# Patient Record
Sex: MALE | Race: WHITE | NOT HISPANIC OR LATINO | Employment: OTHER | URBAN - METROPOLITAN AREA
[De-identification: names, ages, dates, MRNs, and addresses within clinical notes are randomized per-mention and may not be internally consistent; named-entity substitution may affect disease eponyms.]

---

## 2017-01-17 ENCOUNTER — CONVERSION ENCOUNTER (OUTPATIENT)
Dept: SURGERY | Facility: CLINIC | Age: 72
End: 2017-01-17

## 2017-02-17 ENCOUNTER — CONVERSION ENCOUNTER (OUTPATIENT)
Dept: SURGERY | Facility: CLINIC | Age: 72
End: 2017-02-17

## 2017-02-24 ENCOUNTER — HOSPITAL ENCOUNTER (OUTPATIENT)
Dept: CARDIOLOGY | Facility: HOSPITAL | Age: 72
Discharge: HOME | End: 2017-02-24
Attending: INTERNAL MEDICINE | Admitting: INTERNAL MEDICINE

## 2017-02-27 ENCOUNTER — HOSPITAL ENCOUNTER (OUTPATIENT)
Dept: LAB | Facility: HOSPITAL | Age: 72
Setting detail: SPECIMEN
Discharge: HOME | End: 2017-02-27
Attending: INTERNAL MEDICINE | Admitting: INTERNAL MEDICINE

## 2017-02-27 LAB
ALBUMIN SERPL-MCNC: 3 G/DL (ref 3.5–4.8)
ALBUMIN/GLOB SERPL: 0.8 {RATIO} (ref 1–1.7)
ALP SERPL-CCNC: 60 IU/L (ref 32–91)
ALT SERPL-CCNC: 17 IU/L (ref 17–63)
ANION GAP SERPL CALC-SCNC: 11.1 MMOL/L (ref 10–20)
AST SERPL-CCNC: 27 IU/L (ref 15–41)
BILIRUB SERPL-MCNC: 0.5 MG/DL (ref 0.3–1.2)
BUN SERPL-MCNC: 26 MG/DL (ref 8–20)
BUN/CREAT SERPL: 18.6 (ref 6.2–20.3)
CALCIUM SERPL-MCNC: 8.4 MG/DL (ref 8.9–10.3)
CHLORIDE SERPL-SCNC: 107 MMOL/L (ref 101–111)
CHOLEST SERPL-MCNC: 133 MG/DL
CHOLEST/HDLC SERPL: 3.6 {RATIO}
CONV CO2: 26 MMOL/L (ref 22–32)
CONV LDL CHOLESTEROL DIRECT: 70 MG/DL (ref 0–100)
CONV TOTAL PROTEIN: 6.6 G/DL (ref 6.1–7.9)
CREAT UR-MCNC: 1.4 MG/DL (ref 0.7–1.2)
GLOBULIN UR ELPH-MCNC: 3.6 G/DL (ref 2.5–3.8)
GLUCOSE SERPL-MCNC: 130 MG/DL (ref 65–99)
HDLC SERPL-MCNC: 37 MG/DL
LDLC/HDLC SERPL: 1.9 {RATIO}
LIPID INTERPRETATION: ABNORMAL
POTASSIUM SERPL-SCNC: 4.1 MMOL/L (ref 3.6–5.1)
SODIUM SERPL-SCNC: 140 MMOL/L (ref 136–144)
T4 FREE SERPL-MCNC: 0.89 NG/DL (ref 0.58–1.64)
TRIGL SERPL-MCNC: 107 MG/DL
TSH SERPL-ACNC: 0.8 UIU/ML (ref 0.34–5.6)
VLDLC SERPL CALC-MCNC: 25.4 MG/DL

## 2017-03-06 ENCOUNTER — HOSPITAL ENCOUNTER (OUTPATIENT)
Dept: LAB | Facility: HOSPITAL | Age: 72
Setting detail: SPECIMEN
Discharge: HOME | End: 2017-03-06
Attending: INTERNAL MEDICINE | Admitting: INTERNAL MEDICINE

## 2017-03-06 ENCOUNTER — CONVERSION ENCOUNTER (OUTPATIENT)
Dept: SURGERY | Facility: CLINIC | Age: 72
End: 2017-03-06

## 2017-03-06 LAB
CONV MICROALBUM.,U,RANDOM: <2 MG/L
CREAT 24H UR-MCNC: 55.8 MG/DL
MICROALBUMIN/CREAT UR: <3.6 UG/MG

## 2017-04-04 ENCOUNTER — HOSPITAL ENCOUNTER (OUTPATIENT)
Dept: LAB | Facility: HOSPITAL | Age: 72
Discharge: HOME | End: 2017-04-04
Attending: INTERNAL MEDICINE | Admitting: INTERNAL MEDICINE

## 2017-04-04 LAB
ANION GAP SERPL CALC-SCNC: 14.3 MMOL/L (ref 10–20)
BASOPHILS # BLD AUTO: 0 10*3/UL (ref 0–0.2)
BASOPHILS NFR BLD AUTO: 1 % (ref 0–2)
BILIRUB UR QL STRIP: NEGATIVE MG/DL
BUN SERPL-MCNC: 25 MG/DL (ref 8–20)
BUN/CREAT SERPL: 19.2 (ref 6.2–20.3)
CALCIUM SERPL-MCNC: 9 MG/DL (ref 8.9–10.3)
CASTS URNS QL MICRO: ABNORMAL /[LPF]
CHLORIDE SERPL-SCNC: 107 MMOL/L (ref 101–111)
COLOR UR: YELLOW
CONV BACTERIA IN URINE MICRO: NEGATIVE
CONV CLARITY OF URINE: CLEAR
CONV CO2: 27 MMOL/L (ref 22–32)
CONV HYALINE CASTS IN URINE MICRO: 2 /[LPF] (ref 0–5)
CONV PROTEIN IN URINE BY AUTOMATED TEST STRIP: NEGATIVE MG/DL
CONV SMALL ROUND CELLS: ABNORMAL /[HPF]
CONV UROBILINOGEN IN URINE BY AUTOMATED TEST STRIP: 1 MG/DL
CREAT 24H UR-MCNC: 121.8 MG/DL
CREAT UR-MCNC: 1.3 MG/DL (ref 0.7–1.2)
CULTURE INDICATED?: ABNORMAL
DIFFERENTIAL METHOD BLD: (no result)
EOSINOPHIL # BLD AUTO: 0.3 10*3/UL (ref 0–0.3)
EOSINOPHIL # BLD AUTO: 4 % (ref 0–3)
ERYTHROCYTE [DISTWIDTH] IN BLOOD BY AUTOMATED COUNT: 14.9 % (ref 11.5–14.5)
GLUCOSE SERPL-MCNC: 138 MG/DL (ref 65–99)
GLUCOSE UR QL: NEGATIVE MG/DL
HCT VFR BLD AUTO: 32.6 % (ref 40–54)
HGB BLD-MCNC: 10.8 G/DL (ref 14–18)
HGB UR QL STRIP: ABNORMAL
KETONES UR QL STRIP: NEGATIVE MG/DL
LEUKOCYTE ESTERASE UR QL STRIP: NEGATIVE
LYMPHOCYTES # BLD AUTO: 1.4 10*3/UL (ref 0.8–4.8)
LYMPHOCYTES NFR BLD AUTO: 19 % (ref 18–42)
MCH RBC QN AUTO: 29.8 PG (ref 26–32)
MCHC RBC AUTO-ENTMCNC: 33.3 G/DL (ref 32–36)
MCV RBC AUTO: 89.5 FL (ref 80–94)
MONOCYTES # BLD AUTO: 0.7 10*3/UL (ref 0.1–1.3)
MONOCYTES NFR BLD AUTO: 9 % (ref 2–11)
NEUTROPHILS # BLD AUTO: 5 10*3/UL (ref 2.3–8.6)
NEUTROPHILS NFR BLD AUTO: 67 % (ref 50–75)
NITRITE UR QL STRIP: NEGATIVE
NRBC BLD AUTO-RTO: 0 /100{WBCS}
NRBC/RBC NFR BLD MANUAL: 0 10*3/UL
PH UR STRIP.AUTO: 5.5 [PH] (ref 4.5–8)
PHOSPHATE SERPL-MCNC: 2.8 MG/DL (ref 2.4–4.7)
PLATELET # BLD AUTO: 185 10*3/UL (ref 150–450)
PMV BLD AUTO: 9.3 FL (ref 7.4–10.4)
POTASSIUM SERPL-SCNC: 5.3 MMOL/L (ref 3.6–5.1)
PROT UR-MCNC: 8 MG/DL
RBC # BLD AUTO: 3.64 10*6/UL (ref 4.6–6)
RBC #/AREA URNS HPF: 2 /[HPF] (ref 0–3)
SODIUM SERPL-SCNC: 143 MMOL/L (ref 136–144)
SP GR UR: 1.02 (ref 1–1.03)
SPERM URNS QL MICRO: ABNORMAL /[HPF]
SQUAMOUS SPT QL MICRO: 1 /[HPF] (ref 0–5)
UNIDENT CRYS URNS QL MICRO: ABNORMAL /[HPF]
WBC # BLD AUTO: 7.4 10*3/UL (ref 4.5–11.5)
WBC #/AREA URNS HPF: 1 /[HPF] (ref 0–5)
YEAST SPEC QL WET PREP: ABNORMAL /[HPF]

## 2017-04-05 LAB — PTH-INTACT SERPL-MCNC: 36 PG/ML (ref 11–72)

## 2017-04-18 ENCOUNTER — HOSPITAL ENCOUNTER (OUTPATIENT)
Dept: LAB | Facility: HOSPITAL | Age: 72
Discharge: HOME | End: 2017-04-18
Attending: INTERNAL MEDICINE | Admitting: INTERNAL MEDICINE

## 2017-04-18 LAB
INR PPP: 1 (ref 2–3)
PROTHROMBIN TIME: 11.8 SEC (ref 19.4–28.5)

## 2017-04-19 ENCOUNTER — OFFICE (AMBULATORY)
Dept: URBAN - METROPOLITAN AREA CLINIC 64 | Facility: CLINIC | Age: 72
End: 2017-04-19
Payer: COMMERCIAL

## 2017-04-19 ENCOUNTER — HOSPITAL ENCOUNTER (OUTPATIENT)
Dept: OTHER | Facility: HOSPITAL | Age: 72
Setting detail: SPECIMEN
Discharge: HOME | End: 2017-04-19
Attending: INTERNAL MEDICINE | Admitting: INTERNAL MEDICINE

## 2017-04-19 ENCOUNTER — ON CAMPUS - OUTPATIENT (AMBULATORY)
Dept: URBAN - METROPOLITAN AREA HOSPITAL 2 | Facility: HOSPITAL | Age: 72
End: 2017-04-19
Payer: COMMERCIAL

## 2017-04-19 VITALS
HEART RATE: 86 BPM | OXYGEN SATURATION: 96 % | OXYGEN SATURATION: 99 % | DIASTOLIC BLOOD PRESSURE: 63 MMHG | SYSTOLIC BLOOD PRESSURE: 116 MMHG | HEART RATE: 71 BPM | OXYGEN SATURATION: 93 % | HEART RATE: 70 BPM | RESPIRATION RATE: 16 BRPM | DIASTOLIC BLOOD PRESSURE: 60 MMHG | WEIGHT: 315 LBS | HEART RATE: 74 BPM | SYSTOLIC BLOOD PRESSURE: 108 MMHG | SYSTOLIC BLOOD PRESSURE: 156 MMHG | OXYGEN SATURATION: 100 % | TEMPERATURE: 97.8 F | HEIGHT: 74 IN | HEART RATE: 72 BPM | DIASTOLIC BLOOD PRESSURE: 62 MMHG | DIASTOLIC BLOOD PRESSURE: 73 MMHG | OXYGEN SATURATION: 95 % | DIASTOLIC BLOOD PRESSURE: 66 MMHG

## 2017-04-19 DIAGNOSIS — K44.9 DIAPHRAGMATIC HERNIA WITHOUT OBSTRUCTION OR GANGRENE: ICD-10-CM

## 2017-04-19 DIAGNOSIS — K22.70 BARRETT'S ESOPHAGUS WITHOUT DYSPLASIA: ICD-10-CM

## 2017-04-19 DIAGNOSIS — K31.7 POLYP OF STOMACH AND DUODENUM: ICD-10-CM

## 2017-04-19 DIAGNOSIS — K25.9 GASTRIC ULCER, UNSPECIFIED AS ACUTE OR CHRONIC, WITHOUT HEMO: ICD-10-CM

## 2017-04-19 DIAGNOSIS — T18.2XXA FOREIGN BODY IN STOMACH, INITIAL ENCOUNTER: ICD-10-CM

## 2017-04-19 LAB
GI HISTOLOGY: A. SELECT: (no result)
GI HISTOLOGY: B. UNSPECIFIED: (no result)
GI HISTOLOGY: PDF REPORT: (no result)

## 2017-04-19 PROCEDURE — 88305 TISSUE EXAM BY PATHOLOGIST: CPT | Mod: 26 | Performed by: INTERNAL MEDICINE

## 2017-04-19 PROCEDURE — 43239 EGD BIOPSY SINGLE/MULTIPLE: CPT | Performed by: INTERNAL MEDICINE

## 2017-04-19 RX ADMIN — PROPOFOL: 10 INJECTION, EMULSION INTRAVENOUS at 12:04

## 2017-04-20 ENCOUNTER — HOSPITAL ENCOUNTER (OUTPATIENT)
Dept: SLEEP MEDICINE | Facility: HOSPITAL | Age: 72
Discharge: HOME | End: 2017-04-20
Attending: INTERNAL MEDICINE | Admitting: INTERNAL MEDICINE

## 2017-04-25 ENCOUNTER — HOSPITAL ENCOUNTER (OUTPATIENT)
Dept: LAB | Facility: HOSPITAL | Age: 72
Discharge: HOME | End: 2017-04-25
Attending: INTERNAL MEDICINE | Admitting: INTERNAL MEDICINE

## 2017-04-25 LAB — POTASSIUM SERPL-SCNC: 5.3 MMOL/L (ref 3.6–5.1)

## 2017-06-01 ENCOUNTER — HOSPITAL ENCOUNTER (OUTPATIENT)
Dept: LAB | Facility: HOSPITAL | Age: 72
Setting detail: SPECIMEN
Discharge: HOME | End: 2017-06-01
Attending: INTERNAL MEDICINE | Admitting: INTERNAL MEDICINE

## 2017-06-01 LAB
ALBUMIN SERPL-MCNC: 3.1 G/DL (ref 3.5–4.8)
ALBUMIN/GLOB SERPL: 0.7 {RATIO} (ref 1–1.7)
ALP SERPL-CCNC: 73 IU/L (ref 32–91)
ALT SERPL-CCNC: 23 IU/L (ref 17–63)
ANION GAP SERPL CALC-SCNC: 17.3 MMOL/L (ref 10–20)
AST SERPL-CCNC: 40 IU/L (ref 15–41)
BILIRUB SERPL-MCNC: 0.7 MG/DL (ref 0.3–1.2)
BUN SERPL-MCNC: 22 MG/DL (ref 8–20)
BUN/CREAT SERPL: 16.9 (ref 6.2–20.3)
CALCIUM SERPL-MCNC: 8.6 MG/DL (ref 8.9–10.3)
CHLORIDE SERPL-SCNC: 103 MMOL/L (ref 101–111)
CHOLEST SERPL-MCNC: 131 MG/DL
CHOLEST/HDLC SERPL: 3.2 {RATIO}
CONV CO2: 24 MMOL/L (ref 22–32)
CONV LDL CHOLESTEROL DIRECT: 71 MG/DL (ref 0–100)
CONV TOTAL PROTEIN: 7.4 G/DL (ref 6.1–7.9)
CREAT UR-MCNC: 1.3 MG/DL (ref 0.7–1.2)
GLOBULIN UR ELPH-MCNC: 4.3 G/DL (ref 2.5–3.8)
GLUCOSE SERPL-MCNC: 148 MG/DL (ref 65–99)
HDLC SERPL-MCNC: 41 MG/DL
LDLC/HDLC SERPL: 1.7 {RATIO}
LIPID INTERPRETATION: NORMAL
POTASSIUM SERPL-SCNC: 4.3 MMOL/L (ref 3.6–5.1)
SODIUM SERPL-SCNC: 140 MMOL/L (ref 136–144)
T4 FREE SERPL-MCNC: 0.89 NG/DL (ref 0.58–1.64)
TRIGL SERPL-MCNC: 113 MG/DL
TSH SERPL-ACNC: 1.65 UIU/ML (ref 0.34–5.6)
VLDLC SERPL CALC-MCNC: 19 MG/DL

## 2017-06-08 ENCOUNTER — CONVERSION ENCOUNTER (OUTPATIENT)
Dept: SURGERY | Facility: CLINIC | Age: 72
End: 2017-06-08

## 2017-06-08 ENCOUNTER — HOSPITAL ENCOUNTER (OUTPATIENT)
Dept: LAB | Facility: HOSPITAL | Age: 72
Setting detail: SPECIMEN
Discharge: HOME | End: 2017-06-08
Attending: INTERNAL MEDICINE | Admitting: INTERNAL MEDICINE

## 2017-06-08 LAB
CONV MICROALBUM.,U,RANDOM: 4 MG/L
CREAT 24H UR-MCNC: 67.2 MG/DL
MICROALBUMIN/CREAT UR: 6 UG/MG

## 2017-07-18 ENCOUNTER — CONVERSION ENCOUNTER (OUTPATIENT)
Dept: SURGERY | Facility: CLINIC | Age: 72
End: 2017-07-18

## 2017-08-31 ENCOUNTER — HOSPITAL ENCOUNTER (OUTPATIENT)
Dept: LAB | Facility: HOSPITAL | Age: 72
Setting detail: SPECIMEN
Discharge: HOME | End: 2017-08-31
Attending: INTERNAL MEDICINE | Admitting: INTERNAL MEDICINE

## 2017-08-31 LAB
ALBUMIN SERPL-MCNC: 3.1 G/DL (ref 3.5–4.8)
ALBUMIN/GLOB SERPL: 0.9 {RATIO} (ref 1–1.7)
ALP SERPL-CCNC: 71 IU/L (ref 32–91)
ALT SERPL-CCNC: 22 IU/L (ref 17–63)
ANION GAP SERPL CALC-SCNC: 13.9 MMOL/L (ref 10–20)
AST SERPL-CCNC: 41 IU/L (ref 15–41)
BILIRUB SERPL-MCNC: 0.4 MG/DL (ref 0.3–1.2)
BUN SERPL-MCNC: 28 MG/DL (ref 8–20)
BUN/CREAT SERPL: 18.7 (ref 6.2–20.3)
CALCIUM SERPL-MCNC: 8.5 MG/DL (ref 8.9–10.3)
CHLORIDE SERPL-SCNC: 107 MMOL/L (ref 101–111)
CHOLEST SERPL-MCNC: 132 MG/DL
CHOLEST/HDLC SERPL: 3 {RATIO}
CONV CO2: 24 MMOL/L (ref 22–32)
CONV LDL CHOLESTEROL DIRECT: 73 MG/DL (ref 0–100)
CONV TOTAL PROTEIN: 6.4 G/DL (ref 6.1–7.9)
CREAT UR-MCNC: 1.5 MG/DL (ref 0.7–1.2)
GLOBULIN UR ELPH-MCNC: 3.3 G/DL (ref 2.5–3.8)
GLUCOSE SERPL-MCNC: 174 MG/DL (ref 65–99)
HDLC SERPL-MCNC: 45 MG/DL
LDLC/HDLC SERPL: 1.6 {RATIO}
LIPID INTERPRETATION: NORMAL
POTASSIUM SERPL-SCNC: 4.9 MMOL/L (ref 3.6–5.1)
SODIUM SERPL-SCNC: 140 MMOL/L (ref 136–144)
T4 FREE SERPL-MCNC: 0.84 NG/DL (ref 0.58–1.64)
TRIGL SERPL-MCNC: 103 MG/DL
TSH SERPL-ACNC: 0.76 UIU/ML (ref 0.34–5.6)
VLDLC SERPL CALC-MCNC: 14.7 MG/DL

## 2017-09-07 ENCOUNTER — CONVERSION ENCOUNTER (OUTPATIENT)
Dept: SURGERY | Facility: CLINIC | Age: 72
End: 2017-09-07

## 2017-11-30 ENCOUNTER — HOSPITAL ENCOUNTER (OUTPATIENT)
Dept: LAB | Facility: HOSPITAL | Age: 72
Setting detail: SPECIMEN
Discharge: HOME | End: 2017-11-30
Attending: INTERNAL MEDICINE | Admitting: INTERNAL MEDICINE

## 2017-11-30 LAB
ALBUMIN SERPL-MCNC: 2.9 G/DL (ref 3.5–4.8)
ALBUMIN/GLOB SERPL: 0.7 {RATIO} (ref 1–1.7)
ALP SERPL-CCNC: 93 IU/L (ref 32–91)
ALT SERPL-CCNC: 25 IU/L (ref 17–63)
ANION GAP SERPL CALC-SCNC: 13.8 MMOL/L (ref 10–20)
AST SERPL-CCNC: 37 IU/L (ref 15–41)
BILIRUB SERPL-MCNC: 0.7 MG/DL (ref 0.3–1.2)
BUN SERPL-MCNC: 31 MG/DL (ref 8–20)
BUN/CREAT SERPL: 22.1 (ref 6.2–20.3)
CALCIUM SERPL-MCNC: 8.4 MG/DL (ref 8.9–10.3)
CHLORIDE SERPL-SCNC: 106 MMOL/L (ref 101–111)
CHOLEST SERPL-MCNC: 116 MG/DL
CHOLEST/HDLC SERPL: 2.8 {RATIO}
CONV CO2: 24 MMOL/L (ref 22–32)
CONV LDL CHOLESTEROL DIRECT: 58 MG/DL (ref 0–100)
CONV TOTAL PROTEIN: 7.2 G/DL (ref 6.1–7.9)
CREAT UR-MCNC: 1.4 MG/DL (ref 0.7–1.2)
GLOBULIN UR ELPH-MCNC: 4.3 G/DL (ref 2.5–3.8)
GLUCOSE SERPL-MCNC: 166 MG/DL (ref 65–99)
HDLC SERPL-MCNC: 42 MG/DL
LDLC/HDLC SERPL: 1.4 {RATIO}
LIPID INTERPRETATION: NORMAL
POTASSIUM SERPL-SCNC: 5.8 MMOL/L (ref 3.6–5.1)
SODIUM SERPL-SCNC: 138 MMOL/L (ref 136–144)
T4 FREE SERPL-MCNC: 0.97 NG/DL (ref 0.58–1.64)
TRIGL SERPL-MCNC: 95 MG/DL
TSH SERPL-ACNC: 0.89 UIU/ML (ref 0.34–5.6)
VLDLC SERPL CALC-MCNC: 15.5 MG/DL

## 2017-12-05 ENCOUNTER — HOSPITAL ENCOUNTER (OUTPATIENT)
Dept: LAB | Facility: HOSPITAL | Age: 72
Setting detail: SPECIMEN
Discharge: HOME | End: 2017-12-05
Attending: INTERNAL MEDICINE | Admitting: INTERNAL MEDICINE

## 2017-12-05 LAB
ANION GAP SERPL CALC-SCNC: 12.8 MMOL/L (ref 10–20)
BUN SERPL-MCNC: 30 MG/DL (ref 8–20)
BUN/CREAT SERPL: 23.1 (ref 6.2–20.3)
CALCIUM SERPL-MCNC: 8.1 MG/DL (ref 8.9–10.3)
CHLORIDE SERPL-SCNC: 104 MMOL/L (ref 101–111)
CONV CO2: 24 MMOL/L (ref 22–32)
CREAT UR-MCNC: 1.3 MG/DL (ref 0.7–1.2)
GLUCOSE SERPL-MCNC: 167 MG/DL (ref 65–99)
POTASSIUM SERPL-SCNC: 4.8 MMOL/L (ref 3.6–5.1)
SODIUM SERPL-SCNC: 136 MMOL/L (ref 136–144)

## 2017-12-07 ENCOUNTER — HOSPITAL ENCOUNTER (OUTPATIENT)
Dept: LAB | Facility: HOSPITAL | Age: 72
Setting detail: SPECIMEN
Discharge: HOME | End: 2017-12-07
Attending: INTERNAL MEDICINE | Admitting: INTERNAL MEDICINE

## 2017-12-07 ENCOUNTER — CONVERSION ENCOUNTER (OUTPATIENT)
Dept: SURGERY | Facility: CLINIC | Age: 72
End: 2017-12-07

## 2018-01-23 ENCOUNTER — HOSPITAL ENCOUNTER (OUTPATIENT)
Dept: LAB | Facility: HOSPITAL | Age: 73
Discharge: HOME | End: 2018-01-23
Attending: INTERNAL MEDICINE | Admitting: INTERNAL MEDICINE

## 2018-01-23 LAB
ANION GAP SERPL CALC-SCNC: 12.6 MMOL/L (ref 10–20)
BASOPHILS # BLD AUTO: 0.1 10*3/UL (ref 0–0.2)
BASOPHILS NFR BLD AUTO: 1 % (ref 0–2)
BILIRUB UR QL STRIP: NEGATIVE MG/DL
BUN SERPL-MCNC: 28 MG/DL (ref 8–20)
BUN/CREAT SERPL: 21.5 (ref 6.2–20.3)
CALCIUM SERPL-MCNC: 8.3 MG/DL (ref 8.9–10.3)
CASTS URNS QL MICRO: ABNORMAL /[LPF]
CHLORIDE SERPL-SCNC: 105 MMOL/L (ref 101–111)
COLOR UR: YELLOW
CONV BACTERIA IN URINE MICRO: NEGATIVE
CONV CLARITY OF URINE: CLEAR
CONV CO2: 25 MMOL/L (ref 22–32)
CONV HYALINE CASTS IN URINE MICRO: 1 /[LPF] (ref 0–5)
CONV PROTEIN IN URINE BY AUTOMATED TEST STRIP: NEGATIVE MG/DL
CONV SMALL ROUND CELLS: ABNORMAL /[HPF]
CONV UROBILINOGEN IN URINE BY AUTOMATED TEST STRIP: 1 MG/DL
CREAT 24H UR-MCNC: 73.4 MG/DL
CREAT UR-MCNC: 1.3 MG/DL (ref 0.7–1.2)
CULTURE INDICATED?: ABNORMAL
DIFFERENTIAL METHOD BLD: (no result)
EOSINOPHIL # BLD AUTO: 0.3 10*3/UL (ref 0–0.3)
EOSINOPHIL # BLD AUTO: 5 % (ref 0–3)
ERYTHROCYTE [DISTWIDTH] IN BLOOD BY AUTOMATED COUNT: 17.6 % (ref 11.5–14.5)
GLUCOSE SERPL-MCNC: 170 MG/DL (ref 65–99)
GLUCOSE UR QL: NEGATIVE MG/DL
HCT VFR BLD AUTO: 24.9 % (ref 40–54)
HGB BLD-MCNC: 7.4 G/DL (ref 14–18)
HGB UR QL STRIP: ABNORMAL
KETONES UR QL STRIP: NEGATIVE MG/DL
LEUKOCYTE ESTERASE UR QL STRIP: NEGATIVE
LYMPHOCYTES # BLD AUTO: 1.3 10*3/UL (ref 0.8–4.8)
LYMPHOCYTES NFR BLD AUTO: 21 % (ref 18–42)
MCH RBC QN AUTO: 23.9 PG (ref 26–32)
MCHC RBC AUTO-ENTMCNC: 29.9 G/DL (ref 32–36)
MCV RBC AUTO: 80 FL (ref 80–94)
MONOCYTES # BLD AUTO: 0.6 10*3/UL (ref 0.1–1.3)
MONOCYTES NFR BLD AUTO: 10 % (ref 2–11)
NEUTROPHILS # BLD AUTO: 3.9 10*3/UL (ref 2.3–8.6)
NEUTROPHILS NFR BLD AUTO: 63 % (ref 50–75)
NITRITE UR QL STRIP: NEGATIVE
NRBC BLD AUTO-RTO: 0 /100{WBCS}
NRBC/RBC NFR BLD MANUAL: 0 10*3/UL
PH UR STRIP.AUTO: 6 [PH] (ref 4.5–8)
PHOSPHATE SERPL-MCNC: 2.6 MG/DL (ref 2.4–4.7)
PLATELET # BLD AUTO: 204 10*3/UL (ref 150–450)
PMV BLD AUTO: 8.7 FL (ref 7.4–10.4)
POTASSIUM SERPL-SCNC: 4.6 MMOL/L (ref 3.6–5.1)
PROT UR-MCNC: 9 MG/DL
PROT/CREAT UR: 0.1 MG/MG (ref 0–22)
RBC # BLD AUTO: 3.11 10*6/UL (ref 4.6–6)
RBC #/AREA URNS HPF: 18 /[HPF] (ref 0–3)
SODIUM SERPL-SCNC: 138 MMOL/L (ref 136–144)
SP GR UR: 1.02 (ref 1–1.03)
SPERM URNS QL MICRO: ABNORMAL /[HPF]
SQUAMOUS SPT QL MICRO: 1 /[HPF] (ref 0–5)
UNIDENT CRYS URNS QL MICRO: ABNORMAL /[HPF]
URATE SERPL-MCNC: 3.9 MG/DL (ref 4.8–8.7)
WBC # BLD AUTO: 6.1 10*3/UL (ref 4.5–11.5)
WBC #/AREA URNS HPF: 1 /[HPF] (ref 0–5)
YEAST SPEC QL WET PREP: ABNORMAL /[HPF]

## 2018-01-25 ENCOUNTER — HOSPITAL ENCOUNTER (OUTPATIENT)
Dept: INFUSION THERAPY | Facility: HOSPITAL | Age: 73
Discharge: HOME | End: 2018-01-25
Attending: INTERNAL MEDICINE | Admitting: INTERNAL MEDICINE

## 2018-01-25 LAB
ABO + RH BLD: NORMAL
ARMBAND: NORMAL
BLD COMPONENT TYPE: NORMAL
BLD COMPONENT TYPE: NORMAL
BLD GP AB SCN SERPL QL: NEGATIVE
BPU ID: NORMAL
CONV PRODUCT 1 STATUS: NORMAL
CROSSMATCH EXPIRATION: NORMAL
CROSSMATCH INTERPRETATION: NORMAL
CROSSMATCH: NORMAL
HCT VFR BLD AUTO: 25.3 % (ref 40–54)
HGB BLD-MCNC: 7.6 G/DL (ref 14–18)
Lab: NORMAL
NUM BPU REQUESTED: 1
PRE-HGB: 7.6 MG/DL
TRANS STATUS: NORMAL
UNIT DIVISION: 0

## 2018-03-01 ENCOUNTER — HOSPITAL ENCOUNTER (OUTPATIENT)
Dept: LAB | Facility: HOSPITAL | Age: 73
Setting detail: SPECIMEN
Discharge: HOME | End: 2018-03-01
Attending: INTERNAL MEDICINE | Admitting: INTERNAL MEDICINE

## 2018-03-01 LAB
ALBUMIN SERPL-MCNC: 2.8 G/DL (ref 3.5–4.8)
ALBUMIN/GLOB SERPL: 0.7 {RATIO} (ref 1–1.7)
ALP SERPL-CCNC: 106 IU/L (ref 32–91)
ALT SERPL-CCNC: 27 IU/L (ref 17–63)
ANION GAP SERPL CALC-SCNC: 11.4 MMOL/L (ref 10–20)
AST SERPL-CCNC: 54 IU/L (ref 15–41)
BILIRUB SERPL-MCNC: 0.5 MG/DL (ref 0.3–1.2)
BUN SERPL-MCNC: 31 MG/DL (ref 8–20)
BUN/CREAT SERPL: 22.1 (ref 6.2–20.3)
CALCIUM SERPL-MCNC: 8.3 MG/DL (ref 8.9–10.3)
CHLORIDE SERPL-SCNC: 106 MMOL/L (ref 101–111)
CHOLEST SERPL-MCNC: 107 MG/DL
CHOLEST/HDLC SERPL: 2.8 {RATIO}
CONV CO2: 24 MMOL/L (ref 22–32)
CONV LDL CHOLESTEROL DIRECT: 52 MG/DL (ref 0–100)
CONV TOTAL PROTEIN: 6.9 G/DL (ref 6.1–7.9)
CREAT UR-MCNC: 1.4 MG/DL (ref 0.7–1.2)
GLOBULIN UR ELPH-MCNC: 4.1 G/DL (ref 2.5–3.8)
GLUCOSE SERPL-MCNC: 129 MG/DL (ref 65–99)
HDLC SERPL-MCNC: 38 MG/DL
LDLC/HDLC SERPL: 1.4 {RATIO}
LIPID INTERPRETATION: ABNORMAL
POTASSIUM SERPL-SCNC: 4.4 MMOL/L (ref 3.6–5.1)
SODIUM SERPL-SCNC: 137 MMOL/L (ref 136–144)
TRIGL SERPL-MCNC: 79 MG/DL
VLDLC SERPL CALC-MCNC: 17.2 MG/DL

## 2018-03-05 ENCOUNTER — ON CAMPUS - OUTPATIENT (AMBULATORY)
Dept: URBAN - METROPOLITAN AREA HOSPITAL 2 | Facility: HOSPITAL | Age: 73
End: 2018-03-05
Payer: COMMERCIAL

## 2018-03-05 ENCOUNTER — HOSPITAL ENCOUNTER (OUTPATIENT)
Dept: OTHER | Facility: HOSPITAL | Age: 73
Setting detail: SPECIMEN
Discharge: HOME | End: 2018-03-05
Attending: INTERNAL MEDICINE | Admitting: INTERNAL MEDICINE

## 2018-03-05 ENCOUNTER — OFFICE (AMBULATORY)
Dept: URBAN - METROPOLITAN AREA PATHOLOGY 4 | Facility: PATHOLOGY | Age: 73
End: 2018-03-05
Payer: COMMERCIAL

## 2018-03-05 VITALS
WEIGHT: 315 LBS | DIASTOLIC BLOOD PRESSURE: 92 MMHG | HEART RATE: 81 BPM | HEART RATE: 69 BPM | DIASTOLIC BLOOD PRESSURE: 74 MMHG | DIASTOLIC BLOOD PRESSURE: 57 MMHG | HEART RATE: 72 BPM | HEART RATE: 75 BPM | OXYGEN SATURATION: 94 % | SYSTOLIC BLOOD PRESSURE: 82 MMHG | SYSTOLIC BLOOD PRESSURE: 91 MMHG | OXYGEN SATURATION: 97 % | SYSTOLIC BLOOD PRESSURE: 153 MMHG | DIASTOLIC BLOOD PRESSURE: 59 MMHG | HEART RATE: 74 BPM | OXYGEN SATURATION: 100 % | RESPIRATION RATE: 16 BRPM | SYSTOLIC BLOOD PRESSURE: 146 MMHG | HEART RATE: 65 BPM | SYSTOLIC BLOOD PRESSURE: 123 MMHG | HEART RATE: 71 BPM | SYSTOLIC BLOOD PRESSURE: 109 MMHG | HEART RATE: 64 BPM | RESPIRATION RATE: 12 BRPM | DIASTOLIC BLOOD PRESSURE: 65 MMHG | HEART RATE: 70 BPM | OXYGEN SATURATION: 99 % | RESPIRATION RATE: 15 BRPM | SYSTOLIC BLOOD PRESSURE: 160 MMHG | HEIGHT: 74 IN | OXYGEN SATURATION: 98 % | SYSTOLIC BLOOD PRESSURE: 85 MMHG | HEART RATE: 76 BPM | DIASTOLIC BLOOD PRESSURE: 79 MMHG | OXYGEN SATURATION: 96 % | SYSTOLIC BLOOD PRESSURE: 103 MMHG | SYSTOLIC BLOOD PRESSURE: 99 MMHG | SYSTOLIC BLOOD PRESSURE: 118 MMHG | DIASTOLIC BLOOD PRESSURE: 67 MMHG | DIASTOLIC BLOOD PRESSURE: 73 MMHG | DIASTOLIC BLOOD PRESSURE: 56 MMHG | DIASTOLIC BLOOD PRESSURE: 76 MMHG | DIASTOLIC BLOOD PRESSURE: 53 MMHG | HEART RATE: 66 BPM | SYSTOLIC BLOOD PRESSURE: 145 MMHG

## 2018-03-05 DIAGNOSIS — D50.0 IRON DEFICIENCY ANEMIA SECONDARY TO BLOOD LOSS (CHRONIC): ICD-10-CM

## 2018-03-05 DIAGNOSIS — K22.70 BARRETT'S ESOPHAGUS WITHOUT DYSPLASIA: ICD-10-CM

## 2018-03-05 DIAGNOSIS — Z91.19 PATIENT'S NONCOMPLIANCE WITH OTHER MEDICAL TREATMENT AND REG: ICD-10-CM

## 2018-03-05 DIAGNOSIS — K44.9 DIAPHRAGMATIC HERNIA WITHOUT OBSTRUCTION OR GANGRENE: ICD-10-CM

## 2018-03-05 DIAGNOSIS — Z86.010 PERSONAL HISTORY OF COLONIC POLYPS: ICD-10-CM

## 2018-03-05 LAB
GI HISTOLOGY: A. UNSPECIFIED: (no result)
GI HISTOLOGY: PDF REPORT: (no result)

## 2018-03-05 PROCEDURE — 43239 EGD BIOPSY SINGLE/MULTIPLE: CPT | Performed by: INTERNAL MEDICINE

## 2018-03-05 PROCEDURE — 45378 DIAGNOSTIC COLONOSCOPY: CPT | Performed by: INTERNAL MEDICINE

## 2018-03-05 PROCEDURE — 88305 TISSUE EXAM BY PATHOLOGIST: CPT | Mod: 26 | Performed by: INTERNAL MEDICINE

## 2018-03-05 RX ORDER — POLYETHYLENE GLYCOL 3350 17 G/17G
POWDER, FOR SOLUTION ORAL
Qty: 3 | Refills: 3 | Status: COMPLETED
Start: 2018-03-05 | End: 2018-10-31

## 2018-03-05 RX ADMIN — PROPOFOL: 10 INJECTION, EMULSION INTRAVENOUS at 13:55

## 2018-03-08 ENCOUNTER — CONVERSION ENCOUNTER (OUTPATIENT)
Dept: SURGERY | Facility: CLINIC | Age: 73
End: 2018-03-08

## 2018-03-08 ENCOUNTER — HOSPITAL ENCOUNTER (OUTPATIENT)
Dept: LAB | Facility: HOSPITAL | Age: 73
Setting detail: SPECIMEN
Discharge: HOME | End: 2018-03-08
Attending: INTERNAL MEDICINE | Admitting: INTERNAL MEDICINE

## 2018-03-08 LAB
CONV MICROALBUM.,U,RANDOM: 5 MG/L
CREAT 24H UR-MCNC: 56.2 MG/DL
MICROALBUMIN/CREAT UR: 8.9 UG/MG

## 2018-05-17 ENCOUNTER — HOSPITAL ENCOUNTER (OUTPATIENT)
Dept: LAB | Facility: HOSPITAL | Age: 73
Discharge: HOME | End: 2018-05-17
Attending: FAMILY MEDICINE | Admitting: FAMILY MEDICINE

## 2018-05-17 LAB
BASOPHILS # BLD AUTO: 0.1 10*3/UL (ref 0–0.2)
BASOPHILS NFR BLD AUTO: 1 % (ref 0–2)
DIFFERENTIAL METHOD BLD: (no result)
EOSINOPHIL # BLD AUTO: 0.3 10*3/UL (ref 0–0.3)
EOSINOPHIL # BLD AUTO: 5 % (ref 0–3)
ERYTHROCYTE [DISTWIDTH] IN BLOOD BY AUTOMATED COUNT: 18.6 % (ref 11.5–14.5)
HCT VFR BLD AUTO: 26.7 % (ref 40–54)
HGB BLD-MCNC: 8.4 G/DL (ref 14–18)
INR PPP: 3 (ref 2–3)
LYMPHOCYTES # BLD AUTO: 1.5 10*3/UL (ref 0.8–4.8)
LYMPHOCYTES NFR BLD AUTO: 24 % (ref 18–42)
MCH RBC QN AUTO: 26.5 PG (ref 26–32)
MCHC RBC AUTO-ENTMCNC: 31.3 G/DL (ref 32–36)
MCV RBC AUTO: 84.5 FL (ref 80–94)
MONOCYTES # BLD AUTO: 0.7 10*3/UL (ref 0.1–1.3)
MONOCYTES NFR BLD AUTO: 11 % (ref 2–11)
NEUTROPHILS # BLD AUTO: 3.7 10*3/UL (ref 2.3–8.6)
NEUTROPHILS NFR BLD AUTO: 59 % (ref 50–75)
NRBC BLD AUTO-RTO: 0 /100{WBCS}
NRBC/RBC NFR BLD MANUAL: 0 10*3/UL
PLATELET # BLD AUTO: 182 10*3/UL (ref 150–450)
PMV BLD AUTO: 8.8 FL (ref 7.4–10.4)
PROTHROMBIN TIME: 30.3 SEC (ref 19.4–28.5)
RBC # BLD AUTO: 3.16 10*6/UL (ref 4.6–6)
WBC # BLD AUTO: 6.3 10*3/UL (ref 4.5–11.5)

## 2018-05-24 ENCOUNTER — HOSPITAL ENCOUNTER (OUTPATIENT)
Dept: ONCOLOGY | Facility: HOSPITAL | Age: 73
Discharge: HOME | End: 2018-05-24
Attending: INTERNAL MEDICINE | Admitting: INTERNAL MEDICINE

## 2018-05-24 ENCOUNTER — CLINICAL SUPPORT (OUTPATIENT)
Dept: ONCOLOGY | Facility: HOSPITAL | Age: 73
End: 2018-05-24

## 2018-05-24 ENCOUNTER — HOSPITAL ENCOUNTER (OUTPATIENT)
Dept: ONCOLOGY | Facility: CLINIC | Age: 73
Setting detail: INFUSION SERIES
Discharge: HOME | End: 2018-05-24
Attending: INTERNAL MEDICINE | Admitting: INTERNAL MEDICINE

## 2018-05-24 LAB
ALBUMIN SERPL-MCNC: 2.7 G/DL (ref 3.5–4.8)
ALBUMIN/GLOB SERPL: 0.6 {RATIO} (ref 1–1.7)
ALP SERPL-CCNC: 131 IU/L (ref 32–91)
ALT SERPL-CCNC: 37 IU/L (ref 17–63)
ANION GAP SERPL CALC-SCNC: 13.6 MMOL/L (ref 10–20)
AST SERPL-CCNC: 46 IU/L (ref 15–41)
BILIRUB SERPL-MCNC: 0.5 MG/DL (ref 0.3–1.2)
BUN SERPL-MCNC: 27 MG/DL (ref 8–20)
BUN/CREAT SERPL: 19.3 (ref 6.2–20.3)
CALCIUM SERPL-MCNC: 8.2 MG/DL (ref 8.9–10.3)
CHLORIDE SERPL-SCNC: 105 MMOL/L (ref 101–111)
CONV CO2: 26 MMOL/L (ref 22–32)
CONV TOTAL PROTEIN: 6.9 G/DL (ref 6.1–7.9)
CREAT UR-MCNC: 1.4 MG/DL (ref 0.7–1.2)
GLOBULIN UR ELPH-MCNC: 4.2 G/DL (ref 2.5–3.8)
GLUCOSE SERPL-MCNC: 119 MG/DL (ref 65–99)
IRON SATN MFR SERPL: 48 % (ref 20–50)
IRON SERPL-MCNC: 183 UG/DL (ref 45–182)
MAGNESIUM UR-MCNC: 2.16 % (ref 0.5–1.5)
POTASSIUM SERPL-SCNC: 4.6 MMOL/L (ref 3.6–5.1)
RETICS/RBC NFR MANUAL: 0.06 10*6/UL
SODIUM SERPL-SCNC: 140 MMOL/L (ref 136–144)
TIBC SERPL-MCNC: 379 UG/DL (ref 228–428)

## 2018-05-24 NOTE — PROGRESS NOTES
PATIENTS ONCOLOGY RECORD LOCATED IN [facility]      Subjective     Name:  JOSE PATEL     Date:  2018  Address:  [address] [place] [address]  Home: [phone]  :  1945 AGE:  72 y.o.        RECORDS OBTAINED:  Patients Oncology Record is located in [facility]

## 2018-05-25 LAB
ALBUMIN SERPL-MCNC: 2.9 G/DL (ref 3.5–4.8)
ALPHA1 GLOB FLD ELPH-MCNC: 0.2 GM/DL (ref 0.1–0.4)
ALPHA2 GLOB SERPL ELPH-MCNC: 0.7 GM/DL (ref 0.5–1)
B-GLOBULIN SERPL ELPH-MCNC: 1.4 GM/DL (ref 0.7–1.4)
CONV TOTAL PROTEIN: 6.9 G/DL (ref 6.1–7.9)
GAMMA GLOB SERPL ELPH-MCNC: 1.7 GM/DL (ref 0.6–1.6)
HAPTOGLOB SERPL-MCNC: 138 MG/DL (ref 36–195)
INSULIN SERPL-ACNC: ABNORMAL U[IU]/ML
PROT PATTERN SERPL IFE-IMP: NORMAL

## 2018-05-29 ENCOUNTER — HOSPITAL ENCOUNTER (OUTPATIENT)
Dept: ONCOLOGY | Facility: CLINIC | Age: 73
Setting detail: INFUSION SERIES
Discharge: HOME | End: 2018-05-29
Attending: INTERNAL MEDICINE | Admitting: INTERNAL MEDICINE

## 2018-05-29 ENCOUNTER — CLINICAL SUPPORT (OUTPATIENT)
Dept: ONCOLOGY | Facility: HOSPITAL | Age: 73
End: 2018-05-29

## 2018-05-29 NOTE — PROGRESS NOTES
PATIENTS ONCOLOGY RECORD LOCATED IN [facility]      Subjective     Name:  JOSE PATEL     Date:  2018  Address:  [address] [place] [address]  Home: [phone]  :  1945 AGE:  72 y.o.        RECORDS OBTAINED:  Patients Oncology Record is located in [facility]

## 2018-06-06 ENCOUNTER — CONVERSION ENCOUNTER (OUTPATIENT)
Dept: SURGERY | Facility: CLINIC | Age: 73
End: 2018-06-06

## 2018-06-08 ENCOUNTER — HOSPITAL ENCOUNTER (OUTPATIENT)
Dept: LAB | Facility: HOSPITAL | Age: 73
Setting detail: SPECIMEN
Discharge: HOME | End: 2018-06-08
Attending: INTERNAL MEDICINE | Admitting: INTERNAL MEDICINE

## 2018-06-08 LAB
ALBUMIN SERPL-MCNC: 2.7 G/DL (ref 3.5–4.8)
ALBUMIN/GLOB SERPL: 0.6 {RATIO} (ref 1–1.7)
ALP SERPL-CCNC: 209 IU/L (ref 32–91)
ALT SERPL-CCNC: 84 IU/L (ref 17–63)
ANION GAP SERPL CALC-SCNC: 14.2 MMOL/L (ref 10–20)
AST SERPL-CCNC: 121 IU/L (ref 15–41)
BILIRUB SERPL-MCNC: 1.1 MG/DL (ref 0.3–1.2)
BUN SERPL-MCNC: 30 MG/DL (ref 8–20)
BUN/CREAT SERPL: 20 (ref 6.2–20.3)
CALCIUM SERPL-MCNC: 8.5 MG/DL (ref 8.9–10.3)
CHLORIDE SERPL-SCNC: 108 MMOL/L (ref 101–111)
CONV CO2: 23 MMOL/L (ref 22–32)
CONV MICROALBUM.,U,RANDOM: 6 MG/L
CONV TOTAL PROTEIN: 7.2 G/DL (ref 6.1–7.9)
CREAT 24H UR-MCNC: 72.1 MG/DL
CREAT UR-MCNC: 1.5 MG/DL (ref 0.7–1.2)
GLOBULIN UR ELPH-MCNC: 4.5 G/DL (ref 2.5–3.8)
GLUCOSE SERPL-MCNC: 130 MG/DL (ref 65–99)
MICROALBUMIN/CREAT UR: 8.3 UG/MG
POTASSIUM SERPL-SCNC: 5.2 MMOL/L (ref 3.6–5.1)
SODIUM SERPL-SCNC: 140 MMOL/L (ref 136–144)

## 2018-06-15 ENCOUNTER — CONVERSION ENCOUNTER (OUTPATIENT)
Dept: SURGERY | Facility: CLINIC | Age: 73
End: 2018-06-15

## 2018-06-19 ENCOUNTER — HOSPITAL ENCOUNTER (OUTPATIENT)
Dept: ONCOLOGY | Facility: HOSPITAL | Age: 73
Discharge: HOME | End: 2018-06-19
Attending: INTERNAL MEDICINE | Admitting: INTERNAL MEDICINE

## 2018-06-19 ENCOUNTER — HOSPITAL ENCOUNTER (OUTPATIENT)
Dept: ONCOLOGY | Facility: CLINIC | Age: 73
Setting detail: INFUSION SERIES
Discharge: HOME | End: 2018-06-19
Attending: INTERNAL MEDICINE | Admitting: INTERNAL MEDICINE

## 2018-06-19 ENCOUNTER — CLINICAL SUPPORT (OUTPATIENT)
Dept: ONCOLOGY | Facility: HOSPITAL | Age: 73
End: 2018-06-19

## 2018-06-19 NOTE — PROGRESS NOTES
PATIENTS ONCOLOGY RECORD LOCATED IN [facility]      Subjective     Name:  JOSE PATEL     Date:  2018  Address:  [facility] [place] [address]  Home: [phone]  :  1945 AGE:  72 y.o.        RECORDS OBTAINED:  Patients Oncology Record is located in [facility]

## 2018-06-20 LAB
A1AT SERPL-MCNC: 144 MG/DL (ref 88–174)
CERULOPLASMIN SERPL-MCNC: 38 MG/DL (ref 22–58)
HAV IGM SERPL QL IA: NONREACTIVE
HBV CORE IGM SERPL QL IA: NONREACTIVE
HBV SURFACE AG SERPL QL IA: NONREACTIVE
HCV AB SER DONR QL: NORMAL
HCV AB SER DONR QL: NORMAL
IGA1 MFR SER: 783 MG/DL (ref 50–400)
IGG1 SER-MCNC: 1480 MG/DL (ref 600–1500)
IGM SERPL-MCNC: 93 MG/DL (ref 40–300)

## 2018-06-22 ENCOUNTER — HOSPITAL ENCOUNTER (OUTPATIENT)
Dept: CT IMAGING | Facility: HOSPITAL | Age: 73
Discharge: HOME | End: 2018-06-22
Attending: NURSE PRACTITIONER | Admitting: NURSE PRACTITIONER

## 2018-06-25 ENCOUNTER — HOSPITAL ENCOUNTER (OUTPATIENT)
Dept: GENERAL RADIOLOGY | Facility: HOSPITAL | Age: 73
Discharge: HOME | End: 2018-06-25
Attending: NURSE PRACTITIONER | Admitting: NURSE PRACTITIONER

## 2018-06-26 LAB
KAPPA LC SERPL-MCNC: 6.41 MG/DL (ref 0.33–1.94)
KAPPA LC/LAMBDA SER: 0.81 {RATIO} (ref 0.26–1.65)
LAMBDA LC FREE SERPL-MCNC: 7.89 MG/DL (ref 0.57–2.63)

## 2018-06-29 ENCOUNTER — CLINICAL SUPPORT (OUTPATIENT)
Dept: ONCOLOGY | Facility: HOSPITAL | Age: 73
End: 2018-06-29

## 2018-06-29 ENCOUNTER — HOSPITAL ENCOUNTER (OUTPATIENT)
Dept: ONCOLOGY | Facility: CLINIC | Age: 73
Setting detail: INFUSION SERIES
Discharge: HOME | End: 2018-06-29
Attending: INTERNAL MEDICINE | Admitting: INTERNAL MEDICINE

## 2018-06-29 NOTE — PROGRESS NOTES
PATIENTS ONCOLOGY RECORD LOCATED IN [facility]      Subjective     Name:  JOSE PATEL     Date:  2018  Address:  [address] [place] [address]  Home: [phone]  :  1945 AGE:  72 y.o.        RECORDS OBTAINED:  Patients Oncology Record is located in [facility]

## 2018-07-02 ENCOUNTER — HOSPITAL ENCOUNTER (OUTPATIENT)
Dept: MRI IMAGING | Facility: HOSPITAL | Age: 73
Discharge: HOME | End: 2018-07-02
Attending: NURSE PRACTITIONER | Admitting: NURSE PRACTITIONER

## 2018-07-06 ENCOUNTER — INPATIENT HOSPITAL (AMBULATORY)
Dept: URBAN - METROPOLITAN AREA HOSPITAL 84 | Facility: HOSPITAL | Age: 73
End: 2018-07-06
Payer: COMMERCIAL

## 2018-07-06 DIAGNOSIS — K74.60 UNSPECIFIED CIRRHOSIS OF LIVER: ICD-10-CM

## 2018-07-06 DIAGNOSIS — K72.90 HEPATIC FAILURE, UNSPECIFIED WITHOUT COMA: ICD-10-CM

## 2018-07-06 DIAGNOSIS — D69.6 THROMBOCYTOPENIA, UNSPECIFIED: ICD-10-CM

## 2018-07-06 DIAGNOSIS — R94.5 ABNORMAL RESULTS OF LIVER FUNCTION STUDIES: ICD-10-CM

## 2018-07-06 PROCEDURE — 99223 1ST HOSP IP/OBS HIGH 75: CPT | Performed by: NURSE PRACTITIONER

## 2018-07-07 ENCOUNTER — INPATIENT HOSPITAL (AMBULATORY)
Dept: URBAN - METROPOLITAN AREA HOSPITAL 84 | Facility: HOSPITAL | Age: 73
End: 2018-07-07
Payer: COMMERCIAL

## 2018-07-07 DIAGNOSIS — D69.6 THROMBOCYTOPENIA, UNSPECIFIED: ICD-10-CM

## 2018-07-07 DIAGNOSIS — K72.90 HEPATIC FAILURE, UNSPECIFIED WITHOUT COMA: ICD-10-CM

## 2018-07-07 PROCEDURE — 99232 SBSQ HOSP IP/OBS MODERATE 35: CPT | Performed by: NURSE PRACTITIONER

## 2018-07-08 PROCEDURE — 99232 SBSQ HOSP IP/OBS MODERATE 35: CPT | Performed by: NURSE PRACTITIONER

## 2018-07-09 ENCOUNTER — INPATIENT HOSPITAL (AMBULATORY)
Dept: URBAN - METROPOLITAN AREA HOSPITAL 84 | Facility: HOSPITAL | Age: 73
End: 2018-07-09
Payer: COMMERCIAL

## 2018-07-09 DIAGNOSIS — K86.89 OTHER SPECIFIED DISEASES OF PANCREAS: ICD-10-CM

## 2018-07-09 DIAGNOSIS — K83.1 OBSTRUCTION OF BILE DUCT: ICD-10-CM

## 2018-07-09 DIAGNOSIS — R94.5 ABNORMAL RESULTS OF LIVER FUNCTION STUDIES: ICD-10-CM

## 2018-07-09 DIAGNOSIS — K80.51 CALCULUS OF BILE DUCT WITHOUT CHOLANGITIS OR CHOLECYSTITIS W: ICD-10-CM

## 2018-07-09 DIAGNOSIS — R93.2 ABNORMAL FINDINGS ON DIAGNOSTIC IMAGING OF LIVER AND BILIARY: ICD-10-CM

## 2018-07-09 PROCEDURE — 43274 ERCP DUCT STENT PLACEMENT: CPT | Performed by: INTERNAL MEDICINE

## 2018-07-09 PROCEDURE — 43264 ERCP REMOVE DUCT CALCULI: CPT | Performed by: INTERNAL MEDICINE

## 2018-07-10 ENCOUNTER — INPATIENT HOSPITAL (AMBULATORY)
Dept: URBAN - METROPOLITAN AREA HOSPITAL 84 | Facility: HOSPITAL | Age: 73
End: 2018-07-10
Payer: COMMERCIAL

## 2018-07-10 DIAGNOSIS — K74.60 UNSPECIFIED CIRRHOSIS OF LIVER: ICD-10-CM

## 2018-07-10 DIAGNOSIS — K80.51 CALCULUS OF BILE DUCT WITHOUT CHOLANGITIS OR CHOLECYSTITIS W: ICD-10-CM

## 2018-07-10 DIAGNOSIS — R94.5 ABNORMAL RESULTS OF LIVER FUNCTION STUDIES: ICD-10-CM

## 2018-07-10 PROCEDURE — 99231 SBSQ HOSP IP/OBS SF/LOW 25: CPT | Performed by: NURSE PRACTITIONER

## 2018-07-18 ENCOUNTER — CLINICAL SUPPORT (OUTPATIENT)
Dept: ONCOLOGY | Facility: HOSPITAL | Age: 73
End: 2018-07-18

## 2018-07-18 ENCOUNTER — HOSPITAL ENCOUNTER (OUTPATIENT)
Dept: ONCOLOGY | Facility: CLINIC | Age: 73
Setting detail: INFUSION SERIES
Discharge: HOME | End: 2018-07-18
Attending: INTERNAL MEDICINE | Admitting: INTERNAL MEDICINE

## 2018-07-18 NOTE — PROGRESS NOTES
PATIENTS ONCOLOGY RECORD LOCATED IN [facility]      Subjective     Name:  JOSE PATEL     Date:  2018  Address:  [address] [place] [address]  Home: [phone]  :  1945 AGE:  72 y.o.        RECORDS OBTAINED:  Patients Oncology Record is located in [facility]

## 2018-08-23 ENCOUNTER — HOSPITAL ENCOUNTER (OUTPATIENT)
Dept: LAB | Facility: HOSPITAL | Age: 73
Discharge: HOME | End: 2018-08-23
Attending: INTERNAL MEDICINE | Admitting: INTERNAL MEDICINE

## 2018-08-23 LAB
ANION GAP SERPL CALC-SCNC: 8.4 MMOL/L (ref 10–20)
BASOPHILS # BLD AUTO: 0 10*3/UL (ref 0–0.2)
BASOPHILS NFR BLD AUTO: 1 % (ref 0–2)
BILIRUB UR QL STRIP: NEGATIVE MG/DL
BUN SERPL-MCNC: 20 MG/DL (ref 8–20)
BUN/CREAT SERPL: 13.3 (ref 6.2–20.3)
CALCIUM SERPL-MCNC: 8.1 MG/DL (ref 8.9–10.3)
CASTS URNS QL MICRO: ABNORMAL /[LPF]
CHLORIDE SERPL-SCNC: 111 MMOL/L (ref 101–111)
COLOR UR: YELLOW
CONV BACTERIA IN URINE MICRO: NEGATIVE
CONV CLARITY OF URINE: CLEAR
CONV CO2: 26 MMOL/L (ref 22–32)
CONV HYALINE CASTS IN URINE MICRO: 1 /[LPF] (ref 0–5)
CONV PROTEIN IN URINE BY AUTOMATED TEST STRIP: NEGATIVE MG/DL
CONV SMALL ROUND CELLS: ABNORMAL /[HPF]
CONV UROBILINOGEN IN URINE BY AUTOMATED TEST STRIP: 1 MG/DL
CREAT 24H UR-MCNC: 81.7 MG/DL
CREAT UR-MCNC: 1.5 MG/DL (ref 0.7–1.2)
CULTURE INDICATED?: ABNORMAL
DIFFERENTIAL METHOD BLD: (no result)
EOSINOPHIL # BLD AUTO: 0.3 10*3/UL (ref 0–0.3)
EOSINOPHIL # BLD AUTO: 6 % (ref 0–3)
ERYTHROCYTE [DISTWIDTH] IN BLOOD BY AUTOMATED COUNT: 17.4 % (ref 11.5–14.5)
GLUCOSE SERPL-MCNC: 156 MG/DL (ref 65–99)
GLUCOSE UR QL: NEGATIVE MG/DL
HCT VFR BLD AUTO: 27.5 % (ref 40–54)
HGB BLD-MCNC: 9.4 G/DL (ref 14–18)
HGB UR QL STRIP: ABNORMAL
IRON SERPL-MCNC: 127 UG/DL (ref 45–182)
KETONES UR QL STRIP: NEGATIVE MG/DL
LEUKOCYTE ESTERASE UR QL STRIP: NEGATIVE
LYMPHOCYTES # BLD AUTO: 1.3 10*3/UL (ref 0.8–4.8)
LYMPHOCYTES NFR BLD AUTO: 22 % (ref 18–42)
MCH RBC QN AUTO: 34.7 PG (ref 26–32)
MCHC RBC AUTO-ENTMCNC: 34.2 G/DL (ref 32–36)
MCV RBC AUTO: 101.6 FL (ref 80–94)
MONOCYTES # BLD AUTO: 0.5 10*3/UL (ref 0.1–1.3)
MONOCYTES NFR BLD AUTO: 9 % (ref 2–11)
NEUTROPHILS # BLD AUTO: 3.6 10*3/UL (ref 2.3–8.6)
NEUTROPHILS NFR BLD AUTO: 62 % (ref 50–75)
NITRITE UR QL STRIP: NEGATIVE
NRBC BLD AUTO-RTO: 0 /100{WBCS}
NRBC/RBC NFR BLD MANUAL: 0 10*3/UL
PH UR STRIP.AUTO: 6.5 [PH] (ref 4.5–8)
PLATELET # BLD AUTO: 180 10*3/UL (ref 150–450)
PMV BLD AUTO: 8.7 FL (ref 7.4–10.4)
POTASSIUM SERPL-SCNC: 4.4 MMOL/L (ref 3.6–5.1)
PROT UR-MCNC: 9 MG/DL
PROT/CREAT UR: 0.1 MG/MG (ref 0–22)
RBC # BLD AUTO: 2.71 10*6/UL (ref 4.6–6)
RBC #/AREA URNS HPF: 46 /[HPF] (ref 0–3)
SODIUM SERPL-SCNC: 141 MMOL/L (ref 136–144)
SP GR UR: 1.01 (ref 1–1.03)
SPERM URNS QL MICRO: ABNORMAL /[HPF]
SQUAMOUS SPT QL MICRO: 2 /[HPF] (ref 0–5)
UNIDENT CRYS URNS QL MICRO: ABNORMAL /[HPF]
WBC # BLD AUTO: 5.7 10*3/UL (ref 4.5–11.5)
WBC #/AREA URNS HPF: 1 /[HPF] (ref 0–5)
YEAST SPEC QL WET PREP: ABNORMAL /[HPF]

## 2018-08-31 ENCOUNTER — ON CAMPUS - OUTPATIENT (AMBULATORY)
Dept: URBAN - METROPOLITAN AREA HOSPITAL 77 | Facility: HOSPITAL | Age: 73
End: 2018-08-31
Payer: COMMERCIAL

## 2018-08-31 DIAGNOSIS — R94.5 ABNORMAL RESULTS OF LIVER FUNCTION STUDIES: ICD-10-CM

## 2018-08-31 DIAGNOSIS — K44.9 DIAPHRAGMATIC HERNIA WITHOUT OBSTRUCTION OR GANGRENE: ICD-10-CM

## 2018-08-31 DIAGNOSIS — K83.1 OBSTRUCTION OF BILE DUCT: ICD-10-CM

## 2018-08-31 DIAGNOSIS — R93.3 ABNORMAL FINDINGS ON DIAGNOSTIC IMAGING OF OTHER PARTS OF DI: ICD-10-CM

## 2018-08-31 DIAGNOSIS — Z95.828 PRESENCE OF OTHER VASCULAR IMPLANTS AND GRAFTS: ICD-10-CM

## 2018-08-31 DIAGNOSIS — K83.9 DISEASE OF BILIARY TRACT, UNSPECIFIED: ICD-10-CM

## 2018-08-31 PROCEDURE — 43242 EGD US FINE NEEDLE BX/ASPIR: CPT | Performed by: INTERNAL MEDICINE

## 2018-09-06 ENCOUNTER — HOSPITAL ENCOUNTER (OUTPATIENT)
Dept: LAB | Facility: HOSPITAL | Age: 73
Setting detail: SPECIMEN
Discharge: HOME | End: 2018-09-06
Attending: INTERNAL MEDICINE | Admitting: INTERNAL MEDICINE

## 2018-09-06 LAB
ALBUMIN SERPL-MCNC: 3 G/DL (ref 3.5–4.8)
ALBUMIN/GLOB SERPL: 0.7 {RATIO} (ref 1–1.7)
ALP SERPL-CCNC: 76 IU/L (ref 32–91)
ALT SERPL-CCNC: 20 IU/L (ref 17–63)
ANION GAP SERPL CALC-SCNC: 10.9 MMOL/L (ref 10–20)
AST SERPL-CCNC: 40 IU/L (ref 15–41)
BILIRUB SERPL-MCNC: 0.6 MG/DL (ref 0.3–1.2)
BUN SERPL-MCNC: 30 MG/DL (ref 8–20)
BUN/CREAT SERPL: 20 (ref 6.2–20.3)
CALCIUM SERPL-MCNC: 8.5 MG/DL (ref 8.9–10.3)
CHLORIDE SERPL-SCNC: 109 MMOL/L (ref 101–111)
CHOLEST SERPL-MCNC: 130 MG/DL
CHOLEST/HDLC SERPL: 2.7 {RATIO}
CONV CO2: 25 MMOL/L (ref 22–32)
CONV LDL CHOLESTEROL DIRECT: 70 MG/DL (ref 0–100)
CONV TOTAL PROTEIN: 7.1 G/DL (ref 6.1–7.9)
CREAT UR-MCNC: 1.5 MG/DL (ref 0.7–1.2)
GLOBULIN UR ELPH-MCNC: 4.1 G/DL (ref 2.5–3.8)
GLUCOSE SERPL-MCNC: 148 MG/DL (ref 65–99)
HDLC SERPL-MCNC: 48 MG/DL
LDLC/HDLC SERPL: 1.5 {RATIO}
LIPID INTERPRETATION: NORMAL
POTASSIUM SERPL-SCNC: 4.9 MMOL/L (ref 3.6–5.1)
SODIUM SERPL-SCNC: 140 MMOL/L (ref 136–144)
TRIGL SERPL-MCNC: 64 MG/DL
VLDLC SERPL CALC-MCNC: 11.6 MG/DL

## 2018-09-11 ENCOUNTER — CONVERSION ENCOUNTER (OUTPATIENT)
Dept: SURGERY | Facility: CLINIC | Age: 73
End: 2018-09-11

## 2018-09-19 ENCOUNTER — HOSPITAL ENCOUNTER (OUTPATIENT)
Dept: ONCOLOGY | Facility: CLINIC | Age: 73
Setting detail: INFUSION SERIES
Discharge: HOME | End: 2018-09-19
Attending: NURSE PRACTITIONER | Admitting: NURSE PRACTITIONER

## 2018-09-19 ENCOUNTER — CLINICAL SUPPORT (OUTPATIENT)
Dept: ONCOLOGY | Facility: HOSPITAL | Age: 73
End: 2018-09-19

## 2018-09-19 ENCOUNTER — HOSPITAL ENCOUNTER (OUTPATIENT)
Dept: ONCOLOGY | Facility: HOSPITAL | Age: 73
Discharge: HOME | End: 2018-09-19
Attending: NURSE PRACTITIONER | Admitting: NURSE PRACTITIONER

## 2018-09-19 LAB
IRON SATN MFR SERPL: 32 % (ref 20–50)
IRON SERPL-MCNC: 94 UG/DL (ref 45–182)
TIBC SERPL-MCNC: 297 UG/DL (ref 228–428)

## 2018-09-19 NOTE — PROGRESS NOTES
PATIENTS ONCOLOGY RECORD LOCATED IN [facility]      Subjective     Name:  JOSE PATEL     Date:  2018  Address:  [facility] [place] [address]  Home: [phone]  :  1945 AGE:  72 y.o.        RECORDS OBTAINED:  Patients Oncology Record is located in [facility]

## 2018-09-28 ENCOUNTER — INPATIENT HOSPITAL (AMBULATORY)
Dept: URBAN - METROPOLITAN AREA HOSPITAL 84 | Facility: HOSPITAL | Age: 73
End: 2018-09-28
Payer: COMMERCIAL

## 2018-09-28 DIAGNOSIS — Z46.59 ENCOUNTER FOR FITTING AND ADJUSTMENT OF OTHER GASTROINTESTIN: ICD-10-CM

## 2018-09-28 DIAGNOSIS — T85.590A OTHER MECHANICAL COMPLICATION OF BILE DUCT PROSTHESIS, INITI: ICD-10-CM

## 2018-09-28 PROCEDURE — 43247 EGD REMOVE FOREIGN BODY: CPT | Performed by: INTERNAL MEDICINE

## 2018-09-29 ENCOUNTER — INPATIENT HOSPITAL (AMBULATORY)
Dept: URBAN - METROPOLITAN AREA HOSPITAL 84 | Facility: HOSPITAL | Age: 73
End: 2018-09-29
Payer: COMMERCIAL

## 2018-09-29 DIAGNOSIS — K74.60 UNSPECIFIED CIRRHOSIS OF LIVER: ICD-10-CM

## 2018-09-29 DIAGNOSIS — K92.1 MELENA: ICD-10-CM

## 2018-09-29 DIAGNOSIS — R94.5 ABNORMAL RESULTS OF LIVER FUNCTION STUDIES: ICD-10-CM

## 2018-09-29 DIAGNOSIS — D69.6 THROMBOCYTOPENIA, UNSPECIFIED: ICD-10-CM

## 2018-09-29 DIAGNOSIS — K72.90 HEPATIC FAILURE, UNSPECIFIED WITHOUT COMA: ICD-10-CM

## 2018-09-29 PROCEDURE — 99231 SBSQ HOSP IP/OBS SF/LOW 25: CPT | Performed by: NURSE PRACTITIONER

## 2018-10-09 ENCOUNTER — HOSPITAL ENCOUNTER (OUTPATIENT)
Dept: ONCOLOGY | Facility: CLINIC | Age: 73
Setting detail: INFUSION SERIES
Discharge: HOME | End: 2018-10-09
Attending: INTERNAL MEDICINE | Admitting: INTERNAL MEDICINE

## 2018-10-09 ENCOUNTER — CLINICAL SUPPORT (OUTPATIENT)
Dept: ONCOLOGY | Facility: HOSPITAL | Age: 73
End: 2018-10-09

## 2018-10-09 NOTE — PROGRESS NOTES
PATIENTS ONCOLOGY RECORD LOCATED IN [facility]      Subjective     Name:  JOSE PATEL     Date:  10/09/2018  Address:  [address] [place] [address]  Home: [phone]  :  1945 AGE:  72 y.o.        RECORDS OBTAINED:  Patients Oncology Record is located in [facility]

## 2018-10-11 ENCOUNTER — INPATIENT HOSPITAL (AMBULATORY)
Dept: URBAN - METROPOLITAN AREA HOSPITAL 84 | Facility: HOSPITAL | Age: 73
End: 2018-10-11
Payer: COMMERCIAL

## 2018-10-11 DIAGNOSIS — D53.9 NUTRITIONAL ANEMIA, UNSPECIFIED: ICD-10-CM

## 2018-10-11 DIAGNOSIS — R50.9 FEVER, UNSPECIFIED: ICD-10-CM

## 2018-10-11 DIAGNOSIS — R74.8 ABNORMAL LEVELS OF OTHER SERUM ENZYMES: ICD-10-CM

## 2018-10-11 DIAGNOSIS — K75.81 NONALCOHOLIC STEATOHEPATITIS (NASH): ICD-10-CM

## 2018-10-11 PROCEDURE — 99222 1ST HOSP IP/OBS MODERATE 55: CPT | Performed by: NURSE PRACTITIONER

## 2018-10-12 ENCOUNTER — INPATIENT HOSPITAL (AMBULATORY)
Dept: URBAN - METROPOLITAN AREA HOSPITAL 84 | Facility: HOSPITAL | Age: 73
End: 2018-10-12
Payer: COMMERCIAL

## 2018-10-12 DIAGNOSIS — K80.70 CALCULUS OF GALLBLADDER AND BILE DUCT WITHOUT CHOLECYSTITIS: ICD-10-CM

## 2018-10-12 PROCEDURE — 43262 ENDO CHOLANGIOPANCREATOGRAPH: CPT | Performed by: INTERNAL MEDICINE

## 2018-10-12 PROCEDURE — 43264 ERCP REMOVE DUCT CALCULI: CPT | Mod: 59 | Performed by: INTERNAL MEDICINE

## 2018-10-13 ENCOUNTER — INPATIENT HOSPITAL (AMBULATORY)
Dept: URBAN - METROPOLITAN AREA HOSPITAL 84 | Facility: HOSPITAL | Age: 73
End: 2018-10-13
Payer: COMMERCIAL

## 2018-10-13 DIAGNOSIS — K75.81 NONALCOHOLIC STEATOHEPATITIS (NASH): ICD-10-CM

## 2018-10-13 DIAGNOSIS — R74.8 ABNORMAL LEVELS OF OTHER SERUM ENZYMES: ICD-10-CM

## 2018-10-13 DIAGNOSIS — R50.9 FEVER, UNSPECIFIED: ICD-10-CM

## 2018-10-13 DIAGNOSIS — K80.70 CALCULUS OF GALLBLADDER AND BILE DUCT WITHOUT CHOLECYSTITIS: ICD-10-CM

## 2018-10-13 DIAGNOSIS — D53.9 NUTRITIONAL ANEMIA, UNSPECIFIED: ICD-10-CM

## 2018-10-13 PROCEDURE — 99231 SBSQ HOSP IP/OBS SF/LOW 25: CPT | Performed by: NURSE PRACTITIONER

## 2018-10-16 ENCOUNTER — CLINICAL SUPPORT (OUTPATIENT)
Dept: ONCOLOGY | Facility: HOSPITAL | Age: 73
End: 2018-10-16

## 2018-10-16 ENCOUNTER — HOSPITAL ENCOUNTER (OUTPATIENT)
Dept: ONCOLOGY | Facility: CLINIC | Age: 73
Setting detail: INFUSION SERIES
Discharge: HOME | End: 2018-10-16
Attending: INTERNAL MEDICINE | Admitting: INTERNAL MEDICINE

## 2018-10-16 ENCOUNTER — HOSPITAL ENCOUNTER (OUTPATIENT)
Dept: ONCOLOGY | Facility: HOSPITAL | Age: 73
Discharge: HOME | End: 2018-10-16
Attending: NURSE PRACTITIONER | Admitting: NURSE PRACTITIONER

## 2018-10-16 LAB
FERRITIN SERPL-MCNC: 34 NG/ML (ref 24–336)
IRON SATN MFR SERPL: 55 % (ref 20–50)
IRON SERPL-MCNC: 177 UG/DL (ref 45–182)
TIBC SERPL-MCNC: 320 UG/DL (ref 228–428)

## 2018-10-16 NOTE — PROGRESS NOTES
PATIENTS ONCOLOGY RECORD LOCATED IN [facility]      Subjective     Name:  JOSE PATEL     Date:  10/16/2018  Address:  [address] [place] [address]  Home: [phone]  :  1945 AGE:  73 y.o.        RECORDS OBTAINED:  Patients Oncology Record is located in [facility]

## 2018-10-22 ENCOUNTER — HOSPITAL ENCOUNTER (OUTPATIENT)
Dept: ONCOLOGY | Facility: CLINIC | Age: 73
Setting detail: INFUSION SERIES
Discharge: HOME | End: 2018-10-22
Attending: INTERNAL MEDICINE | Admitting: INTERNAL MEDICINE

## 2018-10-22 ENCOUNTER — CLINICAL SUPPORT (OUTPATIENT)
Dept: ONCOLOGY | Facility: HOSPITAL | Age: 73
End: 2018-10-22

## 2018-10-22 NOTE — PROGRESS NOTES
PATIENTS ONCOLOGY RECORD LOCATED IN [facility]      Subjective     Name:  JOSE PATEL     Date:  10/22/2018  Address:  [address] [place] [address]  Home: [phone]  :  1945 AGE:  73 y.o.        RECORDS OBTAINED:  Patients Oncology Record is located in [facility]

## 2018-10-31 ENCOUNTER — OFFICE (AMBULATORY)
Dept: URBAN - METROPOLITAN AREA CLINIC 64 | Facility: CLINIC | Age: 73
End: 2018-10-31
Payer: COMMERCIAL

## 2018-10-31 VITALS
HEART RATE: 80 BPM | SYSTOLIC BLOOD PRESSURE: 143 MMHG | HEIGHT: 74 IN | WEIGHT: 315 LBS | DIASTOLIC BLOOD PRESSURE: 62 MMHG

## 2018-10-31 DIAGNOSIS — R19.5 OTHER FECAL ABNORMALITIES: ICD-10-CM

## 2018-10-31 DIAGNOSIS — K75.81 NONALCOHOLIC STEATOHEPATITIS (NASH): ICD-10-CM

## 2018-10-31 DIAGNOSIS — R53.83 OTHER FATIGUE: ICD-10-CM

## 2018-10-31 DIAGNOSIS — K80.50 CALCULUS OF BILE DUCT WITHOUT CHOLANGITIS OR CHOLECYSTITIS W: ICD-10-CM

## 2018-10-31 DIAGNOSIS — D50.0 IRON DEFICIENCY ANEMIA SECONDARY TO BLOOD LOSS (CHRONIC): ICD-10-CM

## 2018-10-31 DIAGNOSIS — K72.90 HEPATIC FAILURE, UNSPECIFIED WITHOUT COMA: ICD-10-CM

## 2018-10-31 PROCEDURE — 99214 OFFICE O/P EST MOD 30 MIN: CPT | Performed by: NURSE PRACTITIONER

## 2018-11-16 ENCOUNTER — CLINICAL SUPPORT (OUTPATIENT)
Dept: ONCOLOGY | Facility: HOSPITAL | Age: 73
End: 2018-11-16

## 2018-11-16 ENCOUNTER — HOSPITAL ENCOUNTER (OUTPATIENT)
Dept: ONCOLOGY | Facility: CLINIC | Age: 73
Setting detail: INFUSION SERIES
Discharge: HOME | End: 2018-11-16
Attending: INTERNAL MEDICINE | Admitting: INTERNAL MEDICINE

## 2018-11-16 ENCOUNTER — HOSPITAL ENCOUNTER (OUTPATIENT)
Dept: INFUSION THERAPY | Facility: HOSPITAL | Age: 73
Discharge: HOME | End: 2018-11-16
Attending: INTERNAL MEDICINE | Admitting: INTERNAL MEDICINE

## 2018-11-16 LAB
ABO + RH BLD: NORMAL
ARMBAND: NORMAL
BASOPHILS # BLD AUTO: 0.1 10*3/UL (ref 0–0.2)
BASOPHILS NFR BLD AUTO: 1 % (ref 0–2)
BLD COMPONENT TYPE: NORMAL
BLD COMPONENT TYPE: NORMAL
BLD GP AB SCN SERPL QL: NEGATIVE
BPU ID: NORMAL
CONV PRODUCT 1 STATUS: NORMAL
CROSSMATCH EXPIRATION: NORMAL
CROSSMATCH INTERPRETATION: NORMAL
CROSSMATCH: NORMAL
DIFFERENTIAL METHOD BLD: (no result)
EOSINOPHIL # BLD AUTO: 0.2 10*3/UL (ref 0–0.3)
EOSINOPHIL # BLD AUTO: 5 % (ref 0–3)
ERYTHROCYTE [DISTWIDTH] IN BLOOD BY AUTOMATED COUNT: 16.2 % (ref 11.5–14.5)
HCT VFR BLD AUTO: 21.3 % (ref 40–54)
HGB BLD-MCNC: 7.1 G/DL (ref 14–18)
LYMPHOCYTES # BLD AUTO: 1.1 10*3/UL (ref 0.8–4.8)
LYMPHOCYTES NFR BLD AUTO: 22 % (ref 18–42)
Lab: NORMAL
MCH RBC QN AUTO: 36 PG (ref 26–32)
MCHC RBC AUTO-ENTMCNC: 33.4 G/DL (ref 32–36)
MCV RBC AUTO: 107.7 FL (ref 80–94)
MONOCYTES # BLD AUTO: 0.6 10*3/UL (ref 0.1–1.3)
MONOCYTES NFR BLD AUTO: 11 % (ref 2–11)
NEUTROPHILS # BLD AUTO: 3.2 10*3/UL (ref 2.3–8.6)
NEUTROPHILS NFR BLD AUTO: 61 % (ref 50–75)
NRBC BLD AUTO-RTO: 0 /100{WBCS}
NRBC/RBC NFR BLD MANUAL: 0 10*3/UL
NUM BPU REQUESTED: 1
PLATELET # BLD AUTO: 124 10*3/UL (ref 150–450)
PMV BLD AUTO: 9.3 FL (ref 7.4–10.4)
PRE-HGB: 7.1 MG/DL
RBC # BLD AUTO: 1.98 10*6/UL (ref 4.6–6)
TRANS STATUS: NORMAL
UNIT DIVISION: 0
WBC # BLD AUTO: 5.2 10*3/UL (ref 4.5–11.5)

## 2018-11-16 NOTE — PROGRESS NOTES
PATIENTS ONCOLOGY RECORD LOCATED IN [facility]      Subjective     Name:  JOSE PATEL     Date:  2018  Address:  [address], [place] [address]  Home: [unfilled]  :  1945 AGE:  73 y.o.        RECORDS OBTAINED:  Patients Oncology Record is located in [facility]

## 2018-11-21 ENCOUNTER — HOSPITAL ENCOUNTER (OUTPATIENT)
Dept: ONCOLOGY | Facility: CLINIC | Age: 73
Setting detail: INFUSION SERIES
Discharge: HOME | End: 2018-11-21
Attending: INTERNAL MEDICINE | Admitting: INTERNAL MEDICINE

## 2018-11-21 ENCOUNTER — HOSPITAL ENCOUNTER (OUTPATIENT)
Dept: INFUSION THERAPY | Facility: HOSPITAL | Age: 73
Discharge: HOME | End: 2018-11-21
Attending: NURSE PRACTITIONER | Admitting: NURSE PRACTITIONER

## 2018-11-21 ENCOUNTER — CLINICAL SUPPORT (OUTPATIENT)
Dept: ONCOLOGY | Facility: HOSPITAL | Age: 73
End: 2018-11-21

## 2018-11-21 ENCOUNTER — HOSPITAL ENCOUNTER (OUTPATIENT)
Dept: ONCOLOGY | Facility: HOSPITAL | Age: 73
Discharge: HOME | End: 2018-11-21
Attending: INTERNAL MEDICINE | Admitting: INTERNAL MEDICINE

## 2018-11-21 LAB
ABO + RH BLD: NORMAL
ARMBAND: NORMAL
BASOPHILS # BLD AUTO: 0 10*3/UL (ref 0–0.2)
BASOPHILS NFR BLD AUTO: 1 % (ref 0–2)
BLD COMPONENT TYPE: NORMAL
BLD COMPONENT TYPE: NORMAL
BLD GP AB SCN SERPL QL: NEGATIVE
BPU ID: NORMAL
CONV PRODUCT 1 STATUS: NORMAL
CROSSMATCH EXPIRATION: NORMAL
CROSSMATCH INTERPRETATION: NORMAL
CROSSMATCH: NORMAL
DIFFERENTIAL METHOD BLD: (no result)
EOSINOPHIL # BLD AUTO: 0.2 10*3/UL (ref 0–0.3)
EOSINOPHIL # BLD AUTO: 4 % (ref 0–3)
ERYTHROCYTE [DISTWIDTH] IN BLOOD BY AUTOMATED COUNT: 16.4 % (ref 11.5–14.5)
HCT VFR BLD AUTO: 23.9 % (ref 40–54)
HGB BLD-MCNC: 7.9 G/DL (ref 14–18)
LYMPHOCYTES # BLD AUTO: 0.9 10*3/UL (ref 0.8–4.8)
LYMPHOCYTES NFR BLD AUTO: 18 % (ref 18–42)
Lab: NORMAL
MCH RBC QN AUTO: 35.2 PG (ref 26–32)
MCHC RBC AUTO-ENTMCNC: 33 G/DL (ref 32–36)
MCV RBC AUTO: 106.8 FL (ref 80–94)
MONOCYTES # BLD AUTO: 0.5 10*3/UL (ref 0.1–1.3)
MONOCYTES NFR BLD AUTO: 10 % (ref 2–11)
NEUTROPHILS # BLD AUTO: 3.3 10*3/UL (ref 2.3–8.6)
NEUTROPHILS NFR BLD AUTO: 67 % (ref 50–75)
NRBC BLD AUTO-RTO: 0 /100{WBCS}
NRBC/RBC NFR BLD MANUAL: 0 10*3/UL
NUM BPU REQUESTED: 1
PLATELET # BLD AUTO: 136 10*3/UL (ref 150–450)
PMV BLD AUTO: 8.8 FL (ref 7.4–10.4)
PRE-HGB: 7.9 MG/DL
RBC # BLD AUTO: 2.24 10*6/UL (ref 4.6–6)
TRANS STATUS: NORMAL
UNIT DIVISION: 0
WBC # BLD AUTO: 4.9 10*3/UL (ref 4.5–11.5)

## 2018-11-21 NOTE — PROGRESS NOTES
PATIENTS ONCOLOGY RECORD LOCATED IN [facility]      Subjective     Name:  JOSE PATEL     Date:  2018  Address:  [address], [place] [address]  Home: [unfilled]  :  1945 AGE:  73 y.o.        RECORDS OBTAINED:  Patients Oncology Record is located in [facility]

## 2018-11-26 ENCOUNTER — ON CAMPUS - OUTPATIENT (AMBULATORY)
Dept: URBAN - METROPOLITAN AREA HOSPITAL 85 | Facility: HOSPITAL | Age: 73
End: 2018-11-26
Payer: COMMERCIAL

## 2018-11-26 ENCOUNTER — HOSPITAL ENCOUNTER (OUTPATIENT)
Dept: GASTROENTEROLOGY | Facility: HOSPITAL | Age: 73
Setting detail: HOSPITAL OUTPATIENT SURGERY
Discharge: HOME | End: 2018-11-26
Attending: INTERNAL MEDICINE | Admitting: INTERNAL MEDICINE

## 2018-11-26 DIAGNOSIS — D50.0 IRON DEFICIENCY ANEMIA SECONDARY TO BLOOD LOSS (CHRONIC): ICD-10-CM

## 2018-11-26 DIAGNOSIS — K29.70 GASTRITIS, UNSPECIFIED, WITHOUT BLEEDING: ICD-10-CM

## 2018-11-26 LAB — GLUCOSE BLD-MCNC: 136 MG/DL (ref 70–105)

## 2018-11-26 PROCEDURE — 43239 EGD BIOPSY SINGLE/MULTIPLE: CPT | Performed by: INTERNAL MEDICINE

## 2018-11-27 ENCOUNTER — HOSPITAL ENCOUNTER (OUTPATIENT)
Dept: ONCOLOGY | Facility: CLINIC | Age: 73
Setting detail: INFUSION SERIES
Discharge: HOME | End: 2018-11-27
Attending: INTERNAL MEDICINE | Admitting: INTERNAL MEDICINE

## 2018-11-27 ENCOUNTER — CLINICAL SUPPORT (OUTPATIENT)
Dept: ONCOLOGY | Facility: HOSPITAL | Age: 73
End: 2018-11-27

## 2018-11-27 ENCOUNTER — HOSPITAL ENCOUNTER (OUTPATIENT)
Dept: ONCOLOGY | Facility: HOSPITAL | Age: 73
Discharge: HOME | End: 2018-11-27
Attending: INTERNAL MEDICINE | Admitting: INTERNAL MEDICINE

## 2018-11-29 LAB
INTERPRETATION UR IFE-IMP: NORMAL
INTERPRETATION UR IFE-IMP: NORMAL

## 2018-12-04 ENCOUNTER — HOSPITAL ENCOUNTER (OUTPATIENT)
Dept: LAB | Facility: HOSPITAL | Age: 73
Setting detail: SPECIMEN
Discharge: HOME | End: 2018-12-04
Attending: INTERNAL MEDICINE | Admitting: INTERNAL MEDICINE

## 2018-12-04 LAB
ALBUMIN SERPL-MCNC: 2.9 G/DL (ref 3.5–4.8)
ALBUMIN/GLOB SERPL: 0.8 {RATIO} (ref 1–1.7)
ALP SERPL-CCNC: 79 IU/L (ref 32–91)
ALT SERPL-CCNC: 27 IU/L (ref 17–63)
ANION GAP SERPL CALC-SCNC: 10.3 MMOL/L (ref 10–20)
AST SERPL-CCNC: 48 IU/L (ref 15–41)
BILIRUB SERPL-MCNC: 0.8 MG/DL (ref 0.3–1.2)
BUN SERPL-MCNC: 20 MG/DL (ref 8–20)
BUN/CREAT SERPL: 13.3 (ref 6.2–20.3)
CALCIUM SERPL-MCNC: 8.1 MG/DL (ref 8.9–10.3)
CHLORIDE SERPL-SCNC: 105 MMOL/L (ref 101–111)
CHOLEST SERPL-MCNC: 149 MG/DL
CHOLEST/HDLC SERPL: 2.7 {RATIO}
CONV CO2: 26 MMOL/L (ref 22–32)
CONV LDL CHOLESTEROL DIRECT: 82 MG/DL (ref 0–100)
CONV MICROALBUM.,U,RANDOM: 25 MG/L
CONV TOTAL PROTEIN: 6.5 G/DL (ref 6.1–7.9)
CREAT 24H UR-MCNC: 88.4 MG/DL
CREAT UR-MCNC: 1.5 MG/DL (ref 0.7–1.2)
GLOBULIN UR ELPH-MCNC: 3.6 G/DL (ref 2.5–3.8)
GLUCOSE SERPL-MCNC: 126 MG/DL (ref 65–99)
HDLC SERPL-MCNC: 56 MG/DL
LDLC/HDLC SERPL: 1.5 {RATIO}
LIPID INTERPRETATION: NORMAL
MICROALBUMIN/CREAT UR: 28.3 UG/MG
POTASSIUM SERPL-SCNC: 4.3 MMOL/L (ref 3.6–5.1)
SODIUM SERPL-SCNC: 137 MMOL/L (ref 136–144)
T4 FREE SERPL-MCNC: 0.87 NG/DL (ref 0.58–1.64)
TRIGL SERPL-MCNC: 75 MG/DL
TSH SERPL-ACNC: 1.79 UIU/ML (ref 0.34–5.6)
VLDLC SERPL CALC-MCNC: 11.6 MG/DL

## 2018-12-05 LAB — HBA1C MFR BLD: 5 % (ref 0–5.6)

## 2018-12-11 ENCOUNTER — CONVERSION ENCOUNTER (OUTPATIENT)
Dept: SURGERY | Facility: CLINIC | Age: 73
End: 2018-12-11

## 2018-12-18 ENCOUNTER — CLINICAL SUPPORT (OUTPATIENT)
Dept: ONCOLOGY | Facility: HOSPITAL | Age: 73
End: 2018-12-18

## 2018-12-18 ENCOUNTER — HOSPITAL ENCOUNTER (OUTPATIENT)
Dept: ONCOLOGY | Facility: CLINIC | Age: 73
Setting detail: INFUSION SERIES
Discharge: HOME | End: 2018-12-18
Attending: INTERNAL MEDICINE | Admitting: INTERNAL MEDICINE

## 2018-12-18 NOTE — PROGRESS NOTES
PATIENTS ONCOLOGY RECORD LOCATED IN [facility]      Subjective     Name:  JOSE PATEL     Date:  2018  Address:  [address], [place] [address]  Home: [unfilled]  :  1945 AGE:  73 y.o.        RECORDS OBTAINED:  Patients Oncology Record is located in [facility]

## 2018-12-21 ENCOUNTER — HOSPITAL ENCOUNTER (OUTPATIENT)
Dept: LAB | Facility: HOSPITAL | Age: 73
Setting detail: SPECIMEN
Discharge: HOME | End: 2018-12-21
Attending: INTERNAL MEDICINE | Admitting: INTERNAL MEDICINE

## 2018-12-21 LAB
ANION GAP SERPL CALC-SCNC: 12.2 MMOL/L (ref 10–20)
BUN SERPL-MCNC: 30 MG/DL (ref 8–20)
BUN/CREAT SERPL: 20 (ref 6.2–20.3)
CALCIUM SERPL-MCNC: 8.3 MG/DL (ref 8.9–10.3)
CHLORIDE SERPL-SCNC: 107 MMOL/L (ref 101–111)
CONV CO2: 23 MMOL/L (ref 22–32)
CREAT UR-MCNC: 1.5 MG/DL (ref 0.7–1.2)
GLUCOSE SERPL-MCNC: 103 MG/DL (ref 65–99)
POTASSIUM SERPL-SCNC: 5.2 MMOL/L (ref 3.6–5.1)
SODIUM SERPL-SCNC: 137 MMOL/L (ref 136–144)

## 2019-01-02 ENCOUNTER — HOSPITAL ENCOUNTER (OUTPATIENT)
Dept: ONCOLOGY | Facility: CLINIC | Age: 74
Setting detail: INFUSION SERIES
Discharge: HOME | End: 2019-01-02
Attending: INTERNAL MEDICINE | Admitting: INTERNAL MEDICINE

## 2019-01-02 ENCOUNTER — HOSPITAL ENCOUNTER (OUTPATIENT)
Dept: ONCOLOGY | Facility: HOSPITAL | Age: 74
Discharge: HOME | End: 2019-01-02
Attending: INTERNAL MEDICINE | Admitting: INTERNAL MEDICINE

## 2019-01-02 ENCOUNTER — CLINICAL SUPPORT (OUTPATIENT)
Dept: ONCOLOGY | Facility: HOSPITAL | Age: 74
End: 2019-01-02

## 2019-01-02 LAB
IGA1 MFR SER: 651 MG/DL (ref 50–400)
IGG1 SER-MCNC: 1470 MG/DL (ref 600–1500)
IGM SERPL-MCNC: 94 MG/DL (ref 40–300)

## 2019-01-02 NOTE — PROGRESS NOTES
PATIENTS ONCOLOGY RECORD LOCATED IN [facility]      Subjective     Name:  JOSE PATEL     Date:  2019  Address:  [address] [place] [place] [address]  Home: [unfilled]  :  1945 AGE:  73 y.o.        RECORDS OBTAINED:  Patients Oncology Record is located in [facility]

## 2019-01-04 ENCOUNTER — OFFICE (AMBULATORY)
Dept: URBAN - METROPOLITAN AREA CLINIC 64 | Facility: CLINIC | Age: 74
End: 2019-01-04
Payer: COMMERCIAL

## 2019-01-04 VITALS
HEART RATE: 107 BPM | WEIGHT: 315 LBS | SYSTOLIC BLOOD PRESSURE: 155 MMHG | DIASTOLIC BLOOD PRESSURE: 85 MMHG | HEIGHT: 74 IN

## 2019-01-04 DIAGNOSIS — K80.50 CALCULUS OF BILE DUCT WITHOUT CHOLANGITIS OR CHOLECYSTITIS W: ICD-10-CM

## 2019-01-04 DIAGNOSIS — K75.81 NONALCOHOLIC STEATOHEPATITIS (NASH): ICD-10-CM

## 2019-01-04 DIAGNOSIS — D50.0 IRON DEFICIENCY ANEMIA SECONDARY TO BLOOD LOSS (CHRONIC): ICD-10-CM

## 2019-01-04 DIAGNOSIS — K72.90 HEPATIC FAILURE, UNSPECIFIED WITHOUT COMA: ICD-10-CM

## 2019-01-04 PROCEDURE — 99213 OFFICE O/P EST LOW 20 MIN: CPT | Performed by: INTERNAL MEDICINE

## 2019-01-04 RX ORDER — FOLIC ACID 1 MG/1
TABLET ORAL
Qty: 90 | Refills: 3 | Status: ACTIVE

## 2019-01-04 RX ORDER — POLYETHYLENE GLYCOL 3350 17 G/17G
POWDER, FOR SOLUTION ORAL
Qty: 1581 | Refills: 3 | Status: COMPLETED
End: 2019-06-03

## 2019-01-04 RX ORDER — ZINC SULFATE 50(220)MG
220 CAPSULE ORAL
Qty: 90 | Refills: 3 | Status: ACTIVE

## 2019-01-04 RX ORDER — PANTOPRAZOLE SODIUM 40 MG/1
80 TABLET, DELAYED RELEASE ORAL
Qty: 90 | Refills: 3 | Status: ACTIVE
Start: 2018-03-05

## 2019-02-08 ENCOUNTER — HOSPITAL ENCOUNTER (OUTPATIENT)
Dept: ONCOLOGY | Facility: CLINIC | Age: 74
Setting detail: INFUSION SERIES
Discharge: HOME | End: 2019-02-08
Attending: NURSE PRACTITIONER | Admitting: NURSE PRACTITIONER

## 2019-02-08 ENCOUNTER — CLINICAL SUPPORT (OUTPATIENT)
Dept: ONCOLOGY | Facility: HOSPITAL | Age: 74
End: 2019-02-08

## 2019-02-08 NOTE — PROGRESS NOTES
PATIENTS ONCOLOGY RECORD LOCATED IN [facility]      Subjective     Name:  JOSE PATEL     Date:  2019  Address:  [address] [place] [address]  Home: [unfilled]  :  1945 AGE:  73 y.o.        RECORDS OBTAINED:  Patients Oncology Record is located in [facility]

## 2019-02-12 ENCOUNTER — HOSPITAL ENCOUNTER (OUTPATIENT)
Dept: LAB | Facility: HOSPITAL | Age: 74
Discharge: HOME | End: 2019-02-12
Attending: INTERNAL MEDICINE | Admitting: INTERNAL MEDICINE

## 2019-02-12 LAB
ANION GAP SERPL CALC-SCNC: 13.9 MMOL/L (ref 10–20)
BASOPHILS # BLD AUTO: 0 10*3/UL (ref 0–0.2)
BASOPHILS NFR BLD AUTO: 1 % (ref 0–2)
BILIRUB UR QL STRIP: NEGATIVE MG/DL
BUN SERPL-MCNC: 21 MG/DL (ref 8–20)
BUN/CREAT SERPL: 11.7 (ref 6.2–20.3)
CALCIUM SERPL-MCNC: 8.6 MG/DL (ref 8.9–10.3)
CASTS URNS QL MICRO: ABNORMAL /[LPF]
CHLORIDE SERPL-SCNC: 105 MMOL/L (ref 101–111)
COLOR UR: YELLOW
CONV BACTERIA IN URINE MICRO: NEGATIVE
CONV CLARITY OF URINE: CLEAR
CONV CO2: 25 MMOL/L (ref 22–32)
CONV HYALINE CASTS IN URINE MICRO: 2 /[LPF] (ref 0–5)
CONV PROTEIN IN URINE BY AUTOMATED TEST STRIP: NEGATIVE MG/DL
CONV SMALL ROUND CELLS: ABNORMAL /[HPF]
CONV UROBILINOGEN IN URINE BY AUTOMATED TEST STRIP: 1 MG/DL
CREAT 24H UR-MCNC: 121.6 MG/DL
CREAT UR-MCNC: 1.8 MG/DL (ref 0.7–1.2)
CULTURE INDICATED?: ABNORMAL
DIFFERENTIAL METHOD BLD: (no result)
EOSINOPHIL # BLD AUTO: 0.3 10*3/UL (ref 0–0.3)
EOSINOPHIL # BLD AUTO: 4 % (ref 0–3)
ERYTHROCYTE [DISTWIDTH] IN BLOOD BY AUTOMATED COUNT: 15.5 % (ref 11.5–14.5)
GLUCOSE SERPL-MCNC: 191 MG/DL (ref 65–99)
GLUCOSE UR QL: NEGATIVE MG/DL
HCT VFR BLD AUTO: 38.1 % (ref 40–54)
HGB BLD-MCNC: 12.4 G/DL (ref 14–18)
HGB UR QL STRIP: ABNORMAL
IRON SERPL-MCNC: 88 UG/DL (ref 45–182)
KETONES UR QL STRIP: NEGATIVE MG/DL
LEUKOCYTE ESTERASE UR QL STRIP: NEGATIVE
LYMPHOCYTES # BLD AUTO: 1 10*3/UL (ref 0.8–4.8)
LYMPHOCYTES NFR BLD AUTO: 15 % (ref 18–42)
MCH RBC QN AUTO: 35 PG (ref 26–32)
MCHC RBC AUTO-ENTMCNC: 32.7 G/DL (ref 32–36)
MCV RBC AUTO: 106.9 FL (ref 80–94)
MONOCYTES # BLD AUTO: 0.6 10*3/UL (ref 0.1–1.3)
MONOCYTES NFR BLD AUTO: 9 % (ref 2–11)
NEUTROPHILS # BLD AUTO: 5 10*3/UL (ref 2.3–8.6)
NEUTROPHILS NFR BLD AUTO: 71 % (ref 50–75)
NITRITE UR QL STRIP: NEGATIVE
NRBC BLD AUTO-RTO: 0 /100{WBCS}
NRBC/RBC NFR BLD MANUAL: 0 10*3/UL
PH UR STRIP.AUTO: 6 [PH] (ref 4.5–8)
PHOSPHATE SERPL-MCNC: 2.5 MG/DL (ref 2.4–4.7)
PLATELET # BLD AUTO: 163 10*3/UL (ref 150–450)
PMV BLD AUTO: 9.6 FL (ref 7.4–10.4)
POTASSIUM SERPL-SCNC: 4.9 MMOL/L (ref 3.6–5.1)
PROT UR-MCNC: 10 MG/DL
PROT/CREAT UR: 0.1 MG/MG (ref 0–22)
RBC # BLD AUTO: 3.56 10*6/UL (ref 4.6–6)
RBC #/AREA URNS HPF: 17 /[HPF] (ref 0–3)
SODIUM SERPL-SCNC: 139 MMOL/L (ref 136–144)
SP GR UR: 1.02 (ref 1–1.03)
SPERM URNS QL MICRO: ABNORMAL /[HPF]
SQUAMOUS SPT QL MICRO: 2 /[HPF] (ref 0–5)
UNIDENT CRYS URNS QL MICRO: ABNORMAL /[HPF]
WBC # BLD AUTO: 6.9 10*3/UL (ref 4.5–11.5)
WBC #/AREA URNS HPF: 1 /[HPF] (ref 0–5)
YEAST SPEC QL WET PREP: ABNORMAL /[HPF]

## 2019-02-20 ENCOUNTER — HOSPITAL ENCOUNTER (OUTPATIENT)
Dept: LAB | Facility: HOSPITAL | Age: 74
Discharge: HOME | End: 2019-02-20
Attending: FAMILY MEDICINE | Admitting: FAMILY MEDICINE

## 2019-02-20 LAB
ALBUMIN SERPL-MCNC: 2.6 G/DL (ref 3.5–4.8)
ALBUMIN/GLOB SERPL: 0.6 {RATIO} (ref 1–1.7)
ALP SERPL-CCNC: 197 IU/L (ref 32–91)
ALT SERPL-CCNC: 57 IU/L (ref 17–63)
AMMONIA PLAS-MCNC: 21 UMOL/L (ref 9–35)
ANION GAP SERPL CALC-SCNC: 14 MMOL/L (ref 10–20)
AST SERPL-CCNC: 117 IU/L (ref 15–41)
BASOPHILS # BLD AUTO: 0 10*3/UL (ref 0–0.2)
BASOPHILS NFR BLD AUTO: 0 % (ref 0–2)
BILIRUB SERPL-MCNC: 5.4 MG/DL (ref 0.3–1.2)
BUN SERPL-MCNC: 29 MG/DL (ref 8–20)
BUN/CREAT SERPL: 14.5 (ref 6.2–20.3)
CALCIUM SERPL-MCNC: 8.6 MG/DL (ref 8.9–10.3)
CHLORIDE SERPL-SCNC: 108 MMOL/L (ref 101–111)
CONV CO2: 24 MMOL/L (ref 22–32)
CONV TOTAL PROTEIN: 6.9 G/DL (ref 6.1–7.9)
CREAT UR-MCNC: 2 MG/DL (ref 0.7–1.2)
DIFFERENTIAL METHOD BLD: (no result)
EOSINOPHIL # BLD AUTO: 0.3 10*3/UL (ref 0–0.3)
EOSINOPHIL # BLD AUTO: 3 % (ref 0–3)
ERYTHROCYTE [DISTWIDTH] IN BLOOD BY AUTOMATED COUNT: 15.5 % (ref 11.5–14.5)
GLOBULIN UR ELPH-MCNC: 4.3 G/DL (ref 2.5–3.8)
GLUCOSE SERPL-MCNC: 116 MG/DL (ref 65–99)
HCT VFR BLD AUTO: 34.3 % (ref 40–54)
HGB BLD-MCNC: 11.5 G/DL (ref 14–18)
LIPASE SERPL-CCNC: 20 U/L (ref 22–51)
LYMPHOCYTES # BLD AUTO: 1.1 10*3/UL (ref 0.8–4.8)
LYMPHOCYTES NFR BLD AUTO: 14 % (ref 18–42)
MCH RBC QN AUTO: 35.6 PG (ref 26–32)
MCHC RBC AUTO-ENTMCNC: 33.4 G/DL (ref 32–36)
MCV RBC AUTO: 106.5 FL (ref 80–94)
MONOCYTES # BLD AUTO: 0.7 10*3/UL (ref 0.1–1.3)
MONOCYTES NFR BLD AUTO: 9 % (ref 2–11)
NEUTROPHILS # BLD AUTO: 5.6 10*3/UL (ref 2.3–8.6)
NEUTROPHILS NFR BLD AUTO: 74 % (ref 50–75)
NRBC BLD AUTO-RTO: 0 /100{WBCS}
NRBC/RBC NFR BLD MANUAL: 0 10*3/UL
PLATELET # BLD AUTO: 157 10*3/UL (ref 150–450)
PMV BLD AUTO: 9.6 FL (ref 7.4–10.4)
POTASSIUM SERPL-SCNC: 5 MMOL/L (ref 3.6–5.1)
RBC # BLD AUTO: 3.22 10*6/UL (ref 4.6–6)
SODIUM SERPL-SCNC: 141 MMOL/L (ref 136–144)
WBC # BLD AUTO: 7.7 10*3/UL (ref 4.5–11.5)

## 2019-02-23 ENCOUNTER — INPATIENT HOSPITAL (AMBULATORY)
Dept: URBAN - METROPOLITAN AREA HOSPITAL 84 | Facility: HOSPITAL | Age: 74
End: 2019-02-23
Payer: COMMERCIAL

## 2019-02-23 DIAGNOSIS — R50.9 FEVER, UNSPECIFIED: ICD-10-CM

## 2019-02-23 DIAGNOSIS — K83.1 OBSTRUCTION OF BILE DUCT: ICD-10-CM

## 2019-02-23 DIAGNOSIS — R53.1 WEAKNESS: ICD-10-CM

## 2019-02-23 DIAGNOSIS — D69.6 THROMBOCYTOPENIA, UNSPECIFIED: ICD-10-CM

## 2019-02-23 DIAGNOSIS — D53.9 NUTRITIONAL ANEMIA, UNSPECIFIED: ICD-10-CM

## 2019-02-23 DIAGNOSIS — K80.20 CALCULUS OF GALLBLADDER WITHOUT CHOLECYSTITIS WITHOUT OBSTRU: ICD-10-CM

## 2019-02-23 DIAGNOSIS — E11.9 TYPE 2 DIABETES MELLITUS WITHOUT COMPLICATIONS: ICD-10-CM

## 2019-02-23 DIAGNOSIS — K80.70 CALCULUS OF GALLBLADDER AND BILE DUCT WITHOUT CHOLECYSTITIS: ICD-10-CM

## 2019-02-23 DIAGNOSIS — R94.5 ABNORMAL RESULTS OF LIVER FUNCTION STUDIES: ICD-10-CM

## 2019-02-23 DIAGNOSIS — K75.81 NONALCOHOLIC STEATOHEPATITIS (NASH): ICD-10-CM

## 2019-02-23 PROCEDURE — 43277 ERCP EA DUCT/AMPULLA DILATE: CPT | Performed by: INTERNAL MEDICINE

## 2019-02-23 PROCEDURE — 99222 1ST HOSP IP/OBS MODERATE 55: CPT | Mod: 25 | Performed by: NURSE PRACTITIONER

## 2019-02-24 PROCEDURE — 99231 SBSQ HOSP IP/OBS SF/LOW 25: CPT | Performed by: NURSE PRACTITIONER

## 2019-03-06 ENCOUNTER — HOSPITAL ENCOUNTER (OUTPATIENT)
Dept: ONCOLOGY | Facility: CLINIC | Age: 74
Setting detail: INFUSION SERIES
Discharge: HOME | End: 2019-03-06
Attending: INTERNAL MEDICINE | Admitting: INTERNAL MEDICINE

## 2019-03-06 ENCOUNTER — CLINICAL SUPPORT (OUTPATIENT)
Dept: ONCOLOGY | Facility: HOSPITAL | Age: 74
End: 2019-03-06

## 2019-03-06 ENCOUNTER — HOSPITAL ENCOUNTER (OUTPATIENT)
Dept: ONCOLOGY | Facility: HOSPITAL | Age: 74
Discharge: HOME | End: 2019-03-06
Attending: INTERNAL MEDICINE | Admitting: INTERNAL MEDICINE

## 2019-03-06 LAB
ALBUMIN SERPL-MCNC: 2.1 G/DL (ref 3.5–4.8)
ALBUMIN/GLOB SERPL: 0.6 {RATIO} (ref 1–1.7)
ALP SERPL-CCNC: 101 IU/L (ref 32–91)
ALT SERPL-CCNC: 14 IU/L (ref 17–63)
ANION GAP SERPL CALC-SCNC: 15.4 MMOL/L (ref 10–20)
AST SERPL-CCNC: 31 IU/L (ref 15–41)
BACTERIA SPEC AEROBE CULT: NORMAL
BILIRUB SERPL-MCNC: 1.1 MG/DL (ref 0.3–1.2)
BUN SERPL-MCNC: 45 MG/DL (ref 8–20)
BUN/CREAT SERPL: 20.5 (ref 6.2–20.3)
CALCIUM SERPL-MCNC: 7.6 MG/DL (ref 8.9–10.3)
CHLORIDE SERPL-SCNC: 102 MMOL/L (ref 101–111)
CONV CO2: 21 MMOL/L (ref 22–32)
CONV TOTAL PROTEIN: 5.6 G/DL (ref 6.1–7.9)
CREAT UR-MCNC: 2.2 MG/DL (ref 0.7–1.2)
GLOBULIN UR ELPH-MCNC: 3.5 G/DL (ref 2.5–3.8)
GLUCOSE SERPL-MCNC: 126 MG/DL (ref 65–99)
IRON SATN MFR SERPL: 15 % (ref 20–50)
IRON SERPL-MCNC: 23 UG/DL (ref 45–182)
Lab: NORMAL
MICRO REPORT STATUS: NORMAL
POTASSIUM SERPL-SCNC: 5.4 MMOL/L (ref 3.6–5.1)
SODIUM SERPL-SCNC: 133 MMOL/L (ref 136–144)
SPECIMEN SOURCE: NORMAL
TIBC SERPL-MCNC: 153 UG/DL (ref 228–428)

## 2019-03-06 NOTE — PROGRESS NOTES
PATIENTS ONCOLOGY RECORD LOCATED IN [facility]      Subjective     Name:  JOSE PATEL     Date:  2019  Address:  [address] [place] [address]  Home: [unfilled]  :  1945 AGE:  73 y.o.        RECORDS OBTAINED:  Patients Oncology Record is located in [facility]

## 2019-03-08 ENCOUNTER — HOSPITAL ENCOUNTER (OUTPATIENT)
Dept: GENERAL RADIOLOGY | Facility: HOSPITAL | Age: 74
Discharge: HOME | End: 2019-03-08
Attending: INTERNAL MEDICINE | Admitting: INTERNAL MEDICINE

## 2019-03-12 ENCOUNTER — CLINICAL SUPPORT (OUTPATIENT)
Dept: ONCOLOGY | Facility: HOSPITAL | Age: 74
End: 2019-03-12

## 2019-03-12 ENCOUNTER — HOSPITAL ENCOUNTER (OUTPATIENT)
Dept: ONCOLOGY | Facility: CLINIC | Age: 74
Setting detail: INFUSION SERIES
Discharge: HOME | End: 2019-03-12
Attending: NURSE PRACTITIONER | Admitting: NURSE PRACTITIONER

## 2019-03-12 ENCOUNTER — HOSPITAL ENCOUNTER (OUTPATIENT)
Dept: ONCOLOGY | Facility: HOSPITAL | Age: 74
Discharge: HOME | End: 2019-03-12
Attending: NURSE PRACTITIONER | Admitting: NURSE PRACTITIONER

## 2019-03-12 LAB
ANION GAP SERPL CALC-SCNC: 15.5 MMOL/L (ref 10–20)
BUN SERPL-MCNC: 34 MG/DL (ref 8–20)
BUN/CREAT SERPL: 15.5 (ref 6.2–20.3)
CALCIUM SERPL-MCNC: 7.7 MG/DL (ref 8.9–10.3)
CHLORIDE SERPL-SCNC: 103 MMOL/L (ref 101–111)
CONV CO2: 23 MMOL/L (ref 22–32)
CREAT UR-MCNC: 2.2 MG/DL (ref 0.7–1.2)
GLUCOSE SERPL-MCNC: 96 MG/DL (ref 65–99)
POTASSIUM SERPL-SCNC: 4.5 MMOL/L (ref 3.6–5.1)
SODIUM SERPL-SCNC: 137 MMOL/L (ref 136–144)

## 2019-03-12 NOTE — PROGRESS NOTES
PATIENTS ONCOLOGY RECORD LOCATED IN [facility]      Subjective     Name:  JOSE PATEL     Date:  2019  Address:  [address], [place] [address]  Home: [unfilled]  :  1945 AGE:  73 y.o.        RECORDS OBTAINED:  Patients Oncology Record is located in [facility]

## 2019-03-14 ENCOUNTER — CLINICAL SUPPORT (OUTPATIENT)
Dept: ONCOLOGY | Facility: HOSPITAL | Age: 74
End: 2019-03-14

## 2019-03-14 ENCOUNTER — HOSPITAL ENCOUNTER (OUTPATIENT)
Dept: ONCOLOGY | Facility: CLINIC | Age: 74
Setting detail: INFUSION SERIES
Discharge: HOME | End: 2019-03-14
Attending: INTERNAL MEDICINE | Admitting: INTERNAL MEDICINE

## 2019-03-14 NOTE — PROGRESS NOTES
PATIENTS ONCOLOGY RECORD LOCATED IN [facility]      Subjective     Name:  JOSE PATEL     Date:  2019  Address:  [address], [place] [address]  Home: [unfilled]  :  1945 AGE:  73 y.o.        RECORDS OBTAINED:  Patients Oncology Record is located in [facility]

## 2019-03-19 ENCOUNTER — INPATIENT HOSPITAL (AMBULATORY)
Dept: URBAN - METROPOLITAN AREA HOSPITAL 84 | Facility: HOSPITAL | Age: 74
End: 2019-03-19
Payer: COMMERCIAL

## 2019-03-19 DIAGNOSIS — D53.9 NUTRITIONAL ANEMIA, UNSPECIFIED: ICD-10-CM

## 2019-03-19 DIAGNOSIS — D50.9 IRON DEFICIENCY ANEMIA, UNSPECIFIED: ICD-10-CM

## 2019-03-19 DIAGNOSIS — K59.00 CONSTIPATION, UNSPECIFIED: ICD-10-CM

## 2019-03-19 DIAGNOSIS — K75.81 NONALCOHOLIC STEATOHEPATITIS (NASH): ICD-10-CM

## 2019-03-19 PROCEDURE — 99232 SBSQ HOSP IP/OBS MODERATE 35: CPT | Performed by: NURSE PRACTITIONER

## 2019-04-02 ENCOUNTER — HOSPITAL ENCOUNTER (OUTPATIENT)
Dept: ONCOLOGY | Facility: CLINIC | Age: 74
Setting detail: INFUSION SERIES
End: 2019-04-02
Attending: INTERNAL MEDICINE | Admitting: INTERNAL MEDICINE

## 2019-04-03 ENCOUNTER — HOSPITAL ENCOUNTER (OUTPATIENT)
Dept: GENERAL RADIOLOGY | Facility: HOSPITAL | Age: 74
Discharge: HOME | End: 2019-04-03
Attending: PHYSICAL MEDICINE & REHABILITATION | Admitting: PHYSICAL MEDICINE & REHABILITATION

## 2019-04-22 ENCOUNTER — CLINICAL SUPPORT (OUTPATIENT)
Dept: ONCOLOGY | Facility: HOSPITAL | Age: 74
End: 2019-04-22

## 2019-04-22 ENCOUNTER — HOSPITAL ENCOUNTER (OUTPATIENT)
Dept: ONCOLOGY | Facility: CLINIC | Age: 74
Setting detail: INFUSION SERIES
Discharge: HOME | End: 2019-04-22
Attending: INTERNAL MEDICINE | Admitting: INTERNAL MEDICINE

## 2019-04-22 ENCOUNTER — HOSPITAL ENCOUNTER (OUTPATIENT)
Dept: ONCOLOGY | Facility: HOSPITAL | Age: 74
Discharge: HOME | End: 2019-04-22
Attending: INTERNAL MEDICINE | Admitting: INTERNAL MEDICINE

## 2019-04-22 NOTE — PROGRESS NOTES
PATIENTS ONCOLOGY RECORD LOCATED IN [facility]      Subjective     Name:  JOSE PATEL     Date:  2019  Address:  [address], [place] [address]  Home: [unfilled]  :  1945 AGE:  73 y.o.        RECORDS OBTAINED:  Patients Oncology Record is located in [facility]

## 2019-04-24 LAB — PROT PATTERN SERPL IFE-IMP: NORMAL

## 2019-05-08 ENCOUNTER — HOSPITAL ENCOUNTER (OUTPATIENT)
Dept: ONCOLOGY | Facility: CLINIC | Age: 74
Setting detail: INFUSION SERIES
Discharge: HOME | End: 2019-05-08
Attending: INTERNAL MEDICINE | Admitting: INTERNAL MEDICINE

## 2019-05-08 ENCOUNTER — CLINICAL SUPPORT (OUTPATIENT)
Dept: ONCOLOGY | Facility: HOSPITAL | Age: 74
End: 2019-05-08

## 2019-05-08 NOTE — PROGRESS NOTES
PATIENTS ONCOLOGY RECORD LOCATED IN [facility]      Subjective     Name:  JOSE PATEL     Date:  2019  Address:  [address] [place] [address]  Home: [unfilled]  :  1945 AGE:  73 y.o.        RECORDS OBTAINED:  Patients Oncology Record is located in [facility]

## 2019-05-24 ENCOUNTER — HOSPITAL ENCOUNTER (OUTPATIENT)
Dept: LAB | Facility: HOSPITAL | Age: 74
Discharge: HOME | End: 2019-05-24
Attending: INTERNAL MEDICINE | Admitting: INTERNAL MEDICINE

## 2019-05-24 LAB — AMMONIA PLAS-MCNC: 108 UMOL/L (ref 9–35)

## 2019-06-03 ENCOUNTER — OFFICE (AMBULATORY)
Dept: URBAN - METROPOLITAN AREA CLINIC 64 | Facility: CLINIC | Age: 74
End: 2019-06-03
Payer: COMMERCIAL

## 2019-06-03 VITALS
SYSTOLIC BLOOD PRESSURE: 112 MMHG | HEART RATE: 85 BPM | DIASTOLIC BLOOD PRESSURE: 55 MMHG | HEIGHT: 74 IN | WEIGHT: 300 LBS

## 2019-06-03 DIAGNOSIS — R60.0 LOCALIZED EDEMA: ICD-10-CM

## 2019-06-03 DIAGNOSIS — N18.9 CHRONIC KIDNEY DISEASE, UNSPECIFIED: ICD-10-CM

## 2019-06-03 DIAGNOSIS — K72.90 HEPATIC FAILURE, UNSPECIFIED WITHOUT COMA: ICD-10-CM

## 2019-06-03 DIAGNOSIS — K75.81 NONALCOHOLIC STEATOHEPATITIS (NASH): ICD-10-CM

## 2019-06-03 DIAGNOSIS — R74.8 ABNORMAL LEVELS OF OTHER SERUM ENZYMES: ICD-10-CM

## 2019-06-03 PROCEDURE — 99214 OFFICE O/P EST MOD 30 MIN: CPT | Performed by: INTERNAL MEDICINE

## 2019-06-03 RX ORDER — LACTULOSE 10 G/15ML
2700 SOLUTION ORAL
Qty: 8100 | Refills: 4 | Status: ACTIVE
Start: 2018-08-15

## 2019-06-04 ENCOUNTER — CONVERSION ENCOUNTER (OUTPATIENT)
Dept: SURGERY | Facility: CLINIC | Age: 74
End: 2019-06-04

## 2019-06-04 VITALS
WEIGHT: 315 LBS | WEIGHT: 315 LBS | DIASTOLIC BLOOD PRESSURE: 75 MMHG | SYSTOLIC BLOOD PRESSURE: 150 MMHG | SYSTOLIC BLOOD PRESSURE: 102 MMHG | WEIGHT: 315 LBS | HEART RATE: 91 BPM | DIASTOLIC BLOOD PRESSURE: 61 MMHG | BODY MASS INDEX: 45.58 KG/M2 | SYSTOLIC BLOOD PRESSURE: 128 MMHG | HEIGHT: 74 IN | HEIGHT: 74 IN | SYSTOLIC BLOOD PRESSURE: 168 MMHG | HEART RATE: 81 BPM | DIASTOLIC BLOOD PRESSURE: 72 MMHG | BODY MASS INDEX: 40.43 KG/M2 | OXYGEN SATURATION: 98 % | OXYGEN SATURATION: 98 % | BODY MASS INDEX: 40.43 KG/M2 | SYSTOLIC BLOOD PRESSURE: 118 MMHG | WEIGHT: 315 LBS | SYSTOLIC BLOOD PRESSURE: 130 MMHG | DIASTOLIC BLOOD PRESSURE: 73 MMHG | BODY MASS INDEX: 40.43 KG/M2 | WEIGHT: 315 LBS | BODY MASS INDEX: 40.43 KG/M2 | OXYGEN SATURATION: 98 % | BODY MASS INDEX: 45.07 KG/M2 | SYSTOLIC BLOOD PRESSURE: 148 MMHG | HEART RATE: 92 BPM | WEIGHT: 315 LBS | DIASTOLIC BLOOD PRESSURE: 72 MMHG | SYSTOLIC BLOOD PRESSURE: 154 MMHG | BODY MASS INDEX: 40.43 KG/M2 | HEART RATE: 80 BPM | DIASTOLIC BLOOD PRESSURE: 62 MMHG | OXYGEN SATURATION: 97 % | OXYGEN SATURATION: 98 % | HEART RATE: 81 BPM | WEIGHT: 315 LBS | DIASTOLIC BLOOD PRESSURE: 80 MMHG | WEIGHT: 315 LBS | BODY MASS INDEX: 45.58 KG/M2 | OXYGEN SATURATION: 96 % | HEART RATE: 75 BPM | SYSTOLIC BLOOD PRESSURE: 140 MMHG | OXYGEN SATURATION: 98 % | DIASTOLIC BLOOD PRESSURE: 64 MMHG | DIASTOLIC BLOOD PRESSURE: 65 MMHG | SYSTOLIC BLOOD PRESSURE: 140 MMHG | BODY MASS INDEX: 45.45 KG/M2 | WEIGHT: 315 LBS | SYSTOLIC BLOOD PRESSURE: 156 MMHG | DIASTOLIC BLOOD PRESSURE: 73 MMHG | HEIGHT: 74 IN | HEART RATE: 91 BPM | SYSTOLIC BLOOD PRESSURE: 147 MMHG | WEIGHT: 315 LBS | HEART RATE: 82 BPM | HEIGHT: 74 IN | HEART RATE: 66 BPM | DIASTOLIC BLOOD PRESSURE: 72 MMHG | DIASTOLIC BLOOD PRESSURE: 68 MMHG | BODY MASS INDEX: 44.55 KG/M2 | BODY MASS INDEX: 44.68 KG/M2 | WEIGHT: 315 LBS | HEART RATE: 88 BPM | OXYGEN SATURATION: 97 % | HEART RATE: 82 BPM | HEIGHT: 74 IN | BODY MASS INDEX: 44.55 KG/M2 | WEIGHT: 315 LBS | HEART RATE: 69 BPM | OXYGEN SATURATION: 99 % | OXYGEN SATURATION: 98 %

## 2019-06-04 VITALS
WEIGHT: 300 LBS | OXYGEN SATURATION: 98 % | SYSTOLIC BLOOD PRESSURE: 139 MMHG | HEART RATE: 82 BPM | HEIGHT: 74 IN | DIASTOLIC BLOOD PRESSURE: 79 MMHG | BODY MASS INDEX: 38.5 KG/M2

## 2019-06-06 NOTE — PROGRESS NOTES
Vital Signs:    Patient Profile:    73 Years Old Male  Height:     74 inches (187.96 cm)  Weight:     300 pounds  BMI:        38.51     O2 Sat:     98 %  Temp:       97.7 degrees F oral  Pulse rate: 82 / minute  BP Sittin / 79  (left arm)    Cuff size:  large      Problems: Active problems were reviewed with the patient during this visit.  Medications: Medications were reviewed with the patient during this visit.  Allergies: Allergies were reviewed with the patient during this visit.        Vitals Entered By: Og Wright MA (2019 11:37 AM)      Past Medical History:     Reviewed history from 2018 and no changes required:        CAD        3. Promus stent RCA . At that time no narrowing >50% LCA.        DVT right superficial fem and popliteal veins and PE . At that time echocardiogram RVE and RVSP 60+.                        Echo 2017 LV OK EF 65%.  RV borderline dilated.  LA and RA borderline dilated. AV and MV OK.  Mild TR.  RVSP 40-45.        2 episodes numbness left side of body March and 2012.          Echo 2012 LV mild dilatation  and normal wall thickness and uniform and appropriate contractility EF 60%. Mild RVE.  Mild LAE. Mild TR. RVSP 40. Aortic root mildly dilated.        Carotid duplex study 2012  Uncomplicated plaque and 0-15% narrowing heart SILVESTRE and 16-49% narrowing LICA.        Echocardiogram 2014                 Diabetes mellitus  2016 ,  A1c 7.7 2018        Hyperlipidemia Chol 107 TG 79 HDL 38 LDL 52 ALT 27 2018        Hypertension        Morbid obesity        KATHIA and BPAP        Syed's esophagus.        Anemia with low B12 level 2009        Lumbar laminectomy        Renal insufficiency creat 1.4 11/23/15, 1.4 2016, 1.4 K 4.4 2018                Hx  from patient anemia 2018 including 1 unit RBC transfusion  Dr. Harirs and [facility]                Labs 16  Na 138, k 4.2, chloride 104,  co2 30, glucose 158, bun 25, creat 1.4, calcium 8.2.         TSH  0.83 03/01/2018        TP 6.9 alb  2.8 bili 0.5  ALT 27 03/01/2018    Past Surgical History:     Reviewed history from 12/11/2018 and no changes required:        Lithotripsy 1997        left renal stent 2009        Cardiac Stent (8/27/2009): on coumadin only- This pt had chest pain which was atypical for angina pectoris. He had abnormal nuclear perfusion study. At heart catheterization, left coronary artery had no narrowing of 50% or more. The right coronary   artery had 50-70% narrowing in the proximal segment and 40-50% narrowing in the mid portion of the mid segment. The pt will have a stent placed to proximal right coronary artery.                PCI stent (8/24/2009)- Successful placement of 3.5/23 Promus stent in proximal right coronary artery.        Cataracts- (2014)        back surgery 1971        colonoscopy 3/2018        IVC Filter inplant- 11/2018    Family History Summary:      Reviewed history Last on 12/11/2018 and no changes required:06/04/2019      General Comments - FH:  FH High Cholesterol  FH Hypertension      Social History:     Reviewed history from 06/15/2018 and no changes required:        Patient has never smoked.        Alcohol Use: N        Drug Use: N        HIV/High Risk: N        Regular Exercise: N              Active Medications (reviewed today):  LACTULOSE 10 GM/15ML ORAL SOLUTION (LACTULOSE) take 30 mL 3-4 times per day  XIFAXAN 550 MG ORAL TABLET (RIFAXIMIN) Take 2 tablets by mouth daily  ZINC SULFATE 220 (50 ZN) MG ORAL CAPSULE (ZINC SULFATE) 1 cap po daily  FOLIC ACID 1 MG ORAL TABLET (FOLIC ACID) Take 1 tablet by mouth daily  HYDRALAZINE HCL 10 MG ORAL TABLET (HYDRALAZINE HCL) Take 2 tablets by mouth daily  MAGNESIUM OXIDE 400 MG ORAL TABLET (MAGNESIUM OXIDE) Take 1 tablet by mouth daily  OXYCODONE HCL 5 MG ORAL TABLET (OXYCODONE HCL) Take one (1) tablet by mouth twice a day as needed for pain  FERROUS  "SULFATE 325 (65 FE) MG ORAL TABLET (FERROUS SULFATE) 2 tablets daily  GLUCAGON EMERGENCY 1 MG INJECTION KIT (GLUCAGON (RDNA)) use as directed  POLYETHYLENE GLYCOL 1000 POWDER (POLYETHYLENE GLYCOL 1000) 17 grams twice daily  LOSARTAN 25MG TABLETS (LOSARTAN POTASSIUM) TAKE 1/2 TABLET BY MOUTH DAILY  BD INSULIN SYRINGE ULTRAFINE 31G X 5/16\" 1 ML (INSULIN SYRINGE-NEEDLE U-100) 4 times per day  ASPIRIN 81 MG ORAL TABLET (ASPIRIN) 1 tab a day  LANTUS 100 UNIT/ML SUBCUTANEOUS SOLUTION (INSULIN GLARGINE) Inject 40 units subqutaneously at bedtime  AMARYL 2 MG ORAL TABLET (GLIMEPIRIDE) one tab daily with breakfast and one tab po supper  VITAMIN D2 50,000IU (ERGO) CAP RX (ERGOCALCIFEROL) TAKE 1 CAPSULE BY MOUTH ONCE MONTHLY  HUMALOG 100 U/ML SOLUTION  VIAL U 100 (INSULIN LISPRO) INJECT SUBCUTANEOUSLY 10  UNITS WITH MEALS PLUS SSI,  1 UNIT FOR EACH 30MG BLOOD  SUGAR OVER 140  SIMVASTATIN 40 MG ORAL TABLET (SIMVASTATIN) Take 1 tablet by mouth daily  B-12 1000 MCG ORAL CAPSULE (CYANOCOBALAMIN) 1 injection monthly  SODIUM BICARBONATE 650 MG ORAL TABLET (SODIUM BICARBONATE) take 3 tabs daily, 1 in the morning and 1 at noon 1 at supper  PANTOPRAZOLE SODIUM 40 MG ORAL TABLET DELAYED RELEASE (PANTOPRAZOLE SODIUM) take 1 tab by mouth every morning  MORPHINE SULFATE 30 MG ORAL TABLET (MORPHINE SULFATE) Take one (1) tablet by mouth twice a day  LEVOTHYROXINE 0.075MG (75MCG) TABS (LEVOTHYROXINE SODIUM) TAKE 1 TABLET BY MOUTH EVERY MORNING  DULOXETINE HCL 60 MG ORAL CAPSULE DELAYED RELEASE PARTICLES (DULOXETINE HCL) Take 1 tablet by mouth daily  ALLOPURINOL 100 MG ORAL TABLET (ALLOPURINOL) take 1 tab by mouth every morning    Current Allergies (reviewed today):  * NKDA (Critical)        CHIEF COMPLAINT:    Mr Ratliff Is seen in the office today in follow-up from his laparoscopic cholecystectomy the back in February.  He had a [address] postop with sepsis.  He just is out of rehab now and is doing well.  No fevers or chills.  No nausea or " vomiting.    PHYSICAL EXAMINATION:      Incisions have healed nicely without infection.    ASSESSMENT:      Satisfactory postop progress    PLAN:     recheck in the office as needed            ]      Electronically signed by Naif Etienne DO on 06/04/2019 at 12:01 PM  ________________________________________________________________________       Disclaimer: Converted Note message may not contain all data elements that existed in the legacy source system. Please see [email or web address] for the original note details.

## 2019-06-13 ENCOUNTER — TRANSCRIBE ORDERS (OUTPATIENT)
Dept: LAB | Facility: HOSPITAL | Age: 74
End: 2019-06-13

## 2019-06-13 DIAGNOSIS — E11.649 UNCONTROLLED TYPE 2 DIABETES MELLITUS WITH HYPOGLYCEMIA, UNSPECIFIED HYPOGLYCEMIA COMA STATUS (HCC): Primary | ICD-10-CM

## 2019-06-13 DIAGNOSIS — E78.5 HYPERLIPIDEMIA, UNSPECIFIED HYPERLIPIDEMIA TYPE: ICD-10-CM

## 2019-06-13 DIAGNOSIS — I10 ESSENTIAL HYPERTENSION, MALIGNANT: ICD-10-CM

## 2019-06-13 DIAGNOSIS — E03.9 HYPOTHYROIDISM, UNSPECIFIED TYPE: ICD-10-CM

## 2019-06-14 ENCOUNTER — TRANSCRIBE ORDERS (OUTPATIENT)
Dept: ADMINISTRATIVE | Facility: HOSPITAL | Age: 74
End: 2019-06-14

## 2019-06-14 DIAGNOSIS — N17.9 ACUTE RENAL FAILURE, UNSPECIFIED ACUTE RENAL FAILURE TYPE (HCC): Primary | ICD-10-CM

## 2019-06-21 ENCOUNTER — HOSPITAL ENCOUNTER (OUTPATIENT)
Dept: ULTRASOUND IMAGING | Facility: HOSPITAL | Age: 74
Discharge: HOME | End: 2019-06-21
Admitting: NURSE PRACTITIONER

## 2019-06-21 DIAGNOSIS — N17.9 ACUTE RENAL FAILURE, UNSPECIFIED ACUTE RENAL FAILURE TYPE (HCC): ICD-10-CM

## 2019-06-21 PROCEDURE — 76775 US EXAM ABDO BACK WALL LIM: CPT

## 2019-06-24 PROBLEM — E11.9 TYPE 2 DIABETES MELLITUS (HCC): Status: ACTIVE | Noted: 2019-06-24

## 2019-06-24 PROBLEM — M54.50 LOW BACK PAIN: Status: ACTIVE | Noted: 2017-01-17

## 2019-06-24 PROBLEM — Z79.4 TYPE 2 DIABETES MELLITUS WITH KIDNEY COMPLICATION, WITH LONG-TERM CURRENT USE OF INSULIN (HCC): Status: ACTIVE | Noted: 2019-06-24

## 2019-06-24 PROBLEM — M48.061 SPINAL STENOSIS OF LUMBAR REGION: Status: ACTIVE | Noted: 2017-01-17

## 2019-06-24 PROBLEM — M54.16 LUMBAR RADICULOPATHY: Status: ACTIVE | Noted: 2017-01-17

## 2019-06-24 PROBLEM — R74.8 ELEVATED LIVER ENZYMES: Status: ACTIVE | Noted: 2018-06-15

## 2019-06-24 PROBLEM — N18.9 CHRONIC RENAL FAILURE: Status: ACTIVE | Noted: 2018-06-06

## 2019-06-24 PROBLEM — I27.20 PULMONARY HYPERTENSION (HCC): Status: ACTIVE | Noted: 2017-02-17

## 2019-06-24 PROBLEM — I10 HYPERTENSION: Status: ACTIVE | Noted: 2019-06-24

## 2019-06-24 PROBLEM — E11.29 TYPE 2 DIABETES MELLITUS WITH KIDNEY COMPLICATION, WITH LONG-TERM CURRENT USE OF INSULIN (HCC): Status: ACTIVE | Noted: 2019-06-24

## 2019-06-24 PROBLEM — I51.7 RIGHT VENTRICULAR DILATION: Status: ACTIVE | Noted: 2017-02-17

## 2019-06-24 PROBLEM — E03.9 HYPOTHYROIDISM: Status: ACTIVE | Noted: 2019-06-24

## 2019-06-24 PROBLEM — M54.17 LUMBOSACRAL RADICULOPATHY: Status: ACTIVE | Noted: 2017-01-17

## 2019-06-24 PROBLEM — E87.5 HYPERKALEMIA: Status: ACTIVE | Noted: 2017-12-04

## 2019-06-24 RX ORDER — LACTULOSE 10 G/15ML
SOLUTION ORAL
Refills: 3 | COMMUNITY
Start: 2019-06-04 | End: 2019-10-16

## 2019-06-24 RX ORDER — POLYETHYLENE GLYCOL 1000
POWDER (GRAM) MISCELLANEOUS
COMMUNITY
Start: 2017-03-06 | End: 2019-10-16 | Stop reason: SDUPTHER

## 2019-06-24 RX ORDER — BUMETANIDE 1 MG/1
TABLET ORAL
Refills: 0 | COMMUNITY
Start: 2019-05-22 | End: 2019-10-16

## 2019-06-24 RX ORDER — LOSARTAN POTASSIUM 25 MG/1
TABLET ORAL EVERY 24 HOURS
COMMUNITY
Start: 2018-03-19 | End: 2019-08-07

## 2019-06-24 RX ORDER — ALLOPURINOL 100 MG/1
TABLET ORAL
COMMUNITY
Start: 2014-12-09 | End: 2019-10-16

## 2019-06-24 RX ORDER — LEVOTHYROXINE SODIUM 0.07 MG/1
TABLET ORAL EVERY 24 HOURS
COMMUNITY
Start: 2018-03-01 | End: 2019-06-25 | Stop reason: SDUPTHER

## 2019-06-24 RX ORDER — SYRINGE-NEEDLE,INSULIN,0.5 ML 27GX1/2"
SYRINGE, EMPTY DISPOSABLE MISCELLANEOUS
COMMUNITY
Start: 2016-09-06 | End: 2019-10-16

## 2019-06-24 RX ORDER — ERGOCALCIFEROL 1.25 MG/1
1 CAPSULE ORAL
COMMUNITY
Start: 2018-09-07 | End: 2019-08-07

## 2019-06-24 RX ORDER — FERROUS SULFATE 325(65) MG
TABLET ORAL EVERY 24 HOURS
COMMUNITY
Start: 2018-06-06 | End: 2019-07-08 | Stop reason: SDUPTHER

## 2019-06-24 RX ORDER — GLIMEPIRIDE 2 MG/1
TABLET ORAL
COMMUNITY
Start: 2015-07-28 | End: 2019-06-25 | Stop reason: ALTCHOICE

## 2019-06-24 RX ORDER — POTASSIUM CHLORIDE 20 MEQ/1
20 TABLET, EXTENDED RELEASE ORAL DAILY
Refills: 0 | COMMUNITY
Start: 2019-05-22 | End: 2020-01-23

## 2019-06-24 RX ORDER — AMOXICILLIN 500 MG/1
CAPSULE ORAL
COMMUNITY
Start: 2019-06-20 | End: 2019-08-07

## 2019-06-24 RX ORDER — DULOXETIN HYDROCHLORIDE 60 MG/1
CAPSULE, DELAYED RELEASE ORAL
COMMUNITY
Start: 2014-12-09 | End: 2019-10-16

## 2019-06-24 RX ORDER — MAGNESIUM OXIDE 400 MG/1
400 TABLET ORAL DAILY
COMMUNITY
Start: 2018-09-11 | End: 2021-01-01 | Stop reason: HOSPADM

## 2019-06-24 RX ORDER — HYDRALAZINE HYDROCHLORIDE 10 MG/1
TABLET, FILM COATED ORAL EVERY 24 HOURS
COMMUNITY
Start: 2018-12-11 | End: 2019-08-07

## 2019-06-24 RX ORDER — SULFACETAMIDE SODIUM 100 MG/ML
SOLUTION/ DROPS OPHTHALMIC
Refills: 0 | COMMUNITY
Start: 2019-05-25 | End: 2019-06-25

## 2019-06-24 RX ORDER — FOLIC ACID 1 MG/1
1 TABLET ORAL DAILY
COMMUNITY
Start: 2018-12-11 | End: 2021-01-01 | Stop reason: HOSPADM

## 2019-06-24 RX ORDER — PANTOPRAZOLE SODIUM 40 MG/1
TABLET, DELAYED RELEASE ORAL
COMMUNITY
Start: 2014-12-09 | End: 2019-07-08 | Stop reason: SDUPTHER

## 2019-06-24 RX ORDER — CHOLECALCIFEROL (VITAMIN D3) 25 MCG
TABLET,CHEWABLE ORAL
COMMUNITY
Start: 2014-12-09 | End: 2019-07-08 | Stop reason: SDUPTHER

## 2019-06-24 RX ORDER — SODIUM BICARBONATE 650 MG/1
TABLET ORAL
COMMUNITY
Start: 2014-12-09 | End: 2019-10-16

## 2019-06-24 RX ORDER — METOCLOPRAMIDE 5 MG/1
5 TABLET ORAL 2 TIMES DAILY
Refills: 0 | COMMUNITY
Start: 2019-05-22 | End: 2021-01-01

## 2019-06-24 RX ORDER — MORPHINE SULFATE 30 MG/1
TABLET, FILM COATED, EXTENDED RELEASE ORAL
Refills: 0 | COMMUNITY
Start: 2019-03-31 | End: 2019-06-25

## 2019-06-24 RX ORDER — ZINC SULFATE 50(220)MG
1 CAPSULE ORAL EVERY 24 HOURS
COMMUNITY
Start: 2018-12-11 | End: 2019-09-23

## 2019-06-24 RX ORDER — OXYCODONE HYDROCHLORIDE 5 MG/1
5 TABLET ORAL EVERY 8 HOURS PRN
Status: ON HOLD | COMMUNITY
Start: 2018-09-11 | End: 2020-03-07 | Stop reason: SDUPTHER

## 2019-06-24 RX ORDER — INSULIN GLARGINE 100 [IU]/ML
INJECTION, SOLUTION SUBCUTANEOUS
COMMUNITY
Start: 2015-07-28 | End: 2019-06-25

## 2019-06-24 RX ORDER — SIMVASTATIN 40 MG
TABLET ORAL
COMMUNITY
Start: 2014-12-10 | End: 2019-06-25 | Stop reason: SINTOL

## 2019-06-25 ENCOUNTER — OFFICE VISIT (OUTPATIENT)
Dept: ENDOCRINOLOGY | Facility: CLINIC | Age: 74
End: 2019-06-25

## 2019-06-25 VITALS
HEART RATE: 86 BPM | BODY MASS INDEX: 37.35 KG/M2 | SYSTOLIC BLOOD PRESSURE: 118 MMHG | DIASTOLIC BLOOD PRESSURE: 65 MMHG | OXYGEN SATURATION: 98 % | WEIGHT: 291 LBS | HEIGHT: 74 IN

## 2019-06-25 DIAGNOSIS — E78.5 DYSLIPIDEMIA: ICD-10-CM

## 2019-06-25 DIAGNOSIS — N18.30 TYPE 2 DIABETES MELLITUS WITH STAGE 3 CHRONIC KIDNEY DISEASE, WITH LONG-TERM CURRENT USE OF INSULIN (HCC): Primary | ICD-10-CM

## 2019-06-25 DIAGNOSIS — N18.30 CHRONIC RENAL INSUFFICIENCY, STAGE III (MODERATE) (HCC): ICD-10-CM

## 2019-06-25 DIAGNOSIS — E03.9 ACQUIRED HYPOTHYROIDISM: ICD-10-CM

## 2019-06-25 DIAGNOSIS — Z79.4 TYPE 2 DIABETES MELLITUS WITH STAGE 3 CHRONIC KIDNEY DISEASE, WITH LONG-TERM CURRENT USE OF INSULIN (HCC): Primary | ICD-10-CM

## 2019-06-25 DIAGNOSIS — I10 ESSENTIAL HYPERTENSION: ICD-10-CM

## 2019-06-25 DIAGNOSIS — E11.22 TYPE 2 DIABETES MELLITUS WITH STAGE 3 CHRONIC KIDNEY DISEASE, WITH LONG-TERM CURRENT USE OF INSULIN (HCC): Primary | ICD-10-CM

## 2019-06-25 PROCEDURE — 99214 OFFICE O/P EST MOD 30 MIN: CPT | Performed by: INTERNAL MEDICINE

## 2019-06-25 RX ORDER — INSULIN GLARGINE 100 [IU]/ML
INJECTION, SOLUTION SUBCUTANEOUS
Qty: 10 ML | Refills: 4 | Status: SHIPPED | OUTPATIENT
Start: 2019-06-25 | End: 2019-06-25 | Stop reason: SDUPTHER

## 2019-06-25 RX ORDER — DOCUSATE SODIUM 100 MG/1
100 CAPSULE, LIQUID FILLED ORAL 2 TIMES DAILY
COMMUNITY
End: 2021-01-01 | Stop reason: HOSPADM

## 2019-06-25 RX ORDER — INSULIN GLARGINE 100 [IU]/ML
INJECTION, SOLUTION SUBCUTANEOUS
Qty: 30 ML | Refills: 4 | Status: SHIPPED | OUTPATIENT
Start: 2019-06-25 | End: 2019-09-27

## 2019-06-25 RX ORDER — LEVOTHYROXINE SODIUM 0.07 MG/1
75 TABLET ORAL DAILY
Qty: 90 TABLET | Refills: 4 | Status: SHIPPED | OUTPATIENT
Start: 2019-06-25 | End: 2020-05-19 | Stop reason: SDUPTHER

## 2019-06-25 NOTE — PATIENT INSTRUCTIONS
Increase Lantus to 40 units subcu nightly  Increase Humalog to 14 units with each meal  Please send blood sugar records were reviewed in few weeks.  Please get all labs done before follow-up in 3 months

## 2019-06-25 NOTE — PROGRESS NOTES
"Rooming Tab(CC,VS,Pt Hx,Fall Screen)  Chief Complaint   Patient presents with   • Diabetes     Type 2 /        Subjective   73-year-old male with history of type 2 diabetes, hypertension, hyperlipidemia, hypothyroidism, CKD stage III disease and obesity is here for follow-up.  He was recently hospitalized with sepsis subsequently was in the rehab facility and he has been discharged about a week ago.  He is here now for follow-up.  He is off glimepiride, is currently on Lantus 38 units subcu nightly and Humalog 10 units with each meal.  He tells me the blood sugars are running high.  It is good and is eating fairly well.  Denies any low blood sugars.  Reviewed his log.    I have reviewed and updated his medications, medical history and problem list during today's office visit.     Patient Care Team:  Paulette Harris MD as PCP - General  Paulette Harris MD as PCP - Family Medicine    Problem List Tab  Medications Tab  Synopsis Tab  Chart Review Tab  Care Everywhere Tab  Immunizations Tab  Patient History Tab    Social History     Tobacco Use   • Smoking status: Never Smoker   Substance Use Topics   • Alcohol use: No     Frequency: Never       Review of Systems   Constitutional: Negative for fatigue and fever.   Eyes: Negative for blurred vision and double vision.   Respiratory: Negative for cough, chest tightness and shortness of breath.    Cardiovascular: Negative for chest pain and leg swelling.   Neurological: Negative for headache.       Objective     Rooming Tab(CC,VS,Pt Hx,Fall Screen)  /65   Pulse 86   Ht 188 cm (74\")   Wt 132 kg (291 lb)   SpO2 98%   BMI 37.36 kg/m²     Body mass index is 37.36 kg/m².    Physical Exam   Constitutional: He appears well-developed and well-nourished.   HENT:   Head: Normocephalic and atraumatic.   Neck: Neck supple.   Cardiovascular: Normal rate.   Pulmonary/Chest: Effort normal and breath sounds normal.   Musculoskeletal: Normal range of motion.   Skin: " Skin is warm and dry.   Psychiatric: He has a normal mood and affect.        Statin Choice Calculator  Data Reviewed:    Us Renal Bilateral    Result Date: 6/21/2019  Impression: No hydronephrosis or acute abnormality.  Electronically Signed By-Hany Ariza On:6/21/2019 12:07 PM This report was finalized on [id number] by  Hany Ariza, .            Lab Results   Component Value Date    BUN 21 (H) 05/29/2019    CREATININE 1.6 (H) 05/29/2019     05/29/2019    K 3.6 05/29/2019     05/29/2019    CALCIUM 8.1 (L) 05/29/2019    ALBUMIN 2.1 (L) 05/29/2019    BILITOT 1.4 (H) 05/29/2019    ALKPHOS 172 (H) 05/29/2019    AST 67 (H) 05/29/2019    ALT 21 05/29/2019    WBC 11.2 05/29/2019    RBC 2.76 (L) 05/29/2019    HCT 28.8 (L) 05/29/2019    .5 (H) 05/29/2019    MCH 35.0 (H) 05/29/2019    INR 1.1 05/26/2019      Assessment/Plan   Order Review Tab  Health Maintenance Tab  Patient Plan/Order Tab  Diagnoses and all orders for this visit:    1. Type 2 diabetes mellitus with stage 3 chronic kidney disease, with long-term current use of insulin (CMS/[facility]) (Primary): Uncontrolled with high blood sugars.  Will increase Lantus to 40 units subcu nightly and increase Humalog to 14 units with each meal.  We will continue sliding scale and follow blood sugars and make further adjustments as needed.  Currently on lactulose 45 g every 4 hours.    2. Essential hypertension: Well-controlled, continue current medications.    3. Acquired hypothyroidism: No recent thyroid labs.  Will continue current dose and follow TSH and free T4.    4. Dyslipidemia: Off simvastatin due to elevated CPK.  Will follow lipid panel.    5. Chronic renal insufficiency, stage III (moderate) (CMS/HCC): Follows with Dr. Temo Viramontes Tab  No Follow-up on file.

## 2019-07-01 ENCOUNTER — OFFICE (AMBULATORY)
Dept: URBAN - METROPOLITAN AREA CLINIC 64 | Facility: CLINIC | Age: 74
End: 2019-07-01
Payer: COMMERCIAL

## 2019-07-01 VITALS
HEART RATE: 88 BPM | DIASTOLIC BLOOD PRESSURE: 86 MMHG | WEIGHT: 280 LBS | HEIGHT: 74 IN | SYSTOLIC BLOOD PRESSURE: 171 MMHG

## 2019-07-01 DIAGNOSIS — N18.9 CHRONIC KIDNEY DISEASE, UNSPECIFIED: ICD-10-CM

## 2019-07-01 DIAGNOSIS — K72.90 HEPATIC FAILURE, UNSPECIFIED WITHOUT COMA: ICD-10-CM

## 2019-07-01 DIAGNOSIS — R60.0 LOCALIZED EDEMA: ICD-10-CM

## 2019-07-01 DIAGNOSIS — K75.81 NONALCOHOLIC STEATOHEPATITIS (NASH): ICD-10-CM

## 2019-07-01 PROCEDURE — 99214 OFFICE O/P EST MOD 30 MIN: CPT | Performed by: NURSE PRACTITIONER

## 2019-07-01 RX ORDER — METOCLOPRAMIDE HYDROCHLORIDE 5 MG/1
10 TABLET ORAL
Qty: 180 | Refills: 3 | Status: ACTIVE

## 2019-07-01 RX ORDER — ZINC SULFATE 50(220)MG
220 CAPSULE ORAL
Qty: 90 | Refills: 3 | Status: ACTIVE

## 2019-07-01 RX ORDER — FOLIC ACID 1 MG/1
TABLET ORAL
Qty: 90 | Refills: 3 | Status: ACTIVE

## 2019-07-05 ENCOUNTER — TRANSCRIBE ORDERS (OUTPATIENT)
Dept: ADMINISTRATIVE | Facility: HOSPITAL | Age: 74
End: 2019-07-05

## 2019-07-05 DIAGNOSIS — K75.81 NONALCOHOLIC STEATOHEPATITIS: Primary | ICD-10-CM

## 2019-07-05 PROBLEM — D63.1 ANEMIA SECONDARY TO RENAL FAILURE: Status: ACTIVE | Noted: 2019-07-05

## 2019-07-05 PROBLEM — D50.9 IDA (IRON DEFICIENCY ANEMIA): Status: ACTIVE | Noted: 2019-07-05

## 2019-07-05 PROBLEM — R76.0 ANTIPHOSPHOLIPID ANTIBODY POSITIVE: Status: ACTIVE | Noted: 2019-07-05

## 2019-07-05 PROBLEM — D50.9 IDA (IRON DEFICIENCY ANEMIA): Chronic | Status: ACTIVE | Noted: 2019-07-05

## 2019-07-05 PROBLEM — K29.60 EROSIVE GASTRITIS: Status: ACTIVE | Noted: 2019-07-05

## 2019-07-05 PROBLEM — D47.2 IGG GAMMOPATHY: Status: ACTIVE | Noted: 2019-07-05

## 2019-07-05 PROBLEM — D72.829 LEUKOCYTOSIS: Status: ACTIVE | Noted: 2019-07-05

## 2019-07-05 PROBLEM — N18.9 ANEMIA SECONDARY TO RENAL FAILURE: Status: ACTIVE | Noted: 2019-07-05

## 2019-07-05 PROBLEM — R79.1 ELEVATED FACTOR VIII LEVEL: Status: ACTIVE | Noted: 2019-07-05

## 2019-07-05 PROBLEM — R16.1 SPLENOMEGALY: Status: ACTIVE | Noted: 2019-07-05

## 2019-07-05 NOTE — PROGRESS NOTES
Hematology/Oncology Outpatient Follow Up    Patient name:Bry Ratliff  :1945  MRN: [id number]  Primary Care Physician: Paulette Harris MD  Referring Physician: Paulette Harris MD    Chief Complaint   Patient presents with   • Follow-up      JOSE with poor oral absorption, Erosive gastritis,ACD secondary to CKD Stage III, IgG kappa MGUS, History of Vitamin B12 deficiency, History of right lower extremity DVT and bilateral PE’s, decreased anti-thrombin III, decreased, THOMAS and splenomegaly, Leukocytosis         History of Present Illness:   1.   Anemia diagnosed 2018 and IgG kappa monoclonal gammopathy diagnosed May 2018.   · This patient is an obese  male who claims to have developed kidney problems in 2018 for which he was referred to Devi Plascencia M.D. He was found to have a   hemoglobin of 7.4 at that time and was given 1 unit of packed red blood cells. He was then   referred to GI where he had been followed for Syed’s esophagus for a number of years. An EGD   and colonoscopy was performed on 3/5/18 by Marisel Gomez M.D. revealing mucosa suggestive of   Syed’s esophagus and hiatal hernia in the cardia. Patient had a poor prep compromising the   colonoscopy exam though the scope did reach the cecum. Esophageal biopsy revealed squamocolumnar   mucosa with extensive intestinal metaplasia consistent with Syed’s esophagus, negative for   dysplasia. Colonoscopy was recommended to be repeated in two years and EGD in three years. The   patient saw his primary care physician, Paulette Harris M.D., in followup and blood testing   was done on 18 revealing WBC 6.3, hemoglobin 8.4, MCV 84.5, platelet count 182,000. Vitamin   B12 level was 416 (180-914) and patient was referred here for further evaluation. The patient   claims to have been diagnosed with Vitamin B12 deficiency a number of years ago for which he has   been on Vitamin B12 injections up until  six months ago when he just stopped taking those. He has   been recently started on oral iron supplementation. He claims not to see any blood in his urine   but does have microscopic hematuria for which he sees a urologist. He denies nosebleeds, gum   bleeds or blood in the stool. He has not had any significant changes in his weight. He feels weak   and dizzy for a number of years which has recently gotten worse. He does not ambulate much   because of back surgery causing him weakness. He did have a right lower extremity DVT with   bilateral pulmonary emboli in 2004 since which time he has been on Coumadin, thought secondary to   a sedentary lifestyle. He has no family history of anemias.   · 5/24/18 - Patient seen initial consultation for anemia. Hemoglobin 7.9, MCV 89.9. Ferritin 17   (), iron 183 (), TIBC 379 (228-428), iron saturation 48% (20-50), retic 2.16%   (0.5-1.5), haptoglobin 138 (), folate 9.1 (5.9-24.8). SPEP showed monoclonal gammopathy.   SIFE showed IgG kappa monoclonal gammopathy. M-spike in the gamma region 0.7 g/dL. Erythropoietin   53.5 (2.59-18.5). Antiparietal cell antibody and intrinsic factor antibodies negative.   Globulin   4.2 (2.5-3.8). Creatinine 1.4 (0.7-1.2).   · 6/19/18 - Discussed results of anemia workup. Hemoglobin improving. Ferrous Sulfate decreased to   325 mg twice a day. Will perform gammopathy workup for IgG kappa monoclonal gammopathy. IgA 783   (), IgG 1480 (600-1500), IgM 93 (). Kappa lambda FLC ratio 0.81 (0.26-1.65). Ferritin   36 ().        · 6/25/18 - Bone survey negative for acute disease. No evidence of lytic or blastic metastatic   bone disease was present. Chest x-ray showed cardiomegaly, mild right basilar atelectasis and   findings suggestive of ankylosing spondylitis.   · 6/29/18 - Patient underwent sternal bone marrow revealing monoclonal gammopathy of undetermined   significant (MGUS). Extent of bone marrow involvement 5%.  Phenotype kappa positive, IgG positive,   CD38 positive, CD20 negative, CD19 negative, CD45 negative, CD56 negative and  negative.   Dyspoiesis was not seen. There was absence of iron stores. Normal male karyotype. Normal FISH   study. Flow cytometry revealed IgG kappa restrictive plasma cells in a polytypic background.   There was a mixed population of maturing myeloid cells, B-cells and T-cells. No abnormal myeloid   maturation was seen. A monoclonal IgG kappa plasma cell population was present. 4% plasma cells   present.   · 8/23/18 - Labs by Dr. Plascencia revealed B12 of 857 (180-914), folate 12.7 (5.9-24.8), iron 127   ().      · 9/19/18 - Iron 94 (), TIBC 297 (228-428), iron saturation 32 (20-50), ferritin 29   (). Erythropoietin 30.04 (2.59-18.5).        · 10/16/18 - Iron 177 (), TIBC 320 (228-428), iron saturation 55 (20-50), ferritin 34   ().       · 11/16/18 - Hemoglobin 7.1. Patient given 1 unit of packed red blood cells.   · 11/21/18 - Ferritin 27 (). Hemoglobin 7.9. Patient given 1 unit of packed red blood cells.    · 11/26/18 - EGD by Dave Russo M.D. revealed erosive gastritis and bleeding ampulla. There   was oozing upon entering the stomach in many different areas. Duodenal mucosa and the esophagus   were normal.   · 11/27/18 - Hemoglobin 8.2. Order written for Procrit 30,000 units subq weekly, not approved by   insurance for low ferritin. Urine JERRY with no definite monoclonal gammopathy identified with   questionable faint IgG kappa.   · 12/18/18 - Hemoglobin 9.9. Injectafer 750 mg IV given.   · 1/2/19 - Injectafer 750 mg IV given.  Hemoglobin 11, .1. Patient claims that he is   getting Vitamin B12 injections monthly at home through Dr. Harris. Currently taking Ferrous   Sulfate 325 mg p.o. b.i.d. and asked to continue that. Asked to continue Pantoprazole 40 mg daily   that he is taking. IgA 651 (), IgG 1470 (600-1500), IgM 94 ().       · 2/12/19 - Iron 88 ().    · 3/6/19 - WBC 19.8 with 83% neutrophils, 5% lymphocytes, 11% monocytes, hemoglobin 8.7, ,   platelet count 182,000. Patient status post laparoscopic cholecystectomy on 2/24/19 with large   ecchymoses apparent on abdomen. Infectious workup ordered and Injectafer 750 mg IV days 1 and 8   ordered.  Iron 23 (), TIBC 153 (228-428), iron saturation 15% (20-50), ferritin 400   (224-336).       · 3/12/19 - WBC 15.02, hemoglobin 8.1 and platelets 227,000. Patient reported hematuria followed   by Dr. Starkey. Orders written to start Injectafer ASAP. Abdominal ecchymosis improving.   · 3/14/19 - Injectafer 750 mg IV given.   · 4/22/19 - Hemoglobin 9.5, . Patient missed day 8 Injectafer in March and it was   rescheduled.  S JERRY showed IgG kappa monoclonal gammopathy.  -5/8/19 Injectafer 750 mg IV given.  2.   Elevated LFT’s diagnosis established March 2018.  Biliary duct dilation diagnosis established   June 2018.   · 11/30/17 - Maia phos 93 (32-91), total bili 0.7 (0.3-1.2), AST 37 (15-41), ALT 25 (15-41).   · 3/1/18 - T-bili 0.5 (0.3-1.2), maia phos 106 (32-91), AST 54 (15-41), ALT 27 (17-63).   · 5/24/18 - AST 46 (15-41), maia phos 131 (32-91).   · 6/8/18 - CMP ordered by Dr. Alejandra Amaro showed maia phos 209 (32-91),  (15-41), ALT 84   (17-63), total bili 1.1 (0.3-1.2).             · 6/19/18 - CT abdomen and pelvis as well as serological workup for elevated LFT’s ordered.   Alpha-1 antitrypsin 144 (). Ceruloplasmin 38 (22-58). AFP 3 (0-9).  Hepatitis panel   non-reactive.   · 6/22/18 - CT abdomen and pelvis showed abnormal intra and extrahepatic biliary dilation. There   was mild distention of the gallbladder and evidence of several gallstones. Bilateral   non-obstructing kidney stone was present. Incidental sigmoid diverticulosis.   · 7/2/18 - Patient underwent MRCP at [facility] showing markedly dilated intrahepatic   and extrahepatic bile duct  with multiple small filling defects within the distal common bile duct   compatible with choledocholithiasis. There was a focal 6 mm segmental narrowing at the distal CBD   with recommended GI consult for ERCP and brushings.   · 7/5/18 to 7/10/18 - On 7/5/18 labs revealed normal total bilirubin (0.8), AST 69 (15-41), maia   phos 152 (32-91), ALT was normal at 37 (17-63), ammonia was elevated at 86 (9-35), lipase was   normal (22). Patient hospitalized at [facility] due to weakness. Workup revealed cholelithiasis. On   7/9/18 patient underwent ERCP with bilateral sphincterotomy, common duct stone extraction,   biliary brushing and stent placement by Dr. Leiva. Path on brushings was negative for   malignancy. There was intra and extrahepatic biliary ductal dilation with relatively abrupt   distal common bile duct cutoff and non-visualization of the gallbladder. He was started on   Lovenox and sequential compression devices due to risk of pulmonary embolism.  of 33   (0-35).  On 7/10/18 LFT’s revealed total bili 0.9 (0.3-1.2). Maia phos 129 (32-91). AST 50   (15-41), ALT 41 (17-63).     · 8/31/18 - EUS by Dr. Gomez at [facility] revealing suspect benign biliary stricture due to CBD stone   with FNA pathology revealing benign and atypical ductal cells, bile and proteinaceous material   including scattered bacteria, neutrophils and degenerating cells. The atypia was felt mild and   favored to be reactive in nature. Plan was to repeat ERCP with removal of the stent and the stone   with consideration of common bile duct evaluation with cholangioscopy at that time.   · 10/12/18 - ERCP with sphincteroplasty and stone extraction done by Dave Russo M.D. for   choledocholithiasis.   · 2/23/19 - Patient underwent ERCP with balloon clearance of bile duct by Jl Hampton M.D.   · 3/6/19 - LFT’s not elevated with bilirubin 1.1, AST 31, ALT 14, maia phos was mildly elevated at   101 (32-91).    -4/25-4/26/2019-admitted  to [facility] for hepatic encephalopathy and hypokalemia.  -4/27/2019- CT abdomen pelvis showed a 4.8 cm air-fluid collection adjacent to the right posterior hepatic lobe significant improved.  Right-sided chest tube small right pleural effusion.  Hepatic cirrhosis.  Bilateral nonobstructing renal stones.  Sigmoid diverticulosis.  -5/26-5/29/2019-admitted to [facility] for hepatic and cephalopathy.  Ammonia level 213 (H).    3.   Right lower extremity DVT and bilateral pulmonary emboli diagnosed 2004.   · 2004 - Patient claims to have developed right lower extremity DVT with bilateral pulmonary   emboli in 2004 and was coumadinized. The blood clots were thought secondary to his sedentary   lifestyle. He stayed on the Coumadin up until October 2017 when, due to difficulty maintaining   his INR’s, he was switched to Xarelto 20 mg daily by Paulette Harris M.D. which was later   dropped to 10 mg daily.    · 11/26/18 - EGD by Dave Russo M.D. revealed erosive gastritis and bleeding ampulla.   Ampullary biopsy revealed reactive/reparative small intestinal mucosa with mild chronic   inflammation. Gastric antrum biopsy was suggestive of focal mild reactive gastropathy. Due to   erosive gastritis, patient asked to hold Xarelto and Aspirin by Dave Russo M.D.   · 11/27/18 - Patient asked to discontinue Xarelto and hold Aspirin while obtaining IVC filter.   Risks discussed. Factor V Leiden gene mutation and prothrombin gene mutations not detected.   Anticardiolipin IgM 11 (<11). Anti beta-2 glyco, IgA 55 (<20). Factor VIII activity 186 ().   Antithrombin III activity 63 (). Protein C activity 69 (), protein S activity 69   ().       · 12/5/18 - Patient underwent transjugular IVC filter placement in IR by  Jonn Cuenca M.D.   · 1/2/19 - Told patient that his low protein CNS and antithrombin III likely due to liver disease.   He would be at a high risk of going back on Xarelto  and hence continued on Aspirin 81 mg p.o.   daily.   · 1/3/19 - D-dimer 1.25 (<0.45).    · 3/8/19 - Chest x-ray showed chronic changes in the chest with no obvious pneumonia or congestive   changes.   · 4/3/19 - Chest x-ray with right pleural effusion and basilar lung density with slight increase   from prior. Cardiomegaly.  - 4/22/2019-factor VIII 334 (H), anti-phosphatidylserine antibody IgM> 80 (H), anti-phosphatidylserine antibody IgG 12 (N).  Cardiolipin IgG 21 (N), cardiolipin IgM 55 (H), cardiolipin IgA 63 (H).  Beta-2 glycoprotein IgG 4 (N), IgA 91 (H), and IgM 21 (H).  Past Medical History:   Diagnosis Date   • Anemia    • B12 deficiency 2009   • Syed's esophagus    • CAD (coronary artery disease)    • Diabetes mellitus (CMS/HCC)    • History of echocardiogram     03/03/2017 - 12/03/2014 - 04/2012   • Hyperlipidemia    • Hypertension    • Morbid obesity (CMS/HCC)    • KATHIA treated with BiPAP    • Renal insufficiency    • S/P lumbar laminectomy        Past Surgical History:   Procedure Laterality Date   • BACK SURGERY  1971   • CATARACT EXTRACTION  2014   • CHOLECYSTECTOMY  02/24/2019   • COLONOSCOPY  03/2018   • CORONARY ANGIOPLASTY  08/24/2009    PCI stent - succesful placement of 3.5/23 promus stent in proximal right coronary artery   • CORONARY ANGIOPLASTY WITH STENT PLACEMENT  08/27/2009    patient had stent placed to proximal right coronary artery   • CYSTOSCOPY BLADDER STONE LITHOTRIPSY  1997   • OTHER SURGICAL HISTORY  11/2018    IVC filter implant   • RENAL ARTERY STENT Left          Current Outpatient Medications:   •  allopurinol (ZYLOPRIM) 100 MG tablet, ALLOPURINOL 100 MG TABS, Disp: , Rfl:   •  aspirin 81 MG tablet, Take 1 tablet by mouth Daily., Disp: 30 tablet, Rfl: 3  •  bumetanide (BUMEX) 1 MG tablet, TAKE 2 TABLETS BY MOUTH EVERY MORNING THEN ONE TABLET EVERY EVENING START ON 05/24, Disp: , Rfl: 0  •  docusate sodium (COLACE) 100 MG capsule, Take 100 mg by mouth 2 (Two) Times a Day.,  "Disp: , Rfl:   •  DULoxetine (CYMBALTA) 60 MG capsule, DULOXETINE HCL 60 MG CPEP, Disp: , Rfl:   •  ergocalciferol (ERGOCALCIFEROL) 16509 units capsule, 1 capsule., Disp: , Rfl:   •  ferrous sulfate 325 (65 FE) MG tablet, Take 1 tablet by mouth Daily With Breakfast., Disp: 30 tablet, Rfl: 3  •  folic acid (FOLVITE) 1 MG tablet, Daily., Disp: , Rfl:   •  glucagon (GLUCAGON EMERGENCY) 1 MG injection, GLUCAGON EMERGENCY 1 MG KIT, Disp: , Rfl:   •  insulin glargine (LANTUS) 100 UNIT/ML injection, Inject 38 units at bedtime, Disp: 30 mL, Rfl: 4  •  insulin lispro (HUMALOG) 100 UNIT/ML injection, 10 units subcu before each meal plus sliding scale, Disp: 30 mL, Rfl: 4  •  Insulin Syringe-Needle U-100 (BD INSULIN SYRINGE U/F) 31G X 5/16\" 1 ML misc, BD INSULIN SYRINGE U/F 31G X 5/16\" 1 ML, Disp: , Rfl:   •  lactulose (CHRONULAC) 10 GM/15ML solution, TK 45 ML PO Q 4 H, Disp: , Rfl: 3  •  levothyroxine (SYNTHROID, LEVOTHROID) 75 MCG tablet, Take 1 tablet by mouth Daily., Disp: 90 tablet, Rfl: 4  •  magnesium oxide (MAG-OX) 400 MG tablet, Daily., Disp: , Rfl:   •  Melatonin-Pyridoxine (MELATIN PO), Take  by mouth Daily., Disp: , Rfl:   •  metoclopramide (REGLAN) 5 MG tablet, Take 5 mg by mouth 2 (Two) Times a Day., Disp: , Rfl: 0  •  oxyCODONE (ROXICODONE) 5 MG immediate release tablet, Every 12 (Twelve) Hours., Disp: , Rfl:   •  pantoprazole (PROTONIX) 40 MG EC tablet, Take 1 tablet by mouth Daily., Disp: 30 tablet, Rfl: 3  •  Ped Multivitamins-Fl-Iron (MULTIVITAMIN WITH FLUORIDE/IRON) 0.25-10 MG/ML solution solution, Take 1 mL by mouth Daily., Disp: 1 bottle, Rfl: 3  •  potassium chloride (K-DUR,KLOR-CON) 20 MEQ CR tablet, Take 20 mEq by mouth 2 (Two) Times a Day., Disp: , Rfl: 0  •  rifaximin (XIFAXAN) 550 MG tablet, Daily., Disp: , Rfl:   •  sodium bicarbonate 650 MG tablet, SODIUM BICARBONATE 650 MG TABS, Disp: , Rfl:   •  zinc sulfate (ZINCATE) 220 (50 Zn) MG capsule, 1 capsule Daily., Disp: , Rfl:   •  amoxicillin " (AMOXIL) 500 MG capsule, , Disp: , Rfl:   •  hydrALAZINE (APRESOLINE) 10 MG tablet, Daily., Disp: , Rfl:   •  losartan (COZAAR) 25 MG tablet, Daily., Disp: , Rfl:   •  Polyethylene Glycol 1000 powder, POLYETHYLENE GLYCOL 1000 POWD, Disp: , Rfl:     Current Facility-Administered Medications:   •  Cyanocobalamin kit 1,000 mcg, 1,000 mcg, Subcutaneous, Q30 Days, Radha, Susan, APRN    No Known Allergies    Family History   Problem Relation Age of Onset   • Hyperlipidemia Other    • Hypertension Other        Cancer-related family history is not on file.    Social History     Tobacco Use   • Smoking status: Never Smoker   Substance Use Topics   • Alcohol use: No     Frequency: Never   • Drug use: No       I have reviewed the history of present illness, past medical history, family history, social history, lab results, all notes and other records since the patient was last seen on Visit 4/22/19.    SUBJECTIVE:  Patient states that Dr. Russo follows his liver disease.  Patient states that he gets short of breath with exertion and he uses a walker for ambulation. He is taking oral Iron once a day and a multivitamin that has iron in it as well.  He was discharged from rehab on 6/17/2019.        ROS:  Review of Systems   Constitutional: Negative for diaphoresis, fatigue, fever and unexpected weight change.   HENT: Negative for congestion and nosebleeds.    Eyes: Negative.    Respiratory: Positive for shortness of breath (with exertion). Negative for cough.    Cardiovascular: Negative for chest pain and leg swelling.   Gastrointestinal: Positive for diarrhea (takes Lactulose daily.). Negative for abdominal pain, blood in stool, constipation, nausea and vomiting.   Endocrine: Negative for cold intolerance and heat intolerance.   Genitourinary: Negative for difficulty urinating, dysuria and hematuria.   Musculoskeletal: Negative for arthralgias and joint swelling.   Skin: Negative for rash and wound.   Neurological: Positive  "for weakness. Negative for numbness and headaches.        Unable to walk long distances.  Uses a walker for ambulation.   Hematological: Does not bruise/bleed easily.   Psychiatric/Behavioral: Negative for confusion. The patient is not nervous/anxious.    All other systems reviewed and are negative.      Objective:    Vitals:    07/08/19 1447   BP: 148/69   Pulse: 93   Resp: 16   Temp: 97.8 °F (36.6 °C)   Weight: 133 kg (293 lb 6.4 oz)   Height: 198.1 cm (78\")   PainSc:   2   PainLoc: Back       ECOG  (2) Ambulatory and capable of self care, unable to carry out work activity, up and about > 50% or waking hours    Physical Exam   Constitutional: He is oriented to person, place, and time. He appears well-developed and well-nourished.   Walker.  Wife present.   Obese.   HENT:   Head: Normocephalic and atraumatic.   Mouth/Throat: Oropharynx is clear and moist.   Eyes: Conjunctivae, EOM and lids are normal. Pupils are equal, round, and reactive to light.   Neck: Normal range of motion. Neck supple. No thyromegaly present.   Cardiovascular: Normal rate, regular rhythm and normal heart sounds.   No murmur heard.  Pulmonary/Chest: Effort normal and breath sounds normal. No respiratory distress.   Bilateral inspiratory wheezes.    Abdominal: Soft. Normal appearance and bowel sounds are normal. He exhibits no distension.   Genitourinary:   Genitourinary Comments: Deferred.   Musculoskeletal: Normal range of motion. He exhibits edema.   Trace lower extremity edema.    Lymphadenopathy:     He has no cervical adenopathy.     He has no axillary adenopathy.        Right: No supraclavicular adenopathy present.        Left: No supraclavicular adenopathy present.   Neurological: He is alert and oriented to person, place, and time.   Skin: Skin is warm and dry. Capillary refill takes less than 2 seconds. No bruising, no petechiae and no rash noted.   Scattered Nevi on face.    Psychiatric: He has a normal mood and affect. His speech " is normal and behavior is normal. Judgment and thought content normal. Cognition and memory are normal.   Nursing note and vitals reviewed.      RECENT LABS  WBC   Date Value Ref Range Status   07/08/2019 12.40 (H) 3.40 - 10.80 10*3/mm3 Final     RBC   Date Value Ref Range Status   07/08/2019 3.20 (L) 4.14 - 5.80 10*6/mm3 Final     Hemoglobin   Date Value Ref Range Status   07/08/2019 11.0 (L) 13.0 - 17.7 g/dL Final     Hematocrit   Date Value Ref Range Status   07/08/2019 33.6 (L) 37.5 - 51.0 % Final     MCV   Date Value Ref Range Status   07/08/2019 105.0 (H) 79.0 - 97.0 fL Final     MCH   Date Value Ref Range Status   07/08/2019 34.4 (H) 26.6 - 33.0 pg Final     MCHC   Date Value Ref Range Status   07/08/2019 32.7 31.5 - 35.7 g/dL Final     RDW   Date Value Ref Range Status   07/08/2019 13.2 12.3 - 15.4 % Final     RDW-SD   Date Value Ref Range Status   07/08/2019 49.3 37.0 - 54.0 fl Final     MPV   Date Value Ref Range Status   07/08/2019 10.3 6.0 - 12.0 fL Final     Platelets   Date Value Ref Range Status   07/08/2019 377 140 - 450 10*3/mm3 Final     Neutrophil %   Date Value Ref Range Status   07/08/2019 75.3 42.7 - 76.0 % Final     Lymphocyte %   Date Value Ref Range Status   07/08/2019 15.6 (L) 19.6 - 45.3 % Final     Monocyte %   Date Value Ref Range Status   07/08/2019 6.7 5.0 - 12.0 % Final     Eosinophil %   Date Value Ref Range Status   07/08/2019 2.0 0.3 - 6.2 % Final     Basophil %   Date Value Ref Range Status   07/08/2019 0.4 0.0 - 1.5 % Final     Neutrophils, Absolute   Date Value Ref Range Status   07/08/2019 9.34 (H) 1.70 - 7.00 10*3/mm3 Final     Lymphocytes, Absolute   Date Value Ref Range Status   07/08/2019 1.93 0.70 - 3.10 10*3/mm3 Final     Monocytes, Absolute   Date Value Ref Range Status   07/08/2019 0.83 0.10 - 0.90 10*3/mm3 Final     Eosinophils, Absolute   Date Value Ref Range Status   07/08/2019 0.25 0.00 - 0.40 10*3/mm3 Final     Basophils, Absolute   Date Value Ref Range Status    07/08/2019 0.05 0.00 - 0.20 10*3/mm3 Final     nRBC   Date Value Ref Range Status   05/29/2019 0 0 /100[WBCs] Final       Lab Results   Component Value Date    GLUCOSE 237 (H) 07/08/2019    BUN 32 (H) 07/08/2019    CREATININE 2.00 (H) 07/08/2019    EGFRIFNONA 33 (L) 07/08/2019    BCR 16.0 07/08/2019    K 4.7 07/08/2019    CO2 23.0 07/08/2019    CALCIUM 9.0 07/08/2019    ALBUMIN 2.50 (L) 07/08/2019    LABIL2 0.4 (L) 05/29/2019    AST 76 (H) 07/08/2019    ALT 53 07/08/2019         Assessment/Plan     Deficiency of other specified B group vitamins  - Cyanocobalamin kit 1,000 mcg    Iron deficiency anemia, unspecified iron deficiency anemia type  - CBC & Differential  - CBC & Differential  - CBC Auto Differential  - Iron Profile  - Ferritin    Chronic renal insufficiency, stage III (moderate) (CMS/[facility])  - CBC & Differential  - CBC & Differential  - CBC Auto Differential    Erosive gastritis  - CBC & Differential  - CBC & Differential  - CBC Auto Differential    Anemia secondary to renal failure  - CBC & Differential  - CBC & Differential  - CBC Auto Differential  - Iron Profile  - Ferritin    IgG gammopathy    History of thrombosis    THOMAS (nonalcoholic steatohepatitis)    Splenomegaly    Leukocytosis, unspecified type  - CBC & Differential  - CBC & Differential  - CBC Auto Differential    Antiphospholipid antibody positive    Elevated factor VIII level    Uncontrolled type 2 diabetes mellitus with hypoglycemia, unspecified hypoglycemia coma status (CMS/[facility])  - Hemoglobin A1c  - T4, Free  - Lipid Panel  - Microalbumin / Creatinine Urine Ratio - Urine, Clean Catch  - Comprehensive Metabolic Panel  - TSH    Hyperlipidemia, unspecified hyperlipidemia type  - Hemoglobin A1c  - T4, Free  - Lipid Panel  - Microalbumin / Creatinine Urine Ratio - Urine, Clean Catch  - Comprehensive Metabolic Panel  - TSH    Hypothyroidism, unspecified type  - Hemoglobin A1c  - T4, Free  - Lipid Panel  - Microalbumin / Creatinine Urine Ratio -  Urine, Clean Catch  - Comprehensive Metabolic Panel  - TSH    Essential hypertension, malignant  - Hemoglobin A1c  - T4, Free  - Lipid Panel  - Microalbumin / Creatinine Urine Ratio - Urine, Clean Catch  - Comprehensive Metabolic Panel  - TSH    Type 2 diabetes mellitus with stage 3 chronic kidney disease, with long-term current use of insulin (CMS/[facility])      ASSESSMENT:    1. JOSE with poor oral absorption: Continue oral ferrous sulfate 325 mg p.o. daily as well as daily multivitamin.  Hemoglobin improved since last visit.  2. Erosive gastritis: Continue pantoprazole 40 mill grams p.o. daily.  3. ACD secondary to CKD Stage III: will follow.  4. IgG kappa MGUS: S JERRY last visit confirmed presence of monoclonal gammopathy.   5. History of Vitamin B12 deficiency:   Continue vitamin B12 injections monthly.   6. History of right lower extremity DVT and bilateral PE’s, decreased anti-thrombin III, decreased   protein C and S deficiencies, antiphospholipid antibody positivity and elevated Factor VIII   level:   Factor VIII remains elevated and presence of antiphospholipid antibodies persist.  7. THOMAS and splenomegaly:    Recent hospitalizations for hepatic encephalopathy and rehab.  Will obtain note from GI.   8. Leukocytosis: GI bleeding chest x-ray and abdominal ultrasound later this week.  Denies fevers.  Denies any skin normalities.      PLAN:     1. Continue ferrous sulfate 325 PO daily along with daily multivitamin.  2. Continue aspirin 81 mg p.o. daily along with pantoprazole daily.  3. Obtain iron studies.  4. Obtain chest x-ray and abdominal ultrasound planned for later this week.  5. Obtain note from GI.    I have reviewed labs results, imaging, vitals, and medications with the patient today. Will follow up in one month with Dr. Mayfield and a CBC.    Patient verbalized understanding and is in agreement of the above plan.       Much of the above report is an electronic transcription//translation of the spoken  language to printed text using Dragon Software. As such, the subtleties and finesse of the spoken language may permit erroneous, or at times, nonsensical words or phrases to be inadvertently transcribed; thus changes may be made at a later date to rectify these errors.

## 2019-07-08 ENCOUNTER — APPOINTMENT (OUTPATIENT)
Dept: LAB | Facility: HOSPITAL | Age: 74
End: 2019-07-08

## 2019-07-08 ENCOUNTER — OFFICE VISIT (OUTPATIENT)
Dept: ONCOLOGY | Facility: CLINIC | Age: 74
End: 2019-07-08

## 2019-07-08 VITALS
RESPIRATION RATE: 16 BRPM | SYSTOLIC BLOOD PRESSURE: 148 MMHG | HEART RATE: 93 BPM | HEIGHT: 78 IN | TEMPERATURE: 97.8 F | DIASTOLIC BLOOD PRESSURE: 69 MMHG | WEIGHT: 293.4 LBS | BODY MASS INDEX: 33.95 KG/M2

## 2019-07-08 DIAGNOSIS — R76.0 ANTIPHOSPHOLIPID ANTIBODY POSITIVE: ICD-10-CM

## 2019-07-08 DIAGNOSIS — K75.81 NASH (NONALCOHOLIC STEATOHEPATITIS): ICD-10-CM

## 2019-07-08 DIAGNOSIS — D47.2 IGG GAMMOPATHY: ICD-10-CM

## 2019-07-08 DIAGNOSIS — Z86.718 HISTORY OF THROMBOSIS: ICD-10-CM

## 2019-07-08 DIAGNOSIS — D50.9 IRON DEFICIENCY ANEMIA, UNSPECIFIED IRON DEFICIENCY ANEMIA TYPE: ICD-10-CM

## 2019-07-08 DIAGNOSIS — N18.9 ANEMIA SECONDARY TO RENAL FAILURE: ICD-10-CM

## 2019-07-08 DIAGNOSIS — I10 ESSENTIAL HYPERTENSION, MALIGNANT: ICD-10-CM

## 2019-07-08 DIAGNOSIS — N18.30 TYPE 2 DIABETES MELLITUS WITH STAGE 3 CHRONIC KIDNEY DISEASE, WITH LONG-TERM CURRENT USE OF INSULIN (HCC): ICD-10-CM

## 2019-07-08 DIAGNOSIS — Z79.4 TYPE 2 DIABETES MELLITUS WITH STAGE 3 CHRONIC KIDNEY DISEASE, WITH LONG-TERM CURRENT USE OF INSULIN (HCC): ICD-10-CM

## 2019-07-08 DIAGNOSIS — K29.60 EROSIVE GASTRITIS: ICD-10-CM

## 2019-07-08 DIAGNOSIS — D72.829 LEUKOCYTOSIS, UNSPECIFIED TYPE: ICD-10-CM

## 2019-07-08 DIAGNOSIS — D63.1 ANEMIA SECONDARY TO RENAL FAILURE: ICD-10-CM

## 2019-07-08 DIAGNOSIS — N18.30 CHRONIC RENAL INSUFFICIENCY, STAGE III (MODERATE) (HCC): ICD-10-CM

## 2019-07-08 DIAGNOSIS — E03.9 HYPOTHYROIDISM, UNSPECIFIED TYPE: ICD-10-CM

## 2019-07-08 DIAGNOSIS — E53.8 DEFICIENCY OF OTHER SPECIFIED B GROUP VITAMINS: Primary | ICD-10-CM

## 2019-07-08 DIAGNOSIS — E11.649 UNCONTROLLED TYPE 2 DIABETES MELLITUS WITH HYPOGLYCEMIA, UNSPECIFIED HYPOGLYCEMIA COMA STATUS (HCC): ICD-10-CM

## 2019-07-08 DIAGNOSIS — E78.5 HYPERLIPIDEMIA, UNSPECIFIED HYPERLIPIDEMIA TYPE: ICD-10-CM

## 2019-07-08 DIAGNOSIS — R79.1 ELEVATED FACTOR VIII LEVEL: ICD-10-CM

## 2019-07-08 DIAGNOSIS — R16.1 SPLENOMEGALY: ICD-10-CM

## 2019-07-08 DIAGNOSIS — E11.22 TYPE 2 DIABETES MELLITUS WITH STAGE 3 CHRONIC KIDNEY DISEASE, WITH LONG-TERM CURRENT USE OF INSULIN (HCC): ICD-10-CM

## 2019-07-08 PROBLEM — Z86.39 HISTORY OF NON ANEMIC VITAMIN B12 DEFICIENCY: Status: ACTIVE | Noted: 2019-07-08

## 2019-07-08 PROBLEM — Z86.711 HISTORY OF PULMONARY EMBOLUS (PE): Status: ACTIVE | Noted: 2019-07-08

## 2019-07-08 LAB
ALBUMIN SERPL-MCNC: 2.5 G/DL (ref 3.5–4.8)
ALBUMIN UR-MCNC: <2 MG/L
ALBUMIN/GLOB SERPL: 0.5 G/DL (ref 1–1.7)
ALP SERPL-CCNC: 162 U/L (ref 32–91)
ALT SERPL W P-5'-P-CCNC: 53 U/L (ref 17–63)
ANION GAP SERPL CALCULATED.3IONS-SCNC: 16.7 MMOL/L (ref 5–15)
ARTICHOKE IGE QN: 116 MG/DL (ref 0–100)
AST SERPL-CCNC: 76 U/L (ref 15–41)
BASOPHILS # BLD AUTO: 0.05 10*3/MM3 (ref 0–0.2)
BASOPHILS NFR BLD AUTO: 0.4 % (ref 0–1.5)
BILIRUB SERPL-MCNC: 0.7 MG/DL (ref 0.3–1.2)
BUN BLD-MCNC: 32 MG/DL (ref 8–20)
BUN/CREAT SERPL: 16 (ref 6.2–20.3)
CALCIUM SPEC-SCNC: 9 MG/DL (ref 8.9–10.3)
CHLORIDE SERPL-SCNC: 103 MMOL/L (ref 101–111)
CHOLEST SERPL-MCNC: 169 MG/DL
CO2 SERPL-SCNC: 23 MMOL/L (ref 22–32)
CREAT BLD-MCNC: 2 MG/DL (ref 0.7–1.2)
CREAT UR-MCNC: 64.9 MG/DL
DEPRECATED RDW RBC AUTO: 49.3 FL (ref 37–54)
EOSINOPHIL # BLD AUTO: 0.25 10*3/MM3 (ref 0–0.4)
EOSINOPHIL NFR BLD AUTO: 2 % (ref 0.3–6.2)
ERYTHROCYTE [DISTWIDTH] IN BLOOD BY AUTOMATED COUNT: 13.2 % (ref 12.3–15.4)
FERRITIN SERPL-MCNC: 1199 NG/ML (ref 24–336)
GFR SERPL CREATININE-BSD FRML MDRD: 33 ML/MIN/1.73
GLOBULIN UR ELPH-MCNC: 5.4 GM/DL (ref 2.5–3.8)
GLUCOSE BLD-MCNC: 237 MG/DL (ref 65–99)
HCT VFR BLD AUTO: 33.6 % (ref 37.5–51)
HDLC SERPL QL: 4.45
HDLC SERPL-MCNC: 38 MG/DL
HGB BLD-MCNC: 11 G/DL (ref 13–17.7)
IRON 24H UR-MRATE: 56 MCG/DL (ref 45–182)
IRON SATN MFR SERPL: 36 % (ref 20–50)
LDLC/HDLC SERPL: 2.66 {RATIO}
LYMPHOCYTES # BLD AUTO: 1.93 10*3/MM3 (ref 0.7–3.1)
LYMPHOCYTES NFR BLD AUTO: 15.6 % (ref 19.6–45.3)
MCH RBC QN AUTO: 34.4 PG (ref 26.6–33)
MCHC RBC AUTO-ENTMCNC: 32.7 G/DL (ref 31.5–35.7)
MCV RBC AUTO: 105 FL (ref 79–97)
MICROALBUMIN/CREAT UR: NORMAL MG/G
MONOCYTES # BLD AUTO: 0.83 10*3/MM3 (ref 0.1–0.9)
MONOCYTES NFR BLD AUTO: 6.7 % (ref 5–12)
NEUTROPHILS # BLD AUTO: 9.34 10*3/MM3 (ref 1.7–7)
NEUTROPHILS NFR BLD AUTO: 75.3 % (ref 42.7–76)
PLATELET # BLD AUTO: 377 10*3/MM3 (ref 140–450)
PMV BLD AUTO: 10.3 FL (ref 6–12)
POTASSIUM BLD-SCNC: 4.7 MMOL/L (ref 3.6–5.1)
PROT SERPL-MCNC: 7.9 G/DL (ref 6.1–7.9)
RBC # BLD AUTO: 3.2 10*6/MM3 (ref 4.14–5.8)
SODIUM BLD-SCNC: 138 MMOL/L (ref 136–144)
T4 FREE SERPL-MCNC: 0.96 NG/DL (ref 0.58–1.64)
TIBC SERPL-MCNC: 154 MCG/DL (ref 228–428)
TRANSFERRIN SERPL-MCNC: 110 MG/DL (ref 180–330)
TRIGL SERPL-MCNC: 150 MG/DL
TSH SERPL DL<=0.05 MIU/L-ACNC: 1.81 MIU/ML (ref 0.34–5.6)
VLDLC SERPL-MCNC: 30 MG/DL
WBC NRBC COR # BLD: 12.4 10*3/MM3 (ref 3.4–10.8)

## 2019-07-08 PROCEDURE — 82570 ASSAY OF URINE CREATININE: CPT | Performed by: INTERNAL MEDICINE

## 2019-07-08 PROCEDURE — 83036 HEMOGLOBIN GLYCOSYLATED A1C: CPT | Performed by: INTERNAL MEDICINE

## 2019-07-08 PROCEDURE — 36415 COLL VENOUS BLD VENIPUNCTURE: CPT | Performed by: NURSE PRACTITIONER

## 2019-07-08 PROCEDURE — 80053 COMPREHEN METABOLIC PANEL: CPT | Performed by: INTERNAL MEDICINE

## 2019-07-08 PROCEDURE — 80061 LIPID PANEL: CPT | Performed by: INTERNAL MEDICINE

## 2019-07-08 PROCEDURE — 83540 ASSAY OF IRON: CPT | Performed by: INTERNAL MEDICINE

## 2019-07-08 PROCEDURE — 84443 ASSAY THYROID STIM HORMONE: CPT | Performed by: INTERNAL MEDICINE

## 2019-07-08 PROCEDURE — 84439 ASSAY OF FREE THYROXINE: CPT | Performed by: INTERNAL MEDICINE

## 2019-07-08 PROCEDURE — 99213 OFFICE O/P EST LOW 20 MIN: CPT | Performed by: NURSE PRACTITIONER

## 2019-07-08 PROCEDURE — 82728 ASSAY OF FERRITIN: CPT | Performed by: INTERNAL MEDICINE

## 2019-07-08 PROCEDURE — 82043 UR ALBUMIN QUANTITATIVE: CPT | Performed by: INTERNAL MEDICINE

## 2019-07-08 PROCEDURE — 84466 ASSAY OF TRANSFERRIN: CPT | Performed by: INTERNAL MEDICINE

## 2019-07-08 PROCEDURE — 85025 COMPLETE CBC W/AUTO DIFF WBC: CPT | Performed by: NURSE PRACTITIONER

## 2019-07-08 RX ORDER — FERROUS SULFATE 325(65) MG
325 TABLET ORAL
Qty: 30 TABLET | Refills: 3
Start: 2019-07-08 | End: 2019-08-07 | Stop reason: DRUGHIGH

## 2019-07-08 RX ORDER — PANTOPRAZOLE SODIUM 40 MG/1
40 TABLET, DELAYED RELEASE ORAL DAILY
Qty: 30 TABLET | Refills: 3 | Status: ON HOLD
Start: 2019-07-08 | End: 2019-10-19 | Stop reason: SDUPTHER

## 2019-07-09 LAB — HBA1C MFR BLD: 7 % (ref 3.5–5.6)

## 2019-07-11 ENCOUNTER — HOSPITAL ENCOUNTER (OUTPATIENT)
Dept: ULTRASOUND IMAGING | Facility: HOSPITAL | Age: 74
Discharge: HOME | End: 2019-07-11
Admitting: NURSE PRACTITIONER

## 2019-07-11 ENCOUNTER — TRANSCRIBE ORDERS (OUTPATIENT)
Dept: ADMINISTRATIVE | Facility: HOSPITAL | Age: 74
End: 2019-07-11

## 2019-07-11 ENCOUNTER — HOSPITAL ENCOUNTER (OUTPATIENT)
Dept: GENERAL RADIOLOGY | Facility: HOSPITAL | Age: 74
Discharge: HOME | End: 2019-07-11

## 2019-07-11 ENCOUNTER — LAB (OUTPATIENT)
Dept: LAB | Facility: HOSPITAL | Age: 74
End: 2019-07-11

## 2019-07-11 DIAGNOSIS — K75.81 NONALCOHOLIC STEATOHEPATITIS: ICD-10-CM

## 2019-07-11 DIAGNOSIS — K74.60 HEPATIC CIRRHOSIS, UNSPECIFIED HEPATIC CIRRHOSIS TYPE, UNSPECIFIED WHETHER ASCITES PRESENT (HCC): ICD-10-CM

## 2019-07-11 DIAGNOSIS — K76.82 HEPATIC ENCEPHALOPATHY (HCC): ICD-10-CM

## 2019-07-11 DIAGNOSIS — D72.829 LEUKOCYTOSIS, UNSPECIFIED TYPE: ICD-10-CM

## 2019-07-11 DIAGNOSIS — K75.81 NONALCOHOLIC STEATOHEPATITIS: Primary | ICD-10-CM

## 2019-07-11 LAB — ALPHA-FETOPROTEIN: 2.76 NG/ML (ref 0–9)

## 2019-07-11 PROCEDURE — 76705 ECHO EXAM OF ABDOMEN: CPT

## 2019-07-11 PROCEDURE — 82105 ALPHA-FETOPROTEIN SERUM: CPT

## 2019-07-11 PROCEDURE — 36415 COLL VENOUS BLD VENIPUNCTURE: CPT

## 2019-07-11 PROCEDURE — 71046 X-RAY EXAM CHEST 2 VIEWS: CPT

## 2019-08-01 ENCOUNTER — TRANSCRIBE ORDERS (OUTPATIENT)
Dept: ADMINISTRATIVE | Facility: HOSPITAL | Age: 74
End: 2019-08-01

## 2019-08-01 ENCOUNTER — LAB (OUTPATIENT)
Dept: LAB | Facility: HOSPITAL | Age: 74
End: 2019-08-01

## 2019-08-01 DIAGNOSIS — E55.9 VITAMIN D DEFICIENCY: ICD-10-CM

## 2019-08-01 DIAGNOSIS — N18.30 CKD (CHRONIC KIDNEY DISEASE) STAGE 3, GFR 30-59 ML/MIN (HCC): ICD-10-CM

## 2019-08-01 DIAGNOSIS — I10 ESSENTIAL HYPERTENSION, BENIGN: ICD-10-CM

## 2019-08-01 DIAGNOSIS — N17.9 AKI (ACUTE KIDNEY INJURY) (HCC): Primary | ICD-10-CM

## 2019-08-01 DIAGNOSIS — N17.9 AKI (ACUTE KIDNEY INJURY) (HCC): ICD-10-CM

## 2019-08-01 DIAGNOSIS — IMO0002 TYPE II DIABETES MELLITUS WITH COMPLICATION, UNCONTROLLED: ICD-10-CM

## 2019-08-01 LAB
25(OH)D3 SERPL-MCNC: 31.1 NG/ML (ref 30–100)
ANION GAP SERPL CALCULATED.3IONS-SCNC: 16.4 MMOL/L (ref 5–15)
BACTERIA UR QL AUTO: ABNORMAL /HPF
BASOPHILS # BLD AUTO: 0.1 10*3/MM3 (ref 0–0.2)
BASOPHILS NFR BLD AUTO: 0.9 % (ref 0–1.5)
BILIRUB UR QL STRIP: NEGATIVE
BUN BLD-MCNC: 31 MG/DL (ref 8–20)
BUN/CREAT SERPL: 16.3 (ref 6.2–20.3)
CALCIUM SPEC-SCNC: 8.8 MG/DL (ref 8.9–10.3)
CHLORIDE SERPL-SCNC: 100 MMOL/L (ref 101–111)
CLARITY UR: CLEAR
CO2 SERPL-SCNC: 23 MMOL/L (ref 22–32)
COLOR UR: YELLOW
CREAT BLD-MCNC: 1.9 MG/DL (ref 0.7–1.2)
DEPRECATED RDW RBC AUTO: 55.1 FL (ref 37–54)
EOSINOPHIL # BLD AUTO: 0.3 10*3/MM3 (ref 0–0.4)
EOSINOPHIL NFR BLD AUTO: 2.6 % (ref 0.3–6.2)
ERYTHROCYTE [DISTWIDTH] IN BLOOD BY AUTOMATED COUNT: 14.7 % (ref 12.3–15.4)
GFR SERPL CREATININE-BSD FRML MDRD: 35 ML/MIN/1.73
GLUCOSE BLD-MCNC: 134 MG/DL (ref 65–99)
GLUCOSE UR STRIP-MCNC: NEGATIVE MG/DL
HBA1C MFR BLD: 6.9 % (ref 3.5–5.6)
HCT VFR BLD AUTO: 31.9 % (ref 37.5–51)
HGB BLD-MCNC: 10.8 G/DL (ref 13–17.7)
HGB UR QL STRIP.AUTO: ABNORMAL
HYALINE CASTS UR QL AUTO: ABNORMAL /LPF
KETONES UR QL STRIP: NEGATIVE
LEUKOCYTE ESTERASE UR QL STRIP.AUTO: NEGATIVE
LYMPHOCYTES # BLD AUTO: 1.7 10*3/MM3 (ref 0.7–3.1)
LYMPHOCYTES NFR BLD AUTO: 13.6 % (ref 19.6–45.3)
MAGNESIUM SERPL-MCNC: 2.1 MG/DL (ref 1.8–2.5)
MCH RBC QN AUTO: 36.1 PG (ref 26.6–33)
MCHC RBC AUTO-ENTMCNC: 33.9 G/DL (ref 31.5–35.7)
MCV RBC AUTO: 106.5 FL (ref 79–97)
MONOCYTES # BLD AUTO: 1 10*3/MM3 (ref 0.1–0.9)
MONOCYTES NFR BLD AUTO: 8 % (ref 5–12)
NEUTROPHILS # BLD AUTO: 9.2 10*3/MM3 (ref 1.7–7)
NEUTROPHILS NFR BLD AUTO: 74.9 % (ref 42.7–76)
NITRITE UR QL STRIP: NEGATIVE
NRBC BLD AUTO-RTO: 0 /100 WBC (ref 0–0.2)
PH UR STRIP.AUTO: 5.5 [PH] (ref 5–8)
PLATELET # BLD AUTO: 267 10*3/MM3 (ref 140–450)
PMV BLD AUTO: 9.3 FL (ref 6–12)
POTASSIUM BLD-SCNC: 4.4 MMOL/L (ref 3.6–5.1)
PROT UR QL STRIP: NEGATIVE
RBC # BLD AUTO: 3 10*6/MM3 (ref 4.14–5.8)
RBC # UR: ABNORMAL /HPF
REF LAB TEST METHOD: ABNORMAL
SODIUM BLD-SCNC: 135 MMOL/L (ref 136–144)
SP GR UR STRIP: 1.01 (ref 1–1.03)
SQUAMOUS #/AREA URNS HPF: ABNORMAL /HPF
URATE SERPL-MCNC: 9.9 MG/DL (ref 4.8–8.7)
UROBILINOGEN UR QL STRIP: ABNORMAL
WBC NRBC COR # BLD: 12.3 10*3/MM3 (ref 3.4–10.8)
WBC UR QL AUTO: ABNORMAL /HPF

## 2019-08-01 PROCEDURE — 82306 VITAMIN D 25 HYDROXY: CPT

## 2019-08-01 PROCEDURE — 83036 HEMOGLOBIN GLYCOSYLATED A1C: CPT

## 2019-08-01 PROCEDURE — 83970 ASSAY OF PARATHORMONE: CPT

## 2019-08-01 PROCEDURE — 85025 COMPLETE CBC W/AUTO DIFF WBC: CPT

## 2019-08-01 PROCEDURE — 80048 BASIC METABOLIC PNL TOTAL CA: CPT

## 2019-08-01 PROCEDURE — 84156 ASSAY OF PROTEIN URINE: CPT

## 2019-08-01 PROCEDURE — 84550 ASSAY OF BLOOD/URIC ACID: CPT

## 2019-08-01 PROCEDURE — 36415 COLL VENOUS BLD VENIPUNCTURE: CPT

## 2019-08-01 PROCEDURE — 81001 URINALYSIS AUTO W/SCOPE: CPT

## 2019-08-01 PROCEDURE — 82570 ASSAY OF URINE CREATININE: CPT

## 2019-08-01 PROCEDURE — 83735 ASSAY OF MAGNESIUM: CPT

## 2019-08-02 LAB
CREAT UR-MCNC: 78.4 MG/DL
PROT UR-MCNC: 3 MG/DL
PROT/CREAT UR: 38.3 MG/G CREA (ref 0–200)
PTH-INTACT SERPL-MCNC: 40 PG/ML (ref 11–72)

## 2019-08-05 DIAGNOSIS — D72.829 LEUKOCYTOSIS, UNSPECIFIED TYPE: Primary | ICD-10-CM

## 2019-08-06 NOTE — PROGRESS NOTES
Hematology/Oncology Outpatient Follow Up    Patient name:Bry Ratliff  :1945  MRN: [id number]  Primary Care Physician: Paulette Harris MD  Referring Physician: Paulette Harris MD    No chief complaint on file.       History of Present Illness:   1. Anemia diagnosed 2018 and IgG kappa monoclonal gammopathy diagnosed May 2018.   • This patient is an obese  male who claims to have developed kidney problems in 2018 for which he was referred to Devi Plascencia M.D. He was found to have a hemoglobin of 7.4 at that time and was given 1 unit of packed red blood cells. He was then referred to GI where he had been followed for Syed’s esophagus for a number of years. An EGD and colonoscopy was performed on 3/5/18 by Marisel Gomez M.D. revealing mucosa suggestive of Syed’s esophagus and hiatal hernia in the cardia. Patient had a poor prep compromising the colonoscopy exam though the scope did reach the cecum. Esophageal biopsy revealed squamocolumnar mucosa with extensive intestinal metaplasia consistent with Syed’s esophagus, negative for dysplasia. Colonoscopy was recommended to be repeated in two years and EGD in three years. The patient saw his primary care physician, Paulette Harris M.D., in followup and blood testing was done on 18 revealing WBC 6.3, hemoglobin 8.4, MCV 84.5, platelet count 182,000. Vitamin B12 level was 416 (180-914) and patient was referred here for further evaluation. The patient claims to have been diagnosed with Vitamin B12 deficiency a number of years ago for which he has been on Vitamin B12 injections up until six months ago when he just stopped taking those. He has been recently started on oral iron supplementation. He claims not to see any blood in his urine but does have microscopic hematuria for which he sees a urologist. He denies nosebleeds, gum bleeds or blood in the stool. He has not had any significant changes in his  weight. He feels weak and dizzy for a number of years which has recently gotten worse. He does not ambulate much because of back surgery causing him weakness. He did have a right lower extremity DVT with bilateral pulmonary emboli in 2004 since which time he has been on Coumadin, thought secondary to a sedentary lifestyle. He has no family history of anemias.   • 5/24/18 – Patient seen initial consultation for anemia. Hemoglobin 7.9, MCV 89.9. Ferritin 17 (), iron 183 (), TIBC 379 (228-428), iron saturation 48% (20-50), retic 2.16% (0.5-1.5), haptoglobin 138 (), folate 9.1 (5.9-24.8). SPEP showed monoclonal gammopathy. SIFE showed IgG kappa monoclonal gammopathy. M-spike in the gamma region 0.7 g/dL. Erythropoietin 53.5 (2.59-18.5). Antiparietal cell antibody and intrinsic factor antibodies negative.   Globulin 4.2 (2.5-3.8). Creatinine 1.4 (0.7-1.2).   • 6/19/18 – Discussed results of anemia workup. Hemoglobin improving. Ferrous Sulfate decreased to 325 mg twice a day. Will perform gammopathy workup for IgG kappa monoclonal gammopathy. IgA 783 (), IgG 1480 (600-1500), IgM 93 (). Kappa lambda FLC ratio 0.81 (0.26-1.65). Ferritin 36 ().        • 6/25/18 – Bone survey negative for acute disease. No evidence of lytic or blastic metastatic bone disease was present. Chest x-ray showed cardiomegaly, mild right basilar atelectasis and findings suggestive of ankylosing spondylitis.   • 6/29/18 – Patient underwent sternal bone marrow revealing monoclonal gammopathy of undetermined significant (MGUS). Extent of bone marrow involvement 5%. Phenotype kappa positive, IgG positive, CD38 positive, CD20 negative, CD19 negative, CD45 negative, CD56 negative and  negative. Dyspoiesis was not seen. There was absence of iron stores. Normal male karyotype. Normal FISH study. Flow cytometry revealed IgG kappa restrictive plasma cells in a polytypic background. There was a mixed population of  maturing myeloid cells, B-cells and T-cells. No abnormal myeloid maturation was seen. A monoclonal IgG kappa plasma cell population was present. 4% plasma cells present.   • 8/23/18 – Labs by Dr. Plascencia revealed B12 of 857 (180-914), folate 12.7 (5.9-24.8), iron 127 ().      • 9/19/18 – Iron 94 (), TIBC 297 (228-428), iron saturation 32 (20-50), ferritin 29 (). Erythropoietin 30.04 (2.59-18.5).        • 10/16/18 – Iron 177 (), TIBC 320 (228-428), iron saturation 55 (20-50), ferritin 34 ().       • 11/16/18 – Hemoglobin 7.1. Patient given 1 unit of packed red blood cells.   • 11/21/18 – Ferritin 27 (). Hemoglobin 7.9. Patient given 1 unit of packed red blood cells.    • 11/26/18 – EGD by Dave Russo M.D. revealed erosive gastritis and bleeding ampulla. There was oozing upon entering the stomach in many different areas. Duodenal mucosa and the esophagus were normal.   • 11/27/18 – Hemoglobin 8.2. Order written for Procrit 30,000 units subq weekly, not approved by insurance for low ferritin. Urine JERRY with no definite monoclonal gammopathy identified with questionable faint IgG kappa.   • 12/18/18 – Hemoglobin 9.9. Injectafer 750 mg IV given.   • 1/2/19 – Injectafer 750 mg IV given.  Hemoglobin 11, .1. Patient claims that he is getting Vitamin B12 injections monthly at home through Dr. Harris. Currently taking Ferrous Sulfate 325 mg p.o. b.i.d. and asked to continue that. Asked to continue Pantoprazole 40 mg daily that he is taking. IgA 651 (), IgG 1470 (600-1500), IgM 94 ().      • 2/12/19 – Iron 88 ().    • 3/6/19 – WBC 19.8 with 83% neutrophils, 5% lymphocytes, 11% monocytes, hemoglobin 8.7, , platelet count 182,000. Patient status post laparoscopic cholecystectomy on 2/24/19 with large ecchymoses apparent on abdomen. Infectious workup ordered and Injectafer 750 mg IV days 1 and 8 ordered.  Iron 23 (), TIBC 153 (228-428), iron  saturation 15% (20-50), ferritin 400 (224-336).       • 3/12/19 – WBC 15.02, hemoglobin 8.1 and platelets 227,000. Patient reported hematuria followed by Dr. Starkey. Orders written to start Injectafer ASAP. Abdominal ecchymosis improving.   • 3/14/19 – Injectafer 750 mg IV given.   • 4/22/19 – Hemoglobin 9.5, . Patient missed day 8 Injectafer in March and it was rescheduled. S JERRY showed IgG kappa monoclonal gammopathy.  • 5/8/19 - Injectafer 750 mg IV given.   • 7/8/19 - Iron 56 (). Iron Saturation 36 (20-50%). Trasferrin 110 (180-330). TIBC 154 (228-428). Ferritin 1,199.00 (24..00).    2.   Elevated LFT’s diagnosis established March 2018.  Biliary duct dilation diagnosis established June 2018.   · 11/30/17 – Maia phos 93 (32-91), total bili 0.7 (0.3-1.2), AST 37 (15-41), ALT 25 (15-41).   · 3/1/18 – T-bili 0.5 (0.3-1.2), maia phos 106 (32-91), AST 54 (15-41), ALT 27 (17-63).   · 5/24/18 – AST 46 (15-41), maia phos 131 (32-91).   • 6/8/18 – CMP ordered by Dr. Alejandra Amaro showed maia phos 209 (32-91),  (15-41), ALT 84 (17-63), total bili 1.1 (0.3-1.2).             • 6/19/18 – CT abdomen and pelvis as well as serological workup for elevated LFT’s ordered. Alpha-1 antitrypsin 144 (). Ceruloplasmin 38 (22-58). AFP 3 (0-9).  Hepatitis panel non-reactive.   • 6/22/18 – CT abdomen and pelvis showed abnormal intra and extrahepatic biliary dilation. There was mild distention of the gallbladder and evidence of several gallstones. Bilateral non-obstructing kidney stone was present. Incidental sigmoid diverticulosis.   • 7/2/18 – Patient underwent MRCP at [facility] showing markedly dilated intrahepatic and extrahepatic bile duct with multiple small filling defects within the distal common bile duct compatible with choledocholithiasis. There was a focal 6 mm segmental narrowing at the distal CBD with recommended GI consult for ERCP and brushings.   • 7/5/18 to 7/10/18 – On 7/5/18 labs  revealed normal total bilirubin (0.8), AST 69 (15-41), maia phos 152 (32-91), ALT was normal at 37 (17-63), ammonia was elevated at 86 (9-35), lipase was normal (22). Patient hospitalized at [facility] due to weakness. Workup revealed cholelithiasis. On 7/9/18 patient underwent ERCP with bilateral sphincterotomy, common duct stone extraction, biliary brushing and stent placement by Dr. Leiva. Path on brushings was negative for malignancy. There was intra and extrahepatic biliary ductal dilation with relatively abrupt distal common bile duct cutoff and non-visualization of the gallbladder. He was started on Lovenox and sequential compression devices due to risk of pulmonary embolism.  of 33 (0-35).  On 7/10/18 LFT’s revealed total bili 0.9 (0.3-1.2). Maia phos 129 (32-91). AST 50 (15-41), ALT 41 (17-63).     • 8/31/18 – EUS by Dr. Gomez at [facility] revealing suspect benign biliary stricture due to CBD stone with FNA pathology revealing benign and atypical ductal cells, bile and proteinaceous material including scattered bacteria, neutrophils and degenerating cells. The atypia was felt mild and favored to be reactive in nature. Plan was to repeat ERCP with removal of the stent and the stone with consideration of common bile duct evaluation with cholangioscopy at that time.   • 10/12/18 – ERCP with sphincteroplasty and stone extraction done by Dave Russo M.D. for choledocholithiasis.   • 2/23/19 – Patient underwent ERCP with balloon clearance of bile duct by Jl Hampton M.D.   • 3/6/19 – LFT’s not elevated with bilirubin 1.1, AST 31, ALT 14, maia phos was mildly elevated at 101 (32-91).   • 4/25/19 – 4/26/19 – Admitted to [facility] for hepatic encephalopathy and hypokalemia.   • 4/27/2019 – CT abdomen pelvis showed a 4.8 cm air-fluid collection adjacent to the right posterior hepatic lobe significant improved.  Right-sided chest tube small right pleural effusion.  Hepatic cirrhosis.  Bilateral nonobstructing  renal stones.  Sigmoid diverticulosis.   • 5/26/19 – 5/29/2019 – Admitted to [facility] for hepatic and cephalopathy.  Ammonia level 213 (H).  3.   Right lower extremity DVT and bilateral pulmonary emboli diagnosed 2004.   • 2004 – Patient claims to have developed right lower extremity DVT with bilateral pulmonary emboli in 2004 and was coumadinized. The blood clots were thought secondary to his sedentary lifestyle. He stayed on the Coumadin up until October 2017 when, due to difficulty maintaining his INR’s, he was switched to Xarelto 20 mg daily by Paulette Harris M.D. which was later dropped to 10 mg daily.    • 11/26/18 – EGD by Dave Russo M.D. revealed erosive gastritis and bleeding ampulla. Ampullary biopsy revealed reactive/reparative small intestinal mucosa with mild chronic inflammation. Gastric antrum biopsy was suggestive of focal mild reactive gastropathy. Due to erosive gastritis, patient asked to hold Xarelto and Aspirin by Dave Russo M.D.   • 11/27/18 – Patient asked to discontinue Xarelto and hold Aspirin while obtaining IVC filter. Risks discussed. Factor V Leiden gene mutation and prothrombin gene mutations not detected. Anticardiolipin IgM 11 (<11). Anti beta-2 glyco, IgA 55 (<20). Factor VIII activity 186 (). Antithrombin III activity 63 (). Protein C activity 69 (), protein S activity 69 ().       • 12/5/18 – Patient underwent transjugular IVC filter placement in IR by  Jonn Cuenca M.D.   • 1/2/19 – Told patient that his low protein CNS and antithrombin III likely due to liver disease. He would be at a high risk of going back on Xarelto and hence continued on Aspirin 81 mg p.o. daily.   • 1/3/19 – D-dimer 1.25 (<0.45).    • 3/8/19 – Chest x-ray showed chronic changes in the chest with no obvious pneumonia or congestive changes.  • 4/3/19 – Chest x-ray with right pleural effusion and basilar lung density with slight increase from prior.  Cardiomegaly.   • 4/22/19 - Factor VIII 334 (H), Anti-phosphatidylserine antibody IgM> 80 (H), Anti-phosphatidylserine antibody IgG 12 (N), Cardiolipin IgG 21 (N), Cardiolipin IgM 55 (H), Cardiolipin IgA 63 (H), Beta-2 glycoprotein IgG 4 (N), IgA 91 (H), and IgM 21 (H).      Past Medical History:   Diagnosis Date   • Anemia    • B12 deficiency 2009   • Syed's esophagus    • CAD (coronary artery disease)    • Diabetes mellitus (CMS/HCC)    • History of echocardiogram     03/03/2017 - 12/03/2014 - 04/2012   • Hyperlipidemia    • Hypertension    • Morbid obesity (CMS/HCC)    • KATHIA treated with BiPAP    • Renal insufficiency    • S/P lumbar laminectomy        Past Surgical History:   Procedure Laterality Date   • BACK SURGERY  1971   • CATARACT EXTRACTION  2014   • CHOLECYSTECTOMY  02/24/2019   • COLONOSCOPY  03/2018   • CORONARY ANGIOPLASTY  08/24/2009    PCI stent - succesful placement of 3.5/23 promus stent in proximal right coronary artery   • CORONARY ANGIOPLASTY WITH STENT PLACEMENT  08/27/2009    patient had stent placed to proximal right coronary artery   • CYSTOSCOPY BLADDER STONE LITHOTRIPSY  1997   • OTHER SURGICAL HISTORY  11/2018    IVC filter implant   • RENAL ARTERY STENT Left          Current Outpatient Medications:   •  allopurinol (ZYLOPRIM) 100 MG tablet, ALLOPURINOL 100 MG TABS, Disp: , Rfl:   •  amoxicillin (AMOXIL) 500 MG capsule, , Disp: , Rfl:   •  aspirin 81 MG tablet, Take 1 tablet by mouth Daily., Disp: 30 tablet, Rfl: 3  •  bumetanide (BUMEX) 1 MG tablet, TAKE 2 TABLETS BY MOUTH EVERY MORNING THEN ONE TABLET EVERY EVENING START ON 05/24, Disp: , Rfl: 0  •  docusate sodium (COLACE) 100 MG capsule, Take 100 mg by mouth 2 (Two) Times a Day., Disp: , Rfl:   •  DULoxetine (CYMBALTA) 60 MG capsule, DULOXETINE HCL 60 MG CPEP, Disp: , Rfl:   •  ergocalciferol (ERGOCALCIFEROL) 22838 units capsule, 1 capsule., Disp: , Rfl:   •  ferrous sulfate 325 (65 FE) MG tablet, Take 1 tablet by mouth Daily With  "Breakfast., Disp: 30 tablet, Rfl: 3  •  folic acid (FOLVITE) 1 MG tablet, Daily., Disp: , Rfl:   •  glucagon (GLUCAGON EMERGENCY) 1 MG injection, GLUCAGON EMERGENCY 1 MG KIT, Disp: , Rfl:   •  hydrALAZINE (APRESOLINE) 10 MG tablet, Daily., Disp: , Rfl:   •  insulin glargine (LANTUS) 100 UNIT/ML injection, Inject 38 units at bedtime, Disp: 30 mL, Rfl: 4  •  insulin lispro (HUMALOG) 100 UNIT/ML injection, 10 units subcu before each meal plus sliding scale, Disp: 30 mL, Rfl: 4  •  Insulin Syringe-Needle U-100 (BD INSULIN SYRINGE U/F) 31G X 5/16\" 1 ML misc, BD INSULIN SYRINGE U/F 31G X 5/16\" 1 ML, Disp: , Rfl:   •  lactulose (CHRONULAC) 10 GM/15ML solution, TK 45 ML PO Q 4 H, Disp: , Rfl: 3  •  levothyroxine (SYNTHROID, LEVOTHROID) 75 MCG tablet, Take 1 tablet by mouth Daily., Disp: 90 tablet, Rfl: 4  •  losartan (COZAAR) 25 MG tablet, Daily., Disp: , Rfl:   •  magnesium oxide (MAG-OX) 400 MG tablet, Daily., Disp: , Rfl:   •  Melatonin-Pyridoxine (MELATIN PO), Take  by mouth Daily., Disp: , Rfl:   •  metoclopramide (REGLAN) 5 MG tablet, Take 5 mg by mouth 2 (Two) Times a Day., Disp: , Rfl: 0  •  oxyCODONE (ROXICODONE) 5 MG immediate release tablet, Every 12 (Twelve) Hours., Disp: , Rfl:   •  pantoprazole (PROTONIX) 40 MG EC tablet, Take 1 tablet by mouth Daily., Disp: 30 tablet, Rfl: 3  •  Ped Multivitamins-Fl-Iron (MULTIVITAMIN WITH FLUORIDE/IRON) 0.25-10 MG/ML solution solution, Take 1 mL by mouth Daily., Disp: 1 bottle, Rfl: 3  •  Polyethylene Glycol 1000 powder, POLYETHYLENE GLYCOL 1000 POWD, Disp: , Rfl:   •  potassium chloride (K-DUR,KLOR-CON) 20 MEQ CR tablet, Take 20 mEq by mouth 2 (Two) Times a Day., Disp: , Rfl: 0  •  rifaximin (XIFAXAN) 550 MG tablet, Daily., Disp: , Rfl:   •  sodium bicarbonate 650 MG tablet, SODIUM BICARBONATE 650 MG TABS, Disp: , Rfl:   •  zinc sulfate (ZINCATE) 220 (50 Zn) MG capsule, 1 capsule Daily., Disp: , Rfl:     No Known Allergies    Family History   Problem Relation Age of Onset "   • Hyperlipidemia Other    • Hypertension Other        Cancer-related family history is not on file.    Social History     Tobacco Use   • Smoking status: Never Smoker   Substance Use Topics   • Alcohol use: No     Frequency: Never   • Drug use: No       I have reviewed the history of present illness, past medical history, family history, social history, lab results, all notes and other records since the patient was last seen on 4/22/19.    SUBJECTIVE: Patient is here for follow up of anemia, right lower extremity DVT and bilateral pulmonary emboli.    GIORGI Leahy present during office visit.          ROS:    Objective:    There were no vitals filed for this visit.    ECOG  (2) Ambulatory and capable of self care, unable to carry out work activity, up and about > 50% or waking hours    Physical Exam   Constitutional: He is oriented to person, place, and time. He appears well-developed and well-nourished.   Walker.  Wife present.   Obese.   HENT:   Head: Normocephalic and atraumatic.   Mouth/Throat: Oropharynx is clear and moist.   Eyes: Conjunctivae, EOM and lids are normal. Pupils are equal, round, and reactive to light.   Neck: Normal range of motion. Neck supple. No thyromegaly present.   Cardiovascular: Normal rate, regular rhythm and normal heart sounds.   No murmur heard.  Pulmonary/Chest: Effort normal and breath sounds normal. No respiratory distress.   Bilateral inspiratory wheezes.    Abdominal: Soft. Normal appearance and bowel sounds are normal. He exhibits no distension.   Genitourinary:   Genitourinary Comments: Deferred.   Musculoskeletal: Normal range of motion. He exhibits no edema.   Trace lower extremity edema.    Lymphadenopathy:     He has no cervical adenopathy.     He has no axillary adenopathy.        Right: No supraclavicular adenopathy present.        Left: No supraclavicular adenopathy present.   Neurological: He is alert and oriented to person, place, and time.   Skin: Skin is  warm and dry. Capillary refill takes less than 2 seconds. No bruising, no petechiae and no rash noted.   Scattered Nevi on face.    Psychiatric: He has a normal mood and affect. His speech is normal and behavior is normal. Judgment and thought content normal. Cognition and memory are normal.   Nursing note and vitals reviewed.      RECENT LABS  WBC   Date Value Ref Range Status   08/01/2019 12.30 (H) 3.40 - 10.80 10*3/mm3 Final     RBC   Date Value Ref Range Status   08/01/2019 3.00 (L) 4.14 - 5.80 10*6/mm3 Final     Hemoglobin   Date Value Ref Range Status   08/01/2019 10.8 (L) 13.0 - 17.7 g/dL Final     Hematocrit   Date Value Ref Range Status   08/01/2019 31.9 (L) 37.5 - 51.0 % Final     MCV   Date Value Ref Range Status   08/01/2019 106.5 (H) 79.0 - 97.0 fL Final     MCH   Date Value Ref Range Status   08/01/2019 36.1 (H) 26.6 - 33.0 pg Final     MCHC   Date Value Ref Range Status   08/01/2019 33.9 31.5 - 35.7 g/dL Final     RDW   Date Value Ref Range Status   08/01/2019 14.7 12.3 - 15.4 % Final     RDW-SD   Date Value Ref Range Status   08/01/2019 55.1 (H) 37.0 - 54.0 fl Final     MPV   Date Value Ref Range Status   08/01/2019 9.3 6.0 - 12.0 fL Final     Platelets   Date Value Ref Range Status   08/01/2019 267 140 - 450 10*3/mm3 Final     Neutrophil %   Date Value Ref Range Status   08/01/2019 74.9 42.7 - 76.0 % Final     Lymphocyte %   Date Value Ref Range Status   08/01/2019 13.6 (L) 19.6 - 45.3 % Final     Monocyte %   Date Value Ref Range Status   08/01/2019 8.0 5.0 - 12.0 % Final     Eosinophil %   Date Value Ref Range Status   08/01/2019 2.6 0.3 - 6.2 % Final     Basophil %   Date Value Ref Range Status   08/01/2019 0.9 0.0 - 1.5 % Final     Neutrophils, Absolute   Date Value Ref Range Status   08/01/2019 9.20 (H) 1.70 - 7.00 10*3/mm3 Final     Lymphocytes, Absolute   Date Value Ref Range Status   08/01/2019 1.70 0.70 - 3.10 10*3/mm3 Final     Monocytes, Absolute   Date Value Ref Range Status   08/01/2019  1.00 (H) 0.10 - 0.90 10*3/mm3 Final     Eosinophils, Absolute   Date Value Ref Range Status   08/01/2019 0.30 0.00 - 0.40 10*3/mm3 Final     Basophils, Absolute   Date Value Ref Range Status   08/01/2019 0.10 0.00 - 0.20 10*3/mm3 Final     nRBC   Date Value Ref Range Status   08/01/2019 0.0 0.0 - 0.2 /100 WBC Final       Lab Results   Component Value Date    GLUCOSE 134 (H) 08/01/2019    BUN 31 (H) 08/01/2019    CREATININE 1.90 (H) 08/01/2019    EGFRIFNONA 35 (L) 08/01/2019    BCR 16.3 08/01/2019    K 4.4 08/01/2019    CO2 23.0 08/01/2019    CALCIUM 8.8 (L) 08/01/2019    ALBUMIN 2.50 (L) 07/08/2019    LABIL2 0.4 (L) 05/29/2019    AST 76 (H) 07/08/2019    ALT 53 07/08/2019       ASSESSMENT:  Assessment/Plan     There are no diagnoses linked to this encounter.      PLAN:     1.     I have reviewed labs results, imaging, vitals, and medications with the patient today. Will follow up in one month with***.    Patient verbalized understanding and is in agreement of the above plan.     I have reviewed and validated all of the information entered above.     Much of the above report is an electronic transcription//translation of the spoken language to printed text using Dragon Software. As such, the subtleties and finesse of the spoken language may permit erroneous, or at times, nonsensical words or phrases to be inadvertently transcribed; thus changes may be made at a later date to rectify these errors.

## 2019-08-07 ENCOUNTER — APPOINTMENT (OUTPATIENT)
Dept: LAB | Facility: HOSPITAL | Age: 74
End: 2019-08-07

## 2019-08-07 ENCOUNTER — OFFICE VISIT (OUTPATIENT)
Dept: ONCOLOGY | Facility: CLINIC | Age: 74
End: 2019-08-07

## 2019-08-07 VITALS
TEMPERATURE: 98.2 F | WEIGHT: 298.2 LBS | DIASTOLIC BLOOD PRESSURE: 69 MMHG | HEART RATE: 87 BPM | HEIGHT: 74 IN | BODY MASS INDEX: 38.27 KG/M2 | RESPIRATION RATE: 18 BRPM | SYSTOLIC BLOOD PRESSURE: 127 MMHG

## 2019-08-07 DIAGNOSIS — N18.30 ANEMIA IN STAGE 3 CHRONIC KIDNEY DISEASE (HCC): ICD-10-CM

## 2019-08-07 DIAGNOSIS — D72.829 LEUKOCYTOSIS, UNSPECIFIED TYPE: ICD-10-CM

## 2019-08-07 DIAGNOSIS — K29.60 EROSIVE GASTRITIS: ICD-10-CM

## 2019-08-07 DIAGNOSIS — Z86.718 HISTORY OF DVT OF LOWER EXTREMITY: ICD-10-CM

## 2019-08-07 DIAGNOSIS — R79.1 ELEVATED FACTOR VIII LEVEL: ICD-10-CM

## 2019-08-07 DIAGNOSIS — K75.81 NASH (NONALCOHOLIC STEATOHEPATITIS): ICD-10-CM

## 2019-08-07 DIAGNOSIS — D68.59 PROTEIN C DEFICIENCY (HCC): ICD-10-CM

## 2019-08-07 DIAGNOSIS — D68.59 PROTEIN S DEFICIENCY (HCC): ICD-10-CM

## 2019-08-07 DIAGNOSIS — D68.59 ANTITHROMBIN III DEFICIENCY (HCC): ICD-10-CM

## 2019-08-07 DIAGNOSIS — Z86.39 HISTORY OF NON ANEMIC VITAMIN B12 DEFICIENCY: ICD-10-CM

## 2019-08-07 DIAGNOSIS — D47.2 MGUS (MONOCLONAL GAMMOPATHY OF UNKNOWN SIGNIFICANCE): ICD-10-CM

## 2019-08-07 DIAGNOSIS — R16.1 SPLENOMEGALY: ICD-10-CM

## 2019-08-07 DIAGNOSIS — Z86.711 HISTORY OF PULMONARY EMBOLUS (PE): ICD-10-CM

## 2019-08-07 DIAGNOSIS — R76.0 ANTIPHOSPHOLIPID ANTIBODY POSITIVE: ICD-10-CM

## 2019-08-07 DIAGNOSIS — D50.8 OTHER IRON DEFICIENCY ANEMIA: Primary | ICD-10-CM

## 2019-08-07 DIAGNOSIS — D63.1 ANEMIA IN STAGE 3 CHRONIC KIDNEY DISEASE (HCC): ICD-10-CM

## 2019-08-07 LAB
BASOPHILS # BLD AUTO: 0.04 10*3/MM3 (ref 0–0.2)
BASOPHILS NFR BLD AUTO: 0.3 % (ref 0–1.5)
DEPRECATED RDW RBC AUTO: 52.4 FL (ref 37–54)
EOSINOPHIL # BLD AUTO: 0.3 10*3/MM3 (ref 0–0.4)
EOSINOPHIL NFR BLD AUTO: 2.5 % (ref 0.3–6.2)
ERYTHROCYTE [DISTWIDTH] IN BLOOD BY AUTOMATED COUNT: 13.8 % (ref 12.3–15.4)
HCT VFR BLD AUTO: 30.5 % (ref 37.5–51)
HGB BLD-MCNC: 10 G/DL (ref 13–17.7)
LYMPHOCYTES # BLD AUTO: 1.81 10*3/MM3 (ref 0.7–3.1)
LYMPHOCYTES NFR BLD AUTO: 15.3 % (ref 19.6–45.3)
MCH RBC QN AUTO: 35.5 PG (ref 26.6–33)
MCHC RBC AUTO-ENTMCNC: 32.8 G/DL (ref 31.5–35.7)
MCV RBC AUTO: 108.2 FL (ref 79–97)
MONOCYTES # BLD AUTO: 1.16 10*3/MM3 (ref 0.1–0.9)
MONOCYTES NFR BLD AUTO: 9.8 % (ref 5–12)
NEUTROPHILS # BLD AUTO: 8.51 10*3/MM3 (ref 1.7–7)
NEUTROPHILS NFR BLD AUTO: 72.1 % (ref 42.7–76)
PLATELET # BLD AUTO: 251 10*3/MM3 (ref 140–450)
PMV BLD AUTO: 11 FL (ref 6–12)
RBC # BLD AUTO: 2.82 10*6/MM3 (ref 4.14–5.8)
WBC NRBC COR # BLD: 11.82 10*3/MM3 (ref 3.4–10.8)

## 2019-08-07 PROCEDURE — 99214 OFFICE O/P EST MOD 30 MIN: CPT | Performed by: INTERNAL MEDICINE

## 2019-08-07 PROCEDURE — 86335 IMMUNFIX E-PHORSIS/URINE/CSF: CPT | Performed by: NURSE PRACTITIONER

## 2019-08-07 PROCEDURE — 36415 COLL VENOUS BLD VENIPUNCTURE: CPT | Performed by: INTERNAL MEDICINE

## 2019-08-07 PROCEDURE — 85025 COMPLETE CBC W/AUTO DIFF WBC: CPT | Performed by: INTERNAL MEDICINE

## 2019-08-07 RX ORDER — FERROUS SULFATE 325(65) MG
325 TABLET ORAL 2 TIMES DAILY
Qty: 60 TABLET | Refills: 2
Start: 2019-08-07 | End: 2020-01-23

## 2019-08-07 NOTE — PROGRESS NOTES
Hematology/Oncology Outpatient Follow Up    PATIENT NAME:Bry Ratliff  :1945  MRN: [id number]  PRIMARY CARE PHYSICIAN: Paulette Harris MD  REFERRING PHYSICIAN: Paulette Harris MD    Chief Complaint   Patient presents with   • Follow-up     JOSE with poor oral absorption, erosive gastritis, ACD secondary to CKD stage III, IgG kappa MGUS history of right lower extremity DVT and bilateral PEs, Antithrombin III and protein C&S deficiencies, antiphospholipid antibody positivity, elevated factor VIII level, Gonzalez and splenomegaly, leukocytosis.     History of Present Illness:   1. Anemia diagnosed 2018 and IgG kappa monoclonal gammopathy diagnosed May 2018.   • This patient is an obese  male who claims to have developed kidney problems in 2018 for which he was referred to Devi Plascencia M.D. He was found to have a hemoglobin of 7.4 at that time and was given 1 unit of packed red blood cells. He was then referred to GI where he had been followed for Syed’s esophagus for a number of years. An EGD and colonoscopy was performed on 3/5/18 by Marisel Gomez M.D. revealing mucosa suggestive of Syed’s esophagus and hiatal hernia in the cardia. Patient had a poor prep compromising the colonoscopy exam though the scope did reach the cecum. Esophageal biopsy revealed squamocolumnar mucosa with extensive intestinal metaplasia consistent with Syed’s esophagus, negative for dysplasia. Colonoscopy was recommended to be repeated in two years and EGD in three years. The patient saw his primary care physician, Paulette Harris M.D., in followup and blood testing was done on 18 revealing WBC 6.3, hemoglobin 8.4, MCV 84.5, platelet count 182,000. Vitamin B12 level was 416 (180-914) and patient was referred here for further evaluation. The patient claims to have been diagnosed with Vitamin B12 deficiency a number of years ago for which he has been on Vitamin B12 injections up  until six months ago when he just stopped taking those. He has been recently started on oral iron supplementation. He claims not to see any blood in his urine but does have microscopic hematuria for which he sees a urologist. He denies nosebleeds, gum bleeds or blood in the stool. He has not had any significant changes in his weight. He feels weak and dizzy for a number of years which has recently gotten worse. He does not ambulate much because of back surgery causing him weakness. He did have a right lower extremity DVT with bilateral pulmonary emboli in 2004 since which time he has been on Coumadin, thought secondary to a sedentary lifestyle. He has no family history of anemias.   • 5/24/18 - Patient seen initial consultation for anemia. Hemoglobin 7.9, MCV 89.9. Ferritin 17 (), iron 183 (), TIBC 379 (228-428), iron saturation 48% (20-50), retic 2.16% (0.5-1.5), haptoglobin 138 (), folate 9.1 (5.9-24.8). SPEP showed monoclonal gammopathy. SIFE showed IgG kappa monoclonal gammopathy. M-spike in the gamma region 0.7 g/dL. Erythropoietin 53.5 (2.59-18.5). Antiparietal cell antibody and intrinsic factor antibodies negative.   Globulin 4.2 (2.5-3.8). Creatinine 1.4 (0.7-1.2).   • 6/19/18 - Discussed results of anemia workup. Hemoglobin improving. Ferrous Sulfate decreased to 325 mg twice a day. Will perform gammopathy workup for IgG kappa monoclonal gammopathy. IgA 783 (), IgG 1480 (600-1500), IgM 93 (). Kappa lambda FLC ratio 0.81 (0.26-1.65). Ferritin 36 ().        • 6/25/18 - Bone survey negative for acute disease. No evidence of lytic or blastic metastatic bone disease was present. Chest x-ray showed cardiomegaly, mild right basilar atelectasis and findings suggestive of ankylosing spondylitis.   • 6/29/18 - Patient underwent sternal bone marrow revealing monoclonal gammopathy of undetermined significant (MGUS). Extent of bone marrow involvement 5%. Phenotype kappa positive, IgG  positive, CD38 positive, CD20 negative, CD19 negative, CD45 negative, CD56 negative and  negative. Dyspoiesis was not seen. There was absence of iron stores. Normal male karyotype. Normal FISH study. Flow cytometry revealed IgG kappa restrictive plasma cells in a polytypic background. There was a mixed population of maturing myeloid cells, B-cells and T-cells. No abnormal myeloid maturation was seen. A monoclonal IgG kappa plasma cell population was present. 4% plasma cells present.   • 8/23/18 - Labs by Dr. Plascencia revealed B12 of 857 (180-914), folate 12.7 (5.9-24.8), iron 127 ().      • 9/19/18 - Iron 94 (), TIBC 297 (228-428), iron saturation 32 (20-50), ferritin 29 (). Erythropoietin 30.04 (2.59-18.5).        • 10/16/18 - Iron 177 (), TIBC 320 (228-428), iron saturation 55 (20-50), ferritin 34 ().       • 11/16/18 - Hemoglobin 7.1. Patient given 1 unit of packed red blood cells.   • 11/21/18 - Ferritin 27 (). Hemoglobin 7.9. Patient given 1 unit of packed red blood cells.    • 11/26/18 - EGD by Dave Russo M.D. revealed erosive gastritis and bleeding ampulla. There was oozing upon entering the stomach in many different areas. Duodenal mucosa and the esophagus were normal.   • 11/27/18 - Hemoglobin 8.2. Order written for Procrit 30,000 units subq weekly, not approved by insurance for low ferritin. Urine JERRY with no definite monoclonal gammopathy identified with questionable faint IgG kappa.   • 12/18/18 - Hemoglobin 9.9. Injectafer 750 mg IV given.   • 1/2/19 - Injectafer 750 mg IV given.  Hemoglobin 11, .1. Patient claims that he is getting Vitamin B12 injections monthly at home through Dr. Harris. Currently taking Ferrous Sulfate 325 mg p.o. b.i.d. and asked to continue that. Asked to continue Pantoprazole 40 mg daily that he is taking. IgA 651 (), IgG 1470 (600-1500), IgM 94 ().      • 2/12/19 - Iron 88 ().    • 3/6/19 - WBC 19.8 with  83% neutrophils, 5% lymphocytes, 11% monocytes, hemoglobin 8.7, , platelet count 182,000. Patient status post laparoscopic cholecystectomy on 2/24/19 with large ecchymoses apparent on abdomen. Infectious workup ordered and Injectafer 750 mg IV days 1 and 8 ordered.  Iron 23 (), TIBC 153 (228-428), iron saturation 15% (20-50), ferritin 400 (224-336).       • 3/12/19 - WBC 15.02, hemoglobin 8.1 and platelets 227,000. Patient reported hematuria followed by Dr. Starkey. Orders written to start Injectafer ASAP. Abdominal ecchymosis improving.   • 3/14/19 - Injectafer 750 mg IV given.   • 4/22/19 - Hemoglobin 9.5, . Patient missed day 8 Injectafer in March and it was rescheduled. SIFE showed IgG kappa monoclonal gammopathy.  • 5/8/19 - Injectafer 750 mg IV given.   • 7/8/19 - Iron 56 (). Iron Saturation 36 (20-50%). Transferrin 110 (180-330). TIBC 154 (228-428). Ferritin 1,199 ().    • 8/7/2019-hemoglobin 10.0.  Patient taking multivitamin with iron only.  Restarted ferrous sulfate 325mg by mouth twice a day.       2.   Elevated LFT’s diagnosis established March 2018.  Biliary duct dilation diagnosis established June 2018.   · 11/30/17 - Maia phos 93 (32-91), total bili 0.7 (0.3-1.2), AST 37 (15-41), ALT 25 (15-41).   · 3/1/18 - T-bili 0.5 (0.3-1.2), maia phos 106 (32-91), AST 54 (15-41), ALT 27 (17-63).   · 5/24/18 - AST 46 (15-41), maia phos 131 (32-91).   • 6/8/18 - CMP ordered by Dr. Alejandra Amaro showed maia phos 209 (32-91),  (15-41), ALT 84 (17-63), total bili 1.1 (0.3-1.2).             • 6/19/18 - CT abdomen and pelvis as well as serological workup for elevated LFT’s ordered. Alpha-1 antitrypsin 144 (). Ceruloplasmin 38 (22-58). AFP 3 (0-9).  Hepatitis panel non-reactive.   • 6/22/18 - CT abdomen and pelvis showed abnormal intra and extrahepatic biliary dilation. There was mild distention of the gallbladder and evidence of several gallstones. Bilateral non-obstructing  kidney stone was present. Incidental sigmoid diverticulosis.   • 7/2/18 - Patient underwent MRCP at [facility] showing markedly dilated intrahepatic and extrahepatic bile duct with multiple small filling defects within the distal common bile duct compatible with choledocholithiasis. There was a focal 6 mm segmental narrowing at the distal CBD with recommended GI consult for ERCP and brushings.   • 7/5/18 to 7/10/18 - On 7/5/18 labs revealed normal total bilirubin (0.8), AST 69 (15-41), maia phos 152 (32-91), ALT was normal at 37 (17-63), ammonia was elevated at 86 (9-35), lipase was normal (22). Patient hospitalized at [facility] due to weakness. Workup revealed cholelithiasis. On 7/9/18 patient underwent ERCP with bilateral sphincterotomy, common duct stone extraction, biliary brushing and stent placement by Dr. Leiva. Path on brushings was negative for malignancy. There was intra and extrahepatic biliary ductal dilation with relatively abrupt distal common bile duct cutoff and non-visualization of the gallbladder. He was started on Lovenox and sequential compression devices due to risk of pulmonary embolism.  of 33 (0-35).  On 7/10/18 LFT’s revealed total bili 0.9 (0.3-1.2). Maia phos 129 (32-91). AST 50 (15-41), ALT 41 (17-63).     • 8/31/18 - EUS by Dr. Gomez at [facility] revealing suspect benign biliary stricture due to CBD stone with FNA pathology revealing benign and atypical ductal cells, bile and proteinaceous material including scattered bacteria, neutrophils and degenerating cells. The atypia was felt mild and favored to be reactive in nature. Plan was to repeat ERCP with removal of the stent and the stone with consideration of common bile duct evaluation with cholangioscopy at that time.   • 10/12/18 - ERCP with sphincteroplasty and stone extraction done by Dave Russo M.D. for choledocholithiasis.   • 2/23/19 - Patient underwent ERCP with balloon clearance of bile duct by Jl QUINTERO  Memo HORAN.   • 3/6/19 - LFT’s not elevated with bilirubin 1.1, AST 31, ALT 14, osbaldo phos was mildly elevated at 101 (32-91).   • 4/25/19 - 4/26/19 - Admitted to [facility] for hepatic encephalopathy and hypokalemia.   • 4/27/2019 - CT abdomen pelvis showed a 4.8 cm air-fluid collection adjacent to the right posterior hepatic lobe significant improved.  Right-sided chest tube small right pleural effusion.  Hepatic cirrhosis.  Bilateral nonobstructing renal stones.  Sigmoid diverticulosis.   • 5/26/19 - 5/29/2019 - Admitted to [facility] for hepatic and cephalopathy.  Ammonia level 213 (H).  • 7/11/19 - AFP 2.76 (0-9).     3.   Right lower extremity DVT and bilateral pulmonary emboli diagnosed 2004.   • 2004 - Patient claims to have developed right lower extremity DVT with bilateral pulmonary emboli in 2004 and was coumadinized. The blood clots were thought secondary to his sedentary lifestyle. He stayed on the Coumadin up until October 2017 when, due to difficulty maintaining his INR’s, he was switched to Xarelto 20 mg daily by Paulette Harris M.D. which was later dropped to 10 mg daily.    • 11/26/18 - EGD by Dave Russo M.D. revealed erosive gastritis and bleeding ampulla. Ampullary biopsy revealed reactive/reparative small intestinal mucosa with mild chronic inflammation. Gastric antrum biopsy was suggestive of focal mild reactive gastropathy. Due to erosive gastritis, patient asked to hold Xarelto and Aspirin by Dave Russo M.D.   • 11/27/18 - Patient asked to discontinue Xarelto and hold Aspirin while obtaining IVC filter. Risks discussed. Factor V Leiden gene mutation and prothrombin gene mutations not detected. Anticardiolipin IgM 11 (<11). Anti beta-2 glyco, IgA 55 (<20). Factor VIII activity 186 (). Antithrombin III activity 63 (). Protein C activity 69 (), protein S activity 69 ().       • 12/5/18 - Patient underwent transjugular IVC filter placement in IR by   Jonn Cuenca M.D.   • 1/2/19 - Told patient that his low protein CNS and antithrombin III likely due to liver disease. He would be at a high risk of going back on Xarelto and hence continued on Aspirin 81 mg p.o. daily.   • 1/3/19 - D-dimer 1.25 (<0.45).    • 3/8/19 - Chest x-ray showed chronic changes in the chest with no obvious pneumonia or congestive changes.  • 4/3/19 - Chest x-ray with right pleural effusion and basilar lung density with slight increase from prior. Cardiomegaly.   • 4/22/19 - Factor VIII 334 (H), Anti-phosphatidylserine antibody IgM> 80 (H), Anti-phosphatidylserine antibody IgG 12 (N), Cardiolipin IgG 21 (N), Cardiolipin IgM 55 (H), Cardiolipin IgA 63 (H), Beta-2 glycoprotein IgG 4 (N), IgA 91 (H), and IgM 21 (H).   • 7/11/19 - Chest x-ray showed stable small right pleural effusion since 04/25/2019. Minimal right costophrenic angle atelectasis.    Past Medical History:   Diagnosis Date   • Anemia    • B12 deficiency 2009   • Syed's esophagus    • CAD (coronary artery disease)    • Diabetes mellitus (CMS/HCC)    • History of echocardiogram     03/03/2017 - 12/03/2014 - 04/2012   • Hyperlipidemia    • Hypertension    • Morbid obesity (CMS/HCC)    • KATHIA treated with BiPAP    • Renal insufficiency    • S/P lumbar laminectomy        Past Surgical History:   Procedure Laterality Date   • BACK SURGERY  1971   • CATARACT EXTRACTION  2014   • CHOLECYSTECTOMY  02/24/2019   • COLONOSCOPY  03/2018   • CORONARY ANGIOPLASTY  08/24/2009    PCI stent - succesful placement of 3.5/23 promus stent in proximal right coronary artery   • CORONARY ANGIOPLASTY WITH STENT PLACEMENT  08/27/2009    patient had stent placed to proximal right coronary artery   • CYSTOSCOPY BLADDER STONE LITHOTRIPSY  1997   • OTHER SURGICAL HISTORY  11/2018    IVC filter implant   • RENAL ARTERY STENT Left          Current Outpatient Medications:   •  allopurinol (ZYLOPRIM) 100 MG tablet, ALLOPURINOL 100 MG TABS, Disp: , Rfl:   •   "aspirin 81 MG tablet, Take 1 tablet by mouth Daily., Disp: 30 tablet, Rfl: 3  •  bumetanide (BUMEX) 1 MG tablet, TAKE 2 TABLETS BY MOUTH EVERY MORNING THEN ONE TABLET EVERY EVENING START ON 05/24, Disp: , Rfl: 0  •  Cyanocobalamin 1000 MCG/ML kit, Inject 1,000 mcg as directed Every 30 (Thirty) Days., Disp: , Rfl:   •  docusate sodium (COLACE) 100 MG capsule, Take 100 mg by mouth 2 (Two) Times a Day., Disp: , Rfl:   •  DULoxetine (CYMBALTA) 60 MG capsule, DULOXETINE HCL 60 MG CPEP, Disp: , Rfl:   •  folic acid (FOLVITE) 1 MG tablet, Daily., Disp: , Rfl:   •  insulin glargine (LANTUS) 100 UNIT/ML injection, Inject 38 units at bedtime, Disp: 30 mL, Rfl: 4  •  insulin lispro (HUMALOG) 100 UNIT/ML injection, 10 units subcu before each meal plus sliding scale, Disp: 30 mL, Rfl: 4  •  Insulin Syringe-Needle U-100 (BD INSULIN SYRINGE U/F) 31G X 5/16\" 1 ML misc, BD INSULIN SYRINGE U/F 31G X 5/16\" 1 ML, Disp: , Rfl:   •  lactulose (CHRONULAC) 10 GM/15ML solution, TK 45 ML PO Q 4 H, Disp: , Rfl: 3  •  levothyroxine (SYNTHROID, LEVOTHROID) 75 MCG tablet, Take 1 tablet by mouth Daily., Disp: 90 tablet, Rfl: 4  •  magnesium oxide (MAG-OX) 400 MG tablet, Daily., Disp: , Rfl:   •  Melatonin-Pyridoxine (MELATIN PO), Take  by mouth Daily., Disp: , Rfl:   •  metoclopramide (REGLAN) 5 MG tablet, Take 5 mg by mouth 2 (Two) Times a Day., Disp: , Rfl: 0  •  Multiple Vitamin (MULTI VITAMIN DAILY PO), Take  by mouth Daily., Disp: , Rfl:   •  oxyCODONE (ROXICODONE) 5 MG immediate release tablet, Every 12 (Twelve) Hours., Disp: , Rfl:   •  pantoprazole (PROTONIX) 40 MG EC tablet, Take 1 tablet by mouth Daily., Disp: 30 tablet, Rfl: 3  •  Polyethylene Glycol 1000 powder, POLYETHYLENE GLYCOL 1000 POWD, Disp: , Rfl:   •  potassium chloride (K-DUR,KLOR-CON) 20 MEQ CR tablet, Take 20 mEq by mouth 2 (Two) Times a Day., Disp: , Rfl: 0  •  rifaximin (XIFAXAN) 550 MG tablet, Daily., Disp: , Rfl:   •  sodium bicarbonate 650 MG tablet, SODIUM " BICARBONATE 650 MG TABS, Disp: , Rfl:   •  zinc sulfate (ZINCATE) 220 (50 Zn) MG capsule, 1 capsule Daily., Disp: , Rfl:   •  ferrous sulfate 325 (65 FE) MG tablet, Take 1 tablet by mouth 2 (Two) Times a Day., Disp: 60 tablet, Rfl: 2    No Known Allergies    Family History   Problem Relation Age of Onset   • Hyperlipidemia Other    • Hypertension Other        Cancer-related family history is not on file.    Social History     Tobacco Use   • Smoking status: Never Smoker   • Smokeless tobacco: Never Used   Substance Use Topics   • Alcohol use: No     Frequency: Never   • Drug use: No     I have reviewed the history of present illness, past medical history, family history, social history, lab results, all notes and other records since the patient was last seen on 4/22/19.    SUBJECTIVE: Had been in rehab but is now home with home health.  Physical therapy is now completed and he continues to have nursing care for a wound on his buttocks that is improving.          REVIEW OF SYSTEMS:  Review of Systems   Constitutional: Positive for fatigue. Negative for activity change, appetite change, chills, fever and unexpected weight change.   HENT: Negative for ear pain, mouth sores, nosebleeds, sinus pressure and sore throat.    Eyes: Negative for photophobia and visual disturbance.   Respiratory: Negative for cough, shortness of breath, wheezing and stridor.    Cardiovascular: Negative for chest pain, palpitations and leg swelling.   Gastrointestinal: Positive for diarrhea ( Due to medications for high ammonia). Negative for abdominal pain, blood in stool, constipation, nausea and vomiting.   Endocrine: Negative for cold intolerance and heat intolerance.   Genitourinary: Negative for difficulty urinating, dysuria, frequency and hematuria.   Musculoskeletal: Negative for arthralgias, joint swelling and neck stiffness.   Skin: Negative for color change and rash.   Neurological: Negative for dizziness, seizures, syncope and  "headaches.   Hematological: Negative for adenopathy.        No obvious bleeding   Psychiatric/Behavioral: Negative for agitation, confusion and hallucinations. The patient is not nervous/anxious.      OBJECTIVE:    Vitals:    08/07/19 1336   BP: 127/69   Pulse: 87   Resp: 18   Temp: 98.2 °F (36.8 °C)   Weight: 135 kg (298 lb 3.2 oz)   Height: 188 cm (74\")   PainSc: 0-No pain     ECOG  (2) Ambulatory and capable of self care, unable to carry out work activity, up and about > 50% or waking hours    Physical Exam   Constitutional: He is oriented to person, place, and time. He appears well-developed and well-nourished. No distress.   HENT:   Head: Normocephalic and atraumatic.   Nose: Nose normal.   Mouth/Throat: Oropharynx is clear and moist. No oropharyngeal exudate.   Eyes: Conjunctivae and EOM are normal. Pupils are equal, round, and reactive to light. Right eye exhibits no discharge. Left eye exhibits no discharge. No scleral icterus.   Neck: Normal range of motion. Neck supple. No thyromegaly present.   Cardiovascular: Normal rate, regular rhythm, normal heart sounds and intact distal pulses. Exam reveals no gallop and no friction rub.   No murmur heard.  Pulmonary/Chest: Effort normal and breath sounds normal. No stridor. No respiratory distress. He has no wheezes.   Abdominal: Soft. Bowel sounds are normal. He exhibits no mass. There is no tenderness. There is no rebound and no guarding.   Obese   Musculoskeletal: Normal range of motion. He exhibits edema ( Trace bilateral pedal). He exhibits no tenderness or deformity.   Ambulates with the use of a walker   Lymphadenopathy:     He has no cervical adenopathy.   Neurological: He is alert and oriented to person, place, and time. He exhibits normal muscle tone. Coordination normal.   Skin: Skin is warm and dry. No rash noted. He is not diaphoretic. No erythema. No pallor.   Psychiatric: He has a normal mood and affect. His behavior is normal.   Nursing note and " vitals reviewed.      RECENT LABS  WBC   Date Value Ref Range Status   08/07/2019 11.82 (H) 3.40 - 10.80 10*3/mm3 Final     RBC   Date Value Ref Range Status   08/07/2019 2.82 (L) 4.14 - 5.80 10*6/mm3 Final     Hemoglobin   Date Value Ref Range Status   08/07/2019 10.0 (L) 13.0 - 17.7 g/dL Final     Hematocrit   Date Value Ref Range Status   08/07/2019 30.5 (L) 37.5 - 51.0 % Final     MCV   Date Value Ref Range Status   08/07/2019 108.2 (H) 79.0 - 97.0 fL Final     MCH   Date Value Ref Range Status   08/07/2019 35.5 (H) 26.6 - 33.0 pg Final     MCHC   Date Value Ref Range Status   08/07/2019 32.8 31.5 - 35.7 g/dL Final     RDW   Date Value Ref Range Status   08/07/2019 13.8 12.3 - 15.4 % Final     RDW-SD   Date Value Ref Range Status   08/07/2019 52.4 37.0 - 54.0 fl Final     MPV   Date Value Ref Range Status   08/07/2019 11.0 6.0 - 12.0 fL Final     Platelets   Date Value Ref Range Status   08/07/2019 251 140 - 450 10*3/mm3 Final     Neutrophil %   Date Value Ref Range Status   08/07/2019 72.1 42.7 - 76.0 % Final     Lymphocyte %   Date Value Ref Range Status   08/07/2019 15.3 (L) 19.6 - 45.3 % Final     Monocyte %   Date Value Ref Range Status   08/07/2019 9.8 5.0 - 12.0 % Final     Eosinophil %   Date Value Ref Range Status   08/07/2019 2.5 0.3 - 6.2 % Final     Basophil %   Date Value Ref Range Status   08/07/2019 0.3 0.0 - 1.5 % Final     Neutrophils, Absolute   Date Value Ref Range Status   08/07/2019 8.51 (H) 1.70 - 7.00 10*3/mm3 Final     Lymphocytes, Absolute   Date Value Ref Range Status   08/07/2019 1.81 0.70 - 3.10 10*3/mm3 Final     Monocytes, Absolute   Date Value Ref Range Status   08/07/2019 1.16 (H) 0.10 - 0.90 10*3/mm3 Final     Eosinophils, Absolute   Date Value Ref Range Status   08/07/2019 0.30 0.00 - 0.40 10*3/mm3 Final     Basophils, Absolute   Date Value Ref Range Status   08/07/2019 0.04 0.00 - 0.20 10*3/mm3 Final     nRBC   Date Value Ref Range Status   08/01/2019 0.0 0.0 - 0.2 /100 WBC  Final       Lab Results   Component Value Date    GLUCOSE 134 (H) 08/01/2019    BUN 31 (H) 08/01/2019    CREATININE 1.90 (H) 08/01/2019    EGFRIFNONA 35 (L) 08/01/2019    BCR 16.3 08/01/2019    K 4.4 08/01/2019    CO2 23.0 08/01/2019    CALCIUM 8.8 (L) 08/01/2019    ALBUMIN 2.50 (L) 07/08/2019    LABIL2 0.4 (L) 05/29/2019    AST 76 (H) 07/08/2019    ALT 53 07/08/2019     ASSESSMENT:  Assessment/Plan     Iron deficiency anemia with poor oral iron absorption    Erosive gastritis    Leukocytosis, unspecified type  - CBC & Differential  - CBC Auto Differential    Anemia in stage 3 chronic kidney disease (CMS/HCC)    History of Vitamin B12 deficiency    History of DVT of right lower extremity    History of bilateral pulmonary embolus (PE)    Antithrombin III deficiency (CMS/HCC)    Protein C deficiency (CMS/HCC)    Protein S deficiency (CMS/HCC)    Antiphospholipid antibody positive    Elevated factor VIII level    THOMAS (nonalcoholic steatohepatitis)    Splenomegaly    MGUS (monoclonal gammopathy of unknown significance)  - Immunofixation, Urine - Urine, Clean Catch  - IgG, IgA, IgM    Since last physician visit he received a second dose of Injectafer with some response.  His most recent hemoglobin is decreasing somewhat again and his only taking a MVI with iron. Serum immunofixation has shown continued IgG kappa MGUS positivity.  He does not exhibit signs of new clotting events however he continues on a baby ASA daily with h/o IVC filter placement and he is high bleeding risk limiting the use of oral anticoagulation.  Anti-phospholipid antibodies remain positive and factor VIII level remains elevated.  He was hospitalized in late April and in May with hepatic encephalopathy which is followed by his gastroenterologist with known diagnosis of THOMAS.  He takes vitamin B12 injections at home monthly as ordered by his PCP. His WBC remains mildly elevated and are slightly improved from his last visit.  He did recently have a  fever 1 evening that was asymptomatic and resolved with 1 dose of Tylenol.  Has not had severe or recurrent infections.    PLAN:  · U JERRY today  · We will check immune globulins with next visit  · Continue baby aspirin one by mouth daily  · Restart ferrous sulfate 325 mg by mouth twice daily  · We will plan future iron infusions as needed  · He may require the use of Procrit should he remain anemic with iron stores replenished       I have reviewed labs results, imaging, vitals, and medications with the patient today. Will follow up in 2 months with the nurse practitioner.     Patient and his spouse verbalized understanding and is in agreement of the above plan.    I have personally performed a face-to-face diagnostic evaluation on this patient.  I have discussed the case with Brown Amezquita NP, have edited/reviewed the note,  and agree with the care plan.  He continues to be somewhat limited in mobility using a walker and on examination is somewhat pale.  He has had further drop in hemoglobin and has been asked to resume ferrous sulfate 325 mg p.o. twice daily.  He remains at bleeding and thrombosis risk and hence is staying on one aspirin daily.        Israel Mayfield M.D., F.A.C.P.       Much of the above report is an electronic transcription/translation of the spoken language to printed text using Dragon Software. As such, the subtleties and finesse of the spoken language may permit erroneous, or at times, nonsensical words or phrases to be inadvertently transcribed; thus changes may be made at a later date to rectify these errors.

## 2019-08-08 LAB — IFEU: NORMAL

## 2019-08-09 ENCOUNTER — TELEPHONE (OUTPATIENT)
Dept: SURGERY | Facility: CLINIC | Age: 74
End: 2019-08-09

## 2019-08-09 NOTE — TELEPHONE ENCOUNTER
Pt's wife calling stating that pt had lap juaquin with a drain tube in 2/2019 by Dr. Etienne, and then was in the hospital in 4/2019 . Dr. Cleaning was on call and removed drain tube while pt was in the hospital. Dr. Cleaning stated then that a recent scan showed there was fluid that didn't reach the drain tube and it would eventually work itself out. On Tuesday pt started developing a seroma. No fever currently. I let wife know that patient needs to come in to see Dr. Etienne on Monday 8/12/19 to possibly have this drained. I did explain to her that if this starts getting bigger or patient develops a fever over the weekend they will need to go into ER. Pt's wife voiced understanding.

## 2019-08-12 ENCOUNTER — OFFICE VISIT (OUTPATIENT)
Dept: SURGERY | Facility: CLINIC | Age: 74
End: 2019-08-12

## 2019-08-12 VITALS
OXYGEN SATURATION: 98 % | SYSTOLIC BLOOD PRESSURE: 118 MMHG | HEIGHT: 74 IN | WEIGHT: 297 LBS | DIASTOLIC BLOOD PRESSURE: 68 MMHG | BODY MASS INDEX: 38.12 KG/M2 | TEMPERATURE: 100.8 F | HEART RATE: 82 BPM

## 2019-08-12 DIAGNOSIS — L02.213 CUTANEOUS ABSCESS OF CHEST WALL: Primary | ICD-10-CM

## 2019-08-12 PROBLEM — L02.91 SKIN ABSCESS: Status: ACTIVE | Noted: 2019-08-12

## 2019-08-12 PROCEDURE — 87077 CULTURE AEROBIC IDENTIFY: CPT | Performed by: SURGERY

## 2019-08-12 PROCEDURE — 87186 SC STD MICRODIL/AGAR DIL: CPT | Performed by: SURGERY

## 2019-08-12 PROCEDURE — 99212 OFFICE O/P EST SF 10 MIN: CPT | Performed by: SURGERY

## 2019-08-12 PROCEDURE — 87205 SMEAR GRAM STAIN: CPT | Performed by: SURGERY

## 2019-08-12 PROCEDURE — 10060 I&D ABSCESS SIMPLE/SINGLE: CPT | Performed by: SURGERY

## 2019-08-12 PROCEDURE — 87070 CULTURE OTHR SPECIMN AEROBIC: CPT | Performed by: SURGERY

## 2019-08-12 NOTE — PROGRESS NOTES
Subjective   Bry Ratliff is a 73 y.o. male.     History of present illness   is seen in the office today at his wife's request for a small area right lateral chest wall where drain tube was removed back in April and has become infected.  He had a gallbladder removed in February and developed an abscess postop and in March had a cutaneous drain placed.  That remained until April  and was removed.  Past several days he has had increasing redness swelling and discomfort over the old drain tube site.  I recommended that we anesthetize the area open it in the office and drained and cultured.  They are agreeable.    Past Medical History:   Diagnosis Date   • Anemia    • B12 deficiency 2009   • Syed's esophagus    • CAD (coronary artery disease)    • Diabetes mellitus (CMS/HCC)    • History of echocardiogram     03/03/2017 - 12/03/2014 - 04/2012   • Hyperlipidemia    • Hypertension    • Morbid obesity (CMS/HCC)    • KATHIA treated with BiPAP    • Renal insufficiency    • S/P lumbar laminectomy        Past Surgical History:   Procedure Laterality Date   • BACK SURGERY  1971   • CATARACT EXTRACTION  2014   • CHOLECYSTECTOMY  02/24/2019   • COLONOSCOPY  03/2018   • CORONARY ANGIOPLASTY  08/24/2009    PCI stent - succesful placement of 3.5/23 promus stent in proximal right coronary artery   • CORONARY ANGIOPLASTY WITH STENT PLACEMENT  08/27/2009    patient had stent placed to proximal right coronary artery   • CYSTOSCOPY BLADDER STONE LITHOTRIPSY  1997   • OTHER SURGICAL HISTORY  11/2018    IVC filter implant   • RENAL ARTERY STENT Left        Outpatient Encounter Medications as of 8/12/2019   Medication Sig Dispense Refill   • allopurinol (ZYLOPRIM) 100 MG tablet ALLOPURINOL 100 MG TABS     • aspirin 81 MG tablet Take 1 tablet by mouth Daily. 30 tablet 3   • bumetanide (BUMEX) 1 MG tablet TAKE 2 TABLETS BY MOUTH EVERY MORNING THEN ONE TABLET EVERY EVENING START ON 05/24  0   • Cyanocobalamin 1000 MCG/ML kit Inject  "1,000 mcg as directed Every 30 (Thirty) Days.     • docusate sodium (COLACE) 100 MG capsule Take 100 mg by mouth 2 (Two) Times a Day.     • DULoxetine (CYMBALTA) 60 MG capsule DULOXETINE HCL 60 MG CPEP     • ferrous sulfate 325 (65 FE) MG tablet Take 1 tablet by mouth 2 (Two) Times a Day. 60 tablet 2   • folic acid (FOLVITE) 1 MG tablet Daily.     • insulin glargine (LANTUS) 100 UNIT/ML injection Inject 38 units at bedtime 30 mL 4   • insulin lispro (HUMALOG) 100 UNIT/ML injection 10 units subcu before each meal plus sliding scale 30 mL 4   • Insulin Syringe-Needle U-100 (BD INSULIN SYRINGE U/F) 31G X 5/16\" 1 ML misc BD INSULIN SYRINGE U/F 31G X 5/16\" 1 ML     • lactulose (CHRONULAC) 10 GM/15ML solution TK 45 ML PO Q 4 H  3   • levothyroxine (SYNTHROID, LEVOTHROID) 75 MCG tablet Take 1 tablet by mouth Daily. 90 tablet 4   • magnesium oxide (MAG-OX) 400 MG tablet Daily.     • Melatonin-Pyridoxine (MELATIN PO) Take  by mouth Daily.     • metoclopramide (REGLAN) 5 MG tablet Take 5 mg by mouth 2 (Two) Times a Day.  0   • Multiple Vitamin (MULTI VITAMIN DAILY PO) Take  by mouth Daily.     • oxyCODONE (ROXICODONE) 5 MG immediate release tablet Every 12 (Twelve) Hours.     • pantoprazole (PROTONIX) 40 MG EC tablet Take 1 tablet by mouth Daily. 30 tablet 3   • Polyethylene Glycol 1000 powder POLYETHYLENE GLYCOL 1000 POWD     • potassium chloride (K-DUR,KLOR-CON) 20 MEQ CR tablet Take 20 mEq by mouth 2 (Two) Times a Day.  0   • rifaximin (XIFAXAN) 550 MG tablet Daily.     • sodium bicarbonate 650 MG tablet SODIUM BICARBONATE 650 MG TABS     • zinc sulfate (ZINCATE) 220 (50 Zn) MG capsule 1 capsule Daily.       No facility-administered encounter medications on file as of 8/12/2019.        No Known Allergies    Family History   Problem Relation Age of Onset   • Hyperlipidemia Other    • Hypertension Other        Social History     Socioeconomic History   • Marital status:      Spouse name: Not on file   • Number of " children: Not on file   • Years of education: Not on file   • Highest education level: Not on file   Tobacco Use   • Smoking status: Never Smoker   • Smokeless tobacco: Never Used   Substance and Sexual Activity   • Alcohol use: No     Frequency: Never   • Drug use: No   • Sexual activity: Defer       The following portions of the patient's history were reviewed and updated as appropriate: allergies, current medications, past family history, past medical history, past social history, past surgical history and problem list.    Objective       Assessment/Plan   There are no diagnoses linked to this encounter.    Procedure #2 the area is prepped with Betadine, anesthetized with lidocaine and incision is made overlying the central portion of it.  The contents were cultured and we then irrigated with saline and placed quarter inch packing into the wound.  With covered with sterile dressing and asked that they keep it intact for 24 hours.  Then they should remove the packing and they will cleanse the area and shower.  Possible including on Bactrim DS twice daily for the next 10 days.    Impression: Small subcu abscess with drain tube was removed in April    Plan: See above paragraph                 Naif Etienne DO  8/12/2019  3:49 PM

## 2019-08-15 LAB
BACTERIA SPEC AEROBE CULT: ABNORMAL
GRAM STN SPEC: ABNORMAL

## 2019-08-19 ENCOUNTER — OFFICE VISIT (OUTPATIENT)
Dept: SURGERY | Facility: CLINIC | Age: 74
End: 2019-08-19

## 2019-08-19 VITALS
HEART RATE: 91 BPM | WEIGHT: 298 LBS | HEIGHT: 74 IN | TEMPERATURE: 98.5 F | SYSTOLIC BLOOD PRESSURE: 146 MMHG | OXYGEN SATURATION: 98 % | BODY MASS INDEX: 38.24 KG/M2 | DIASTOLIC BLOOD PRESSURE: 73 MMHG

## 2019-08-19 DIAGNOSIS — L02.213 CUTANEOUS ABSCESS OF CHEST WALL: Primary | ICD-10-CM

## 2019-08-19 PROCEDURE — 99024 POSTOP FOLLOW-UP VISIT: CPT | Performed by: SURGERY

## 2019-08-19 RX ORDER — SULFAMETHOXAZOLE AND TRIMETHOPRIM 800; 160 MG/1; MG/1
1 TABLET ORAL 2 TIMES DAILY
Refills: 0 | COMMUNITY
Start: 2019-08-12 | End: 2019-10-10 | Stop reason: SDUPTHER

## 2019-08-19 NOTE — PROGRESS NOTES
Subjective   Bry Ratliff is a 73 y.o. male.     History of present illness  Bry is seen in the office today follow-up from the I&D of the abscess from the right lateral chest wall 1 week ago.  He is doing well.  It drained for a day or 2 after we saw him and opened it.  Not drain since last Thursday.    It was growing an organism susceptible to the medication he was on.    Past Medical History:   Diagnosis Date   • Anemia    • B12 deficiency 2009   • Syed's esophagus    • CAD (coronary artery disease)    • Diabetes mellitus (CMS/HCC)    • History of echocardiogram     03/03/2017 - 12/03/2014 - 04/2012   • Hyperlipidemia    • Hypertension    • Morbid obesity (CMS/HCC)    • KATHIA treated with BiPAP    • Renal insufficiency    • S/P lumbar laminectomy        Past Surgical History:   Procedure Laterality Date   • BACK SURGERY  1971   • CATARACT EXTRACTION  2014   • CHOLECYSTECTOMY  02/24/2019   • COLONOSCOPY  03/2018   • CORONARY ANGIOPLASTY  08/24/2009    PCI stent - succesful placement of 3.5/23 promus stent in proximal right coronary artery   • CORONARY ANGIOPLASTY WITH STENT PLACEMENT  08/27/2009    patient had stent placed to proximal right coronary artery   • CYSTOSCOPY BLADDER STONE LITHOTRIPSY  1997   • OTHER SURGICAL HISTORY  11/2018    IVC filter implant   • RENAL ARTERY STENT Left        Outpatient Encounter Medications as of 8/19/2019   Medication Sig Dispense Refill   • allopurinol (ZYLOPRIM) 100 MG tablet ALLOPURINOL 100 MG TABS     • aspirin 81 MG tablet Take 1 tablet by mouth Daily. 30 tablet 3   • bumetanide (BUMEX) 1 MG tablet TAKE 2 TABLETS BY MOUTH EVERY MORNING THEN ONE TABLET EVERY EVENING START ON 05/24  0   • Cyanocobalamin 1000 MCG/ML kit Inject 1,000 mcg as directed Every 30 (Thirty) Days.     • docusate sodium (COLACE) 100 MG capsule Take 100 mg by mouth 2 (Two) Times a Day.     • DULoxetine (CYMBALTA) 60 MG capsule DULOXETINE HCL 60 MG CPEP     • ferrous sulfate 325 (65 FE) MG tablet  "Take 1 tablet by mouth 2 (Two) Times a Day. 60 tablet 2   • folic acid (FOLVITE) 1 MG tablet Daily.     • insulin glargine (LANTUS) 100 UNIT/ML injection Inject 38 units at bedtime 30 mL 4   • insulin lispro (HUMALOG) 100 UNIT/ML injection 10 units subcu before each meal plus sliding scale 30 mL 4   • Insulin Syringe-Needle U-100 (BD INSULIN SYRINGE U/F) 31G X 5/16\" 1 ML misc BD INSULIN SYRINGE U/F 31G X 5/16\" 1 ML     • lactulose (CHRONULAC) 10 GM/15ML solution TK 45 ML PO Q 4 H  3   • levothyroxine (SYNTHROID, LEVOTHROID) 75 MCG tablet Take 1 tablet by mouth Daily. 90 tablet 4   • magnesium oxide (MAG-OX) 400 MG tablet Daily.     • Melatonin-Pyridoxine (MELATIN PO) Take  by mouth Daily.     • metoclopramide (REGLAN) 5 MG tablet Take 5 mg by mouth 2 (Two) Times a Day.  0   • Multiple Vitamin (MULTI VITAMIN DAILY PO) Take  by mouth Daily.     • oxyCODONE (ROXICODONE) 5 MG immediate release tablet Every 12 (Twelve) Hours.     • pantoprazole (PROTONIX) 40 MG EC tablet Take 1 tablet by mouth Daily. 30 tablet 3   • Polyethylene Glycol 1000 powder POLYETHYLENE GLYCOL 1000 POWD     • potassium chloride (K-DUR,KLOR-CON) 20 MEQ CR tablet Take 20 mEq by mouth 2 (Two) Times a Day.  0   • rifaximin (XIFAXAN) 550 MG tablet Daily.     • sodium bicarbonate 650 MG tablet SODIUM BICARBONATE 650 MG TABS     • sulfamethoxazole-trimethoprim (BACTRIM DS,SEPTRA DS) 800-160 MG per tablet Take 1 tablet by mouth 2 (Two) Times a Day.  0   • zinc sulfate (ZINCATE) 220 (50 Zn) MG capsule 1 capsule Daily.       No facility-administered encounter medications on file as of 8/19/2019.        No Known Allergies    Family History   Problem Relation Age of Onset   • Hyperlipidemia Other    • Hypertension Other        Social History     Socioeconomic History   • Marital status:      Spouse name: Not on file   • Number of children: Not on file   • Years of education: Not on file   • Highest education level: Not on file   Tobacco Use   • Smoking " status: Never Smoker   • Smokeless tobacco: Never Used   Substance and Sexual Activity   • Alcohol use: No     Frequency: Never   • Drug use: No   • Sexual activity: Defer       The following portions of the patient's history were reviewed and updated as appropriate: allergies, current medications, past family history, past medical history, past social history, past surgical history and problem list.    Objective       Assessment/Plan   There are no diagnoses linked to this encounter.    Impression: Subcutaneous abscess right lateral chest wall drained    Plan: Finish  antibiotics and recheck in the office as needed         Naif Etienne DO  8/19/2019  3:14 PM

## 2019-08-21 ENCOUNTER — OFFICE (AMBULATORY)
Dept: URBAN - METROPOLITAN AREA CLINIC 64 | Facility: CLINIC | Age: 74
End: 2019-08-21
Payer: COMMERCIAL

## 2019-08-21 VITALS
HEIGHT: 74 IN | SYSTOLIC BLOOD PRESSURE: 134 MMHG | HEART RATE: 80 BPM | WEIGHT: 295 LBS | DIASTOLIC BLOOD PRESSURE: 57 MMHG

## 2019-08-21 DIAGNOSIS — L29.9 PRURITUS, UNSPECIFIED: ICD-10-CM

## 2019-08-21 DIAGNOSIS — K72.90 HEPATIC FAILURE, UNSPECIFIED WITHOUT COMA: ICD-10-CM

## 2019-08-21 DIAGNOSIS — K75.81 NONALCOHOLIC STEATOHEPATITIS (NASH): ICD-10-CM

## 2019-08-21 PROCEDURE — 99213 OFFICE O/P EST LOW 20 MIN: CPT | Performed by: NURSE PRACTITIONER

## 2019-08-21 RX ORDER — DOCUSATE SODIUM 100 MG/1
TABLET, FILM COATED ORAL
Qty: 60 | Refills: 3 | Status: ACTIVE

## 2019-08-21 RX ORDER — HYDROXYZINE PAMOATE 25 MG/1
75 CAPSULE ORAL
Qty: 90 | Refills: 2 | Status: COMPLETED
Start: 2019-08-21 | End: 2020-01-01

## 2019-08-26 ENCOUNTER — HOSPITAL ENCOUNTER (OUTPATIENT)
Dept: ULTRASOUND IMAGING | Facility: HOSPITAL | Age: 74
Discharge: HOME | End: 2019-08-26
Admitting: SURGERY

## 2019-08-26 ENCOUNTER — OFFICE VISIT (OUTPATIENT)
Dept: SURGERY | Facility: CLINIC | Age: 74
End: 2019-08-26

## 2019-08-26 VITALS
SYSTOLIC BLOOD PRESSURE: 139 MMHG | OXYGEN SATURATION: 100 % | HEART RATE: 75 BPM | TEMPERATURE: 97.9 F | DIASTOLIC BLOOD PRESSURE: 70 MMHG

## 2019-08-26 DIAGNOSIS — L02.213 CUTANEOUS ABSCESS OF CHEST WALL: Primary | ICD-10-CM

## 2019-08-26 PROCEDURE — 76604 US EXAM CHEST: CPT

## 2019-08-26 PROCEDURE — 99212 OFFICE O/P EST SF 10 MIN: CPT | Performed by: SURGERY

## 2019-08-26 RX ORDER — HYDROXYZINE PAMOATE 25 MG/1
25 CAPSULE ORAL 3 TIMES DAILY PRN
Refills: 0 | COMMUNITY
Start: 2019-08-21 | End: 2019-10-16

## 2019-08-26 NOTE — PROGRESS NOTES
Subjective   Bry Ratliff is a 73 y.o. male.     History of present illness  Mr. Ratliff seen in the office today for follow-up from the drainage of an abscess from the right lateral chest wall several weeks ago.  He was doing better and then wife states over the weekend it swelled back up and started draining again.    There is some concern that he may have a piece of wire in this area.  So I suggested rather than open and drain it more widely without knowing for sure within a sending from a ultrasound of the area to make sure there is no metal foreign body in this.  If there is no metal foreign body then we will see him back in the morning to open this up more widely.    Past Medical History:   Diagnosis Date   • Anemia    • B12 deficiency 2009   • Syed's esophagus    • CAD (coronary artery disease)    • Diabetes mellitus (CMS/HCC)    • History of echocardiogram     03/03/2017 - 12/03/2014 - 04/2012   • Hyperlipidemia    • Hypertension    • Morbid obesity (CMS/HCC)    • KATHIA treated with BiPAP    • Renal insufficiency    • S/P lumbar laminectomy        Past Surgical History:   Procedure Laterality Date   • BACK SURGERY  1971   • CATARACT EXTRACTION  2014   • CHOLECYSTECTOMY  02/24/2019   • COLONOSCOPY  03/2018   • CORONARY ANGIOPLASTY  08/24/2009    PCI stent - succesful placement of 3.5/23 promus stent in proximal right coronary artery   • CORONARY ANGIOPLASTY WITH STENT PLACEMENT  08/27/2009    patient had stent placed to proximal right coronary artery   • CYSTOSCOPY BLADDER STONE LITHOTRIPSY  1997   • OTHER SURGICAL HISTORY  11/2018    IVC filter implant   • RENAL ARTERY STENT Left        Outpatient Encounter Medications as of 8/26/2019   Medication Sig Dispense Refill   • allopurinol (ZYLOPRIM) 100 MG tablet ALLOPURINOL 100 MG TABS     • aspirin 81 MG tablet Take 1 tablet by mouth Daily. 30 tablet 3   • bumetanide (BUMEX) 1 MG tablet TAKE 2 TABLETS BY MOUTH EVERY MORNING THEN ONE TABLET EVERY EVENING START  "ON 05/24  0   • Cyanocobalamin 1000 MCG/ML kit Inject 1,000 mcg as directed Every 30 (Thirty) Days.     • docusate sodium (COLACE) 100 MG capsule Take 100 mg by mouth 2 (Two) Times a Day.     • DULoxetine (CYMBALTA) 60 MG capsule DULOXETINE HCL 60 MG CPEP     • ferrous sulfate 325 (65 FE) MG tablet Take 1 tablet by mouth 2 (Two) Times a Day. 60 tablet 2   • folic acid (FOLVITE) 1 MG tablet Daily.     • hydrOXYzine pamoate (VISTARIL) 25 MG capsule Take 25 mg by mouth 3 (Three) Times a Day As Needed.  0   • insulin glargine (LANTUS) 100 UNIT/ML injection Inject 38 units at bedtime 30 mL 4   • insulin lispro (HUMALOG) 100 UNIT/ML injection 10 units subcu before each meal plus sliding scale 30 mL 4   • Insulin Syringe-Needle U-100 (BD INSULIN SYRINGE U/F) 31G X 5/16\" 1 ML misc BD INSULIN SYRINGE U/F 31G X 5/16\" 1 ML     • lactulose (CHRONULAC) 10 GM/15ML solution TK 45 ML PO Q 4 H  3   • levothyroxine (SYNTHROID, LEVOTHROID) 75 MCG tablet Take 1 tablet by mouth Daily. 90 tablet 4   • magnesium oxide (MAG-OX) 400 MG tablet Daily.     • Melatonin-Pyridoxine (MELATIN PO) Take  by mouth Daily.     • metoclopramide (REGLAN) 5 MG tablet Take 5 mg by mouth 2 (Two) Times a Day.  0   • Multiple Vitamin (MULTI VITAMIN DAILY PO) Take  by mouth Daily.     • oxyCODONE (ROXICODONE) 5 MG immediate release tablet Every 12 (Twelve) Hours.     • pantoprazole (PROTONIX) 40 MG EC tablet Take 1 tablet by mouth Daily. 30 tablet 3   • Polyethylene Glycol 1000 powder POLYETHYLENE GLYCOL 1000 POWD     • potassium chloride (K-DUR,KLOR-CON) 20 MEQ CR tablet Take 20 mEq by mouth 2 (Two) Times a Day.  0   • rifaximin (XIFAXAN) 550 MG tablet Daily.     • sodium bicarbonate 650 MG tablet SODIUM BICARBONATE 650 MG TABS     • sulfamethoxazole-trimethoprim (BACTRIM DS,SEPTRA DS) 800-160 MG per tablet Take 1 tablet by mouth 2 (Two) Times a Day.  0   • zinc sulfate (ZINCATE) 220 (50 Zn) MG capsule 1 capsule Daily.       No facility-administered encounter " medications on file as of 8/26/2019.        No Known Allergies    Family History   Problem Relation Age of Onset   • Hyperlipidemia Other    • Hypertension Other        Social History     Socioeconomic History   • Marital status:      Spouse name: Not on file   • Number of children: Not on file   • Years of education: Not on file   • Highest education level: Not on file   Tobacco Use   • Smoking status: Never Smoker   • Smokeless tobacco: Never Used   Substance and Sexual Activity   • Alcohol use: No     Frequency: Never   • Drug use: No   • Sexual activity: Defer       The following portions of the patient's history were reviewed and updated as appropriate: allergies, current medications, past family history, past medical history, past social history, past surgical history and problem list.    Objective       Assessment/Plan   There are no diagnoses linked to this encounter.    Recurrent abscess right lateral chest wall    Plan: Sent for ultrasound of the area.  If there is a metallic foreign body then this will have to be removed in the operating room.  If there is no foreign body then we will open and drain the area more widely in the office in the morning.           Naif Etienne,   8/26/2019  1:54 PM

## 2019-09-03 ENCOUNTER — OFFICE VISIT (OUTPATIENT)
Dept: CARDIOLOGY | Facility: CLINIC | Age: 74
End: 2019-09-03

## 2019-09-03 VITALS
BODY MASS INDEX: 38.76 KG/M2 | OXYGEN SATURATION: 99 % | HEIGHT: 74 IN | SYSTOLIC BLOOD PRESSURE: 126 MMHG | WEIGHT: 302 LBS | HEART RATE: 81 BPM | DIASTOLIC BLOOD PRESSURE: 72 MMHG

## 2019-09-03 DIAGNOSIS — E78.5 DYSLIPIDEMIA: Primary | ICD-10-CM

## 2019-09-03 DIAGNOSIS — I25.10 CORONARY ARTERY DISEASE INVOLVING NATIVE CORONARY ARTERY OF NATIVE HEART WITHOUT ANGINA PECTORIS: ICD-10-CM

## 2019-09-03 DIAGNOSIS — I10 HTN, GOAL BELOW 130/80: ICD-10-CM

## 2019-09-03 DIAGNOSIS — N18.30 CHRONIC RENAL IMPAIRMENT, STAGE 3 (MODERATE) (HCC): ICD-10-CM

## 2019-09-03 PROCEDURE — 99214 OFFICE O/P EST MOD 30 MIN: CPT | Performed by: INTERNAL MEDICINE

## 2019-09-03 RX ORDER — ATORVASTATIN CALCIUM 20 MG/1
20 TABLET, FILM COATED ORAL DAILY
Qty: 90 TABLET | Refills: 3 | Status: SHIPPED | OUTPATIENT
Start: 2019-09-03 | End: 2020-05-19 | Stop reason: SINTOL

## 2019-09-03 NOTE — PROGRESS NOTES
Subjective:     Encounter Date:09/03/2019      Patient ID: Bry Ratliff 72 yo    Chief Complaint:  1 yr follow up.   CAD stent RCA 2009. DVT right superficial femoral and popliteal veins 2004, RVE and elevated RVSP echo since 2004, IVC filter December 2018. Morbid obesity. DM. Dyslipidemia.  HTN. KATHIA and BiPAP. Chronic renal insufficiency.    IVC filter and off warfarin December 2018.  Cholecystectomy 2/24/2019 and complication operative site abscess.  THOMAS and hepatic encephalopathy.    No SOB or chest tightness with present activity.  Fatigue and no energy.  No orthopnea or PND.  Takes naps during the day in a recliner.  No ankle swelling.  No syncope or near syncope.  No nausea, vomiting, hematemesis, melena, rectal bleeding.    Echocardiogram 03/03/2017 reviewed and summary in [facility].       No symptoms suggestive of angina pectoris or SOB with present markedly limited activity.  ECHO borderline RV and estimated pulmonary artery systolic pressure just above upper end of normal.  BP and fluid status satisfactory.  Patient taking warfarin and ASA for previous DVT and  coronary stent .  Patient taking statin and satisfactory lipid panel.  Renal failure stable and K satisfactory .  Told patient that he should use BPAP  any time that he sleeps day or night.  Morbid obese weight and expected to shorten his life and lead to continued ill health.  No change made in medication.   return 1 year with EKG.       Past Medical History:     CAD     3.5/23 Promus stent RCA 2009. At that time no narrowing >50% LCA.     DVT right superficial fem and popliteal veins and PE 2004. At that time echocardiogram RVE and RVSP 60+.     IVC filter and off warfarin December 2018.          Echo 03/03/2017 LV OK EF 65%.  RV borderline dilated.  LA and RA borderline dilated. AV and MV OK.  Mild TR.  RVSP 40-45.     2 episodes numbness left side of body March and April 2012.       Echo April 2012 LV mild dilatation  and normal wall thickness  and uniform and appropriate contractility EF 60%. Mild RVE.  Mild LAE. Mild TR. RVSP 40. Aortic root mildly dilated.     Carotid duplex study April 2012  Uncomplicated plaque and 0-15% narrowing heart SILVESTRE and 16-49% narrowing LICA.     Echocardiogram 12/03/2014           Diabetes mellitus  12/14/2016 ,  A1c 7.7 03/01/2018     Hyperlipidemia Chol 107 TG 79 HDL 38 LDL 52 ALT 27 03/01/2018     Hypertension     Morbid obesity     KATHIA and BPAP     Syed's esophagus.     Anemia with low B12 level 2009     Lumbar laminectomy     Renal insufficiency creat 1.4 11/23/15, 1.4 12/14/2016, 1.4 03/01/2018,     1.9 K 4.4 8/1/2019     Hx  from patient anemia 2018 including 1 unit RBC transfusion  Dr. Harris and Tran  Hgb 10.0 .2 8/7/2019        Cholecystectomy 2/24/2019 and complication operative site abscess.  THOMAS and hepatic encephalopathy.       TSH  0.83 03/01/2018     TP 6.9 alb  2.8 bili 0.5  ALT 27 03/01/2018      Objective:     Physical Exam   Constitutional:   Weight 302 pounds with 49 pound loss in the past year  /72 RA= LA sitting  HR 76 regular rhythm  O2 sat 99% room air   Cardiovascular:   No carotid bruits  No JVD  No palpable precordial impulses  Decreased heart sounds  1-2/6 systolic murmur  No diastolic murmur  No gallop or rub   Pulmonary/Chest:   Bilateral decreased breath sounds  No wheezing, rhonchi, rales   Abdominal:   Obese  Liver and spleen not palpable   Musculoskeletal:   Indentation at ankles from socks       Lab Review:       Assessment:       No symptoms suggestive of angina pectoris or SOB with present markedly limited activity.  ECHO borderline RV and estimated pulmonary artery systolic pressure just above upper end of normal.  BP and fluid status satisfactory.  Patient taking ASA for  coronary stent and was taken off of warfarin during the past year and had IVC filter December 2018  Patient taken off statin unsure of reason  Moderate renal failure in case  satisfactory  Marked weight loss past year 49 pounds in part due to ongoing illnesses  Plan:     Atorvastatin 20 mg a day added   No change otherwise to medication  Return appointment 1 year with EKG

## 2019-09-05 ENCOUNTER — OFFICE VISIT (OUTPATIENT)
Dept: SURGERY | Facility: CLINIC | Age: 74
End: 2019-09-05

## 2019-09-05 VITALS
HEIGHT: 74 IN | SYSTOLIC BLOOD PRESSURE: 147 MMHG | TEMPERATURE: 99.1 F | WEIGHT: 301 LBS | DIASTOLIC BLOOD PRESSURE: 73 MMHG | BODY MASS INDEX: 38.63 KG/M2 | HEART RATE: 89 BPM | OXYGEN SATURATION: 99 %

## 2019-09-05 DIAGNOSIS — L02.213 CUTANEOUS ABSCESS OF CHEST WALL: Primary | ICD-10-CM

## 2019-09-05 PROCEDURE — 99024 POSTOP FOLLOW-UP VISIT: CPT | Performed by: SURGERY

## 2019-09-05 RX ORDER — CYANOCOBALAMIN 1000 UG/ML
INJECTION, SOLUTION INTRAMUSCULAR; SUBCUTANEOUS
Refills: 1 | COMMUNITY
Start: 2019-09-03 | End: 2019-10-16 | Stop reason: SDUPTHER

## 2019-09-05 NOTE — PROGRESS NOTES
SUBJECTIVE:    Bry is seen in the office today follow-up from the incision and drainage of chest wall abscess last week.  He is doing better.  Still some clearish drainage but no pus.    OBJECTIVE:    Area is markedly improved.    ASSESSMENT:    Satisfactory progress    PLAN:    He took his last antibiotic today.  Yoana refill his antibiotics for another 5 days.  Recheck in the office as needed

## 2019-09-17 DIAGNOSIS — E11.22 TYPE 2 DIABETES MELLITUS WITH STAGE 3 CHRONIC KIDNEY DISEASE, WITH LONG-TERM CURRENT USE OF INSULIN (HCC): ICD-10-CM

## 2019-09-17 DIAGNOSIS — E03.9 ACQUIRED HYPOTHYROIDISM: ICD-10-CM

## 2019-09-17 DIAGNOSIS — I10 ESSENTIAL HYPERTENSION: Primary | ICD-10-CM

## 2019-09-17 DIAGNOSIS — Z79.4 TYPE 2 DIABETES MELLITUS WITH STAGE 3 CHRONIC KIDNEY DISEASE, WITH LONG-TERM CURRENT USE OF INSULIN (HCC): ICD-10-CM

## 2019-09-17 DIAGNOSIS — N18.30 TYPE 2 DIABETES MELLITUS WITH STAGE 3 CHRONIC KIDNEY DISEASE, WITH LONG-TERM CURRENT USE OF INSULIN (HCC): ICD-10-CM

## 2019-09-17 DIAGNOSIS — E78.5 DYSLIPIDEMIA: ICD-10-CM

## 2019-09-20 ENCOUNTER — LAB (OUTPATIENT)
Dept: LAB | Facility: HOSPITAL | Age: 74
End: 2019-09-20

## 2019-09-20 ENCOUNTER — TELEPHONE (OUTPATIENT)
Dept: SURGERY | Facility: CLINIC | Age: 74
End: 2019-09-20

## 2019-09-20 DIAGNOSIS — Z79.4 TYPE 2 DIABETES MELLITUS WITH STAGE 3 CHRONIC KIDNEY DISEASE, WITH LONG-TERM CURRENT USE OF INSULIN (HCC): ICD-10-CM

## 2019-09-20 DIAGNOSIS — E11.22 TYPE 2 DIABETES MELLITUS WITH STAGE 3 CHRONIC KIDNEY DISEASE, WITH LONG-TERM CURRENT USE OF INSULIN (HCC): ICD-10-CM

## 2019-09-20 DIAGNOSIS — E78.5 DYSLIPIDEMIA: ICD-10-CM

## 2019-09-20 DIAGNOSIS — I10 ESSENTIAL HYPERTENSION: ICD-10-CM

## 2019-09-20 DIAGNOSIS — N18.30 TYPE 2 DIABETES MELLITUS WITH STAGE 3 CHRONIC KIDNEY DISEASE, WITH LONG-TERM CURRENT USE OF INSULIN (HCC): ICD-10-CM

## 2019-09-20 DIAGNOSIS — E03.9 ACQUIRED HYPOTHYROIDISM: ICD-10-CM

## 2019-09-20 LAB
ALBUMIN SERPL-MCNC: 2.5 G/DL (ref 3.5–4.8)
ALBUMIN/GLOB SERPL: 0.6 G/DL (ref 1–1.7)
ALP SERPL-CCNC: 306 U/L (ref 32–91)
ALT SERPL W P-5'-P-CCNC: 73 U/L (ref 17–63)
ANION GAP SERPL CALCULATED.3IONS-SCNC: 15.6 MMOL/L (ref 5–15)
ARTICHOKE IGE QN: 109 MG/DL (ref 0–100)
AST SERPL-CCNC: 98 U/L (ref 15–41)
BILIRUB SERPL-MCNC: 0.7 MG/DL (ref 0.3–1.2)
BUN BLD-MCNC: 40 MG/DL (ref 8–20)
BUN/CREAT SERPL: 21.1 (ref 6.2–20.3)
CALCIUM SPEC-SCNC: 8.2 MG/DL (ref 8.9–10.3)
CHLORIDE SERPL-SCNC: 100 MMOL/L (ref 101–111)
CHOLEST SERPL-MCNC: 177 MG/DL
CO2 SERPL-SCNC: 25 MMOL/L (ref 22–32)
CREAT BLD-MCNC: 1.9 MG/DL (ref 0.7–1.2)
GFR SERPL CREATININE-BSD FRML MDRD: 35 ML/MIN/1.73
GLOBULIN UR ELPH-MCNC: 4.4 GM/DL (ref 2.5–3.8)
GLUCOSE BLD-MCNC: 173 MG/DL (ref 65–99)
HBA1C MFR BLD: 5.4 % (ref 3.5–5.6)
HDLC SERPL QL: 4.43
HDLC SERPL-MCNC: 40 MG/DL
LDLC/HDLC SERPL: 2.82 {RATIO}
POTASSIUM BLD-SCNC: 3.6 MMOL/L (ref 3.6–5.1)
PROT SERPL-MCNC: 6.9 G/DL (ref 6.1–7.9)
SODIUM BLD-SCNC: 137 MMOL/L (ref 136–144)
T4 FREE SERPL-MCNC: 0.87 NG/DL (ref 0.58–1.64)
TRIGL SERPL-MCNC: 122 MG/DL
TSH SERPL DL<=0.05 MIU/L-ACNC: 2.66 UIU/ML (ref 0.34–5.6)
VLDLC SERPL-MCNC: 24.4 MG/DL

## 2019-09-20 PROCEDURE — 83036 HEMOGLOBIN GLYCOSYLATED A1C: CPT

## 2019-09-20 PROCEDURE — 84439 ASSAY OF FREE THYROXINE: CPT

## 2019-09-20 PROCEDURE — 84443 ASSAY THYROID STIM HORMONE: CPT

## 2019-09-20 PROCEDURE — 36415 COLL VENOUS BLD VENIPUNCTURE: CPT

## 2019-09-20 PROCEDURE — 80061 LIPID PANEL: CPT

## 2019-09-20 PROCEDURE — 80053 COMPREHEN METABOLIC PANEL: CPT

## 2019-09-20 NOTE — TELEPHONE ENCOUNTER
Pt's wife is calling stating that pt's incision has opened back up and is draining more. We have seen pt for this same issues multiple times. I let patient's wife know that we do not have a physician in office today. And that I could fit pt in on Monday 9/23/19. Pt has been scheduled for Monday 9/23/19 @ 2:40p w/ Dr Etienne.     I did let wife know that if this worsens or patient develops a fever or has any signs of infection that they will need to be seen in ER. Pt voiced understanding and has no further questions or concerns at this time.

## 2019-09-23 ENCOUNTER — OFFICE VISIT (OUTPATIENT)
Dept: SURGERY | Facility: CLINIC | Age: 74
End: 2019-09-23

## 2019-09-23 VITALS
HEIGHT: 74 IN | DIASTOLIC BLOOD PRESSURE: 63 MMHG | HEART RATE: 91 BPM | BODY MASS INDEX: 39.27 KG/M2 | SYSTOLIC BLOOD PRESSURE: 127 MMHG | WEIGHT: 306 LBS | TEMPERATURE: 97.6 F | OXYGEN SATURATION: 100 %

## 2019-09-23 DIAGNOSIS — L02.213 CUTANEOUS ABSCESS OF CHEST WALL: Primary | ICD-10-CM

## 2019-09-23 DIAGNOSIS — L02.91 ENCOUNTER FOR DRAINAGE OF ABSCESS: ICD-10-CM

## 2019-09-23 PROCEDURE — 99212 OFFICE O/P EST SF 10 MIN: CPT | Performed by: SURGERY

## 2019-09-23 NOTE — PROGRESS NOTES
Subjective   Bry Ratliff is a 73 y.o. male.     History of present illness  Mr Ratliff is seen in the office for persistent drainage from where percutaneous drain was placed after a lap juaquin to drain an abscess.  He had liver cirrhosis complicated the picture as well.  Percutaneous drain was placed in March of this past year.  It stayed in a number of weeks.  Did well for a long period of time and he has had recurring episodes of the drain tube tract getting infected and draining what appears to be bile.  Consequently, I suggested that we repeat a CT scan of the right upper quadrant to make sure that there is no abscess or bile collection there area if there is not then we will excise the sinus tract down to the muscle and closed the muscle and the layers above it.  There is an abnormality intra-abdominal he then will actually open that area all the way cleaned it out in place percutaneous drains such as a José Miguel drain get it to heal.    Past Medical History:   Diagnosis Date   • Anemia    • B12 deficiency 2009   • Syed's esophagus    • CAD (coronary artery disease)    • Diabetes mellitus (CMS/HCC)    • History of echocardiogram     03/03/2017 - 12/03/2014 - 04/2012   • Hyperlipidemia    • Hypertension    • Morbid obesity (CMS/HCC)    • KATHIA treated with BiPAP    • Renal insufficiency    • S/P lumbar laminectomy        Past Surgical History:   Procedure Laterality Date   • BACK SURGERY  1971   • CATARACT EXTRACTION  2014   • CHOLECYSTECTOMY  02/24/2019   • COLONOSCOPY  03/2018   • CORONARY ANGIOPLASTY  08/24/2009    PCI stent - succesful placement of 3.5/23 promus stent in proximal right coronary artery   • CORONARY ANGIOPLASTY WITH STENT PLACEMENT  08/27/2009    patient had stent placed to proximal right coronary artery   • CYSTOSCOPY BLADDER STONE LITHOTRIPSY  1997   • OTHER SURGICAL HISTORY  11/2018    IVC filter implant   • RENAL ARTERY STENT Left        Outpatient Encounter Medications as of 9/23/2019  "  Medication Sig Dispense Refill   • allopurinol (ZYLOPRIM) 100 MG tablet ALLOPURINOL 100 MG TABS     • aspirin 81 MG tablet Take 1 tablet by mouth Daily. 30 tablet 3   • atorvastatin (LIPITOR) 20 MG tablet Take 1 tablet by mouth Daily. 90 tablet 3   • bumetanide (BUMEX) 1 MG tablet TAKE 2 TABLETS BY MOUTH EVERY MORNING THEN ONE TABLET EVERY EVENING START ON 05/24  0   • cyanocobalamin 1000 MCG/ML injection   1   • Cyanocobalamin 1000 MCG/ML kit Inject 1,000 mcg as directed Every 30 (Thirty) Days.     • docusate sodium (COLACE) 100 MG capsule Take 100 mg by mouth 2 (Two) Times a Day.     • DULoxetine (CYMBALTA) 60 MG capsule DULOXETINE HCL 60 MG CPEP     • ferrous sulfate 325 (65 FE) MG tablet Take 1 tablet by mouth 2 (Two) Times a Day. 60 tablet 2   • folic acid (FOLVITE) 1 MG tablet Daily.     • hydrOXYzine pamoate (VISTARIL) 25 MG capsule Take 25 mg by mouth 3 (Three) Times a Day As Needed.  0   • insulin glargine (LANTUS) 100 UNIT/ML injection Inject 38 units at bedtime 30 mL 4   • insulin lispro (HUMALOG) 100 UNIT/ML injection 10 units subcu before each meal plus sliding scale 30 mL 4   • Insulin Syringe-Needle U-100 (BD INSULIN SYRINGE U/F) 31G X 5/16\" 1 ML misc BD INSULIN SYRINGE U/F 31G X 5/16\" 1 ML     • lactulose (CHRONULAC) 10 GM/15ML solution TK 45 ML PO Q 4 H  3   • levothyroxine (SYNTHROID, LEVOTHROID) 75 MCG tablet Take 1 tablet by mouth Daily. 90 tablet 4   • magnesium oxide (MAG-OX) 400 MG tablet Daily.     • Melatonin-Pyridoxine (MELATIN PO) Take  by mouth Daily.     • metoclopramide (REGLAN) 5 MG tablet Take 5 mg by mouth 2 (Two) Times a Day.  0   • Multiple Vitamin (MULTI VITAMIN DAILY PO) Take  by mouth Daily.     • oxyCODONE (ROXICODONE) 5 MG immediate release tablet Every 12 (Twelve) Hours.     • pantoprazole (PROTONIX) 40 MG EC tablet Take 1 tablet by mouth Daily. 30 tablet 3   • Polyethylene Glycol 1000 powder POLYETHYLENE GLYCOL 1000 POWD     • potassium chloride (K-DUR,KLOR-CON) 20 MEQ CR " tablet Take 20 mEq by mouth Daily.  0   • rifaximin (XIFAXAN) 550 MG tablet Every 12 (Twelve) Hours.     • sodium bicarbonate 650 MG tablet SODIUM BICARBONATE 650 MG TABS     • sulfamethoxazole-trimethoprim (BACTRIM DS,SEPTRA DS) 800-160 MG per tablet Take 1 tablet by mouth 2 (Two) Times a Day.  0   • zinc sulfate (ZINCATE) 50 MG capsule   0   • [DISCONTINUED] zinc sulfate (ZINCATE) 220 (50 Zn) MG capsule 1 capsule Daily.       No facility-administered encounter medications on file as of 9/23/2019.        No Known Allergies    Family History   Problem Relation Age of Onset   • Hyperlipidemia Other    • Hypertension Other        Social History     Socioeconomic History   • Marital status:      Spouse name: Not on file   • Number of children: Not on file   • Years of education: Not on file   • Highest education level: Not on file   Tobacco Use   • Smoking status: Never Smoker   • Smokeless tobacco: Never Used   Substance and Sexual Activity   • Alcohol use: No     Frequency: Never   • Drug use: No   • Sexual activity: Defer       The following portions of the patient's history were reviewed and updated as appropriate: allergies, current medications, past family history, past medical history, past social history, past surgical history and problem list.    Objective       Assessment/Plan   There are no diagnoses linked to this encounter.    View of systems is unchanged from previous visit and I would refer you to it for further details.    On exam the area is draining what appears to be dilute bile.    Impression: Persistent drainage from an old percutaneous drain site    Plan: Going to do a CT scan to look at the right upper quadrant more carefully.  We will then decide on surgical approach to fix it.  We will likely excise the sinus tract down to the muscle and then primarily close this.  However, there is any intra-abdominal collection we may have to go all the way into the abdominal cavity to get it to  heal.           Naif Etienne,   9/23/2019  3:55 PM

## 2019-09-26 ENCOUNTER — OFFICE (AMBULATORY)
Dept: URBAN - METROPOLITAN AREA CLINIC 64 | Facility: CLINIC | Age: 74
End: 2019-09-26
Payer: COMMERCIAL

## 2019-09-26 ENCOUNTER — HOSPITAL ENCOUNTER (OUTPATIENT)
Dept: CT IMAGING | Facility: HOSPITAL | Age: 74
Discharge: HOME | End: 2019-09-26
Admitting: SURGERY

## 2019-09-26 VITALS — SYSTOLIC BLOOD PRESSURE: 138 MMHG | HEART RATE: 83 BPM | DIASTOLIC BLOOD PRESSURE: 56 MMHG | HEIGHT: 74 IN

## 2019-09-26 DIAGNOSIS — R19.5 OTHER FECAL ABNORMALITIES: ICD-10-CM

## 2019-09-26 DIAGNOSIS — K74.69 OTHER CIRRHOSIS OF LIVER: ICD-10-CM

## 2019-09-26 DIAGNOSIS — K72.90 HEPATIC FAILURE, UNSPECIFIED WITHOUT COMA: ICD-10-CM

## 2019-09-26 DIAGNOSIS — N18.9 CHRONIC KIDNEY DISEASE, UNSPECIFIED: ICD-10-CM

## 2019-09-26 DIAGNOSIS — L02.213 CUTANEOUS ABSCESS OF CHEST WALL: ICD-10-CM

## 2019-09-26 DIAGNOSIS — R74.8 ABNORMAL LEVELS OF OTHER SERUM ENZYMES: ICD-10-CM

## 2019-09-26 PROCEDURE — 99214 OFFICE O/P EST MOD 30 MIN: CPT | Performed by: INTERNAL MEDICINE

## 2019-09-26 PROCEDURE — 0 IOPAMIDOL PER 1 ML: Performed by: SURGERY

## 2019-09-26 PROCEDURE — 74160 CT ABDOMEN W/CONTRAST: CPT

## 2019-09-26 RX ORDER — LACTULOSE 10 G/15ML
SOLUTION ORAL
Qty: 0 | Refills: 0 | Status: ACTIVE

## 2019-09-26 RX ADMIN — IOPAMIDOL 50 ML: 755 INJECTION, SOLUTION INTRAVENOUS at 12:00

## 2019-09-27 ENCOUNTER — OFFICE VISIT (OUTPATIENT)
Dept: ENDOCRINOLOGY | Facility: CLINIC | Age: 74
End: 2019-09-27

## 2019-09-27 VITALS
SYSTOLIC BLOOD PRESSURE: 112 MMHG | BODY MASS INDEX: 39.4 KG/M2 | OXYGEN SATURATION: 100 % | WEIGHT: 307 LBS | HEIGHT: 74 IN | DIASTOLIC BLOOD PRESSURE: 65 MMHG | HEART RATE: 80 BPM

## 2019-09-27 DIAGNOSIS — Z79.4 TYPE 2 DIABETES MELLITUS WITH STAGE 3 CHRONIC KIDNEY DISEASE, WITH LONG-TERM CURRENT USE OF INSULIN (HCC): ICD-10-CM

## 2019-09-27 DIAGNOSIS — N18.30 TYPE 2 DIABETES MELLITUS WITH STAGE 3 CHRONIC KIDNEY DISEASE, WITH LONG-TERM CURRENT USE OF INSULIN (HCC): ICD-10-CM

## 2019-09-27 DIAGNOSIS — E03.9 ACQUIRED HYPOTHYROIDISM: Primary | ICD-10-CM

## 2019-09-27 DIAGNOSIS — I10 ESSENTIAL HYPERTENSION: ICD-10-CM

## 2019-09-27 DIAGNOSIS — E11.22 TYPE 2 DIABETES MELLITUS WITH STAGE 3 CHRONIC KIDNEY DISEASE, WITH LONG-TERM CURRENT USE OF INSULIN (HCC): ICD-10-CM

## 2019-09-27 DIAGNOSIS — E78.2 MIXED HYPERLIPIDEMIA: ICD-10-CM

## 2019-09-27 DIAGNOSIS — K74.60 HEPATIC CIRRHOSIS, UNSPECIFIED HEPATIC CIRRHOSIS TYPE, UNSPECIFIED WHETHER ASCITES PRESENT (HCC): ICD-10-CM

## 2019-09-27 PROCEDURE — 99214 OFFICE O/P EST MOD 30 MIN: CPT | Performed by: INTERNAL MEDICINE

## 2019-09-27 RX ORDER — INSULIN GLARGINE 100 [IU]/ML
INJECTION, SOLUTION SUBCUTANEOUS
Qty: 30 ML | Refills: 4 | Status: SHIPPED | OUTPATIENT
Start: 2019-09-27 | End: 2019-12-10 | Stop reason: SDUPTHER

## 2019-09-27 NOTE — PROGRESS NOTES
Endocrine Progress Note Outpatient     Patient Care Team:  Paulette Harris MD as PCP - General    Chief Complaint: Follow-up type 2 diabetes    HPI: 73-year-old male with history of type 2 diabetes, hypertension, hyperlipidemia, hypothyroidism, CKD stage III disease and obesity is here for follow-up.  For type 2 diabetes he is currently on Lantus 44 units at bedtime, Humalog 14 units before each meal plus sliding scale of 1 unit for each 30 mg blood sugar over 140.  He did bring in blood sugar records for review and they are running mostly in 200s.  His A1c however is very good at 5.4%.  Does have anemia.  Hypertension: Well-controlled  Hyperlipidemia: Atorvastatin 20 mg p.o. daily  Hypothyroidism: On levothyroxine supplementation  Liver cirrhosis: He is on lactulose and follows with GI on regular basis.    Past Medical History:   Diagnosis Date   • Anemia    • B12 deficiency 2009   • Syed's esophagus    • CAD (coronary artery disease)    • Diabetes mellitus (CMS/HCC)    • History of echocardiogram     03/03/2017 - 12/03/2014 - 04/2012   • Hyperlipidemia    • Hypertension    • Morbid obesity (CMS/HCC)    • KATHIA treated with BiPAP    • Renal insufficiency    • S/P lumbar laminectomy        Social History     Socioeconomic History   • Marital status:      Spouse name: Not on file   • Number of children: Not on file   • Years of education: Not on file   • Highest education level: Not on file   Tobacco Use   • Smoking status: Never Smoker   • Smokeless tobacco: Never Used   Substance and Sexual Activity   • Alcohol use: No     Frequency: Never   • Drug use: No   • Sexual activity: Defer       Family History   Problem Relation Age of Onset   • Hyperlipidemia Other    • Hypertension Other        No Known Allergies    ROS:   Constitutional:  Admit fatigue, tiredness.    Eyes:  Denies change in visual acuity   HENT:  Denies nasal congestion or sore throat   Respiratory: denies cough, admit shortness of breath.    Cardiovascular:  denies chest pain, edema   GI:  Denies abdominal pain, nausea, vomiting.   Musculoskeletal:  Denies back pain or joint pain   Integument:  Denies dry skin and rash   Neurologic:  Denies headache, focal weakness or sensory changes   Endocrine:  Denies polyuria or polydipsia   Psychiatric:  Admit depression and anxiety      Vitals:    09/27/19 1118   BP: 112/65   Pulse: 80   SpO2: 100%       Physical Exam:  GEN: NAD, conversant, Obesity  EYES: EOMI, PERRL, no conjunctival erythema  NECK: no thyromegaly, full ROM   CV: RRR, no murmurs/rubs/gallops, no peripheral edema  LUNG: CTAB, no wheezes/rales/ronchi  SKIN: no rashes, no acanthosis  MSK: no deformities, full ROM of all extremities  NEURO: no tremors, DTR normal  PSYCH: AOX3, appropriate mood, affect normal      Results Review:     I reviewed the patient's new clinical results.    Lab Results   Component Value Date    HGBA1C 5.4 09/20/2019    HGBA1C 6.9 (H) 08/01/2019    HGBA1C 7.0 (H) 07/08/2019      Lab Results   Component Value Date    GLUCOSE 173 (H) 09/20/2019    BUN 40 (H) 09/20/2019    CREATININE 1.90 (H) 09/20/2019    EGFRIFNONA 35 (L) 09/20/2019    BCR 21.1 (H) 09/20/2019    K 3.6 09/20/2019    CO2 25.0 09/20/2019    CALCIUM 8.2 (L) 09/20/2019    ALBUMIN 2.50 (L) 09/20/2019    LABIL2 0.4 (L) 05/29/2019    AST 98 (H) 09/20/2019    ALT 73 (H) 09/20/2019    CHOL 177 09/20/2019    TRIG 122 09/20/2019     (H) 09/20/2019    HDL 40 09/20/2019     Lab Results   Component Value Date    TSH 2.660 09/20/2019    FREET4 0.87 09/20/2019         Medication Review: Reviewed.       Current Outpatient Medications:   •  allopurinol (ZYLOPRIM) 100 MG tablet, ALLOPURINOL 100 MG TABS, Disp: , Rfl:   •  aspirin 81 MG tablet, Take 1 tablet by mouth Daily., Disp: 30 tablet, Rfl: 3  •  atorvastatin (LIPITOR) 20 MG tablet, Take 1 tablet by mouth Daily., Disp: 90 tablet, Rfl: 3  •  bumetanide (BUMEX) 1 MG tablet, TAKE 2 TABLETS BY MOUTH EVERY MORNING THEN ONE  "TABLET EVERY EVENING START ON 05/24, Disp: , Rfl: 0  •  cyanocobalamin 1000 MCG/ML injection, , Disp: , Rfl: 1  •  Cyanocobalamin 1000 MCG/ML kit, Inject 1,000 mcg as directed Every 30 (Thirty) Days., Disp: , Rfl:   •  docusate sodium (COLACE) 100 MG capsule, Take 100 mg by mouth 2 (Two) Times a Day., Disp: , Rfl:   •  DULoxetine (CYMBALTA) 60 MG capsule, DULOXETINE HCL 60 MG CPEP, Disp: , Rfl:   •  ferrous sulfate 325 (65 FE) MG tablet, Take 1 tablet by mouth 2 (Two) Times a Day., Disp: 60 tablet, Rfl: 2  •  folic acid (FOLVITE) 1 MG tablet, Daily., Disp: , Rfl:   •  hydrOXYzine pamoate (VISTARIL) 25 MG capsule, Take 25 mg by mouth 3 (Three) Times a Day As Needed., Disp: , Rfl: 0  •  insulin glargine (LANTUS) 100 UNIT/ML injection, Inject 38 units at bedtime, Disp: 30 mL, Rfl: 4  •  insulin lispro (HUMALOG) 100 UNIT/ML injection, 10 units subcu before each meal plus sliding scale, Disp: 30 mL, Rfl: 4  •  Insulin Syringe-Needle U-100 (BD INSULIN SYRINGE U/F) 31G X 5/16\" 1 ML misc, BD INSULIN SYRINGE U/F 31G X 5/16\" 1 ML, Disp: , Rfl:   •  lactulose (CHRONULAC) 10 GM/15ML solution, TK 45 ML PO Q 4 H, Disp: , Rfl: 3  •  levothyroxine (SYNTHROID, LEVOTHROID) 75 MCG tablet, Take 1 tablet by mouth Daily., Disp: 90 tablet, Rfl: 4  •  magnesium oxide (MAG-OX) 400 MG tablet, Daily., Disp: , Rfl:   •  Melatonin-Pyridoxine (MELATIN PO), Take  by mouth Daily., Disp: , Rfl:   •  metoclopramide (REGLAN) 5 MG tablet, Take 5 mg by mouth 2 (Two) Times a Day., Disp: , Rfl: 0  •  Multiple Vitamin (MULTI VITAMIN DAILY PO), Take  by mouth Daily., Disp: , Rfl:   •  oxyCODONE (ROXICODONE) 5 MG immediate release tablet, Every 12 (Twelve) Hours., Disp: , Rfl:   •  pantoprazole (PROTONIX) 40 MG EC tablet, Take 1 tablet by mouth Daily., Disp: 30 tablet, Rfl: 3  •  Polyethylene Glycol 1000 powder, POLYETHYLENE GLYCOL 1000 POWD, Disp: , Rfl:   •  potassium chloride (K-DUR,KLOR-CON) 20 MEQ CR tablet, Take 20 mEq by mouth Daily., Disp: , Rfl: " 0  •  rifaximin (XIFAXAN) 550 MG tablet, Every 12 (Twelve) Hours., Disp: , Rfl:   •  sodium bicarbonate 650 MG tablet, SODIUM BICARBONATE 650 MG TABS, Disp: , Rfl:   •  sulfamethoxazole-trimethoprim (BACTRIM DS,SEPTRA DS) 800-160 MG per tablet, Take 1 tablet by mouth 2 (Two) Times a Day., Disp: , Rfl: 0  •  zinc sulfate (ZINCATE) 50 MG capsule, , Disp: , Rfl: 0  No current facility-administered medications for this visit.       Assessment/Plan   1.  Diabetes mellitus type II: Uncontrolled with blood sugars at home are running high however his A1c is 5.4%.  He could have anemia, however we will go ahead and give him a new meter to make sure his meter is working fine, I will increase Lantus to 46 units at bedtime and increase Humalog to 15 units before each meal and continue the sliding scale.  We will continue to follow blood sugars and A1c.  Advised always to keep glucose source with him in case of low blood sugar.  2.  Hypertension: Well-controlled, continue current medication  3.  Hyperlipidemia: LDL is mild high at 109 he is recently started on atorvastatin by his cardiologist.  4.  Hypothyroidism: Well-controlled with TSH of 2.66 and free T4 0.87.  Continue current medication  5.  Liver cirrhosis: He follows with GI, currently on lactulose.            Alejandra Amaro MD FACE.    Much of the above report is an electronic transcription/translation of the spoken language to printed text using Dragon Software. As such, the subtleties and finesse of the spoken language may permit erroneous, or at times, nonsensical words or phrases to be inadvertently transcribed; thus changes may be made at a later date to rectify these errors.

## 2019-09-27 NOTE — PATIENT INSTRUCTIONS
Increase Lantus to 46 units subcu at bedtime  Increase Humalog to 15 units with each meal along with sliding scale  Please use a new meter to make sure we get accurate blood sugar readings  Always keep glucose source with you in case of low blood sugars  Follow-up in 3 to 4 months with labs.

## 2019-10-01 ENCOUNTER — TELEPHONE (OUTPATIENT)
Dept: SURGERY | Facility: CLINIC | Age: 74
End: 2019-10-01

## 2019-10-01 NOTE — PROGRESS NOTES
Hematology/Oncology Outpatient Follow Up    PATIENT NAME:Bry Ratliff  :1945  MRN: [id number]  PRIMARY CARE PHYSICIAN: Paulette Harris MD  REFERRING PHYSICIAN: Paulette Harris MD    No chief complaint on file.    History of Present Illness:   1. Anemia diagnosed 2018 and IgG kappa monoclonal gammopathy diagnosed May 2018.   • This patient is an obese  male who claims to have developed kidney problems in 2018 for which he was referred to Devi Plascencia M.D. He was found to have a hemoglobin of 7.4 at that time and was given 1 unit of packed red blood cells. He was then referred to GI where he had been followed for Syed’s esophagus for a number of years. An EGD and colonoscopy was performed on 3/5/18 by Marisel Gomez M.D. revealing mucosa suggestive of Syed’s esophagus and hiatal hernia in the cardia. Patient had a poor prep compromising the colonoscopy exam though the scope did reach the cecum. Esophageal biopsy revealed squamocolumnar mucosa with extensive intestinal metaplasia consistent with Syed’s esophagus, negative for dysplasia. Colonoscopy was recommended to be repeated in two years and EGD in three years. The patient saw his primary care physician, Paulette Harris M.D., in followup and blood testing was done on 18 revealing WBC 6.3, hemoglobin 8.4, MCV 84.5, platelet count 182,000. Vitamin B12 level was 416 (180-914) and patient was referred here for further evaluation. The patient claims to have been diagnosed with Vitamin B12 deficiency a number of years ago for which he has been on Vitamin B12 injections up until six months ago when he just stopped taking those. He has been recently started on oral iron supplementation. He claims not to see any blood in his urine but does have microscopic hematuria for which he sees a urologist. He denies nosebleeds, gum bleeds or blood in the stool. He has not had any significant changes in his  weight. He feels weak and dizzy for a number of years which has recently gotten worse. He does not ambulate much because of back surgery causing him weakness. He did have a right lower extremity DVT with bilateral pulmonary emboli in 2004 since which time he has been on Coumadin, thought secondary to a sedentary lifestyle. He has no family history of anemias.   • 5/24/18 – Patient seen initial consultation for anemia. Hemoglobin 7.9, MCV 89.9. Ferritin 17 (), iron 183 (), TIBC 379 (228-428), iron saturation 48% (20-50), retic 2.16% (0.5-1.5), haptoglobin 138 (), folate 9.1 (5.9-24.8). SPEP showed monoclonal gammopathy. SIFE showed IgG kappa monoclonal gammopathy. M-spike in the gamma region 0.7 g/dL. Erythropoietin 53.5 (2.59-18.5). Antiparietal cell antibody and intrinsic factor antibodies negative.   Globulin 4.2 (2.5-3.8). Creatinine 1.4 (0.7-1.2).   • 6/19/18 – Discussed results of anemia workup. Hemoglobin improving. Ferrous Sulfate decreased to 325 mg twice a day. Will perform gammopathy workup for IgG kappa monoclonal gammopathy. IgA 783 (), IgG 1480 (600-1500), IgM 93 (). Kappa lambda FLC ratio 0.81 (0.26-1.65). Ferritin 36 ().        • 6/25/18 – Bone survey negative for acute disease. No evidence of lytic or blastic metastatic bone disease was present. Chest x-ray showed cardiomegaly, mild right basilar atelectasis and findings suggestive of ankylosing spondylitis.   • 6/29/18 – Patient underwent sternal bone marrow revealing monoclonal gammopathy of undetermined significant (MGUS). Extent of bone marrow involvement 5%. Phenotype kappa positive, IgG positive, CD38 positive, CD20 negative, CD19 negative, CD45 negative, CD56 negative and  negative. Dyspoiesis was not seen. There was absence of iron stores. Normal male karyotype. Normal FISH study. Flow cytometry revealed IgG kappa restrictive plasma cells in a polytypic background. There was a mixed population of  maturing myeloid cells, B-cells and T-cells. No abnormal myeloid maturation was seen. A monoclonal IgG kappa plasma cell population was present. 4% plasma cells present.   • 8/23/18 – Labs by Dr. Plascencia revealed B12 of 857 (180-914), folate 12.7 (5.9-24.8), iron 127 ().      • 9/19/18 – Iron 94 (), TIBC 297 (228-428), iron saturation 32 (20-50), ferritin 29 (). Erythropoietin 30.04 (2.59-18.5).        • 10/16/18 – Iron 177 (), TIBC 320 (228-428), iron saturation 55 (20-50), ferritin 34 ().       • 11/16/18 – Hemoglobin 7.1. Patient given 1 unit of packed red blood cells.   • 11/21/18 – Ferritin 27 (). Hemoglobin 7.9. Patient given 1 unit of packed red blood cells.    • 11/26/18 – EGD by Dave Russo M.D. revealed erosive gastritis and bleeding ampulla. There was oozing upon entering the stomach in many different areas. Duodenal mucosa and the esophagus were normal.   • 11/27/18 – Hemoglobin 8.2. Order written for Procrit 30,000 units subq weekly, not approved by insurance for low ferritin. Urine JERRY with no definite monoclonal gammopathy identified with questionable faint IgG kappa.   • 12/18/18 – Hemoglobin 9.9. Injectafer 750 mg IV given.   • 1/2/19 – Injectafer 750 mg IV given.  Hemoglobin 11, .1. Patient claims that he is getting Vitamin B12 injections monthly at home through Dr. Harris. Currently taking Ferrous Sulfate 325 mg p.o. b.i.d. and asked to continue that. Asked to continue Pantoprazole 40 mg daily that he is taking. IgA 651 (), IgG 1470 (600-1500), IgM 94 ().      • 2/12/19 – Iron 88 ().    • 3/6/19 – WBC 19.8 with 83% neutrophils, 5% lymphocytes, 11% monocytes, hemoglobin 8.7, , platelet count 182,000. Patient status post laparoscopic cholecystectomy on 2/24/19 with large ecchymoses apparent on abdomen. Infectious workup ordered and Injectafer 750 mg IV days 1 and 8 ordered.  Iron 23 (), TIBC 153 (228-428), iron  saturation 15% (20-50), ferritin 400 (224-336).       • 3/12/19 – WBC 15.02, hemoglobin 8.1 and platelets 227,000. Patient reported hematuria followed by Dr. Starkey. Orders written to start Injectafer ASAP. Abdominal ecchymosis improving.   • 3/14/19 – Injectafer 750 mg IV given.   • 4/22/19 – Hemoglobin 9.5, . Patient missed day 8 Injectafer in March and it was rescheduled. SIFE showed IgG kappa monoclonal gammopathy.  • 5/8/19 - Injectafer 750 mg IV given.   • 7/8/19 - Iron 56 (). Iron Saturation 36 (20-50%). Transferrin 110 (180-330). TIBC 154 (228-428). Ferritin 1,199 ().    • 8/7/2019–hemoglobin 10.0.  Patient taking multivitamin with iron only.  Restarted ferrous sulfate 325mg by mouth twice a day.       2.   Elevated LFT’s diagnosis established March 2018.  Biliary duct dilation diagnosis established June 2018.   · 11/30/17 – Maia phos 93 (32-91), total bili 0.7 (0.3-1.2), AST 37 (15-41), ALT 25 (15-41).   · 3/1/18 – T-bili 0.5 (0.3-1.2), maia phos 106 (32-91), AST 54 (15-41), ALT 27 (17-63).   · 5/24/18 – AST 46 (15-41), maia phos 131 (32-91).   • 6/8/18 – CMP ordered by Dr. Alejandra Amaro showed maia phos 209 (32-91),  (15-41), ALT 84 (17-63), total bili 1.1 (0.3-1.2).             • 6/19/18 – CT abdomen and pelvis as well as serological workup for elevated LFT’s ordered. Alpha-1 antitrypsin 144 (). Ceruloplasmin 38 (22-58). AFP 3 (0-9).  Hepatitis panel non-reactive.   • 6/22/18 – CT abdomen and pelvis showed abnormal intra and extrahepatic biliary dilation. There was mild distention of the gallbladder and evidence of several gallstones. Bilateral non-obstructing kidney stone was present. Incidental sigmoid diverticulosis.   • 7/2/18 – Patient underwent MRCP at [facility] showing markedly dilated intrahepatic and extrahepatic bile duct with multiple small filling defects within the distal common bile duct compatible with choledocholithiasis. There was a focal 6 mm  segmental narrowing at the distal CBD with recommended GI consult for ERCP and brushings.   • 7/5/18 to 7/10/18 – On 7/5/18 labs revealed normal total bilirubin (0.8), AST 69 (15-41), maia phos 152 (32-91), ALT was normal at 37 (17-63), ammonia was elevated at 86 (9-35), lipase was normal (22). Patient hospitalized at [facility] due to weakness. Workup revealed cholelithiasis. On 7/9/18 patient underwent ERCP with bilateral sphincterotomy, common duct stone extraction, biliary brushing and stent placement by Dr. Leiva. Path on brushings was negative for malignancy. There was intra and extrahepatic biliary ductal dilation with relatively abrupt distal common bile duct cutoff and non-visualization of the gallbladder. He was started on Lovenox and sequential compression devices due to risk of pulmonary embolism.  of 33 (0-35).  On 7/10/18 LFT’s revealed total bili 0.9 (0.3-1.2). Maia phos 129 (32-91). AST 50 (15-41), ALT 41 (17-63).     • 8/31/18 – EUS by Dr. Gomez at [facility] revealing suspect benign biliary stricture due to CBD stone with FNA pathology revealing benign and atypical ductal cells, bile and proteinaceous material including scattered bacteria, neutrophils and degenerating cells. The atypia was felt mild and favored to be reactive in nature. Plan was to repeat ERCP with removal of the stent and the stone with consideration of common bile duct evaluation with cholangioscopy at that time.   • 10/12/18 – ERCP with sphincteroplasty and stone extraction done by Dave Russo M.D. for choledocholithiasis.   • 2/23/19 – Patient underwent ERCP with balloon clearance of bile duct by Jl Hampton M.D.   • 3/6/19 – LFT’s not elevated with bilirubin 1.1, AST 31, ALT 14, maia phos was mildly elevated at 101 (32-91).   • 4/25/19 – 4/26/19 – Admitted to [facility] for hepatic encephalopathy and hypokalemia.   • 4/27/2019 – CT abdomen pelvis showed a 4.8 cm air-fluid collection adjacent to the right posterior hepatic  lobe significant improved.  Right-sided chest tube small right pleural effusion.  Hepatic cirrhosis.  Bilateral nonobstructing renal stones.  Sigmoid diverticulosis.   • 5/26/19 – 5/29/2019 – Admitted to [facility] for hepatic and cephalopathy.  Ammonia level 213 (H).  • 7/11/19 - AFP 2.76 (0-9).     3.   Right lower extremity DVT and bilateral pulmonary emboli diagnosed 2004.   • 2004 – Patient claims to have developed right lower extremity DVT with bilateral pulmonary emboli in 2004 and was coumadinized. The blood clots were thought secondary to his sedentary lifestyle. He stayed on the Coumadin up until October 2017 when, due to difficulty maintaining his INR’s, he was switched to Xarelto 20 mg daily by Paulette Harris M.D. which was later dropped to 10 mg daily.    • 11/26/18 – EGD by Dave Russo M.D. revealed erosive gastritis and bleeding ampulla. Ampullary biopsy revealed reactive/reparative small intestinal mucosa with mild chronic inflammation. Gastric antrum biopsy was suggestive of focal mild reactive gastropathy. Due to erosive gastritis, patient asked to hold Xarelto and Aspirin by Dave Russo M.D.   • 11/27/18 – Patient asked to discontinue Xarelto and hold Aspirin while obtaining IVC filter. Risks discussed. Factor V Leiden gene mutation and prothrombin gene mutations not detected. Anticardiolipin IgM 11 (<11). Anti beta-2 glyco, IgA 55 (<20). Factor VIII activity 186 (). Antithrombin III activity 63 (). Protein C activity 69 (), protein S activity 69 ().       • 12/5/18 – Patient underwent transjugular IVC filter placement in IR by  Jonn Cuenca M.D.   • 1/2/19 – Told patient that his low protein CNS and antithrombin III likely due to liver disease. He would be at a high risk of going back on Xarelto and hence continued on Aspirin 81 mg p.o. daily.   • 1/3/19 – D-dimer 1.25 (<0.45).    • 3/8/19 – Chest x-ray showed chronic changes in the chest  with no obvious pneumonia or congestive changes.  • 4/3/19 – Chest x-ray with right pleural effusion and basilar lung density with slight increase from prior. Cardiomegaly.   • 4/22/19 - Factor VIII 334 (H), Anti-phosphatidylserine antibody IgM> 80 (H), Anti-phosphatidylserine antibody IgG 12 (N), Cardiolipin IgG 21 (N), Cardiolipin IgM 55 (H), Cardiolipin IgA 63 (H), Beta-2 glycoprotein IgG 4 (N), IgA 91 (H), and IgM 21 (H).   • 7/11/19 - Chest x-ray showed stable small right pleural effusion since 04/25/2019. Minimal right costophrenic angle atelectasis.    Past Medical History:   Diagnosis Date   • Anemia    • B12 deficiency 2009   • Syed's esophagus    • CAD (coronary artery disease)    • Diabetes mellitus (CMS/HCC)    • History of echocardiogram     03/03/2017 - 12/03/2014 - 04/2012   • Hyperlipidemia    • Hypertension    • Morbid obesity (CMS/HCC)    • KATHIA treated with BiPAP    • Renal insufficiency    • S/P lumbar laminectomy        Past Surgical History:   Procedure Laterality Date   • BACK SURGERY  1971   • CATARACT EXTRACTION  2014   • CHOLECYSTECTOMY  02/24/2019   • COLONOSCOPY  03/2018   • CORONARY ANGIOPLASTY  08/24/2009    PCI stent - succesful placement of 3.5/23 promus stent in proximal right coronary artery   • CORONARY ANGIOPLASTY WITH STENT PLACEMENT  08/27/2009    patient had stent placed to proximal right coronary artery   • CYSTOSCOPY BLADDER STONE LITHOTRIPSY  1997   • OTHER SURGICAL HISTORY  11/2018    IVC filter implant   • RENAL ARTERY STENT Left          Current Outpatient Medications:   •  allopurinol (ZYLOPRIM) 100 MG tablet, ALLOPURINOL 100 MG TABS, Disp: , Rfl:   •  aspirin 81 MG tablet, Take 1 tablet by mouth Daily., Disp: 30 tablet, Rfl: 3  •  atorvastatin (LIPITOR) 20 MG tablet, Take 1 tablet by mouth Daily., Disp: 90 tablet, Rfl: 3  •  bumetanide (BUMEX) 1 MG tablet, TAKE 2 TABLETS BY MOUTH EVERY MORNING THEN ONE TABLET EVERY EVENING START ON 05/24, Disp: , Rfl: 0  •   "cyanocobalamin 1000 MCG/ML injection, , Disp: , Rfl: 1  •  Cyanocobalamin 1000 MCG/ML kit, Inject 1,000 mcg as directed Every 30 (Thirty) Days., Disp: , Rfl:   •  docusate sodium (COLACE) 100 MG capsule, Take 100 mg by mouth 2 (Two) Times a Day., Disp: , Rfl:   •  DULoxetine (CYMBALTA) 60 MG capsule, DULOXETINE HCL 60 MG CPEP, Disp: , Rfl:   •  ferrous sulfate 325 (65 FE) MG tablet, Take 1 tablet by mouth 2 (Two) Times a Day., Disp: 60 tablet, Rfl: 2  •  folic acid (FOLVITE) 1 MG tablet, Daily., Disp: , Rfl:   •  glucose blood (ONETOUCH VERIO) test strip, Use to check BS 4 times daily DX E11.29, Disp: 400 each, Rfl: 1  •  hydrOXYzine pamoate (VISTARIL) 25 MG capsule, Take 25 mg by mouth 3 (Three) Times a Day As Needed., Disp: , Rfl: 0  •  insulin glargine (LANTUS) 100 UNIT/ML injection, Inject 46 units at bedtime, Disp: 30 mL, Rfl: 4  •  insulin lispro (HUMALOG) 100 UNIT/ML injection, 15 units subcu before each meal plus sliding scale, Disp: 30 mL, Rfl: 4  •  Insulin Syringe-Needle U-100 (BD INSULIN SYRINGE U/F) 31G X 5/16\" 1 ML misc, BD INSULIN SYRINGE U/F 31G X 5/16\" 1 ML, Disp: , Rfl:   •  lactulose (CHRONULAC) 10 GM/15ML solution, TK 45 ML PO Q 4 H, Disp: , Rfl: 3  •  levothyroxine (SYNTHROID, LEVOTHROID) 75 MCG tablet, Take 1 tablet by mouth Daily., Disp: 90 tablet, Rfl: 4  •  magnesium oxide (MAG-OX) 400 MG tablet, Daily., Disp: , Rfl:   •  Melatonin-Pyridoxine (MELATIN PO), Take  by mouth Daily., Disp: , Rfl:   •  metoclopramide (REGLAN) 5 MG tablet, Take 5 mg by mouth 2 (Two) Times a Day., Disp: , Rfl: 0  •  Multiple Vitamin (MULTI VITAMIN DAILY PO), Take  by mouth Daily., Disp: , Rfl:   •  ONETOUCH DELICA LANCETS FINE misc, Use to check BS 4 times daily DX E11.29, Disp: 400 each, Rfl: 1  •  oxyCODONE (ROXICODONE) 5 MG immediate release tablet, Every 12 (Twelve) Hours., Disp: , Rfl:   •  pantoprazole (PROTONIX) 40 MG EC tablet, Take 1 tablet by mouth Daily., Disp: 30 tablet, Rfl: 3  •  polyethylene glycol " (MIRALAX) pack packet, Take 17 g by mouth Daily., Disp: , Rfl:   •  Polyethylene Glycol 1000 powder, POLYETHYLENE GLYCOL 1000 POWD, Disp: , Rfl:   •  potassium chloride (K-DUR,KLOR-CON) 20 MEQ CR tablet, Take 20 mEq by mouth Daily., Disp: , Rfl: 0  •  rifaximin (XIFAXAN) 550 MG tablet, Every 12 (Twelve) Hours., Disp: , Rfl:   •  sodium bicarbonate 650 MG tablet, SODIUM BICARBONATE 650 MG TABS, Disp: , Rfl:   •  sulfamethoxazole-trimethoprim (BACTRIM DS,SEPTRA DS) 800-160 MG per tablet, Take 1 tablet by mouth 2 (Two) Times a Day., Disp: , Rfl: 0  •  zinc sulfate (ZINCATE) 50 MG capsule, , Disp: , Rfl: 0    No Known Allergies    Family History   Problem Relation Age of Onset   • Hyperlipidemia Other    • Hypertension Other        Cancer-related family history is not on file.    Social History     Tobacco Use   • Smoking status: Never Smoker   • Smokeless tobacco: Never Used   Substance Use Topics   • Alcohol use: No     Frequency: Never   • Drug use: No     I have reviewed the history of present illness, past medical history, family history, social history, lab results, all notes and other records since the patient was last seen on 8/7/19.    SUBJECTIVE:       Jazz Theodore CMA (AAMA) was present during office visit.             REVIEW OF SYSTEMS:  Review of Systems   Constitutional: Positive for fatigue. Negative for activity change, appetite change, chills, fever and unexpected weight change.   HENT: Negative for ear pain, mouth sores, nosebleeds, sinus pressure and sore throat.    Eyes: Negative for photophobia and visual disturbance.   Respiratory: Negative for cough, shortness of breath, wheezing and stridor.    Cardiovascular: Negative for chest pain, palpitations and leg swelling.   Gastrointestinal: Positive for diarrhea ( Due to medications for high ammonia). Negative for abdominal pain, blood in stool, constipation, nausea and vomiting.   Endocrine: Negative for cold intolerance and heat intolerance.    Genitourinary: Negative for difficulty urinating, dysuria, frequency and hematuria.   Musculoskeletal: Negative for arthralgias, joint swelling and neck stiffness.   Skin: Negative for color change and rash.   Neurological: Negative for dizziness, seizures, syncope and headaches.   Hematological: Negative for adenopathy.        No obvious bleeding   Psychiatric/Behavioral: Negative for agitation, confusion and hallucinations. The patient is not nervous/anxious.      OBJECTIVE:    There were no vitals filed for this visit.  ECOG  (2) Ambulatory and capable of self care, unable to carry out work activity, up and about > 50% or waking hours    Physical Exam   Constitutional: He is oriented to person, place, and time. He appears well-developed and well-nourished. No distress.   HENT:   Head: Normocephalic and atraumatic.   Nose: Nose normal.   Mouth/Throat: Oropharynx is clear and moist. No oropharyngeal exudate.   Eyes: Conjunctivae and EOM are normal. Pupils are equal, round, and reactive to light. Right eye exhibits no discharge. Left eye exhibits no discharge. No scleral icterus.   Neck: Normal range of motion. Neck supple. No thyromegaly present.   Cardiovascular: Normal rate, regular rhythm, normal heart sounds and intact distal pulses. Exam reveals no gallop and no friction rub.   No murmur heard.  Pulmonary/Chest: Effort normal and breath sounds normal. No stridor. No respiratory distress. He has no wheezes.   Abdominal: Soft. Bowel sounds are normal. He exhibits no mass. There is no tenderness. There is no rebound and no guarding.   Obese   Musculoskeletal: Normal range of motion. He exhibits edema ( Trace bilateral pedal). He exhibits no tenderness or deformity.   Ambulates with the use of a walker   Lymphadenopathy:     He has no cervical adenopathy.   Neurological: He is alert and oriented to person, place, and time. He exhibits normal muscle tone. Coordination normal.   Skin: Skin is warm and dry. No rash  noted. He is not diaphoretic. No erythema. No pallor.   Psychiatric: He has a normal mood and affect. His behavior is normal.   Nursing note and vitals reviewed.      RECENT LABS  WBC   Date Value Ref Range Status   08/07/2019 11.82 (H) 3.40 - 10.80 10*3/mm3 Final     RBC   Date Value Ref Range Status   08/07/2019 2.82 (L) 4.14 - 5.80 10*6/mm3 Final     Hemoglobin   Date Value Ref Range Status   08/07/2019 10.0 (L) 13.0 - 17.7 g/dL Final     Hematocrit   Date Value Ref Range Status   08/07/2019 30.5 (L) 37.5 - 51.0 % Final     MCV   Date Value Ref Range Status   08/07/2019 108.2 (H) 79.0 - 97.0 fL Final     MCH   Date Value Ref Range Status   08/07/2019 35.5 (H) 26.6 - 33.0 pg Final     MCHC   Date Value Ref Range Status   08/07/2019 32.8 31.5 - 35.7 g/dL Final     RDW   Date Value Ref Range Status   08/07/2019 13.8 12.3 - 15.4 % Final     RDW-SD   Date Value Ref Range Status   08/07/2019 52.4 37.0 - 54.0 fl Final     MPV   Date Value Ref Range Status   08/07/2019 11.0 6.0 - 12.0 fL Final     Platelets   Date Value Ref Range Status   08/07/2019 251 140 - 450 10*3/mm3 Final     Neutrophil %   Date Value Ref Range Status   08/07/2019 72.1 42.7 - 76.0 % Final     Lymphocyte %   Date Value Ref Range Status   08/07/2019 15.3 (L) 19.6 - 45.3 % Final     Monocyte %   Date Value Ref Range Status   08/07/2019 9.8 5.0 - 12.0 % Final     Eosinophil %   Date Value Ref Range Status   08/07/2019 2.5 0.3 - 6.2 % Final     Basophil %   Date Value Ref Range Status   08/07/2019 0.3 0.0 - 1.5 % Final     Neutrophils, Absolute   Date Value Ref Range Status   08/07/2019 8.51 (H) 1.70 - 7.00 10*3/mm3 Final     Lymphocytes, Absolute   Date Value Ref Range Status   08/07/2019 1.81 0.70 - 3.10 10*3/mm3 Final     Monocytes, Absolute   Date Value Ref Range Status   08/07/2019 1.16 (H) 0.10 - 0.90 10*3/mm3 Final     Eosinophils, Absolute   Date Value Ref Range Status   08/07/2019 0.30 0.00 - 0.40 10*3/mm3 Final     Basophils, Absolute   Date  Value Ref Range Status   08/07/2019 0.04 0.00 - 0.20 10*3/mm3 Final     nRBC   Date Value Ref Range Status   08/01/2019 0.0 0.0 - 0.2 /100 WBC Final       Lab Results   Component Value Date    GLUCOSE 173 (H) 09/20/2019    BUN 40 (H) 09/20/2019    CREATININE 1.90 (H) 09/20/2019    EGFRIFNONA 35 (L) 09/20/2019    BCR 21.1 (H) 09/20/2019    K 3.6 09/20/2019    CO2 25.0 09/20/2019    CALCIUM 8.2 (L) 09/20/2019    ALBUMIN 2.50 (L) 09/20/2019    LABIL2 0.4 (L) 05/29/2019    AST 98 (H) 09/20/2019    ALT 73 (H) 09/20/2019     ASSESSMENT:  Assessment/Plan     Iron deficiency anemia with poor oral iron absorption    Erosive gastritis    Leukocytosis, unspecified type    Anemia in stage 3 chronic kidney disease (CMS/HCC)    History of Vitamin B12 deficiency    History of DVT of right lower extremity    History of bilateral pulmonary embolus (PE)    Antithrombin III deficiency (CMS/HCC)    Protein C deficiency (CMS/HCC)    Protein S deficiency (CMS/HCC)    Antiphospholipid antibody positive    Elevated factor VIII level    THOMAS (nonalcoholic steatohepatitis)    Splenomegaly    IgG kappa MGUS      PLAN:  · Continue baby aspirin one by mouth daily  · ferrous sulfate 325 mg by mouth twice daily       I have reviewed labs results, imaging, vitals, and medications with the patient today and have reviewed information entered by Jazz Theodore CMA (AAMA).   Will follow up in 2 months with the MD.     Patient and his spouse verbalized understanding and is in agreement of the above plan.        Much of the above report is an electronic transcription/translation of the spoken language to printed text using Dragon Software. As such, the subtleties and finesse of the spoken language may permit erroneous, or at times, nonsensical words or phrases to be inadvertently transcribed; thus changes may be made at a later date to rectify these errors.

## 2019-10-01 NOTE — TELEPHONE ENCOUNTER
Pt's wife is calling stating that pt had CT scan done on 9/26/19 and is wanting to know if you are wanting to schedule surgery ?     If so, please place case request and we can get scheduled pt.

## 2019-10-07 ENCOUNTER — HOSPITAL ENCOUNTER (OUTPATIENT)
Dept: GENERAL RADIOLOGY | Facility: HOSPITAL | Age: 74
Discharge: HOME | End: 2019-10-07
Admitting: SURGERY

## 2019-10-07 ENCOUNTER — OFFICE VISIT (OUTPATIENT)
Dept: ONCOLOGY | Facility: CLINIC | Age: 74
End: 2019-10-07

## 2019-10-07 ENCOUNTER — TELEPHONE (OUTPATIENT)
Dept: ONCOLOGY | Facility: CLINIC | Age: 74
End: 2019-10-07

## 2019-10-07 ENCOUNTER — APPOINTMENT (OUTPATIENT)
Dept: LAB | Facility: HOSPITAL | Age: 74
End: 2019-10-07

## 2019-10-07 DIAGNOSIS — D50.8 OTHER IRON DEFICIENCY ANEMIA: ICD-10-CM

## 2019-10-07 DIAGNOSIS — D63.1 ANEMIA IN STAGE 3 CHRONIC KIDNEY DISEASE (HCC): ICD-10-CM

## 2019-10-07 DIAGNOSIS — R76.0 ANTIPHOSPHOLIPID ANTIBODY POSITIVE: ICD-10-CM

## 2019-10-07 DIAGNOSIS — K75.81 NASH (NONALCOHOLIC STEATOHEPATITIS): ICD-10-CM

## 2019-10-07 DIAGNOSIS — Z86.718 HISTORY OF DVT OF LOWER EXTREMITY: ICD-10-CM

## 2019-10-07 DIAGNOSIS — D50.8 OTHER IRON DEFICIENCY ANEMIA: Primary | ICD-10-CM

## 2019-10-07 DIAGNOSIS — R79.1 ELEVATED FACTOR VIII LEVEL: ICD-10-CM

## 2019-10-07 DIAGNOSIS — D68.59 PROTEIN S DEFICIENCY (HCC): ICD-10-CM

## 2019-10-07 DIAGNOSIS — Z86.39 HISTORY OF NON ANEMIC VITAMIN B12 DEFICIENCY: ICD-10-CM

## 2019-10-07 DIAGNOSIS — D47.2 MGUS (MONOCLONAL GAMMOPATHY OF UNKNOWN SIGNIFICANCE): ICD-10-CM

## 2019-10-07 DIAGNOSIS — D68.59 ANTITHROMBIN III DEFICIENCY (HCC): ICD-10-CM

## 2019-10-07 DIAGNOSIS — D72.829 LEUKOCYTOSIS, UNSPECIFIED TYPE: ICD-10-CM

## 2019-10-07 DIAGNOSIS — L02.91 ENCOUNTER FOR DRAINAGE OF ABSCESS: ICD-10-CM

## 2019-10-07 DIAGNOSIS — N18.30 ANEMIA IN STAGE 3 CHRONIC KIDNEY DISEASE (HCC): ICD-10-CM

## 2019-10-07 DIAGNOSIS — K29.60 EROSIVE GASTRITIS: ICD-10-CM

## 2019-10-07 DIAGNOSIS — Z86.711 HISTORY OF PULMONARY EMBOLUS (PE): ICD-10-CM

## 2019-10-07 DIAGNOSIS — D68.59 PROTEIN C DEFICIENCY (HCC): ICD-10-CM

## 2019-10-07 DIAGNOSIS — R16.1 SPLENOMEGALY: ICD-10-CM

## 2019-10-07 DIAGNOSIS — L02.213 CUTANEOUS ABSCESS OF CHEST WALL: ICD-10-CM

## 2019-10-07 PROCEDURE — 25010000002 IOPAMIDOL 61 % SOLUTION: Performed by: SURGERY

## 2019-10-07 PROCEDURE — 76080 X-RAY EXAM OF FISTULA: CPT

## 2019-10-07 RX ADMIN — IOPAMIDOL 15 ML: 612 INJECTION, SOLUTION INTRATHECAL at 18:15

## 2019-10-07 NOTE — TELEPHONE ENCOUNTER
Pt is scheduled for Fistulagram at 4:15pm. Lowell will review this once he returns to the office.

## 2019-10-07 NOTE — TELEPHONE ENCOUNTER
Pt's wife calling this morning stating that pt is having drainage. Order is needing signed by a MD, Dr. Quarles to sign order as STAT . Will scheduled pt to have this done today.

## 2019-10-10 RX ORDER — SULFAMETHOXAZOLE AND TRIMETHOPRIM 800; 160 MG/1; MG/1
1 TABLET ORAL 2 TIMES DAILY
Qty: 20 TABLET | Refills: 0 | Status: SHIPPED | OUTPATIENT
Start: 2019-10-10 | End: 2019-10-19 | Stop reason: HOSPADM

## 2019-10-10 NOTE — PROGRESS NOTES
VOID        Hematology/Oncology Outpatient Follow Up    PATIENT NAME:Bry Ratliff  :1945  MRN: [id number]  PRIMARY CARE PHYSICIAN: Paulette Harris MD  REFERRING PHYSICIAN: Paulette Harris MD    Chief Complaint   Patient presents with   • Follow-up     Iron deficiency anemia with poor oral iron absorption, Erosive gastritis, Leukocytosis,  Anemia in stage 3 chronic kidney disease , History of Vitamin B12 deficiency,  History of DVT of right lower extremity,  History of bilateral pulmonary embolus , Antithrombin III deficiency ,  Protein C deficiency ,  Protein S deficiency ,  Antiphospholipid antibody positive, Elevated factor VIII level,  THOMAS,  Splenomegaly, MGUS            History of Present Illness:   1. Anemia diagnosed 2018 and IgG kappa monoclonal gammopathy diagnosed May 2018.   • This patient is an obese  male who claims to have developed kidney problems in 2018 for which he was referred to Devi Plascencia M.D. He was found to have a hemoglobin of 7.4 at that time and was given 1 unit of packed red blood cells. He was then referred to GI where he had been followed for Syed’s esophagus for a number of years. An EGD and colonoscopy was performed on 3/5/18 by Marisel Gomez M.D. revealing mucosa suggestive of Syed’s esophagus and hiatal hernia in the cardia. Patient had a poor prep compromising the colonoscopy exam though the scope did reach the cecum. Esophageal biopsy revealed squamocolumnar mucosa with extensive intestinal metaplasia consistent with Seyd’s esophagus, negative for dysplasia. Colonoscopy was recommended to be repeated in two years and EGD in three years. The patient saw his primary care physician, Paulette Harris M.D., in followup and blood testing was done on 18 revealing WBC 6.3, hemoglobin 8.4, MCV 84.5, platelet count 182,000. Vitamin B12 level was 416 (180-914) and patient was referred here for further evaluation. The  patient claims to have been diagnosed with Vitamin B12 deficiency a number of years ago for which he has been on Vitamin B12 injections up until six months ago when he just stopped taking those. He has been recently started on oral iron supplementation. He claims not to see any blood in his urine but does have microscopic hematuria for which he sees a urologist. He denies nosebleeds, gum bleeds or blood in the stool. He has not had any significant changes in his weight. He feels weak and dizzy for a number of years which has recently gotten worse. He does not ambulate much because of back surgery causing him weakness. He did have a right lower extremity DVT with bilateral pulmonary emboli in 2004 since which time he has been on Coumadin, thought secondary to a sedentary lifestyle. He has no family history of anemias.   • 5/24/18 - Patient seen initial consultation for anemia. Hemoglobin 7.9, MCV 89.9. Ferritin 17 (), iron 183 (), TIBC 379 (228-428), iron saturation 48% (20-50), retic 2.16% (0.5-1.5), haptoglobin 138 (), folate 9.1 (5.9-24.8). SPEP showed monoclonal gammopathy. SIFE showed IgG kappa monoclonal gammopathy. M-spike in the gamma region 0.7 g/dL. Erythropoietin 53.5 (2.59-18.5). Antiparietal cell antibody and intrinsic factor antibodies negative.   Globulin 4.2 (2.5-3.8). Creatinine 1.4 (0.7-1.2).   • 6/19/18 - Discussed results of anemia workup. Hemoglobin improving. Ferrous Sulfate decreased to 325 mg twice a day. Will perform gammopathy workup for IgG kappa monoclonal gammopathy. IgA 783 (), IgG 1480 (600-1500), IgM 93 (). Kappa lambda FLC ratio 0.81 (0.26-1.65). Ferritin 36 ().        • 6/25/18 - Bone survey negative for acute disease. No evidence of lytic or blastic metastatic bone disease was present. Chest x-ray showed cardiomegaly, mild right basilar atelectasis and findings suggestive of ankylosing spondylitis.   • 6/29/18 - Patient underwent sternal bone  marrow revealing monoclonal gammopathy of undetermined significant (MGUS). Extent of bone marrow involvement 5%. Phenotype kappa positive, IgG positive, CD38 positive, CD20 negative, CD19 negative, CD45 negative, CD56 negative and  negative. Dyspoiesis was not seen. There was absence of iron stores. Normal male karyotype. Normal FISH study. Flow cytometry revealed IgG kappa restrictive plasma cells in a polytypic background. There was a mixed population of maturing myeloid cells, B-cells and T-cells. No abnormal myeloid maturation was seen. A monoclonal IgG kappa plasma cell population was present. 4% plasma cells present.   • 8/23/18 - Labs by Dr. Plascencia revealed B12 of 857 (180-914), folate 12.7 (5.9-24.8), iron 127 ().      • 9/19/18 - Iron 94 (), TIBC 297 (228-428), iron saturation 32 (20-50), ferritin 29 (). Erythropoietin 30.04 (2.59-18.5).   • 10/16/18 - Iron 177 (), TIBC 320 (228-428), iron saturation 55 (20-50), ferritin 34 ().       • 11/16/18 - Hemoglobin 7.1. Patient given 1 unit of packed red blood cells.   • 11/21/18 - Ferritin 27 (). Hemoglobin 7.9. Patient given 1 unit of packed red blood cells.    • 11/26/18 - EGD by Dave Russo M.D. revealed erosive gastritis and bleeding ampulla. There was oozing upon entering the stomach in many different areas. Duodenal mucosa and the esophagus were normal.   • 11/27/18 - Hemoglobin 8.2. Order written for Procrit 30,000 units subq weekly, not approved by insurance for low ferritin. Urine JERRY with no definite monoclonal gammopathy identified with questionable faint IgG kappa.   • 12/18/18 - Hemoglobin 9.9. Injectafer 750 mg IV given.   • 1/2/19 - Injectafer 750 mg IV given.  Hemoglobin 11, .1. Patient claims that he is getting Vitamin B12 injections monthly at home through Dr. Harris. Currently taking Ferrous Sulfate 325 mg p.o. b.i.d. and asked to continue that. Asked to continue Pantoprazole 40 mg daily  that he is taking. IgA 651 (), IgG 1470 (600-1500), IgM 94 ().      • 2/12/19 - Iron 88 ().    • 3/6/19 - WBC 19.8 with 83% neutrophils, 5% lymphocytes, 11% monocytes, hemoglobin 8.7, , platelet count 182,000. Patient status post laparoscopic cholecystectomy on 2/24/19 with large ecchymoses apparent on abdomen. Infectious workup ordered and Injectafer 750 mg IV days 1 and 8 ordered.  Iron 23 (), TIBC 153 (228-428), iron saturation 15% (20-50), ferritin 400 (224-336).       • 3/12/19 - WBC 15.02, hemoglobin 8.1 and platelets 227,000. Patient reported hematuria followed by Dr. Starkey. Orders written to start Injectafer ASAP. Abdominal ecchymosis improving.   • 3/14/19 - Injectafer 750 mg IV given.   • 4/22/19 - Hemoglobin 9.5, . Patient missed day 8 Injectafer in March and it was rescheduled. SIFE showed IgG kappa monoclonal gammopathy.  • 5/8/19 - Injectafer 750 mg IV given.   • 7/8/19 - Iron 56 (). Iron Saturation 36 (20-50%). Transferrin 110 (180-330). TIBC 154 (228-428). Ferritin 1,199 ().    • 8/7/2019-hemoglobin 10.0.  Patient taking multivitamin with iron only.  Restarted ferrous sulfate 325mg by mouth twice a day.  UIFE was positive for free kappa light chain.  • 10/16/2019-hemoglobin 4.8.  Patient confirms compliance with ferrous sulfate 325 mg by mouth twice daily and is on a baby aspirin daily.     2.   Elevated LFT’s diagnosis established March 2018.  Biliary duct dilation diagnosis established June 2018.   · 11/30/17 - Maia phos 93 (32-91), total bili 0.7 (0.3-1.2), AST 37 (15-41), ALT 25 (15-41).   · 3/1/18 - T-bili 0.5 (0.3-1.2), maia phos 106 (32-91), AST 54 (15-41), ALT 27 (17-63).   · 5/24/18 - AST 46 (15-41), maia phos 131 (32-91).   • 6/8/18 - CMP ordered by Dr. Alejandra Amaro showed maia phos 209 (32-91),  (15-41), ALT 84 (17-63), total bili 1.1 (0.3-1.2).             • 6/19/18 - CT abdomen and pelvis as well as serological workup for elevated  LFT’s ordered. Alpha-1 antitrypsin 144 (). Ceruloplasmin 38 (22-58). AFP 3 (0-9).  Hepatitis panel non-reactive.   • 6/22/18 - CT abdomen and pelvis showed abnormal intra and extrahepatic biliary dilation. There was mild distention of the gallbladder and evidence of several gallstones. Bilateral non-obstructing kidney stone was present. Incidental sigmoid diverticulosis.   • 7/2/18 - Patient underwent MRCP at [facility] showing markedly dilated intrahepatic and extrahepatic bile duct with multiple small filling defects within the distal common bile duct compatible with choledocholithiasis. There was a focal 6 mm segmental narrowing at the distal CBD with recommended GI consult for ERCP and brushings.   • 7/5/18 to 7/10/18 - On 7/5/18 labs revealed normal total bilirubin (0.8), AST 69 (15-41), maia phos 152 (32-91), ALT was normal at 37 (17-63), ammonia was elevated at 86 (9-35), lipase was normal (22). Patient hospitalized at [facility] due to weakness. Workup revealed cholelithiasis. On 7/9/18 patient underwent ERCP with bilateral sphincterotomy, common duct stone extraction, biliary brushing and stent placement by Dr. Leiva. Path on brushings was negative for malignancy. There was intra and extrahepatic biliary ductal dilation with relatively abrupt distal common bile duct cutoff and non-visualization of the gallbladder. He was started on Lovenox and sequential compression devices due to risk of pulmonary embolism.  of 33 (0-35).  On 7/10/18 LFT’s revealed total bili 0.9 (0.3-1.2). Maia phos 129 (32-91). AST 50 (15-41), ALT 41 (17-63).     • 8/31/18 - EUS by Dr. Gomez at [facility] revealing suspect benign biliary stricture due to CBD stone with FNA pathology revealing benign and atypical ductal cells, bile and proteinaceous material including scattered bacteria, neutrophils and degenerating cells. The atypia was felt mild and favored to be reactive in nature. Plan was to repeat ERCP with removal of the  stent and the stone with consideration of common bile duct evaluation with cholangioscopy at that time.   • 10/12/18 - ERCP with sphincteroplasty and stone extraction done by Dave Russo M.D. for choledocholithiasis.   • 2/23/19 - Patient underwent ERCP with balloon clearance of bile duct by Jl Hampton M.D.   • 3/6/19 - LFT’s not elevated with bilirubin 1.1, AST 31, ALT 14, osbaldo phos was mildly elevated at 101 (32-91).   • 4/25/19 - 4/26/19 - Admitted to [facility] for hepatic encephalopathy and hypokalemia.   • 4/27/2019 - CT abdomen pelvis showed a 4.8 cm air-fluid collection adjacent to the right posterior hepatic lobe significant improved.  Right-sided chest tube small right pleural effusion.  Hepatic cirrhosis.  Bilateral nonobstructing renal stones.  Sigmoid diverticulosis.   • 5/26/19 - 5/29/2019 - Admitted to [facility] for hepatic and cephalopathy.  Ammonia level 213 (H).  • 7/11/19 - AFP 2.76 (0-9).     3.   Right lower extremity DVT and bilateral pulmonary emboli diagnosed 2004.   • 2004 - Patient claims to have developed right lower extremity DVT with bilateral pulmonary emboli in 2004 and was coumadinized. The blood clots were thought secondary to his sedentary lifestyle. He stayed on the Coumadin up until October 2017 when, due to difficulty maintaining his INR’s, he was switched to Xarelto 20 mg daily by Paulette Harris M.D. which was later dropped to 10 mg daily.    • 11/26/18 - EGD by Dave Russo M.D. revealed erosive gastritis and bleeding ampulla. Ampullary biopsy revealed reactive/reparative small intestinal mucosa with mild chronic inflammation. Gastric antrum biopsy was suggestive of focal mild reactive gastropathy. Due to erosive gastritis, patient asked to hold Xarelto and Aspirin by Dave Russo M.D.   • 11/27/18 - Patient asked to discontinue Xarelto and hold Aspirin while obtaining IVC filter. Risks discussed. Factor V Leiden gene mutation and  prothrombin gene mutations not detected. Anticardiolipin IgM 11 (<11). Anti beta-2 glyco, IgA 55 (<20). Factor VIII activity 186 (). Antithrombin III activity 63 (). Protein C activity 69 (), protein S activity 69 ().      • 12/5/18 - Patient underwent transjugular IVC filter placement in IR by  Jonn Cuenca M.D.   • 1/2/19 - Told patient that his low protein CNS and antithrombin III likely due to liver disease. He would be at a high risk of going back on Xarelto and hence continued on Aspirin 81 mg p.o. daily.   • 1/3/19 - D-dimer 1.25 (<0.45).    • 3/8/19 - Chest x-ray showed chronic changes in the chest with no obvious pneumonia or congestive changes.  • 4/3/19 - Chest x-ray with right pleural effusion and basilar lung density with slight increase from prior. Cardiomegaly.   • 4/22/19 - Factor VIII 334 (H), Anti-phosphatidylserine antibody IgM> 80 (H), Anti-phosphatidylserine antibody IgG 12 (N), Cardiolipin IgG 21 (N), Cardiolipin IgM 55 (H), Cardiolipin IgA 63 (H), Beta-2 glycoprotein IgG 4 (N), IgA 91 (H), and IgM 21 (H).   • 7/11/19 - Chest x-ray showed stable small right pleural effusion since 04/25/2019. Minimal right costophrenic angle atelectasis.    Past Medical History:   Diagnosis Date   • Anemia    • B12 deficiency 2009   • Syed's esophagus    • CAD (coronary artery disease)    • Diabetes mellitus (CMS/HCC)    • History of echocardiogram     03/03/2017 - 12/03/2014 - 04/2012   • Hyperlipidemia    • Hypertension    • Morbid obesity (CMS/HCC)    • KATHIA treated with BiPAP    • Renal insufficiency    • S/P lumbar laminectomy        Past Surgical History:   Procedure Laterality Date   • BACK SURGERY  1971   • CATARACT EXTRACTION  2014   • CHOLECYSTECTOMY  02/24/2019   • COLONOSCOPY  03/2018   • CORONARY ANGIOPLASTY  08/24/2009    PCI stent - succesful placement of 3.5/23 promus stent in proximal right coronary artery   • CORONARY ANGIOPLASTY WITH STENT PLACEMENT  08/27/2009  "   patient had stent placed to proximal right coronary artery   • CYSTOSCOPY BLADDER STONE LITHOTRIPSY  1997   • OTHER SURGICAL HISTORY  11/2018    IVC filter implant   • RENAL ARTERY STENT Left          Current Outpatient Medications:   •  allopurinol (ZYLOPRIM) 100 MG tablet, ALLOPURINOL 100 MG TABS, Disp: , Rfl:   •  aspirin 81 MG tablet, Take 1 tablet by mouth Daily., Disp: 30 tablet, Rfl: 3  •  atorvastatin (LIPITOR) 20 MG tablet, Take 1 tablet by mouth Daily., Disp: 90 tablet, Rfl: 3  •  bumetanide (BUMEX) 1 MG tablet, TAKE 2 TABLETS BY MOUTH EVERY MORNING THEN ONE TABLET EVERY EVENING START ON 05/24, Disp: , Rfl: 0  •  Cyanocobalamin 1000 MCG/ML kit, Inject 1,000 mcg as directed Every 30 (Thirty) Days., Disp: , Rfl:   •  docusate sodium (COLACE) 100 MG capsule, Take 100 mg by mouth 2 (Two) Times a Day., Disp: , Rfl:   •  DULoxetine (CYMBALTA) 60 MG capsule, DULOXETINE HCL 60 MG CPEP, Disp: , Rfl:   •  ferrous sulfate 325 (65 FE) MG tablet, Take 1 tablet by mouth 2 (Two) Times a Day., Disp: 60 tablet, Rfl: 2  •  folic acid (FOLVITE) 1 MG tablet, Daily., Disp: , Rfl:   •  glucose blood (ONETOUCH VERIO) test strip, Use to check BS 4 times daily DX E11.29, Disp: 400 each, Rfl: 1  •  hydrOXYzine (ATARAX) 25 MG tablet, Take 25 mg by mouth 3 (Three) Times a Day As Needed for Itching., Disp: , Rfl:   •  insulin glargine (LANTUS) 100 UNIT/ML injection, Inject 46 units at bedtime, Disp: 30 mL, Rfl: 4  •  insulin lispro (HUMALOG) 100 UNIT/ML injection, 15 units subcu before each meal plus sliding scale, Disp: 30 mL, Rfl: 4  •  Insulin Syringe-Needle U-100 (BD INSULIN SYRINGE U/F) 31G X 5/16\" 1 ML misc, BD INSULIN SYRINGE U/F 31G X 5/16\" 1 ML, Disp: , Rfl:   •  lactulose (CHRONULAC) 10 GM/15ML solution, TK 45 ML PO Q 4 H, Disp: , Rfl: 3  •  levothyroxine (SYNTHROID, LEVOTHROID) 75 MCG tablet, Take 1 tablet by mouth Daily., Disp: 90 tablet, Rfl: 4  •  magnesium oxide (MAG-OX) 400 MG tablet, Daily., Disp: , Rfl:   •  " "Melatonin-Pyridoxine (MELATIN PO), Take  by mouth Daily., Disp: , Rfl:   •  metoclopramide (REGLAN) 5 MG tablet, Take 5 mg by mouth 2 (Two) Times a Day., Disp: , Rfl: 0  •  Multiple Vitamin (MULTI VITAMIN DAILY PO), Take  by mouth Daily., Disp: , Rfl:   •  ONETOUCH DELICA LANCETS FINE misc, Use to check BS 4 times daily DX E11.29, Disp: 400 each, Rfl: 1  •  oxyCODONE (ROXICODONE) 5 MG immediate release tablet, Every 12 (Twelve) Hours., Disp: , Rfl:   •  pantoprazole (PROTONIX) 40 MG EC tablet, Take 1 tablet by mouth Daily., Disp: 30 tablet, Rfl: 3  •  potassium chloride (K-DUR,KLOR-CON) 20 MEQ CR tablet, Take 20 mEq by mouth Daily., Disp: , Rfl: 0  •  rifaximin (XIFAXAN) 550 MG tablet, Every 12 (Twelve) Hours., Disp: , Rfl:   •  sodium bicarbonate 650 MG tablet, SODIUM BICARBONATE 650 MG TABS, Disp: , Rfl:   •  sulfamethoxazole-trimethoprim (BACTRIM DS,SEPTRA DS) 800-160 MG per tablet, Take 1 tablet by mouth 2 (Two) Times a Day., Disp: 20 tablet, Rfl: 0  •  zinc sulfate (ZINCATE) 50 MG capsule, , Disp: , Rfl: 0  •  polyethylene glycol (MIRALAX) pack packet, Take 17 g by mouth Daily., Disp: , Rfl:     No Known Allergies    Family History   Problem Relation Age of Onset   • Hyperlipidemia Other    • Hypertension Other        Cancer-related family history is not on file.    Social History     Tobacco Use   • Smoking status: Never Smoker   • Smokeless tobacco: Never Used   Substance Use Topics   • Alcohol use: No     Frequency: Never   • Drug use: No     I have reviewed the history of present illness, past medical history, family history, social history, lab results, all notes and other records since the patient was last seen on 8/7/19.    SUBJECTIVE:         REVIEW OF SYSTEMS:  Review of Systems     OBJECTIVE:    Vitals:    10/16/19 1343   BP: 107/49   Pulse: 62   Resp: 18   Temp: 97.9 °F (36.6 °C)   Weight: (!) 142 kg (313 lb)   Height: 188 cm (74\")   PainSc: 0-No pain     ECOG  (2) Ambulatory and capable of self " care, unable to carry out work activity, up and about > 50% or waking hours    Physical Exam    RECENT LABS  WBC   Date Value Ref Range Status   10/16/2019 10.39 3.40 - 10.80 10*3/mm3 Final     RBC   Date Value Ref Range Status   10/16/2019 1.32 (L) 4.14 - 5.80 10*6/mm3 Final     Hemoglobin   Date Value Ref Range Status   10/16/2019 4.9 (C) 13.0 - 17.7 g/dL Final     Hematocrit   Date Value Ref Range Status   10/16/2019 16.2 (C) 37.5 - 51.0 % Final     MCV   Date Value Ref Range Status   10/16/2019 122.7 (H) 79.0 - 97.0 fL Final     MCH   Date Value Ref Range Status   10/16/2019 37.1 (H) 26.6 - 33.0 pg Final     MCHC   Date Value Ref Range Status   10/16/2019 30.2 (L) 31.5 - 35.7 g/dL Final     RDW   Date Value Ref Range Status   10/16/2019 14.9 12.3 - 15.4 % Final     RDW-SD   Date Value Ref Range Status   10/16/2019 62.0 (H) 37.0 - 54.0 fl Final     MPV   Date Value Ref Range Status   10/16/2019 9.6 6.0 - 12.0 fL Final     Platelets   Date Value Ref Range Status   10/16/2019 293 140 - 450 10*3/mm3 Final     Neutrophil %   Date Value Ref Range Status   10/16/2019 76.5 (H) 42.7 - 76.0 % Final     Lymphocyte %   Date Value Ref Range Status   10/16/2019 11.5 (L) 19.6 - 45.3 % Final     Monocyte %   Date Value Ref Range Status   10/16/2019 8.7 5.0 - 12.0 % Final     Eosinophil %   Date Value Ref Range Status   10/16/2019 2.6 0.3 - 6.2 % Final     Basophil %   Date Value Ref Range Status   10/16/2019 0.7 0.0 - 1.5 % Final     Neutrophils, Absolute   Date Value Ref Range Status   10/16/2019 7.96 (H) 1.70 - 7.00 10*3/mm3 Final     Lymphocytes, Absolute   Date Value Ref Range Status   10/16/2019 1.19 0.70 - 3.10 10*3/mm3 Final     Monocytes, Absolute   Date Value Ref Range Status   10/16/2019 0.90 0.10 - 0.90 10*3/mm3 Final     Eosinophils, Absolute   Date Value Ref Range Status   10/16/2019 0.27 0.00 - 0.40 10*3/mm3 Final     Basophils, Absolute   Date Value Ref Range Status   10/16/2019 0.07 0.00 - 0.20 10*3/mm3 Final      nRBC   Date Value Ref Range Status   08/01/2019 0.0 0.0 - 0.2 /100 WBC Final       Lab Results   Component Value Date    GLUCOSE 173 (H) 09/20/2019    BUN 40 (H) 09/20/2019    CREATININE 1.90 (H) 09/20/2019    EGFRIFNONA 35 (L) 09/20/2019    BCR 21.1 (H) 09/20/2019    K 3.6 09/20/2019    CO2 25.0 09/20/2019    CALCIUM 8.2 (L) 09/20/2019    ALBUMIN 2.50 (L) 09/20/2019    LABIL2 0.4 (L) 05/29/2019    AST 98 (H) 09/20/2019    ALT 73 (H) 09/20/2019     ASSESSMENT:  Assessment/Plan     JOSE (iron deficiency anemia) with poor po absorption  - CBC & Differential  - Iron Profile  - Ferritin  - Haptoglobin  - Reticulocytes  - Folate  - Vitamin B12  - Reticulocytes    Erosive gastritis    Leukocytosis, unspecified type  - CBC & Differential  - CBC & Differential  - CBC & Differential  - CBC Auto Differential    Anemia in stage 3 chronic kidney disease (CMS/HCC)  - CBC & Differential  - CBC & Differential  - Iron Profile  - Ferritin    History of Vitamin B12 deficiency    History of DVT of right lower extremity    History of bilateral pulmonary embolus (PE)    Antithrombin III deficiency (CMS/HCC)    Protein C deficiency (CMS/HCC)    Protein S deficiency (CMS/HCC)    Antiphospholipid antibody positive    Elevated factor VIII level    THOMAS (nonalcoholic steatohepatitis)    Splenomegaly  - CBC & Differential    IgG kappa MGUS        PLAN:  ·        I have reviewed labs results, imaging, vitals, and medications with the patient today and have reviewed information entered by Jazz Theodore CMA (AAMA).       Patient and his spouse verbalized understanding and is in agreement of the above plan.  M.      Much of the above report is an electronic transcription/translation of the spoken language to printed text using Dragon Software. As such, the subtleties and finesse of the spoken language may permit erroneous, or at times, nonsensical words or phrases to be inadvertently transcribed; thus changes may be made at a later date to  rectify these errors.

## 2019-10-10 NOTE — TELEPHONE ENCOUNTER
Per Dr. Etienne, he has reviewed fistulagram and wants IR to place a percutaneous drain. If this does not fix this, Pt will need to be taken to OR.    Dr. Etienne is wanting pt to be on Bactrim  800 mg. Sending to pharmacy.

## 2019-10-11 DIAGNOSIS — K65.1 PERITONEAL ABSCESS (HCC): Primary | ICD-10-CM

## 2019-10-14 ENCOUNTER — HOSPITAL ENCOUNTER (OUTPATIENT)
Dept: INTERVENTIONAL RADIOLOGY/VASCULAR | Facility: HOSPITAL | Age: 74
Discharge: HOME | End: 2019-10-14
Admitting: SURGERY

## 2019-10-14 VITALS — HEIGHT: 74 IN | BODY MASS INDEX: 39.14 KG/M2 | WEIGHT: 305 LBS

## 2019-10-14 DIAGNOSIS — K65.1 PERITONEAL ABSCESS (HCC): ICD-10-CM

## 2019-10-14 PROCEDURE — G0463 HOSPITAL OUTPT CLINIC VISIT: HCPCS

## 2019-10-14 NOTE — NURSING NOTE
Dr Cuenca after getting history and examining fistula site has decided not to attempt drain placement. Dr Cuenca is going to contact Dr Etienne regarding findings.  Pt and wife relate history of abscess not leaking for several days and the fistula site is healed over. No drainage on dressing.  Dr Cuenca told pt and wife to contact Dr Etienne if there is any drainage from fistula in the future and we can place a drain as needed.

## 2019-10-16 ENCOUNTER — OFFICE VISIT (OUTPATIENT)
Dept: ONCOLOGY | Facility: CLINIC | Age: 74
End: 2019-10-16

## 2019-10-16 ENCOUNTER — APPOINTMENT (OUTPATIENT)
Dept: LAB | Facility: HOSPITAL | Age: 74
End: 2019-10-16

## 2019-10-16 ENCOUNTER — LAB REQUISITION (OUTPATIENT)
Dept: LAB | Facility: HOSPITAL | Age: 74
End: 2019-10-16

## 2019-10-16 ENCOUNTER — HOSPITAL ENCOUNTER (INPATIENT)
Facility: HOSPITAL | Age: 74
LOS: 1 days | Discharge: HOME HEALTH | End: 2019-10-19
Attending: INTERNAL MEDICINE | Admitting: INTERNAL MEDICINE

## 2019-10-16 VITALS
DIASTOLIC BLOOD PRESSURE: 49 MMHG | HEART RATE: 62 BPM | WEIGHT: 313 LBS | SYSTOLIC BLOOD PRESSURE: 107 MMHG | HEIGHT: 74 IN | TEMPERATURE: 97.9 F | BODY MASS INDEX: 40.17 KG/M2 | RESPIRATION RATE: 18 BRPM

## 2019-10-16 DIAGNOSIS — D50.0 IRON DEFICIENCY ANEMIA DUE TO CHRONIC BLOOD LOSS: ICD-10-CM

## 2019-10-16 DIAGNOSIS — K92.1 HEMATOCHEZIA: ICD-10-CM

## 2019-10-16 DIAGNOSIS — K75.81 NASH (NONALCOHOLIC STEATOHEPATITIS): ICD-10-CM

## 2019-10-16 DIAGNOSIS — D68.59 PROTEIN C DEFICIENCY (HCC): ICD-10-CM

## 2019-10-16 DIAGNOSIS — N18.30 ANEMIA IN STAGE 3 CHRONIC KIDNEY DISEASE (HCC): ICD-10-CM

## 2019-10-16 DIAGNOSIS — D47.2 MGUS (MONOCLONAL GAMMOPATHY OF UNKNOWN SIGNIFICANCE): ICD-10-CM

## 2019-10-16 DIAGNOSIS — D63.1 ANEMIA IN STAGE 3 CHRONIC KIDNEY DISEASE (HCC): ICD-10-CM

## 2019-10-16 DIAGNOSIS — Z86.39 HISTORY OF NON ANEMIC VITAMIN B12 DEFICIENCY: ICD-10-CM

## 2019-10-16 DIAGNOSIS — R76.0 ANTIPHOSPHOLIPID ANTIBODY POSITIVE: ICD-10-CM

## 2019-10-16 DIAGNOSIS — K29.60 EROSIVE GASTRITIS: ICD-10-CM

## 2019-10-16 DIAGNOSIS — D50.8 OTHER IRON DEFICIENCY ANEMIA: Primary | ICD-10-CM

## 2019-10-16 DIAGNOSIS — R16.1 SPLENOMEGALY: ICD-10-CM

## 2019-10-16 DIAGNOSIS — Z86.711 HISTORY OF PULMONARY EMBOLUS (PE): ICD-10-CM

## 2019-10-16 DIAGNOSIS — Z00.00 ROUTINE GENERAL MEDICAL EXAMINATION AT A HEALTH CARE FACILITY: ICD-10-CM

## 2019-10-16 DIAGNOSIS — D64.9 ANEMIA, UNSPECIFIED TYPE: Primary | ICD-10-CM

## 2019-10-16 DIAGNOSIS — Z86.718 HISTORY OF DVT OF LOWER EXTREMITY: ICD-10-CM

## 2019-10-16 DIAGNOSIS — D68.59 PROTEIN S DEFICIENCY (HCC): ICD-10-CM

## 2019-10-16 DIAGNOSIS — D68.59 ANTITHROMBIN III DEFICIENCY (HCC): ICD-10-CM

## 2019-10-16 DIAGNOSIS — D72.829 LEUKOCYTOSIS, UNSPECIFIED TYPE: ICD-10-CM

## 2019-10-16 DIAGNOSIS — R79.1 ELEVATED FACTOR VIII LEVEL: ICD-10-CM

## 2019-10-16 LAB
ABO GROUP BLD: NORMAL
ALBUMIN SERPL-MCNC: 2.8 G/DL (ref 3.5–5.2)
ALBUMIN/GLOB SERPL: 0.7 G/DL
ALP SERPL-CCNC: 380 U/L (ref 39–117)
ALT SERPL W P-5'-P-CCNC: 30 U/L (ref 1–41)
ANION GAP SERPL CALCULATED.3IONS-SCNC: 17.8 MMOL/L (ref 5–15)
AST SERPL-CCNC: 56 U/L (ref 1–40)
BASOPHILS # BLD AUTO: 0.06 10*3/MM3 (ref 0–0.2)
BASOPHILS # BLD AUTO: 0.07 10*3/MM3 (ref 0–0.2)
BASOPHILS NFR BLD AUTO: 0.6 % (ref 0–1.5)
BASOPHILS NFR BLD AUTO: 0.7 % (ref 0–1.5)
BILIRUB SERPL-MCNC: 0.4 MG/DL (ref 0.2–1.2)
BLD GP AB SCN SERPL QL: NEGATIVE
BUN BLD-MCNC: 28 MG/DL (ref 8–23)
BUN/CREAT SERPL: 11.4 (ref 7–25)
CALCIUM SPEC-SCNC: 8.1 MG/DL (ref 8.6–10.5)
CHLORIDE SERPL-SCNC: 100 MMOL/L (ref 98–107)
CO2 SERPL-SCNC: 22 MMOL/L (ref 22–29)
CREAT BLD-MCNC: 2.45 MG/DL (ref 0.76–1.27)
DEPRECATED RDW RBC AUTO: 62 FL (ref 37–54)
DEPRECATED RDW RBC AUTO: 62.6 FL (ref 37–54)
EOSINOPHIL # BLD AUTO: 0.27 10*3/MM3 (ref 0–0.4)
EOSINOPHIL # BLD AUTO: 0.27 10*3/MM3 (ref 0–0.4)
EOSINOPHIL NFR BLD AUTO: 2.6 % (ref 0.3–6.2)
EOSINOPHIL NFR BLD AUTO: 2.9 % (ref 0.3–6.2)
ERYTHROCYTE [DISTWIDTH] IN BLOOD BY AUTOMATED COUNT: 14.9 % (ref 12.3–15.4)
ERYTHROCYTE [DISTWIDTH] IN BLOOD BY AUTOMATED COUNT: 14.9 % (ref 12.3–15.4)
FOLATE SERPL-MCNC: >20 NG/ML (ref 4.78–24.2)
GFR SERPL CREATININE-BSD FRML MDRD: 26 ML/MIN/1.73
GLOBULIN UR ELPH-MCNC: 3.9 GM/DL
GLUCOSE BLD-MCNC: 193 MG/DL (ref 65–99)
HAPTOGLOB SERPL-MCNC: 105 MG/DL (ref 30–200)
HCT VFR BLD AUTO: 15.8 % (ref 37.5–51)
HCT VFR BLD AUTO: 16.2 % (ref 37.5–51)
HCT VFR BLD AUTO: 16.5 % (ref 37.5–51)
HGB BLD-MCNC: 4.8 G/DL (ref 13–17.7)
HGB BLD-MCNC: 4.9 G/DL (ref 13–17.7)
HGB BLD-MCNC: 5.1 G/DL (ref 13–17.7)
IGA1 MFR SER: 720 MG/DL (ref 70–400)
IGG1 SER-MCNC: 1489 MG/DL (ref 700–1600)
IGM SERPL-MCNC: 72 MG/DL (ref 40–230)
LIPASE SERPL-CCNC: 26 U/L (ref 13–60)
LYMPHOCYTES # BLD AUTO: 1.18 10*3/MM3 (ref 0.7–3.1)
LYMPHOCYTES # BLD AUTO: 1.19 10*3/MM3 (ref 0.7–3.1)
LYMPHOCYTES NFR BLD AUTO: 11.5 % (ref 19.6–45.3)
LYMPHOCYTES NFR BLD AUTO: 12.7 % (ref 19.6–45.3)
MCH RBC QN AUTO: 36.1 PG (ref 26.6–33)
MCH RBC QN AUTO: 37.1 PG (ref 26.6–33)
MCHC RBC AUTO-ENTMCNC: 29.1 G/DL (ref 31.5–35.7)
MCHC RBC AUTO-ENTMCNC: 30.2 G/DL (ref 31.5–35.7)
MCV RBC AUTO: 122.7 FL (ref 79–97)
MCV RBC AUTO: 124.1 FL (ref 79–97)
MONOCYTES # BLD AUTO: 0.84 10*3/MM3 (ref 0.1–0.9)
MONOCYTES # BLD AUTO: 0.9 10*3/MM3 (ref 0.1–0.9)
MONOCYTES NFR BLD AUTO: 8.7 % (ref 5–12)
MONOCYTES NFR BLD AUTO: 9 % (ref 5–12)
NEUTROPHILS # BLD AUTO: 6.95 10*3/MM3 (ref 1.7–7)
NEUTROPHILS # BLD AUTO: 7.96 10*3/MM3 (ref 1.7–7)
NEUTROPHILS NFR BLD AUTO: 74.8 % (ref 42.7–76)
NEUTROPHILS NFR BLD AUTO: 76.5 % (ref 42.7–76)
PLATELET # BLD AUTO: 289 10*3/MM3 (ref 140–450)
PLATELET # BLD AUTO: 293 10*3/MM3 (ref 140–450)
PMV BLD AUTO: 9.6 FL (ref 6–12)
PMV BLD AUTO: 9.9 FL (ref 6–12)
POTASSIUM BLD-SCNC: 3.8 MMOL/L (ref 3.5–5.2)
PROT SERPL-MCNC: 6.7 G/DL (ref 6–8.5)
RBC # BLD AUTO: 1.32 10*6/MM3 (ref 4.14–5.8)
RBC # BLD AUTO: 1.33 10*6/MM3 (ref 4.14–5.8)
RETICS # AUTO: 0.1 10*6/MM3 (ref 0.02–0.13)
RETICS/RBC NFR AUTO: 7.63 % (ref 0.7–1.9)
RH BLD: POSITIVE
SODIUM BLD-SCNC: 136 MMOL/L (ref 136–145)
T&S EXPIRATION DATE: NORMAL
VIT B12 BLD-MCNC: 1256 PG/ML (ref 211–946)
WBC NRBC COR # BLD: 10.39 10*3/MM3 (ref 3.4–10.8)
WBC NRBC COR # BLD: 9.3 10*3/MM3 (ref 3.4–10.8)

## 2019-10-16 PROCEDURE — 83010 ASSAY OF HAPTOGLOBIN QUANT: CPT | Performed by: NURSE PRACTITIONER

## 2019-10-16 PROCEDURE — 85045 AUTOMATED RETICULOCYTE COUNT: CPT | Performed by: NURSE PRACTITIONER

## 2019-10-16 PROCEDURE — 86901 BLOOD TYPING SEROLOGIC RH(D): CPT

## 2019-10-16 PROCEDURE — 80053 COMPREHEN METABOLIC PANEL: CPT | Performed by: NURSE PRACTITIONER

## 2019-10-16 PROCEDURE — 99223 1ST HOSP IP/OBS HIGH 75: CPT | Performed by: INTERNAL MEDICINE

## 2019-10-16 PROCEDURE — 86923 COMPATIBILITY TEST ELECTRIC: CPT

## 2019-10-16 PROCEDURE — 86900 BLOOD TYPING SEROLOGIC ABO: CPT | Performed by: NURSE PRACTITIONER

## 2019-10-16 PROCEDURE — 86850 RBC ANTIBODY SCREEN: CPT | Performed by: NURSE PRACTITIONER

## 2019-10-16 PROCEDURE — 83690 ASSAY OF LIPASE: CPT | Performed by: NURSE PRACTITIONER

## 2019-10-16 PROCEDURE — 36415 COLL VENOUS BLD VENIPUNCTURE: CPT | Performed by: NURSE PRACTITIONER

## 2019-10-16 PROCEDURE — 85025 COMPLETE CBC W/AUTO DIFF WBC: CPT | Performed by: NURSE PRACTITIONER

## 2019-10-16 PROCEDURE — 86901 BLOOD TYPING SEROLOGIC RH(D): CPT | Performed by: NURSE PRACTITIONER

## 2019-10-16 PROCEDURE — P9016 RBC LEUKOCYTES REDUCED: HCPCS

## 2019-10-16 PROCEDURE — 82607 VITAMIN B-12: CPT | Performed by: NURSE PRACTITIONER

## 2019-10-16 PROCEDURE — 82746 ASSAY OF FOLIC ACID SERUM: CPT | Performed by: NURSE PRACTITIONER

## 2019-10-16 PROCEDURE — G0378 HOSPITAL OBSERVATION PER HR: HCPCS

## 2019-10-16 PROCEDURE — 99284 EMERGENCY DEPT VISIT MOD MDM: CPT

## 2019-10-16 PROCEDURE — 85014 HEMATOCRIT: CPT | Performed by: PATHOLOGY

## 2019-10-16 PROCEDURE — 36430 TRANSFUSION BLD/BLD COMPNT: CPT

## 2019-10-16 PROCEDURE — 82784 ASSAY IGA/IGD/IGG/IGM EACH: CPT | Performed by: NURSE PRACTITIONER

## 2019-10-16 PROCEDURE — 86900 BLOOD TYPING SEROLOGIC ABO: CPT

## 2019-10-16 PROCEDURE — 85018 HEMOGLOBIN: CPT | Performed by: PATHOLOGY

## 2019-10-16 RX ORDER — LACTULOSE 10 G/15ML
60 SOLUTION ORAL EVERY 4 HOURS
Status: ON HOLD | COMMUNITY
End: 2021-01-01 | Stop reason: SDUPTHER

## 2019-10-16 RX ORDER — PANTOPRAZOLE SODIUM 40 MG/10ML
40 INJECTION, POWDER, LYOPHILIZED, FOR SOLUTION INTRAVENOUS ONCE
Status: COMPLETED | OUTPATIENT
Start: 2019-10-16 | End: 2019-10-16

## 2019-10-16 RX ORDER — ALLOPURINOL 100 MG/1
100 TABLET ORAL 2 TIMES DAILY
COMMUNITY

## 2019-10-16 RX ORDER — SODIUM BICARBONATE 650 MG/1
650 TABLET ORAL 3 TIMES DAILY
COMMUNITY
End: 2021-01-01 | Stop reason: HOSPADM

## 2019-10-16 RX ORDER — DULOXETIN HYDROCHLORIDE 60 MG/1
60 CAPSULE, DELAYED RELEASE ORAL DAILY
COMMUNITY
End: 2021-01-01 | Stop reason: HOSPADM

## 2019-10-16 RX ORDER — SODIUM CHLORIDE 9 MG/ML
60 INJECTION, SOLUTION INTRAVENOUS CONTINUOUS
Status: DISCONTINUED | OUTPATIENT
Start: 2019-10-16 | End: 2019-10-19 | Stop reason: HOSPADM

## 2019-10-16 RX ORDER — MULTIPLE VITAMINS W/ MINERALS TAB 9MG-400MCG
1 TAB ORAL DAILY
COMMUNITY

## 2019-10-16 RX ORDER — BUMETANIDE 2 MG/1
2 TABLET ORAL EVERY MORNING
COMMUNITY
End: 2019-10-19 | Stop reason: HOSPADM

## 2019-10-16 RX ORDER — SODIUM CHLORIDE 0.9 % (FLUSH) 0.9 %
10 SYRINGE (ML) INJECTION AS NEEDED
Status: DISCONTINUED | OUTPATIENT
Start: 2019-10-16 | End: 2019-10-19 | Stop reason: HOSPADM

## 2019-10-16 RX ORDER — BUMETANIDE 1 MG/1
1 TABLET ORAL EVERY EVENING
COMMUNITY
End: 2019-10-19 | Stop reason: HOSPADM

## 2019-10-16 RX ORDER — HYDROXYZINE HYDROCHLORIDE 25 MG/1
25 TABLET, FILM COATED ORAL 3 TIMES DAILY PRN
COMMUNITY
End: 2019-12-04 | Stop reason: SDUPTHER

## 2019-10-16 RX ADMIN — SODIUM CHLORIDE 75 ML/HR: 900 INJECTION, SOLUTION INTRAVENOUS at 18:05

## 2019-10-16 RX ADMIN — PANTOPRAZOLE SODIUM 40 MG: 40 INJECTION, POWDER, FOR SOLUTION INTRAVENOUS at 18:05

## 2019-10-16 NOTE — ED PROVIDER NOTES
Subjective   Context: 74-year-old male patient with history of chronic anemia presents to the ER per his heme oncologist for treatment of anemia; patient reports that his blood count today was noted to be less than 5.  The patient states that he has had no recent changes in his normal activity, he denies chest pain or shortness of breath.  The patient states that he has had transfusions in the past.  He reports that he has had no active bleeding from stool, he states that occasionally his stool is noted to be dark reports no lisa blood.  He reports no fever or chills.  Patient reports that he has a healing abscess to his right flank but states that this area has no redness and no recent drainage.  He reports chronic sacral ulcers that do not bleed.  He reports that his activity is at its baseline, he states that he does get tired with sustained activity.  Reports no increase in his chronic edema.  Denies confusion.  He reports some upper abdominal discomfort that occurs after eating greasy foods.      Location:  Not applicable, no pain  Quality:  Timing:  Duration:   Aggravating:  Alleviating:    Heme/Onc:  Tran            Review of Systems   Constitutional: Negative for chills, fatigue and fever.   HENT: Negative for congestion, nosebleeds, rhinorrhea, sore throat, trouble swallowing and voice change.    Eyes: Negative for photophobia and visual disturbance.   Respiratory: Negative for cough, choking, chest tightness and shortness of breath.    Cardiovascular: Positive for leg swelling. Negative for chest pain and palpitations.        Chronic leg edema unchanged   Gastrointestinal: Negative for abdominal distention, abdominal pain, blood in stool, constipation, diarrhea, nausea and vomiting.        Loose stools secondary to lactulose and stool softener   Genitourinary: Negative for decreased urine volume, difficulty urinating, flank pain, frequency, hematuria and urgency.   Musculoskeletal: Negative for  "arthralgias, back pain and myalgias.   Skin: Positive for wound. Negative for color change and rash.        Chronic sacral ulceration   Neurological: Negative for dizziness, syncope, weakness, light-headedness, numbness and headaches.   Hematological: Does not bruise/bleed easily.   Psychiatric/Behavioral: Negative for confusion.     Prior to Admission medications    Medication Sig Start Date End Date Taking? Authorizing Provider   allopurinol (ZYLOPRIM) 100 MG tablet ALLOPURINOL 100 MG TABS 12/9/14   Mary Young MD   aspirin 81 MG tablet Take 1 tablet by mouth Daily. 7/8/19   Susan Garcia APRN   atorvastatin (LIPITOR) 20 MG tablet Take 1 tablet by mouth Daily. 9/3/19   Rush Reilly MD   bumetanide (BUMEX) 1 MG tablet TAKE 2 TABLETS BY MOUTH EVERY MORNING THEN ONE TABLET EVERY EVENING START ON 05/24 5/22/19   Mary Young MD   Cyanocobalamin 1000 MCG/ML kit Inject 1,000 mcg as directed Every 30 (Thirty) Days.    Mary Young MD   docusate sodium (COLACE) 100 MG capsule Take 100 mg by mouth 2 (Two) Times a Day.    Mary Young MD   DULoxetine (CYMBALTA) 60 MG capsule DULOXETINE HCL 60 MG CPEP 12/9/14   Mary Young MD   ferrous sulfate 325 (65 FE) MG tablet Take 1 tablet by mouth 2 (Two) Times a Day. 8/7/19   Brown Amezquita FNP   folic acid (FOLVITE) 1 MG tablet Daily. 12/11/18   Mary Young MD   glucose blood (ONETOUCH VERIO) test strip Use to check BS 4 times daily DX E11.29 9/27/19   Alejandra Amaro MD   hydrOXYzine (ATARAX) 25 MG tablet Take 25 mg by mouth 3 (Three) Times a Day As Needed for Itching.    Mary Young MD   insulin glargine (LANTUS) 100 UNIT/ML injection Inject 46 units at bedtime 9/27/19   Alejandra Amaro MD   insulin lispro (HUMALOG) 100 UNIT/ML injection 15 units subcu before each meal plus sliding scale 9/27/19   Alejandra Amaro MD   Insulin Syringe-Needle U-100 (BD INSULIN SYRINGE U/F) 31G X 5/16\" 1 ML misc BD INSULIN " "SYRINGE U/F 31G X 5/16\" 1 ML 9/6/16   Mary Young MD   lactulose (CHRONULAC) 10 GM/15ML solution TK 45 ML PO Q 4 H 6/4/19   Mary Young MD   levothyroxine (SYNTHROID, LEVOTHROID) 75 MCG tablet Take 1 tablet by mouth Daily. 6/25/19   Alejandra Amaor MD   magnesium oxide (MAG-OX) 400 MG tablet Daily. 9/11/18   Mary Young MD   Melatonin-Pyridoxine (MELATIN PO) Take  by mouth Daily.    Mary Young MD   metoclopramide (REGLAN) 5 MG tablet Take 5 mg by mouth 2 (Two) Times a Day. 5/22/19   Mary Young MD   Multiple Vitamin (MULTI VITAMIN DAILY PO) Take  by mouth Daily.    Provider, Historical, MD   ONETOUCH DELICA LANCETS FINE misc Use to check BS 4 times daily DX E11.29 9/27/19   Alejandra Amaro MD   oxyCODONE (ROXICODONE) 5 MG immediate release tablet Every 12 (Twelve) Hours. 9/11/18   Mary Young MD   pantoprazole (PROTONIX) 40 MG EC tablet Take 1 tablet by mouth Daily. 7/8/19   Susan Garcia APRN   polyethylene glycol (MIRALAX) pack packet Take 17 g by mouth Daily.    Mary Young MD   potassium chloride (K-DUR,KLOR-CON) 20 MEQ CR tablet Take 20 mEq by mouth Daily. 5/22/19   Mary Young MD   rifaximin (XIFAXAN) 550 MG tablet Every 12 (Twelve) Hours. 12/11/18   Mary Young MD   sodium bicarbonate 650 MG tablet SODIUM BICARBONATE 650 MG TABS 12/9/14   Mary Young MD   sulfamethoxazole-trimethoprim (BACTRIM DS,SEPTRA DS) 800-160 MG per tablet Take 1 tablet by mouth 2 (Two) Times a Day. 10/10/19   Naif Etienne DO   zinc sulfate (ZINCATE) 50 MG capsule  9/19/19   Mary Young MD   cyanocobalamin 1000 MCG/ML injection  9/3/19 10/16/19  Mary Young MD   hydrOXYzine pamoate (VISTARIL) 25 MG capsule Take 25 mg by mouth 3 (Three) Times a Day As Needed. 8/21/19 10/16/19  Provider, MD Mary   Polyethylene Glycol 1000 powder POLYETHYLENE GLYCOL 1000 POWD 3/6/17 10/16/19  Provider, MD Mary "       Past Medical History:   Diagnosis Date   • Anemia    • B12 deficiency 2009   • Syed's esophagus    • CAD (coronary artery disease)    • Diabetes mellitus (CMS/[facility])    • History of echocardiogram     03/03/2017 - 12/03/2014 - 04/2012   • Hyperlipidemia    • Hypertension    • Morbid obesity (CMS/HCC)    • KATHIA treated with BiPAP    • Renal insufficiency    • S/P lumbar laminectomy        No Known Allergies    Past Surgical History:   Procedure Laterality Date   • BACK SURGERY  1971   • CATARACT EXTRACTION  2014   • CHOLECYSTECTOMY  02/24/2019   • COLONOSCOPY  03/2018   • CORONARY ANGIOPLASTY  08/24/2009    PCI stent - succesful placement of 3.5/23 promus stent in proximal right coronary artery   • CORONARY ANGIOPLASTY WITH STENT PLACEMENT  08/27/2009    patient had stent placed to proximal right coronary artery   • CYSTOSCOPY BLADDER STONE LITHOTRIPSY  1997   • OTHER SURGICAL HISTORY  11/2018    IVC filter implant   • RENAL ARTERY STENT Left        Family History   Problem Relation Age of Onset   • Hyperlipidemia Other    • Hypertension Other        Social History     Socioeconomic History   • Marital status:      Spouse name: Not on file   • Number of children: Not on file   • Years of education: Not on file   • Highest education level: Not on file   Tobacco Use   • Smoking status: Never Smoker   • Smokeless tobacco: Never Used   Substance and Sexual Activity   • Alcohol use: No     Frequency: Never   • Drug use: No   • Sexual activity: Defer           Objective   Physical Exam    Vital signs and triage nurse note reviewed.   Constitutional: Awake, alert; overly obese male patient in no acute distress, he is pleasant conversational examination.  Appearance is nontoxic   HEENT: Normocephalic, atraumatic; pupils are PERRL with intact EOM; oropharynx is pale pink and moist  Neck: Supple, obese, full range of motion without pain; no cervical lymphadenopathy.   Cardiovascular: Regular rate and rhythm,  normal S1-S2. Distal pulses present    Pulmonary: Respiratory effort regular nonlabored, breath sounds clear to auscultation all fields, no rhonchi or wheezing.   Abdomen: Soft, morbidly obese, nontender nondistended with normoactive bowel sounds; no rebound or guarding.  Healing abscess noted to upper R flank with crusted area, surrounding erythema edema there is no induration or fluctuance, no active discharge or bleeding.  Sacral ulcer is noted with no active discharge or bleeding, no necrosis.  Rectal examination: good tone, small amount of dark brown stool is noted that is heme positive, no palpable masses   Musculoskeletal: Independent range of motion of all extremities with no palpable tenderness, chronic edema of bilateral lower extremities without asymmetry  Nuro: Alert oriented x3, speech is clear and appropriate, GCS 15   Skin:  Fleshtone pale, dry, intact; no erythematous or petechial rash or lesion    Procedures           ED Course        No radiology results for the last day  Results for orders placed or performed in visit on 10/16/19   CBC Auto Differential   Result Value Ref Range    WBC 9.30 3.40 - 10.80 10*3/mm3    RBC 1.33 (L) 4.14 - 5.80 10*6/mm3    Hemoglobin 4.8 (C) 13.0 - 17.7 g/dL    Hematocrit 16.5 (C) 37.5 - 51.0 %    .1 (H) 79.0 - 97.0 fL    MCH 36.1 (H) 26.6 - 33.0 pg    MCHC 29.1 (L) 31.5 - 35.7 g/dL    RDW 14.9 12.3 - 15.4 %    RDW-SD 62.6 (H) 37.0 - 54.0 fl    MPV 9.9 6.0 - 12.0 fL    Platelets 289 140 - 450 10*3/mm3    Neutrophil % 74.8 42.7 - 76.0 %    Lymphocyte % 12.7 (L) 19.6 - 45.3 %    Monocyte % 9.0 5.0 - 12.0 %    Eosinophil % 2.9 0.3 - 6.2 %    Basophil % 0.6 0.0 - 1.5 %    Neutrophils, Absolute 6.95 1.70 - 7.00 10*3/mm3    Lymphocytes, Absolute 1.18 0.70 - 3.10 10*3/mm3    Monocytes, Absolute 0.84 0.10 - 0.90 10*3/mm3    Eosinophils, Absolute 0.27 0.00 - 0.40 10*3/mm3    Basophils, Absolute 0.06 0.00 - 0.20 10*3/mm3   CBC Auto Differential   Result Value Ref Range     "WBC 10.39 3.40 - 10.80 10*3/mm3    RBC 1.32 (L) 4.14 - 5.80 10*6/mm3    Hemoglobin 4.9 (C) 13.0 - 17.7 g/dL    Hematocrit 16.2 (C) 37.5 - 51.0 %    .7 (H) 79.0 - 97.0 fL    MCH 37.1 (H) 26.6 - 33.0 pg    MCHC 30.2 (L) 31.5 - 35.7 g/dL    RDW 14.9 12.3 - 15.4 %    RDW-SD 62.0 (H) 37.0 - 54.0 fl    MPV 9.6 6.0 - 12.0 fL    Platelets 293 140 - 450 10*3/mm3    Neutrophil % 76.5 (H) 42.7 - 76.0 %    Lymphocyte % 11.5 (L) 19.6 - 45.3 %    Monocyte % 8.7 5.0 - 12.0 %    Eosinophil % 2.6 0.3 - 6.2 %    Basophil % 0.7 0.0 - 1.5 %    Neutrophils, Absolute 7.96 (H) 1.70 - 7.00 10*3/mm3    Lymphocytes, Absolute 1.19 0.70 - 3.10 10*3/mm3    Monocytes, Absolute 0.90 0.10 - 0.90 10*3/mm3    Eosinophils, Absolute 0.27 0.00 - 0.40 10*3/mm3    Basophils, Absolute 0.07 0.00 - 0.20 10*3/mm3   Reticulocytes   Result Value Ref Range    Reticulocyte % 7.63 (H) 0.70 - 1.90 %    Reticulocyte Absolute 0.1036 0.0200 - 0.1300 10*6/mm3     Medications   sodium chloride 0.9 % flush 10 mL (not administered)   sodium chloride 0.9 % infusion (75 mL/hr Intravenous New Bag 10/16/19 1805)   pantoprazole (PROTONIX) injection 40 mg (40 mg Intravenous Given 10/16/19 1805)     BP (!) 116/38   Pulse 77   Temp 97.9 °F (36.6 °C) (Oral)   Resp 16   Ht 188 cm (74\")   Wt (!) 143 kg (315 lb 7.7 oz)   SpO2 96%   BMI 40.50 kg/m²             MDM  Number of Diagnoses or Management Options  Anemia, unspecified type:   Hematochezia:   THOMAS (nonalcoholic steatohepatitis):      Amount and/or Complexity of Data Reviewed  Discuss the patient with other providers: yes (Hospitalist for admission:  Dr Menchaca, aware of pending labs and will follow )  Independent visualization of images, tracings, or specimens: yes (CT abd pelvis Sept 2019    IMPRESSION:     1. The fluid collection posterior to the right hepatic lobe has  significantly diminished in size with only trace fluid remaining. Small  locules of air are seen within this collection which could be related " to  recent shunt mentation or could be related to tiny fistula from the  splenic flexure of the colon.  2. Trace right pleural fluid, diminished since 04/27/2019. Thickened,  likely reactive, pleural rind remains. Mild right basilar atelectasis.  Line  3. Abnormal intrahepatic and extra hepatic biliary ductal dilation.  Intrahepatic biliary dilation is new since 04/27/2019. The CBD appears  attenuated in its mid segment, raising the possibility of stricture.  ERCP recommended.  5. Uncomplicated colonic diverticulosis.  6. Nonobstructing bilateral renal stones and probable small right renal  cyst.  7. Cirrhotic liver morphology. No focal liver lesion is seen.    CBC reviewed, significant for hemoglobin hematocrit 4.9/16.2)      Patient is typed and cross for 1 unit PRBCs in the ED and will be admitted to the hospital for further transfusion and evaluation of his hematochezia.  He remained stable in the ED with no hypotension noted, patient is admitted to hospitalist service to medical monitored bed in stable condition.    Final diagnoses:   Anemia, unspecified type   Hematochezia   THOMAS (nonalcoholic steatohepatitis)              Nicole Spaulding, [place]APIN[place]O  10/16/19 1827

## 2019-10-16 NOTE — ED NOTES
Pt sent to ED by doctor from low Hgb level 5.0 second draw showed 4.5     Siria Jones, RN  10/16/19 [id number]

## 2019-10-16 NOTE — CONSULTS
Hematology/Oncology Inpatient Consultation    Patient name: Bry Ratliff  : 1945  MRN: [id number]  Referring Provider: Dr. Menchaca   Reason for Consultation: Acute Anemia    Chief complaint: Anemia    History of present illness:    74 y.o.  male admitted through the ED 10/16/2019 after being sent from the [facility] where his hemoglobin was 4.8.  He reported some black stools on oral iron supplementation twice per day without melena or bright red blood per rectum.  He denied recent bleeding events including hemoptysis, nosebleeds, and hematuria.  He had been treated for a chest wall abscess in early 2019 with I&D followed by antibiotics.  An outpatient CT abdomen done 2019 due to continued drainage from the wound showed significant decrease in posterior right hepatic lobe fluid collection, trace right pleural effusion that was improved, intrahepatic and extrahepatic biliary ductal dilation with new intrahepatic biliary dilation.  Liver cirrhosis with no focal liver lesion, diverticulosis, and nonobstructive renal stones noted.  He was most recently treated with Bactrim given by his surgeon.  He stated that the wound closed and he did not have significant bleeding when the wound was open. He denied fever or chills.  His spouse reported him to be more fatigued and less active.  He reported stable shortness of air with exertion.  PMH significant for JOSE with malabsorption, IgG kappa monoclonal gammopathy, erosive gastritis, Syed's esophagus, CKD stage III, vitamin B12 deficiency, RLE DVT and history of PE with thrombophilia, THOMAS, and splenomegaly.  He was not on anticoagulation but was taking aspirin 81 mg by mouth daily.  As an outpatient at the [facility] 10/16/2019 CBC revealed WBC 9.3, hemoglobin 4.8, .1, and platelets 289,000.  Repeat with a new specimen revealed hemoglobin 4.9.  Acute anemia labs were sent with reticulocyte count 7.63 percent (0.7-1.9).  Remaining  anemia work-up was pending at time of admission.    10/16/19  Hematology/Oncology was consulted as the patient is known to our service and followed for anemia diagnosed January 2018, IgG kappa monoclonal gammopathy diagnosed May 2018, elevated LFTs with biliary duct dilation diagnosed 2018 and right lower extremity DVT and bilateral pulmonary emboli diagnosed 2004.    · Anemia/JOSE/MGUS - He was initially seen by us in consultation for anemia with hemoglobin of 7.4 noted in January 2018 by his nephrologist Dr. Plascencia.  He did undergo an EGD and colonoscopy in March 2018 with findings of Syed's esophagus and hiatal hernia in the cardia.  Colonoscopy had poor prep but the scope did reach the cecum.  Colonoscopy was recommended to be repeated in 2 years and EGD in 3 years.  At time of consultation the patient had reported history of vitamin B12 deficiency but had not been on vitamin B12 injections for 6 months prior.  B12 level at PCP office prior to consultation was normal.  He was diagnosed with JOSE and was started on oral iron therapy.  Additional work-up including bone marrow evaluation revealed an IgG kappa monoclonal gammopathy.  Bone marrow had 4% plasma cells present.  He is undergone repeat EGD in November 2018 revealing erosive gastritis and bleeding in the ampulla.  Also oozing upon entering the stomach in many different areas.  He has received IV iron in late 2018 and again in late April and early May 2019.  On 7/8/2019 iron studies showed iron saturation 36%, iron 56, TIBC 154 and ferritin was 1199.  On 8/7/2019 his hemoglobin was 10.0.  He was restarted on ferrous sulfate 325 mg by mouth twice a day.  · Elevated LFTs and biliary duct dilation- work-up of elevated LFTs revealed cholelithiasis and THOMAS.  In July 2018 he underwent ERCP with bilateral sphincterotomy and common duct stone extraction with biliary brushing and stent placement.  Pathology on brushings was negative.  He subsequently underwent  EUS in August 2018 revealing suspect benign biliary stricture due to CBD stone with pathology negative for malignancy and revealing benign and atypical ductal cells.  He has been hospitalized once in April and a second hospitalization in May 2019 for encephalopathy related to his cirrhosis.  · Right lower extremity DVT and bilateral PEs were diagnosed in 2004 he was treated with Coumadin followed by Xarelto.  Thrombophilia work-up revealed protein C and S deficiencies, elevated factor VIII, and antiphospholipid antibody positivity.  Due to GI bleed IVC filter was placed in December 2018.  He has been on low-dose aspirin.  Repeat antiphospholipid antibodies were positive and factor VIII remained elevated (334) in April 2019.    PCP: Paulette Harris MD    History:  Past Medical History:   Diagnosis Date   • Anemia    • B12 deficiency 2009   • Syed's esophagus    • CAD (coronary artery disease)    • Diabetes mellitus (CMS/HCC)    • History of echocardiogram     03/03/2017 - 12/03/2014 - 04/2012   • Hyperlipidemia    • Hypertension    • Morbid obesity (CMS/HCC)    • KATHIA treated with BiPAP    • Renal insufficiency    • S/P lumbar laminectomy    ,   Past Surgical History:   Procedure Laterality Date   • BACK SURGERY  1971   • CATARACT EXTRACTION  2014   • CHOLECYSTECTOMY  02/24/2019   • COLONOSCOPY  03/2018   • CORONARY ANGIOPLASTY  08/24/2009    PCI stent - succesful placement of 3.5/23 promus stent in proximal right coronary artery   • CORONARY ANGIOPLASTY WITH STENT PLACEMENT  08/27/2009    patient had stent placed to proximal right coronary artery   • CYSTOSCOPY BLADDER STONE LITHOTRIPSY  1997   • OTHER SURGICAL HISTORY  11/2018    IVC filter implant   • RENAL ARTERY STENT Left    ,   Family History   Problem Relation Age of Onset   • Hyperlipidemia Other    • Hypertension Other    ,   Social History     Tobacco Use   • Smoking status: Never Smoker   • Smokeless tobacco: Never Used   Substance Use Topics   •  "Alcohol use: No     Frequency: Never   • Drug use: No   ,   (Not in a hospital admission), Scheduled Meds:   , Continuous Infusions:      sodium chloride 75 mL/hr Last Rate: 75 mL/hr (10/16/19 1805)   , PRN Meds:  •  [COMPLETED] Insert peripheral IV **AND** sodium chloride   Allergies:  Patient has no known allergies.    ROS:  Review of Systems   Constitutional: Positive for activity change and fatigue. Negative for appetite change, chills, diaphoresis, fever and unexpected weight change.   HENT: Negative for dental problem, ear pain, mouth sores, nosebleeds and sore throat.    Eyes: Negative for visual disturbance.   Respiratory: Positive for shortness of breath. Negative for cough, chest tightness, wheezing and stridor.    Cardiovascular: Positive for leg swelling ( Chronic and stable). Negative for chest pain and palpitations.   Gastrointestinal: Negative for abdominal distention, abdominal pain, anal bleeding, blood in stool, constipation, diarrhea, nausea and vomiting.        Black stools with reported compliance to ferrous sulfate dosing twice daily.   Endocrine: Negative for cold intolerance and heat intolerance.   Genitourinary: Negative for difficulty urinating, dysuria and hematuria.   Musculoskeletal: Negative for arthralgias, back pain, joint swelling and neck stiffness.   Skin: Negative for color change and rash.   Neurological: Negative for dizziness, seizures, syncope, weakness, light-headedness and headaches.   Hematological: Negative for adenopathy.        No obvious bleeding   Psychiatric/Behavioral: Negative for agitation, confusion and hallucinations.   All other systems reviewed and are negative.       Objective     Vital Signs:   BP (!) 116/38   Pulse 77   Temp 97.9 °F (36.6 °C) (Oral)   Resp 16   Ht 188 cm (74\")   Wt (!) 143 kg (315 lb 7.7 oz)   SpO2 96%   BMI 40.50 kg/m²     Physical Exam:  Physical Exam   Constitutional: He is oriented to person, place, and time. He appears " well-developed and well-nourished. No distress.   HENT:   Head: Normocephalic and atraumatic.   Nose: Nose normal.   Mouth/Throat: Oropharynx is clear and moist. No oropharyngeal exudate.   Eyes: Conjunctivae and EOM are normal. Pupils are equal, round, and reactive to light. Right eye exhibits no discharge. Left eye exhibits no discharge. No scleral icterus.   Neck: Normal range of motion. Neck supple. No thyromegaly present.   Cardiovascular: Normal rate, regular rhythm and intact distal pulses. Exam reveals no gallop and no friction rub.   Murmur ( Systolic) heard.  Pulmonary/Chest: Effort normal and breath sounds normal. No stridor. No respiratory distress. He has no wheezes. He has no rales. He exhibits no tenderness.   Abdominal: Soft. Bowel sounds are normal. He exhibits no distension and no mass. There is no tenderness. There is no rebound and no guarding.   Obese   Musculoskeletal: Normal range of motion. He exhibits edema ( 2+ BLE). He exhibits no tenderness or deformity.   Lymphadenopathy:     He has no cervical adenopathy.   Neurological: He is alert and oriented to person, place, and time. He exhibits normal muscle tone. Coordination normal.   Skin: Skin is warm and dry. Capillary refill takes less than 2 seconds. No rash noted. He is not diaphoretic. No erythema. There is pallor.   Psychiatric: He has a normal mood and affect. His behavior is normal.   Nursing note and vitals reviewed.       Results Review:        Lab Results (last 48 hours)     Procedure Component Value Units Date/Time    Comprehensive Metabolic Panel [[id number]]  (Abnormal) Collected:  10/16/19 1814    Specimen:  Blood Updated:  10/16/19 1848     Glucose 193 mg/dL      BUN 28 mg/dL      Creatinine 2.45 mg/dL      Sodium 136 mmol/L      Potassium 3.8 mmol/L      Chloride 100 mmol/L      CO2 22.0 mmol/L      Calcium 8.1 mg/dL      Total Protein 6.7 g/dL      Albumin 2.80 g/dL      ALT (SGPT) 30 U/L      AST (SGOT) 56 U/L      Alkaline  Phosphatase 380 U/L      Total Bilirubin 0.4 mg/dL      eGFR Non African Amer 26 mL/min/1.73      Globulin 3.9 gm/dL      A/G Ratio 0.7 g/dL      BUN/Creatinine Ratio 11.4     Anion Gap 17.8 mmol/L     Narrative:       GFR Normal >60  Chronic Kidney Disease <60  Kidney Failure <15    Lipase [[id number]]  (Normal) Collected:  10/16/19 1814    Specimen:  Blood Updated:  10/16/19 1848     Lipase 26 U/L            Pending Results: hapto, iron, TIBC, ferritin, B12, folate    Imaging Reviewed:   [facility] Outpatient Visit    Result Date: 10/14/2019  IMPRESSION : The requested procedure was not attempted today as the patient's cutaneous fistula site has healed over there is no evidence of any continued drainage for the past several days. The patient and his wife were instructed to continuously evaluate for recurrent drainage, at which point the patient can be returned for the attempted procedure.  Electronically Signed By-Jonn Cuenca On:10/14/2019 5:38 PM This report was finalized on [id number] by  Jonn Cuenca, .      I have reviewed the patient's labs, imaging, reports, and other clinician documentation.         Assessment/Plan       ASSESSMENT  1. Acute on chronic anemia/JOSE with malabsorption/IgG kappa MGUS - Chronic anemia with last hemoglobin 10.0 in early August and on oral iron twice daily. MGUS has been followed as outpatient and not likely etiology for acute anemia.  Known Syed's esophagus & last EGD November 2018 with active bleeding.  GI bleed suspected.  Patient receiving pRBC transfusion in ED.  Recent antibiotics for chest wall wound.  Hemolysis also in differential.  Acute labs sent from the cancer center today with retic very high.  2. Biliary ductal dilation/THOMAS/splenomegaly - followed by [facility] as outpatient.  Outpatient CT had revealed new intrahepatic biliary dilation.  3. Thrombophilia/H/o RLE DVT and bilateral PE -antiphospholipid antibody positivity, decreased protein C&S, and elevated factor VIII.   Has IVC filter. On low dose ASA as outpatient.   4. CKD - followed by Dr. Plascencia as outpatient.   5. DM/CAD s/p stents/HLD/HTN/KATHIA - chronic conditions per primary team.     PLAN  1. Transfuse to keep Hgb above 7.5.  2. Recommend GI consult.  3. Await acute anemia workup.      I have personally performed a face-to-face diagnostic evaluation on this patient.  I have discussed the case  with Brown Amezquita NP, have edited/reviewed the note,  and agree with the care plan.  Patient complaining of increased fatigue and pain on examination admitted from the cancer center with a hemoglobin of 4.8 g/dL.  Needs acute anemia work-up, transfusions and GI evaluation.    Thank you for the opportunity to consult on this patient.          I discussed the patients findings and my recommendations with patient and family.    Thank you for this consult.  We will be happy to follow along in the care of this patient.       Much of the above report is an electronic transcription/translation of the spoken language to printed text using Dragon Software. As such, the subtleties and finesse of the spoken language may permit erroneous, or at times, nonsensical words or phrases to be inadvertently transcribed; thus changes may be made at a later date to rectify these errors.

## 2019-10-17 ENCOUNTER — APPOINTMENT (OUTPATIENT)
Dept: ULTRASOUND IMAGING | Facility: HOSPITAL | Age: 74
End: 2019-10-17

## 2019-10-17 ENCOUNTER — INPATIENT HOSPITAL (AMBULATORY)
Dept: URBAN - METROPOLITAN AREA HOSPITAL 84 | Facility: HOSPITAL | Age: 74
End: 2019-10-17
Payer: COMMERCIAL

## 2019-10-17 DIAGNOSIS — K72.90 HEPATIC FAILURE, UNSPECIFIED WITHOUT COMA: ICD-10-CM

## 2019-10-17 DIAGNOSIS — N17.9 ACUTE KIDNEY FAILURE, UNSPECIFIED: ICD-10-CM

## 2019-10-17 DIAGNOSIS — K92.1 MELENA: ICD-10-CM

## 2019-10-17 DIAGNOSIS — N18.9 CHRONIC KIDNEY DISEASE, UNSPECIFIED: ICD-10-CM

## 2019-10-17 DIAGNOSIS — D50.0 IRON DEFICIENCY ANEMIA SECONDARY TO BLOOD LOSS (CHRONIC): ICD-10-CM

## 2019-10-17 DIAGNOSIS — K75.81 NONALCOHOLIC STEATOHEPATITIS (NASH): ICD-10-CM

## 2019-10-17 LAB
ANION GAP SERPL CALCULATED.3IONS-SCNC: 13 MMOL/L (ref 5–15)
ANISOCYTOSIS BLD QL: ABNORMAL
BILIRUB UR QL STRIP: NEGATIVE
BUN BLD-MCNC: 27 MG/DL (ref 8–23)
BUN/CREAT SERPL: 12.3 (ref 7–25)
CALCIUM SPEC-SCNC: 7.8 MG/DL (ref 8.6–10.5)
CHLORIDE SERPL-SCNC: 104 MMOL/L (ref 98–107)
CK SERPL-CCNC: 61 U/L (ref 20–200)
CLARITY UR: CLEAR
CO2 SERPL-SCNC: 26 MMOL/L (ref 22–29)
COLOR UR: YELLOW
CREAT BLD-MCNC: 2.2 MG/DL (ref 0.76–1.27)
DEPRECATED RDW RBC AUTO: 94.9 FL (ref 37–54)
EOSINOPHIL # BLD MANUAL: 0.07 10*3/MM3 (ref 0–0.4)
EOSINOPHIL NFR BLD MANUAL: 1 % (ref 0.3–6.2)
EOSINOPHIL SPEC QL MICRO: 0 % EOS/100 CELLS (ref 0–0)
ERYTHROCYTE [DISTWIDTH] IN BLOOD BY AUTOMATED COUNT: 23.7 % (ref 12.3–15.4)
FERRITIN SERPL-MCNC: 128.4 NG/ML (ref 30–400)
GFR SERPL CREATININE-BSD FRML MDRD: 29 ML/MIN/1.73
GIANT PLATELETS: ABNORMAL
GLUCOSE BLD-MCNC: 156 MG/DL (ref 65–99)
GLUCOSE BLDC GLUCOMTR-MCNC: 164 MG/DL (ref 70–105)
GLUCOSE BLDC GLUCOMTR-MCNC: 188 MG/DL (ref 70–105)
GLUCOSE BLDC GLUCOMTR-MCNC: 192 MG/DL (ref 70–105)
GLUCOSE BLDC GLUCOMTR-MCNC: 90 MG/DL (ref 70–105)
GLUCOSE UR STRIP-MCNC: NEGATIVE MG/DL
HBA1C MFR BLD: 4.5 % (ref 3.5–5.6)
HCT VFR BLD AUTO: 16.3 % (ref 37.5–51)
HCT VFR BLD AUTO: 18.2 % (ref 37.5–51)
HCT VFR BLD AUTO: 19.9 % (ref 37.5–51)
HEMOCCULT STL QL IA: POSITIVE
HGB BLD-MCNC: 5.2 G/DL (ref 13–17.7)
HGB BLD-MCNC: 5.9 G/DL (ref 13–17.7)
HGB BLD-MCNC: 6.6 G/DL (ref 13–17.7)
HGB UR QL STRIP.AUTO: NEGATIVE
IRON 24H UR-MRATE: 47 MCG/DL (ref 59–158)
IRON SATN MFR SERPL: 18 % (ref 20–50)
KETONES UR QL STRIP: NEGATIVE
LDH SERPL-CCNC: NORMAL U/L
LEUKOCYTE ESTERASE UR QL STRIP.AUTO: NEGATIVE
LYMPHOCYTES # BLD MANUAL: 1.21 10*3/MM3 (ref 0.7–3.1)
LYMPHOCYTES NFR BLD MANUAL: 18 % (ref 19.6–45.3)
LYMPHOCYTES NFR BLD MANUAL: 3 % (ref 5–12)
MACROCYTES BLD QL SMEAR: ABNORMAL
MCH RBC QN AUTO: 36.7 PG (ref 26.6–33)
MCHC RBC AUTO-ENTMCNC: 32.1 G/DL (ref 31.5–35.7)
MCV RBC AUTO: 114.4 FL (ref 79–97)
MONOCYTES # BLD AUTO: 0.2 10*3/MM3 (ref 0.1–0.9)
NEUTROPHILS # BLD AUTO: 5.23 10*3/MM3 (ref 1.7–7)
NEUTROPHILS NFR BLD MANUAL: 74 % (ref 42.7–76)
NEUTS BAND NFR BLD MANUAL: 4 % (ref 0–5)
NITRITE UR QL STRIP: NEGATIVE
PH UR STRIP.AUTO: 6 [PH] (ref 5–8)
PLATELET # BLD AUTO: 218 10*3/MM3 (ref 140–450)
PMV BLD AUTO: 8.3 FL (ref 6–12)
POIKILOCYTOSIS BLD QL SMEAR: ABNORMAL
POTASSIUM BLD-SCNC: 4 MMOL/L (ref 3.5–5.2)
PROT UR QL STRIP: NEGATIVE
RBC # BLD AUTO: 1.42 10*6/MM3 (ref 4.14–5.8)
SCAN SLIDE: NORMAL
SODIUM BLD-SCNC: 139 MMOL/L (ref 136–145)
SODIUM UR-SCNC: 96 MMOL/L
SP GR UR STRIP: 1.01 (ref 1–1.03)
TIBC SERPL-MCNC: 264 MCG/DL (ref 298–536)
TRANSFERRIN SERPL-MCNC: 177 MG/DL (ref 200–360)
TSH SERPL DL<=0.05 MIU/L-ACNC: 3.13 UIU/ML (ref 0.27–4.2)
URATE SERPL-MCNC: 8.3 MG/DL (ref 3.4–7)
UROBILINOGEN UR QL STRIP: NORMAL
WBC MORPH BLD: NORMAL
WBC NRBC COR # BLD: 6.7 10*3/MM3 (ref 3.4–10.8)

## 2019-10-17 PROCEDURE — 82550 ASSAY OF CK (CPK): CPT | Performed by: INTERNAL MEDICINE

## 2019-10-17 PROCEDURE — 84466 ASSAY OF TRANSFERRIN: CPT | Performed by: NURSE PRACTITIONER

## 2019-10-17 PROCEDURE — G0378 HOSPITAL OBSERVATION PER HR: HCPCS

## 2019-10-17 PROCEDURE — 82962 GLUCOSE BLOOD TEST: CPT

## 2019-10-17 PROCEDURE — 83615 LACTATE (LD) (LDH) ENZYME: CPT | Performed by: NURSE PRACTITIONER

## 2019-10-17 PROCEDURE — 82728 ASSAY OF FERRITIN: CPT | Performed by: NURSE PRACTITIONER

## 2019-10-17 PROCEDURE — 76775 US EXAM ABDO BACK WALL LIM: CPT

## 2019-10-17 PROCEDURE — 86900 BLOOD TYPING SEROLOGIC ABO: CPT

## 2019-10-17 PROCEDURE — 87205 SMEAR GRAM STAIN: CPT | Performed by: INTERNAL MEDICINE

## 2019-10-17 PROCEDURE — 63710000001 INSULIN LISPRO (HUMAN) PER 5 UNITS: Performed by: NURSE PRACTITIONER

## 2019-10-17 PROCEDURE — 99222 1ST HOSP IP/OBS MODERATE 55: CPT | Performed by: NURSE PRACTITIONER

## 2019-10-17 PROCEDURE — 85014 HEMATOCRIT: CPT | Performed by: INTERNAL MEDICINE

## 2019-10-17 PROCEDURE — 63710000001 INSULIN GLARGINE PER 5 UNITS: Performed by: NURSE PRACTITIONER

## 2019-10-17 PROCEDURE — 82274 ASSAY TEST FOR BLOOD FECAL: CPT | Performed by: NURSE PRACTITIONER

## 2019-10-17 PROCEDURE — 84550 ASSAY OF BLOOD/URIC ACID: CPT | Performed by: INTERNAL MEDICINE

## 2019-10-17 PROCEDURE — 84443 ASSAY THYROID STIM HORMONE: CPT | Performed by: INTERNAL MEDICINE

## 2019-10-17 PROCEDURE — 99233 SBSQ HOSP IP/OBS HIGH 50: CPT | Performed by: INTERNAL MEDICINE

## 2019-10-17 PROCEDURE — 83540 ASSAY OF IRON: CPT | Performed by: NURSE PRACTITIONER

## 2019-10-17 PROCEDURE — 80048 BASIC METABOLIC PNL TOTAL CA: CPT | Performed by: NURSE PRACTITIONER

## 2019-10-17 PROCEDURE — 83036 HEMOGLOBIN GLYCOSYLATED A1C: CPT | Performed by: NURSE PRACTITIONER

## 2019-10-17 PROCEDURE — 85018 HEMOGLOBIN: CPT | Performed by: INTERNAL MEDICINE

## 2019-10-17 PROCEDURE — 81003 URINALYSIS AUTO W/O SCOPE: CPT | Performed by: INTERNAL MEDICINE

## 2019-10-17 PROCEDURE — 36430 TRANSFUSION BLD/BLD COMPNT: CPT

## 2019-10-17 PROCEDURE — 84300 ASSAY OF URINE SODIUM: CPT | Performed by: INTERNAL MEDICINE

## 2019-10-17 PROCEDURE — 85007 BL SMEAR W/DIFF WBC COUNT: CPT | Performed by: NURSE PRACTITIONER

## 2019-10-17 PROCEDURE — P9016 RBC LEUKOCYTES REDUCED: HCPCS

## 2019-10-17 PROCEDURE — 85025 COMPLETE CBC W/AUTO DIFF WBC: CPT | Performed by: NURSE PRACTITIONER

## 2019-10-17 RX ORDER — INSULIN GLARGINE 100 [IU]/ML
46 INJECTION, SOLUTION SUBCUTANEOUS NIGHTLY
Status: DISCONTINUED | OUTPATIENT
Start: 2019-10-17 | End: 2019-10-19 | Stop reason: HOSPADM

## 2019-10-17 RX ORDER — FERROUS SULFATE TAB EC 324 MG (65 MG FE EQUIVALENT) 324 (65 FE) MG
324 TABLET DELAYED RESPONSE ORAL 2 TIMES DAILY WITH MEALS
Status: DISCONTINUED | OUTPATIENT
Start: 2019-10-17 | End: 2019-10-19 | Stop reason: HOSPADM

## 2019-10-17 RX ORDER — ONDANSETRON 4 MG/1
4 TABLET, FILM COATED ORAL EVERY 6 HOURS PRN
Status: DISCONTINUED | OUTPATIENT
Start: 2019-10-17 | End: 2019-10-19 | Stop reason: HOSPADM

## 2019-10-17 RX ORDER — OXYCODONE HYDROCHLORIDE 5 MG/1
5 TABLET ORAL EVERY 8 HOURS PRN
Status: DISCONTINUED | OUTPATIENT
Start: 2019-10-17 | End: 2019-10-19 | Stop reason: HOSPADM

## 2019-10-17 RX ORDER — POTASSIUM CHLORIDE 20 MEQ/1
20 TABLET, EXTENDED RELEASE ORAL DAILY
Status: DISCONTINUED | OUTPATIENT
Start: 2019-10-17 | End: 2019-10-19 | Stop reason: HOSPADM

## 2019-10-17 RX ORDER — SULFAMETHOXAZOLE AND TRIMETHOPRIM 800; 160 MG/1; MG/1
1 TABLET ORAL EVERY 12 HOURS SCHEDULED
Status: DISCONTINUED | OUTPATIENT
Start: 2019-10-17 | End: 2019-10-17

## 2019-10-17 RX ORDER — MULTIVITAMIN,THERAPEUTIC
1 TABLET ORAL DAILY
Status: DISCONTINUED | OUTPATIENT
Start: 2019-10-17 | End: 2019-10-19 | Stop reason: HOSPADM

## 2019-10-17 RX ORDER — DEXTROSE MONOHYDRATE 25 G/50ML
25 INJECTION, SOLUTION INTRAVENOUS
Status: DISCONTINUED | OUTPATIENT
Start: 2019-10-17 | End: 2019-10-19 | Stop reason: HOSPADM

## 2019-10-17 RX ORDER — BUMETANIDE 1 MG/1
1 TABLET ORAL EVERY EVENING
Status: DISCONTINUED | OUTPATIENT
Start: 2019-10-17 | End: 2019-10-17

## 2019-10-17 RX ORDER — SODIUM BICARBONATE 650 MG/1
650 TABLET ORAL 3 TIMES DAILY
Status: DISCONTINUED | OUTPATIENT
Start: 2019-10-17 | End: 2019-10-19 | Stop reason: HOSPADM

## 2019-10-17 RX ORDER — ALLOPURINOL 100 MG/1
100 TABLET ORAL 2 TIMES DAILY
Status: DISCONTINUED | OUTPATIENT
Start: 2019-10-17 | End: 2019-10-19 | Stop reason: HOSPADM

## 2019-10-17 RX ORDER — ONDANSETRON 2 MG/ML
4 INJECTION INTRAMUSCULAR; INTRAVENOUS EVERY 6 HOURS PRN
Status: DISCONTINUED | OUTPATIENT
Start: 2019-10-17 | End: 2019-10-19 | Stop reason: HOSPADM

## 2019-10-17 RX ORDER — DOCUSATE SODIUM 100 MG/1
100 CAPSULE, LIQUID FILLED ORAL 2 TIMES DAILY
Status: DISCONTINUED | OUTPATIENT
Start: 2019-10-17 | End: 2019-10-19 | Stop reason: HOSPADM

## 2019-10-17 RX ORDER — METOCLOPRAMIDE 5 MG/1
5 TABLET ORAL 2 TIMES DAILY
Status: DISCONTINUED | OUTPATIENT
Start: 2019-10-17 | End: 2019-10-19 | Stop reason: HOSPADM

## 2019-10-17 RX ORDER — PANTOPRAZOLE SODIUM 40 MG/1
40 TABLET, DELAYED RELEASE ORAL DAILY
Status: DISCONTINUED | OUTPATIENT
Start: 2019-10-17 | End: 2019-10-18

## 2019-10-17 RX ORDER — NITROGLYCERIN 0.4 MG/1
0.4 TABLET SUBLINGUAL
Status: DISCONTINUED | OUTPATIENT
Start: 2019-10-17 | End: 2019-10-19 | Stop reason: HOSPADM

## 2019-10-17 RX ORDER — ATORVASTATIN CALCIUM 20 MG/1
20 TABLET, FILM COATED ORAL DAILY
Status: DISCONTINUED | OUTPATIENT
Start: 2019-10-17 | End: 2019-10-17

## 2019-10-17 RX ORDER — LEVOTHYROXINE SODIUM 0.07 MG/1
75 TABLET ORAL
Status: DISCONTINUED | OUTPATIENT
Start: 2019-10-17 | End: 2019-10-19 | Stop reason: HOSPADM

## 2019-10-17 RX ORDER — BUMETANIDE 1 MG/1
2 TABLET ORAL DAILY
Status: DISCONTINUED | OUTPATIENT
Start: 2019-10-17 | End: 2019-10-17

## 2019-10-17 RX ORDER — SODIUM CHLORIDE 0.9 % (FLUSH) 0.9 %
10 SYRINGE (ML) INJECTION AS NEEDED
Status: DISCONTINUED | OUTPATIENT
Start: 2019-10-17 | End: 2019-10-19 | Stop reason: HOSPADM

## 2019-10-17 RX ORDER — FOLIC ACID 1 MG/1
1 TABLET ORAL DAILY
Status: DISCONTINUED | OUTPATIENT
Start: 2019-10-17 | End: 2019-10-18

## 2019-10-17 RX ORDER — SODIUM CHLORIDE 0.9 % (FLUSH) 0.9 %
10 SYRINGE (ML) INJECTION EVERY 12 HOURS SCHEDULED
Status: DISCONTINUED | OUTPATIENT
Start: 2019-10-17 | End: 2019-10-19 | Stop reason: HOSPADM

## 2019-10-17 RX ORDER — ATORVASTATIN CALCIUM 20 MG/1
20 TABLET, FILM COATED ORAL NIGHTLY
Status: DISCONTINUED | OUTPATIENT
Start: 2019-10-17 | End: 2019-10-19 | Stop reason: HOSPADM

## 2019-10-17 RX ORDER — NICOTINE POLACRILEX 4 MG
15 LOZENGE BUCCAL
Status: DISCONTINUED | OUTPATIENT
Start: 2019-10-17 | End: 2019-10-19 | Stop reason: HOSPADM

## 2019-10-17 RX ORDER — HYDROXYZINE HYDROCHLORIDE 25 MG/1
25 TABLET, FILM COATED ORAL 3 TIMES DAILY PRN
Status: DISCONTINUED | OUTPATIENT
Start: 2019-10-17 | End: 2019-10-19 | Stop reason: HOSPADM

## 2019-10-17 RX ORDER — LACTULOSE 10 G/15ML
60 SOLUTION ORAL
Status: DISCONTINUED | OUTPATIENT
Start: 2019-10-17 | End: 2019-10-19 | Stop reason: HOSPADM

## 2019-10-17 RX ORDER — DULOXETIN HYDROCHLORIDE 30 MG/1
60 CAPSULE, DELAYED RELEASE ORAL DAILY
Status: DISCONTINUED | OUTPATIENT
Start: 2019-10-17 | End: 2019-10-19 | Stop reason: HOSPADM

## 2019-10-17 RX ORDER — CHOLECALCIFEROL (VITAMIN D3) 125 MCG
5 CAPSULE ORAL ONCE
Status: COMPLETED | OUTPATIENT
Start: 2019-10-17 | End: 2019-10-17

## 2019-10-17 RX ADMIN — METOCLOPRAMIDE 5 MG: 10 TABLET ORAL at 08:02

## 2019-10-17 RX ADMIN — RIFAXIMIN 550 MG: 550 TABLET ORAL at 08:02

## 2019-10-17 RX ADMIN — Medication 10 ML: at 10:14

## 2019-10-17 RX ADMIN — MAGNESIUM OXIDE TAB 400 MG (241.3 MG ELEMENTAL MG) 400 MG: 400 (241.3 MG) TAB at 08:02

## 2019-10-17 RX ADMIN — Medication 10 ML: at 04:03

## 2019-10-17 RX ADMIN — OXYCODONE HYDROCHLORIDE 5 MG: 5 TABLET ORAL at 04:09

## 2019-10-17 RX ADMIN — THERA TABS 1 TABLET: TAB at 08:02

## 2019-10-17 RX ADMIN — DOCUSATE SODIUM 100 MG: 100 CAPSULE, LIQUID FILLED ORAL at 21:28

## 2019-10-17 RX ADMIN — LACTULOSE 60 ML: 10 SOLUTION ORAL at 21:29

## 2019-10-17 RX ADMIN — FERROUS SULFATE TAB EC 324 MG (65 MG FE EQUIVALENT) 324 MG: 324 (65 FE) TABLET DELAYED RESPONSE at 08:02

## 2019-10-17 RX ADMIN — SULFAMETHOXAZOLE AND TRIMETHOPRIM 160 MG: 800; 160 TABLET ORAL at 08:02

## 2019-10-17 RX ADMIN — INSULIN LISPRO 15 UNITS: 100 INJECTION, SOLUTION INTRAVENOUS; SUBCUTANEOUS at 13:39

## 2019-10-17 RX ADMIN — INSULIN LISPRO 2 UNITS: 100 INJECTION, SOLUTION INTRAVENOUS; SUBCUTANEOUS at 08:27

## 2019-10-17 RX ADMIN — DOCUSATE SODIUM 100 MG: 100 CAPSULE, LIQUID FILLED ORAL at 08:02

## 2019-10-17 RX ADMIN — ALLOPURINOL 100 MG: 100 TABLET ORAL at 08:02

## 2019-10-17 RX ADMIN — PANTOPRAZOLE SODIUM 40 MG: 40 TABLET, DELAYED RELEASE ORAL at 08:03

## 2019-10-17 RX ADMIN — DULOXETINE HYDROCHLORIDE 60 MG: 30 CAPSULE, DELAYED RELEASE ORAL at 08:01

## 2019-10-17 RX ADMIN — MELATONIN TAB 5 MG 5 MG: 5 TAB at 04:02

## 2019-10-17 RX ADMIN — SODIUM BICARBONATE 650 MG TABLET 650 MG: at 08:02

## 2019-10-17 RX ADMIN — LACTULOSE 60 ML: 10 SOLUTION ORAL at 08:17

## 2019-10-17 RX ADMIN — ALLOPURINOL 100 MG: 100 TABLET ORAL at 21:28

## 2019-10-17 RX ADMIN — SODIUM BICARBONATE 650 MG TABLET 650 MG: at 21:28

## 2019-10-17 RX ADMIN — LACTULOSE 60 ML: 10 SOLUTION ORAL at 04:09

## 2019-10-17 RX ADMIN — METOCLOPRAMIDE 5 MG: 10 TABLET ORAL at 21:28

## 2019-10-17 RX ADMIN — INSULIN LISPRO 15 UNITS: 100 INJECTION, SOLUTION INTRAVENOUS; SUBCUTANEOUS at 08:27

## 2019-10-17 RX ADMIN — POTASSIUM CHLORIDE 20 MEQ: 1500 TABLET, EXTENDED RELEASE ORAL at 08:02

## 2019-10-17 RX ADMIN — FERROUS SULFATE TAB EC 324 MG (65 MG FE EQUIVALENT) 324 MG: 324 (65 FE) TABLET DELAYED RESPONSE at 21:28

## 2019-10-17 RX ADMIN — INSULIN LISPRO 2 UNITS: 100 INJECTION, SOLUTION INTRAVENOUS; SUBCUTANEOUS at 13:39

## 2019-10-17 RX ADMIN — FOLIC ACID 1 MG: 1 TABLET ORAL at 08:02

## 2019-10-17 RX ADMIN — BUMETANIDE 2 MG: 1 TABLET ORAL at 08:03

## 2019-10-17 RX ADMIN — LEVOTHYROXINE SODIUM 75 MCG: 75 TABLET ORAL at 06:28

## 2019-10-17 RX ADMIN — RIFAXIMIN 550 MG: 550 TABLET ORAL at 21:28

## 2019-10-17 RX ADMIN — INSULIN GLARGINE 46 UNITS: 100 INJECTION, SOLUTION SUBCUTANEOUS at 21:30

## 2019-10-17 RX ADMIN — ATORVASTATIN CALCIUM 20 MG: 20 TABLET, FILM COATED ORAL at 21:28

## 2019-10-17 NOTE — ED NOTES
Infusion still running at 2100 when Malika MACE was leaving. Report given to Siria RN to complete blood transfusion. Malika MACE came in at 1200 to complete transfusion documentation, but was not the primary RN after 2100.      Malika Rutherford, RN  10/17/19 [id number]

## 2019-10-17 NOTE — PROGRESS NOTES
[facility] Medicine Services Daily Progress Note      Hospitalist Team  LOS 0 days      Patient Care Team:  Paulette Harris MD as PCP - General        Chief Complaint / Subjective  Chief Complaint   Patient presents with   • Abnormal Lab     Low hemoglobin     Patient denies any chest pain, shortness of breath nor palpitation      Brief Synopsis of Hospital Course/HPI    74-year-old man with multiple comorbidities including Gonzalez cirrhosis, CKD stage III, CAD status post stenting and hypothyroidism.  Follows up with hematology for iron deficiency anemia and coagulation factor deficiency.  Had followed up for routine visits at the hematologist office on 10/16.  Blood work done showed hemoglobin of 4.8 however patient reported no chest pain, shortness of breath, palpitation or generalized body weakness.    He was sent to the emergency room for further evaluation and management.      Review of Systems   Constitution: Positive for weakness. Negative for chills and fever.   HENT: Negative for congestion.    Eyes: Negative for blurred vision.   Cardiovascular: Negative for chest pain and palpitations.   Respiratory: Negative for shortness of breath.    Gastrointestinal: Negative for abdominal pain, nausea and vomiting.   Genitourinary: Negative for flank pain.   Neurological: Negative for focal weakness.   Psychiatric/Behavioral: Negative for altered mental status.       Family History   Problem Relation Age of Onset   • Hyperlipidemia Other    • Hypertension Other        Past Medical History:   Diagnosis Date   • Anemia    • B12 deficiency 2009   • Syed's esophagus    • CAD (coronary artery disease)    • Diabetes mellitus (CMS/HCC)    • History of echocardiogram     03/03/2017 - 12/03/2014 - 04/2012   • Hyperlipidemia    • Hypertension    • Morbid obesity (CMS/HCC)    • KATHIA treated with BiPAP    • Renal insufficiency    • S/P lumbar laminectomy        Social History     Socioeconomic History  "  • Marital status:      Spouse name: Not on file   • Number of children: Not on file   • Years of education: Not on file   • Highest education level: Not on file   Tobacco Use   • Smoking status: Never Smoker   • Smokeless tobacco: Never Used   Substance and Sexual Activity   • Alcohol use: No     Frequency: Never   • Drug use: No   • Sexual activity: Defer           Objective      Vital Signs  Temp:  [97.4 °F (36.3 °C)-98.6 °F (37 °C)] 98.6 °F (37 °C)  Heart Rate:  [62-90] 90  Resp:  [16-23] 18  BP: (107-199)/(38-58) 129/55  Oxygen Therapy  SpO2: 100 %  Pulse Oximetry Type: Continuous  Device (Oxygen Therapy): room air  Flowsheet Rows      First Filed Value   Admission Height  188 cm (74\") Documented at 10/16/2019 1701   Admission Weight  143 kg (315 lb 7.7 oz)  (Abnormal)  Documented at 10/16/2019 1701        Intake & Output (last 3 days)       10/14 0701 - 10/15 0700 10/15 0701 - 10/16 0700 10/16 0701 - 10/17 0700 10/17 0701 - 10/18 0700    Blood   308     Total Intake(mL/kg)   308 (2.2)     Net   +308                 Lines, Drains & Airways    Active LDAs     Name:   Placement date:   Placement time:   Site:   Days:    Peripheral IV 10/16/19 1803 Right Antecubital   10/16/19    1803    Antecubital   less than 1                  Physical Exam:   Physical Exam   Constitutional: He is oriented to person, place, and time. He appears well-developed. No distress.   HENT:   Head: Normocephalic and atraumatic.   Eyes: EOM are normal. Pupils are equal, round, and reactive to light.   Neck: Neck supple.   Cardiovascular: Normal rate and regular rhythm.   Pulmonary/Chest: Effort normal and breath sounds normal.   Abdominal: Soft.   Musculoskeletal: Normal range of motion.   Neurological: He is alert and oriented to person, place, and time.   Skin: Skin is warm and dry.   Psychiatric: He has a normal mood and affect.         Procedures:              Results Review:     I reviewed the patient's new clinical " results.      Results from last 7 days   Lab Units 10/17/19  0620 10/16/19  1814 10/16/19  1455 10/16/19  1351   WBC 10*3/mm3 6.70  --  10.39 9.30   HEMOGLOBIN g/dL 5.2* 5.1* 4.9* 4.8*   HEMATOCRIT % 16.3* 15.8* 16.2* 16.5*   PLATELETS 10*3/mm3 218  --  293 289   MONOCYTES % % 3.0*  --   --   --      Results from last 7 days   Lab Units 10/17/19  0620 10/16/19  1814   SODIUM mmol/L 139 136   POTASSIUM mmol/L 4.0 3.8   CHLORIDE mmol/L 104 100   CO2 mmol/L 26.0 22.0   BUN mg/dL 27* 28*   CREATININE mg/dL 2.20* 2.45*   CALCIUM mg/dL 7.8* 8.1*   BILIRUBIN mg/dL  --  0.4   ALK PHOS U/L  --  380*   ALT (SGPT) U/L  --  30   AST (SGOT) U/L  --  56*   GLUCOSE mg/dL 156* 193*         Lab Results   Component Value Date    CALCIUM 7.8 (L) 10/17/2019    PHOS 4.3 2019     Hemoglobin A1C   Date Value Ref Range Status   10/17/2019 4.5 3.5 - 5.6 % Final                   Microbiology Results (last 10 days)     ** No results found for the last 240 hours. **          ECG/EMG Results (most recent)     None          Results for orders placed during the hospital encounter of 19   Duplex Venous Lower Extremity - Bilateral CAR    Narrative                   Lower Extremity Venous Report                          [facility]                         Vascular Laboratory                            [address] [place] [address]    Name: JOSE PATEL                Study Date: 2019 04:18 PM  MRN: [id number]                       Patient Location: ER  : 1945 (M/d/yyyy)           Gender: Male  Age: 73 yrs                          Account#: [id number]  Reason For Study: soa      Procedure  Venous study was carried out in bilateral lower extremities. Gray scale, color  flow, and spectral doppler images were obtained. Images were obtained in  transverse and longitudinal views. The study was technically difficult due to  Difficulty visualizing the distal veins secondary to edema and  body habitus..    Right Lower Extremity Venous  On the right side the patient has patent common femoral, femoral, and  popliteal veins. The main veins are easily compressible. Femoral vein was  mapped in its entirety through the course of the thigh. It is patent and  compresses well. The popliteal vein is patent along with its tributaries and  compresses well with no evidence of any filling defect. Augmentation test is  positive. Respiratory waves are biphasic. Distal veins are patent. The right  greater and small saphenous veins are patent with good compressibility. There  is fluid retention noted, suggestive of edema.    Left Lower Extremity Venous  On the left side the patient has patent common femoral, femoral, and popliteal  veins. The main veins compress well. Femoral vein was mapped in its entirety  through the course of the thigh. It is patent and compresses well. The  popliteal vein is patent along with its tributaries and compresses well with  no evidence of any filling defect. Augmentation test is positive. Respiratory  waves are biphasic. Distal veins are patent. The left greater and small  saphenous veins are patent with good compressibility. There is fluid retention  noted, suggestive of edema.      Interpretation Summary  No evidence of any deep vein thrombosis or superficial vein thrombosis.  _______________________________________________________________________________    Electronically signed by: Willian Adams MD  on 03/18/2019 11:07 AM    Ordering Physician: ANA HARTLEY  Referring Physician: MELODY CHIANG  Performed By:          Results for orders placed during the hospital encounter of 03/17/19   Adult Transthoracic Echo Complete W/ Cont if Necessary Per Protocol    Narrative                           Adult Echocardiogram Report          [facility]  Cardiology Department  [address] [place] [address]  [id number]      Name: JOSE PATEL JStudy Date: 03/18/2019 07:08 AM            BP: 155/29 mmHg  MRN: [id number]       Patient Location:   : 1945      Gender: Male                              Height: 74 in  Age: 73 yrs          Account#: [id number]                     Weight: 350 lb  Reason For Study: SHORTNESS OF AIR                             BSA: 2.8 m2  Ordering Physician:  ANA HARTLEY  Referring Physician:  MELODY CHIANG  Performed By: MAXX      M-Mode/2-D Measurements:  LVIDd: 5.2 cm       (3.7-5.7) LVPWd: 1.3 cm        (0.8-1.2)  LVIDs: 4.1 cm       (2.3-3.9)  ACS: 2.0 cm         (1.6-3.7) IVSd: 1.4 cm         (0.7-1.2)  LA dimension: 4.2 cm(1.9-4.0) RVDd: 4.3 cm         (0.7-2.4)  FS: 21.9 %          (21-40%)  Ao root diam: 4.1 cm (2.0-3.7)    Comments  Undetermined supraventricular rhythm with regular ventricular rhythm with  probably broad QRS complexes.  Left ventricle had normal cavity size and wall thickness. Contractility of  left ventricle probably uniform and appropriate with EF estimated 50-60%.  Right ventricle at least mildly dilated.  Left atrium probably mildly dilated.  Unsure whether right atrium dilated.  Aortic valve probably had 3 cusps and had mild thickening and increased  density of the cusps with well maintained opening. Peak systolic velocity 1.7  m/s and mean velocity 1.2 m/s. No aortic regurgitation.  Mitral valve had unremarkable appearance and unimpaired opening. No  significant mitral regurgitation.  Peak E velocity 110 cm/s and peak e' velocity septum 11 cm/s and lateral wall  14 cm/s and E/e' average 9.  Tricuspid valve had unremarkable appearance. Mild tricuspid regurgitation.  RVSP less than 35 mmHg.  Pulmonic valve had unremarkable appearance. No significant pulmonic  regurgitation. Right ventricular outflow track 4 cm diameter.  IVC probably dilated.  No pericardial effusion.  Aortic root was not dilated.      Interpretation  1. Undetermined supraventricular rhythm with regular ventricular rhythm with  probably broad QRS complexes.  2. Right  ventricle at least mildly dilated.  3. Left atrium probably mildly dilated.  4. Unsure whether right atrium dilated.  5. Aortic valve probably had 3 cusps and had mild sclerosis/calcification of  the cusps with well maintained opening and no regurgitation.  6. Mild tricuspid regurgitation.  7. Peak systolic pulmonary artery pressure estimated less than 35 mmHg.  8. IVC probably dilated suggesting elevated IVC and right atrial pressure.    MMode/2D Measurements & Calculations  ESV(Teich): 73.9 ml  EF(Teich): 43.9 %                      Ao root area: 13.0 cm2  Asc Aorta Diam: 4.1 cm                 LVOT diam: 2.3 cm    EDV(MOD-sp4): 129.1 ml  ESV(MOD-sp4): 86.7 ml  EF(MOD-sp4): 32.8 %    Doppler Measurements & Calculations  MV E max frankie: 115.7 cm/sec               MV max P.9 mmHg                                           MV mean P.8 mmHg  MV dec time: 0.29 sec                    Ao V2 max: 175.7 cm/sec                                           Ao max P.3 mmHg                                           Ao V2 mean: 127.8 cm/sec                                           Ao mean P.2 mmHg                                           Ao V2 VTI: 33.3 cm                                           ARMAND(I,D): 3.4 cm2                                             ARMAND(V,D): 2.9 cm2  LV V1 max P.2 mmHg                   PA max P.6 mmHg  LV V1 mean PG: 3.5 mmHg  LV V1 max: 124.8 cm/sec  LV V1 mean: 87.7 cm/sec  LV V1 VTI: 27.5 cm  TR max frankie: 219.6 cm/sec  TR max P.4 mmHg  RVSP(TR): 29.4 mmHg    _______________________________________________________________________________      Electronically signed by: Rush Reilly MD  on 2019 09:30 AM         Ir Outpatient Visit    Result Date: 10/14/2019  IMPRESSION : The requested procedure was not attempted today as the patient's cutaneous fistula site has healed over there is no evidence of any continued drainage for the past several days. The patient and his  wife were instructed to continuously evaluate for recurrent drainage, at which point the patient can be returned for the attempted procedure.  Electronically Signed By-Jonn Cuenca On:10/14/2019 5:38 PM This report was finalized on [id number] by  Jonn Cuenca, .      Xrays, labs reviewed personally by physician.    Medication Review:   I have reviewed the patient's current medication list      Scheduled Meds    allopurinol 100 mg Oral BID   atorvastatin 20 mg Oral Daily   docusate sodium 100 mg Oral BID   DULoxetine 60 mg Oral Daily   ferrous sulfate 324 mg Oral BID With Meals   folic acid 1 mg Oral Daily   insulin glargine 46 Units Subcutaneous Nightly   insulin lispro 0-7 Units Subcutaneous 4x Daily With Meals & Nightly   insulin lispro 15 Units Subcutaneous TID With Meals   lactulose 60 mL Oral Q4H   levothyroxine 75 mcg Oral Q AM   magnesium oxide 400 mg Oral Daily   metoclopramide 5 mg Oral BID   pantoprazole 40 mg Oral Daily   potassium chloride 20 mEq Oral Daily   rifaximin 550 mg Oral Q12H   sodium bicarbonate 650 mg Oral TID   sodium chloride 10 mL Intravenous Q12H   sulfamethoxazole-trimethoprim 1 tablet Oral Q12H   THERA 1 tablet Oral Daily       Meds Infusions    sodium chloride 75 mL/hr Last Rate: 75 mL/hr (10/16/19 1805)       Meds PRN  dextrose  •  dextrose  •  glucagon (human recombinant)  •  hydrOXYzine  •  nitroglycerin  •  ondansetron **OR** ondansetron  •  oxyCODONE  •  [COMPLETED] Insert peripheral IV **AND** sodium chloride  •  sodium chloride        Assessment / Plan    Acute on chronic anemia  Has a history of JOSE with malabsorption and IgG kappa MGUS  Patient on supplementary iron  Hemoglobin August 2019 was 10  ?  Slow GI bleed  Though patient denies history of hematochezia but has black stool-patient is on iron- has had previous history of GI bleed  Transfusing as needed to keep hemoglobin greater than 7  Hematology is following  GI also consulted  PPI    History of Gonzalez cirrhosis  with complication-hepatic encephalopathy  Had no history of previous esophageal varices  Continue on home medication  On Xifaxan and lactulose    History of PE/DVT-status post IVC filter    Anxiety/depression disorder  On  Atarax  and Cymbalta     Hypothyroidism-on Synthroid    Acute kidney failure on chronic kidney disease stage III  Possibly due to ATN from severe anemia leading to renal hypoperfusion in the setting of recent exposure to Bactrim  Holding diuretic  Nephrology is following  Transfusing packed red blood cell    Diabetes mellitus type 2   On Lantus, pre-meal insulin and sliding scale insulin  Monitor blood sugar and adjust insulin as needed       Recent chest wall abscess s/p resection- wound healing- on Bactrim 10 days since 10/10/19  Discontinue Bactrim due to worsening renal function     Gout on allopurinol    DVT prophylaxis with SCD                    Active Hospital Problems    Diagnosis  POA   • Anemia [D64.9]  Yes      Resolved Hospital Problems   No resolved problems to display.           Celestino Menchaca MD  10/17/19  9:39 AM

## 2019-10-17 NOTE — PROGRESS NOTES
Hematology/Oncology Inpatient Progress Note    PATIENT NAME: Bry Ratliff  : 1945  MRN: [id number]    CHIEF COMPLAINT: anemia    HISTORY OF PRESENT ILLNESS:  74 y.o.  male admitted through the ED 10/16/2019 after being sent from the [facility] where his hemoglobin was 4.8.  He reported some black stools on oral iron supplementation twice per day without melena or bright red blood per rectum.  He denied recent bleeding events including hemoptysis, nosebleeds, and hematuria.  He had been treated for a chest wall abscess in early 2019 with I&D followed by antibiotics.  An outpatient CT abdomen done 2019 due to continued drainage from the wound showed significant decrease in posterior right hepatic lobe fluid collection, trace right pleural effusion that was improved, intrahepatic and extrahepatic biliary ductal dilation with new intrahepatic biliary dilation.  Liver cirrhosis with no focal liver lesion, diverticulosis, and nonobstructive renal stones noted.  He was most recently treated with Bactrim given by his surgeon.  He stated that the wound closed and he did not have significant bleeding when the wound was open. He denied fever or chills.  His spouse reported him to be more fatigued and less active.  He reported stable shortness of air with exertion.  He was not on anticoagulation but was taking aspirin 81 mg by mouth daily.  CBC at the [facility] center 10/16/2019 showed a WBC 9.3, hemoglobin 4.8, .1, and platelets 289,000.  Repeat with a new specimen revealed hemoglobin 4.9.  Acute anemia labs were sent with reticulocyte count 7.63 percent (0.7-1.9).  Remaining anemia work-up was pending at time of admission.  PMH significant for JOSE with malabsorption, IgG kappa monoclonal gammopathy, erosive gastritis, Syed's esophagus, CKD stage III, vitamin B12 deficiency, RLE DVT and history of PE with thrombophilia, THOMAS, and splenomegaly.     10/16/19  Hematology/Oncology was  consulted as the patient is known to our service and followed for anemia diagnosed January 2018, IgG kappa monoclonal gammopathy diagnosed May 2018, elevated LFTs with biliary duct dilation diagnosed 2018 and right lower extremity DVT and bilateral pulmonary emboli diagnosed 2004.    · Anemia/JOSE/MGUS - He was initially seen by us in consultation for anemia with hemoglobin of 7.4 noted in January 2018 by his nephrologist Dr. Plascencia.  He did undergo an EGD and colonoscopy in March 2018 with findings of Syed's esophagus and hiatal hernia in the cardia.  Colonoscopy had poor prep but the scope did reach the cecum.  Colonoscopy was recommended to be repeated in 2 years and EGD in 3 years.  At time of consultation the patient had reported history of vitamin B12 deficiency but had not been on vitamin B12 injections for 6 months prior.  B12 level at PCP office prior to consultation was normal.  He was diagnosed with JOSE and was started on oral iron therapy.  Additional work-up including bone marrow evaluation revealed an IgG kappa monoclonal gammopathy.  Bone marrow had 4% plasma cells present.  EGD in November 2018 revealed erosive gastritis, bleeding in the ampulla and oozing upon entering the stomach in many different areas.  He has received IV iron in late 2018 and again in late April and early May 2019.  On 7/8/2019, iron studies showed iron saturation 36%, iron 56, TIBC 154 and ferritin was 1199.  On 8/7/2019, his hemoglobin was 10.0.  He was restarted on ferrous sulfate 325 mg by mouth twice a day.  · Elevated LFTs and biliary duct dilation- work-up of elevated LFTs revealed cholelithiasis and THOMAS.  In July 2018 he underwent ERCP with bilateral sphincterotomy and common duct stone extraction with biliary brushing and stent placement.  Pathology on brushings was negative.  He subsequently underwent EUS in August 2018 revealing suspect benign biliary stricture due to CBD stone with pathology negative for  malignancy and revealing benign and atypical ductal cells.  He was hospitalized once in April and a second hospitalization in May 2019 for encephalopathy related to his cirrhosis.  · Right lower extremity DVT and bilateral PEs were diagnosed in 2004 he was treated with Coumadin followed by Xarelto.  Thrombophilia work-up revealed protein C and S deficiencies, elevated factor VIII, and antiphospholipid antibody positivity.  Due to GI bleed, and IVC filter was placed in December 2018 and low-dose aspirin was started.  Repeat antiphospholipid antibodies were positive and factor VIII remained elevated (334) in April 2019.     PCP: Paulette Harris MD    Subjective   He is feeling well today.  No bleeding or GI symptomatology.      ROS:  Review of Systems   Constitutional: Negative for diaphoresis, fatigue, fever and unexpected weight change.   HENT: Negative for congestion and nosebleeds.    Eyes: Negative.    Respiratory: Negative for cough and shortness of breath.    Cardiovascular: Negative for chest pain and leg swelling.   Gastrointestinal: Negative for abdominal pain, blood in stool, constipation, diarrhea, nausea and vomiting.   Endocrine: Negative for cold intolerance and heat intolerance.   Genitourinary: Negative for dysuria and hematuria.   Musculoskeletal: Negative for arthralgias and joint swelling.   Skin: Positive for wound (on sacrum. ). Negative for rash.   Allergic/Immunologic: Negative.    Neurological: Negative for numbness and headaches.   Hematological: Does not bruise/bleed easily.   Psychiatric/Behavioral: Negative for confusion. The patient is not nervous/anxious.    All other systems reviewed and are negative.       MEDICATIONS:    Scheduled Meds:    allopurinol 100 mg Oral BID   atorvastatin 20 mg Oral Daily   docusate sodium 100 mg Oral BID   DULoxetine 60 mg Oral Daily   ferrous sulfate 324 mg Oral BID With Meals   folic acid 1 mg Oral Daily   insulin glargine 46 Units Subcutaneous Nightly  "  insulin lispro 0-7 Units Subcutaneous 4x Daily With Meals & Nightly   insulin lispro 15 Units Subcutaneous TID With Meals   lactulose 60 mL Oral Q4H   levothyroxine 75 mcg Oral Q AM   magnesium oxide 400 mg Oral Daily   metoclopramide 5 mg Oral BID   pantoprazole 40 mg Oral Daily   potassium chloride 20 mEq Oral Daily   rifaximin 550 mg Oral Q12H   sodium bicarbonate 650 mg Oral TID   sodium chloride 10 mL Intravenous Q12H   sulfamethoxazole-trimethoprim 1 tablet Oral Q12H   THERA 1 tablet Oral Daily      Continuous Infusions:    sodium chloride 75 mL/hr Last Rate: 75 mL/hr (10/16/19 1805)      PRN Meds:  dextrose  •  dextrose  •  glucagon (human recombinant)  •  hydrOXYzine  •  nitroglycerin  •  ondansetron **OR** ondansetron  •  oxyCODONE  •  [COMPLETED] Insert peripheral IV **AND** sodium chloride  •  sodium chloride     ALLERGIES:  No Known Allergies    Objective    VITALS:   /46   Pulse 77   Temp 98.6 °F (37 °C)   Resp 17   Ht 188 cm (74\")   Wt (!) 143 kg (315 lb 7.7 oz)   SpO2 100%   BMI 40.50 kg/m²     PHYSICAL EXAM:  Physical Exam   Constitutional: He is oriented to person, place, and time. He appears well-developed and well-nourished.   Morbid obesity   HENT:   Head: Normocephalic and atraumatic.   Mouth/Throat: Oropharynx is clear and moist.   Eyes: Conjunctivae, EOM and lids are normal. Pupils are equal, round, and reactive to light.   Neck: Normal range of motion. Neck supple. No thyromegaly present.   Cardiovascular: Normal rate, regular rhythm and normal heart sounds.   No murmur heard.  Pulmonary/Chest: Effort normal and breath sounds normal. No respiratory distress.   Abdominal: Soft. Normal appearance and bowel sounds are normal. He exhibits no distension.   Genitourinary:   Genitourinary Comments: Deferred.   Musculoskeletal: Normal range of motion. He exhibits no edema.   Lymphadenopathy:     He has no cervical adenopathy.     He has no axillary adenopathy.        Right: No " supraclavicular adenopathy present.        Left: No supraclavicular adenopathy present.   Neurological: He is alert and oriented to person, place, and time.   Skin: Skin is warm and dry. Capillary refill takes less than 2 seconds. No bruising, no petechiae and no rash noted.   Psychiatric: He has a normal mood and affect. His speech is normal and behavior is normal. Judgment and thought content normal. Cognition and memory are normal.   Nursing note and vitals reviewed.        RECENT LABS:  Lab Results (last 24 hours)     Procedure Component Value Units Date/Time    POC Glucose Once [[id number]]  (Abnormal) Collected:  10/17/19 0815    Specimen:  Blood Updated:  10/17/19 0817     Glucose 164 mg/dL      Comment: Serial Number: [id number]:  [id number]       Basic Metabolic Panel [[id number]]  (Abnormal) Collected:  10/17/19 0620    Specimen:  Blood Updated:  10/17/19 0722     Glucose 156 mg/dL      BUN 27 mg/dL      Creatinine 2.20 mg/dL      Sodium 139 mmol/L      Potassium 4.0 mmol/L      Chloride 104 mmol/L      CO2 26.0 mmol/L      Calcium 7.8 mg/dL      eGFR Non African Amer 29 mL/min/1.73      BUN/Creatinine Ratio 12.3     Anion Gap 13.0 mmol/L     Narrative:       GFR Normal >60  Chronic Kidney Disease <60  Kidney Failure <15    Hemoglobin A1c [[id number]]  (Normal) Collected:  10/17/19 0620    Specimen:  Blood Updated:  10/17/19 0715     Hemoglobin A1C 4.5 %     Narrative:       Hemoglobin A1C Reference Range:    <5.7 %        Normal  5.7-6.4 %     Increased risk for diabetes  > 6.4 %        Diabetes       These guidelines have been recommended by the American Diabetic Association for Hgb A1c.      The following 2010 guidelines have been recommended by the American Diabetes Association for Hemoglobin A1c.    HBA1c 5.7-6.4% Increased risk for future diabetes (pre-diabetes)  HBA1c     >6.4% Diabetes    CBC & Differential [[id number]] Collected:  10/17/19 0620    Specimen:  Blood Updated:  10/17/19 0703     Narrative:       The following orders were created for panel order CBC & Differential.  Procedure                               Abnormality         Status                     ---------                               -----------         ------                     CBC Auto Differential[[id number]]        Abnormal            Final result                 Please view results for these tests on the individual orders.    CBC Auto Differential [[id number]]  (Abnormal) Collected:  10/17/19 0620    Specimen:  Blood Updated:  10/17/19 0703     WBC 6.70 10*3/mm3      RBC 1.42 10*6/mm3      Hemoglobin 5.2 g/dL      Hematocrit 16.3 %      .4 fL      Comment: Parameter reviewed in the past 30 days on 10.16.19. Result checked         MCH 36.7 pg      MCHC 32.1 g/dL      RDW 23.7 %      RDW-SD 94.9 fl      MPV 8.3 fL      Platelets 218 10*3/mm3     Scan Slide [[id number]] Collected:  10/17/19 0620    Specimen:  Blood Updated:  10/17/19 0703     Scan Slide --     Comment: See Manual Differential Results       Manual Differential [[id number]]  (Abnormal) Collected:  10/17/19 0620    Specimen:  Blood Updated:  10/17/19 0703     Neutrophil % 74.0 %      Lymphocyte % 18.0 %      Monocyte % 3.0 %      Eosinophil % 1.0 %      Bands %  4.0 %      Neutrophils Absolute 5.23 10*3/mm3      Lymphocytes Absolute 1.21 10*3/mm3      Monocytes Absolute 0.20 10*3/mm3      Eosinophils Absolute 0.07 10*3/mm3      Anisocytosis Large/3+     Macrocytes Mod/2+     Poikilocytes Mod/2+     WBC Morphology Normal     Giant Platelets Slight/1+    Comprehensive Metabolic Panel [[id number]]  (Abnormal) Collected:  10/16/19 1814    Specimen:  Blood Updated:  10/16/19 1848     Glucose 193 mg/dL      BUN 28 mg/dL      Creatinine 2.45 mg/dL      Sodium 136 mmol/L      Potassium 3.8 mmol/L      Chloride 100 mmol/L      CO2 22.0 mmol/L      Calcium 8.1 mg/dL      Total Protein 6.7 g/dL      Albumin 2.80 g/dL      ALT (SGPT) 30 U/L      AST (SGOT) 56 U/L      Alkaline  Phosphatase 380 U/L      Total Bilirubin 0.4 mg/dL      eGFR Non African Amer 26 mL/min/1.73      Globulin 3.9 gm/dL      A/G Ratio 0.7 g/dL      BUN/Creatinine Ratio 11.4     Anion Gap 17.8 mmol/L     Narrative:       GFR Normal >60  Chronic Kidney Disease <60  Kidney Failure <15    Lipase [[id number]]  (Normal) Collected:  10/16/19 1814    Specimen:  Blood Updated:  10/16/19 1848     Lipase 26 U/L           PENDING RESULTS: stool heme, UA    IMAGING REVIEWED:  No radiology results for the last day    I have reviewed the patient's labs, imaging, reports, and other clinician documentation.    Assessment/Plan   ASSESSMENT:  1. Acute on chronic anemia/Macrocytosis/JOSE with malabsorption/IgG kappa MGUS - Chronic anemia with last hemoglobin 10.0 in early August and on oral iron twice daily. MGUS has been followed as outpatient and not likely etiology for acute anemia. EGD November 2018 with active bleeding.  GI bleed suspected-GI consulted-on protonix and stool heme ordered.  S/P pRBC's 1 unit 10/16 and 10/17. Recent antibiotics for chest wall wound-adverse effect of Bactrim may be contributing as well as liver disease/splenomegaly.  Hapto normal and retic high.  Folic acid normal, B12 high and Fe studies showed ACD. On oral Fe, Multivitamin and folic acid.  UA sent for possible source of blood loss.     2. Biliary ductal dilation/THOMAS with cirrhosis/splenomegaly/Syed's esophagus - followed by [facility] as outpatient.  Outpatient CT had revealed new intrahepatic biliary dilation.  GI consulted.   3. Thrombophilia/H/o RLE DVT and bilateral PE -antiphospholipid antibody positivity, decreased protein C&S, and elevated factor VIII.  Has IVC filter. ASA on hold on admit.  SCD's.    4. SUSANA on CKD - followed by Dr. Plascencia as outpatient.   5. DM/CAD s/p stents/HLD/HTN/KATHIA - chronic conditions per primary team.   6. Sacral wound-on bactrim for > 1 week.  Per Dr. Etienne as an OP.      PLAN  1. Transfuse to keep Hgb above 7.0-1  unit today.   2. Recommend GI consult.  3. Stool heme.  4. LDH.   5. UA.  6. Consider holding bactrim.     Electronically signed by SHAJI Tamayo, 10/17/19, 9:35 AM.         I have personally performed a face-to-face diagnostic evaluation on this patient.  I have discussed the case with Susan Garcia NP, have edited/reviewed the note,  and agree with the care plan.  Patient claims to be feeling all right with no significant shortness of breath but on examination is quite pale.  His labs are significant for hemoglobin of 5.2 which is minimally better than yesterday after 1 unit of packed red blood cells.  He seems to have had GI bleed and he needs additional transfusions and GI work-up.          I discussed the patients findings and my recommendations with patient    KIERSTEN Mayfield MD  10/17/19  9:21 AM    Much of the above report is an electronic transcription/translation of the spoken language to printed text using Dragon Software. As such, the subtleties and finesse of the spoken language may permit erroneous, or at times, nonsensical words or phrases to be inadvertently transcribed; thus changes may be made at a later date to rectify these errors.

## 2019-10-17 NOTE — ED NOTES
No reaction noted after first 15 minutes of blood transfusion.  Rate changed.  Will cont to monitor.      Simran Moore RN  10/17/19 [id number]

## 2019-10-17 NOTE — ED NOTES
Call placed to Dr Menchaca to reports pt's hgb of 5.2 after transfusion.  Awating call back.      Simran Moore RN  10/17/19 [id number]

## 2019-10-17 NOTE — ED NOTES
Blood transfusion complete.  No reactions suspected.  Family at bedside. Pt ordered lunch, resting abed.  No acute distress noted.  Will cont to monitor.      Simran Moore RN  10/17/19 [id number]

## 2019-10-17 NOTE — CONSULTS
NEPHROLOGY CONSULTATION-----[facility]    [facility]  [phone]  Paula Plascencia MD    Patient Care Team:  Paulette Harris MD as PCP - General    CC/REASON FOR CONSULTATION: RENAL FAILURE/ELEVATED SERUM CREATININE  PHYSICIAN REQUESTING CONSULTATION:     History of Present Illness     HPI    Patient is a 74 y.o. WM, who I actively follow as an outpatient for his CRF/CKD STG 3, whom I was asked to see in consultation for evaluation and management of renal failure/elevated serum creatinine. Patient was admitted with severe anema.  Recent exposure to Bactrim. No NSAIDs or recent IV dye exposure. No known h/o hepatitis, TB, rheumatic fever, jaundice, SLE. Does bleed/bruise easily. Chronic diarrhea from Lactulose use. Some urinary hesitancy.  Some LE edema/fluid retention. +Compliance with home meds. Was on diuretics in the form of Bumex   prior to admission.   No herbal med use.      Review of Systems   Constitutional: Positive for activity change and fatigue. Negative for appetite change, chills, diaphoresis, fever and unexpected weight change.   HENT: Negative for congestion, dental problem, drooling, ear discharge, ear pain, facial swelling, hearing loss, mouth sores, nosebleeds, postnasal drip, rhinorrhea, sinus pressure, sinus pain, sneezing, sore throat, tinnitus, trouble swallowing and voice change.    Eyes: Negative for photophobia, pain, discharge, redness, itching and visual disturbance.   Respiratory: Positive for shortness of breath. Negative for apnea, cough, choking, chest tightness, wheezing and stridor.    Cardiovascular: Positive for leg swelling. Negative for chest pain and palpitations.   Gastrointestinal: Positive for diarrhea. Negative for abdominal distention, abdominal pain, anal bleeding, blood in stool, constipation, nausea, rectal pain and vomiting.   Endocrine: Negative for cold intolerance, heat intolerance, polydipsia, polyphagia  and polyuria.   Genitourinary: Negative for decreased urine volume, difficulty urinating, dysuria, enuresis, flank pain, frequency, genital sores, hematuria and urgency.   Musculoskeletal: Positive for arthralgias. Negative for back pain, gait problem, joint swelling, myalgias, neck pain and neck stiffness.   Skin: Negative for color change, pallor, rash and wound.   Allergic/Immunologic: Negative for environmental allergies, food allergies and immunocompromised state.   Neurological: Positive for weakness. Negative for dizziness, tremors, seizures, syncope, facial asymmetry, speech difficulty, light-headedness, numbness and headaches.   Hematological: Negative for adenopathy. Bruises/bleeds easily.   Psychiatric/Behavioral: Negative for agitation, behavioral problems, confusion, decreased concentration, dysphoric mood, hallucinations, self-injury, sleep disturbance and suicidal ideas. The patient is not nervous/anxious and is not hyperactive.           Past Medical History:   Diagnosis Date   • Anemia    • B12 deficiency 2009   • Syed's esophagus    • CAD (coronary artery disease)    • Diabetes mellitus (CMS/[facility])    • History of echocardiogram     03/03/2017 - 12/03/2014 - 04/2012   • Hyperlipidemia    • Hypertension    • Morbid obesity (CMS/HCC)    • KATHIA treated with BiPAP    • Renal insufficiency    • S/P lumbar laminectomy        Past Surgical History:   Procedure Laterality Date   • BACK SURGERY  1971   • CATARACT EXTRACTION  2014   • CHOLECYSTECTOMY  02/24/2019   • COLONOSCOPY  03/2018   • CORONARY ANGIOPLASTY  08/24/2009    PCI stent - succesful placement of 3.5/23 promus stent in proximal right coronary artery   • CORONARY ANGIOPLASTY WITH STENT PLACEMENT  08/27/2009    patient had stent placed to proximal right coronary artery   • CYSTOSCOPY BLADDER STONE LITHOTRIPSY  1997   • OTHER SURGICAL HISTORY  11/2018    IVC filter implant   • RENAL ARTERY STENT Left        Family History   Problem Relation Age of  Onset   • Hyperlipidemia Other    • Hypertension Other        Social History     Tobacco Use   • Smoking status: Never Smoker   • Smokeless tobacco: Never Used   Substance Use Topics   • Alcohol use: No     Frequency: Never   • Drug use: No       Home Meds:   (Not in a hospital admission)    Scheduled Meds:    allopurinol 100 mg Oral BID   atorvastatin 20 mg Oral Daily   bumetanide 1 mg Oral Q PM   bumetanide 2 mg Oral Daily   docusate sodium 100 mg Oral BID   DULoxetine 60 mg Oral Daily   ferrous sulfate 324 mg Oral BID With Meals   folic acid 1 mg Oral Daily   insulin glargine 46 Units Subcutaneous Nightly   insulin lispro 0-7 Units Subcutaneous 4x Daily With Meals & Nightly   insulin lispro 15 Units Subcutaneous TID With Meals   lactulose 60 mL Oral Q4H   levothyroxine 75 mcg Oral Q AM   magnesium oxide 400 mg Oral Daily   metoclopramide 5 mg Oral BID   pantoprazole 40 mg Oral Daily   potassium chloride 20 mEq Oral Daily   rifaximin 550 mg Oral Q12H   sodium bicarbonate 650 mg Oral TID   sodium chloride 10 mL Intravenous Q12H   sulfamethoxazole-trimethoprim 1 tablet Oral Q12H   THERA 1 tablet Oral Daily       Continuous Infusions:    sodium chloride 75 mL/hr Last Rate: 75 mL/hr (10/16/19 1805)       PRN Meds:  dextrose  •  dextrose  •  glucagon (human recombinant)  •  hydrOXYzine  •  nitroglycerin  •  ondansetron **OR** ondansetron  •  oxyCODONE  •  [COMPLETED] Insert peripheral IV **AND** sodium chloride  •  sodium chloride    Allergies:  Patient has no known allergies.    OBJECTIVE    Vital Signs  Temp:  [97.4 °F (36.3 °C)-98.3 °F (36.8 °C)] 98.3 °F (36.8 °C)  Heart Rate:  [62-89] 79  Resp:  [16-23] 17  BP: (107-199)/(38-58) 123/43    No intake/output data recorded.  I/O last 3 completed shifts:  In: 308 [Blood:308]  Out: -     Physical Exam:  General Appearance: alert, appears stated age and cooperative  Head: normocephalic, without obvious abnormality and atraumatic  Eyes: conjunctivae and sclerae normal and  no icterus  Neck: supple and no JVD  Lungs: clear to auscultation and respirations regular  Heart: regular rhythm & normal rate and normal S1, S2 +REBECCA  Chest Wall: no abnormalities observed  Abdomen: normal bowel sounds and soft non-tender +OBESITY  Extremities: moves extremities well, +TRACE CHRONIC PRETIBIAL EDEMA, no cyanosis and no redness  Skin: no bleeding, bruising or rash  Neurologic: Alert, and oriented. No focal deficits    Results Review:    I reviewed the patient's new clinical results.    WBC WBC   Date Value Ref Range Status   10/17/2019 6.70 3.40 - 10.80 10*3/mm3 Final   10/16/2019 10.39 3.40 - 10.80 10*3/mm3 Final   10/16/2019 9.30 3.40 - 10.80 10*3/mm3 Final      HGB Hemoglobin   Date Value Ref Range Status   10/17/2019 5.2 (C) 13.0 - 17.7 g/dL Final   10/16/2019 5.1 (C) 13.0 - 17.7 g/dL Final   10/16/2019 4.9 (C) 13.0 - 17.7 g/dL Final   10/16/2019 4.8 (C) 13.0 - 17.7 g/dL Final      HCT Hematocrit   Date Value Ref Range Status   10/17/2019 16.3 (C) 37.5 - 51.0 % Final   10/16/2019 15.8 (C) 37.5 - 51.0 % Final   10/16/2019 16.2 (C) 37.5 - 51.0 % Final   10/16/2019 16.5 (C) 37.5 - 51.0 % Final      Platlets No results found for: LABPLAT   MCV MCV   Date Value Ref Range Status   10/17/2019 114.4 (H) 79.0 - 97.0 fL Final     Comment:     Parameter reviewed in the past 30 days on 10.16.19. Result checked    10/16/2019 122.7 (H) 79.0 - 97.0 fL Final   10/16/2019 124.1 (H) 79.0 - 97.0 fL Final          Sodium Sodium   Date Value Ref Range Status   10/17/2019 139 136 - 145 mmol/L Final   10/16/2019 136 136 - 145 mmol/L Final      Potassium Potassium   Date Value Ref Range Status   10/17/2019 4.0 3.5 - 5.2 mmol/L Final   10/16/2019 3.8 3.5 - 5.2 mmol/L Final      Chloride Chloride   Date Value Ref Range Status   10/17/2019 104 98 - 107 mmol/L Final   10/16/2019 100 98 - 107 mmol/L Final      CO2 CO2   Date Value Ref Range Status   10/17/2019 26.0 22.0 - 29.0 mmol/L Final   10/16/2019 22.0 22.0 - 29.0  mmol/L Final      BUN BUN   Date Value Ref Range Status   10/17/2019 27 (H) 8 - 23 mg/dL Final   10/16/2019 28 (H) 8 - 23 mg/dL Final      Creatinine Creatinine   Date Value Ref Range Status   10/17/2019 2.20 (H) 0.76 - 1.27 mg/dL Final   10/16/2019 2.45 (H) 0.76 - 1.27 mg/dL Final      Calcium Calcium   Date Value Ref Range Status   10/17/2019 7.8 (L) 8.6 - 10.5 mg/dL Final   10/16/2019 8.1 (L) 8.6 - 10.5 mg/dL Final      PO4 No results found for: CAPO4   Albumin Albumin   Date Value Ref Range Status   10/16/2019 2.80 (L) 3.50 - 5.20 g/dL Final      Magnesium No results found for: MG   Uric Acid No results found for: URICACID       Imaging Results (last 72 hours)     ** No results found for the last 72 hours. **            Results for orders placed during the hospital encounter of 07/11/19   XR Chest 2 View    Narrative DATE OF EXAM:  7/11/2019 10:46 AM     PROCEDURE:  XR CHEST 2 VW-     INDICATIONS:  NONALCOHOLIC STEATOHEPATITIS; K75.81-Nonalcoholic steatohepatitis  (THOMAS); K74.60-Unspecified cirrhosis of liver; K72.90-Hepatic failure,  unspecified without coma; D72.829-Elevated white blood cell count,  unspecified     COMPARISON:  AP portable chest 04/25/2019.     TECHNIQUE:   Two radiologic views of the chest.     FINDINGS:  Small right pleural effusion with minimal right costophrenic angle  atelectasis. Heart size is normal, and is improved since 04/25/2019.  Pulmonary vascular distribution is normal. The right arm approach PICC  is been removed since the prior examination. No visible pneumothorax.  Degenerative spurring within the thoracic spine, but no acute osseous  abnormality is seen.        Impression Stable small right pleural effusion since 04/25/2019. Minimal right  costophrenic angle atelectasis.     Electronically Signed By-Dr. Kaitlin Wong MD On:7/11/2019 11:06 AM  This report was finalized on [id number] by Dr. Kaitlin Wong MD.         Results for orders placed during the hospital encounter  of 19   Duplex Venous Lower Extremity - Bilateral CAR    Narrative                   Lower Extremity Venous Report                          [facility]                         Vascular Laboratory                            [address] [place] [address]    Name: JOSE PATEL                Study Date: 2019 04:18 PM  MRN: [id number]                       Patient Location:   : 1945 (M/d/yyyy)           Gender: Male  Age: 73 yrs                          Account#: [id number]  Reason For Study: soa      Procedure  Venous study was carried out in bilateral lower extremities. Gray scale, color  flow, and spectral doppler images were obtained. Images were obtained in  transverse and longitudinal views. The study was technically difficult due to  Difficulty visualizing the distal veins secondary to edema and body habitus..    Right Lower Extremity Venous  On the right side the patient has patent common femoral, femoral, and  popliteal veins. The main veins are easily compressible. Femoral vein was  mapped in its entirety through the course of the thigh. It is patent and  compresses well. The popliteal vein is patent along with its tributaries and  compresses well with no evidence of any filling defect. Augmentation test is  positive. Respiratory waves are biphasic. Distal veins are patent. The right  greater and small saphenous veins are patent with good compressibility. There  is fluid retention noted, suggestive of edema.    Left Lower Extremity Venous  On the left side the patient has patent common femoral, femoral, and popliteal  veins. The main veins compress well. Femoral vein was mapped in its entirety  through the course of the thigh. It is patent and compresses well. The  popliteal vein is patent along with its tributaries and compresses well with  no evidence of any filling defect. Augmentation test is positive. Respiratory  waves are biphasic. Distal  veins are patent. The left greater and small  saphenous veins are patent with good compressibility. There is fluid retention  noted, suggestive of edema.      Interpretation Summary  No evidence of any deep vein thrombosis or superficial vein thrombosis.  _______________________________________________________________________________    Electronically signed by: Willian Adams MD  on 03/18/2019 11:07 AM    Ordering Physician: ANA HARTLEY  Referring Physician: MELODY CHIANG  Performed By:          ASSESSMENT / PLAN      Anemia      1. RENAL FAILURE-------Nonoliguric. +ARF/SUSANA on top of known CRF/CKD STG 3 with a baseline serum creatinine of about 1.5. CRF/CKD STG 3 secondary to DGS/HTN NS. +ARF/SUSANA appears to be secondary to ATN from severe anemia with subsequent decreased renal perfusion and recent Bactrim exposure. Transfuse. Hold Bumex. Check urine and serum studies and renal US. No NSAIDs or IV dye. Dose meds for CrCl less than 10 cc/min.    2. ANEMIA------per Hem/Onc and GI. Check Fe. Transfuse    3. HTN WITH CKD STG 3-------BP okay. Avoid hypotension. No ACE/ARB/DRI/diuretic    4. DMII WITH RENAL MANIFESTATIONS------BS okay. On Lantus    5. HYPOTHYROIDISM-------On Synthroid. Check TSH    6. HYPOCALCEMIA------Replace IV. Follow ionized levels    7. OBESITY/THOMAS/KATHIA------Follow ammonia. On Lactulose. CPAP    8. EDEMA/CHRONIC VENOUS INSUFFICIENCY    9. TYPE 4 RTA------Stable bicarbonate levels on po NaHCO3    10. GERD-------On PPI      I discussed the patients findings and my recommendations with patient, family, nursing staff and consulting provider    Will follow along closely. Thank you for allowing us to see this patient in renal consultation.    [facility]  [phone]  MD Paula Forte Konijeti, MD  10/17/19  8:32 AM

## 2019-10-17 NOTE — CONSULTS
"GI CONSULT  NOTE:    Referring Provider:  Dr. Menchaca    Chief complaint: Anemia    Subjective \"I was told my blood count was very low\"    History of present illness: Patient is a 74-year-old male with a history of Gonzalez cirrhosis complicate a by hepatic encephalopathy, choledocholithiasis with previous ERCP and sphincterotomy, chronic kidney disease, diabetes, PE/DVT with IVC filter, CAD/stent chronic pain on narcotics.  He has had a history of cholecystectomy earlier this year with drain placement and developed a fistula.  Is apparently healed without need for repeat surgical intervention.  He has a history of protein S and C deficiency, Antithrombin III deficiency and antiphospholipid positive with MGUS and is followed by hematology with iron deficiency anemia.    He presents after being found to have a hemoglobin of 5.1 as an outpatient for transfusions.  He is had increased weakness with instability but denies nausea, vomiting or heartburn.  No difficult he swallowing and is eating well without abdominal pain.  No fevers chills.  No chest pain or increased shortness of breath.  He was on recent antibiotic course with Bactrim but this was stopped by nephrology.  He had recurrent right side drainage from previous drain placement over the last several months that has healed spontaneously.  Wife is at bedside who helps with recent history.      Endo History:  2/19 ERCP Dr. Hampton - evidence of prior sphicterotomy, sludge and small stones removed via balloon extraction  10/18 ERCP Dr. Russo - multiple filling defects on cholangiogram indicating multipele large cbd stones, sphinteroplasty using 8mm hurricane balloon held x 3 minutes with some bleeding after sphincteroplasty that slowed down by endo of procedure, successful extraction of multiple large CBD stones larges 1cm, widely patent sphincterotomy side for any stone remnants to come out on their own   9/18 EGD Dr. Galindo - nl egd with removal of biliary " stent  8/18 EUS Dr. Gomez - benign biliary structure suspected to be d/t BD stone, gravel and sludge in CBD, fatty infiltration of pancreas, FNA of CBD negative for malignancy  7/18 ERCP Dr. Leiva - biliary ductal dilation with abrupt distal CBD cutoff, choledocholithiaisis s/p sphincterotomy and balloon sweep, biliary stent placed, biliary brushings negative for malignancy  3/18 EGD/colon Dr. Gomez - Syed's with bx neg for dysphasia, hiatal hernia, poor colon prep  4/17 EGD Dr. Gomez - Syed's esophagus with bx negative for dysphasia, hiatal hernia, polyp in stomach body hyperplastic, food in fundus  6/14 EGD/colonoscopy Dr. Gomez - Syed's esophagus with bx negative for dysphasia, antral polyp, TA colon polyp, div, g2 internal/external hemorrhoids       Past Medical History:  Past Medical History:   Diagnosis Date   • Anemia    • B12 deficiency 2009   • Syed's esophagus    • CAD (coronary artery disease)    • Diabetes mellitus (CMS/HCC)    • History of echocardiogram     03/03/2017 - 12/03/2014 - 04/2012   • Hyperlipidemia    • Hypertension    • Morbid obesity (CMS/HCC)    • KATHIA treated with BiPAP    • Renal insufficiency    • S/P lumbar laminectomy        Past Surgical History:  Past Surgical History:   Procedure Laterality Date   • BACK SURGERY  1971   • CATARACT EXTRACTION  2014   • CHOLECYSTECTOMY  02/24/2019   • COLONOSCOPY  03/2018   • CORONARY ANGIOPLASTY  08/24/2009    PCI stent - succesful placement of 3.5/23 promus stent in proximal right coronary artery   • CORONARY ANGIOPLASTY WITH STENT PLACEMENT  08/27/2009    patient had stent placed to proximal right coronary artery   • CYSTOSCOPY BLADDER STONE LITHOTRIPSY  1997   • OTHER SURGICAL HISTORY  11/2018    IVC filter implant   • RENAL ARTERY STENT Left        Social History:  Social History     Tobacco Use   • Smoking status: Never Smoker   • Smokeless tobacco: Never Used   Substance Use Topics   • Alcohol use: No     Frequency: Never   •  Drug use: No   No tobacco or ETOH abuse    Family History:  Family History   Problem Relation Age of Onset   • Hyperlipidemia Other    • Hypertension Other        Medications:    (Not in a hospital admission)    Scheduled Meds:  allopurinol 100 mg Oral BID   atorvastatin 20 mg Oral Nightly   docusate sodium 100 mg Oral BID   DULoxetine 60 mg Oral Daily   ferrous sulfate 324 mg Oral BID With Meals   folic acid 1 mg Oral Daily   insulin glargine 46 Units Subcutaneous Nightly   insulin lispro 0-7 Units Subcutaneous 4x Daily With Meals & Nightly   insulin lispro 15 Units Subcutaneous TID With Meals   lactulose 60 mL Oral Q4H   levothyroxine 75 mcg Oral Q AM   magnesium oxide 400 mg Oral Daily   metoclopramide 5 mg Oral BID   pantoprazole 40 mg Oral Daily   potassium chloride 20 mEq Oral Daily   rifaximin 550 mg Oral Q12H   sodium bicarbonate 650 mg Oral TID   sodium chloride 10 mL Intravenous Q12H   THERA 1 tablet Oral Daily     Continuous Infusions:  sodium chloride 60 mL/hr Last Rate: 60 mL/hr (10/17/19 1326)     PRN Meds:.dextrose  •  dextrose  •  glucagon (human recombinant)  •  hydrOXYzine  •  nitroglycerin  •  ondansetron **OR** ondansetron  •  oxyCODONE  •  [COMPLETED] Insert peripheral IV **AND** sodium chloride  •  sodium chloride    ALLERGIES:  Patient has no known allergies.    ROS:  Review of Systems   Constitutional: Negative for chills, fever and unexpected weight change.   HENT: Negative for nosebleeds and trouble swallowing.    Respiratory: Negative for cough and shortness of breath.    Cardiovascular: Negative for chest pain and palpitations.   Gastrointestinal: Positive for blood in stool. Negative for abdominal distention, abdominal pain, anal bleeding, constipation, diarrhea, nausea, rectal pain and vomiting.   Genitourinary: Negative for difficulty urinating and dysuria.   Musculoskeletal: Positive for gait problem. Negative for joint swelling.   Skin: Negative for color change and rash.    Neurological: Positive for weakness. Negative for syncope.   Psychiatric/Behavioral: Negative for agitation and confusion.       Objective resting in bed in ER, wife at bedside, NAD    Vital Signs:   Temp:  [97.4 °F (36.3 °C)-98.6 °F (37 °C)] 98.4 °F (36.9 °C)  Heart Rate:  [76-90] 77  Resp:  [16-23] 16  BP: (106-199)/(36-58) 106/36    Physical Exam:      General Appearance:    Awake and alert, in no acute distress, obese   Head:    Normocephalic, without obvious abnormality, atraumatic   Eyes:            Conjunctivae normal, anicteric sclera   Ears:    Ears appear intact with no abnormalities noted   Throat:   No oral lesions, no thrush, oral mucosa moist   Neck:    supple, no thyromegaly, no JVD   Lungs:      respirations regular, even and unlabored    Heart:    Regular rhythm and normal rate, normal S1 and S2   Chest Wall:    No abnormalities observed   Abdomen:     Normal bowel sounds, soft, non-tender, no rebound or guarding, non-distended, obese   Rectal:     Deferred   Extremities:   Moves all extremities well, no cyanosis, no             redness   Pulses:   Pulses palpable and equal bilaterally   Skin:   No bleeding, scattered bruising, no rash, no jaundice   Lymph nodes:   No palpable adenopathy   Neurologic:   Cranial nerves 2 - 12 grossly intact,  sensation intact       Results Review:   I reviewed the patient's labs and imaging.  Lab Results (last 24 hours)     Procedure Component Value Units Date/Time    Sodium, Urine, Random - Urine, Clean Catch [[id number]] Collected:  10/17/19 1508    Specimen:  Urine, Clean Catch Updated:  10/17/19 1512    Eosinophil Smear - Urine, Urine, Clean Catch [[id number]] Collected:  10/17/19 1508    Specimen:  Urine, Clean Catch Updated:  10/17/19 1512    Hemoglobin & Hematocrit, Blood [[id number]]  (Abnormal) Collected:  10/17/19 1446    Specimen:  Blood Updated:  10/17/19 1510     Hemoglobin 5.9 g/dL      Hematocrit 18.2 %     TSH [[id number]]  (Normal) Collected:  10/17/19  0620    Specimen:  Blood Updated:  10/17/19 1446     TSH 3.130 uIU/mL      Comment: Results may be falsely decreased if patient taking Biotin.       POC Glucose Once [[id number]]  (Abnormal) Collected:  10/17/19 1317    Specimen:  Blood Updated:  10/17/19 1324     Glucose 192 mg/dL      Comment: Serial Number: [id number]:  [id number]       Lactate Dehydrogenase [[id number]] Collected:  10/17/19 0620    Specimen:  Blood Updated:  10/17/19 0955     LDH --     Comment: Specimen hemolyzed.  Results may be affected.       CK [[id number]]  (Normal) Collected:  10/17/19 0620    Specimen:  Blood Updated:  10/17/19 0955     Creatine Kinase 61 U/L     Uric Acid [[id number]]  (Abnormal) Collected:  10/17/19 0620    Specimen:  Blood Updated:  10/17/19 0955     Uric Acid 8.3 mg/dL     POC Glucose Once [[id number]]  (Abnormal) Collected:  10/17/19 0815    Specimen:  Blood Updated:  10/17/19 0817     Glucose 164 mg/dL      Comment: Serial Number: [id number]:  [id number]       Basic Metabolic Panel [[id number]]  (Abnormal) Collected:  10/17/19 0620    Specimen:  Blood Updated:  10/17/19 0722     Glucose 156 mg/dL      BUN 27 mg/dL      Creatinine 2.20 mg/dL      Sodium 139 mmol/L      Potassium 4.0 mmol/L      Chloride 104 mmol/L      CO2 26.0 mmol/L      Calcium 7.8 mg/dL      eGFR Non African Amer 29 mL/min/1.73      BUN/Creatinine Ratio 12.3     Anion Gap 13.0 mmol/L     Narrative:       GFR Normal >60  Chronic Kidney Disease <60  Kidney Failure <15    Hemoglobin A1c [[id number]]  (Normal) Collected:  10/17/19 0620    Specimen:  Blood Updated:  10/17/19 0715     Hemoglobin A1C 4.5 %     Narrative:       Hemoglobin A1C Reference Range:    <5.7 %        Normal  5.7-6.4 %     Increased risk for diabetes  > 6.4 %        Diabetes       These guidelines have been recommended by the American Diabetic Association for Hgb A1c.      The following 2010 guidelines have been recommended by the American Diabetes Association for  Hemoglobin A1c.    HBA1c 5.7-6.4% Increased risk for future diabetes (pre-diabetes)  HBA1c     >6.4% Diabetes    CBC & Differential [[id number]] Collected:  10/17/19 0620    Specimen:  Blood Updated:  10/17/19 0703    Narrative:       The following orders were created for panel order CBC & Differential.  Procedure                               Abnormality         Status                     ---------                               -----------         ------                     CBC Auto Differential[[id number]]        Abnormal            Final result                 Please view results for these tests on the individual orders.    CBC Auto Differential [[id number]]  (Abnormal) Collected:  10/17/19 0620    Specimen:  Blood Updated:  10/17/19 0703     WBC 6.70 10*3/mm3      RBC 1.42 10*6/mm3      Hemoglobin 5.2 g/dL      Hematocrit 16.3 %      .4 fL      Comment: Parameter reviewed in the past 30 days on 10.16.19. Result checked         MCH 36.7 pg      MCHC 32.1 g/dL      RDW 23.7 %      RDW-SD 94.9 fl      MPV 8.3 fL      Platelets 218 10*3/mm3     Scan Slide [[id number]] Collected:  10/17/19 0620    Specimen:  Blood Updated:  10/17/19 0703     Scan Slide --     Comment: See Manual Differential Results       Manual Differential [[id number]]  (Abnormal) Collected:  10/17/19 0620    Specimen:  Blood Updated:  10/17/19 0703     Neutrophil % 74.0 %      Lymphocyte % 18.0 %      Monocyte % 3.0 %      Eosinophil % 1.0 %      Bands %  4.0 %      Neutrophils Absolute 5.23 10*3/mm3      Lymphocytes Absolute 1.21 10*3/mm3      Monocytes Absolute 0.20 10*3/mm3      Eosinophils Absolute 0.07 10*3/mm3      Anisocytosis Large/3+     Macrocytes Mod/2+     Poikilocytes Mod/2+     WBC Morphology Normal     Giant Platelets Slight/1+    Comprehensive Metabolic Panel [[id number]]  (Abnormal) Collected:  10/16/19 1814    Specimen:  Blood Updated:  10/16/19 1848     Glucose 193 mg/dL      BUN 28 mg/dL      Creatinine 2.45 mg/dL      Sodium  136 mmol/L      Potassium 3.8 mmol/L      Chloride 100 mmol/L      CO2 22.0 mmol/L      Calcium 8.1 mg/dL      Total Protein 6.7 g/dL      Albumin 2.80 g/dL      ALT (SGPT) 30 U/L      AST (SGOT) 56 U/L      Alkaline Phosphatase 380 U/L      Total Bilirubin 0.4 mg/dL      eGFR Non African Amer 26 mL/min/1.73      Globulin 3.9 gm/dL      A/G Ratio 0.7 g/dL      BUN/Creatinine Ratio 11.4     Anion Gap 17.8 mmol/L     Narrative:       GFR Normal >60  Chronic Kidney Disease <60  Kidney Failure <15    Lipase [[id number]]  (Normal) Collected:  10/16/19 1814    Specimen:  Blood Updated:  10/16/19 1848     Lipase 26 U/L           Imaging Results (last 24 hours)     Procedure Component Value Units Date/Time    US Renal Bilateral [[id number]] Collected:  10/17/19 1451     Updated:  10/17/19 1455    Narrative:       Examination: US RENAL BILATERAL-     Date of Exam: 10/17/2019 2:15 PM     Indication: ARF/CRF; D64.9-Anemia, unspecified; K92.1-Melena;  K75.81-Nonalcoholic steatohepatitis (THOMAS).     Comparison: Renal ultrasound dated 06/21/2019     Technique: Grayscale and color Doppler ultrasound evaluation of the  kidneys and urinary bladder was performed     Findings:  Visualization is limited due to body habitus. The right kidney measures  11.0 x 5.8 x 5.3 cm. The left kidney measures 10.7 x 5.1 x 4.8 cm. There  is normal renal echogenicity. There is probable renal cortical thinning  within the left kidney.  There is a normal variant column of Thanh  within the mid aspect of the right kidney. There are no shadowing renal  stones.     The bladder is well distended with bladder volume of 399 cc. There is no  internal debris or abnormal wall thickening.       Impression:       1. Probable mild renal cortical thinning of the left kidney.  2. No hydronephrosis.     Electronically Signed By-Shady Fischer On:10/17/2019 2:53 PM  This report was finalized on [id number] by  Shady Fischer, .             ASSESSMENT:    Acute on  chronic anemia/JOSE/MGUS  Possible melena  SUSANA/CKD  Gonzalez cirrhosis with history of hepatic encephalopathy  History of choledocholithiasis with previous ERCP and sphincterotomy  DMII  History of PE/DVT with IVC filter  History of CAD/stent  Chronic pain on narcotics  Morbid obesity  History of GERD/Syed's esophagus    PLAN:    Patient admitted with acute anemia with hemoglobin 4.8.  He is s/p 2 units with recheck 5 and need for 2 more units.  Does question recent melena.  No previous history of esophageal varices.  We will plan EGD evaluation in a.m. once transfused.  Start PPI.  Monitor hemoglobin closely.  Hematology and nephrology following.  Hemolysis work-up in progress.  Continue supportive care and will follow.    I discussed the patients findings and my recommendations with the patient.  Madonna Pérez, SHAJI  10/17/19  3:57 PM

## 2019-10-17 NOTE — H&P
[facility]            Primary Care Provider:  Paulette Harris MD    Patient Care Team:  Paulette Harris MD as PCP - General    CHIEF COMPLAINT:     Chief Complaint   Patient presents with   • Abnormal Lab     Low hemoglobin           HISTORY OF PRESENT ILLNESS:    HPI  74 year old male with PMH of JOSE  And poor iron absorption was sent from hematology office today for Hgb 5.1 ( was 10 in August). He denies any hematemesis, hematuria, melena or hematochezia. He denies weakness, dyspnea , chest pain or palpitations. He reports he has no complaints and the only reason he is here is because they told him needed to come. 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 He was started on PRBC transfusion and will be admitted for monitoring H and H likely another transfusion and hematology consulted.     Past Medical History:   Diagnosis Date   • Anemia    • B12 deficiency 2009   • Syed's esophagus    • CAD (coronary artery disease)    • Diabetes mellitus (CMS/HCC)    • History of echocardiogram     03/03/2017 - 12/03/2014 - 04/2012   • Hyperlipidemia    • Hypertension    • Morbid obesity (CMS/HCC)    • KATHIA treated with BiPAP    • Renal insufficiency    • S/P lumbar laminectomy        Past Surgical History:   Procedure Laterality Date   • BACK SURGERY  1971   • CATARACT EXTRACTION  2014   • CHOLECYSTECTOMY  02/24/2019   • COLONOSCOPY  03/2018   • CORONARY ANGIOPLASTY  08/24/2009    PCI stent - succesful placement of 3.5/23 promus stent in proximal right coronary artery   • CORONARY ANGIOPLASTY WITH STENT PLACEMENT  08/27/2009    patient had stent placed to proximal right coronary artery   • CYSTOSCOPY BLADDER STONE LITHOTRIPSY  1997   • OTHER SURGICAL  "HISTORY  11/2018    IVC filter implant   • RENAL ARTERY STENT Left        Family History   Problem Relation Age of Onset   • Hyperlipidemia Other    • Hypertension Other        Social History     Tobacco Use   • Smoking status: Never Smoker   • Smokeless tobacco: Never Used   Substance Use Topics   • Alcohol use: No     Frequency: Never   • Drug use: No         (Not in a hospital admission)    Allergies:  Patient has no known allergies.      There is no immunization history on file for this patient.        REVIEW OF SYSTEMS:     Review of Systems   Constitution: Negative.   HENT: Negative.    Eyes: Negative.    Cardiovascular: Negative.    Respiratory: Negative.    Endocrine: Negative.    Hematologic/Lymphatic: Negative.    Skin:        Sacral and left anterior / lateral abdomen    Musculoskeletal: Negative.    Gastrointestinal: Negative.    Genitourinary: Negative.    Neurological: Negative.    Psychiatric/Behavioral: Negative.    Allergic/Immunologic: Negative.        Vital Signs  Temp:  [97.4 °F (36.3 °C)-98.3 °F (36.8 °C)] 98.3 °F (36.8 °C)  Heart Rate:  [62-89] 78  Resp:  [16-23] 17  BP: (107-199)/(38-58) 118/50    Flowsheet Rows      First Filed Value   Admission Height  188 cm (74\") Documented at 10/16/2019 1701   Admission Weight  143 kg (315 lb 7.7 oz)  (Abnormal)  Documented at 10/16/2019 1701           Physical Exam:    Physical Exam   Constitutional: He is oriented to person, place, and time.   Morbid obesity BMI 40   HENT:   Head: Atraumatic.   Eyes: EOM are normal.   Neck: Normal range of motion. Neck supple.   Cardiovascular: Normal rate, regular rhythm and normal heart sounds.   Pulmonary/Chest: Effort normal and breath sounds normal.   Abdominal: Soft. Bowel sounds are normal.   Musculoskeletal: Normal range of motion.   Neurological: He is alert and oriented to person, place, and time.   Skin: Skin is warm and dry.   Sacral skin tear and stage 2 also healing wound right anterolateral torso  "   Psychiatric: He has a normal mood and affect. His behavior is normal. Judgment and thought content normal.   Vitals reviewed.      Emotional Behavior:    cooperative and pleasant    Debilities:  Age appropriate      Results Review:      I reviewed the patient's new clinical results.    Lab Results (most recent)     Procedure Component Value Units Date/Time    Comprehensive Metabolic Panel [[id number]]  (Abnormal) Collected:  10/16/19 1814    Specimen:  Blood Updated:  10/16/19 1848     Glucose 193 mg/dL      BUN 28 mg/dL      Creatinine 2.45 mg/dL      Sodium 136 mmol/L      Potassium 3.8 mmol/L      Chloride 100 mmol/L      CO2 22.0 mmol/L      Calcium 8.1 mg/dL      Total Protein 6.7 g/dL      Albumin 2.80 g/dL      ALT (SGPT) 30 U/L      AST (SGOT) 56 U/L      Alkaline Phosphatase 380 U/L      Total Bilirubin 0.4 mg/dL      eGFR Non African Amer 26 mL/min/1.73      Globulin 3.9 gm/dL      A/G Ratio 0.7 g/dL      BUN/Creatinine Ratio 11.4     Anion Gap 17.8 mmol/L     Narrative:       GFR Normal >60  Chronic Kidney Disease <60  Kidney Failure <15    Lipase [[id number]]  (Normal) Collected:  10/16/19 1814    Specimen:  Blood Updated:  10/16/19 1848     Lipase 26 U/L           Imaging Results (most recent)     None        NA    ECG/EMG Results (most recent)     None        Ordered and pending     Results for orders placed during the hospital encounter of 19   Duplex Venous Lower Extremity - Bilateral CAR    Narrative                   Lower Extremity Venous Report                          [facility]                         Vascular Laboratory                            [address] [place] [address]    Name: JOSE PATEL                Study Date: 2019 04:18 PM  MRN: [id number]                       Patient Location:   : 1945 (M/d/yyyy)           Gender: Male  Age: 73 yrs                          Account#: [id number]  Reason For Study:  soa      Procedure  Venous study was carried out in bilateral lower extremities. Gray scale, color  flow, and spectral doppler images were obtained. Images were obtained in  transverse and longitudinal views. The study was technically difficult due to  Difficulty visualizing the distal veins secondary to edema and body habitus..    Right Lower Extremity Venous  On the right side the patient has patent common femoral, femoral, and  popliteal veins. The main veins are easily compressible. Femoral vein was  mapped in its entirety through the course of the thigh. It is patent and  compresses well. The popliteal vein is patent along with its tributaries and  compresses well with no evidence of any filling defect. Augmentation test is  positive. Respiratory waves are biphasic. Distal veins are patent. The right  greater and small saphenous veins are patent with good compressibility. There  is fluid retention noted, suggestive of edema.    Left Lower Extremity Venous  On the left side the patient has patent common femoral, femoral, and popliteal  veins. The main veins compress well. Femoral vein was mapped in its entirety  through the course of the thigh. It is patent and compresses well. The  popliteal vein is patent along with its tributaries and compresses well with  no evidence of any filling defect. Augmentation test is positive. Respiratory  waves are biphasic. Distal veins are patent. The left greater and small  saphenous veins are patent with good compressibility. There is fluid retention  noted, suggestive of edema.      Interpretation Summary  No evidence of any deep vein thrombosis or superficial vein thrombosis.  _______________________________________________________________________________    Electronically signed by: Willian Adams MD  on 03/18/2019 11:07 AM    Ordering Physician: ANA HARTLEY  Referring Physician: MELODY CHIANG  Performed By:          Results for orders placed during the hospital  encounter of 19   Adult Transthoracic Echo Complete W/ Cont if Necessary Per Protocol    Narrative                           Adult Echocardiogram Report          [facility]  Cardiology Department  [facility] [place]  [address]      Name: JOSE PATEL JStudy Date: 2019 07:08 AM           BP: 155/29 mmHg  MRN: [id number]       Patient Location:   : 1945      Gender: Male                              Height: 74 in  Age: 73 yrs          Account#: [id number]                     Weight: 350 lb  Reason For Study: SHORTNESS OF AIR                             BSA: 2.8 m2  Ordering Physician:  ANA HARTLEY  Referring Physician:  MELODY CHIANG  Performed By: MAXX      M-Mode/2-D Measurements:  LVIDd: 5.2 cm       (3.7-5.7) LVPWd: 1.3 cm        (0.8-1.2)  LVIDs: 4.1 cm       (2.3-3.9)  ACS: 2.0 cm         (1.6-3.7) IVSd: 1.4 cm         (0.7-1.2)  LA dimension: 4.2 cm(1.9-4.0) RVDd: 4.3 cm         (0.7-2.4)  FS: 21.9 %          (21-40%)  Ao root diam: 4.1 cm (2.0-3.7)    Comments  Undetermined supraventricular rhythm with regular ventricular rhythm with  probably broad QRS complexes.  Left ventricle had normal cavity size and wall thickness. Contractility of  left ventricle probably uniform and appropriate with EF estimated 50-60%.  Right ventricle at least mildly dilated.  Left atrium probably mildly dilated.  Unsure whether right atrium dilated.  Aortic valve probably had 3 cusps and had mild thickening and increased  density of the cusps with well maintained opening. Peak systolic velocity 1.7  m/s and mean velocity 1.2 m/s. No aortic regurgitation.  Mitral valve had unremarkable appearance and unimpaired opening. No  significant mitral regurgitation.  Peak E velocity 110 cm/s and peak e' velocity septum 11 cm/s and lateral wall  14 cm/s and E/e' average 9.  Tricuspid valve had unremarkable appearance. Mild tricuspid regurgitation.  RVSP less than 35 mmHg.  Pulmonic valve had  unremarkable appearance. No significant pulmonic  regurgitation. Right ventricular outflow track 4 cm diameter.  IVC probably dilated.  No pericardial effusion.  Aortic root was not dilated.      Interpretation  1. Undetermined supraventricular rhythm with regular ventricular rhythm with  probably broad QRS complexes.  2. Right ventricle at least mildly dilated.  3. Left atrium probably mildly dilated.  4. Unsure whether right atrium dilated.  5. Aortic valve probably had 3 cusps and had mild sclerosis/calcification of  the cusps with well maintained opening and no regurgitation.  6. Mild tricuspid regurgitation.  7. Peak systolic pulmonary artery pressure estimated less than 35 mmHg.  8. IVC probably dilated suggesting elevated IVC and right atrial pressure.    MMode/2D Measurements & Calculations  ESV(Teich): 73.9 ml  EF(Teich): 43.9 %                      Ao root area: 13.0 cm2  Asc Aorta Diam: 4.1 cm                 LVOT diam: 2.3 cm    EDV(MOD-sp4): 129.1 ml  ESV(MOD-sp4): 86.7 ml  EF(MOD-sp4): 32.8 %    Doppler Measurements & Calculations  MV E max frankie: 115.7 cm/sec               MV max P.9 mmHg                                           MV mean P.8 mmHg  MV dec time: 0.29 sec                    Ao V2 max: 175.7 cm/sec                                           Ao max P.3 mmHg                                           Ao V2 mean: 127.8 cm/sec                                           Ao mean P.2 mmHg                                           Ao V2 VTI: 33.3 cm                                           ARMAND(I,D): 3.4 cm2                                             ARMAND(V,D): 2.9 cm2  LV V1 max P.2 mmHg                   PA max P.6 mmHg  LV V1 mean PG: 3.5 mmHg  LV V1 max: 124.8 cm/sec  LV V1 mean: 87.7 cm/sec  LV V1 VTI: 27.5 cm  TR max frankie: 219.6 cm/sec  TR max P.4 mmHg  RVSP(TR): 29.4  mmHg    _______________________________________________________________________________      Electronically signed by: Rush Reilly MD  on 03/18/2019 09:30 AM         IR outpatient visit  Narrative:    DATE OF EXAM:   10/14/2019 9:51 AM     PROCEDURE:   IR OUTPATIENT VISIT-     INDICATIONS:   Peritoneal abscess; K65.1-Peritoneal abscess     COMPARISON:  No Comparisons Available        Impression: IMPRESSION :   The requested procedure was not attempted today as the patient's  cutaneous fistula site has healed over there is no evidence of any  continued drainage for the past several days. The patient and his wife  were instructed to continuously evaluate for recurrent drainage, at  which point the patient can be returned for the attempted procedure.     Electronically Signed By-Jonn Cuenca On:10/14/2019 5:38 PM  This report was finalized on [id number] by  Jonn Cuenca, .      Active Hospital Problems    Diagnosis  POA   • Anemia [D64.9]  Yes      Resolved Hospital Problems   No resolved problems to display.         Assessment/Plan       Anemia    Anemia with PMH JOSE, poor iron absorption, and cirrhosis Hgb 5.1- transfuse 1 unit started in ED with reflex Hgb , will likely need another unit. Hematology consulted as follows patient and referred here. Denies any bleeding. 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 - wound RN consulted     THOMAS Cirrhosis - stable - continue Xifaxan, lactulose     CAD s/p stents -stable continue statin     DM2 controlled- continue home  regimen accucheck achs SSI prn LCS diet     GERD- on PPI, Reglan     Depression- on Cymbalta     Anxiety - on Atarax     Pain - on home Roxicodone INSPECT verified     Recent chest wall abscess s/p resection- wound healing- on Bactrim 10 days since 10/10/19    Gout on allopurinol    HX DVT - has  IVC filter     HTN - controled - on Bumex     Hypothyroidism - on levothyroxine     DVT prophylaxis IVC filter add scd       Patient independently seen/examined and nurse practitioner note reviewed and verified.  I have also reviewed the note from the hematology oncology.  Patient otherwise clinically stable.  Patient with low hemoglobin and has suspected GI bleed will need upper endoscopy.  Patient also getting PRBC transfusion.  Patient also has chronic kidney disease and may need to have nephrology involved on this case.      Disposition     Patient will be admitted for PRBC, monitoring H and H and hematology consulted.     I discussed the patients findings and my recommendations with patient and family at bedide .     Santosh Amaro MD  10/17/19  3:38 AM

## 2019-10-18 ENCOUNTER — ANESTHESIA (OUTPATIENT)
Dept: GASTROENTEROLOGY | Facility: HOSPITAL | Age: 74
End: 2019-10-18

## 2019-10-18 ENCOUNTER — ANESTHESIA EVENT (OUTPATIENT)
Dept: GASTROENTEROLOGY | Facility: HOSPITAL | Age: 74
End: 2019-10-18

## 2019-10-18 ENCOUNTER — INPATIENT HOSPITAL (AMBULATORY)
Dept: URBAN - METROPOLITAN AREA HOSPITAL 84 | Facility: HOSPITAL | Age: 74
End: 2019-10-18
Payer: COMMERCIAL

## 2019-10-18 DIAGNOSIS — K44.9 DIAPHRAGMATIC HERNIA WITHOUT OBSTRUCTION OR GANGRENE: ICD-10-CM

## 2019-10-18 DIAGNOSIS — D50.0 IRON DEFICIENCY ANEMIA SECONDARY TO BLOOD LOSS (CHRONIC): ICD-10-CM

## 2019-10-18 DIAGNOSIS — K22.70 BARRETT'S ESOPHAGUS WITHOUT DYSPLASIA: ICD-10-CM

## 2019-10-18 DIAGNOSIS — K31.811 ANGIODYSPLASIA OF STOMACH AND DUODENUM WITH BLEEDING: ICD-10-CM

## 2019-10-18 LAB
ABO + RH BLD: NORMAL
ABO + RH BLD: NORMAL
ALBUMIN SERPL-MCNC: 2.3 G/DL (ref 3.5–5.2)
ALBUMIN/GLOB SERPL: 0.7 G/DL
ALP SERPL-CCNC: 276 U/L (ref 39–117)
ALT SERPL W P-5'-P-CCNC: 21 U/L (ref 1–41)
ANION GAP SERPL CALCULATED.3IONS-SCNC: 9 MMOL/L (ref 5–15)
ANISOCYTOSIS BLD QL: ABNORMAL
AST SERPL-CCNC: 39 U/L (ref 1–40)
BH BB BLOOD EXPIRATION DATE: NORMAL
BH BB BLOOD EXPIRATION DATE: NORMAL
BH BB BLOOD TYPE BARCODE: 6200
BH BB BLOOD TYPE BARCODE: 6200
BH BB DISPENSE STATUS: NORMAL
BH BB DISPENSE STATUS: NORMAL
BH BB PRODUCT CODE: NORMAL
BH BB PRODUCT CODE: NORMAL
BH BB UNIT NUMBER: NORMAL
BH BB UNIT NUMBER: NORMAL
BILIRUB SERPL-MCNC: 1.2 MG/DL (ref 0.2–1.2)
BUN BLD-MCNC: 28 MG/DL (ref 8–23)
BUN/CREAT SERPL: 13.5 (ref 7–25)
CA-I SERPL ISE-MCNC: 1.13 MMOL/L (ref 1.2–1.3)
CALCIUM SPEC-SCNC: 8 MG/DL (ref 8.6–10.5)
CHLORIDE SERPL-SCNC: 106 MMOL/L (ref 98–107)
CO2 SERPL-SCNC: 24 MMOL/L (ref 22–29)
CREAT BLD-MCNC: 2.08 MG/DL (ref 0.76–1.27)
DEPRECATED RDW RBC AUTO: 98.4 FL (ref 37–54)
EOSINOPHIL # BLD MANUAL: 0.32 10*3/MM3 (ref 0–0.4)
EOSINOPHIL NFR BLD MANUAL: 4 % (ref 0.3–6.2)
ERYTHROCYTE [DISTWIDTH] IN BLOOD BY AUTOMATED COUNT: 28 % (ref 12.3–15.4)
GFR SERPL CREATININE-BSD FRML MDRD: 31 ML/MIN/1.73
GIANT PLATELETS: ABNORMAL
GLOBULIN UR ELPH-MCNC: 3.5 GM/DL
GLUCOSE BLD-MCNC: 133 MG/DL (ref 65–99)
GLUCOSE BLDC GLUCOMTR-MCNC: 126 MG/DL (ref 70–105)
GLUCOSE BLDC GLUCOMTR-MCNC: 142 MG/DL (ref 70–105)
GLUCOSE BLDC GLUCOMTR-MCNC: 164 MG/DL (ref 70–105)
GLUCOSE BLDC GLUCOMTR-MCNC: 165 MG/DL (ref 70–105)
HCT VFR BLD AUTO: 21.1 % (ref 37.5–51)
HGB BLD-MCNC: 7 G/DL (ref 13–17.7)
LYMPHOCYTES # BLD MANUAL: 1.26 10*3/MM3 (ref 0.7–3.1)
LYMPHOCYTES NFR BLD MANUAL: 16 % (ref 19.6–45.3)
LYMPHOCYTES NFR BLD MANUAL: 5 % (ref 5–12)
MCH RBC QN AUTO: 35.3 PG (ref 26.6–33)
MCHC RBC AUTO-ENTMCNC: 33.4 G/DL (ref 31.5–35.7)
MCV RBC AUTO: 105.5 FL (ref 79–97)
MONOCYTES # BLD AUTO: 0.4 10*3/MM3 (ref 0.1–0.9)
NEUTROPHILS # BLD AUTO: 5.93 10*3/MM3 (ref 1.7–7)
NEUTROPHILS NFR BLD MANUAL: 71 % (ref 42.7–76)
NEUTS BAND NFR BLD MANUAL: 4 % (ref 0–5)
PHOSPHATE SERPL-MCNC: 2.7 MG/DL (ref 2.5–4.5)
PLATELET # BLD AUTO: 216 10*3/MM3 (ref 140–450)
PMV BLD AUTO: 8.8 FL (ref 6–12)
POIKILOCYTOSIS BLD QL SMEAR: ABNORMAL
POTASSIUM BLD-SCNC: 4.1 MMOL/L (ref 3.5–5.2)
PROT SERPL-MCNC: 5.8 G/DL (ref 6–8.5)
RBC # BLD AUTO: 2 10*6/MM3 (ref 4.14–5.8)
SCAN SLIDE: NORMAL
SODIUM BLD-SCNC: 139 MMOL/L (ref 136–145)
UNIT  ABO: NORMAL
UNIT  ABO: NORMAL
UNIT  RH: NORMAL
UNIT  RH: NORMAL
WBC MORPH BLD: NORMAL
WBC NRBC COR # BLD: 7.9 10*3/MM3 (ref 3.4–10.8)

## 2019-10-18 PROCEDURE — P9016 RBC LEUKOCYTES REDUCED: HCPCS

## 2019-10-18 PROCEDURE — 85007 BL SMEAR W/DIFF WBC COUNT: CPT | Performed by: INTERNAL MEDICINE

## 2019-10-18 PROCEDURE — 86900 BLOOD TYPING SEROLOGIC ABO: CPT

## 2019-10-18 PROCEDURE — 85025 COMPLETE CBC W/AUTO DIFF WBC: CPT | Performed by: NURSE PRACTITIONER

## 2019-10-18 PROCEDURE — 85025 COMPLETE CBC W/AUTO DIFF WBC: CPT | Performed by: INTERNAL MEDICINE

## 2019-10-18 PROCEDURE — 85007 BL SMEAR W/DIFF WBC COUNT: CPT | Performed by: NURSE PRACTITIONER

## 2019-10-18 PROCEDURE — 84100 ASSAY OF PHOSPHORUS: CPT | Performed by: INTERNAL MEDICINE

## 2019-10-18 PROCEDURE — 0W3P8ZZ CONTROL BLEEDING IN GASTROINTESTINAL TRACT, VIA NATURAL OR ARTIFICIAL OPENING ENDOSCOPIC: ICD-10-PCS | Performed by: INTERNAL MEDICINE

## 2019-10-18 PROCEDURE — 36430 TRANSFUSION BLD/BLD COMPNT: CPT

## 2019-10-18 PROCEDURE — 25010000002 PROPOFOL 10 MG/ML EMULSION: Performed by: ANESTHESIOLOGY

## 2019-10-18 PROCEDURE — 82330 ASSAY OF CALCIUM: CPT | Performed by: INTERNAL MEDICINE

## 2019-10-18 PROCEDURE — 82962 GLUCOSE BLOOD TEST: CPT

## 2019-10-18 PROCEDURE — 99232 SBSQ HOSP IP/OBS MODERATE 35: CPT | Performed by: INTERNAL MEDICINE

## 2019-10-18 PROCEDURE — 63710000001 INSULIN GLARGINE PER 5 UNITS: Performed by: NURSE PRACTITIONER

## 2019-10-18 PROCEDURE — 99233 SBSQ HOSP IP/OBS HIGH 50: CPT | Performed by: INTERNAL MEDICINE

## 2019-10-18 PROCEDURE — 43255 EGD CONTROL BLEEDING ANY: CPT | Performed by: INTERNAL MEDICINE

## 2019-10-18 PROCEDURE — 80053 COMPREHEN METABOLIC PANEL: CPT | Performed by: INTERNAL MEDICINE

## 2019-10-18 RX ORDER — SODIUM CHLORIDE 0.9 % (FLUSH) 0.9 %
10 SYRINGE (ML) INJECTION AS NEEDED
Status: DISCONTINUED | OUTPATIENT
Start: 2019-10-18 | End: 2019-10-18

## 2019-10-18 RX ORDER — PROPOFOL 10 MG/ML
VIAL (ML) INTRAVENOUS AS NEEDED
Status: DISCONTINUED | OUTPATIENT
Start: 2019-10-18 | End: 2019-10-18 | Stop reason: SURG

## 2019-10-18 RX ORDER — SODIUM CHLORIDE 0.9 % (FLUSH) 0.9 %
3 SYRINGE (ML) INJECTION EVERY 12 HOURS SCHEDULED
Status: DISCONTINUED | OUTPATIENT
Start: 2019-10-18 | End: 2019-10-18

## 2019-10-18 RX ORDER — SUCRALFATE 1 G/1
1 TABLET ORAL
Status: DISCONTINUED | OUTPATIENT
Start: 2019-10-18 | End: 2019-10-19 | Stop reason: HOSPADM

## 2019-10-18 RX ORDER — PANTOPRAZOLE SODIUM 40 MG/1
40 TABLET, DELAYED RELEASE ORAL
Status: DISCONTINUED | OUTPATIENT
Start: 2019-10-18 | End: 2019-10-19 | Stop reason: HOSPADM

## 2019-10-18 RX ADMIN — ALLOPURINOL 100 MG: 100 TABLET ORAL at 20:48

## 2019-10-18 RX ADMIN — Medication 3 ML: at 15:51

## 2019-10-18 RX ADMIN — SUCRALFATE 1 G: 1 TABLET ORAL at 17:28

## 2019-10-18 RX ADMIN — SODIUM CHLORIDE 60 ML/HR: 900 INJECTION, SOLUTION INTRAVENOUS at 13:31

## 2019-10-18 RX ADMIN — PROPOFOL 100 MG: 10 INJECTION, EMULSION INTRAVENOUS at 13:50

## 2019-10-18 RX ADMIN — Medication 10 ML: at 20:49

## 2019-10-18 RX ADMIN — Medication 10 ML: at 08:30

## 2019-10-18 RX ADMIN — RIFAXIMIN 550 MG: 550 TABLET ORAL at 20:48

## 2019-10-18 RX ADMIN — LACTULOSE 60 ML: 10 SOLUTION ORAL at 20:48

## 2019-10-18 RX ADMIN — PANTOPRAZOLE SODIUM 40 MG: 40 TABLET, DELAYED RELEASE ORAL at 17:28

## 2019-10-18 RX ADMIN — FERROUS SULFATE TAB EC 324 MG (65 MG FE EQUIVALENT) 324 MG: 324 (65 FE) TABLET DELAYED RESPONSE at 17:28

## 2019-10-18 RX ADMIN — OXYCODONE HYDROCHLORIDE 5 MG: 5 TABLET ORAL at 22:27

## 2019-10-18 RX ADMIN — INSULIN GLARGINE 46 UNITS: 100 INJECTION, SOLUTION SUBCUTANEOUS at 20:49

## 2019-10-18 RX ADMIN — DOCUSATE SODIUM 100 MG: 100 CAPSULE, LIQUID FILLED ORAL at 20:48

## 2019-10-18 RX ADMIN — PROPOFOL 100 MG: 10 INJECTION, EMULSION INTRAVENOUS at 13:56

## 2019-10-18 RX ADMIN — METOCLOPRAMIDE 5 MG: 10 TABLET ORAL at 20:48

## 2019-10-18 RX ADMIN — SODIUM BICARBONATE 650 MG TABLET 650 MG: at 20:48

## 2019-10-18 RX ADMIN — SODIUM BICARBONATE 650 MG TABLET 650 MG: at 15:49

## 2019-10-18 RX ADMIN — PROPOFOL 50 MG: 10 INJECTION, EMULSION INTRAVENOUS at 13:52

## 2019-10-18 RX ADMIN — PROPOFOL 50 MG: 10 INJECTION, EMULSION INTRAVENOUS at 14:00

## 2019-10-18 RX ADMIN — SUCRALFATE 1 G: 1 TABLET ORAL at 20:48

## 2019-10-18 RX ADMIN — LACTULOSE 60 ML: 10 SOLUTION ORAL at 15:49

## 2019-10-18 RX ADMIN — ATORVASTATIN CALCIUM 20 MG: 20 TABLET, FILM COATED ORAL at 20:48

## 2019-10-18 NOTE — ANESTHESIA POSTPROCEDURE EVALUATION
Patient: Bry Ratliff    Procedure Summary     Date:  10/18/19 Room / Location:  [facility] ENDOSCOPY 1 / [facility] ENDOSCOPY    Anesthesia Start:  1345 Anesthesia Stop:  1403    Procedure:  ESOPHAGOGASTRODUODENOSCOPY with argon plasma coagulation of gastric antral vascular ectasia (N/A ) Diagnosis:       Iron deficiency anemia due to chronic blood loss      (Iron deficiency anemia due to chronic blood loss [D50.0])    Surgeon:  Marisel Gomez MD Provider:  Carmen Mosley MD    Anesthesia Type:  MAC ASA Status:  4          Anesthesia Type: MAC  Last vitals  BP   107/54 (10/18/19 1130)   Temp   98.8 °F (37.1 °C) (10/18/19 1130)   Pulse   77 (10/18/19 1130)   Resp   18 (10/18/19 1130)     SpO2   99 % (10/18/19 1013)     Post Anesthesia Care and Evaluation    Patient location during evaluation: PACU  Patient participation: complete - patient participated  Level of consciousness: awake  Pain score: 0 (See nurse's notes for pain score)  Pain management: adequate  Airway patency: patent  Anesthetic complications: No anesthetic complications  PONV Status: none  Cardiovascular status: acceptable  Respiratory status: acceptable  Hydration status: acceptable    Comments: Patient seen and examined postoperatively; vital signs stable; SpO2 greater than or equal to 90%; cardiopulmonary status stable; nausea/vomiting adequately controlled; pain adequately controlled; no apparent anesthesia complications; patient discharged from anesthesia care when discharge criteria were met

## 2019-10-18 NOTE — PROGRESS NOTES
Discharge Planning Assessment   [place]     Patient Name: Bry Ratliff  MRN: [id number]  Today's Date: 10/18/2019    Admit Date: 10/16/2019    Discharge Needs Assessment     Row Name 10/18/19 1148       Living Environment    Lives With  spouse    Current Living Arrangements  home/apartment/condo    Primary Care Provided by  self    Provides Primary Care For  no one       Transition Planning    Patient/Family Anticipates Transition to  home with family       Discharge Needs Assessment    Readmission Within the Last 30 Days  no previous admission in last 30 days    Equipment Currently Used at Home  wheelchair;cane, straight    Discharge Facility/Level of Care Needs  home with home health Current with [facility]    Offered/Gave Vendor List  no             Home Medical Care - Selection Complete      Service Provider Request Status Selected Services Address Phone Number Fax Number    [facility] Selected Home Health Services [address] [facility] [place] [id number] [phone]      Therapy      No service coordination in this encounter.      Community Resources      No service coordination in this encounter.          Demographic Summary     Row Name 10/18/19 1147       General Information    Admission Type  inpatient    Arrived From  emergency department    Required Notices Provided  Important Message from Medicare    Referral Source  admission list    Preferred Language  English        Functional Status     Row Name 10/18/19 1148       Functional Status    Usual Activity Tolerance  good    Current Activity Tolerance  good       Functional Status, IADL    Medications  independent    Meal Preparation  independent       Mental Status    General Appearance WDL  WDL      Spoke with patient at bedside, [facility].  (current, notified of admission)  Discharge Barrier: monitor Hbg., transfusing  Katie Barnes RN   [phone]

## 2019-10-18 NOTE — PROGRESS NOTES
Hematology/Oncology Inpatient Progress Note    PATIENT NAME: Bry Ratliff  : 1945  MRN: [id number]    CHIEF COMPLAINT: anemia    HISTORY OF PRESENT ILLNESS:  74 y.o.  male admitted through the ED 10/16/2019 after being sent from the [facility] where his hemoglobin was 4.8.  He reported some black stools on oral iron supplementation twice per day without melena or bright red blood per rectum.  He denied recent bleeding events including hemoptysis, nosebleeds, and hematuria.  He had been treated for a chest wall abscess in early 2019 with I&D followed by antibiotics.  An outpatient CT abdomen done 2019 due to continued drainage from the wound showed significant decrease in posterior right hepatic lobe fluid collection, trace right pleural effusion that was improved, intrahepatic and extrahepatic biliary ductal dilation with new intrahepatic biliary dilation.  Liver cirrhosis with no focal liver lesion, diverticulosis, and nonobstructive renal stones noted.  He was most recently treated with Bactrim given by his surgeon.  He stated that the wound closed and he did not have significant bleeding when the wound was open. He denied fever or chills.  His spouse reported him to be more fatigued and less active.  He reported stable shortness of air with exertion.  He was not on anticoagulation but was taking aspirin 81 mg by mouth daily.  CBC at the [facility] center 10/16/2019 showed a WBC 9.3, hemoglobin 4.8, .1, and platelets 289,000.  Repeat with a new specimen revealed hemoglobin 4.9.  Acute anemia labs were sent with reticulocyte count 7.63 percent (0.7-1.9).  Remaining anemia work-up was pending at time of admission.  PMH significant for JOSE with malabsorption, IgG kappa monoclonal gammopathy, erosive gastritis, Syed's esophagus, CKD stage III, vitamin B12 deficiency, RLE DVT and history of PE with thrombophilia, THOMAS, and splenomegaly.     10/16/19  Hematology/Oncology was  consulted as the patient is known to our service and followed for anemia diagnosed January 2018, IgG kappa monoclonal gammopathy diagnosed May 2018, elevated LFTs with biliary duct dilation diagnosed 2018 and right lower extremity DVT and bilateral pulmonary emboli diagnosed 2004.    · Anemia/JOSE/MGUS - He was initially seen by us in consultation for anemia with hemoglobin of 7.4 noted in January 2018 by his nephrologist Dr. Plascencia.  He did undergo an EGD and colonoscopy in March 2018 with findings of Syed's esophagus and hiatal hernia in the cardia.  Colonoscopy had poor prep but the scope did reach the cecum.  Colonoscopy was recommended to be repeated in 2 years and EGD in 3 years.  At time of consultation the patient had reported history of vitamin B12 deficiency but had not been on vitamin B12 injections for 6 months prior.  B12 level at PCP office prior to consultation was normal.  He was diagnosed with JOSE and was started on oral iron therapy.  Additional work-up including bone marrow evaluation revealed an IgG kappa monoclonal gammopathy.  Bone marrow had 4% plasma cells present.  EGD in November 2018 revealed erosive gastritis, bleeding in the ampulla and oozing upon entering the stomach in many different areas.  He has received IV iron in late 2018 and again in late April and early May 2019.  On 7/8/2019, iron studies showed iron saturation 36%, iron 56, TIBC 154 and ferritin was 1199.  On 8/7/2019, his hemoglobin was 10.0.  He was restarted on ferrous sulfate 325 mg by mouth twice a day.  · Elevated LFTs and biliary duct dilation- work-up of elevated LFTs revealed cholelithiasis and THOMAS.  In July 2018 he underwent ERCP with bilateral sphincterotomy and common duct stone extraction with biliary brushing and stent placement.  Pathology on brushings was negative.  He subsequently underwent EUS in August 2018 revealing suspect benign biliary stricture due to CBD stone with pathology negative for  malignancy and revealing benign and atypical ductal cells.  He was hospitalized once in April and a second hospitalization in May 2019 for encephalopathy related to his cirrhosis.  · Right lower extremity DVT and bilateral PEs were diagnosed in 2004 he was treated with Coumadin followed by Xarelto.  Thrombophilia work-up revealed protein C and S deficiencies, elevated factor VIII, and antiphospholipid antibody positivity.  Due to GI bleed, and IVC filter was placed in December 2018 and low-dose aspirin was started.  Repeat antiphospholipid antibodies were positive and factor VIII remained elevated (334) in April 2019.     PCP: Paulette Harris MD    Subjective   Feels all right with clear urine and no vomiting.  Is getting some diarrhea from lactulose but no bright blood in it.  Shortness of air on exertion    ROS:  Review of Systems   Constitutional: Negative for diaphoresis, fatigue, fever and unexpected weight change.   HENT: Negative for congestion and nosebleeds.    Eyes: Negative.    Respiratory: Positive for shortness of breath. Negative for cough.    Cardiovascular: Negative for chest pain and leg swelling.   Gastrointestinal: Positive for diarrhea. Negative for abdominal pain, blood in stool, constipation, nausea and vomiting.   Endocrine: Negative for cold intolerance and heat intolerance.   Genitourinary: Negative for dysuria and hematuria.   Musculoskeletal: Negative for arthralgias and joint swelling.   Skin: Positive for wound (on sacrum. ). Negative for rash.   Allergic/Immunologic: Negative.    Neurological: Negative for numbness and headaches.   Hematological: Does not bruise/bleed easily.   Psychiatric/Behavioral: Negative for confusion. The patient is not nervous/anxious.    All other systems reviewed and are negative.       MEDICATIONS:    Scheduled Meds:      allopurinol 100 mg Oral BID   atorvastatin 20 mg Oral Nightly   docusate sodium 100 mg Oral BID   DULoxetine 60 mg Oral Daily   ferrous  "sulfate 324 mg Oral BID With Meals   folic acid 1 mg Oral Daily   insulin glargine 46 Units Subcutaneous Nightly   insulin lispro 0-7 Units Subcutaneous 4x Daily With Meals & Nightly   insulin lispro 15 Units Subcutaneous TID With Meals   lactulose 60 mL Oral Q4H   levothyroxine 75 mcg Oral Q AM   magnesium oxide 400 mg Oral Daily   metoclopramide 5 mg Oral BID   pantoprazole 40 mg Oral Daily   potassium chloride 20 mEq Oral Daily   rifaximin 550 mg Oral Q12H   sodium bicarbonate 650 mg Oral TID   sodium chloride 10 mL Intravenous Q12H   THERA 1 tablet Oral Daily      Continuous Infusions:      sodium chloride 60 mL/hr Last Rate: 60 mL/hr (10/17/19 1326)      PRN Meds:  dextrose  •  dextrose  •  glucagon (human recombinant)  •  hydrOXYzine  •  nitroglycerin  •  ondansetron **OR** ondansetron  •  oxyCODONE  •  [COMPLETED] Insert peripheral IV **AND** sodium chloride  •  sodium chloride     ALLERGIES:  No Known Allergies    Objective    VITALS:   /42   Pulse 68   Temp 98.1 °F (36.7 °C) (Axillary)   Resp 16   Ht 188 cm (74\")   Wt (!) 139 kg (306 lb 14.1 oz)   SpO2 98%   BMI 39.40 kg/m²     PHYSICAL EXAM:  Physical Exam   Constitutional: He is oriented to person, place, and time. He appears well-developed and well-nourished.   Morbid obesity   HENT:   Head: Normocephalic and atraumatic.   Mouth/Throat: Oropharynx is clear and moist.   Poor dentition   Eyes: Conjunctivae, EOM and lids are normal. Pupils are equal, round, and reactive to light.   Neck: Normal range of motion. Neck supple. No thyromegaly present.   Cardiovascular: Normal rate, regular rhythm and normal heart sounds.   No murmur heard.  Monitor leads   Pulmonary/Chest: Effort normal and breath sounds normal. No respiratory distress.   Abdominal: Soft. Normal appearance and bowel sounds are normal. He exhibits no distension.   Obese   Genitourinary:   Genitourinary Comments: Deferred.   Musculoskeletal: Normal range of motion. He exhibits no " edema.   Lymphadenopathy:     He has no cervical adenopathy.     He has no axillary adenopathy.        Right: No supraclavicular adenopathy present.        Left: No supraclavicular adenopathy present.   Neurological: He is alert and oriented to person, place, and time.   Skin: Skin is warm and dry. Capillary refill takes less than 2 seconds. No bruising, no petechiae and no rash noted.   Psychiatric: He has a normal mood and affect. His speech is normal and behavior is normal. Judgment and thought content normal. Cognition and memory are normal.   Nursing note and vitals reviewed.        RECENT LABS:  Lab Results (last 24 hours)     Procedure Component Value Units Date/Time    POC Glucose Once [[id number]]  (Abnormal) Collected:  10/18/19 0726    Specimen:  Blood Updated:  10/18/19 0729     Glucose 126 mg/dL      Comment: Serial Number: [id number]:  [id number]       CBC & Differential [[id number]] Collected:  10/18/19 0344    Specimen:  Blood Updated:  10/18/19 0609    Narrative:       The following orders were created for panel order CBC & Differential.  Procedure                               Abnormality         Status                     ---------                               -----------         ------                     CBC Auto Differential[[id number]]        Abnormal            Final result                 Please view results for these tests on the individual orders.    CBC Auto Differential [[id number]]  (Abnormal) Collected:  10/18/19 0344    Specimen:  Blood Updated:  10/18/19 0609     WBC 7.90 10*3/mm3      RBC 2.00 10*6/mm3      Hemoglobin 7.0 g/dL      Hematocrit 21.1 %      .5 fL      Comment: Result checked         MCH 35.3 pg      MCHC 33.4 g/dL      RDW 28.0 %      Comment: Result checked         RDW-SD 98.4 fl      MPV 8.8 fL      Platelets 216 10*3/mm3     Scan Slide [[id number]] Collected:  10/18/19 0344    Specimen:  Blood Updated:  10/18/19 0609     Scan Slide --     Comment: See  Manual Differential Results       Manual Differential [[id number]]  (Abnormal) Collected:  10/18/19 0344    Specimen:  Blood Updated:  10/18/19 0609     Neutrophil % 71.0 %      Lymphocyte % 16.0 %      Monocyte % 5.0 %      Eosinophil % 4.0 %      Bands %  4.0 %      Neutrophils Absolute 5.93 10*3/mm3      Lymphocytes Absolute 1.26 10*3/mm3      Monocytes Absolute 0.40 10*3/mm3      Eosinophils Absolute 0.32 10*3/mm3      Anisocytosis Mod/2+     Poikilocytes Slight/1+     WBC Morphology Normal     Giant Platelets Slight/1+    Comprehensive Metabolic Panel [[id number]]  (Abnormal) Collected:  10/18/19 0344    Specimen:  Blood Updated:  10/18/19 0521     Glucose 133 mg/dL      BUN 28 mg/dL      Creatinine 2.08 mg/dL      Sodium 139 mmol/L      Potassium 4.1 mmol/L      Chloride 106 mmol/L      CO2 24.0 mmol/L      Calcium 8.0 mg/dL      Total Protein 5.8 g/dL      Albumin 2.30 g/dL      ALT (SGPT) 21 U/L      AST (SGOT) 39 U/L      Alkaline Phosphatase 276 U/L      Total Bilirubin 1.2 mg/dL      eGFR Non African Amer 31 mL/min/1.73      Globulin 3.5 gm/dL      A/G Ratio 0.7 g/dL      BUN/Creatinine Ratio 13.5     Anion Gap 9.0 mmol/L     Narrative:       GFR Normal >60  Chronic Kidney Disease <60  Kidney Failure <15    Phosphorus [[id number]]  (Normal) Collected:  10/18/19 0344    Specimen:  Blood Updated:  10/18/19 0521     Phosphorus 2.7 mg/dL     Calcium, Ionized [[id number]]  (Abnormal) Collected:  10/18/19 0344    Specimen:  Blood Updated:  10/18/19 0455     Ionized Calcium 1.13 mmol/L     Hemoglobin & Hematocrit, Blood [[id number]]  (Abnormal) Collected:  10/17/19 2255    Specimen:  Blood Updated:  10/17/19 2310     Hemoglobin 6.6 g/dL      Hematocrit 19.9 %     Occult Blood, Fecal By Immunoassay - Stool, Per Rectum [[id number]]  (Abnormal) Collected:  10/17/19 2043    Specimen:  Stool from Per Rectum Updated:  10/17/19 2057     Occult Blood, Fecal by Immunoassay Positive    POC Glucose Once [[id number]]  (Abnormal)  Collected:  10/17/19 2049    Specimen:  Blood Updated:  10/17/19 2052     Glucose 188 mg/dL      Comment: Serial Number: [id number]:  [id number]       Urinalysis With Culture If Indicated - Urine, Clean Catch [[id number]]  (Normal) Collected:  10/17/19 1508    Specimen:  Urine, Clean Catch Updated:  10/17/19 1840     Color, UA Yellow     Appearance, UA Clear     pH, UA 6.0     Specific Gravity, UA 1.008     Glucose, UA Negative     Ketones, UA Negative     Bilirubin, UA Negative     Blood, UA Negative     Protein, UA Negative     Leuk Esterase, UA Negative     Nitrite, UA Negative     Urobilinogen, UA 0.2 E.U./dL    Narrative:       Urine microscopic not indicated.    POC Glucose Once [[id number]]  (Normal) Collected:  10/17/19 1719    Specimen:  Blood Updated:  10/17/19 1721     Glucose 90 mg/dL      Comment: Serial Number: [id number]:  [id number]       Eosinophil Smear - Urine, Urine, Clean Catch [[id number]]  (Normal) Collected:  10/17/19 1508    Specimen:  Urine, Clean Catch Updated:  10/17/19 1707     Eosinophil Smear 0 % EOS/100 Cells     Sodium, Urine, Random - Urine, Clean Catch [[id number]] Collected:  10/17/19 1508    Specimen:  Urine, Clean Catch Updated:  10/17/19 1608     Sodium, Urine 96 mmol/L     Narrative:       Reference intervals for random urine have not been established.  Clinical usage is dependent upon physician's interpretation in combination with other laboratory tests.     Hemoglobin & Hematocrit, Blood [[id number]]  (Abnormal) Collected:  10/17/19 1446    Specimen:  Blood Updated:  10/17/19 1510     Hemoglobin 5.9 g/dL      Hematocrit 18.2 %     TSH [[id number]]  (Normal) Collected:  10/17/19 0620    Specimen:  Blood Updated:  10/17/19 1446     TSH 3.130 uIU/mL      Comment: Results may be falsely decreased if patient taking Biotin.       POC Glucose Once [[id number]]  (Abnormal) Collected:  10/17/19 1317    Specimen:  Blood Updated:  10/17/19 1324     Glucose 192 mg/dL      Comment:  Serial Number: [id number]:  [id number]       Lactate Dehydrogenase [[id number]] Collected:  10/17/19 0620    Specimen:  Blood Updated:  10/17/19 0955     LDH --     Comment: Specimen hemolyzed.  Results may be affected.       CK [[id number]]  (Normal) Collected:  10/17/19 0620    Specimen:  Blood Updated:  10/17/19 0955     Creatine Kinase 61 U/L     Uric Acid [[id number]]  (Abnormal) Collected:  10/17/19 0620    Specimen:  Blood Updated:  10/17/19 0955     Uric Acid 8.3 mg/dL           PENDING RESULTS: LDH    IMAGING REVIEWED:  Us Renal Bilateral    Result Date: 10/17/2019  1. Probable mild renal cortical thinning of the left kidney. 2. No hydronephrosis.  Electronically Signed By-Shady Fischer On:10/17/2019 2:53 PM This report was finalized on [id number] by  Shady Fischer, .      I have reviewed the patient's labs, imaging, reports, and other clinician documentation.    Assessment/Plan   ASSESSMENT:  1. Acute on chronic anemia/Macrocytosis/JOSE with malabsorption/IgG kappa MGUS - Chronic anemia with last hemoglobin 10.0 in early August and on oral iron twice daily. MGUS has been followed as outpatient and not likely etiology for acute anemia. EGD November 2018 with active bleeding.  GI bleed suspected-GI consulted-on protonix. Stool heme positive.    UA negative for blood.  S/P pRBC's 1 unit 10/16, 10/17, 10/18. Recent antibiotics for chest wall wound-adverse effect of Bactrim may be contributing as well as liver disease/splenomegaly.  Hapto normal and retic high.  Folic acid normal, B12 high and Fe studies showed ACD. On oral Fe, Multivitamin and folic acid.  Folate supplementation discontinued.  2. Biliary ductal dilation/THOMAS with cirrhosis/splenomegaly/Syed's esophagus - followed by [facility] as outpatient.  Outpatient CT had revealed new intrahepatic biliary dilation.  GI consulted.   3. Thrombophilia/H/o RLE DVT and bilateral PE -antiphospholipid antibody positivity, decreased protein C&S, and  elevated factor VIII.  Has IVC filter. ASA on hold on admit.  SCD's.    4. SUSANA on CKD - followed by Dr. Plascencia as outpatient.   5. DM/CAD s/p stents/HLD/HTN/KATHIA/hyperuricemia- chronic conditions per primary team.   6. Sacral wound-on bactrim for > 1 week.  Per Dr. Etienne as an OP.  Bactrim discontinued.     PLAN  1. Transfuse to keep Hgb above 7.0-1 unit today.   2. EGD.  3. LDH.   4. DC folic acid.                 I discussed the patients findings and my recommendations with patient    NNydia Mayfield MD  10/18/19  8:49 AM    Much of the above report is an electronic transcription/translation of the spoken language to printed text using Dragon Software. As such, the subtleties and finesse of the spoken language may permit erroneous, or at times, nonsensical words or phrases to be inadvertently transcribed; thus changes may be made at a later date to rectify these errors.

## 2019-10-18 NOTE — PROGRESS NOTES
[facility] Medicine Services Daily Progress Note      Hospitalist Team  LOS 0 days      Patient Care Team:  Paulette Harris MD as PCP - General        Chief Complaint / Subjective  Chief Complaint   Patient presents with   • Abnormal Lab     Low hemoglobin     Patient denies any chest pain, shortness of breath nor palpitation      Brief Synopsis of Hospital Course/HPI    74-year-old man with multiple comorbidities including Gonzalez cirrhosis, CKD stage III, CAD status post stenting and hypothyroidism.  Follows up with hematology for iron deficiency anemia and coagulation factor deficiency.  Had followed up for routine visits at the hematologist office on 10/16.  Blood work done showed hemoglobin of 4.8 however patient reported no chest pain, shortness of breath, palpitation or generalized body weakness.    He was sent to the emergency room for further evaluation and management.      Review of Systems   Constitution: Positive for weakness. Negative for chills and fever.   HENT: Negative for congestion.    Eyes: Negative for blurred vision.   Cardiovascular: Negative for chest pain and palpitations.   Respiratory: Negative for shortness of breath.    Gastrointestinal: Negative for abdominal pain, nausea and vomiting.   Genitourinary: Negative for flank pain.   Neurological: Negative for focal weakness.   Psychiatric/Behavioral: Negative for altered mental status.   All other systems reviewed and are negative.      Family History   Problem Relation Age of Onset   • Hyperlipidemia Other    • Hypertension Other        Past Medical History:   Diagnosis Date   • Anemia    • B12 deficiency 2009   • Syed's esophagus    • CAD (coronary artery disease)    • Diabetes mellitus (CMS/HCC)    • History of echocardiogram     03/03/2017 - 12/03/2014 - 04/2012   • Hyperlipidemia    • Hypertension    • Morbid obesity (CMS/HCC)    • KATHIA treated with BiPAP    • Renal insufficiency    • S/P lumbar laminectomy   "      Social History     Socioeconomic History   • Marital status:      Spouse name: Not on file   • Number of children: Not on file   • Years of education: Not on file   • Highest education level: Not on file   Tobacco Use   • Smoking status: Never Smoker   • Smokeless tobacco: Never Used   Substance and Sexual Activity   • Alcohol use: No     Frequency: Never   • Drug use: No   • Sexual activity: Defer           Objective      Vital Signs  Temp:  [97.7 °F (36.5 °C)-98.6 °F (37 °C)] 98.3 °F (36.8 °C)  Heart Rate:  [68-83] 83  Resp:  [14-18] 18  BP: (100-141)/(30-61) 100/30  Oxygen Therapy  SpO2: 100 %  Pulse Oximetry Type: Continuous  Device (Oxygen Therapy): room air  Oximetry Probe Site Changed: Yes  Flowsheet Rows      First Filed Value   Admission Height  188 cm (74\") Documented at 10/16/2019 1701   Admission Weight  143 kg (315 lb 7.7 oz)  (Abnormal)  Documented at 10/16/2019 1701        Intake & Output (last 3 days)       10/15 0701 - 10/16 0700 10/16 0701 - 10/17 0700 10/17 0701 - 10/18 0700 10/18 0701 - 10/19 0700    P.O.   240     Blood  308 905     Total Intake(mL/kg)  308 (2.2) 1145 (8.2)     Net  +308 +1145             Urine Unmeasured Occurrence   3 x     Stool Unmeasured Occurrence   1 x         Lines, Drains & Airways    Active LDAs     Name:   Placement date:   Placement time:   Site:   Days:    Peripheral IV 10/16/19 1803 Right Antecubital   10/16/19    1803    Antecubital   less than 1                  Physical Exam:   Physical Exam   Constitutional: He is oriented to person, place, and time. He appears well-developed. No distress.   HENT:   Head: Normocephalic and atraumatic.   Eyes: EOM are normal. Pupils are equal, round, and reactive to light.   Neck: Neck supple.   Cardiovascular: Normal rate and regular rhythm.   Pulmonary/Chest: Effort normal and breath sounds normal.   Abdominal: Soft.   Musculoskeletal: Normal range of motion.   Neurological: He is alert and oriented to person, " place, and time.   Skin: Skin is warm and dry.   Psychiatric: He has a normal mood and affect.         Procedures:              Results Review:     I reviewed the patient's new clinical results.      Results from last 7 days   Lab Units 10/18/19  0344 10/17/19  2255 10/17/19  1446 10/17/19  0620  10/16/19  1455   WBC 10*3/mm3 7.90  --   --  6.70  --  10.39   HEMOGLOBIN g/dL 7.0* 6.6* 5.9* 5.2*   < > 4.9*   HEMATOCRIT % 21.1* 19.9* 18.2* 16.3*   < > 16.2*   PLATELETS 10*3/mm3 216  --   --  218  --  293   MONOCYTES % % 5.0  --   --  3.0*  --   --     < > = values in this interval not displayed.     Results from last 7 days   Lab Units 10/18/19  0344 10/17/19  0620 10/16/19  1814   SODIUM mmol/L 139 139 136   POTASSIUM mmol/L 4.1 4.0 3.8   CHLORIDE mmol/L 106 104 100   CO2 mmol/L 24.0 26.0 22.0   BUN mg/dL 28* 27* 28*   CREATININE mg/dL 2.08* 2.20* 2.45*   CALCIUM mg/dL 8.0* 7.8* 8.1*   BILIRUBIN mg/dL 1.2  --  0.4   ALK PHOS U/L 276*  --  380*   ALT (SGPT) U/L 21  --  30   AST (SGOT) U/L 39  --  56*   GLUCOSE mg/dL 133* 156* 193*         Lab Results   Component Value Date    CALCIUM 8.0 (L) 10/18/2019    PHOS 2.7 10/18/2019     Hemoglobin A1C   Date Value Ref Range Status   10/17/2019 4.5 3.5 - 5.6 % Final                   Microbiology Results (last 10 days)     Procedure Component Value - Date/Time    Eosinophil Smear - Urine, Urine, Clean Catch [[id number]]  (Normal) Collected:  10/17/19 1508    Lab Status:  Final result Specimen:  Urine, Clean Catch Updated:  10/17/19 1707     Eosinophil Smear 0 % EOS/100 Cells           ECG/EMG Results (most recent)     None          Results for orders placed during the hospital encounter of 03/17/19   Duplex Venous Lower Extremity - Bilateral CAR    Narrative                   Lower Extremity Venous Report                          [facility]                         Vascular Laboratory                            [address] [place]  [address]    Name: JOSE PATEL                Study Date: 2019 04:18 PM  MRN: [id number]                       Patient Location: ER  : 1945 (M/d/yyyy)           Gender: Male  Age: 73 yrs                          Account#: [id number]  Reason For Study: soa      Procedure  Venous study was carried out in bilateral lower extremities. Gray scale, color  flow, and spectral doppler images were obtained. Images were obtained in  transverse and longitudinal views. The study was technically difficult due to  Difficulty visualizing the distal veins secondary to edema and body habitus..    Right Lower Extremity Venous  On the right side the patient has patent common femoral, femoral, and  popliteal veins. The main veins are easily compressible. Femoral vein was  mapped in its entirety through the course of the thigh. It is patent and  compresses well. The popliteal vein is patent along with its tributaries and  compresses well with no evidence of any filling defect. Augmentation test is  positive. Respiratory waves are biphasic. Distal veins are patent. The right  greater and small saphenous veins are patent with good compressibility. There  is fluid retention noted, suggestive of edema.    Left Lower Extremity Venous  On the left side the patient has patent common femoral, femoral, and popliteal  veins. The main veins compress well. Femoral vein was mapped in its entirety  through the course of the thigh. It is patent and compresses well. The  popliteal vein is patent along with its tributaries and compresses well with  no evidence of any filling defect. Augmentation test is positive. Respiratory  waves are biphasic. Distal veins are patent. The left greater and small  saphenous veins are patent with good compressibility. There is fluid retention  noted, suggestive of edema.      Interpretation Summary  No evidence of any deep vein thrombosis or superficial vein  thrombosis.  _______________________________________________________________________________    Electronically signed by: Willian Adams MD  on 2019 11:07 AM    Ordering Physician: ANA HARTLEY  Referring Physician: MELODY CHIANG  Performed By:          Results for orders placed during the hospital encounter of 19   Adult Transthoracic Echo Complete W/ Cont if Necessary Per Protocol    Narrative                           Adult Echocardiogram Report          [facility]  Cardiology Department  [address] [place] [address]  [address]      Name: JOSE PATEL JStudy Date: 2019 07:08 AM           BP: 155/29 mmHg  MRN: [id number]       Patient Location:   : 1945      Gender: Male                              Height: 74 in  Age: 73 yrs          Account#: [id number]                     Weight: 350 lb  Reason For Study: SHORTNESS OF AIR                             BSA: 2.8 m2  Ordering Physician:  ANA HARTLEY  Referring Physician:  MELODY CHIANG  Performed By: MAXX      M-Mode/2-D Measurements:  LVIDd: 5.2 cm       (3.7-5.7) LVPWd: 1.3 cm        (0.8-1.2)  LVIDs: 4.1 cm       (2.3-3.9)  ACS: 2.0 cm         (1.6-3.7) IVSd: 1.4 cm         (0.7-1.2)  LA dimension: 4.2 cm(1.9-4.0) RVDd: 4.3 cm         (0.7-2.4)  FS: 21.9 %          (21-40%)  Ao root diam: 4.1 cm (2.0-3.7)    Comments  Undetermined supraventricular rhythm with regular ventricular rhythm with  probably broad QRS complexes.  Left ventricle had normal cavity size and wall thickness. Contractility of  left ventricle probably uniform and appropriate with EF estimated 50-60%.  Right ventricle at least mildly dilated.  Left atrium probably mildly dilated.  Unsure whether right atrium dilated.  Aortic valve probably had 3 cusps and had mild thickening and increased  density of the cusps with well maintained opening. Peak systolic velocity 1.7  m/s and mean velocity 1.2 m/s. No aortic regurgitation.  Mitral valve had unremarkable  appearance and unimpaired opening. No  significant mitral regurgitation.  Peak E velocity 110 cm/s and peak e' velocity septum 11 cm/s and lateral wall  14 cm/s and E/e' average 9.  Tricuspid valve had unremarkable appearance. Mild tricuspid regurgitation.  RVSP less than 35 mmHg.  Pulmonic valve had unremarkable appearance. No significant pulmonic  regurgitation. Right ventricular outflow track 4 cm diameter.  IVC probably dilated.  No pericardial effusion.  Aortic root was not dilated.      Interpretation  1. Undetermined supraventricular rhythm with regular ventricular rhythm with  probably broad QRS complexes.  2. Right ventricle at least mildly dilated.  3. Left atrium probably mildly dilated.  4. Unsure whether right atrium dilated.  5. Aortic valve probably had 3 cusps and had mild sclerosis/calcification of  the cusps with well maintained opening and no regurgitation.  6. Mild tricuspid regurgitation.  7. Peak systolic pulmonary artery pressure estimated less than 35 mmHg.  8. IVC probably dilated suggesting elevated IVC and right atrial pressure.    MMode/2D Measurements & Calculations  ESV(Teich): 73.9 ml  EF(Teich): 43.9 %                      Ao root area: 13.0 cm2  Asc Aorta Diam: 4.1 cm                 LVOT diam: 2.3 cm    EDV(MOD-sp4): 129.1 ml  ESV(MOD-sp4): 86.7 ml  EF(MOD-sp4): 32.8 %    Doppler Measurements & Calculations  MV E max frankie: 115.7 cm/sec               MV max P.9 mmHg                                           MV mean P.8 mmHg  MV dec time: 0.29 sec                    Ao V2 max: 175.7 cm/sec                                           Ao max P.3 mmHg                                           Ao V2 mean: 127.8 cm/sec                                           Ao mean P.2 mmHg                                           Ao V2 VTI: 33.3 cm                                           ARMAND(I,D): 3.4 cm2                                             ARMAND(V,D): 2.9 cm2  LV V1 max P.2  mmHg                   PA max P.6 mmHg  LV V1 mean PG: 3.5 mmHg  LV V1 max: 124.8 cm/sec  LV V1 mean: 87.7 cm/sec  LV V1 VTI: 27.5 cm  TR max frankie: 219.6 cm/sec  TR max P.4 mmHg  RVSP(TR): 29.4 mmHg    _______________________________________________________________________________      Electronically signed by: Rush Reilly MD  on 2019 09:30 AM         Us Renal Bilateral    Result Date: 10/17/2019  1. Probable mild renal cortical thinning of the left kidney. 2. No hydronephrosis.  Electronically Signed By-Shady Fischer On:10/17/2019 2:53 PM This report was finalized on [id number] by  Shady Fischer, .    [facility] Outpatient Visit    Result Date: 10/14/2019  IMPRESSION : The requested procedure was not attempted today as the patient's cutaneous fistula site has healed over there is no evidence of any continued drainage for the past several days. The patient and his wife were instructed to continuously evaluate for recurrent drainage, at which point the patient can be returned for the attempted procedure.  Electronically Signed By-Jonn Cuenca On:10/14/2019 5:38 PM This report was finalized on [id number] by  Jonn Cuenca, .      Xrays, labs reviewed personally by physician.    Medication Review:   I have reviewed the patient's current medication list      Scheduled Meds    allopurinol 100 mg Oral BID   atorvastatin 20 mg Oral Nightly   docusate sodium 100 mg Oral BID   DULoxetine 60 mg Oral Daily   ferrous sulfate 324 mg Oral BID With Meals   insulin glargine 46 Units Subcutaneous Nightly   insulin lispro 0-7 Units Subcutaneous 4x Daily With Meals & Nightly   insulin lispro 15 Units Subcutaneous TID With Meals   lactulose 60 mL Oral Q4H   levothyroxine 75 mcg Oral Q AM   magnesium oxide 400 mg Oral Daily   metoclopramide 5 mg Oral BID   pantoprazole 40 mg Oral Daily   potassium chloride 20 mEq Oral Daily   rifaximin 550 mg Oral Q12H   sodium bicarbonate 650 mg Oral TID   sodium chloride 10 mL  Intravenous Q12H   THERA 1 tablet Oral Daily       Meds Infusions    sodium chloride 60 mL/hr Last Rate: 60 mL/hr (10/17/19 1326)       Meds PRN  dextrose  •  dextrose  •  glucagon (human recombinant)  •  hydrOXYzine  •  nitroglycerin  •  ondansetron **OR** ondansetron  •  oxyCODONE  •  [COMPLETED] Insert peripheral IV **AND** sodium chloride  •  sodium chloride        Assessment / Plan    Acute on chronic anemia  Has a history of JOSE with malabsorption and IgG kappa MGUS  Patient on supplementary iron  Hemoglobin August 2019 was 10  ?  Slow GI bleed  Though patient denies history of hematochezia but has black stool-patient is on iron- has had previous history of GI bleed  Fecal Hemoccult positive  Transfusing as needed to keep hemoglobin greater than 7  Is status post transfusion 4 units packed red blood cell and currently receiving another unit   hematology is following  GI plans EGD today   On PPI    History of Gonzalez cirrhosis with complication-hepatic encephalopathy  Had no history of previous esophageal varices  Continue on home medication  On Xifaxan and lactulose    History of PE/DVT-status post IVC filter    Anxiety/depression disorder  On  Atarax  and Cymbalta     Hypothyroidism-on Synthroid    Acute kidney failure on chronic kidney disease stage III  Possibly due to ATN from severe anemia leading to renal hypoperfusion in the setting of recent exposure to Bactrim  Holding diuretic  Nephrology is following  Transfusing packed red blood cell    Diabetes mellitus type 2   Blood glucose has been fairly controlled-less than 200 since admission on Lantus, pre-meal insulin and sliding scale insulin  Monitor blood sugar and adjust insulin as needed       Recent chest wall abscess s/p resection- wound healing- on Bactrim 10 days since 10/10/19  Discontinue Bactrim due to worsening renal function     Gout on allopurinol    DVT prophylaxis with SCD                    Active Hospital Problems    Diagnosis  POA   •  **Iron deficiency anemia due to chronic blood loss [D50.0]  Unknown   • Anemia [D64.9]  Yes      Resolved Hospital Problems   No resolved problems to display.           Celestino Menchaca MD  10/18/19  9:46 AM

## 2019-10-18 NOTE — OP NOTE
ESOPHAGOGASTRODUODENOSCOPY Procedure Report    Patient Name:  Bry Ratliff  YOB: 1945    Date of Surgery:  10/18/2019     Pre-Op Diagnosis:  Iron deficiency anemia due to chronic blood loss [D50.0]         Procedure/CPT® Codes:      Procedure(s):  ESOPHAGOGASTRODUODENOSCOPY with argon plasma coagulation of gastric antral vascular ectasia    Staff:  Surgeon(s):  Marisel Gomez MD      Anesthesia: Monitored Anesthesia Care    Description of Procedure:  A description of the procedure as well as risks, benefits and alternative methods were explained to the patient who voiced understanding and signed the corresponding consent form. A physical exam was performed and vital signs were monitored throughout the procedure.    An upper GI endoscope was placed into the mouth and proceeded through the esophagus, stomach and second portion of the duodenum without difficulty. The scope was then retroflexed and the fundus was visualized. The procedure was not difficult and there were no immediate complications.  Again noticed a short segment of Syed's esophagus without any obvious nodules.  Small hiatal hernia was seen.  Severe gastric antral vascular ectasia was noticed with active oozing.  Dual mucosa looks normal.    Due to the severe gastric antral vascular ectasia, APC was applied for bleeding control.  Patient tolerated procedure very well.      Impression:  1.  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  2.  Syed's esophagus  3.  Hiatal hernia.    Recommendations:  Follow with a hemoglobin.  Patient will be placed on PPI and Carafate.  Repeat EGD with cauterization of ganglion again in 6 weeks.  Patient will need to be on IV iron at least 2 doses with Injectafer.    Follow hemoglobin and transfuse.  If less than 7.5.  Call if needed        Marisel Gomez MD     Date: 10/18/2019    Time: 3:09 PM

## 2019-10-18 NOTE — PROGRESS NOTES
"NEPHROLOGY PROGRESS NOTE------[facility]    [facility]  [phone]  Enrique Lubin MD      Patient Care Team:  Paulette Harris MD as PCP - General      Provider:  Enrique Luibn MD  Patient Name: Bry Ratliff  :  1945    SUBJECTIVE:  F/u SUSANA  CKD3  No cp/soa    Medication:    allopurinol 100 mg Oral BID   atorvastatin 20 mg Oral Nightly   docusate sodium 100 mg Oral BID   DULoxetine 60 mg Oral Daily   ferrous sulfate 324 mg Oral BID With Meals   insulin glargine 46 Units Subcutaneous Nightly   insulin lispro 0-7 Units Subcutaneous 4x Daily With Meals & Nightly   insulin lispro 15 Units Subcutaneous TID With Meals   lactulose 60 mL Oral Q4H   levothyroxine 75 mcg Oral Q AM   magnesium oxide 400 mg Oral Daily   metoclopramide 5 mg Oral BID   pantoprazole 40 mg Oral Daily   potassium chloride 20 mEq Oral Daily   rifaximin 550 mg Oral Q12H   sodium bicarbonate 650 mg Oral TID   sodium chloride 10 mL Intravenous Q12H   THERA 1 tablet Oral Daily       sodium chloride 60 mL/hr Last Rate: 60 mL/hr (10/17/19 1326)       OBJECTIVE    Vital Sign Min/Max for last 24 hours  Temp  Min: 97.7 °F (36.5 °C)  Max: 98.8 °F (37.1 °C)   BP  Min: 100/30  Max: 141/61   Pulse  Min: 68  Max: 83   Resp  Min: 14  Max: 18   SpO2  Min: 93 %  Max: 100 %   No Data Recorded   Weight  Min: 139 kg (306 lb 14.1 oz)  Max: 140 kg (308 lb 10.3 oz)     Flowsheet Rows      First Filed Value   Admission Height  188 cm (74\") Documented at 10/16/2019 1701   Admission Weight  143 kg (315 lb 7.7 oz)  (Abnormal)  Documented at 10/16/2019 1701          I/O this shift:  In: 350 [Blood:350]  Out: -   I/O last 3 completed shifts:  In: 1453 [P.O.:240; Blood:1213]  Out: -     Physical Exam:  General Appearance: alert, appears stated age and cooperative  Head: normocephalic, without obvious abnormality and atraumatic  Eyes: conjunctivae and sclerae normal and no icterus  Neck: supple and no JVD  Lungs: clear to " auscultation and respirations regular  Heart: regular rhythm & normal rate and normal S1, S2  Chest: Wall no abnormalities observed  Abdomen: normal bowel sounds and soft non-tender  Extremities: moves extremities well, no edema, no cyanosis and no redness  Skin: no bleeding, bruising or rash, turgor normal, color normal and no leasions noted  Neurologic: Alert, and oriented. No focal deficits    Labs:    WBC WBC   Date Value Ref Range Status   10/18/2019 7.90 3.40 - 10.80 10*3/mm3 Final   10/17/2019 6.70 3.40 - 10.80 10*3/mm3 Final   10/16/2019 10.39 3.40 - 10.80 10*3/mm3 Final   10/16/2019 9.30 3.40 - 10.80 10*3/mm3 Final      HGB Hemoglobin   Date Value Ref Range Status   10/18/2019 7.0 (L) 13.0 - 17.7 g/dL Final   10/17/2019 6.6 (C) 13.0 - 17.7 g/dL Final   10/17/2019 5.9 (C) 13.0 - 17.7 g/dL Final   10/17/2019 5.2 (C) 13.0 - 17.7 g/dL Final   10/16/2019 5.1 (C) 13.0 - 17.7 g/dL Final   10/16/2019 4.9 (C) 13.0 - 17.7 g/dL Final   10/16/2019 4.8 (C) 13.0 - 17.7 g/dL Final      HCT Hematocrit   Date Value Ref Range Status   10/18/2019 21.1 (L) 37.5 - 51.0 % Final   10/17/2019 19.9 (C) 37.5 - 51.0 % Final   10/17/2019 18.2 (C) 37.5 - 51.0 % Final   10/17/2019 16.3 (C) 37.5 - 51.0 % Final   10/16/2019 15.8 (C) 37.5 - 51.0 % Final   10/16/2019 16.2 (C) 37.5 - 51.0 % Final   10/16/2019 16.5 (C) 37.5 - 51.0 % Final      Platlets No results found for: LABPLAT   MCV MCV   Date Value Ref Range Status   10/18/2019 105.5 (H) 79.0 - 97.0 fL Final     Comment:     Result checked    10/17/2019 114.4 (H) 79.0 - 97.0 fL Final     Comment:     Parameter reviewed in the past 30 days on 10.16.19. Result checked    10/16/2019 122.7 (H) 79.0 - 97.0 fL Final   10/16/2019 124.1 (H) 79.0 - 97.0 fL Final          Sodium Sodium   Date Value Ref Range Status   10/18/2019 139 136 - 145 mmol/L Final   10/17/2019 139 136 - 145 mmol/L Final   10/16/2019 136 136 - 145 mmol/L Final      Potassium Potassium   Date Value Ref Range Status    10/18/2019 4.1 3.5 - 5.2 mmol/L Final   10/17/2019 4.0 3.5 - 5.2 mmol/L Final   10/16/2019 3.8 3.5 - 5.2 mmol/L Final      Chloride Chloride   Date Value Ref Range Status   10/18/2019 106 98 - 107 mmol/L Final   10/17/2019 104 98 - 107 mmol/L Final   10/16/2019 100 98 - 107 mmol/L Final      CO2 CO2   Date Value Ref Range Status   10/18/2019 24.0 22.0 - 29.0 mmol/L Final   10/17/2019 26.0 22.0 - 29.0 mmol/L Final   10/16/2019 22.0 22.0 - 29.0 mmol/L Final      BUN BUN   Date Value Ref Range Status   10/18/2019 28 (H) 8 - 23 mg/dL Final   10/17/2019 27 (H) 8 - 23 mg/dL Final   10/16/2019 28 (H) 8 - 23 mg/dL Final      Creatinine Creatinine   Date Value Ref Range Status   10/18/2019 2.08 (H) 0.76 - 1.27 mg/dL Final   10/17/2019 2.20 (H) 0.76 - 1.27 mg/dL Final   10/16/2019 2.45 (H) 0.76 - 1.27 mg/dL Final      Calcium Calcium   Date Value Ref Range Status   10/18/2019 8.0 (L) 8.6 - 10.5 mg/dL Final   10/17/2019 7.8 (L) 8.6 - 10.5 mg/dL Final   10/16/2019 8.1 (L) 8.6 - 10.5 mg/dL Final      PO4 No components found for: PO4   Albumin Albumin   Date Value Ref Range Status   10/18/2019 2.30 (L) 3.50 - 5.20 g/dL Final   10/16/2019 2.80 (L) 3.50 - 5.20 g/dL Final      Magnesium No results found for: MG   Uric Acid No components found for: URIC ACID     Imaging Results (last 72 hours)     Procedure Component Value Units Date/Time    US Renal Bilateral [[id number]] Collected:  10/17/19 1451     Updated:  10/17/19 1455    Narrative:       Examination: US RENAL BILATERAL-     Date of Exam: 10/17/2019 2:15 PM     Indication: ARF/CRF; D64.9-Anemia, unspecified; K92.1-Melena;  K75.81-Nonalcoholic steatohepatitis (THOMAS).     Comparison: Renal ultrasound dated 06/21/2019     Technique: Grayscale and color Doppler ultrasound evaluation of the  kidneys and urinary bladder was performed     Findings:  Visualization is limited due to body habitus. The right kidney measures  11.0 x 5.8 x 5.3 cm. The left kidney measures 10.7 x 5.1 x 4.8  cm. There  is normal renal echogenicity. There is probable renal cortical thinning  within the left kidney.  There is a normal variant column of Thanh  within the mid aspect of the right kidney. There are no shadowing renal  stones.     The bladder is well distended with bladder volume of 399 cc. There is no  internal debris or abnormal wall thickening.       Impression:       1. Probable mild renal cortical thinning of the left kidney.  2. No hydronephrosis.     Electronically Signed By-Shady Fischer On:10/17/2019 2:53 PM  This report was finalized on [id number] by  Shady Fischer, .          Results for orders placed during the hospital encounter of 07/11/19   XR Chest 2 View    Narrative DATE OF EXAM:  7/11/2019 10:46 AM     PROCEDURE:  XR CHEST 2 VW-     INDICATIONS:  NONALCOHOLIC STEATOHEPATITIS; K75.81-Nonalcoholic steatohepatitis  (THOMAS); K74.60-Unspecified cirrhosis of liver; K72.90-Hepatic failure,  unspecified without coma; D72.829-Elevated white blood cell count,  unspecified     COMPARISON:  AP portable chest 04/25/2019.     TECHNIQUE:   Two radiologic views of the chest.     FINDINGS:  Small right pleural effusion with minimal right costophrenic angle  atelectasis. Heart size is normal, and is improved since 04/25/2019.  Pulmonary vascular distribution is normal. The right arm approach PICC  is been removed since the prior examination. No visible pneumothorax.  Degenerative spurring within the thoracic spine, but no acute osseous  abnormality is seen.        Impression Stable small right pleural effusion since 04/25/2019. Minimal right  costophrenic angle atelectasis.     Electronically Signed By-Dr. Kaitlin Wong MD On:7/11/2019 11:06 AM  This report was finalized on [id number] by Dr. Kaitlin Wong MD.       Results for orders placed during the hospital encounter of 03/17/19   Duplex Venous Lower Extremity - Bilateral CAR    Narrative                   Lower Extremity Venous Report                           [facility]                         Vascular Laboratory                            [address] [place] [address]    Name: JOSE PATEL                Study Date: 2019 04:18 PM  MRN: [id number]                       Patient Location: ER  : 1945 (M/d/yyyy)           Gender: Male  Age: 73 yrs                          Account#: [id number]  Reason For Study: soa      Procedure  Venous study was carried out in bilateral lower extremities. Gray scale, color  flow, and spectral doppler images were obtained. Images were obtained in  transverse and longitudinal views. The study was technically difficult due to  Difficulty visualizing the distal veins secondary to edema and body habitus..    Right Lower Extremity Venous  On the right side the patient has patent common femoral, femoral, and  popliteal veins. The main veins are easily compressible. Femoral vein was  mapped in its entirety through the course of the thigh. It is patent and  compresses well. The popliteal vein is patent along with its tributaries and  compresses well with no evidence of any filling defect. Augmentation test is  positive. Respiratory waves are biphasic. Distal veins are patent. The right  greater and small saphenous veins are patent with good compressibility. There  is fluid retention noted, suggestive of edema.    Left Lower Extremity Venous  On the left side the patient has patent common femoral, femoral, and popliteal  veins. The main veins compress well. Femoral vein was mapped in its entirety  through the course of the thigh. It is patent and compresses well. The  popliteal vein is patent along with its tributaries and compresses well with  no evidence of any filling defect. Augmentation test is positive. Respiratory  waves are biphasic. Distal veins are patent. The left greater and small  saphenous veins are patent with good compressibility. There is fluid  retention  noted, suggestive of edema.      Interpretation Summary  No evidence of any deep vein thrombosis or superficial vein thrombosis.  _______________________________________________________________________________    Electronically signed by: Willian Adams MD  on 03/18/2019 11:07 AM    Ordering Physician: ANA HARTLEY  Referring Physician: MELODY CHIANG  Performed By:            ASSESSMENT / PLAN      Iron deficiency anemia due to chronic blood loss    Anemia    1.SUSANA/CKD3-----Nonoliguric. +ARF/SUSANA on top of known CRF/CKD STG 3 with a baseline serum creatinine of about 1.5. CRF/CKD STG 3 secondary to DGS/HTN NS. +ARF/SUSANA appears to be secondary to ATN from severe anemia with subsequent decreased renal perfusion and recent Bactrim exposure. Transfuse. Hold Bumex. Check urine and serum studies and renal US. No NSAIDs or IV dye. Dose meds for CrCl less than 10 cc/min.     2. ANEMIA------per Hem/Onc and GI. Check Fe. Transfuse     3. HTN WITH CKD STG 3-------BP okay. Avoid hypotension. No ACE/ARB/DRI/diuretic     4. DMII WITH RENAL MANIFESTATIONS------BS okay. On Lantus     5. HYPOTHYROIDISM-------On Synthroid. Check TSH     6. HYPOCALCEMIA------Replace IV. Follow ionized levels     7. OBESITY/THOMAS/KATHIA------Follow ammonia. On Lactulose. CPAP     8. EDEMA/CHRONIC VENOUS INSUFFICIENCY     9. TYPE 4 RTA------Stable bicarbonate levels on po NaHCO3     10. GERD-------On PPI  Cr stable      Enrique Lubin MD  [facility]  [phone]  10/18/19  1:02 PM

## 2019-10-18 NOTE — PLAN OF CARE
Problem: Activity Intolerance (Adult)  Goal: Identify Related Risk Factors and Signs and Symptoms  Outcome: Ongoing (interventions implemented as appropriate)   10/18/19 0320   Activity Intolerance (Adult)   Related Risk Factors (Activity Intolerance) generalized weakness;pain;obesity;sedentary lifestyle;PVD (peripheral vascular disease);impaired cardiac output   Signs and Symptoms (Activity Intolerance) pallor/cyanosis;pain/discomfort     Goal: Activity Tolerance  Outcome: Ongoing (interventions implemented as appropriate)   10/18/19 0320   Activity Intolerance (Adult)   Activity Tolerance achieves outcome     Goal: Effective Energy Conservation Techniques  Outcome: Ongoing (interventions implemented as appropriate)   10/18/19 0320   Activity Intolerance (Adult)   Effective Energy Conservation Techniques achieves outcome       Problem: Anemia (Adult)  Goal: Identify Related Risk Factors and Signs and Symptoms  Outcome: Ongoing (interventions implemented as appropriate)   10/18/19 0320   Anemia (Adult)   Related Risk Factors (Anemia) chronic illness;bleeding  ((Possible GI Bleed))   Signs and Symptoms (Anemia) dyspnea;fatigue;pallor     Goal: Symptom Improvement  Outcome: Ongoing (interventions implemented as appropriate)   10/18/19 0320   Anemia (Adult)   Symptom Improvement making progress toward outcome       Problem: Patient Care Overview  Goal: Plan of Care Review  Outcome: Ongoing (interventions implemented as appropriate)   10/18/19 0320   Coping/Psychosocial   Plan of Care Reviewed With patient;spouse   Plan of Care Review   Progress no change   OTHER   Outcome Summary Pt is new admit to PCU 10/17. Pt has completed 4th unit of PRBC's. Awaiting next H&H level. Pt will have an EGD in am and is currently NPO.       Problem: Pain, Chronic (Adult)  Goal: Identify Related Risk Factors and Signs and Symptoms  Outcome: Ongoing (interventions implemented as appropriate)   10/18/19 0320   Pain, Chronic (Adult)    Related Risk Factors (Chronic Pain) disease process;procedures/treatments   Signs and Symptoms (Chronic Pain) social withdrawal     Goal: Acceptable Pain/Comfort Level and Functional Ability  Outcome: Ongoing (interventions implemented as appropriate)   10/18/19 0320   Pain, Chronic (Adult)   Acceptable Pain/Comfort Level and Functional Ability achieves outcome       Problem: Skin Injury Risk (Adult)  Goal: Identify Related Risk Factors and Signs and Symptoms  Outcome: Ongoing (interventions implemented as appropriate)   10/18/19 0320   Skin Injury Risk (Adult)   Related Risk Factors (Skin Injury Risk) advanced age;body weight extremes;edema;mobility impaired;moisture     Goal: Skin Health and Integrity  Outcome: Ongoing (interventions implemented as appropriate)   10/18/19 0320   Skin Injury Risk (Adult)   Skin Health and Integrity making progress toward outcome

## 2019-10-18 NOTE — ANESTHESIA PREPROCEDURE EVALUATION
Anesthesia Evaluation     Patient summary reviewed and Nursing notes reviewed   NPO Solid Status: > 8 hours  NPO Liquid Status: > 8 hours           Airway   Mallampati: III  TM distance: >3 FB  Neck ROM: full  Possible difficult intubation  Dental - normal exam     Pulmonary - negative pulmonary ROS and normal exam   Cardiovascular - negative cardio ROS and normal exam        Neuro/Psych- negative ROS  GI/Hepatic/Renal/Endo - negative ROS     Musculoskeletal (-) negative ROS    Abdominal  - normal exam    Bowel sounds: normal.   Substance History - negative use     OB/GYN negative ob/gyn ROS         Other                        Anesthesia Plan    ASA 4     MAC     intravenous induction   Anesthetic plan, all risks, benefits, and alternatives have been provided, discussed and informed consent has been obtained with: patient.

## 2019-10-19 VITALS
BODY MASS INDEX: 39.38 KG/M2 | SYSTOLIC BLOOD PRESSURE: 103 MMHG | TEMPERATURE: 98.1 F | OXYGEN SATURATION: 98 % | HEIGHT: 74 IN | RESPIRATION RATE: 15 BRPM | WEIGHT: 306.88 LBS | DIASTOLIC BLOOD PRESSURE: 30 MMHG | HEART RATE: 72 BPM

## 2019-10-19 LAB
ABO + RH BLD: NORMAL
ALBUMIN SERPL-MCNC: 2.6 G/DL (ref 3.5–5.2)
ALBUMIN/GLOB SERPL: 0.7 G/DL
ALP SERPL-CCNC: 279 U/L (ref 39–117)
ALT SERPL W P-5'-P-CCNC: 23 U/L (ref 1–41)
ANION GAP SERPL CALCULATED.3IONS-SCNC: 9 MMOL/L (ref 5–15)
ANISOCYTOSIS BLD QL: NORMAL
ANISOCYTOSIS BLD QL: NORMAL
AST SERPL-CCNC: 55 U/L (ref 1–40)
BASOPHILS # BLD AUTO: 0 10*3/MM3 (ref 0–0.2)
BASOPHILS # BLD AUTO: 0.1 10*3/MM3 (ref 0–0.2)
BASOPHILS NFR BLD AUTO: 0.5 % (ref 0–1.5)
BASOPHILS NFR BLD AUTO: 0.8 % (ref 0–1.5)
BH BB BLOOD EXPIRATION DATE: NORMAL
BH BB BLOOD TYPE BARCODE: 600
BH BB BLOOD TYPE BARCODE: 6200
BH BB BLOOD TYPE BARCODE: 6200
BH BB DISPENSE STATUS: NORMAL
BH BB PRODUCT CODE: NORMAL
BH BB UNIT NUMBER: NORMAL
BILIRUB SERPL-MCNC: 0.7 MG/DL (ref 0.2–1.2)
BUN BLD-MCNC: 22 MG/DL (ref 8–23)
BUN/CREAT SERPL: 11.5 (ref 7–25)
CALCIUM SPEC-SCNC: 8.1 MG/DL (ref 8.6–10.5)
CHLORIDE SERPL-SCNC: 106 MMOL/L (ref 98–107)
CO2 SERPL-SCNC: 22 MMOL/L (ref 22–29)
CREAT BLD-MCNC: 1.91 MG/DL (ref 0.76–1.27)
DEPRECATED RDW RBC AUTO: 94.1 FL (ref 37–54)
DEPRECATED RDW RBC AUTO: 98 FL (ref 37–54)
EOSINOPHIL # BLD AUTO: 0.3 10*3/MM3 (ref 0–0.4)
EOSINOPHIL # BLD AUTO: 0.3 10*3/MM3 (ref 0–0.4)
EOSINOPHIL NFR BLD AUTO: 3.8 % (ref 0.3–6.2)
EOSINOPHIL NFR BLD AUTO: 4.5 % (ref 0.3–6.2)
ERYTHROCYTE [DISTWIDTH] IN BLOOD BY AUTOMATED COUNT: 27.5 % (ref 12.3–15.4)
ERYTHROCYTE [DISTWIDTH] IN BLOOD BY AUTOMATED COUNT: 27.8 % (ref 12.3–15.4)
GFR SERPL CREATININE-BSD FRML MDRD: 35 ML/MIN/1.73
GLOBULIN UR ELPH-MCNC: 3.6 GM/DL
GLUCOSE BLD-MCNC: 210 MG/DL (ref 65–99)
GLUCOSE BLDC GLUCOMTR-MCNC: 112 MG/DL (ref 70–105)
GLUCOSE BLDC GLUCOMTR-MCNC: 116 MG/DL (ref 70–105)
GLUCOSE BLDC GLUCOMTR-MCNC: 178 MG/DL (ref 70–105)
HCT VFR BLD AUTO: 23 % (ref 37.5–51)
HCT VFR BLD AUTO: 24.5 % (ref 37.5–51)
HCT VFR BLD AUTO: 24.5 % (ref 37.5–51)
HGB BLD-MCNC: 7.7 G/DL (ref 13–17.7)
HGB BLD-MCNC: 8.1 G/DL (ref 13–17.7)
HGB BLD-MCNC: 8.3 G/DL (ref 13–17.7)
INR PPP: 1.09 (ref 0.9–1.1)
LYMPHOCYTES # BLD AUTO: 0.9 10*3/MM3 (ref 0.7–3.1)
LYMPHOCYTES # BLD AUTO: 1.2 10*3/MM3 (ref 0.7–3.1)
LYMPHOCYTES NFR BLD AUTO: 11.8 % (ref 19.6–45.3)
LYMPHOCYTES NFR BLD AUTO: 16.9 % (ref 19.6–45.3)
MACROCYTES BLD QL SMEAR: NORMAL
MACROCYTES BLD QL SMEAR: NORMAL
MCH RBC QN AUTO: 34.3 PG (ref 26.6–33)
MCH RBC QN AUTO: 35.3 PG (ref 26.6–33)
MCHC RBC AUTO-ENTMCNC: 33.2 G/DL (ref 31.5–35.7)
MCHC RBC AUTO-ENTMCNC: 33.4 G/DL (ref 31.5–35.7)
MCV RBC AUTO: 102.5 FL (ref 79–97)
MCV RBC AUTO: 106.1 FL (ref 79–97)
MONOCYTES # BLD AUTO: 0.6 10*3/MM3 (ref 0.1–0.9)
MONOCYTES # BLD AUTO: 0.6 10*3/MM3 (ref 0.1–0.9)
MONOCYTES NFR BLD AUTO: 7 % (ref 5–12)
MONOCYTES NFR BLD AUTO: 7.5 % (ref 5–12)
NEUTROPHILS # BLD AUTO: 5.2 10*3/MM3 (ref 1.7–7)
NEUTROPHILS # BLD AUTO: 6.2 10*3/MM3 (ref 1.7–7)
NEUTROPHILS NFR BLD AUTO: 70.3 % (ref 42.7–76)
NEUTROPHILS NFR BLD AUTO: 76.9 % (ref 42.7–76)
NRBC BLD AUTO-RTO: 0 /100 WBC (ref 0–0.2)
NRBC BLD AUTO-RTO: 0.1 /100 WBC (ref 0–0.2)
PLAT MORPH BLD: NORMAL
PLAT MORPH BLD: NORMAL
PLATELET # BLD AUTO: 216 10*3/MM3 (ref 140–450)
PLATELET # BLD AUTO: 224 10*3/MM3 (ref 140–450)
PMV BLD AUTO: 8.3 FL (ref 6–12)
PMV BLD AUTO: 8.4 FL (ref 6–12)
POIKILOCYTOSIS BLD QL SMEAR: NORMAL
POIKILOCYTOSIS BLD QL SMEAR: NORMAL
POLYCHROMASIA BLD QL SMEAR: NORMAL
POTASSIUM BLD-SCNC: 4.4 MMOL/L (ref 3.5–5.2)
PROT SERPL-MCNC: 6.2 G/DL (ref 6–8.5)
PROTHROMBIN TIME: 11.2 SECONDS (ref 9.6–11.7)
RBC # BLD AUTO: 2.24 10*6/MM3 (ref 4.14–5.8)
RBC # BLD AUTO: 2.31 10*6/MM3 (ref 4.14–5.8)
SODIUM BLD-SCNC: 137 MMOL/L (ref 136–145)
TOXIC GRANULATION: NORMAL
UNIT  ABO: NORMAL
UNIT  RH: NORMAL
WBC MORPH BLD: NORMAL
WBC NRBC COR # BLD: 7.3 10*3/MM3 (ref 3.4–10.8)
WBC NRBC COR # BLD: 8 10*3/MM3 (ref 3.4–10.8)

## 2019-10-19 PROCEDURE — 99239 HOSP IP/OBS DSCHRG MGMT >30: CPT | Performed by: INTERNAL MEDICINE

## 2019-10-19 PROCEDURE — 85014 HEMATOCRIT: CPT | Performed by: NURSE PRACTITIONER

## 2019-10-19 PROCEDURE — 99232 SBSQ HOSP IP/OBS MODERATE 35: CPT | Performed by: INTERNAL MEDICINE

## 2019-10-19 PROCEDURE — 25010000002 IRON SUCROSE PER 1 MG: Performed by: NURSE PRACTITIONER

## 2019-10-19 PROCEDURE — 85610 PROTHROMBIN TIME: CPT | Performed by: NURSE PRACTITIONER

## 2019-10-19 PROCEDURE — 63710000001 INSULIN LISPRO (HUMAN) PER 5 UNITS: Performed by: NURSE PRACTITIONER

## 2019-10-19 PROCEDURE — 85018 HEMOGLOBIN: CPT | Performed by: NURSE PRACTITIONER

## 2019-10-19 PROCEDURE — 80053 COMPREHEN METABOLIC PANEL: CPT | Performed by: NURSE PRACTITIONER

## 2019-10-19 PROCEDURE — 85025 COMPLETE CBC W/AUTO DIFF WBC: CPT | Performed by: NURSE PRACTITIONER

## 2019-10-19 PROCEDURE — 82962 GLUCOSE BLOOD TEST: CPT

## 2019-10-19 PROCEDURE — 85007 BL SMEAR W/DIFF WBC COUNT: CPT | Performed by: NURSE PRACTITIONER

## 2019-10-19 RX ORDER — BUMETANIDE 1 MG/1
2 TABLET ORAL DAILY
Status: DISCONTINUED | OUTPATIENT
Start: 2019-10-19 | End: 2019-10-19 | Stop reason: HOSPADM

## 2019-10-19 RX ORDER — BUMETANIDE 2 MG/1
2 TABLET ORAL DAILY
Start: 2019-10-20 | End: 2020-03-07 | Stop reason: HOSPADM

## 2019-10-19 RX ORDER — PANTOPRAZOLE SODIUM 40 MG/1
40 TABLET, DELAYED RELEASE ORAL 2 TIMES DAILY
Qty: 30 TABLET | Refills: 3 | Status: SHIPPED | OUTPATIENT
Start: 2019-10-19

## 2019-10-19 RX ORDER — SUCRALFATE 1 G/1
1 TABLET ORAL
Qty: 120 TABLET | Refills: 0 | Status: SHIPPED | OUTPATIENT
Start: 2019-10-19 | End: 2019-11-18

## 2019-10-19 RX ADMIN — LEVOTHYROXINE SODIUM 75 MCG: 75 TABLET ORAL at 06:05

## 2019-10-19 RX ADMIN — LACTULOSE 60 ML: 10 SOLUTION ORAL at 01:58

## 2019-10-19 RX ADMIN — FERROUS SULFATE TAB EC 324 MG (65 MG FE EQUIVALENT) 324 MG: 324 (65 FE) TABLET DELAYED RESPONSE at 17:21

## 2019-10-19 RX ADMIN — SUCRALFATE 1 G: 1 TABLET ORAL at 09:57

## 2019-10-19 RX ADMIN — THERA TABS 1 TABLET: TAB at 09:57

## 2019-10-19 RX ADMIN — LACTULOSE 60 ML: 10 SOLUTION ORAL at 06:02

## 2019-10-19 RX ADMIN — INSULIN LISPRO 2 UNITS: 100 INJECTION, SOLUTION INTRAVENOUS; SUBCUTANEOUS at 12:21

## 2019-10-19 RX ADMIN — FERROUS SULFATE TAB EC 324 MG (65 MG FE EQUIVALENT) 324 MG: 324 (65 FE) TABLET DELAYED RESPONSE at 09:58

## 2019-10-19 RX ADMIN — RIFAXIMIN 550 MG: 550 TABLET ORAL at 09:58

## 2019-10-19 RX ADMIN — Medication 10 ML: at 09:57

## 2019-10-19 RX ADMIN — METOCLOPRAMIDE 5 MG: 10 TABLET ORAL at 09:57

## 2019-10-19 RX ADMIN — LACTULOSE 60 ML: 10 SOLUTION ORAL at 12:20

## 2019-10-19 RX ADMIN — DULOXETINE HYDROCHLORIDE 60 MG: 30 CAPSULE, DELAYED RELEASE ORAL at 09:58

## 2019-10-19 RX ADMIN — INSULIN LISPRO 15 UNITS: 100 INJECTION, SOLUTION INTRAVENOUS; SUBCUTANEOUS at 09:57

## 2019-10-19 RX ADMIN — SUCRALFATE 1 G: 1 TABLET ORAL at 12:20

## 2019-10-19 RX ADMIN — SODIUM BICARBONATE 650 MG TABLET 650 MG: at 09:57

## 2019-10-19 RX ADMIN — ALLOPURINOL 100 MG: 100 TABLET ORAL at 09:57

## 2019-10-19 RX ADMIN — POTASSIUM CHLORIDE 20 MEQ: 1500 TABLET, EXTENDED RELEASE ORAL at 09:57

## 2019-10-19 RX ADMIN — INSULIN LISPRO 15 UNITS: 100 INJECTION, SOLUTION INTRAVENOUS; SUBCUTANEOUS at 12:20

## 2019-10-19 RX ADMIN — SUCRALFATE 1 G: 1 TABLET ORAL at 17:21

## 2019-10-19 RX ADMIN — DOCUSATE SODIUM 100 MG: 100 CAPSULE, LIQUID FILLED ORAL at 09:58

## 2019-10-19 RX ADMIN — IRON SUCROSE 400 MG: 20 INJECTION, SOLUTION INTRAVENOUS at 13:46

## 2019-10-19 RX ADMIN — PANTOPRAZOLE SODIUM 40 MG: 40 TABLET, DELAYED RELEASE ORAL at 09:58

## 2019-10-19 RX ADMIN — MAGNESIUM OXIDE TAB 400 MG (241.3 MG ELEMENTAL MG) 400 MG: 400 (241.3 MG) TAB at 09:58

## 2019-10-19 RX ADMIN — BUMETANIDE 2 MG: 1 TABLET ORAL at 13:50

## 2019-10-19 NOTE — DISCHARGE SUMMARY
[facility] Medicine Services  DISCHARGE SUMMARY        Prepared For PCP:  Paulette Harris MD        Date of Admission:   10/16/2019    Date of Discharge:  10/19/2019    Length of stay:  LOS: 1 day     Reason For Admission:    Acute blood loss anemia      Principal and Active Diagnosis During Hospital Stay:    Acute on chronic anemia  Has a history of JOSE with malabsorption and IgG kappa MGUS  Patient on supplementary iron  Hemoglobin August 2019 was 10  ?  Slow GI bleed  Though patient denies history of hematochezia but has black stool-patient is on iron- has had previous history of GI bleed  Fecal Hemoccult positive  Transfused  as needed to keep hemoglobin greater than 7  status post transfusion 5 units packed red blood cell .  hematology and GI followed while inpatient   EGD 10/18-  Severe gastric antral vascular ectasia with oozing likely reason for blood loss anemia status post of cauterization.  Syed's esophagus  Hiatal hernia.    GI recommended  PPI and Carafate.  Repeat EGD with cauterization of ganglion again in 6 weeks.         History of Gonzalez cirrhosis with complication-hepatic encephalopathy  Had no history of previous esophageal varices  Continue on home medication  On Xifaxan and lactulose     History of PE/DVT-status post IVC filter     Anxiety/depression disorder  On  Atarax  and Cymbalta      Hypothyroidism-on Synthroid     Acute kidney failure on chronic kidney disease stage III  Possibly due to ATN from severe anemia leading to renal hypoperfusion in the setting of recent exposure to Bactrim  Nephrology  followed while inpatient  Serum creatinine slowly improving  Hemoglobin stable   nephrologist -okay to resume Bumex at 2 mg daily    Diabetes mellitus type 2   Blood glucose has been fairly controlled-less than 200 since admission on Lantus and pre-meal insulin       Recent chest wall abscess s/p resection- wound healing- on Bactrim 10 days since  10/10/19  Discontinue Bactrim due to worsening renal function     Gout on allopurinol             Hospital Course:    74-year-old man with multiple comorbidities including Gonzalez cirrhosis, CKD stage III, CAD status post stenting and hypothyroidism.  Follows up with hematology for iron deficiency anemia and coagulation factor deficiency.  Had followed up for routine visits at the hematologist office on 10/16.  Blood work done showed hemoglobin of 4.8 however patient reported no chest pain, shortness of breath, palpitation or generalized body weakness.    He was sent to the emergency room for further evaluation and management.     Conditions addressed while inpatients are as stated below    Acute on chronic anemia  Has a history of JOSE with malabsorption and IgG kappa MGUS  Patient on supplementary iron  Hemoglobin August 2019 was 10  ?  Slow GI bleed  Though patient denies history of hematochezia but has black stool-patient is on iron- has had previous history of GI bleed  Fecal Hemoccult positive  Transfused  as needed to keep hemoglobin greater than 7  status post transfusion 5 units packed red blood cell .  hematology and GI followed while inpatient   EGD 10/18-  Severe gastric antral vascular ectasia with oozing likely reason for blood loss anemia status post of cauterization.  Syed's esophagus  Hiatal hernia.    GI recommended  PPI and Carafate.  Repeat EGD with cauterization of ganglion again in 6 weeks.         History of Gonzalez cirrhosis with complication-hepatic encephalopathy  Had no history of previous esophageal varices  Continue on home medication  On Xifaxan and lactulose     History of PE/DVT-status post IVC filter     Anxiety/depression disorder  On  Atarax  and Cymbalta      Hypothyroidism-on Synthroid     Acute kidney failure on chronic kidney disease stage III  Possibly due to ATN from severe anemia leading to renal hypoperfusion in the setting of recent exposure to Bactrim  Nephrology  followed  while inpatient  Serum creatinine slowly improving  Hemoglobin stable   nephrologist -okay to resume Bumex at 2 mg daily    Diabetes mellitus type 2   Blood glucose has been fairly controlled-less than 200 since admission on Lantus and pre-meal insulin       Recent chest wall abscess s/p resection- wound healing- on Bactrim 10 days since 10/10/19  Discontinue Bactrim due to worsening renal function     Gout on allopurinol             Recommendation for Outpatient Providers:             Reasons For Change In Medications and Indications for New Medications:          Discharge Disposition             Discharge Medications      New Medications      Instructions Start Date   sucralfate 1 g tablet  Commonly known as:  CARAFATE   1 g, Oral, 4 Times Daily Before Meals & Nightly         Changes to Medications      Instructions Start Date   aspirin 81 MG tablet  What changed:  These instructions start on 10/26/2019. If you are unsure what to do until then, ask your doctor or other care provider.   81 mg, Oral, Daily   Start Date:  10/26/2019     bumetanide 2 MG tablet  Commonly known as:  BUMEX  What changed:    · when to take this  · Another medication with the same name was removed. Continue taking this medication, and follow the directions you see here.   2 mg, Oral, Daily   Start Date:  10/20/2019     pantoprazole 40 MG EC tablet  Commonly known as:  PROTONIX  What changed:  when to take this   40 mg, Oral, 2 Times Daily         Continue These Medications      Instructions Start Date   allopurinol 100 MG tablet  Commonly known as:  ZYLOPRIM   100 mg, Oral, 2 Times Daily      atorvastatin 20 MG tablet  Commonly known as:  LIPITOR   20 mg, Oral, Daily      COLACE 100 MG capsule  Generic drug:  docusate sodium   100 mg, Oral, 2 Times Daily      Cyanocobalamin 1000 MCG/ML kit   1,000 mcg, Injection, Every 30 Days      DULoxetine 60 MG capsule  Commonly known as:  CYMBALTA   60 mg, Oral, Daily      ferrous sulfate 325 (65 FE)  MG tablet   325 mg, Oral, 2 Times Daily      folic acid 1 MG tablet  Commonly known as:  FOLVITE   1 mg, Oral, Daily      hydrOXYzine 25 MG tablet  Commonly known as:  ATARAX   25 mg, Oral, 3 Times Daily PRN      insulin glargine 100 UNIT/ML injection  Commonly known as:  LANTUS   Inject 46 units at bedtime      insulin lispro 100 UNIT/ML injection  Commonly known as:  HUMALOG   15 units subcu before each meal plus sliding scale      lactulose 10 GM/15ML solution  Commonly known as:  CHRONULAC   60 mL, Oral, Every 4 Hours      levothyroxine 75 MCG tablet  Commonly known as:  SYNTHROID, LEVOTHROID   75 mcg, Oral, Daily      magnesium oxide 400 MG tablet  Commonly known as:  MAG-OX   400 mg, Oral, Daily      metoclopramide 5 MG tablet  Commonly known as:  REGLAN   5 mg, Oral, 2 Times Daily      multivitamin with minerals tablet tablet   1 tablet, Oral, Daily      oxyCODONE 5 MG immediate release tablet  Commonly known as:  ROXICODONE   5 mg, Oral, Every 8 Hours PRN      potassium chloride 20 MEQ CR tablet  Commonly known as:  K-DUR,KLOR-CON   20 mEq, Oral, Daily      sodium bicarbonate 650 MG tablet   650 mg, Oral, 3 Times Daily      XIFAXAN 550 MG tablet  Generic drug:  rifaximin   550 mg, Oral, Every 12 Hours Scheduled      zinc sulfate 50 MG capsule  Commonly known as:  ZINCATE   50 mg, Oral, Daily         Stop These Medications    sulfamethoxazole-trimethoprim 800-160 MG per tablet  Commonly known as:  BACTRIM DS,SEPTRA DS                  Test Results Pending at Discharge            Procedures Performed  Procedure(s):  ESOPHAGOGASTRODUODENOSCOPY with argon plasma coagulation of gastric antral vascular ectasia       Vital Signs    Temp:  [97.8 °F (36.6 °C)-98.8 °F (37.1 °C)] 98.1 °F (36.7 °C)  Heart Rate:  [68-78] 72  Resp:  [15-24] 15  BP: (103-138)/(30-53) 103/30    Physical Exam:    Physical Exam   Constitutional: He is oriented to person, place, and time. He appears well-developed and well-nourished.   HENT:    Head: Normocephalic and atraumatic.   Eyes: EOM are normal. Pupils are equal, round, and reactive to light.   Neck: Neck supple.   Cardiovascular: Normal rate and regular rhythm.   Pulmonary/Chest: Effort normal and breath sounds normal.   Abdominal: Soft. Bowel sounds are normal.   Musculoskeletal: Normal range of motion.   Neurological: He is alert and oriented to person, place, and time.   Skin: Skin is warm and dry.   Psychiatric: He has a normal mood and affect.   Nursing note and vitals reviewed.        Consults:   Consults     Date and Time Order Name Status Description    10/17/2019 0342 Inpatient Nephrology Consult Completed     10/17/2019 0342 Inpatient Gastroenterology Consult Completed     10/17/2019 0116 Inpatient Hematology & Oncology Consult            Pertinent Test Results:     Results from last 7 days   Lab Units 10/19/19  0942 10/18/19  2321 10/18/19  0344  10/17/19  0620   WBC 10*3/mm3 8.00 7.30 7.90  --  6.70   HEMOGLOBIN g/dL 8.1* 7.7* 7.0*   < > 5.2*   HEMATOCRIT % 24.5* 23.0* 21.1*   < > 16.3*   PLATELETS 10*3/mm3 216 224 216  --  218   MONOCYTES % %  --   --  5.0  --  3.0*    < > = values in this interval not displayed.     Results from last 7 days   Lab Units 10/19/19  0942 10/18/19  0344 10/17/19  0620   SODIUM mmol/L 137 139 139   POTASSIUM mmol/L 4.4 4.1 4.0   CHLORIDE mmol/L 106 106 104   CO2 mmol/L 22.0 24.0 26.0   BUN mg/dL 22 28* 27*   CREATININE mg/dL 1.91* 2.08* 2.20*   GLUCOSE mg/dL 210* 133* 156*   CALCIUM mg/dL 8.1* 8.0* 7.8*     Results from last 7 days   Lab Units 10/19/19  0942 10/18/19  0344 10/17/19  0620 10/16/19  1814   SODIUM mmol/L 137 139 139 136   POTASSIUM mmol/L 4.4 4.1 4.0 3.8   CHLORIDE mmol/L 106 106 104 100   CO2 mmol/L 22.0 24.0 26.0 22.0   BUN mg/dL 22 28* 27* 28*   CREATININE mg/dL 1.91* 2.08* 2.20* 2.45*   CALCIUM mg/dL 8.1* 8.0* 7.8* 8.1*   BILIRUBIN mg/dL 0.7 1.2  --  0.4   ALK PHOS U/L 279* 276*  --  380*   ALT (SGPT) U/L 23 21  --  30   AST (SGOT) U/L  55* 39  --  56*   GLUCOSE mg/dL 210* 133* 156* 193*         Lab Results   Component Value Date    CALCIUM 8.1 (L) 10/19/2019    PHOS 2.7 10/18/2019     Hemoglobin A1C   Date Value Ref Range Status   10/17/2019 4.5 3.5 - 5.6 % Final             Microbiology Results (last 10 days)     Procedure Component Value - Date/Time    Eosinophil Smear - Urine, Urine, Clean Catch [[id number]]  (Normal) Collected:  10/17/19 1508    Lab Status:  Final result Specimen:  Urine, Clean Catch Updated:  10/17/19 170     Eosinophil Smear 0 % EOS/100 Cells           ECG/EMG Results (most recent)     None          Results for orders placed during the hospital encounter of 19   Duplex Venous Lower Extremity - Bilateral CAR    Narrative                   Lower Extremity Venous Report                          [facility]                         Vascular Laboratory                            [address] [place] [address]    Name: JOSE PATEL                Study Date: 2019 04:18 PM  MRN: [id number]                       Patient Location: ER  : 1945 (M/[birth year]/yyyy)           Gender: Male  Age: 73 yrs                          Account#: [id number]  Reason For Study: soa      Procedure  Venous study was carried out in bilateral lower extremities. Gray scale, color  flow, and spectral doppler images were obtained. Images were obtained in  transverse and longitudinal views. The study was technically difficult due to  Difficulty visualizing the distal veins secondary to edema and body habitus..    Right Lower Extremity Venous  On the right side the patient has patent common femoral, femoral, and  popliteal veins. The main veins are easily compressible. Femoral vein was  mapped in its entirety through the course of the thigh. It is patent and  compresses well. The popliteal vein is patent along with its tributaries and  compresses well with no evidence of any filling defect. Augmentation test  is  positive. Respiratory waves are biphasic. Distal veins are patent. The right  greater and small saphenous veins are patent with good compressibility. There  is fluid retention noted, suggestive of edema.    Left Lower Extremity Venous  On the left side the patient has patent common femoral, femoral, and popliteal  veins. The main veins compress well. Femoral vein was mapped in its entirety  through the course of the thigh. It is patent and compresses well. The  popliteal vein is patent along with its tributaries and compresses well with  no evidence of any filling defect. Augmentation test is positive. Respiratory  waves are biphasic. Distal veins are patent. The left greater and small  saphenous veins are patent with good compressibility. There is fluid retention  noted, suggestive of edema.      Interpretation Summary  No evidence of any deep vein thrombosis or superficial vein thrombosis.  _______________________________________________________________________________    Electronically signed by: Willian Adams MD  on 2019 11:07 AM    Ordering Physician: ANA HARTLEY  Referring Physician: MELODY CHIANG  Performed By: GE         Results for orders placed during the hospital encounter of 19   Adult Transthoracic Echo Complete W/ Cont if Necessary Per Protocol    Narrative                           Adult Echocardiogram Report          [facility]  Cardiology Department  [address] [place] [address]  [id number]      Name: JOSE PATEL JStudy Date: 2019 07:08 AM           BP: 155/29 mmHg  MRN: [id number]       Patient Location:   : 1945      Gender: Male                              Height: 74 in  Age: 73 yrs          Account#: [id number]                     Weight: 350 lb  Reason For Study: SHORTNESS OF AIR                             BSA: 2.8 m2  Ordering Physician:  ANA HARTLEY  Referring Physician:  MELODY CHIANG  Performed By: MAXX      M-Mode/2-D Measurements:  LVIDd:  5.2 cm       (3.7-5.7) LVPWd: 1.3 cm        (0.8-1.2)  LVIDs: 4.1 cm       (2.3-3.9)  ACS: 2.0 cm         (1.6-3.7) IVSd: 1.4 cm         (0.7-1.2)  LA dimension: 4.2 cm(1.9-4.0) RVDd: 4.3 cm         (0.7-2.4)  FS: 21.9 %          (21-40%)  Ao root diam: 4.1 cm (2.0-3.7)    Comments  Undetermined supraventricular rhythm with regular ventricular rhythm with  probably broad QRS complexes.  Left ventricle had normal cavity size and wall thickness. Contractility of  left ventricle probably uniform and appropriate with EF estimated 50-60%.  Right ventricle at least mildly dilated.  Left atrium probably mildly dilated.  Unsure whether right atrium dilated.  Aortic valve probably had 3 cusps and had mild thickening and increased  density of the cusps with well maintained opening. Peak systolic velocity 1.7  m/s and mean velocity 1.2 m/s. No aortic regurgitation.  Mitral valve had unremarkable appearance and unimpaired opening. No  significant mitral regurgitation.  Peak E velocity 110 cm/s and peak e' velocity septum 11 cm/s and lateral wall  14 cm/s and E/e' average 9.  Tricuspid valve had unremarkable appearance. Mild tricuspid regurgitation.  RVSP less than 35 mmHg.  Pulmonic valve had unremarkable appearance. No significant pulmonic  regurgitation. Right ventricular outflow track 4 cm diameter.  IVC probably dilated.  No pericardial effusion.  Aortic root was not dilated.      Interpretation  1. Undetermined supraventricular rhythm with regular ventricular rhythm with  probably broad QRS complexes.  2. Right ventricle at least mildly dilated.  3. Left atrium probably mildly dilated.  4. Unsure whether right atrium dilated.  5. Aortic valve probably had 3 cusps and had mild sclerosis/calcification of  the cusps with well maintained opening and no regurgitation.  6. Mild tricuspid regurgitation.  7. Peak systolic pulmonary artery pressure estimated less than 35 mmHg.  8. IVC probably dilated suggesting elevated IVC and  right atrial pressure.    MMode/2D Measurements & Calculations  ESV(Teich): 73.9 ml  EF(Teich): 43.9 %                      Ao root area: 13.0 cm2  Asc Aorta Diam: 4.1 cm                 LVOT diam: 2.3 cm    EDV(MOD-sp4): 129.1 ml  ESV(MOD-sp4): 86.7 ml  EF(MOD-sp4): 32.8 %    Doppler Measurements & Calculations  MV E max frankie: 115.7 cm/sec               MV max P.9 mmHg                                           MV mean P.8 mmHg  MV dec time: 0.29 sec                    Ao V2 max: 175.7 cm/sec                                           Ao max P.3 mmHg                                           Ao V2 mean: 127.8 cm/sec                                           Ao mean P.2 mmHg                                           Ao V2 VTI: 33.3 cm                                           ARMAND(I,D): 3.4 cm2                                             ARMAND(V,D): 2.9 cm2  LV V1 max P.2 mmHg                   PA max P.6 mmHg  LV V1 mean PG: 3.5 mmHg  LV V1 max: 124.8 cm/sec  LV V1 mean: 87.7 cm/sec  LV V1 VTI: 27.5 cm  TR max frankie: 219.6 cm/sec  TR max P.4 mmHg  RVSP(TR): 29.4 mmHg    _______________________________________________________________________________      Electronically signed by: Rush Reilly MD  on 2019 09:30 AM         Us Renal Bilateral    Result Date: 10/17/2019  1. Probable mild renal cortical thinning of the left kidney. 2. No hydronephrosis.  Electronically Signed By-Shady Fischer On:10/17/2019 2:53 PM This report was finalized on [id number] by  Shady Fischer, .    Ir Outpatient Visit    Result Date: 10/14/2019  IMPRESSION : The requested procedure was not attempted today as the patient's cutaneous fistula site has healed over there is no evidence of any continued drainage for the past several days. The patient and his wife were instructed to continuously evaluate for recurrent drainage, at which point the patient can be returned for the attempted procedure.   Electronically Signed By-Jonn Cuenca On:10/14/2019 5:38 PM This report was finalized on [id number] by  Jonn Cuenca, .          Condition on Discharge:      Stable    Discharge Diet:   Diet Instructions     Diet: Consistent Carbohydrate      Discharge Diet:  Consistent Carbohydrate          Activity at Discharge:   Activity Instructions     Activity as Tolerated            Follow-up Appointments  Future Appointments   Date Time Provider Department Center   1/16/2020 10:00 AM [facility] None   1/23/2020  1:40 PM Alejandra Amaro MD [facility] NA None   9/4/2020 11:10 AM Alvaro Pandey MD [facility] NA     Additional Instructions for the Follow-ups that You Need to Schedule     Discharge Follow-up with PCP   As directed       Currently Documented PCP:    Paultete Harris MD    PCP Phone Number:    [phone]     Follow Up Details:  Follow up with PCP in 2 weeks         Discharge Follow-up with Specified Provider: Follow-up with gastroenterologist next scheduled appointment   As directed      To:  Follow-up with gastroenterologist next scheduled appointment         Discharge Follow-up with Specified Provider: Follow-up with the hematologist in 1 week   As directed      To:  Follow-up with the hematologist in 1 week                 Celestino Menchaca MD  10/19/19  3:06 PM    Time: I spent  32  minutes on this discharge activity which included face-to-face encounter with the patient/reviewing the data in the system/coordination of the care with the nursing staff as well as consultants/documentation/entering orders.

## 2019-10-19 NOTE — PROGRESS NOTES
"NEPHROLOGY PROGRESS NOTE------[facility]    [facility]  [phone]  Enrique Lubin MD      Patient Care Team:  Paulette Harris MD as PCP - General      Provider:  Enrique Lubin MD  Patient Name: Bry Ratliff  :  1945    SUBJECTIVE:  F/u SUSANA  CKD3  No cp/soa    Medication:    allopurinol 100 mg Oral BID   atorvastatin 20 mg Oral Nightly   docusate sodium 100 mg Oral BID   DULoxetine 60 mg Oral Daily   ferrous sulfate 324 mg Oral BID With Meals   insulin glargine 46 Units Subcutaneous Nightly   insulin lispro 0-7 Units Subcutaneous 4x Daily With Meals & Nightly   insulin lispro 15 Units Subcutaneous TID With Meals   lactulose 60 mL Oral Q4H   levothyroxine 75 mcg Oral Q AM   magnesium oxide 400 mg Oral Daily   metoclopramide 5 mg Oral BID   pantoprazole 40 mg Oral BID AC   potassium chloride 20 mEq Oral Daily   rifaximin 550 mg Oral Q12H   sodium bicarbonate 650 mg Oral TID   sodium chloride 10 mL Intravenous Q12H   sucralfate 1 g Oral 4x Daily AC & at Bedtime   THERA 1 tablet Oral Daily       sodium chloride 60 mL/hr Last Rate: 60 mL/hr (10/18/19 1331)       OBJECTIVE    Vital Sign Min/Max for last 24 hours  Temp  Min: 97.8 °F (36.6 °C)  Max: 98.8 °F (37.1 °C)   BP  Min: 100/50  Max: 138/45   Pulse  Min: 68  Max: 78   Resp  Min: 16  Max: 24   SpO2  Min: 95 %  Max: 100 %   No Data Recorded   No Data Recorded     Flowsheet Rows      First Filed Value   Admission Height  188 cm (74\") Documented at 10/16/2019 170   Admission Weight  143 kg (315 lb 7.7 oz)  (Abnormal)  Documented at 10/16/2019 1701          I/O this shift:  In: 480 [P.O.:480]  Out: -   I/O last 3 completed shifts:  In: 1880 [P.O.:840; I.V.:100; Blood:940]  Out: -     Physical Exam:  General Appearance: alert, appears stated age and cooperative  Head: normocephalic, without obvious abnormality and atraumatic  Eyes: conjunctivae and sclerae normal and no icterus  Neck: supple and no JVD  Lungs: clear " to auscultation and respirations regular  Heart: regular rhythm & normal rate and normal S1, S2  Chest: Wall no abnormalities observed  Abdomen: normal bowel sounds and soft non-tender  Extremities: moves extremities well, no edema, no cyanosis and no redness  Skin: no bleeding, bruising or rash, turgor normal, color normal and no leasions noted  Neurologic: Alert, and oriented. No focal deficits    Labs:    WBC WBC   Date Value Ref Range Status   10/19/2019 8.00 3.40 - 10.80 10*3/mm3 Final   10/18/2019 7.30 3.40 - 10.80 10*3/mm3 Final   10/18/2019 7.90 3.40 - 10.80 10*3/mm3 Final   10/17/2019 6.70 3.40 - 10.80 10*3/mm3 Final   10/16/2019 10.39 3.40 - 10.80 10*3/mm3 Final   10/16/2019 9.30 3.40 - 10.80 10*3/mm3 Final      HGB Hemoglobin   Date Value Ref Range Status   10/19/2019 8.1 (L) 13.0 - 17.7 g/dL Final   10/18/2019 7.7 (L) 13.0 - 17.7 g/dL Final   10/18/2019 7.0 (L) 13.0 - 17.7 g/dL Final   10/17/2019 6.6 (C) 13.0 - 17.7 g/dL Final   10/17/2019 5.9 (C) 13.0 - 17.7 g/dL Final   10/17/2019 5.2 (C) 13.0 - 17.7 g/dL Final   10/16/2019 5.1 (C) 13.0 - 17.7 g/dL Final   10/16/2019 4.9 (C) 13.0 - 17.7 g/dL Final   10/16/2019 4.8 (C) 13.0 - 17.7 g/dL Final      HCT Hematocrit   Date Value Ref Range Status   10/19/2019 24.5 (L) 37.5 - 51.0 % Final   10/18/2019 23.0 (L) 37.5 - 51.0 % Final   10/18/2019 21.1 (L) 37.5 - 51.0 % Final   10/17/2019 19.9 (C) 37.5 - 51.0 % Final   10/17/2019 18.2 (C) 37.5 - 51.0 % Final   10/17/2019 16.3 (C) 37.5 - 51.0 % Final   10/16/2019 15.8 (C) 37.5 - 51.0 % Final   10/16/2019 16.2 (C) 37.5 - 51.0 % Final   10/16/2019 16.5 (C) 37.5 - 51.0 % Final      Platlets No results found for: LABPLAT   MCV MCV   Date Value Ref Range Status   10/19/2019 106.1 (H) 79.0 - 97.0 fL Final   10/18/2019 102.5 (H) 79.0 - 97.0 fL Final   10/18/2019 105.5 (H) 79.0 - 97.0 fL Final     Comment:     Result checked    10/17/2019 114.4 (H) 79.0 - 97.0 fL Final     Comment:     Parameter reviewed in the past 30 days  on 10.16.19. Result checked    10/16/2019 122.7 (H) 79.0 - 97.0 fL Final   10/16/2019 124.1 (H) 79.0 - 97.0 fL Final          Sodium Sodium   Date Value Ref Range Status   10/19/2019 137 136 - 145 mmol/L Final   10/18/2019 139 136 - 145 mmol/L Final   10/17/2019 139 136 - 145 mmol/L Final   10/16/2019 136 136 - 145 mmol/L Final      Potassium Potassium   Date Value Ref Range Status   10/19/2019 4.4 3.5 - 5.2 mmol/L Final   10/18/2019 4.1 3.5 - 5.2 mmol/L Final   10/17/2019 4.0 3.5 - 5.2 mmol/L Final   10/16/2019 3.8 3.5 - 5.2 mmol/L Final      Chloride Chloride   Date Value Ref Range Status   10/19/2019 106 98 - 107 mmol/L Final   10/18/2019 106 98 - 107 mmol/L Final   10/17/2019 104 98 - 107 mmol/L Final   10/16/2019 100 98 - 107 mmol/L Final      CO2 CO2   Date Value Ref Range Status   10/19/2019 22.0 22.0 - 29.0 mmol/L Final   10/18/2019 24.0 22.0 - 29.0 mmol/L Final   10/17/2019 26.0 22.0 - 29.0 mmol/L Final   10/16/2019 22.0 22.0 - 29.0 mmol/L Final      BUN BUN   Date Value Ref Range Status   10/19/2019 22 8 - 23 mg/dL Final   10/18/2019 28 (H) 8 - 23 mg/dL Final   10/17/2019 27 (H) 8 - 23 mg/dL Final   10/16/2019 28 (H) 8 - 23 mg/dL Final      Creatinine Creatinine   Date Value Ref Range Status   10/19/2019 1.91 (H) 0.76 - 1.27 mg/dL Final   10/18/2019 2.08 (H) 0.76 - 1.27 mg/dL Final   10/17/2019 2.20 (H) 0.76 - 1.27 mg/dL Final   10/16/2019 2.45 (H) 0.76 - 1.27 mg/dL Final      Calcium Calcium   Date Value Ref Range Status   10/19/2019 8.1 (L) 8.6 - 10.5 mg/dL Final   10/18/2019 8.0 (L) 8.6 - 10.5 mg/dL Final   10/17/2019 7.8 (L) 8.6 - 10.5 mg/dL Final   10/16/2019 8.1 (L) 8.6 - 10.5 mg/dL Final      PO4 No components found for: PO4   Albumin Albumin   Date Value Ref Range Status   10/19/2019 2.60 (L) 3.50 - 5.20 g/dL Final   10/18/2019 2.30 (L) 3.50 - 5.20 g/dL Final   10/16/2019 2.80 (L) 3.50 - 5.20 g/dL Final      Magnesium No results found for: MG   Uric Acid No components found for: URIC ACID      Imaging Results (last 72 hours)     Procedure Component Value Units Date/Time    US Renal Bilateral [[id number]] Collected:  10/17/19 1451     Updated:  10/17/19 1455    Narrative:       Examination: US RENAL BILATERAL-     Date of Exam: 10/17/2019 2:15 PM     Indication: ARF/CRF; D64.9-Anemia, unspecified; K92.1-Melena;  K75.81-Nonalcoholic steatohepatitis (THOMAS).     Comparison: Renal ultrasound dated 06/21/2019     Technique: Grayscale and color Doppler ultrasound evaluation of the  kidneys and urinary bladder was performed     Findings:  Visualization is limited due to body habitus. The right kidney measures  11.0 x 5.8 x 5.3 cm. The left kidney measures 10.7 x 5.1 x 4.8 cm. There  is normal renal echogenicity. There is probable renal cortical thinning  within the left kidney.  There is a normal variant column of Thanh  within the mid aspect of the right kidney. There are no shadowing renal  stones.     The bladder is well distended with bladder volume of 399 cc. There is no  internal debris or abnormal wall thickening.       Impression:       1. Probable mild renal cortical thinning of the left kidney.  2. No hydronephrosis.     Electronically Signed By-Shady Fischer On:10/17/2019 2:53 PM  This report was finalized on [id number] by  Shady Fischer, .          Results for orders placed during the hospital encounter of 07/11/19   XR Chest 2 View    Narrative DATE OF EXAM:  7/11/2019 10:46 AM     PROCEDURE:  XR CHEST 2 VW-     INDICATIONS:  NONALCOHOLIC STEATOHEPATITIS; K75.81-Nonalcoholic steatohepatitis  (THOMAS); K74.60-Unspecified cirrhosis of liver; K72.90-Hepatic failure,  unspecified without coma; D72.829-Elevated white blood cell count,  unspecified     COMPARISON:  AP portable chest 04/25/2019.     TECHNIQUE:   Two radiologic views of the chest.     FINDINGS:  Small right pleural effusion with minimal right costophrenic angle  atelectasis. Heart size is normal, and is improved since  2019.  Pulmonary vascular distribution is normal. The right arm approach PICC  is been removed since the prior examination. No visible pneumothorax.  Degenerative spurring within the thoracic spine, but no acute osseous  abnormality is seen.        Impression Stable small right pleural effusion since 2019. Minimal right  costophrenic angle atelectasis.     Electronically Signed By-Dr. Kaitlin Wong MD On:2019 11:06 AM  This report was finalized on [id number] by Dr. Kaitlin Wong MD.       Results for orders placed during the hospital encounter of 19   Duplex Venous Lower Extremity - Bilateral CAR    Narrative                   Lower Extremity Venous Report                          [facility]                         Vascular Laboratory                            [address] [place] [address]    Name: JOSE PATEL                Study Date: 2019 04:18 PM  MRN: [id number]                       Patient Location:   : 1945 (M/d/yyyy)           Gender: Male  Age: 73 yrs                          Account#: [id number]  Reason For Study: soa      Procedure  Venous study was carried out in bilateral lower extremities. Gray scale, color  flow, and spectral doppler images were obtained. Images were obtained in  transverse and longitudinal views. The study was technically difficult due to  Difficulty visualizing the distal veins secondary to edema and body habitus..    Right Lower Extremity Venous  On the right side the patient has patent common femoral, femoral, and  popliteal veins. The main veins are easily compressible. Femoral vein was  mapped in its entirety through the course of the thigh. It is patent and  compresses well. The popliteal vein is patent along with its tributaries and  compresses well with no evidence of any filling defect. Augmentation test is  positive. Respiratory waves are biphasic. Distal veins are patent. The  right  greater and small saphenous veins are patent with good compressibility. There  is fluid retention noted, suggestive of edema.    Left Lower Extremity Venous  On the left side the patient has patent common femoral, femoral, and popliteal  veins. The main veins compress well. Femoral vein was mapped in its entirety  through the course of the thigh. It is patent and compresses well. The  popliteal vein is patent along with its tributaries and compresses well with  no evidence of any filling defect. Augmentation test is positive. Respiratory  waves are biphasic. Distal veins are patent. The left greater and small  saphenous veins are patent with good compressibility. There is fluid retention  noted, suggestive of edema.      Interpretation Summary  No evidence of any deep vein thrombosis or superficial vein thrombosis.  _______________________________________________________________________________    Electronically signed by: Willian Adams MD  on 03/18/2019 11:07 AM    Ordering Physician: ANA HARTLEY  Referring Physician: MELODY CHIANG  Performed By:            ASSESSMENT / PLAN      Iron deficiency anemia due to chronic blood loss    Anemia    1.SUSANA/CKD3-----Nonoliguric. +ARF/SUSANA on top of known CRF/CKD STG 3 with a baseline serum creatinine of about 1.5. CRF/CKD STG 3 secondary to DGS/HTN NS. +ARF/SUSANA appears to be secondary to ATN from severe anemia with subsequent decreased renal perfusion and recent Bactrim exposure. Transfuse. Hold Bumex. Check urine and serum studies and renal US. No NSAIDs or IV dye. Dose meds for CrCl less than 10 cc/min.     2. ANEMIA------per Hem/Onc and GI. Check Fe. Transfuse     3. HTN WITH CKD STG 3-------BP okay. Avoid hypotension. No ACE/ARB/DRI/diuretic     4. DMII WITH RENAL MANIFESTATIONS------BS okay. On Lantus     5. HYPOTHYROIDISM-------On Synthroid. Check TSH     6. HYPOCALCEMIA------Replace IV. Follow ionized levels     7. OBESITY/THOMAS/KATHIA------Follow ammonia. On  Lactulose. CPAP     8. EDEMA/CHRONIC VENOUS INSUFFICIENCY     9. TYPE 4 RTA------Stable bicarbonate levels on po NaHCO3     10. GERD-------On PPI    Cr slowly better  Hb stable  Can resume bumex at 2 mg daily      Enrique Lubin MD  [facility]  [phone]  10/19/19  11:06 AM

## 2019-10-19 NOTE — PROGRESS NOTES
Hematology/Oncology Inpatient Progress Note    PATIENT NAME: Bry Ratliff  : 1945  MRN: [id number]    CHIEF COMPLAINT: anemia    HISTORY OF PRESENT ILLNESS:  74 y.o.  male admitted through the ED 10/16/2019 after being sent from the [facility] where his hemoglobin was 4.8.  He reported some black stools on oral iron supplementation twice per day without melena or bright red blood per rectum.  He denied recent bleeding events including hemoptysis, nosebleeds, and hematuria.  He had been treated for a chest wall abscess in early 2019 with I&D followed by antibiotics.  An outpatient CT abdomen done 2019 due to continued drainage from the wound showed significant decrease in posterior right hepatic lobe fluid collection, trace right pleural effusion that was improved, intrahepatic and extrahepatic biliary ductal dilation with new intrahepatic biliary dilation.  Liver cirrhosis with no focal liver lesion, diverticulosis, and nonobstructive renal stones noted.  He was most recently treated with Bactrim given by his surgeon.  He stated that the wound closed and he did not have significant bleeding when the wound was open. He denied fever or chills.  His spouse reported him to be more fatigued and less active.  He reported stable shortness of air with exertion.  He was not on anticoagulation but was taking aspirin 81 mg by mouth daily.  CBC at the [facility] center 10/16/2019 showed a WBC 9.3, hemoglobin 4.8, .1, and platelets 289,000.  Repeat with a new specimen revealed hemoglobin 4.9.  Acute anemia labs were sent with reticulocyte count 7.63 percent (0.7-1.9).  Remaining anemia work-up was pending at time of admission.  PMH significant for JSOE with malabsorption, IgG kappa monoclonal gammopathy, erosive gastritis, Syed's esophagus, CKD stage III, vitamin B12 deficiency, RLE DVT and history of PE with thrombophilia, THOMAS, and splenomegaly.     10/16/19  Hematology/Oncology was  consulted as the patient is known to our service and followed for anemia diagnosed January 2018, IgG kappa monoclonal gammopathy diagnosed May 2018, elevated LFTs with biliary duct dilation diagnosed 2018 and right lower extremity DVT and bilateral pulmonary emboli diagnosed 2004.    · Anemia/JOSE/MGUS - He was initially seen by us in consultation for anemia with hemoglobin of 7.4 noted in January 2018 by his nephrologist Dr. Plascencia.  He did undergo an EGD and colonoscopy in March 2018 with findings of Syed's esophagus and hiatal hernia in the cardia.  Colonoscopy had poor prep but the scope did reach the cecum.  Colonoscopy was recommended to be repeated in 2 years and EGD in 3 years.  At time of consultation the patient had reported history of vitamin B12 deficiency but had not been on vitamin B12 injections for 6 months prior.  B12 level at PCP office prior to consultation was normal.  He was diagnosed with JOSE and was started on oral iron therapy.  Additional work-up including bone marrow evaluation revealed an IgG kappa monoclonal gammopathy.  Bone marrow had 4% plasma cells present.  EGD in November 2018 revealed erosive gastritis, bleeding in the ampulla and oozing upon entering the stomach in many different areas.  He has received IV iron in late 2018 and again in late April and early May 2019.  On 7/8/2019, iron studies showed iron saturation 36%, iron 56, TIBC 154 and ferritin was 1199.  On 8/7/2019, his hemoglobin was 10.0.  He was restarted on ferrous sulfate 325 mg by mouth twice a day.  · Elevated LFTs and biliary duct dilation- work-up of elevated LFTs revealed cholelithiasis and THOMAS.  In July 2018 he underwent ERCP with bilateral sphincterotomy and common duct stone extraction with biliary brushing and stent placement.  Pathology on brushings was negative.  He subsequently underwent EUS in August 2018 revealing suspect benign biliary stricture due to CBD stone with pathology negative for  malignancy and revealing benign and atypical ductal cells.  He was hospitalized once in April and a second hospitalization in May 2019 for encephalopathy related to his cirrhosis.  · Right lower extremity DVT and bilateral PEs were diagnosed in 2004 he was treated with Coumadin followed by Xarelto.  Thrombophilia work-up revealed protein C and S deficiencies, elevated factor VIII, and antiphospholipid antibody positivity.  Due to GI bleed, and IVC filter was placed in December 2018 and low-dose aspirin was started.  Repeat antiphospholipid antibodies were positive and factor VIII remained elevated (334) in April 2019.    10/18-EGD per Dr. Gomez.  Oozing severe gastric antral vascular ectasia, Syed's esophagus and hiatal hernia.       PCP: Paulette Harris MD    Subjective   Feels all right with clear urine and no vomiting.  No bleeding per rectum.  Shortness of air on exertion    ROS:  Review of Systems   Constitutional: Negative for diaphoresis, fatigue, fever and unexpected weight change.   HENT: Negative for congestion and nosebleeds.    Eyes: Negative.    Respiratory: Positive for shortness of breath. Negative for cough.    Cardiovascular: Negative for chest pain and leg swelling.   Gastrointestinal: Positive for diarrhea. Negative for abdominal pain, blood in stool, constipation, nausea and vomiting.   Endocrine: Negative for cold intolerance and heat intolerance.   Genitourinary: Negative for dysuria and hematuria.   Musculoskeletal: Negative for arthralgias and joint swelling.   Skin: Positive for wound (on sacrum. ). Negative for rash.   Allergic/Immunologic: Negative.    Neurological: Negative for numbness and headaches.   Hematological: Does not bruise/bleed easily.   Psychiatric/Behavioral: Negative for confusion. The patient is not nervous/anxious.    All other systems reviewed and are negative.       MEDICATIONS:    Scheduled Meds:      allopurinol 100 mg Oral BID   atorvastatin 20 mg Oral Nightly  "  docusate sodium 100 mg Oral BID   DULoxetine 60 mg Oral Daily   ferrous sulfate 324 mg Oral BID With Meals   insulin glargine 46 Units Subcutaneous Nightly   insulin lispro 0-7 Units Subcutaneous 4x Daily With Meals & Nightly   insulin lispro 15 Units Subcutaneous TID With Meals   lactulose 60 mL Oral Q4H   levothyroxine 75 mcg Oral Q AM   magnesium oxide 400 mg Oral Daily   metoclopramide 5 mg Oral BID   pantoprazole 40 mg Oral BID AC   potassium chloride 20 mEq Oral Daily   rifaximin 550 mg Oral Q12H   sodium bicarbonate 650 mg Oral TID   sodium chloride 10 mL Intravenous Q12H   sucralfate 1 g Oral 4x Daily AC & at Bedtime   THERA 1 tablet Oral Daily      Continuous Infusions:      sodium chloride 60 mL/hr Last Rate: 60 mL/hr (10/18/19 1331)      PRN Meds:  dextrose  •  dextrose  •  glucagon (human recombinant)  •  hydrOXYzine  •  nitroglycerin  •  ondansetron **OR** ondansetron  •  oxyCODONE  •  [COMPLETED] Insert peripheral IV **AND** sodium chloride  •  sodium chloride     ALLERGIES:  No Known Allergies    Objective    VITALS:   /45   Pulse 73   Temp 98.3 °F (36.8 °C) (Oral)   Resp 20   Ht 188 cm (74\")   Wt (!) 139 kg (306 lb 14.1 oz)   SpO2 98%   BMI 39.40 kg/m²     PHYSICAL EXAM:  Physical Exam   Constitutional: He is oriented to person, place, and time. He appears well-developed and well-nourished.   Morbid obesity   HENT:   Head: Normocephalic and atraumatic.   Mouth/Throat: Oropharynx is clear and moist.   Poor dentition   Eyes: Conjunctivae, EOM and lids are normal. Pupils are equal, round, and reactive to light.   Neck: Normal range of motion. Neck supple. No thyromegaly present.   Cardiovascular: Normal rate, regular rhythm and normal heart sounds.   No murmur heard.  Monitor leads   Pulmonary/Chest: Effort normal and breath sounds normal. No respiratory distress.   Abdominal: Soft. Normal appearance and bowel sounds are normal. He exhibits no distension.   Obese   Genitourinary: "   Genitourinary Comments: Deferred.   Musculoskeletal: Normal range of motion. He exhibits no edema.   Lymphadenopathy:     He has no cervical adenopathy.     He has no axillary adenopathy.        Right: No supraclavicular adenopathy present.        Left: No supraclavicular adenopathy present.   Neurological: He is alert and oriented to person, place, and time.   Skin: Skin is warm and dry. Capillary refill takes less than 2 seconds. No bruising, no petechiae and no rash noted.   Psychiatric: He has a normal mood and affect. His speech is normal and behavior is normal. Judgment and thought content normal. Cognition and memory are normal.   Nursing note and vitals reviewed.        RECENT LABS:  Lab Results (last 24 hours)     Procedure Component Value Units Date/Time    POC Glucose Once [[id number]]  (Abnormal) Collected:  10/19/19 0717    Specimen:  Blood Updated:  10/19/19 0718     Glucose 112 mg/dL      Comment: Serial Number: [id number]:  [id number]       CBC & Differential [[id number]] Collected:  10/18/19 2321    Specimen:  Blood Updated:  10/19/19 0102    Narrative:       The following orders were created for panel order CBC & Differential.  Procedure                               Abnormality         Status                     ---------                               -----------         ------                     CBC Auto Differential[[id number]]        Abnormal            Final result                 Please view results for these tests on the individual orders.    CBC Auto Differential [[id number]]  (Abnormal) Collected:  10/18/19 2321    Specimen:  Blood Updated:  10/19/19 0102     WBC 7.30 10*3/mm3      RBC 2.24 10*6/mm3      Hemoglobin 7.7 g/dL      Hematocrit 23.0 %      .5 fL      MCH 34.3 pg      MCHC 33.4 g/dL      RDW 27.5 %      RDW-SD 94.1 fl      MPV 8.4 fL      Platelets 224 10*3/mm3      Neutrophil % 70.3 %      Lymphocyte % 16.9 %      Monocyte % 7.5 %      Eosinophil % 4.5 %       Basophil % 0.8 %      Neutrophils, Absolute 5.20 10*3/mm3      Lymphocytes, Absolute 1.20 10*3/mm3      Monocytes, Absolute 0.60 10*3/mm3      Eosinophils, Absolute 0.30 10*3/mm3      Basophils, Absolute 0.10 10*3/mm3      nRBC 0.0 /100 WBC     Narrative:       Appended report. These results have been appended to a previously verified report.    Scan Slide [[id number]] Collected:  10/18/19 2321    Specimen:  Blood Updated:  10/19/19 0102     Anisocytosis Mod/2+     Macrocytes Slight/1+     Poikilocytes Slight/1+     Toxic Granulation Slight/1+     Platelet Morphology Normal    POC Glucose Once [[id number]]  (Abnormal) Collected:  10/18/19 1853    Specimen:  Blood Updated:  10/18/19 1854     Glucose 165 mg/dL      Comment: Serial Number: [id number]:  [id number]       POC Glucose Once [[id number]]  (Abnormal) Collected:  10/18/19 1532    Specimen:  Blood Updated:  10/18/19 1534     Glucose 142 mg/dL      Comment: Serial Number: [id number]:  [id number]       POC Glucose Once [[id number]]  (Abnormal) Collected:  10/18/19 1128    Specimen:  Blood Updated:  10/18/19 1135     Glucose 164 mg/dL      Comment: Serial Number: [id number]:  [id number]             PENDING RESULTS: n/a    IMAGING REVIEWED:  Us Renal Bilateral    Result Date: 10/17/2019  1. Probable mild renal cortical thinning of the left kidney. 2. No hydronephrosis.  Electronically Signed By-Shady Fischer On:10/17/2019 2:53 PM This report was finalized on [id number] by  Shady Fischer, .      I have reviewed the patient's labs, imaging, reports, and other clinician documentation.    Assessment/Plan   ASSESSMENT:  1. Acute on chronic anemia/Macrocytosis/JOSE with malabsorption/IgG kappa MGUS - Chronic anemia with last hemoglobin 10.0 in early August and on oral iron twice daily. MGUS has been followed as outpatient and not likely etiology for acute anemia. EGD November 2018 with active bleeding and oozing from severe gastric antral vascular  ectasia, Syed's esophagus and hiatal hernia with admit.  On protonix. UA negative for blood.  S/P pRBC's 1 unit 10/16 and 10/18, 3 units 10/17.  Recent antibiotics for chest wall wound-adverse effect of Bactrim may be contributing as well as liver disease/splenomegaly.  Hapto/folic both normal.  B12 and retic both high.  Fe studies showed ACD. On oral Fe and Multivitamin.  Improved.    2. Biliary ductal dilation/THOMAS with cirrhosis/splenomegaly/Syed's esophagus/gastric antral vascular ectasia- followed by [facility] as outpatient.  Outpatient CT had revealed new intrahepatic biliary dilation.  EGD to be repeated in 6 weeks.    3. Thrombophilia/H/o RLE DVT and bilateral PE -antiphospholipid antibody positivity, decreased protein C&S, and elevated factor VIII.  Has IVC filter. ASA on hold since admit.  SCD's.    4. SUSANA on CKD - followed by Dr. Plascencia as outpatient. Stable.  5. DM/CAD s/p stents/HLD/HTN/KATHIA/hyperuricemia- chronic conditions per primary team.   6. Sacral wound-s/p Bactrim course.  Per Dr. Etienne as an OP.       PLAN  1. Transfuse to keep Hgb above 7.0.  2. H/H at 1600.   3. Resume ASA 81 mg when ok with GI.   4. Venofer 400 mg x 1.    Electronically signed by SHAJI Tamayo, 10/19/19, 9:09 AM.         Patient seen and examined.  I personally reviewed all pertinent labs and the  imaging.  I agree with  nurse practitioner's assessment and plan.  Patient has received Venofer.  Will need follow-up as outpatient in a week.  I discussed the patients findings and my recommendations with patient    SCOOBY Guajardo MD  10/19/19  9:09 AM

## 2019-10-20 ENCOUNTER — READMISSION MANAGEMENT (OUTPATIENT)
Dept: CALL CENTER | Facility: HOSPITAL | Age: 74
End: 2019-10-20

## 2019-10-20 NOTE — OUTREACH NOTE
Prep Survey      Responses   Facility patient discharged from?  [facility]   Is patient eligible?  Yes   Discharge diagnosis  Iron deficiency anemia due to chronic blood loss Syed's esophagus   Does the patient have one of the following disease processes/diagnoses(primary or secondary)?  Other   Does the patient have Home health ordered?  Yes   What is the Home health agency?   [facility]   Is there a DME ordered?  No   Prep survey completed?  Yes          Spring Arce RN

## 2019-10-20 NOTE — PROGRESS NOTES
"Continued Stay Note  [facility]     Patient Name: Bry Ratliff  MRN: [id number]  Today's Date: 10/20/2019    Admit Date: 10/16/2019    Discharge Plan     Row Name 10/20/19 0813       Plan    Plan Comments  10/20/19: Per Katie with [facility] this pt is current and she has noted pt was discharged home on 10/19/19 but she did not note a HHC order.  I checked order history and do not see the HHC order- I noted on 10/18/19  has noted that the pt is to \"return home with [facility].\"  Katie with [facility] will pursue resumption of care orders from PCP.         Discharge Codes    No documentation.       Expected Discharge Date and Time     Expected Discharge Date Expected Discharge Time    Oct 19, 2019             Malika Allen RN    "

## 2019-10-21 NOTE — PROGRESS NOTES
Case Management Discharge Note    Final Note: [facility] h[facility]           Home Medical Care - Selection Complete      Service Provider Request Status Selected Services Address Phone Number Fax Number    [facility] [place] [facility] Services [address] [address] [phone]                         Final Discharge Disposition Code: 06 - home with home health care

## 2019-10-22 DIAGNOSIS — L02.213 CUTANEOUS ABSCESS OF CHEST WALL: Primary | ICD-10-CM

## 2019-10-23 ENCOUNTER — READMISSION MANAGEMENT (OUTPATIENT)
Dept: CALL CENTER | Facility: HOSPITAL | Age: 74
End: 2019-10-23

## 2019-10-23 NOTE — OUTREACH NOTE
Medical Week 1 Survey      Responses   Facility patient discharged from?  [facility]   Does the patient have one of the following disease processes/diagnoses(primary or secondary)?  Other   Is there a successful TCM telephone encounter documented?  No   Week 1 attempt successful?  Yes   Call start time  1855   Call end time  1858   Discharge diagnosis  Iron deficiency anemia due to chronic blood loss Syed's esophagus   Meds reviewed with patient/caregiver?  Yes   Is the patient having any side effects they believe may be caused by any medication additions or changes?  No   Does the patient have all medications ordered at discharge?  Yes   Is the patient taking all medications as directed (includes completed medication regime)?  Yes   Does the patient have a primary care provider?   Yes   Does the patient have an appointment with their PCP within 7 days of discharge?  Greater than 7 days   What is preventing the patient from scheduling follow up appointments within 7 days of discharge?  Earlier appointment not available   Has the patient kept scheduled appointments due by today?  N/A   Comments  Patient to have CT done on Oct 28   What is the Home health agency?   [facility]   Has home health visited the patient within 72 hours of discharge?  Yes   Did the patient receive a copy of their discharge instructions?  Yes   Nursing interventions  Reviewed instructions with patient   What is the patient's perception of their health status since discharge?  Improving   Is the patient/caregiver able to teach back signs and symptoms related to disease process for when to call PCP?  Yes   Is the patient/caregiver able to teach back signs and symptoms related to disease process for when to call 911?  Yes   Is the patient/caregiver able to teach back the hierarchy of who to call/visit for symptoms/problems? PCP, Specialist, Home health nurse, Urgent Care, ED, 911  Yes   Additional teach back comments  Patient states he is doing well.    Week 1 call completed?  Yes   Wrap up additional comments  No questions or needs at this time          Kesha Mallory, ANH

## 2019-10-24 ENCOUNTER — TELEPHONE (OUTPATIENT)
Dept: SURGERY | Facility: CLINIC | Age: 74
End: 2019-10-24

## 2019-10-24 DIAGNOSIS — L02.213 CUTANEOUS ABSCESS OF CHEST WALL: Primary | ICD-10-CM

## 2019-10-24 NOTE — TELEPHONE ENCOUNTER
Pt is scheduled for an repeat CT scan on 10/28/19. Dr Etienne is wanting to see CT scan results once completed . He will talk with IR once he has reviewed results.

## 2019-10-25 ENCOUNTER — LAB (OUTPATIENT)
Dept: LAB | Facility: HOSPITAL | Age: 74
End: 2019-10-25

## 2019-10-25 DIAGNOSIS — D72.829 LEUKOCYTOSIS, UNSPECIFIED TYPE: ICD-10-CM

## 2019-10-25 LAB
BASOPHILS # BLD AUTO: 0.05 10*3/MM3 (ref 0–0.2)
BASOPHILS NFR BLD AUTO: 0.7 % (ref 0–1.5)
DEPRECATED RDW RBC AUTO: 74.7 FL (ref 37–54)
EOSINOPHIL # BLD AUTO: 0.32 10*3/MM3 (ref 0–0.4)
EOSINOPHIL NFR BLD AUTO: 4.5 % (ref 0.3–6.2)
ERYTHROCYTE [DISTWIDTH] IN BLOOD BY AUTOMATED COUNT: 19.7 % (ref 12.3–15.4)
HCT VFR BLD AUTO: 27.6 % (ref 37.5–51)
HGB BLD-MCNC: 8.7 G/DL (ref 13–17.7)
LYMPHOCYTES # BLD AUTO: 1.1 10*3/MM3 (ref 0.7–3.1)
LYMPHOCYTES NFR BLD AUTO: 15.4 % (ref 19.6–45.3)
MCH RBC QN AUTO: 34.1 PG (ref 26.6–33)
MCHC RBC AUTO-ENTMCNC: 31.5 G/DL (ref 31.5–35.7)
MCV RBC AUTO: 108.2 FL (ref 79–97)
MONOCYTES # BLD AUTO: 0.63 10*3/MM3 (ref 0.1–0.9)
MONOCYTES NFR BLD AUTO: 8.8 % (ref 5–12)
NEUTROPHILS # BLD AUTO: 5.03 10*3/MM3 (ref 1.7–7)
NEUTROPHILS NFR BLD AUTO: 70.6 % (ref 42.7–76)
PLATELET # BLD AUTO: 139 10*3/MM3 (ref 140–450)
PMV BLD AUTO: 11.4 FL (ref 6–12)
RBC # BLD AUTO: 2.55 10*6/MM3 (ref 4.14–5.8)
WBC NRBC COR # BLD: 7.13 10*3/MM3 (ref 3.4–10.8)

## 2019-10-25 PROCEDURE — 36415 COLL VENOUS BLD VENIPUNCTURE: CPT

## 2019-10-25 PROCEDURE — 85025 COMPLETE CBC W/AUTO DIFF WBC: CPT

## 2019-10-28 ENCOUNTER — LAB (OUTPATIENT)
Dept: LAB | Facility: HOSPITAL | Age: 74
End: 2019-10-28

## 2019-10-28 ENCOUNTER — HOSPITAL ENCOUNTER (OUTPATIENT)
Dept: CT IMAGING | Facility: HOSPITAL | Age: 74
Discharge: HOME | End: 2019-10-28
Admitting: SURGERY

## 2019-10-28 ENCOUNTER — TRANSCRIBE ORDERS (OUTPATIENT)
Dept: ADMINISTRATIVE | Facility: HOSPITAL | Age: 74
End: 2019-10-28

## 2019-10-28 DIAGNOSIS — N18.30 CHRONIC KIDNEY DISEASE, STAGE III (MODERATE) (HCC): Primary | ICD-10-CM

## 2019-10-28 DIAGNOSIS — L02.213 CUTANEOUS ABSCESS OF CHEST WALL: ICD-10-CM

## 2019-10-28 DIAGNOSIS — N18.30 CHRONIC KIDNEY DISEASE, STAGE III (MODERATE) (HCC): ICD-10-CM

## 2019-10-28 LAB
ANION GAP SERPL CALCULATED.3IONS-SCNC: 9.8 MMOL/L (ref 5–15)
BUN BLD-MCNC: 19 MG/DL (ref 8–23)
BUN/CREAT SERPL: 11.4 (ref 7–25)
CALCIUM SPEC-SCNC: 8.1 MG/DL (ref 8.6–10.5)
CHLORIDE SERPL-SCNC: 101 MMOL/L (ref 98–107)
CO2 SERPL-SCNC: 23.2 MMOL/L (ref 22–29)
CREAT BLD-MCNC: 1.67 MG/DL (ref 0.76–1.27)
GFR SERPL CREATININE-BSD FRML MDRD: 41 ML/MIN/1.73
GLUCOSE BLD-MCNC: 190 MG/DL (ref 65–99)
POTASSIUM BLD-SCNC: 4.4 MMOL/L (ref 3.5–5.2)
SODIUM BLD-SCNC: 134 MMOL/L (ref 136–145)

## 2019-10-28 PROCEDURE — 36415 COLL VENOUS BLD VENIPUNCTURE: CPT

## 2019-10-28 PROCEDURE — 80048 BASIC METABOLIC PNL TOTAL CA: CPT

## 2019-10-28 PROCEDURE — 0 IOPAMIDOL PER 1 ML: Performed by: SURGERY

## 2019-10-28 PROCEDURE — 74177 CT ABD & PELVIS W/CONTRAST: CPT

## 2019-10-28 RX ADMIN — IOPAMIDOL 50 ML: 755 INJECTION, SOLUTION INTRAVENOUS at 12:00

## 2019-10-29 PROBLEM — D68.59 PROTEIN S DEFICIENCY (HCC): Status: RESOLVED | Noted: 2019-08-07 | Resolved: 2019-10-29

## 2019-10-29 NOTE — PROGRESS NOTES
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 Anemia diagnosed 2018 and IgG kappa monoclonal gammopathy diagnosed May 2018.   • This patient is an obese  male who claims to have developed kidney problems in 2018 for which he was referred to Devi Plascencia M.D. He was found to have a hemoglobin of 7.4 at that time and was given 1 unit of packed red blood cells. He was then referred to GI where he had been followed for Syed’s esophagus for a number of years. An EGD and colonoscopy was performed on 3/5/18 by Marisel Gomez M.D. revealing mucosa suggestive of Syed’s esophagus and hiatal hernia in the cardia. Patient had a poor prep compromising the colonoscopy exam though the scope did reach the cecum. Esophageal biopsy revealed squamocolumnar mucosa with extensive intestinal metaplasia consistent with Syed’s esophagus, negative for dysplasia. Colonoscopy was recommended to be repeated in two years and EGD in three years. The patient saw his primary care physician, Paulette Harris M.D., in followup and blood testing was done on 18 revealing WBC 6.3, hemoglobin 8.4, MCV 84.5, platelet count 182,000. Vitamin B12 level was 416 (180-914) and patient was referred here for further evaluation. The patient claims to have been diagnosed with Vitamin B12 deficiency a number of years ago for which he has been on Vitamin B12 injections  up until six months ago when he just stopped taking those. He has been recently started on oral iron supplementation. He claims not to see any blood in his urine but does have microscopic hematuria for which he sees a urologist. He denies nosebleeds, gum bleeds or blood in the stool. He has not had any significant changes in his weight. He feels weak and dizzy for a number of years which has recently gotten worse. He does not ambulate much because of back surgery causing him weakness. He did have a right lower extremity DVT with bilateral pulmonary emboli in 2004 since which time he has been on Coumadin, thought secondary to a sedentary lifestyle. He has no family history of anemias.   • 5/24/18 - Patient seen initial consultation for anemia. Hemoglobin 7.9, MCV 89.9. Ferritin 17 (), iron 183 (), TIBC 379 (228-428), iron saturation 48% (20-50), retic 2.16% (0.5-1.5), haptoglobin 138 (), folate 9.1 (5.9-24.8). SPEP showed monoclonal gammopathy. SIFE showed IgG kappa monoclonal gammopathy. M-spike in the gamma region 0.7 g/dL. Erythropoietin 53.5 (2.59-18.5). Antiparietal cell antibody and intrinsic factor antibodies negative.   Globulin 4.2 (2.5-3.8). Creatinine 1.4 (0.7-1.2).   • 6/19/18 - Discussed results of anemia workup. Hemoglobin improving. Ferrous Sulfate decreased to 325 mg twice a day. Will perform gammopathy workup for IgG kappa monoclonal gammopathy. IgA 783 (), IgG 1480 (600-1500), IgM 93 (). Kappa lambda FLC ratio 0.81 (0.26-1.65). Ferritin 36 ().        • 6/25/18 - Bone survey negative for acute disease. No evidence of lytic or blastic metastatic bone disease was present. Chest x-ray showed cardiomegaly, mild right basilar atelectasis and findings suggestive of ankylosing spondylitis.   • 6/29/18 - Patient underwent sternal bone marrow revealing monoclonal gammopathy of undetermined significant (MGUS). Extent of bone marrow involvement 5%. Phenotype kappa positive,  IgG positive, CD38 positive, CD20 negative, CD19 negative, CD45 negative, CD56 negative and  negative. Dyspoiesis was not seen. There was absence of iron stores. Normal male karyotype. Normal FISH study. Flow cytometry revealed IgG kappa restrictive plasma cells in a polytypic background. There was a mixed population of maturing myeloid cells, B-cells and T-cells. No abnormal myeloid maturation was seen. A monoclonal IgG kappa plasma cell population was present. 4% plasma cells present.   • 8/23/18 - Labs by Dr. Plascencia revealed B12 of 857 (180-914), folate 12.7 (5.9-24.8), iron 127 ().      • 9/19/18 - Iron 94 (), TIBC 297 (228-428), iron saturation 32 (20-50), ferritin 29 (). Erythropoietin 30.04 (2.59-18.5).        • 10/16/18 - Iron 177 (), TIBC 320 (228-428), iron saturation 55 (20-50), ferritin 34 ().       • 11/16/18 - Hemoglobin 7.1. Patient given 1 unit of packed red blood cells.   • 11/21/18 - Ferritin 27 (). Hemoglobin 7.9. Patient given 1 unit of packed red blood cells.    • 11/26/18 - EGD by Dave Russo M.D. revealed erosive gastritis and bleeding ampulla. There was oozing upon entering the stomach in many different areas. Duodenal mucosa and the esophagus were normal.   • 11/27/18 - Hemoglobin 8.2. Order written for Procrit 30,000 units subq weekly, not approved by insurance for low ferritin. Urine JERRY with no definite monoclonal gammopathy identified with questionable faint IgG kappa.   • 12/18/18 - Hemoglobin 9.9. Injectafer 750 mg IV given.   • 1/2/19 - Injectafer 750 mg IV given.  Hemoglobin 11, .1. Patient claims that he is getting Vitamin B12 injections monthly at home through Dr. Harris. Currently taking Ferrous Sulfate 325 mg p.o. b.i.d. and asked to continue that. Asked to continue Pantoprazole 40 mg daily that he is taking. IgA 651 (), IgG 1470 (600-1500), IgM 94 ().      • 2/12/19 - Iron 88 ().    • 3/6/19 - WBC 19.8  with 83% neutrophils, 5% lymphocytes, 11% monocytes, hemoglobin 8.7, , platelet count 182,000. Patient status post laparoscopic cholecystectomy on 2/24/19 with large ecchymoses apparent on abdomen. Infectious workup ordered and Injectafer 750 mg IV days 1 and 8 ordered.  Iron 23 (), TIBC 153 (228-428), iron saturation 15% (20-50), ferritin 400 (224-336).       • 3/12/19 - WBC 15.02, hemoglobin 8.1 and platelets 227,000. Patient reported hematuria followed by Dr. Starkey. Orders written to start Injectafer ASAP. Abdominal ecchymosis improving.   • 3/14/19 - Injectafer 750 mg IV given.   • 4/22/19 - Hemoglobin 9.5, . Patient missed day 8 Injectafer in March and it was rescheduled. SIFE showed IgG kappa monoclonal gammopathy.  • 5/8/19 - Injectafer 750 mg IV given.   • 7/8/19 - Iron 56 (). Iron Saturation 36 (20-50%). Transferrin 110 (180-330). TIBC 154 (228-428). Ferritin 1,199 ().    • 8/7/2019-hemoglobin 10.0.  Patient taking multivitamin with iron only.  Restarted ferrous sulfate 325mg by mouth twice a day. UIFE showed free kappa light chain identified.   • 10/16/2019 patient hospitalized at [facility] for 3 days and found to have a hemoglobin of 4.8 at the cancer center visit.  Stool Hemoccult was positive.  He ended up receiving 5 units of packed red blood cells.  EGD by Marisel Gomez MD revealed severe gastric antral vascular ectasia with oozing, Syed's esophagus and hiatal hernia.  The patient was treated with PPI and Carafate.  Plan was to repeat EGD with cauterization in 6 weeks.  B12 and reticulocyte count were high.  Haptoglobin and folic acid were normal.  Creatinine 2.2 (0.76-1.27), iron 47 (), TIBC 264 (298-5 0.36), iron saturation 18 (20-50), ferritin 128.4 ().  Aspirin was held temporarily and patient given IV iron.  IgA 720 (), IgM 72 (), IgG 1489 (700-1600).     2.   Elevated LFT’s diagnosis established March 2018.  Biliary duct dilation diagnosis  established June 2018.   · 11/30/17 - Maia phos 93 (32-91), total bili 0.7 (0.3-1.2), AST 37 (15-41), ALT 25 (15-41).   · 3/1/18 - T-bili 0.5 (0.3-1.2), maia phos 106 (32-91), AST 54 (15-41), ALT 27 (17-63).   · 5/24/18 - AST 46 (15-41), maia phos 131 (32-91).   • 6/8/18 - CMP ordered by Dr. Alejandra Amaro showed maia phos 209 (32-91),  (15-41), ALT 84 (17-63), total bili 1.1 (0.3-1.2).             • 6/19/18 - CT abdomen and pelvis as well as serological workup for elevated LFT’s ordered. Alpha-1 antitrypsin 144 (). Ceruloplasmin 38 (22-58). AFP 3 (0-9).  Hepatitis panel non-reactive.   • 6/22/18 - CT abdomen and pelvis showed abnormal intra and extrahepatic biliary dilation. There was mild distention of the gallbladder and evidence of several gallstones. Bilateral non-obstructing kidney stone was present. Incidental sigmoid diverticulosis.   • 7/2/18 - Patient underwent MRCP at [facility] showing markedly dilated intrahepatic and extrahepatic bile duct with multiple small filling defects within the distal common bile duct compatible with choledocholithiasis. There was a focal 6 mm segmental narrowing at the distal CBD with recommended GI consult for ERCP and brushings.   • 7/5/18 to 7/10/18 - On 7/5/18 labs revealed normal total bilirubin (0.8), AST 69 (15-41), maia phos 152 (32-91), ALT was normal at 37 (17-63), ammonia was elevated at 86 (9-35), lipase was normal (22). Patient hospitalized at [facility] due to weakness. Workup revealed cholelithiasis. On 7/9/18 patient underwent ERCP with bilateral sphincterotomy, common duct stone extraction, biliary brushing and stent placement by Dr. Leiva. Path on brushings was negative for malignancy. There was intra and extrahepatic biliary ductal dilation with relatively abrupt distal common bile duct cutoff and non-visualization of the gallbladder. He was started on Lovenox and sequential compression devices due to risk of pulmonary embolism.  of 33  (0-35).  On 7/10/18 LFT’s revealed total bili 0.9 (0.3-1.2). Maia phos 129 (32-91). AST 50 (15-41), ALT 41 (17-63).     • 8/31/18 - EUS by Dr. Gomez at [facility] revealing suspect benign biliary stricture due to CBD stone with FNA pathology revealing benign and atypical ductal cells, bile and proteinaceous material including scattered bacteria, neutrophils and degenerating cells. The atypia was felt mild and favored to be reactive in nature. Plan was to repeat ERCP with removal of the stent and the stone with consideration of common bile duct evaluation with cholangioscopy at that time.   • 10/12/18 - ERCP with sphincteroplasty and stone extraction done by Dave Russo M.D. for choledocholithiasis.   • 2/23/19 - Patient underwent ERCP with balloon clearance of bile duct by Jl Hampton M.D.   • 3/6/19 - LFT’s not elevated with bilirubin 1.1, AST 31, ALT 14, maia phos was mildly elevated at 101 (32-91).   • 4/25/19 - 4/26/19 - Admitted to [facility] for hepatic encephalopathy and hypokalemia.   • 4/27/2019 - CT abdomen pelvis showed a 4.8 cm air-fluid collection adjacent to the right posterior hepatic lobe significant improved.  Right-sided chest tube small right pleural effusion.  Hepatic cirrhosis.  Bilateral nonobstructing renal stones.  Sigmoid diverticulosis.   • 5/26/19 - 5/29/2019 - Admitted to [facility] for hepatic and cephalopathy.  Ammonia level 213 (H).  • 7/11/19 - AFP 2.76 (0-9).   • 9/26/19 - CT scan abdomen showed fluid collection posterior to the right hepatic lobe has significantly diminished in size with only trace fluid remaining. Small locules of air are seen within this collection which could be related to recent shunt mentation or could be related to tiny fistula from the splenic flexure of the colon. Trace right pleural fluid, diminished since 4/27/19. Thickened, likely reactive, pleural rind remains. Mild right basilar atelectasis. Abnormal intrahepatic and extra hepatic biliary  ductal dilation. Intrahepatic biliary dilation is new since 4/27/19. The CBD appears attenuated in its mid segment, raising the possibility of stricture. ERCP recommended. Uncomplicated colonic diverticulosis. Non-obstructing bilateral renal stones and probable small right renal cyst. Cirrhotic liver morphology. No focal liver lesion is seen.  • 10/28/19 - CT scan abdomen and pelvis showed fluid collection posterior to the right hepatic lobe appears slightly increased in size now measuring 24 mm in diameter, again a small droplet of gas adjacent to this fluid collection is seen which may represent fistula. Right basilar small pleural effusion and atelectasis remain. Continued dilatation of the intra and extrahepatic biliary ductal system which appears stable from previous exam. Non-obstructing bilateral renal calculi. Changes of cirrhosis and splenomegaly. Diverticulosis. Mild interval change from prior study with increasing fluid collection posterior to the right hepatic lobe.     3.   Right lower extremity DVT and bilateral pulmonary emboli diagnosed 2004.   • 2004 - Patient claims to have developed right lower extremity DVT with bilateral pulmonary emboli in 2004 and was coumadinized. The blood clots were thought secondary to his sedentary lifestyle. He stayed on the Coumadin up until October 2017 when, due to difficulty maintaining his INR’s, he was switched to Xarelto 20 mg daily by Paulette Harris M.D. which was later dropped to 10 mg daily.    • 11/26/18 - EGD by Dave Russo M.D. revealed erosive gastritis and bleeding ampulla. Ampullary biopsy revealed reactive/reparative small intestinal mucosa with mild chronic inflammation. Gastric antrum biopsy was suggestive of focal mild reactive gastropathy. Due to erosive gastritis, patient asked to hold Xarelto and Aspirin by Dave Russo M.D.   • 11/27/18 - Patient asked to discontinue Xarelto and hold Aspirin while obtaining IVC filter. Risks  discussed. Factor V Leiden gene mutation and prothrombin gene mutations not detected. Anticardiolipin IgM 11 (<11). Anti beta-2 glyco, IgA 55 (<20). Factor VIII activity 186 (). Antithrombin III activity 63 (). Protein C activity 69 (), protein S activity 69 ().       • 12/5/18 - Patient underwent transjugular IVC filter placement in IR by  Jonn Cuenca M.D.   • 1/2/19 - Told patient that his low protein CNS and antithrombin III likely due to liver disease. He would be at a high risk of going back on Xarelto and hence continued on Aspirin 81 mg p.o. daily.   • 1/3/19 - D-dimer 1.25 (<0.45).    • 3/8/19 - Chest x-ray showed chronic changes in the chest with no obvious pneumonia or congestive changes.  • 4/3/19 - Chest x-ray with right pleural effusion and basilar lung density with slight increase from prior. Cardiomegaly.   • 4/22/19 - Factor VIII 334 (H), Anti-phosphatidylserine antibody IgM> 80 (H), Anti-phosphatidylserine antibody IgG 12 (N), Cardiolipin IgG 21 (N), Cardiolipin IgM 55 (H), Cardiolipin IgA 63 (H), Beta-2 glycoprotein IgG 4 (N), IgA 91 (H), and IgM 21 (H).   • 7/11/19 - Chest x-ray showed stable small right pleural effusion since 04/25/2019. Minimal right costophrenic angle atelectasis.    Past Medical History:   Diagnosis Date   • Anemia    • B12 deficiency 2009   • Syed's esophagus    • CAD (coronary artery disease)    • Diabetes mellitus (CMS/HCC)    • History of echocardiogram     03/03/2017 - 12/03/2014 - 04/2012   • Hyperlipidemia    • Hypertension    • Morbid obesity (CMS/HCC)    • KATHIA treated with BiPAP    • Renal insufficiency    • S/P lumbar laminectomy        Past Surgical History:   Procedure Laterality Date   • BACK SURGERY  1971   • CATARACT EXTRACTION  2014   • CHOLECYSTECTOMY  02/24/2019   • COLONOSCOPY  03/2018   • CORONARY ANGIOPLASTY  08/24/2009    PCI stent - succesful placement of 3.5/23 promus stent in proximal right coronary artery   • CORONARY  ANGIOPLASTY WITH STENT PLACEMENT  08/27/2009    patient had stent placed to proximal right coronary artery   • CYSTOSCOPY BLADDER STONE LITHOTRIPSY  1997   • ENDOSCOPY N/A 10/18/2019    Procedure: ESOPHAGOGASTRODUODENOSCOPY with argon plasma coagulation of gastric antral vascular ectasia;  Surgeon: Marisel Gomez MD;  Location: [facility] ENDOSCOPY;  Service: Gastroenterology   • OTHER SURGICAL HISTORY  11/2018    IVC filter implant   • RENAL ARTERY STENT Left          Current Outpatient Medications:   •  allopurinol (ZYLOPRIM) 100 MG tablet, Take 100 mg by mouth 2 (Two) Times a Day., Disp: , Rfl:   •  aspirin 81 MG tablet, Take 1 tablet by mouth Daily., Disp: 30 tablet, Rfl: 3  •  atorvastatin (LIPITOR) 20 MG tablet, Take 1 tablet by mouth Daily., Disp: 90 tablet, Rfl: 3  •  bumetanide (BUMEX) 2 MG tablet, Take 1 tablet by mouth Daily., Disp: , Rfl:   •  Cyanocobalamin 1000 MCG/ML kit, Inject 1,000 mcg as directed Every 30 (Thirty) Days., Disp: , Rfl:   •  docusate sodium (COLACE) 100 MG capsule, Take 100 mg by mouth 2 (Two) Times a Day., Disp: , Rfl:   •  DULoxetine (CYMBALTA) 60 MG capsule, Take 60 mg by mouth Daily., Disp: , Rfl:   •  ferrous sulfate 325 (65 FE) MG tablet, Take 1 tablet by mouth 2 (Two) Times a Day., Disp: 60 tablet, Rfl: 2  •  folic acid (FOLVITE) 1 MG tablet, Take 1 mg by mouth Daily., Disp: , Rfl:   •  hydrOXYzine (ATARAX) 25 MG tablet, Take 25 mg by mouth 3 (Three) Times a Day As Needed for Itching., Disp: , Rfl:   •  insulin glargine (LANTUS) 100 UNIT/ML injection, Inject 46 units at bedtime, Disp: 30 mL, Rfl: 4  •  insulin lispro (HUMALOG) 100 UNIT/ML injection, 15 units subcu before each meal plus sliding scale, Disp: 30 mL, Rfl: 4  •  lactulose (CHRONULAC) 10 GM/15ML solution, Take 60 mL by mouth Every 4 (Four) Hours., Disp: , Rfl:   •  levothyroxine (SYNTHROID, LEVOTHROID) 75 MCG tablet, Take 1 tablet by mouth Daily., Disp: 90 tablet, Rfl: 4  •  magnesium oxide (MAG-OX) 400 MG tablet, Take  400 mg by mouth Daily., Disp: , Rfl:   •  metoclopramide (REGLAN) 5 MG tablet, Take 5 mg by mouth 2 (Two) Times a Day., Disp: , Rfl: 0  •  Multiple Vitamins-Minerals (MULTIVITAMIN WITH MINERALS) tablet tablet, Take 1 tablet by mouth Daily., Disp: , Rfl:   •  oxyCODONE (ROXICODONE) 5 MG immediate release tablet, Take 5 mg by mouth Every 8 (Eight) Hours As Needed., Disp: , Rfl:   •  pantoprazole (PROTONIX) 40 MG EC tablet, Take 1 tablet by mouth 2 (Two) Times a Day., Disp: 30 tablet, Rfl: 3  •  potassium chloride (K-DUR,KLOR-CON) 20 MEQ CR tablet, Take 20 mEq by mouth Daily., Disp: , Rfl: 0  •  rifaximin (XIFAXAN) 550 MG tablet, Take 550 mg by mouth Every 12 (Twelve) Hours., Disp: , Rfl:   •  sodium bicarbonate 650 MG tablet, Take 650 mg by mouth 3 (Three) Times a Day., Disp: , Rfl:   •  sucralfate (CARAFATE) 1 g tablet, Take 1 tablet by mouth 4 (Four) Times a Day Before Meals & at Bedtime for 30 days., Disp: 120 tablet, Rfl: 0  •  zinc sulfate (ZINCATE) 50 MG capsule, Take 50 mg by mouth Daily., Disp: , Rfl: 0    No Known Allergies    Family History   Problem Relation Age of Onset   • Hyperlipidemia Other    • Hypertension Other        Cancer-related family history is not on file.    Social History     Tobacco Use   • Smoking status: Never Smoker   • Smokeless tobacco: Never Used   Substance Use Topics   • Alcohol use: No     Frequency: Never   • Drug use: No     I have reviewed the history of present illness, past medical history, family history, social history, lab results, all notes and other records since the patient was last seen on 8/7/19.    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 Reports that he is off  "Aspirin right now because he is getting a drain tube placed for abscess. Has a little cough. Home Health is coming once a week.     Female accompanied patient today.    GIORGI Leahy present during office visit.        REVIEW OF SYSTEMS:  Review of Systems   Constitutional: Negative for activity change, appetite change, chills, fever and unexpected weight change.   HENT: Negative for ear pain, mouth sores, nosebleeds, sinus pressure and sore throat.    Eyes: Negative for photophobia and visual disturbance.   Respiratory: Positive for cough. Negative for shortness of breath, wheezing and stridor.    Cardiovascular: Negative for chest pain, palpitations and leg swelling.   Gastrointestinal: Negative for abdominal pain, blood in stool, constipation, diarrhea, nausea and vomiting.   Endocrine: Negative for cold intolerance and heat intolerance.   Genitourinary: Negative for difficulty urinating, dysuria, frequency and hematuria.   Musculoskeletal: Negative for arthralgias, joint swelling and neck stiffness.   Skin: Negative for color change and rash.   Neurological: Negative for dizziness, seizures, syncope and headaches.   Hematological: Negative for adenopathy.        No obvious bleeding   Psychiatric/Behavioral: Negative for agitation, confusion and hallucinations. The patient is not nervous/anxious.      OBJECTIVE:    Vitals:    10/30/19 1531   BP: 159/70   Pulse: 103   Resp: 20   Temp: 99.3 °F (37.4 °C)   Weight: (!) 139 kg (307 lb 6.4 oz)   Height: 188 cm (74\")   PainSc: 0-No pain     ECOG  (2) Ambulatory and capable of self care, unable to carry out work activity, up and about > 50% or waking hours    Physical Exam   Constitutional: He is oriented to person, place, and time. No distress.   HENT:   Head: Normocephalic and atraumatic.   Mouth/Throat: No oropharyngeal exudate.   Poor dentition.    Eyes: Conjunctivae and EOM are normal. Right eye exhibits no discharge. Left eye exhibits no discharge. No scleral " icterus.   Neck: Normal range of motion. Neck supple. No thyromegaly present.   Cardiovascular: Normal rate, regular rhythm and normal heart sounds. Exam reveals no gallop and no friction rub.   No murmur heard.  Grade II systolic murmur.    Pulmonary/Chest: Effort normal. No stridor. No respiratory distress. He has no wheezes.   Abdominal: Soft. Bowel sounds are normal. He exhibits no mass. There is no tenderness. There is no rebound and no guarding.   Obese.   Musculoskeletal: Normal range of motion. He exhibits no tenderness or deformity.   1+ edema left lower extremity. He is using a walker for ambulation.    Lymphadenopathy:     He has no cervical adenopathy.   Neurological: He is alert and oriented to person, place, and time. He exhibits normal muscle tone. Coordination normal.   Skin: Skin is warm. No rash noted. He is not diaphoretic. No erythema. No pallor.   Skin is pale. Ecchymoses upper extremities.    Psychiatric: He has a normal mood and affect. His behavior is normal.   Nursing note and vitals reviewed.      RECENT LABS  WBC   Date Value Ref Range Status   10/25/2019 7.13 3.40 - 10.80 10*3/mm3 Final     RBC   Date Value Ref Range Status   10/25/2019 2.55 (L) 4.14 - 5.80 10*6/mm3 Final     Hemoglobin   Date Value Ref Range Status   10/25/2019 8.7 (L) 13.0 - 17.7 g/dL Final     Hematocrit   Date Value Ref Range Status   10/25/2019 27.6 (L) 37.5 - 51.0 % Final     MCV   Date Value Ref Range Status   10/25/2019 108.2 (H) 79.0 - 97.0 fL Final     MCH   Date Value Ref Range Status   10/25/2019 34.1 (H) 26.6 - 33.0 pg Final     MCHC   Date Value Ref Range Status   10/25/2019 31.5 31.5 - 35.7 g/dL Final     RDW   Date Value Ref Range Status   10/25/2019 19.7 (H) 12.3 - 15.4 % Final     RDW-SD   Date Value Ref Range Status   10/25/2019 74.7 (H) 37.0 - 54.0 fl Final     MPV   Date Value Ref Range Status   10/25/2019 11.4 6.0 - 12.0 fL Final     Platelets   Date Value Ref Range Status   10/25/2019 139 (L) 140 -  450 10*3/mm3 Final     Neutrophil %   Date Value Ref Range Status   10/25/2019 70.6 42.7 - 76.0 % Final     Lymphocyte %   Date Value Ref Range Status   10/25/2019 15.4 (L) 19.6 - 45.3 % Final     Monocyte %   Date Value Ref Range Status   10/25/2019 8.8 5.0 - 12.0 % Final     Eosinophil %   Date Value Ref Range Status   10/25/2019 4.5 0.3 - 6.2 % Final     Basophil %   Date Value Ref Range Status   10/25/2019 0.7 0.0 - 1.5 % Final     Neutrophils, Absolute   Date Value Ref Range Status   10/25/2019 5.03 1.70 - 7.00 10*3/mm3 Final     Lymphocytes, Absolute   Date Value Ref Range Status   10/25/2019 1.10 0.70 - 3.10 10*3/mm3 Final     Monocytes, Absolute   Date Value Ref Range Status   10/25/2019 0.63 0.10 - 0.90 10*3/mm3 Final     Eosinophils, Absolute   Date Value Ref Range Status   10/25/2019 0.32 0.00 - 0.40 10*3/mm3 Final     Basophils, Absolute   Date Value Ref Range Status   10/25/2019 0.05 0.00 - 0.20 10*3/mm3 Final     nRBC   Date Value Ref Range Status   10/19/2019 0.1 0.0 - 0.2 /100 WBC Final       Lab Results   Component Value Date    GLUCOSE 190 (H) 10/28/2019    BUN 19 10/28/2019    CREATININE 1.67 (H) 10/28/2019    EGFRIFNONA 41 (L) 10/28/2019    BCR 11.4 10/28/2019    K 4.4 10/28/2019    CO2 23.2 10/28/2019    CALCIUM 8.1 (L) 10/28/2019    ALBUMIN 2.60 (L) 10/19/2019    LABIL2 0.4 (L) 05/29/2019    AST 55 (H) 10/19/2019    ALT 23 10/19/2019     ASSESSMENT:  Assessment/Plan     Iron deficiency anemia due to chronic blood loss  - CBC & Differential  - CBC Auto Differential  - Iron Profile    GAVE (gastric antral vascular ectasia)    Anemia in stage 3 chronic kidney disease (CMS/HCC)    History of Vitamin B12 deficiency    History of DVT of right lower extremity    History of bilateral pulmonary embolus (PE)    Antithrombin III deficiency (CMS/HCC)    Protein C deficiency (CMS/HCC)    Protein S deficiency (CMS/HCC)    Antiphospholipid antibody positive    Elevated factor VIII level    THOMAS (nonalcoholic  steatohepatitis)    Splenomegaly    IgG kappa MGUS  - CBC & Differential  - CBC Auto Differential    Patient claims to be feeling somewhat better post hospitalization and blood transfusions.  He is still on PPI and Carafate.  He has just started back on aspirin daily which she is going to hold again as he needs a drain placed for a fluid collection in abdomen.  He continues on B12 injections through PCP and is taking folic acid 1 pill daily as well as iron twice a day.  He is scheduled for follow-up EGD by [facility].  His discharge hemoglobin was 8.7 and if he is better today we will continue on the oral iron twice a day.  If his hemoglobin is not much better, we will make arranges for IV iron again.    PLAN:  Iron profile next visit.  Continue monthly vitamin B12 replacement at home.  Resume aspirin 81 mg p.o. daily.       I have reviewed lab results, imaging, vitals and medications with the patient today. Will follow up in 1 month with NP x2.     Patient verbalized understanding and is in agreement of the above plan.    I have reviewed and agree with the above information.   Israel Mayfield M.D., F.A.C.P.

## 2019-10-29 NOTE — TELEPHONE ENCOUNTER
Pt has been scheduled for CT guided percutaneous drain placement on 11/1/19 . Pt's wife has been notified of plan . Per  and Dr Nydia Donahue.

## 2019-10-30 ENCOUNTER — OFFICE VISIT (OUTPATIENT)
Dept: ONCOLOGY | Facility: CLINIC | Age: 74
End: 2019-10-30

## 2019-10-30 ENCOUNTER — READMISSION MANAGEMENT (OUTPATIENT)
Dept: CALL CENTER | Facility: HOSPITAL | Age: 74
End: 2019-10-30

## 2019-10-30 ENCOUNTER — APPOINTMENT (OUTPATIENT)
Dept: LAB | Facility: HOSPITAL | Age: 74
End: 2019-10-30

## 2019-10-30 VITALS
SYSTOLIC BLOOD PRESSURE: 159 MMHG | WEIGHT: 307.4 LBS | TEMPERATURE: 99.3 F | DIASTOLIC BLOOD PRESSURE: 70 MMHG | HEIGHT: 74 IN | HEART RATE: 103 BPM | BODY MASS INDEX: 39.45 KG/M2 | RESPIRATION RATE: 20 BRPM

## 2019-10-30 DIAGNOSIS — D68.59 ANTITHROMBIN III DEFICIENCY (HCC): ICD-10-CM

## 2019-10-30 DIAGNOSIS — D68.59 PROTEIN C DEFICIENCY (HCC): ICD-10-CM

## 2019-10-30 DIAGNOSIS — K31.819 GAVE (GASTRIC ANTRAL VASCULAR ECTASIA): ICD-10-CM

## 2019-10-30 DIAGNOSIS — R79.1 ELEVATED FACTOR VIII LEVEL: ICD-10-CM

## 2019-10-30 DIAGNOSIS — D68.59 PROTEIN S DEFICIENCY (HCC): ICD-10-CM

## 2019-10-30 DIAGNOSIS — D47.2 MGUS (MONOCLONAL GAMMOPATHY OF UNKNOWN SIGNIFICANCE): ICD-10-CM

## 2019-10-30 DIAGNOSIS — Z86.711 HISTORY OF PULMONARY EMBOLUS (PE): ICD-10-CM

## 2019-10-30 DIAGNOSIS — N18.30 ANEMIA IN STAGE 3 CHRONIC KIDNEY DISEASE (HCC): ICD-10-CM

## 2019-10-30 DIAGNOSIS — Z86.718 HISTORY OF DVT OF LOWER EXTREMITY: ICD-10-CM

## 2019-10-30 DIAGNOSIS — R16.1 SPLENOMEGALY: ICD-10-CM

## 2019-10-30 DIAGNOSIS — D50.0 IRON DEFICIENCY ANEMIA DUE TO CHRONIC BLOOD LOSS: Primary | ICD-10-CM

## 2019-10-30 DIAGNOSIS — Z86.39 HISTORY OF NON ANEMIC VITAMIN B12 DEFICIENCY: ICD-10-CM

## 2019-10-30 DIAGNOSIS — K75.81 NASH (NONALCOHOLIC STEATOHEPATITIS): ICD-10-CM

## 2019-10-30 DIAGNOSIS — D63.1 ANEMIA IN STAGE 3 CHRONIC KIDNEY DISEASE (HCC): ICD-10-CM

## 2019-10-30 DIAGNOSIS — R76.0 ANTIPHOSPHOLIPID ANTIBODY POSITIVE: ICD-10-CM

## 2019-10-30 LAB
BASOPHILS # BLD AUTO: 0.03 10*3/MM3 (ref 0–0.2)
BASOPHILS NFR BLD AUTO: 0.4 % (ref 0–1.5)
DEPRECATED RDW RBC AUTO: 71.4 FL (ref 37–54)
EOSINOPHIL # BLD AUTO: 0.2 10*3/MM3 (ref 0–0.4)
EOSINOPHIL NFR BLD AUTO: 2.6 % (ref 0.3–6.2)
ERYTHROCYTE [DISTWIDTH] IN BLOOD BY AUTOMATED COUNT: 18.3 % (ref 12.3–15.4)
HCT VFR BLD AUTO: 29.7 % (ref 37.5–51)
HGB BLD-MCNC: 9.2 G/DL (ref 13–17.7)
LYMPHOCYTES # BLD AUTO: 0.45 10*3/MM3 (ref 0.7–3.1)
LYMPHOCYTES NFR BLD AUTO: 5.8 % (ref 19.6–45.3)
MCH RBC QN AUTO: 34.5 PG (ref 26.6–33)
MCHC RBC AUTO-ENTMCNC: 31 G/DL (ref 31.5–35.7)
MCV RBC AUTO: 111.2 FL (ref 79–97)
MONOCYTES # BLD AUTO: 0.57 10*3/MM3 (ref 0.1–0.9)
MONOCYTES NFR BLD AUTO: 7.4 % (ref 5–12)
NEUTROPHILS # BLD AUTO: 6.49 10*3/MM3 (ref 1.7–7)
NEUTROPHILS NFR BLD AUTO: 83.8 % (ref 42.7–76)
PLATELET # BLD AUTO: 232 10*3/MM3 (ref 140–450)
PMV BLD AUTO: 10.6 FL (ref 6–12)
RBC # BLD AUTO: 2.67 10*6/MM3 (ref 4.14–5.8)
WBC NRBC COR # BLD: 7.74 10*3/MM3 (ref 3.4–10.8)

## 2019-10-30 PROCEDURE — 99214 OFFICE O/P EST MOD 30 MIN: CPT | Performed by: INTERNAL MEDICINE

## 2019-10-30 PROCEDURE — 36415 COLL VENOUS BLD VENIPUNCTURE: CPT | Performed by: INTERNAL MEDICINE

## 2019-10-30 PROCEDURE — 85025 COMPLETE CBC W/AUTO DIFF WBC: CPT | Performed by: INTERNAL MEDICINE

## 2019-10-30 NOTE — OUTREACH NOTE
Medical Week 2 Survey      Responses   Facility patient discharged from?  [facility]   Does the patient have one of the following disease processes/diagnoses(primary or secondary)?  Other   Week 2 attempt successful?  No   Rescheduled  Revoked   Revoke  Decline to participate [NO ANSWER, LEFT VM]          Samia Gonzalez LPN

## 2019-11-01 ENCOUNTER — HOSPITAL ENCOUNTER (OUTPATIENT)
Facility: HOSPITAL | Age: 74
Setting detail: HOSPITAL OUTPATIENT SURGERY
End: 2019-11-01
Attending: INTERNAL MEDICINE | Admitting: INTERNAL MEDICINE

## 2019-11-01 ENCOUNTER — HOSPITAL ENCOUNTER (OUTPATIENT)
Dept: INTERVENTIONAL RADIOLOGY/VASCULAR | Facility: HOSPITAL | Age: 74
Discharge: HOME | End: 2019-11-01
Admitting: RADIOLOGY

## 2019-11-01 VITALS
BODY MASS INDEX: 39.75 KG/M2 | HEIGHT: 74 IN | TEMPERATURE: 98.4 F | WEIGHT: 309.75 LBS | HEART RATE: 75 BPM | OXYGEN SATURATION: 100 % | SYSTOLIC BLOOD PRESSURE: 109 MMHG | DIASTOLIC BLOOD PRESSURE: 46 MMHG | RESPIRATION RATE: 19 BRPM

## 2019-11-01 DIAGNOSIS — L02.213 CUTANEOUS ABSCESS OF CHEST WALL: ICD-10-CM

## 2019-11-01 PROCEDURE — C1729 CATH, DRAINAGE: HCPCS

## 2019-11-01 PROCEDURE — C1887 CATHETER, GUIDING: HCPCS

## 2019-11-01 PROCEDURE — C1769 GUIDE WIRE: HCPCS

## 2019-11-01 PROCEDURE — 0 IOPAMIDOL PER 1 ML: Performed by: SURGERY

## 2019-11-01 PROCEDURE — 76080 X-RAY EXAM OF FISTULA: CPT

## 2019-11-01 PROCEDURE — 25010000002 FENTANYL CITRATE (PF) 100 MCG/2ML SOLUTION: Performed by: RADIOLOGY

## 2019-11-01 RX ORDER — SODIUM CHLORIDE 0.9 % (FLUSH) 0.9 %
3-10 SYRINGE (ML) INJECTION AS NEEDED
Status: DISCONTINUED | OUTPATIENT
Start: 2019-11-01 | End: 2019-11-01 | Stop reason: HOSPADM

## 2019-11-01 RX ORDER — FENTANYL CITRATE 50 UG/ML
INJECTION, SOLUTION INTRAMUSCULAR; INTRAVENOUS
Status: COMPLETED | OUTPATIENT
Start: 2019-11-01 | End: 2019-11-01

## 2019-11-01 RX ORDER — SODIUM CHLORIDE 0.9 % (FLUSH) 0.9 %
3 SYRINGE (ML) INJECTION EVERY 12 HOURS SCHEDULED
Status: DISCONTINUED | OUTPATIENT
Start: 2019-11-01 | End: 2019-11-01 | Stop reason: HOSPADM

## 2019-11-01 RX ADMIN — Medication 2 G: at 12:45

## 2019-11-01 RX ADMIN — FENTANYL CITRATE 100 MCG: 50 INJECTION, SOLUTION INTRAMUSCULAR; INTRAVENOUS at 12:55

## 2019-11-01 RX ADMIN — IOPAMIDOL 10 ML: 755 INJECTION, SOLUTION INTRAVENOUS at 13:32

## 2019-11-01 NOTE — DISCHARGE INSTRUCTIONS
A responsible adult should stay with you and you should rest quietly for the rest of the day. Do not drink alcohol, drive or cook for 24 hours following your procedure.  Progress your diet as tolerated.  Resume your usually medications including aspirin.  Do not remove your dressing.  If your site starts bleeding and you feel it is bleeding excessively apply pressure and proceed to the Emergency room.  Do not shower, bath, or get your dressing wet at all .   No lifting more that 10 pounds for 48 hours.  If severe pain, increased shortness of air or racing heartbeat occur, seek immediate medical attention.  Follow up with Dr. Etienne with questions.

## 2019-11-04 ENCOUNTER — TELEPHONE (OUTPATIENT)
Dept: SURGERY | Facility: CLINIC | Age: 74
End: 2019-11-04

## 2019-11-04 NOTE — TELEPHONE ENCOUNTER
Pt's wife calling stating that pt had percutaneous drain placed on 11/1/19 by Dr. Donahue. Dr. Donahue is wanting to speak w/ Dr. Etienne about pt. He is thinking that the patient is going to need surgery. Since he is having drainage in two diff areas. I have left a message with IR to have  contact DR. Etienne. Pt is having some discomfort where the drain was placed. No fever. Pt is on schedule for 11/6/19 to see Dr. Etienne.

## 2019-11-05 NOTE — TELEPHONE ENCOUNTER
GENARO Etienne and he states that the pt is needing to see GI for an ERCP & Stent placement with [facility].  does not need to speak with  at this time.     I have genaro Bills @ [facility] and she states to send office note from tomorrow 11/6/19 once completed and all testing that the pt has had done to her @ [phone] and she will send this info to Dr. Armstrong or Dr. Gomez when received to find out when they can work patient in. As of right now they don't have any openings until Dec.

## 2019-11-07 ENCOUNTER — OFFICE VISIT (OUTPATIENT)
Dept: SURGERY | Facility: CLINIC | Age: 74
End: 2019-11-07

## 2019-11-07 VITALS
OXYGEN SATURATION: 100 % | SYSTOLIC BLOOD PRESSURE: 114 MMHG | HEIGHT: 74 IN | BODY MASS INDEX: 39.66 KG/M2 | HEART RATE: 84 BPM | TEMPERATURE: 97.7 F | DIASTOLIC BLOOD PRESSURE: 58 MMHG | WEIGHT: 309 LBS

## 2019-11-07 DIAGNOSIS — R93.5 ABNORMAL COMPUTERIZED AXIAL TOMOGRAPHY OF ABDOMEN: Primary | ICD-10-CM

## 2019-11-07 PROCEDURE — 99213 OFFICE O/P EST LOW 20 MIN: CPT | Performed by: SURGERY

## 2019-11-07 RX ORDER — CYANOCOBALAMIN 1000 UG/ML
INJECTION, SOLUTION INTRAMUSCULAR; SUBCUTANEOUS
Refills: 1 | COMMUNITY
Start: 2019-11-06 | End: 2019-11-07 | Stop reason: SDUPTHER

## 2019-11-07 RX ORDER — SYRINGE WITH NEEDLE, 1 ML 25GX5/8"
SYRINGE, EMPTY DISPOSABLE MISCELLANEOUS
Refills: 1 | COMMUNITY
Start: 2019-11-06 | End: 2020-03-03

## 2019-11-07 NOTE — TELEPHONE ENCOUNTER
Melonie @ [facility] will send a message out to  to contact  in regards to pt. Pt had EGD done a couple of weeks ago and had bleeding from stomach. Will await to here from  for a plan

## 2019-11-08 LAB — REF LAB TEST METHOD: NORMAL

## 2019-11-11 PROBLEM — R93.5 ABNORMAL COMPUTERIZED AXIAL TOMOGRAPHY OF ABDOMEN: Status: ACTIVE | Noted: 2019-11-11

## 2019-11-11 NOTE — PROGRESS NOTES
Subjective   Bry Ratliff is a 74 y.o. male.     History of present illness  Bry is seen in the office today follow-up from the percutaneous drain placement by IR, Dr. Mills, for the recurrent fluid collection around the liver.  The fistulogram showed continued connection between the scan and this collection.  It also showed a very tiny connection to the colon.  I discussed this with Dr. Mills and we feel that doing a decompression of the biliary tree by ERCP with stent may get this to close.  We would want to try that first before doing an exploratory surgery.  We will contact Dr. Gomez who has recently done an EGD on him for consideration of this.    Past Medical History:   Diagnosis Date   • Anemia    • B12 deficiency 2009   • Syed's esophagus    • CAD (coronary artery disease)    • Diabetes mellitus (CMS/HCC)    • History of echocardiogram     03/03/2017 - 12/03/2014 - 04/2012   • Hyperlipidemia    • Hypertension    • Morbid obesity (CMS/HCC)    • KATHIA treated with BiPAP    • Renal insufficiency    • S/P lumbar laminectomy        Past Surgical History:   Procedure Laterality Date   • BACK SURGERY  1971   • CATARACT EXTRACTION  2014   • CHOLECYSTECTOMY  02/24/2019   • COLONOSCOPY  03/2018   • CORONARY ANGIOPLASTY  08/24/2009    PCI stent - succesful placement of 3.5/23 promus stent in proximal right coronary artery   • CORONARY ANGIOPLASTY WITH STENT PLACEMENT  08/27/2009    patient had stent placed to proximal right coronary artery   • CYSTOSCOPY BLADDER STONE LITHOTRIPSY  1997   • ENDOSCOPY N/A 10/18/2019    Procedure: ESOPHAGOGASTRODUODENOSCOPY with argon plasma coagulation of gastric antral vascular ectasia;  Surgeon: Marisel Gomez MD;  Location: [facility] ENDOSCOPY;  Service: Gastroenterology   • OTHER SURGICAL HISTORY  11/2018    IVC filter implant   • RENAL ARTERY STENT Left        Outpatient Encounter Medications as of 11/7/2019   Medication Sig Dispense Refill   • allopurinol (ZYLOPRIM) 100 MG  "tablet Take 100 mg by mouth 2 (Two) Times a Day.     • aspirin 81 MG tablet Take 1 tablet by mouth Daily. 30 tablet 3   • atorvastatin (LIPITOR) 20 MG tablet Take 1 tablet by mouth Daily. 90 tablet 3   • B-D 3CC LUER-YASMEEN SYR 25GX1\" 25G X 1\" 3 ML misc   1   • bumetanide (BUMEX) 2 MG tablet Take 1 tablet by mouth Daily.     • Cyanocobalamin 1000 MCG/ML kit Inject 1,000 mcg as directed Every 30 (Thirty) Days.     • docusate sodium (COLACE) 100 MG capsule Take 100 mg by mouth 2 (Two) Times a Day.     • DULoxetine (CYMBALTA) 60 MG capsule Take 60 mg by mouth Daily.     • ferrous sulfate 325 (65 FE) MG tablet Take 1 tablet by mouth 2 (Two) Times a Day. 60 tablet 2   • folic acid (FOLVITE) 1 MG tablet Take 1 mg by mouth Daily.     • hydrOXYzine (ATARAX) 25 MG tablet Take 25 mg by mouth 3 (Three) Times a Day As Needed for Itching.     • insulin glargine (LANTUS) 100 UNIT/ML injection Inject 46 units at bedtime 30 mL 4   • insulin lispro (HUMALOG) 100 UNIT/ML injection 15 units subcu before each meal plus sliding scale 30 mL 4   • lactulose (CHRONULAC) 10 GM/15ML solution Take 60 mL by mouth Every 4 (Four) Hours.     • levothyroxine (SYNTHROID, LEVOTHROID) 75 MCG tablet Take 1 tablet by mouth Daily. 90 tablet 4   • magnesium oxide (MAG-OX) 400 MG tablet Take 400 mg by mouth Daily.     • metoclopramide (REGLAN) 5 MG tablet Take 5 mg by mouth 2 (Two) Times a Day.  0   • Multiple Vitamins-Minerals (MULTIVITAMIN WITH MINERALS) tablet tablet Take 1 tablet by mouth Daily.     • oxyCODONE (ROXICODONE) 5 MG immediate release tablet Take 5 mg by mouth Every 8 (Eight) Hours As Needed.     • pantoprazole (PROTONIX) 40 MG EC tablet Take 1 tablet by mouth 2 (Two) Times a Day. 30 tablet 3   • potassium chloride (K-DUR,KLOR-CON) 20 MEQ CR tablet Take 20 mEq by mouth Daily.  0   • rifaximin (XIFAXAN) 550 MG tablet Take 550 mg by mouth Every 12 (Twelve) Hours.     • sodium bicarbonate 650 MG tablet Take 650 mg by mouth 3 (Three) Times a Day. "     • sucralfate (CARAFATE) 1 g tablet Take 1 tablet by mouth 4 (Four) Times a Day Before Meals & at Bedtime for 30 days. 120 tablet 0   • zinc sulfate (ZINCATE) 50 MG capsule Take 50 mg by mouth Daily.  0   • [DISCONTINUED] cyanocobalamin 1000 MCG/ML injection   1     No facility-administered encounter medications on file as of 11/7/2019.        No Known Allergies    Family History   Problem Relation Age of Onset   • Hyperlipidemia Other    • Hypertension Other        Social History     Socioeconomic History   • Marital status:      Spouse name: Not on file   • Number of children: Not on file   • Years of education: Not on file   • Highest education level: Not on file   Tobacco Use   • Smoking status: Never Smoker   • Smokeless tobacco: Never Used   Substance and Sexual Activity   • Alcohol use: No     Frequency: Never   • Drug use: No   • Sexual activity: Defer       The following portions of the patient's history were reviewed and updated as appropriate: allergies, current medications, past family history, past medical history, past social history, past surgical history and problem list.    Objective       Assessment/Plan   There are no diagnoses linked to this encounter.  Complete review of systems is noted and unchanged from previous visit    Physical exam shows pleasant obese 74-year-old male.  HEENT is negative.  Heart regular.  Lungs clear.  Abdomen soft.  He has a percutaneous pigtail drain in the right lateral abdominal wall/chest wall that is draining bilious fluid.    Extremities show equal range of motion in the upper and lower extremities.  He has symmetrical strength and usage.  Neuro shows no obvious focal deficit.    Impression persistent drainage right upper quadrant after cholecystectomy last spring.    Plan: We will contact Dr. Gomez and discuss possible ERCP with stent placement.             Naif Etienne DO  11/11/2019  7:42 AM

## 2019-11-19 PROBLEM — E53.8 VITAMIN B12 DEFICIENCY: Status: ACTIVE | Noted: 2019-11-19

## 2019-11-19 NOTE — PROGRESS NOTES
Hematology/Oncology Outpatient Follow Up    PATIENT NAME:Bry Ratliff  :1945  MRN: [id number]  PRIMARY CARE PHYSICIAN: Paulette Harris MD  REFERRING PHYSICIAN: Paulette Harris MD    No chief complaint on file.    History of Present Illness:   1. Anemia diagnosed 2018 and IgG kappa monoclonal gammopathy diagnosed May 2018.   • This patient is an obese  male who claims to have developed kidney problems in 2018 for which he was referred to Devi Plascencia M.D. He was found to have a hemoglobin of 7.4 at that time and was given 1 unit of packed red blood cells. He was then referred to GI where he had been followed for Syed’s esophagus for a number of years. An EGD and colonoscopy was performed on 3/5/18 by Marisel Gomez M.D. revealing mucosa suggestive of Syed’s esophagus and hiatal hernia in the cardia. Patient had a poor prep compromising the colonoscopy exam though the scope did reach the cecum. Esophageal biopsy revealed squamocolumnar mucosa with extensive intestinal metaplasia consistent with Syed’s esophagus, negative for dysplasia. Colonoscopy was recommended to be repeated in two years and EGD in three years. The patient saw his primary care physician, Paulette Harris M.D., in followup and blood testing was done on 18 revealing WBC 6.3, hemoglobin 8.4, MCV 84.5, platelet count 182,000. Vitamin B12 level was 416 (180-914) and patient was referred here for further evaluation. The patient claims to have been diagnosed with Vitamin B12 deficiency a number of years ago for which he has been on Vitamin B12 injections up until six months ago when he just stopped taking those. He has been recently started on oral iron supplementation. He claims not to see any blood in his urine but does have microscopic hematuria for which he sees a urologist. He denies nosebleeds, gum bleeds or blood in the stool. He has not had any significant changes in his  weight. He feels weak and dizzy for a number of years which has recently gotten worse. He does not ambulate much because of back surgery causing him weakness. He did have a right lower extremity DVT with bilateral pulmonary emboli in 2004 since which time he has been on Coumadin, thought secondary to a sedentary lifestyle. He has no family history of anemias.   • 5/24/18 – Patient seen initial consultation for anemia. Hemoglobin 7.9, MCV 89.9. Ferritin 17 (), iron 183 (), TIBC 379 (228-428), iron saturation 48% (20-50), retic 2.16% (0.5-1.5), haptoglobin 138 (), folate 9.1 (5.9-24.8). SPEP showed monoclonal gammopathy. SIFE showed IgG kappa monoclonal gammopathy. M-spike in the gamma region 0.7 g/dL. Erythropoietin 53.5 (2.59-18.5). Antiparietal cell antibody and intrinsic factor antibodies negative.   Globulin 4.2 (2.5-3.8). Creatinine 1.4 (0.7-1.2).   • 6/19/18 – Discussed results of anemia workup. Hemoglobin improving. Ferrous Sulfate decreased to 325 mg twice a day. Will perform gammopathy workup for IgG kappa monoclonal gammopathy. IgA 783 (), IgG 1480 (600-1500), IgM 93 (). Kappa lambda FLC ratio 0.81 (0.26-1.65). Ferritin 36 ().        • 6/25/18 – Bone survey negative for acute disease. No evidence of lytic or blastic metastatic bone disease was present. Chest x-ray showed cardiomegaly, mild right basilar atelectasis and findings suggestive of ankylosing spondylitis.   • 6/29/18 – Patient underwent sternal bone marrow revealing monoclonal gammopathy of undetermined significant (MGUS). Extent of bone marrow involvement 5%. Phenotype kappa positive, IgG positive, CD38 positive, CD20 negative, CD19 negative, CD45 negative, CD56 negative and  negative. Dyspoiesis was not seen. There was absence of iron stores. Normal male karyotype. Normal FISH study. Flow cytometry revealed IgG kappa restrictive plasma cells in a polytypic background. There was a mixed population of  maturing myeloid cells, B-cells and T-cells. No abnormal myeloid maturation was seen. A monoclonal IgG kappa plasma cell population was present. 4% plasma cells present.   • 8/23/18 – Labs by Dr. Plascencia revealed B12 of 857 (180-914), folate 12.7 (5.9-24.8), iron 127 ().      • 9/19/18 – Iron 94 (), TIBC 297 (228-428), iron saturation 32 (20-50), ferritin 29 (). Erythropoietin 30.04 (2.59-18.5).        • 10/16/18 – Iron 177 (), TIBC 320 (228-428), iron saturation 55 (20-50), ferritin 34 ().       • 11/16/18 – Hemoglobin 7.1. Patient given 1 unit of packed red blood cells.   • 11/21/18 – Ferritin 27 (). Hemoglobin 7.9. Patient given 1 unit of packed red blood cells.    • 11/26/18 – EGD by Dave Russo M.D. revealed erosive gastritis and bleeding ampulla. There was oozing upon entering the stomach in many different areas. Duodenal mucosa and the esophagus were normal.   • 11/27/18 – Hemoglobin 8.2. Order written for Procrit 30,000 units subq weekly, not approved by insurance for low ferritin. Urine JERRY with no definite monoclonal gammopathy identified with questionable faint IgG kappa.   • 12/18/18 – Hemoglobin 9.9. Injectafer 750 mg IV given.   • 1/2/19 – Injectafer 750 mg IV given.  Hemoglobin 11, .1. Patient claims that he is getting Vitamin B12 injections monthly at home through Dr. Harris. Currently taking Ferrous Sulfate 325 mg p.o. b.i.d. and asked to continue that. Asked to continue Pantoprazole 40 mg daily that he is taking. IgA 651 (), IgG 1470 (600-1500), IgM 94 ().      • 2/12/19 – Iron 88 ().    • 3/6/19 – WBC 19.8 with 83% neutrophils, 5% lymphocytes, 11% monocytes, hemoglobin 8.7, , platelet count 182,000. Patient status post laparoscopic cholecystectomy on 2/24/19 with large ecchymoses apparent on abdomen. Infectious workup ordered and Injectafer 750 mg IV days 1 and 8 ordered.  Iron 23 (), TIBC 153 (228-428), iron  saturation 15% (20-50), ferritin 400 (224-336).       • 3/12/19 – WBC 15.02, hemoglobin 8.1 and platelets 227,000. Patient reported hematuria followed by Dr. Starkey. Orders written to start Injectafer ASAP. Abdominal ecchymosis improving.   • 3/14/19 – Injectafer 750 mg IV given.   • 4/22/19 – Hemoglobin 9.5, . Patient missed day 8 Injectafer in March and it was rescheduled. SIFE showed IgG kappa monoclonal gammopathy.  • 5/8/19 - Injectafer 750 mg IV given.   • 7/8/19 - Iron 56 (). Iron Saturation 36 (20-50%). Transferrin 110 (180-330). TIBC 154 (228-428). Ferritin 1,199 ().    • 8/7/2019–hemoglobin 10.0.  Patient taking multivitamin with iron only.  Restarted ferrous sulfate 325mg by mouth twice a day. UIFE showed free kappa light chain identified.   • 10/16/2019 patient hospitalized at [facility] for 3 days and found to have a hemoglobin of 4.8 at the cancer center visit.  Stool Hemoccult was positive.  He ended up receiving 5 units of packed red blood cells.  EGD by Marisel Gomez MD revealed severe gastric antral vascular ectasia with oozing, Syed's esophagus and hiatal hernia.  The patient was treated with PPI and Carafate.  Plan was to repeat EGD with cauterization in 6 weeks.  B12 and reticulocyte count were high.  Haptoglobin and folic acid were normal.  Creatinine 2.2 (0.76–1.27), iron 47 (59–158), TIBC 264 (298–5 0.36), iron saturation 18 (20–50), ferritin 128.4 (30–400).  Aspirin was held temporarily and patient given IV iron.  IgA 720 (70–400), IgM 72 (40–230), IgG 1489 (700–1600).     2.   Elevated LFT’s diagnosis established March 2018.  Biliary duct dilation diagnosis established June 2018.   · 11/30/17 – Maia phos 93 (32-91), total bili 0.7 (0.3-1.2), AST 37 (15-41), ALT 25 (15-41).   · 3/1/18 – T-bili 0.5 (0.3-1.2), maia phos 106 (32-91), AST 54 (15-41), ALT 27 (17-63).   · 5/24/18 – AST 46 (15-41), maia phos 131 (32-91).   • 6/8/18 – CMP ordered by Dr. Alejandra Amaro showed maia phos  209 (32-91),  (15-41), ALT 84 (17-63), total bili 1.1 (0.3-1.2).             • 6/19/18 – CT abdomen and pelvis as well as serological workup for elevated LFT’s ordered. Alpha-1 antitrypsin 144 (). Ceruloplasmin 38 (22-58). AFP 3 (0-9).  Hepatitis panel non-reactive.   • 6/22/18 – CT abdomen and pelvis showed abnormal intra and extrahepatic biliary dilation. There was mild distention of the gallbladder and evidence of several gallstones. Bilateral non-obstructing kidney stone was present. Incidental sigmoid diverticulosis.   • 7/2/18 – Patient underwent MRCP at [facility] showing markedly dilated intrahepatic and extrahepatic bile duct with multiple small filling defects within the distal common bile duct compatible with choledocholithiasis. There was a focal 6 mm segmental narrowing at the distal CBD with recommended GI consult for ERCP and brushings.   • 7/5/18 to 7/10/18 – On 7/5/18 labs revealed normal total bilirubin (0.8), AST 69 (15-41), maia phos 152 (32-91), ALT was normal at 37 (17-63), ammonia was elevated at 86 (9-35), lipase was normal (22). Patient hospitalized at [facility] due to weakness. Workup revealed cholelithiasis. On 7/9/18 patient underwent ERCP with bilateral sphincterotomy, common duct stone extraction, biliary brushing and stent placement by Dr. Leiva. Path on brushings was negative for malignancy. There was intra and extrahepatic biliary ductal dilation with relatively abrupt distal common bile duct cutoff and non-visualization of the gallbladder. He was started on Lovenox and sequential compression devices due to risk of pulmonary embolism.  of 33 (0-35).  On 7/10/18 LFT’s revealed total bili 0.9 (0.3-1.2). Maia phos 129 (32-91). AST 50 (15-41), ALT 41 (17-63).     • 8/31/18 – EUS by Dr. Gomez at [facility] revealing suspect benign biliary stricture due to CBD stone with FNA pathology revealing benign and atypical ductal cells, bile and proteinaceous material including  scattered bacteria, neutrophils and degenerating cells. The atypia was felt mild and favored to be reactive in nature. Plan was to repeat ERCP with removal of the stent and the stone with consideration of common bile duct evaluation with cholangioscopy at that time.   • 10/12/18 – ERCP with sphincteroplasty and stone extraction done by Dave Russo M.D. for choledocholithiasis.   • 2/23/19 – Patient underwent ERCP with balloon clearance of bile duct by Jl Hampton M.D.   • 3/6/19 – LFT’s not elevated with bilirubin 1.1, AST 31, ALT 14, osbaldo phos was mildly elevated at 101 (32-91).   • 4/25/19 – 4/26/19 – Admitted to [facility] for hepatic encephalopathy and hypokalemia.   • 4/27/2019 – CT abdomen pelvis showed a 4.8 cm air-fluid collection adjacent to the right posterior hepatic lobe significant improved.  Right-sided chest tube small right pleural effusion.  Hepatic cirrhosis.  Bilateral nonobstructing renal stones.  Sigmoid diverticulosis.   • 5/26/19 – 5/29/2019 – Admitted to [facility] for hepatic and cephalopathy.  Ammonia level 213 (H).  • 7/11/19 - AFP 2.76 (0-9).   • 9/26/19 - CT scan abdomen showed fluid collection posterior to the right hepatic lobe has significantly diminished in size with only trace fluid remaining. Small locules of air are seen within this collection which could be related to recent shunt mentation or could be related to tiny fistula from the splenic flexure of the colon. Trace right pleural fluid, diminished since 4/27/19. Thickened, likely reactive, pleural rind remains. Mild right basilar atelectasis. Abnormal intrahepatic and extra hepatic biliary ductal dilation. Intrahepatic biliary dilation is new since 4/27/19. The CBD appears attenuated in its mid segment, raising the possibility of stricture. ERCP recommended. Uncomplicated colonic diverticulosis. Non-obstructing bilateral renal stones and probable small right renal cyst. Cirrhotic liver morphology. No focal  liver lesion is seen.  • 10/28/19 - CT scan abdomen and pelvis showed fluid collection posterior to the right hepatic lobe appears slightly increased in size now measuring 24 mm in diameter, again a small droplet of gas adjacent to this fluid collection is seen which may represent fistula. Right basilar small pleural effusion and atelectasis remain. Continued dilatation of the intra and extrahepatic biliary ductal system which appears stable from previous exam. Non-obstructing bilateral renal calculi. Changes of cirrhosis and splenomegaly. Diverticulosis. Mild interval change from prior study with increasing fluid collection posterior to the right hepatic lobe.     3.   Right lower extremity DVT and bilateral pulmonary emboli diagnosed 2004.   • 2004 – Patient claims to have developed right lower extremity DVT with bilateral pulmonary emboli in 2004 and was coumadinized. The blood clots were thought secondary to his sedentary lifestyle. He stayed on the Coumadin up until October 2017 when, due to difficulty maintaining his INR’s, he was switched to Xarelto 20 mg daily by Paulette Harris M.D. which was later dropped to 10 mg daily.    • 11/26/18 – EGD by Dave Russo M.D. revealed erosive gastritis and bleeding ampulla. Ampullary biopsy revealed reactive/reparative small intestinal mucosa with mild chronic inflammation. Gastric antrum biopsy was suggestive of focal mild reactive gastropathy. Due to erosive gastritis, patient asked to hold Xarelto and Aspirin by Dave Russo M.D.   • 11/27/18 – Patient asked to discontinue Xarelto and hold Aspirin while obtaining IVC filter. Risks discussed. Factor V Leiden gene mutation and prothrombin gene mutations not detected. Anticardiolipin IgM 11 (<11). Anti beta-2 glyco, IgA 55 (<20). Factor VIII activity 186 (). Antithrombin III activity 63 (). Protein C activity 69 (), protein S activity 69 ().       • 12/5/18 – Patient underwent  transjugular IVC filter placement in IR by  Jonn Cuenca M.D.   • 1/2/19 – Told patient that his low protein CNS and antithrombin III likely due to liver disease. He would be at a high risk of going back on Xarelto and hence continued on Aspirin 81 mg p.o. daily.   • 1/3/19 – D-dimer 1.25 (<0.45).    • 3/8/19 – Chest x-ray showed chronic changes in the chest with no obvious pneumonia or congestive changes.  • 4/3/19 – Chest x-ray with right pleural effusion and basilar lung density with slight increase from prior. Cardiomegaly.   • 4/22/19 - Factor VIII 334 (H), Anti-phosphatidylserine antibody IgM> 80 (H), Anti-phosphatidylserine antibody IgG 12 (N), Cardiolipin IgG 21 (N), Cardiolipin IgM 55 (H), Cardiolipin IgA 63 (H), Beta-2 glycoprotein IgG 4 (N), IgA 91 (H), and IgM 21 (H).   • 7/11/19 - Chest x-ray showed stable small right pleural effusion since 04/25/2019. Minimal right costophrenic angle atelectasis.    Past Medical History:   Diagnosis Date   • Anemia    • B12 deficiency 2009   • Syed's esophagus    • CAD (coronary artery disease)    • Diabetes mellitus (CMS/HCC)    • History of echocardiogram     03/03/2017 - 12/03/2014 - 04/2012   • Hyperlipidemia    • Hypertension    • Morbid obesity (CMS/HCC)    • KATHIA treated with BiPAP    • Renal insufficiency    • S/P lumbar laminectomy        Past Surgical History:   Procedure Laterality Date   • BACK SURGERY  1971   • CATARACT EXTRACTION  2014   • CHOLECYSTECTOMY  02/24/2019   • COLONOSCOPY  03/2018   • CORONARY ANGIOPLASTY  08/24/2009    PCI stent - succesful placement of 3.5/23 promus stent in proximal right coronary artery   • CORONARY ANGIOPLASTY WITH STENT PLACEMENT  08/27/2009    patient had stent placed to proximal right coronary artery   • CYSTOSCOPY BLADDER STONE LITHOTRIPSY  1997   • ENDOSCOPY N/A 10/18/2019    Procedure: ESOPHAGOGASTRODUODENOSCOPY with argon plasma coagulation of gastric antral vascular ectasia;  Surgeon: Marisel Gomez MD;   "Location: [facility] ENDOSCOPY;  Service: Gastroenterology   • OTHER SURGICAL HISTORY  11/2018    IVC filter implant   • RENAL ARTERY STENT Left          Current Outpatient Medications:   •  allopurinol (ZYLOPRIM) 100 MG tablet, Take 100 mg by mouth 2 (Two) Times a Day., Disp: , Rfl:   •  aspirin 81 MG tablet, Take 1 tablet by mouth Daily., Disp: 30 tablet, Rfl: 3  •  atorvastatin (LIPITOR) 20 MG tablet, Take 1 tablet by mouth Daily., Disp: 90 tablet, Rfl: 3  •  B-D 3CC LUER-YASMEEN SYR 25GX1\" 25G X 1\" 3 ML misc, , Disp: , Rfl: 1  •  bumetanide (BUMEX) 2 MG tablet, Take 1 tablet by mouth Daily., Disp: , Rfl:   •  Cyanocobalamin 1000 MCG/ML kit, Inject 1,000 mcg as directed Every 30 (Thirty) Days., Disp: , Rfl:   •  docusate sodium (COLACE) 100 MG capsule, Take 100 mg by mouth 2 (Two) Times a Day., Disp: , Rfl:   •  DULoxetine (CYMBALTA) 60 MG capsule, Take 60 mg by mouth Daily., Disp: , Rfl:   •  ferrous sulfate 325 (65 FE) MG tablet, Take 1 tablet by mouth 2 (Two) Times a Day., Disp: 60 tablet, Rfl: 2  •  folic acid (FOLVITE) 1 MG tablet, Take 1 mg by mouth Daily., Disp: , Rfl:   •  hydrOXYzine (ATARAX) 25 MG tablet, Take 25 mg by mouth 3 (Three) Times a Day As Needed for Itching., Disp: , Rfl:   •  insulin glargine (LANTUS) 100 UNIT/ML injection, Inject 46 units at bedtime, Disp: 30 mL, Rfl: 4  •  insulin lispro (HUMALOG) 100 UNIT/ML injection, 15 units subcu before each meal plus sliding scale, Disp: 30 mL, Rfl: 4  •  lactulose (CHRONULAC) 10 GM/15ML solution, Take 60 mL by mouth Every 4 (Four) Hours., Disp: , Rfl:   •  levothyroxine (SYNTHROID, LEVOTHROID) 75 MCG tablet, Take 1 tablet by mouth Daily., Disp: 90 tablet, Rfl: 4  •  magnesium oxide (MAG-OX) 400 MG tablet, Take 400 mg by mouth Daily., Disp: , Rfl:   •  metoclopramide (REGLAN) 5 MG tablet, Take 5 mg by mouth 2 (Two) Times a Day., Disp: , Rfl: 0  •  Multiple Vitamins-Minerals (MULTIVITAMIN WITH MINERALS) tablet tablet, Take 1 tablet by mouth Daily., Disp: , " Rfl:   •  oxyCODONE (ROXICODONE) 5 MG immediate release tablet, Take 5 mg by mouth Every 8 (Eight) Hours As Needed., Disp: , Rfl:   •  pantoprazole (PROTONIX) 40 MG EC tablet, Take 1 tablet by mouth 2 (Two) Times a Day., Disp: 30 tablet, Rfl: 3  •  potassium chloride (K-DUR,KLOR-CON) 20 MEQ CR tablet, Take 20 mEq by mouth Daily., Disp: , Rfl: 0  •  rifaximin (XIFAXAN) 550 MG tablet, Take 550 mg by mouth Every 12 (Twelve) Hours., Disp: , Rfl:   •  sodium bicarbonate 650 MG tablet, Take 650 mg by mouth 3 (Three) Times a Day., Disp: , Rfl:   •  zinc sulfate (ZINCATE) 50 MG capsule, Take 50 mg by mouth Daily., Disp: , Rfl: 0    No Known Allergies    Family History   Problem Relation Age of Onset   • Hyperlipidemia Other    • Hypertension Other        Cancer-related family history is not on file.    Social History     Tobacco Use   • Smoking status: Never Smoker   • Smokeless tobacco: Never Used   Substance Use Topics   • Alcohol use: No     Frequency: Never   • Drug use: No     I have reviewed the history of present illness, past medical history, family history, social history, lab results, all notes and other records since the patient was last seen on     SUBJECTIVE:       Jazz Theodore CMA (AAMA) was present during office visit.         REVIEW OF SYSTEMS:  Review of Systems   Constitutional: Negative for chills and fever.   HENT: Negative for ear pain, mouth sores, nosebleeds and sore throat.    Eyes: Negative for photophobia and visual disturbance.   Respiratory: Negative for wheezing and stridor.    Cardiovascular: Negative for chest pain and palpitations.   Gastrointestinal: Negative for abdominal pain, diarrhea, nausea and vomiting.   Endocrine: Negative for cold intolerance and heat intolerance.   Genitourinary: Negative for dysuria and hematuria.   Musculoskeletal: Negative for joint swelling and neck stiffness.   Skin: Negative for color change and rash.   Neurological: Negative for seizures and syncope.    Hematological: Negative for adenopathy.        No obvious bleeding   Psychiatric/Behavioral: Negative for agitation, confusion and hallucinations.     OBJECTIVE:    There were no vitals filed for this visit.  ECOG  (2) Ambulatory and capable of self care, unable to carry out work activity, up and about > 50% or waking hours    Physical Exam   Constitutional: He is oriented to person, place, and time. No distress.   HENT:   Head: Normocephalic and atraumatic.   Mouth/Throat: No oropharyngeal exudate.   Poor dentition.    Eyes: Conjunctivae and EOM are normal. Right eye exhibits no discharge. Left eye exhibits no discharge. No scleral icterus.   Neck: Normal range of motion. Neck supple. No thyromegaly present.   Cardiovascular: Normal rate, regular rhythm and normal heart sounds. Exam reveals no gallop and no friction rub.   No murmur heard.  Pulmonary/Chest: Effort normal. No stridor. No respiratory distress. He has no wheezes.   Abdominal: Soft. Bowel sounds are normal. He exhibits no mass. There is no tenderness. There is no rebound and no guarding.   Obese.   Musculoskeletal: Normal range of motion. He exhibits no tenderness or deformity.   Lymphadenopathy:     He has no cervical adenopathy.   Neurological: He is alert and oriented to person, place, and time. He exhibits normal muscle tone. Coordination normal.   Skin: Skin is warm. No rash noted. He is not diaphoretic. No erythema. No pallor.   Psychiatric: He has a normal mood and affect. His behavior is normal.   Nursing note and vitals reviewed.      RECENT LABS  WBC   Date Value Ref Range Status   10/30/2019 7.74 3.40 - 10.80 10*3/mm3 Final     RBC   Date Value Ref Range Status   10/30/2019 2.67 (L) 4.14 - 5.80 10*6/mm3 Final     Hemoglobin   Date Value Ref Range Status   10/30/2019 9.2 (L) 13.0 - 17.7 g/dL Final     Hematocrit   Date Value Ref Range Status   10/30/2019 29.7 (L) 37.5 - 51.0 % Final     MCV   Date Value Ref Range Status   10/30/2019 111.2  (H) 79.0 - 97.0 fL Final     MCH   Date Value Ref Range Status   10/30/2019 34.5 (H) 26.6 - 33.0 pg Final     MCHC   Date Value Ref Range Status   10/30/2019 31.0 (L) 31.5 - 35.7 g/dL Final     RDW   Date Value Ref Range Status   10/30/2019 18.3 (H) 12.3 - 15.4 % Final     RDW-SD   Date Value Ref Range Status   10/30/2019 71.4 (H) 37.0 - 54.0 fl Final     MPV   Date Value Ref Range Status   10/30/2019 10.6 6.0 - 12.0 fL Final     Platelets   Date Value Ref Range Status   10/30/2019 232 140 - 450 10*3/mm3 Final     Neutrophil %   Date Value Ref Range Status   10/30/2019 83.8 (H) 42.7 - 76.0 % Final     Lymphocyte %   Date Value Ref Range Status   10/30/2019 5.8 (L) 19.6 - 45.3 % Final     Monocyte %   Date Value Ref Range Status   10/30/2019 7.4 5.0 - 12.0 % Final     Eosinophil %   Date Value Ref Range Status   10/30/2019 2.6 0.3 - 6.2 % Final     Basophil %   Date Value Ref Range Status   10/30/2019 0.4 0.0 - 1.5 % Final     Neutrophils, Absolute   Date Value Ref Range Status   10/30/2019 6.49 1.70 - 7.00 10*3/mm3 Final     Lymphocytes, Absolute   Date Value Ref Range Status   10/30/2019 0.45 (L) 0.70 - 3.10 10*3/mm3 Final     Monocytes, Absolute   Date Value Ref Range Status   10/30/2019 0.57 0.10 - 0.90 10*3/mm3 Final     Eosinophils, Absolute   Date Value Ref Range Status   10/30/2019 0.20 0.00 - 0.40 10*3/mm3 Final     Basophils, Absolute   Date Value Ref Range Status   10/30/2019 0.03 0.00 - 0.20 10*3/mm3 Final     nRBC   Date Value Ref Range Status   10/19/2019 0.1 0.0 - 0.2 /100 WBC Final       Lab Results   Component Value Date    GLUCOSE 190 (H) 10/28/2019    BUN 19 10/28/2019    CREATININE 1.67 (H) 10/28/2019    EGFRIFNONA 41 (L) 10/28/2019    BCR 11.4 10/28/2019    K 4.4 10/28/2019    CO2 23.2 10/28/2019    CALCIUM 8.1 (L) 10/28/2019    ALBUMIN 2.60 (L) 10/19/2019    LABIL2 0.4 (L) 05/29/2019    AST 55 (H) 10/19/2019    ALT 23 10/19/2019     ASSESSMENT:  Assessment/Plan     There are no diagnoses linked  to this encounter.      PLAN:         I have reviewed lab results, imaging, vitals and medications with the patient today and have reviewed information entered by Jazz Theodore CMA (AAMA).   Will follow up in     Patient verbalized understanding and is in agreement of the above plan.

## 2019-11-20 ENCOUNTER — ANESTHESIA (OUTPATIENT)
Dept: GASTROENTEROLOGY | Facility: HOSPITAL | Age: 74
End: 2019-11-20

## 2019-11-20 ENCOUNTER — ANESTHESIA EVENT (OUTPATIENT)
Dept: GASTROENTEROLOGY | Facility: HOSPITAL | Age: 74
End: 2019-11-20

## 2019-11-20 ENCOUNTER — HOSPITAL ENCOUNTER (OUTPATIENT)
Facility: HOSPITAL | Age: 74
Setting detail: HOSPITAL OUTPATIENT SURGERY
Discharge: HOME | End: 2019-11-20
Attending: INTERNAL MEDICINE | Admitting: INTERNAL MEDICINE

## 2019-11-20 ENCOUNTER — ON CAMPUS - OUTPATIENT (AMBULATORY)
Dept: URBAN - METROPOLITAN AREA HOSPITAL 85 | Facility: HOSPITAL | Age: 74
End: 2019-11-20
Payer: COMMERCIAL

## 2019-11-20 ENCOUNTER — APPOINTMENT (OUTPATIENT)
Dept: GENERAL RADIOLOGY | Facility: HOSPITAL | Age: 74
End: 2019-11-20

## 2019-11-20 VITALS
HEART RATE: 79 BPM | BODY MASS INDEX: 38.24 KG/M2 | TEMPERATURE: 97.9 F | SYSTOLIC BLOOD PRESSURE: 135 MMHG | WEIGHT: 298 LBS | HEIGHT: 74 IN | RESPIRATION RATE: 18 BRPM | DIASTOLIC BLOOD PRESSURE: 60 MMHG | OXYGEN SATURATION: 100 %

## 2019-11-20 DIAGNOSIS — K22.70 BARRETT'S ESOPHAGUS WITHOUT DYSPLASIA: ICD-10-CM

## 2019-11-20 DIAGNOSIS — K44.9 DIAPHRAGMATIC HERNIA WITHOUT OBSTRUCTION OR GANGRENE: ICD-10-CM

## 2019-11-20 DIAGNOSIS — R94.5 ABNORMAL RESULTS OF LIVER FUNCTION STUDIES: ICD-10-CM

## 2019-11-20 DIAGNOSIS — K83.1 OBSTRUCTION OF BILE DUCT: ICD-10-CM

## 2019-11-20 DIAGNOSIS — K31.819 GASTRIC ANTRAL VASCULAR ECTASIA: ICD-10-CM

## 2019-11-20 DIAGNOSIS — D50.0 IRON DEFICIENCY ANEMIA DUE TO CHRONIC BLOOD LOSS: ICD-10-CM

## 2019-11-20 DIAGNOSIS — K31.819 ANGIODYSPLASIA OF STOMACH AND DUODENUM WITHOUT BLEEDING: ICD-10-CM

## 2019-11-20 LAB
ABO GROUP BLD: NORMAL
ALBUMIN SERPL-MCNC: 3.2 G/DL (ref 3.5–5.2)
ALBUMIN/GLOB SERPL: 0.7 G/DL
ALP SERPL-CCNC: 356 U/L (ref 39–117)
ALT SERPL W P-5'-P-CCNC: 29 U/L (ref 1–41)
ANION GAP SERPL CALCULATED.3IONS-SCNC: 13 MMOL/L (ref 5–15)
AST SERPL-CCNC: 51 U/L (ref 1–40)
BILIRUB SERPL-MCNC: 0.4 MG/DL (ref 0.2–1.2)
BLD GP AB SCN SERPL QL: NEGATIVE
BUN BLD-MCNC: 32 MG/DL (ref 8–23)
BUN/CREAT SERPL: 17.8 (ref 7–25)
CALCIUM SPEC-SCNC: 8.5 MG/DL (ref 8.6–10.5)
CHLORIDE SERPL-SCNC: 102 MMOL/L (ref 98–107)
CO2 SERPL-SCNC: 23 MMOL/L (ref 22–29)
CREAT BLD-MCNC: 1.8 MG/DL (ref 0.76–1.27)
DEPRECATED RDW RBC AUTO: 81.8 FL (ref 37–54)
ERYTHROCYTE [DISTWIDTH] IN BLOOD BY AUTOMATED COUNT: 21.1 % (ref 12.3–15.4)
GFR SERPL CREATININE-BSD FRML MDRD: 37 ML/MIN/1.73
GLOBULIN UR ELPH-MCNC: 4.7 GM/DL
GLUCOSE BLD-MCNC: 242 MG/DL (ref 65–99)
GLUCOSE BLDC GLUCOMTR-MCNC: 215 MG/DL (ref 70–105)
HCT VFR BLD AUTO: 20 % (ref 37.5–51)
HGB BLD-MCNC: 6.6 G/DL (ref 13–17.7)
INR PPP: 0.97 (ref 0.9–1.1)
IRON 24H UR-MRATE: 80 MCG/DL (ref 59–158)
IRON SATN MFR SERPL: 27 % (ref 20–50)
MCH RBC QN AUTO: 36 PG (ref 26.6–33)
MCHC RBC AUTO-ENTMCNC: 32.7 G/DL (ref 31.5–35.7)
MCV RBC AUTO: 110.2 FL (ref 79–97)
PLATELET # BLD AUTO: 219 10*3/MM3 (ref 140–450)
PMV BLD AUTO: 8.8 FL (ref 6–12)
POTASSIUM BLD-SCNC: 4.5 MMOL/L (ref 3.5–5.2)
PROT SERPL-MCNC: 7.9 G/DL (ref 6–8.5)
PROTHROMBIN TIME: 10.2 SECONDS (ref 9.6–11.7)
RBC # BLD AUTO: 1.82 10*6/MM3 (ref 4.14–5.8)
RH BLD: POSITIVE
SODIUM BLD-SCNC: 138 MMOL/L (ref 136–145)
T&S EXPIRATION DATE: NORMAL
TIBC SERPL-MCNC: 292 MCG/DL (ref 298–536)
TRANSFERRIN SERPL-MCNC: 196 MG/DL (ref 200–360)
WBC NRBC COR # BLD: 8.3 10*3/MM3 (ref 3.4–10.8)

## 2019-11-20 PROCEDURE — 25010000002 PHENYLEPHRINE 10 MG/ML SOLUTION: Performed by: ANESTHESIOLOGY

## 2019-11-20 PROCEDURE — 85027 COMPLETE CBC AUTOMATED: CPT | Performed by: INTERNAL MEDICINE

## 2019-11-20 PROCEDURE — 88104 CYTOPATH FL NONGYN SMEARS: CPT | Performed by: INTERNAL MEDICINE

## 2019-11-20 PROCEDURE — 25010000002 PROPOFOL 200 MG/20ML EMULSION: Performed by: ANESTHESIOLOGY

## 2019-11-20 PROCEDURE — 86901 BLOOD TYPING SEROLOGIC RH(D): CPT | Performed by: INTERNAL MEDICINE

## 2019-11-20 PROCEDURE — 86923 COMPATIBILITY TEST ELECTRIC: CPT

## 2019-11-20 PROCEDURE — 36430 TRANSFUSION BLD/BLD COMPNT: CPT

## 2019-11-20 PROCEDURE — 25010000002 ONDANSETRON PER 1 MG: Performed by: ANESTHESIOLOGY

## 2019-11-20 PROCEDURE — 43274 ERCP DUCT STENT PLACEMENT: CPT | Performed by: INTERNAL MEDICINE

## 2019-11-20 PROCEDURE — 25010000002 IOPAMIDOL 61 % SOLUTION 50 ML VIAL: Performed by: INTERNAL MEDICINE

## 2019-11-20 PROCEDURE — 82962 GLUCOSE BLOOD TEST: CPT

## 2019-11-20 PROCEDURE — 85610 PROTHROMBIN TIME: CPT | Performed by: INTERNAL MEDICINE

## 2019-11-20 PROCEDURE — C1769 GUIDE WIRE: HCPCS | Performed by: INTERNAL MEDICINE

## 2019-11-20 PROCEDURE — C1876 STENT, NON-COA/NON-COV W/DEL: HCPCS | Performed by: INTERNAL MEDICINE

## 2019-11-20 PROCEDURE — P9016 RBC LEUKOCYTES REDUCED: HCPCS

## 2019-11-20 PROCEDURE — 74328 X-RAY BILE DUCT ENDOSCOPY: CPT

## 2019-11-20 PROCEDURE — 86850 RBC ANTIBODY SCREEN: CPT | Performed by: INTERNAL MEDICINE

## 2019-11-20 PROCEDURE — 80053 COMPREHEN METABOLIC PANEL: CPT | Performed by: INTERNAL MEDICINE

## 2019-11-20 PROCEDURE — 84466 ASSAY OF TRANSFERRIN: CPT | Performed by: INTERNAL MEDICINE

## 2019-11-20 PROCEDURE — 83540 ASSAY OF IRON: CPT | Performed by: INTERNAL MEDICINE

## 2019-11-20 PROCEDURE — 86900 BLOOD TYPING SEROLOGIC ABO: CPT | Performed by: INTERNAL MEDICINE

## 2019-11-20 PROCEDURE — 86900 BLOOD TYPING SEROLOGIC ABO: CPT

## 2019-11-20 DEVICE — STNT OASIS 11.5F 9CM
Type: IMPLANTABLE DEVICE | Status: NON-FUNCTIONAL
Removed: 2020-02-27

## 2019-11-20 RX ORDER — ONDANSETRON 2 MG/ML
INJECTION INTRAMUSCULAR; INTRAVENOUS AS NEEDED
Status: DISCONTINUED | OUTPATIENT
Start: 2019-11-20 | End: 2019-11-20 | Stop reason: SURG

## 2019-11-20 RX ORDER — PROPOFOL 10 MG/ML
INJECTION, EMULSION INTRAVENOUS AS NEEDED
Status: DISCONTINUED | OUTPATIENT
Start: 2019-11-20 | End: 2019-11-20 | Stop reason: SURG

## 2019-11-20 RX ORDER — ONDANSETRON 4 MG/1
4 TABLET, FILM COATED ORAL EVERY 6 HOURS PRN
Status: DISCONTINUED | OUTPATIENT
Start: 2019-11-20 | End: 2019-11-20 | Stop reason: HOSPADM

## 2019-11-20 RX ORDER — ONDANSETRON 2 MG/ML
4 INJECTION INTRAMUSCULAR; INTRAVENOUS EVERY 6 HOURS PRN
Status: DISCONTINUED | OUTPATIENT
Start: 2019-11-20 | End: 2019-11-20 | Stop reason: HOSPADM

## 2019-11-20 RX ORDER — HYDROMORPHONE HCL 110MG/55ML
0.5 PATIENT CONTROLLED ANALGESIA SYRINGE INTRAVENOUS
Status: DISCONTINUED | OUTPATIENT
Start: 2019-11-20 | End: 2019-11-20 | Stop reason: HOSPADM

## 2019-11-20 RX ORDER — PHENYLEPHRINE HYDROCHLORIDE 10 MG/ML
INJECTION INTRAVENOUS AS NEEDED
Status: DISCONTINUED | OUTPATIENT
Start: 2019-11-20 | End: 2019-11-20 | Stop reason: SURG

## 2019-11-20 RX ORDER — SODIUM CHLORIDE 0.9 % (FLUSH) 0.9 %
3-10 SYRINGE (ML) INJECTION AS NEEDED
Status: DISCONTINUED | OUTPATIENT
Start: 2019-11-20 | End: 2019-11-20 | Stop reason: HOSPADM

## 2019-11-20 RX ORDER — SODIUM CHLORIDE 9 MG/ML
9 INJECTION, SOLUTION INTRAVENOUS CONTINUOUS PRN
Status: DISCONTINUED | OUTPATIENT
Start: 2019-11-20 | End: 2019-11-20 | Stop reason: HOSPADM

## 2019-11-20 RX ORDER — LIDOCAINE HYDROCHLORIDE 20 MG/ML
INJECTION, SOLUTION EPIDURAL; INFILTRATION; INTRACAUDAL; PERINEURAL AS NEEDED
Status: DISCONTINUED | OUTPATIENT
Start: 2019-11-20 | End: 2019-11-20 | Stop reason: SURG

## 2019-11-20 RX ORDER — SODIUM CHLORIDE 0.9 % (FLUSH) 0.9 %
3 SYRINGE (ML) INJECTION EVERY 12 HOURS SCHEDULED
Status: DISCONTINUED | OUTPATIENT
Start: 2019-11-20 | End: 2019-11-20 | Stop reason: HOSPADM

## 2019-11-20 RX ORDER — ROCURONIUM BROMIDE 10 MG/ML
INJECTION, SOLUTION INTRAVENOUS AS NEEDED
Status: DISCONTINUED | OUTPATIENT
Start: 2019-11-20 | End: 2019-11-20 | Stop reason: SURG

## 2019-11-20 RX ORDER — NALOXONE HCL 0.4 MG/ML
0.1 VIAL (ML) INJECTION
Status: DISCONTINUED | OUTPATIENT
Start: 2019-11-20 | End: 2019-11-20 | Stop reason: HOSPADM

## 2019-11-20 RX ORDER — MORPHINE SULFATE 4 MG/ML
2 INJECTION, SOLUTION INTRAMUSCULAR; INTRAVENOUS
Status: DISCONTINUED | OUTPATIENT
Start: 2019-11-20 | End: 2019-11-20 | Stop reason: HOSPADM

## 2019-11-20 RX ORDER — SUCRALFATE 1 G/1
1 TABLET ORAL 4 TIMES DAILY
COMMUNITY
End: 2020-05-19

## 2019-11-20 RX ORDER — METOCLOPRAMIDE HYDROCHLORIDE 5 MG/ML
10 INJECTION INTRAMUSCULAR; INTRAVENOUS 3 TIMES DAILY
Status: DISCONTINUED | OUTPATIENT
Start: 2019-11-20 | End: 2019-11-20 | Stop reason: HOSPADM

## 2019-11-20 RX ORDER — SODIUM CHLORIDE, SODIUM LACTATE, POTASSIUM CHLORIDE, CALCIUM CHLORIDE 600; 310; 30; 20 MG/100ML; MG/100ML; MG/100ML; MG/100ML
125 INJECTION, SOLUTION INTRAVENOUS CONTINUOUS
Status: DISCONTINUED | OUTPATIENT
Start: 2019-11-20 | End: 2019-11-20 | Stop reason: HOSPADM

## 2019-11-20 RX ORDER — GLYCOPYRROLATE 0.2 MG/ML
INJECTION INTRAMUSCULAR; INTRAVENOUS AS NEEDED
Status: DISCONTINUED | OUTPATIENT
Start: 2019-11-20 | End: 2019-11-20 | Stop reason: SURG

## 2019-11-20 RX ADMIN — ROCURONIUM BROMIDE 30 MG: 10 INJECTION, SOLUTION INTRAVENOUS at 12:35

## 2019-11-20 RX ADMIN — GLYCOPYRROLATE 0.2 MG: 0.2 INJECTION, SOLUTION INTRAMUSCULAR; INTRAVENOUS at 12:35

## 2019-11-20 RX ADMIN — LIDOCAINE HYDROCHLORIDE 50 MG: 20 INJECTION, SOLUTION EPIDURAL; INFILTRATION; INTRACAUDAL; PERINEURAL at 12:35

## 2019-11-20 RX ADMIN — ONDANSETRON 4 MG: 2 INJECTION INTRAMUSCULAR; INTRAVENOUS at 13:12

## 2019-11-20 RX ADMIN — SODIUM CHLORIDE: 0.9 INJECTION, SOLUTION INTRAVENOUS at 12:32

## 2019-11-20 RX ADMIN — PHENYLEPHRINE HYDROCHLORIDE 0.1 MG: 10 INJECTION INTRAVENOUS at 12:35

## 2019-11-20 RX ADMIN — SUGAMMADEX 400 MG: 100 INJECTION, SOLUTION INTRAVENOUS at 13:13

## 2019-11-20 RX ADMIN — PROPOFOL 150 MG: 10 INJECTION, EMULSION INTRAVENOUS at 12:35

## 2019-11-20 RX ADMIN — ROCURONIUM BROMIDE 10 MG: 10 INJECTION, SOLUTION INTRAVENOUS at 12:40

## 2019-11-20 NOTE — OP NOTE
ENDOSCOPIC RETROGRADE CHOLANGIOPANCREATOGRAPHY Procedure Report    Patient Name:  Bry Ratliff  YOB: 1945    Date of Surgery:  11/20/2019     Pre-Op Diagnosis:  Gastric antral vascular ectasia [K31.819]        Procedure/CPT® Codes:      Procedure(s):  ERCP, clearance of bile duct with balloon, placement of biliary stent, EGD with argon plasma coagulation of gastric antral vascular ectasia    Staff:  Surgeon(s):  Marisel Gomez MD      Anesthesia: General    Description of Procedure:  A description of the procedure as well as risks, benefits and alternative methods were explained to the patient who voiced understanding and signed the corresponding consent form.Specifically risks of post-ERCP pancreatitis, bleeding, perforation, failure to canulate and adverse reaction to sedation were discussed. A physical exam was performed and vital signs were monitored throughout the procedure.    A  film was performed which was normal. With the patient in the semi-prone position, an Olympus side viewing endoscope was placed into the mouth and proceeded through the esophagus, stomach and second portion of the duodenum without difficulty. Limited views of the esophagus and stomach were normal. The ampulla was visualized and appeared normal. A [employer]rop Mobile hydrotome was used to cannulate the ampulla using wire guided technique. Bile was aspirated to ensure this was the duct of interest. Contrast was injected into the bile duct.      The scope was then retroflexed and the fundus was visualized. The procedure was not difficult and there were no immediate complications.    Findings:   Severe gastric antral vascular ectasia was noticed with a Syed's esophagus and small hiatal hernia.  Major ampulla was aligned with the scope.  Conventional common bile duct was obtained using a dream tome.  Significant dilation of common hepatic common bile duct and intrahepatic ducts were noticed.  Drain was noticed in  the right hepatic side.  Stricture was seen at the distal part of the common bile duct which was smooth.  Balloon was inflated to 12 mm and decreased to 9 mm still could not be passed through that stricture area, it was deflated further with a slight resistant.  Stricture was brushing cytology was performed.  Suspect the stricture most likely may be benign at the distal part of the common bile duct.  Subsequent that, 11.5 Hungarian 9 cm stent was placed across the stricture.  Patient tolerated procedure very well no immediate complication was noticed.    Later  scope was advanced regularization from orifice, esophagus.  Severe gastric antral vascular ectasia was seen.  These the and they were cauterized by using APC.  Patient tolerated very well no immediate complication was noticed.    Impression:  1 severe gastric antral vascular ectasia status post of APC.  2. Distal CBD stricture stricture which was smooth, brushing cytology was performed and subsequently 11.5 and 9 cm stent was placed across the stricture.  Drain was noticed in the right intrahepatic region area.  Patient may have a sludge or stone above that which will be removed after further dilation of the stricture on next ERCP.  Because of very low hemoglobin 6.6 today, extensive therapeutic was not performed.    Recommendations:  Patient will undergo repeat ERCP in 3-month.  Follow-up on the brushing cytology.  He will undergo repeat EGD with a cauterization of APC in  6 weeks.  He will be placed on a PPI and Carafate.  Patient will need to be on IV Injectafer.  Patient will be receiving a blood today and subsequently will be on IV Injectafer.      Marisel Gomez MD     Date: 11/20/2019    Time: 1:15 PM

## 2019-11-20 NOTE — ANESTHESIA PREPROCEDURE EVALUATION
Anesthesia Evaluation     Patient summary reviewed and Nursing notes reviewed   no history of anesthetic complications:  NPO Solid Status: > 8 hours  NPO Liquid Status: > 8 hours           Airway   Mallampati: III  TM distance: >3 FB  Neck ROM: full  Possible difficult intubation  Dental - normal exam     Pulmonary - normal exam   (+) sleep apnea on CPAP,   Cardiovascular - normal exam    (+) hypertension well controlled, CAD, cardiac stents unknown timeframe hyperlipidemia,       Neuro/Psych  (+) numbness,     GI/Hepatic/Renal/Endo    (+) morbid obesity, PUD,  hepatitis, liver disease, renal disease, diabetes mellitus type 2 well controlled,     Musculoskeletal     (+) chronic pain,   Abdominal  - normal exam    Bowel sounds: normal.   Substance History - negative use     OB/GYN negative ob/gyn ROS         Other - negative ROS                         Anesthesia Plan    ASA 4     general     intravenous induction     Anesthetic plan, all risks, benefits, and alternatives have been provided, discussed and informed consent has been obtained with: patient.

## 2019-11-20 NOTE — NURSING NOTE
Spoke with Dr Gomez via cell phone at 4524 11/20/2019. Verbal order to discharge patient to home post transfusion 1 unit PRBCs. Telephone order Dr Gomez, disregard order for post-transfusion H&H. Have Patient move Dr Mayfield appointment to this week, if they have trouble patient is to call Dr Gomez and he will call Dr Mayfield directly.     Repeated and verified by myself this date this time.

## 2019-11-20 NOTE — ANESTHESIA PROCEDURE NOTES
Airway  Urgency: elective    Date/Time: 11/20/2019 12:37 PM  Airway not difficult    General Information and Staff    Patient location during procedure: OR  Anesthesiologist: Jas Ahn MD    Indications and Patient Condition  Indications for airway management: airway protection    Preoxygenated: yes  MILS maintained throughout  Mask difficulty assessment: 1 - vent by mask    Final Airway Details  Final airway type: endotracheal airway      Successful airway: ETT  Cuffed: yes   Successful intubation technique: direct laryngoscopy  Endotracheal tube insertion site: oral  Blade: Vikram  Blade size: 3  ETT size (mm): 7.0  Cormack-Lehane Classification: grade I - full view of glottis  Placement verified by: chest auscultation and capnometry   Measured from: teeth  ETT/EBT  to teeth (cm): 22  Number of attempts at approach: 1  Assessment: lips, teeth, and gum same as pre-op and atraumatic intubation    Additional Comments  ATRAUMATIC.  TEETH IN PREOP CONDITION.  CUFF TO MINIMUM OCCLUSIVE CUFF PRESSURE. GAUZE BITE BLOCK

## 2019-11-20 NOTE — DISCHARGE INSTRUCTIONS
A responsible adult should stay with you and you should rest quietly for the rest of the day.    Do not drink alcohol, drive, operate any heavy machinery or power tools or make any legal/important decisions for the next 24 hours.     Progress your diet as tolerated.  If you begin to experience severe pain, increased shortness of breath, racing heartbeat or a fever above 101 F, seek immediate medical attention.     Follow up with Dr Gomez in 3 months for stent removal. Follow up with Dr Mayfield this week if possible, call Dr Gomez if you cannot move the appointment up to this week.

## 2019-11-20 NOTE — ANESTHESIA POSTPROCEDURE EVALUATION
Patient: Bry Ratliff    Procedure Summary     Date:  11/20/19 Room / Location:  [facility] ENDOSCOPY 2 / [facility] ENDOSCOPY    Anesthesia Start:  1232 Anesthesia Stop:  1320    Procedure:  ERCP, clearance of bile duct with balloon, placement of biliary stent, EGD with argon plasma coagulation of gastric antral vascular ectasia (N/A ) Diagnosis:       Gastric antral vascular ectasia      (Gastric antral vascular ectasia [K31.819])    Surgeon:  Marisel Gomez MD Provider:  Jas Ahn MD    Anesthesia Type:  general ASA Status:  4          Anesthesia Type: general  Last vitals  BP   126/52 (11/20/19 1337)   Temp   97.7 °F (36.5 °C) (11/20/19 1335)   Pulse   85 (11/20/19 1337)   Resp   18 (11/20/19 1337)     SpO2   100 % (11/20/19 1337)     Post Anesthesia Care and Evaluation    Patient location during evaluation: PACU  Patient participation: complete - patient participated  Level of consciousness: awake  Pain score: 0 (See nurse's notes for pain score)  Pain management: adequate  Airway patency: patent  Anesthetic complications: No anesthetic complications  PONV Status: none  Cardiovascular status: acceptable  Respiratory status: acceptable  Hydration status: acceptable    Comments: Patient seen and examined postoperatively; vital signs stable; SpO2 greater than or equal to 90%; cardiopulmonary status stable; nausea/vomiting adequately controlled; pain adequately controlled; no apparent anesthesia complications; patient discharged from anesthesia care when discharge criteria were met

## 2019-11-20 NOTE — H&P
Patient Care Team:  Paulette Harris MD as PCP - General      Subjective .     History of present illness:    Bry Ratliff is a 74 y.o. male who presents today for Procedure(s):  ENDOSCOPIC RETROGRADE CHOLANGIOPANCREATOGRAPHY possible stent for the indications listed below.     The updated Patient Profile was reviewed prior to the procedure, in conjunction with the Physical Exam, including medical conditions, surgical procedures, medications, allergies, family history and social history.     Pre-operatively, I reviewed the indication(s) for the procedure, the risks of the procedure [including but not limited to: unexpected bleeding possibly requiring hospitalization and/or unplanned repeat procedures, perforation possibly requiring surgical treatment, missed lesions and complications of sedation/MAC (also explained by anesthesia staff)].     I have evaluated the patient for risks associated with the planned anesthesia and the procedure to be performed and find the patient an acceptable candidate for IV sedation.    Multiple opportunities were provided for any questions or concerns, and all questions were answered satisfactorily before any anesthesia was administered. We will proceed with the planned procedure.      ASSESSMENT/PLAN:  Possible fistula  ERCP        Past Medical History:  Past Medical History:   Diagnosis Date   • Anemia    • B12 deficiency 2009   • Syed's esophagus    • CAD (coronary artery disease)    • Diabetes mellitus (CMS/HCC)    • History of echocardiogram     03/03/2017 - 12/03/2014 - 04/2012   • Hyperlipidemia    • Hypertension    • Morbid obesity (CMS/HCC)    • KATHIA treated with BiPAP    • Renal insufficiency    • S/P lumbar laminectomy        Past Surgical History:  Past Surgical History:   Procedure Laterality Date   • BACK SURGERY  1971   • CATARACT EXTRACTION  2014   • CHOLECYSTECTOMY  02/24/2019   • COLONOSCOPY  03/2018   • CORONARY ANGIOPLASTY  08/24/2009    PCI stent - succesful  "placement of 3.5/23 promus stent in proximal right coronary artery   • CORONARY ANGIOPLASTY WITH STENT PLACEMENT  08/27/2009    patient had stent placed to proximal right coronary artery   • CYSTOSCOPY BLADDER STONE LITHOTRIPSY  1997   • ENDOSCOPY N/A 10/18/2019    Procedure: ESOPHAGOGASTRODUODENOSCOPY with argon plasma coagulation of gastric antral vascular ectasia;  Surgeon: Marisel Gomez MD;  Location: [facility] ENDOSCOPY;  Service: Gastroenterology   • OTHER SURGICAL HISTORY  11/2018    IVC filter implant   • RENAL ARTERY STENT Left        Social History:  Social History     Tobacco Use   • Smoking status: Never Smoker   • Smokeless tobacco: Never Used   Substance Use Topics   • Alcohol use: No     Frequency: Never   • Drug use: No       Family History:  Family History   Problem Relation Age of Onset   • Hyperlipidemia Other    • Hypertension Other        Medications:  Medications Prior to Admission   Medication Sig Dispense Refill Last Dose   • allopurinol (ZYLOPRIM) 100 MG tablet Take 100 mg by mouth 2 (Two) Times a Day.   Taking   • aspirin 81 MG tablet Take 1 tablet by mouth Daily. 30 tablet 3 Taking   • atorvastatin (LIPITOR) 20 MG tablet Take 1 tablet by mouth Daily. 90 tablet 3 Taking   • B-D 3CC LUER-YASMEEN SYR 25GX1\" 25G X 1\" 3 ML misc   1 Taking   • bumetanide (BUMEX) 2 MG tablet Take 1 tablet by mouth Daily.   Taking   • Cyanocobalamin 1000 MCG/ML kit Inject 1,000 mcg as directed Every 30 (Thirty) Days.   Taking   • docusate sodium (COLACE) 100 MG capsule Take 100 mg by mouth 2 (Two) Times a Day.   Taking   • DULoxetine (CYMBALTA) 60 MG capsule Take 60 mg by mouth Daily.   Taking   • ferrous sulfate 325 (65 FE) MG tablet Take 1 tablet by mouth 2 (Two) Times a Day. 60 tablet 2 Taking   • folic acid (FOLVITE) 1 MG tablet Take 1 mg by mouth Daily.   Taking   • hydrOXYzine (ATARAX) 25 MG tablet Take 25 mg by mouth 3 (Three) Times a Day As Needed for Itching.   Taking   • insulin glargine (LANTUS) 100 UNIT/ML " injection Inject 46 units at bedtime 30 mL 4 Taking   • insulin lispro (HUMALOG) 100 UNIT/ML injection 15 units subcu before each meal plus sliding scale 30 mL 4 Taking   • lactulose (CHRONULAC) 10 GM/15ML solution Take 60 mL by mouth Every 4 (Four) Hours.   Taking   • levothyroxine (SYNTHROID, LEVOTHROID) 75 MCG tablet Take 1 tablet by mouth Daily. 90 tablet 4 Taking   • magnesium oxide (MAG-OX) 400 MG tablet Take 400 mg by mouth Daily.   Taking   • metoclopramide (REGLAN) 5 MG tablet Take 5 mg by mouth 2 (Two) Times a Day.  0 Taking   • Multiple Vitamins-Minerals (MULTIVITAMIN WITH MINERALS) tablet tablet Take 1 tablet by mouth Daily.   Taking   • oxyCODONE (ROXICODONE) 5 MG immediate release tablet Take 5 mg by mouth Every 8 (Eight) Hours As Needed.   Taking   • pantoprazole (PROTONIX) 40 MG EC tablet Take 1 tablet by mouth 2 (Two) Times a Day. 30 tablet 3 Taking   • potassium chloride (K-DUR,KLOR-CON) 20 MEQ CR tablet Take 20 mEq by mouth Daily.  0 Taking   • rifaximin (XIFAXAN) 550 MG tablet Take 550 mg by mouth Every 12 (Twelve) Hours.   Taking   • sodium bicarbonate 650 MG tablet Take 650 mg by mouth 3 (Three) Times a Day.   Taking   • zinc sulfate (ZINCATE) 50 MG capsule Take 50 mg by mouth Daily.  0 Taking       Scheduled Meds:  Continuous Infusions:  No current facility-administered medications for this encounter.   PRN Meds:.    ALLERGIES:  Patient has no known allergies.        Objective     Vital Signs:        Physical Exam:      General Appearance:    Awake and alert, in no acute distress   Lungs:     Clear to auscultation bilaterally, respirations regular, even and unlabored    Heart:    Regular rhythm and normal rate, normal S1 and S2, no            murmur, no gallop, no rub   Abdomen:     Normal bowel sounds, soft, non-tender, no rebound or guarding, non-distended, no hepatosplenomegaly        Results Review:   I reviewed the patient's labs and imaging.  Lab Results (last 24 hours)     Procedure  Component Value Units Date/Time    Protime-INR [[id number]] Collected:  11/20/19 1129    Specimen:  Blood Updated:  11/20/19 1135    Comprehensive Metabolic Panel [[id number]] Collected:  11/20/19 1129    Specimen:  Blood Updated:  11/20/19 1135    CBC (No Diff) [[id number]] Collected:  11/20/19 1129    Specimen:  Blood Updated:  11/20/19 1135          Imaging Results (Last 24 Hours)     ** No results found for the last 24 hours. **             I discussed the patients findings and my recommendations with the patient.  Marisel Gomez MD  11/20/19  11:37 AM

## 2019-11-21 ENCOUNTER — TELEPHONE (OUTPATIENT)
Dept: ONCOLOGY | Facility: CLINIC | Age: 74
End: 2019-11-21

## 2019-11-21 LAB
ABO + RH BLD: NORMAL
BH BB BLOOD EXPIRATION DATE: NORMAL
BH BB BLOOD TYPE BARCODE: 6200
BH BB DISPENSE STATUS: NORMAL
BH BB PRODUCT CODE: NORMAL
BH BB UNIT NUMBER: NORMAL
LAB AP CASE REPORT: NORMAL
LAB AP DIAGNOSIS COMMENT: NORMAL
PATH REPORT.FINAL DX SPEC: NORMAL
UNIT  ABO: NORMAL
UNIT  RH: NORMAL

## 2019-11-21 NOTE — TELEPHONE ENCOUNTER
Mrs. Ratliff left message that Dr. Gomez wanted patient to be seen by Dr. Mayfield this week for low hemoglobin.  He had ERCP and another procedure done.  Returned call, explained Dr. Mayfield had left practice.  Scheduled w/ Dr. You for Friday 11/22.  Cancelled NP appt for 11/26.

## 2019-11-22 ENCOUNTER — TELEPHONE (OUTPATIENT)
Dept: ONCOLOGY | Facility: CLINIC | Age: 74
End: 2019-11-22

## 2019-11-22 ENCOUNTER — OFFICE VISIT (OUTPATIENT)
Dept: ONCOLOGY | Facility: CLINIC | Age: 74
End: 2019-11-22

## 2019-11-22 ENCOUNTER — APPOINTMENT (OUTPATIENT)
Dept: LAB | Facility: HOSPITAL | Age: 74
End: 2019-11-22

## 2019-11-22 ENCOUNTER — TELEPHONE (OUTPATIENT)
Dept: SURGERY | Facility: CLINIC | Age: 74
End: 2019-11-22

## 2019-11-22 VITALS
HEART RATE: 87 BPM | BODY MASS INDEX: 38.63 KG/M2 | RESPIRATION RATE: 16 BRPM | SYSTOLIC BLOOD PRESSURE: 119 MMHG | HEIGHT: 74 IN | TEMPERATURE: 98.2 F | DIASTOLIC BLOOD PRESSURE: 64 MMHG | WEIGHT: 301 LBS

## 2019-11-22 DIAGNOSIS — D50.0 IRON DEFICIENCY ANEMIA DUE TO CHRONIC BLOOD LOSS: Primary | ICD-10-CM

## 2019-11-22 DIAGNOSIS — N18.30 ANEMIA IN STAGE 3 CHRONIC KIDNEY DISEASE (HCC): Primary | ICD-10-CM

## 2019-11-22 DIAGNOSIS — D50.9 IRON DEFICIENCY ANEMIA, UNSPECIFIED IRON DEFICIENCY ANEMIA TYPE: ICD-10-CM

## 2019-11-22 DIAGNOSIS — D63.1 ANEMIA IN STAGE 3 CHRONIC KIDNEY DISEASE (HCC): Primary | ICD-10-CM

## 2019-11-22 DIAGNOSIS — D50.9 IRON DEFICIENCY ANEMIA, UNSPECIFIED IRON DEFICIENCY ANEMIA TYPE: Primary | ICD-10-CM

## 2019-11-22 LAB
BASOPHILS # BLD AUTO: 0.05 10*3/MM3 (ref 0–0.2)
BASOPHILS NFR BLD AUTO: 0.6 % (ref 0–1.5)
DEPRECATED RDW RBC AUTO: 75.5 FL (ref 37–54)
EOSINOPHIL # BLD AUTO: 0.38 10*3/MM3 (ref 0–0.4)
EOSINOPHIL NFR BLD AUTO: 4.4 % (ref 0.3–6.2)
ERYTHROCYTE [DISTWIDTH] IN BLOOD BY AUTOMATED COUNT: 19.2 % (ref 12.3–15.4)
FERRITIN SERPL-MCNC: 132.4 NG/ML (ref 30–400)
HCT VFR BLD AUTO: 23.3 % (ref 37.5–51)
HGB BLD-MCNC: 7.2 G/DL (ref 13–17.7)
IRON 24H UR-MRATE: 43 MCG/DL (ref 59–158)
IRON SATN MFR SERPL: 15 % (ref 20–50)
LYMPHOCYTES # BLD AUTO: 1.07 10*3/MM3 (ref 0.7–3.1)
LYMPHOCYTES NFR BLD AUTO: 12.3 % (ref 19.6–45.3)
MCH RBC QN AUTO: 34.8 PG (ref 26.6–33)
MCHC RBC AUTO-ENTMCNC: 30.9 G/DL (ref 31.5–35.7)
MCV RBC AUTO: 112.6 FL (ref 79–97)
MONOCYTES # BLD AUTO: 0.8 10*3/MM3 (ref 0.1–0.9)
MONOCYTES NFR BLD AUTO: 9.2 % (ref 5–12)
NEUTROPHILS # BLD AUTO: 6.41 10*3/MM3 (ref 1.7–7)
NEUTROPHILS NFR BLD AUTO: 73.5 % (ref 42.7–76)
PLATELET # BLD AUTO: 242 10*3/MM3 (ref 140–450)
PMV BLD AUTO: 10.8 FL (ref 6–12)
RBC # BLD AUTO: 2.07 10*6/MM3 (ref 4.14–5.8)
TIBC SERPL-MCNC: 283 MCG/DL (ref 298–536)
TRANSFERRIN SERPL-MCNC: 190 MG/DL (ref 200–360)
WBC NRBC COR # BLD: 8.71 10*3/MM3 (ref 3.4–10.8)

## 2019-11-22 PROCEDURE — 36415 COLL VENOUS BLD VENIPUNCTURE: CPT | Performed by: INTERNAL MEDICINE

## 2019-11-22 PROCEDURE — 84466 ASSAY OF TRANSFERRIN: CPT | Performed by: NURSE PRACTITIONER

## 2019-11-22 PROCEDURE — 85025 COMPLETE CBC W/AUTO DIFF WBC: CPT | Performed by: INTERNAL MEDICINE

## 2019-11-22 PROCEDURE — 82728 ASSAY OF FERRITIN: CPT | Performed by: NURSE PRACTITIONER

## 2019-11-22 PROCEDURE — 99215 OFFICE O/P EST HI 40 MIN: CPT | Performed by: INTERNAL MEDICINE

## 2019-11-22 PROCEDURE — 83540 ASSAY OF IRON: CPT | Performed by: NURSE PRACTITIONER

## 2019-11-22 RX ORDER — SODIUM CHLORIDE 9 MG/ML
250 INJECTION, SOLUTION INTRAVENOUS AS NEEDED
Status: CANCELLED | OUTPATIENT
Start: 2019-11-22

## 2019-11-22 NOTE — TELEPHONE ENCOUNTER
Pt's wife is calling today stating that Dr. Gomez placed the stent on 11/20/19 and she said that  mentioned that 2 weeks after the stent has been placed that we need to do a CT scan to see if the drain tube can come out. I let wife know that  will be back in the office on Monday 11/25/19 and I will mention this to him to see if we can be the ones to place the order for the CT scan. Pt voiced understanding and had no further question or concerns at this time.

## 2019-11-22 NOTE — PROGRESS NOTES
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 Anemia diagnosed January 2018 and IgG kappa monoclonal gammopathy diagnosed May 2018.   · This patient is an obese  male who claims to have developed kidney problems in January 2018 for which he was referred to Devi Plascencia M.D. He was found to have a hemoglobin of 7.4 at that time and was given 1 unit of packed red blood cells. He was then referred to GI where he had been followed for Syed’s esophagus for a number of years. An EGD and colonoscopy was performed on 3/5/18 by Marisel Gomez M.D. revealing mucosa suggestive of Syed’s esophagus and hiatal hernia in the cardia. Patient had a poor prep compromising the colonoscopy exam though the scope did reach the cecum. Esophageal biopsy revealed squamocolumnar mucosa with extensive intestinal metaplasia consistent with Syed’s esophagus, negative for dysplasia. Colonoscopy was recommended to be repeated in two years and EGD in three years. The patient saw his primary care physician, Paulette Harris M.D., in followup and blood testing was done on 5/17/18 revealing WBC 6.3, hemoglobin 8.4, MCV 84.5, platelet count 182,000. Vitamin B12 level was 416 (180-914) and patient was referred here for further evaluation. The patient claims to have been diagnosed with Vitamin B12 deficiency a number of years ago for which he has been on Vitamin B12 injections up until six months ago when he just stopped taking those. He has been recently started on oral iron  supplementation. He claims not to see any blood in his urine but does have microscopic hematuria for which he sees a urologist. He denies nosebleeds, gum bleeds or blood in the stool. He has not had any significant changes in his weight. He feels weak and dizzy for a number of years which has recently gotten worse. He does not ambulate much because of back surgery causing him weakness. He did have a right lower extremity DVT with bilateral pulmonary emboli in 2004 since which time he has been on Coumadin, thought secondary to a sedentary lifestyle. He has no family history of anemias.   · 5/24/18 - Patient seen initial consultation for anemia. Hemoglobin 7.9, MCV 89.9. Ferritin 17 (), iron 183 (), TIBC 379 (228-428), iron saturation 48% (20-50), retic 2.16% (0.5-1.5), haptoglobin 138 (), folate 9.1 (5.9-24.8). SPEP showed monoclonal gammopathy. SIFE showed IgG kappa monoclonal gammopathy. M-spike in the gamma region 0.7 g/dL. Erythropoietin 53.5 (2.59-18.5). Antiparietal cell antibody and intrinsic factor antibodies negative.   Globulin 4.2 (2.5-3.8). Creatinine 1.4 (0.7-1.2).   · 6/19/18 - Discussed results of anemia workup. Hemoglobin improving. Ferrous Sulfate decreased to 325 mg twice a day. Will perform gammopathy workup for IgG kappa monoclonal gammopathy. IgA 783 (), IgG 1480 (600-1500), IgM 93 (). Kappa lambda FLC ratio 0.81 (0.26-1.65). Ferritin 36 ().        · 6/25/18 - Bone survey negative for acute disease. No evidence of lytic or blastic metastatic bone disease was present. Chest x-ray showed cardiomegaly, mild right basilar atelectasis and findings suggestive of ankylosing spondylitis.   · 6/29/18 - Patient underwent sternal bone marrow revealing monoclonal gammopathy of undetermined significant (MGUS). Extent of bone marrow involvement 5%. Phenotype kappa positive, IgG positive, CD38 positive, CD20 negative, CD19 negative, CD45 negative, CD56 negative and   negative. Dyspoiesis was not seen. There was absence of iron stores. Normal male karyotype. Normal FISH study. Flow cytometry revealed IgG kappa restrictive plasma cells in a polytypic background. There was a mixed population of maturing myeloid cells, B-cells and T-cells. No abnormal myeloid maturation was seen. A monoclonal IgG kappa plasma cell population was present. 4% plasma cells present.   · 8/23/18 - Labs by Dr. Plascencia revealed B12 of 857 (180-914), folate 12.7 (5.9-24.8), iron 127 ().      · 9/19/18 - Iron 94 (), TIBC 297 (228-428), iron saturation 32 (20-50), ferritin 29 (). Erythropoietin 30.04 (2.59-18.5).        · 10/16/18 - Iron 177 (), TIBC 320 (228-428), iron saturation 55 (20-50), ferritin 34 ().       · 11/16/18 - Hemoglobin 7.1. Patient given 1 unit of packed red blood cells.   · 11/21/18 - Ferritin 27 (). Hemoglobin 7.9. Patient given 1 unit of packed red blood cells.    · 11/26/18 - EGD by Dave Russo M.D. revealed erosive gastritis and bleeding ampulla. There was oozing upon entering the stomach in many different areas. Duodenal mucosa and the esophagus were normal.   · 11/27/18 - Hemoglobin 8.2. Order written for Procrit 30,000 units subq weekly, not approved by insurance for low ferritin. Urine JERRY with no definite monoclonal gammopathy identified with questionable faint IgG kappa.   · 12/18/18 - Hemoglobin 9.9. Injectafer 750 mg IV given.   · 1/2/19 - Injectafer 750 mg IV given.  Hemoglobin 11, .1. Patient claims that he is getting Vitamin B12 injections monthly at home through Dr. Harris. Currently taking Ferrous Sulfate 325 mg p.o. b.i.d. and asked to continue that. Asked to continue Pantoprazole 40 mg daily that he is taking. IgA 651 (), IgG 1470 (600-1500), IgM 94 ().      · 2/12/19 - Iron 88 ().    · 3/6/19 - WBC 19.8 with 83% neutrophils, 5% lymphocytes, 11% monocytes, hemoglobin 8.7, , platelet count  182,000. Patient status post laparoscopic cholecystectomy on 2/24/19 with large ecchymoses apparent on abdomen. Infectious workup ordered and Injectafer 750 mg IV days 1 and 8 ordered.  Iron 23 (), TIBC 153 (228-428), iron saturation 15% (20-50), ferritin 400 (224-336).       · 3/12/19 - WBC 15.02, hemoglobin 8.1 and platelets 227,000. Patient reported hematuria followed by Dr. Starkey. Orders written to start Injectafer ASAP. Abdominal ecchymosis improving.   · 3/14/19 - Injectafer 750 mg IV given.   · 4/22/19 - Hemoglobin 9.5, . Patient missed day 8 Injectafer in March and it was rescheduled. SIFE showed IgG kappa monoclonal gammopathy.  · 5/8/19 - Injectafer 750 mg IV given.   · 7/8/19 - Iron 56 (). Iron Saturation 36 (20-50%). Transferrin 110 (180-330). TIBC 154 (228-428). Ferritin 1,199 ().    · 8/7/2019-hemoglobin 10.0.  Patient taking multivitamin with iron only.  Restarted ferrous sulfate 325mg by mouth twice a day. UIFE showed free kappa light chain identified.   · 10/16/2019 patient hospitalized at [facility] for 3 days and found to have a hemoglobin of 4.8 at the cancer center visit.  Stool Hemoccult was positive.  He ended up receiving 5 units of packed red blood cells.  EGD by Marisel Gomez MD revealed severe gastric antral vascular ectasia with oozing, Syed's esophagus and hiatal hernia.  The patient was treated with PPI and Carafate.  Plan was to repeat EGD with cauterization in 6 weeks.  B12 and reticulocyte count were high.  Haptoglobin and folic acid were normal.  Creatinine 2.2 (0.76-1.27), iron 47 (), TIBC 264 (298-5 0.36), iron saturation 18 (20-50), ferritin 128.4 ().  Aspirin was held temporarily and patient given IV iron.  IgA 720 (), IgM 72 (), IgG 1489 (700-1600).     2.   Elevated LFT’s diagnosis established March 2018.  Biliary duct dilation diagnosis established June 2018.   · 11/30/17 - Maia phos 93 (32-91), total bili 0.7 (0.3-1.2), AST 37  (15-41), ALT 25 (15-41).   · 3/1/18 - T-bili 0.5 (0.3-1.2), maia phos 106 (32-91), AST 54 (15-41), ALT 27 (17-63).   · 5/24/18 - AST 46 (15-41), maia phos 131 (32-91).   · 6/8/18 - CMP ordered by Dr. Alejandra Amaro showed maia phos 209 (32-91),  (15-41), ALT 84 (17-63), total bili 1.1 (0.3-1.2).             · 6/19/18 - CT abdomen and pelvis as well as serological workup for elevated LFT’s ordered. Alpha-1 antitrypsin 144 (). Ceruloplasmin 38 (22-58). AFP 3 (0-9).  Hepatitis panel non-reactive.   · 6/22/18 - CT abdomen and pelvis showed abnormal intra and extrahepatic biliary dilation. There was mild distention of the gallbladder and evidence of several gallstones. Bilateral non-obstructing kidney stone was present. Incidental sigmoid diverticulosis.   · 7/2/18 - Patient underwent MRCP at [facility] showing markedly dilated intrahepatic and extrahepatic bile duct with multiple small filling defects within the distal common bile duct compatible with choledocholithiasis. There was a focal 6 mm segmental narrowing at the distal CBD with recommended GI consult for ERCP and brushings.   · 7/5/18 to 7/10/18 - On 7/5/18 labs revealed normal total bilirubin (0.8), AST 69 (15-41), maia phos 152 (32-91), ALT was normal at 37 (17-63), ammonia was elevated at 86 (9-35), lipase was normal (22). Patient hospitalized at [facility] due to weakness. Workup revealed cholelithiasis. On 7/9/18 patient underwent ERCP with bilateral sphincterotomy, common duct stone extraction, biliary brushing and stent placement by Dr. Leiva. Path on brushings was negative for malignancy. There was intra and extrahepatic biliary ductal dilation with relatively abrupt distal common bile duct cutoff and non-visualization of the gallbladder. He was started on Lovenox and sequential compression devices due to risk of pulmonary embolism.  of 33 (0-35).  On 7/10/18 LFT’s revealed total bili 0.9 (0.3-1.2). Maia phos 129 (32-91). AST 50  (15-41), ALT 41 (17-63).     · 8/31/18 - EUS by Dr. Gomez at [facility] revealing suspect benign biliary stricture due to CBD stone with FNA pathology revealing benign and atypical ductal cells, bile and proteinaceous material including scattered bacteria, neutrophils and degenerating cells. The atypia was felt mild and favored to be reactive in nature. Plan was to repeat ERCP with removal of the stent and the stone with consideration of common bile duct evaluation with cholangioscopy at that time.   · 10/12/18 - ERCP with sphincteroplasty and stone extraction done by Dave Russo M.D. for choledocholithiasis.   · 2/23/19 - Patient underwent ERCP with balloon clearance of bile duct by Jl Hampton M.D.   · 3/6/19 - LFT’s not elevated with bilirubin 1.1, AST 31, ALT 14, osbaldo phos was mildly elevated at 101 (32-91).   · 4/25/19 - 4/26/19 - Admitted to [facility] for hepatic encephalopathy and hypokalemia.   · 4/27/2019 - CT abdomen pelvis showed a 4.8 cm air-fluid collection adjacent to the right posterior hepatic lobe significant improved.  Right-sided chest tube small right pleural effusion.  Hepatic cirrhosis.  Bilateral nonobstructing renal stones.  Sigmoid diverticulosis.   · 5/26/19 - 5/29/2019 - Admitted to [facility] for hepatic and cephalopathy.  Ammonia level 213 (H).  · 7/11/19 - AFP 2.76 (0-9).   · 9/26/19 - CT scan abdomen showed fluid collection posterior to the right hepatic lobe has significantly diminished in size with only trace fluid remaining. Small locules of air are seen within this collection which could be related to recent shunt mentation or could be related to tiny fistula from the splenic flexure of the colon. Trace right pleural fluid, diminished since 4/27/19. Thickened, likely reactive, pleural rind remains. Mild right basilar atelectasis. Abnormal intrahepatic and extra hepatic biliary ductal dilation. Intrahepatic biliary dilation is new since 4/27/19. The CBD appears  attenuated in its mid segment, raising the possibility of stricture. ERCP recommended. Uncomplicated colonic diverticulosis. Non-obstructing bilateral renal stones and probable small right renal cyst. Cirrhotic liver morphology. No focal liver lesion is seen.  · 10/28/19 - CT scan abdomen and pelvis showed fluid collection posterior to the right hepatic lobe appears slightly increased in size now measuring 24 mm in diameter, again a small droplet of gas adjacent to this fluid collection is seen which may represent fistula. Right basilar small pleural effusion and atelectasis remain. Continued dilatation of the intra and extrahepatic biliary ductal system which appears stable from previous exam. Non-obstructing bilateral renal calculi. Changes of cirrhosis and splenomegaly. Diverticulosis. Mild interval change from prior study with increasing fluid collection posterior to the right hepatic lobe.      3.   Right lower extremity DVT and bilateral pulmonary emboli diagnosed 2004.   · 2004 - Patient claims to have developed right lower extremity DVT with bilateral pulmonary emboli in 2004 and was coumadinized. The blood clots were thought secondary to his sedentary lifestyle. He stayed on the Coumadin up until October 2017 when, due to difficulty maintaining his INR’s, he was switched to Xarelto 20 mg daily by Paulette Harris M.D. which was later dropped to 10 mg daily.    · 11/26/18 - EGD by Dave Russo M.D. revealed erosive gastritis and bleeding ampulla. Ampullary biopsy revealed reactive/reparative small intestinal mucosa with mild chronic inflammation. Gastric antrum biopsy was suggestive of focal mild reactive gastropathy. Due to erosive gastritis, patient asked to hold Xarelto and Aspirin by Dave Russo M.D.   · 11/27/18 - Patient asked to discontinue Xarelto and hold Aspirin while obtaining IVC filter. Risks discussed. Factor V Leiden gene mutation and prothrombin gene mutations not detected.  Anticardiolipin IgM 11 (<11). Anti beta-2 glyco, IgA 55 (<20). Factor VIII activity 186 (). Antithrombin III activity 63 (). Protein C activity 69 (), protein S activity 69 ().       · 12/5/18 - Patient underwent transjugular IVC filter placement in IR by  Jonn Cuenca M.D.   · 1/2/19 - Told patient that his low protein CNS and antithrombin III likely due to liver disease. He would be at a high risk of going back on Xarelto and hence continued on Aspirin 81 mg p.o. daily.   · 1/3/19 - D-dimer 1.25 (<0.45).    · 3/8/19 - Chest x-ray showed chronic changes in the chest with no obvious pneumonia or congestive changes.  · 4/3/19 - Chest x-ray with right pleural effusion and basilar lung density with slight increase from prior. Cardiomegaly.   · 4/22/19 - Factor VIII 334 (H), Anti-phosphatidylserine antibody IgM> 80 (H), Anti-phosphatidylserine antibody IgG 12 (N), Cardiolipin IgG 21 (N), Cardiolipin IgM 55 (H), Cardiolipin IgA 63 (H), Beta-2 glycoprotein IgG 4 (N), IgA 91 (H), and IgM 21 (H).   · 7/11/19 - Chest x-ray showed stable small right pleural effusion since 04/25/2019. Minimal right costophrenic angle atelectasis.  ·     Past Medical History:   Diagnosis Date   • Anemia    • B12 deficiency 2009   • Syed's esophagus    • CAD (coronary artery disease)    • Diabetes mellitus (CMS/HCC)    • History of echocardiogram     03/03/2017 - 12/03/2014 - 04/2012   • Hyperlipidemia    • Hypertension    • Morbid obesity (CMS/HCC)    • KATHIA treated with BiPAP    • Renal insufficiency    • S/P lumbar laminectomy        Past Surgical History:   Procedure Laterality Date   • BACK SURGERY  1971   • CATARACT EXTRACTION  2014   • CHOLECYSTECTOMY  02/24/2019   • COLONOSCOPY  03/2018   • CORONARY ANGIOPLASTY  08/24/2009    PCI stent - succesful placement of 3.5/23 promus stent in proximal right coronary artery   • CORONARY ANGIOPLASTY WITH STENT PLACEMENT  08/27/2009    patient had stent placed to  "proximal right coronary artery   • CYSTOSCOPY BLADDER STONE LITHOTRIPSY  1997   • ENDOSCOPY N/A 10/18/2019    Procedure: ESOPHAGOGASTRODUODENOSCOPY with argon plasma coagulation of gastric antral vascular ectasia;  Surgeon: Marisel Gomez MD;  Location: [facility] ENDOSCOPY;  Service: Gastroenterology   • ERCP N/A 11/20/2019    Procedure: ERCP, clearance of bile duct with balloon, placement of biliary stent, EGD with argon plasma coagulation of gastric antral vascular ectasia;  Surgeon: Marisel Gomez MD;  Location: [facility] ENDOSCOPY;  Service: Gastroenterology   • OTHER SURGICAL HISTORY  11/2018    IVC filter implant   • RENAL ARTERY STENT Left          Current Outpatient Medications:   •  allopurinol (ZYLOPRIM) 100 MG tablet, Take 100 mg by mouth 2 (Two) Times a Day., Disp: , Rfl:   •  aspirin 81 MG tablet, Take 1 tablet by mouth Daily., Disp: 30 tablet, Rfl: 3  •  atorvastatin (LIPITOR) 20 MG tablet, Take 1 tablet by mouth Daily., Disp: 90 tablet, Rfl: 3  •  B-D 3CC LUER-YASMEEN SYR 25GX1\" 25G X 1\" 3 ML misc, , Disp: , Rfl: 1  •  bumetanide (BUMEX) 2 MG tablet, Take 1 tablet by mouth Daily., Disp: , Rfl:   •  Cyanocobalamin 1000 MCG/ML kit, Inject 1,000 mcg as directed Every 30 (Thirty) Days., Disp: , Rfl:   •  docusate sodium (COLACE) 100 MG capsule, Take 100 mg by mouth 2 (Two) Times a Day., Disp: , Rfl:   •  DULoxetine (CYMBALTA) 60 MG capsule, Take 60 mg by mouth Daily., Disp: , Rfl:   •  ferrous sulfate 325 (65 FE) MG tablet, Take 1 tablet by mouth 2 (Two) Times a Day., Disp: 60 tablet, Rfl: 2  •  folic acid (FOLVITE) 1 MG tablet, Take 1 mg by mouth Daily., Disp: , Rfl:   •  hydrOXYzine (ATARAX) 25 MG tablet, Take 25 mg by mouth 3 (Three) Times a Day As Needed for Itching., Disp: , Rfl:   •  insulin glargine (LANTUS) 100 UNIT/ML injection, Inject 46 units at bedtime, Disp: 30 mL, Rfl: 4  •  insulin lispro (HUMALOG) 100 UNIT/ML injection, 15 units subcu before each meal plus sliding scale, Disp: 30 mL, Rfl: 4  •  " lactulose (CHRONULAC) 10 GM/15ML solution, Take 60 mL by mouth Every 4 (Four) Hours., Disp: , Rfl:   •  levothyroxine (SYNTHROID, LEVOTHROID) 75 MCG tablet, Take 1 tablet by mouth Daily., Disp: 90 tablet, Rfl: 4  •  magnesium oxide (MAG-OX) 400 MG tablet, Take 400 mg by mouth Daily., Disp: , Rfl:   •  metoclopramide (REGLAN) 5 MG tablet, Take 5 mg by mouth 2 (Two) Times a Day., Disp: , Rfl: 0  •  Multiple Vitamins-Minerals (MULTIVITAMIN WITH MINERALS) tablet tablet, Take 1 tablet by mouth Daily., Disp: , Rfl:   •  oxyCODONE (ROXICODONE) 5 MG immediate release tablet, Take 5 mg by mouth Every 8 (Eight) Hours As Needed., Disp: , Rfl:   •  pantoprazole (PROTONIX) 40 MG EC tablet, Take 1 tablet by mouth 2 (Two) Times a Day., Disp: 30 tablet, Rfl: 3  •  potassium chloride (K-DUR,KLOR-CON) 20 MEQ CR tablet, Take 20 mEq by mouth Daily., Disp: , Rfl: 0  •  rifaximin (XIFAXAN) 550 MG tablet, Take 550 mg by mouth Every 12 (Twelve) Hours., Disp: , Rfl:   •  sodium bicarbonate 650 MG tablet, Take 650 mg by mouth 3 (Three) Times a Day., Disp: , Rfl:   •  sucralfate (CARAFATE) 1 g tablet, Take 1 g by mouth 4 (Four) Times a Day., Disp: , Rfl:   •  zinc sulfate (ZINCATE) 50 MG capsule, Take 50 mg by mouth Daily., Disp: , Rfl: 0    No Known Allergies    Family History   Problem Relation Age of Onset   • Hyperlipidemia Other    • Hypertension Other        Cancer-related family history is not on file.    Social History     Tobacco Use   • Smoking status: Never Smoker   • Smokeless tobacco: Never Used   Substance Use Topics   • Alcohol use: No     Frequency: Never   • Drug use: No       I have reviewed the history of present illness, past medical history, family history, social history, lab results, all notes and other records since the patient was last seen on   Visit date not found  SUBJECTIVE:      Patient is my office for follow-up.  He continues to have black stools he attributes that to taking oral iron tablets.  He tolerated the  "recent EGD and ERCP.    ROS:      Review of Systems   Constitutional: Negative for fever.   HENT: Negative for nosebleeds and trouble swallowing.    Eyes: Negative for visual disturbance.   Respiratory: Negative for cough, shortness of breath and wheezing.    Cardiovascular: Negative for chest pain.   Gastrointestinal: Negative for abdominal pain and blood in stool.   Endocrine: Negative for cold intolerance.   Genitourinary: Negative for dysuria and hematuria.   Musculoskeletal: Negative for joint swelling.   Skin: Negative for rash.   Allergic/Immunologic: Negative for environmental allergies.   Neurological: Negative for seizures.   Hematological: Does not bruise/bleed easily.   Psychiatric/Behavioral: The patient is not nervous/anxious.          Objective:       Vitals:    11/22/19 0954   BP: 119/64   Pulse: 87   Resp: 16   Temp: 98.2 °F (36.8 °C)   Weight: (!) 137 kg (301 lb)   Height: 188 cm (74\")   PainSc: 0-No pain         PHYSICAL EXAM:      Physical Exam   Constitutional: He is oriented to person, place, and time. No distress.   Morbidly obese   HENT:   Head: Normocephalic and atraumatic.   Eyes: Conjunctivae and EOM are normal. Right eye exhibits no discharge. Left eye exhibits no discharge. No scleral icterus.   Neck: Normal range of motion. Neck supple. No thyromegaly present.   Cardiovascular: Normal rate, regular rhythm and normal heart sounds. Exam reveals no gallop and no friction rub.   Pulmonary/Chest: Effort normal. No stridor. No respiratory distress. He has no wheezes.   Abdominal: Soft. Bowel sounds are normal. He exhibits no mass. There is no tenderness. There is no rebound and no guarding.   Musculoskeletal: Normal range of motion. He exhibits no tenderness.   1+ bilateral lower extremity edema   Lymphadenopathy:     He has no cervical adenopathy.   Neurological: He is alert and oriented to person, place, and time. He exhibits normal muscle tone.   Skin: Skin is warm. No rash noted. He is " not diaphoretic. No erythema.   Psychiatric: He has a normal mood and affect. His behavior is normal.   Nursing note and vitals reviewed.       RECENT LABS:     WBC   Date Value Ref Range Status   11/20/2019 8.30 3.40 - 10.80 10*3/mm3 Final     RBC   Date Value Ref Range Status   11/20/2019 1.82 (L) 4.14 - 5.80 10*6/mm3 Final     Hemoglobin   Date Value Ref Range Status   11/20/2019 6.6 (C) 13.0 - 17.7 g/dL Final     Hematocrit   Date Value Ref Range Status   11/20/2019 20.0 (C) 37.5 - 51.0 % Final     MCV   Date Value Ref Range Status   11/20/2019 110.2 (H) 79.0 - 97.0 fL Final     MCH   Date Value Ref Range Status   11/20/2019 36.0 (H) 26.6 - 33.0 pg Final     MCHC   Date Value Ref Range Status   11/20/2019 32.7 31.5 - 35.7 g/dL Final     RDW   Date Value Ref Range Status   11/20/2019 21.1 (H) 12.3 - 15.4 % Final     RDW-SD   Date Value Ref Range Status   11/20/2019 81.8 (H) 37.0 - 54.0 fl Final     MPV   Date Value Ref Range Status   11/20/2019 8.8 6.0 - 12.0 fL Final     Platelets   Date Value Ref Range Status   11/20/2019 219 140 - 450 10*3/mm3 Final     Neutrophil %   Date Value Ref Range Status   10/30/2019 83.8 (H) 42.7 - 76.0 % Final     Lymphocyte %   Date Value Ref Range Status   10/30/2019 5.8 (L) 19.6 - 45.3 % Final     Monocyte %   Date Value Ref Range Status   10/30/2019 7.4 5.0 - 12.0 % Final     Eosinophil %   Date Value Ref Range Status   10/30/2019 2.6 0.3 - 6.2 % Final     Basophil %   Date Value Ref Range Status   10/30/2019 0.4 0.0 - 1.5 % Final     Neutrophils, Absolute   Date Value Ref Range Status   10/30/2019 6.49 1.70 - 7.00 10*3/mm3 Final     Lymphocytes, Absolute   Date Value Ref Range Status   10/30/2019 0.45 (L) 0.70 - 3.10 10*3/mm3 Final     Monocytes, Absolute   Date Value Ref Range Status   10/30/2019 0.57 0.10 - 0.90 10*3/mm3 Final     Eosinophils, Absolute   Date Value Ref Range Status   10/30/2019 0.20 0.00 - 0.40 10*3/mm3 Final     Basophils, Absolute   Date Value Ref Range  Status   10/30/2019 0.03 0.00 - 0.20 10*3/mm3 Final     nRBC   Date Value Ref Range Status   10/19/2019 0.1 0.0 - 0.2 /100 WBC Final       Lab Results   Component Value Date    GLUCOSE 242 (H) 11/20/2019    BUN 32 (H) 11/20/2019    CREATININE 1.80 (H) 11/20/2019    EGFRIFNONA 37 (L) 11/20/2019    BCR 17.8 11/20/2019    K 4.5 11/20/2019    CO2 23.0 11/20/2019    CALCIUM 8.5 (L) 11/20/2019    ALBUMIN 3.20 (L) 11/20/2019    LABIL2 0.4 (L) 05/29/2019    AST 51 (H) 11/20/2019    ALT 29 11/20/2019         Assessment/Plan      ASSESSMENT:     GAVE (gastric antral vascular ectasia)     Anemia in stage 3 chronic kidney disease (CMS/HCC)     History of Vitamin B12 deficiency     History of DVT of right lower extremity     History of bilateral pulmonary embolus (PE)     Antithrombin III deficiency (CMS/HCC)     Protein C deficiency (CMS/HCC)     Protein S deficiency (CMS/HCC)     Antiphospholipid antibody positive     Elevated factor VIII level     THOMAS (nonalcoholic steatohepatitis)     Splenomegaly     IgG kappa MGUS    PLAN:      1. Cbc 11/25, transfuse if the hemoglobin drops below 7.5  2. Patient is off anticoagulation given his GI bleed.  3. Instructed the patient to stop taking oral iron tablets that way we can tell the difference between GI blood loss versus iron use.  4. If patient needs iron will administer testing infusion   5. Patient follows with Dr. Gomez regarding his Thomas and portal hypertension.  Continue to use ammonia.    6. Patient has hyper coag work-up but off anticoagulation secondary to GI bleeds.    7. I will see him back in my office in 4 weeks recheck CBC at the time     I have reviewed labs results, imaging, vitals, and medications with the patient today.     Patient verbalized understanding and is in agreement of the above plan.          This report was compiled using Dragon voice recognition software. I have made every effort to proof read this document; however, typographical errors may  persist.

## 2019-11-22 NOTE — TELEPHONE ENCOUNTER
----- Message from SHAJI Tamayo sent at 11/22/2019  1:52 PM EST -----  Patient needs blood transfusion if not already ordered.

## 2019-11-22 NOTE — TELEPHONE ENCOUNTER
I called and made the pt aware the he would need a blood transfusion and he and his wife state the the drive is to far and he does not want to do that today. I spoke with ambulatory care and he is going to get his one unit tomorrow and they will call him with a time to come in. I made his wife aware.

## 2019-11-23 ENCOUNTER — HOSPITAL ENCOUNTER (OUTPATIENT)
Dept: INFUSION THERAPY | Facility: HOSPITAL | Age: 74
Setting detail: INFUSION SERIES
Discharge: HOME | End: 2019-11-23

## 2019-11-23 VITALS
DIASTOLIC BLOOD PRESSURE: 66 MMHG | OXYGEN SATURATION: 100 % | RESPIRATION RATE: 18 BRPM | SYSTOLIC BLOOD PRESSURE: 129 MMHG | HEART RATE: 70 BPM | TEMPERATURE: 98 F

## 2019-11-23 DIAGNOSIS — N18.30 ANEMIA IN STAGE 3 CHRONIC KIDNEY DISEASE (HCC): ICD-10-CM

## 2019-11-23 DIAGNOSIS — D50.9 IRON DEFICIENCY ANEMIA, UNSPECIFIED IRON DEFICIENCY ANEMIA TYPE: ICD-10-CM

## 2019-11-23 DIAGNOSIS — D63.1 ANEMIA IN STAGE 3 CHRONIC KIDNEY DISEASE (HCC): ICD-10-CM

## 2019-11-23 LAB
ABO GROUP BLD: NORMAL
ANISOCYTOSIS BLD QL: ABNORMAL
BASOPHILS # BLD MANUAL: 0.09 10*3/MM3 (ref 0–0.2)
BASOPHILS NFR BLD AUTO: 1 % (ref 0–1.5)
BB HOLD TUBE: NORMAL
BLD GP AB SCN SERPL QL: NEGATIVE
DEPRECATED RDW RBC AUTO: 78.8 FL (ref 37–54)
EOSINOPHIL # BLD MANUAL: 0.34 10*3/MM3 (ref 0–0.4)
EOSINOPHIL NFR BLD MANUAL: 4 % (ref 0.3–6.2)
ERYTHROCYTE [DISTWIDTH] IN BLOOD BY AUTOMATED COUNT: 20.9 % (ref 12.3–15.4)
HCT VFR BLD AUTO: 21 % (ref 37.5–51)
HGB BLD-MCNC: 7 G/DL (ref 13–17.7)
LYMPHOCYTES # BLD MANUAL: 0.94 10*3/MM3 (ref 0.7–3.1)
LYMPHOCYTES NFR BLD MANUAL: 11 % (ref 19.6–45.3)
LYMPHOCYTES NFR BLD MANUAL: 6 % (ref 5–12)
MCH RBC QN AUTO: 36 PG (ref 26.6–33)
MCHC RBC AUTO-ENTMCNC: 33.2 G/DL (ref 31.5–35.7)
MCV RBC AUTO: 108.5 FL (ref 79–97)
MONOCYTES # BLD AUTO: 0.51 10*3/MM3 (ref 0.1–0.9)
NEUTROPHILS # BLD AUTO: 6.63 10*3/MM3 (ref 1.7–7)
NEUTROPHILS NFR BLD MANUAL: 71 % (ref 42.7–76)
NEUTS BAND NFR BLD MANUAL: 7 % (ref 0–5)
PLAT MORPH BLD: NORMAL
PLATELET # BLD AUTO: 249 10*3/MM3 (ref 140–450)
PMV BLD AUTO: 9.3 FL (ref 6–12)
POLYCHROMASIA BLD QL SMEAR: ABNORMAL
RBC # BLD AUTO: 1.93 10*6/MM3 (ref 4.14–5.8)
RH BLD: POSITIVE
SCAN SLIDE: NORMAL
T&S EXPIRATION DATE: NORMAL
WBC MORPH BLD: NORMAL
WBC NRBC COR # BLD: 8.5 10*3/MM3 (ref 3.4–10.8)

## 2019-11-23 PROCEDURE — 86900 BLOOD TYPING SEROLOGIC ABO: CPT

## 2019-11-23 PROCEDURE — 86900 BLOOD TYPING SEROLOGIC ABO: CPT | Performed by: INTERNAL MEDICINE

## 2019-11-23 PROCEDURE — 86923 COMPATIBILITY TEST ELECTRIC: CPT

## 2019-11-23 PROCEDURE — 85007 BL SMEAR W/DIFF WBC COUNT: CPT | Performed by: NURSE PRACTITIONER

## 2019-11-23 PROCEDURE — 86850 RBC ANTIBODY SCREEN: CPT | Performed by: INTERNAL MEDICINE

## 2019-11-23 PROCEDURE — P9016 RBC LEUKOCYTES REDUCED: HCPCS

## 2019-11-23 PROCEDURE — 36430 TRANSFUSION BLD/BLD COMPNT: CPT

## 2019-11-23 PROCEDURE — 86901 BLOOD TYPING SEROLOGIC RH(D): CPT | Performed by: INTERNAL MEDICINE

## 2019-11-23 PROCEDURE — 85025 COMPLETE CBC W/AUTO DIFF WBC: CPT | Performed by: NURSE PRACTITIONER

## 2019-11-23 PROCEDURE — 36415 COLL VENOUS BLD VENIPUNCTURE: CPT

## 2019-11-23 RX ORDER — SODIUM CHLORIDE 9 MG/ML
250 INJECTION, SOLUTION INTRAVENOUS AS NEEDED
Status: DISCONTINUED | OUTPATIENT
Start: 2019-11-23 | End: 2019-11-25 | Stop reason: HOSPADM

## 2019-11-23 NOTE — NURSING NOTE
Blood completed, no S/S reaction, IV removed, pt requested to walk out on own, discharged in no acute distress

## 2019-11-24 LAB
ABO + RH BLD: NORMAL
BH BB BLOOD EXPIRATION DATE: NORMAL
BH BB BLOOD TYPE BARCODE: 6200
BH BB DISPENSE STATUS: NORMAL
BH BB PRODUCT CODE: NORMAL
BH BB UNIT NUMBER: NORMAL
UNIT  ABO: NORMAL
UNIT  RH: NORMAL

## 2019-11-25 ENCOUNTER — LAB (OUTPATIENT)
Dept: LAB | Facility: HOSPITAL | Age: 74
End: 2019-11-25

## 2019-11-25 DIAGNOSIS — D50.0 IRON DEFICIENCY ANEMIA DUE TO CHRONIC BLOOD LOSS: ICD-10-CM

## 2019-11-25 DIAGNOSIS — N18.30 ANEMIA IN STAGE 3 CHRONIC KIDNEY DISEASE (HCC): ICD-10-CM

## 2019-11-25 DIAGNOSIS — D63.1 ANEMIA IN STAGE 3 CHRONIC KIDNEY DISEASE (HCC): ICD-10-CM

## 2019-11-25 DIAGNOSIS — K90.9 MALABSORPTION OF IRON: ICD-10-CM

## 2019-11-25 LAB
BASOPHILS # BLD AUTO: 0.04 10*3/MM3 (ref 0–0.2)
BASOPHILS NFR BLD AUTO: 0.4 % (ref 0–1.5)
DEPRECATED RDW RBC AUTO: 71.1 FL (ref 37–54)
EOSINOPHIL # BLD AUTO: 0.42 10*3/MM3 (ref 0–0.4)
EOSINOPHIL NFR BLD AUTO: 3.7 % (ref 0.3–6.2)
ERYTHROCYTE [DISTWIDTH] IN BLOOD BY AUTOMATED COUNT: 18.7 % (ref 12.3–15.4)
HCT VFR BLD AUTO: 25 % (ref 37.5–51)
HGB BLD-MCNC: 8 G/DL (ref 13–17.7)
LYMPHOCYTES # BLD AUTO: 1.15 10*3/MM3 (ref 0.7–3.1)
LYMPHOCYTES NFR BLD AUTO: 10.1 % (ref 19.6–45.3)
MCH RBC QN AUTO: 34.6 PG (ref 26.6–33)
MCHC RBC AUTO-ENTMCNC: 32 G/DL (ref 31.5–35.7)
MCV RBC AUTO: 108.2 FL (ref 79–97)
MONOCYTES # BLD AUTO: 0.93 10*3/MM3 (ref 0.1–0.9)
MONOCYTES NFR BLD AUTO: 8.2 % (ref 5–12)
NEUTROPHILS # BLD AUTO: 8.81 10*3/MM3 (ref 1.7–7)
NEUTROPHILS NFR BLD AUTO: 77.6 % (ref 42.7–76)
PLATELET # BLD AUTO: 279 10*3/MM3 (ref 140–450)
PMV BLD AUTO: 10.3 FL (ref 6–12)
RBC # BLD AUTO: 2.31 10*6/MM3 (ref 4.14–5.8)
WBC NRBC COR # BLD: 11.35 10*3/MM3 (ref 3.4–10.8)

## 2019-11-25 PROCEDURE — 36415 COLL VENOUS BLD VENIPUNCTURE: CPT

## 2019-11-25 PROCEDURE — 85025 COMPLETE CBC W/AUTO DIFF WBC: CPT

## 2019-11-25 RX ORDER — SODIUM CHLORIDE 9 MG/ML
250 INJECTION, SOLUTION INTRAVENOUS ONCE
Status: CANCELLED | OUTPATIENT
Start: 2019-12-11

## 2019-11-25 RX ORDER — SODIUM CHLORIDE 9 MG/ML
250 INJECTION, SOLUTION INTRAVENOUS ONCE
Status: CANCELLED | OUTPATIENT
Start: 2019-12-04

## 2019-11-26 ENCOUNTER — APPOINTMENT (OUTPATIENT)
Dept: ONCOLOGY | Facility: CLINIC | Age: 74
End: 2019-11-26

## 2019-11-26 ENCOUNTER — APPOINTMENT (OUTPATIENT)
Dept: LAB | Facility: HOSPITAL | Age: 74
End: 2019-11-26

## 2019-11-26 NOTE — TELEPHONE ENCOUNTER
SW  and he states that we don't need to order a CT scan since drain tube fell out, Pt is as needed in our office unless he has more complications and then should call our office .  will let patient know if they should proceed with a CT scan In a couple of weeks.

## 2019-12-03 ENCOUNTER — TRANSCRIBE ORDERS (OUTPATIENT)
Dept: ADMINISTRATIVE | Facility: HOSPITAL | Age: 74
End: 2019-12-03

## 2019-12-03 DIAGNOSIS — K75.81 NASH (NONALCOHOLIC STEATOHEPATITIS): Primary | ICD-10-CM

## 2019-12-03 DIAGNOSIS — R74.8 ELEVATED LIVER ENZYMES: ICD-10-CM

## 2019-12-03 DIAGNOSIS — K83.1 BILE DUCT OBSTRUCTION: ICD-10-CM

## 2019-12-04 ENCOUNTER — HOSPITAL ENCOUNTER (OUTPATIENT)
Dept: ONCOLOGY | Facility: HOSPITAL | Age: 74
Setting detail: INFUSION SERIES
Discharge: HOME | End: 2019-12-04

## 2019-12-04 VITALS
RESPIRATION RATE: 18 BRPM | WEIGHT: 303 LBS | TEMPERATURE: 97.8 F | HEIGHT: 74 IN | DIASTOLIC BLOOD PRESSURE: 57 MMHG | HEART RATE: 82 BPM | BODY MASS INDEX: 38.89 KG/M2 | SYSTOLIC BLOOD PRESSURE: 121 MMHG

## 2019-12-04 DIAGNOSIS — D50.0 IRON DEFICIENCY ANEMIA DUE TO CHRONIC BLOOD LOSS: ICD-10-CM

## 2019-12-04 DIAGNOSIS — K90.9 MALABSORPTION OF IRON: Primary | ICD-10-CM

## 2019-12-04 PROCEDURE — 25010000002 FERRIC CARBOXYMALTOSE 750 MG/15ML SOLUTION 15 ML VIAL: Performed by: NURSE PRACTITIONER

## 2019-12-04 PROCEDURE — 96374 THER/PROPH/DIAG INJ IV PUSH: CPT | Performed by: NURSE PRACTITIONER

## 2019-12-04 RX ORDER — HYDROXYZINE PAMOATE 25 MG/1
25 CAPSULE ORAL 3 TIMES DAILY PRN
Refills: 0 | COMMUNITY
Start: 2019-11-26 | End: 2021-01-01 | Stop reason: HOSPADM

## 2019-12-04 RX ORDER — SODIUM CHLORIDE 9 MG/ML
250 INJECTION, SOLUTION INTRAVENOUS ONCE
Status: DISCONTINUED | OUTPATIENT
Start: 2019-12-04 | End: 2019-12-05 | Stop reason: HOSPADM

## 2019-12-04 RX ADMIN — FERRIC CARBOXYMALTOSE INJECTION 750 MG: 50 INJECTION, SOLUTION INTRAVENOUS at 13:34

## 2019-12-09 ENCOUNTER — HOSPITAL ENCOUNTER (OUTPATIENT)
Dept: MRI IMAGING | Facility: HOSPITAL | Age: 74
Discharge: HOME | End: 2019-12-09
Admitting: INTERNAL MEDICINE

## 2019-12-09 DIAGNOSIS — R74.8 ELEVATED LIVER ENZYMES: ICD-10-CM

## 2019-12-09 DIAGNOSIS — K83.1 BILE DUCT OBSTRUCTION: ICD-10-CM

## 2019-12-09 DIAGNOSIS — K75.81 NASH (NONALCOHOLIC STEATOHEPATITIS): ICD-10-CM

## 2019-12-09 PROCEDURE — A9576 INJ PROHANCE MULTIPACK: HCPCS | Performed by: INTERNAL MEDICINE

## 2019-12-09 PROCEDURE — 74183 MRI ABD W/O CNTR FLWD CNTR: CPT

## 2019-12-09 PROCEDURE — 25010000002 GADOTERIDOL PER 1 ML: Performed by: INTERNAL MEDICINE

## 2019-12-09 RX ADMIN — GADOTERIDOL 20 ML: 279.3 INJECTION, SOLUTION INTRAVENOUS at 14:39

## 2019-12-10 DIAGNOSIS — N18.30 TYPE 2 DIABETES MELLITUS WITH STAGE 3 CHRONIC KIDNEY DISEASE, WITH LONG-TERM CURRENT USE OF INSULIN (HCC): ICD-10-CM

## 2019-12-10 DIAGNOSIS — Z79.4 TYPE 2 DIABETES MELLITUS WITH STAGE 3 CHRONIC KIDNEY DISEASE, WITH LONG-TERM CURRENT USE OF INSULIN (HCC): ICD-10-CM

## 2019-12-10 DIAGNOSIS — E11.22 TYPE 2 DIABETES MELLITUS WITH STAGE 3 CHRONIC KIDNEY DISEASE, WITH LONG-TERM CURRENT USE OF INSULIN (HCC): ICD-10-CM

## 2019-12-10 RX ORDER — INSULIN GLARGINE 100 [IU]/ML
INJECTION, SOLUTION SUBCUTANEOUS
Qty: 45 ML | Refills: 3 | Status: SHIPPED | OUTPATIENT
Start: 2019-12-10 | End: 2020-01-23 | Stop reason: SDUPTHER

## 2019-12-11 ENCOUNTER — HOSPITAL ENCOUNTER (OUTPATIENT)
Dept: ONCOLOGY | Facility: HOSPITAL | Age: 74
Setting detail: INFUSION SERIES
Discharge: HOME | End: 2019-12-11

## 2019-12-11 VITALS
WEIGHT: 295 LBS | SYSTOLIC BLOOD PRESSURE: 129 MMHG | TEMPERATURE: 98.2 F | RESPIRATION RATE: 16 BRPM | BODY MASS INDEX: 37.86 KG/M2 | DIASTOLIC BLOOD PRESSURE: 72 MMHG | OXYGEN SATURATION: 100 % | HEART RATE: 66 BPM | HEIGHT: 74 IN

## 2019-12-11 DIAGNOSIS — D50.0 IRON DEFICIENCY ANEMIA DUE TO CHRONIC BLOOD LOSS: ICD-10-CM

## 2019-12-11 DIAGNOSIS — K90.9 MALABSORPTION OF IRON: Primary | ICD-10-CM

## 2019-12-11 PROCEDURE — 25010000002 FERRIC CARBOXYMALTOSE 750 MG/15ML SOLUTION 15 ML VIAL: Performed by: NURSE PRACTITIONER

## 2019-12-11 PROCEDURE — 36415 COLL VENOUS BLD VENIPUNCTURE: CPT | Performed by: INTERNAL MEDICINE

## 2019-12-11 PROCEDURE — 96365 THER/PROPH/DIAG IV INF INIT: CPT | Performed by: INTERNAL MEDICINE

## 2019-12-11 RX ORDER — SODIUM CHLORIDE 9 MG/ML
250 INJECTION, SOLUTION INTRAVENOUS ONCE
Status: DISCONTINUED | OUTPATIENT
Start: 2019-12-11 | End: 2019-12-12 | Stop reason: HOSPADM

## 2019-12-11 RX ADMIN — FERRIC CARBOXYMALTOSE INJECTION 750 MG: 50 INJECTION, SOLUTION INTRAVENOUS at 11:24

## 2019-12-16 ENCOUNTER — OFFICE (AMBULATORY)
Dept: URBAN - METROPOLITAN AREA CLINIC 64 | Facility: CLINIC | Age: 74
End: 2019-12-16
Payer: COMMERCIAL

## 2019-12-16 VITALS — SYSTOLIC BLOOD PRESSURE: 127 MMHG | HEART RATE: 81 BPM | DIASTOLIC BLOOD PRESSURE: 61 MMHG | HEIGHT: 74 IN

## 2019-12-16 DIAGNOSIS — K31.819 ANGIODYSPLASIA OF STOMACH AND DUODENUM WITHOUT BLEEDING: ICD-10-CM

## 2019-12-16 DIAGNOSIS — Z86.010 PERSONAL HISTORY OF COLONIC POLYPS: ICD-10-CM

## 2019-12-16 DIAGNOSIS — K72.90 HEPATIC FAILURE, UNSPECIFIED WITHOUT COMA: ICD-10-CM

## 2019-12-16 DIAGNOSIS — I10 ESSENTIAL (PRIMARY) HYPERTENSION: ICD-10-CM

## 2019-12-16 DIAGNOSIS — D64.9 ANEMIA, UNSPECIFIED: ICD-10-CM

## 2019-12-16 DIAGNOSIS — K74.69 OTHER CIRRHOSIS OF LIVER: ICD-10-CM

## 2019-12-16 PROCEDURE — 99214 OFFICE O/P EST MOD 30 MIN: CPT | Performed by: INTERNAL MEDICINE

## 2019-12-16 RX ORDER — PANTOPRAZOLE SODIUM 40 MG/1
80 TABLET, DELAYED RELEASE ORAL
Qty: 90 | Refills: 3 | Status: ACTIVE
Start: 2018-03-05

## 2019-12-20 ENCOUNTER — OFFICE VISIT (OUTPATIENT)
Dept: ONCOLOGY | Facility: CLINIC | Age: 74
End: 2019-12-20

## 2019-12-20 ENCOUNTER — APPOINTMENT (OUTPATIENT)
Dept: LAB | Facility: HOSPITAL | Age: 74
End: 2019-12-20

## 2019-12-20 VITALS
HEIGHT: 74 IN | HEART RATE: 85 BPM | SYSTOLIC BLOOD PRESSURE: 123 MMHG | RESPIRATION RATE: 16 BRPM | BODY MASS INDEX: 37.99 KG/M2 | WEIGHT: 296 LBS | DIASTOLIC BLOOD PRESSURE: 63 MMHG

## 2019-12-20 DIAGNOSIS — D50.0 IRON DEFICIENCY ANEMIA DUE TO CHRONIC BLOOD LOSS: Primary | ICD-10-CM

## 2019-12-20 PROBLEM — D63.1 ANEMIA DUE TO STAGE 3 CHRONIC KIDNEY DISEASE (HCC): Status: ACTIVE | Noted: 2019-12-20

## 2019-12-20 PROBLEM — N18.30 ANEMIA DUE TO STAGE 3 CHRONIC KIDNEY DISEASE (HCC): Status: ACTIVE | Noted: 2019-12-20

## 2019-12-20 LAB
BASOPHILS # BLD AUTO: 0.05 10*3/MM3 (ref 0–0.2)
BASOPHILS NFR BLD AUTO: 0.6 % (ref 0–1.5)
DEPRECATED RDW RBC AUTO: 64.9 FL (ref 37–54)
EOSINOPHIL # BLD AUTO: 0.35 10*3/MM3 (ref 0–0.4)
EOSINOPHIL NFR BLD AUTO: 4.5 % (ref 0.3–6.2)
ERYTHROCYTE [DISTWIDTH] IN BLOOD BY AUTOMATED COUNT: 17.6 % (ref 12.3–15.4)
HCT VFR BLD AUTO: 26.2 % (ref 37.5–51)
HGB BLD-MCNC: 8.5 G/DL (ref 13–17.7)
LYMPHOCYTES # BLD AUTO: 1.2 10*3/MM3 (ref 0.7–3.1)
LYMPHOCYTES NFR BLD AUTO: 15.5 % (ref 19.6–45.3)
MCH RBC QN AUTO: 34.6 PG (ref 26.6–33)
MCHC RBC AUTO-ENTMCNC: 32.4 G/DL (ref 31.5–35.7)
MCV RBC AUTO: 106.5 FL (ref 79–97)
MONOCYTES # BLD AUTO: 0.66 10*3/MM3 (ref 0.1–0.9)
MONOCYTES NFR BLD AUTO: 8.5 % (ref 5–12)
NEUTROPHILS # BLD AUTO: 5.49 10*3/MM3 (ref 1.7–7)
NEUTROPHILS NFR BLD AUTO: 70.9 % (ref 42.7–76)
PLATELET # BLD AUTO: 177 10*3/MM3 (ref 140–450)
PMV BLD AUTO: 11.1 FL (ref 6–12)
RBC # BLD AUTO: 2.46 10*6/MM3 (ref 4.14–5.8)
WBC NRBC COR # BLD: 7.75 10*3/MM3 (ref 3.4–10.8)

## 2019-12-20 PROCEDURE — 36415 COLL VENOUS BLD VENIPUNCTURE: CPT | Performed by: INTERNAL MEDICINE

## 2019-12-20 PROCEDURE — 99215 OFFICE O/P EST HI 40 MIN: CPT | Performed by: INTERNAL MEDICINE

## 2019-12-20 PROCEDURE — 85025 COMPLETE CBC W/AUTO DIFF WBC: CPT | Performed by: INTERNAL MEDICINE

## 2019-12-20 NOTE — PROGRESS NOTES
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 Anemia diagnosed January 2018 and IgG kappa monoclonal gammopathy diagnosed May 2018.   · This patient is an obese  male who  developed kidney problems in January 2018 for which he was referred to Devi Plascencia M.D. He was found to have a hemoglobin of 7.4 at that time and was given 1 unit of packed red blood cells. He was then referred to GI where he had been followed for Syed’s esophagus for a number of years. An EGD and colonoscopy was performed on 3/5/18 by Marisel Gomez M.D. revealing mucosa suggestive of Syed’s esophagus and hiatal hernia in the cardia. Patient had a poor prep compromising the colonoscopy exam though the scope did reach the cecum. Esophageal biopsy revealed squamocolumnar mucosa with extensive intestinal metaplasia consistent with Syed’s esophagus, negative for dysplasia. Colonoscopy was recommended to be repeated in two years and EGD in three years. The patient saw his primary care physician, Paulette Harris M.D., in followup and blood testing was done on 5/17/18 revealing WBC 6.3, hemoglobin 8.4, MCV 84.5, platelet count 182,000. Vitamin B12 level was 416 (180-914) and patient was referred here for further evaluation. The patient claims to have been diagnosed with Vitamin B12 deficiency a number of years ago for which he has been on Vitamin B12 injections up until six months ago when he just stopped taking those. He has been recently started on oral iron supplementation.  He claims not to see any blood in his urine but does have microscopic hematuria for which he sees a urologist. He denies nosebleeds, gum bleeds or blood in the stool. He has not had any significant changes in his weight. He feels weak and dizzy for a number of years which has recently gotten worse. He does not ambulate much because of back surgery causing him weakness. He did have a right lower extremity DVT with bilateral pulmonary emboli in 2004 since which time he has been on Coumadin, thought secondary to a sedentary lifestyle. He has no family history of anemias.   · 5/24/18 - Patient seen initial consultation for anemia. Hemoglobin 7.9, MCV 89.9. Ferritin 17 (), iron 183 (), TIBC 379 (228-428), iron saturation 48% (20-50), retic 2.16% (0.5-1.5), haptoglobin 138 (), folate 9.1 (5.9-24.8). SPEP showed monoclonal gammopathy. SIFE showed IgG kappa monoclonal gammopathy. M-spike in the gamma region 0.7 g/dL. Erythropoietin 53.5 (2.59-18.5). Antiparietal cell antibody and intrinsic factor antibodies negative.   Globulin 4.2 (2.5-3.8). Creatinine 1.4 (0.7-1.2).   · 6/19/18 - Discussed results of anemia workup. Hemoglobin improving. Ferrous Sulfate decreased to 325 mg twice a day. Will perform gammopathy workup for IgG kappa monoclonal gammopathy. IgA 783 (), IgG 1480 (600-1500), IgM 93 (). Kappa lambda FLC ratio 0.81 (0.26-1.65). Ferritin 36 ().        · 6/25/18 - Bone survey negative for acute disease. No evidence of lytic or blastic metastatic bone disease was present. Chest x-ray showed cardiomegaly, mild right basilar atelectasis and findings suggestive of ankylosing spondylitis.   · 6/29/18 - Patient underwent sternal bone marrow revealing monoclonal gammopathy of undetermined significant (MGUS). Extent of bone marrow involvement 5%. Phenotype kappa positive, IgG positive, CD38 positive, CD20 negative, CD19 negative, CD45 negative, CD56 negative and  negative. Dyspoiesis  was not seen. There was absence of iron stores. Normal male karyotype. Normal FISH study. Flow cytometry revealed IgG kappa restrictive plasma cells in a polytypic background. There was a mixed population of maturing myeloid cells, B-cells and T-cells. No abnormal myeloid maturation was seen. A monoclonal IgG kappa plasma cell population was present. 4% plasma cells present.   · 8/23/18 - Labs by Dr. Plascencia revealed B12 of 857 (180-914), folate 12.7 (5.9-24.8), iron 127 ().      · 9/19/18 - Iron 94 (), TIBC 297 (228-428), iron saturation 32 (20-50), ferritin 29 (). Erythropoietin 30.04 (2.59-18.5).        · 10/16/18 - Iron 177 (), TIBC 320 (228-428), iron saturation 55 (20-50), ferritin 34 ().       · 11/16/18 - Hemoglobin 7.1. Patient given 1 unit of packed red blood cells.   · 11/21/18 - Ferritin 27 (). Hemoglobin 7.9. Patient given 1 unit of packed red blood cells.    · 11/26/18 - EGD by Dave Russo M.D. revealed erosive gastritis and bleeding ampulla. There was oozing upon entering the stomach in many different areas. Duodenal mucosa and the esophagus were normal.   · 11/27/18 - Hemoglobin 8.2. Order written for Procrit 30,000 units subq weekly, not approved by insurance for low ferritin. Urine JERRY with no definite monoclonal gammopathy identified with questionable faint IgG kappa.   · 12/18/18 - Hemoglobin 9.9. Injectafer 750 mg IV given.   · 1/2/19 - Injectafer 750 mg IV given.  Hemoglobin 11, .1. Patient claims that he is getting Vitamin B12 injections monthly at home through Dr. Harris. Currently taking Ferrous Sulfate 325 mg p.o. b.i.d. and asked to continue that. Asked to continue Pantoprazole 40 mg daily that he is taking. IgA 651 (), IgG 1470 (600-1500), IgM 94 ().      · 2/12/19 - Iron 88 ().    · 3/6/19 - WBC 19.8 with 83% neutrophils, 5% lymphocytes, 11% monocytes, hemoglobin 8.7, , platelet count 182,000. Patient status post  laparoscopic cholecystectomy on 2/24/19 with large ecchymoses apparent on abdomen. Infectious workup ordered and Injectafer 750 mg IV days 1 and 8 ordered.  Iron 23 (), TIBC 153 (228-428), iron saturation 15% (20-50), ferritin 400 (224-336).       · 3/12/19 - WBC 15.02, hemoglobin 8.1 and platelets 227,000. Patient reported hematuria followed by Dr. Starkey. Orders written to start Injectafer ASAP. Abdominal ecchymosis improving.   · 3/14/19 - Injectafer 750 mg IV given.   · 4/22/19 - Hemoglobin 9.5, . Patient missed day 8 Injectafer in March and it was rescheduled. SIFE showed IgG kappa monoclonal gammopathy.  · 5/8/19 - Injectafer 750 mg IV given.   · 7/8/19 - Iron 56 (). Iron Saturation 36 (20-50%). Transferrin 110 (180-330). TIBC 154 (228-428). Ferritin 1,199 ().    · 8/7/2019-hemoglobin 10.0.  Patient taking multivitamin with iron only.  Restarted ferrous sulfate 325mg by mouth twice a day. UIFE showed free kappa light chain identified.   · 10/16/2019 patient hospitalized at [facility] for 3 days and found to have a hemoglobin of 4.8 at the cancer center visit.  Stool Hemoccult was positive.  He ended up receiving 5 units of packed red blood cells.  EGD by Marisel Gomez MD revealed severe gastric antral vascular ectasia with oozing, Syed's esophagus and hiatal hernia.  The patient was treated with PPI and Carafate.  Plan was to repeat EGD with cauterization in 6 weeks.  B12 and reticulocyte count were high.  Haptoglobin and folic acid were normal.  Creatinine 2.2 (0.76-1.27), iron 47 (), TIBC 264 (298-5 0.36), iron saturation 18 (20-50), ferritin 128.4 ().  Aspirin was held temporarily and patient given IV iron.  IgA 720 (), IgM 72 (), IgG 1489 (700-1600).     2.   Elevated LFT’s diagnosis established March 2018.  Biliary duct dilation diagnosis established June 2018.   · 11/30/17 - Maia phos 93 (32-91), total bili 0.7 (0.3-1.2), AST 37 (15-41), ALT 25 (15-41).    · 3/1/18 - T-bili 0.5 (0.3-1.2), maia phos 106 (32-91), AST 54 (15-41), ALT 27 (17-63).   · 5/24/18 - AST 46 (15-41), maia phos 131 (32-91).   · 6/8/18 - CMP ordered by Dr. Alejandra Amaro showed maia phos 209 (32-91),  (15-41), ALT 84 (17-63), total bili 1.1 (0.3-1.2).             · 6/19/18 - CT abdomen and pelvis as well as serological workup for elevated LFT’s ordered. Alpha-1 antitrypsin 144 (). Ceruloplasmin 38 (22-58). AFP 3 (0-9).  Hepatitis panel non-reactive.   · 6/22/18 - CT abdomen and pelvis showed abnormal intra and extrahepatic biliary dilation. There was mild distention of the gallbladder and evidence of several gallstones. Bilateral non-obstructing kidney stone was present. Incidental sigmoid diverticulosis.   · 7/2/18 - Patient underwent MRCP at [facility] showing markedly dilated intrahepatic and extrahepatic bile duct with multiple small filling defects within the distal common bile duct compatible with choledocholithiasis. There was a focal 6 mm segmental narrowing at the distal CBD with recommended GI consult for ERCP and brushings.   · 7/5/18 to 7/10/18 - On 7/5/18 labs revealed normal total bilirubin (0.8), AST 69 (15-41), maia phos 152 (32-91), ALT was normal at 37 (17-63), ammonia was elevated at 86 (9-35), lipase was normal (22). Patient hospitalized at [facility] due to weakness. Workup revealed cholelithiasis. On 7/9/18 patient underwent ERCP with bilateral sphincterotomy, common duct stone extraction, biliary brushing and stent placement by Dr. Leiva. Path on brushings was negative for malignancy. There was intra and extrahepatic biliary ductal dilation with relatively abrupt distal common bile duct cutoff and non-visualization of the gallbladder. He was started on Lovenox and sequential compression devices due to risk of pulmonary embolism.  of 33 (0-35).  On 7/10/18 LFT’s revealed total bili 0.9 (0.3-1.2). Maia phos 129 (32-91). AST 50 (15-41), ALT 41 (17-63).      · 8/31/18 - EUS by Dr. Gomez at [facility] revealing suspect benign biliary stricture due to CBD stone with FNA pathology revealing benign and atypical ductal cells, bile and proteinaceous material including scattered bacteria, neutrophils and degenerating cells. The atypia was felt mild and favored to be reactive in nature. Plan was to repeat ERCP with removal of the stent and the stone with consideration of common bile duct evaluation with cholangioscopy at that time.   · 10/12/18 - ERCP with sphincteroplasty and stone extraction done by  JUAN PABLO Russo for choledocholithiasis.   · 2/23/19 - Patient underwent ERCP with balloon clearance of bile duct by Memo  · 3/6/19 - LFT’s not elevated with bilirubin 1.1, AST 31, ALT 14, osbaldo phos was mildly elevated at 101 (32-91).   · 4/25/19 - 4/26/19 - Admitted to [facility] for hepatic encephalopathy and hypokalemia.   · 4/27/2019 - CT abdomen pelvis showed a 4.8 cm air-fluid collection adjacent to the right posterior hepatic lobe significant improved.  Right-sided chest tube small right pleural effusion.  Hepatic cirrhosis.  Bilateral nonobstructing renal stones.  Sigmoid diverticulosis.   · 5/26/19 - 5/29/2019 - Admitted to [facility] for hepatic and cephalopathy.  Ammonia level 213 (H).  · 7/11/19 - AFP 2.76 (0-9).   · 9/26/19 - CT scan abdomen showed fluid collection posterior to the right hepatic lobe has significantly diminished in size with only trace fluid remaining. Small locules of air are seen within this collection which could be related to recent shunt mentation or could be related to tiny fistula from the splenic flexure of the colon. Trace right pleural fluid, diminished since 4/27/19. Thickened, likely reactive, pleural rind remains. Mild right basilar atelectasis. Abnormal intrahepatic and extra hepatic biliary ductal dilation. Intrahepatic biliary dilation is new since 4/27/19. The CBD appears attenuated in its mid segment, raising the possibility of  stricture. ERCP recommended. Uncomplicated colonic diverticulosis. Non-obstructing bilateral renal stones and probable small right renal cyst. Cirrhotic liver morphology. No focal liver lesion is seen.  · 10/28/19 - CT scan abdomen and pelvis showed fluid collection posterior to the right hepatic lobe appears slightly increased in size now measuring 24 mm in diameter, again a small droplet of gas adjacent to this fluid collection is seen which may represent fistula. Right basilar small pleural effusion and atelectasis remain. Continued dilatation of the intra and extrahepatic biliary ductal system which appears stable from previous exam. Non-obstructing bilateral renal calculi. Changes of cirrhosis and splenomegaly. Diverticulosis. Mild interval change from prior study with increasing fluid collection posterior to the right hepatic lobe.      3.   Right lower extremity DVT and bilateral pulmonary emboli diagnosed 2004.   · 2004 - Patient claims to have developed right lower extremity DVT with bilateral pulmonary emboli in 2004 and was coumadinized. The blood clots were thought secondary to his sedentary lifestyle. He stayed on the Coumadin up until October 2017 when, due to difficulty maintaining his INR’s, he was switched to Xarelto 20 mg daily by Paulette Harris M.D. which was later dropped to 10 mg daily.    · 11/26/18 - EGD by Dave Russo M.D. revealed erosive gastritis and bleeding ampulla. Ampullary biopsy revealed reactive/reparative small intestinal mucosa with mild chronic inflammation. Gastric antrum biopsy was suggestive of focal mild reactive gastropathy. Due to erosive gastritis, patient asked to hold Xarelto and Aspirin by Dave Russo M.D.   · 11/27/18 - Patient asked to discontinue Xarelto and hold Aspirin while obtaining IVC filter. Risks discussed. Factor V Leiden gene mutation and prothrombin gene mutations not detected. Anticardiolipin IgM 11 (<11). Anti beta-2 glyco, IgA 55  (<20). Factor VIII activity 186 (). Antithrombin III activity 63 (). Protein C activity 69 (), protein S activity 69 ().       · 12/5/18 - Patient underwent transjugular IVC filter placement in IR by  Jonn Cuenca M.D.   · 1/2/19 - Told patient that his low protein CNS and antithrombin III likely due to liver disease. He would be at a high risk of going back on Xarelto and hence continued on Aspirin 81 mg p.o. daily.   · 1/3/19 - D-dimer 1.25 (<0.45).    · 3/8/19 - Chest x-ray showed chronic changes in the chest with no obvious pneumonia or congestive changes.  · 4/3/19 - Chest x-ray with right pleural effusion and basilar lung density with slight increase from prior. Cardiomegaly.   · 4/22/19 - Factor VIII 334 (H), Anti-phosphatidylserine antibody IgM> 80 (H), Anti-phosphatidylserine antibody IgG 12 (N), Cardiolipin IgG 21 (N), Cardiolipin IgM 55 (H), Cardiolipin IgA 63 (H), Beta-2 glycoprotein IgG 4 (N), IgA 91 (H), and IgM 21 (H).   · 7/11/19 - Chest x-ray showed stable small right pleural effusion since 04/25/2019. Minimal right costophrenic angle atelectasis.  ·     Past Medical History:   Diagnosis Date   • Anemia    • B12 deficiency 2009   • Syed's esophagus    • CAD (coronary artery disease)    • Diabetes mellitus (CMS/HCC)    • History of echocardiogram     03/03/2017 - 12/03/2014 - 04/2012   • Hyperlipidemia    • Hypertension    • Morbid obesity (CMS/HCC)    • KATHIA treated with BiPAP    • Renal insufficiency    • S/P lumbar laminectomy        Past Surgical History:   Procedure Laterality Date   • BACK SURGERY  1971   • CATARACT EXTRACTION  2014   • CHOLECYSTECTOMY  02/24/2019   • COLONOSCOPY  03/2018   • CORONARY ANGIOPLASTY  08/24/2009    PCI stent - succesful placement of 3.5/23 promus stent in proximal right coronary artery   • CORONARY ANGIOPLASTY WITH STENT PLACEMENT  08/27/2009    patient had stent placed to proximal right coronary artery   • CYSTOSCOPY BLADDER STONE  "LITHOTRIPSY  1997   • ENDOSCOPY N/A 10/18/2019    Procedure: ESOPHAGOGASTRODUODENOSCOPY with argon plasma coagulation of gastric antral vascular ectasia;  Surgeon: Marisel Gomez MD;  Location: [facility] ENDOSCOPY;  Service: Gastroenterology   • ERCP N/A 11/20/2019    Procedure: ERCP, clearance of bile duct with balloon, placement of biliary stent, EGD with argon plasma coagulation of gastric antral vascular ectasia;  Surgeon: Marisel Gomez MD;  Location: [facility] ENDOSCOPY;  Service: Gastroenterology   • OTHER SURGICAL HISTORY  11/2018    IVC filter implant   • RENAL ARTERY STENT Left          Current Outpatient Medications:   •  allopurinol (ZYLOPRIM) 100 MG tablet, Take 100 mg by mouth 2 (Two) Times a Day., Disp: , Rfl:   •  aspirin 81 MG tablet, Take 1 tablet by mouth Daily., Disp: 30 tablet, Rfl: 3  •  atorvastatin (LIPITOR) 20 MG tablet, Take 1 tablet by mouth Daily., Disp: 90 tablet, Rfl: 3  •  B-D 3CC LUER-YASMEEN SYR 25GX1\" 25G X 1\" 3 ML misc, , Disp: , Rfl: 1  •  bumetanide (BUMEX) 2 MG tablet, Take 1 tablet by mouth Daily., Disp: , Rfl:   •  Cyanocobalamin 1000 MCG/ML kit, Inject 1,000 mcg as directed Every 30 (Thirty) Days., Disp: , Rfl:   •  docusate sodium (COLACE) 100 MG capsule, Take 100 mg by mouth 2 (Two) Times a Day., Disp: , Rfl:   •  DULoxetine (CYMBALTA) 60 MG capsule, Take 60 mg by mouth Daily., Disp: , Rfl:   •  ferrous sulfate 325 (65 FE) MG tablet, Take 1 tablet by mouth 2 (Two) Times a Day., Disp: 60 tablet, Rfl: 2  •  folic acid (FOLVITE) 1 MG tablet, Take 1 mg by mouth Daily., Disp: , Rfl:   •  hydrOXYzine pamoate (VISTARIL) 25 MG capsule, , Disp: , Rfl: 0  •  insulin glargine (LANTUS) 100 UNIT/ML injection, Inject 46 units at bedtime, Disp: 45 mL, Rfl: 3  •  insulin lispro (HUMALOG) 100 UNIT/ML injection, 15 units subcu before each meal plus sliding scale MDD 60, Disp: 60 mL, Rfl: 4  •  lactulose (CHRONULAC) 10 GM/15ML solution, Take 60 mL by mouth Every 4 (Four) Hours., Disp: , Rfl:   •  " levothyroxine (SYNTHROID, LEVOTHROID) 75 MCG tablet, Take 1 tablet by mouth Daily., Disp: 90 tablet, Rfl: 4  •  magnesium oxide (MAG-OX) 400 MG tablet, Take 400 mg by mouth Daily., Disp: , Rfl:   •  metoclopramide (REGLAN) 5 MG tablet, Take 5 mg by mouth 2 (Two) Times a Day., Disp: , Rfl: 0  •  Multiple Vitamins-Minerals (MULTIVITAMIN WITH MINERALS) tablet tablet, Take 1 tablet by mouth Daily., Disp: , Rfl:   •  oxyCODONE (ROXICODONE) 5 MG immediate release tablet, Take 5 mg by mouth Every 8 (Eight) Hours As Needed., Disp: , Rfl:   •  pantoprazole (PROTONIX) 40 MG EC tablet, Take 1 tablet by mouth 2 (Two) Times a Day., Disp: 30 tablet, Rfl: 3  •  potassium chloride (K-DUR,KLOR-CON) 20 MEQ CR tablet, Take 20 mEq by mouth Daily., Disp: , Rfl: 0  •  rifaximin (XIFAXAN) 550 MG tablet, Take 550 mg by mouth Every 12 (Twelve) Hours., Disp: , Rfl:   •  sodium bicarbonate 650 MG tablet, Take 650 mg by mouth 3 (Three) Times a Day., Disp: , Rfl:   •  zinc sulfate (ZINCATE) 50 MG capsule, Take 50 mg by mouth Daily., Disp: , Rfl: 0  •  sucralfate (CARAFATE) 1 g tablet, Take 1 g by mouth 4 (Four) Times a Day., Disp: , Rfl:     No Known Allergies    Family History   Problem Relation Age of Onset   • Hyperlipidemia Other    • Hypertension Other        Cancer-related family history is not on file.    Social History     Tobacco Use   • Smoking status: Never Smoker   • Smokeless tobacco: Never Used   Substance Use Topics   • Alcohol use: No     Frequency: Never   • Drug use: No       I have reviewed the history of present illness, past medical history, family history, social history, lab results, all notes and other records since the patient was last seen at the cancer care center  SUBJECTIVE:      Patient is my office for follow-up.  He received iron infusions tolerated it well.  Stopped iron tablets.  He tolerated the recent EGD and ERCP. not too sleepy anymore.  Not trying to lose weight.  Weight fluctuates depending on the fluid  "on him    ROS:      Review of Systems   Constitutional: Negative for fever.   HENT: Negative for nosebleeds and trouble swallowing.    Eyes: Negative for visual disturbance.   Respiratory: Negative for cough, shortness of breath and wheezing.    Cardiovascular: Negative for chest pain.   Gastrointestinal: Negative for abdominal pain and blood in stool.   Endocrine: Negative for cold intolerance.   Genitourinary: Negative for dysuria and hematuria.   Musculoskeletal: Negative for joint swelling.   Skin: Negative for rash.   Allergic/Immunologic: Negative for environmental allergies.   Neurological: Negative for seizures.   Hematological: Does not bruise/bleed easily.   Psychiatric/Behavioral: The patient is not nervous/anxious.          Objective:       Vitals:    12/20/19 1033   BP: 123/63   Pulse: 85   Resp: 16   Weight: 134 kg (296 lb)   Height: 188 cm (74\")   PainSc: 0-No pain         PHYSICAL EXAM:      Physical Exam   Constitutional: He is oriented to person, place, and time. No distress.   Morbidly obese   HENT:   Head: Normocephalic and atraumatic.   Eyes: Conjunctivae and EOM are normal. Right eye exhibits no discharge. Left eye exhibits no discharge. No scleral icterus.   Neck: Normal range of motion. Neck supple. No thyromegaly present.   Cardiovascular: Normal rate, regular rhythm and normal heart sounds. Exam reveals no gallop and no friction rub.   Pulmonary/Chest: Effort normal. No stridor. No respiratory distress. He has no wheezes.   Abdominal: Soft. Bowel sounds are normal. He exhibits no mass. There is no tenderness. There is no rebound and no guarding.   Musculoskeletal: Normal range of motion. He exhibits no tenderness.   1+ bilateral lower extremity edema   Lymphadenopathy:     He has no cervical adenopathy.   Neurological: He is alert and oriented to person, place, and time. He exhibits normal muscle tone.   Skin: Skin is warm. No rash noted. He is not diaphoretic. No erythema.   Psychiatric: " He has a normal mood and affect. His behavior is normal.   Nursing note and vitals reviewed.       RECENT LABS:     WBC   Date Value Ref Range Status   12/20/2019 7.75 3.40 - 10.80 10*3/mm3 Final     RBC   Date Value Ref Range Status   12/20/2019 2.46 (L) 4.14 - 5.80 10*6/mm3 Final     Hemoglobin   Date Value Ref Range Status   12/20/2019 8.5 (L) 13.0 - 17.7 g/dL Final     Hematocrit   Date Value Ref Range Status   12/20/2019 26.2 (L) 37.5 - 51.0 % Final     MCV   Date Value Ref Range Status   12/20/2019 106.5 (H) 79.0 - 97.0 fL Final     MCH   Date Value Ref Range Status   12/20/2019 34.6 (H) 26.6 - 33.0 pg Final     MCHC   Date Value Ref Range Status   12/20/2019 32.4 31.5 - 35.7 g/dL Final     RDW   Date Value Ref Range Status   12/20/2019 17.6 (H) 12.3 - 15.4 % Final     RDW-SD   Date Value Ref Range Status   12/20/2019 64.9 (H) 37.0 - 54.0 fl Final     MPV   Date Value Ref Range Status   12/20/2019 11.1 6.0 - 12.0 fL Final     Platelets   Date Value Ref Range Status   12/20/2019 177 140 - 450 10*3/mm3 Final     Neutrophil %   Date Value Ref Range Status   12/20/2019 70.9 42.7 - 76.0 % Final     Lymphocyte %   Date Value Ref Range Status   12/20/2019 15.5 (L) 19.6 - 45.3 % Final     Monocyte %   Date Value Ref Range Status   12/20/2019 8.5 5.0 - 12.0 % Final     Eosinophil %   Date Value Ref Range Status   12/20/2019 4.5 0.3 - 6.2 % Final     Basophil %   Date Value Ref Range Status   12/20/2019 0.6 0.0 - 1.5 % Final     Neutrophils, Absolute   Date Value Ref Range Status   12/20/2019 5.49 1.70 - 7.00 10*3/mm3 Final     Lymphocytes, Absolute   Date Value Ref Range Status   12/20/2019 1.20 0.70 - 3.10 10*3/mm3 Final     Monocytes, Absolute   Date Value Ref Range Status   12/20/2019 0.66 0.10 - 0.90 10*3/mm3 Final     Eosinophils, Absolute   Date Value Ref Range Status   12/20/2019 0.35 0.00 - 0.40 10*3/mm3 Final     Basophils, Absolute   Date Value Ref Range Status   12/20/2019 0.05 0.00 - 0.20 10*3/mm3 Final      nRBC   Date Value Ref Range Status   10/19/2019 0.1 0.0 - 0.2 /100 WBC Final       Lab Results   Component Value Date    GLUCOSE 242 (H) 11/20/2019    BUN 32 (H) 11/20/2019    CREATININE 1.80 (H) 11/20/2019    EGFRIFNONA 37 (L) 11/20/2019    BCR 17.8 11/20/2019    K 4.5 11/20/2019    CO2 23.0 11/20/2019    CALCIUM 8.5 (L) 11/20/2019    ALBUMIN 3.20 (L) 11/20/2019    LABIL2 0.4 (L) 05/29/2019    AST 51 (H) 11/20/2019    ALT 29 11/20/2019         Assessment/Plan      ASSESSMENT:     GAVE (gastric antral vascular ectasia)     Anemia in stage 3 chronic kidney disease (CMS/HCC)     History of Vitamin B12 deficiency     History of DVT of right lower extremity     History of bilateral pulmonary embolus (PE)     Antithrombin III deficiency (CMS/HCC)     Protein C deficiency (CMS/HCC)     Protein S deficiency (CMS/HCC)     Antiphospholipid antibody positive     Elevated factor VIII level     THOMAS (nonalcoholic steatohepatitis)     Splenomegaly     IgG kappa MGUS    PLAN:      1. Hemoglobin mildly improved to 8.5 after iron infusion.  Recheck CBC in 6 weeks.  2. Patient is off anticoagulation given his GI bleed.  3. Instructed the patient to stop taking oral iron tablets that way we can tell the difference between GI blood loss versus iron use.  4. Patient follows with Dr. Gomez regarding his Thomas and portal hypertension.  Continue to use lactulose for his high ammonia.    5. Patient has hyper coag work-up but off anticoagulation secondary to GI bleeds.    6. I will see him back in my office in 6 weeks recheck CBC at the time     I have reviewed labs results, imaging, vitals, and medications with the patient today.     Patient verbalized understanding and is in agreement of the above plan.          This report was compiled using Dragon voice recognition software. I have made every effort to proof read this document; however, typographical errors may persist.

## 2019-12-26 DIAGNOSIS — D64.9 ANEMIA, UNSPECIFIED TYPE: Primary | ICD-10-CM

## 2019-12-27 ENCOUNTER — LAB (OUTPATIENT)
Dept: LAB | Facility: HOSPITAL | Age: 74
End: 2019-12-27

## 2019-12-27 ENCOUNTER — APPOINTMENT (OUTPATIENT)
Dept: ONCOLOGY | Facility: HOSPITAL | Age: 74
End: 2019-12-27

## 2019-12-27 ENCOUNTER — HOSPITAL ENCOUNTER (OUTPATIENT)
Dept: ONCOLOGY | Facility: HOSPITAL | Age: 74
Discharge: HOME | End: 2019-12-27
Admitting: NURSE PRACTITIONER

## 2019-12-27 VITALS
OXYGEN SATURATION: 97 % | BODY MASS INDEX: 37.99 KG/M2 | RESPIRATION RATE: 20 BRPM | SYSTOLIC BLOOD PRESSURE: 127 MMHG | DIASTOLIC BLOOD PRESSURE: 67 MMHG | HEART RATE: 84 BPM | HEIGHT: 74 IN | WEIGHT: 296 LBS | TEMPERATURE: 97.9 F

## 2019-12-27 DIAGNOSIS — D47.2 MGUS (MONOCLONAL GAMMOPATHY OF UNKNOWN SIGNIFICANCE): ICD-10-CM

## 2019-12-27 DIAGNOSIS — D50.0 IRON DEFICIENCY ANEMIA DUE TO CHRONIC BLOOD LOSS: ICD-10-CM

## 2019-12-27 DIAGNOSIS — D64.9 ANEMIA, UNSPECIFIED TYPE: ICD-10-CM

## 2019-12-27 DIAGNOSIS — D50.0 IRON DEFICIENCY ANEMIA DUE TO CHRONIC BLOOD LOSS: Primary | ICD-10-CM

## 2019-12-27 LAB
BASOPHILS # BLD AUTO: 0.05 10*3/MM3 (ref 0–0.2)
BASOPHILS NFR BLD AUTO: 0.6 % (ref 0–1.5)
DEPRECATED RDW RBC AUTO: 67.1 FL (ref 37–54)
EOSINOPHIL # BLD AUTO: 0.38 10*3/MM3 (ref 0–0.4)
EOSINOPHIL NFR BLD AUTO: 4.8 % (ref 0.3–6.2)
ERYTHROCYTE [DISTWIDTH] IN BLOOD BY AUTOMATED COUNT: 17.7 % (ref 12.3–15.4)
FERRITIN SERPL-MCNC: 611.4 NG/ML (ref 30–400)
HCT VFR BLD AUTO: 23.3 % (ref 37.5–51)
HGB BLD-MCNC: 7.5 G/DL (ref 13–17.7)
IRON 24H UR-MRATE: 76 MCG/DL (ref 59–158)
IRON SATN MFR SERPL: 31 % (ref 20–50)
LYMPHOCYTES # BLD AUTO: 1.08 10*3/MM3 (ref 0.7–3.1)
LYMPHOCYTES NFR BLD AUTO: 13.7 % (ref 19.6–45.3)
MCH RBC QN AUTO: 35 PG (ref 26.6–33)
MCHC RBC AUTO-ENTMCNC: 32.2 G/DL (ref 31.5–35.7)
MCV RBC AUTO: 108.9 FL (ref 79–97)
MONOCYTES # BLD AUTO: 0.76 10*3/MM3 (ref 0.1–0.9)
MONOCYTES NFR BLD AUTO: 9.7 % (ref 5–12)
NEUTROPHILS # BLD AUTO: 5.6 10*3/MM3 (ref 1.7–7)
NEUTROPHILS NFR BLD AUTO: 71.2 % (ref 42.7–76)
PLATELET # BLD AUTO: 170 10*3/MM3 (ref 140–450)
PMV BLD AUTO: 11 FL (ref 6–12)
RBC # BLD AUTO: 2.14 10*6/MM3 (ref 4.14–5.8)
TIBC SERPL-MCNC: 241 MCG/DL (ref 298–536)
TRANSFERRIN SERPL-MCNC: 162 MG/DL (ref 200–360)
WBC NRBC COR # BLD: 7.87 10*3/MM3 (ref 3.4–10.8)

## 2019-12-27 PROCEDURE — 83540 ASSAY OF IRON: CPT | Performed by: NURSE PRACTITIONER

## 2019-12-27 PROCEDURE — 82728 ASSAY OF FERRITIN: CPT | Performed by: NURSE PRACTITIONER

## 2019-12-27 PROCEDURE — 36415 COLL VENOUS BLD VENIPUNCTURE: CPT

## 2019-12-27 PROCEDURE — 85025 COMPLETE CBC W/AUTO DIFF WBC: CPT

## 2019-12-27 PROCEDURE — 84466 ASSAY OF TRANSFERRIN: CPT | Performed by: NURSE PRACTITIONER

## 2019-12-27 NOTE — PROGRESS NOTES
Patient came in for cbc poss retacrit advised it wasn't approved needed Iron studies repeated, Spoke with Jana BECKHAM about HGB 7.5 she advised to bring back 12/30/19 or 12/31/19 to repeat cbc. Patient is not having any symptoms of SOB, dizziness or weakness. Patient has appointment to come in on 12/30/19

## 2019-12-30 ENCOUNTER — HOSPITAL ENCOUNTER (OUTPATIENT)
Dept: ONCOLOGY | Facility: HOSPITAL | Age: 74
Discharge: HOME | End: 2019-12-30
Admitting: NURSE PRACTITIONER

## 2019-12-30 ENCOUNTER — HOSPITAL ENCOUNTER (OUTPATIENT)
Dept: INFUSION THERAPY | Facility: HOSPITAL | Age: 74
Setting detail: INFUSION SERIES
Discharge: HOME | End: 2019-12-30

## 2019-12-30 ENCOUNTER — LAB (OUTPATIENT)
Dept: LAB | Facility: HOSPITAL | Age: 74
End: 2019-12-30

## 2019-12-30 VITALS
TEMPERATURE: 97.2 F | OXYGEN SATURATION: 100 % | DIASTOLIC BLOOD PRESSURE: 64 MMHG | HEART RATE: 74 BPM | BODY MASS INDEX: 39.66 KG/M2 | WEIGHT: 309 LBS | RESPIRATION RATE: 16 BRPM | SYSTOLIC BLOOD PRESSURE: 137 MMHG | HEIGHT: 74 IN

## 2019-12-30 VITALS
HEIGHT: 60 IN | SYSTOLIC BLOOD PRESSURE: 140 MMHG | RESPIRATION RATE: 18 BRPM | BODY MASS INDEX: 60.78 KG/M2 | WEIGHT: 309.6 LBS | DIASTOLIC BLOOD PRESSURE: 58 MMHG | HEART RATE: 77 BPM | TEMPERATURE: 99 F

## 2019-12-30 DIAGNOSIS — D47.2 MGUS (MONOCLONAL GAMMOPATHY OF UNKNOWN SIGNIFICANCE): ICD-10-CM

## 2019-12-30 DIAGNOSIS — D50.9 IRON DEFICIENCY ANEMIA, UNSPECIFIED IRON DEFICIENCY ANEMIA TYPE: Primary | ICD-10-CM

## 2019-12-30 DIAGNOSIS — D50.0 IRON DEFICIENCY ANEMIA DUE TO CHRONIC BLOOD LOSS: Primary | ICD-10-CM

## 2019-12-30 DIAGNOSIS — D50.0 IRON DEFICIENCY ANEMIA DUE TO CHRONIC BLOOD LOSS: ICD-10-CM

## 2019-12-30 LAB
ABO GROUP BLD: NORMAL
BASOPHILS # BLD AUTO: 0.08 10*3/MM3 (ref 0–0.2)
BASOPHILS # BLD AUTO: 0.1 10*3/MM3 (ref 0–0.2)
BASOPHILS NFR BLD AUTO: 0.9 % (ref 0–1.5)
BASOPHILS NFR BLD AUTO: 1 % (ref 0–1.5)
BLD GP AB SCN SERPL QL: NEGATIVE
DEPRECATED RDW RBC AUTO: 69.5 FL (ref 37–54)
DEPRECATED RDW RBC AUTO: 79.6 FL (ref 37–54)
EOSINOPHIL # BLD AUTO: 0.3 10*3/MM3 (ref 0–0.4)
EOSINOPHIL # BLD AUTO: 0.32 10*3/MM3 (ref 0–0.4)
EOSINOPHIL NFR BLD AUTO: 3.8 % (ref 0.3–6.2)
EOSINOPHIL NFR BLD AUTO: 4.4 % (ref 0.3–6.2)
ERYTHROCYTE [DISTWIDTH] IN BLOOD BY AUTOMATED COUNT: 18.2 % (ref 12.3–15.4)
ERYTHROCYTE [DISTWIDTH] IN BLOOD BY AUTOMATED COUNT: 20.8 % (ref 12.3–15.4)
HCT VFR BLD AUTO: 20.3 % (ref 37.5–51)
HCT VFR BLD AUTO: 20.7 % (ref 37.5–51)
HGB BLD-MCNC: 6.5 G/DL (ref 13–17.7)
HGB BLD-MCNC: 6.7 G/DL (ref 13–17.7)
HGB BLD-MCNC: 7.2 G/DL (ref 13–17.7)
LYMPHOCYTES # BLD AUTO: 1.1 10*3/MM3 (ref 0.7–3.1)
LYMPHOCYTES # BLD AUTO: 1.33 10*3/MM3 (ref 0.7–3.1)
LYMPHOCYTES NFR BLD AUTO: 14 % (ref 19.6–45.3)
LYMPHOCYTES NFR BLD AUTO: 15.8 % (ref 19.6–45.3)
MCH RBC QN AUTO: 34.6 PG (ref 26.6–33)
MCH RBC QN AUTO: 36 PG (ref 26.6–33)
MCHC RBC AUTO-ENTMCNC: 31.4 G/DL (ref 31.5–35.7)
MCHC RBC AUTO-ENTMCNC: 32.9 G/DL (ref 31.5–35.7)
MCV RBC AUTO: 109.3 FL (ref 79–97)
MCV RBC AUTO: 110.1 FL (ref 79–97)
MONOCYTES # BLD AUTO: 0.7 10*3/MM3 (ref 0.1–0.9)
MONOCYTES # BLD AUTO: 0.78 10*3/MM3 (ref 0.1–0.9)
MONOCYTES NFR BLD AUTO: 8.9 % (ref 5–12)
MONOCYTES NFR BLD AUTO: 9.3 % (ref 5–12)
NEUTROPHILS # BLD AUTO: 5.6 10*3/MM3 (ref 1.7–7)
NEUTROPHILS # BLD AUTO: 5.91 10*3/MM3 (ref 1.7–7)
NEUTROPHILS NFR BLD AUTO: 70.1 % (ref 42.7–76)
NEUTROPHILS NFR BLD AUTO: 71.8 % (ref 42.7–76)
NRBC BLD AUTO-RTO: 0.1 /100 WBC (ref 0–0.2)
PLATELET # BLD AUTO: 190 10*3/MM3 (ref 140–450)
PLATELET # BLD AUTO: 195 10*3/MM3 (ref 140–450)
PMV BLD AUTO: 10.5 FL (ref 6–12)
PMV BLD AUTO: 7.6 FL (ref 6–12)
RBC # BLD AUTO: 1.85 10*6/MM3 (ref 4.14–5.8)
RBC # BLD AUTO: 1.88 10*6/MM3 (ref 4.14–5.8)
RH BLD: POSITIVE
T&S EXPIRATION DATE: NORMAL
WBC NRBC COR # BLD: 7.8 10*3/MM3 (ref 3.4–10.8)
WBC NRBC COR # BLD: 8.42 10*3/MM3 (ref 3.4–10.8)

## 2019-12-30 PROCEDURE — 85018 HEMOGLOBIN: CPT | Performed by: NURSE PRACTITIONER

## 2019-12-30 PROCEDURE — 86900 BLOOD TYPING SEROLOGIC ABO: CPT

## 2019-12-30 PROCEDURE — 86923 COMPATIBILITY TEST ELECTRIC: CPT

## 2019-12-30 PROCEDURE — 86850 RBC ANTIBODY SCREEN: CPT | Performed by: NURSE PRACTITIONER

## 2019-12-30 PROCEDURE — 36430 TRANSFUSION BLD/BLD COMPNT: CPT

## 2019-12-30 PROCEDURE — P9016 RBC LEUKOCYTES REDUCED: HCPCS

## 2019-12-30 PROCEDURE — 36415 COLL VENOUS BLD VENIPUNCTURE: CPT

## 2019-12-30 PROCEDURE — 86901 BLOOD TYPING SEROLOGIC RH(D): CPT | Performed by: NURSE PRACTITIONER

## 2019-12-30 PROCEDURE — 85025 COMPLETE CBC W/AUTO DIFF WBC: CPT

## 2019-12-30 PROCEDURE — 86900 BLOOD TYPING SEROLOGIC ABO: CPT | Performed by: NURSE PRACTITIONER

## 2019-12-30 PROCEDURE — 85025 COMPLETE CBC W/AUTO DIFF WBC: CPT | Performed by: NURSE PRACTITIONER

## 2019-12-30 RX ORDER — SODIUM CHLORIDE 9 MG/ML
250 INJECTION, SOLUTION INTRAVENOUS AS NEEDED
Status: CANCELLED | OUTPATIENT
Start: 2019-12-30

## 2019-12-30 RX ORDER — SODIUM CHLORIDE 9 MG/ML
250 INJECTION, SOLUTION INTRAVENOUS AS NEEDED
Status: DISCONTINUED | OUTPATIENT
Start: 2019-12-30 | End: 2020-01-01 | Stop reason: HOSPADM

## 2019-12-30 NOTE — PROGRESS NOTES
1023 Patient pale and reports SOA at times.otherwise no problemsPatient denies signs of bleeding and wife reports he is seeing  on 01/09/2019 for an EGD Notified Aline N.PNydia of Hgb 6.5 today and on 11/22/19 Hgb 7.2 and of above assessment. DAMON Garcia sending  orders for patient to have a transfusion of PRCs  today at [facility] and patient and his wife made aware a will go directly to hospital. I contacted Jackie at Ambulatory dept of [facility] of orders for transfusion on patient and that they are on their way there.

## 2020-01-01 ENCOUNTER — HOSPITAL ENCOUNTER (OUTPATIENT)
Dept: CT IMAGING | Facility: HOSPITAL | Age: 75
Discharge: HOME | End: 2020-12-21

## 2020-01-01 ENCOUNTER — HOSPITAL ENCOUNTER (OUTPATIENT)
Dept: ONCOLOGY | Facility: HOSPITAL | Age: 75
Discharge: HOME | End: 2020-09-08
Admitting: INTERNAL MEDICINE

## 2020-01-01 ENCOUNTER — HOSPITAL ENCOUNTER (OUTPATIENT)
Dept: ONCOLOGY | Facility: HOSPITAL | Age: 75
Setting detail: INFUSION SERIES
Discharge: HOME | End: 2020-12-22

## 2020-01-01 ENCOUNTER — TELEPHONE (OUTPATIENT)
Dept: ONCOLOGY | Facility: CLINIC | Age: 75
End: 2020-01-01

## 2020-01-01 ENCOUNTER — HOSPITAL ENCOUNTER (OUTPATIENT)
Dept: ONCOLOGY | Facility: HOSPITAL | Age: 75
Discharge: HOME | End: 2020-10-19
Admitting: INTERNAL MEDICINE

## 2020-01-01 ENCOUNTER — HOSPITAL ENCOUNTER (OUTPATIENT)
Dept: ONCOLOGY | Facility: HOSPITAL | Age: 75
Discharge: HOME | End: 2020-11-02
Admitting: NURSE PRACTITIONER

## 2020-01-01 ENCOUNTER — OFFICE VISIT (OUTPATIENT)
Dept: CARDIOLOGY | Facility: CLINIC | Age: 75
End: 2020-01-01

## 2020-01-01 ENCOUNTER — HOSPITAL ENCOUNTER (OUTPATIENT)
Dept: CT IMAGING | Facility: HOSPITAL | Age: 75
Discharge: HOME | End: 2020-11-27
Admitting: SURGERY

## 2020-01-01 ENCOUNTER — LAB (OUTPATIENT)
Dept: LAB | Facility: HOSPITAL | Age: 75
End: 2020-01-01

## 2020-01-01 ENCOUNTER — TELEMEDICINE (OUTPATIENT)
Dept: ENDOCRINOLOGY | Facility: CLINIC | Age: 75
End: 2020-01-01

## 2020-01-01 ENCOUNTER — HOSPITAL ENCOUNTER (OUTPATIENT)
Dept: ONCOLOGY | Facility: HOSPITAL | Age: 75
Discharge: HOME | End: 2020-09-21
Admitting: INTERNAL MEDICINE

## 2020-01-01 ENCOUNTER — OFFICE VISIT (OUTPATIENT)
Dept: ONCOLOGY | Facility: CLINIC | Age: 75
End: 2020-01-01

## 2020-01-01 ENCOUNTER — ANESTHESIA (OUTPATIENT)
Dept: GASTROENTEROLOGY | Facility: HOSPITAL | Age: 75
End: 2020-01-01

## 2020-01-01 ENCOUNTER — ON CAMPUS - OUTPATIENT (AMBULATORY)
Dept: URBAN - METROPOLITAN AREA HOSPITAL 77 | Facility: HOSPITAL | Age: 75
End: 2020-01-01
Payer: COMMERCIAL

## 2020-01-01 ENCOUNTER — TRANSCRIBE ORDERS (OUTPATIENT)
Dept: ADMINISTRATIVE | Facility: HOSPITAL | Age: 75
End: 2020-01-01

## 2020-01-01 ENCOUNTER — HOSPITAL ENCOUNTER (OUTPATIENT)
Dept: ONCOLOGY | Facility: HOSPITAL | Age: 75
Discharge: HOME | End: 2020-11-30
Admitting: INTERNAL MEDICINE

## 2020-01-01 ENCOUNTER — ANESTHESIA EVENT (OUTPATIENT)
Dept: GASTROENTEROLOGY | Facility: HOSPITAL | Age: 75
End: 2020-01-01

## 2020-01-01 ENCOUNTER — HOSPITAL ENCOUNTER (OUTPATIENT)
Dept: ONCOLOGY | Facility: HOSPITAL | Age: 75
Discharge: HOME | End: 2020-12-28

## 2020-01-01 ENCOUNTER — TELEPHONE (OUTPATIENT)
Dept: SURGERY | Facility: CLINIC | Age: 75
End: 2020-01-01

## 2020-01-01 ENCOUNTER — HOSPITAL ENCOUNTER (OUTPATIENT)
Dept: ONCOLOGY | Facility: HOSPITAL | Age: 75
Discharge: HOME | End: 2020-10-05
Admitting: FAMILY MEDICINE

## 2020-01-01 ENCOUNTER — OFFICE (AMBULATORY)
Dept: URBAN - METROPOLITAN AREA CLINIC 64 | Facility: CLINIC | Age: 75
End: 2020-01-01

## 2020-01-01 ENCOUNTER — APPOINTMENT (OUTPATIENT)
Dept: GENERAL RADIOLOGY | Facility: HOSPITAL | Age: 75
End: 2020-01-01

## 2020-01-01 ENCOUNTER — OFFICE VISIT (OUTPATIENT)
Dept: SURGERY | Facility: CLINIC | Age: 75
End: 2020-01-01

## 2020-01-01 ENCOUNTER — HOSPITAL ENCOUNTER (OUTPATIENT)
Dept: CARDIOLOGY | Facility: HOSPITAL | Age: 75
Discharge: HOME | End: 2020-11-11

## 2020-01-01 ENCOUNTER — APPOINTMENT (OUTPATIENT)
Dept: MRI IMAGING | Facility: HOSPITAL | Age: 75
End: 2020-01-01

## 2020-01-01 ENCOUNTER — HOSPITAL ENCOUNTER (OUTPATIENT)
Facility: HOSPITAL | Age: 75
Setting detail: HOSPITAL OUTPATIENT SURGERY
Discharge: HOME | End: 2020-11-12
Attending: INTERNAL MEDICINE | Admitting: INTERNAL MEDICINE

## 2020-01-01 ENCOUNTER — HOSPITAL ENCOUNTER (OUTPATIENT)
Dept: ONCOLOGY | Facility: HOSPITAL | Age: 75
Discharge: HOME | End: 2020-12-14

## 2020-01-01 ENCOUNTER — ON CAMPUS - OUTPATIENT (AMBULATORY)
Dept: URBAN - METROPOLITAN AREA HOSPITAL 85 | Facility: HOSPITAL | Age: 75
End: 2020-01-01

## 2020-01-01 ENCOUNTER — HOSPITAL ENCOUNTER (OUTPATIENT)
Dept: ONCOLOGY | Facility: HOSPITAL | Age: 75
Discharge: HOME | End: 2020-11-16
Admitting: NURSE PRACTITIONER

## 2020-01-01 VITALS
BODY MASS INDEX: 39.11 KG/M2 | SYSTOLIC BLOOD PRESSURE: 122 MMHG | OXYGEN SATURATION: 100 % | WEIGHT: 304.75 LBS | DIASTOLIC BLOOD PRESSURE: 67 MMHG | HEIGHT: 74 IN | HEART RATE: 77 BPM

## 2020-01-01 VITALS
RESPIRATION RATE: 18 BRPM | DIASTOLIC BLOOD PRESSURE: 74 MMHG | HEART RATE: 74 BPM | BODY MASS INDEX: 39.16 KG/M2 | TEMPERATURE: 97.3 F | SYSTOLIC BLOOD PRESSURE: 123 MMHG | HEIGHT: 74 IN

## 2020-01-01 VITALS
SYSTOLIC BLOOD PRESSURE: 116 MMHG | BODY MASS INDEX: 39.01 KG/M2 | WEIGHT: 304 LBS | HEIGHT: 74 IN | DIASTOLIC BLOOD PRESSURE: 60 MMHG

## 2020-01-01 VITALS
OXYGEN SATURATION: 99 % | WEIGHT: 303 LBS | HEIGHT: 74 IN | BODY MASS INDEX: 38.89 KG/M2 | TEMPERATURE: 98.2 F | HEART RATE: 74 BPM | DIASTOLIC BLOOD PRESSURE: 72 MMHG | SYSTOLIC BLOOD PRESSURE: 135 MMHG

## 2020-01-01 VITALS
BODY MASS INDEX: 39.14 KG/M2 | HEIGHT: 74 IN | DIASTOLIC BLOOD PRESSURE: 80 MMHG | RESPIRATION RATE: 18 BRPM | WEIGHT: 305 LBS | SYSTOLIC BLOOD PRESSURE: 172 MMHG | HEART RATE: 80 BPM | TEMPERATURE: 96.9 F

## 2020-01-01 VITALS
WEIGHT: 301 LBS | HEIGHT: 74 IN | SYSTOLIC BLOOD PRESSURE: 124 MMHG | BODY MASS INDEX: 38.63 KG/M2 | DIASTOLIC BLOOD PRESSURE: 68 MMHG

## 2020-01-01 VITALS
HEART RATE: 87 BPM | WEIGHT: 303 LBS | HEIGHT: 74 IN | DIASTOLIC BLOOD PRESSURE: 64 MMHG | SYSTOLIC BLOOD PRESSURE: 132 MMHG

## 2020-01-01 VITALS
RESPIRATION RATE: 25 BRPM | OXYGEN SATURATION: 97 % | WEIGHT: 302.91 LBS | HEIGHT: 74 IN | TEMPERATURE: 99.1 F | DIASTOLIC BLOOD PRESSURE: 53 MMHG | BODY MASS INDEX: 38.88 KG/M2 | SYSTOLIC BLOOD PRESSURE: 127 MMHG | HEART RATE: 83 BPM

## 2020-01-01 VITALS
SYSTOLIC BLOOD PRESSURE: 148 MMHG | HEIGHT: 74 IN | TEMPERATURE: 96.8 F | DIASTOLIC BLOOD PRESSURE: 71 MMHG | HEART RATE: 94 BPM | RESPIRATION RATE: 18 BRPM | WEIGHT: 303 LBS | BODY MASS INDEX: 38.89 KG/M2

## 2020-01-01 VITALS
HEIGHT: 74 IN | DIASTOLIC BLOOD PRESSURE: 71 MMHG | TEMPERATURE: 97.3 F | HEART RATE: 73 BPM | OXYGEN SATURATION: 100 % | SYSTOLIC BLOOD PRESSURE: 149 MMHG | BODY MASS INDEX: 39.76 KG/M2 | WEIGHT: 309.8 LBS | RESPIRATION RATE: 20 BRPM

## 2020-01-01 VITALS
BODY MASS INDEX: 39.32 KG/M2 | SYSTOLIC BLOOD PRESSURE: 151 MMHG | WEIGHT: 306.4 LBS | HEART RATE: 88 BPM | RESPIRATION RATE: 18 BRPM | DIASTOLIC BLOOD PRESSURE: 69 MMHG | HEIGHT: 74 IN | TEMPERATURE: 97.3 F

## 2020-01-01 VITALS
BODY MASS INDEX: 39.16 KG/M2 | WEIGHT: 305.1 LBS | HEIGHT: 74 IN | DIASTOLIC BLOOD PRESSURE: 58 MMHG | SYSTOLIC BLOOD PRESSURE: 124 MMHG

## 2020-01-01 VITALS
BODY MASS INDEX: 39.89 KG/M2 | DIASTOLIC BLOOD PRESSURE: 58 MMHG | HEIGHT: 74 IN | WEIGHT: 310.8 LBS | SYSTOLIC BLOOD PRESSURE: 130 MMHG

## 2020-01-01 VITALS — HEIGHT: 74 IN

## 2020-01-01 DIAGNOSIS — K31.89 GASTROPTOSIS: ICD-10-CM

## 2020-01-01 DIAGNOSIS — I25.10 CHRONIC CORONARY ARTERY DISEASE: ICD-10-CM

## 2020-01-01 DIAGNOSIS — D50.9 IRON DEFICIENCY ANEMIA, UNSPECIFIED IRON DEFICIENCY ANEMIA TYPE: Primary | ICD-10-CM

## 2020-01-01 DIAGNOSIS — I27.20 PULMONARY HYPERTENSION (HCC): ICD-10-CM

## 2020-01-01 DIAGNOSIS — K31.819 ANGIODYSPLASIA OF STOMACH AND DUODENUM WITHOUT BLEEDING: ICD-10-CM

## 2020-01-01 DIAGNOSIS — D50.0 IRON DEFICIENCY ANEMIA DUE TO CHRONIC BLOOD LOSS: Primary | ICD-10-CM

## 2020-01-01 DIAGNOSIS — K83.8 OTHER SPECIFIED DISEASES OF BILIARY TRACT: ICD-10-CM

## 2020-01-01 DIAGNOSIS — E11.65 TYPE 2 DIABETES MELLITUS WITH HYPERGLYCEMIA, WITH LONG-TERM CURRENT USE OF INSULIN (HCC): Primary | ICD-10-CM

## 2020-01-01 DIAGNOSIS — K31.819 GASTRIC ANTRAL VASCULAR ECTASIA: ICD-10-CM

## 2020-01-01 DIAGNOSIS — N18.30 STAGE 3 CHRONIC KIDNEY DISEASE, UNSPECIFIED WHETHER STAGE 3A OR 3B CKD (HCC): Primary | ICD-10-CM

## 2020-01-01 DIAGNOSIS — T85.590A OTHER MECHANICAL COMPLICATION OF BILE DUCT PROSTHESIS, INITI: ICD-10-CM

## 2020-01-01 DIAGNOSIS — Z46.59 ENCOUNTER FOR FITTING AND ADJUSTMENT OF OTHER GASTROINTESTIN: ICD-10-CM

## 2020-01-01 DIAGNOSIS — D64.9 ANEMIA, UNSPECIFIED TYPE: ICD-10-CM

## 2020-01-01 DIAGNOSIS — R74.8 ELEVATED LIVER ENZYMES: ICD-10-CM

## 2020-01-01 DIAGNOSIS — K75.81 NONALCOHOLIC STEATOHEPATITIS: ICD-10-CM

## 2020-01-01 DIAGNOSIS — N18.30 STAGE 3 CHRONIC KIDNEY DISEASE, UNSPECIFIED WHETHER STAGE 3A OR 3B CKD (HCC): ICD-10-CM

## 2020-01-01 DIAGNOSIS — E11.65 TYPE 2 DIABETES MELLITUS WITH HYPERGLYCEMIA, WITH LONG-TERM CURRENT USE OF INSULIN (HCC): ICD-10-CM

## 2020-01-01 DIAGNOSIS — D47.2 MGUS (MONOCLONAL GAMMOPATHY OF UNKNOWN SIGNIFICANCE): Primary | ICD-10-CM

## 2020-01-01 DIAGNOSIS — D47.2 MGUS (MONOCLONAL GAMMOPATHY OF UNKNOWN SIGNIFICANCE): ICD-10-CM

## 2020-01-01 DIAGNOSIS — K74.69 OTHER CIRRHOSIS OF LIVER: ICD-10-CM

## 2020-01-01 DIAGNOSIS — N18.6 ANEMIA IN END-STAGE RENAL DISEASE (HCC): ICD-10-CM

## 2020-01-01 DIAGNOSIS — G47.33 OBSTRUCTIVE SLEEP APNEA: ICD-10-CM

## 2020-01-01 DIAGNOSIS — D50.0 IRON DEFICIENCY ANEMIA DUE TO CHRONIC BLOOD LOSS: ICD-10-CM

## 2020-01-01 DIAGNOSIS — N18.30 CKD (CHRONIC KIDNEY DISEASE) STAGE 3, GFR 30-59 ML/MIN (HCC): ICD-10-CM

## 2020-01-01 DIAGNOSIS — K75.81 NASH (NONALCOHOLIC STEATOHEPATITIS): ICD-10-CM

## 2020-01-01 DIAGNOSIS — K63.2 COLOCUTANEOUS FISTULA: Primary | ICD-10-CM

## 2020-01-01 DIAGNOSIS — Z86.718 HISTORY OF DVT OF LOWER EXTREMITY: ICD-10-CM

## 2020-01-01 DIAGNOSIS — K83.1 OBSTRUCTION OF BILE DUCT: ICD-10-CM

## 2020-01-01 DIAGNOSIS — Z79.4 TYPE 2 DIABETES MELLITUS WITH HYPERGLYCEMIA, WITH LONG-TERM CURRENT USE OF INSULIN (HCC): Primary | ICD-10-CM

## 2020-01-01 DIAGNOSIS — K63.2 COLOCUTANEOUS FISTULA: ICD-10-CM

## 2020-01-01 DIAGNOSIS — K22.70 BARRETT'S ESOPHAGUS: ICD-10-CM

## 2020-01-01 DIAGNOSIS — E78.2 MIXED HYPERLIPIDEMIA: ICD-10-CM

## 2020-01-01 DIAGNOSIS — R16.0 LIVER MASS: Primary | ICD-10-CM

## 2020-01-01 DIAGNOSIS — R97.8 ABNORMAL TUMOR MARKERS: ICD-10-CM

## 2020-01-01 DIAGNOSIS — K90.9 MALABSORPTION OF IRON: ICD-10-CM

## 2020-01-01 DIAGNOSIS — N18.30 CKD (CHRONIC KIDNEY DISEASE) STAGE 3, GFR 30-59 ML/MIN (HCC): Primary | ICD-10-CM

## 2020-01-01 DIAGNOSIS — R74.8 ELEVATED SERUM ALKALINE PHOSPHATASE LEVEL: Primary | ICD-10-CM

## 2020-01-01 DIAGNOSIS — D63.1 ANEMIA DUE TO CHRONIC RENAL FAILURE TREATED WITH ERYTHROPOIETIN, STAGE 3 (MODERATE) (HCC): ICD-10-CM

## 2020-01-01 DIAGNOSIS — D47.2 IGG GAMMOPATHY: ICD-10-CM

## 2020-01-01 DIAGNOSIS — N18.30 CHRONIC KIDNEY DISEASE, STAGE III (MODERATE) (HCC): ICD-10-CM

## 2020-01-01 DIAGNOSIS — E11.8 DIABETIC COMPLICATION (HCC): ICD-10-CM

## 2020-01-01 DIAGNOSIS — R74.8 ABNORMAL LEVELS OF OTHER SERUM ENZYMES: ICD-10-CM

## 2020-01-01 DIAGNOSIS — E55.9 AVITAMINOSIS D: ICD-10-CM

## 2020-01-01 DIAGNOSIS — L02.213 CUTANEOUS ABSCESS OF CHEST WALL: Primary | ICD-10-CM

## 2020-01-01 DIAGNOSIS — I10 ESSENTIAL HYPERTENSION: Primary | ICD-10-CM

## 2020-01-01 DIAGNOSIS — K74.60 HEPATIC CIRRHOSIS, UNSPECIFIED HEPATIC CIRRHOSIS TYPE, UNSPECIFIED WHETHER ASCITES PRESENT (HCC): Primary | ICD-10-CM

## 2020-01-01 DIAGNOSIS — D63.1 ANEMIA IN END-STAGE RENAL DISEASE (HCC): Primary | ICD-10-CM

## 2020-01-01 DIAGNOSIS — N18.6 ANEMIA IN END-STAGE RENAL DISEASE (HCC): Primary | ICD-10-CM

## 2020-01-01 DIAGNOSIS — R94.5 ABNORMAL RESULTS OF LIVER FUNCTION STUDIES: ICD-10-CM

## 2020-01-01 DIAGNOSIS — L02.212 CUTANEOUS ABSCESS OF BACK EXCLUDING BUTTOCKS: Primary | ICD-10-CM

## 2020-01-01 DIAGNOSIS — K90.9 MALABSORPTION OF IRON: Primary | ICD-10-CM

## 2020-01-01 DIAGNOSIS — D63.1 ANEMIA IN END-STAGE RENAL DISEASE (HCC): ICD-10-CM

## 2020-01-01 DIAGNOSIS — K75.81 NONALCOHOLIC STEATOHEPATITIS (NASH): ICD-10-CM

## 2020-01-01 DIAGNOSIS — N18.30 ANEMIA DUE TO CHRONIC RENAL FAILURE TREATED WITH ERYTHROPOIETIN, STAGE 3 (MODERATE) (HCC): ICD-10-CM

## 2020-01-01 DIAGNOSIS — D64.9 ANEMIA, UNSPECIFIED: ICD-10-CM

## 2020-01-01 DIAGNOSIS — K74.69 FLORID CIRRHOSIS (HCC): ICD-10-CM

## 2020-01-01 DIAGNOSIS — R80.9 PROTEINURIA, UNSPECIFIED TYPE: ICD-10-CM

## 2020-01-01 DIAGNOSIS — E03.9 ACQUIRED HYPOTHYROIDISM: ICD-10-CM

## 2020-01-01 DIAGNOSIS — D50.9 IRON DEFICIENCY ANEMIA, UNSPECIFIED IRON DEFICIENCY ANEMIA TYPE: ICD-10-CM

## 2020-01-01 DIAGNOSIS — K74.60 HEPATIC CIRRHOSIS, UNSPECIFIED HEPATIC CIRRHOSIS TYPE, UNSPECIFIED WHETHER ASCITES PRESENT (HCC): ICD-10-CM

## 2020-01-01 DIAGNOSIS — K74.60 UNSPECIFIED CIRRHOSIS OF LIVER: ICD-10-CM

## 2020-01-01 DIAGNOSIS — K74.60 CIRRHOSIS OF LIVER (HCC): ICD-10-CM

## 2020-01-01 DIAGNOSIS — K74.69 FLORID CIRRHOSIS (HCC): Primary | ICD-10-CM

## 2020-01-01 DIAGNOSIS — I10 ESSENTIAL HYPERTENSION: ICD-10-CM

## 2020-01-01 DIAGNOSIS — Z79.4 TYPE 2 DIABETES MELLITUS WITH HYPERGLYCEMIA, WITH LONG-TERM CURRENT USE OF INSULIN (HCC): ICD-10-CM

## 2020-01-01 LAB
25(OH)D3 SERPL-MCNC: 30.6 NG/ML (ref 30–100)
ABO + RH BLD: NORMAL
ABO + RH BLD: NORMAL
ALBUMIN SERPL ELPH-MCNC: 2.6 G/DL (ref 2.9–4.4)
ALBUMIN SERPL-MCNC: 2.8 G/DL (ref 3.5–5.2)
ALBUMIN SERPL-MCNC: 3 G/DL (ref 3.5–5.2)
ALBUMIN SERPL-MCNC: 3.1 G/DL (ref 3.5–5.2)
ALBUMIN/GLOB SERPL: 0.6 {RATIO} (ref 0.7–1.7)
ALBUMIN/GLOB SERPL: 0.7 G/DL
ALP SERPL-CCNC: 124 U/L (ref 39–117)
ALP SERPL-CCNC: 328 U/L (ref 39–117)
ALP SERPL-CCNC: 499 U/L (ref 39–117)
ALPHA1 GLOB SERPL ELPH-MCNC: 0.3 G/DL (ref 0–0.4)
ALPHA2 GLOB SERPL ELPH-MCNC: 0.9 G/DL (ref 0.4–1)
ALT SERPL W P-5'-P-CCNC: 22 U/L (ref 1–41)
ALT SERPL W P-5'-P-CCNC: 9 U/L (ref 1–41)
ALT SERPL W P-5'-P-CCNC: 90 U/L (ref 1–41)
AMMONIA BLD-SCNC: 24 UMOL/L (ref 16–60)
AMMONIA BLD-SCNC: 63 UMOL/L (ref 16–60)
ANION GAP SERPL CALCULATED.3IONS-SCNC: 10 MMOL/L (ref 5–15)
ANION GAP SERPL CALCULATED.3IONS-SCNC: 10.5 MMOL/L (ref 5–15)
ANION GAP SERPL CALCULATED.3IONS-SCNC: 11 MMOL/L (ref 5–15)
ANION GAP SERPL CALCULATED.3IONS-SCNC: 11 MMOL/L (ref 5–15)
ANION GAP SERPL CALCULATED.3IONS-SCNC: 13 MMOL/L (ref 5–15)
ANION GAP SERPL CALCULATED.3IONS-SCNC: 9.3 MMOL/L (ref 5–15)
APTT PPP: 30.6 SECONDS (ref 24–31)
ARTERIAL PATENCY WRIST A: POSITIVE
AST SERPL-CCNC: 179 U/L (ref 1–40)
AST SERPL-CCNC: 19 U/L (ref 1–40)
AST SERPL-CCNC: 21 U/L (ref 1–40)
ATMOSPHERIC PRESS: ABNORMAL MM[HG]
B-GLOBULIN SERPL ELPH-MCNC: 1.1 G/DL (ref 0.7–1.3)
BASE EXCESS BLDA CALC-SCNC: -4.2 MMOL/L (ref 0–3)
BASOPHILS # BLD AUTO: 0.03 10*3/MM3 (ref 0–0.2)
BASOPHILS # BLD AUTO: 0.06 10*3/MM3 (ref 0–0.2)
BASOPHILS # BLD AUTO: 0.06 10*3/MM3 (ref 0–0.2)
BASOPHILS # BLD AUTO: 0.08 10*3/MM3 (ref 0–0.2)
BASOPHILS # BLD AUTO: 0.09 10*3/MM3 (ref 0–0.2)
BASOPHILS # BLD AUTO: 0.1 10*3/MM3 (ref 0–0.2)
BASOPHILS # BLD AUTO: 0.1 10*3/MM3 (ref 0–0.2)
BASOPHILS NFR BLD AUTO: 0.2 % (ref 0–1.5)
BASOPHILS NFR BLD AUTO: 0.7 % (ref 0–1.5)
BASOPHILS NFR BLD AUTO: 0.8 % (ref 0–1.5)
BASOPHILS NFR BLD AUTO: 0.9 % (ref 0–1.5)
BASOPHILS NFR BLD AUTO: 1 % (ref 0–1.5)
BASOPHILS NFR BLD AUTO: 1 % (ref 0–1.5)
BASOPHILS NFR BLD AUTO: 1.1 % (ref 0–1.5)
BASOPHILS NFR BLD AUTO: 1.3 % (ref 0–1.5)
BDY SITE: ABNORMAL
BH BB BLOOD EXPIRATION DATE: NORMAL
BH BB BLOOD EXPIRATION DATE: NORMAL
BH BB BLOOD TYPE BARCODE: 6200
BH BB BLOOD TYPE BARCODE: 6200
BH BB DISPENSE STATUS: NORMAL
BH BB DISPENSE STATUS: NORMAL
BH BB PRODUCT CODE: NORMAL
BH BB PRODUCT CODE: NORMAL
BH BB UNIT NUMBER: NORMAL
BH BB UNIT NUMBER: NORMAL
BILIRUB SERPL-MCNC: 0.3 MG/DL (ref 0–1.2)
BILIRUB SERPL-MCNC: 0.6 MG/DL (ref 0–1.2)
BILIRUB SERPL-MCNC: 0.8 MG/DL (ref 0–1.2)
BILIRUB UR QL STRIP: NEGATIVE
BUN SERPL-MCNC: 24 MG/DL (ref 8–23)
BUN SERPL-MCNC: 26 MG/DL (ref 8–23)
BUN SERPL-MCNC: 27 MG/DL (ref 8–23)
BUN SERPL-MCNC: 28 MG/DL (ref 8–23)
BUN SERPL-MCNC: 35 MG/DL (ref 8–23)
BUN SERPL-MCNC: 37 MG/DL (ref 8–23)
BUN/CREAT SERPL: 16 (ref 7–25)
BUN/CREAT SERPL: 16.4 (ref 7–25)
BUN/CREAT SERPL: 18.6 (ref 7–25)
BUN/CREAT SERPL: 19.9 (ref 7–25)
BUN/CREAT SERPL: 23.4 (ref 7–25)
BUN/CREAT SERPL: 24.5 (ref 7–25)
CA-I BLDA-SCNC: 0.96 MMOL/L (ref 1.15–1.33)
CALCIUM SPEC-SCNC: 8.3 MG/DL (ref 8.6–10.5)
CALCIUM SPEC-SCNC: 8.5 MG/DL (ref 8.6–10.5)
CALCIUM SPEC-SCNC: 8.6 MG/DL (ref 8.6–10.5)
CALCIUM SPEC-SCNC: 8.6 MG/DL (ref 8.6–10.5)
CALCIUM SPEC-SCNC: 8.8 MG/DL (ref 8.6–10.5)
CALCIUM SPEC-SCNC: 9.4 MG/DL (ref 8.6–10.5)
CHLORIDE SERPL-SCNC: 102 MMOL/L (ref 98–107)
CHLORIDE SERPL-SCNC: 104 MMOL/L (ref 98–107)
CHLORIDE SERPL-SCNC: 105 MMOL/L (ref 98–107)
CHLORIDE SERPL-SCNC: 105 MMOL/L (ref 98–107)
CHLORIDE SERPL-SCNC: 106 MMOL/L (ref 98–107)
CHLORIDE SERPL-SCNC: 106 MMOL/L (ref 98–107)
CLARITY UR: CLEAR
CO2 SERPL-SCNC: 19 MMOL/L (ref 22–29)
CO2 SERPL-SCNC: 20.5 MMOL/L (ref 22–29)
CO2 SERPL-SCNC: 20.7 MMOL/L (ref 22–29)
CO2 SERPL-SCNC: 22 MMOL/L (ref 22–29)
CO2 SERPL-SCNC: 22 MMOL/L (ref 22–29)
CO2 SERPL-SCNC: 25 MMOL/L (ref 22–29)
COLOR UR: YELLOW
CREAT BLDA-MCNC: 1.5 MG/DL (ref 0.6–1.3)
CREAT SERPL-MCNC: 1.41 MG/DL (ref 0.76–1.27)
CREAT SERPL-MCNC: 1.43 MG/DL (ref 0.76–1.27)
CREAT SERPL-MCNC: 1.45 MG/DL (ref 0.76–1.27)
CREAT SERPL-MCNC: 1.5 MG/DL (ref 0.76–1.27)
CREAT SERPL-MCNC: 1.58 MG/DL (ref 0.76–1.27)
CREAT SERPL-MCNC: 1.59 MG/DL (ref 0.76–1.27)
CREAT UR-MCNC: 99.5 MG/DL
D-LACTATE SERPL-SCNC: 1.9 MMOL/L (ref 0.5–2)
DEPRECATED RDW RBC AUTO: 49.5 FL (ref 37–54)
DEPRECATED RDW RBC AUTO: 49.8 FL (ref 37–54)
DEPRECATED RDW RBC AUTO: 50.9 FL (ref 37–54)
DEPRECATED RDW RBC AUTO: 53.7 FL (ref 37–54)
DEPRECATED RDW RBC AUTO: 54.4 FL (ref 37–54)
DEPRECATED RDW RBC AUTO: 54.5 FL (ref 37–54)
DEPRECATED RDW RBC AUTO: 54.6 FL (ref 37–54)
DEPRECATED RDW RBC AUTO: 54.7 FL (ref 37–54)
DEPRECATED RDW RBC AUTO: 56.6 FL (ref 37–54)
DEPRECATED RDW RBC AUTO: 57.4 FL (ref 37–54)
DEPRECATED RDW RBC AUTO: 59.1 FL (ref 37–54)
DEPRECATED RDW RBC AUTO: 65 FL (ref 37–54)
DEPRECATED RDW RBC AUTO: 68.3 FL (ref 37–54)
EOSINOPHIL # BLD AUTO: 0.3 10*3/MM3 (ref 0–0.4)
EOSINOPHIL # BLD AUTO: 0.31 10*3/MM3 (ref 0–0.4)
EOSINOPHIL # BLD AUTO: 0.31 10*3/MM3 (ref 0–0.4)
EOSINOPHIL # BLD AUTO: 0.39 10*3/MM3 (ref 0–0.4)
EOSINOPHIL # BLD AUTO: 0.39 10*3/MM3 (ref 0–0.4)
EOSINOPHIL # BLD AUTO: 0.47 10*3/MM3 (ref 0–0.4)
EOSINOPHIL # BLD AUTO: 0.49 10*3/MM3 (ref 0–0.4)
EOSINOPHIL # BLD AUTO: 0.53 10*3/MM3 (ref 0–0.4)
EOSINOPHIL # BLD AUTO: 0.53 10*3/MM3 (ref 0–0.4)
EOSINOPHIL # BLD AUTO: 0.54 10*3/MM3 (ref 0–0.4)
EOSINOPHIL # BLD AUTO: 0.64 10*3/MM3 (ref 0–0.4)
EOSINOPHIL NFR BLD AUTO: 2.5 % (ref 0.3–6.2)
EOSINOPHIL NFR BLD AUTO: 4.2 % (ref 0.3–6.2)
EOSINOPHIL NFR BLD AUTO: 4.2 % (ref 0.3–6.2)
EOSINOPHIL NFR BLD AUTO: 4.4 % (ref 0.3–6.2)
EOSINOPHIL NFR BLD AUTO: 5.1 % (ref 0.3–6.2)
EOSINOPHIL NFR BLD AUTO: 5.1 % (ref 0.3–6.2)
EOSINOPHIL NFR BLD AUTO: 5.2 % (ref 0.3–6.2)
EOSINOPHIL NFR BLD AUTO: 5.6 % (ref 0.3–6.2)
EOSINOPHIL NFR BLD AUTO: 5.7 % (ref 0.3–6.2)
EOSINOPHIL NFR BLD AUTO: 5.7 % (ref 0.3–6.2)
EOSINOPHIL NFR BLD AUTO: 7.3 % (ref 0.3–6.2)
ERYTHROCYTE [DISTWIDTH] IN BLOOD BY AUTOMATED COUNT: 14.6 % (ref 12.3–15.4)
ERYTHROCYTE [DISTWIDTH] IN BLOOD BY AUTOMATED COUNT: 14.7 % (ref 12.3–15.4)
ERYTHROCYTE [DISTWIDTH] IN BLOOD BY AUTOMATED COUNT: 15.2 % (ref 12.3–15.4)
ERYTHROCYTE [DISTWIDTH] IN BLOOD BY AUTOMATED COUNT: 15.3 % (ref 12.3–15.4)
ERYTHROCYTE [DISTWIDTH] IN BLOOD BY AUTOMATED COUNT: 15.8 % (ref 12.3–15.4)
ERYTHROCYTE [DISTWIDTH] IN BLOOD BY AUTOMATED COUNT: 16.1 % (ref 12.3–15.4)
ERYTHROCYTE [DISTWIDTH] IN BLOOD BY AUTOMATED COUNT: 16.5 % (ref 12.3–15.4)
ERYTHROCYTE [DISTWIDTH] IN BLOOD BY AUTOMATED COUNT: 16.6 % (ref 12.3–15.4)
ERYTHROCYTE [DISTWIDTH] IN BLOOD BY AUTOMATED COUNT: 17.3 % (ref 12.3–15.4)
ERYTHROCYTE [DISTWIDTH] IN BLOOD BY AUTOMATED COUNT: 18.5 % (ref 12.3–15.4)
ERYTHROCYTE [DISTWIDTH] IN BLOOD BY AUTOMATED COUNT: 19.9 % (ref 12.3–15.4)
FERRITIN SERPL-MCNC: 67.59 NG/ML (ref 30–400)
FIBRINOGEN PPP-MCNC: 624 MG/DL (ref 210–450)
FOLATE SERPL-MCNC: >20 NG/ML (ref 4.78–24.2)
GAMMA GLOB SERPL ELPH-MCNC: 1.9 G/DL (ref 0.4–1.8)
GFR SERPL CREATININE-BSD FRML MDRD: 43 ML/MIN/1.73
GFR SERPL CREATININE-BSD FRML MDRD: 43 ML/MIN/1.73
GFR SERPL CREATININE-BSD FRML MDRD: 46 ML/MIN/1.73
GFR SERPL CREATININE-BSD FRML MDRD: 48 ML/MIN/1.73
GFR SERPL CREATININE-BSD FRML MDRD: 48 ML/MIN/1.73
GFR SERPL CREATININE-BSD FRML MDRD: 49 ML/MIN/1.73
GGT SERPL-CCNC: 25 U/L (ref 8–61)
GLOBULIN SER CALC-MCNC: 4.2 G/DL (ref 2.2–3.9)
GLOBULIN UR ELPH-MCNC: 4.1 GM/DL
GLOBULIN UR ELPH-MCNC: 4.2 GM/DL
GLOBULIN UR ELPH-MCNC: 4.3 GM/DL
GLUCOSE BLDC GLUCOMTR-MCNC: 145 MG/DL (ref 70–105)
GLUCOSE BLDC GLUCOMTR-MCNC: 146 MG/DL (ref 70–105)
GLUCOSE BLDC GLUCOMTR-MCNC: 180 MG/DL (ref 74–100)
GLUCOSE SERPL-MCNC: 125 MG/DL (ref 65–99)
GLUCOSE SERPL-MCNC: 131 MG/DL (ref 65–99)
GLUCOSE SERPL-MCNC: 136 MG/DL (ref 65–99)
GLUCOSE SERPL-MCNC: 153 MG/DL (ref 65–99)
GLUCOSE SERPL-MCNC: 236 MG/DL (ref 65–99)
GLUCOSE SERPL-MCNC: 82 MG/DL (ref 65–99)
GLUCOSE UR STRIP-MCNC: NEGATIVE MG/DL
HBA1C MFR BLD: 6.1 % (ref 3.5–5.6)
HCO3 BLDA-SCNC: 22.7 MMOL/L (ref 21–28)
HCT VFR BLD AUTO: 26.7 % (ref 37.5–51)
HCT VFR BLD AUTO: 27.1 % (ref 37.5–51)
HCT VFR BLD AUTO: 28 % (ref 37.5–51)
HCT VFR BLD AUTO: 29.1 % (ref 37.5–51)
HCT VFR BLD AUTO: 29.7 % (ref 37.5–51)
HCT VFR BLD AUTO: 29.8 % (ref 37.5–51)
HCT VFR BLD AUTO: 29.9 % (ref 37.5–51)
HCT VFR BLD AUTO: 30.1 % (ref 37.5–51)
HCT VFR BLD AUTO: 30.2 % (ref 37.5–51)
HCT VFR BLD AUTO: 30.4 % (ref 37.5–51)
HCT VFR BLD AUTO: 31 % (ref 37.5–51)
HCT VFR BLD AUTO: 32.8 % (ref 37.5–51)
HCT VFR BLD AUTO: 33.1 % (ref 37.5–51)
HEMODILUTION: NO
HGB BLD-MCNC: 10.1 G/DL (ref 13–17.7)
HGB BLD-MCNC: 10.3 G/DL (ref 13–17.7)
HGB BLD-MCNC: 8.4 G/DL (ref 13–17.7)
HGB BLD-MCNC: 8.6 G/DL (ref 13–17.7)
HGB BLD-MCNC: 8.8 G/DL (ref 13–17.7)
HGB BLD-MCNC: 9 G/DL (ref 13–17.7)
HGB BLD-MCNC: 9.1 G/DL (ref 13–17.7)
HGB BLD-MCNC: 9.2 G/DL (ref 13–17.7)
HGB BLD-MCNC: 9.3 G/DL (ref 13–17.7)
HGB BLD-MCNC: 9.4 G/DL (ref 13–17.7)
HGB BLD-MCNC: 9.6 G/DL (ref 13–17.7)
HGB UR QL STRIP.AUTO: NEGATIVE
IGA1 MFR SER: 936 MG/DL (ref 70–400)
IGG1 SER-MCNC: 1562 MG/DL (ref 700–1600)
IGM SERPL-MCNC: 179 MG/DL (ref 40–230)
IMM GRANULOCYTES # BLD AUTO: 0.04 10*3/MM3 (ref 0–0.05)
IMM GRANULOCYTES NFR BLD AUTO: 0.6 % (ref 0–0.5)
INHALED O2 CONCENTRATION: 100 %
INR PPP: 1.06 (ref 0.93–1.1)
IRON 24H UR-MRATE: 41 MCG/DL (ref 59–158)
IRON 24H UR-MRATE: 89 MCG/DL (ref 59–158)
IRON SATN MFR SERPL: 29 % (ref 20–50)
KAPPA LC FREE SER-MCNC: 152.9 MG/L (ref 3.3–19.4)
KAPPA LC FREE/LAMBDA FREE SER: 1.01 {RATIO} (ref 0.26–1.65)
KETONES UR QL STRIP: NEGATIVE
LAB AP CASE REPORT: NORMAL
LAB AP CASE REPORT: NORMAL
LAB AP DIAGNOSIS COMMENT: NORMAL
LAB AP DIAGNOSIS COMMENT: NORMAL
LABORATORY COMMENT REPORT: ABNORMAL
LAMBDA LC FREE SERPL-MCNC: 151.2 MG/L (ref 5.7–26.3)
LEUKOCYTE ESTERASE UR QL STRIP.AUTO: NEGATIVE
LYMPHOCYTES # BLD AUTO: 0.99 10*3/MM3 (ref 0.7–3.1)
LYMPHOCYTES # BLD AUTO: 1 10*3/MM3 (ref 0.7–3.1)
LYMPHOCYTES # BLD AUTO: 1.01 10*3/MM3 (ref 0.7–3.1)
LYMPHOCYTES # BLD AUTO: 1.11 10*3/MM3 (ref 0.7–3.1)
LYMPHOCYTES # BLD AUTO: 1.22 10*3/MM3 (ref 0.7–3.1)
LYMPHOCYTES # BLD AUTO: 1.4 10*3/MM3 (ref 0.7–3.1)
LYMPHOCYTES # BLD AUTO: 1.46 10*3/MM3 (ref 0.7–3.1)
LYMPHOCYTES # BLD AUTO: 1.53 10*3/MM3 (ref 0.7–3.1)
LYMPHOCYTES # BLD AUTO: 1.54 10*3/MM3 (ref 0.7–3.1)
LYMPHOCYTES # BLD AUTO: 1.69 10*3/MM3 (ref 0.7–3.1)
LYMPHOCYTES # BLD AUTO: 1.86 10*3/MM3 (ref 0.7–3.1)
LYMPHOCYTES NFR BLD AUTO: 13 % (ref 19.6–45.3)
LYMPHOCYTES NFR BLD AUTO: 13.3 % (ref 19.6–45.3)
LYMPHOCYTES NFR BLD AUTO: 14.2 % (ref 19.6–45.3)
LYMPHOCYTES NFR BLD AUTO: 14.4 % (ref 19.6–45.3)
LYMPHOCYTES NFR BLD AUTO: 14.7 % (ref 19.6–45.3)
LYMPHOCYTES NFR BLD AUTO: 15.9 % (ref 19.6–45.3)
LYMPHOCYTES NFR BLD AUTO: 17.5 % (ref 19.6–45.3)
LYMPHOCYTES NFR BLD AUTO: 17.8 % (ref 19.6–45.3)
LYMPHOCYTES NFR BLD AUTO: 18 % (ref 19.6–45.3)
LYMPHOCYTES NFR BLD AUTO: 18 % (ref 19.6–45.3)
LYMPHOCYTES NFR BLD AUTO: 7.9 % (ref 19.6–45.3)
M PROTEIN SERPL ELPH-MCNC: 0.7 G/DL
MCH RBC QN AUTO: 29.1 PG (ref 26.6–33)
MCH RBC QN AUTO: 29.5 PG (ref 26.6–33)
MCH RBC QN AUTO: 30 PG (ref 26.6–33)
MCH RBC QN AUTO: 30 PG (ref 26.6–33)
MCH RBC QN AUTO: 30.2 PG (ref 26.6–33)
MCH RBC QN AUTO: 30.3 PG (ref 26.6–33)
MCH RBC QN AUTO: 30.4 PG (ref 26.6–33)
MCH RBC QN AUTO: 30.5 PG (ref 26.6–33)
MCH RBC QN AUTO: 30.6 PG (ref 26.6–33)
MCH RBC QN AUTO: 30.8 PG (ref 26.6–33)
MCH RBC QN AUTO: 31 PG (ref 26.6–33)
MCHC RBC AUTO-ENTMCNC: 30.2 G/DL (ref 31.5–35.7)
MCHC RBC AUTO-ENTMCNC: 30.8 G/DL (ref 31.5–35.7)
MCHC RBC AUTO-ENTMCNC: 30.8 G/DL (ref 31.5–35.7)
MCHC RBC AUTO-ENTMCNC: 30.9 G/DL (ref 31.5–35.7)
MCHC RBC AUTO-ENTMCNC: 31 G/DL (ref 31.5–35.7)
MCHC RBC AUTO-ENTMCNC: 31 G/DL (ref 31.5–35.7)
MCHC RBC AUTO-ENTMCNC: 31.1 G/DL (ref 31.5–35.7)
MCHC RBC AUTO-ENTMCNC: 31.4 G/DL (ref 31.5–35.7)
MCHC RBC AUTO-ENTMCNC: 31.4 G/DL (ref 31.5–35.7)
MCHC RBC AUTO-ENTMCNC: 31.6 G/DL (ref 31.5–35.7)
MCHC RBC AUTO-ENTMCNC: 32.2 G/DL (ref 31.5–35.7)
MCV RBC AUTO: 101 FL (ref 79–97)
MCV RBC AUTO: 93 FL (ref 79–97)
MCV RBC AUTO: 94.4 FL (ref 79–97)
MCV RBC AUTO: 95.5 FL (ref 79–97)
MCV RBC AUTO: 95.9 FL (ref 79–97)
MCV RBC AUTO: 97.1 FL (ref 79–97)
MCV RBC AUTO: 97.5 FL (ref 79–97)
MCV RBC AUTO: 97.8 FL (ref 79–97)
MCV RBC AUTO: 98.7 FL (ref 79–97)
MCV RBC AUTO: 99 FL (ref 79–97)
MCV RBC AUTO: 99.6 FL (ref 79–97)
MODALITY: ABNORMAL
MONOCYTES # BLD AUTO: 0.69 10*3/MM3 (ref 0.1–0.9)
MONOCYTES # BLD AUTO: 0.7 10*3/MM3 (ref 0.1–0.9)
MONOCYTES # BLD AUTO: 0.72 10*3/MM3 (ref 0.1–0.9)
MONOCYTES # BLD AUTO: 0.73 10*3/MM3 (ref 0.1–0.9)
MONOCYTES # BLD AUTO: 0.81 10*3/MM3 (ref 0.1–0.9)
MONOCYTES # BLD AUTO: 0.81 10*3/MM3 (ref 0.1–0.9)
MONOCYTES # BLD AUTO: 0.89 10*3/MM3 (ref 0.1–0.9)
MONOCYTES # BLD AUTO: 0.93 10*3/MM3 (ref 0.1–0.9)
MONOCYTES # BLD AUTO: 0.94 10*3/MM3 (ref 0.1–0.9)
MONOCYTES # BLD AUTO: 1 10*3/MM3 (ref 0.1–0.9)
MONOCYTES # BLD AUTO: 1.16 10*3/MM3 (ref 0.1–0.9)
MONOCYTES NFR BLD AUTO: 10.3 % (ref 5–12)
MONOCYTES NFR BLD AUTO: 10.7 % (ref 5–12)
MONOCYTES NFR BLD AUTO: 11.4 % (ref 5–12)
MONOCYTES NFR BLD AUTO: 7.8 % (ref 5–12)
MONOCYTES NFR BLD AUTO: 8.6 % (ref 5–12)
MONOCYTES NFR BLD AUTO: 8.8 % (ref 5–12)
MONOCYTES NFR BLD AUTO: 9 % (ref 5–12)
MONOCYTES NFR BLD AUTO: 9 % (ref 5–12)
MONOCYTES NFR BLD AUTO: 9.3 % (ref 5–12)
MONOCYTES NFR BLD AUTO: 9.5 % (ref 5–12)
MONOCYTES NFR BLD AUTO: 9.8 % (ref 5–12)
NEUTROPHILS NFR BLD AUTO: 4.93 10*3/MM3 (ref 1.7–7)
NEUTROPHILS NFR BLD AUTO: 5.1 10*3/MM3 (ref 1.7–7)
NEUTROPHILS NFR BLD AUTO: 5.45 10*3/MM3 (ref 1.7–7)
NEUTROPHILS NFR BLD AUTO: 5.47 10*3/MM3 (ref 1.7–7)
NEUTROPHILS NFR BLD AUTO: 5.53 10*3/MM3 (ref 1.7–7)
NEUTROPHILS NFR BLD AUTO: 5.88 10*3/MM3 (ref 1.7–7)
NEUTROPHILS NFR BLD AUTO: 6.24 10*3/MM3 (ref 1.7–7)
NEUTROPHILS NFR BLD AUTO: 6.77 10*3/MM3 (ref 1.7–7)
NEUTROPHILS NFR BLD AUTO: 6.91 10*3/MM3 (ref 1.7–7)
NEUTROPHILS NFR BLD AUTO: 62.8 % (ref 42.7–76)
NEUTROPHILS NFR BLD AUTO: 66.6 % (ref 42.7–76)
NEUTROPHILS NFR BLD AUTO: 66.9 % (ref 42.7–76)
NEUTROPHILS NFR BLD AUTO: 66.9 % (ref 42.7–76)
NEUTROPHILS NFR BLD AUTO: 67 % (ref 42.7–76)
NEUTROPHILS NFR BLD AUTO: 69.2 % (ref 42.7–76)
NEUTROPHILS NFR BLD AUTO: 7.48 10*3/MM3 (ref 1.7–7)
NEUTROPHILS NFR BLD AUTO: 70.7 % (ref 42.7–76)
NEUTROPHILS NFR BLD AUTO: 71.5 % (ref 42.7–76)
NEUTROPHILS NFR BLD AUTO: 71.8 % (ref 42.7–76)
NEUTROPHILS NFR BLD AUTO: 72.3 % (ref 42.7–76)
NEUTROPHILS NFR BLD AUTO: 80.1 % (ref 42.7–76)
NEUTROPHILS NFR BLD AUTO: 9.97 10*3/MM3 (ref 1.7–7)
NITRITE UR QL STRIP: NEGATIVE
NRBC BLD AUTO-RTO: 0 /100 WBC (ref 0–0.2)
NRBC BLD AUTO-RTO: 0 /100 WBC (ref 0–0.2)
PATH REPORT.FINAL DX SPEC: NORMAL
PATH REPORT.FINAL DX SPEC: NORMAL
PATH REPORT.GROSS SPEC: NORMAL
PATH REPORT.GROSS SPEC: NORMAL
PCO2 BLDA: 49 MM HG (ref 35–48)
PH BLDA: 7.27 PH UNITS (ref 7.35–7.45)
PH UR STRIP.AUTO: 5.5 [PH] (ref 5–8)
PHOSPHATE SERPL-MCNC: 2.1 MG/DL (ref 2.5–4.5)
PLATELET # BLD AUTO: 133 10*3/MM3 (ref 140–450)
PLATELET # BLD AUTO: 175 10*3/MM3 (ref 140–450)
PLATELET # BLD AUTO: 194 10*3/MM3 (ref 140–450)
PLATELET # BLD AUTO: 200 10*3/MM3 (ref 140–450)
PLATELET # BLD AUTO: 213 10*3/MM3 (ref 140–450)
PLATELET # BLD AUTO: 218 10*3/MM3 (ref 140–450)
PLATELET # BLD AUTO: 218 10*3/MM3 (ref 140–450)
PLATELET # BLD AUTO: 225 10*3/MM3 (ref 140–450)
PLATELET # BLD AUTO: 233 10*3/MM3 (ref 140–450)
PLATELET # BLD AUTO: 242 10*3/MM3 (ref 140–450)
PLATELET # BLD AUTO: 244 10*3/MM3 (ref 140–450)
PLATELET # BLD AUTO: 281 10*3/MM3 (ref 140–450)
PLATELET # BLD AUTO: 285 10*3/MM3 (ref 140–450)
PMV BLD AUTO: 10 FL (ref 6–12)
PMV BLD AUTO: 10.2 FL (ref 6–12)
PMV BLD AUTO: 10.4 FL (ref 6–12)
PMV BLD AUTO: 10.6 FL (ref 6–12)
PMV BLD AUTO: 11.2 FL (ref 6–12)
PMV BLD AUTO: 11.2 FL (ref 6–12)
PMV BLD AUTO: 11.3 FL (ref 6–12)
PMV BLD AUTO: 12 FL (ref 6–12)
PMV BLD AUTO: 12 FL (ref 6–12)
PMV BLD AUTO: 12.6 FL (ref 6–12)
PMV BLD AUTO: 8.6 FL (ref 6–12)
PMV BLD AUTO: 8.9 FL (ref 6–12)
PMV BLD AUTO: 9.9 FL (ref 6–12)
PO2 BLDA: 306.8 MM HG (ref 83–108)
POTASSIUM BLDA-SCNC: 4.7 MMOL/L (ref 3.5–4.5)
POTASSIUM SERPL-SCNC: 4.5 MMOL/L (ref 3.5–5.2)
POTASSIUM SERPL-SCNC: 4.7 MMOL/L (ref 3.5–5.2)
POTASSIUM SERPL-SCNC: 4.9 MMOL/L (ref 3.5–5.2)
POTASSIUM SERPL-SCNC: 5 MMOL/L (ref 3.5–5.2)
POTASSIUM SERPL-SCNC: 5 MMOL/L (ref 3.5–5.2)
POTASSIUM SERPL-SCNC: 5.2 MMOL/L (ref 3.5–5.2)
PROT SERPL-MCNC: 6.8 G/DL (ref 6–8.5)
PROT SERPL-MCNC: 6.9 G/DL (ref 6–8.5)
PROT SERPL-MCNC: 7.2 G/DL (ref 6–8.5)
PROT SERPL-MCNC: 7.4 G/DL (ref 6–8.5)
PROT UR QL STRIP: NEGATIVE
PROT UR-MCNC: 10 MG/DL
PROTHROMBIN TIME: 11.6 SECONDS (ref 9.6–11.7)
PTH-INTACT SERPL-MCNC: 25.3 PG/ML (ref 15–65)
QT INTERVAL: 394 MS
RBC # BLD AUTO: 2.77 10*6/MM3 (ref 4.14–5.8)
RBC # BLD AUTO: 2.87 10*6/MM3 (ref 4.14–5.8)
RBC # BLD AUTO: 2.88 10*6/MM3 (ref 4.14–5.8)
RBC # BLD AUTO: 2.94 10*6/MM3 (ref 4.14–5.8)
RBC # BLD AUTO: 2.98 10*6/MM3 (ref 4.14–5.8)
RBC # BLD AUTO: 3.03 10*6/MM3 (ref 4.14–5.8)
RBC # BLD AUTO: 3.05 10*6/MM3 (ref 4.14–5.8)
RBC # BLD AUTO: 3.07 10*6/MM3 (ref 4.14–5.8)
RBC # BLD AUTO: 3.11 10*6/MM3 (ref 4.14–5.8)
RBC # BLD AUTO: 3.18 10*6/MM3 (ref 4.14–5.8)
RBC # BLD AUTO: 3.2 10*6/MM3 (ref 4.14–5.8)
RBC # BLD AUTO: 3.41 10*6/MM3 (ref 4.14–5.8)
RBC # BLD AUTO: 3.42 10*6/MM3 (ref 4.14–5.8)
SAO2 % BLDCOA: 99.9 % (ref 94–98)
SARS-COV-2 RNA PNL SPEC NAA+PROBE: NOT DETECTED
SODIUM BLD-SCNC: 137 MMOL/L (ref 138–146)
SODIUM SERPL-SCNC: 134 MMOL/L (ref 136–145)
SODIUM SERPL-SCNC: 135 MMOL/L (ref 136–145)
SODIUM SERPL-SCNC: 135 MMOL/L (ref 136–145)
SODIUM SERPL-SCNC: 137 MMOL/L (ref 136–145)
SODIUM SERPL-SCNC: 140 MMOL/L (ref 136–145)
SODIUM SERPL-SCNC: 141 MMOL/L (ref 136–145)
SP GR UR STRIP: 1.02 (ref 1–1.03)
TIBC SERPL-MCNC: 302 MCG/DL (ref 298–536)
TRANSFERRIN SERPL-MCNC: 203 MG/DL (ref 200–360)
UNIT  ABO: NORMAL
UNIT  ABO: NORMAL
UNIT  RH: NORMAL
UNIT  RH: NORMAL
UROBILINOGEN UR QL STRIP: NORMAL
VIT B12 BLD-MCNC: >2000 PG/ML (ref 211–946)
WBC # BLD AUTO: 10.33 10*3/MM3 (ref 3.4–10.8)
WBC # BLD AUTO: 10.44 10*3/MM3 (ref 3.4–10.8)
WBC # BLD AUTO: 12.46 10*3/MM3 (ref 3.4–10.8)
WBC # BLD AUTO: 7.11 10*3/MM3 (ref 3.4–10.8)
WBC # BLD AUTO: 7.2 10*3/MM3 (ref 3.4–10.8)
WBC # BLD AUTO: 7.7 10*3/MM3 (ref 3.4–10.8)
WBC # BLD AUTO: 8.18 10*3/MM3 (ref 3.4–10.8)
WBC # BLD AUTO: 8.79 10*3/MM3 (ref 3.4–10.8)
WBC # BLD AUTO: 8.8 10*3/MM3 (ref 3.4–10.8)
WBC # BLD AUTO: 9.26 10*3/MM3 (ref 3.4–10.8)
WBC # BLD AUTO: 9.36 10*3/MM3 (ref 3.4–10.8)
WBC # BLD AUTO: 9.37 10*3/MM3 (ref 3.4–10.8)
WBC # BLD AUTO: 9.8 10*3/MM3 (ref 3.4–10.8)

## 2020-01-01 PROCEDURE — 96372 THER/PROPH/DIAG INJ SC/IM: CPT

## 2020-01-01 PROCEDURE — 85730 THROMBOPLASTIN TIME PARTIAL: CPT | Performed by: INTERNAL MEDICINE

## 2020-01-01 PROCEDURE — 43275 ERCP REMOVE FORGN BODY DUCT: CPT | Performed by: INTERNAL MEDICINE

## 2020-01-01 PROCEDURE — 82728 ASSAY OF FERRITIN: CPT

## 2020-01-01 PROCEDURE — 83036 HEMOGLOBIN GLYCOSYLATED A1C: CPT

## 2020-01-01 PROCEDURE — 25010000002 IOPAMIDOL 61 % SOLUTION 50 ML VIAL: Performed by: INTERNAL MEDICINE

## 2020-01-01 PROCEDURE — 84100 ASSAY OF PHOSPHORUS: CPT

## 2020-01-01 PROCEDURE — C9803 HOPD COVID-19 SPEC COLLECT: HCPCS

## 2020-01-01 PROCEDURE — 80053 COMPREHEN METABOLIC PANEL: CPT

## 2020-01-01 PROCEDURE — 85025 COMPLETE CBC W/AUTO DIFF WBC: CPT

## 2020-01-01 PROCEDURE — 80051 ELECTROLYTE PANEL: CPT

## 2020-01-01 PROCEDURE — 82140 ASSAY OF AMMONIA: CPT

## 2020-01-01 PROCEDURE — 99214 OFFICE O/P EST MOD 30 MIN: CPT | Performed by: INTERNAL MEDICINE

## 2020-01-01 PROCEDURE — 0 IOPAMIDOL PER 1 ML: Performed by: SURGERY

## 2020-01-01 PROCEDURE — 82607 VITAMIN B-12: CPT

## 2020-01-01 PROCEDURE — 25010000002 EPOETIN ALFA-EPBX 40000 UNIT/ML SOLUTION: Performed by: INTERNAL MEDICINE

## 2020-01-01 PROCEDURE — 25010000002 EPOETIN ALFA-EPBX 40000 UNIT/ML SOLUTION: Performed by: NURSE PRACTITIONER

## 2020-01-01 PROCEDURE — 82784 ASSAY IGA/IGD/IGG/IGM EACH: CPT

## 2020-01-01 PROCEDURE — 99213 OFFICE O/P EST LOW 20 MIN: CPT | Performed by: SURGERY

## 2020-01-01 PROCEDURE — 25010000002 FENTANYL CITRATE (PF) 100 MCG/2ML SOLUTION: Performed by: ANESTHESIOLOGY

## 2020-01-01 PROCEDURE — 85384 FIBRINOGEN ACTIVITY: CPT | Performed by: INTERNAL MEDICINE

## 2020-01-01 PROCEDURE — 36415 COLL VENOUS BLD VENIPUNCTURE: CPT

## 2020-01-01 PROCEDURE — 84466 ASSAY OF TRANSFERRIN: CPT

## 2020-01-01 PROCEDURE — C1874 STENT, COATED/COV W/DEL SYS: HCPCS | Performed by: INTERNAL MEDICINE

## 2020-01-01 PROCEDURE — 80048 BASIC METABOLIC PNL TOTAL CA: CPT

## 2020-01-01 PROCEDURE — 83970 ASSAY OF PARATHORMONE: CPT

## 2020-01-01 PROCEDURE — 36600 WITHDRAWAL OF ARTERIAL BLOOD: CPT

## 2020-01-01 PROCEDURE — 82140 ASSAY OF AMMONIA: CPT | Performed by: INTERNAL MEDICINE

## 2020-01-01 PROCEDURE — 93005 ELECTROCARDIOGRAM TRACING: CPT | Performed by: INTERNAL MEDICINE

## 2020-01-01 PROCEDURE — 80053 COMPREHEN METABOLIC PANEL: CPT | Performed by: INTERNAL MEDICINE

## 2020-01-01 PROCEDURE — 82565 ASSAY OF CREATININE: CPT

## 2020-01-01 PROCEDURE — 43274 ERCP DUCT STENT PLACEMENT: CPT | Performed by: INTERNAL MEDICINE

## 2020-01-01 PROCEDURE — 96365 THER/PROPH/DIAG IV INF INIT: CPT

## 2020-01-01 PROCEDURE — 99213 OFFICE O/P EST LOW 20 MIN: CPT | Mod: 95 | Performed by: INTERNAL MEDICINE

## 2020-01-01 PROCEDURE — 82803 BLOOD GASES ANY COMBINATION: CPT

## 2020-01-01 PROCEDURE — C1769 GUIDE WIRE: HCPCS | Performed by: INTERNAL MEDICINE

## 2020-01-01 PROCEDURE — 80048 BASIC METABOLIC PNL TOTAL CA: CPT | Performed by: INTERNAL MEDICINE

## 2020-01-01 PROCEDURE — 85027 COMPLETE CBC AUTOMATED: CPT | Performed by: INTERNAL MEDICINE

## 2020-01-01 PROCEDURE — 43261 ENDO CHOLANGIOPANCREATOGRAPH: CPT | Performed by: INTERNAL MEDICINE

## 2020-01-01 PROCEDURE — 82330 ASSAY OF CALCIUM: CPT

## 2020-01-01 PROCEDURE — 74177 CT ABD & PELVIS W/CONTRAST: CPT

## 2020-01-01 PROCEDURE — 99212 OFFICE O/P EST SF 10 MIN: CPT | Performed by: SURGERY

## 2020-01-01 PROCEDURE — 25010000002 EPOETIN ALFA-EPBX 40000 UNIT/ML SOLUTION: Performed by: FAMILY MEDICINE

## 2020-01-01 PROCEDURE — 85027 COMPLETE CBC AUTOMATED: CPT

## 2020-01-01 PROCEDURE — 82570 ASSAY OF URINE CREATININE: CPT

## 2020-01-01 PROCEDURE — 25010000002 DEXAMETHASONE PER 1 MG: Performed by: ANESTHESIOLOGY

## 2020-01-01 PROCEDURE — 74300 X-RAY BILE DUCTS/PANCREAS: CPT

## 2020-01-01 PROCEDURE — 85025 COMPLETE CBC W/AUTO DIFF WBC: CPT | Performed by: INTERNAL MEDICINE

## 2020-01-01 PROCEDURE — 93010 ELECTROCARDIOGRAM REPORT: CPT | Performed by: INTERNAL MEDICINE

## 2020-01-01 PROCEDURE — 25010000002 PROPOFOL 10 MG/ML EMULSION: Performed by: ANESTHESIOLOGY

## 2020-01-01 PROCEDURE — 25010000002 FERRIC CARBOXYMALTOSE 750 MG/15ML SOLUTION 15 ML VIAL: Performed by: INTERNAL MEDICINE

## 2020-01-01 PROCEDURE — 83605 ASSAY OF LACTIC ACID: CPT

## 2020-01-01 PROCEDURE — 82306 VITAMIN D 25 HYDROXY: CPT

## 2020-01-01 PROCEDURE — 88104 CYTOPATH FL NONGYN SMEARS: CPT | Performed by: INTERNAL MEDICINE

## 2020-01-01 PROCEDURE — 25010000002 LEVOFLOXACIN PER 250 MG

## 2020-01-01 PROCEDURE — 84156 ASSAY OF PROTEIN URINE: CPT

## 2020-01-01 PROCEDURE — 88305 TISSUE EXAM BY PATHOLOGIST: CPT | Performed by: INTERNAL MEDICINE

## 2020-01-01 PROCEDURE — 36415 COLL VENOUS BLD VENIPUNCTURE: CPT | Performed by: INTERNAL MEDICINE

## 2020-01-01 PROCEDURE — 85610 PROTHROMBIN TIME: CPT | Performed by: INTERNAL MEDICINE

## 2020-01-01 PROCEDURE — 83540 ASSAY OF IRON: CPT

## 2020-01-01 PROCEDURE — 82746 ASSAY OF FOLIC ACID SERUM: CPT

## 2020-01-01 PROCEDURE — 25010000002 ONDANSETRON PER 1 MG: Performed by: ANESTHESIOLOGY

## 2020-01-01 PROCEDURE — 82962 GLUCOSE BLOOD TEST: CPT

## 2020-01-01 PROCEDURE — 82977 ASSAY OF GGT: CPT

## 2020-01-01 PROCEDURE — 43264 ERCP REMOVE DUCT CALCULI: CPT | Mod: 59 | Performed by: INTERNAL MEDICINE

## 2020-01-01 PROCEDURE — 99215 OFFICE O/P EST HI 40 MIN: CPT | Performed by: INTERNAL MEDICINE

## 2020-01-01 PROCEDURE — 71260 CT THORAX DX C+: CPT

## 2020-01-01 PROCEDURE — 0 IOPAMIDOL PER 1 ML: Performed by: INTERNAL MEDICINE

## 2020-01-01 PROCEDURE — 87635 SARS-COV-2 COVID-19 AMP PRB: CPT

## 2020-01-01 PROCEDURE — 81003 URINALYSIS AUTO W/O SCOPE: CPT

## 2020-01-01 PROCEDURE — 99214 OFFICE O/P EST MOD 30 MIN: CPT | Performed by: NURSE PRACTITIONER

## 2020-01-01 DEVICE — STENT SYSTEM RMV
Type: IMPLANTABLE DEVICE | Status: FUNCTIONAL
Brand: WALLFLEX BILIARY

## 2020-01-01 RX ORDER — ZINC SULFATE 50(220)MG
CAPSULE ORAL
Qty: 90 | Refills: 3 | Status: ACTIVE

## 2020-01-01 RX ORDER — CIPROFLOXACIN 500 MG/1
TABLET, FILM COATED ORAL
COMMUNITY
Start: 2020-01-01 | End: 2020-01-01

## 2020-01-01 RX ORDER — LEVOFLOXACIN 5 MG/ML
INJECTION, SOLUTION INTRAVENOUS
Status: COMPLETED
Start: 2020-01-01 | End: 2020-01-01

## 2020-01-01 RX ORDER — SODIUM CHLORIDE 9 MG/ML
INJECTION INTRAVENOUS
Status: COMPLETED
Start: 2020-01-01 | End: 2020-01-01

## 2020-01-01 RX ORDER — URSODIOL 500 MG/1
500 TABLET, FILM COATED ORAL 2 TIMES DAILY
COMMUNITY
Start: 2020-01-01 | End: 2021-01-01

## 2020-01-01 RX ORDER — ONDANSETRON 2 MG/ML
4 INJECTION INTRAMUSCULAR; INTRAVENOUS ONCE AS NEEDED
Status: DISCONTINUED | OUTPATIENT
Start: 2020-01-01 | End: 2020-01-01 | Stop reason: HOSPADM

## 2020-01-01 RX ORDER — ONDANSETRON 4 MG/1
4 TABLET, FILM COATED ORAL EVERY 6 HOURS PRN
Status: DISCONTINUED | OUTPATIENT
Start: 2020-01-01 | End: 2020-01-01 | Stop reason: HOSPADM

## 2020-01-01 RX ORDER — SODIUM CHLORIDE 9 MG/ML
1000 INJECTION, SOLUTION INTRAVENOUS CONTINUOUS
Status: DISCONTINUED | OUTPATIENT
Start: 2020-01-01 | End: 2020-01-01 | Stop reason: HOSPADM

## 2020-01-01 RX ORDER — LEVOFLOXACIN 5 MG/ML
500 INJECTION, SOLUTION INTRAVENOUS ONCE
Status: DISCONTINUED | OUTPATIENT
Start: 2020-01-01 | End: 2020-01-01 | Stop reason: SDUPTHER

## 2020-01-01 RX ORDER — METOCLOPRAMIDE HYDROCHLORIDE 5 MG/ML
10 INJECTION INTRAMUSCULAR; INTRAVENOUS 3 TIMES DAILY
Status: DISCONTINUED | OUTPATIENT
Start: 2020-01-01 | End: 2020-01-01 | Stop reason: HOSPADM

## 2020-01-01 RX ORDER — DEXAMETHASONE SODIUM PHOSPHATE 4 MG/ML
INJECTION, SOLUTION INTRA-ARTICULAR; INTRALESIONAL; INTRAMUSCULAR; INTRAVENOUS; SOFT TISSUE AS NEEDED
Status: DISCONTINUED | OUTPATIENT
Start: 2020-01-01 | End: 2020-01-01 | Stop reason: SURG

## 2020-01-01 RX ORDER — ROCURONIUM BROMIDE 10 MG/ML
INJECTION, SOLUTION INTRAVENOUS AS NEEDED
Status: DISCONTINUED | OUTPATIENT
Start: 2020-01-01 | End: 2020-01-01 | Stop reason: SURG

## 2020-01-01 RX ORDER — SODIUM CHLORIDE, SODIUM LACTATE, POTASSIUM CHLORIDE, CALCIUM CHLORIDE 600; 310; 30; 20 MG/100ML; MG/100ML; MG/100ML; MG/100ML
125 INJECTION, SOLUTION INTRAVENOUS CONTINUOUS
Status: DISCONTINUED | OUTPATIENT
Start: 2020-01-01 | End: 2020-01-01 | Stop reason: HOSPADM

## 2020-01-01 RX ORDER — LACTULOSE 10 G/15ML
SOLUTION ORAL
Qty: 0 | Refills: 0 | Status: ACTIVE

## 2020-01-01 RX ORDER — GLYCOPYRROLATE 1 MG/5 ML
SYRINGE (ML) INTRAVENOUS AS NEEDED
Status: DISCONTINUED | OUTPATIENT
Start: 2020-01-01 | End: 2020-01-01 | Stop reason: SURG

## 2020-01-01 RX ORDER — HYDROXYZINE PAMOATE 25 MG/1
CAPSULE ORAL
Qty: 180 | Refills: 2 | Status: ACTIVE

## 2020-01-01 RX ORDER — FENTANYL CITRATE 50 UG/ML
INJECTION, SOLUTION INTRAMUSCULAR; INTRAVENOUS AS NEEDED
Status: DISCONTINUED | OUTPATIENT
Start: 2020-01-01 | End: 2020-01-01 | Stop reason: SURG

## 2020-01-01 RX ORDER — DOXYCYCLINE HYCLATE 50 MG/1
CAPSULE, GELATIN COATED ORAL DAILY
COMMUNITY
Start: 2020-01-01

## 2020-01-01 RX ORDER — LIDOCAINE HYDROCHLORIDE 10 MG/ML
INJECTION, SOLUTION EPIDURAL; INFILTRATION; INTRACAUDAL; PERINEURAL AS NEEDED
Status: DISCONTINUED | OUTPATIENT
Start: 2020-01-01 | End: 2020-01-01 | Stop reason: SURG

## 2020-01-01 RX ORDER — SODIUM CHLORIDE 9 MG/ML
250 INJECTION, SOLUTION INTRAVENOUS ONCE
Status: COMPLETED | OUTPATIENT
Start: 2020-01-01 | End: 2020-01-01

## 2020-01-01 RX ORDER — ONDANSETRON 2 MG/ML
INJECTION INTRAMUSCULAR; INTRAVENOUS AS NEEDED
Status: DISCONTINUED | OUTPATIENT
Start: 2020-01-01 | End: 2020-01-01 | Stop reason: SURG

## 2020-01-01 RX ORDER — PANTOPRAZOLE SODIUM 40 MG/1
80 TABLET, DELAYED RELEASE ORAL
Qty: 90 | Refills: 3 | Status: ACTIVE
Start: 2018-03-05

## 2020-01-01 RX ORDER — PROPOFOL 10 MG/ML
VIAL (ML) INTRAVENOUS AS NEEDED
Status: DISCONTINUED | OUTPATIENT
Start: 2020-01-01 | End: 2020-01-01 | Stop reason: SURG

## 2020-01-01 RX ORDER — NEOSTIGMINE METHYLSULFATE 5 MG/5 ML
SYRINGE (ML) INTRAVENOUS AS NEEDED
Status: DISCONTINUED | OUTPATIENT
Start: 2020-01-01 | End: 2020-01-01 | Stop reason: SURG

## 2020-01-01 RX ORDER — HYDROMORPHONE HCL 110MG/55ML
0.5 PATIENT CONTROLLED ANALGESIA SYRINGE INTRAVENOUS
Status: DISCONTINUED | OUTPATIENT
Start: 2020-01-01 | End: 2020-01-01 | Stop reason: HOSPADM

## 2020-01-01 RX ORDER — NALOXONE HCL 0.4 MG/ML
0.1 VIAL (ML) INJECTION
Status: DISCONTINUED | OUTPATIENT
Start: 2020-01-01 | End: 2020-01-01 | Stop reason: HOSPADM

## 2020-01-01 RX ORDER — ONDANSETRON 2 MG/ML
4 INJECTION INTRAMUSCULAR; INTRAVENOUS EVERY 6 HOURS PRN
Status: DISCONTINUED | OUTPATIENT
Start: 2020-01-01 | End: 2020-01-01 | Stop reason: HOSPADM

## 2020-01-01 RX ORDER — EZETIMIBE 10 MG/1
10 TABLET ORAL DAILY
Qty: 30 TABLET | Refills: 11 | Status: SHIPPED | OUTPATIENT
Start: 2020-01-01 | End: 2021-01-01

## 2020-01-01 RX ORDER — LEVOFLOXACIN 5 MG/ML
500 INJECTION, SOLUTION INTRAVENOUS ONCE
Status: COMPLETED | OUTPATIENT
Start: 2020-01-01 | End: 2020-01-01

## 2020-01-01 RX ORDER — URSODIOL 500 MG/1
TABLET ORAL
Qty: 0 | Refills: 0 | Status: ACTIVE

## 2020-01-01 RX ORDER — URSODIOL 300 MG/1
500 CAPSULE ORAL 2 TIMES DAILY
COMMUNITY
End: 2020-01-01 | Stop reason: ALTCHOICE

## 2020-01-01 RX ADMIN — SODIUM CHLORIDE 1000 ML: 9 INJECTION, SOLUTION INTRAVENOUS at 12:04

## 2020-01-01 RX ADMIN — EPOETIN ALFA-EPBX 40000 UNITS: 40000 INJECTION, SOLUTION INTRAVENOUS; SUBCUTANEOUS at 13:37

## 2020-01-01 RX ADMIN — IOPAMIDOL 100 ML: 755 INJECTION, SOLUTION INTRAVENOUS at 17:15

## 2020-01-01 RX ADMIN — ONDANSETRON 4 MG: 2 INJECTION INTRAMUSCULAR; INTRAVENOUS at 13:05

## 2020-01-01 RX ADMIN — EPOETIN ALFA-EPBX 40000 UNITS: 40000 INJECTION, SOLUTION INTRAVENOUS; SUBCUTANEOUS at 11:40

## 2020-01-01 RX ADMIN — DEXAMETHASONE SODIUM PHOSPHATE 8 MG: 4 INJECTION, SOLUTION INTRAMUSCULAR; INTRAVENOUS at 12:29

## 2020-01-01 RX ADMIN — PROPOFOL 200 MG: 10 INJECTION, EMULSION INTRAVENOUS at 12:22

## 2020-01-01 RX ADMIN — EPOETIN ALFA-EPBX 40000 UNITS: 40000 INJECTION, SOLUTION INTRAVENOUS; SUBCUTANEOUS at 11:12

## 2020-01-01 RX ADMIN — EPOETIN ALFA-EPBX 40000 UNITS: 40000 INJECTION, SOLUTION INTRAVENOUS; SUBCUTANEOUS at 15:15

## 2020-01-01 RX ADMIN — Medication 0.6 MG: at 13:03

## 2020-01-01 RX ADMIN — SODIUM CHLORIDE 750 MG: 900 INJECTION, SOLUTION INTRAVENOUS at 13:17

## 2020-01-01 RX ADMIN — LEVOFLOXACIN 500 MG: 5 INJECTION, SOLUTION INTRAVENOUS at 13:53

## 2020-01-01 RX ADMIN — IOPAMIDOL 100 ML: 755 INJECTION, SOLUTION INTRAVENOUS at 15:00

## 2020-01-01 RX ADMIN — EPOETIN ALFA-EPBX 40000 UNITS: 40000 INJECTION, SOLUTION INTRAVENOUS; SUBCUTANEOUS at 12:12

## 2020-01-01 RX ADMIN — ROCURONIUM BROMIDE 40 MG: 10 INJECTION, SOLUTION INTRAVENOUS at 12:22

## 2020-01-01 RX ADMIN — EPOETIN ALFA-EPBX 40000 UNITS: 40000 INJECTION, SOLUTION INTRAVENOUS; SUBCUTANEOUS at 10:58

## 2020-01-01 RX ADMIN — Medication 3 MG: at 13:03

## 2020-01-01 RX ADMIN — LIDOCAINE HYDROCHLORIDE 50 MG: 10 INJECTION, SOLUTION EPIDURAL; INFILTRATION; INTRACAUDAL; PERINEURAL at 12:22

## 2020-01-01 RX ADMIN — SUGAMMADEX 200 MG: 100 INJECTION, SOLUTION INTRAVENOUS at 13:30

## 2020-01-01 RX ADMIN — FENTANYL CITRATE 100 MCG: 50 INJECTION, SOLUTION INTRAMUSCULAR; INTRAVENOUS at 12:22

## 2020-01-01 RX ADMIN — EPOETIN ALFA-EPBX 40000 UNITS: 40000 INJECTION, SOLUTION INTRAVENOUS; SUBCUTANEOUS at 11:30

## 2020-01-01 RX ADMIN — SODIUM CHLORIDE 250 ML: 9 INJECTION, SOLUTION INTRAVENOUS at 13:16

## 2020-01-03 ENCOUNTER — HOSPITAL ENCOUNTER (OUTPATIENT)
Dept: ONCOLOGY | Facility: HOSPITAL | Age: 75
Discharge: HOME | End: 2020-01-03
Admitting: INTERNAL MEDICINE

## 2020-01-03 ENCOUNTER — LAB (OUTPATIENT)
Dept: LAB | Facility: HOSPITAL | Age: 75
End: 2020-01-03

## 2020-01-03 VITALS
RESPIRATION RATE: 18 BRPM | DIASTOLIC BLOOD PRESSURE: 66 MMHG | TEMPERATURE: 98.4 F | WEIGHT: 301.4 LBS | SYSTOLIC BLOOD PRESSURE: 161 MMHG | HEIGHT: 68 IN | BODY MASS INDEX: 45.68 KG/M2 | HEART RATE: 80 BPM

## 2020-01-03 DIAGNOSIS — D47.2 MGUS (MONOCLONAL GAMMOPATHY OF UNKNOWN SIGNIFICANCE): ICD-10-CM

## 2020-01-03 DIAGNOSIS — D50.0 IRON DEFICIENCY ANEMIA DUE TO CHRONIC BLOOD LOSS: ICD-10-CM

## 2020-01-03 DIAGNOSIS — D63.1 ANEMIA DUE TO STAGE 3 CHRONIC KIDNEY DISEASE (HCC): Primary | ICD-10-CM

## 2020-01-03 DIAGNOSIS — N18.30 ANEMIA DUE TO STAGE 3 CHRONIC KIDNEY DISEASE (HCC): Primary | ICD-10-CM

## 2020-01-03 LAB
BASOPHILS # BLD AUTO: 0.07 10*3/MM3 (ref 0–0.2)
BASOPHILS NFR BLD AUTO: 0.8 % (ref 0–1.5)
DEPRECATED RDW RBC AUTO: 71.9 FL (ref 37–54)
EOSINOPHIL # BLD AUTO: 0.57 10*3/MM3 (ref 0–0.4)
EOSINOPHIL NFR BLD AUTO: 6.2 % (ref 0.3–6.2)
ERYTHROCYTE [DISTWIDTH] IN BLOOD BY AUTOMATED COUNT: 19.5 % (ref 12.3–15.4)
HCT VFR BLD AUTO: 26.2 % (ref 37.5–51)
HGB BLD-MCNC: 8.6 G/DL (ref 13–17.7)
LYMPHOCYTES # BLD AUTO: 1.45 10*3/MM3 (ref 0.7–3.1)
LYMPHOCYTES NFR BLD AUTO: 15.7 % (ref 19.6–45.3)
MCH RBC QN AUTO: 34.4 PG (ref 26.6–33)
MCHC RBC AUTO-ENTMCNC: 32.8 G/DL (ref 31.5–35.7)
MCV RBC AUTO: 104.8 FL (ref 79–97)
MONOCYTES # BLD AUTO: 0.8 10*3/MM3 (ref 0.1–0.9)
MONOCYTES NFR BLD AUTO: 8.7 % (ref 5–12)
NEUTROPHILS # BLD AUTO: 6.34 10*3/MM3 (ref 1.7–7)
NEUTROPHILS NFR BLD AUTO: 68.6 % (ref 42.7–76)
PLATELET # BLD AUTO: 184 10*3/MM3 (ref 140–450)
PMV BLD AUTO: 10.9 FL (ref 6–12)
RBC # BLD AUTO: 2.5 10*6/MM3 (ref 4.14–5.8)
WBC NRBC COR # BLD: 9.23 10*3/MM3 (ref 3.4–10.8)

## 2020-01-03 PROCEDURE — 85025 COMPLETE CBC W/AUTO DIFF WBC: CPT

## 2020-01-03 PROCEDURE — 36415 COLL VENOUS BLD VENIPUNCTURE: CPT

## 2020-01-03 PROCEDURE — 25010000002 EPOETIN ALFA-EPBX 40000 UNIT/ML SOLUTION: Performed by: INTERNAL MEDICINE

## 2020-01-03 PROCEDURE — 96372 THER/PROPH/DIAG INJ SC/IM: CPT

## 2020-01-03 RX ADMIN — EPOETIN ALFA-EPBX 40000 UNITS: 40000 INJECTION, SOLUTION INTRAVENOUS; SUBCUTANEOUS at 13:44

## 2020-01-06 ENCOUNTER — TRANSCRIBE ORDERS (OUTPATIENT)
Dept: ADMINISTRATIVE | Facility: HOSPITAL | Age: 75
End: 2020-01-06

## 2020-01-06 ENCOUNTER — LAB (OUTPATIENT)
Dept: LAB | Facility: HOSPITAL | Age: 75
End: 2020-01-06

## 2020-01-06 DIAGNOSIS — D63.1 ANEMIA DUE TO STAGE 3 CHRONIC KIDNEY DISEASE (HCC): Primary | ICD-10-CM

## 2020-01-06 DIAGNOSIS — N18.30 ANEMIA DUE TO STAGE 3 CHRONIC KIDNEY DISEASE (HCC): Primary | ICD-10-CM

## 2020-01-06 DIAGNOSIS — N18.30 CKD (CHRONIC KIDNEY DISEASE), STAGE III (HCC): ICD-10-CM

## 2020-01-06 DIAGNOSIS — N18.30 ANEMIA DUE TO STAGE 3 CHRONIC KIDNEY DISEASE (HCC): ICD-10-CM

## 2020-01-06 DIAGNOSIS — D63.1 ANEMIA DUE TO STAGE 3 CHRONIC KIDNEY DISEASE (HCC): ICD-10-CM

## 2020-01-06 LAB
25(OH)D3 SERPL-MCNC: 27.3 NG/ML (ref 30–100)
ALBUMIN SERPL-MCNC: 3.1 G/DL (ref 3.5–5.2)
ALBUMIN/GLOB SERPL: 0.7 G/DL
ALP SERPL-CCNC: 127 U/L (ref 39–117)
ALT SERPL W P-5'-P-CCNC: 21 U/L (ref 1–41)
ANION GAP SERPL CALCULATED.3IONS-SCNC: 12.4 MMOL/L (ref 5–15)
AST SERPL-CCNC: 38 U/L (ref 1–40)
BASOPHILS # BLD AUTO: 0.07 10*3/MM3 (ref 0–0.2)
BASOPHILS NFR BLD AUTO: 1 % (ref 0–1.5)
BILIRUB SERPL-MCNC: 0.3 MG/DL (ref 0.2–1.2)
BUN BLD-MCNC: 20 MG/DL (ref 8–23)
BUN/CREAT SERPL: 12.6 (ref 7–25)
CALCIUM SPEC-SCNC: 8.3 MG/DL (ref 8.6–10.5)
CHLORIDE SERPL-SCNC: 99 MMOL/L (ref 98–107)
CK SERPL-CCNC: 50 U/L (ref 20–200)
CO2 SERPL-SCNC: 24.6 MMOL/L (ref 22–29)
CREAT BLD-MCNC: 1.59 MG/DL (ref 0.76–1.27)
DEPRECATED RDW RBC AUTO: 62.4 FL (ref 37–54)
EOSINOPHIL # BLD AUTO: 0.33 10*3/MM3 (ref 0–0.4)
EOSINOPHIL NFR BLD AUTO: 4.5 % (ref 0.3–6.2)
ERYTHROCYTE [DISTWIDTH] IN BLOOD BY AUTOMATED COUNT: 16.5 % (ref 12.3–15.4)
GFR SERPL CREATININE-BSD FRML MDRD: 43 ML/MIN/1.73
GLOBULIN UR ELPH-MCNC: 4.3 GM/DL
GLUCOSE BLD-MCNC: 223 MG/DL (ref 65–99)
HCT VFR BLD AUTO: 24.6 % (ref 37.5–51)
HGB BLD-MCNC: 8.2 G/DL (ref 13–17.7)
IMM GRANULOCYTES # BLD AUTO: 0.04 10*3/MM3 (ref 0–0.05)
IMM GRANULOCYTES NFR BLD AUTO: 0.5 % (ref 0–0.5)
LYMPHOCYTES # BLD AUTO: 1.08 10*3/MM3 (ref 0.7–3.1)
LYMPHOCYTES NFR BLD AUTO: 14.7 % (ref 19.6–45.3)
MAGNESIUM SERPL-MCNC: 2.1 MG/DL (ref 1.6–2.4)
MCH RBC QN AUTO: 34.7 PG (ref 26.6–33)
MCHC RBC AUTO-ENTMCNC: 33.3 G/DL (ref 31.5–35.7)
MCV RBC AUTO: 104.2 FL (ref 79–97)
MONOCYTES # BLD AUTO: 0.71 10*3/MM3 (ref 0.1–0.9)
MONOCYTES NFR BLD AUTO: 9.6 % (ref 5–12)
NEUTROPHILS # BLD AUTO: 5.13 10*3/MM3 (ref 1.7–7)
NEUTROPHILS NFR BLD AUTO: 69.7 % (ref 42.7–76)
NRBC BLD AUTO-RTO: 0 /100 WBC (ref 0–0.2)
PHOSPHATE SERPL-MCNC: 1.1 MG/DL (ref 2.5–4.5)
PLATELET # BLD AUTO: 201 10*3/MM3 (ref 140–450)
PMV BLD AUTO: 11.9 FL (ref 6–12)
POTASSIUM BLD-SCNC: 4.3 MMOL/L (ref 3.5–5.2)
PROT SERPL-MCNC: 7.4 G/DL (ref 6–8.5)
PTH-INTACT SERPL-MCNC: 55.6 PG/ML (ref 15–65)
RBC # BLD AUTO: 2.36 10*6/MM3 (ref 4.14–5.8)
SODIUM BLD-SCNC: 136 MMOL/L (ref 136–145)
URATE SERPL-MCNC: 7.2 MG/DL (ref 3.4–7)
WBC NRBC COR # BLD: 7.36 10*3/MM3 (ref 3.4–10.8)

## 2020-01-06 PROCEDURE — 84550 ASSAY OF BLOOD/URIC ACID: CPT

## 2020-01-06 PROCEDURE — 80053 COMPREHEN METABOLIC PANEL: CPT

## 2020-01-06 PROCEDURE — 36415 COLL VENOUS BLD VENIPUNCTURE: CPT

## 2020-01-06 PROCEDURE — 83735 ASSAY OF MAGNESIUM: CPT

## 2020-01-06 PROCEDURE — 82550 ASSAY OF CK (CPK): CPT

## 2020-01-06 PROCEDURE — 83970 ASSAY OF PARATHORMONE: CPT

## 2020-01-06 PROCEDURE — 85025 COMPLETE CBC W/AUTO DIFF WBC: CPT

## 2020-01-06 PROCEDURE — 82306 VITAMIN D 25 HYDROXY: CPT

## 2020-01-06 PROCEDURE — 84100 ASSAY OF PHOSPHORUS: CPT

## 2020-01-08 ENCOUNTER — ANESTHESIA EVENT (OUTPATIENT)
Dept: GASTROENTEROLOGY | Facility: HOSPITAL | Age: 75
End: 2020-01-08

## 2020-01-09 ENCOUNTER — ON CAMPUS - OUTPATIENT (AMBULATORY)
Dept: URBAN - METROPOLITAN AREA HOSPITAL 85 | Facility: HOSPITAL | Age: 75
End: 2020-01-09
Payer: COMMERCIAL

## 2020-01-09 ENCOUNTER — HOSPITAL ENCOUNTER (OUTPATIENT)
Facility: HOSPITAL | Age: 75
Setting detail: HOSPITAL OUTPATIENT SURGERY
Discharge: HOME | End: 2020-01-09
Attending: INTERNAL MEDICINE | Admitting: INTERNAL MEDICINE

## 2020-01-09 ENCOUNTER — ANESTHESIA (OUTPATIENT)
Dept: GASTROENTEROLOGY | Facility: HOSPITAL | Age: 75
End: 2020-01-09

## 2020-01-09 VITALS
BODY MASS INDEX: 39.01 KG/M2 | DIASTOLIC BLOOD PRESSURE: 58 MMHG | OXYGEN SATURATION: 97 % | HEIGHT: 74 IN | WEIGHT: 304 LBS | SYSTOLIC BLOOD PRESSURE: 139 MMHG | HEART RATE: 76 BPM

## 2020-01-09 DIAGNOSIS — K31.819 GASTRIC ANTRAL VASCULAR ECTASIA: ICD-10-CM

## 2020-01-09 DIAGNOSIS — N18.30 ANEMIA DUE TO STAGE 3 CHRONIC KIDNEY DISEASE (HCC): ICD-10-CM

## 2020-01-09 DIAGNOSIS — K22.70 BARRETT'S ESOPHAGUS WITHOUT DYSPLASIA: ICD-10-CM

## 2020-01-09 DIAGNOSIS — K31.819 ANGIODYSPLASIA OF STOMACH AND DUODENUM WITHOUT BLEEDING: ICD-10-CM

## 2020-01-09 DIAGNOSIS — D63.1 ANEMIA DUE TO STAGE 3 CHRONIC KIDNEY DISEASE (HCC): ICD-10-CM

## 2020-01-09 DIAGNOSIS — K44.9 DIAPHRAGMATIC HERNIA WITHOUT OBSTRUCTION OR GANGRENE: ICD-10-CM

## 2020-01-09 LAB — GLUCOSE BLDC GLUCOMTR-MCNC: 238 MG/DL (ref 70–105)

## 2020-01-09 PROCEDURE — 43239 EGD BIOPSY SINGLE/MULTIPLE: CPT | Performed by: INTERNAL MEDICINE

## 2020-01-09 PROCEDURE — 25010000002 PROPOFOL 10 MG/ML EMULSION: Performed by: ANESTHESIOLOGY

## 2020-01-09 PROCEDURE — 88305 TISSUE EXAM BY PATHOLOGIST: CPT | Performed by: INTERNAL MEDICINE

## 2020-01-09 PROCEDURE — 82962 GLUCOSE BLOOD TEST: CPT

## 2020-01-09 RX ORDER — PROMETHAZINE HYDROCHLORIDE 25 MG/ML
6.25 INJECTION, SOLUTION INTRAMUSCULAR; INTRAVENOUS ONCE AS NEEDED
Status: DISCONTINUED | OUTPATIENT
Start: 2020-01-09 | End: 2020-01-09 | Stop reason: HOSPADM

## 2020-01-09 RX ORDER — LABETALOL HYDROCHLORIDE 5 MG/ML
5 INJECTION, SOLUTION INTRAVENOUS
Status: DISCONTINUED | OUTPATIENT
Start: 2020-01-09 | End: 2020-01-09 | Stop reason: HOSPADM

## 2020-01-09 RX ORDER — SODIUM CHLORIDE 0.9 % (FLUSH) 0.9 %
10 SYRINGE (ML) INJECTION EVERY 12 HOURS SCHEDULED
Status: DISCONTINUED | OUTPATIENT
Start: 2020-01-09 | End: 2020-01-09 | Stop reason: HOSPADM

## 2020-01-09 RX ORDER — ONDANSETRON 2 MG/ML
4 INJECTION INTRAMUSCULAR; INTRAVENOUS ONCE AS NEEDED
Status: DISCONTINUED | OUTPATIENT
Start: 2020-01-09 | End: 2020-01-09 | Stop reason: HOSPADM

## 2020-01-09 RX ORDER — HYDRALAZINE HYDROCHLORIDE 20 MG/ML
5 INJECTION INTRAMUSCULAR; INTRAVENOUS
Status: DISCONTINUED | OUTPATIENT
Start: 2020-01-09 | End: 2020-01-09 | Stop reason: HOSPADM

## 2020-01-09 RX ORDER — ACETAMINOPHEN 325 MG/1
650 TABLET ORAL ONCE AS NEEDED
Status: DISCONTINUED | OUTPATIENT
Start: 2020-01-09 | End: 2020-01-09 | Stop reason: HOSPADM

## 2020-01-09 RX ORDER — SODIUM CHLORIDE 0.9 % (FLUSH) 0.9 %
10 SYRINGE (ML) INJECTION AS NEEDED
Status: DISCONTINUED | OUTPATIENT
Start: 2020-01-09 | End: 2020-01-09 | Stop reason: HOSPADM

## 2020-01-09 RX ORDER — PROMETHAZINE HYDROCHLORIDE 25 MG/1
25 SUPPOSITORY RECTAL ONCE AS NEEDED
Status: DISCONTINUED | OUTPATIENT
Start: 2020-01-09 | End: 2020-01-09 | Stop reason: HOSPADM

## 2020-01-09 RX ORDER — PROMETHAZINE HYDROCHLORIDE 25 MG/1
25 TABLET ORAL ONCE AS NEEDED
Status: DISCONTINUED | OUTPATIENT
Start: 2020-01-09 | End: 2020-01-09 | Stop reason: HOSPADM

## 2020-01-09 RX ORDER — PROPOFOL 10 MG/ML
VIAL (ML) INTRAVENOUS AS NEEDED
Status: DISCONTINUED | OUTPATIENT
Start: 2020-01-09 | End: 2020-01-09 | Stop reason: SURG

## 2020-01-09 RX ORDER — PHENYLEPHRINE HCL IN 0.9% NACL 0.5 MG/5ML
.5-3 SYRINGE (ML) INTRAVENOUS
Status: DISCONTINUED | OUTPATIENT
Start: 2020-01-09 | End: 2020-01-09 | Stop reason: HOSPADM

## 2020-01-09 RX ORDER — MEPERIDINE HYDROCHLORIDE 25 MG/ML
12.5 INJECTION INTRAMUSCULAR; INTRAVENOUS; SUBCUTANEOUS
Status: DISCONTINUED | OUTPATIENT
Start: 2020-01-09 | End: 2020-01-09 | Stop reason: HOSPADM

## 2020-01-09 RX ORDER — SODIUM CHLORIDE 9 MG/ML
9 INJECTION, SOLUTION INTRAVENOUS CONTINUOUS PRN
Status: DISCONTINUED | OUTPATIENT
Start: 2020-01-09 | End: 2020-01-09 | Stop reason: HOSPADM

## 2020-01-09 RX ORDER — SODIUM CHLORIDE 0.9 % (FLUSH) 0.9 %
3 SYRINGE (ML) INJECTION EVERY 12 HOURS SCHEDULED
Status: DISCONTINUED | OUTPATIENT
Start: 2020-01-09 | End: 2020-01-09 | Stop reason: HOSPADM

## 2020-01-09 RX ORDER — ACETAMINOPHEN 650 MG/1
650 SUPPOSITORY RECTAL ONCE AS NEEDED
Status: DISCONTINUED | OUTPATIENT
Start: 2020-01-09 | End: 2020-01-09 | Stop reason: HOSPADM

## 2020-01-09 RX ADMIN — SODIUM CHLORIDE 9 ML/HR: 0.9 INJECTION, SOLUTION INTRAVENOUS at 10:41

## 2020-01-09 RX ADMIN — PROPOFOL 100 MG: 10 INJECTION, EMULSION INTRAVENOUS at 11:41

## 2020-01-09 RX ADMIN — PROPOFOL 200 MG: 10 INJECTION, EMULSION INTRAVENOUS at 11:32

## 2020-01-09 NOTE — ANESTHESIA POSTPROCEDURE EVALUATION
Patient: Bry Ratliff    Procedure Summary     Date:  01/09/20 Room / Location:  [facility] ENDOSCOPY 4 / [facility] ENDOSCOPY    Anesthesia Start:  1131 Anesthesia Stop:  1153    Procedure:  ESOPHAGOGASTRODUODENOSCOPY (N/A ) Diagnosis:       Gastric antral vascular ectasia      (GASTRIC ANTRAL VASCULAR ECTASIA)    Surgeon:  Marisel Gomez MD Provider:  Anna Etienne MD    Anesthesia Type:  MAC ASA Status:  3          Anesthesia Type: MAC    Vitals  No vitals data found for the desired time range.          Post Anesthesia Care and Evaluation    Patient location during evaluation: PACU  Patient participation: complete - patient participated  Level of consciousness: sleepy but conscious  Pain management: adequate  Airway patency: patent  Anesthetic complications: No anesthetic complications  PONV Status: controlled  Cardiovascular status: acceptable  Respiratory status: acceptable  Hydration status: acceptable  Post Neuraxial Block status: Motor and sensory function returned to baseline

## 2020-01-09 NOTE — OP NOTE
ESOPHAGOGASTRODUODENOSCOPY Procedure Report    Patient Name:  Bry Ratliff  YOB: 1945    Date of Surgery:  1/9/2020     Pre-Op Diagnosis:  GASTRIC ANTRAL VASCULAR ECTASIA         Procedure/CPT® Codes:      Procedure(s):  ESOPHAGOGASTRODUODENOSCOPY WITH BIOPSY, ARGON PLASMA COAGULATION OF GASTRIC ANTREL VASCULAR ECTASIA,    Staff:  Surgeon(s):  Marisel Gomez MD      Anesthesia: Monitored Anesthesia Care    Description of Procedure:  A description of the procedure as well as risks, benefits and alternative methods were explained to the patient who voiced understanding and signed the corresponding consent form. A physical exam was performed and vital signs were monitored throughout the procedure.    An upper GI endoscope was placed into the mouth and proceeded through the esophagus, stomach and second portion of the duodenum without difficulty. The scope was then retroflexed and the fundus was visualized. The procedure was not difficult and there were no immediate complications.  Again noticed a short segment of Syed's esophagus which was biopsied.  Does have a small hiatal hernia.  Gastric antral vascular ectasia was noticed which have improved after previous application of APC.  Still there was significant lesion in the antrum and the distal part of the body they were cauterized using a standard setting for APC.  Duodenal mucosa looks normal.  Patient tolerated procedure very well.      Impression:  1.  Moderately severe gastric antral vascular ectasia APC applied for cauterization  2.  Short segment of Syed's biopsy taken.  3.  Small hiatal hernia.    Recommendations:  Patient will continue with iron supplement.  Follow hemoglobin  Repeat EGD as well as perform colonoscopy examination next time to evaluate    evaluate recurrent anemia.  Follow up with biopsy report      Marisel Gomez MD     Date: 1/9/2020    Time: 12:01 PM

## 2020-01-09 NOTE — ANESTHESIA PREPROCEDURE EVALUATION
Anesthesia Evaluation     Patient summary reviewed and Nursing notes reviewed   no history of anesthetic complications:  NPO Solid Status: > 8 hours  NPO Liquid Status: > 8 hours           Airway   Mallampati: II  TM distance: >3 FB  Neck ROM: full  Large neck circumference and No difficulty expected  Dental      Pulmonary     breath sounds clear to auscultation  (+) sleep apnea on CPAP,   Cardiovascular     ECG reviewed  Rhythm: regular  Rate: normal    (+) hypertension poorly controlled 2 medications or greater, CAD, hyperlipidemia,       Neuro/Psych- negative ROS  GI/Hepatic/Renal/Endo    (+) obesity, morbid obesity, GERD well controlled,  hepatitis (THOMAS), liver disease fatty liver disease, renal disease CRI, diabetes mellitus type 2 poorly controlled using insulin,     Musculoskeletal (-) negative ROS    Abdominal   (+) obese,     Abdomen: soft.   Substance History - negative use     OB/GYN negative ob/gyn ROS         Other - negative ROS                       Anesthesia Plan    ASA 3     MAC       Anesthetic plan, all risks, benefits, and alternatives have been provided, discussed and informed consent has been obtained with: patient.

## 2020-01-09 NOTE — H&P
Patient Care Team:  Paulette Harris MD as PCP - General  Josefina Plascencia MD as Consulting Physician (Nephrology)      Subjective .     History of present illness:    Bry Ratliff is a 74 y.o. male who presents today for Procedure(s):  ESOPHAGOGASTRODUODENOSCOPY for the indications listed below.     The updated Patient Profile was reviewed prior to the procedure, in conjunction with the Physical Exam, including medical conditions, surgical procedures, medications, allergies, family history and social history.     Pre-operatively, I reviewed the indication(s) for the procedure, the risks of the procedure [including but not limited to: unexpected bleeding possibly requiring hospitalization and/or unplanned repeat procedures, perforation possibly requiring surgical treatment, missed lesions and complications of sedation/MAC (also explained by anesthesia staff)].     I have evaluated the patient for risks associated with the planned anesthesia and the procedure to be performed and find the patient an acceptable candidate for IV sedation.    Multiple opportunities were provided for any questions or concerns, and all questions were answered satisfactorily before any anesthesia was administered. We will proceed with the planned procedure.      ASSESSMENT/PLAN:  EGD  C        Past Medical History:  Past Medical History:   Diagnosis Date   • Anemia    • B12 deficiency 2009   • Syed's esophagus    • CAD (coronary artery disease)    • Diabetes mellitus (CMS/HCC)    • GERD (gastroesophageal reflux disease)    • History of echocardiogram     03/03/2017 - 12/03/2014 - 04/2012   • Hyperlipidemia    • Hypertension    • Morbid obesity (CMS/HCC)    • KATHIA treated with BiPAP    • Renal insufficiency    • S/P lumbar laminectomy        Past Surgical History:  Past Surgical History:   Procedure Laterality Date   • BACK SURGERY  1971   • CATARACT EXTRACTION  2014   • CHOLECYSTECTOMY  02/24/2019   • COLONOSCOPY  03/2018    • CORONARY ANGIOPLASTY  08/24/2009    PCI stent - succesful placement of 3.5/23 promus stent in proximal right coronary artery   • CORONARY ANGIOPLASTY WITH STENT PLACEMENT  08/27/2009    patient had stent placed to proximal right coronary artery   • CYSTOSCOPY BLADDER STONE LITHOTRIPSY  1997   • ENDOSCOPY N/A 10/18/2019    Procedure: ESOPHAGOGASTRODUODENOSCOPY with argon plasma coagulation of gastric antral vascular ectasia;  Surgeon: Marisel Gomez MD;  Location: [facility] ENDOSCOPY;  Service: Gastroenterology   • ERCP N/A 11/20/2019    Procedure: ERCP, clearance of bile duct with balloon, placement of biliary stent, EGD with argon plasma coagulation of gastric antral vascular ectasia;  Surgeon: Marisel Gomez MD;  Location: [facility] ENDOSCOPY;  Service: Gastroenterology   • OTHER SURGICAL HISTORY  11/2018    IVC filter implant   • RENAL ARTERY STENT Left        Social History:  Social History     Tobacco Use   • Smoking status: Never Smoker   • Smokeless tobacco: Never Used   Substance Use Topics   • Alcohol use: No     Frequency: Never   • Drug use: No       Family History:  Family History   Problem Relation Age of Onset   • Hyperlipidemia Other    • Hypertension Other        Medications:  No medications prior to admission.       Scheduled Meds:  Continuous Infusions:  No current facility-administered medications for this encounter.   PRN Meds:.    ALLERGIES:  Patient has no known allergies.        Objective     Vital Signs:        Physical Exam:      General Appearance:    Awake and alert, in no acute distress   Lungs:     Clear to auscultation bilaterally, respirations regular, even and unlabored    Heart:    Regular rhythm and normal rate, normal S1 and S2, no            murmur, no gallop, no rub   Abdomen:     Normal bowel sounds, soft, non-tender, no rebound or guarding, non-distended, no hepatosplenomegaly        Results Review:   I reviewed the patient's labs and imaging.  Lab Results (last 24 hours)     **  No results found for the last 24 hours. **          Imaging Results (Last 24 Hours)     ** No results found for the last 24 hours. **             I discussed the patients findings and my recommendations with the patient.  Marisel Gomez MD  01/09/20  7:59 AM

## 2020-01-10 ENCOUNTER — HOSPITAL ENCOUNTER (OUTPATIENT)
Dept: ONCOLOGY | Facility: HOSPITAL | Age: 75
Discharge: HOME | End: 2020-01-10
Admitting: INTERNAL MEDICINE

## 2020-01-10 ENCOUNTER — LAB (OUTPATIENT)
Dept: LAB | Facility: HOSPITAL | Age: 75
End: 2020-01-10

## 2020-01-10 VITALS
SYSTOLIC BLOOD PRESSURE: 124 MMHG | HEART RATE: 83 BPM | RESPIRATION RATE: 18 BRPM | HEIGHT: 74 IN | TEMPERATURE: 98.6 F | DIASTOLIC BLOOD PRESSURE: 58 MMHG | WEIGHT: 300.4 LBS | BODY MASS INDEX: 38.55 KG/M2

## 2020-01-10 DIAGNOSIS — N18.30 ANEMIA DUE TO STAGE 3 CHRONIC KIDNEY DISEASE (HCC): Primary | ICD-10-CM

## 2020-01-10 DIAGNOSIS — D63.1 ANEMIA DUE TO STAGE 3 CHRONIC KIDNEY DISEASE (HCC): ICD-10-CM

## 2020-01-10 DIAGNOSIS — N18.30 ANEMIA DUE TO STAGE 3 CHRONIC KIDNEY DISEASE (HCC): ICD-10-CM

## 2020-01-10 DIAGNOSIS — D63.1 ANEMIA DUE TO STAGE 3 CHRONIC KIDNEY DISEASE (HCC): Primary | ICD-10-CM

## 2020-01-10 LAB
BASOPHILS # BLD AUTO: 0.06 10*3/MM3 (ref 0–0.2)
BASOPHILS NFR BLD AUTO: 0.5 % (ref 0–1.5)
DEPRECATED RDW RBC AUTO: 67.4 FL (ref 37–54)
EOSINOPHIL # BLD AUTO: 0.38 10*3/MM3 (ref 0–0.4)
EOSINOPHIL NFR BLD AUTO: 3.3 % (ref 0.3–6.2)
ERYTHROCYTE [DISTWIDTH] IN BLOOD BY AUTOMATED COUNT: 18.2 % (ref 12.3–15.4)
HCT VFR BLD AUTO: 26.9 % (ref 37.5–51)
HGB BLD-MCNC: 8.7 G/DL (ref 13–17.7)
LAB AP CASE REPORT: NORMAL
LYMPHOCYTES # BLD AUTO: 1.71 10*3/MM3 (ref 0.7–3.1)
LYMPHOCYTES NFR BLD AUTO: 15 % (ref 19.6–45.3)
MCH RBC QN AUTO: 34.3 PG (ref 26.6–33)
MCHC RBC AUTO-ENTMCNC: 32.3 G/DL (ref 31.5–35.7)
MCV RBC AUTO: 105.9 FL (ref 79–97)
MONOCYTES # BLD AUTO: 0.9 10*3/MM3 (ref 0.1–0.9)
MONOCYTES NFR BLD AUTO: 7.9 % (ref 5–12)
NEUTROPHILS # BLD AUTO: 8.38 10*3/MM3 (ref 1.7–7)
NEUTROPHILS NFR BLD AUTO: 73.3 % (ref 42.7–76)
PATH REPORT.FINAL DX SPEC: NORMAL
PATH REPORT.GROSS SPEC: NORMAL
PLATELET # BLD AUTO: 240 10*3/MM3 (ref 140–450)
PMV BLD AUTO: 10.3 FL (ref 6–12)
RBC # BLD AUTO: 2.54 10*6/MM3 (ref 4.14–5.8)
WBC NRBC COR # BLD: 11.43 10*3/MM3 (ref 3.4–10.8)

## 2020-01-10 PROCEDURE — 96372 THER/PROPH/DIAG INJ SC/IM: CPT | Performed by: INTERNAL MEDICINE

## 2020-01-10 PROCEDURE — 85025 COMPLETE CBC W/AUTO DIFF WBC: CPT

## 2020-01-10 PROCEDURE — 25010000002 EPOETIN ALFA-EPBX 40000 UNIT/ML SOLUTION: Performed by: INTERNAL MEDICINE

## 2020-01-10 RX ADMIN — EPOETIN ALFA-EPBX 40000 UNITS: 40000 INJECTION, SOLUTION INTRAVENOUS; SUBCUTANEOUS at 13:53

## 2020-01-16 ENCOUNTER — LAB (OUTPATIENT)
Dept: LAB | Facility: HOSPITAL | Age: 75
End: 2020-01-16

## 2020-01-16 DIAGNOSIS — N18.30 TYPE 2 DIABETES MELLITUS WITH STAGE 3 CHRONIC KIDNEY DISEASE, WITH LONG-TERM CURRENT USE OF INSULIN (HCC): ICD-10-CM

## 2020-01-16 DIAGNOSIS — E11.22 TYPE 2 DIABETES MELLITUS WITH STAGE 3 CHRONIC KIDNEY DISEASE, WITH LONG-TERM CURRENT USE OF INSULIN (HCC): ICD-10-CM

## 2020-01-16 DIAGNOSIS — I10 ESSENTIAL HYPERTENSION: ICD-10-CM

## 2020-01-16 DIAGNOSIS — Z79.4 TYPE 2 DIABETES MELLITUS WITH STAGE 3 CHRONIC KIDNEY DISEASE, WITH LONG-TERM CURRENT USE OF INSULIN (HCC): ICD-10-CM

## 2020-01-16 DIAGNOSIS — E78.2 MIXED HYPERLIPIDEMIA: ICD-10-CM

## 2020-01-16 DIAGNOSIS — E03.9 ACQUIRED HYPOTHYROIDISM: ICD-10-CM

## 2020-01-16 DIAGNOSIS — K74.60 HEPATIC CIRRHOSIS, UNSPECIFIED HEPATIC CIRRHOSIS TYPE, UNSPECIFIED WHETHER ASCITES PRESENT (HCC): ICD-10-CM

## 2020-01-16 LAB
ALBUMIN SERPL-MCNC: 3 G/DL (ref 3.5–5.2)
ALBUMIN/GLOB SERPL: 0.7 G/DL
ALP SERPL-CCNC: 116 U/L (ref 39–117)
ALT SERPL W P-5'-P-CCNC: 15 U/L (ref 1–41)
ANION GAP SERPL CALCULATED.3IONS-SCNC: 14.6 MMOL/L (ref 5–15)
AST SERPL-CCNC: 28 U/L (ref 1–40)
BILIRUB SERPL-MCNC: 0.5 MG/DL (ref 0.2–1.2)
BUN BLD-MCNC: 16 MG/DL (ref 8–23)
BUN/CREAT SERPL: 10.6 (ref 7–25)
CALCIUM SPEC-SCNC: 8.7 MG/DL (ref 8.6–10.5)
CHLORIDE SERPL-SCNC: 99 MMOL/L (ref 98–107)
CHOLEST SERPL-MCNC: 139 MG/DL (ref 0–200)
CO2 SERPL-SCNC: 25.4 MMOL/L (ref 22–29)
CREAT BLD-MCNC: 1.51 MG/DL (ref 0.76–1.27)
GFR SERPL CREATININE-BSD FRML MDRD: 46 ML/MIN/1.73
GLOBULIN UR ELPH-MCNC: 4.2 GM/DL
GLUCOSE BLD-MCNC: 214 MG/DL (ref 65–99)
HBA1C MFR BLD: 6 % (ref 3.5–5.6)
HDLC SERPL-MCNC: 47 MG/DL (ref 40–60)
LDLC SERPL CALC-MCNC: 64 MG/DL (ref 0–100)
LDLC/HDLC SERPL: 1.36 {RATIO}
POTASSIUM BLD-SCNC: 4 MMOL/L (ref 3.5–5.2)
PROT SERPL-MCNC: 7.2 G/DL (ref 6–8.5)
SODIUM BLD-SCNC: 139 MMOL/L (ref 136–145)
T4 FREE SERPL-MCNC: 0.87 NG/DL (ref 0.93–1.7)
TRIGL SERPL-MCNC: 141 MG/DL (ref 0–150)
TSH SERPL DL<=0.05 MIU/L-ACNC: 1.88 UIU/ML (ref 0.27–4.2)
VLDLC SERPL-MCNC: 28.2 MG/DL (ref 5–40)

## 2020-01-16 PROCEDURE — 36415 COLL VENOUS BLD VENIPUNCTURE: CPT

## 2020-01-16 PROCEDURE — 80053 COMPREHEN METABOLIC PANEL: CPT

## 2020-01-16 PROCEDURE — 83036 HEMOGLOBIN GLYCOSYLATED A1C: CPT

## 2020-01-16 PROCEDURE — 84439 ASSAY OF FREE THYROXINE: CPT

## 2020-01-16 PROCEDURE — 84443 ASSAY THYROID STIM HORMONE: CPT

## 2020-01-16 PROCEDURE — 80061 LIPID PANEL: CPT

## 2020-01-17 ENCOUNTER — OFFICE VISIT (OUTPATIENT)
Dept: ONCOLOGY | Facility: CLINIC | Age: 75
End: 2020-01-17

## 2020-01-17 ENCOUNTER — HOSPITAL ENCOUNTER (OUTPATIENT)
Dept: ONCOLOGY | Facility: HOSPITAL | Age: 75
Discharge: HOME | End: 2020-01-17
Admitting: INTERNAL MEDICINE

## 2020-01-17 ENCOUNTER — LAB (OUTPATIENT)
Dept: LAB | Facility: HOSPITAL | Age: 75
End: 2020-01-17

## 2020-01-17 VITALS
SYSTOLIC BLOOD PRESSURE: 109 MMHG | RESPIRATION RATE: 24 BRPM | HEART RATE: 82 BPM | BODY MASS INDEX: 39.22 KG/M2 | TEMPERATURE: 98 F | DIASTOLIC BLOOD PRESSURE: 61 MMHG | HEIGHT: 74 IN | WEIGHT: 305.6 LBS

## 2020-01-17 DIAGNOSIS — D50.0 IRON DEFICIENCY ANEMIA DUE TO CHRONIC BLOOD LOSS: ICD-10-CM

## 2020-01-17 DIAGNOSIS — D47.2 MGUS (MONOCLONAL GAMMOPATHY OF UNKNOWN SIGNIFICANCE): ICD-10-CM

## 2020-01-17 DIAGNOSIS — D50.0 IRON DEFICIENCY ANEMIA DUE TO CHRONIC BLOOD LOSS: Primary | ICD-10-CM

## 2020-01-17 DIAGNOSIS — N18.30 ANEMIA DUE TO STAGE 3 CHRONIC KIDNEY DISEASE (HCC): Primary | ICD-10-CM

## 2020-01-17 DIAGNOSIS — D63.1 ANEMIA DUE TO STAGE 3 CHRONIC KIDNEY DISEASE (HCC): Primary | ICD-10-CM

## 2020-01-17 LAB
BASOPHILS # BLD AUTO: 0.05 10*3/MM3 (ref 0–0.2)
BASOPHILS NFR BLD AUTO: 0.7 % (ref 0–1.5)
DEPRECATED RDW RBC AUTO: 62 FL (ref 37–54)
EOSINOPHIL # BLD AUTO: 0.37 10*3/MM3 (ref 0–0.4)
EOSINOPHIL NFR BLD AUTO: 5.5 % (ref 0.3–6.2)
ERYTHROCYTE [DISTWIDTH] IN BLOOD BY AUTOMATED COUNT: 16.8 % (ref 12.3–15.4)
FERRITIN SERPL-MCNC: 130.4 NG/ML (ref 30–400)
HCT VFR BLD AUTO: 26.1 % (ref 37.5–51)
HGB BLD-MCNC: 8.3 G/DL (ref 13–17.7)
IRON 24H UR-MRATE: 42 MCG/DL (ref 59–158)
IRON SATN MFR SERPL: 16 % (ref 20–50)
LYMPHOCYTES # BLD AUTO: 1.04 10*3/MM3 (ref 0.7–3.1)
LYMPHOCYTES NFR BLD AUTO: 15.4 % (ref 19.6–45.3)
MCH RBC QN AUTO: 33.9 PG (ref 26.6–33)
MCHC RBC AUTO-ENTMCNC: 31.8 G/DL (ref 31.5–35.7)
MCV RBC AUTO: 106.5 FL (ref 79–97)
MONOCYTES # BLD AUTO: 0.74 10*3/MM3 (ref 0.1–0.9)
MONOCYTES NFR BLD AUTO: 10.9 % (ref 5–12)
NEUTROPHILS # BLD AUTO: 4.56 10*3/MM3 (ref 1.7–7)
NEUTROPHILS NFR BLD AUTO: 67.5 % (ref 42.7–76)
PLATELET # BLD AUTO: 207 10*3/MM3 (ref 140–450)
PMV BLD AUTO: 10.3 FL (ref 6–12)
RBC # BLD AUTO: 2.45 10*6/MM3 (ref 4.14–5.8)
TIBC SERPL-MCNC: 264 MCG/DL (ref 298–536)
TRANSFERRIN SERPL-MCNC: 177 MG/DL (ref 200–360)
WBC NRBC COR # BLD: 6.76 10*3/MM3 (ref 3.4–10.8)

## 2020-01-17 PROCEDURE — 84466 ASSAY OF TRANSFERRIN: CPT | Performed by: INTERNAL MEDICINE

## 2020-01-17 PROCEDURE — 85025 COMPLETE CBC W/AUTO DIFF WBC: CPT

## 2020-01-17 PROCEDURE — 96372 THER/PROPH/DIAG INJ SC/IM: CPT | Performed by: INTERNAL MEDICINE

## 2020-01-17 PROCEDURE — 25010000002 EPOETIN ALFA-EPBX 40000 UNIT/ML SOLUTION: Performed by: INTERNAL MEDICINE

## 2020-01-17 PROCEDURE — 36415 COLL VENOUS BLD VENIPUNCTURE: CPT | Performed by: INTERNAL MEDICINE

## 2020-01-17 PROCEDURE — 83540 ASSAY OF IRON: CPT | Performed by: INTERNAL MEDICINE

## 2020-01-17 PROCEDURE — 99215 OFFICE O/P EST HI 40 MIN: CPT | Performed by: INTERNAL MEDICINE

## 2020-01-17 PROCEDURE — 82728 ASSAY OF FERRITIN: CPT | Performed by: INTERNAL MEDICINE

## 2020-01-17 RX ADMIN — EPOETIN ALFA-EPBX 40000 UNITS: 40000 INJECTION, SOLUTION INTRAVENOUS; SUBCUTANEOUS at 12:23

## 2020-01-17 NOTE — PROGRESS NOTES
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 Anemia diagnosed January 2018 and IgG kappa monoclonal gammopathy diagnosed May 2018.   · This patient is an obese  male who  developed kidney problems in January 2018 for which he was referred to Devi Plascencia M.D. He was found to have a hemoglobin of 7.4 at that time and was given 1 unit of packed red blood cells. He was then referred to GI where he had been followed for Syed’s esophagus for a number of years. An EGD and colonoscopy was performed on 3/5/18 by Marisel Gomez M.D. revealing mucosa suggestive of Syed’s esophagus and hiatal hernia in the cardia. Patient had a poor prep compromising the colonoscopy exam though the scope did reach the cecum. Esophageal biopsy revealed squamocolumnar mucosa with extensive intestinal metaplasia consistent with Syed’s esophagus, negative for dysplasia. Colonoscopy was recommended to be repeated in two years and EGD in three years. The patient saw his primary care physician, Paulette Harris M.D., in followup and blood testing was done on 5/17/18 revealing WBC 6.3, hemoglobin 8.4, MCV 84.5, platelet count 182,000. Vitamin B12 level was 416 (180-914) and patient was referred here for further evaluation. The patient claims to have been diagnosed with Vitamin B12 deficiency a number of years ago for which he has been on Vitamin B12 injections up until six months ago when he just stopped taking those. He has been recently started on oral iron supplementation.  He claims not to see any blood in his urine but does have microscopic hematuria for which he sees a urologist. He denies nosebleeds, gum bleeds or blood in the stool. He has not had any significant changes in his weight. He feels weak and dizzy for a number of years which has recently gotten worse. He does not ambulate much because of back surgery causing him weakness. He did have a right lower extremity DVT with bilateral pulmonary emboli in 2004 since which time he has been on Coumadin, thought secondary to a sedentary lifestyle. He has no family history of anemias.   · 5/24/18 - Patient seen initial consultation for anemia. Hemoglobin 7.9, MCV 89.9. Ferritin 17 (), iron 183 (), TIBC 379 (228-428), iron saturation 48% (20-50), retic 2.16% (0.5-1.5), haptoglobin 138 (), folate 9.1 (5.9-24.8). SPEP showed monoclonal gammopathy. SIFE showed IgG kappa monoclonal gammopathy. M-spike in the gamma region 0.7 g/dL. Erythropoietin 53.5 (2.59-18.5). Antiparietal cell antibody and intrinsic factor antibodies negative.   Globulin 4.2 (2.5-3.8). Creatinine 1.4 (0.7-1.2).   · 6/19/18 - Discussed results of anemia workup. Hemoglobin improving. Ferrous Sulfate decreased to 325 mg twice a day. Will perform gammopathy workup for IgG kappa monoclonal gammopathy. IgA 783 (), IgG 1480 (600-1500), IgM 93 (). Kappa lambda FLC ratio 0.81 (0.26-1.65). Ferritin 36 ().        · 6/25/18 - Bone survey negative for acute disease. No evidence of lytic or blastic metastatic bone disease was present. Chest x-ray showed cardiomegaly, mild right basilar atelectasis and findings suggestive of ankylosing spondylitis.   · 6/29/18 - Patient underwent sternal bone marrow revealing monoclonal gammopathy of undetermined significant (MGUS). Extent of bone marrow involvement 5%. Phenotype kappa positive, IgG positive, CD38 positive, CD20 negative, CD19 negative, CD45 negative, CD56 negative and  negative. Dyspoiesis  was not seen. There was absence of iron stores. Normal male karyotype. Normal FISH study. Flow cytometry revealed IgG kappa restrictive plasma cells in a polytypic background. There was a mixed population of maturing myeloid cells, B-cells and T-cells. No abnormal myeloid maturation was seen. A monoclonal IgG kappa plasma cell population was present. 4% plasma cells present.   · 8/23/18 - Labs by Dr. Plascencia revealed B12 of 857 (180-914), folate 12.7 (5.9-24.8), iron 127 ().      · 9/19/18 - Iron 94 (), TIBC 297 (228-428), iron saturation 32 (20-50), ferritin 29 (). Erythropoietin 30.04 (2.59-18.5).        · 11/16/18 - Hemoglobin 7.1. Patient given 1 unit of packed red blood cells.   · 11/21/18 - Ferritin 27 (). Hemoglobin 7.9. Patient given 1 unit of packed red blood cells.    · 11/26/18 - EGD by Dave Russo M.D. revealed erosive gastritis and bleeding ampulla. There was oozing upon entering the stomach in many different areas. Duodenal mucosa and the esophagus were normal.   · 11/27/18 - Hemoglobin 8.2. Order written for Procrit 30,000 units subq weekly, not approved by insurance for low ferritin. Urine JERRY with no definite monoclonal gammopathy identified with questionable faint IgG kappa.   · 12/18/18 - Hemoglobin 9.9. Injectafer 750 mg IV given.   · 1/2/19 - Injectafer 750 mg IV given.  Hemoglobin 11, .1. Patient claims that he is getting Vitamin B12 injections monthly at home through Dr. Harris. Currently taking Ferrous Sulfate 325 mg p.o. b.i.d. and asked to continue that. Asked to continue Pantoprazole 40 mg daily that he is taking. IgA 651 (), IgG 1470 (600-1500), IgM 94 ().      · 2/12/19 - Iron 88 ().    · 3/6/19 - WBC 19.8 with 83% neutrophils, 5% lymphocytes, 11% monocytes, hemoglobin 8.7, , platelet count 182,000. Patient status post laparoscopic cholecystectomy on 2/24/19 with large ecchymoses apparent on abdomen. Infectious workup  ordered and Injectafer 750 mg IV days 1 and 8 ordered.  Iron 23 (), TIBC 153 (228-428), iron saturation 15% (20-50), ferritin 400 (224-336).       · 3/12/19 - WBC 15.02, hemoglobin 8.1 and platelets 227,000. Patient reported hematuria followed by Dr. Starkey. Orders written to start Injectafer ASAP. Abdominal ecchymosis improving.   · 3/14/19 - Injectafer 750 mg IV given.   · 4/22/19 - Hemoglobin 9.5, . Patient missed day 8 Injectafer in March and it was rescheduled. SIFE showed IgG kappa monoclonal gammopathy.  · 5/8/19 - Injectafer 750 mg IV given.   · 7/8/19 - Iron 56 (). Iron Saturation 36 (20-50%). Transferrin 110 (180-330). TIBC 154 (228-428). Ferritin 1,199 ().    · 8/7/2019-hemoglobin 10.0.  Patient taking multivitamin with iron only.  Restarted ferrous sulfate 325mg by mouth twice a day. UIFE showed free kappa light chain identified.   · 10/16/2019 patient hospitalized at [facility] for 3 days and found to have a hemoglobin of 4.8 at the cancer center visit.  Stool Hemoccult was positive.  He ended up receiving 5 units of packed red blood cells.    · EGD by Marisel Gomez MD revealed severe gastric antral vascular ectasia with oozing, Syed's esophagus and hiatal hernia.  The patient was treated with PPI and Carafate.  Plan was to repeat EGD with cauterization in 6 weeks.    · Creatinine 2.2 , iron 47 , TIBC 264 , iron saturation 18 (20-50), ferritin 128.4 ().    · Aspirin was held temporarily and patient given IV iron.  IgA 720 (), IgM 72 (), IgG 1489 (700-1600).  · EGD done secondary to GI bleed on 1/9/2020 --1.  Moderately severe gastric antral vascular ectasia APC applied for cauterization 2.  Short segment of Syed's biopsy taken. 3.  Small hiatal hernia.  ·      2.   Elevated LFT’s diagnosis established March 2018.  Biliary duct dilation diagnosis established June 2018.   · 11/30/17 - Maia phos 93 (32-91), total bili 0.7 (0.3-1.2), AST 37 (15-41), ALT 25 (15-41).    · 3/1/18 - T-bili 0.5 (0.3-1.2), maia phos 106 (32-91), AST 54 (15-41), ALT 27 (17-63).   · 5/24/18 - AST 46 (15-41), maia phos 131 (32-91).   · 6/8/18 - CMP ordered by Dr. Alejandra Amaro showed maia phos 209 (32-91),  (15-41), ALT 84 (17-63), total bili 1.1 (0.3-1.2).             · 6/19/18 - CT abdomen and pelvis as well as serological workup for elevated LFT’s ordered. Alpha-1 antitrypsin 144 (). Ceruloplasmin 38 (22-58). AFP 3 (0-9).  Hepatitis panel non-reactive.   · 6/22/18 - CT abdomen and pelvis showed abnormal intra and extrahepatic biliary dilation. There was mild distention of the gallbladder and evidence of several gallstones. Bilateral non-obstructing kidney stone was present. Incidental sigmoid diverticulosis.   · 7/2/18 - Patient underwent MRCP at [facility] showing markedly dilated intrahepatic and extrahepatic bile duct with multiple small filling defects within the distal common bile duct compatible with choledocholithiasis. There was a focal 6 mm segmental narrowing at the distal CBD with recommended GI consult for ERCP and brushings.   · 7/5/18 to 7/10/18 - On 7/5/18 labs revealed normal total bilirubin (0.8), AST 69 (15-41), maia phos 152 (32-91), ALT was normal at 37 (17-63), ammonia was elevated at 86 (9-35), lipase was normal (22). Patient hospitalized at [facility] due to weakness. Workup revealed cholelithiasis. On 7/9/18 patient underwent ERCP with bilateral sphincterotomy, common duct stone extraction, biliary brushing and stent placement by Dr. Leiva. Path on brushings was negative for malignancy. There was intra and extrahepatic biliary ductal dilation with relatively abrupt distal common bile duct cutoff and non-visualization of the gallbladder. He was started on Lovenox and sequential compression devices due to risk of pulmonary embolism.  of 33 (0-35).  On 7/10/18 LFT’s revealed total bili 0.9 (0.3-1.2). Maia phos 129 (32-91). AST 50 (15-41), ALT 41 (17-63).      · 8/31/18 - EUS by Dr. Gomez at [facility] revealing suspect benign biliary stricture due to CBD stone with FNA pathology revealing benign and atypical ductal cells, bile and proteinaceous material including scattered bacteria, neutrophils and degenerating cells. The atypia was felt mild and favored to be reactive in nature. Plan was to repeat ERCP with removal of the stent and the stone with consideration of common bile duct evaluation with cholangioscopy at that time.   · 10/12/18 - ERCP with sphincteroplasty and stone extraction done by  JUAN PABLO Russo for choledocholithiasis.   · 2/23/19 - Patient underwent ERCP with balloon clearance of bile duct by Memo  · 3/6/19 - LFT’s not elevated with bilirubin 1.1, AST 31, ALT 14, osbaldo phos was mildly elevated at 101 (32-91).   · 4/25/19 - 4/26/19 - Admitted to [facility] for hepatic encephalopathy and hypokalemia.   · 4/27/2019 - CT abdomen pelvis showed a 4.8 cm air-fluid collection adjacent to the right posterior hepatic lobe significant improved.  Right-sided chest tube small right pleural effusion.  Hepatic cirrhosis.  Bilateral nonobstructing renal stones.  Sigmoid diverticulosis.   · 5/26/19 - 5/29/2019 - Admitted to [facility] for hepatic and cephalopathy.  Ammonia level 213 (H).  · 7/11/19 - AFP 2.76 (0-9).   · 9/26/19 - CT scan abdomen showed fluid collection posterior to the right hepatic lobe has significantly diminished in size with only trace fluid remaining. Small locules of air are seen within this collection which could be related to recent shunt mentation or could be related to tiny fistula from the splenic flexure of the colon. Trace right pleural fluid, diminished since 4/27/19. Thickened, likely reactive, pleural rind remains. Mild right basilar atelectasis. Abnormal intrahepatic and extra hepatic biliary ductal dilation. Intrahepatic biliary dilation is new since 4/27/19. The CBD appears attenuated in its mid segment, raising the possibility of  stricture. ERCP recommended. Uncomplicated colonic diverticulosis. Non-obstructing bilateral renal stones and probable small right renal cyst. Cirrhotic liver morphology. No focal liver lesion is seen.  · 10/28/19 - CT scan abdomen and pelvis showed fluid collection posterior to the right hepatic lobe appears slightly increased in size now measuring 24 mm in diameter, again a small droplet of gas adjacent to this fluid collection is seen which may represent fistula. Right basilar small pleural effusion and atelectasis remain. Continued dilatation of the intra and extrahepatic biliary ductal system which appears stable from previous exam. Non-obstructing bilateral renal calculi. Changes of cirrhosis and splenomegaly. Diverticulosis. Mild interval change from prior study with increasing fluid collection posterior to the right hepatic lobe.      3.   Right lower extremity DVT and bilateral pulmonary emboli diagnosed 2004.   · 2004 - Patient claims to have developed right lower extremity DVT with bilateral pulmonary emboli in 2004 and was coumadinized. The blood clots were thought secondary to his sedentary lifestyle. He stayed on the Coumadin up until October 2017 when, due to difficulty maintaining his INR’s, he was switched to Xarelto 20 mg daily by Paulette Harris M.D. which was later dropped to 10 mg daily.    · 11/26/18 - EGD by Dave Russo M.D. revealed erosive gastritis and bleeding ampulla. Ampullary biopsy revealed reactive/reparative small intestinal mucosa with mild chronic inflammation. Gastric antrum biopsy was suggestive of focal mild reactive gastropathy. Due to erosive gastritis, patient asked to hold Xarelto and Aspirin by Dave Russo M.D.   · 11/27/18 - Patient asked to discontinue Xarelto and hold Aspirin while obtaining IVC filter. Risks discussed. Factor V Leiden gene mutation and prothrombin gene mutations not detected. Anticardiolipin IgM 11 (<11). Anti beta-2 glyco, IgA 55  (<20). Factor VIII activity 186 (). Antithrombin III activity 63 (). Protein C activity 69 (), protein S activity 69 ().       · 12/5/18 - Patient underwent transjugular IVC filter placement in IR by  Jonn Cuenca M.D.   · 1/2/19 - Told patient that his low protein CNS and antithrombin III likely due to liver disease. He would be at a high risk of going back on Xarelto and hence continued on Aspirin 81 mg p.o. daily.   · 1/3/19 - D-dimer 1.25 (<0.45).    · 3/8/19 - Chest x-ray showed chronic changes in the chest with no obvious pneumonia or congestive changes.  · 4/3/19 - Chest x-ray with right pleural effusion and basilar lung density with slight increase from prior. Cardiomegaly.   · 4/22/19 - Factor VIII 334 (H), Anti-phosphatidylserine antibody IgM> 80 (H), Anti-phosphatidylserine antibody IgG 12 (N), Cardiolipin IgG 21 (N), Cardiolipin IgM 55 (H), Cardiolipin IgA 63 (H), Beta-2 glycoprotein IgG 4 (N), IgA 91 (H), and IgM 21 (H).   · 7/11/19 - Chest x-ray showed stable small right pleural effusion since 04/25/2019. Minimal right costophrenic angle atelectasis.  · 4.  Recurrent episodes of hepatic encephalopathy on treatment with lactulose 30 cc 4 times a day.  · Recurrent GI blood loss on anticoagulation.    Past Medical History:   Diagnosis Date   • Anemia    • B12 deficiency 2009   • Syed's esophagus    • CAD (coronary artery disease)    • Diabetes mellitus (CMS/HCC)    • GERD (gastroesophageal reflux disease)    • History of echocardiogram     03/03/2017 - 12/03/2014 - 04/2012   • Hyperlipidemia    • Hypertension    • Morbid obesity (CMS/HCC)    • KATHIA treated with BiPAP    • Renal insufficiency    • S/P lumbar laminectomy        Past Surgical History:   Procedure Laterality Date   • BACK SURGERY  1971   • CATARACT EXTRACTION  2014   • CHOLECYSTECTOMY  02/24/2019   • COLONOSCOPY  03/2018   • CORONARY ANGIOPLASTY  08/24/2009    PCI stent - succesful placement of 3.5/23 promus  "stent in proximal right coronary artery   • CORONARY ANGIOPLASTY WITH STENT PLACEMENT  08/27/2009    patient had stent placed to proximal right coronary artery   • CYSTOSCOPY BLADDER STONE LITHOTRIPSY  1997   • ENDOSCOPY N/A 10/18/2019    Procedure: ESOPHAGOGASTRODUODENOSCOPY with argon plasma coagulation of gastric antral vascular ectasia;  Surgeon: Marisel Gomez MD;  Location: [facility] ENDOSCOPY;  Service: Gastroenterology   • ENDOSCOPY N/A 1/9/2020    Procedure: ESOPHAGOGASTRODUODENOSCOPY WITH BIOPSY, ARGON PLASMA COAGULATION OF GASTRIC ANTREL VASCULAR ECTASIA,;  Surgeon: Marisel Gomez MD;  Location: [facility] ENDOSCOPY;  Service: Gastroenterology   • ERCP N/A 11/20/2019    Procedure: ERCP, clearance of bile duct with balloon, placement of biliary stent, EGD with argon plasma coagulation of gastric antral vascular ectasia;  Surgeon: Marisel Gomez MD;  Location: [facility] ENDOSCOPY;  Service: Gastroenterology   • OTHER SURGICAL HISTORY  11/2018    IVC filter implant   • RENAL ARTERY STENT Left          Current Outpatient Medications:   •  allopurinol (ZYLOPRIM) 100 MG tablet, Take 100 mg by mouth 2 (Two) Times a Day., Disp: , Rfl:   •  aspirin 81 MG tablet, Take 1 tablet by mouth Daily., Disp: 30 tablet, Rfl: 3  •  atorvastatin (LIPITOR) 20 MG tablet, Take 1 tablet by mouth Daily., Disp: 90 tablet, Rfl: 3  •  B-D 3CC LUER-YASMEEN SYR 25GX1\" 25G X 1\" 3 ML misc, , Disp: , Rfl: 1  •  bumetanide (BUMEX) 2 MG tablet, Take 1 tablet by mouth Daily., Disp: , Rfl:   •  Cyanocobalamin 1000 MCG/ML kit, Inject 1,000 mcg as directed Every 30 (Thirty) Days., Disp: , Rfl:   •  docusate sodium (COLACE) 100 MG capsule, Take 100 mg by mouth 2 (Two) Times a Day., Disp: , Rfl:   •  DULoxetine (CYMBALTA) 60 MG capsule, Take 60 mg by mouth Daily., Disp: , Rfl:   •  folic acid (FOLVITE) 1 MG tablet, Take 1 mg by mouth Daily., Disp: , Rfl:   •  hydrOXYzine pamoate (VISTARIL) 25 MG capsule, Take 25 mg by mouth As Needed., Disp: , Rfl: 0  •  " insulin glargine (LANTUS) 100 UNIT/ML injection, Inject 46 units at bedtime (Patient taking differently: Inject 46 Units under the skin into the appropriate area as directed. Inject 46 units at bedtime), Disp: 45 mL, Rfl: 3  •  insulin lispro (HUMALOG) 100 UNIT/ML injection, 15 units subcu before each meal plus sliding scale MDD 60, Disp: 60 mL, Rfl: 4  •  K Phos Wilkin-Sod Phos Di & Mono (PHOSPHA 250 NEUTRAL PO), Take 250 mg by mouth Daily., Disp: , Rfl:   •  lactulose (CHRONULAC) 10 GM/15ML solution, Take 60 mL by mouth Every 4 (Four) Hours., Disp: , Rfl:   •  levothyroxine (SYNTHROID, LEVOTHROID) 75 MCG tablet, Take 1 tablet by mouth Daily., Disp: 90 tablet, Rfl: 4  •  magnesium oxide (MAG-OX) 400 MG tablet, Take 400 mg by mouth Daily., Disp: , Rfl:   •  metoclopramide (REGLAN) 5 MG tablet, Take 5 mg by mouth 2 (Two) Times a Day., Disp: , Rfl: 0  •  Multiple Vitamins-Minerals (MULTIVITAMIN WITH MINERALS) tablet tablet, Take 1 tablet by mouth Daily., Disp: , Rfl:   •  oxyCODONE (ROXICODONE) 5 MG immediate release tablet, Take 5 mg by mouth Every 8 (Eight) Hours As Needed., Disp: , Rfl:   •  pantoprazole (PROTONIX) 40 MG EC tablet, Take 1 tablet by mouth 2 (Two) Times a Day., Disp: 30 tablet, Rfl: 3  •  rifaximin (XIFAXAN) 550 MG tablet, Take 550 mg by mouth Every 12 (Twelve) Hours., Disp: , Rfl:   •  sodium bicarbonate 650 MG tablet, Take 650 mg by mouth 3 (Three) Times a Day., Disp: , Rfl:   •  sucralfate (CARAFATE) 1 g tablet, Take 1 g by mouth 4 (Four) Times a Day., Disp: , Rfl:   •  zinc sulfate (ZINCATE) 50 MG capsule, Take 50 mg by mouth Daily., Disp: , Rfl: 0  •  ferrous sulfate 325 (65 FE) MG tablet, Take 1 tablet by mouth 2 (Two) Times a Day., Disp: 60 tablet, Rfl: 2  •  potassium chloride (K-DUR,KLOR-CON) 20 MEQ CR tablet, Take 20 mEq by mouth Daily., Disp: , Rfl: 0    No Known Allergies    Family History   Problem Relation Age of Onset   • Hyperlipidemia Other    • Hypertension Other        Cancer-related  "family history is not on file.    Social History     Tobacco Use   • Smoking status: Never Smoker   • Smokeless tobacco: Never Used   Substance Use Topics   • Alcohol use: No     Frequency: Never   • Drug use: No       I have reviewed the history of present illness, past medical history, family history, social history, lab results, all notes and other records since the patient was last seen at the cancer care center  SUBJECTIVE:      Patient is my office for follow-up.  He received iron infusions tolerated it well.  Had episode of GI bleed underwent EGD and cauterization again.  Compliant with lactulose,  not too sleepy nowadays but reports to fatigue.  Not trying to lose weight.  Weight fluctuates depending on the fluid on him.     ROS:      Review of Systems   Constitutional: Negative for fever.   HENT: Negative for nosebleeds and trouble swallowing.    Eyes: Negative for visual disturbance.   Respiratory: Negative for cough, shortness of breath and wheezing.    Cardiovascular: Negative for chest pain.   Gastrointestinal: Negative for abdominal pain and blood in stool.   Endocrine: Negative for cold intolerance.   Genitourinary: Negative for dysuria and hematuria.   Musculoskeletal: Negative for joint swelling.   Skin: Negative for rash.   Allergic/Immunologic: Negative for environmental allergies.   Neurological: Negative for seizures.   Hematological: Does not bruise/bleed easily.   Psychiatric/Behavioral: The patient is not nervous/anxious.          Objective:       Vitals:    01/17/20 1119   BP: 109/61   Pulse: 82   Resp: 24   Temp: 98 °F (36.7 °C)   Weight: (!) 139 kg (305 lb 9.6 oz)   Height: 188 cm (74\")   BMI 38      PHYSICAL EXAM:      Physical Exam   Constitutional: He is oriented to person, place, and time. No distress.   Morbidly obese   HENT:   Head: Normocephalic and atraumatic.   Eyes: Conjunctivae and EOM are normal. Right eye exhibits no discharge. Left eye exhibits no discharge. No scleral " icterus.   Neck: Normal range of motion. Neck supple. No thyromegaly present.   Cardiovascular: Normal rate, regular rhythm and normal heart sounds. Exam reveals no gallop and no friction rub.   Pulmonary/Chest: Effort normal. No stridor. No respiratory distress. He has no wheezes.   Abdominal: Soft. Bowel sounds are normal. He exhibits no mass. There is no tenderness. There is no rebound and no guarding.   Musculoskeletal: Normal range of motion. He exhibits no tenderness.   1+ bilateral lower extremity edema   Lymphadenopathy:     He has no cervical adenopathy.   Neurological: He is alert and oriented to person, place, and time. He exhibits normal muscle tone.   Skin: Skin is warm. No rash noted. He is not diaphoretic. No erythema.   Psychiatric: He has a normal mood and affect. His behavior is normal.   Nursing note and vitals reviewed.       RECENT LABS:     WBC   Date Value Ref Range Status   01/17/2020 6.76 3.40 - 10.80 10*3/mm3 Final     RBC   Date Value Ref Range Status   01/17/2020 2.45 (L) 4.14 - 5.80 10*6/mm3 Final     Hemoglobin   Date Value Ref Range Status   01/17/2020 8.3 (L) 13.0 - 17.7 g/dL Final     Hematocrit   Date Value Ref Range Status   01/17/2020 26.1 (L) 37.5 - 51.0 % Final     MCV   Date Value Ref Range Status   01/17/2020 106.5 (H) 79.0 - 97.0 fL Final     MCH   Date Value Ref Range Status   01/17/2020 33.9 (H) 26.6 - 33.0 pg Final     MCHC   Date Value Ref Range Status   01/17/2020 31.8 31.5 - 35.7 g/dL Final     RDW   Date Value Ref Range Status   01/17/2020 16.8 (H) 12.3 - 15.4 % Final     RDW-SD   Date Value Ref Range Status   01/17/2020 62.0 (H) 37.0 - 54.0 fl Final     MPV   Date Value Ref Range Status   01/17/2020 10.3 6.0 - 12.0 fL Final     Platelets   Date Value Ref Range Status   01/17/2020 207 140 - 450 10*3/mm3 Final     Neutrophil %   Date Value Ref Range Status   01/17/2020 67.5 42.7 - 76.0 % Final     Lymphocyte %   Date Value Ref Range Status   01/17/2020 15.4 (L) 19.6 -  45.3 % Final     Monocyte %   Date Value Ref Range Status   01/17/2020 10.9 5.0 - 12.0 % Final     Eosinophil %   Date Value Ref Range Status   01/17/2020 5.5 0.3 - 6.2 % Final     Basophil %   Date Value Ref Range Status   01/17/2020 0.7 0.0 - 1.5 % Final     Immature Grans %   Date Value Ref Range Status   01/06/2020 0.5 0.0 - 0.5 % Final     Neutrophils, Absolute   Date Value Ref Range Status   01/17/2020 4.56 1.70 - 7.00 10*3/mm3 Final     Lymphocytes, Absolute   Date Value Ref Range Status   01/17/2020 1.04 0.70 - 3.10 10*3/mm3 Final     Monocytes, Absolute   Date Value Ref Range Status   01/17/2020 0.74 0.10 - 0.90 10*3/mm3 Final     Eosinophils, Absolute   Date Value Ref Range Status   01/17/2020 0.37 0.00 - 0.40 10*3/mm3 Final     Basophils, Absolute   Date Value Ref Range Status   01/17/2020 0.05 0.00 - 0.20 10*3/mm3 Final     Immature Grans, Absolute   Date Value Ref Range Status   01/06/2020 0.04 0.00 - 0.05 10*3/mm3 Final     nRBC   Date Value Ref Range Status   01/06/2020 0.0 0.0 - 0.2 /100 WBC Final       Lab Results   Component Value Date    GLUCOSE 214 (H) 01/16/2020    BUN 16 01/16/2020    CREATININE 1.51 (H) 01/16/2020    EGFRIFNONA 46 (L) 01/16/2020    BCR 10.6 01/16/2020    K 4.0 01/16/2020    CO2 25.4 01/16/2020    CALCIUM 8.7 01/16/2020    ALBUMIN 3.00 (L) 01/16/2020    LABIL2 0.4 (L) 05/29/2019    AST 28 01/16/2020    ALT 15 01/16/2020         Assessment/Plan      ASSESSMENT:     GAVE (gastric antral vascular ectasia)     Anemia in stage 3 chronic kidney disease (CMS/HCC)     Vitamin B12 deficiency     History of DVT of right lower extremity     History of bilateral pulmonary embolus (PE)     Antithrombin III deficiency (CMS/HCC)     Protein C deficiency (CMS/HCC)     Protein S deficiency (CMS/HCC)     Antiphospholipid antibody positive     Elevated factor VIII level     THOMAS (nonalcoholic steatohepatitis)     Splenomegaly     IgG kappa MGUS    PLAN:      1. Hemoglobin mildly improved to 8.5  after iron infusion.    2. Recheck iron studies.  Patient will benefit from weekly Procrit if the iron studies are adequate.  He will receive Procrit when hemoglobin is less than 10 only.   3. Patient is off anticoagulation given his GI bleed.  Follow closely for DVT or pulmonary emboli.  He had PE in the past.  4. Instructed the patient to stop taking oral iron tablets that way we can tell the difference between GI blood loss versus iron use when he has black stool.  5. Patient follows with Dr. Gomez regarding his Gonzalez and portal hypertension.   6. I prescribed him lactulose for his hepatic encephalopathy.  Discussed compliance.  Check ammonia level today   7. Evaluation for myeloma is ongoing given his IgG kappa MGUS.  8. patient has hyper coag work-up which was normal or negative , he is  off anticoagulation secondary to GI bleeds.    9. I will see him back in my office in 6 weeks recheck CBC at the time     I have reviewed labs results, imaging, vitals, and medications with the patient today.     Patient verbalized understanding and is in agreement of the above plan.  Addendum electronically signed by Joceline Pandey MD, 03/24/20, 10:30 AM.          This report was compiled using Dragon voice recognition software. I have made every effort to proof read this document; however, typographical errors may persist.

## 2020-01-23 ENCOUNTER — OFFICE VISIT (OUTPATIENT)
Dept: ENDOCRINOLOGY | Facility: CLINIC | Age: 75
End: 2020-01-23

## 2020-01-23 VITALS
BODY MASS INDEX: 39.27 KG/M2 | WEIGHT: 306 LBS | OXYGEN SATURATION: 98 % | SYSTOLIC BLOOD PRESSURE: 150 MMHG | DIASTOLIC BLOOD PRESSURE: 70 MMHG | HEART RATE: 78 BPM | HEIGHT: 74 IN

## 2020-01-23 DIAGNOSIS — N18.30 TYPE 2 DIABETES MELLITUS WITH STAGE 3 CHRONIC KIDNEY DISEASE, WITH LONG-TERM CURRENT USE OF INSULIN (HCC): Primary | ICD-10-CM

## 2020-01-23 DIAGNOSIS — E03.9 ACQUIRED HYPOTHYROIDISM: ICD-10-CM

## 2020-01-23 DIAGNOSIS — Z79.4 TYPE 2 DIABETES MELLITUS WITH STAGE 3 CHRONIC KIDNEY DISEASE, WITH LONG-TERM CURRENT USE OF INSULIN (HCC): Primary | ICD-10-CM

## 2020-01-23 DIAGNOSIS — I10 ESSENTIAL HYPERTENSION: ICD-10-CM

## 2020-01-23 DIAGNOSIS — E78.2 MIXED HYPERLIPIDEMIA: ICD-10-CM

## 2020-01-23 DIAGNOSIS — E11.22 TYPE 2 DIABETES MELLITUS WITH STAGE 3 CHRONIC KIDNEY DISEASE, WITH LONG-TERM CURRENT USE OF INSULIN (HCC): Primary | ICD-10-CM

## 2020-01-23 PROCEDURE — 99214 OFFICE O/P EST MOD 30 MIN: CPT | Performed by: INTERNAL MEDICINE

## 2020-01-23 RX ORDER — MIRABEGRON 50 MG/1
50 TABLET, FILM COATED, EXTENDED RELEASE ORAL DAILY
COMMUNITY
End: 2020-01-01 | Stop reason: ALTCHOICE

## 2020-01-23 RX ORDER — CHOLECALCIFEROL (VITAMIN D3) 125 MCG
10 CAPSULE ORAL NIGHTLY PRN
COMMUNITY
End: 2021-01-01

## 2020-01-23 RX ORDER — INSULIN GLARGINE 100 [IU]/ML
INJECTION, SOLUTION SUBCUTANEOUS
Qty: 45 ML | Refills: 3 | Status: SHIPPED | OUTPATIENT
Start: 2020-01-23 | End: 2021-01-01 | Stop reason: SDUPTHER

## 2020-01-23 NOTE — PATIENT INSTRUCTIONS
Increase Lantus to 50 units at bedtime  Increase Humalog to 18 units before each meal along with a sliding scale of 1 unit for each 30/140 at meals.  Please work on your diet and stop eating candies  Always keep glucose source with you in case of low blood sugar  Please get regular eye exam and flu vaccine  Follow-up in 3 months with labs.

## 2020-01-23 NOTE — PROGRESS NOTES
Endocrine Progress Note Outpatient     Patient Care Team:  Paulette Harris MD as PCP - General  Josefina Plascencia MD as Consulting Physician (Nephrology)    Chief Complaint: Follow-up type 2 diabetes    HPI: 74-year-old male with history of type 2 diabetes, hypertension, hyperlipidemia, hypothyroidism, CKD stage III disease and obesity is here for follow-up.  For type 2 diabetes he is currently on Lantus 46 units at bedtime, Humalog 15 units before each meal plus sliding scale of 1 unit for each 30 mg blood sugar over 140.  He did bring in blood sugar records for review and they are running mostly in 200s.  His A1c however is very good at 6%.  Does have anemia.  Hypertension: Well-controlled  Hyperlipidemia: Atorvastatin 20 mg p.o. daily  Hypothyroidism: On levothyroxine supplementation  Liver cirrhosis: He is on lactulose and follows with GI on regular basis.    Past Medical History:   Diagnosis Date   • Anemia    • B12 deficiency 2009   • Syed's esophagus    • CAD (coronary artery disease)    • Diabetes mellitus (CMS/HCC)    • GERD (gastroesophageal reflux disease)    • History of echocardiogram     03/03/2017 - 12/03/2014 - 04/2012   • Hyperlipidemia    • Hypertension    • Morbid obesity (CMS/HCC)    • KATHIA treated with BiPAP    • Renal insufficiency    • S/P lumbar laminectomy        Social History     Socioeconomic History   • Marital status:      Spouse name: Not on file   • Number of children: Not on file   • Years of education: Not on file   • Highest education level: Not on file   Tobacco Use   • Smoking status: Never Smoker   • Smokeless tobacco: Never Used   Substance and Sexual Activity   • Alcohol use: No     Frequency: Never   • Drug use: No   • Sexual activity: Defer       Family History   Problem Relation Age of Onset   • Hyperlipidemia Other    • Hypertension Other        No Known Allergies    ROS:   Constitutional:  Admit fatigue, tiredness.    Eyes:  Denies change in visual  acuity   HENT:  Denies nasal congestion or sore throat   Respiratory: denies cough, admit shortness of breath.   Cardiovascular:  denies chest pain, edema   GI:  Denies abdominal pain, nausea, vomiting.   Musculoskeletal:  Denies back pain or joint pain   Integument:  Denies dry skin and rash   Neurologic:  Denies headache, focal weakness or sensory changes   Endocrine:  Denies polyuria or polydipsia   Psychiatric:  Admit depression and anxiety      Vitals:    01/23/20 1329   BP: 150/70   Pulse: 78   SpO2: 98%       Physical Exam:  GEN: NAD, conversant, Obesity  EYES: EOMI, PERRL, no conjunctival erythema  NECK: no thyromegaly, full ROM   CV: RRR, no murmurs/rubs/gallops, no peripheral edema  LUNG: CTAB, no wheezes/rales/ronchi  SKIN: no rashes, no acanthosis  MSK: no deformities, full ROM of all extremities  NEURO: no tremors, DTR normal  PSYCH: AOX3, appropriate mood, affect normal      Results Review:     I reviewed the patient's new clinical results.    Lab Results   Component Value Date    HGBA1C 6.0 (H) 01/16/2020    HGBA1C 4.5 10/17/2019    HGBA1C 5.4 09/20/2019      Lab Results   Component Value Date    GLUCOSE 214 (H) 01/16/2020    BUN 16 01/16/2020    CREATININE 1.51 (H) 01/16/2020    EGFRIFNONA 46 (L) 01/16/2020    BCR 10.6 01/16/2020    K 4.0 01/16/2020    CO2 25.4 01/16/2020    CALCIUM 8.7 01/16/2020    ALBUMIN 3.00 (L) 01/16/2020    LABIL2 0.4 (L) 05/29/2019    AST 28 01/16/2020    ALT 15 01/16/2020    CHOL 139 01/16/2020    TRIG 141 01/16/2020    LDL 64 01/16/2020    HDL 47 01/16/2020     Lab Results   Component Value Date    TSH 1.880 01/16/2020    FREET4 0.87 (L) 01/16/2020         Medication Review: Reviewed.       Current Outpatient Medications:   •  allopurinol (ZYLOPRIM) 100 MG tablet, Take 100 mg by mouth 2 (Two) Times a Day., Disp: , Rfl:   •  aspirin 81 MG tablet, Take 1 tablet by mouth Daily., Disp: 30 tablet, Rfl: 3  •  atorvastatin (LIPITOR) 20 MG tablet, Take 1 tablet by mouth Daily.,  "Disp: 90 tablet, Rfl: 3  •  B-D 3CC LUER-YASMEEN SYR 25GX1\" 25G X 1\" 3 ML misc, , Disp: , Rfl: 1  •  bumetanide (BUMEX) 2 MG tablet, Take 1 tablet by mouth Daily., Disp: , Rfl:   •  Cyanocobalamin 1000 MCG/ML kit, Inject 1,000 mcg as directed Every 30 (Thirty) Days., Disp: , Rfl:   •  docusate sodium (COLACE) 100 MG capsule, Take 100 mg by mouth 2 (Two) Times a Day., Disp: , Rfl:   •  DULoxetine (CYMBALTA) 60 MG capsule, Take 60 mg by mouth Daily., Disp: , Rfl:   •  folic acid (FOLVITE) 1 MG tablet, Take 1 mg by mouth Daily., Disp: , Rfl:   •  hydrOXYzine pamoate (VISTARIL) 25 MG capsule, Take 25 mg by mouth As Needed., Disp: , Rfl: 0  •  insulin glargine (LANTUS) 100 UNIT/ML injection, Inject 46 units at bedtime (Patient taking differently: Inject 46 Units under the skin into the appropriate area as directed. Inject 46 units at bedtime), Disp: 45 mL, Rfl: 3  •  insulin lispro (HUMALOG) 100 UNIT/ML injection, 15 units subcu before each meal plus sliding scale MDD 60, Disp: 60 mL, Rfl: 4  •  lactulose (CHRONULAC) 10 GM/15ML solution, Take 60 mL by mouth Every 4 (Four) Hours., Disp: , Rfl:   •  levothyroxine (SYNTHROID, LEVOTHROID) 75 MCG tablet, Take 1 tablet by mouth Daily., Disp: 90 tablet, Rfl: 4  •  magnesium oxide (MAG-OX) 400 MG tablet, Take 400 mg by mouth Daily., Disp: , Rfl:   •  melatonin 5 MG tablet tablet, Take 10 mg by mouth., Disp: , Rfl:   •  metoclopramide (REGLAN) 5 MG tablet, Take 5 mg by mouth 2 (Two) Times a Day., Disp: , Rfl: 0  •  Mirabegron ER (MYRBETRIQ) 50 MG tablet sustained-release 24 hour 24 hr tablet, Take 50 mg by mouth Daily., Disp: , Rfl:   •  Multiple Vitamins-Minerals (MULTIVITAMIN WITH MINERALS) tablet tablet, Take 1 tablet by mouth Daily., Disp: , Rfl:   •  oxyCODONE (ROXICODONE) 5 MG immediate release tablet, Take 5 mg by mouth Every 8 (Eight) Hours As Needed., Disp: , Rfl:   •  pantoprazole (PROTONIX) 40 MG EC tablet, Take 1 tablet by mouth 2 (Two) Times a Day., Disp: 30 tablet, Rfl: " 3  •  rifaximin (XIFAXAN) 550 MG tablet, Take 550 mg by mouth Every 12 (Twelve) Hours., Disp: , Rfl:   •  sodium bicarbonate 650 MG tablet, Take 650 mg by mouth 3 (Three) Times a Day., Disp: , Rfl:   •  sucralfate (CARAFATE) 1 g tablet, Take 1 g by mouth 4 (Four) Times a Day., Disp: , Rfl:   •  zinc sulfate (ZINCATE) 50 MG capsule, Take 50 mg by mouth Daily., Disp: , Rfl: 0      Assessment/Plan   1.  Diabetes mellitus type II: Uncontrolled with blood sugars at home are running high however his A1c is 6%.  He could have anemia, however we will go ahead and give him a new meter to make sure his meter is working fine, I will increase Lantus to 50 units at bedtime and increase Humalog to 18 units before each meal and continue the sliding scale.  We will continue to follow blood sugars and A1c.  Advised always to keep glucose source with him in case of low blood sugar. Advised to stop eating candies.   2.  Hypertension: Well-controlled, continue current medication  3.  Hyperlipidemia: Well-controlled with LDL 64 and triglycerides 141.  Continue current medications.  4.  Hypothyroidism: Well-controlled with TSH of 1.8 and free T4 0.87.  Continue current medication  5.  Liver cirrhosis: He follows with GI, currently on lactulose.            Alejandra Amaro MD FACE.    Much of the above report is an electronic transcription/translation of the spoken language to printed text using Dragon Software. As such, the subtleties and finesse of the spoken language may permit erroneous, or at times, nonsensical words or phrases to be inadvertently transcribed; thus changes may be made at a later date to rectify these errors.

## 2020-01-24 ENCOUNTER — LAB (OUTPATIENT)
Dept: LAB | Facility: HOSPITAL | Age: 75
End: 2020-01-24

## 2020-01-24 ENCOUNTER — HOSPITAL ENCOUNTER (OUTPATIENT)
Dept: ONCOLOGY | Facility: HOSPITAL | Age: 75
Discharge: HOME | End: 2020-01-24
Admitting: INTERNAL MEDICINE

## 2020-01-24 VITALS
BODY MASS INDEX: 39.42 KG/M2 | SYSTOLIC BLOOD PRESSURE: 147 MMHG | HEIGHT: 74 IN | HEART RATE: 77 BPM | RESPIRATION RATE: 16 BRPM | WEIGHT: 307.2 LBS | TEMPERATURE: 98.9 F | DIASTOLIC BLOOD PRESSURE: 55 MMHG

## 2020-01-24 DIAGNOSIS — D63.1 ANEMIA DUE TO STAGE 3 CHRONIC KIDNEY DISEASE (HCC): Primary | ICD-10-CM

## 2020-01-24 DIAGNOSIS — D47.2 MGUS (MONOCLONAL GAMMOPATHY OF UNKNOWN SIGNIFICANCE): ICD-10-CM

## 2020-01-24 DIAGNOSIS — D50.0 IRON DEFICIENCY ANEMIA DUE TO CHRONIC BLOOD LOSS: ICD-10-CM

## 2020-01-24 DIAGNOSIS — N18.30 ANEMIA DUE TO STAGE 3 CHRONIC KIDNEY DISEASE (HCC): Primary | ICD-10-CM

## 2020-01-24 LAB
BASOPHILS # BLD AUTO: 0.09 10*3/MM3 (ref 0–0.2)
BASOPHILS NFR BLD AUTO: 1.2 % (ref 0–1.5)
DEPRECATED RDW RBC AUTO: 61 FL (ref 37–54)
EOSINOPHIL # BLD AUTO: 0.38 10*3/MM3 (ref 0–0.4)
EOSINOPHIL NFR BLD AUTO: 5.2 % (ref 0.3–6.2)
ERYTHROCYTE [DISTWIDTH] IN BLOOD BY AUTOMATED COUNT: 16.4 % (ref 12.3–15.4)
HCT VFR BLD AUTO: 27.6 % (ref 37.5–51)
HGB BLD-MCNC: 8.8 G/DL (ref 13–17.7)
LYMPHOCYTES # BLD AUTO: 1.05 10*3/MM3 (ref 0.7–3.1)
LYMPHOCYTES NFR BLD AUTO: 14.5 % (ref 19.6–45.3)
MCH RBC QN AUTO: 33.8 PG (ref 26.6–33)
MCHC RBC AUTO-ENTMCNC: 31.9 G/DL (ref 31.5–35.7)
MCV RBC AUTO: 106.2 FL (ref 79–97)
MONOCYTES # BLD AUTO: 0.72 10*3/MM3 (ref 0.1–0.9)
MONOCYTES NFR BLD AUTO: 9.9 % (ref 5–12)
NEUTROPHILS # BLD AUTO: 5 10*3/MM3 (ref 1.7–7)
NEUTROPHILS NFR BLD AUTO: 69.2 % (ref 42.7–76)
PLATELET # BLD AUTO: 182 10*3/MM3 (ref 140–450)
PMV BLD AUTO: 10.2 FL (ref 6–12)
RBC # BLD AUTO: 2.6 10*6/MM3 (ref 4.14–5.8)
WBC NRBC COR # BLD: 7.24 10*3/MM3 (ref 3.4–10.8)

## 2020-01-24 PROCEDURE — 96372 THER/PROPH/DIAG INJ SC/IM: CPT | Performed by: INTERNAL MEDICINE

## 2020-01-24 PROCEDURE — 25010000002 EPOETIN ALFA-EPBX 40000 UNIT/ML SOLUTION: Performed by: INTERNAL MEDICINE

## 2020-01-24 PROCEDURE — 85025 COMPLETE CBC W/AUTO DIFF WBC: CPT

## 2020-01-24 RX ADMIN — EPOETIN ALFA-EPBX 40000 UNITS: 40000 INJECTION, SOLUTION INTRAVENOUS; SUBCUTANEOUS at 10:26

## 2020-01-30 ENCOUNTER — LAB (OUTPATIENT)
Dept: LAB | Facility: HOSPITAL | Age: 75
End: 2020-01-30

## 2020-01-30 ENCOUNTER — HOSPITAL ENCOUNTER (OUTPATIENT)
Dept: ONCOLOGY | Facility: HOSPITAL | Age: 75
Discharge: HOME | End: 2020-01-30
Admitting: INTERNAL MEDICINE

## 2020-01-30 ENCOUNTER — TRANSCRIBE ORDERS (OUTPATIENT)
Dept: ADMINISTRATIVE | Facility: HOSPITAL | Age: 75
End: 2020-01-30

## 2020-01-30 VITALS
SYSTOLIC BLOOD PRESSURE: 150 MMHG | HEART RATE: 78 BPM | HEIGHT: 74 IN | RESPIRATION RATE: 18 BRPM | WEIGHT: 298.6 LBS | DIASTOLIC BLOOD PRESSURE: 56 MMHG | TEMPERATURE: 98.7 F | BODY MASS INDEX: 38.32 KG/M2

## 2020-01-30 DIAGNOSIS — D47.2 MGUS (MONOCLONAL GAMMOPATHY OF UNKNOWN SIGNIFICANCE): ICD-10-CM

## 2020-01-30 DIAGNOSIS — N18.30 ANEMIA DUE TO STAGE 3 CHRONIC KIDNEY DISEASE (HCC): Primary | ICD-10-CM

## 2020-01-30 DIAGNOSIS — Z12.5 PROSTATE CANCER SCREENING: Primary | ICD-10-CM

## 2020-01-30 DIAGNOSIS — D63.1 ANEMIA DUE TO STAGE 3 CHRONIC KIDNEY DISEASE (HCC): Primary | ICD-10-CM

## 2020-01-30 DIAGNOSIS — D50.0 IRON DEFICIENCY ANEMIA DUE TO CHRONIC BLOOD LOSS: ICD-10-CM

## 2020-01-30 DIAGNOSIS — Z12.5 PROSTATE CANCER SCREENING: ICD-10-CM

## 2020-01-30 LAB
BASOPHILS # BLD AUTO: 0.07 10*3/MM3 (ref 0–0.2)
BASOPHILS NFR BLD AUTO: 0.8 % (ref 0–1.5)
DEPRECATED RDW RBC AUTO: 57.4 FL (ref 37–54)
EOSINOPHIL # BLD AUTO: 0.52 10*3/MM3 (ref 0–0.4)
EOSINOPHIL NFR BLD AUTO: 5.8 % (ref 0.3–6.2)
ERYTHROCYTE [DISTWIDTH] IN BLOOD BY AUTOMATED COUNT: 15.7 % (ref 12.3–15.4)
HCT VFR BLD AUTO: 30.7 % (ref 37.5–51)
HGB BLD-MCNC: 9.8 G/DL (ref 13–17.7)
LYMPHOCYTES # BLD AUTO: 1.6 10*3/MM3 (ref 0.7–3.1)
LYMPHOCYTES NFR BLD AUTO: 17.9 % (ref 19.6–45.3)
MCH RBC QN AUTO: 33.3 PG (ref 26.6–33)
MCHC RBC AUTO-ENTMCNC: 31.9 G/DL (ref 31.5–35.7)
MCV RBC AUTO: 104.4 FL (ref 79–97)
MONOCYTES # BLD AUTO: 0.85 10*3/MM3 (ref 0.1–0.9)
MONOCYTES NFR BLD AUTO: 9.5 % (ref 5–12)
NEUTROPHILS # BLD AUTO: 5.89 10*3/MM3 (ref 1.7–7)
NEUTROPHILS NFR BLD AUTO: 66 % (ref 42.7–76)
PLATELET # BLD AUTO: 238 10*3/MM3 (ref 140–450)
PMV BLD AUTO: 10.5 FL (ref 6–12)
PSA SERPL-MCNC: 0.04 NG/ML (ref 0–4)
RBC # BLD AUTO: 2.94 10*6/MM3 (ref 4.14–5.8)
WBC NRBC COR # BLD: 8.93 10*3/MM3 (ref 3.4–10.8)

## 2020-01-30 PROCEDURE — 25010000002 EPOETIN ALFA-EPBX 40000 UNIT/ML SOLUTION: Performed by: INTERNAL MEDICINE

## 2020-01-30 PROCEDURE — 36415 COLL VENOUS BLD VENIPUNCTURE: CPT

## 2020-01-30 PROCEDURE — G0103 PSA SCREENING: HCPCS

## 2020-01-30 PROCEDURE — 96372 THER/PROPH/DIAG INJ SC/IM: CPT | Performed by: INTERNAL MEDICINE

## 2020-01-30 PROCEDURE — 85025 COMPLETE CBC W/AUTO DIFF WBC: CPT

## 2020-01-30 RX ADMIN — EPOETIN ALFA-EPBX 40000 UNITS: 40000 INJECTION, SOLUTION INTRAVENOUS; SUBCUTANEOUS at 10:48

## 2020-02-06 ENCOUNTER — LAB (OUTPATIENT)
Dept: LAB | Facility: HOSPITAL | Age: 75
End: 2020-02-06

## 2020-02-06 ENCOUNTER — HOSPITAL ENCOUNTER (OUTPATIENT)
Dept: ONCOLOGY | Facility: HOSPITAL | Age: 75
Discharge: HOME | End: 2020-02-06
Admitting: NURSE PRACTITIONER

## 2020-02-06 DIAGNOSIS — D63.1 ANEMIA DUE TO STAGE 3 CHRONIC KIDNEY DISEASE (HCC): Primary | ICD-10-CM

## 2020-02-06 DIAGNOSIS — N18.30 ANEMIA DUE TO STAGE 3 CHRONIC KIDNEY DISEASE (HCC): Primary | ICD-10-CM

## 2020-02-06 DIAGNOSIS — D47.2 MGUS (MONOCLONAL GAMMOPATHY OF UNKNOWN SIGNIFICANCE): ICD-10-CM

## 2020-02-06 DIAGNOSIS — D50.0 IRON DEFICIENCY ANEMIA DUE TO CHRONIC BLOOD LOSS: ICD-10-CM

## 2020-02-06 LAB
BASOPHILS # BLD AUTO: 0.06 10*3/MM3 (ref 0–0.2)
BASOPHILS NFR BLD AUTO: 0.7 % (ref 0–1.5)
DEPRECATED RDW RBC AUTO: 56.3 FL (ref 37–54)
EOSINOPHIL # BLD AUTO: 0.59 10*3/MM3 (ref 0–0.4)
EOSINOPHIL NFR BLD AUTO: 6.9 % (ref 0.3–6.2)
ERYTHROCYTE [DISTWIDTH] IN BLOOD BY AUTOMATED COUNT: 15.2 % (ref 12.3–15.4)
HCT VFR BLD AUTO: 31.4 % (ref 37.5–51)
HGB BLD-MCNC: 10.1 G/DL (ref 13–17.7)
LYMPHOCYTES # BLD AUTO: 1.67 10*3/MM3 (ref 0.7–3.1)
LYMPHOCYTES NFR BLD AUTO: 19.6 % (ref 19.6–45.3)
MCH RBC QN AUTO: 33.4 PG (ref 26.6–33)
MCHC RBC AUTO-ENTMCNC: 32.2 G/DL (ref 31.5–35.7)
MCV RBC AUTO: 104 FL (ref 79–97)
MONOCYTES # BLD AUTO: 0.81 10*3/MM3 (ref 0.1–0.9)
MONOCYTES NFR BLD AUTO: 9.5 % (ref 5–12)
NEUTROPHILS # BLD AUTO: 5.41 10*3/MM3 (ref 1.7–7)
NEUTROPHILS NFR BLD AUTO: 63.3 % (ref 42.7–76)
PLATELET # BLD AUTO: 193 10*3/MM3 (ref 140–450)
PMV BLD AUTO: 11.2 FL (ref 6–12)
RBC # BLD AUTO: 3.02 10*6/MM3 (ref 4.14–5.8)
WBC NRBC COR # BLD: 8.54 10*3/MM3 (ref 3.4–10.8)

## 2020-02-06 PROCEDURE — 85025 COMPLETE CBC W/AUTO DIFF WBC: CPT

## 2020-02-06 PROCEDURE — 36415 COLL VENOUS BLD VENIPUNCTURE: CPT

## 2020-02-12 ENCOUNTER — TELEPHONE (OUTPATIENT)
Dept: ONCOLOGY | Facility: CLINIC | Age: 75
End: 2020-02-12

## 2020-02-13 ENCOUNTER — APPOINTMENT (OUTPATIENT)
Dept: ONCOLOGY | Facility: HOSPITAL | Age: 75
End: 2020-02-13

## 2020-02-13 ENCOUNTER — APPOINTMENT (OUTPATIENT)
Dept: LAB | Facility: HOSPITAL | Age: 75
End: 2020-02-13

## 2020-02-18 ENCOUNTER — TELEPHONE (OUTPATIENT)
Dept: ONCOLOGY | Facility: CLINIC | Age: 75
End: 2020-02-18

## 2020-02-18 ENCOUNTER — APPOINTMENT (OUTPATIENT)
Dept: PREADMISSION TESTING | Facility: HOSPITAL | Age: 75
End: 2020-02-18

## 2020-02-18 VITALS
BODY MASS INDEX: 38.01 KG/M2 | HEART RATE: 92 BPM | SYSTOLIC BLOOD PRESSURE: 131 MMHG | HEIGHT: 74 IN | WEIGHT: 296.2 LBS | DIASTOLIC BLOOD PRESSURE: 66 MMHG | OXYGEN SATURATION: 96 %

## 2020-02-18 LAB
ALBUMIN SERPL-MCNC: 3.3 G/DL (ref 3.5–5.2)
ALBUMIN/GLOB SERPL: 0.7 G/DL
ALP SERPL-CCNC: 131 U/L (ref 39–117)
ALT SERPL W P-5'-P-CCNC: 17 U/L (ref 1–41)
ANION GAP SERPL CALCULATED.3IONS-SCNC: 17 MMOL/L (ref 5–15)
APTT PPP: 23.1 SECONDS (ref 24–31)
AST SERPL-CCNC: 43 U/L (ref 1–40)
BILIRUB SERPL-MCNC: 0.4 MG/DL (ref 0.2–1.2)
BUN BLD-MCNC: 31 MG/DL (ref 8–23)
BUN/CREAT SERPL: 17.1 (ref 7–25)
CALCIUM SPEC-SCNC: 8.9 MG/DL (ref 8.6–10.5)
CHLORIDE SERPL-SCNC: 101 MMOL/L (ref 98–107)
CO2 SERPL-SCNC: 26 MMOL/L (ref 22–29)
CREAT BLD-MCNC: 1.81 MG/DL (ref 0.76–1.27)
DEPRECATED RDW RBC AUTO: 59.1 FL (ref 37–54)
ERYTHROCYTE [DISTWIDTH] IN BLOOD BY AUTOMATED COUNT: 16.5 % (ref 12.3–15.4)
GFR SERPL CREATININE-BSD FRML MDRD: 37 ML/MIN/1.73
GLOBULIN UR ELPH-MCNC: 4.9 GM/DL
GLUCOSE BLD-MCNC: 172 MG/DL (ref 65–99)
HCT VFR BLD AUTO: 30.2 % (ref 37.5–51)
HGB BLD-MCNC: 9.6 G/DL (ref 13–17.7)
INR PPP: 1.01 (ref 0.9–1.1)
MCH RBC QN AUTO: 32.4 PG (ref 26.6–33)
MCHC RBC AUTO-ENTMCNC: 31.8 G/DL (ref 31.5–35.7)
MCV RBC AUTO: 101.8 FL (ref 79–97)
PLATELET # BLD AUTO: 237 10*3/MM3 (ref 140–450)
PMV BLD AUTO: 9.4 FL (ref 6–12)
POTASSIUM BLD-SCNC: 4.2 MMOL/L (ref 3.5–5.2)
PROT SERPL-MCNC: 8.2 G/DL (ref 6–8.5)
PROTHROMBIN TIME: 10.5 SECONDS (ref 9.6–11.7)
RBC # BLD AUTO: 2.97 10*6/MM3 (ref 4.14–5.8)
SODIUM BLD-SCNC: 144 MMOL/L (ref 136–145)
WBC NRBC COR # BLD: 7.6 10*3/MM3 (ref 3.4–10.8)

## 2020-02-18 PROCEDURE — 93010 ELECTROCARDIOGRAM REPORT: CPT | Performed by: INTERNAL MEDICINE

## 2020-02-18 PROCEDURE — 85610 PROTHROMBIN TIME: CPT | Performed by: INTERNAL MEDICINE

## 2020-02-18 PROCEDURE — 93005 ELECTROCARDIOGRAM TRACING: CPT

## 2020-02-18 PROCEDURE — 80053 COMPREHEN METABOLIC PANEL: CPT | Performed by: INTERNAL MEDICINE

## 2020-02-18 PROCEDURE — 85730 THROMBOPLASTIN TIME PARTIAL: CPT | Performed by: INTERNAL MEDICINE

## 2020-02-18 PROCEDURE — 85027 COMPLETE CBC AUTOMATED: CPT | Performed by: INTERNAL MEDICINE

## 2020-02-18 NOTE — TELEPHONE ENCOUNTER
Patient had been at hospital earlier today for pre-admission testing. [facility] scheduled [facility] PAT called here asking at patient's request, if CBC done today at hospital can be reviewed to see if he can avoid coming in tomorrow. I told PAT that I will look for his result and call the patient.    Reviewed CBC and Hgb = 9.6. Procrit order is for Hgb < 10. Called patient and informed him still needs Procrit. However, we can use today's CBC. Pt gave v/u and will keep appt.

## 2020-02-19 ENCOUNTER — HOSPITAL ENCOUNTER (OUTPATIENT)
Dept: ONCOLOGY | Facility: HOSPITAL | Age: 75
Discharge: HOME | End: 2020-02-19
Admitting: INTERNAL MEDICINE

## 2020-02-19 ENCOUNTER — APPOINTMENT (OUTPATIENT)
Dept: LAB | Facility: HOSPITAL | Age: 75
End: 2020-02-19

## 2020-02-19 VITALS
BODY MASS INDEX: 37.99 KG/M2 | SYSTOLIC BLOOD PRESSURE: 151 MMHG | HEIGHT: 74 IN | DIASTOLIC BLOOD PRESSURE: 63 MMHG | HEART RATE: 84 BPM | TEMPERATURE: 97.6 F | RESPIRATION RATE: 18 BRPM | WEIGHT: 296 LBS

## 2020-02-19 DIAGNOSIS — D63.1 ANEMIA DUE TO STAGE 3 CHRONIC KIDNEY DISEASE (HCC): Primary | ICD-10-CM

## 2020-02-19 DIAGNOSIS — N18.30 ANEMIA DUE TO STAGE 3 CHRONIC KIDNEY DISEASE (HCC): Primary | ICD-10-CM

## 2020-02-19 PROCEDURE — 25010000002 EPOETIN ALFA-EPBX 40000 UNIT/ML SOLUTION: Performed by: INTERNAL MEDICINE

## 2020-02-19 PROCEDURE — 96372 THER/PROPH/DIAG INJ SC/IM: CPT

## 2020-02-19 RX ADMIN — EPOETIN ALFA-EPBX 40000 UNITS: 40000 INJECTION, SOLUTION INTRAVENOUS; SUBCUTANEOUS at 10:45

## 2020-02-19 NOTE — PROGRESS NOTES
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 Anemia diagnosed January 2018 and IgG kappa monoclonal gammopathy diagnosed May 2018.   · This patient is an obese  male who  developed kidney problems in January 2018 for which he was referred to Devi Plascencia M.D. He was found to have a hemoglobin of 7.4 at that time and was given 1 unit of packed red blood cells. He was then referred to GI where he had been followed for Syed’s esophagus for a number of years. An EGD and colonoscopy was performed on 3/5/18 by Marisel Gomez M.D. revealing mucosa suggestive of Syed’s esophagus and hiatal hernia in the cardia. Patient had a poor prep compromising the colonoscopy exam though the scope did reach the cecum. Esophageal biopsy revealed squamocolumnar mucosa with extensive intestinal metaplasia consistent with Syed’s esophagus, negative for dysplasia. Colonoscopy was recommended to be repeated in two years and EGD in three years. The patient saw his primary care physician, Paulette Harris M.D., in followup and blood testing was done on 5/17/18 revealing WBC 6.3, hemoglobin 8.4, MCV 84.5, platelet count 182,000. Vitamin B12 level was 416 (180-914) and patient was referred here for further evaluation. The patient claims to have been diagnosed with Vitamin B12 deficiency a number of years ago for which he has been on Vitamin B12 injections up until six months ago when he just stopped taking those. He has been recently started on oral iron supplementation.  He claims not to see any blood in his urine but does have microscopic hematuria for which he sees a urologist. He denies nosebleeds, gum bleeds or blood in the stool. He has not had any significant changes in his weight. He feels weak and dizzy for a number of years which has recently gotten worse. He does not ambulate much because of back surgery causing him weakness. He did have a right lower extremity DVT with bilateral pulmonary emboli in 2004 since which time he has been on Coumadin, thought secondary to a sedentary lifestyle. He has no family history of anemias.   · 5/24/18 - Patient seen initial consultation for anemia. Hemoglobin 7.9, MCV 89.9. Ferritin 17 (), iron 183 (), TIBC 379 (228-428), iron saturation 48% (20-50), retic 2.16% (0.5-1.5), haptoglobin 138 (), folate 9.1 (5.9-24.8). SPEP showed monoclonal gammopathy. SIFE showed IgG kappa monoclonal gammopathy. M-spike in the gamma region 0.7 g/dL. Erythropoietin 53.5 (2.59-18.5). Antiparietal cell antibody and intrinsic factor antibodies negative.   Globulin 4.2 (2.5-3.8). Creatinine 1.4 (0.7-1.2).   · 6/19/18 - Discussed results of anemia workup. Hemoglobin improving. Ferrous Sulfate decreased to 325 mg twice a day. Will perform gammopathy workup for IgG kappa monoclonal gammopathy. IgA 783 (), IgG 1480 (600-1500), IgM 93 (). Kappa lambda FLC ratio 0.81 (0.26-1.65). Ferritin 36 ().        · 6/25/18 - Bone survey negative for acute disease. No evidence of lytic or blastic metastatic bone disease was present. Chest x-ray showed cardiomegaly, mild right basilar atelectasis and findings suggestive of ankylosing spondylitis.   · 6/29/18 - Patient underwent sternal bone marrow revealing monoclonal gammopathy of undetermined significant (MGUS). Extent of bone marrow involvement 5%. Phenotype kappa positive, IgG positive, CD38 positive, CD20 negative, CD19 negative, CD45 negative, CD56 negative and  negative. Dyspoiesis  was not seen. There was absence of iron stores. Normal male karyotype. Normal FISH study. Flow cytometry revealed IgG kappa restrictive plasma cells in a polytypic background. There was a mixed population of maturing myeloid cells, B-cells and T-cells. No abnormal myeloid maturation was seen. A monoclonal IgG kappa plasma cell population was present. 4% plasma cells present.   · 8/23/18 - Labs by Dr. Plascencia revealed B12 of 857 (180-914), folate 12.7 (5.9-24.8), iron 127 ().      · 9/19/18 - Iron 94 (), TIBC 297 (228-428), iron saturation 32 (20-50), ferritin 29 (). Erythropoietin 30.04 (2.59-18.5).        · 10/16/18 - Iron 177 (), TIBC 320 (228-428), iron saturation 55 (20-50), ferritin 34 ().       · 11/16/18 - Hemoglobin 7.1. Patient given 1 unit of packed red blood cells.   · 11/21/18 - Ferritin 27 (). Hemoglobin 7.9. Patient given 1 unit of packed red blood cells.    · 11/26/18 - EGD by Dave Russo M.D. revealed erosive gastritis and bleeding ampulla. There was oozing upon entering the stomach in many different areas. Duodenal mucosa and the esophagus were normal.   · 11/27/18 - Hemoglobin 8.2. Order written for Procrit 30,000 units subq weekly, not approved by insurance for low ferritin. Urine JERRY with no definite monoclonal gammopathy identified with questionable faint IgG kappa.   · 12/18/18 - Hemoglobin 9.9. Injectafer 750 mg IV given.   · 1/2/19 - Injectafer 750 mg IV given.  Hemoglobin 11, .1. Patient claims that he is getting Vitamin B12 injections monthly at home through Dr. Harris. Currently taking Ferrous Sulfate 325 mg p.o. b.i.d. and asked to continue that. Asked to continue Pantoprazole 40 mg daily that he is taking. IgA 651 (), IgG 1470 (600-1500), IgM 94 ().      · 2/12/19 - Iron 88 ().    · 3/6/19 - WBC 19.8 with 83% neutrophils, 5% lymphocytes, 11% monocytes, hemoglobin 8.7, , platelet count 182,000. Patient status post  laparoscopic cholecystectomy on 2/24/19 with large ecchymoses apparent on abdomen. Infectious workup ordered and Injectafer 750 mg IV days 1 and 8 ordered.  Iron 23 (), TIBC 153 (228-428), iron saturation 15% (20-50), ferritin 400 (224-336).       · 3/12/19 - WBC 15.02, hemoglobin 8.1 and platelets 227,000. Patient reported hematuria followed by Dr. Starkey. Orders written to start Injectafer ASAP. Abdominal ecchymosis improving.   · 3/14/19 - Injectafer 750 mg IV given.   · 4/22/19 - Hemoglobin 9.5, . Patient missed day 8 Injectafer in March and it was rescheduled. SIFE showed IgG kappa monoclonal gammopathy.  · 5/8/19 - Injectafer 750 mg IV given.   · 7/8/19 - Iron 56 (). Iron Saturation 36 (20-50%). Transferrin 110 (180-330). TIBC 154 (228-428). Ferritin 1,199 ().    · 8/7/2019-hemoglobin 10.0.  Patient taking multivitamin with iron only.  Restarted ferrous sulfate 325mg by mouth twice a day. UIFE showed free kappa light chain identified.   10/16/2019 patient hospitalized at [facility] for 3 days and found to have a hemoglobin of 4.8 at the cancer center visit.  Stool Hemoccult was positive.  He ended up receiving 5 units of packed red blood cells.  EGD by Marisel Gomez MD revealed severe gastric antral vascular ectasia with oozing, Syed's esophagus and hiatal hernia.  The patient was treated with PPI and Carafate.  Plan was to repeat EGD with cauterization in 6 weeks.  B12 and reticulocyte count were high.  Haptoglobin and folic acid were normal.  Creatinine 2.2 (0.76-1.27), iron 47 (), TIBC 264 (298-5 0.36), iron saturation 18 (20-50), ferritin 128.4 ().  Aspirin was held temporarily and patient given IV iron.  IgA 720 (), IgM 72 (), IgG 1489 (700-1600).  · EGD done secondary to GI bleed on 1/9/2020 1.  Moderately severe gastric antral vascular ectasia APC applied for cauterization 2.  Short segment of Syed's biopsy taken. 3.  Small hiatal hernia.  · 1/17/20 Iron  42, Iron saturation 16, TIBC 264.  · 1/3/2020 patient was initiated on weekly Procrit     2.   Elevated LFT’s diagnosis established March 2018.  Biliary duct dilation diagnosis established June 2018.   · 11/30/17 - Maia phos 93 (32-91), total bili 0.7 (0.3-1.2), AST 37 (15-41), ALT 25 (15-41).   · 3/1/18 - T-bili 0.5 (0.3-1.2), maia phos 106 (32-91), AST 54 (15-41), ALT 27 (17-63).   · 5/24/18 - AST 46 (15-41), maia phos 131 (32-91).   · 6/8/18 - CMP ordered by Dr. Alejandra Amaro showed maia phos 209 (32-91),  (15-41), ALT 84 (17-63), total bili 1.1 (0.3-1.2).             · 6/19/18 - CT abdomen and pelvis as well as serological workup for elevated LFT’s ordered. Alpha-1 antitrypsin 144 (). Ceruloplasmin 38 (22-58). AFP 3 (0-9).  Hepatitis panel non-reactive.   · 6/22/18 - CT abdomen and pelvis showed abnormal intra and extrahepatic biliary dilation. There was mild distention of the gallbladder and evidence of several gallstones. Bilateral non-obstructing kidney stone was present. Incidental sigmoid diverticulosis.   · 7/2/18 - Patient underwent MRCP at [facility] showing markedly dilated intrahepatic and extrahepatic bile duct with multiple small filling defects within the distal common bile duct compatible with choledocholithiasis. There was a focal 6 mm segmental narrowing at the distal CBD with recommended GI consult for ERCP and brushings.   · 7/5/18 to 7/10/18 - On 7/5/18 labs revealed normal total bilirubin (0.8), AST 69 (15-41), maia phos 152 (32-91), ALT was normal at 37 (17-63), ammonia was elevated at 86 (9-35), lipase was normal (22). Patient hospitalized at [facility] due to weakness. Workup revealed cholelithiasis. On 7/9/18 patient underwent ERCP with bilateral sphincterotomy, common duct stone extraction, biliary brushing and stent placement by Dr. Leiva. Path on brushings was negative for malignancy. There was intra and extrahepatic biliary ductal dilation with relatively abrupt  distal common bile duct cutoff and non-visualization of the gallbladder. He was started on Lovenox and sequential compression devices due to risk of pulmonary embolism.  of 33 (0-35).  On 7/10/18 LFT’s revealed total bili 0.9 (0.3-1.2). Maia phos 129 (32-91). AST 50 (15-41), ALT 41 (17-63).     · 8/31/18 - EUS by Dr. Gomez at [facility] revealing suspect benign biliary stricture due to CBD stone with FNA pathology revealing benign and atypical ductal cells, bile and proteinaceous material including scattered bacteria, neutrophils and degenerating cells. The atypia was felt mild and favored to be reactive in nature. Plan was to repeat ERCP with removal of the stent and the stone with consideration of common bile duct evaluation with cholangioscopy at that time.   · 10/12/18 - ERCP with sphincteroplasty and stone extraction done by  JUAN PABLO Russo for choledocholithiasis.   · 2/23/19 - Patient underwent ERCP with balloon clearance of bile duct by Memo  · 3/6/19 - LFT’s not elevated with bilirubin 1.1, AST 31, ALT 14, maia phos was mildly elevated at 101 (32-91).   · 4/25/19 - 4/26/19 - Admitted to [facility] for hepatic encephalopathy and hypokalemia.   · 4/27/2019 - CT abdomen pelvis showed a 4.8 cm air-fluid collection adjacent to the right posterior hepatic lobe significant improved.  Right-sided chest tube small right pleural effusion.  Hepatic cirrhosis.  Bilateral nonobstructing renal stones.  Sigmoid diverticulosis.   · 5/26/19 - 5/29/2019 - Admitted to [facility] for hepatic and cephalopathy.  Ammonia level 213 (H).  · 7/11/19 - AFP 2.76 (0-9).   · 9/26/19 - CT scan abdomen showed fluid collection posterior to the right hepatic lobe has significantly diminished in size with only trace fluid remaining. Small locules of air are seen within this collection which could be related to recent shunt mentation or could be related to tiny fistula from the splenic flexure of the colon. Trace right pleural fluid,  diminished since 4/27/19. Thickened, likely reactive, pleural rind remains. Mild right basilar atelectasis. Abnormal intrahepatic and extra hepatic biliary ductal dilation. Intrahepatic biliary dilation is new since 4/27/19. The CBD appears attenuated in its mid segment, raising the possibility of stricture. ERCP recommended. Uncomplicated colonic diverticulosis. Non-obstructing bilateral renal stones and probable small right renal cyst. Cirrhotic liver morphology. No focal liver lesion is seen.  · 10/28/19 - CT scan abdomen and pelvis showed fluid collection posterior to the right hepatic lobe appears slightly increased in size now measuring 24 mm in diameter, again a small droplet of gas adjacent to this fluid collection is seen which may represent fistula. Right basilar small pleural effusion and atelectasis remain. Continued dilatation of the intra and extrahepatic biliary ductal system which appears stable from previous exam. Non-obstructing bilateral renal calculi. Changes of cirrhosis and splenomegaly. Diverticulosis. Mild interval change from prior study with increasing fluid collection posterior to the right hepatic lobe.      3.   Right lower extremity DVT and bilateral pulmonary emboli diagnosed 2004.   · 2004 - Patient claims to have developed right lower extremity DVT with bilateral pulmonary emboli in 2004 and was coumadinized. The blood clots were thought secondary to his sedentary lifestyle. He stayed on the Coumadin up until October 2017 when, due to difficulty maintaining his INR’s, he was switched to Xarelto 20 mg daily by Paulette Harris M.D. which was later dropped to 10 mg daily.    · 11/26/18 - EGD by Dave Russo M.D. revealed erosive gastritis and bleeding ampulla. Ampullary biopsy revealed reactive/reparative small intestinal mucosa with mild chronic inflammation. Gastric antrum biopsy was suggestive of focal mild reactive gastropathy. Due to erosive gastritis, patient asked to  hold Xarelto and Aspirin by Dave Russo M.D.   · 11/27/18 - Patient asked to discontinue Xarelto and hold Aspirin while obtaining IVC filter. Risks discussed. Factor V Leiden gene mutation and prothrombin gene mutations not detected. Anticardiolipin IgM 11 (<11). Anti beta-2 glyco, IgA 55 (<20). Factor VIII activity 186 (). Antithrombin III activity 63 (). Protein C activity 69 (), protein S activity 69 ().       · 12/5/18 - Patient underwent transjugular IVC filter placement in IR by  Jonn Cuenca M.D.   · 1/2/19 - Told patient that his low protein CNS and antithrombin III likely due to liver disease. He would be at a high risk of going back on Xarelto and hence continued on Aspirin 81 mg p.o. daily.   · 1/3/19 - D-dimer 1.25 (<0.45).    · 3/8/19 - Chest x-ray showed chronic changes in the chest with no obvious pneumonia or congestive changes.  · 4/3/19 - Chest x-ray with right pleural effusion and basilar lung density with slight increase from prior. Cardiomegaly.   · 4/22/19 - Factor VIII 334 (H), Anti-phosphatidylserine antibody IgM> 80 (H), Anti-phosphatidylserine antibody IgG 12 (N), Cardiolipin IgG 21 (N), Cardiolipin IgM 55 (H), Cardiolipin IgA 63 (H), Beta-2 glycoprotein IgG 4 (N), IgA 91 (H), and IgM 21 (H).   · 7/11/19 - Chest x-ray showed stable small right pleural effusion since 04/25/2019. Minimal right costophrenic angle atelectasis.  · 4.  Recurrent episodes of hepatic encephalopathy on treatment with lactulose 30 cc 4 times a day.  · Recurrent GI blood loss on anticoagulation.    Past Medical History:   Diagnosis Date   • Anemia    • Anxiety    • B12 deficiency 2009   • Balance disorder    • Syed's esophagus    • CAD (coronary artery disease)     cardiac stent   • Constipation    • DDD (degenerative disc disease), lumbar    • Decubitus ulcer     buttocks   • Depression    • Diabetes mellitus (CMS/HCC)    • Disease of thyroid gland     hypothyroid   •  Diverticular disease    • Dvt femoral (deep venous thrombosis) (CMS/HCC) 2004    rt let   • Gastroparesis    • GERD (gastroesophageal reflux disease)    • Gout    • Hepatic encephalopathy (CMS/HCC)     if doesnt take meds   • History of echocardiogram     03/03/2017 - 12/03/2014 - 04/2012   • History of stomach ulcers    • Hyperlipidemia    • Iron deficiency    • Morbid obesity (CMS/HCC)    • THOMAS (nonalcoholic steatohepatitis)    • Neuropathy    • OAB (overactive bladder)    • KATHIA treated with BiPAP     bring dos   • Peripheral edema    • Pulmonary embolus (CMS/HCC) 2004   • Renal insufficiency    • S/P lumbar laminectomy    • Skin irritation     skin fold   • Stage 3 chronic kidney disease (CMS/HCC)        Past Surgical History:   Procedure Laterality Date   • BACK SURGERY  1971   • BILE DUCT STENT PLACEMENT     • CATARACT EXTRACTION  2014   • CHOLECYSTECTOMY  02/24/2019   • COLONOSCOPY  03/2018   • CORONARY ANGIOPLASTY  08/24/2009    PCI stent - succesful placement of 3.5/23 promus stent in proximal right coronary artery   • CORONARY ANGIOPLASTY WITH STENT PLACEMENT  08/27/2009    patient had stent placed to proximal right coronary artery   • CYSTOSCOPY BLADDER STONE LITHOTRIPSY  1997   • ENDOSCOPY N/A 10/18/2019    Procedure: ESOPHAGOGASTRODUODENOSCOPY with argon plasma coagulation of gastric antral vascular ectasia;  Surgeon: Marisel Gomez MD;  Location: [facility] ENDOSCOPY;  Service: Gastroenterology   • ENDOSCOPY N/A 1/9/2020    Procedure: ESOPHAGOGASTRODUODENOSCOPY WITH BIOPSY, ARGON PLASMA COAGULATION OF GASTRIC ANTREL VASCULAR ECTASIA,;  Surgeon: Marisel Gomez MD;  Location: [facility] ENDOSCOPY;  Service: Gastroenterology   • ERCP N/A 11/20/2019    Procedure: ERCP, clearance of bile duct with balloon, placement of biliary stent, EGD with argon plasma coagulation of gastric antral vascular ectasia;  Surgeon: Marisel Gomez MD;  Location: [facility] ENDOSCOPY;  Service: Gastroenterology   • OTHER SURGICAL HISTORY   "11/2018    IVC filter implant   • RENAL ARTERY STENT Left     does not recall this         Current Outpatient Medications:   •  allopurinol (ZYLOPRIM) 100 MG tablet, Take 100 mg by mouth 2 (Two) Times a Day. Do not preop, Disp: , Rfl:   •  aspirin 81 MG tablet, Take 1 tablet by mouth Daily. (Patient taking differently: Take 81 mg by mouth Daily. dont take preop), Disp: 30 tablet, Rfl: 3  •  atorvastatin (LIPITOR) 20 MG tablet, Take 1 tablet by mouth Daily. (Patient taking differently: Take 20 mg by mouth Every Night.), Disp: 90 tablet, Rfl: 3  •  B-D 3CC LUER-YASMEEN SYR 25GX1\" 25G X 1\" 3 ML misc, , Disp: , Rfl: 1  •  bumetanide (BUMEX) 2 MG tablet, Take 1 tablet by mouth Daily. (Patient taking differently: Take 2 mg by mouth Daily. Do not take preop), Disp: , Rfl:   •  Cyanocobalamin 1000 MCG/ML kit, Inject 1,000 mcg as directed Every 30 (Thirty) Days., Disp: , Rfl:   •  docusate sodium (COLACE) 100 MG capsule, Take 100 mg by mouth 2 (Two) Times a Day. dont take preop, Disp: , Rfl:   •  DULoxetine (CYMBALTA) 60 MG capsule, Take 60 mg by mouth Daily. Take preop, Disp: , Rfl:   •  folic acid (FOLVITE) 1 MG tablet, Take 1 mg by mouth Daily. dont take preop, Disp: , Rfl:   •  hydrOXYzine pamoate (VISTARIL) 25 MG capsule, Take 25 mg by mouth 3 (Three) Times a Day As Needed. Always takes am and pm  dont take preop, Disp: , Rfl: 0  •  insulin glargine (LANTUS) 100 UNIT/ML injection, Inject 50 units at bedtime (Patient taking differently: Inject 50 Units under the skin into the appropriate area as directed Every Night. Inject 50 units at bedtime), Disp: 45 mL, Rfl: 3  •  insulin lispro (HUMALOG) 100 UNIT/ML injection, 18 units subcu before each meal plus sliding scale MDD 60 (Patient taking differently: Inject 18 Units under the skin into the appropriate area as directed 3 (Three) Times a Day Before Meals. 18 units subcu before each meal plus sliding scale MDD 60  Do not take preop), Disp: 60 mL, Rfl: 4  •  lactulose " (CHRONULAC) 10 GM/15ML solution, Take 60 mL by mouth Every 4 (Four) Hours. Take last dose 6 am 2/27, Disp: , Rfl:   •  levothyroxine (SYNTHROID, LEVOTHROID) 75 MCG tablet, Take 1 tablet by mouth Daily. (Patient taking differently: Take 75 mcg by mouth Daily. Take preop), Disp: 90 tablet, Rfl: 4  •  magnesium oxide (MAG-OX) 400 MG tablet, Take 400 mg by mouth Daily. Take post op, Disp: , Rfl:   •  melatonin 5 MG tablet tablet, Take 10 mg by mouth At Night As Needed., Disp: , Rfl:   •  metoclopramide (REGLAN) 5 MG tablet, Take 5 mg by mouth 2 (Two) Times a Day. Ok to take preop, Disp: , Rfl: 0  •  Mirabegron ER (MYRBETRIQ) 50 MG tablet sustained-release 24 hour 24 hr tablet, Take 50 mg by mouth Daily. Ok to take preop, Disp: , Rfl:   •  Multiple Vitamins-Minerals (MULTIVITAMIN WITH MINERALS) tablet tablet, Take 1 tablet by mouth Daily. dont take preop, Disp: , Rfl:   •  oxyCODONE (ROXICODONE) 5 MG immediate release tablet, Take 5 mg by mouth Every 8 (Eight) Hours As Needed. May take preop, Disp: , Rfl:   •  pantoprazole (PROTONIX) 40 MG EC tablet, Take 1 tablet by mouth 2 (Two) Times a Day. (Patient taking differently: Take 40 mg by mouth 2 (Two) Times a Day. Take preop), Disp: 30 tablet, Rfl: 3  •  phosphorus (K PHOS NEUTRAL) 155-852-130 MG tablet, Take 1 tablet by mouth Daily. afternoon, Disp: , Rfl:   •  rifaximin (XIFAXAN) 550 MG tablet, Take 550 mg by mouth Every 12 (Twelve) Hours. Take preop, Disp: , Rfl:   •  sodium bicarbonate 650 MG tablet, Take 650 mg by mouth 3 (Three) Times a Day. Take preop, Disp: , Rfl:   •  sucralfate (CARAFATE) 1 g tablet, Take 1 g by mouth 4 (Four) Times a Day., Disp: , Rfl:   •  zinc sulfate (ZINCATE) 50 MG capsule, Take 50 mg by mouth Daily. Do not take preop, Disp: , Rfl: 0    No Known Allergies    Family History   Problem Relation Age of Onset   • Hyperlipidemia Other    • Hypertension Other        Cancer-related family history is not on file.    Social History     Tobacco Use   •  "Smoking status: Never Smoker   • Smokeless tobacco: Never Used   Substance Use Topics   • Alcohol use: No     Frequency: Never   • Drug use: No       I have reviewed the history of present illness, past medical history, family history, social history, lab results, all notes and other records since the patient was last seen at the cancer care center    SUBJECTIVE:      Patient is my office for follow-up.  He received iron infusions tolerated it well.  No further bleeds .  Scheduled for ERCP.  Compliant with lactulose not too sleepy anymore.  Not trying to lose weight.  Weight fluctuates depending on the fluid on him    ROS:      Review of Systems   Constitutional: Negative for fever.   HENT: Negative for nosebleeds and trouble swallowing.    Eyes: Negative for visual disturbance.   Respiratory: Negative for cough, shortness of breath and wheezing.    Cardiovascular: Negative for chest pain.   Gastrointestinal: Negative for abdominal pain and blood in stool.   Endocrine: Negative for cold intolerance.   Genitourinary: Negative for dysuria and hematuria.   Musculoskeletal: Negative for joint swelling.   Skin: Negative for rash.   Allergic/Immunologic: Negative for environmental allergies.   Neurological: Negative for seizures.   Hematological: Does not bruise/bleed easily.   Psychiatric/Behavioral: The patient is not nervous/anxious.          Objective:       Vitals:    02/24/20 1207   BP: 135/67   Pulse: 86   Temp: 98.5 °F (36.9 °C)   Weight: (!) 137 kg (301 lb 6.4 oz)   Height: 188 cm (74\")         PHYSICAL EXAM:      Physical Exam   Constitutional: He is oriented to person, place, and time. No distress.   Morbidly obese   HENT:   Head: Normocephalic and atraumatic.   Eyes: Conjunctivae and EOM are normal. Right eye exhibits no discharge. Left eye exhibits no discharge. No scleral icterus.   Neck: Normal range of motion. Neck supple. No thyromegaly present.   Cardiovascular: Normal rate, regular rhythm and normal " heart sounds. Exam reveals no gallop and no friction rub.   Pulmonary/Chest: Effort normal. No stridor. No respiratory distress. He has no wheezes.   Abdominal: Soft. Bowel sounds are normal. He exhibits no mass. There is no tenderness. There is no rebound and no guarding.   Musculoskeletal: Normal range of motion. He exhibits no tenderness.   1+ bilateral lower extremity edema   Lymphadenopathy:     He has no cervical adenopathy.   Neurological: He is alert and oriented to person, place, and time. He exhibits normal muscle tone.   Skin: Skin is warm. No rash noted. He is not diaphoretic. No erythema.   Psychiatric: He has a normal mood and affect. His behavior is normal.   Nursing note and vitals reviewed.       RECENT LABS:     WBC   Date Value Ref Range Status   02/24/2020 8.45 3.40 - 10.80 10*3/mm3 Final     RBC   Date Value Ref Range Status   02/24/2020 2.77 (L) 4.14 - 5.80 10*6/mm3 Final     Hemoglobin   Date Value Ref Range Status   02/24/2020 9.1 (L) 13.0 - 17.7 g/dL Final     Hematocrit   Date Value Ref Range Status   02/24/2020 29.0 (L) 37.5 - 51.0 % Final     MCV   Date Value Ref Range Status   02/24/2020 104.7 (H) 79.0 - 97.0 fL Final     MCH   Date Value Ref Range Status   02/24/2020 32.9 26.6 - 33.0 pg Final     MCHC   Date Value Ref Range Status   02/24/2020 31.4 (L) 31.5 - 35.7 g/dL Final     RDW   Date Value Ref Range Status   02/24/2020 15.1 12.3 - 15.4 % Final     RDW-SD   Date Value Ref Range Status   02/24/2020 55.4 (H) 37.0 - 54.0 fl Final     MPV   Date Value Ref Range Status   02/24/2020 10.3 6.0 - 12.0 fL Final     Platelets   Date Value Ref Range Status   02/24/2020 242 140 - 450 10*3/mm3 Final     Neutrophil %   Date Value Ref Range Status   02/24/2020 65.4 42.7 - 76.0 % Final     Lymphocyte %   Date Value Ref Range Status   02/24/2020 16.4 (L) 19.6 - 45.3 % Final     Monocyte %   Date Value Ref Range Status   02/24/2020 10.2 5.0 - 12.0 % Final     Eosinophil %   Date Value Ref Range  Status   02/24/2020 6.9 (H) 0.3 - 6.2 % Final     Basophil %   Date Value Ref Range Status   02/24/2020 1.1 0.0 - 1.5 % Final     Immature Grans %   Date Value Ref Range Status   01/06/2020 0.5 0.0 - 0.5 % Final     Neutrophils, Absolute   Date Value Ref Range Status   02/24/2020 5.53 1.70 - 7.00 10*3/mm3 Final     Lymphocytes, Absolute   Date Value Ref Range Status   02/24/2020 1.39 0.70 - 3.10 10*3/mm3 Final     Monocytes, Absolute   Date Value Ref Range Status   02/24/2020 0.86 0.10 - 0.90 10*3/mm3 Final     Eosinophils, Absolute   Date Value Ref Range Status   02/24/2020 0.58 (H) 0.00 - 0.40 10*3/mm3 Final     Basophils, Absolute   Date Value Ref Range Status   02/24/2020 0.09 0.00 - 0.20 10*3/mm3 Final     Immature Grans, Absolute   Date Value Ref Range Status   01/06/2020 0.04 0.00 - 0.05 10*3/mm3 Final     nRBC   Date Value Ref Range Status   01/06/2020 0.0 0.0 - 0.2 /100 WBC Final       Lab Results   Component Value Date    GLUCOSE 172 (H) 02/18/2020    BUN 31 (H) 02/18/2020    CREATININE 1.81 (H) 02/18/2020    EGFRIFNONA 37 (L) 02/18/2020    BCR 17.1 02/18/2020    K 4.2 02/18/2020    CO2 26.0 02/18/2020    CALCIUM 8.9 02/18/2020    ALBUMIN 3.30 (L) 02/18/2020    LABIL2 0.4 (L) 05/29/2019    AST 43 (H) 02/18/2020    ALT 17 02/18/2020         Assessment/Plan      ASSESSMENT:     GAVE (gastric antral vascular ectasia)     Anemia in stage 3 chronic kidney disease (CMS/HCC)     History of Vitamin B12 deficiency     History of DVT of right lower extremity     History of bilateral pulmonary embolus (PE)     Antithrombin III deficiency (CMS/HCC)     Protein C deficiency (CMS/HCC)     Protein S deficiency (CMS/HCC)     Antiphospholipid antibody positive     Elevated factor VIII level     THOMAS (nonalcoholic steatohepatitis)     Splenomegaly     IgG kappa MGUS    PLAN:      1. Hemoglobin proving with Procrit injections.  Continue weekly Procrit.  Obtain iron studies today if needed..  Recheck iron studies.  Patient  will continue weekly Procrit at this time with a hemoglobin is less than 10.   2. Patient is off anticoagulation given his GI bleed.  3. Instructed the patient to stop taking oral iron tablets that way we can tell the difference between GI blood loss versus iron use.  4. Patient follows with Dr. Gomez regarding his Gonzalez and portal hypertension.  Continue to use lactulose for his high ammonia.  ERCP with stent removal this week  5. Patient has hyper coag work-up but off anticoagulation secondary to GI bleeds.    6. I will see him back in my office in 6 weeks recheck CBC at the time     I have reviewed labs results, imaging, vitals, and medications with the patient today.     Patient verbalized understanding and is in agreement of the above plan.          This report was compiled using Dragon voice recognition software. I have made every effort to proof read this document; however, typographical errors may persist.

## 2020-02-24 ENCOUNTER — OFFICE VISIT (OUTPATIENT)
Dept: LAB | Facility: HOSPITAL | Age: 75
End: 2020-02-24

## 2020-02-24 ENCOUNTER — TELEPHONE (OUTPATIENT)
Dept: ONCOLOGY | Facility: CLINIC | Age: 75
End: 2020-02-24

## 2020-02-24 ENCOUNTER — HOSPITAL ENCOUNTER (OUTPATIENT)
Dept: ONCOLOGY | Facility: HOSPITAL | Age: 75
Discharge: HOME | End: 2020-02-24
Admitting: INTERNAL MEDICINE

## 2020-02-24 ENCOUNTER — OFFICE VISIT (OUTPATIENT)
Dept: ONCOLOGY | Facility: CLINIC | Age: 75
End: 2020-02-24

## 2020-02-24 VITALS
HEIGHT: 74 IN | DIASTOLIC BLOOD PRESSURE: 67 MMHG | HEART RATE: 86 BPM | SYSTOLIC BLOOD PRESSURE: 135 MMHG | BODY MASS INDEX: 38.68 KG/M2 | TEMPERATURE: 98.5 F | WEIGHT: 301.4 LBS

## 2020-02-24 DIAGNOSIS — K90.9 MALABSORPTION OF IRON: ICD-10-CM

## 2020-02-24 DIAGNOSIS — D50.0 IRON DEFICIENCY ANEMIA DUE TO CHRONIC BLOOD LOSS: ICD-10-CM

## 2020-02-24 DIAGNOSIS — N18.30 ANEMIA DUE TO STAGE 3 CHRONIC KIDNEY DISEASE (HCC): Primary | ICD-10-CM

## 2020-02-24 DIAGNOSIS — K31.819 GAVE (GASTRIC ANTRAL VASCULAR ECTASIA): ICD-10-CM

## 2020-02-24 DIAGNOSIS — D50.9 IRON DEFICIENCY ANEMIA, UNSPECIFIED IRON DEFICIENCY ANEMIA TYPE: ICD-10-CM

## 2020-02-24 DIAGNOSIS — D63.1 ANEMIA DUE TO STAGE 3 CHRONIC KIDNEY DISEASE (HCC): Primary | ICD-10-CM

## 2020-02-24 DIAGNOSIS — D47.2 MGUS (MONOCLONAL GAMMOPATHY OF UNKNOWN SIGNIFICANCE): ICD-10-CM

## 2020-02-24 LAB
BASOPHILS # BLD AUTO: 0.09 10*3/MM3 (ref 0–0.2)
BASOPHILS NFR BLD AUTO: 1.1 % (ref 0–1.5)
DEPRECATED RDW RBC AUTO: 55.4 FL (ref 37–54)
EOSINOPHIL # BLD AUTO: 0.58 10*3/MM3 (ref 0–0.4)
EOSINOPHIL NFR BLD AUTO: 6.9 % (ref 0.3–6.2)
ERYTHROCYTE [DISTWIDTH] IN BLOOD BY AUTOMATED COUNT: 15.1 % (ref 12.3–15.4)
HCT VFR BLD AUTO: 29 % (ref 37.5–51)
HGB BLD-MCNC: 9.1 G/DL (ref 13–17.7)
LYMPHOCYTES # BLD AUTO: 1.39 10*3/MM3 (ref 0.7–3.1)
LYMPHOCYTES NFR BLD AUTO: 16.4 % (ref 19.6–45.3)
MCH RBC QN AUTO: 32.9 PG (ref 26.6–33)
MCHC RBC AUTO-ENTMCNC: 31.4 G/DL (ref 31.5–35.7)
MCV RBC AUTO: 104.7 FL (ref 79–97)
MONOCYTES # BLD AUTO: 0.86 10*3/MM3 (ref 0.1–0.9)
MONOCYTES NFR BLD AUTO: 10.2 % (ref 5–12)
NEUTROPHILS # BLD AUTO: 5.53 10*3/MM3 (ref 1.7–7)
NEUTROPHILS NFR BLD AUTO: 65.4 % (ref 42.7–76)
PLATELET # BLD AUTO: 242 10*3/MM3 (ref 140–450)
PMV BLD AUTO: 10.3 FL (ref 6–12)
RBC # BLD AUTO: 2.77 10*6/MM3 (ref 4.14–5.8)
WBC NRBC COR # BLD: 8.45 10*3/MM3 (ref 3.4–10.8)

## 2020-02-24 PROCEDURE — 99215 OFFICE O/P EST HI 40 MIN: CPT | Performed by: INTERNAL MEDICINE

## 2020-02-24 PROCEDURE — 36415 COLL VENOUS BLD VENIPUNCTURE: CPT

## 2020-02-24 PROCEDURE — 85025 COMPLETE CBC W/AUTO DIFF WBC: CPT

## 2020-02-24 RX ORDER — SODIUM CHLORIDE 9 MG/ML
250 INJECTION, SOLUTION INTRAVENOUS ONCE
Status: CANCELLED | OUTPATIENT
Start: 2020-03-10

## 2020-02-24 RX ORDER — SODIUM CHLORIDE 9 MG/ML
250 INJECTION, SOLUTION INTRAVENOUS ONCE
Status: CANCELLED | OUTPATIENT
Start: 2020-03-03

## 2020-02-24 NOTE — PROGRESS NOTES
Spoke with Nany and she advised patient doesn't meet medicare guidelines for retacrit with his IRON studies he will need injectafer. Advised Brandee VIDAL MA and spoke with patient and advised they will call once approved with insurance to do IV Iron.

## 2020-02-24 NOTE — TELEPHONE ENCOUNTER
Received return call from the patient's wife returning call to Ana Worthington.  Chart reviewed.  Advised that an iron infusion has been ordered as he no longer qualifies for Retacrit.  Explained that once insurance authorization has been obtained, he will be called for an appointment.  Patient's wife v/u.

## 2020-02-26 ENCOUNTER — ANESTHESIA EVENT (OUTPATIENT)
Dept: GASTROENTEROLOGY | Facility: HOSPITAL | Age: 75
End: 2020-02-26

## 2020-02-27 ENCOUNTER — HOSPITAL ENCOUNTER (OUTPATIENT)
Dept: GENERAL RADIOLOGY | Facility: HOSPITAL | Age: 75
Discharge: HOME | End: 2020-02-27

## 2020-02-27 ENCOUNTER — HOSPITAL ENCOUNTER (OUTPATIENT)
Facility: HOSPITAL | Age: 75
Setting detail: HOSPITAL OUTPATIENT SURGERY
Discharge: HOME | End: 2020-02-27
Attending: INTERNAL MEDICINE | Admitting: INTERNAL MEDICINE

## 2020-02-27 ENCOUNTER — ANESTHESIA (OUTPATIENT)
Dept: GASTROENTEROLOGY | Facility: HOSPITAL | Age: 75
End: 2020-02-27

## 2020-02-27 ENCOUNTER — ON CAMPUS - OUTPATIENT (AMBULATORY)
Dept: URBAN - METROPOLITAN AREA HOSPITAL 85 | Facility: HOSPITAL | Age: 75
End: 2020-02-27
Payer: COMMERCIAL

## 2020-02-27 VITALS
RESPIRATION RATE: 17 BRPM | SYSTOLIC BLOOD PRESSURE: 114 MMHG | TEMPERATURE: 98.4 F | BODY MASS INDEX: 39.97 KG/M2 | HEIGHT: 73 IN | OXYGEN SATURATION: 100 % | HEART RATE: 75 BPM | DIASTOLIC BLOOD PRESSURE: 60 MMHG | WEIGHT: 301.59 LBS

## 2020-02-27 DIAGNOSIS — K22.70 BARRETT'S ESOPHAGUS WITHOUT DYSPLASIA: ICD-10-CM

## 2020-02-27 DIAGNOSIS — K31.819 ANGIODYSPLASIA OF STOMACH AND DUODENUM WITHOUT BLEEDING: ICD-10-CM

## 2020-02-27 DIAGNOSIS — N18.9 CHRONIC KIDNEY FAILURE, UNSPECIFIED STAGE: ICD-10-CM

## 2020-02-27 DIAGNOSIS — K83.1 OBSTRUCTION OF BILE DUCT: ICD-10-CM

## 2020-02-27 LAB
GLUCOSE BLDC GLUCOMTR-MCNC: 143 MG/DL (ref 70–105)
GLUCOSE BLDC GLUCOMTR-MCNC: 155 MG/DL (ref 70–105)
GLUCOSE BLDC GLUCOMTR-MCNC: 159 MG/DL (ref 70–105)

## 2020-02-27 PROCEDURE — 82962 GLUCOSE BLOOD TEST: CPT

## 2020-02-27 PROCEDURE — C1769 GUIDE WIRE: HCPCS | Performed by: INTERNAL MEDICINE

## 2020-02-27 PROCEDURE — C2625 STENT, NON-COR, TEM W/DEL SY: HCPCS | Performed by: INTERNAL MEDICINE

## 2020-02-27 PROCEDURE — 25010000002 PROPOFOL 10 MG/ML EMULSION: Performed by: ANESTHESIOLOGY

## 2020-02-27 PROCEDURE — 74328 X-RAY BILE DUCT ENDOSCOPY: CPT

## 2020-02-27 PROCEDURE — 88104 CYTOPATH FL NONGYN SMEARS: CPT | Performed by: INTERNAL MEDICINE

## 2020-02-27 PROCEDURE — 43276 ERCP STENT EXCHANGE W/DILATE: CPT | Performed by: INTERNAL MEDICINE

## 2020-02-27 PROCEDURE — C1726 CATH, BAL DIL, NON-VASCULAR: HCPCS | Performed by: INTERNAL MEDICINE

## 2020-02-27 PROCEDURE — 25010000002 IOPAMIDOL 61 % SOLUTION 50 ML VIAL: Performed by: INTERNAL MEDICINE

## 2020-02-27 PROCEDURE — 25010000002 ONDANSETRON PER 1 MG: Performed by: ANESTHESIOLOGY

## 2020-02-27 DEVICE — BILIARY STENT WITH NAVIFLEXTM RX DELIVERY SYSTEM
Type: IMPLANTABLE DEVICE | Site: BILE DUCT | Status: FUNCTIONAL
Brand: ADVANIX™ BILIARY

## 2020-02-27 RX ORDER — OXYCODONE HYDROCHLORIDE 5 MG/1
10 TABLET ORAL ONCE AS NEEDED
Status: DISCONTINUED | OUTPATIENT
Start: 2020-02-27 | End: 2020-02-27 | Stop reason: HOSPADM

## 2020-02-27 RX ORDER — ONDANSETRON 2 MG/ML
4 INJECTION INTRAMUSCULAR; INTRAVENOUS ONCE AS NEEDED
Status: DISCONTINUED | OUTPATIENT
Start: 2020-02-27 | End: 2020-02-27 | Stop reason: HOSPADM

## 2020-02-27 RX ORDER — PROPOFOL 10 MG/ML
VIAL (ML) INTRAVENOUS AS NEEDED
Status: DISCONTINUED | OUTPATIENT
Start: 2020-02-27 | End: 2020-02-27 | Stop reason: SURG

## 2020-02-27 RX ORDER — PROMETHAZINE HYDROCHLORIDE 25 MG/1
25 TABLET ORAL ONCE AS NEEDED
Status: DISCONTINUED | OUTPATIENT
Start: 2020-02-27 | End: 2020-02-27 | Stop reason: HOSPADM

## 2020-02-27 RX ORDER — LIDOCAINE HYDROCHLORIDE 20 MG/ML
INJECTION, SOLUTION EPIDURAL; INFILTRATION; INTRACAUDAL; PERINEURAL AS NEEDED
Status: DISCONTINUED | OUTPATIENT
Start: 2020-02-27 | End: 2020-02-27 | Stop reason: SURG

## 2020-02-27 RX ORDER — PROMETHAZINE HYDROCHLORIDE 25 MG/ML
12.5 INJECTION, SOLUTION INTRAMUSCULAR; INTRAVENOUS ONCE AS NEEDED
Status: DISCONTINUED | OUTPATIENT
Start: 2020-02-27 | End: 2020-02-27 | Stop reason: HOSPADM

## 2020-02-27 RX ORDER — SODIUM CHLORIDE 0.9 % (FLUSH) 0.9 %
10 SYRINGE (ML) INJECTION EVERY 12 HOURS SCHEDULED
Status: DISCONTINUED | OUTPATIENT
Start: 2020-02-27 | End: 2020-02-27 | Stop reason: HOSPADM

## 2020-02-27 RX ORDER — HYDRALAZINE HYDROCHLORIDE 20 MG/ML
5 INJECTION INTRAMUSCULAR; INTRAVENOUS
Status: DISCONTINUED | OUTPATIENT
Start: 2020-02-27 | End: 2020-02-27 | Stop reason: HOSPADM

## 2020-02-27 RX ORDER — NALOXONE HCL 0.4 MG/ML
0.4 VIAL (ML) INJECTION AS NEEDED
Status: DISCONTINUED | OUTPATIENT
Start: 2020-02-27 | End: 2020-02-27 | Stop reason: HOSPADM

## 2020-02-27 RX ORDER — ONDANSETRON 2 MG/ML
INJECTION INTRAMUSCULAR; INTRAVENOUS AS NEEDED
Status: DISCONTINUED | OUTPATIENT
Start: 2020-02-27 | End: 2020-02-27 | Stop reason: SURG

## 2020-02-27 RX ORDER — SODIUM CHLORIDE 9 MG/ML
INJECTION INTRAVENOUS
Status: DISCONTINUED
Start: 2020-02-27 | End: 2020-02-27 | Stop reason: HOSPADM

## 2020-02-27 RX ORDER — PROMETHAZINE HYDROCHLORIDE 25 MG/1
25 SUPPOSITORY RECTAL ONCE AS NEEDED
Status: DISCONTINUED | OUTPATIENT
Start: 2020-02-27 | End: 2020-02-27 | Stop reason: HOSPADM

## 2020-02-27 RX ORDER — LABETALOL HYDROCHLORIDE 5 MG/ML
5 INJECTION, SOLUTION INTRAVENOUS
Status: DISCONTINUED | OUTPATIENT
Start: 2020-02-27 | End: 2020-02-27 | Stop reason: HOSPADM

## 2020-02-27 RX ORDER — SODIUM CHLORIDE 9 MG/ML
9 INJECTION, SOLUTION INTRAVENOUS CONTINUOUS PRN
Status: DISCONTINUED | OUTPATIENT
Start: 2020-02-27 | End: 2020-02-27 | Stop reason: HOSPADM

## 2020-02-27 RX ORDER — IPRATROPIUM BROMIDE AND ALBUTEROL SULFATE 2.5; .5 MG/3ML; MG/3ML
3 SOLUTION RESPIRATORY (INHALATION) ONCE AS NEEDED
Status: DISCONTINUED | OUTPATIENT
Start: 2020-02-27 | End: 2020-02-27 | Stop reason: HOSPADM

## 2020-02-27 RX ORDER — ROCURONIUM BROMIDE 10 MG/ML
INJECTION, SOLUTION INTRAVENOUS AS NEEDED
Status: DISCONTINUED | OUTPATIENT
Start: 2020-02-27 | End: 2020-02-27 | Stop reason: SURG

## 2020-02-27 RX ORDER — FLUMAZENIL 0.1 MG/ML
0.1 INJECTION INTRAVENOUS AS NEEDED
Status: DISCONTINUED | OUTPATIENT
Start: 2020-02-27 | End: 2020-02-27 | Stop reason: HOSPADM

## 2020-02-27 RX ORDER — SODIUM CHLORIDE 0.9 % (FLUSH) 0.9 %
10 SYRINGE (ML) INJECTION AS NEEDED
Status: DISCONTINUED | OUTPATIENT
Start: 2020-02-27 | End: 2020-02-27 | Stop reason: HOSPADM

## 2020-02-27 RX ORDER — SODIUM CHLORIDE 9 MG/ML
INJECTION INTRAVENOUS
Status: DISCONTINUED
Start: 2020-02-27 | End: 2020-02-27 | Stop reason: WASHOUT

## 2020-02-27 RX ORDER — GLYCOPYRROLATE 1 MG/5 ML
SYRINGE (ML) INTRAVENOUS AS NEEDED
Status: DISCONTINUED | OUTPATIENT
Start: 2020-02-27 | End: 2020-02-27 | Stop reason: SURG

## 2020-02-27 RX ORDER — SODIUM CHLORIDE 0.9 % (FLUSH) 0.9 %
3 SYRINGE (ML) INJECTION EVERY 12 HOURS SCHEDULED
Status: DISCONTINUED | OUTPATIENT
Start: 2020-02-27 | End: 2020-02-27 | Stop reason: HOSPADM

## 2020-02-27 RX ORDER — NEOSTIGMINE METHYLSULFATE 5 MG/5 ML
SYRINGE (ML) INTRAVENOUS AS NEEDED
Status: DISCONTINUED | OUTPATIENT
Start: 2020-02-27 | End: 2020-02-27 | Stop reason: SURG

## 2020-02-27 RX ORDER — MEPERIDINE HYDROCHLORIDE 25 MG/ML
12.5 INJECTION INTRAMUSCULAR; INTRAVENOUS; SUBCUTANEOUS
Status: DISCONTINUED | OUTPATIENT
Start: 2020-02-27 | End: 2020-02-27 | Stop reason: HOSPADM

## 2020-02-27 RX ORDER — WATER 1000 ML/1000ML
INJECTION, SOLUTION INTRAVENOUS
Status: DISCONTINUED
Start: 2020-02-27 | End: 2020-02-27 | Stop reason: WASHOUT

## 2020-02-27 RX ADMIN — Medication 10 ML: at 10:28

## 2020-02-27 RX ADMIN — ONDANSETRON 4 MG: 2 INJECTION INTRAMUSCULAR; INTRAVENOUS at 11:04

## 2020-02-27 RX ADMIN — LIDOCAINE HYDROCHLORIDE 100 MG: 20 INJECTION, SOLUTION EPIDURAL; INFILTRATION; INTRACAUDAL; PERINEURAL at 10:54

## 2020-02-27 RX ADMIN — Medication 0.6 MG: at 11:50

## 2020-02-27 RX ADMIN — SODIUM CHLORIDE 9 ML/HR: 0.9 INJECTION, SOLUTION INTRAVENOUS at 10:28

## 2020-02-27 RX ADMIN — PROPOFOL 150 MG: 10 INJECTION, EMULSION INTRAVENOUS at 10:54

## 2020-02-27 RX ADMIN — Medication 5 MG: at 11:50

## 2020-02-27 RX ADMIN — ROCURONIUM BROMIDE 40 MG: 10 INJECTION, SOLUTION INTRAVENOUS at 10:54

## 2020-02-27 NOTE — ANESTHESIA PROCEDURE NOTES
Airway  Urgency: elective    Date/Time: 2/27/2020 10:57 AM  Airway not difficult    General Information and Staff    Patient location during procedure: OR  Anesthesiologist: Bi Oviedo MD    Indications and Patient Condition  Indications for airway management: airway protection    Preoxygenated: yes  MILS not maintained throughout  Mask difficulty assessment: 1 - vent by mask    Final Airway Details  Final airway type: endotracheal airway      Successful airway: ETT  Cuffed: yes   Successful intubation technique: direct laryngoscopy  Endotracheal tube insertion site: oral  Blade: Vikram  Blade size: 4  ETT size (mm): 8.0  Cormack-Lehane Classification: grade I - full view of glottis  Placement verified by: capnometry and palpation of cuff   Measured from: lips  ETT/EBT  to lips (cm): 24  Number of attempts at approach: 1  Assessment: lips, teeth, and gum same as pre-op and atraumatic intubation    Additional Comments  ASA monitors applied; preoxygenated with 100% FiO2 via anesthesia face mask; induction of general anesthesia; bag-mask ventilation; patient's position optimized; laryngoscopy; cuffed ETT placed; cuff inflated to seal; atraumatic/dentition in preoperative condition; ETT secured in place; correct placement confirmed by bilateral chest rise, tube condensation, and return of EtCO2 > 30 mmHg x3

## 2020-02-27 NOTE — ANESTHESIA POSTPROCEDURE EVALUATION
Patient: Bry Ratliff    Procedure Summary     Date:  02/27/20 Room / Location:  [facility] ENDOSCOPY 2 / [facility] ENDOSCOPY    Anesthesia Start:  1050 Anesthesia Stop:  1202    Procedure:  ENDOSCOPIC RETROGRADE CHOLANGIOPANCREATOGRAPHY with removal of biliary stent, brushing, dilation, and placement of biliary stent x 2 and Esophagogastroduodenoscopy with argon plasma coagulation (N/A ) Diagnosis:       Obstruction of bile duct      (OBSTRUCTION OF BILE DUCT)    Surgeon:  Marisel Gomez MD Provider:  Bi Oviedo MD    Anesthesia Type:  general ASA Status:  4          Anesthesia Type: general    Vitals  Vitals Value Taken Time   /55 2/27/2020 12:14 PM   Temp 98.4 °F (36.9 °C) 2/27/2020 12:00 PM   Pulse 73 2/27/2020 12:14 PM   Resp 23 2/27/2020 12:00 PM   SpO2 100 % 2/27/2020 12:14 PM   Vitals shown include unvalidated device data.        Post Anesthesia Care and Evaluation    Patient location during evaluation: PACU  Patient participation: complete - patient participated  Level of consciousness: awake  Pain scale: See nurse's notes for pain score.  Pain management: adequate  Airway patency: patent  Anesthetic complications: No anesthetic complications  PONV Status: none  Cardiovascular status: acceptable  Respiratory status: acceptable  Hydration status: acceptable    Comments: Patient seen and examined postoperatively; vital signs stable; SpO2 greater than or equal to 90%; cardiopulmonary status stable; nausea/vomiting adequately controlled; pain adequately controlled; no apparent anesthesia complications; patient discharged from anesthesia care when discharge criteria were met

## 2020-02-27 NOTE — ANESTHESIA PREPROCEDURE EVALUATION
Anesthesia Evaluation                  Airway   Dental      Pulmonary    (+) pulmonary embolism, sleep apnea,   Cardiovascular     ECG reviewed  PT is on anticoagulation therapy    (+) hypertension, CAD, DVT, hyperlipidemia,       Neuro/Psych  (+) numbness, psychiatric history Depression and Anxiety,     GI/Hepatic/Renal/Endo    (+) obesity,   liver disease, renal disease CRI and stones, diabetes mellitus, thyroid problem hypothyroidism    Musculoskeletal     (+) back pain,   Abdominal    Substance History      OB/GYN          Other   arthritis, blood dyscrasia anemia,     ROS/Med Hx Other: Bile duct obstruction, cirrhosis, THOMAS, splenomegaly, spinal stenosis, radiculopathy, neuropathy, erosive gastritis, vit B12 deficiency, pulm HTN, Antithrombin III deficiency, Protein S and C deficiencies, elevated Factor VIII, GAVE, iron malabsorption, IgG Kappa MGUS, skin abscess, Syed's esophagus, OAB, hepatic encephalopathy, KATHIA on BiPAP, decubitus ulcer, gout, DDD, gastroparesis, diverticulosis, balance disorder    S/P renal artery stent    S/P coronary angioplasty    Echo  Comments  Undetermined supraventricular rhythm with regular ventricular rhythm with  probably broad QRS complexes.  Left ventricle had normal cavity size and wall thickness. Contractility of  left ventricle probably uniform and appropriate with EF estimated 50-60%.  Right ventricle at least mildly dilated.  Left atrium probably mildly dilated.  Unsure whether right atrium dilated.  Aortic valve probably had 3 cusps and had mild thickening and increased  density of the cusps with well maintained opening. Peak systolic velocity 1.7  m/s and mean velocity 1.2 m/s. No aortic regurgitation.  Mitral valve had unremarkable appearance and unimpaired opening. No  significant mitral regurgitation.  Peak E velocity 110 cm/s and peak e' velocity septum 11 cm/s and lateral wall  14 cm/s and E/e' average 9.  Tricuspid valve had unremarkable appearance. Mild  tricuspid regurgitation.  RVSP less than 35 mmHg.  Pulmonic valve had unremarkable appearance. No significant pulmonic  regurgitation. Right ventricular outflow track 4 cm diameter.  IVC probably dilated.  No pericardial effusion.  Aortic root was not dilated.        Interpretation  1. Undetermined supraventricular rhythm with regular ventricular rhythm with  probably broad QRS complexes.  2. Right ventricle at least mildly dilated.  3. Left atrium probably mildly dilated.  4. Unsure whether right atrium dilated.  5. Aortic valve probably had 3 cusps and had mild sclerosis/calcification of  the cusps with well maintained opening and no regurgitation.  6. Mild tricuspid regurgitation.  7. Peak systolic pulmonary artery pressure estimated less than 35 mmHg.  8. IVC probably dilated suggesting elevated IVC and right atrial pressure.                Anesthesia Plan    ASA 4     general   (Patient identified; pre-operative vital signs, all relevant labs/studies, complete medical/surgical/anesthetic history, full medication list, full allergy list, and NPO status obtained/reviewed; physical assessment performed; anesthetic options, side effects, potential complications, risks, and benefits discussed; questions answered; written anesthesia consent obtained; patient cleared for procedure; anesthesia machine and equipment checked and functioning)  intravenous induction     Anesthetic plan, all risks, benefits, and alternatives have been provided, discussed and informed consent has been obtained with: patient.

## 2020-02-28 LAB
LAB AP CASE REPORT: NORMAL
LAB AP DIAGNOSIS COMMENT: NORMAL
PATH REPORT.FINAL DX SPEC: NORMAL
PATH REPORT.GROSS SPEC: NORMAL

## 2020-03-02 ENCOUNTER — APPOINTMENT (OUTPATIENT)
Dept: LAB | Facility: HOSPITAL | Age: 75
End: 2020-03-02

## 2020-03-02 ENCOUNTER — APPOINTMENT (OUTPATIENT)
Dept: ONCOLOGY | Facility: HOSPITAL | Age: 75
End: 2020-03-02

## 2020-03-03 ENCOUNTER — APPOINTMENT (OUTPATIENT)
Dept: GENERAL RADIOLOGY | Facility: HOSPITAL | Age: 75
End: 2020-03-03

## 2020-03-03 ENCOUNTER — APPOINTMENT (OUTPATIENT)
Dept: ULTRASOUND IMAGING | Facility: HOSPITAL | Age: 75
End: 2020-03-03

## 2020-03-03 ENCOUNTER — APPOINTMENT (OUTPATIENT)
Dept: CARDIOLOGY | Facility: HOSPITAL | Age: 75
End: 2020-03-03

## 2020-03-03 ENCOUNTER — HOSPITAL ENCOUNTER (INPATIENT)
Facility: HOSPITAL | Age: 75
LOS: 4 days | Discharge: NURSING, REHAB OR LONG-TERM CARE | End: 2020-03-07
Attending: EMERGENCY MEDICINE | Admitting: INTERNAL MEDICINE

## 2020-03-03 DIAGNOSIS — N17.9 ACUTE RENAL FAILURE, UNSPECIFIED ACUTE RENAL FAILURE TYPE (HCC): ICD-10-CM

## 2020-03-03 DIAGNOSIS — R77.8 ELEVATED TROPONIN: ICD-10-CM

## 2020-03-03 DIAGNOSIS — R53.1 GENERAL WEAKNESS: Primary | ICD-10-CM

## 2020-03-03 DIAGNOSIS — K31.819 GAVE (GASTRIC ANTRAL VASCULAR ECTASIA): ICD-10-CM

## 2020-03-03 DIAGNOSIS — D64.9 ANEMIA, UNSPECIFIED TYPE: ICD-10-CM

## 2020-03-03 DIAGNOSIS — N18.9 CHRONIC KIDNEY DISEASE, UNSPECIFIED CKD STAGE: ICD-10-CM

## 2020-03-03 DIAGNOSIS — D50.9 IRON DEFICIENCY ANEMIA, UNSPECIFIED IRON DEFICIENCY ANEMIA TYPE: ICD-10-CM

## 2020-03-03 PROBLEM — I51.7 RIGHT VENTRICULAR DILATION: Status: RESOLVED | Noted: 2017-02-17 | Resolved: 2020-03-03

## 2020-03-03 PROBLEM — R93.5 ABNORMAL COMPUTERIZED AXIAL TOMOGRAPHY OF ABDOMEN: Status: RESOLVED | Noted: 2019-11-11 | Resolved: 2020-03-03

## 2020-03-03 PROBLEM — M54.50 LOW BACK PAIN: Status: RESOLVED | Noted: 2017-01-17 | Resolved: 2020-03-03

## 2020-03-03 PROBLEM — M54.17 LUMBOSACRAL RADICULOPATHY: Status: RESOLVED | Noted: 2017-01-17 | Resolved: 2020-03-03

## 2020-03-03 PROBLEM — D72.829 LEUKOCYTOSIS: Status: RESOLVED | Noted: 2019-07-05 | Resolved: 2020-03-03

## 2020-03-03 PROBLEM — M54.16 LUMBAR RADICULOPATHY: Status: RESOLVED | Noted: 2017-01-17 | Resolved: 2020-03-03

## 2020-03-03 PROBLEM — E87.5 HYPERKALEMIA: Status: RESOLVED | Noted: 2017-12-04 | Resolved: 2020-03-03

## 2020-03-03 LAB
ALBUMIN SERPL-MCNC: 3 G/DL (ref 3.5–5.2)
ALBUMIN/GLOB SERPL: 0.6 G/DL
ALP SERPL-CCNC: 128 U/L (ref 39–117)
ALT SERPL W P-5'-P-CCNC: 22 U/L (ref 1–41)
ANION GAP SERPL CALCULATED.3IONS-SCNC: 15 MMOL/L (ref 5–15)
APTT PPP: 31.2 SECONDS (ref 24–31)
AST SERPL-CCNC: 34 U/L (ref 1–40)
BACTERIA UR QL AUTO: ABNORMAL /HPF
BASOPHILS # BLD AUTO: 0 10*3/MM3 (ref 0–0.2)
BASOPHILS NFR BLD AUTO: 0.4 % (ref 0–1.5)
BH CV LOWER VASCULAR LEFT COMMON FEMORAL AUGMENT: NORMAL
BH CV LOWER VASCULAR LEFT COMMON FEMORAL COMPETENT: NORMAL
BH CV LOWER VASCULAR LEFT COMMON FEMORAL COMPRESS: NORMAL
BH CV LOWER VASCULAR LEFT COMMON FEMORAL PHASIC: NORMAL
BH CV LOWER VASCULAR LEFT COMMON FEMORAL SPONT: NORMAL
BH CV LOWER VASCULAR LEFT DISTAL FEMORAL COMPRESS: NORMAL
BH CV LOWER VASCULAR LEFT GASTRONEMIUS COMPRESS: NORMAL
BH CV LOWER VASCULAR LEFT GREATER SAPH AK COMPRESS: NORMAL
BH CV LOWER VASCULAR LEFT GREATER SAPH BK COMPRESS: NORMAL
BH CV LOWER VASCULAR LEFT LESSER SAPH COMPRESS: NORMAL
BH CV LOWER VASCULAR LEFT MID FEMORAL AUGMENT: NORMAL
BH CV LOWER VASCULAR LEFT MID FEMORAL COMPETENT: NORMAL
BH CV LOWER VASCULAR LEFT MID FEMORAL COMPRESS: NORMAL
BH CV LOWER VASCULAR LEFT MID FEMORAL PHASIC: NORMAL
BH CV LOWER VASCULAR LEFT MID FEMORAL SPONT: NORMAL
BH CV LOWER VASCULAR LEFT PERONEAL COMPRESS: NORMAL
BH CV LOWER VASCULAR LEFT POPLITEAL AUGMENT: NORMAL
BH CV LOWER VASCULAR LEFT POPLITEAL COMPETENT: NORMAL
BH CV LOWER VASCULAR LEFT POPLITEAL COMPRESS: NORMAL
BH CV LOWER VASCULAR LEFT POPLITEAL PHASIC: NORMAL
BH CV LOWER VASCULAR LEFT POPLITEAL SPONT: NORMAL
BH CV LOWER VASCULAR LEFT POSTERIOR TIBIAL COMPRESS: NORMAL
BH CV LOWER VASCULAR LEFT PROXIMAL FEMORAL COMPRESS: NORMAL
BH CV LOWER VASCULAR LEFT SAPHENOFEMORAL JUNCTION AUGMENT: NORMAL
BH CV LOWER VASCULAR LEFT SAPHENOFEMORAL JUNCTION COMPETENT: NORMAL
BH CV LOWER VASCULAR LEFT SAPHENOFEMORAL JUNCTION COMPRESS: NORMAL
BH CV LOWER VASCULAR LEFT SAPHENOFEMORAL JUNCTION PHASIC: NORMAL
BH CV LOWER VASCULAR LEFT SAPHENOFEMORAL JUNCTION SPONT: NORMAL
BH CV LOWER VASCULAR RIGHT COMMON FEMORAL AUGMENT: NORMAL
BH CV LOWER VASCULAR RIGHT COMMON FEMORAL COMPETENT: NORMAL
BH CV LOWER VASCULAR RIGHT COMMON FEMORAL COMPRESS: NORMAL
BH CV LOWER VASCULAR RIGHT COMMON FEMORAL PHASIC: NORMAL
BH CV LOWER VASCULAR RIGHT COMMON FEMORAL SPONT: NORMAL
BH CV LOWER VASCULAR RIGHT DISTAL FEMORAL COMPRESS: NORMAL
BH CV LOWER VASCULAR RIGHT GASTRONEMIUS COMPRESS: NORMAL
BH CV LOWER VASCULAR RIGHT GREATER SAPH AK COMPRESS: NORMAL
BH CV LOWER VASCULAR RIGHT GREATER SAPH BK COMPRESS: NORMAL
BH CV LOWER VASCULAR RIGHT LESSER SAPH COMPRESS: NORMAL
BH CV LOWER VASCULAR RIGHT MID FEMORAL AUGMENT: NORMAL
BH CV LOWER VASCULAR RIGHT MID FEMORAL COMPETENT: NORMAL
BH CV LOWER VASCULAR RIGHT MID FEMORAL COMPRESS: NORMAL
BH CV LOWER VASCULAR RIGHT MID FEMORAL PHASIC: NORMAL
BH CV LOWER VASCULAR RIGHT MID FEMORAL SPONT: NORMAL
BH CV LOWER VASCULAR RIGHT PERONEAL COMPRESS: NORMAL
BH CV LOWER VASCULAR RIGHT POPLITEAL AUGMENT: NORMAL
BH CV LOWER VASCULAR RIGHT POPLITEAL COMPETENT: NORMAL
BH CV LOWER VASCULAR RIGHT POPLITEAL COMPRESS: NORMAL
BH CV LOWER VASCULAR RIGHT POPLITEAL PHASIC: NORMAL
BH CV LOWER VASCULAR RIGHT POPLITEAL SPONT: NORMAL
BH CV LOWER VASCULAR RIGHT POSTERIOR TIBIAL COMPRESS: NORMAL
BH CV LOWER VASCULAR RIGHT PROXIMAL FEMORAL COMPRESS: NORMAL
BH CV LOWER VASCULAR RIGHT SAPHENOFEMORAL JUNCTION AUGMENT: NORMAL
BH CV LOWER VASCULAR RIGHT SAPHENOFEMORAL JUNCTION COMPETENT: NORMAL
BH CV LOWER VASCULAR RIGHT SAPHENOFEMORAL JUNCTION COMPRESS: NORMAL
BH CV LOWER VASCULAR RIGHT SAPHENOFEMORAL JUNCTION PHASIC: NORMAL
BH CV LOWER VASCULAR RIGHT SAPHENOFEMORAL JUNCTION SPONT: NORMAL
BILIRUB SERPL-MCNC: 0.5 MG/DL (ref 0.2–1.2)
BILIRUB UR QL STRIP: NEGATIVE
BUN BLD-MCNC: 35 MG/DL (ref 8–23)
BUN/CREAT SERPL: 19 (ref 7–25)
CALCIUM SPEC-SCNC: 8.2 MG/DL (ref 8.6–10.5)
CHLORIDE SERPL-SCNC: 100 MMOL/L (ref 98–107)
CLARITY UR: CLEAR
CO2 SERPL-SCNC: 19 MMOL/L (ref 22–29)
COLOR UR: YELLOW
CREAT BLD-MCNC: 1.84 MG/DL (ref 0.76–1.27)
DEPRECATED RDW RBC AUTO: 55.1 FL (ref 37–54)
EOSINOPHIL # BLD AUTO: 0.1 10*3/MM3 (ref 0–0.4)
EOSINOPHIL NFR BLD AUTO: 1 % (ref 0.3–6.2)
ERYTHROCYTE [DISTWIDTH] IN BLOOD BY AUTOMATED COUNT: 15.8 % (ref 12.3–15.4)
FLUAV SUBTYP SPEC NAA+PROBE: NOT DETECTED
FLUBV RNA ISLT QL NAA+PROBE: NOT DETECTED
GFR SERPL CREATININE-BSD FRML MDRD: 36 ML/MIN/1.73
GLOBULIN UR ELPH-MCNC: 4.7 GM/DL
GLUCOSE BLD-MCNC: 245 MG/DL (ref 65–99)
GLUCOSE BLDC GLUCOMTR-MCNC: 145 MG/DL (ref 70–105)
GLUCOSE BLDC GLUCOMTR-MCNC: 168 MG/DL (ref 70–105)
GLUCOSE UR STRIP-MCNC: NEGATIVE MG/DL
HCT VFR BLD AUTO: 24.9 % (ref 37.5–51)
HGB BLD-MCNC: 8.4 G/DL (ref 13–17.7)
HGB UR QL STRIP.AUTO: ABNORMAL
HOLD SPECIMEN: NORMAL
HYALINE CASTS UR QL AUTO: ABNORMAL /LPF
INR PPP: 1.09 (ref 0.9–1.1)
KETONES UR QL STRIP: NEGATIVE
LEUKOCYTE ESTERASE UR QL STRIP.AUTO: NEGATIVE
LYMPHOCYTES # BLD AUTO: 0.5 10*3/MM3 (ref 0.7–3.1)
LYMPHOCYTES NFR BLD AUTO: 4.2 % (ref 19.6–45.3)
MCH RBC QN AUTO: 33.5 PG (ref 26.6–33)
MCHC RBC AUTO-ENTMCNC: 33.9 G/DL (ref 31.5–35.7)
MCV RBC AUTO: 98.7 FL (ref 79–97)
MONOCYTES # BLD AUTO: 1.1 10*3/MM3 (ref 0.1–0.9)
MONOCYTES NFR BLD AUTO: 9.6 % (ref 5–12)
NEUTROPHILS # BLD AUTO: 9.4 10*3/MM3 (ref 1.7–7)
NEUTROPHILS NFR BLD AUTO: 84.8 % (ref 42.7–76)
NITRITE UR QL STRIP: NEGATIVE
NRBC BLD AUTO-RTO: 0 /100 WBC (ref 0–0.2)
PH UR STRIP.AUTO: <=5 [PH] (ref 5–8)
PLATELET # BLD AUTO: 181 10*3/MM3 (ref 140–450)
PMV BLD AUTO: 9 FL (ref 6–12)
POTASSIUM BLD-SCNC: 3.7 MMOL/L (ref 3.5–5.2)
POTASSIUM BLD-SCNC: 3.9 MMOL/L (ref 3.5–5.2)
PROT SERPL-MCNC: 7.7 G/DL (ref 6–8.5)
PROT UR QL STRIP: NEGATIVE
PROTHROMBIN TIME: 11.3 SECONDS (ref 9.6–11.7)
RBC # BLD AUTO: 2.52 10*6/MM3 (ref 4.14–5.8)
RBC # UR: ABNORMAL /HPF
REF LAB TEST METHOD: ABNORMAL
SODIUM BLD-SCNC: 134 MMOL/L (ref 136–145)
SODIUM UR-SCNC: 41 MMOL/L
SP GR UR STRIP: 1.01 (ref 1–1.03)
SQUAMOUS #/AREA URNS HPF: ABNORMAL /HPF
TROPONIN T SERPL-MCNC: 0.03 NG/ML (ref 0–0.03)
UROBILINOGEN UR QL STRIP: ABNORMAL
WBC NRBC COR # BLD: 11 10*3/MM3 (ref 3.4–10.8)
WBC UR QL AUTO: ABNORMAL /HPF

## 2020-03-03 PROCEDURE — 99222 1ST HOSP IP/OBS MODERATE 55: CPT | Performed by: INTERNAL MEDICINE

## 2020-03-03 PROCEDURE — 74018 RADEX ABDOMEN 1 VIEW: CPT

## 2020-03-03 PROCEDURE — 85610 PROTHROMBIN TIME: CPT | Performed by: EMERGENCY MEDICINE

## 2020-03-03 PROCEDURE — 71045 X-RAY EXAM CHEST 1 VIEW: CPT

## 2020-03-03 PROCEDURE — 25010000002 EPOETIN ALFA-EPBX 40000 UNIT/ML SOLUTION: Performed by: INTERNAL MEDICINE

## 2020-03-03 PROCEDURE — 85730 THROMBOPLASTIN TIME PARTIAL: CPT | Performed by: EMERGENCY MEDICINE

## 2020-03-03 PROCEDURE — 63710000001 INSULIN GLARGINE PER 5 UNITS: Performed by: INTERNAL MEDICINE

## 2020-03-03 PROCEDURE — 93970 EXTREMITY STUDY: CPT

## 2020-03-03 PROCEDURE — 82962 GLUCOSE BLOOD TEST: CPT

## 2020-03-03 PROCEDURE — 81001 URINALYSIS AUTO W/SCOPE: CPT | Performed by: EMERGENCY MEDICINE

## 2020-03-03 PROCEDURE — 99223 1ST HOSP IP/OBS HIGH 75: CPT | Performed by: INTERNAL MEDICINE

## 2020-03-03 PROCEDURE — 93005 ELECTROCARDIOGRAM TRACING: CPT | Performed by: EMERGENCY MEDICINE

## 2020-03-03 PROCEDURE — 84484 ASSAY OF TROPONIN QUANT: CPT | Performed by: EMERGENCY MEDICINE

## 2020-03-03 PROCEDURE — 87502 INFLUENZA DNA AMP PROBE: CPT | Performed by: EMERGENCY MEDICINE

## 2020-03-03 PROCEDURE — 25010000002 IRON SUCROSE PER 1 MG: Performed by: INTERNAL MEDICINE

## 2020-03-03 PROCEDURE — 99284 EMERGENCY DEPT VISIT MOD MDM: CPT

## 2020-03-03 PROCEDURE — 84300 ASSAY OF URINE SODIUM: CPT | Performed by: INTERNAL MEDICINE

## 2020-03-03 PROCEDURE — 80053 COMPREHEN METABOLIC PANEL: CPT | Performed by: EMERGENCY MEDICINE

## 2020-03-03 PROCEDURE — 82274 ASSAY TEST FOR BLOOD FECAL: CPT | Performed by: INTERNAL MEDICINE

## 2020-03-03 PROCEDURE — 84132 ASSAY OF SERUM POTASSIUM: CPT | Performed by: INTERNAL MEDICINE

## 2020-03-03 PROCEDURE — 85025 COMPLETE CBC W/AUTO DIFF WBC: CPT | Performed by: EMERGENCY MEDICINE

## 2020-03-03 PROCEDURE — 76775 US EXAM ABDO BACK WALL LIM: CPT

## 2020-03-03 RX ORDER — ALLOPURINOL 100 MG/1
100 TABLET ORAL 2 TIMES DAILY
Status: DISCONTINUED | OUTPATIENT
Start: 2020-03-03 | End: 2020-03-07 | Stop reason: HOSPADM

## 2020-03-03 RX ORDER — ACETAMINOPHEN 650 MG/1
650 SUPPOSITORY RECTAL EVERY 4 HOURS PRN
Status: DISCONTINUED | OUTPATIENT
Start: 2020-03-03 | End: 2020-03-07 | Stop reason: HOSPADM

## 2020-03-03 RX ORDER — SODIUM CHLORIDE 0.9 % (FLUSH) 0.9 %
10 SYRINGE (ML) INJECTION AS NEEDED
Status: DISCONTINUED | OUTPATIENT
Start: 2020-03-03 | End: 2020-03-06 | Stop reason: SDUPTHER

## 2020-03-03 RX ORDER — MAGNESIUM SULFATE HEPTAHYDRATE 40 MG/ML
4 INJECTION, SOLUTION INTRAVENOUS AS NEEDED
Status: DISCONTINUED | OUTPATIENT
Start: 2020-03-03 | End: 2020-03-07 | Stop reason: HOSPADM

## 2020-03-03 RX ORDER — MAGNESIUM SULFATE HEPTAHYDRATE 40 MG/ML
2 INJECTION, SOLUTION INTRAVENOUS AS NEEDED
Status: DISCONTINUED | OUTPATIENT
Start: 2020-03-03 | End: 2020-03-07 | Stop reason: HOSPADM

## 2020-03-03 RX ORDER — ATORVASTATIN CALCIUM 20 MG/1
20 TABLET, FILM COATED ORAL DAILY
Status: DISCONTINUED | OUTPATIENT
Start: 2020-03-04 | End: 2020-03-07 | Stop reason: HOSPADM

## 2020-03-03 RX ORDER — SODIUM BICARBONATE 650 MG/1
650 TABLET ORAL 3 TIMES DAILY
Status: DISCONTINUED | OUTPATIENT
Start: 2020-03-03 | End: 2020-03-07 | Stop reason: HOSPADM

## 2020-03-03 RX ORDER — ACETAMINOPHEN 160 MG/5ML
650 SOLUTION ORAL EVERY 4 HOURS PRN
Status: DISCONTINUED | OUTPATIENT
Start: 2020-03-03 | End: 2020-03-07 | Stop reason: HOSPADM

## 2020-03-03 RX ORDER — NICOTINE POLACRILEX 4 MG
15 LOZENGE BUCCAL
Status: DISCONTINUED | OUTPATIENT
Start: 2020-03-03 | End: 2020-03-07 | Stop reason: HOSPADM

## 2020-03-03 RX ORDER — OXYCODONE HYDROCHLORIDE 5 MG/1
5 TABLET ORAL EVERY 8 HOURS PRN
Status: DISCONTINUED | OUTPATIENT
Start: 2020-03-03 | End: 2020-03-07 | Stop reason: HOSPADM

## 2020-03-03 RX ORDER — SUCRALFATE 1 G/1
1 TABLET ORAL
Status: DISCONTINUED | OUTPATIENT
Start: 2020-03-03 | End: 2020-03-07 | Stop reason: HOSPADM

## 2020-03-03 RX ORDER — SODIUM CHLORIDE 9 MG/ML
100 INJECTION, SOLUTION INTRAVENOUS CONTINUOUS
Status: DISCONTINUED | OUTPATIENT
Start: 2020-03-03 | End: 2020-03-05

## 2020-03-03 RX ORDER — CALCIUM GLUCONATE 20 MG/ML
2 INJECTION, SOLUTION INTRAVENOUS AS NEEDED
Status: DISCONTINUED | OUTPATIENT
Start: 2020-03-03 | End: 2020-03-07

## 2020-03-03 RX ORDER — PANTOPRAZOLE SODIUM 40 MG/1
40 TABLET, DELAYED RELEASE ORAL
Status: DISCONTINUED | OUTPATIENT
Start: 2020-03-03 | End: 2020-03-07 | Stop reason: HOSPADM

## 2020-03-03 RX ORDER — FOLIC ACID 1 MG/1
1 TABLET ORAL DAILY
Status: DISCONTINUED | OUTPATIENT
Start: 2020-03-04 | End: 2020-03-07 | Stop reason: HOSPADM

## 2020-03-03 RX ORDER — ASPIRIN 81 MG/1
81 TABLET ORAL DAILY
Status: DISCONTINUED | OUTPATIENT
Start: 2020-03-04 | End: 2020-03-07 | Stop reason: HOSPADM

## 2020-03-03 RX ORDER — ACETAMINOPHEN 325 MG/1
650 TABLET ORAL EVERY 4 HOURS PRN
Status: DISCONTINUED | OUTPATIENT
Start: 2020-03-03 | End: 2020-03-07 | Stop reason: HOSPADM

## 2020-03-03 RX ORDER — MULTIVITAMIN,THERAPEUTIC
1 TABLET ORAL DAILY
Status: DISCONTINUED | OUTPATIENT
Start: 2020-03-04 | End: 2020-03-07 | Stop reason: HOSPADM

## 2020-03-03 RX ORDER — POTASSIUM CHLORIDE 20 MEQ/1
40 TABLET, EXTENDED RELEASE ORAL AS NEEDED
Status: DISCONTINUED | OUTPATIENT
Start: 2020-03-03 | End: 2020-03-07 | Stop reason: HOSPADM

## 2020-03-03 RX ORDER — INSULIN GLARGINE 100 [IU]/ML
50 INJECTION, SOLUTION SUBCUTANEOUS NIGHTLY
Status: DISCONTINUED | OUTPATIENT
Start: 2020-03-03 | End: 2020-03-07 | Stop reason: HOSPADM

## 2020-03-03 RX ORDER — DEXTROSE MONOHYDRATE 25 G/50ML
25 INJECTION, SOLUTION INTRAVENOUS
Status: DISCONTINUED | OUTPATIENT
Start: 2020-03-03 | End: 2020-03-07 | Stop reason: HOSPADM

## 2020-03-03 RX ORDER — DULOXETIN HYDROCHLORIDE 30 MG/1
60 CAPSULE, DELAYED RELEASE ORAL DAILY
Status: DISCONTINUED | OUTPATIENT
Start: 2020-03-04 | End: 2020-03-07 | Stop reason: HOSPADM

## 2020-03-03 RX ORDER — METOCLOPRAMIDE 5 MG/1
5 TABLET ORAL
Status: DISCONTINUED | OUTPATIENT
Start: 2020-03-03 | End: 2020-03-07 | Stop reason: HOSPADM

## 2020-03-03 RX ORDER — OXYBUTYNIN CHLORIDE 10 MG/1
10 TABLET, EXTENDED RELEASE ORAL DAILY
Status: DISCONTINUED | OUTPATIENT
Start: 2020-03-04 | End: 2020-03-07 | Stop reason: HOSPADM

## 2020-03-03 RX ORDER — SODIUM CHLORIDE 0.9 % (FLUSH) 0.9 %
10 SYRINGE (ML) INJECTION EVERY 12 HOURS SCHEDULED
Status: DISCONTINUED | OUTPATIENT
Start: 2020-03-03 | End: 2020-03-06 | Stop reason: SDUPTHER

## 2020-03-03 RX ORDER — ONDANSETRON 2 MG/ML
4 INJECTION INTRAMUSCULAR; INTRAVENOUS EVERY 6 HOURS PRN
Status: DISCONTINUED | OUTPATIENT
Start: 2020-03-03 | End: 2020-03-06 | Stop reason: SDUPTHER

## 2020-03-03 RX ORDER — DOCUSATE SODIUM 100 MG/1
100 CAPSULE, LIQUID FILLED ORAL 2 TIMES DAILY PRN
Status: DISCONTINUED | OUTPATIENT
Start: 2020-03-03 | End: 2020-03-07 | Stop reason: HOSPADM

## 2020-03-03 RX ORDER — SODIUM PHOSPHATE, DIBASIC, ANHYDROUS, POTASSIUM PHOSPHATE, MONOBASIC, AND SODIUM PHOSPHATE, MONOBASIC, MONOHYDRATE 852; 155; 130 MG/1; MG/1; MG/1
1 TABLET, COATED ORAL DAILY
Status: DISCONTINUED | OUTPATIENT
Start: 2020-03-04 | End: 2020-03-04

## 2020-03-03 RX ORDER — DOCUSATE SODIUM 100 MG/1
100 CAPSULE, LIQUID FILLED ORAL 2 TIMES DAILY
Status: DISCONTINUED | OUTPATIENT
Start: 2020-03-03 | End: 2020-03-07 | Stop reason: HOSPADM

## 2020-03-03 RX ORDER — LACTULOSE 10 G/15ML
30 SOLUTION ORAL 3 TIMES DAILY
Status: DISCONTINUED | OUTPATIENT
Start: 2020-03-03 | End: 2020-03-05

## 2020-03-03 RX ORDER — CALCIUM GLUCONATE 20 MG/ML
1 INJECTION, SOLUTION INTRAVENOUS AS NEEDED
Status: DISCONTINUED | OUTPATIENT
Start: 2020-03-03 | End: 2020-03-07

## 2020-03-03 RX ORDER — CYANOCOBALAMIN 1000 UG/ML
1000 INJECTION, SOLUTION INTRAMUSCULAR; SUBCUTANEOUS
Status: DISCONTINUED | OUTPATIENT
Start: 2020-03-04 | End: 2020-03-07 | Stop reason: HOSPADM

## 2020-03-03 RX ORDER — HYDROXYZINE HYDROCHLORIDE 25 MG/1
25 TABLET, FILM COATED ORAL NIGHTLY PRN
Status: DISCONTINUED | OUTPATIENT
Start: 2020-03-03 | End: 2020-03-07 | Stop reason: HOSPADM

## 2020-03-03 RX ORDER — BUMETANIDE 1 MG/1
2 TABLET ORAL DAILY
Status: CANCELLED | OUTPATIENT
Start: 2020-03-03

## 2020-03-03 RX ORDER — NITROGLYCERIN 0.4 MG/1
0.4 TABLET SUBLINGUAL
Status: DISCONTINUED | OUTPATIENT
Start: 2020-03-03 | End: 2020-03-07 | Stop reason: HOSPADM

## 2020-03-03 RX ORDER — ZINC SULFATE 50(220)MG
220 CAPSULE ORAL DAILY
Status: DISCONTINUED | OUTPATIENT
Start: 2020-03-04 | End: 2020-03-07 | Stop reason: HOSPADM

## 2020-03-03 RX ORDER — PANTOPRAZOLE SODIUM 40 MG/1
40 TABLET, DELAYED RELEASE ORAL
Status: DISCONTINUED | OUTPATIENT
Start: 2020-03-04 | End: 2020-03-03 | Stop reason: SDUPTHER

## 2020-03-03 RX ORDER — CHOLECALCIFEROL (VITAMIN D3) 125 MCG
5 CAPSULE ORAL NIGHTLY PRN
Status: DISCONTINUED | OUTPATIENT
Start: 2020-03-03 | End: 2020-03-07 | Stop reason: HOSPADM

## 2020-03-03 RX ORDER — POTASSIUM CHLORIDE 1.5 G/1.77G
40 POWDER, FOR SOLUTION ORAL AS NEEDED
Status: DISCONTINUED | OUTPATIENT
Start: 2020-03-03 | End: 2020-03-07 | Stop reason: HOSPADM

## 2020-03-03 RX ORDER — NICOTINE 21 MG/24HR
1 PATCH, TRANSDERMAL 24 HOURS TRANSDERMAL NIGHTLY
Status: DISCONTINUED | OUTPATIENT
Start: 2020-03-03 | End: 2020-03-07 | Stop reason: HOSPADM

## 2020-03-03 RX ORDER — LEVOTHYROXINE SODIUM 0.07 MG/1
75 TABLET ORAL
Status: DISCONTINUED | OUTPATIENT
Start: 2020-03-04 | End: 2020-03-07 | Stop reason: HOSPADM

## 2020-03-03 RX ORDER — CHOLECALCIFEROL (VITAMIN D3) 125 MCG
10 CAPSULE ORAL NIGHTLY PRN
Status: DISCONTINUED | OUTPATIENT
Start: 2020-03-03 | End: 2020-03-07 | Stop reason: HOSPADM

## 2020-03-03 RX ADMIN — METOCLOPRAMIDE 5 MG: 5 TABLET ORAL at 23:45

## 2020-03-03 RX ADMIN — ALLOPURINOL 100 MG: 100 TABLET ORAL at 23:46

## 2020-03-03 RX ADMIN — EPOETIN ALFA-EPBX 40000 UNITS: 40000 INJECTION, SOLUTION INTRAVENOUS; SUBCUTANEOUS at 17:37

## 2020-03-03 RX ADMIN — SUCRALFATE 1 G: 1 TABLET ORAL at 23:46

## 2020-03-03 RX ADMIN — SODIUM CHLORIDE 200 MG: 900 INJECTION, SOLUTION INTRAVENOUS at 17:26

## 2020-03-03 RX ADMIN — SODIUM BICARBONATE 650 MG TABLET 650 MG: at 23:45

## 2020-03-03 RX ADMIN — HYDROXYZINE HYDROCHLORIDE 25 MG: 25 TABLET, FILM COATED ORAL at 23:59

## 2020-03-03 RX ADMIN — LACTULOSE 30 G: 10 SOLUTION ORAL at 23:44

## 2020-03-03 RX ADMIN — OXYCODONE HYDROCHLORIDE 5 MG: 5 TABLET ORAL at 23:59

## 2020-03-03 RX ADMIN — Medication 10 ML: at 23:47

## 2020-03-03 RX ADMIN — INSULIN GLARGINE 30 UNITS: 100 INJECTION, SOLUTION SUBCUTANEOUS at 23:56

## 2020-03-03 RX ADMIN — RIFAXIMIN 550 MG: 550 TABLET ORAL at 23:46

## 2020-03-03 RX ADMIN — PANTOPRAZOLE SODIUM 40 MG: 40 TABLET, DELAYED RELEASE ORAL at 23:47

## 2020-03-03 RX ADMIN — DOCUSATE SODIUM 100 MG: 100 CAPSULE, LIQUID FILLED ORAL at 23:46

## 2020-03-03 NOTE — H&P
[facility] Medicine Services      Patient Name: Bry Ratliff  : 1945  MRN: [id number]  Primary Care Physician: Paulette Harris MD  Date of admission: 3/3/2020    Patient Care Team:  Paulette Harris MD as PCP - General  Josefina Plascencia MD as Consulting Physician (Nephrology)          Subjective   History Present Illness     Chief Complaint:   Chief Complaint   Patient presents with   • Weakness - Generalized       Mr. Ratliff is a 74 y.o.  presents to [facility] complaining of generalized weakness.  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  Patient also has anemia for which patient is supposed to receive iron infusions.  Patient condition has been declining for the last several days and it got to a point where he was unable to move around and safely ambulate.  5 she is also elderly frail person and she is unable to take care of him as he is very unstable on his gait and he is tend to fall and she is afraid that he will have a major injury from a fall.  Patient also complains of right knee pain for which she had injection in the past and that knee is also getting worse in terms of his ambulation.           History of Present Illness    Review of Systems   All other systems reviewed and are negative.          Personal History     Past Medical History:   Past Medical History:   Diagnosis Date   • Anemia    • Anxiety    • B12 deficiency    • Balance disorder    • Syed's esophagus    • CAD (coronary artery disease)     cardiac  stent   • Constipation    • DDD (degenerative disc disease), lumbar    • Decubitus ulcer     buttocks   • Depression    • Diabetes mellitus (CMS/HCC)    • Disease of thyroid gland     hypothyroid   • Diverticular disease    • Dvt femoral (deep venous thrombosis) (CMS/HCC) 2004    rt let   • Gastroparesis    • GERD (gastroesophageal reflux disease)    • Gout    • Hepatic encephalopathy (CMS/HCC)     if doesnt take meds   • History of echocardiogram     03/03/2017 - 12/03/2014 - 04/2012   • History of stomach ulcers    • Hyperlipidemia    • Iron deficiency    • Morbid obesity (CMS/HCC)    • THOMAS (nonalcoholic steatohepatitis)    • Neuropathy    • OAB (overactive bladder)    • KATHIA treated with BiPAP     bring dos   • Peripheral edema    • Pulmonary embolus (CMS/HCC) 2004   • Renal insufficiency    • S/P lumbar laminectomy    • Skin irritation     skin fold   • Stage 3 chronic kidney disease (CMS/HCC)        Surgical History:      Past Surgical History:   Procedure Laterality Date   • BACK SURGERY  1971   • BILE DUCT STENT PLACEMENT     • CATARACT EXTRACTION  2014   • CHOLECYSTECTOMY  02/24/2019   • COLONOSCOPY  03/2018   • CORONARY ANGIOPLASTY  08/24/2009    PCI stent - succesful placement of 3.5/23 promus stent in proximal right coronary artery   • CORONARY ANGIOPLASTY WITH STENT PLACEMENT  08/27/2009    patient had stent placed to proximal right coronary artery   • CYSTOSCOPY BLADDER STONE LITHOTRIPSY  1997   • ENDOSCOPY N/A 10/18/2019    Procedure: ESOPHAGOGASTRODUODENOSCOPY with argon plasma coagulation of gastric antral vascular ectasia;  Surgeon: Marisel Gomez MD;  Location: [facility] ENDOSCOPY;  Service: Gastroenterology   • ENDOSCOPY N/A 1/9/2020    Procedure: ESOPHAGOGASTRODUODENOSCOPY WITH BIOPSY, ARGON PLASMA COAGULATION OF GASTRIC ANTREL VASCULAR ECTASIA,;  Surgeon: Marisel Gomez MD;  Location: [facility] ENDOSCOPY;  Service: Gastroenterology   • ERCP N/A 11/20/2019    Procedure: ERCP, clearance of bile duct  with balloon, placement of biliary stent, EGD with argon plasma coagulation of gastric antral vascular ectasia;  Surgeon: Marisel Gomez MD;  Location: [facility] ENDOSCOPY;  Service: Gastroenterology   • ERCP N/A 2/27/2020    Procedure: ENDOSCOPIC RETROGRADE CHOLANGIOPANCREATOGRAPHY with removal of biliary stent, brushing, dilation, and placement of biliary stent x 2 and Esophagogastroduodenoscopy with argon plasma coagulation;  Surgeon: Marisel Gomez MD;  Location: [facility] ENDOSCOPY;  Service: Gastroenterology;  Laterality: N/A;  Gastric antral vascular ectasia, common bile duct stricture   • OTHER SURGICAL HISTORY  11/2018    IVC filter implant   • RENAL ARTERY STENT Left     does not recall this           Family History: family history includes Hyperlipidemia in an other family member; Hypertension in an other family member. Otherwise pertinent FHx was reviewed and unremarkable.     Social History:  reports that he has never smoked. He has never used smokeless tobacco. He reports that he does not drink alcohol or use drugs.      Medications:  Prior to Admission medications    Medication Sig Start Date End Date Taking? Authorizing Provider   allopurinol (ZYLOPRIM) 100 MG tablet Take 100 mg by mouth 2 (Two) Times a Day. Do not preop   Yes Mary Young MD   aspirin 81 MG tablet Take 1 tablet by mouth Daily. 10/26/19  Yes Celestino Menchaca MD   atorvastatin (LIPITOR) 20 MG tablet Take 1 tablet by mouth Daily.  Patient taking differently: Take 20 mg by mouth Every Night. 9/3/19  Yes Rush Reilly MD   bumetanide (BUMEX) 2 MG tablet Take 1 tablet by mouth Daily.  Patient taking differently: Take 2 mg by mouth Daily. Do not take preop 10/20/19  Yes Celestino Menchaca MD   Cyanocobalamin 1000 MCG/ML kit Inject 1,000 mcg as directed Every 30 (Thirty) Days.   Yes Mary Young MD   docusate sodium (COLACE) 100 MG capsule Take 100 mg by mouth 2 (Two) Times a Day.   Yes Mary Young MD   DULoxetine  (CYMBALTA) 60 MG capsule Take 60 mg by mouth Daily.   Yes Mary Young MD   folic acid (FOLVITE) 1 MG tablet Take 1 mg by mouth Daily. 12/11/18  Yes Mary Young MD   hydrOXYzine pamoate (VISTARIL) 25 MG capsule Take 25 mg by mouth 3 (Three) Times a Day As Needed. 11/26/19  Yes Mary Young MD   insulin glargine (LANTUS) 100 UNIT/ML injection Inject 50 units at bedtime  Patient taking differently: Inject 50 Units under the skin into the appropriate area as directed Every Night. Inject 50 units at bedtime 1/23/20  Yes Alejandra Amaro MD   insulin lispro (HUMALOG) 100 UNIT/ML injection 18 units subcu before each meal plus sliding scale MDD 60  Patient taking differently: Inject 18 Units under the skin into the appropriate area as directed 3 (Three) Times a Day Before Meals. 18 units subcu before each meal plus sliding scale MDD 60 1/23/20  Yes Alejandra Amaro MD   lactulose (CHRONULAC) 10 GM/15ML solution Take 60 mL by mouth Every 4 (Four) Hours.   Yes Mary Young MD   levothyroxine (SYNTHROID, LEVOTHROID) 75 MCG tablet Take 1 tablet by mouth Daily.  Patient taking differently: Take 75 mcg by mouth Daily. Take preop 6/25/19  Yes Alejandra Amaro MD   magnesium oxide (MAG-OX) 400 MG tablet Take 400 mg by mouth Daily. Take post op 9/11/18  Yes Mary Young MD   melatonin 5 MG tablet tablet Take 10 mg by mouth At Night As Needed.   Yes Mary Young MD   metoclopramide (REGLAN) 5 MG tablet Take 5 mg by mouth 2 (Two) Times a Day. Ok to take preop 5/22/19  Yes Mary Young MD   Mirabegron ER (MYRBETRIQ) 50 MG tablet sustained-release 24 hour 24 hr tablet Take 50 mg by mouth Daily.   Yes Mary Young MD   Multiple Vitamins-Minerals (MULTIVITAMIN WITH MINERALS) tablet tablet Take 1 tablet by mouth Daily.   Yes Mary Young MD   oxyCODONE (ROXICODONE) 5 MG immediate release tablet Take 5 mg by mouth Every 8 (Eight) Hours As Needed. 9/11/18  Yes  "Mary Young MD   pantoprazole (PROTONIX) 40 MG EC tablet Take 1 tablet by mouth 2 (Two) Times a Day. 10/19/19  Yes Celestino Menchaca MD   phosphorus (K PHOS NEUTRAL) 155-852-130 MG tablet Take 1 tablet by mouth Daily. afternoon   Yes Mary Young MD   rifaximin (XIFAXAN) 550 MG tablet Take 550 mg by mouth Every 12 (Twelve) Hours. 12/11/18  Yes Mary Young MD   sodium bicarbonate 650 MG tablet Take 650 mg by mouth 3 (Three) Times a Day.   Yes Mary Young MD   sucralfate (CARAFATE) 1 g tablet Take 1 g by mouth 4 (Four) Times a Day.   Yes Mary Young MD   zinc sulfate (ZINCATE) 50 MG capsule Take 50 mg by mouth Daily. 9/19/19  Yes Mary Young MD B-D 3CC LUER-YASMEEN SYR 25GX1\" 25G X 1\" 3 ML misc  11/6/19 3/3/20  Mary Young MD       Allergies:  No Known Allergies    Objective   Objective     Vital Signs  Temp:  [98.7 °F (37.1 °C)] 98.7 °F (37.1 °C)  Heart Rate:  [89] 89  Resp:  [16] 16  BP: (125)/(70) 125/70  SpO2:  [96 %-99 %] 99 %  on   ;      Body mass index is 37.88 kg/m².    Physical Exam   Nursing note and vitals reviewed.      Results Review:  I have personally reviewed most recent radiology images and interpretations and agree with findings, most notably: Normal chest x-ray.    Results from last 7 days   Lab Units 03/03/20  1202   WBC 10*3/mm3 11.00*   HEMOGLOBIN g/dL 8.4*   HEMATOCRIT % 24.9*   PLATELETS 10*3/mm3 181   INR  1.09     Results from last 7 days   Lab Units 03/03/20  1202   SODIUM mmol/L 134*   POTASSIUM mmol/L 3.7   CHLORIDE mmol/L 100   CO2 mmol/L 19.0*   BUN mg/dL 35*   CREATININE mg/dL 1.84*   GLUCOSE mg/dL 245*   CALCIUM mg/dL 8.2*   ALT (SGPT) U/L 22   AST (SGOT) U/L 34   TROPONIN T ng/mL 0.031*     Estimated Creatinine Clearance: 52.1 mL/min (A) (by C-G formula based on SCr of 1.84 mg/dL (H)).  Brief Urine Lab Results  (Last result in the past 365 days)      Color   Clarity   Blood   Leuk Est   Nitrite   Protein   CREAT   Urine " HCG        03/03/20 1238 Yellow Clear Small (1+) Negative Negative Negative               Microbiology Results (last 10 days)     Procedure Component Value - Date/Time    Influenza Antigen, Rapid - Swab, Nasopharynx [[id number]]  (Normal) Collected:  03/03/20 1202    Lab Status:  Final result Specimen:  Swab from Nasopharynx Updated:  03/03/20 1222     Influenza A PCR Not Detected     Influenza B PCR Not Detected          ECG/EMG Results (most recent)     Procedure Component Value Units Date/Time    ECG 12 Lead [[id number]] Collected:  03/03/20 1307     Updated:  03/03/20 1309    Narrative:       HEART RATE= 77  bpm  RR Interval= 776  ms  WI Interval= 88  ms  P Horizontal Axis=   deg  P Front Axis= 0  deg  QRSD Interval= 90  ms  QT Interval= 459  ms  QRS Axis= -23  deg  T Wave Axis= 23  deg  - ABNORMAL ECG -  Sinus rhythm  Low voltage, precordial leads  Prolonged QT interval  When compared with ECG of 18-Feb-2020 10:54:41,  Significant axis, voltage or hypertrophy change  Electronically Signed By:   Date and Time of Study: 2020-03-03 13:07:48                    Xr Chest 1 View    Result Date: 3/3/2020  No acute chest findings.  Electronically Signed By-Dr. Kaitlin Wong MD On:3/3/2020 12:46 PM This report was finalized on [id number] by Dr. Kaitlin Wong MD.    Fl Ercp Biliary Duct Only    Result Date: 2/27/2020  Fluoroscopy performed during ERCP, as described above.  Electronically Signed By-Rush Soni On:2/27/2020 12:02 PM This report was finalized on [id number] by  Rush Soni, .        Estimated Creatinine Clearance: 52.1 mL/min (A) (by C-G formula based on SCr of 1.84 mg/dL (H)).    Assessment/Plan   Assessment/Plan       Active Hospital Problems    Diagnosis  POA   • **General weakness [R53.1]  Yes     Priority: High   • CKD (chronic kidney disease) stage 3, GFR 30-59 ml/min (CMS/HCC) [N18.3]  Yes     Priority: High   • Spinal stenosis of lumbar region [M48.061]  Yes     Priority: High   • Anemia in  stage 3 chronic kidney disease (CMS/HCC) [N18.3, D63.1]  Yes     Priority: Medium   • Type 2 diabetes mellitus with kidney complication, with long-term current use of insulin (CMS/HCC) [E11.29, Z79.4]  Not Applicable     Priority: Medium   • Chronic coronary artery disease [I25.10]  Yes     Priority: Medium   • Cirrhosis (CMS/HCC) [K74.60]  Yes     Priority: Medium   • Diabetic neuropathy (CMS/HCC) [E11.40]  Yes     Priority: Medium   • Morbid obesity (CMS/HCC) [E66.01]  Yes     Priority: Medium   • Obstructive sleep apnea [G47.33]  Yes     Priority: Medium   • 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]  Yes     Priority: Low   • 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]  Yes     Priority: Low   • Protein S deficiency (CMS/HCC) [D68.59]  Yes     Priority: Low   • Protein C deficiency (CMS/HCC) [D68.59]  Yes     Priority: Low   • History of Vitamin B12 deficiency [Z86.39]  Not Applicable     Priority: Low   • History of bilateral pulmonary embolus (PE) [Z86.711]  Yes     Priority: Low   • JOSE (iron deficiency anemia) with poor po absorption [D50.9]  Yes     Priority: Low   • IgG kappa MGUS [D47.2]  Yes     Priority: Low   • THOMAS (nonalcoholic steatohepatitis) [K75.81]  Yes     Priority: Low   • Antiphospholipid antibody positive [R76.0]  Yes     Priority: Low   • 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]  Yes     Priority: Low   • Splenomegaly [R16.1]  Yes     Priority: Low   • Essential hypertension [I10]  Yes     Priority: Low   • Acquired hypothyroidism [E03.9]  Yes     Priority: Low   • Pulmonary hypertension (CMS/HCC) [I27.20]  Yes     Priority: Low   • Dyslipidemia [E78.5]  Yes     Priority: Low   • History of DVT of right lower extremity [Z86.718]  Not Applicable     Priority: Low   • Deficiency of other specified B group vitamins [E53.8]  Yes     Priority: Low      Resolved Hospital Problems   No resolved problems to display.     Medical decision making very high complexity with multiple medical problems  as well as at risk for fall and decline.    #1 generalized weakness with difficulty ambulation as well as taking care of himself.  Patient has several risk factors which can cause myopathy as well as patient has peripheral neuropathy.  Patient also has spinal stenosis and I am wondering if that is playing into this.  I think patient need to be staying in the hospital at this time and need to get further evaluated and we need to get PT OT involved as well as patient will need rehab placement.  I believe if patient get discharged home he will have a significant fall and he may end up getting a major joint fracture or dislocated.    #2 chronic kidney disease stage III  -We will avoid nephrotoxic medications as well as patient will see nephrology    #3 anemia of chronic kidney disease as well as chronic blood loss anemia from GI origin and he has recent endoscopy done as well as ERCP done and patient has seen GI.  -Patient may need Venofer infusion as well as patient is on vitamin B12 injections.  His hemoglobin need to be monitored.  I am also concerned that he has some blood disorder and he may need hematology consult.    #4 obstructive sleep sleep apnea and patient need to use his CPAP machine    #5 hyperlipidemia stable on statins and will check lipid profile    #6 history of DVT/PE and patient is at high risk for PE and DVT while he is in the hospital and he will be on anticoagulation enoxaparin and I do understand patient has history of chronic blood loss anemia but we do need to monitor that but I am also concerned about his DVT PE and further complication.    #7 essential hypertension well-controlled on medications    #8 acquired hypothyroidism patient is on levothyroxine and will check his TSH    #9 diabetes mellitus type 2 with neuropathy/chronic kidney disease  -Continue his long-acting and short acting insulin at current doses and also check Accu-Cheks and use sliding scale insulin.  Patient also will get  hemoglobin A1c drawn.  Patient may need endocrinology consult.    #10 hyponatremia will monitor sodium    #11 minimal elevation of troponin and we may consider checking for PE study as well as echocardiogram and cardiology consult    #12 liver cirrhosis and patient is on lactulose and I will check ammonia level    VTE Prophylaxis -   Mechanical Order History:     None      Pharmalogical Order History:     Ordered     Dose Route Frequency Stop    Signed and Held  enoxaparin (LOVENOX) syringe 40 mg      40 mg SC Every 24 Hours --          CODE STATUS:    Code Status and Medical Interventions:   Ordered at: 03/03/20 1551     Level Of Support Discussed With:    Patient     Code Status:    CPR     Medical Interventions (Level of Support Prior to Arrest):    Full       Admission Status:  I believe this patient meets inpatient criteria.      I discussed the patient's findings and my recommendations with patient and family.        Electronically signed by Santosh Amaro MD, 03/03/20, 3:52 PM.  [facility] Hospitalist Team

## 2020-03-03 NOTE — ED PROVIDER NOTES
Subjective   Chief complaint: General weakness    74-year-old male presents with general weakness.  Patient has a history of anemia and was supposed to get an iron infusion today but he was too weak.  He states he has been having a fever over the past few days.  He denies any cough or congestion.  He has had no vomiting or diarrhea.  He denies any urinary complaints.  He denies any alleviating or exacerbating factors.      History provided by:  Patient and spouse      Review of Systems   Constitutional: Positive for fever.   HENT: Negative for congestion and sore throat.    Eyes: Negative for pain and redness.   Respiratory: Negative for cough and shortness of breath.    Cardiovascular: Negative for chest pain and palpitations.   Gastrointestinal: Negative for abdominal pain and vomiting.   Genitourinary: Negative for dysuria and frequency.   Musculoskeletal: Negative for back pain.   Skin: Negative for rash.   Neurological: Positive for weakness. Negative for headaches.   Psychiatric/Behavioral: Negative for behavioral problems and confusion.       Past Medical History:   Diagnosis Date   • Anemia    • Anxiety    • B12 deficiency 2009   • Balance disorder    • Syed's esophagus    • CAD (coronary artery disease)     cardiac stent   • Constipation    • DDD (degenerative disc disease), lumbar    • Decubitus ulcer     buttocks   • Depression    • Diabetes mellitus (CMS/HCC)    • Disease of thyroid gland     hypothyroid   • Diverticular disease    • Dvt femoral (deep venous thrombosis) (CMS/HCC) 2004    rt let   • Gastroparesis    • GERD (gastroesophageal reflux disease)    • Gout    • Hepatic encephalopathy (CMS/HCC)     if doesnt take meds   • History of echocardiogram     03/03/2017 - 12/03/2014 - 04/2012   • History of stomach ulcers    • Hyperlipidemia    • Iron deficiency    • Morbid obesity (CMS/HCC)    • THOMAS (nonalcoholic steatohepatitis)    • Neuropathy    • OAB (overactive bladder)    • KATHIA treated with  BiPAP     bring dos   • Peripheral edema    • Pulmonary embolus (CMS/HCC) 2004   • Renal insufficiency    • S/P lumbar laminectomy    • Skin irritation     skin fold   • Stage 3 chronic kidney disease (CMS/HCC)        No Known Allergies    Past Surgical History:   Procedure Laterality Date   • BACK SURGERY  1971   • BILE DUCT STENT PLACEMENT     • CATARACT EXTRACTION  2014   • CHOLECYSTECTOMY  02/24/2019   • COLONOSCOPY  03/2018   • CORONARY ANGIOPLASTY  08/24/2009    PCI stent - succesful placement of 3.5/23 promus stent in proximal right coronary artery   • CORONARY ANGIOPLASTY WITH STENT PLACEMENT  08/27/2009    patient had stent placed to proximal right coronary artery   • CYSTOSCOPY BLADDER STONE LITHOTRIPSY  1997   • ENDOSCOPY N/A 10/18/2019    Procedure: ESOPHAGOGASTRODUODENOSCOPY with argon plasma coagulation of gastric antral vascular ectasia;  Surgeon: Marisel Gomez MD;  Location: [facility] ENDOSCOPY;  Service: Gastroenterology   • ENDOSCOPY N/A 1/9/2020    Procedure: ESOPHAGOGASTRODUODENOSCOPY WITH BIOPSY, ARGON PLASMA COAGULATION OF GASTRIC ANTREL VASCULAR ECTASIA,;  Surgeon: Marisel Gomez MD;  Location: [facility] ENDOSCOPY;  Service: Gastroenterology   • ERCP N/A 11/20/2019    Procedure: ERCP, clearance of bile duct with balloon, placement of biliary stent, EGD with argon plasma coagulation of gastric antral vascular ectasia;  Surgeon: Marisel Gomez MD;  Location: [facility] ENDOSCOPY;  Service: Gastroenterology   • ERCP N/A 2/27/2020    Procedure: ENDOSCOPIC RETROGRADE CHOLANGIOPANCREATOGRAPHY with removal of biliary stent, brushing, dilation, and placement of biliary stent x 2 and Esophagogastroduodenoscopy with argon plasma coagulation;  Surgeon: Marisel Gomez MD;  Location: [facility] ENDOSCOPY;  Service: Gastroenterology;  Laterality: N/A;  Gastric antral vascular ectasia, common bile duct stricture   • OTHER SURGICAL HISTORY  11/2018    IVC filter implant   • RENAL ARTERY STENT Left     does not recall this  "      Family History   Problem Relation Age of Onset   • Hyperlipidemia Other    • Hypertension Other        Social History     Socioeconomic History   • Marital status:      Spouse name: Not on file   • Number of children: Not on file   • Years of education: Not on file   • Highest education level: Not on file   Tobacco Use   • Smoking status: Never Smoker   • Smokeless tobacco: Never Used   Substance and Sexual Activity   • Alcohol use: No     Frequency: Never   • Drug use: No   • Sexual activity: Defer       /70 (BP Location: Left arm, Patient Position: Sitting)   Pulse 89   Temp 98.7 °F (37.1 °C) (Oral)   Resp 16   Ht 188 cm (74\")   Wt 134 kg (295 lb)   SpO2 96%   BMI 37.88 kg/m²       Objective   Physical Exam   Constitutional: He is oriented to person, place, and time. He appears well-developed and well-nourished.   HENT:   Head: Normocephalic and atraumatic.   Eyes: Pupils are equal, round, and reactive to light. EOM are normal.   Neck: Normal range of motion. Neck supple.   Cardiovascular: Normal rate, regular rhythm and normal heart sounds.   Pulmonary/Chest: Effort normal and breath sounds normal. No respiratory distress.   Abdominal: Soft. Bowel sounds are normal. There is no tenderness.   Musculoskeletal: Normal range of motion.   Neurological: He is alert and oriented to person, place, and time.   General weakness but no focal motor or sensory deficit appreciated   Skin:   Patient does have decubitus ulcers on the sacrum with no signs of secondary infection   Nursing note and vitals reviewed.      Procedures           ED Course      Results for orders placed or performed during the hospital encounter of 03/03/20   Influenza Antigen, Rapid - Swab, Nasopharynx   Result Value Ref Range    Influenza A PCR Not Detected Not Detected    Influenza B PCR Not Detected Not Detected   Comprehensive Metabolic Panel   Result Value Ref Range    Glucose 245 (H) 65 - 99 mg/dL    BUN 35 (H) 8 - 23 " mg/dL    Creatinine 1.84 (H) 0.76 - 1.27 mg/dL    Sodium 134 (L) 136 - 145 mmol/L    Potassium 3.7 3.5 - 5.2 mmol/L    Chloride 100 98 - 107 mmol/L    CO2 19.0 (L) 22.0 - 29.0 mmol/L    Calcium 8.2 (L) 8.6 - 10.5 mg/dL    Total Protein 7.7 6.0 - 8.5 g/dL    Albumin 3.00 (L) 3.50 - 5.20 g/dL    ALT (SGPT) 22 1 - 41 U/L    AST (SGOT) 34 1 - 40 U/L    Alkaline Phosphatase 128 (H) 39 - 117 U/L    Total Bilirubin 0.5 0.2 - 1.2 mg/dL    eGFR Non African Amer 36 (L) >60 mL/min/1.73    Globulin 4.7 gm/dL    A/G Ratio 0.6 g/dL    BUN/Creatinine Ratio 19.0 7.0 - 25.0    Anion Gap 15.0 5.0 - 15.0 mmol/L   Protime-INR   Result Value Ref Range    Protime 11.3 9.6 - 11.7 Seconds    INR 1.09 0.90 - 1.10   aPTT   Result Value Ref Range    PTT 31.2 (H) 24.0 - 31.0 seconds   Troponin   Result Value Ref Range    Troponin T 0.031 (C) 0.000 - 0.030 ng/mL   Urinalysis With Culture If Indicated - Urine, Clean Catch   Result Value Ref Range    Color, UA Yellow Yellow, Straw    Appearance, UA Clear Clear    pH, UA <=5.0 5.0 - 8.0    Specific Gravity, UA 1.013 1.005 - 1.030    Glucose, UA Negative Negative    Ketones, UA Negative Negative    Bilirubin, UA Negative Negative    Blood, UA Small (1+) (A) Negative    Protein, UA Negative Negative    Leuk Esterase, UA Negative Negative    Nitrite, UA Negative Negative    Urobilinogen, UA 0.2 E.U./dL 0.2 - 1.0 E.U./dL   CBC Auto Differential   Result Value Ref Range    WBC 11.00 (H) 3.40 - 10.80 10*3/mm3    RBC 2.52 (L) 4.14 - 5.80 10*6/mm3    Hemoglobin 8.4 (L) 13.0 - 17.7 g/dL    Hematocrit 24.9 (L) 37.5 - 51.0 %    MCV 98.7 (H) 79.0 - 97.0 fL    MCH 33.5 (H) 26.6 - 33.0 pg    MCHC 33.9 31.5 - 35.7 g/dL    RDW 15.8 (H) 12.3 - 15.4 %    RDW-SD 55.1 (H) 37.0 - 54.0 fl    MPV 9.0 6.0 - 12.0 fL    Platelets 181 140 - 450 10*3/mm3    Neutrophil % 84.8 (H) 42.7 - 76.0 %    Lymphocyte % 4.2 (L) 19.6 - 45.3 %    Monocyte % 9.6 5.0 - 12.0 %    Eosinophil % 1.0 0.3 - 6.2 %    Basophil % 0.4 0.0 - 1.5 %     Neutrophils, Absolute 9.40 (H) 1.70 - 7.00 10*3/mm3    Lymphocytes, Absolute 0.50 (L) 0.70 - 3.10 10*3/mm3    Monocytes, Absolute 1.10 (H) 0.10 - 0.90 10*3/mm3    Eosinophils, Absolute 0.10 0.00 - 0.40 10*3/mm3    Basophils, Absolute 0.00 0.00 - 0.20 10*3/mm3    nRBC 0.0 0.0 - 0.2 /100 WBC   Urinalysis, Microscopic Only - Urine, Clean Catch   Result Value Ref Range    RBC, UA 0-2 (A) None Seen /HPF    WBC, UA None Seen None Seen /HPF    Bacteria, UA None Seen None Seen /HPF    Squamous Epithelial Cells, UA 0-2 None Seen, 0-2 /HPF    Hyaline Casts, UA 3-6 None Seen /LPF    Methodology Manual Light Microscopy    Gold Top - SST   Result Value Ref Range    Extra Tube Hold for add-ons.      Xr Chest 1 View    Result Date: 3/3/2020  No acute chest findings.  Electronically Signed By-Dr. Kaitlin Wong MD On:3/3/2020 12:46 PM This report was finalized on [id number] by Dr. Kaitlin Wong MD.        My interpretation of EKG shows sinus rhythm, rate of 77, prolonged QTC, no ST elevation                                     MDM   Patient had the above evaluation.  Results were discussed with the patient.  Chest x-ray shows no acute disease.  EKG shows no acute ischemia.  Troponin is borderline elevated at 0.31.  I have no records for comparison on the troponin level.  Hemoglobin is 8.4.  This has been trending down over the past 3-4 weeks.  Hemoglobin was 9.1 about a week ago.  Creatinine appears to be at baseline.  Urinalysis is negative.  Patient is generally weak on exam with no focal deficits appreciated.  He was so weak today that he was unable to stand.  His wife cannot care for him in this state.  I discussed with the hospitalist and the patient will be admitted for further evaluation and management.      Final diagnoses:   General weakness   Anemia, unspecified type   Chronic kidney disease, unspecified CKD stage   Elevated troponin            Erasto Weems MD  03/03/20 1380

## 2020-03-03 NOTE — CONSULTS
NEPHROLOGY CONSULTATION-----[facility]    [facility]  [phone]  Paula Plascencia MD    Patient Care Team:  Paulette Harris MD as PCP - Josefina Rucker MD as Consulting Physician (Nephrology)    CC/REASON FOR CONSULTATION: RENAL FAILURE/ELEVATED SERUM CREATININE  PHYSICIAN REQUESTING CONSULTATION:     History of Present Illness     Patient is a 74 y.o. WM, who I actively follow as an outpatient for his CRF/CKD STG 3, whom I was asked to see in consultation for evaluation and management of renal failure/elevated serum creatinine. Patient was admitted after presenting to ER with generalized weakness. Has a history of recurrent anemia requiring PRBCs and IV iron infusions.  Also reports some fever over past few day. No cough, diarrhea, or new skin lesions. No NSAIDs or recent IV dye exposure. No known h/o hepatitis, TB, rheumatic fever, jaundice, SLE. Does bleed/bruise easily. Chronic diarrhea from Lactulose use. Some urinary hesitancy.  Some LE edema/fluid retention. +Compliance with home meds. Was on diuretics in the form of Bumex   prior to admission.   No herbal med use. +Abdominal pain.       Review of Systems   Constitutional: Positive for activity change, fatigue and fever. Negative for appetite change, chills, diaphoresis and unexpected weight change.   HENT: Negative for congestion, dental problem, drooling, ear discharge, ear pain, facial swelling, hearing loss, mouth sores, nosebleeds, postnasal drip, rhinorrhea, sinus pressure, sinus pain, sneezing, sore throat, tinnitus, trouble swallowing and voice change.    Eyes: Negative for photophobia, pain, discharge, redness, itching and visual disturbance.   Respiratory: Negative for apnea, cough, choking, chest tightness, shortness of breath, wheezing and stridor.    Cardiovascular: Negative for chest pain, palpitations and leg swelling.   Gastrointestinal: Negative for abdominal  distention, abdominal pain, anal bleeding, blood in stool, constipation, diarrhea, nausea, rectal pain and vomiting.   Endocrine: Negative for cold intolerance, heat intolerance, polydipsia, polyphagia and polyuria.   Genitourinary: Negative for decreased urine volume, difficulty urinating, dysuria, enuresis, flank pain, frequency, genital sores, hematuria and urgency.   Musculoskeletal: Positive for arthralgias. Negative for back pain, gait problem, joint swelling, myalgias, neck pain and neck stiffness.   Skin: Negative for color change, pallor, rash and wound.   Allergic/Immunologic: Negative for environmental allergies, food allergies and immunocompromised state.   Neurological: Positive for weakness. Negative for dizziness, tremors, seizures, syncope, facial asymmetry, speech difficulty, light-headedness, numbness and headaches.   Hematological: Negative for adenopathy. Bruises/bleeds easily.   Psychiatric/Behavioral: Negative for agitation, behavioral problems, confusion, decreased concentration, dysphoric mood, hallucinations, self-injury, sleep disturbance and suicidal ideas. The patient is not nervous/anxious and is not hyperactive.           Past Medical History:   Diagnosis Date   • Anemia    • Anxiety    • B12 deficiency 2009   • Balance disorder    • Syed's esophagus    • CAD (coronary artery disease)     cardiac stent   • Constipation    • DDD (degenerative disc disease), lumbar    • Decubitus ulcer     buttocks   • Depression    • Diabetes mellitus (CMS/HCC)    • Disease of thyroid gland     hypothyroid   • Diverticular disease    • Dvt femoral (deep venous thrombosis) (CMS/HCC) 2004    rt let   • Gastroparesis    • GERD (gastroesophageal reflux disease)    • Gout    • Hepatic encephalopathy (CMS/[facility])     if doesnt take meds   • History of echocardiogram     03/03/2017 - 12/03/2014 - 04/2012   • History of stomach ulcers    • Hyperlipidemia    • Iron deficiency    • Morbid obesity (CMS/HCC)    •  THOMAS (nonalcoholic steatohepatitis)    • Neuropathy    • OAB (overactive bladder)    • KATHIA treated with BiPAP     bring dos   • Peripheral edema    • Pulmonary embolus (CMS/HCC) 2004   • Renal insufficiency    • S/P lumbar laminectomy    • Skin irritation     skin fold   • Stage 3 chronic kidney disease (CMS/HCC)        Past Surgical History:   Procedure Laterality Date   • BACK SURGERY  1971   • BILE DUCT STENT PLACEMENT     • CATARACT EXTRACTION  2014   • CHOLECYSTECTOMY  02/24/2019   • COLONOSCOPY  03/2018   • CORONARY ANGIOPLASTY  08/24/2009    PCI stent - succesful placement of 3.5/23 promus stent in proximal right coronary artery   • CORONARY ANGIOPLASTY WITH STENT PLACEMENT  08/27/2009    patient had stent placed to proximal right coronary artery   • CYSTOSCOPY BLADDER STONE LITHOTRIPSY  1997   • ENDOSCOPY N/A 10/18/2019    Procedure: ESOPHAGOGASTRODUODENOSCOPY with argon plasma coagulation of gastric antral vascular ectasia;  Surgeon: Marisel Gomez MD;  Location: [facility] ENDOSCOPY;  Service: Gastroenterology   • ENDOSCOPY N/A 1/9/2020    Procedure: ESOPHAGOGASTRODUODENOSCOPY WITH BIOPSY, ARGON PLASMA COAGULATION OF GASTRIC ANTREL VASCULAR ECTASIA,;  Surgeon: Marisel Gomez MD;  Location: [facility] ENDOSCOPY;  Service: Gastroenterology   • ERCP N/A 11/20/2019    Procedure: ERCP, clearance of bile duct with balloon, placement of biliary stent, EGD with argon plasma coagulation of gastric antral vascular ectasia;  Surgeon: Marisel Gomez MD;  Location: [facility] ENDOSCOPY;  Service: Gastroenterology   • ERCP N/A 2/27/2020    Procedure: ENDOSCOPIC RETROGRADE CHOLANGIOPANCREATOGRAPHY with removal of biliary stent, brushing, dilation, and placement of biliary stent x 2 and Esophagogastroduodenoscopy with argon plasma coagulation;  Surgeon: Marisel Gomez MD;  Location: [facility] ENDOSCOPY;  Service: Gastroenterology;  Laterality: N/A;  Gastric antral vascular ectasia, common bile duct stricture   • OTHER SURGICAL HISTORY   11/2018    IVC filter implant   • RENAL ARTERY STENT Left     does not recall this       Family History   Problem Relation Age of Onset   • Hyperlipidemia Other    • Hypertension Other        Social History     Tobacco Use   • Smoking status: Never Smoker   • Smokeless tobacco: Never Used   Substance Use Topics   • Alcohol use: No     Frequency: Never   • Drug use: No       Home Meds:   (Not in a hospital admission)    Scheduled Meds:       Continuous Infusions:       PRN Meds:  •  [COMPLETED] Insert peripheral IV **AND** sodium chloride    Allergies:  Patient has no known allergies.    OBJECTIVE    Vital Signs  Temp:  [98.7 °F (37.1 °C)] 98.7 °F (37.1 °C)  Heart Rate:  [89] 89  Resp:  [16] 16  BP: (125)/(70) 125/70    No intake/output data recorded.  No intake/output data recorded.    Physical Exam:  General Appearance: alert, appears stated age and cooperative  Head: normocephalic, without obvious abnormality and atraumatic  Eyes: conjunctivae and sclerae normal and no icterus  Neck: supple and no JVD  Lungs: clear to auscultation and respirations regular  Heart: regular rhythm & normal rate and normal S1, S2 +REBECCA  Chest Wall: no abnormalities observed  Abdomen: normal bowel sounds and +MILD DIFFUSE PAIN +OBESITY  Extremities: moves extremities well, no edema, no cyanosis and no redness  Skin: no bleeding, bruising or rash  Neurologic: Alert, and oriented. No focal deficits    Results Review:    I reviewed the patient's new clinical results.    WBC WBC   Date Value Ref Range Status   03/03/2020 11.00 (H) 3.40 - 10.80 10*3/mm3 Final      HGB Hemoglobin   Date Value Ref Range Status   03/03/2020 8.4 (L) 13.0 - 17.7 g/dL Final      HCT Hematocrit   Date Value Ref Range Status   03/03/2020 24.9 (L) 37.5 - 51.0 % Final      Platlets No results found for: LABPLAT   MCV MCV   Date Value Ref Range Status   03/03/2020 98.7 (H) 79.0 - 97.0 fL Final          Sodium Sodium   Date Value Ref Range Status   03/03/2020 134 (L)  136 - 145 mmol/L Final      Potassium Potassium   Date Value Ref Range Status   03/03/2020 3.7 3.5 - 5.2 mmol/L Final      Chloride Chloride   Date Value Ref Range Status   03/03/2020 100 98 - 107 mmol/L Final      CO2 CO2   Date Value Ref Range Status   03/03/2020 19.0 (L) 22.0 - 29.0 mmol/L Final      BUN BUN   Date Value Ref Range Status   03/03/2020 35 (H) 8 - 23 mg/dL Final      Creatinine Creatinine   Date Value Ref Range Status   03/03/2020 1.84 (H) 0.76 - 1.27 mg/dL Final      Calcium Calcium   Date Value Ref Range Status   03/03/2020 8.2 (L) 8.6 - 10.5 mg/dL Final      PO4 No results found for: CAPO4   Albumin Albumin   Date Value Ref Range Status   03/03/2020 3.00 (L) 3.50 - 5.20 g/dL Final      Magnesium No results found for: MG   Uric Acid No results found for: URICACID       Imaging Results (Last 72 Hours)     Procedure Component Value Units Date/Time    XR Chest 1 View [[id number]] Collected:  03/03/20 1245     Updated:  03/03/20 1248    Narrative:       DATE OF EXAM:  3/3/2020 12:12 PM     PROCEDURE:  XR CHEST 1 VW-     INDICATIONS:  Shortness breath, cough and fever for 2 to 3 days.       COMPARISON:  PA and lateral chest 07/11/2019.     TECHNIQUE:   Single radiographic view of the chest was obtained.     FINDINGS:  No acute airspace disease. Heart size is upper limits normal. Mild  degenerative endplate spurring in the thoracic spine. No acute osseous  abnormality.       Impression:       No acute chest findings.     Electronically Signed By-Dr. Kaitlin Wong MD On:3/3/2020 12:46 PM  This report was finalized on [id number] by Dr. Kaitlin Wong MD.            Results for orders placed during the hospital encounter of 03/03/20   XR Chest 1 View    Narrative DATE OF EXAM:  3/3/2020 12:12 PM     PROCEDURE:  XR CHEST 1 VW-     INDICATIONS:  Shortness breath, cough and fever for 2 to 3 days.       COMPARISON:  PA and lateral chest 07/11/2019.     TECHNIQUE:   Single radiographic view of the chest was  obtained.     FINDINGS:  No acute airspace disease. Heart size is upper limits normal. Mild  degenerative endplate spurring in the thoracic spine. No acute osseous  abnormality.       Impression No acute chest findings.     Electronically Signed By-Dr. Kaitlin Wong MD On:3/3/2020 12:46 PM  This report was finalized on [id number] by Dr. Kaitlin Wong MD.              ASSESSMENT / PLAN      General weakness    Cirrhosis (CMS/HCC)    Diabetic neuropathy (CMS/HCC)    Dyslipidemia    History of DVT of right lower extremity    Essential hypertension    Acquired hypothyroidism    Morbid obesity (CMS/HCC)    Obstructive sleep apnea    Type 2 diabetes mellitus with kidney complication, with long-term current use of insulin (CMS/HCC)    Spinal stenosis of lumbar region    Pulmonary hypertension (CMS/HCC)    Deficiency of other specified B group vitamins    Chronic coronary artery disease    JOSE (iron deficiency anemia) with poor po absorption    IgG kappa MGUS    THOMAS (nonalcoholic steatohepatitis)    Splenomegaly    Antiphospholipid antibody positive    Elevated factor VIII level    Anemia in stage 3 chronic kidney disease (CMS/HCC)    History of Vitamin B12 deficiency    History of bilateral pulmonary embolus (PE)    Antithrombin III deficiency (CMS/HCC)    Protein C deficiency (CMS/HCC)    Protein S deficiency (CMS/HCC)    GAVE (gastric antral vascular ectasia)    CKD (chronic kidney disease) stage 3, GFR 30-59 ml/min (CMS/[facility])      1. RENAL FAILURE-------Nonoliguric. +Mild ARF/SUSANA on top of known CRF/CKD STG 3 with a baseline serum creatinine of about 1.5. CRF/CKD STG 3 secondary to DGS/HTN NS. +ARF/SUSANA appears to be secondary to slight prerenal state.  Hold Bumex. Check urine and serum studies and renal US and PVR. No NSAIDs or IV dye. Dose meds for CrCl less than 10 cc/min.     2. ANEMIA WITH H/O GAVE AND MGUS AND JOSE-----Check Fe and hemoccult. Follow for PRBC need. On IV iron and EPO     3. HTN WITH CKD STG  3-------BP okay. Avoid hypotension. No ACE/ARB/DRI/diuretic     4. DMII WITH RENAL MANIFESTATIONS------BS okay. On Lantus     5. HYPOTHYROIDISM-------On Synthroid. Check TSH     6. THOMAS/CIRRHOSIS/HEPATIC ENCEPHALOPATHY------On Lactulose. Check ammonia     7. OBESITY/KATHIA------ CPAP     8. EDEMA/CHRONIC VENOUS INSUFFICIENCY     9. ACIDOSIS------Metabolic. No elevation in AGAP. Secondary to Type 4 RTA and stool losses from diarrhea related to Lactulose use. Bicarb down. Increase po NaHCO3     10. GERD-------On PPI/Carafate    11. HYPERCOAGUABLE STATE WITH ANTI THROMBI III/PROTEIN C & S DEFICIENCY AND HISTORY OF PE-----Check V/Q and Dopplers. Lovenox ordered for prophylaxis      I discussed the patients findings and my recommendations with patient, family, nursing staff and consulting provider    Will follow along closely. Thank you for allowing us to see this patient in renal consultation.    [facility]  [phone]  MD Paula Forte MD  03/03/20  4:02 PM

## 2020-03-03 NOTE — ED NOTES
Patient is resting comfortably.  No complaints at this time  Call light within reach.     Sue Swanson RN  03/03/20 4459

## 2020-03-04 ENCOUNTER — APPOINTMENT (OUTPATIENT)
Dept: CARDIOLOGY | Facility: HOSPITAL | Age: 75
End: 2020-03-04

## 2020-03-04 ENCOUNTER — APPOINTMENT (OUTPATIENT)
Dept: NUCLEAR MEDICINE | Facility: HOSPITAL | Age: 75
End: 2020-03-04

## 2020-03-04 ENCOUNTER — APPOINTMENT (OUTPATIENT)
Dept: GENERAL RADIOLOGY | Facility: HOSPITAL | Age: 75
End: 2020-03-04

## 2020-03-04 PROBLEM — L89.153 DECUBITUS ULCER OF SACRAL REGION, STAGE 3 (HCC): Status: ACTIVE | Noted: 2020-03-04

## 2020-03-04 LAB
ALBUMIN SERPL-MCNC: 2.7 G/DL (ref 3.5–5.2)
ALBUMIN/GLOB SERPL: 0.6 G/DL
ALP SERPL-CCNC: 119 U/L (ref 39–117)
ALT SERPL W P-5'-P-CCNC: 18 U/L (ref 1–41)
AMMONIA BLD-SCNC: 65 UMOL/L (ref 16–60)
ANION GAP SERPL CALCULATED.3IONS-SCNC: 12 MMOL/L (ref 5–15)
AST SERPL-CCNC: 29 U/L (ref 1–40)
BASOPHILS # BLD AUTO: 0.1 10*3/MM3 (ref 0–0.2)
BASOPHILS NFR BLD AUTO: 0.7 % (ref 0–1.5)
BH CV ECHO MEAS - ACS: 1.9 CM
BH CV ECHO MEAS - AO MAX PG (FULL): 2.1 MMHG
BH CV ECHO MEAS - AO MAX PG: 7.6 MMHG
BH CV ECHO MEAS - AO MEAN PG (FULL): 1 MMHG
BH CV ECHO MEAS - AO MEAN PG: 4.5 MMHG
BH CV ECHO MEAS - AO ROOT AREA (BSA CORRECTED): 1.5
BH CV ECHO MEAS - AO ROOT AREA: 12.4 CM^2
BH CV ECHO MEAS - AO ROOT DIAM: 4 CM
BH CV ECHO MEAS - AO V2 MAX: 138.1 CM/SEC
BH CV ECHO MEAS - AO V2 MEAN: 99.8 CM/SEC
BH CV ECHO MEAS - AO V2 VTI: 28.9 CM
BH CV ECHO MEAS - AORTIC HR: 76.5 BPM
BH CV ECHO MEAS - AORTIC R-R: 0.78 SEC
BH CV ECHO MEAS - ASC AORTA: 3.5 CM
BH CV ECHO MEAS - AVA(I,A): 5.1 CM^2
BH CV ECHO MEAS - AVA(I,D): 5.1 CM^2
BH CV ECHO MEAS - AVA(V,A): 4.4 CM^2
BH CV ECHO MEAS - AVA(V,D): 4.4 CM^2
BH CV ECHO MEAS - BSA(HAYCOCK): 2.7 M^2
BH CV ECHO MEAS - BSA: 2.6 M^2
BH CV ECHO MEAS - BZI_BMI: 37.9 KILOGRAMS/M^2
BH CV ECHO MEAS - BZI_METRIC_HEIGHT: 188 CM
BH CV ECHO MEAS - BZI_METRIC_WEIGHT: 133.8 KG
BH CV ECHO MEAS - CI(AO): 10.7 L/MIN/M^2
BH CV ECHO MEAS - CI(LVOT): 4.4 L/MIN/M^2
BH CV ECHO MEAS - CO(AO): 27.3 L/MIN
BH CV ECHO MEAS - CO(LVOT): 11.2 L/MIN
BH CV ECHO MEAS - EDV(CUBED): 201.1 ML
BH CV ECHO MEAS - EDV(MOD-SP4): 176.8 ML
BH CV ECHO MEAS - EDV(TEICH): 170.5 ML
BH CV ECHO MEAS - EF(CUBED): 73.7 %
BH CV ECHO MEAS - EF(MOD-BP): 61 %
BH CV ECHO MEAS - EF(MOD-SP4): 61.2 %
BH CV ECHO MEAS - EF(TEICH): 64.7 %
BH CV ECHO MEAS - ESV(CUBED): 53 ML
BH CV ECHO MEAS - ESV(MOD-SP4): 68.5 ML
BH CV ECHO MEAS - ESV(TEICH): 60.2 ML
BH CV ECHO MEAS - FS: 35.9 %
BH CV ECHO MEAS - IVS/LVPW: 0.81
BH CV ECHO MEAS - IVSD: 0.96 CM
BH CV ECHO MEAS - LA DIMENSION(2D): 3.4 CM
BH CV ECHO MEAS - LV DIASTOLIC VOL/BSA (35-75): 69 ML/M^2
BH CV ECHO MEAS - LV MASS(C)D: 259.1 GRAMS
BH CV ECHO MEAS - LV MASS(C)DI: 101.1 GRAMS/M^2
BH CV ECHO MEAS - LV MAX PG: 5.5 MMHG
BH CV ECHO MEAS - LV MEAN PG: 3.5 MMHG
BH CV ECHO MEAS - LV SYSTOLIC VOL/BSA (12-30): 26.7 ML/M^2
BH CV ECHO MEAS - LV V1 MAX: 117.2 CM/SEC
BH CV ECHO MEAS - LV V1 MEAN: 90.2 CM/SEC
BH CV ECHO MEAS - LV V1 VTI: 28.2 CM
BH CV ECHO MEAS - LVIDD: 5.9 CM
BH CV ECHO MEAS - LVIDS: 3.8 CM
BH CV ECHO MEAS - LVOT AREA: 5.2 CM^2
BH CV ECHO MEAS - LVOT DIAM: 2.6 CM
BH CV ECHO MEAS - LVPWD: 1.2 CM
BH CV ECHO MEAS - MV A MAX VEL: 109.4 CM/SEC
BH CV ECHO MEAS - MV DEC SLOPE: 649.1 CM/SEC^2
BH CV ECHO MEAS - MV DEC TIME: 0.16 SEC
BH CV ECHO MEAS - MV E MAX VEL: 101.9 CM/SEC
BH CV ECHO MEAS - MV E/A: 0.93
BH CV ECHO MEAS - MV MAX PG: 5.8 MMHG
BH CV ECHO MEAS - MV MEAN PG: 3.3 MMHG
BH CV ECHO MEAS - MV V2 MAX: 120.6 CM/SEC
BH CV ECHO MEAS - MV V2 MEAN: 87.4 CM/SEC
BH CV ECHO MEAS - MV V2 VTI: 33.3 CM
BH CV ECHO MEAS - MVA(VTI): 4.4 CM^2
BH CV ECHO MEAS - PA ACC TIME: 0.08 SEC
BH CV ECHO MEAS - PA MAX PG (FULL): 5 MMHG
BH CV ECHO MEAS - PA MAX PG: 7.8 MMHG
BH CV ECHO MEAS - PA MEAN PG (FULL): 2.7 MMHG
BH CV ECHO MEAS - PA MEAN PG: 4.3 MMHG
BH CV ECHO MEAS - PA PR(ACCEL): 41.3 MMHG
BH CV ECHO MEAS - PA V2 MAX: 139.7 CM/SEC
BH CV ECHO MEAS - PA V2 MEAN: 96.4 CM/SEC
BH CV ECHO MEAS - PA V2 VTI: 27.2 CM
BH CV ECHO MEAS - PVA(I,A): 3.7 CM^2
BH CV ECHO MEAS - PVA(I,D): 3.7 CM^2
BH CV ECHO MEAS - PVA(V,A): 3.3 CM^2
BH CV ECHO MEAS - PVA(V,D): 3.3 CM^2
BH CV ECHO MEAS - QP/QS: 0.68
BH CV ECHO MEAS - RAP SYSTOLE: 15 MMHG
BH CV ECHO MEAS - RV MAX PG: 2.8 MMHG
BH CV ECHO MEAS - RV MEAN PG: 1.6 MMHG
BH CV ECHO MEAS - RV V1 MAX: 84.4 CM/SEC
BH CV ECHO MEAS - RV V1 MEAN: 59.6 CM/SEC
BH CV ECHO MEAS - RV V1 VTI: 17.9 CM
BH CV ECHO MEAS - RVDD: 3.5 CM
BH CV ECHO MEAS - RVOT AREA: 5.5 CM^2
BH CV ECHO MEAS - RVOT DIAM: 2.7 CM
BH CV ECHO MEAS - RVSP: 49 MMHG
BH CV ECHO MEAS - SI(AO): 139.4 ML/M^2
BH CV ECHO MEAS - SI(CUBED): 57.8 ML/M^2
BH CV ECHO MEAS - SI(LVOT): 57.2 ML/M^2
BH CV ECHO MEAS - SI(MOD-SP4): 42.2 ML/M^2
BH CV ECHO MEAS - SI(TEICH): 43 ML/M^2
BH CV ECHO MEAS - SV(AO): 357.2 ML
BH CV ECHO MEAS - SV(CUBED): 148.2 ML
BH CV ECHO MEAS - SV(LVOT): 146.6 ML
BH CV ECHO MEAS - SV(MOD-SP4): 108.3 ML
BH CV ECHO MEAS - SV(RVOT): 99.3 ML
BH CV ECHO MEAS - SV(TEICH): 110.2 ML
BH CV ECHO MEAS - TR MAX VEL: 291.7 CM/SEC
BILIRUB SERPL-MCNC: 0.4 MG/DL (ref 0.2–1.2)
BUN BLD-MCNC: 35 MG/DL (ref 8–23)
BUN/CREAT SERPL: 19.6 (ref 7–25)
CA-I SERPL ISE-MCNC: 1.13 MMOL/L (ref 1.2–1.3)
CALCIUM SPEC-SCNC: 8.5 MG/DL (ref 8.6–10.5)
CHLORIDE SERPL-SCNC: 103 MMOL/L (ref 98–107)
CHOLEST SERPL-MCNC: 99 MG/DL (ref 0–200)
CK SERPL-CCNC: 37 U/L (ref 20–200)
CO2 SERPL-SCNC: 22 MMOL/L (ref 22–29)
CREAT BLD-MCNC: 1.79 MG/DL (ref 0.76–1.27)
CRP SERPL-MCNC: 16.24 MG/DL (ref 0–0.5)
D-LACTATE SERPL-SCNC: 2 MMOL/L (ref 0.5–2)
DEPRECATED RDW RBC AUTO: 56.4 FL (ref 37–54)
EOSINOPHIL # BLD AUTO: 0.4 10*3/MM3 (ref 0–0.4)
EOSINOPHIL NFR BLD AUTO: 4.3 % (ref 0.3–6.2)
ERYTHROCYTE [DISTWIDTH] IN BLOOD BY AUTOMATED COUNT: 16.3 % (ref 12.3–15.4)
ERYTHROCYTE [SEDIMENTATION RATE] IN BLOOD: >120 MM/HR (ref 0–20)
FERRITIN SERPL-MCNC: 292.8 NG/ML (ref 30–400)
FOLATE SERPL-MCNC: >20 NG/ML (ref 4.78–24.2)
GFR SERPL CREATININE-BSD FRML MDRD: 37 ML/MIN/1.73
GLOBULIN UR ELPH-MCNC: 4.5 GM/DL
GLUCOSE BLD-MCNC: 159 MG/DL (ref 65–99)
GLUCOSE BLDC GLUCOMTR-MCNC: 141 MG/DL (ref 70–105)
GLUCOSE BLDC GLUCOMTR-MCNC: 142 MG/DL (ref 70–105)
GLUCOSE BLDC GLUCOMTR-MCNC: 165 MG/DL (ref 70–105)
GLUCOSE BLDC GLUCOMTR-MCNC: 191 MG/DL (ref 70–105)
HBA1C MFR BLD: 7.4 % (ref 3.5–5.6)
HCT VFR BLD AUTO: 24.6 % (ref 37.5–51)
HDLC SERPL-MCNC: 30 MG/DL (ref 40–60)
HGB BLD-MCNC: 8.4 G/DL (ref 13–17.7)
IRON 24H UR-MRATE: 97 MCG/DL (ref 59–158)
LDLC SERPL CALC-MCNC: 44 MG/DL (ref 0–100)
LDLC/HDLC SERPL: 1.45 {RATIO}
LV EF 2D ECHO EST: 50 %
LYMPHOCYTES # BLD AUTO: 0.8 10*3/MM3 (ref 0.7–3.1)
LYMPHOCYTES NFR BLD AUTO: 9.2 % (ref 19.6–45.3)
MAGNESIUM SERPL-MCNC: 1.9 MG/DL (ref 1.6–2.4)
MCH RBC QN AUTO: 33.5 PG (ref 26.6–33)
MCHC RBC AUTO-ENTMCNC: 34 G/DL (ref 31.5–35.7)
MCV RBC AUTO: 98.5 FL (ref 79–97)
MONOCYTES # BLD AUTO: 0.6 10*3/MM3 (ref 0.1–0.9)
MONOCYTES NFR BLD AUTO: 7.2 % (ref 5–12)
NEUTROPHILS # BLD AUTO: 6.5 10*3/MM3 (ref 1.7–7)
NEUTROPHILS NFR BLD AUTO: 78.6 % (ref 42.7–76)
NRBC BLD AUTO-RTO: 0 /100 WBC (ref 0–0.2)
NT-PROBNP SERPL-MCNC: 1083 PG/ML (ref 5–900)
PHOSPHATE SERPL-MCNC: 2.8 MG/DL (ref 2.5–4.5)
PLATELET # BLD AUTO: 189 10*3/MM3 (ref 140–450)
PMV BLD AUTO: 9.2 FL (ref 6–12)
POTASSIUM BLD-SCNC: 3.3 MMOL/L (ref 3.5–5.2)
PROCALCITONIN SERPL-MCNC: 0.4 NG/ML (ref 0.1–0.25)
PROT SERPL-MCNC: 7.2 G/DL (ref 6–8.5)
RBC # BLD AUTO: 2.5 10*6/MM3 (ref 4.14–5.8)
SODIUM BLD-SCNC: 137 MMOL/L (ref 136–145)
TRIGL SERPL-MCNC: 127 MG/DL (ref 0–150)
TROPONIN T SERPL-MCNC: 0.02 NG/ML (ref 0–0.03)
TSH SERPL DL<=0.05 MIU/L-ACNC: 2.73 UIU/ML (ref 0.27–4.2)
URATE SERPL-MCNC: 8 MG/DL (ref 3.4–7)
VIT B12 BLD-MCNC: 1634 PG/ML (ref 211–946)
VLDLC SERPL-MCNC: 25.4 MG/DL
WBC NRBC COR # BLD: 8.3 10*3/MM3 (ref 3.4–10.8)

## 2020-03-04 PROCEDURE — 99232 SBSQ HOSP IP/OBS MODERATE 35: CPT | Performed by: NURSE PRACTITIONER

## 2020-03-04 PROCEDURE — 84443 ASSAY THYROID STIM HORMONE: CPT | Performed by: INTERNAL MEDICINE

## 2020-03-04 PROCEDURE — 97165 OT EVAL LOW COMPLEX 30 MIN: CPT

## 2020-03-04 PROCEDURE — A9540 TC99M MAA: HCPCS | Performed by: INTERNAL MEDICINE

## 2020-03-04 PROCEDURE — 82140 ASSAY OF AMMONIA: CPT | Performed by: INTERNAL MEDICINE

## 2020-03-04 PROCEDURE — 82607 VITAMIN B-12: CPT | Performed by: INTERNAL MEDICINE

## 2020-03-04 PROCEDURE — 82550 ASSAY OF CK (CPK): CPT | Performed by: INTERNAL MEDICINE

## 2020-03-04 PROCEDURE — 78582 LUNG VENTILAT&PERFUS IMAGING: CPT

## 2020-03-04 PROCEDURE — 82728 ASSAY OF FERRITIN: CPT | Performed by: INTERNAL MEDICINE

## 2020-03-04 PROCEDURE — 87070 CULTURE OTHR SPECIMN AEROBIC: CPT | Performed by: INTERNAL MEDICINE

## 2020-03-04 PROCEDURE — 83735 ASSAY OF MAGNESIUM: CPT | Performed by: INTERNAL MEDICINE

## 2020-03-04 PROCEDURE — 99232 SBSQ HOSP IP/OBS MODERATE 35: CPT | Performed by: INTERNAL MEDICINE

## 2020-03-04 PROCEDURE — 87040 BLOOD CULTURE FOR BACTERIA: CPT | Performed by: INTERNAL MEDICINE

## 2020-03-04 PROCEDURE — 83605 ASSAY OF LACTIC ACID: CPT | Performed by: INTERNAL MEDICINE

## 2020-03-04 PROCEDURE — 93306 TTE W/DOPPLER COMPLETE: CPT | Performed by: INTERNAL MEDICINE

## 2020-03-04 PROCEDURE — 83540 ASSAY OF IRON: CPT | Performed by: INTERNAL MEDICINE

## 2020-03-04 PROCEDURE — 84100 ASSAY OF PHOSPHORUS: CPT | Performed by: INTERNAL MEDICINE

## 2020-03-04 PROCEDURE — A9538 TC99M PYROPHOSPHATE: HCPCS | Performed by: INTERNAL MEDICINE

## 2020-03-04 PROCEDURE — 63710000001 INSULIN LISPRO (HUMAN) PER 5 UNITS: Performed by: INTERNAL MEDICINE

## 2020-03-04 PROCEDURE — 97162 PT EVAL MOD COMPLEX 30 MIN: CPT

## 2020-03-04 PROCEDURE — 80061 LIPID PANEL: CPT | Performed by: INTERNAL MEDICINE

## 2020-03-04 PROCEDURE — 82746 ASSAY OF FOLIC ACID SERUM: CPT | Performed by: INTERNAL MEDICINE

## 2020-03-04 PROCEDURE — 97530 THERAPEUTIC ACTIVITIES: CPT

## 2020-03-04 PROCEDURE — 25010000002 IRON SUCROSE PER 1 MG: Performed by: INTERNAL MEDICINE

## 2020-03-04 PROCEDURE — 97116 GAIT TRAINING THERAPY: CPT

## 2020-03-04 PROCEDURE — 0 TECHNETIUM TC99M PYROPHOSPHATE: Performed by: INTERNAL MEDICINE

## 2020-03-04 PROCEDURE — 0 TECHNETIUM ALBUMIN AGGREGATED: Performed by: INTERNAL MEDICINE

## 2020-03-04 PROCEDURE — 87150 DNA/RNA AMPLIFIED PROBE: CPT | Performed by: INTERNAL MEDICINE

## 2020-03-04 PROCEDURE — 85652 RBC SED RATE AUTOMATED: CPT | Performed by: INTERNAL MEDICINE

## 2020-03-04 PROCEDURE — 82962 GLUCOSE BLOOD TEST: CPT

## 2020-03-04 PROCEDURE — 87147 CULTURE TYPE IMMUNOLOGIC: CPT | Performed by: INTERNAL MEDICINE

## 2020-03-04 PROCEDURE — 80053 COMPREHEN METABOLIC PANEL: CPT | Performed by: INTERNAL MEDICINE

## 2020-03-04 PROCEDURE — 82330 ASSAY OF CALCIUM: CPT | Performed by: INTERNAL MEDICINE

## 2020-03-04 PROCEDURE — 83880 ASSAY OF NATRIURETIC PEPTIDE: CPT | Performed by: INTERNAL MEDICINE

## 2020-03-04 PROCEDURE — 93306 TTE W/DOPPLER COMPLETE: CPT

## 2020-03-04 PROCEDURE — 73564 X-RAY EXAM KNEE 4 OR MORE: CPT

## 2020-03-04 PROCEDURE — 84484 ASSAY OF TROPONIN QUANT: CPT | Performed by: INTERNAL MEDICINE

## 2020-03-04 PROCEDURE — 25010000002 ENOXAPARIN PER 10 MG: Performed by: INTERNAL MEDICINE

## 2020-03-04 PROCEDURE — 84145 PROCALCITONIN (PCT): CPT | Performed by: INTERNAL MEDICINE

## 2020-03-04 PROCEDURE — 86140 C-REACTIVE PROTEIN: CPT | Performed by: INTERNAL MEDICINE

## 2020-03-04 PROCEDURE — 87205 SMEAR GRAM STAIN: CPT | Performed by: INTERNAL MEDICINE

## 2020-03-04 PROCEDURE — 85025 COMPLETE CBC W/AUTO DIFF WBC: CPT | Performed by: INTERNAL MEDICINE

## 2020-03-04 PROCEDURE — 83036 HEMOGLOBIN GLYCOSYLATED A1C: CPT | Performed by: INTERNAL MEDICINE

## 2020-03-04 PROCEDURE — 84550 ASSAY OF BLOOD/URIC ACID: CPT | Performed by: INTERNAL MEDICINE

## 2020-03-04 RX ORDER — BUPIVACAINE HYDROCHLORIDE 2.5 MG/ML
5 INJECTION, SOLUTION INFILTRATION; PERINEURAL ONCE
Status: COMPLETED | OUTPATIENT
Start: 2020-03-05 | End: 2020-03-05

## 2020-03-04 RX ORDER — TRIAMCINOLONE ACETONIDE 40 MG/ML
80 INJECTION, SUSPENSION INTRA-ARTICULAR; INTRAMUSCULAR ONCE
Status: COMPLETED | OUTPATIENT
Start: 2020-03-05 | End: 2020-03-05

## 2020-03-04 RX ADMIN — PANTOPRAZOLE SODIUM 40 MG: 40 TABLET, DELAYED RELEASE ORAL at 17:42

## 2020-03-04 RX ADMIN — INSULIN LISPRO 18 UNITS: 100 INJECTION, SOLUTION INTRAVENOUS; SUBCUTANEOUS at 17:47

## 2020-03-04 RX ADMIN — DOCUSATE SODIUM 100 MG: 100 CAPSULE, LIQUID FILLED ORAL at 20:21

## 2020-03-04 RX ADMIN — SODIUM BICARBONATE 650 MG TABLET 650 MG: at 11:38

## 2020-03-04 RX ADMIN — INSULIN LISPRO 2 UNITS: 100 INJECTION, SOLUTION INTRAVENOUS; SUBCUTANEOUS at 11:45

## 2020-03-04 RX ADMIN — ENOXAPARIN SODIUM 40 MG: 40 INJECTION SUBCUTANEOUS at 17:41

## 2020-03-04 RX ADMIN — SODIUM CHLORIDE 200 MG: 900 INJECTION, SOLUTION INTRAVENOUS at 17:42

## 2020-03-04 RX ADMIN — OXYBUTYNIN 10 MG: 10 TABLET, FILM COATED, EXTENDED RELEASE ORAL at 11:37

## 2020-03-04 RX ADMIN — Medication 10 ML: at 20:21

## 2020-03-04 RX ADMIN — RIFAXIMIN 550 MG: 550 TABLET ORAL at 20:21

## 2020-03-04 RX ADMIN — INSULIN LISPRO 2 UNITS: 100 INJECTION, SOLUTION INTRAVENOUS; SUBCUTANEOUS at 17:41

## 2020-03-04 RX ADMIN — POTASSIUM CHLORIDE 40 MEQ: 1500 TABLET, EXTENDED RELEASE ORAL at 11:40

## 2020-03-04 RX ADMIN — METOCLOPRAMIDE 5 MG: 5 TABLET ORAL at 17:42

## 2020-03-04 RX ADMIN — ALLOPURINOL 100 MG: 100 TABLET ORAL at 20:21

## 2020-03-04 RX ADMIN — ZINC SULFATE 220 MG (50 MG) CAPSULE 220 MG: CAPSULE at 11:38

## 2020-03-04 RX ADMIN — LEVOTHYROXINE SODIUM 75 MCG: 75 TABLET ORAL at 05:25

## 2020-03-04 RX ADMIN — SODIUM CHLORIDE 100 ML/HR: 900 INJECTION, SOLUTION INTRAVENOUS at 00:37

## 2020-03-04 RX ADMIN — FOLIC ACID 1 MG: 1 TABLET ORAL at 11:37

## 2020-03-04 RX ADMIN — SUCRALFATE 1 G: 1 TABLET ORAL at 11:41

## 2020-03-04 RX ADMIN — INSULIN LISPRO 18 UNITS: 100 INJECTION, SOLUTION INTRAVENOUS; SUBCUTANEOUS at 11:41

## 2020-03-04 RX ADMIN — METOCLOPRAMIDE 5 MG: 5 TABLET ORAL at 11:38

## 2020-03-04 RX ADMIN — DOCUSATE SODIUM 100 MG: 100 CAPSULE, LIQUID FILLED ORAL at 11:39

## 2020-03-04 RX ADMIN — Medication 10 ML: at 11:44

## 2020-03-04 RX ADMIN — ATORVASTATIN CALCIUM 20 MG: 20 TABLET, FILM COATED ORAL at 11:39

## 2020-03-04 RX ADMIN — THERA TABS 1 TABLET: TAB at 11:38

## 2020-03-04 RX ADMIN — LACTULOSE 30 G: 10 SOLUTION ORAL at 20:21

## 2020-03-04 RX ADMIN — ALLOPURINOL 100 MG: 100 TABLET ORAL at 11:39

## 2020-03-04 RX ADMIN — PANTOPRAZOLE SODIUM 40 MG: 40 TABLET, DELAYED RELEASE ORAL at 11:37

## 2020-03-04 RX ADMIN — MAGNESIUM OXIDE TAB 400 MG (241.3 MG ELEMENTAL MG) 400 MG: 400 (241.3 MG) TAB at 11:37

## 2020-03-04 RX ADMIN — LACTULOSE 30 G: 10 SOLUTION ORAL at 11:41

## 2020-03-04 RX ADMIN — SODIUM BICARBONATE 650 MG TABLET 650 MG: at 20:21

## 2020-03-04 RX ADMIN — DULOXETINE HYDROCHLORIDE 60 MG: 30 CAPSULE, DELAYED RELEASE ORAL at 11:39

## 2020-03-04 RX ADMIN — POTASSIUM & SODIUM PHOSPHATES POWDER PACK 280-160-250 MG 1 PACKET: 280-160-250 PACK at 17:42

## 2020-03-04 RX ADMIN — Medication 1 DOSE: at 08:35

## 2020-03-04 RX ADMIN — SODIUM BICARBONATE 650 MG TABLET 650 MG: at 17:42

## 2020-03-04 RX ADMIN — SUCRALFATE 1 G: 1 TABLET ORAL at 20:21

## 2020-03-04 RX ADMIN — RIFAXIMIN 550 MG: 550 TABLET ORAL at 11:38

## 2020-03-04 RX ADMIN — TECHNETIUM TC99M PYROPHOSPHATE 1 DOSE: 12 INJECTION INTRAVENOUS at 08:15

## 2020-03-04 RX ADMIN — SUCRALFATE 1 G: 1 TABLET ORAL at 17:42

## 2020-03-04 RX ADMIN — LACTULOSE 30 G: 10 SOLUTION ORAL at 17:41

## 2020-03-04 RX ADMIN — ASPIRIN 81 MG: 81 TABLET, COATED ORAL at 11:38

## 2020-03-04 NOTE — PLAN OF CARE
Problem: Patient Care Overview  Goal: Plan of Care Review  Outcome: Ongoing (interventions implemented as appropriate)  Flowsheets (Taken 3/4/2020 1154)  Outcome Summary: Pt ADM with anemia,chronic kidney obesity. Prior pt lives with spouse in private home ramp to enter. Per spouse she is afraid care at home is becoming too difficult. Pt was SBA mobility in the home however pt was not able to get in a car with spouses help. Today pt needed MIN A bed mobility, sit to stand, and CGA walking 45 feet CGA and a second person as  due to per spouse pt weight is 298 lbs. Pt SOA with gait, UE support bariatric rollator.  Recommendation is IP rehab.

## 2020-03-04 NOTE — PLAN OF CARE
Patient is alert and oriented.  Patient had BSC Ax1-2.  Wife remained at bedside.  Patient is weak.  Will continue to monitor

## 2020-03-04 NOTE — CONSULTS
Referring Provider: Emergency room  Reason for Consultation: Pain in right knee    Patient Care Team:  Paulette Harris MD as PCP - General  Josefina Plascencia MD as Consulting Physician (Nephrology)    Chief complaint right knee pain    Subjective .     History of present illness: This patient is a 74-year-old male whose had had increasing difficulty walking around.  Came to the hospital because of severe weakness and shortness of breath.  I was asked to see him because of pain in his right knee.  Apparently according to his wife I saw him in the office about a year ago and gave him an injection at that time.  They both said it helped a lot.      History  Past Medical History:   Diagnosis Date   • Anemia    • Anxiety    • B12 deficiency 2009   • Balance disorder    • Syed's esophagus    • CAD (coronary artery disease)     cardiac stent   • Constipation    • DDD (degenerative disc disease), lumbar    • Decubitus ulcer     buttocks   • Depression    • Diabetes mellitus (CMS/HCC)    • Disease of thyroid gland     hypothyroid   • Diverticular disease    • Dvt femoral (deep venous thrombosis) (CMS/HCC) 2004    rt let   • Gastroparesis    • GERD (gastroesophageal reflux disease)    • Gout    • Hepatic encephalopathy (CMS/HCC)     if doesnt take meds   • History of echocardiogram     03/03/2017 - 12/03/2014 - 04/2012   • History of stomach ulcers    • Hyperlipidemia    • Iron deficiency    • Morbid obesity (CMS/HCC)    • THOMAS (nonalcoholic steatohepatitis)    • Neuropathy    • OAB (overactive bladder)    • KATHIA treated with BiPAP     bring dos   • Peripheral edema    • Pulmonary embolus (CMS/HCC) 2004   • Renal insufficiency    • S/P lumbar laminectomy    • Skin irritation     skin fold   • Stage 3 chronic kidney disease (CMS/HCC)        Past Surgical History:   Procedure Laterality Date   • BACK SURGERY  1971   • BILE DUCT STENT PLACEMENT     • CATARACT EXTRACTION  2014   • CHOLECYSTECTOMY  02/24/2019    • COLONOSCOPY  03/2018   • CORONARY ANGIOPLASTY  08/24/2009    PCI stent - succesful placement of 3.5/23 promus stent in proximal right coronary artery   • CORONARY ANGIOPLASTY WITH STENT PLACEMENT  08/27/2009    patient had stent placed to proximal right coronary artery   • CYSTOSCOPY BLADDER STONE LITHOTRIPSY  1997   • ENDOSCOPY N/A 10/18/2019    Procedure: ESOPHAGOGASTRODUODENOSCOPY with argon plasma coagulation of gastric antral vascular ectasia;  Surgeon: Marisel Gomez MD;  Location: [facility] ENDOSCOPY;  Service: Gastroenterology   • ENDOSCOPY N/A 1/9/2020    Procedure: ESOPHAGOGASTRODUODENOSCOPY WITH BIOPSY, ARGON PLASMA COAGULATION OF GASTRIC ANTREL VASCULAR ECTASIA,;  Surgeon: Marisel Gomez MD;  Location: [facility] ENDOSCOPY;  Service: Gastroenterology   • ERCP N/A 11/20/2019    Procedure: ERCP, clearance of bile duct with balloon, placement of biliary stent, EGD with argon plasma coagulation of gastric antral vascular ectasia;  Surgeon: Marisel Gomez MD;  Location: [facility] ENDOSCOPY;  Service: Gastroenterology   • ERCP N/A 2/27/2020    Procedure: ENDOSCOPIC RETROGRADE CHOLANGIOPANCREATOGRAPHY with removal of biliary stent, brushing, dilation, and placement of biliary stent x 2 and Esophagogastroduodenoscopy with argon plasma coagulation;  Surgeon: Marisel Gomez MD;  Location: [facility] ENDOSCOPY;  Service: Gastroenterology;  Laterality: N/A;  Gastric antral vascular ectasia, common bile duct stricture   • OTHER SURGICAL HISTORY  11/2018    IVC filter implant   • RENAL ARTERY STENT Left     does not recall this          Scheduled Meds:      allopurinol 100 mg Oral BID   aspirin 81 mg Oral Daily   atorvastatin 20 mg Oral Daily   cyanocobalamin 1,000 mcg Subcutaneous Q30 Days   docusate sodium 100 mg Oral BID   DULoxetine 60 mg Oral Daily   enoxaparin 40 mg Subcutaneous Daily   folic acid 1 mg Oral Daily   insulin glargine 50 Units Subcutaneous Nightly   insulin lispro 0-9 Units Subcutaneous 4x Daily With Meals &  Nightly   insulin lispro 18 Units Subcutaneous TID AC   iron sucrose 200 mg Intravenous Q24H   K-PHOS-NEUTRAL 1 tablet Oral Daily   lactulose 30 g Oral TID   levothyroxine 75 mcg Oral Q AM   magnesium oxide 400 mg Oral Daily   metoclopramide 5 mg Oral BID AC   nicotine 1 patch Transdermal Nightly   oxybutynin XL 10 mg Oral Daily   pantoprazole 40 mg Oral BID AC   riFAXIMin 550 mg Oral Q12H   sodium bicarbonate 650 mg Oral TID   sodium chloride 10 mL Intravenous Q12H   sucralfate 1 g Oral 4x Daily AC & at Bedtime   THERA 1 tablet Oral Daily   zinc sulfate 220 mg Oral Daily        PRN Meds:   •  acetaminophen **OR** acetaminophen **OR** acetaminophen  •  Calcium Gluconate-NaCl **AND** calcium gluconate **AND** Calcium, Ionized  •  dextrose  •  dextrose  •  docusate sodium  •  glucagon (human recombinant)  •  hydrOXYzine  •  insulin lispro **AND** insulin lispro  •  ketamine (KETALAR) infusion **AND** Ketamine Vital Signs & Assessment  •  magnesium sulfate **OR** magnesium sulfate **OR** magnesium sulfate  •  melatonin  •  melatonin  •  nitroglycerin  •  ondansetron  •  oxyCODONE  •  potassium & sodium phosphates **OR** potassium & sodium phosphates  •  potassium chloride  •  potassium chloride  •  [COMPLETED] Insert peripheral IV **AND** sodium chloride  •  sodium chloride      Allergies:    Patient has no known allergies.        Objective     Vital Signs   [unfilled]    Physical Exam:     General Appearance:   Sitting up in a chair.  With his wife.  Oriented to time place and person.   Extremities:  Right knee active range of motion is 0/125.  There is no sign of infection.  He has pain to palpation in the medial joint line   Pulses:  Deferred   Skin:  Deferred   Neurologic:  5/5 plantarflexion and dorsiflexion.       Results Review:  CBC    Results from last 7 days   Lab Units 03/04/20  1144 03/03/20  1202   WBC 10*3/mm3 8.30 11.00*   HEMOGLOBIN g/dL 8.4* 8.4*   PLATELETS 10*3/mm3 189 181     BMP Results from last 7  days   Lab Units 03/04/20  0944 03/03/20  1202   SODIUM mmol/L 137 134*   POTASSIUM mmol/L 3.3* 3.9  3.7   CHLORIDE mmol/L 103 100   CO2 mmol/L 22.0 19.0*   BUN mg/dL 35* 35*   CREATININE mg/dL 1.79* 1.84*   GLUCOSE mg/dL 159* 245*   MAGNESIUM mg/dL 1.9  --    PHOSPHORUS mg/dL 2.8  --      Infection Results from last 7 days   Lab Units 03/04/20  0944   PROCALCITONIN ng/mL 0.40*     Radiology(recent) Nm Lung Ventilation Perfusion Aerosol    Result Date: 3/4/2020  Normal ventilation/perfusion lung scan. This excludes the diagnosis of pulmonary embolism with a very high degree of clinical certainty  Electronically Signed By-Naif Donahue On:3/4/2020 9:13 AM This report was finalized on [id number] by  Naif Donahue, .    Xr Chest 1 View    Result Date: 3/3/2020  No acute chest findings.  Electronically Signed By-Dr. Kaitlin Wong MD On:3/3/2020 12:46 PM This report was finalized on [id number] by Dr. Kaitlin Wong MD.    Us Renal Bilateral    Result Date: 3/3/2020  Normal renal ultrasound.  Electronically Signed By-Dr. Kaitlin Wong MD On:3/3/2020 5:04 PM This report was finalized on [id number] by Dr. Kaitlin Wong MD.    Xr Abdomen Kub    Result Date: 3/3/2020   1. Postoperative changes of biliary stent placement and cholecystectomy and inferior vena cava filter 2. Nonspecific bowel pattern, lung bases are free of disease 3. No evidence of renal or ureteral calculus  Electronically Signed By-Bennett Bobby Jr. On:3/3/2020 11:20 PM This report was finalized on [id number] by  Bennett Bobby Jr., .        I reviewed the patient's new clinical results.  I reviewed the patient's new imaging results and agree with the interpretation.      Assessment/Plan       General weakness    Cirrhosis (CMS/HCC)    Diabetic neuropathy (CMS/HCC)    Dyslipidemia    History of DVT of right lower extremity    Essential hypertension    Acquired hypothyroidism    Morbid obesity (CMS/HCC)    Obstructive sleep apnea    Type 2 diabetes  mellitus with kidney complication, with long-term current use of insulin (CMS/HCC)    Spinal stenosis of lumbar region    Pulmonary hypertension (CMS/HCC)    Deficiency of other specified B group vitamins    Chronic coronary artery disease    JOSE (iron deficiency anemia) with poor po absorption    IgG kappa MGUS    THOMAS (nonalcoholic steatohepatitis)    Splenomegaly    Antiphospholipid antibody positive    Elevated factor VIII level    Anemia in stage 3 chronic kidney disease (CMS/HCC)    History of Vitamin B12 deficiency    History of bilateral pulmonary embolus (PE)    Antithrombin III deficiency (CMS/HCC)    Protein C deficiency (CMS/HCC)    Protein S deficiency (CMS/HCC)    GAVE (gastric antral vascular ectasia)    CKD (chronic kidney disease) stage 3, GFR 30-59 ml/min (CMS/HCC)      Impression #1 pain in right knee #2 weakness and shortness of breath 3 stage III chronic kidney disease #4 spinal stenosis #5 type 2 diabetes    Recommendations #1 we will get x-rays of his right knee and then if there are no unusual abnormalities we will go ahead and plan on an injection of his knee.  Thank you    I discussed the patients findings and my recommendations with patient    Carlo Staples MD  03/04/20  12:47 PM

## 2020-03-04 NOTE — THERAPY EVALUATION
Patient Name: Bry Ratliff  : 1945    MRN: [id number]                              Today's Date: 3/4/2020       Admit Date: 3/3/2020    Visit Dx:     ICD-10-CM ICD-9-CM   1. General weakness R53.1 780.79   2. Anemia, unspecified type D64.9 285.9   3. Chronic kidney disease, unspecified CKD stage N18.9 585.9   4. Elevated troponin R79.89 790.6     Patient Active Problem List   Diagnosis   • Cirrhosis (CMS/HCC)   • Diabetic neuropathy (CMS/HCC)   • Dyslipidemia   • History of DVT of right lower extremity   • Essential hypertension   • Acquired hypothyroidism   • Morbid obesity (CMS/[facility])   • Obstructive sleep apnea   • Type 2 diabetes mellitus with kidney complication, with long-term current use of insulin (CMS/HCC)   • Spinal stenosis of lumbar region   • Pulmonary hypertension (CMS/HCC)   • Kidney stone   • Deficiency of other specified B group vitamins   • Chronic coronary artery disease   • JOSE (iron deficiency anemia) with poor po absorption   • Erosive gastritis   • IgG kappa MGUS   • THOMAS (nonalcoholic steatohepatitis)   • Splenomegaly   • Antiphospholipid antibody positive   • Elevated factor VIII level   • Anemia in stage 3 chronic kidney disease (CMS/HCC)   • History of Vitamin B12 deficiency   • History of bilateral pulmonary embolus (PE)   • Antithrombin III deficiency (CMS/HCC)   • Protein C deficiency (CMS/HCC)   • Protein S deficiency (CMS/HCC)   • Skin abscess   • Mixed hyperlipidemia   • Anemia   • Iron deficiency anemia due to chronic blood loss   • GAVE (gastric antral vascular ectasia)   • Vitamin B12 deficiency   • Malabsorption of iron   • CKD (chronic kidney disease) stage 3, GFR 30-59 ml/min (CMS/HCC)   • Iron deficiency anemia   • General weakness     Past Medical History:   Diagnosis Date   • Anemia    • Anxiety    • B12 deficiency    • Balance disorder    • Syed's esophagus    • CAD (coronary artery disease)     cardiac stent   • Constipation    • DDD (degenerative disc  disease), lumbar    • Decubitus ulcer     buttocks   • Depression    • Diabetes mellitus (CMS/HCC)    • Disease of thyroid gland     hypothyroid   • Diverticular disease    • Dvt femoral (deep venous thrombosis) (CMS/HCC) 2004    rt let   • Gastroparesis    • GERD (gastroesophageal reflux disease)    • Gout    • Hepatic encephalopathy (CMS/HCC)     if doesnt take meds   • History of echocardiogram     03/03/2017 - 12/03/2014 - 04/2012   • History of stomach ulcers    • Hyperlipidemia    • Iron deficiency    • Morbid obesity (CMS/HCC)    • THOMAS (nonalcoholic steatohepatitis)    • Neuropathy    • OAB (overactive bladder)    • KATHIA treated with BiPAP     bring dos   • Peripheral edema    • Pulmonary embolus (CMS/HCC) 2004   • Renal insufficiency    • S/P lumbar laminectomy    • Skin irritation     skin fold   • Stage 3 chronic kidney disease (CMS/HCC)      Past Surgical History:   Procedure Laterality Date   • BACK SURGERY  1971   • BILE DUCT STENT PLACEMENT     • CATARACT EXTRACTION  2014   • CHOLECYSTECTOMY  02/24/2019   • COLONOSCOPY  03/2018   • CORONARY ANGIOPLASTY  08/24/2009    PCI stent - succesful placement of 3.5/23 promus stent in proximal right coronary artery   • CORONARY ANGIOPLASTY WITH STENT PLACEMENT  08/27/2009    patient had stent placed to proximal right coronary artery   • CYSTOSCOPY BLADDER STONE LITHOTRIPSY  1997   • ENDOSCOPY N/A 10/18/2019    Procedure: ESOPHAGOGASTRODUODENOSCOPY with argon plasma coagulation of gastric antral vascular ectasia;  Surgeon: Marisel Gomez MD;  Location: [facility] ENDOSCOPY;  Service: Gastroenterology   • ENDOSCOPY N/A 1/9/2020    Procedure: ESOPHAGOGASTRODUODENOSCOPY WITH BIOPSY, ARGON PLASMA COAGULATION OF GASTRIC ANTREL VASCULAR ECTASIA,;  Surgeon: Marisel Gomez MD;  Location: [facility] ENDOSCOPY;  Service: Gastroenterology   • ERCP N/A 11/20/2019    Procedure: ERCP, clearance of bile duct with balloon, placement of biliary stent, EGD with argon plasma coagulation of  gastric antral vascular ectasia;  Surgeon: Marisel Gomez MD;  Location: [facility] ENDOSCOPY;  Service: Gastroenterology   • ERCP N/A 2/27/2020    Procedure: ENDOSCOPIC RETROGRADE CHOLANGIOPANCREATOGRAPHY with removal of biliary stent, brushing, dilation, and placement of biliary stent x 2 and Esophagogastroduodenoscopy with argon plasma coagulation;  Surgeon: Marisel Gomez MD;  Location: [facility] ENDOSCOPY;  Service: Gastroenterology;  Laterality: N/A;  Gastric antral vascular ectasia, common bile duct stricture   • OTHER SURGICAL HISTORY  11/2018    IVC filter implant   • RENAL ARTERY STENT Left     does not recall this     General Information     Row Name 03/04/20 1142          PT Evaluation Time/Intention    Document Type  evaluation  -     Row Name 03/04/20 1142          General Information    Patient Profile Reviewed?  yes  -     Row Name 03/04/20 1142          Relationship/Environment    Lives With  spouse Spouse fears she will not be able to continue meeting his needs at home  -     Row Name 03/04/20 1142          Resource/Environmental Concerns    Current Living Arrangements  home/apartment/condo  -     Row Name 03/04/20 1142          Home Main Entrance    Number of Stairs, Main Entrance  (S) none RAMP  -     Row Name 03/04/20 1142          Cognitive Assessment/Intervention- PT/OT    Orientation Status (Cognition)  oriented x 4  -     Row Name 03/04/20 1142          Safety Issues, Functional Mobility    Safety Issues Affecting Function (Mobility)  awareness of need for assistance  -     Impairments Affecting Function (Mobility)  balance;endurance/activity tolerance;motor control;motor planning;shortness of breath;strength  -       User Key  (r) = Recorded By, (t) = Taken By, (c) = Cosigned By    Initials Name Provider Type    WC Matilde Yoon, PT Physical Therapist        Mobility     Row Name 03/04/20 1144          Bed Mobility Assessment/Treatment    Bed Mobility Assessment/Treatment  bed  mobility (all) activities  -     [place] Level (Bed Mobility)  minimum assist (75% patient effort)  -     Row Name 03/04/20 1144          Bed-Chair Transfer    Bed-Chair [place] (Transfers)  minimum assist (75% patient effort)  -     Assistive Device (Bed-Chair Transfers)  walker, 4-wheeled;bariatric  -     Row Name 03/04/20 1144          Sit-Stand Transfer    Sit-Stand [place] (Transfers)  minimum assist (75% patient effort)  -     Assistive Device (Sit-Stand Transfers)  bariatric;walker, 4-wheeled  -     Row Name 03/04/20 1149 03/04/20 1144       Gait/Stairs Assessment/Training    Gait/Stairs Assessment/Training  --  gait/ambulation independence  -    [place] Level (Gait)  --  contact guard  -    Assistive Device (Gait)  --  walker, 4-wheeled  -    Distance in Feet (Gait)  45 feet  -  --    Deviations/Abnormal Patterns (Gait)  --  base of support, wide  -      User Key  (r) = Recorded By, (t) = Taken By, (c) = Cosigned By    Initials Name Provider Type     Matilde Yoon, PT Physical Therapist        Obj/Interventions     Row Name 03/04/20 1145          General ROM    GENERAL ROM COMMENTS  WFL's  -     Row Name 03/04/20 1145          MMT (Manual Muscle Testing)    General MMT Comments  4+/5 left LE 4/5 on the right grossly  -     Row Name 03/04/20 1145          Static Sitting Balance    Level of [place] (Unsupported Sitting, Static Balance)  standby assist  -     Time Able to Maintain Position (Unsupported Sitting, Static Balance)  4 to 5 minutes  -     Row Name 03/04/20 1145          Dynamic Sitting Balance    Level of [place], Reaches Outside Midline (Sitting, Dynamic Balance)  standby assist  -     Row Name 03/04/20 1145          Static Standing Balance    Level of [place] (Supported Standing, Static Balance)  contact guard assist  -     Time Able to Maintain Position (Supported Standing, Static Balance)  4 to 5 minutes  -     Assistive  Device Utilized (Supported Standing, Static Balance)  walker, rolling  -     Row Name 03/04/20 1145          Dynamic Standing Balance    Level of [place], Reaches Outside Midline (Standing, Dynamic Balance)  contact guard assist  -     Time Able to Maintain Position, Reaches Outside Midline (Standing, Dynamic Balance)  4 to 5 minutes  -     Assistive Device Utilized (Supported Standing, Dynamic Balance)  walker, rolling  -       User Key  (r) = Recorded By, (t) = Taken By, (c) = Cosigned By    Initials Name Provider Type    Matilde Echols PT Physical Therapist        Goals/Plan     Row Name 03/04/20 1148          Bed Mobility Goal 1 (PT)    Activity/Assistive Device (Bed Mobility Goal 1, PT)  bed mobility activities, all  -WC     [place] Level/Cues Needed (Bed Mobility Goal 1, PT)  conditional independence  -WC     Time Frame (Bed Mobility Goal 1, PT)  long term goal (LTG);2 weeks  -     Row Name 03/04/20 1148          Transfer Goal 1 (PT)    Activity/Assistive Device (Transfer Goal 1, PT)  sit-to-stand/stand-to-sit;bed-to-chair/chair-to-bed  -     [place] Level/Cues Needed (Transfer Goal 1, PT)  conditional independence  -WC     Time Frame (Transfer Goal 1, PT)  2 weeks  -     Row Name 03/04/20 1148          Gait Training Goal 1 (PT)    Activity/Assistive Device (Gait Training Goal 1, PT)  gait (walking locomotion)  -WC     [place] Level (Gait Training Goal 1, PT)  conditional independence  -WC     Time Frame (Gait Training Goal 1, PT)  2 weeks  -WC     Barriers (Gait Training Goal 1, PT)  60 feet   -       User Key  (r) = Recorded By, (t) = Taken By, (c) = Cosigned By    Initials Name Provider Type    Matilde Echols PT Physical Therapist        Clinical Impression     Row Name 03/04/20 1146          Pain Assessment    Additional Documentation  Pain Scale: Numbers Pre/Post-Treatment (Group)  -[facility] Name 03/04/20 1147          Pain Scale: Numbers Pre/Post-Treatment     Pain Scale: Numbers, Pretreatment  0/10 - no pain  -     Pain Scale: Numbers, Post-Treatment  0/10 - no pain  -     Row Name 03/04/20 1147          Plan of Care Review    Plan of Care Reviewed With  patient;spouse  -     Row Name 03/04/20 1147          Physical Therapy Clinical Impression    Criteria for Skilled Interventions Met (PT Clinical Impression)  yes;treatment indicated  -     Rehab Potential (PT Clinical Summary)  good, to achieve stated therapy goals  -     Row Name 03/04/20 1147          Vital Signs    Pre Patient Position  Supine  -     Intra Patient Position  Standing  -WC     Post Patient Position  Sitting  -     Row Name 03/04/20 1147          Positioning and Restraints    Pre-Treatment Position  in bed  -     Post Treatment Position  chair  -       User Key  (r) = Recorded By, (t) = Taken By, (c) = Cosigned By    Initials Name Provider Type    Matilde Echols PT Physical Therapist        Outcome Measures     Row Name 03/04/20 1201          How much help from another person do you currently need...    Turning from your back to your side while in flat bed without using bedrails?  3  -WC     Moving from lying on back to sitting on the side of a flat bed without bedrails?  3  -WC     Moving to and from a bed to a chair (including a wheelchair)?  3  -WC     Standing up from a chair using your arms (e.g., wheelchair, bedside chair)?  3  -WC     Climbing 3-5 steps with a railing?  1  -WC     To walk in hospital room?  3  -WC     AM-PAC 6 Clicks Score (PT)  16  -     Row Name 03/04/20 1201          Functional Assessment    Outcome Measure Options  AM-PAC 6 Clicks Basic Mobility (PT)  -       User Key  (r) = Recorded By, (t) = Taken By, (c) = Cosigned By    Initials Name Provider Type    Matilde Echols PT Physical Therapist          PT Recommendation and Plan  Planned Therapy Interventions (PT Eval): balance training, bed mobility training, gait training, neuromuscular  re-education, patient/family education, strengthening, transfer training  Outcome Summary/Treatment Plan (PT)  Anticipated Discharge Disposition (PT): inpatient rehabilitation facility  Plan of Care Reviewed With: patient, spouse  Outcome Summary: Pt ADM with anemia,chronic kidney obesity. Prior pt lives with spouse in private home ramp to enter. Per spouse she is afraid care at home is becoming too difficult. Pt was SBA mobility in the home however pt was not able to get in a car with spouses help. Today pt needed MIN A bed mobility, sit to stand, and CGA walking 45 feet CGA and a second person as  due to per spouse pt weight is 298 lbs. Pt SOA with gait, UE support bariatric rollator.  Recommendation is IP rehab.     Time Calculation:   PT Charges     Row Name 03/04/20 1150             Time Calculation    Start Time  1110  -WC      Stop Time  1130  -WC      Time Calculation (min)  20 min  -WC      PT Received On  03/04/20  -WC      PT - Next Appointment  03/06/20  -WC      PT Goal Re-Cert Due Date  03/18/20  -WC         Time Calculation- PT    TCU Minutes- PT  15 min  -WC        User Key  (r) = Recorded By, (t) = Taken By, (c) = Cosigned By    Initials Name Provider Type    WC Matilde Yoon, VALENTE Physical Therapist        Therapy Charges for Today     Code Description Service Date Service Provider Modifiers Qty    84967494326 HC PT EVAL MOD COMPLEXITY 3 3/4/2020 Matilde Yoon, PT GP 1    55192866003 HC GAIT TRAINING EA 15 MIN 3/4/2020 Matilde Yoon, PT GP 1          PT G-Codes  Outcome Measure Options: AM-PAC 6 Clicks Basic Mobility (PT)  AM-PAC 6 Clicks Score (PT): 16    Matilde Yoon PT  3/4/2020

## 2020-03-04 NOTE — PROGRESS NOTES
"NEPHROLOGY PROGRESS NOTE------[facility]    [facility]  [phone]  Paula Plascencia MD      Patient Care Team:  Paulette Harris MD as PCP - General  Josefina Plascencia MD as Consulting Physician (Nephrology)      Provider:  Paula lPascencia MD  Patient Name: Bry Ratliff  :  1945    SUBJECTIVE:    Patient feeling better this AM. Labs pending.    Medication:    allopurinol 100 mg Oral BID   aspirin 81 mg Oral Daily   atorvastatin 20 mg Oral Daily   cyanocobalamin 1,000 mcg Subcutaneous Q30 Days   docusate sodium 100 mg Oral BID   DULoxetine 60 mg Oral Daily   enoxaparin 40 mg Subcutaneous Daily   folic acid 1 mg Oral Daily   insulin glargine 50 Units Subcutaneous Nightly   insulin lispro 0-9 Units Subcutaneous 4x Daily With Meals & Nightly   insulin lispro 18 Units Subcutaneous TID AC   iron sucrose 200 mg Intravenous Q24H   K-PHOS-NEUTRAL 1 tablet Oral Daily   lactulose 30 g Oral TID   levothyroxine 75 mcg Oral Q AM   magnesium oxide 400 mg Oral Daily   metoclopramide 5 mg Oral BID AC   nicotine 1 patch Transdermal Nightly   oxybutynin XL 10 mg Oral Daily   pantoprazole 40 mg Oral BID AC   riFAXIMin 550 mg Oral Q12H   sodium bicarbonate 650 mg Oral TID   sodium chloride 10 mL Intravenous Q12H   sucralfate 1 g Oral 4x Daily AC & at Bedtime   THERA 1 tablet Oral Daily   zinc sulfate 220 mg Oral Daily       sodium chloride 100 mL/hr Last Rate: 100 mL/hr (20 0037)       OBJECTIVE    Vital Sign Min/Max for last 24 hours  Temp  Min: 97.4 °F (36.3 °C)  Max: 98.9 °F (37.2 °C)   BP  Min: 117/62  Max: 125/70   Pulse  Min: 78  Max: 89   Resp  Min: 15  Max: 18   SpO2  Min: 96 %  Max: 100 %   No data recorded   Weight  Min: 134 kg (295 lb)  Max: 134 kg (295 lb)     Flowsheet Rows      First Filed Value   Admission Height  188 cm (74\") Documented at 2020 1133   Admission Weight  134 kg (295 lb) Documented at 2020 1133          No " intake/output data recorded.  I/O last 3 completed shifts:  In: 1080 [P.O.:480; I.V.:600]  Out: -     Physical Exam:  General Appearance: alert, appears stated age and cooperative  Head: normocephalic, without obvious abnormality and atraumatic  Eyes: conjunctivae and sclerae normal and no icterus  Neck: supple and no JVD  Lungs: clear to auscultation and respirations regular  Heart: regular rhythm & normal rate and normal S1, S2 +REBECCA  Chest: Wall no abnormalities observed  Abdomen: normal bowel sounds and soft non-tender +OBESITY  Extremities: moves extremities well, +TRACE PRETIBIAL EDEMA BILAT, no cyanosis and no redness  Skin: no bleeding, bruising or rash, turgor normal, color normal and no lesions noted  Neurologic: Alert, and oriented. No focal deficits    Labs:    WBC WBC   Date Value Ref Range Status   03/03/2020 11.00 (H) 3.40 - 10.80 10*3/mm3 Final      HGB Hemoglobin   Date Value Ref Range Status   03/03/2020 8.4 (L) 13.0 - 17.7 g/dL Final      HCT Hematocrit   Date Value Ref Range Status   03/03/2020 24.9 (L) 37.5 - 51.0 % Final      Platlets No results found for: LABPLAT   MCV MCV   Date Value Ref Range Status   03/03/2020 98.7 (H) 79.0 - 97.0 fL Final          Sodium Sodium   Date Value Ref Range Status   03/03/2020 134 (L) 136 - 145 mmol/L Final      Potassium Potassium   Date Value Ref Range Status   03/03/2020 3.7 3.5 - 5.2 mmol/L Final   03/03/2020 3.9 3.5 - 5.2 mmol/L Final      Chloride Chloride   Date Value Ref Range Status   03/03/2020 100 98 - 107 mmol/L Final      CO2 CO2   Date Value Ref Range Status   03/03/2020 19.0 (L) 22.0 - 29.0 mmol/L Final      BUN BUN   Date Value Ref Range Status   03/03/2020 35 (H) 8 - 23 mg/dL Final      Creatinine Creatinine   Date Value Ref Range Status   03/03/2020 1.84 (H) 0.76 - 1.27 mg/dL Final      Calcium Calcium   Date Value Ref Range Status   03/03/2020 8.2 (L) 8.6 - 10.5 mg/dL Final      PO4 No components found for: PO4   Albumin Albumin   Date Value  Ref Range Status   03/03/2020 3.00 (L) 3.50 - 5.20 g/dL Final      Magnesium No results found for: MG   Uric Acid No components found for: URIC ACID     Imaging Results (Last 72 Hours)     Procedure Component Value Units Date/Time    XR Abdomen KUB [[id number]] Collected:  03/03/20 2318     Updated:  03/03/20 2322    Narrative:       DATE OF EXAM:  3/3/2020 7:24 PM     PROCEDURE:  XR ABDOMEN KUB-     INDICATIONS:  ABDOMINAL PAIN; R53.1-Weakness; D64.9-Anemia, unspecified; N18.9-Chronic  kidney disease, unspecified; R79.89-Other specified abnormal findings of  blood chemistry  74-year-old male who has abdominal pain off and on, pain mostly to the  right in the middle of his abdomen. Patient gives history of diabetes     COMPARISON:  No Comparisons Available     TECHNIQUE:   Single radiographic view of the abdomen was obtained.     FINDINGS:  Today's KUB demonstrates a common duct stent in place along with change  of cholecystectomy and placement of inferior vena cava filter the bowel  pattern is nonspecific the bony structures demonstrate degenerative  change no renal or ureteral or bladder calculus is seen. The bony  structures are intact. The are degenerative changes to the lower  thoracic and lumbar spine. The lung bases are free of disease..       Impression:          1. Postoperative changes of biliary stent placement and cholecystectomy  and inferior vena cava filter  2. Nonspecific bowel pattern, lung bases are free of disease  3. No evidence of renal or ureteral calculus     Electronically Signed By-Bennett Bobby Jr. On:3/3/2020 11:20 PM  This report was finalized on [id number] by  Bennett Bobby Jr., .    US Renal Bilateral [[id number]] Collected:  03/03/20 1701     Updated:  03/03/20 1706    Narrative:       DATE OF EXAM:  3/3/2020 4:38 PM     PROCEDURE:  US RENAL BILATERAL-     INDICATIONS:  ARF/CKD; R53.1-Weakness; D64.9-Anemia, unspecified; N18.9-Chronic kidney  disease, unspecified; R79.89-Other specified  abnormal findings of blood  chemistry     COMPARISON:  No Comparisons Available     TECHNIQUE:   Grayscale and color Doppler ultrasound evaluation of the kidneys and  urinary bladder was performed.        FINDINGS:  Study is technically limited secondary to obscuration by body habitus.  Right kidney measures 9.5 x 4.9 x 5.0 cm the left kidney measures 8.6 x  4.7 x 4.9 cm. Both kidneys maintain normal cortical thickness and  echotexture. No cystic or solid renal mass or shadowing stone or  hydronephrosis is seen. There is mild to moderate urinary bladder  distention with fluid, and the urinary bladder appears normal.        Impression:       Normal renal ultrasound.     Electronically Signed By-Dr. Kaitlin Wong MD On:3/3/2020 5:04 PM  This report was finalized on [id number] by Dr. Kaitlin Wong MD.    XR Chest 1 View [[id number]] Collected:  03/03/20 1245     Updated:  03/03/20 1248    Narrative:       DATE OF EXAM:  3/3/2020 12:12 PM     PROCEDURE:  XR CHEST 1 VW-     INDICATIONS:  Shortness breath, cough and fever for 2 to 3 days.       COMPARISON:  PA and lateral chest 07/11/2019.     TECHNIQUE:   Single radiographic view of the chest was obtained.     FINDINGS:  No acute airspace disease. Heart size is upper limits normal. Mild  degenerative endplate spurring in the thoracic spine. No acute osseous  abnormality.       Impression:       No acute chest findings.     Electronically Signed By-Dr. Kaitlin Wong MD On:3/3/2020 12:46 PM  This report was finalized on [id number] by Dr. Kaitlin Wong MD.          Results for orders placed during the hospital encounter of 03/03/20   XR Abdomen KUB    Narrative DATE OF EXAM:  3/3/2020 7:24 PM     PROCEDURE:  XR ABDOMEN KUB-     INDICATIONS:  ABDOMINAL PAIN; R53.1-Weakness; D64.9-Anemia, unspecified; N18.9-Chronic  kidney disease, unspecified; R79.89-Other specified abnormal findings of  blood chemistry  74-year-old male who has abdominal pain off and on, pain  mostly to the  right in the middle of his abdomen. Patient gives history of diabetes     COMPARISON:  No Comparisons Available     TECHNIQUE:   Single radiographic view of the abdomen was obtained.     FINDINGS:  Today's KUB demonstrates a common duct stent in place along with change  of cholecystectomy and placement of inferior vena cava filter the bowel  pattern is nonspecific the bony structures demonstrate degenerative  change no renal or ureteral or bladder calculus is seen. The bony  structures are intact. The are degenerative changes to the lower  thoracic and lumbar spine. The lung bases are free of disease..       Impression    1. Postoperative changes of biliary stent placement and cholecystectomy  and inferior vena cava filter  2. Nonspecific bowel pattern, lung bases are free of disease  3. No evidence of renal or ureteral calculus     Electronically Signed By-Bennett Bobby Jr. On:3/3/2020 11:20 PM  This report was finalized on [id number] by  Bennett Bobby Jr., .   XR Chest 1 View    Narrative DATE OF EXAM:  3/3/2020 12:12 PM     PROCEDURE:  XR CHEST 1 VW-     INDICATIONS:  Shortness breath, cough and fever for 2 to 3 days.       COMPARISON:  PA and lateral chest 07/11/2019.     TECHNIQUE:   Single radiographic view of the chest was obtained.     FINDINGS:  No acute airspace disease. Heart size is upper limits normal. Mild  degenerative endplate spurring in the thoracic spine. No acute osseous  abnormality.       Impression No acute chest findings.     Electronically Signed By-Dr. Kaitlin Wong MD On:3/3/2020 12:46 PM  This report was finalized on [id number] by Dr. Kaitlin Wong MD.       Results for orders placed during the hospital encounter of 03/03/20   Duplex Venous Lower Extremity - Bilateral CAR    Narrative · Normal bilateral lower extremity venous duplex scan.            ASSESSMENT / PLAN      General weakness    Cirrhosis (CMS/HCC)    Diabetic neuropathy (CMS/HCC)    Dyslipidemia     History of DVT of right lower extremity    Essential hypertension    Acquired hypothyroidism    Morbid obesity (CMS/HCC)    Obstructive sleep apnea    Type 2 diabetes mellitus with kidney complication, with long-term current use of insulin (CMS/HCC)    Spinal stenosis of lumbar region    Pulmonary hypertension (CMS/HCC)    Deficiency of other specified B group vitamins    Chronic coronary artery disease    JOSE (iron deficiency anemia) with poor po absorption    IgG kappa MGUS    THOMAS (nonalcoholic steatohepatitis)    Splenomegaly    Antiphospholipid antibody positive    Elevated factor VIII level    Anemia in stage 3 chronic kidney disease (CMS/HCC)    History of Vitamin B12 deficiency    History of bilateral pulmonary embolus (PE)    Antithrombin III deficiency (CMS/HCC)    Protein C deficiency (CMS/HCC)    Protein S deficiency (CMS/HCC)    GAVE (gastric antral vascular ectasia)    CKD (chronic kidney disease) stage 3, GFR 30-59 ml/min (CMS/formerly [facility])      1. RENAL FAILURE-------Nonoliguric. Will follow up on labs from this AM. +Mild ARF/SUSANA on top of known CRF/CKD STG 3 with a baseline serum creatinine of about 1.5. CRF/CKD STG 3 secondary to DGS/HTN NS. +ARF/SUSANA appears to be secondary to slight prerenal state.  Hold Bumex.  No NSAIDs or IV dye. Dose meds for CrCl less than 10 cc/min.     2. ANEMIA WITH H/O GAVE AND MGUS AND JOSE-----Check Fe and hemoccult. Follow for PRBC need. On IV iron and EPO     3. HTN WITH CKD STG 3-------BP okay. Avoid hypotension. No ACE/ARB/DRI/diuretic     4. DMII WITH RENAL MANIFESTATIONS------BS okay. On Lantus     5. HYPOTHYROIDISM-------On Synthroid. TSH pending     6. THOMAS/CIRRHOSIS/HEPATIC ENCEPHALOPATHY------On Lactulose. Follow ammonia     7. OBESITY/KATHIA------ CPAP     8. EDEMA/CHRONIC VENOUS INSUFFICIENCY     9. ACIDOSIS------Metabolic. No elevation in AGAP. Secondary to Type 4 RTA and stool losses from diarrhea related to Lactulose use. Bicarb down. Increased po NaHCO3     10.  GERD-------On PPI/Carafate     11. HYPERCOAGUABLE STATE WITH ANTI THROMBI III/PROTEIN C & S DEFICIENCY AND HISTORY OF PE-----Dopplers negative. On Lovenox      Paula Plascencia MD  [facility]  [phone]  03/04/20  7:43 AM

## 2020-03-04 NOTE — PROGRESS NOTES
Continued Stay Note  [facility]     Patient Name: Bry Ratliff  MRN: [id number]  Today's Date: 3/4/2020    Admit Date: 3/3/2020    Discharge Plan     Row Name 03/04/20 1417       Plan    Plan  D/C Plan: Rehab choices 1. [facility] 2. [facility]-referral pending.     Plan Comments  Spoke with patient and spouse at bedside. CM discussed PT recommendations for rehab. Patient is agreeable to rehab. ECF list given. 1. [facility] 2. [facility]. SW notified. Spouse has concerns for transportation to MD appts while at rehab.     Row Name 03/04/20 5479       Plan    Plan  D/C Plan: Pending PT recommendations.     Patient/Family in Agreement with Plan  yes    Plan Comments  Spoke to patient's spouse at bedside. Patient off the floor for testing. Patient is dependent on spouse for care. Spouse reports patient requires 1-2 people to ambulate. Patient sits in recliner and moves from recliner to bedside commode. Spouse states she cannot continue to care for patient at home as he is getting weaker. Spouse states patient was at [facility] for rehab last year and would likely choose [facility] or [facility] if rehab is recommended. Spouse reports concerns for transportation to appts as she has difficulty transferring patient to the car. CM notified SW of family concerns. Per Brittnye at [facility], patient is current, referral sent and order placed. Barrier to D/C: IVFs, xray of R knee pending.         Expected Discharge Date and Time     Expected Discharge Date Expected Discharge Time    Mar 6, 2020             Simran Funes

## 2020-03-04 NOTE — THERAPY EVALUATION
Acute Care - Occupational Therapy Initial Evaluation   [facility]     Patient Name: Bry Ratliff  : 1945  MRN: [id number]  Today's Date: 3/4/2020             Admit Date: 3/3/2020       ICD-10-CM ICD-9-CM   1. General weakness R53.1 780.79   2. Anemia, unspecified type D64.9 285.9   3. Chronic kidney disease, unspecified CKD stage N18.9 585.9   4. Elevated troponin R79.89 790.6     Patient Active Problem List   Diagnosis   • Cirrhosis (CMS/HCC)   • Diabetic neuropathy (CMS/HCC)   • Dyslipidemia   • History of DVT of right lower extremity   • Essential hypertension   • Acquired hypothyroidism   • Morbid obesity (CMS/HCC)   • Obstructive sleep apnea   • Type 2 diabetes mellitus with kidney complication, with long-term current use of insulin (CMS/HCC)   • Spinal stenosis of lumbar region   • Pulmonary hypertension (CMS/HCC)   • Kidney stone   • Deficiency of other specified B group vitamins   • Chronic coronary artery disease   • JOSE (iron deficiency anemia) with poor po absorption   • Erosive gastritis   • IgG kappa MGUS   • THOMAS (nonalcoholic steatohepatitis)   • Splenomegaly   • Antiphospholipid antibody positive   • Elevated factor VIII level   • Anemia in stage 3 chronic kidney disease (CMS/HCC)   • History of Vitamin B12 deficiency   • History of bilateral pulmonary embolus (PE)   • Antithrombin III deficiency (CMS/HCC)   • Protein C deficiency (CMS/HCC)   • Protein S deficiency (CMS/HCC)   • Skin abscess   • Mixed hyperlipidemia   • Anemia   • Iron deficiency anemia due to chronic blood loss   • GAVE (gastric antral vascular ectasia)   • Vitamin B12 deficiency   • Malabsorption of iron   • CKD (chronic kidney disease) stage 3, GFR 30-59 ml/min (CMS/HCC)   • Iron deficiency anemia   • General weakness   • Decubitus ulcer of sacral region, stage 3 (CMS/HCC)     Past Medical History:   Diagnosis Date   • Anemia    • Anxiety    • B12 deficiency    • Balance disorder    • Syed's esophagus    • CAD  (coronary artery disease)     cardiac stent   • Constipation    • DDD (degenerative disc disease), lumbar    • Decubitus ulcer     buttocks   • Depression    • Diabetes mellitus (CMS/HCC)    • Disease of thyroid gland     hypothyroid   • Diverticular disease    • Dvt femoral (deep venous thrombosis) (CMS/HCC) 2004    rt let   • Gastroparesis    • GERD (gastroesophageal reflux disease)    • Gout    • Hepatic encephalopathy (CMS/HCC)     if doesnt take meds   • History of echocardiogram     03/03/2017 - 12/03/2014 - 04/2012   • History of stomach ulcers    • Hyperlipidemia    • Iron deficiency    • Morbid obesity (CMS/HCC)    • THOMAS (nonalcoholic steatohepatitis)    • Neuropathy    • OAB (overactive bladder)    • KATHIA treated with BiPAP     bring dos   • Peripheral edema    • Pulmonary embolus (CMS/HCC) 2004   • Renal insufficiency    • S/P lumbar laminectomy    • Skin irritation     skin fold   • Stage 3 chronic kidney disease (CMS/HCC)      Past Surgical History:   Procedure Laterality Date   • BACK SURGERY  1971   • BILE DUCT STENT PLACEMENT     • CATARACT EXTRACTION  2014   • CHOLECYSTECTOMY  02/24/2019   • COLONOSCOPY  03/2018   • CORONARY ANGIOPLASTY  08/24/2009    PCI stent - succesful placement of 3.5/23 promus stent in proximal right coronary artery   • CORONARY ANGIOPLASTY WITH STENT PLACEMENT  08/27/2009    patient had stent placed to proximal right coronary artery   • CYSTOSCOPY BLADDER STONE LITHOTRIPSY  1997   • ENDOSCOPY N/A 10/18/2019    Procedure: ESOPHAGOGASTRODUODENOSCOPY with argon plasma coagulation of gastric antral vascular ectasia;  Surgeon: Marisel Gomez MD;  Location: [facility] ENDOSCOPY;  Service: Gastroenterology   • ENDOSCOPY N/A 1/9/2020    Procedure: ESOPHAGOGASTRODUODENOSCOPY WITH BIOPSY, ARGON PLASMA COAGULATION OF GASTRIC ANTREL VASCULAR ECTASIA,;  Surgeon: Marisel Gomez MD;  Location: [facility] ENDOSCOPY;  Service: Gastroenterology   • ERCP N/A 11/20/2019    Procedure: ERCP, clearance of  bile duct with balloon, placement of biliary stent, EGD with argon plasma coagulation of gastric antral vascular ectasia;  Surgeon: Marisel Gomez MD;  Location: [facility] ENDOSCOPY;  Service: Gastroenterology   • ERCP N/A 2/27/2020    Procedure: ENDOSCOPIC RETROGRADE CHOLANGIOPANCREATOGRAPHY with removal of biliary stent, brushing, dilation, and placement of biliary stent x 2 and Esophagogastroduodenoscopy with argon plasma coagulation;  Surgeon: Marisel Gomez MD;  Location: [facility] ENDOSCOPY;  Service: Gastroenterology;  Laterality: N/A;  Gastric antral vascular ectasia, common bile duct stricture   • OTHER SURGICAL HISTORY  11/2018    IVC filter implant   • RENAL ARTERY STENT Left     does not recall this          OT ASSESSMENT FLOWSHEET (last 12 hours)      Occupational Therapy Evaluation     Row Name 03/04/20 1500                   OT Evaluation Time/Intention    Subjective Information  weakness  -MP        Document Type  evaluation  -MP        Patient Effort  good  -MP           General Information    Patient Profile Reviewed?  yes  -MP        Patient Observations  alert;cooperative;agree to therapy  -MP        Prior Level of Function  mod assist:;ADL's Requires assists for LB ADLs  -MP           Relationship/Environment    Lives With  spouse  -MP        Family Caregiver if Needed  child(jose), adult  -MP           Resource/Environmental Concerns    Current Living Arrangements  home/apartment/condo  -MP        Resource/Environmental Concerns  none  -MP        Transportation Concerns  car, none  -MP           Cognitive Assessment/Intervention- PT/OT    Orientation Status (Cognition)  oriented x 3  -MP        Follows Commands (Cognition)  WNL  -MP           Bed Mobility Assessment/Treatment    Bed Mobility Assessment/Treatment  supine-sit  -MP        [place] Level (Bed Mobility)  minimum assist (75% patient effort);1 person assist  -MP        Supine-Sit [place] (Bed Mobility)  minimum assist (75% patient  effort);1 person assist  -MP           Functional Mobility    Functional Mobility- Ind. Level  contact guard assist;1 person  -MP        Functional Mobility- Device  rolling walker  -MP           Bed-Chair Transfer    Bed-Chair [place] (Transfers)  minimum assist (75% patient effort);1 person assist  -MP        Assistive Device (Bed-Chair Transfers)  walker, 4-wheeled  -MP           Sit-Stand Transfer    Sit-Stand Glenn (Transfers)  minimum assist (75% patient effort)  -MP        Assistive Device (Sit-Stand Transfers)  walker, 4-wheeled  -MP           ADL Assessment/Intervention    BADL Assessment/Intervention  lower body dressing  -MP           Lower Body Dressing Assessment/Training    Lower Body Dressing [place] Level  don;socks;maximum assist (25% patient effort)  -MP           General ROM    GENERAL ROM COMMENTS  BUE WFL  -MP           MMT (Manual Muscle Testing)    General MMT Comments  BUE 4/5  -MP           Positioning and Restraints    Pre-Treatment Position  in bed  -MP        Post Treatment Position  chair  -MP           Pain Scale: Numbers Pre/Post-Treatment    Pain Scale: Numbers, Pretreatment  0/10 - no pain  -MP        Pain Scale: Numbers, Post-Treatment  0/10 - no pain  -MP           Wound 10/17/19 1830 lower sacral spine Pressure Injury    Wound - Properties Group Date first assessed: 10/17/19  -JR Time first assessed: 1830  -JR Orientation: lower  -JR Location: sacral spine  -JR Primary Wound Type: Pressure inj  -JR Stage, Pressure Injury: Stage 3  -JR       Plan of Care Review    Plan of Care Reviewed With  patient  -MP        Progress  no change  -MP           Clinical Impression (OT)    Criteria for Skilled Therapeutic Interventions Met (OT Eval)  yes;treatment indicated  -MP        Rehab Potential (OT Eval)  good, to achieve stated therapy goals  -MP        Therapy Frequency (OT Eval)  3 times/wk  -MP        Care Plan Review (OT)  evaluation/treatment results reviewed  -MP         Care Plan Review, Other Participant (OT Eval)  spouse  -MP        Anticipated Discharge Disposition (OT)  inpatient rehabilitation facility  -MP           OT Goals    Bed Mobility Goal Selection (OT)  bed mobility, OT goal 1  -MP        Transfer Goal Selection (OT)  transfer, OT goal 1  -MP        Toileting Goal Selection (OT)  toileting, OT goal 1  -MP           Bed Mobility Goal 1 (OT)    Activity/Assistive Device (Bed Mobility Goal 1, OT)  sit to supine;supine to sit  -MP        Christian Level/Cues Needed (Bed Mobility Goal 1, OT)  supervision required;1 person assist  -MP        Time Frame (Bed Mobility Goal 1, OT)  long term goal (LTG);2 weeks  -MP           Transfer Goal 1 (OT)    Activity/Assistive Device (Transfer Goal 1, OT)  transfers, all  -MP        Christian Level/Cues Needed (Transfer Goal 1, OT)  supervision required;1 person assist  -MP        Time Frame (Transfer Goal 1, OT)  long term goal (LTG);2 weeks  -MP           Toileting Goal 1 (OT)    Activity/Device (Toileting Goal 1, OT)  toileting skills, all  -MP        Christian Level/Cues Needed (Toileting Goal 1, OT)  supervision required;set-up required;1 person assist  -MP        Time Frame (Toileting Goal 1, OT)  long term goal (LTG);2 weeks  -MP          User Key  (r) = Recorded By, (t) = Taken By, (c) = Cosigned By    Initials Name Effective Dates    JR Gautam Torres RN 06/16/16 -     MP Emerson Mccormick OT 03/01/19 -                OT Recommendation and Plan  Outcome Summary/Treatment Plan (OT)  Anticipated Discharge Disposition (OT): inpatient rehabilitation facility  Therapy Frequency (OT Eval): 3 times/wk  Plan of Care Review  Plan of Care Reviewed With: patient  Plan of Care Reviewed With: patient  Outcome Summary: Pt. is 75 y/o male admit w/ anemia, h/o CKD stage III. Pt. living w/ spouse prior to admit w/ incresaed weakness and difficulty ambulating. Pt. requires increased assist for ADL's this date max and min A for all  bed mobility and bed to chair transfer. Pt. not safe to d/c home at this time and will require IP rehab at d/c to address aforementioned deficits. Will follow up w/ pt. 1-3x per week at [facility].        Time Calculation:   Time Calculation- OT     Row Name 03/04/20 1551             Time Calculation- OT    OT Start Time  1100  -MP      OT Stop Time  1120  -MP      OT Time Calculation (min)  20 min  -MP      Total Timed Code Minutes- OT  10 minute(s)  -MP      OT Received On  03/04/20  -      OT - Next Appointment  03/06/20  -      OT Goal Re-Cert Due Date  03/18/20  -        User Key  (r) = Recorded By, (t) = Taken By, (c) = Cosigned By    Initials Name Provider Type    Emerson Don OT Occupational Therapist        Therapy Charges for Today     Code Description Service Date Service Provider Modifiers Qty    [id number]  OT EVAL LOW COMPLEXITY 3 3/4/2020 Emerson Mccormick OT GO 1    [id number]  OT THERAPEUTIC ACT EA 15 MIN 3/4/2020 Emerson Mccormick OT GO 1               Emerson Mccormick OT  3/4/2020

## 2020-03-04 NOTE — PROGRESS NOTES
Discharge Planning Assessment  [facility]     Patient Name: Bry Ratliff  MRN: [id number]  Today's Date: 3/4/2020    Admit Date: 3/3/2020    Discharge Needs Assessment     Row Name 03/04/20 1318       Living Environment    Lives With  spouse    Current Living Arrangements  home/apartment/condo    Primary Care Provided by  spouse/significant other    Provides Primary Care For  no one, unable/limited ability to care for self    Family Caregiver if Needed  child(jose), adult;spouse    Quality of Family Relationships  helpful;supportive    Able to Return to Prior Arrangements  yes       Resource/Environmental Concerns    Resource/Environmental Concerns  none    Transportation Concerns  car, none       Transition Planning    Patient/Family Anticipates Transition to  home with family    Transportation Anticipated  family or friend will provide       Discharge Needs Assessment    Readmission Within the Last 30 Days  no previous admission in last 30 days 2/27/2020-Endoscopy procedure    Concerns to be Addressed  denies needs/concerns at this time    Equipment Currently Used at Home  rollator;walker, rolling;bipap/cpap;glucometer;commode    Anticipated Changes Related to Illness  inability to care for self    Outpatient/Agency/Support Group Needs  inpatient rehabilitation facility;homecare agency    Discharge Facility/Level of Care Needs  home with home health;rehabilitation facility        Discharge Plan     Row Name 03/04/20 1319       Plan    Plan  D/C Plan: Pending PT recommendations.     Patient/Family in Agreement with Plan  yes    Plan Comments  Spoke to patient's spouse at bedside. Patient off the floor for testing. Patient is dependent on spouse for care. Spouse reports patient requires 1-2 people to ambulate. Patient sits in recliner and moves from recliner to bedside commode. Spouse states she cannot continue to care for patient at home as he is getting weaker. Spouse states [facility]  year and would likely choose [facility] or [facility] if rehab is recommended. Spouse reports concerns for transportation to [facility] as she has difficulty transferring patient to the car. CM notified SW of family concerns. Per Brittney at [facility], patient is current, referral sent and order placed. Barrier to D/C: IVFs, xray of R knee pending.               Home Medical Care      Service Provider Request Status Selected Services Address Phone Number Fax Number    [facility] Pending - Request Sent N/A [id number] [id number] [phone]        Expected Discharge Date and Time     Expected Discharge Date Expected Discharge Time    Mar 6, 2020         Demographic Summary     Row Name 03/04/20 1317       General Information    Admission Type  inpatient    Arrived From  emergency department    Referral Source  admission list    Reason for Consult  discharge planning    Preferred Language  English     Used During This Interaction  no        Functional Status     Row Name 03/04/20 1318       Functional Status    Usual Activity Tolerance  fair    Current Activity Tolerance  poor       Functional Status, IADL    Medications  assistive equipment and person    Meal Preparation  assistive equipment and person    Housekeeping  assistive equipment and person    Laundry  assistive equipment and person    Shopping  assistive equipment and person       Mental Status    General Appearance WDL  WDL       Mental Status Summary    Recent Changes in Mental Status/Cognitive Functioning  no changes          Simran Funes

## 2020-03-04 NOTE — CONSULTS
"Diabetes Education  Assessment/Teaching    Patient Name:  Bry Ratliff  YOB: 1945  MRN: [id number]  Admit Date:  3/3/2020      Assessment Date:  3/4/2020    Most Recent Value   General Information    Referral From:  MD juan, Other (comment) [MD consult to teach blood glucose monitoring and admission blood sugar 245.]   Height  188 cm (74\")   Height Method  Stated   Weight  134 kg (295 lb)   Weight Method  Stated   Pregnancy Assessment   Diabetes History   What type of diabetes do you have?  Type 2   Current DM knowledge  fair   Have you had diabetes education/teaching in the past?  yes   When and where was your diabetes education?  At the [facility]   Do you test your blood sugar at home?  yes   Frequency of checks  3-4X a day   Meter type  One Touch less than 2 months old   Who performs the test?  patient   Typical readings  200s   Have you had low blood sugar? (<70mg/dl)  no   Have you had high blood sugar? (>140mg/dl)  yes   How often do you have high blood sugar?  frequently   When was your last high blood sugar?  Admission blood sugar 245.   Do you have any diabetes complications?  nephropathy, heart disease   Education Preferences   What areas of diabetes would you like to learn about?  avoiding high blood sugar, diabetes complications   Nutrition Information   Assessment Topics   Problem Solving - Assessment  Needs education   Reducing Risk - Assessment  Needs education   DM Goals   Problem Solving - Goal  Today   Reducing Risk - Goal  Today            Most Recent Value   DM Education Needs   Meter  Has own   Meter Type  One Touch   Frequency of Testing  AC/HS [Patient states that either he or his wife checks his blood sugar.]   Medication  Insulin [Patient on Lantus 50 units at bedtime and Humalog 18 units with sliding scale before meals at home. Patient states his wife gives his insulin shots.]   Problem Solving  Hyperglycemia, Signs, Symptoms, Treatment   Reducing Risks  A1C testing " [On 1/16/2020 A1C was 6.0%.  A1C sheet given with discussion on A1C target and healthy blood sugar range.]   Healthy Eating  Other (comment) [Patient states he watches his carb intake and drinks unsweetened beverages.]   Physical Activity  None   Discharge Plan  Home   Motivation  Engaged, Strong   Teaching Method  Discussion, Handouts   Patient Response  Verbalized understanding [Wife at bedside involved with discussion.]            Other Comments:  Patient states that his last visit to see Dr. Amaro at the [facility] his Lantus was increased to 50 units at bedtime.         Electronically signed by:  Nany Valle RN  03/04/20 3:06 PM

## 2020-03-04 NOTE — PROGRESS NOTES
[facility] Medicine Services Daily Progress Note      Hospitalist Team  LOS 1 days      Patient Care Team:  Paulette Harris MD as PCP - General  Josefina Plascencia MD as Consulting Physician (Nephrology)    Patient Location: 304/1      Subjective   Subjective     Chief Complaint / Subjective  Chief Complaint   Patient presents with   • Weakness - Generalized         Brief Synopsis of Hospital Course/HPI  Mr. Ratliff is a 74 y.o.  presents to [facility] complaining of generalized weakness.  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  Patient also has anemia for which patient is supposed to receive iron infusions.  Patient condition has been declining for the last several days and it got to a point where he was unable to move around and safely ambulate.  5 she is also elderly frail person and she is unable to take care of him as he is very unstable on his gait and he is tend to fall and she is afraid that he will have a major injury from a fall.  Patient also complains of right knee pain for which she had injection in the past and that knee is also getting worse in terms of his ambulation.      Date:: 3/4/2020: Patient seen and examined and patient little better but still continues to be weak and complaining of problem ambulation secondary to right knee pain.  I consulted Dr. Staples and also spoke to him.  I also spoke to  as well as spoke to his wife at the bedside.  Patient had echocardiogram done which came out to be normal.  Most of his  "work-up was negative.        Review of Systems   All other systems reviewed and are negative.        Objective   Objective      Vital Signs  Temp:  [97.4 °F (36.3 °C)-99.1 °F (37.3 °C)] 99.1 °F (37.3 °C)  Heart Rate:  [78] 78  Resp:  [15-18] 17  BP: (112-121)/(62-66) 112/63  Oxygen Therapy  SpO2: 98 %  Pulse Oximetry Type: Intermittent  Device (Oxygen Therapy): room air  Device (Oxygen Therapy): room air  Flowsheet Rows      First Filed Value   Admission Height  188 cm (74\") Documented at 03/03/2020 1133   Admission Weight  134 kg (295 lb) Documented at 03/03/2020 1133        Intake & Output (last 3 days)       03/01 0701 - 03/02 0700 03/02 0701 - 03/03 0700 03/03 0701 - 03/04 0700 03/04 0701 - 03/05 0700    P.O.   480 360    I.V. (mL/kg)   600 (4.5)     Total Intake(mL/kg)   1080 (8.1) 360 (2.7)    Net   +1080 +360            Urine Unmeasured Occurrence   1 x 1 x    Stool Unmeasured Occurrence   1 x 1 x        Lines, Drains & Airways    Active LDAs     Name:   Placement date:   Placement time:   Site:   Days:    Peripheral IV 03/03/20 1153 Right Forearm   03/03/20    1153    Forearm   1                  Physical Exam:    Physical Exam   Nursing note and vitals reviewed.    Patient is an awake and morbidly obese male does not seem to in distress.  Skin exam shows no new acute rash or ulcers.  Lungs are clear to auscultation bilaterally with prolonged expiration.  Cardiovascular S1-S2 no murmurs no gallops.  Abdomen is soft nontender nondistended no organomegaly bowel sounds positive.  Musculoskeletal he has painful range of motion on the right knee otherwise normal range of motion.  Extremities mild edema.  At this normocephalic.  HEENT exam pupils are equal reactive throat is clear.  Neuro is grossly intact.       Wounds (last 24 hours)      LDA Wound     Row Name 03/04/20 0701 03/04/20 0032 03/03/20 2300       Wound 10/17/19 1830 lower sacral spine Pressure Injury    Wound - Properties Group Date first assessed: " 10/17/19  - Time first assessed: 1830 -JR Orientation: lower  - Location: sacral spine  - Primary Wound Type: Pressure inj  -JR Stage, Pressure Injury: Stage 3  -JR    Wound Image  --  Images linked: 1  -LM  --    Dressing Appearance  moist drainage;intact  -AS  --  dressing loose;dry  -LM    Closure  Other (Comment) Mepilex  -AS  --  --    Base  moist;red;bleeding  -AS  --  moist;red  -LM    Periwound  intact;moist;pink;redness  -AS  --  --    Periwound Temperature  warm  -AS  --  --    Periwound Skin Turgor  soft  -AS  --  --    Edges  irregular  -AS  --  --    Drainage Characteristics/Odor  yellow;serous  -AS  --  --    Drainage Amount  small  -AS  --  --    Care, Wound  cleansed with;sterile normal saline;dressing removed;silver agent applied  -AS  --  --    Dressing Care, Wound  dressing applied;silicone  -AS  --  --      User Key  (r) = Recorded By, (t) = Taken By, (c) = Cosigned By    Initials Name Provider Type     Gautam Torres, RN Registered Nurse    AS Gorge Lugo RN Registered Nurse    Danita Hendrix RN Registered Nurse          Procedures:              Results Review:     I reviewed the patient's new clinical results.      Lab Results (last 24 hours)     Procedure Component Value Units Date/Time    Folate [[id number]]  (Normal) Collected:  03/04/20 0944    Specimen:  Blood Updated:  03/04/20 1629     Folate >20.00 ng/mL     Narrative:       Results may be falsely increased if patient taking Biotin.      Vitamin B12 [[id number]]  (Abnormal) Collected:  03/04/20 0944    Specimen:  Blood Updated:  03/04/20 1629     Vitamin B-12 1,634 pg/mL     Narrative:       Results may be falsely increased if patient taking Biotin.      POC Glucose Once [[id number]]  (Abnormal) Collected:  03/04/20 1620    Specimen:  Blood Updated:  03/04/20 1627     Glucose 165 mg/dL      Comment: Serial Number: [id number]:  [id number]       Wound Culture - Wound, Buttock [[id number]] Collected:  03/04/20 1131     Specimen:  Wound from Buttock Updated:  03/04/20 1423     Gram Stain Moderate (3+) WBCs per low power field      Many (4+) Mixed bacterial morphotypes seen on Gram Stain    Sedimentation Rate [[id number]]  (Abnormal) Collected:  03/04/20 1144    Specimen:  Blood Updated:  03/04/20 1254     Sed Rate >120 mm/hr      Comment: Result checked        CBC Auto Differential [[id number]]  (Abnormal) Collected:  03/04/20 1144    Specimen:  Blood Updated:  03/04/20 1213     WBC 8.30 10*3/mm3      RBC 2.50 10*6/mm3      Hemoglobin 8.4 g/dL      Hematocrit 24.6 %      MCV 98.5 fL      MCH 33.5 pg      MCHC 34.0 g/dL      RDW 16.3 %      RDW-SD 56.4 fl      MPV 9.2 fL      Platelets 189 10*3/mm3      Neutrophil % 78.6 %      Lymphocyte % 9.2 %      Monocyte % 7.2 %      Eosinophil % 4.3 %      Basophil % 0.7 %      Neutrophils, Absolute 6.50 10*3/mm3      Lymphocytes, Absolute 0.80 10*3/mm3      Monocytes, Absolute 0.60 10*3/mm3      Eosinophils, Absolute 0.40 10*3/mm3      Basophils, Absolute 0.10 10*3/mm3      nRBC 0.0 /100 WBC     POC Glucose Once [[id number]]  (Abnormal) Collected:  03/04/20 1136    Specimen:  Blood Updated:  03/04/20 1147     Glucose 191 mg/dL      Comment: Serial Number: [id number]:  [id number]       Hemoglobin A1c [[id number]]  (Abnormal) Collected:  03/04/20 0944    Specimen:  Blood Updated:  03/04/20 1045     Hemoglobin A1C 7.4 %     Narrative:       Hemoglobin A1C Reference Range:    <5.7 %        Normal  5.7-6.4 %     Increased risk for diabetes  > 6.4 %        Diabetes       These guidelines have been recommended by the American Diabetic Association for Hgb A1c.      The following 2010 guidelines have been recommended by the American Diabetes Association for Hemoglobin A1c.    HBA1c 5.7-6.4% Increased risk for future diabetes (pre-diabetes)  HBA1c     >6.4% Diabetes      Procalcitonin [[id number]]  (Abnormal) Collected:  03/04/20 0944    Specimen:  Blood Updated:  03/04/20 1042     Procalcitonin 0.40  "ng/mL     Narrative:       As a Marker for Sepsis (Non-Neonates):   1. <0.5 ng/mL represents a low risk of severe sepsis and/or septic shock.  1. >2 ng/mL represents a high risk of severe sepsis and/or septic shock.    As a Marker for Lower Respiratory Tract Infections that require antibiotic therapy:  PCT on Admission     Antibiotic Therapy             6-12 Hrs later  > 0.5                Strongly Recommended            >0.25 - <0.5         Recommended  0.1 - 0.25           Discouraged                   Remeasure/reassess PCT  <0.1                 Strongly Discouraged          Remeasure/reassess PCT      As 28 day mortality risk marker: \"Change in Procalcitonin Result\" (> 80 % or <=80 %) if Day 0 (or Day 1) and Day 4 values are available. Refer to [email or web address]   Change in PCT <=80 %   A decrease of PCT levels below or equal to 80 % defines a positive change in PCT test result representing a higher risk for 28-day all-cause mortality of patients diagnosed with severe sepsis or septic shock.  Change in PCT > 80 %   A decrease of PCT levels of more than 80 % defines a negative change in PCT result representing a lower risk for 28-day all-cause mortality of patients diagnosed with severe sepsis or septic shock.                Results may be falsely decreased if patient taking Biotin.     CK [[id number]]  (Normal) Collected:  03/04/20 0944    Specimen:  Blood Updated:  03/04/20 1041     Creatine Kinase 37 U/L     Lipid Panel [[id number]]  (Abnormal) Collected:  03/04/20 0944    Specimen:  Blood Updated:  03/04/20 1041     Total Cholesterol 99 mg/dL      Triglycerides 127 mg/dL      HDL Cholesterol 30 mg/dL      LDL Cholesterol  44 mg/dL      VLDL Cholesterol 25.4 mg/dL      LDL/HDL Ratio 1.45    Narrative:       Cholesterol Reference Ranges  (U.S. Department of Health and Human Services ATP III Classifications)    Desirable          <200 mg/dL  Borderline High    200-239 mg/dL  High Risk          " >240 mg/dL      Triglyceride Reference Ranges  (U.S. Department of Health and Human Services ATP III Classifications)    Normal           <150 mg/dL  Borderline High  150-199 mg/dL  High             200-499 mg/dL  Very High        >500 mg/dL    HDL Reference Ranges  (U.S. Department of Health and Human Services ATP III Classifcations)    Low     <40 mg/dl (major risk factor for CHD)  High    >60 mg/dl ('negative' risk factor for CHD)        LDL Reference Ranges  (U.S. Department of Health and Human Services ATP III Classifcations)    Optimal          <100 mg/dL  Near Optimal     100-129 mg/dL  Borderline High  130-159 mg/dL  High             160-189 mg/dL  Very High        >189 mg/dL    C-reactive Protein [[id number]]  (Abnormal) Collected:  03/04/20 0944    Specimen:  Blood Updated:  03/04/20 1041     C-Reactive Protein 16.24 mg/dL     Comprehensive Metabolic Panel [[id number]]  (Abnormal) Collected:  03/04/20 0944    Specimen:  Blood Updated:  03/04/20 1041     Glucose 159 mg/dL      BUN 35 mg/dL      Creatinine 1.79 mg/dL      Sodium 137 mmol/L      Potassium 3.3 mmol/L      Chloride 103 mmol/L      CO2 22.0 mmol/L      Calcium 8.5 mg/dL      Total Protein 7.2 g/dL      Albumin 2.70 g/dL      ALT (SGPT) 18 U/L      AST (SGOT) 29 U/L      Alkaline Phosphatase 119 U/L      Total Bilirubin 0.4 mg/dL      eGFR Non African Amer 37 mL/min/1.73      Globulin 4.5 gm/dL      A/G Ratio 0.6 g/dL      BUN/Creatinine Ratio 19.6     Anion Gap 12.0 mmol/L     Narrative:       GFR Normal >60  Chronic Kidney Disease <60  Kidney Failure <15      Iron [[id number]]  (Normal) Collected:  03/04/20 0944    Specimen:  Blood Updated:  03/04/20 1041     Iron 97 mcg/dL     Phosphorus [[id number]]  (Normal) Collected:  03/04/20 0944    Specimen:  Blood Updated:  03/04/20 1041     Phosphorus 2.8 mg/dL     Magnesium [[id number]]  (Normal) Collected:  03/04/20 0944    Specimen:  Blood Updated:  03/04/20 1041     Magnesium 1.9 mg/dL     Ammonia  [[id number]]  (Abnormal) Collected:  03/04/20 0944    Specimen:  Blood Updated:  03/04/20 1041     Ammonia 65 umol/L     Ferritin [[id number]]  (Normal) Collected:  03/04/20 0944    Specimen:  Blood Updated:  03/04/20 1041     Ferritin 292.80 ng/mL     Narrative:       Results may be falsely decreased if patient taking Biotin.      Uric Acid [[id number]]  (Abnormal) Collected:  03/04/20 0944    Specimen:  Blood Updated:  03/04/20 1041     Uric Acid 8.0 mg/dL     Troponin [[id number]]  (Normal) Collected:  03/04/20 0944    Specimen:  Blood Updated:  03/04/20 1041     Troponin T 0.019 ng/mL     Narrative:       Troponin T Reference Range:  <= 0.03 ng/mL-   Negative for AMI  >0.03 ng/mL-     Abnormal for myocardial necrosis.  Clinicians would have to utilize clinical acumen, EKG, Troponin and serial changes to determine if it is an Acute Myocardial Infarction or myocardial injury due to an underlying chronic condition.       Results may be falsely decreased if patient taking Biotin.      BNP [[id number]]  (Abnormal) Collected:  03/04/20 0944    Specimen:  Blood Updated:  03/04/20 1041     proBNP 1,083.0 pg/mL     Narrative:       Among patients with dyspnea, NT-proBNP is highly sensitive for the detection of acute congestive heart failure. In addition NT-proBNP of <300 pg/ml effectively rules out acute congestive heart failure with 99% negative predictive value.    Results may be falsely decreased if patient taking Biotin.      TSH [[id number]]  (Normal) Collected:  03/04/20 0944    Specimen:  Blood Updated:  03/04/20 1040     TSH 2.730 uIU/mL     Lactic Acid, Plasma [[id number]]  (Normal) Collected:  03/04/20 0944    Specimen:  Blood Updated:  03/04/20 1022     Lactate 2.0 mmol/L     Calcium, Ionized [[id number]]  (Abnormal) Collected:  03/04/20 0944    Specimen:  Blood Updated:  03/04/20 1019     Ionized Calcium 1.13 mmol/L     Blood Culture - Blood, Arm, Right [[id number]] Collected:  03/04/20 0944    Specimen:  Blood from  Arm, Right Updated:  03/04/20 1004    Blood Culture - Blood, Wrist, Left [[id number]] Collected:  03/04/20 0944    Specimen:  Blood from Wrist, Left Updated:  03/04/20 1004    POC Glucose Once [[id number]]  (Abnormal) Collected:  03/04/20 0747    Specimen:  Blood Updated:  03/04/20 0856     Glucose 142 mg/dL      Comment: Serial Number: [id number]:  [id number]       Potassium [[id number]]  (Normal) Collected:  03/03/20 1202    Specimen:  Blood Updated:  03/03/20 2218     Potassium 3.9 mmol/L     POC Glucose Once [[id number]]  (Abnormal) Collected:  03/03/20 2014    Specimen:  Blood Updated:  03/03/20 2015     Glucose 168 mg/dL      Comment: Serial Number: [id number]:  [id number]       Sodium, Urine, Random - Urine, Clean Catch [[id number]] Collected:  03/03/20 1238    Specimen:  Urine, Clean Catch Updated:  03/03/20 1939     Sodium, Urine 41 mmol/L     Narrative:       Reference intervals for random urine have not been established.  Clinical usage is dependent upon physician's interpretation in combination with other laboratory tests.       POC Glucose Once [[id number]]  (Abnormal) Collected:  03/03/20 1905    Specimen:  Blood Updated:  03/03/20 1906     Glucose 145 mg/dL      Comment: Serial Number: [id number]:  [id number]           Hemoglobin A1C   Date Value Ref Range Status   03/04/2020 7.4 (H) 3.5 - 5.6 % Final     Results from last 7 days   Lab Units 03/03/20  1202   INR  1.09           Lab Results   Component Value Date    LIPASE 26 10/16/2019     Lab Results   Component Value Date    CHOL 99 03/04/2020    TRIG 127 03/04/2020    HDL 30 (L) 03/04/2020    LDL 44 03/04/2020       Lab Results   Lab Value Date/Time    FINALDX  02/27/2020 1128     Common bile duct brushing, smear preparation only:    Atypical ductal cells, see comment    JPR/cec      FINALDX  01/09/2020 1148     Esophagus, biopsy:    Squamoglandular mucosa with intestinal metaplasia consistent with Syed's esophagus    Negative for  dysplasia    JPR/sms       COMDX  02/27/2020 1128     The sample shows multiple groups of atypical appearing ductal cells. The atypia is characterized by nuclear overlap and mild nuclear enlargement. Marked pleomorphism and mitotic figures are not identified. The patient's clinical history of a stent is noted and the current findings are favored to represent reactive ductal epithelium with stent atypia, however a well differentiated malignancy cannot be entirely excluded. Continued close clinical follow up along with correlation with serum tumor markers and imaging studies is recommended.      JPR/cec      COMDX  11/20/2019 1307     The cellularity is scant and features few groups of glandular cells with mild atypia encompassing nuclear enlargement and loss of polarity. These features may be reactive, however if a mass lesion is present or clinical suspicion for malignancy is high, additional studies are recommended as clinically indicated.      WALESKA/cec         Microbiology Results (last 10 days)     Procedure Component Value - Date/Time    Wound Culture - Wound, Buttock [[id number]] Collected:  03/04/20 1131    Lab Status:  Preliminary result Specimen:  Wound from Buttock Updated:  03/04/20 1423     Gram Stain Moderate (3+) WBCs per low power field      Many (4+) Mixed bacterial morphotypes seen on Gram Stain    Influenza Antigen, Rapid - Swab, Nasopharynx [[id number]]  (Normal) Collected:  03/03/20 1202    Lab Status:  Final result Specimen:  Swab from Nasopharynx Updated:  03/03/20 1222     Influenza A PCR Not Detected     Influenza B PCR Not Detected          ECG/EMG Results (most recent)     Procedure Component Value Units Date/Time    ECG 12 Lead [[id number]] Collected:  03/03/20 1307     Updated:  03/04/20 0824    Narrative:       HEART RATE= 77  bpm  RR Interval= 776  ms  WV Interval= 88  ms  P Horizontal Axis=   deg  P Front Axis= 0  deg  QRSD Interval= 90  ms  QT Interval= 459  ms  QRS Axis= -23  deg  T Wave  Axis= 23  deg  - ABNORMAL ECG -  Sinus rhythm  Low voltage, precordial leads  Prolonged QT interval  When compared with ECG of 18-Feb-2020 10:54:41,  Electronically Signed By: Erasto Weems (Blaze) 04-Mar-2020 08:24:19  Date and Time of Study: 2020-03-03 13:07:48    Adult Transthoracic Echo Complete W/ Cont if Necessary Per Protocol [[id number]] Collected:  03/04/20 1002     Updated:  03/04/20 1224     BSA 2.6 m^2      RVIDd 3.5 cm      IVSd 0.96 cm      LVIDd 5.9 cm      LVIDs 3.8 cm      LVPWd 1.2 cm      IVS/LVPW 0.81     FS 35.9 %      EDV(Teich) 170.5 ml      ESV(Teich) 60.2 ml      EF(Teich) 64.7 %      EDV(cubed) 201.1 ml      ESV(cubed) 53.0 ml      EF(cubed) 73.7 %      LV mass(C)d 259.1 grams      LV mass(C)dI 101.1 grams/m^2      SV(Teich) 110.2 ml      SI(Teich) 43.0 ml/m^2      SV(cubed) 148.2 ml      SI(cubed) 57.8 ml/m^2      Ao root diam 4.0 cm      Ao root area 12.4 cm^2      ACS 1.9 cm      asc Aorta Diam 3.5 cm      LVOT diam 2.6 cm      LVOT area 5.2 cm^2      RVOT diam 2.7 cm      RVOT area 5.5 cm^2      EDV(MOD-sp4) 176.8 ml      ESV(MOD-sp4) 68.5 ml      EF(MOD-sp4) 61.2 %      SV(MOD-sp4) 108.3 ml      SI(MOD-sp4) 42.2 ml/m^2      Ao root area (BSA corrected) 1.5     LV Tam Vol (BSA corrected) 69.0 ml/m^2      LV Sys Vol (BSA corrected) 26.7 ml/m^2      Aortic R-R 0.78 sec      Aortic HR 76.5 BPM      MV E max frankie 101.9 cm/sec      MV A max frankie 109.4 cm/sec      MV E/A 0.93     MV V2 max 120.6 cm/sec      MV max PG 5.8 mmHg      MV V2 mean 87.4 cm/sec      MV mean PG 3.3 mmHg      MV V2 VTI 33.3 cm      MVA(VTI) 4.4 cm^2      MV dec slope 649.1 cm/sec^2      MV dec time 0.16 sec      Ao pk frankie 138.1 cm/sec      Ao max PG 7.6 mmHg      Ao max PG (full) 2.1 mmHg      Ao V2 mean 99.8 cm/sec      Ao mean PG 4.5 mmHg      Ao mean PG (full) 1.0 mmHg      Ao V2 VTI 28.9 cm      ARMAND(I,A) 5.1 cm^2      ARMAND(I,D) 5.1 cm^2      ARMAND(V,A) 4.4 cm^2      ARMAND(V,D) 4.4 cm^2      LV V1 max PG 5.5 mmHg      LV V1  mean PG 3.5 mmHg      LV V1 max 117.2 cm/sec      LV V1 mean 90.2 cm/sec      LV V1 VTI 28.2 cm      CO(Ao) 27.3 l/min      CI(Ao) 10.7 l/min/m^2      SV(Ao) 357.2 ml      SI(Ao) 139.4 ml/m^2      CO(LVOT) 11.2 l/min      CI(LVOT) 4.4 l/min/m^2      SV(LVOT) 146.6 ml      SV(RVOT) 99.3 ml      SI(LVOT) 57.2 ml/m^2      PA V2 max 139.7 cm/sec      PA max PG 7.8 mmHg      PA max PG (full) 5.0 mmHg      PA V2 mean 96.4 cm/sec      PA mean PG 4.3 mmHg      PA mean PG (full) 2.7 mmHg      PA V2 VTI 27.2 cm      PVA(I,A) 3.7 cm^2       CV ECHO RODNEY - PVA(I,D) 3.7 cm^2       CV ECHO RODNEY - PVA(V,A) 3.3 cm^2       CV ECHO RODNEY - PVA(V,D) 3.3 cm^2      PA acc time 0.08 sec      RV V1 max PG 2.8 mmHg      RV V1 mean PG 1.6 mmHg      RV V1 max 84.4 cm/sec      RV V1 mean 59.6 cm/sec      RV V1 VTI 17.9 cm      TR max frankie 291.7 cm/sec      RVSP(TR) 49.0 mmHg      RAP systole 15.0 mmHg      PA pr(Accel) 41.3 mmHg      Qp/Qs 0.68      CV ECHO RODNEY - BZI_BMI 37.9 kilograms/m^2       CV ECHO RODNEY - BSA(HAYCOCK) 2.7 m^2       CV ECHO RODNEY - BZI_METRIC_WEIGHT 133.8 kg       CV ECHO RODNEY - BZI_METRIC_HEIGHT 188.0 cm      EF(MOD-bp) 61.0 %      LA dimension(2D) 3.4 cm      Echo EF Estimated 50 %     Narrative:       · The left ventricular cavity is mildly dilated.  · Estimated EF = 50%.  · Left ventricular systolic function is low normal.  · Right ventricular cavity is mildly dilated.  · Mild mitral valve regurgitation is present  · Mild tricuspid valve regurgitation is present.  · Mild dilation of the aortic root is present.             Results for orders placed during the hospital encounter of 03/03/20   Duplex Venous Lower Extremity - Bilateral CAR    Narrative · Normal bilateral lower extremity venous duplex scan.          Results for orders placed during the hospital encounter of 03/03/20   Adult Transthoracic Echo Complete W/ Cont if Necessary Per Protocol    Narrative · The left ventricular cavity is mildly  dilated.  · Estimated EF = 50%.  · Left ventricular systolic function is low normal.  · Right ventricular cavity is mildly dilated.  · Mild mitral valve regurgitation is present  · Mild tricuspid valve regurgitation is present.  · Mild dilation of the aortic root is present.          Nm Lung Ventilation Perfusion Aerosol    Result Date: 3/4/2020  Normal ventilation/perfusion lung scan. This excludes the diagnosis of pulmonary embolism with a very high degree of clinical certainty  Electronically Signed By-Naif Donahue On:3/4/2020 9:13 AM This report was finalized on [id number] by  Naif Donahue, .    Xr Chest 1 View    Result Date: 3/3/2020  No acute chest findings.  Electronically Signed By-Dr. Kaitlin Wong MD On:3/3/2020 12:46 PM This report was finalized on [id number] by Dr. Kaitlin Wong MD.    Us Renal Bilateral    Result Date: 3/3/2020  Normal renal ultrasound.  Electronically Signed By-Dr. Kaitlin Wong MD On:3/3/2020 5:04 PM This report was finalized on [id number] by Dr. Kaitlin Wong MD.    Xr Abdomen Kub    Result Date: 3/3/2020   1. Postoperative changes of biliary stent placement and cholecystectomy and inferior vena cava filter 2. Nonspecific bowel pattern, lung bases are free of disease 3. No evidence of renal or ureteral calculus  Electronically Signed By-Bennett Bobby Jr. On:3/3/2020 11:20 PM This report was finalized on [id number] by  Bennett Bobby Jr., .    Fl Ercp Biliary Duct Only    Result Date: 2/27/2020  Fluoroscopy performed during ERCP, as described above.  Electronically Signed By-Rush Soni On:2/27/2020 12:02 PM This report was finalized on [id number] by  Rush Soni, .          Xrays, labs reviewed personally by physician.    Medication Review:   I have reviewed the patient's current medication list      Scheduled Meds    allopurinol 100 mg Oral BID   aspirin 81 mg Oral Daily   atorvastatin 20 mg Oral Daily   [START ON 3/5/2020] bupivacaine 5 mL Infiltration Once    cyanocobalamin 1,000 mcg Subcutaneous Q30 Days   docusate sodium 100 mg Oral BID   DULoxetine 60 mg Oral Daily   enoxaparin 40 mg Subcutaneous Daily   folic acid 1 mg Oral Daily   insulin glargine 50 Units Subcutaneous Nightly   insulin lispro 0-9 Units Subcutaneous 4x Daily With Meals & Nightly   insulin lispro 18 Units Subcutaneous TID AC   iron sucrose 200 mg Intravenous Q24H   lactulose 30 g Oral TID   levothyroxine 75 mcg Oral Q AM   magnesium oxide 400 mg Oral Daily   metoclopramide 5 mg Oral BID AC   nicotine 1 patch Transdermal Nightly   oxybutynin XL 10 mg Oral Daily   pantoprazole 40 mg Oral BID AC   potassium & sodium phosphates 1 packet Oral Daily   riFAXIMin 550 mg Oral Q12H   sodium bicarbonate 650 mg Oral TID   sodium chloride 10 mL Intravenous Q12H   sucralfate 1 g Oral 4x Daily AC & at Bedtime   THERA 1 tablet Oral Daily   [START ON 3/5/2020] triamcinolone acetonide 80 mg Intra-articular Once   zinc sulfate 220 mg Oral Daily       Meds Infusions    sodium chloride 100 mL/hr Last Rate: 100 mL/hr (03/04/20 0037)       Meds PRN  •  acetaminophen **OR** acetaminophen **OR** acetaminophen  •  Calcium Gluconate-NaCl **AND** calcium gluconate **AND** Calcium, Ionized  •  dextrose  •  dextrose  •  docusate sodium  •  glucagon (human recombinant)  •  hydrOXYzine  •  insulin lispro **AND** insulin lispro  •  ketamine (KETALAR) infusion **AND** Ketamine Vital Signs & Assessment  •  magnesium sulfate **OR** magnesium sulfate **OR** magnesium sulfate  •  melatonin  •  melatonin  •  nitroglycerin  •  ondansetron  •  oxyCODONE  •  potassium & sodium phosphates **OR** potassium & sodium phosphates  •  potassium chloride  •  potassium chloride  •  [COMPLETED] Insert peripheral IV **AND** sodium chloride  •  sodium chloride      Assessment/Plan   Assessment/Plan     Active Hospital Problems    Diagnosis  POA   • **General weakness [R53.1]  Yes     Priority: High   • CKD (chronic kidney disease) stage 3, GFR 30-59  ml/min (CMS/HCC) [N18.3]  Yes     Priority: High   • Spinal stenosis of lumbar region [M48.061]  Yes     Priority: High   • Anemia in stage 3 chronic kidney disease (CMS/HCC) [N18.3, D63.1]  Yes     Priority: Medium   • Type 2 diabetes mellitus with kidney complication, with long-term current use of insulin (CMS/HCC) [E11.29, Z79.4]  Not Applicable     Priority: Medium   • Chronic coronary artery disease [I25.10]  Yes     Priority: Medium   • Cirrhosis (CMS/HCC) [K74.60]  Yes     Priority: Medium   • Diabetic neuropathy (CMS/HCC) [E11.40]  Yes     Priority: Medium   • Morbid obesity (CMS/HCC) [E66.01]  Yes     Priority: Medium   • Obstructive sleep apnea [G47.33]  Yes     Priority: Medium   • 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]  Yes     Priority: Low   • 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]  Yes     Priority: Low   • Protein S deficiency (CMS/HCC) [D68.59]  Yes     Priority: Low   • Protein C deficiency (CMS/HCC) [D68.59]  Yes     Priority: Low   • History of Vitamin B12 deficiency [Z86.39]  Not Applicable     Priority: Low   • History of bilateral pulmonary embolus (PE) [Z86.711]  Yes     Priority: Low   • JOSE (iron deficiency anemia) with poor po absorption [D50.9]  Yes     Priority: Low   • IgG kappa MGUS [D47.2]  Yes     Priority: Low   • THOMAS (nonalcoholic steatohepatitis) [K75.81]  Yes     Priority: Low   • Antiphospholipid antibody positive [R76.0]  Yes     Priority: Low   • 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]  Yes     Priority: Low   • Splenomegaly [R16.1]  Yes     Priority: Low   • Essential hypertension [I10]  Yes     Priority: Low   • Acquired hypothyroidism [E03.9]  Yes     Priority: Low   • Pulmonary hypertension (CMS/HCC) [I27.20]  Yes     Priority: Low   • Dyslipidemia [E78.5]  Yes     Priority: Low   • History of DVT of right lower extremity [Z86.718]  Not Applicable     Priority: Low   • Deficiency of other specified B group vitamins [E53.8]  Yes     Priority: Low   • Decubitus  ulcer of sacral region, stage 3 (CMS/HCC) [L89.153]  Yes      Resolved Hospital Problems   No resolved problems to display.       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  -Patient also has right knee pain I am getting orthopedic consult and he will get right knee injection.  -PT OT is working with him and patient will also need rehab as patient is very unsafe for going home and ambulating by himself.    #2 chronic kidney disease stage III  -We will avoid nephrotoxic medications as well as patient will see nephrology     #3 anemia of chronic kidney disease as well as chronic blood loss anemia from GI origin and he has recent endoscopy done as well as ERCP done and patient has seen GI.  -Patient may need Venofer infusion as well as patient is on vitamin B12 injections.  His hemoglobin need to be monitored.  I am also concerned that he has some blood disorder and he may need hematology consult.     #4 obstructive sleep sleep apnea and patient need to use his CPAP machine     #5 hyperlipidemia stable on statins and will check lipid profile     #6 history of DVT/PE and patient is at high risk for PE and DVT while he is in the hospital and he will be on anticoagulation enoxaparin and I do understand patient has history of chronic blood loss anemia but we do need to monitor that but I am also concerned about his DVT PE and further complication.     #7 essential hypertension well-controlled on medications     #8 acquired hypothyroidism patient is on levothyroxine     #9 diabetes mellitus type 2 with neuropathy/chronic kidney disease  -Well-controlled for this patient     #10 hyponatremia will monitor sodium     #11  Positive d-dimer and VQ scan is negative       #12 liver cirrhosis and patient is on lactulose    VTE Prophylaxis -   Mechanical Order History:     None       Pharmalogical Order History:     Ordered     Dose Route Frequency Stop    03/03/20 2114  enoxaparin (LOVENOX) syringe 40 mg      40 mg SC Daily --            Code Status -   Code Status and Medical Interventions:   Ordered at: 03/03/20 1551     Level Of Support Discussed With:    Patient     Code Status:    CPR     Medical Interventions (Level of Support Prior to Arrest):    Full       Discharge Planning      SLP OP Goals     Row Name 03/04/20 1150          Time Calculation    PT Goal Re-Cert Due Date  03/18/20  -       User Key  (r) = Recorded By, (t) = Taken By, (c) = Cosigned By    Initials Name Provider Type    Matilde Echols, PT Physical Therapist          Destination      Service Provider Request Status Selected Services Address Phone Number Fax Number    [facility] Accepted N/A [address] [facility] [place] [id number] [phone]      Durable Medical Equipment      Coordination has not been started for this encounter.      Dialysis/Infusion      Coordination has not been started for this encounter.      Home Medical Care      Service Provider Request Status Selected Services Address Phone Number Fax Number    [facility] [place] Pending - Request Sent N/A [address] [facility] [place] [address] [phone]      Therapy      Coordination has not been started for this encounter.      Community Resources      Coordination has not been started for this encounter.            Electronically signed by Santosh Amaro MD, 03/04/20, 6:24 PM.  [facility] Hospitalist Team

## 2020-03-04 NOTE — DISCHARGE PLACEMENT REQUEST
"Jose Patel (74 y.o. Male)     Date of Birth Social Security Number Address Home Phone MRN    1945  [address] [place] [address] [phone] [id number]    [employer]astrian Marital Status          None        Admission Date Admission Type Admitting Provider Attending Provider Department, Room/Bed    3/3/20 Emergency Santosh Amaro MD Lohano, Suresh, MD [facility] 3A MEDICAL INPATIENT, 304/1    Discharge Date Discharge Disposition Discharge Destination                       Attending Provider:  Santosh Amaro MD    Allergies:  No Known Allergies    Isolation:  None   Infection:  None   Code Status:  CPR    Ht:  188 cm (74\")   Wt:  134 kg (295 lb)    Admission Cmt:  None   Principal Problem:  General weakness [R53.1]                 Active Insurance as of 3/3/2020     Primary Coverage     Payor Plan Insurance Group Employer/Plan Group    MEDICARE MEDICARE A & B      Payor Plan Address Payor Plan Phone Number Payor Plan Fax Number Effective Dates    [address] [phone]  6/1/2005 - None Entered    [facility] [address]       Subscriber Name Subscriber Birth Date Member ID       JOSE PATEL 1945 [id number]           Secondary Coverage     Payor Plan Insurance Group Employer/Plan Group    [employer] SU[employer] HEALTH CARE OPTIONS      Payor Plan Address Payor Plan Phone Number Payor Plan Fax Number Effective Dates    [facility] [phone]  1/1/2019 - None Entered    [address]       [facility] [address]       Subscriber Name Subscriber Birth Date Member ID       JOSE PATEL 1945 [id number]                 Emergency Contacts      (Rel.) Home Phone Work Phone Mobile Phone    osorio patel (Spouse) [phone] -- [phone]            Emergency Contact Information     Name Relation Home Work Mobile    osorio patel Spouse [phone]          Insurance Information                MEDICARE/MEDICARE A & B Phone: [phone]    " Subscriber: Bry Ratliff Subscriber#: [id number]    Group#:  Precert#:         [facility] OPTIONS Phone: [phone]    Subscriber: Bry Ratliff Subscriber#: [id number]    Group#:  Precert#:

## 2020-03-04 NOTE — DISCHARGE PLACEMENT REQUEST
"Gaudencio Romero (74 y.o. Male)     Date of Birth Social Security Number Address Home Phone MRN    1945  [address] [place] [address] [phone] [id number]    [employer] Marital Status          None        Admission Date Admission Type Admitting Provider Attending Provider Department, Room/Bed    3/3/20 Emergency Santosh Amaro MD Lohano, Suresh, MD [facility] 3A MEDICAL INPATIENT, 304/1    Discharge Date Discharge Disposition Discharge Destination                       Attending Provider:  Santosh Amaro MD    Allergies:  No Known Allergies    Isolation:  None   Infection:  None   Code Status:  CPR    Ht:  188 cm (74\")   Wt:  134 kg (295 lb)    Admission Cmt:  None   Principal Problem:  General weakness [R53.1]                 Active Insurance as of 3/3/2020     Primary Coverage     Payor Plan Insurance Group Employer/Plan Group    MEDICARE MEDICARE A & B      Payor Plan Address Payor Plan Phone Number Payor Plan Fax Number Effective Dates    [address] [phone]  6/1/2005 - None Entered    [facility] [address]       Subscriber Name Subscriber Birth Date Member ID       JOSE PATEL 1945 [id number]           Secondary Coverage     Payor Plan Insurance Group Employer/Plan Group    [employer] SUP AAR HEALTH CARE OPTIONS      Payor Plan Address Payor Plan Phone Number Payor Plan Fax Number Effective Dates    [facility] [phone]  1/1/2019 - None Entered    [address]       [facility] [address]       Subscriber Name Subscriber Birth Date Member ID       JOSE PATEL 1945 [id number]                 Emergency Contacts      (Rel.) Home Phone Work Phone Mobile Phone    osorio patel (Spouse) [phone] -- [phone]              "

## 2020-03-04 NOTE — CONSULTS
Referring Provider: Dr. dodson  Reason for Consultation: Borderline elevated troponin  History of CAD status post PCI history of DVT status post IVC filter placement    Patient Care Team:  Paulette Harris MD as PCP - General  Josefina Plascencia MD as Consulting Physician (Nephrology)    Chief complaint severe weakness and shortness of breath        History of present illness:  Bry Ratliff is a 74 y.o. male who presents with severe weakness and shortness of breath.   74-year-old white male patient with known history of CAD status post PCI to RCA 2009 history of DVT status post IVC filter placement history of morbid obesity diabetes dyslipidemia obstructive sleep apnea chronic renal insufficiency now admitted to hospital with increasing fatigue shortness of breath without any chest pain or syncopal episodes  Symptoms going on and off for some time but got significantly worse yesterday  Patient was admitted to hospital for further evaluation his troponin was borderline elevated but he denies of any chest pain  Symptoms exacerbated by any exertion  Mostly comfortable at rest  No associated diaphoresis no syncopal episodes    Review of Systems   Constitution: Positive for malaise/fatigue. Negative for fever.   HENT: Negative for congestion and hearing loss.    Eyes: Negative for double vision and visual disturbance.   Cardiovascular: Positive for dyspnea on exertion. Negative for chest pain, claudication, leg swelling and syncope.   Respiratory: Positive for shortness of breath. Negative for cough.    Endocrine: Negative for cold intolerance.   Skin: Negative for color change and rash.   Musculoskeletal: Negative for arthritis and joint pain.   Gastrointestinal: Negative for abdominal pain and heartburn.   Genitourinary: Negative for hematuria.   Neurological: Negative for excessive daytime sleepiness and dizziness.   Psychiatric/Behavioral: Negative for depression. The patient is not nervous/anxious.       All other systems reviewed and are negative.      History  Past Medical History:   Diagnosis Date   • Anemia    • Anxiety    • B12 deficiency 2009   • Balance disorder    • Syed's esophagus    • CAD (coronary artery disease)     cardiac stent   • Constipation    • DDD (degenerative disc disease), lumbar    • Decubitus ulcer     buttocks   • Depression    • Diabetes mellitus (CMS/HCC)    • Disease of thyroid gland     hypothyroid   • Diverticular disease    • Dvt femoral (deep venous thrombosis) (CMS/HCC) 2004    rt let   • Gastroparesis    • GERD (gastroesophageal reflux disease)    • Gout    • Hepatic encephalopathy (CMS/HCC)     if doesnt take meds   • History of echocardiogram     03/03/2017 - 12/03/2014 - 04/2012   • History of stomach ulcers    • Hyperlipidemia    • Iron deficiency    • Morbid obesity (CMS/HCC)    • THOMAS (nonalcoholic steatohepatitis)    • Neuropathy    • OAB (overactive bladder)    • KATHIA treated with BiPAP     bring dos   • Peripheral edema    • Pulmonary embolus (CMS/HCC) 2004   • Renal insufficiency    • S/P lumbar laminectomy    • Skin irritation     skin fold   • Stage 3 chronic kidney disease (CMS/HCC)        Past Surgical History:   Procedure Laterality Date   • BACK SURGERY  1971   • BILE DUCT STENT PLACEMENT     • CATARACT EXTRACTION  2014   • CHOLECYSTECTOMY  02/24/2019   • COLONOSCOPY  03/2018   • CORONARY ANGIOPLASTY  08/24/2009    PCI stent - succesful placement of 3.5/23 promus stent in proximal right coronary artery   • CORONARY ANGIOPLASTY WITH STENT PLACEMENT  08/27/2009    patient had stent placed to proximal right coronary artery   • CYSTOSCOPY BLADDER STONE LITHOTRIPSY  1997   • ENDOSCOPY N/A 10/18/2019    Procedure: ESOPHAGOGASTRODUODENOSCOPY with argon plasma coagulation of gastric antral vascular ectasia;  Surgeon: Marisel Gomez MD;  Location: [facility] ENDOSCOPY;  Service: Gastroenterology   • ENDOSCOPY N/A 1/9/2020    Procedure: ESOPHAGOGASTRODUODENOSCOPY WITH  BIOPSY, ARGON PLASMA COAGULATION OF GASTRIC ANTREL VASCULAR ECTASIA,;  Surgeon: Marisel Gomez MD;  Location: [facility] ENDOSCOPY;  Service: Gastroenterology   • ERCP N/A 11/20/2019    Procedure: ERCP, clearance of bile duct with balloon, placement of biliary stent, EGD with argon plasma coagulation of gastric antral vascular ectasia;  Surgeon: Marisel Gomez MD;  Location: [facility] ENDOSCOPY;  Service: Gastroenterology   • ERCP N/A 2/27/2020    Procedure: ENDOSCOPIC RETROGRADE CHOLANGIOPANCREATOGRAPHY with removal of biliary stent, brushing, dilation, and placement of biliary stent x 2 and Esophagogastroduodenoscopy with argon plasma coagulation;  Surgeon: Marisel Gomez MD;  Location: [facility] ENDOSCOPY;  Service: Gastroenterology;  Laterality: N/A;  Gastric antral vascular ectasia, common bile duct stricture   • OTHER SURGICAL HISTORY  11/2018    IVC filter implant   • RENAL ARTERY STENT Left     does not recall this       Family History   Problem Relation Age of Onset   • Hyperlipidemia Other    • Hypertension Other        Social History     Tobacco Use   • Smoking status: Never Smoker   • Smokeless tobacco: Never Used   Substance Use Topics   • Alcohol use: No     Frequency: Never   • Drug use: No        Medications Prior to Admission   Medication Sig Dispense Refill Last Dose   • allopurinol (ZYLOPRIM) 100 MG tablet Take 100 mg by mouth 2 (Two) Times a Day. Do not preop   3/3/2020 at 0800   • aspirin 81 MG tablet Take 1 tablet by mouth Daily. 30 tablet 3 3/3/2020 at 0800   • atorvastatin (LIPITOR) 20 MG tablet Take 1 tablet by mouth Daily. (Patient taking differently: Take 20 mg by mouth Every Night.) 90 tablet 3 3/2/2020 at Unknown time   • bumetanide (BUMEX) 2 MG tablet Take 1 tablet by mouth Daily. (Patient taking differently: Take 2 mg by mouth Daily. Do not take preop)   3/3/2020 at 0800   • Cyanocobalamin 1000 MCG/ML kit Inject 1,000 mcg as directed Every 30 (Thirty) Days.   1/31/2020   • docusate sodium  (COLACE) 100 MG capsule Take 100 mg by mouth 2 (Two) Times a Day.   3/3/2020 at 0800   • DULoxetine (CYMBALTA) 60 MG capsule Take 60 mg by mouth Daily.   3/3/2020 at 0800   • folic acid (FOLVITE) 1 MG tablet Take 1 mg by mouth Daily.   3/3/2020 at 0800   • hydrOXYzine pamoate (VISTARIL) 25 MG capsule Take 25 mg by mouth 3 (Three) Times a Day As Needed.  0 Taking   • insulin glargine (LANTUS) 100 UNIT/ML injection Inject 50 units at bedtime (Patient taking differently: Inject 50 Units under the skin into the appropriate area as directed Every Night. Inject 50 units at bedtime) 45 mL 3 3/2/2020 at Unknown time   • insulin lispro (HUMALOG) 100 UNIT/ML injection 18 units subcu before each meal plus sliding scale MDD 60 (Patient taking differently: Inject 18 Units under the skin into the appropriate area as directed 3 (Three) Times a Day Before Meals. 18 units subcu before each meal plus sliding scale MDD 60) 60 mL 4 3/2/2020 at Unknown time   • lactulose (CHRONULAC) 10 GM/15ML solution Take 60 mL by mouth Every 4 (Four) Hours.   3/3/2020 at 0700   • levothyroxine (SYNTHROID, LEVOTHROID) 75 MCG tablet Take 1 tablet by mouth Daily. (Patient taking differently: Take 75 mcg by mouth Daily. Take preop) 90 tablet 4 3/3/2020 at 0700   • magnesium oxide (MAG-OX) 400 MG tablet Take 400 mg by mouth Daily. Take post op   3/3/2020 at 0800   • melatonin 5 MG tablet tablet Take 10 mg by mouth At Night As Needed.   Taking   • metoclopramide (REGLAN) 5 MG tablet Take 5 mg by mouth 2 (Two) Times a Day. Ok to take preop  0 3/3/2020 at 0800   • Mirabegron ER (MYRBETRIQ) 50 MG tablet sustained-release 24 hour 24 hr tablet Take 50 mg by mouth Daily.   3/3/2020 at 0800   • Multiple Vitamins-Minerals (MULTIVITAMIN WITH MINERALS) tablet tablet Take 1 tablet by mouth Daily.   3/3/2020 at 0800   • oxyCODONE (ROXICODONE) 5 MG immediate release tablet Take 5 mg by mouth Every 8 (Eight) Hours As Needed.   2/27/2020 at Unknown time   • pantoprazole  "(PROTONIX) 40 MG EC tablet Take 1 tablet by mouth 2 (Two) Times a Day. 30 tablet 3 3/3/2020 at 0800   • phosphorus (K PHOS NEUTRAL) 155-852-130 MG tablet Take 1 tablet by mouth Daily. afternoon   3/2/2020 at Unknown time   • rifaximin (XIFAXAN) 550 MG tablet Take 550 mg by mouth Every 12 (Twelve) Hours.   3/3/2020 at 0800   • sodium bicarbonate 650 MG tablet Take 650 mg by mouth 3 (Three) Times a Day.   3/3/2020 at 0800   • sucralfate (CARAFATE) 1 g tablet Take 1 g by mouth 4 (Four) Times a Day.   3/3/2020 at 0800   • zinc sulfate (ZINCATE) 50 MG capsule Take 50 mg by mouth Daily.  0 3/3/2020 at 0800         Patient has no known allergies.    Scheduled Meds:  iron sucrose 200 mg Intravenous Q24H     Continuous Infusions:   PRN Meds:.[COMPLETED] Insert peripheral IV **AND** sodium chloride        VITAL SIGNS  Vitals:    03/03/20 1353 03/03/20 1514 03/03/20 1636 03/03/20 2003   BP:    121/66   BP Location:    Right arm   Patient Position:       Pulse:    78   Resp:    18   Temp:    98.9 °F (37.2 °C)   TempSrc:    Oral   SpO2: 96% 99% 100% 98%   Weight:       Height:           Flowsheet Rows      First Filed Value   Admission Height  188 cm (74\") Documented at 03/03/2020 1133   Admission Weight  134 kg (295 lb) Documented at 03/03/2020 1133           TELEMETRY: Sinus rhythm    Physical Exam:  Physical Exam   Constitutional: He is oriented to person, place, and time. He appears well-developed and well-nourished. He is cooperative.   Morbid obesity   HENT:   Head: Normocephalic and atraumatic.   Mouth/Throat: Uvula is midline and oropharynx is clear and moist. No oral lesions.   Eyes: Conjunctivae are normal. No scleral icterus.   Neck: Trachea normal. Neck supple. No JVD present. Carotid bruit is not present. No thyromegaly present.   Cardiovascular: Normal rate, regular rhythm, S1 normal, S2 normal, normal heart sounds, intact distal pulses and normal pulses. PMI is not displaced. Exam reveals no gallop and no friction " rub.   No murmur heard.  Pulmonary/Chest: Effort normal and breath sounds normal.   Abdominal: Soft. Bowel sounds are normal.   Genitourinary:   Genitourinary Comments: No Maurice catheter   Musculoskeletal: Normal range of motion. He exhibits edema.   Neurological: He is alert and oriented to person, place, and time. He has normal strength.   No focal deficits   Skin: Skin is warm. No cyanosis.   Psychiatric: He has a normal mood and affect.                LAB RESULTS (LAST 7 DAYS)    CBC  Results from last 7 days   Lab Units 03/03/20  1202   WBC 10*3/mm3 11.00*   RBC 10*6/mm3 2.52*   HEMOGLOBIN g/dL 8.4*   HEMATOCRIT % 24.9*   MCV fL 98.7*   PLATELETS 10*3/mm3 181       BMP  Results from last 7 days   Lab Units 03/03/20  1202   SODIUM mmol/L 134*   POTASSIUM mmol/L 3.7   CHLORIDE mmol/L 100   CO2 mmol/L 19.0*   BUN mg/dL 35*   CREATININE mg/dL 1.84*   GLUCOSE mg/dL 245*       CMP Results from last 7 days   Lab Units 03/03/20  1202   SODIUM mmol/L 134*   POTASSIUM mmol/L 3.7   CHLORIDE mmol/L 100   CO2 mmol/L 19.0*   BUN mg/dL 35*   CREATININE mg/dL 1.84*   GLUCOSE mg/dL 245*   ALBUMIN g/dL 3.00*   BILIRUBIN mg/dL 0.5   ALK PHOS U/L 128*   AST (SGOT) U/L 34   ALT (SGPT) U/L 22         BNP        TROPONIN  Results from last 7 days   Lab Units 03/03/20  1202   TROPONIN T ng/mL 0.031*       CoAg  Results from last 7 days   Lab Units 03/03/20  1202   INR  1.09   APTT seconds 31.2*       Creatinine Clearance  Estimated Creatinine Clearance: 52.1 mL/min (A) (by C-G formula based on SCr of 1.84 mg/dL (H)).    ABG        Radiology  Xr Chest 1 View    Result Date: 3/3/2020  No acute chest findings.  Electronically Signed By-Dr. Kaitlin Wong MD On:3/3/2020 12:46 PM This report was finalized on [id number] by Dr. Kaitlin Wong MD.    Us Renal Bilateral    Result Date: 3/3/2020  Normal renal ultrasound.  Electronically Signed By-Dr. Kaitlin Wong MD On:3/3/2020 5:04 PM This report was finalized on [id number] by   Kaitlin Wong MD.          EKG          I personally viewed and interpreted the patient's EKG/Telemetry data:    ECHOCARDIOGRAM:         STRESS MYOVIEW:    CARDIAC CATHETERIZATION:    OTHER:         Assessment/Plan       General weakness    Cirrhosis (CMS/HCC)    Diabetic neuropathy (CMS/HCC)    Dyslipidemia    History of DVT of right lower extremity    Essential hypertension    Acquired hypothyroidism    Morbid obesity (CMS/HCC)    Obstructive sleep apnea    Type 2 diabetes mellitus with kidney complication, with long-term current use of insulin (CMS/HCC)    Spinal stenosis of lumbar region    Pulmonary hypertension (CMS/HCC)    Deficiency of other specified B group vitamins    Chronic coronary artery disease    JOSE (iron deficiency anemia) with poor po absorption    IgG kappa MGUS    THOMAS (nonalcoholic steatohepatitis)    Splenomegaly    Antiphospholipid antibody positive    Elevated factor VIII level    Anemia in stage 3 chronic kidney disease (CMS/HCC)    History of Vitamin B12 deficiency    History of bilateral pulmonary embolus (PE)    Antithrombin III deficiency (CMS/HCC)    Protein C deficiency (CMS/HCC)    Protein S deficiency (CMS/HCC)    GAVE (gastric antral vascular ectasia)    CKD (chronic kidney disease) stage 3, GFR 30-59 ml/min (CMS/HCC)      Multiple comorbid conditions including cirrhosis RV dysfunction obstructive sleep apnea pulmonary hypertension  Now admitted with significant weakness fatigue shortness of breath probably multifactorial  History of DVT status post IVC filter placement  CAD status post PCI with no symptoms of chest pain  Echocardiogram will be checked  Patient will be monitored for recurrent DVT also monitor for any sepsis  Anemia will be closely monitored  Needs aggressive cardiac risk factor modification diabetes dyslipidemia  Borderline elevated troponin doubt any acute coronary syndrome when stable pharmacological stress Myoview  I discussed the patients findings and my  recommendations with patient and family at bedside also discussed with attending nurse    Alvaro Pandey MD  03/03/20  8:10 PM

## 2020-03-04 NOTE — PROGRESS NOTES
Wound Initial Evaluation   [facility]     Patient Name: Bry Ratliff  : 1945  MRN: [id number]  Today's Date: 3/4/2020 Room Number: 304/1      Admit Date: 3/3/2020  Attending: Santosh Amaro MD    Consult Requested By: Dr Amaro    Reason For Consult: stage 3 sacral PU    Chief Complaint: Patient presented to the hospital with complaints of generalized weakness.  Patient is current with home health Per caregiver though sacral pressure injuries have been present for greater than 1 year they have been seen in the past by a plastic surgeon at the outpatient wound care center.  Currently only being seen by home health and treating with a zinc powder and barrier cream.  Per caregiver the injuries were improving until about a week ago when they believe the injury began deteriorating Per caregiver patient is more weak and requiring more assistance with care recently.  Patient states that he sleeps in his recliner.    Visit Dx:    ICD-10-CM ICD-9-CM   1. General weakness R53.1 780.79   2. Anemia, unspecified type D64.9 285.9   3. Chronic kidney disease, unspecified CKD stage N18.9 585.9   4. Elevated troponin R79.89 790.6     Patient Active Problem List   Diagnosis   • Cirrhosis (CMS/HCC)   • Diabetic neuropathy (CMS/HCC)   • Dyslipidemia   • History of DVT of right lower extremity   • Essential hypertension   • Acquired hypothyroidism   • Morbid obesity (CMS/HCC)   • Obstructive sleep apnea   • Type 2 diabetes mellitus with kidney complication, with long-term current use of insulin (CMS/HCC)   • Spinal stenosis of lumbar region   • Pulmonary hypertension (CMS/HCC)   • Kidney stone   • Deficiency of other specified B group vitamins   • Chronic coronary artery disease   • JOSE (iron deficiency anemia) with poor po absorption   • Erosive gastritis   • IgG kappa MGUS   • THOMAS (nonalcoholic steatohepatitis)   • Splenomegaly   • Antiphospholipid antibody positive   • Elevated factor VIII level   • Anemia in stage 3  chronic kidney disease (CMS/HCC)   • History of Vitamin B12 deficiency   • History of bilateral pulmonary embolus (PE)   • Antithrombin III deficiency (CMS/HCC)   • Protein C deficiency (CMS/HCC)   • Protein S deficiency (CMS/HCC)   • Skin abscess   • Mixed hyperlipidemia   • Anemia   • Iron deficiency anemia due to chronic blood loss   • GAVE (gastric antral vascular ectasia)   • Vitamin B12 deficiency   • Malabsorption of iron   • CKD (chronic kidney disease) stage 3, GFR 30-59 ml/min (CMS/HCC)   • Iron deficiency anemia   • General weakness   • Decubitus ulcer of sacral region, stage 3 (CMS/HCC)       History:   Past Medical History:   Diagnosis Date   • Anemia    • Anxiety    • B12 deficiency 2009   • Balance disorder    • Syed's esophagus    • CAD (coronary artery disease)     cardiac stent   • Constipation    • DDD (degenerative disc disease), lumbar    • Decubitus ulcer     buttocks   • Depression    • Diabetes mellitus (CMS/HCC)    • Disease of thyroid gland     hypothyroid   • Diverticular disease    • Dvt femoral (deep venous thrombosis) (CMS/HCC) 2004    rt let   • Gastroparesis    • GERD (gastroesophageal reflux disease)    • Gout    • Hepatic encephalopathy (CMS/HCC)     if doesnt take meds   • History of echocardiogram     03/03/2017 - 12/03/2014 - 04/2012   • History of stomach ulcers    • Hyperlipidemia    • Iron deficiency    • Morbid obesity (CMS/HCC)    • THOMAS (nonalcoholic steatohepatitis)    • Neuropathy    • OAB (overactive bladder)    • KATHIA treated with BiPAP     bring dos   • Peripheral edema    • Pulmonary embolus (CMS/HCC) 2004   • Renal insufficiency    • S/P lumbar laminectomy    • Skin irritation     skin fold   • Stage 3 chronic kidney disease (CMS/HCC)      Past Surgical History:   Procedure Laterality Date   • BACK SURGERY  1971   • BILE DUCT STENT PLACEMENT     • CATARACT EXTRACTION  2014   • CHOLECYSTECTOMY  02/24/2019   • COLONOSCOPY  03/2018   • CORONARY ANGIOPLASTY  08/24/2009     PCI stent - succesful placement of 3.5/23 promus stent in proximal right coronary artery   • CORONARY ANGIOPLASTY WITH STENT PLACEMENT  08/27/2009    patient had stent placed to proximal right coronary artery   • CYSTOSCOPY BLADDER STONE LITHOTRIPSY  1997   • ENDOSCOPY N/A 10/18/2019    Procedure: ESOPHAGOGASTRODUODENOSCOPY with argon plasma coagulation of gastric antral vascular ectasia;  Surgeon: Marisel Gomez MD;  Location: [facility] ENDOSCOPY;  Service: Gastroenterology   • ENDOSCOPY N/A 1/9/2020    Procedure: ESOPHAGOGASTRODUODENOSCOPY WITH BIOPSY, ARGON PLASMA COAGULATION OF GASTRIC ANTREL VASCULAR ECTASIA,;  Surgeon: Marisel Gomez MD;  Location: [facility] ENDOSCOPY;  Service: Gastroenterology   • ERCP N/A 11/20/2019    Procedure: ERCP, clearance of bile duct with balloon, placement of biliary stent, EGD with argon plasma coagulation of gastric antral vascular ectasia;  Surgeon: Marisel Gomez MD;  Location: [facility] ENDOSCOPY;  Service: Gastroenterology   • ERCP N/A 2/27/2020    Procedure: ENDOSCOPIC RETROGRADE CHOLANGIOPANCREATOGRAPHY with removal of biliary stent, brushing, dilation, and placement of biliary stent x 2 and Esophagogastroduodenoscopy with argon plasma coagulation;  Surgeon: Marisel Gomez MD;  Location: [facility] ENDOSCOPY;  Service: Gastroenterology;  Laterality: N/A;  Gastric antral vascular ectasia, common bile duct stricture   • OTHER SURGICAL HISTORY  11/2018    IVC filter implant   • RENAL ARTERY STENT Left     does not recall this     Social History     Socioeconomic History   • Marital status:      Spouse name: Not on file   • Number of children: Not on file   • Years of education: Not on file   • Highest education level: Not on file   Tobacco Use   • Smoking status: Never Smoker   • Smokeless tobacco: Never Used   Substance and Sexual Activity   • Alcohol use: No     Frequency: Never   • Drug use: No   • Sexual activity: Defer       Allergies:  No Known  Allergies    Medications:    Current Facility-Administered Medications:   •  acetaminophen (TYLENOL) tablet 650 mg, 650 mg, Oral, Q4H PRN **OR** acetaminophen (TYLENOL) 160 MG/5ML solution 650 mg, 650 mg, Oral, Q4H PRN **OR** acetaminophen (TYLENOL) suppository 650 mg, 650 mg, Rectal, Q4H PRN, Santosh Amaro MD  •  allopurinol (ZYLOPRIM) tablet 100 mg, 100 mg, Oral, BID, Santosh Amaro MD, 100 mg at 03/04/20 1139  •  aspirin EC tablet 81 mg, 81 mg, Oral, Daily, Santosh Amaro MD, 81 mg at 03/04/20 1138  •  atorvastatin (LIPITOR) tablet 20 mg, 20 mg, Oral, Daily, Santosh Amaro MD, 20 mg at 03/04/20 1139  •  [START ON 3/5/2020] bupivacaine (MARCAINE) 0.25 % injection 5 mL, 5 mL, Infiltration, Once, Carlo Staples MD  •  calcium gluconate 1g/50ml 0.675% NaCl IV SOLN, 1 g, Intravenous, PRN **AND** calcium gluconate 2-0.675 GM/100ML NACL IVPB, 2 g, Intravenous, PRN **AND** Calcium, Ionized, , , PRN, Santosh Amaro MD  •  cyanocobalamin injection 1,000 mcg, 1,000 mcg, Subcutaneous, Q30 Days, Santosh Amaro MD  •  dextrose (D50W) 25 g/ 50mL Intravenous Solution 25 g, 25 g, Intravenous, Q15 Min PRN, Santosh Amaro MD  •  dextrose (GLUTOSE) oral gel 15 g, 15 g, Oral, Q15 Min PRN, Santosh Amaro MD  •  docusate sodium (COLACE) capsule 100 mg, 100 mg, Oral, BID, Santosh Amaro MD, 100 mg at 03/04/20 1139  •  docusate sodium (COLACE) capsule 100 mg, 100 mg, Oral, BID PRN, Santosh Amaro MD  •  DULoxetine (CYMBALTA) DR capsule 60 mg, 60 mg, Oral, Daily, Santosh Amaro MD, 60 mg at 03/04/20 1139  •  enoxaparin (LOVENOX) syringe 40 mg, 40 mg, Subcutaneous, Daily, Santosh Amaro MD  •  folic acid (FOLVITE) tablet 1 mg, 1 mg, Oral, Daily, Santosh Amaro MD, 1 mg at 03/04/20 1137  •  glucagon (human recombinant) (GLUCAGEN DIAGNOSTIC) injection 1 mg, 1 mg, Subcutaneous, Q15 Min PRN, Santosh Amaro MD  •  hydrOXYzine (ATARAX) tablet 25 mg, 25 mg, Oral, Nightly PRN, Santosh Amaro MD, 25 mg at 03/03/20 3299  •  insulin  glargine (LANTUS) injection 50 Units, 50 Units, Subcutaneous, Nightly, Santosh Amaro MD, 30 Units at 03/03/20 2356  •  insulin lispro (humaLOG) injection 0-9 Units, 0-9 Units, Subcutaneous, 4x Daily With Meals & Nightly, 2 Units at 03/04/20 1145 **AND** insulin lispro (humaLOG) injection 0-9 Units, 0-9 Units, Subcutaneous, PRN, Santosh Amaro MD  •  insulin lispro (humaLOG) injection 18 Units, 18 Units, Subcutaneous, TID AC, Santosh Amaro MD, 18 Units at 03/04/20 1141  •  iron sucrose 200 mg, 200 mg, Intravenous, Q24H, Santosh Amaro MD, 200 mg at 03/03/20 1726  •  K-PHOS-NEUTRAL 155-852-130 MG tablet 1 tablet, 1 tablet, Oral, Daily, Santosh Amaro MD  •  ketamine (KETALAR) 31 mg in sodium chloride 0.9 % 100 mL infusion, 0.3 mg/kg (Adjusted), Intravenous, Daily PRN **AND** Ketamine Vital Signs & Assessment, , , Per Order Details, Santosh Amaro MD  •  lactulose (CHRONULAC) 10 GM/15ML solution 30 g, 30 g, Oral, TID, Santosh Amaro MD, 30 g at 03/04/20 1141  •  levothyroxine (SYNTHROID, LEVOTHROID) tablet 75 mcg, 75 mcg, Oral, Q AM, Santosh Amaro MD, 75 mcg at 03/04/20 0525  •  magnesium oxide (MAGOX) tablet 400 mg, 400 mg, Oral, Daily, Santosh Amaro MD, 400 mg at 03/04/20 1137  •  Magnesium Sulfate 2 gram Bolus, followed by 8 gram infusion (total Mg dose 10 grams)- Mg less than or equal to 1mg/dL, 2 g, Intravenous, PRN **OR** Magnesium Sulfate 2 gram / 50mL Infusion (GIVE X 3 BAGS TO EQUAL 6GM TOTAL DOSE) - Mg 1.1 - 1.5 mg/dl, 2 g, Intravenous, PRN **OR** Magnesium Sulfate 4 gram infusion- Mg 1.6-1.9 mg/dL, 4 g, Intravenous, PRN, Santosh Amaro MD  •  melatonin tablet 10 mg, 10 mg, Oral, Nightly PRN, Santosh Amaro MD  •  melatonin tablet 5 mg, 5 mg, Oral, Nightly PRN, Santosh Amaro MD  •  metoclopramide (REGLAN) tablet 5 mg, 5 mg, Oral, BID AC, Santosh Amaro MD, 5 mg at 03/04/20 1138  •  nicotine (NICODERM CQ) 21 MG/24HR patch 1 patch, 1 patch, Transdermal, Nightly, Santosh Amaro MD  •   nitroglycerin (NITROSTAT) SL tablet 0.4 mg, 0.4 mg, Sublingual, Q5 Min PRN, Santosh Amaro MD  •  ondansetron (ZOFRAN) injection 4 mg, 4 mg, Intravenous, Q6H PRN, Santosh Amaro MD  •  oxybutynin XL (DITROPAN-XL) 24 hr tablet 10 mg, 10 mg, Oral, Daily, Santosh Amaro MD, 10 mg at 03/04/20 1137  •  oxyCODONE (ROXICODONE) immediate release tablet 5 mg, 5 mg, Oral, Q8H PRN, Santosh Amaro MD, 5 mg at 03/03/20 2359  •  pantoprazole (PROTONIX) EC tablet 40 mg, 40 mg, Oral, BID AC, Santosh Amaro MD, 40 mg at 03/04/20 1137  •  potassium & sodium phosphates (PHOS-NAK) 280-160-250 MG packet - for Phosphorus less than 1.25 mg/dL, 2 packet, Oral, Q6H PRN **OR** potassium & sodium phosphates (PHOS-NAK) 280-160-250 MG packet - for Phosphorus 1.25 - 2.5 mg/dL, 2 packet, Oral, Q6H PRN, Santosh Amaro MD  •  potassium chloride (K-DUR,KLOR-CON) CR tablet 40 mEq, 40 mEq, Oral, PRN, Santosh Amaro MD, 40 mEq at 03/04/20 1140  •  potassium chloride (KLOR-CON) packet 40 mEq, 40 mEq, Oral, PRN, Santosh Amaro MD  •  riFAXIMin (XIFAXAN) tablet 550 mg, 550 mg, Oral, Q12H, Santosh Amaro MD, 550 mg at 03/04/20 1138  •  sodium bicarbonate tablet 650 mg, 650 mg, Oral, TID, Santosh Amaro MD, 650 mg at 03/04/20 1138  •  [COMPLETED] Insert peripheral IV, , , Once **AND** sodium chloride 0.9 % flush 10 mL, 10 mL, Intravenous, PRN, Santosh Amaro MD  •  sodium chloride 0.9 % flush 10 mL, 10 mL, Intravenous, Q12H, Santosh Amaro MD, 10 mL at 03/04/20 1144  •  sodium chloride 0.9 % flush 10 mL, 10 mL, Intravenous, PRN, Santosh Amaro MD  •  sodium chloride 0.9 % infusion, 100 mL/hr, Intravenous, Continuous, Santosh Amaro MD, Last Rate: 100 mL/hr at 03/04/20 0037, 100 mL/hr at 03/04/20 0037  •  sucralfate (CARAFATE) tablet 1 g, 1 g, Oral, 4x Daily AC & at Bedtime, Santosh Amaro MD, 1 g at 03/04/20 1141  •  THERA tablet 1 tablet, 1 tablet, Oral, Daily, Santosh Amaro MD, 1 tablet at 03/04/20 1138  •  [START ON 3/5/2020]  triamcinolone acetonide (KENALOG-40) injection 80 mg, 80 mg, Intra-articular, Once, Carlo Staples MD  •  zinc sulfate (ZINCATE) capsule 220 mg, 220 mg, Oral, Daily, Santosh Amaro MD, 220 mg at 03/04/20 1138    Results Review:  Lab Results (last 48 hours)     Procedure Component Value Units Date/Time    Wound Culture - Wound, Buttock [[id number]] Collected:  03/04/20 1131    Specimen:  Wound from Buttock Updated:  03/04/20 1316    Sedimentation Rate [[id number]]  (Abnormal) Collected:  03/04/20 1144    Specimen:  Blood Updated:  03/04/20 1254     Sed Rate >120 mm/hr      Comment: Result checked        CBC Auto Differential [[id number]]  (Abnormal) Collected:  03/04/20 1144    Specimen:  Blood Updated:  03/04/20 1213     WBC 8.30 10*3/mm3      RBC 2.50 10*6/mm3      Hemoglobin 8.4 g/dL      Hematocrit 24.6 %      MCV 98.5 fL      MCH 33.5 pg      MCHC 34.0 g/dL      RDW 16.3 %      RDW-SD 56.4 fl      MPV 9.2 fL      Platelets 189 10*3/mm3      Neutrophil % 78.6 %      Lymphocyte % 9.2 %      Monocyte % 7.2 %      Eosinophil % 4.3 %      Basophil % 0.7 %      Neutrophils, Absolute 6.50 10*3/mm3      Lymphocytes, Absolute 0.80 10*3/mm3      Monocytes, Absolute 0.60 10*3/mm3      Eosinophils, Absolute 0.40 10*3/mm3      Basophils, Absolute 0.10 10*3/mm3      nRBC 0.0 /100 WBC     POC Glucose Once [[id number]]  (Abnormal) Collected:  03/04/20 1136    Specimen:  Blood Updated:  03/04/20 1147     Glucose 191 mg/dL      Comment: Serial Number: [id number]:  [id number]       Hemoglobin A1c [[id number]]  (Abnormal) Collected:  03/04/20 0944    Specimen:  Blood Updated:  03/04/20 1045     Hemoglobin A1C 7.4 %     Narrative:       Hemoglobin A1C Reference Range:    <5.7 %        Normal  5.7-6.4 %     Increased risk for diabetes  > 6.4 %        Diabetes       These guidelines have been recommended by the American Diabetic Association for Hgb A1c.      The following 2010 guidelines have been recommended by the American  "Diabetes Association for Hemoglobin A1c.    HBA1c 5.7-6.4% Increased risk for future diabetes (pre-diabetes)  HBA1c     >6.4% Diabetes      Procalcitonin [[id number]]  (Abnormal) Collected:  03/04/20 0944    Specimen:  Blood Updated:  03/04/20 1042     Procalcitonin 0.40 ng/mL     Narrative:       As a Marker for Sepsis (Non-Neonates):   1. <0.5 ng/mL represents a low risk of severe sepsis and/or septic shock.  1. >2 ng/mL represents a high risk of severe sepsis and/or septic shock.    As a Marker for Lower Respiratory Tract Infections that require antibiotic therapy:  PCT on Admission     Antibiotic Therapy             6-12 Hrs later  > 0.5                Strongly Recommended            >0.25 - <0.5         Recommended  0.1 - 0.25           Discouraged                   Remeasure/reassess PCT  <0.1                 Strongly Discouraged          Remeasure/reassess PCT      As 28 day mortality risk marker: \"Change in Procalcitonin Result\" (> 80 % or <=80 %) if Day 0 (or Day 1) and Day 4 values are available. Refer to http://www.[employer]-pct-calculator.com/   Change in PCT <=80 %   A decrease of PCT levels below or equal to 80 % defines a positive change in PCT test result representing a higher risk for 28-day all-cause mortality of patients diagnosed with severe sepsis or septic shock.  Change in PCT > 80 %   A decrease of PCT levels of more than 80 % defines a negative change in PCT result representing a lower risk for 28-day all-cause mortality of patients diagnosed with severe sepsis or septic shock.                Results may be falsely decreased if patient taking Biotin.     CK [[id number]]  (Normal) Collected:  03/04/20 0944    Specimen:  Blood Updated:  03/04/20 1041     Creatine Kinase 37 U/L     Lipid Panel [[id number]]  (Abnormal) Collected:  03/04/20 0944    Specimen:  Blood Updated:  03/04/20 1041     Total Cholesterol 99 mg/dL      Triglycerides 127 mg/dL      HDL Cholesterol 30 mg/dL      LDL Cholesterol  44 " mg/dL      VLDL Cholesterol 25.4 mg/dL      LDL/HDL Ratio 1.45    Narrative:       Cholesterol Reference Ranges  (U.S. Department of Health and Human Services ATP III Classifications)    Desirable          <200 mg/dL  Borderline High    200-239 mg/dL  High Risk          >240 mg/dL      Triglyceride Reference Ranges  (U.S. Department of Health and Human Services ATP III Classifications)    Normal           <150 mg/dL  Borderline High  150-199 mg/dL  High             200-499 mg/dL  Very High        >500 mg/dL    HDL Reference Ranges  (U.S. Department of Health and Human Services ATP III Classifcations)    Low     <40 mg/dl (major risk factor for CHD)  High    >60 mg/dl ('negative' risk factor for CHD)        LDL Reference Ranges  (U.S. Department of Health and Human Services ATP III Classifcations)    Optimal          <100 mg/dL  Near Optimal     100-129 mg/dL  Borderline High  130-159 mg/dL  High             160-189 mg/dL  Very High        >189 mg/dL    C-reactive Protein [[id number]]  (Abnormal) Collected:  03/04/20 0944    Specimen:  Blood Updated:  03/04/20 1041     C-Reactive Protein 16.24 mg/dL     Comprehensive Metabolic Panel [[id number]]  (Abnormal) Collected:  03/04/20 0944    Specimen:  Blood Updated:  03/04/20 1041     Glucose 159 mg/dL      BUN 35 mg/dL      Creatinine 1.79 mg/dL      Sodium 137 mmol/L      Potassium 3.3 mmol/L      Chloride 103 mmol/L      CO2 22.0 mmol/L      Calcium 8.5 mg/dL      Total Protein 7.2 g/dL      Albumin 2.70 g/dL      ALT (SGPT) 18 U/L      AST (SGOT) 29 U/L      Alkaline Phosphatase 119 U/L      Total Bilirubin 0.4 mg/dL      eGFR Non African Amer 37 mL/min/1.73      Globulin 4.5 gm/dL      A/G Ratio 0.6 g/dL      BUN/Creatinine Ratio 19.6     Anion Gap 12.0 mmol/L     Narrative:       GFR Normal >60  Chronic Kidney Disease <60  Kidney Failure <15      Iron [[id number]]  (Normal) Collected:  03/04/20 0944    Specimen:  Blood Updated:  03/04/20 1041     Iron 97 mcg/dL      Phosphorus [[id number]]  (Normal) Collected:  03/04/20 0944    Specimen:  Blood Updated:  03/04/20 1041     Phosphorus 2.8 mg/dL     Magnesium [[id number]]  (Normal) Collected:  03/04/20 0944    Specimen:  Blood Updated:  03/04/20 1041     Magnesium 1.9 mg/dL     Ammonia [[id number]]  (Abnormal) Collected:  03/04/20 0944    Specimen:  Blood Updated:  03/04/20 1041     Ammonia 65 umol/L     Ferritin [[id number]]  (Normal) Collected:  03/04/20 0944    Specimen:  Blood Updated:  03/04/20 1041     Ferritin 292.80 ng/mL     Narrative:       Results may be falsely decreased if patient taking Biotin.      Uric Acid [[id number]]  (Abnormal) Collected:  03/04/20 0944    Specimen:  Blood Updated:  03/04/20 1041     Uric Acid 8.0 mg/dL     Troponin [[id number]]  (Normal) Collected:  03/04/20 0944    Specimen:  Blood Updated:  03/04/20 1041     Troponin T 0.019 ng/mL     Narrative:       Troponin T Reference Range:  <= 0.03 ng/mL-   Negative for AMI  >0.03 ng/mL-     Abnormal for myocardial necrosis.  Clinicians would have to utilize clinical acumen, EKG, Troponin and serial changes to determine if it is an Acute Myocardial Infarction or myocardial injury due to an underlying chronic condition.       Results may be falsely decreased if patient taking Biotin.      BNP [[id number]]  (Abnormal) Collected:  03/04/20 0944    Specimen:  Blood Updated:  03/04/20 1041     proBNP 1,083.0 pg/mL     Narrative:       Among patients with dyspnea, NT-proBNP is highly sensitive for the detection of acute congestive heart failure. In addition NT-proBNP of <300 pg/ml effectively rules out acute congestive heart failure with 99% negative predictive value.    Results may be falsely decreased if patient taking Biotin.      TSH [[id number]]  (Normal) Collected:  03/04/20 0944    Specimen:  Blood Updated:  03/04/20 1040     TSH 2.730 uIU/mL     Lactic Acid, Plasma [[id number]]  (Normal) Collected:  03/04/20 0944    Specimen:  Blood Updated:  03/04/20 1022      Lactate 2.0 mmol/L     Calcium, Ionized [[id number]]  (Abnormal) Collected:  03/04/20 0944    Specimen:  Blood Updated:  03/04/20 1019     Ionized Calcium 1.13 mmol/L     Blood Culture - Blood, Arm, Right [[id number]] Collected:  03/04/20 0944    Specimen:  Blood from Arm, Right Updated:  03/04/20 1004    Blood Culture - Blood, Wrist, Left [[id number]] Collected:  03/04/20 0944    Specimen:  Blood from Wrist, Left Updated:  03/04/20 1004    Vitamin B12 [[id number]] Collected:  03/04/20 0944    Specimen:  Blood Updated:  03/04/20 1003    Folate [[id number]] Collected:  03/04/20 0944    Specimen:  Blood Updated:  03/04/20 1003    POC Glucose Once [[id number]]  (Abnormal) Collected:  03/04/20 0747    Specimen:  Blood Updated:  03/04/20 0856     Glucose 142 mg/dL      Comment: Serial Number: [id number]:  [id number]       Potassium [[id number]]  (Normal) Collected:  03/03/20 1202    Specimen:  Blood Updated:  03/03/20 2218     Potassium 3.9 mmol/L     POC Glucose Once [[id number]]  (Abnormal) Collected:  03/03/20 2014    Specimen:  Blood Updated:  03/03/20 2015     Glucose 168 mg/dL      Comment: Serial Number: [id number]:  [id number]       Sodium, Urine, Random - Urine, Clean Catch [[id number]] Collected:  03/03/20 1238    Specimen:  Urine, Clean Catch Updated:  03/03/20 1939     Sodium, Urine 41 mmol/L     Narrative:       Reference intervals for random urine have not been established.  Clinical usage is dependent upon physician's interpretation in combination with other laboratory tests.       POC Glucose Once [[id number]]  (Abnormal) Collected:  03/03/20 1905    Specimen:  Blood Updated:  03/03/20 1906     Glucose 145 mg/dL      Comment: Serial Number: [id number]:  [id number]       Urinalysis, Microscopic Only - Urine, Clean Catch [[id number]]  (Abnormal) Collected:  03/03/20 1238    Specimen:  Urine, Clean Catch Updated:  03/03/20 1315     RBC, UA 0-2 /HPF      WBC, UA None Seen /HPF      Bacteria, UA None  Seen /HPF      Squamous Epithelial Cells, UA 0-2 /HPF      Hyaline Casts, UA 3-6 /LPF      Methodology Manual Light Microscopy    Extra Tubes [[id number]] Collected:  03/03/20 1202    Specimen:  Blood Updated:  03/03/20 1315    Narrative:       The following orders were created for panel order Extra Tubes.  Procedure                               Abnormality         Status                     ---------                               -----------         ------                     Gold Top - SST[[id number]]                                   Final result                 Please view results for these tests on the individual orders.    Gold Top - SST [[id number]] Collected:  03/03/20 1202    Specimen:  Blood Updated:  03/03/20 1315     Extra Tube Hold for add-ons.     Comment: Auto resulted.       Urinalysis With Culture If Indicated - Urine, Clean Catch [[id number]]  (Abnormal) Collected:  03/03/20 1238    Specimen:  Urine, Clean Catch Updated:  03/03/20 1309     Color, UA Yellow     Appearance, UA Clear     pH, UA <=5.0     Specific Gravity, UA 1.013     Glucose, UA Negative     Ketones, UA Negative     Bilirubin, UA Negative     Blood, UA Small (1+)     Protein, UA Negative     Leuk Esterase, UA Negative     Nitrite, UA Negative     Urobilinogen, UA 0.2 E.U./dL    Comprehensive Metabolic Panel [[id number]]  (Abnormal) Collected:  03/03/20 1202    Specimen:  Blood Updated:  03/03/20 1237     Glucose 245 mg/dL      BUN 35 mg/dL      Creatinine 1.84 mg/dL      Sodium 134 mmol/L      Potassium 3.7 mmol/L      Chloride 100 mmol/L      CO2 19.0 mmol/L      Calcium 8.2 mg/dL      Total Protein 7.7 g/dL      Albumin 3.00 g/dL      ALT (SGPT) 22 U/L      AST (SGOT) 34 U/L      Alkaline Phosphatase 128 U/L      Total Bilirubin 0.5 mg/dL      eGFR Non African Amer 36 mL/min/1.73      Globulin 4.7 gm/dL      A/G Ratio 0.6 g/dL      BUN/Creatinine Ratio 19.0     Anion Gap 15.0 mmol/L     Narrative:       GFR Normal >60  Chronic Kidney  Disease <60  Kidney Failure <15      Troponin [[id number]]  (Abnormal) Collected:  03/03/20 1202    Specimen:  Blood Updated:  03/03/20 1235     Troponin T 0.031 ng/mL     Narrative:       Troponin T Reference Range:  <= 0.03 ng/mL-   Negative for AMI  >0.03 ng/mL-     Abnormal for myocardial necrosis.  Clinicians would have to utilize clinical acumen, EKG, Troponin and serial changes to determine if it is an Acute Myocardial Infarction or myocardial injury due to an underlying chronic condition.       Results may be falsely decreased if patient taking Biotin.      Influenza Antigen, Rapid - Swab, Nasopharynx [[id number]]  (Normal) Collected:  03/03/20 1202    Specimen:  Swab from Nasopharynx Updated:  03/03/20 1222     Influenza A PCR Not Detected     Influenza B PCR Not Detected    Protime-INR [[id number]]  (Normal) Collected:  03/03/20 1202    Specimen:  Blood Updated:  03/03/20 1220     Protime 11.3 Seconds      INR 1.09    aPTT [[id number]]  (Abnormal) Collected:  03/03/20 1202    Specimen:  Blood Updated:  03/03/20 1220     PTT 31.2 seconds     CBC & Differential [[id number]] Collected:  03/03/20 1202    Specimen:  Blood Updated:  03/03/20 1210    Narrative:       The following orders were created for panel order CBC & Differential.  Procedure                               Abnormality         Status                     ---------                               -----------         ------                     CBC Auto Differential[[id number]]        Abnormal            Final result                 Please view results for these tests on the individual orders.    CBC Auto Differential [[id number]]  (Abnormal) Collected:  03/03/20 1202    Specimen:  Blood Updated:  03/03/20 1210     WBC 11.00 10*3/mm3      RBC 2.52 10*6/mm3      Hemoglobin 8.4 g/dL      Hematocrit 24.9 %      MCV 98.7 fL      MCH 33.5 pg      MCHC 33.9 g/dL      RDW 15.8 %      RDW-SD 55.1 fl      MPV 9.0 fL      Platelets 181 10*3/mm3      Neutrophil % 84.8  %      Lymphocyte % 4.2 %      Monocyte % 9.6 %      Eosinophil % 1.0 %      Basophil % 0.4 %      Neutrophils, Absolute 9.40 10*3/mm3      Lymphocytes, Absolute 0.50 10*3/mm3      Monocytes, Absolute 1.10 10*3/mm3      Eosinophils, Absolute 0.10 10*3/mm3      Basophils, Absolute 0.00 10*3/mm3      nRBC 0.0 /100 WBC         Imaging Results (Last 72 Hours)     Procedure Component Value Units Date/Time    NM Lung Ventilation Perfusion Aerosol [[id number]] Collected:  03/04/20 0911     Updated:  03/04/20 0926    Narrative:       agfaDATE OF EXAM:  3/4/2020 8:24 AM     PROCEDURE:  NM LUNG VENTILATION PERFUSION AEROSOL-     INDICATIONS:  Dyspnea, cardiac origin suspected; R53.1-Weakness; D64.9-Anemia,  unspecified; N18.9-Chronic kidney disease, unspecified; R79.89-Other  specified abnormal findings of blood chemistry     COMPARISON:  Chest radiograph 03/03/2020     TECHNIQUE:   The patient was ventilated with 5.5 mCi of technetium 99m pertechnetate  aerosol and ventilation images were acquired in multiple obliquities.  The patient then received 45 mCi of technetium 99m MAA intravenously and  perfusion images were acquired in multiple obliquities.     FINDINGS:  Ventilation and perfusion scan appears normal. No evidence of  ventilation or perfusion mismatch. No chest radiographic abnormalities.       Impression:       Normal ventilation/perfusion lung scan. This excludes the diagnosis of  pulmonary embolism with a very high degree of clinical certainty     Electronically Signed By-Naif Donahue On:3/4/2020 9:13 AM  This report was finalized on [id number] by  Naif Donahue, .    XR Abdomen KUB [[id number]] Collected:  03/03/20 2318     Updated:  03/03/20 2322    Narrative:       DATE OF EXAM:  3/3/2020 7:24 PM     PROCEDURE:  XR ABDOMEN KUB-     INDICATIONS:  ABDOMINAL PAIN; R53.1-Weakness; D64.9-Anemia, unspecified; N18.9-Chronic  kidney disease, unspecified; R79.89-Other specified abnormal findings of  blood  chemistry  74-year-old male who has abdominal pain off and on, pain mostly to the  right in the middle of his abdomen. Patient gives history of diabetes     COMPARISON:  No Comparisons Available     TECHNIQUE:   Single radiographic view of the abdomen was obtained.     FINDINGS:  Today's KUB demonstrates a common duct stent in place along with change  of cholecystectomy and placement of inferior vena cava filter the bowel  pattern is nonspecific the bony structures demonstrate degenerative  change no renal or ureteral or bladder calculus is seen. The bony  structures are intact. The are degenerative changes to the lower  thoracic and lumbar spine. The lung bases are free of disease..       Impression:          1. Postoperative changes of biliary stent placement and cholecystectomy  and inferior vena cava filter  2. Nonspecific bowel pattern, lung bases are free of disease  3. No evidence of renal or ureteral calculus     Electronically Signed By-Bennett Bobby Jr. On:3/3/2020 11:20 PM  This report was finalized on [id number] by  Bennett Bobby Jr., .    US Renal Bilateral [[id number]] Collected:  03/03/20 1701     Updated:  03/03/20 1706    Narrative:       DATE OF EXAM:  3/3/2020 4:38 PM     PROCEDURE:  US RENAL BILATERAL-     INDICATIONS:  ARF/CKD; R53.1-Weakness; D64.9-Anemia, unspecified; N18.9-Chronic kidney  disease, unspecified; R79.89-Other specified abnormal findings of blood  chemistry     COMPARISON:  No Comparisons Available     TECHNIQUE:   Grayscale and color Doppler ultrasound evaluation of the kidneys and  urinary bladder was performed.        FINDINGS:  Study is technically limited secondary to obscuration by body habitus.  Right kidney measures 9.5 x 4.9 x 5.0 cm the left kidney measures 8.6 x  4.7 x 4.9 cm. Both kidneys maintain normal cortical thickness and  echotexture. No cystic or solid renal mass or shadowing stone or  hydronephrosis is seen. There is mild to moderate urinary bladder  distention  with fluid, and the urinary bladder appears normal.        Impression:       Normal renal ultrasound.     Electronically Signed By-Dr. Kaitlin Wong MD On:3/3/2020 5:04 PM  This report was finalized on [id number] by Dr. Kaitlin Wong MD.    XR Chest 1 View [[id number]] Collected:  03/03/20 1245     Updated:  03/03/20 1248    Narrative:       DATE OF EXAM:  3/3/2020 12:12 PM     PROCEDURE:  XR CHEST 1 VW-     INDICATIONS:  Shortness breath, cough and fever for 2 to 3 days.       COMPARISON:  PA and lateral chest 07/11/2019.     TECHNIQUE:   Single radiographic view of the chest was obtained.     FINDINGS:  No acute airspace disease. Heart size is upper limits normal. Mild  degenerative endplate spurring in the thoracic spine. No acute osseous  abnormality.       Impression:       No acute chest findings.     Electronically Signed By-Dr. Kaitlin Wong MD On:3/3/2020 12:46 PM  This report was finalized on [id number] by Dr. Kaitlin Wong MD.          Review of Systems:  Review of Systems   Respiratory: Negative for cough and shortness of breath.    Cardiovascular: Negative for chest pain and leg swelling.   Gastrointestinal:        Occasional incont   Genitourinary: Negative for dysuria.   Skin: Positive for rash and wound.   Neurological: Positive for weakness.   Psychiatric/Behavioral: Negative.        Physical Assessment:  Wound 10/17/19 1830 lower sacral spine Pressure Injury (Active)   Wound Image   3/4/2020 12:32 AM                                                             Stage III sacral pressure injuries: The current treatment in place is a silicone foam border there is a small amount of exudate serosanguineous in color noted.  The injuries are rather low sacral and the patient does have some defects in the skin from previous scarring.  The wound injuries do not meet therefore will measure out as 2 separate wounds approximate site of the right sacral injury 6 x 3.2 cm with a depth of point to and  left sacrum injury approximately 6 cm x 2-1/2 cm with a depth of 0.2.  There is no odor noted nor is there any warmth or erythema noted to the injury.  Skin is slightly macerated surrounding and patient does have a red papular rash consistent with yeast.    Recommendation and Plan  Sacral stage III pressure injuries I would recommend treating with a Hydrofiber with silver and covering with a silicone foam pad and changing every other day.  Will need to offload patient recommend an immersion surface as well as a chair cushion for when patient is out of bed.  Discussed and instructed patient will need to limit time out of bed.  Would recommend patient to follow-up and return to outpatient wound care following discharge.  Patient has been recommended by physical therapy for inpatient rehab however patient is states that he is unsure if patient does go home it likely he will need a hospital bed with a mattress to help offload the area so that he does not have to sleep in his recliner.    Stephie Millard, APRN   3/4/2020   1:20 PM

## 2020-03-04 NOTE — PLAN OF CARE
Pt alert and oriented waiting for rehab placement, very pleasant. Wound dressing changed on bottom. Will continue to monitor

## 2020-03-04 NOTE — PLAN OF CARE
Problem: Patient Care Overview  Goal: Plan of Care Review  Outcome: Ongoing (interventions implemented as appropriate)  Flowsheets  Taken 3/4/2020 1500  Progress: no change  Plan of Care Reviewed With: patient  Taken 3/4/2020 1879  Outcome Summary: Pt. is 73 y/o male admit w/ anemia, h/o CKD stage III. Pt. living w/ spouse prior to admit w/ incresaed weakness and difficulty ambulating. Pt. requires increased assist for ADL's this date max and min A for all bed mobility and bed to chair transfer. Pt. not safe to d/c home at this time and will require IP rehab at d/c to address aforementioned deficits. Will follow up w/ pt. 1-3x per week at [facility].

## 2020-03-05 LAB
ABO GROUP BLD: NORMAL
ALBUMIN SERPL-MCNC: 2.6 G/DL (ref 3.5–5.2)
ALBUMIN/GLOB SERPL: 0.6 G/DL
ALP SERPL-CCNC: 112 U/L (ref 39–117)
ALT SERPL W P-5'-P-CCNC: 21 U/L (ref 1–41)
AMMONIA BLD-SCNC: 191 UMOL/L (ref 16–60)
ANION GAP SERPL CALCULATED.3IONS-SCNC: 13 MMOL/L (ref 5–15)
AST SERPL-CCNC: 39 U/L (ref 1–40)
BACTERIA BLD CULT: ABNORMAL
BACTERIA SPEC AEROBE CULT: NORMAL
BILIRUB SERPL-MCNC: 0.2 MG/DL (ref 0.2–1.2)
BLD GP AB SCN SERPL QL: NEGATIVE
BUN BLD-MCNC: 34 MG/DL (ref 8–23)
BUN/CREAT SERPL: 19.2 (ref 7–25)
CA-I SERPL ISE-MCNC: 1.15 MMOL/L (ref 1.2–1.3)
CALCIUM SPEC-SCNC: 8.5 MG/DL (ref 8.6–10.5)
CHLORIDE SERPL-SCNC: 104 MMOL/L (ref 98–107)
CO2 SERPL-SCNC: 21 MMOL/L (ref 22–29)
CREAT BLD-MCNC: 1.77 MG/DL (ref 0.76–1.27)
DEPRECATED RDW RBC AUTO: 55.6 FL (ref 37–54)
ERYTHROCYTE [DISTWIDTH] IN BLOOD BY AUTOMATED COUNT: 16.1 % (ref 12.3–15.4)
GFR SERPL CREATININE-BSD FRML MDRD: 38 ML/MIN/1.73
GLOBULIN UR ELPH-MCNC: 4.2 GM/DL
GLUCOSE BLD-MCNC: 196 MG/DL (ref 65–99)
GLUCOSE BLDC GLUCOMTR-MCNC: 145 MG/DL (ref 70–105)
GLUCOSE BLDC GLUCOMTR-MCNC: 154 MG/DL (ref 70–105)
GLUCOSE BLDC GLUCOMTR-MCNC: 158 MG/DL (ref 70–105)
GLUCOSE BLDC GLUCOMTR-MCNC: 178 MG/DL (ref 70–105)
GLUCOSE BLDC GLUCOMTR-MCNC: 188 MG/DL (ref 70–105)
GLUCOSE BLDC GLUCOMTR-MCNC: 209 MG/DL (ref 70–105)
GRAM STN SPEC: NORMAL
GRAM STN SPEC: NORMAL
HCT VFR BLD AUTO: 22.8 % (ref 37.5–51)
HCT VFR BLD AUTO: 24.5 % (ref 37.5–51)
HEMOCCULT STL QL IA: POSITIVE
HGB BLD-MCNC: 7.7 G/DL (ref 13–17.7)
HGB BLD-MCNC: 8.3 G/DL (ref 13–17.7)
MAGNESIUM SERPL-MCNC: 2.1 MG/DL (ref 1.6–2.4)
MCH RBC QN AUTO: 33.1 PG (ref 26.6–33)
MCHC RBC AUTO-ENTMCNC: 33.7 G/DL (ref 31.5–35.7)
MCV RBC AUTO: 98.2 FL (ref 79–97)
PHOSPHATE SERPL-MCNC: 1.8 MG/DL (ref 2.5–4.5)
PLATELET # BLD AUTO: 178 10*3/MM3 (ref 140–450)
PMV BLD AUTO: 9.1 FL (ref 6–12)
POTASSIUM BLD-SCNC: 3.6 MMOL/L (ref 3.5–5.2)
PROT SERPL-MCNC: 6.8 G/DL (ref 6–8.5)
RBC # BLD AUTO: 2.33 10*6/MM3 (ref 4.14–5.8)
RH BLD: POSITIVE
SODIUM BLD-SCNC: 138 MMOL/L (ref 136–145)
T&S EXPIRATION DATE: NORMAL
WBC NRBC COR # BLD: 6.4 10*3/MM3 (ref 3.4–10.8)

## 2020-03-05 PROCEDURE — 85018 HEMOGLOBIN: CPT | Performed by: PHYSICIAN ASSISTANT

## 2020-03-05 PROCEDURE — 82962 GLUCOSE BLOOD TEST: CPT

## 2020-03-05 PROCEDURE — 84100 ASSAY OF PHOSPHORUS: CPT | Performed by: INTERNAL MEDICINE

## 2020-03-05 PROCEDURE — 25010000002 ENOXAPARIN PER 10 MG: Performed by: INTERNAL MEDICINE

## 2020-03-05 PROCEDURE — 25010000002 IRON SUCROSE PER 1 MG: Performed by: INTERNAL MEDICINE

## 2020-03-05 PROCEDURE — 25010000002 TRIAMCINOLONE PER 10 MG: Performed by: ORTHOPAEDIC SURGERY

## 2020-03-05 PROCEDURE — 3E0U3BZ INTRODUCTION OF ANESTHETIC AGENT INTO JOINTS, PERCUTANEOUS APPROACH: ICD-10-PCS | Performed by: ORTHOPAEDIC SURGERY

## 2020-03-05 PROCEDURE — 86901 BLOOD TYPING SEROLOGIC RH(D): CPT | Performed by: INTERNAL MEDICINE

## 2020-03-05 PROCEDURE — 83735 ASSAY OF MAGNESIUM: CPT | Performed by: INTERNAL MEDICINE

## 2020-03-05 PROCEDURE — 85014 HEMATOCRIT: CPT | Performed by: PHYSICIAN ASSISTANT

## 2020-03-05 PROCEDURE — 86900 BLOOD TYPING SEROLOGIC ABO: CPT | Performed by: INTERNAL MEDICINE

## 2020-03-05 PROCEDURE — 80053 COMPREHEN METABOLIC PANEL: CPT | Performed by: INTERNAL MEDICINE

## 2020-03-05 PROCEDURE — 3E0U33Z INTRODUCTION OF ANTI-INFLAMMATORY INTO JOINTS, PERCUTANEOUS APPROACH: ICD-10-PCS | Performed by: ORTHOPAEDIC SURGERY

## 2020-03-05 PROCEDURE — 63710000001 INSULIN GLARGINE PER 5 UNITS: Performed by: INTERNAL MEDICINE

## 2020-03-05 PROCEDURE — 82330 ASSAY OF CALCIUM: CPT | Performed by: INTERNAL MEDICINE

## 2020-03-05 PROCEDURE — 63710000001 INSULIN LISPRO (HUMAN) PER 5 UNITS: Performed by: INTERNAL MEDICINE

## 2020-03-05 PROCEDURE — 86850 RBC ANTIBODY SCREEN: CPT | Performed by: INTERNAL MEDICINE

## 2020-03-05 PROCEDURE — 99232 SBSQ HOSP IP/OBS MODERATE 35: CPT | Performed by: INTERNAL MEDICINE

## 2020-03-05 PROCEDURE — 25010000002 FUROSEMIDE PER 20 MG: Performed by: INTERNAL MEDICINE

## 2020-03-05 PROCEDURE — 82140 ASSAY OF AMMONIA: CPT | Performed by: INTERNAL MEDICINE

## 2020-03-05 PROCEDURE — 85027 COMPLETE CBC AUTOMATED: CPT | Performed by: INTERNAL MEDICINE

## 2020-03-05 PROCEDURE — 25010000002 CALCIUM GLUCONATE-NACL 1-0.675 GM/50ML-% SOLUTION: Performed by: INTERNAL MEDICINE

## 2020-03-05 RX ORDER — FUROSEMIDE 10 MG/ML
20 INJECTION INTRAMUSCULAR; INTRAVENOUS ONCE
Status: COMPLETED | OUTPATIENT
Start: 2020-03-05 | End: 2020-03-05

## 2020-03-05 RX ORDER — DIPHENHYDRAMINE HYDROCHLORIDE 50 MG/ML
12.5 INJECTION INTRAMUSCULAR; INTRAVENOUS ONCE
Status: DISCONTINUED | OUTPATIENT
Start: 2020-03-05 | End: 2020-03-07 | Stop reason: HOSPADM

## 2020-03-05 RX ORDER — ACETAMINOPHEN 325 MG/1
650 TABLET ORAL ONCE
Status: DISCONTINUED | OUTPATIENT
Start: 2020-03-05 | End: 2020-03-07 | Stop reason: HOSPADM

## 2020-03-05 RX ORDER — ARGININE/GLUTAMINE/CALCIUM BMB 7G-7G-1.5G
1 POWDER IN PACKET (EA) ORAL 2 TIMES DAILY
Status: DISCONTINUED | OUTPATIENT
Start: 2020-03-05 | End: 2020-03-07 | Stop reason: HOSPADM

## 2020-03-05 RX ORDER — LACTULOSE 10 G/15ML
30 SOLUTION ORAL
Status: COMPLETED | OUTPATIENT
Start: 2020-03-05 | End: 2020-03-05

## 2020-03-05 RX ORDER — CALCIUM GLUCONATE 20 MG/ML
1 INJECTION, SOLUTION INTRAVENOUS EVERY 12 HOURS
Status: COMPLETED | OUTPATIENT
Start: 2020-03-05 | End: 2020-03-05

## 2020-03-05 RX ORDER — LACTULOSE 10 G/15ML
45 SOLUTION ORAL 3 TIMES DAILY
Status: DISCONTINUED | OUTPATIENT
Start: 2020-03-05 | End: 2020-03-05 | Stop reason: SDUPTHER

## 2020-03-05 RX ADMIN — SODIUM BICARBONATE 650 MG TABLET 650 MG: at 11:10

## 2020-03-05 RX ADMIN — RIFAXIMIN 550 MG: 550 TABLET ORAL at 11:10

## 2020-03-05 RX ADMIN — SUCRALFATE 1 G: 1 TABLET ORAL at 20:37

## 2020-03-05 RX ADMIN — ALLOPURINOL 100 MG: 100 TABLET ORAL at 20:37

## 2020-03-05 RX ADMIN — Medication 10 ML: at 11:12

## 2020-03-05 RX ADMIN — ENOXAPARIN SODIUM 40 MG: 40 INJECTION SUBCUTANEOUS at 18:04

## 2020-03-05 RX ADMIN — INSULIN GLARGINE 25 UNITS: 100 INJECTION, SOLUTION SUBCUTANEOUS at 01:11

## 2020-03-05 RX ADMIN — CALCIUM GLUCONATE 1 G: 20 INJECTION, SOLUTION INTRAVENOUS at 09:14

## 2020-03-05 RX ADMIN — OXYCODONE HYDROCHLORIDE 5 MG: 5 TABLET ORAL at 09:32

## 2020-03-05 RX ADMIN — DOCUSATE SODIUM 100 MG: 100 CAPSULE, LIQUID FILLED ORAL at 09:13

## 2020-03-05 RX ADMIN — PANTOPRAZOLE SODIUM 40 MG: 40 TABLET, DELAYED RELEASE ORAL at 18:05

## 2020-03-05 RX ADMIN — METOCLOPRAMIDE 5 MG: 5 TABLET ORAL at 11:11

## 2020-03-05 RX ADMIN — INSULIN LISPRO 2 UNITS: 100 INJECTION, SOLUTION INTRAVENOUS; SUBCUTANEOUS at 12:29

## 2020-03-05 RX ADMIN — BUPIVACAINE HYDROCHLORIDE 5 ML: 2.5 INJECTION, SOLUTION INFILTRATION; PERINEURAL at 06:15

## 2020-03-05 RX ADMIN — DOCUSATE SODIUM 100 MG: 100 CAPSULE, LIQUID FILLED ORAL at 20:38

## 2020-03-05 RX ADMIN — LEVOTHYROXINE SODIUM 75 MCG: 75 TABLET ORAL at 05:41

## 2020-03-05 RX ADMIN — THERA TABS 1 TABLET: TAB at 11:09

## 2020-03-05 RX ADMIN — RIFAXIMIN 550 MG: 550 TABLET ORAL at 20:38

## 2020-03-05 RX ADMIN — INSULIN LISPRO 18 UNITS: 100 INJECTION, SOLUTION INTRAVENOUS; SUBCUTANEOUS at 18:05

## 2020-03-05 RX ADMIN — Medication 10 ML: at 20:38

## 2020-03-05 RX ADMIN — ALLOPURINOL 100 MG: 100 TABLET ORAL at 11:10

## 2020-03-05 RX ADMIN — TRIAMCINOLONE ACETONIDE 80 MG: 40 INJECTION, SUSPENSION INTRA-ARTICULAR; INTRAMUSCULAR at 06:15

## 2020-03-05 RX ADMIN — LACTULOSE 30 G: 10 SOLUTION ORAL at 18:05

## 2020-03-05 RX ADMIN — POTASSIUM PHOSPHATE, MONOBASIC AND POTASSIUM PHOSPHATE, DIBASIC 20 MMOL: 224; 236 INJECTION, SOLUTION, CONCENTRATE INTRAVENOUS at 11:43

## 2020-03-05 RX ADMIN — ASPIRIN 81 MG: 81 TABLET, COATED ORAL at 09:13

## 2020-03-05 RX ADMIN — LACTULOSE 30 G: 10 SOLUTION ORAL at 13:00

## 2020-03-05 RX ADMIN — MAGNESIUM OXIDE TAB 400 MG (241.3 MG ELEMENTAL MG) 400 MG: 400 (241.3 MG) TAB at 11:10

## 2020-03-05 RX ADMIN — PANTOPRAZOLE SODIUM 40 MG: 40 TABLET, DELAYED RELEASE ORAL at 09:13

## 2020-03-05 RX ADMIN — LACTULOSE 30 G: 10 SOLUTION ORAL at 15:19

## 2020-03-05 RX ADMIN — INSULIN LISPRO 18 UNITS: 100 INJECTION, SOLUTION INTRAVENOUS; SUBCUTANEOUS at 12:29

## 2020-03-05 RX ADMIN — DULOXETINE HYDROCHLORIDE 60 MG: 30 CAPSULE, DELAYED RELEASE ORAL at 11:10

## 2020-03-05 RX ADMIN — OXYBUTYNIN 10 MG: 10 TABLET, FILM COATED, EXTENDED RELEASE ORAL at 11:10

## 2020-03-05 RX ADMIN — SODIUM CHLORIDE 200 MG: 900 INJECTION, SOLUTION INTRAVENOUS at 18:05

## 2020-03-05 RX ADMIN — LACTULOSE 30 G: 10 SOLUTION ORAL at 09:14

## 2020-03-05 RX ADMIN — ATORVASTATIN CALCIUM 20 MG: 20 TABLET, FILM COATED ORAL at 11:11

## 2020-03-05 RX ADMIN — FOLIC ACID 1 MG: 1 TABLET ORAL at 11:10

## 2020-03-05 RX ADMIN — SUCRALFATE 1 G: 1 TABLET ORAL at 11:10

## 2020-03-05 RX ADMIN — INSULIN GLARGINE 50 UNITS: 100 INJECTION, SOLUTION SUBCUTANEOUS at 20:38

## 2020-03-05 RX ADMIN — METOCLOPRAMIDE 5 MG: 5 TABLET ORAL at 18:05

## 2020-03-05 RX ADMIN — POTASSIUM & SODIUM PHOSPHATES POWDER PACK 280-160-250 MG 1 PACKET: 280-160-250 PACK at 09:14

## 2020-03-05 RX ADMIN — LACTULOSE 30 G: 10 SOLUTION ORAL at 11:11

## 2020-03-05 RX ADMIN — SODIUM BICARBONATE 650 MG TABLET 650 MG: at 18:13

## 2020-03-05 RX ADMIN — FUROSEMIDE 20 MG: 10 INJECTION, SOLUTION INTRAMUSCULAR; INTRAVENOUS at 09:13

## 2020-03-05 RX ADMIN — SODIUM BICARBONATE 650 MG TABLET 650 MG: at 20:38

## 2020-03-05 RX ADMIN — CALCIUM GLUCONATE 1 G: 20 INJECTION, SOLUTION INTRAVENOUS at 20:51

## 2020-03-05 RX ADMIN — INSULIN LISPRO 2 UNITS: 100 INJECTION, SOLUTION INTRAVENOUS; SUBCUTANEOUS at 20:38

## 2020-03-05 RX ADMIN — ZINC SULFATE 220 MG (50 MG) CAPSULE 220 MG: CAPSULE at 11:09

## 2020-03-05 NOTE — PROGRESS NOTES
"NEPHROLOGY PROGRESS NOTE------[facility]    [facility]  [phone]  Paula Plascencia MD      Patient Care Team:  Paulette Harris MD as PCP - General  Josefina Plascencia MD as Consulting Physician (Nephrology)      Provider:  Paula Plascencia MD  Patient Name: Bry Ratliff  :  1945    SUBJECTIVE:    No SOB, CP, palpitations, dysuria, cramping.    Medication:    allopurinol 100 mg Oral BID   aspirin 81 mg Oral Daily   atorvastatin 20 mg Oral Daily   cyanocobalamin 1,000 mcg Subcutaneous Q30 Days   docusate sodium 100 mg Oral BID   DULoxetine 60 mg Oral Daily   enoxaparin 40 mg Subcutaneous Daily   folic acid 1 mg Oral Daily   insulin glargine 50 Units Subcutaneous Nightly   insulin lispro 0-9 Units Subcutaneous 4x Daily With Meals & Nightly   insulin lispro 18 Units Subcutaneous TID AC   iron sucrose 200 mg Intravenous Q24H   lactulose 30 g Oral TID   levothyroxine 75 mcg Oral Q AM   magnesium oxide 400 mg Oral Daily   metoclopramide 5 mg Oral BID AC   nicotine 1 patch Transdermal Nightly   oxybutynin XL 10 mg Oral Daily   pantoprazole 40 mg Oral BID AC   potassium & sodium phosphates 1 packet Oral Daily   riFAXIMin 550 mg Oral Q12H   sodium bicarbonate 650 mg Oral TID   sodium chloride 10 mL Intravenous Q12H   sucralfate 1 g Oral 4x Daily AC & at Bedtime   THERA 1 tablet Oral Daily   zinc sulfate 220 mg Oral Daily       sodium chloride 100 mL/hr Last Rate: 100 mL/hr (20 0037)       OBJECTIVE    Vital Sign Min/Max for last 24 hours  Temp  Min: 98.3 °F (36.8 °C)  Max: 99.1 °F (37.3 °C)   BP  Min: 112/63  Max: 133/72   Pulse  Min: 68  Max: 78   Resp  Min: 14  Max: 18   SpO2  Min: 98 %  Max: 99 %   No data recorded   No data recorded     Flowsheet Rows      First Filed Value   Admission Height  188 cm (74\") Documented at 2020 1133   Admission Weight  134 kg (295 lb) Documented at 2020 1133          I/O this shift:  In: -   Out: 250 " [Urine:250]  I/O last 3 completed shifts:  In: 1440 [P.O.:840; I.V.:600]  Out: -     Physical Exam:  General Appearance: alert, appears stated age and cooperative  Head: normocephalic, without obvious abnormality and atraumatic  Eyes: conjunctivae and sclerae normal and no icterus  Neck: supple and no JVD  Lungs: clear to auscultation and respirations regular  Heart: regular rhythm & normal rate and normal S1, S2 +REBECCA  Chest: Wall no abnormalities observed  Abdomen: normal bowel sounds and soft non-tender +OBESITY  Extremities: moves extremities well, +TRACE PRETIBIAL EDEMA BILAT, no cyanosis and no redness  Skin: no bleeding, bruising or rash, turgor normal, color normal and no lesions noted  Neurologic: Alert, and oriented. No focal deficits    Labs:    WBC WBC   Date Value Ref Range Status   03/05/2020 6.40 3.40 - 10.80 10*3/mm3 Final   03/04/2020 8.30 3.40 - 10.80 10*3/mm3 Final   03/03/2020 11.00 (H) 3.40 - 10.80 10*3/mm3 Final      HGB Hemoglobin   Date Value Ref Range Status   03/05/2020 8.3 (L) 13.0 - 17.7 g/dL Final   03/05/2020 7.7 (L) 13.0 - 17.7 g/dL Final   03/04/2020 8.4 (L) 13.0 - 17.7 g/dL Final   03/03/2020 8.4 (L) 13.0 - 17.7 g/dL Final      HCT Hematocrit   Date Value Ref Range Status   03/05/2020 24.5 (L) 37.5 - 51.0 % Final   03/05/2020 22.8 (L) 37.5 - 51.0 % Final   03/04/2020 24.6 (L) 37.5 - 51.0 % Final   03/03/2020 24.9 (L) 37.5 - 51.0 % Final      Platlets No results found for: LABPLAT   MCV MCV   Date Value Ref Range Status   03/05/2020 98.2 (H) 79.0 - 97.0 fL Final   03/04/2020 98.5 (H) 79.0 - 97.0 fL Final   03/03/2020 98.7 (H) 79.0 - 97.0 fL Final          Sodium Sodium   Date Value Ref Range Status   03/05/2020 138 136 - 145 mmol/L Final   03/04/2020 137 136 - 145 mmol/L Final   03/03/2020 134 (L) 136 - 145 mmol/L Final      Potassium Potassium   Date Value Ref Range Status   03/05/2020 3.6 3.5 - 5.2 mmol/L Final   03/04/2020 3.3 (L) 3.5 - 5.2 mmol/L Final   03/03/2020 3.7 3.5 - 5.2  mmol/L Final   03/03/2020 3.9 3.5 - 5.2 mmol/L Final      Chloride Chloride   Date Value Ref Range Status   03/05/2020 104 98 - 107 mmol/L Final   03/04/2020 103 98 - 107 mmol/L Final   03/03/2020 100 98 - 107 mmol/L Final      CO2 CO2   Date Value Ref Range Status   03/05/2020 21.0 (L) 22.0 - 29.0 mmol/L Final   03/04/2020 22.0 22.0 - 29.0 mmol/L Final   03/03/2020 19.0 (L) 22.0 - 29.0 mmol/L Final      BUN BUN   Date Value Ref Range Status   03/05/2020 34 (H) 8 - 23 mg/dL Final   03/04/2020 35 (H) 8 - 23 mg/dL Final   03/03/2020 35 (H) 8 - 23 mg/dL Final      Creatinine Creatinine   Date Value Ref Range Status   03/05/2020 1.77 (H) 0.76 - 1.27 mg/dL Final   03/04/2020 1.79 (H) 0.76 - 1.27 mg/dL Final   03/03/2020 1.84 (H) 0.76 - 1.27 mg/dL Final      Calcium Calcium   Date Value Ref Range Status   03/05/2020 8.5 (L) 8.6 - 10.5 mg/dL Final   03/04/2020 8.5 (L) 8.6 - 10.5 mg/dL Final   03/03/2020 8.2 (L) 8.6 - 10.5 mg/dL Final      PO4 No components found for: PO4   Albumin Albumin   Date Value Ref Range Status   03/05/2020 2.60 (L) 3.50 - 5.20 g/dL Final   03/04/2020 2.70 (L) 3.50 - 5.20 g/dL Final   03/03/2020 3.00 (L) 3.50 - 5.20 g/dL Final      Magnesium Magnesium   Date Value Ref Range Status   03/05/2020 2.1 1.6 - 2.4 mg/dL Final   03/04/2020 1.9 1.6 - 2.4 mg/dL Final      Uric Acid No components found for: URIC ACID     Imaging Results (Last 72 Hours)     Procedure Component Value Units Date/Time    XR Knee 4+ View Bilateral [[id number]] Collected:  03/04/20 2319     Updated:  03/04/20 2328    Narrative:       DATE OF EXAM:  3/4/2020 9:43 PM     PROCEDURE:  XR KNEE 4+ VW BILATERAL-     INDICATIONS:  Pain in both knees and difficulty walking.; R53.1-Weakness;  D64.9-Anemia, unspecified; N18.9-Chronic kidney disease, unspecified;  R79.89-Other specified abnormal findings of blood chemistry     COMPARISON:  No Comparisons Available     TECHNIQUE:   Four radiographic views of bilateral knees were obtained.      FINDINGS:  Right knee: There is no acute fracture or dislocation. There is moderate  to advanced tricompartmental joint space narrowing with marginal  osteophyte formation, worst within the lateral compartment. There is  subchondral sclerosis of the lateral tibial plateau. There is  chondrocalcinosis within the medial and lateral compartments. There is a  trace suprapatellar joint effusion. There is no significant soft tissue  swelling. Bone mineralization is normal. There is no evidence of osseous  erosion.      Left knee: There is mild medial compartment joint space narrowing  without significant osteophyte formation. There is patellofemoral  compartment joint space narrowing with mild osteophyte formation. There  is chondrocalcinosis which can be seen with CPPD arthropathy. There is  no left knee joint effusion. There is peripheral vascular  atherosclerotic calcification. There is no osseous erosion.       Impression:       Right knee:  1. Moderate to advanced tricompartmental joint space narrowing with  marginal osteophyte formation, likely representing osteoarthritis or  CPPD arthropathy given chondrocalcinosis.  2. Trace suprapatellar joint effusion.     Left knee:  1. Mild medial and patellofemoral compartment joint space narrowing  which may relate to osteoarthritis or CPPD arthropathy given  chondrocalcinosis.     Electronically Signed By-Shady Fischer On:3/4/2020 11:26 PM  This report was finalized on [id number] by  Shady Fischer, .    NM Lung Ventilation Perfusion Aerosol [[id number]] Collected:  03/04/20 0911     Updated:  03/04/20 0926    Narrative:       agfaDATE OF EXAM:  3/4/2020 8:24 AM     PROCEDURE:  NM LUNG VENTILATION PERFUSION AEROSOL-     INDICATIONS:  Dyspnea, cardiac origin suspected; R53.1-Weakness; D64.9-Anemia,  unspecified; N18.9-Chronic kidney disease, unspecified; R79.89-Other  specified abnormal findings of blood chemistry     COMPARISON:  Chest radiograph 03/03/2020      TECHNIQUE:   The patient was ventilated with 5.5 mCi of technetium 99m pertechnetate  aerosol and ventilation images were acquired in multiple obliquities.  The patient then received 45 mCi of technetium 99m MAA intravenously and  perfusion images were acquired in multiple obliquities.     FINDINGS:  Ventilation and perfusion scan appears normal. No evidence of  ventilation or perfusion mismatch. No chest radiographic abnormalities.       Impression:       Normal ventilation/perfusion lung scan. This excludes the diagnosis of  pulmonary embolism with a very high degree of clinical certainty     Electronically Signed By-Naif Donahue On:3/4/2020 9:13 AM  This report was finalized on [id number] by  Naif Donahue, .    XR Abdomen KUB [[id number]] Collected:  03/03/20 2318     Updated:  03/03/20 2322    Narrative:       DATE OF EXAM:  3/3/2020 7:24 PM     PROCEDURE:  XR ABDOMEN KUB-     INDICATIONS:  ABDOMINAL PAIN; R53.1-Weakness; D64.9-Anemia, unspecified; N18.9-Chronic  kidney disease, unspecified; R79.89-Other specified abnormal findings of  blood chemistry  74-year-old male who has abdominal pain off and on, pain mostly to the  right in the middle of his abdomen. Patient gives history of diabetes     COMPARISON:  No Comparisons Available     TECHNIQUE:   Single radiographic view of the abdomen was obtained.     FINDINGS:  Today's KUB demonstrates a common duct stent in place along with change  of cholecystectomy and placement of inferior vena cava filter the bowel  pattern is nonspecific the bony structures demonstrate degenerative  change no renal or ureteral or bladder calculus is seen. The bony  structures are intact. The are degenerative changes to the lower  thoracic and lumbar spine. The lung bases are free of disease..       Impression:          1. Postoperative changes of biliary stent placement and cholecystectomy  and inferior vena cava filter  2. Nonspecific bowel pattern, lung bases are free of  disease  3. No evidence of renal or ureteral calculus     Electronically Signed By-Bennett Bobby Jr. On:3/3/2020 11:20 PM  This report was finalized on [id number] by  Bennett Bobby Jr., .    US Renal Bilateral [[id number]] Collected:  03/03/20 1701     Updated:  03/03/20 1706    Narrative:       DATE OF EXAM:  3/3/2020 4:38 PM     PROCEDURE:  US RENAL BILATERAL-     INDICATIONS:  ARF/CKD; R53.1-Weakness; D64.9-Anemia, unspecified; N18.9-Chronic kidney  disease, unspecified; R79.89-Other specified abnormal findings of blood  chemistry     COMPARISON:  No Comparisons Available     TECHNIQUE:   Grayscale and color Doppler ultrasound evaluation of the kidneys and  urinary bladder was performed.        FINDINGS:  Study is technically limited secondary to obscuration by body habitus.  Right kidney measures 9.5 x 4.9 x 5.0 cm the left kidney measures 8.6 x  4.7 x 4.9 cm. Both kidneys maintain normal cortical thickness and  echotexture. No cystic or solid renal mass or shadowing stone or  hydronephrosis is seen. There is mild to moderate urinary bladder  distention with fluid, and the urinary bladder appears normal.        Impression:       Normal renal ultrasound.     Electronically Signed By-Dr. Kaitlin Wong MD On:3/3/2020 5:04 PM  This report was finalized on [id number] by Dr. Kaitlin Wong MD.    XR Chest 1 View [[id number]] Collected:  03/03/20 1245     Updated:  03/03/20 1248    Narrative:       DATE OF EXAM:  3/3/2020 12:12 PM     PROCEDURE:  XR CHEST 1 VW-     INDICATIONS:  Shortness breath, cough and fever for 2 to 3 days.       COMPARISON:  PA and lateral chest 07/11/2019.     TECHNIQUE:   Single radiographic view of the chest was obtained.     FINDINGS:  No acute airspace disease. Heart size is upper limits normal. Mild  degenerative endplate spurring in the thoracic spine. No acute osseous  abnormality.       Impression:       No acute chest findings.     Electronically Signed By-Dr. Kaitlin Wong MD  On:3/3/2020 12:46 PM  This report was finalized on [id number] by Dr. Kaitlin Wong MD.          Results for orders placed during the hospital encounter of 03/03/20   XR Knee 4+ View Bilateral    Narrative DATE OF EXAM:  3/4/2020 9:43 PM     PROCEDURE:  XR KNEE 4+ VW BILATERAL-     INDICATIONS:  Pain in both knees and difficulty walking.; R53.1-Weakness;  D64.9-Anemia, unspecified; N18.9-Chronic kidney disease, unspecified;  R79.89-Other specified abnormal findings of blood chemistry     COMPARISON:  No Comparisons Available     TECHNIQUE:   Four radiographic views of bilateral knees were obtained.     FINDINGS:  Right knee: There is no acute fracture or dislocation. There is moderate  to advanced tricompartmental joint space narrowing with marginal  osteophyte formation, worst within the lateral compartment. There is  subchondral sclerosis of the lateral tibial plateau. There is  chondrocalcinosis within the medial and lateral compartments. There is a  trace suprapatellar joint effusion. There is no significant soft tissue  swelling. Bone mineralization is normal. There is no evidence of osseous  erosion.      Left knee: There is mild medial compartment joint space narrowing  without significant osteophyte formation. There is patellofemoral  compartment joint space narrowing with mild osteophyte formation. There  is chondrocalcinosis which can be seen with CPPD arthropathy. There is  no left knee joint effusion. There is peripheral vascular  atherosclerotic calcification. There is no osseous erosion.       Impression Right knee:  1. Moderate to advanced tricompartmental joint space narrowing with  marginal osteophyte formation, likely representing osteoarthritis or  CPPD arthropathy given chondrocalcinosis.  2. Trace suprapatellar joint effusion.     Left knee:  1. Mild medial and patellofemoral compartment joint space narrowing  which may relate to osteoarthritis or CPPD arthropathy given  chondrocalcinosis.      Electronically Signed By-Shady Fischer On:3/4/2020 11:26 PM  This report was finalized on [id number] by  Shady Fischer, .   XR Abdomen KUB    Narrative DATE OF EXAM:  3/3/2020 7:24 PM     PROCEDURE:  XR ABDOMEN KUB-     INDICATIONS:  ABDOMINAL PAIN; R53.1-Weakness; D64.9-Anemia, unspecified; N18.9-Chronic  kidney disease, unspecified; R79.89-Other specified abnormal findings of  blood chemistry  74-year-old male who has abdominal pain off and on, pain mostly to the  right in the middle of his abdomen. Patient gives history of diabetes     COMPARISON:  No Comparisons Available     TECHNIQUE:   Single radiographic view of the abdomen was obtained.     FINDINGS:  Today's KUB demonstrates a common duct stent in place along with change  of cholecystectomy and placement of inferior vena cava filter the bowel  pattern is nonspecific the bony structures demonstrate degenerative  change no renal or ureteral or bladder calculus is seen. The bony  structures are intact. The are degenerative changes to the lower  thoracic and lumbar spine. The lung bases are free of disease..       Impression    1. Postoperative changes of biliary stent placement and cholecystectomy  and inferior vena cava filter  2. Nonspecific bowel pattern, lung bases are free of disease  3. No evidence of renal or ureteral calculus     Electronically Signed By-Bennett Bobby Jr. On:3/3/2020 11:20 PM  This report was finalized on [id number] by  Bennett Bobby Jr., .   XR Chest 1 View    Narrative DATE OF EXAM:  3/3/2020 12:12 PM     PROCEDURE:  XR CHEST 1 VW-     INDICATIONS:  Shortness breath, cough and fever for 2 to 3 days.       COMPARISON:  PA and lateral chest 07/11/2019.     TECHNIQUE:   Single radiographic view of the chest was obtained.     FINDINGS:  No acute airspace disease. Heart size is upper limits normal. Mild  degenerative endplate spurring in the thoracic spine. No acute osseous  abnormality.       Impression No acute chest  findings.     Electronically Signed By-Dr. Kaitlin Wong MD On:3/3/2020 12:46 PM  This report was finalized on [id number] by Dr. Kaitlin Wong MD.       Results for orders placed during the hospital encounter of 03/03/20   Duplex Venous Lower Extremity - Bilateral CAR    Narrative · Normal bilateral lower extremity venous duplex scan.            ASSESSMENT / PLAN      General weakness    Cirrhosis (CMS/HCC)    Diabetic neuropathy (CMS/HCC)    Dyslipidemia    History of DVT of right lower extremity    Essential hypertension    Acquired hypothyroidism    Morbid obesity (CMS/HCC)    Obstructive sleep apnea    Type 2 diabetes mellitus with kidney complication, with long-term current use of insulin (CMS/HCC)    Spinal stenosis of lumbar region    Pulmonary hypertension (CMS/HCC)    Deficiency of other specified B group vitamins    Chronic coronary artery disease    JOSE (iron deficiency anemia) with poor po absorption    IgG kappa MGUS    THOMAS (nonalcoholic steatohepatitis)    Splenomegaly    Antiphospholipid antibody positive    Elevated factor VIII level    Anemia in stage 3 chronic kidney disease (CMS/HCC)    History of Vitamin B12 deficiency    History of bilateral pulmonary embolus (PE)    Antithrombin III deficiency (CMS/HCC)    Protein C deficiency (CMS/HCC)    Protein S deficiency (CMS/HCC)    GAVE (gastric antral vascular ectasia)    CKD (chronic kidney disease) stage 3, GFR 30-59 ml/min (CMS/[facility])    Decubitus ulcer of sacral region, stage 3 (CMS/HCC)      1. CRF/CKD STG 3-------Nonoliguric. BUN/Cr stable. +Mild ARF/SUSANA on top of known CRF/CKD STG 3 with a baseline serum creatinine of about 1.5. CRF/CKD STG 3 secondary to DGS/HTN NS. +ARF/SUSANA appears to be secondary to slight prerenal state.  Holding Bumex.  No NSAIDs or IV dye. Dose meds for CrCl less than 10 cc/min.     2. ANEMIA WITH H/O GAVE AND MGUS AND JOSE-----PRBCs today     3. HTN WITH CKD STG 3-------BP okay. Avoid hypotension. No  ACE/ARB/DRI/diuretic     4. DMII WITH RENAL MANIFESTATIONS------BS okay. On Lantus     5. HYPOTHYROIDISM-------On Synthroid. TSH pending     6. THOMAS/CIRRHOSIS/HEPATIC ENCEPHALOPATHY------Increase Lactulose. Follow ammonia     7. OBESITY/KATHIA------ CPAP     8. EDEMA/CHRONIC VENOUS INSUFFICIENCY     9. ACIDOSIS------Metabolic. No elevation in AGAP. Secondary to Type 4 RTA and stool losses from diarrhea related to Lactulose use. Bicarb down. Increased po NaHCO3     10. GERD-------On PPI/Carafate     11. HYPERCOAGUABLE STATE WITH ANTI THROMBI III/PROTEIN C & S DEFICIENCY AND HISTORY OF PE-----Dopplers negative. On Lovenox    12. HYPOCALCEMIA-----Replace IV. Follow ionized levels    13. HYPOPHOSPHATEMIA------Replace IV    Paula Plascencia MD  [facility]  [phone]  03/05/20  6:32 AM

## 2020-03-05 NOTE — PROGRESS NOTES
Reason for follow-up: Shortness of breath  3 of CAD status post PCI  History of DVT  Severe anemia and renal failure  Patient Care Team:  Paulette Harris MD as PCP - General  Josefina Plascencia MD as Consulting Physician (Nephrology)    Subjective .   Feels slightly better     Review of Systems   Constitution: Positive for malaise/fatigue. Negative for fever.   HENT: Negative for congestion and hearing loss.    Eyes: Negative for double vision and visual disturbance.   Cardiovascular: Positive for dyspnea on exertion. Negative for chest pain, claudication, leg swelling and syncope.   Respiratory: Positive for cough and shortness of breath.    Endocrine: Negative for cold intolerance.   Skin: Negative for color change and rash.   Musculoskeletal: Negative for arthritis and joint pain.   Gastrointestinal: Negative for abdominal pain and heartburn.   Genitourinary: Negative for hematuria.   Neurological: Negative for excessive daytime sleepiness and dizziness.   Psychiatric/Behavioral: Negative for depression. The patient is not nervous/anxious.    All other systems reviewed and are negative.      Patient has no known allergies.    Scheduled Meds:  allopurinol 100 mg Oral BID   aspirin 81 mg Oral Daily   atorvastatin 20 mg Oral Daily   [START ON 3/5/2020] bupivacaine 5 mL Infiltration Once   cyanocobalamin 1,000 mcg Subcutaneous Q30 Days   docusate sodium 100 mg Oral BID   DULoxetine 60 mg Oral Daily   enoxaparin 40 mg Subcutaneous Daily   folic acid 1 mg Oral Daily   insulin glargine 50 Units Subcutaneous Nightly   insulin lispro 0-9 Units Subcutaneous 4x Daily With Meals & Nightly   insulin lispro 18 Units Subcutaneous TID AC   iron sucrose 200 mg Intravenous Q24H   lactulose 30 g Oral TID   levothyroxine 75 mcg Oral Q AM   magnesium oxide 400 mg Oral Daily   metoclopramide 5 mg Oral BID AC   nicotine 1 patch Transdermal Nightly   oxybutynin XL 10 mg Oral Daily   pantoprazole 40 mg Oral BID AC    "  potassium & sodium phosphates 1 packet Oral Daily   riFAXIMin 550 mg Oral Q12H   sodium bicarbonate 650 mg Oral TID   sodium chloride 10 mL Intravenous Q12H   sucralfate 1 g Oral 4x Daily AC & at Bedtime   THERA 1 tablet Oral Daily   [START ON 3/5/2020] triamcinolone acetonide 80 mg Intra-articular Once   zinc sulfate 220 mg Oral Daily     Continuous Infusions:  sodium chloride 100 mL/hr Last Rate: 100 mL/hr (03/04/20 0037)     PRN Meds:.•  acetaminophen **OR** acetaminophen **OR** acetaminophen  •  Calcium Gluconate-NaCl **AND** calcium gluconate **AND** Calcium, Ionized  •  dextrose  •  dextrose  •  docusate sodium  •  glucagon (human recombinant)  •  hydrOXYzine  •  insulin lispro **AND** insulin lispro  •  ketamine (KETALAR) infusion **AND** Ketamine Vital Signs & Assessment  •  magnesium sulfate **OR** magnesium sulfate **OR** magnesium sulfate  •  melatonin  •  melatonin  •  nitroglycerin  •  ondansetron  •  oxyCODONE  •  potassium & sodium phosphates **OR** potassium & sodium phosphates  •  potassium chloride  •  potassium chloride  •  [COMPLETED] Insert peripheral IV **AND** sodium chloride  •  sodium chloride    Objective   Looks comfortable lying in the bed    VITAL SIGNS  Vitals:    03/03/20 1636 03/03/20 2003 03/04/20 0423 03/04/20 1208   BP:  121/66 117/62 112/63   BP Location:  Right arm Right arm Left arm   Patient Position:   Lying Sitting   Pulse:  78 78 78   Resp:  18 15 17   Temp:  98.9 °F (37.2 °C) 97.4 °F (36.3 °C) 99.1 °F (37.3 °C)   TempSrc:  Oral Oral Oral   SpO2: 100% 98% 98% 98%   Weight:       Height:           Flowsheet Rows      First Filed Value   Admission Height  188 cm (74\") Documented at 03/03/2020 1133   Admission Weight  134 kg (295 lb) Documented at 03/03/2020 1133           TELEMETRY: Sinus rhythm    Physical Exam:  Physical Exam   Constitutional: He is oriented to person, place, and time. He appears well-developed and well-nourished. He is cooperative.   Obese   HENT:   Head: " Normocephalic and atraumatic.   Mouth/Throat: Uvula is midline and oropharynx is clear and moist. No oral lesions.   Eyes: Conjunctivae are normal. No scleral icterus.   Neck: Trachea normal. Neck supple. No JVD present. Carotid bruit is not present. No thyromegaly present.   Cardiovascular: Normal rate, regular rhythm, S1 normal, S2 normal, normal heart sounds, intact distal pulses and normal pulses. PMI is not displaced. Exam reveals no gallop and no friction rub.   No murmur heard.  Pulmonary/Chest: Effort normal and breath sounds normal.   Abdominal: Soft. Bowel sounds are normal.   Genitourinary:   Genitourinary Comments: No Maurice catheter   Musculoskeletal: Normal range of motion. He exhibits edema.   Neurological: He is alert and oriented to person, place, and time. He has normal strength.   No focal deficits   Skin: Skin is warm. No cyanosis.   Decub ulcers noted on the sacrum   Psychiatric: He has a normal mood and affect.                LAB RESULTS (LAST 7 DAYS)    CBC  Results from last 7 days   Lab Units 03/04/20  1144 03/03/20  1202   WBC 10*3/mm3 8.30 11.00*   RBC 10*6/mm3 2.50* 2.52*   HEMOGLOBIN g/dL 8.4* 8.4*   HEMATOCRIT % 24.6* 24.9*   MCV fL 98.5* 98.7*   PLATELETS 10*3/mm3 189 181       BMP  Results from last 7 days   Lab Units 03/04/20  0944 03/03/20  1202   SODIUM mmol/L 137 134*   POTASSIUM mmol/L 3.3* 3.9  3.7   CHLORIDE mmol/L 103 100   CO2 mmol/L 22.0 19.0*   BUN mg/dL 35* 35*   CREATININE mg/dL 1.79* 1.84*   GLUCOSE mg/dL 159* 245*   MAGNESIUM mg/dL 1.9  --    PHOSPHORUS mg/dL 2.8  --        CMP Results from last 7 days   Lab Units 03/04/20  0944 03/03/20  1202   SODIUM mmol/L 137 134*   POTASSIUM mmol/L 3.3* 3.9  3.7   CHLORIDE mmol/L 103 100   CO2 mmol/L 22.0 19.0*   BUN mg/dL 35* 35*   CREATININE mg/dL 1.79* 1.84*   GLUCOSE mg/dL 159* 245*   ALBUMIN g/dL 2.70* 3.00*   BILIRUBIN mg/dL 0.4 0.5   ALK PHOS U/L 119* 128*   AST (SGOT) U/L 29 34   ALT (SGPT) U/L 18 22   AMMONIA umol/L 65*   --          BNP        TROPONIN  Results from last 7 days   Lab Units 03/04/20  0944   CK TOTAL U/L 37   TROPONIN T ng/mL 0.019       CoAg  Results from last 7 days   Lab Units 03/03/20  1202   INR  1.09   APTT seconds 31.2*       Creatinine Clearance  Estimated Creatinine Clearance: 53.5 mL/min (A) (by C-G formula based on SCr of 1.79 mg/dL (H)).    ABG        Radiology  Nm Lung Ventilation Perfusion Aerosol    Result Date: 3/4/2020  Normal ventilation/perfusion lung scan. This excludes the diagnosis of pulmonary embolism with a very high degree of clinical certainty  Electronically Signed By-Naif Donahue On:3/4/2020 9:13 AM This report was finalized on [id number] by  Naif Donahue, .    Xr Chest 1 View    Result Date: 3/3/2020  No acute chest findings.  Electronically Signed By-Dr. Kaitlin Wong MD On:3/3/2020 12:46 PM This report was finalized on [id number] by Dr. Kaitlin Wong MD.    Us Renal Bilateral    Result Date: 3/3/2020  Normal renal ultrasound.  Electronically Signed By-Dr. Kaitlin Wong MD On:3/3/2020 5:04 PM This report was finalized on [id number] by Dr. Kaitlin Wong MD.    Xr Abdomen Kub    Result Date: 3/3/2020   1. Postoperative changes of biliary stent placement and cholecystectomy and inferior vena cava filter 2. Nonspecific bowel pattern, lung bases are free of disease 3. No evidence of renal or ureteral calculus  Electronically Signed By-Bennett Bobby Jr. On:3/3/2020 11:20 PM This report was finalized on [id number] by  Bennett Bobby Jr., .            EKG        I personally viewed and interpreted the patient's EKG/Telemetry data:    ECHOCARDIOGRAM:    Results for orders placed during the hospital encounter of 03/03/20   Adult Transthoracic Echo Complete W/ Cont if Necessary Per Protocol    Narrative · The left ventricular cavity is mildly dilated.  · Estimated EF = 50%.  · Left ventricular systolic function is low normal.  · Right ventricular cavity is mildly dilated.  · Mild mitral  valve regurgitation is present  · Mild tricuspid valve regurgitation is present.  · Mild dilation of the aortic root is present.        STRESS MYOVIEW:    CARDIAC CATHETERIZATION:    OTHER:         Assessment/Plan       General weakness    Cirrhosis (CMS/HCC)    Diabetic neuropathy (CMS/HCC)    Dyslipidemia    History of DVT of right lower extremity    Essential hypertension    Acquired hypothyroidism    Morbid obesity (CMS/HCC)    Obstructive sleep apnea    Type 2 diabetes mellitus with kidney complication, with long-term current use of insulin (CMS/HCC)    Spinal stenosis of lumbar region    Pulmonary hypertension (CMS/HCC)    Deficiency of other specified B group vitamins    Chronic coronary artery disease    JOSE (iron deficiency anemia) with poor po absorption    IgG kappa MGUS    THOMAS (nonalcoholic steatohepatitis)    Splenomegaly    Antiphospholipid antibody positive    Elevated factor VIII level    Anemia in stage 3 chronic kidney disease (CMS/HCC)    History of Vitamin B12 deficiency    History of bilateral pulmonary embolus (PE)    Antithrombin III deficiency (CMS/HCC)    Protein C deficiency (CMS/HCC)    Protein S deficiency (CMS/HCC)    GAVE (gastric antral vascular ectasia)    CKD (chronic kidney disease) stage 3, GFR 30-59 ml/min (CMS/HCC)    Decubitus ulcer of sacral region, stage 3 (CMS/HCC)      Multiple medical problems now admitted with worsening shortness of breath borderline elevated troponin probably demand ischemia  History of CAD status post PCI almost 10 years ago  Patient have severe anemia renal failure  Primary team following  Pharmacological stress Myoview in the morning  Echocardiogram was reviewed    I discussed the patients findings and my recommendations with patient and family at bedside    Alvaro Pandey MD  03/04/20  7:03 PM

## 2020-03-05 NOTE — CONSULTS
Nutrition Services    Patient Name:  Bry Ratliff  YOB: 1945  MRN: [id number]  Admit Date:  3/3/2020    Comments:     R/t stg III pressure ulcer, adding Boost Glucose Control BID and Adrian BID both for wound healing     Reason for Assessment                Reason for Assessment    Reason For Assessment 3/5: Non specific Diet education, noted upon chart review pt with pressure ulcer        Diagnosis H&P:     74 y.o.  presents to [facility] complaining of generalized weakness.  Patient is a morbidly obese male with history of several chronic medical problems including diabetes mellitus type 2 with neuropathy/severe spinal stenosis/CKD stage III/liver cirrhosis secondary to Gonzalez/obstructive sleep apnea/CAD/hyperlipidemia/anemia/history of DVT and PE/essential hypertension/acquired hypothyroidism/pulmonary hypertension and he has several other health problems and apparently lives at home and recently had a EGD done as well as ERCP done by GI    Current Problems:    Generalized weakness with difficult ambulation    CKD III  -Nephrology following     Anemia of chronic kidney disease as well as chronic blood loss anemia from GI origin               Nutrition/Diet History                Nutrition/Diet History    Narrative     3/5: Pt sleeping in bed with head tilted back and mouth open snoring. Did not attempt to wake. Will follow up at later date.       Functional Status Wheelchair bound per physical therapy's note        Food Allergies  NKFA        Factors Affecting Nutritional Intake  Chronic disease          Anthropometrics             Anthropometrics    Height 74 inches     Weight 295 lb (3/3/20)        Admit Weight    Admit Weight 295 lb (3/3/20)        Ideal Body Weight (IBW)    Ideal Body Weight (IBW) 86.3 kg/190 lb     % Ideal Body Weight 155%        Usual Body Weight (UBW)    Usual Body Weight UTD    % Usual Body Weight UTD     Weight Hx  296 lb (2/18/20)  304 lb (1/10/20)  296 lb  (12/27/19)  309 lb (11/1/19)   305 lb (10/14/19)  302 lb (9/3/19)  298 lb (7/8/19)  291 lb (6/25/19)  356 lb (3/8/18)  348 lb (1/17/17)  338 lb (2/26/16)     Wt fairly stable x 1 year, but down slightly over 4 years.        Body Mass Index (BMI)    BMI (kg/m2) 37.88            Labs/Medications                Labs    Pertinent Lab Results Comments Gluc 196 H, iCa 1.15 L, Alb 2.6 L, Phos 1.8 L, Ammonia 191 H, Hgb 8.3 L, Hct 24.5 L         Medications    Pertinent Medications Comments Colace, cymbalta, folvite, lantus, humalog, lactulose, synthroid, Mgox, Reglan, Protonix, Phos-Nak, Carafate, MVI, zinc, oxycodone          Physical Findings                Physical Findings    Overall Physical Appearance 3/5 pt appears very well nourished laying in bed     Edema  None charted      Gastrointestinal + BM x 5 on 3/5 noted on lactulose      Tubes None      Oral/Mouth Cavity Unknown baseline in regards to chewing/swallowing issues.      Skin Per wound RN on 3/4 Stg III wound to sacrum          Estimated/Assessed Needs              Calorie Requirements     Height Used for Calorie Calculations       Weight Used for Calorie Calculations      Formula Chosen for Calorie Calculations       Estimated Calorie Requirements (kcals/day)              Protein Requirements    Weight Used For Protein Calculations 86.3 kg (IBW)       Est Protein Requirement Amount (gms/kg) 1.5 gm/kg       Estimated Protein Requirements (gms/day) 129 gm          Fluid Requirements     Estimated Fluid Requirements (mL/day)            Fluid Deficit       Current Sodium Level (mEq/L)      Desired Sodium Level (mEq/L)      Estimated Fluid Deficit Needs (L)           Nutrition Prescription Ordered                Nutrition Prescription PO    Current PO Diet  Consistent Carb, HH diet      Supplement  None         Nutrition Prescription EN    Enteral Route -     TF Modular -     TF Delivery Method -     Current Ordered TF  -     Current Ordered Water flush  -     TF  Observation  -        Nutrition Prescription TPN    TPN Route -     Current Ordered TPN Volume  -     Dextrose (gm/kcals)  -     Amino Acid (gm/kcals) -     Lipids (mL/Concentration/Frequency)  -     TPN Observation  -          Evaluation of Received Nutrient/Fluid Intake                PO Evaluation    % PO Intake 3/5: 75% x 1 meal         EN Evaluation    TF Changes -     TF Residual -     TF Tolerance -     Average EN Delivered  -        TPN Evaluation    Total Number of Days on TPN  -           Clinical Course      Clinical Nutrition Course Details           Problem/Interventions:                     Nutrition Diagnoses Problem 1      Problem 1   Increased nutrient needs (protein) r/t increased demand for wound healing AEB pt with stg III pressure ulcer       Nutrition Diagnoses Problem 2      Problem 2            Intervention Goal                Intervention Goal    General Pt will eat > 75% of meals    Pt will be accepting of ONS          Nutrition Intervention                Nutrition Intervention    RD/Tech Action  Starting ONS for wound healing          Nutrition Prescription                Nutrition Prescription PO      Diet  Consistent Carb/HH     Supplement Adrian BID, Boost Glucose Control BID         Nutrition Prescription EN    Enteral Prescription -        Nutrition Prescription TPN    TPN Prescription -         Monitor/Evaluation                Monitor/Evaluation    Monitor Weights, intakes, labs, BM and skin            Electronically signed by:  Lulu Wilson RD  03/05/20 1:43 PM

## 2020-03-05 NOTE — PROGRESS NOTES
Continued Stay Note  ERICA Jerry     Patient Name: Bry Ratliff  MRN: [id number]  Today's Date: 3/5/2020    Admit Date: 3/3/2020    Discharge Plan     Row Name 03/05/20 1311       Plan    Plan  DC Plan: [facility] accepted. PASRR approved. No precert required. See treatment team note.     Row Name 03/05/20 1244               Discharge Codes    No documentation.       Expected Discharge Date and Time     Expected Discharge Date Expected Discharge Time    Mar 6, 2020           BOBO Young    Phone # [phone]  Cell #[phone]  Fax#[phone]  [email or web address]    BOBO Young

## 2020-03-05 NOTE — PROGRESS NOTES
Continued Stay Note  ERICA Jerry     Patient Name: Bry Ratliff  MRN: [id number]  Today's Date: 3/5/2020    Admit Date: 3/3/2020    Discharge Plan     Row Name 03/05/20 1244       Plan    Plan  D/C Plan: [facility] accepted. No precet needed.     Row Name 03/05/20 1242       Plan    Plan Comments  Barrier to D/C: myoview today, IV lasix, electrolyte replacement.         Expected Discharge Date and Time     Expected Discharge Date Expected Discharge Time    Mar 6, 2020             Simran Funes

## 2020-03-05 NOTE — OP NOTE
Preoperative diagnosis degenerative arthritis right knee  Postoperative diagnosis the same  Surgeon Dr. Carlo Staples  Anesthesia local  Complications drains transfusions none.  Estimated blood loss 0    Procedure performed injection right knee    Indications this patient is a 74-year-old male who has diminishing ability to get around.  He has been falling more recently.  Complaining of pain in his right knee.    Procedure the patient was seen in his room his wife was there with him.  His right knee was prepped and draped in usual sterile fashion.  He was given an injection in his right knee 80 mg of Kenalog and 4 cc of 0.25% Marcaine plain.  He tolerated it well.  We will resume physical therapy and follow him in the office in 6 weeks.

## 2020-03-05 NOTE — PROGRESS NOTES
[facility] Services Daily Progress Note      Hospitalist Team  LOS 2 days      Patient Care Team:  Paulette Harris MD as PCP - General  Josefina Plascencia MD as Consulting Physician (Nephrology)    Patient Location: 304/1      Subjective   Subjective     Chief Complaint / Subjective  Chief Complaint   Patient presents with   • Weakness - Generalized         Brief Synopsis of Hospital Course/HPI  Mr. Ratliff is a 74 y.o.  presents to [facility] complaining of generalized weakness.  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  Patient also has anemia for which patient is supposed to receive iron infusions.  Patient condition has been declining for the last several days and it got to a point where he was unable to move around and safely ambulate.  5 she is also elderly frail person and she is unable to take care of him as he is very unstable on his gait and he is tend to fall and she is afraid that he will have a major injury from a fall.  Patient also complains of right knee pain for which she had injection in the past and that knee is also getting worse in terms of his ambulation.      Date:: 3/4/2020: Patient seen and examined and patient little better but still continues to be weak and complaining of problem ambulation secondary to right knee pain.  I consulted Dr. Staples and also spoke to him.  I also spoke to  as well as spoke to his wife at the bedside.  Patient had echocardiogram done which came out to be normal.  Most of his  "work-up was negative.    Date 3/5/2020 : Patient had jump in ammonia level from 63 to 191 and patient had no BM since yesterday. Lactulose dose increased.     Review of Systems   All other systems reviewed and are negative.        Objective   Objective      Vital Signs  Temp:  [98 °F (36.7 °C)-98.9 °F (37.2 °C)] 98 °F (36.7 °C)  Heart Rate:  [68-82] 82  Resp:  [14-18] 16  BP: (117-133)/(64-72) 123/65  Oxygen Therapy  SpO2: 99 %  Pulse Oximetry Type: Intermittent  Device (Oxygen Therapy): room air  Device (Oxygen Therapy): room air  Flowsheet Rows      First Filed Value   Admission Height  188 cm (74\") Documented at 03/03/2020 1133   Admission Weight  134 kg (295 lb) Documented at 03/03/2020 1133        Intake & Output (last 3 days)       03/02 0701 - 03/03 0700 03/03 0701 - 03/04 0700 03/04 0701 - 03/05 0700 03/05 0701 - 03/06 0700    P.O.  480 360 720    I.V. (mL/kg)  600 (4.5)      Total Intake(mL/kg)  1080 (8.1) 360 (2.7) 720 (5.4)    Urine (mL/kg/hr)   250 (0.1)     Stool   0     Total Output   250     Net  +1080 +110 +720            Urine Unmeasured Occurrence  1 x 1 x 3 x    Stool Unmeasured Occurrence  1 x 2 x 7 x        Lines, Drains & Airways    Active LDAs     Name:   Placement date:   Placement time:   Site:   Days:    Peripheral IV 03/03/20 1153 Right Forearm   03/03/20    1153    Forearm   1                  Physical Exam:    Physical Exam   Nursing note and vitals reviewed.    Patient is an awake and morbidly obese male does not seem to in distress.  Skin exam shows no new acute rash or ulcers.  Lungs are clear to auscultation bilaterally with prolonged expiration.  Cardiovascular S1-S2 no murmurs no gallops.  Abdomen is soft nontender nondistended no organomegaly bowel sounds positive.  Musculoskeletal he has painful range of motion on the right knee otherwise normal range of motion.  Extremities mild edema.  At this normocephalic.  HEENT exam pupils are equal reactive throat is clear.  Neuro is " grossly intact.       Wounds (last 24 hours)      LDA Wound     Row Name 03/05/20 0701 03/04/20 1901          Wound 10/17/19 1830 lower sacral spine Pressure Injury    Wound - Properties Group Date first assessed: 10/17/19  -JR Time first assessed: 1830  -JR Orientation: lower  -JR Location: sacral spine  -JR Primary Wound Type: Pressure inj  -JR Stage, Pressure Injury: Stage 3  -JR    Dressing Appearance  moist drainage;intact  -AS  moist drainage;intact  -LM     Closure  Other (Comment) Mepilex  -AS  Other (Comment) Mepilex  -LM     Base  moist;red;bleeding  -AS  moist;red;bleeding  -LM     Periwound  intact;moist;pink;redness  -AS  intact;moist;pink;redness  -LM     Periwound Temperature  warm  -AS  warm  -LM     Periwound Skin Turgor  soft  -AS  soft  -LM     Edges  irregular  -AS  irregular  -LM     Drainage Characteristics/Odor  yellow;serous  -AS  yellow;serous  -LM     Drainage Amount  small  -AS  small  -LM     Care, Wound  cleansed with;sterile normal saline  -AS  --     Dressing Care, Wound  dressing applied;silicone  -AS  --       User Key  (r) = Recorded By, (t) = Taken By, (c) = Cosigned By    Initials Name Provider Type    JR Gautam Torres, RN Registered Nurse    AS Sohl, Alexica, RN Registered Nurse    Danita Hendrix RN Registered Nurse          Procedures:              Results Review:     I reviewed the patient's new clinical results.      Lab Results (last 24 hours)     Procedure Component Value Units Date/Time    POC Glucose Once [[id number]]  (Abnormal) Collected:  03/05/20 1701    Specimen:  Blood Updated:  03/05/20 1706     Glucose 145 mg/dL      Comment: Serial Number: [id number]:  [id number]       Occult Blood, Fecal By Immunoassay - Stool, Per Rectum [[id number]]  (Abnormal) Collected:  03/03/20 1417    Specimen:  Stool from Per Rectum Updated:  03/05/20 1417     Occult Blood, Fecal by Immunoassay Positive    POC Glucose Once [[id number]]  (Abnormal) Collected:  03/05/20 1137     Specimen:  Blood Updated:  03/05/20 1154     Glucose 178 mg/dL      Comment: Serial Number: [id number]:  [id number]       Blood Culture - Blood, Wrist, Left [[id number]] Collected:  03/04/20 0944    Specimen:  Blood from Wrist, Left Updated:  03/05/20 1015     Blood Culture No growth at 24 hours    Blood Culture - Blood, Arm, Right [[id number]] Collected:  03/04/20 0944    Specimen:  Blood from Arm, Right Updated:  03/05/20 1015     Blood Culture No growth at 24 hours    Wound Culture - Wound, Buttock [[id number]] Collected:  03/04/20 1131    Specimen:  Wound from Buttock Updated:  03/05/20 0825     Wound Culture Heavy growth (4+) Mixed Sona Isolated     Comment: This specimen is not acceptable for generating clinically relevant information due to predominating colonic and rectal sona. No further workup.          Gram Stain Moderate (3+) WBCs per low power field      Many (4+) Mixed bacterial morphotypes seen on Gram Stain    POC Glucose Once [[id number]]  (Abnormal) Collected:  03/05/20 0738    Specimen:  Blood Updated:  03/05/20 0740     Glucose 158 mg/dL      Comment: Serial Number: [id number]:  [id number]       POC Glucose Once [[id number]]  (Abnormal) Collected:  03/05/20 0617    Specimen:  Blood Updated:  03/05/20 0620     Glucose 154 mg/dL      Comment: Serial Number: [id number]:  [id number]       Calcium, Ionized [[id number]]  (Abnormal) Collected:  03/05/20 0456    Specimen:  Blood Updated:  03/05/20 0546     Ionized Calcium 1.15 mmol/L     Hemoglobin & Hematocrit, Blood [[id number]]  (Abnormal) Collected:  03/05/20 0456    Specimen:  Blood Updated:  03/05/20 0544     Hemoglobin 8.3 g/dL      Hematocrit 24.5 %     Phosphorus [[id number]]  (Abnormal) Collected:  03/05/20 0316    Specimen:  Blood Updated:  03/05/20 0416     Phosphorus 1.8 mg/dL     Comprehensive Metabolic Panel [[id number]]  (Abnormal) Collected:  03/05/20 0316    Specimen:  Blood Updated:  03/05/20 0412     Glucose 196 mg/dL       BUN 34 mg/dL      Creatinine 1.77 mg/dL      Sodium 138 mmol/L      Potassium 3.6 mmol/L      Chloride 104 mmol/L      CO2 21.0 mmol/L      Calcium 8.5 mg/dL      Total Protein 6.8 g/dL      Albumin 2.60 g/dL      ALT (SGPT) 21 U/L      AST (SGOT) 39 U/L      Alkaline Phosphatase 112 U/L      Total Bilirubin 0.2 mg/dL      eGFR Non African Amer 38 mL/min/1.73      Globulin 4.2 gm/dL      A/G Ratio 0.6 g/dL      BUN/Creatinine Ratio 19.2     Anion Gap 13.0 mmol/L     Narrative:       GFR Normal >60  Chronic Kidney Disease <60  Kidney Failure <15      Magnesium [[id number]]  (Normal) Collected:  03/05/20 0316    Specimen:  Blood Updated:  03/05/20 0412     Magnesium 2.1 mg/dL     Ammonia [[id number]]  (Abnormal) Collected:  03/05/20 0316    Specimen:  Blood Updated:  03/05/20 0410     Ammonia 191 umol/L     CBC (No Diff) [[id number]]  (Abnormal) Collected:  03/05/20 0316    Specimen:  Blood Updated:  03/05/20 0354     WBC 6.40 10*3/mm3      RBC 2.33 10*6/mm3      Hemoglobin 7.7 g/dL      Hematocrit 22.8 %      MCV 98.2 fL      MCH 33.1 pg      MCHC 33.7 g/dL      RDW 16.1 %      RDW-SD 55.6 fl      MPV 9.1 fL      Platelets 178 10*3/mm3     POC Glucose Once [[id number]]  (Abnormal) Collected:  03/05/20 0029    Specimen:  Blood Updated:  03/05/20 0030     Glucose 209 mg/dL      Comment: Serial Number: [id number]:  [id number]       POC Glucose Once [[id number]]  (Abnormal) Collected:  03/04/20 2031    Specimen:  Blood Updated:  03/04/20 2033     Glucose 141 mg/dL      Comment: Serial Number: [id number]:  [id number]           Hemoglobin A1C   Date Value Ref Range Status   03/04/2020 7.4 (H) 3.5 - 5.6 % Final     Results from last 7 days   Lab Units 03/03/20  1202   INR  1.09           Lab Results   Component Value Date    LIPASE 26 10/16/2019     Lab Results   Component Value Date    CHOL 99 03/04/2020    TRIG 127 03/04/2020    HDL 30 (L) 03/04/2020    LDL 44 03/04/2020       Lab Results   Lab Value Date/Time     FINALDX  02/27/2020 1128     Common bile duct brushing, smear preparation only:    Atypical ductal cells, see comment    JPR/cec      FINALDX  01/09/2020 1148     Esophagus, biopsy:    Squamoglandular mucosa with intestinal metaplasia consistent with Syed's esophagus    Negative for dysplasia    JPR/sms       COMDX  02/27/2020 1128     The sample shows multiple groups of atypical appearing ductal cells. The atypia is characterized by nuclear overlap and mild nuclear enlargement. Marked pleomorphism and mitotic figures are not identified. The patient's clinical history of a stent is noted and the current findings are favored to represent reactive ductal epithelium with stent atypia, however a well differentiated malignancy cannot be entirely excluded. Continued close clinical follow up along with correlation with serum tumor markers and imaging studies is recommended.      JPR/cec      COMDX  11/20/2019 1307     The cellularity is scant and features few groups of glandular cells with mild atypia encompassing nuclear enlargement and loss of polarity. These features may be reactive, however if a mass lesion is present or clinical suspicion for malignancy is high, additional studies are recommended as clinically indicated.      WALESKA/cec         Microbiology Results (last 10 days)     Procedure Component Value - Date/Time    Wound Culture - Wound, Buttock [[id number]] Collected:  03/04/20 1131    Lab Status:  Final result Specimen:  Wound from Buttock Updated:  03/05/20 0825     Wound Culture Heavy growth (4+) Mixed Sona Isolated     Comment: This specimen is not acceptable for generating clinically relevant information due to predominating colonic and rectal sona. No further workup.          Gram Stain Moderate (3+) WBCs per low power field      Many (4+) Mixed bacterial morphotypes seen on Gram Stain    Blood Culture - Blood, Wrist, Left [[id number]] Collected:  03/04/20 0975    Lab Status:  Preliminary result  Specimen:  Blood from Wrist, Left Updated:  03/05/20 1015     Blood Culture No growth at 24 hours    Blood Culture - Blood, Arm, Right [[id number]] Collected:  03/04/20 0944    Lab Status:  Preliminary result Specimen:  Blood from Arm, Right Updated:  03/05/20 1015     Blood Culture No growth at 24 hours    Influenza Antigen, Rapid - Swab, Nasopharynx [[id number]]  (Normal) Collected:  03/03/20 1202    Lab Status:  Final result Specimen:  Swab from Nasopharynx Updated:  03/03/20 1222     Influenza A PCR Not Detected     Influenza B PCR Not Detected          ECG/EMG Results (most recent)     Procedure Component Value Units Date/Time    ECG 12 Lead [[id number]] Collected:  03/03/20 1307     Updated:  03/04/20 0824    Narrative:       HEART RATE= 77  bpm  RR Interval= 776  ms  NC Interval= 88  ms  P Horizontal Axis=   deg  P Front Axis= 0  deg  QRSD Interval= 90  ms  QT Interval= 459  ms  QRS Axis= -23  deg  T Wave Axis= 23  deg  - ABNORMAL ECG -  Sinus rhythm  Low voltage, precordial leads  Prolonged QT interval  When compared with ECG of 18-Feb-2020 10:54:41,  Electronically Signed By: Erasto Weems ([facility]) 04-Mar-2020 08:24:19  Date and Time of Study: 2020-03-03 13:07:48    Adult Transthoracic Echo Complete W/ Cont if Necessary Per Protocol [[id number]] Collected:  03/04/20 1002     Updated:  03/04/20 1224     BSA 2.6 m^2      RVIDd 3.5 cm      IVSd 0.96 cm      LVIDd 5.9 cm      LVIDs 3.8 cm      LVPWd 1.2 cm      IVS/LVPW 0.81     FS 35.9 %      EDV(Teich) 170.5 ml      ESV(Teich) 60.2 ml      EF(Teich) 64.7 %      EDV(cubed) 201.1 ml      ESV(cubed) 53.0 ml      EF(cubed) 73.7 %      LV mass(C)d 259.1 grams      LV mass(C)dI 101.1 grams/m^2      SV(Teich) 110.2 ml      SI(Teich) 43.0 ml/m^2      SV(cubed) 148.2 ml      SI(cubed) 57.8 ml/m^2      Ao root diam 4.0 cm      Ao root area 12.4 cm^2      ACS 1.9 cm      asc Aorta Diam 3.5 cm      LVOT diam 2.6 cm      LVOT area 5.2 cm^2      RVOT diam 2.7 cm      RVOT area 5.5  cm^2      EDV(MOD-sp4) 176.8 ml      ESV(MOD-sp4) 68.5 ml      EF(MOD-sp4) 61.2 %      SV(MOD-sp4) 108.3 ml      SI(MOD-sp4) 42.2 ml/m^2      Ao root area (BSA corrected) 1.5     LV Tam Vol (BSA corrected) 69.0 ml/m^2      LV Sys Vol (BSA corrected) 26.7 ml/m^2      Aortic R-R 0.78 sec      Aortic HR 76.5 BPM      MV E max frankie 101.9 cm/sec      MV A max frankie 109.4 cm/sec      MV E/A 0.93     MV V2 max 120.6 cm/sec      MV max PG 5.8 mmHg      MV V2 mean 87.4 cm/sec      MV mean PG 3.3 mmHg      MV V2 VTI 33.3 cm      MVA(VTI) 4.4 cm^2      MV dec slope 649.1 cm/sec^2      MV dec time 0.16 sec      Ao pk frankie 138.1 cm/sec      Ao max PG 7.6 mmHg      Ao max PG (full) 2.1 mmHg      Ao V2 mean 99.8 cm/sec      Ao mean PG 4.5 mmHg      Ao mean PG (full) 1.0 mmHg      Ao V2 VTI 28.9 cm      AMRAND(I,A) 5.1 cm^2      ARMAND(I,D) 5.1 cm^2      ARMAND(V,A) 4.4 cm^2      ARMAND(V,D) 4.4 cm^2      LV V1 max PG 5.5 mmHg      LV V1 mean PG 3.5 mmHg      LV V1 max 117.2 cm/sec      LV V1 mean 90.2 cm/sec      LV V1 VTI 28.2 cm      CO(Ao) 27.3 l/min      CI(Ao) 10.7 l/min/m^2      SV(Ao) 357.2 ml      SI(Ao) 139.4 ml/m^2      CO(LVOT) 11.2 l/min      CI(LVOT) 4.4 l/min/m^2      SV(LVOT) 146.6 ml      SV(RVOT) 99.3 ml      SI(LVOT) 57.2 ml/m^2      PA V2 max 139.7 cm/sec      PA max PG 7.8 mmHg      PA max PG (full) 5.0 mmHg      PA V2 mean 96.4 cm/sec      PA mean PG 4.3 mmHg      PA mean PG (full) 2.7 mmHg      PA V2 VTI 27.2 cm      PVA(I,A) 3.7 cm^2       CV ECHO RODNEY - PVA(I,D) 3.7 cm^2       CV ECHO RODNEY - PVA(V,A) 3.3 cm^2       CV ECHO RODNEY - PVA(V,D) 3.3 cm^2      PA acc time 0.08 sec      RV V1 max PG 2.8 mmHg      RV V1 mean PG 1.6 mmHg      RV V1 max 84.4 cm/sec      RV V1 mean 59.6 cm/sec      RV V1 VTI 17.9 cm      TR max frankie 291.7 cm/sec      RVSP(TR) 49.0 mmHg      RAP systole 15.0 mmHg      PA pr(Accel) 41.3 mmHg      Qp/Qs 0.68      CV ECHO RODNEY - BZI_BMI 37.9 kilograms/m^2       CV ECHO RODNEY - BSA([facility]) 2.7 m^2        CV ECHO RODNEY - BZI_METRIC_WEIGHT 133.8 kg       CV ECHO RODNEY - BZI_METRIC_HEIGHT 188.0 cm      EF(MOD-bp) 61.0 %      LA dimension(2D) 3.4 cm      Echo EF Estimated 50 %     Narrative:       · The left ventricular cavity is mildly dilated.  · Estimated EF = 50%.  · Left ventricular systolic function is low normal.  · Right ventricular cavity is mildly dilated.  · Mild mitral valve regurgitation is present  · Mild tricuspid valve regurgitation is present.  · Mild dilation of the aortic root is present.             Results for orders placed during the hospital encounter of 03/03/20   Duplex Venous Lower Extremity - Bilateral CAR    Narrative · Normal bilateral lower extremity venous duplex scan.          Results for orders placed during the hospital encounter of 03/03/20   Adult Transthoracic Echo Complete W/ Cont if Necessary Per Protocol    Narrative · The left ventricular cavity is mildly dilated.  · Estimated EF = 50%.  · Left ventricular systolic function is low normal.  · Right ventricular cavity is mildly dilated.  · Mild mitral valve regurgitation is present  · Mild tricuspid valve regurgitation is present.  · Mild dilation of the aortic root is present.          Nm Lung Ventilation Perfusion Aerosol    Result Date: 3/4/2020  Normal ventilation/perfusion lung scan. This excludes the diagnosis of pulmonary embolism with a very high degree of clinical certainty  Electronically Signed By-Naif Donahue On:3/4/2020 9:13 AM This report was finalized on [id number] by  Naif Donahue, .    Xr Knee 4+ View Bilateral    Result Date: 3/4/2020  Right knee: 1. Moderate to advanced tricompartmental joint space narrowing with marginal osteophyte formation, likely representing osteoarthritis or CPPD arthropathy given chondrocalcinosis. 2. Trace suprapatellar joint effusion.  Left knee: 1. Mild medial and patellofemoral compartment joint space narrowing which may relate to osteoarthritis or CPPD arthropathy given  chondrocalcinosis.  Electronically Signed By-Shady Fischer On:3/4/2020 11:26 PM This report was finalized on [id number] by  Shady Fischer, .    Xr Chest 1 View    Result Date: 3/3/2020  No acute chest findings.  Electronically Signed By-Dr. Kaitlin Wong MD On:3/3/2020 12:46 PM This report was finalized on [id number] by Dr. Kaitlin Wong MD.    Us Renal Bilateral    Result Date: 3/3/2020  Normal renal ultrasound.  Electronically Signed By-Dr. Kaitlin Wong MD On:3/3/2020 5:04 PM This report was finalized on [id number] by Dr. Kaitlin Wong MD.    Xr Abdomen Kub    Result Date: 3/3/2020   1. Postoperative changes of biliary stent placement and cholecystectomy and inferior vena cava filter 2. Nonspecific bowel pattern, lung bases are free of disease 3. No evidence of renal or ureteral calculus  Electronically Signed By-Bennett Bobby Jr. On:3/3/2020 11:20 PM This report was finalized on [id number] by  Bennett Bobby Jr., .    Fl Ercp Biliary Duct Only    Result Date: 2/27/2020  Fluoroscopy performed during ERCP, as described above.  Electronically Signed By-Rush Soni On:2/27/2020 12:02 PM This report was finalized on [id number] by  Rush Soni, .          Xrays, labs reviewed personally by physician.    Medication Review:   I have reviewed the patient's current medication list      Scheduled Meds    acetaminophen 650 mg Oral Once   allopurinol 100 mg Oral BID   aspirin 81 mg Oral Daily   atorvastatin 20 mg Oral Daily   calcium gluconate 1 g Intravenous Q12H   cyanocobalamin 1,000 mcg Subcutaneous Q30 Days   diphenhydrAMINE 12.5 mg Intravenous Once   docusate sodium 100 mg Oral BID   DULoxetine 60 mg Oral Daily   enoxaparin 40 mg Subcutaneous Daily   folic acid 1 mg Oral Daily   insulin glargine 50 Units Subcutaneous Nightly   insulin lispro 0-9 Units Subcutaneous 4x Daily With Meals & Nightly   insulin lispro 18 Units Subcutaneous TID AC   iron sucrose 200 mg Intravenous Q24H   ADRIANA 1 packet  Oral BID   lactulose 30 g Oral Q2H   levothyroxine 75 mcg Oral Q AM   magnesium oxide 400 mg Oral Daily   metoclopramide 5 mg Oral BID AC   nicotine 1 patch Transdermal Nightly   oxybutynin XL 10 mg Oral Daily   pantoprazole 40 mg Oral BID AC   potassium & sodium phosphates 1 packet Oral Daily   riFAXIMin 550 mg Oral Q12H   sodium bicarbonate 650 mg Oral TID   sodium chloride 10 mL Intravenous Q12H   sucralfate 1 g Oral 4x Daily AC & at Bedtime   THERA 1 tablet Oral Daily   zinc sulfate 220 mg Oral Daily       Meds Infusions       Meds PRN  •  acetaminophen **OR** acetaminophen **OR** acetaminophen  •  Calcium Gluconate-NaCl **AND** calcium gluconate **AND** Calcium, Ionized  •  dextrose  •  dextrose  •  docusate sodium  •  glucagon (human recombinant)  •  hydrOXYzine  •  insulin lispro **AND** insulin lispro  •  ketamine (KETALAR) infusion **AND** Ketamine Vital Signs & Assessment  •  magnesium sulfate **OR** magnesium sulfate **OR** magnesium sulfate  •  melatonin  •  melatonin  •  nitroglycerin  •  ondansetron  •  oxyCODONE  •  potassium & sodium phosphates **OR** potassium & sodium phosphates  •  potassium chloride  •  potassium chloride  •  [COMPLETED] Insert peripheral IV **AND** sodium chloride  •  sodium chloride      Assessment/Plan   Assessment/Plan     Active Hospital Problems    Diagnosis  POA   • **General weakness [R53.1]  Yes     Priority: High   • CKD (chronic kidney disease) stage 3, GFR 30-59 ml/min (CMS/HCC) [N18.3]  Yes     Priority: High   • Spinal stenosis of lumbar region [M48.061]  Yes     Priority: High   • Anemia in stage 3 chronic kidney disease (CMS/HCC) [N18.3, D63.1]  Yes     Priority: Medium   • Type 2 diabetes mellitus with kidney complication, with long-term current use of insulin (CMS/[facility]) [E11.29, Z79.4]  Not Applicable     Priority: Medium   • Chronic coronary artery disease [I25.10]  Yes     Priority: Medium   • Cirrhosis (CMS/HCC) [K74.60]  Yes     Priority: Medium   • Diabetic  neuropathy (CMS/HCC) [E11.40]  Yes     Priority: Medium   • Morbid obesity (CMS/HCC) [E66.01]  Yes     Priority: Medium   • Obstructive sleep apnea [G47.33]  Yes     Priority: Medium   • 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]  Yes     Priority: Low   • 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]  Yes     Priority: Low   • Protein S deficiency (CMS/HCC) [D68.59]  Yes     Priority: Low   • Protein C deficiency (CMS/HCC) [D68.59]  Yes     Priority: Low   • History of Vitamin B12 deficiency [Z86.39]  Not Applicable     Priority: Low   • History of bilateral pulmonary embolus (PE) [Z86.711]  Yes     Priority: Low   • JOSE (iron deficiency anemia) with poor po absorption [D50.9]  Yes     Priority: Low   • IgG kappa MGUS [D47.2]  Yes     Priority: Low   • THOMAS (nonalcoholic steatohepatitis) [K75.81]  Yes     Priority: Low   • Antiphospholipid antibody positive [R76.0]  Yes     Priority: Low   • 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]  Yes     Priority: Low   • Splenomegaly [R16.1]  Yes     Priority: Low   • Essential hypertension [I10]  Yes     Priority: Low   • Acquired hypothyroidism [E03.9]  Yes     Priority: Low   • Pulmonary hypertension (CMS/HCC) [I27.20]  Yes     Priority: Low   • Dyslipidemia [E78.5]  Yes     Priority: Low   • History of DVT of right lower extremity [Z86.718]  Not Applicable     Priority: Low   • Deficiency of other specified B group vitamins [E53.8]  Yes     Priority: Low   • Decubitus ulcer of sacral region, stage 3 (CMS/HCC) [L89.153]  Yes      Resolved Hospital Problems   No resolved problems to display.       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  -Patient also has right knee pain I am getting orthopedic consult and he will get right knee injection.  -PT OT is working with him and patient will also need rehab as patient is very  unsafe for going home and ambulating by himself.    #2 chronic kidney disease stage III  -We will avoid nephrotoxic medications as well as patient will see nephrology     #3 anemia of chronic kidney disease as well as chronic blood loss anemia from GI origin and he has recent endoscopy done as well as ERCP done and patient has seen GI.  -Patient may need Venofer infusion as well as patient is on vitamin B12 injections.  His hemoglobin need to be monitored.   -Hemoglobin stable      #4 obstructive sleep sleep apnea and patient need to use his CPAP machine     #5 hyperlipidemia stable on statins and will check lipid profile     #6 history of DVT/PE and patient is at high risk for PE and DVT while he is in the hospital and he will be on anticoagulation enoxaparin and I do understand patient has history of chronic blood loss anemia but we do need to monitor that but I am also concerned about his DVT PE and further complication.     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   -Patient and family explained    VTE Prophylaxis -   Mechanical Order History:     None      Pharmalogical Order History:     Ordered     Dose Route Frequency Stop    03/03/20 2114  enoxaparin (LOVENOX) syringe 40 mg      40 mg SC Daily --            Code Status -   Code Status and Medical Interventions:   Ordered at: 03/03/20 7121     Level Of Support Discussed With:    Patient     Code Status:    CPR     Medical Interventions (Level of Support Prior to Arrest):    Full       Discharge Planning          Destination      Service Provider Request Status Selected Services Address Phone Number Fax Number    [facility]  [facility] Accepted N/A [address] [place] [address] [phone]      Durable Medical Equipment      Coordination has not been started for this encounter.      Dialysis/Infusion      Coordination has not been started for this encounter.      Home Medical Care      Service Provider Request Status Selected Services Address Phone Number Fax Number    [facility] Pending - Request Sent N/A [address] [place] [address] [phone]      Therapy      Coordination has not been started for this encounter.      Community Resources      Coordination has not been started for this encounter.            Electronically signed by Santosh Amaro MD, 03/05/20, 5:46 PM.  [facility] Hospitalist Team

## 2020-03-05 NOTE — PLAN OF CARE
Patient is alert and oriented.  Patient has BSC Ax1.  Patient spouse is at bedside.  Patient is NPO for a stress test in am. Will continue to monitor

## 2020-03-06 ENCOUNTER — ANESTHESIA EVENT (OUTPATIENT)
Dept: GASTROENTEROLOGY | Facility: HOSPITAL | Age: 75
End: 2020-03-06

## 2020-03-06 ENCOUNTER — ANESTHESIA (OUTPATIENT)
Dept: GASTROENTEROLOGY | Facility: HOSPITAL | Age: 75
End: 2020-03-06

## 2020-03-06 ENCOUNTER — INPATIENT HOSPITAL (AMBULATORY)
Dept: URBAN - METROPOLITAN AREA HOSPITAL 84 | Facility: HOSPITAL | Age: 75
End: 2020-03-06
Payer: COMMERCIAL

## 2020-03-06 ENCOUNTER — APPOINTMENT (OUTPATIENT)
Dept: NUCLEAR MEDICINE | Facility: HOSPITAL | Age: 75
End: 2020-03-06

## 2020-03-06 DIAGNOSIS — R53.1 WEAKNESS: ICD-10-CM

## 2020-03-06 DIAGNOSIS — K31.819 ANGIODYSPLASIA OF STOMACH AND DUODENUM WITHOUT BLEEDING: ICD-10-CM

## 2020-03-06 DIAGNOSIS — K75.81 NONALCOHOLIC STEATOHEPATITIS (NASH): ICD-10-CM

## 2020-03-06 DIAGNOSIS — E78.5 HYPERLIPIDEMIA, UNSPECIFIED: ICD-10-CM

## 2020-03-06 DIAGNOSIS — K83.1 OBSTRUCTION OF BILE DUCT: ICD-10-CM

## 2020-03-06 DIAGNOSIS — E11.9 TYPE 2 DIABETES MELLITUS WITHOUT COMPLICATIONS: ICD-10-CM

## 2020-03-06 DIAGNOSIS — D53.9 NUTRITIONAL ANEMIA, UNSPECIFIED: ICD-10-CM

## 2020-03-06 LAB
ALBUMIN SERPL-MCNC: 2.7 G/DL (ref 3.5–5.2)
ALBUMIN/GLOB SERPL: 0.6 G/DL
ALP SERPL-CCNC: 129 U/L (ref 39–117)
ALPHA-FETOPROTEIN: 1.03 NG/ML (ref 0–8.3)
ALT SERPL W P-5'-P-CCNC: 23 U/L (ref 1–41)
AMMONIA BLD-SCNC: 78 UMOL/L (ref 16–60)
ANION GAP SERPL CALCULATED.3IONS-SCNC: 13 MMOL/L (ref 5–15)
AST SERPL-CCNC: 37 U/L (ref 1–40)
BASOPHILS # BLD AUTO: 0.1 10*3/MM3 (ref 0–0.2)
BASOPHILS NFR BLD AUTO: 0.8 % (ref 0–1.5)
BH CV NUCLEAR PRIOR STUDY: 3
BH CV STRESS BP STAGE 1: NORMAL
BH CV STRESS BP STAGE 2: NORMAL
BH CV STRESS COMMENTS STAGE 1: NORMAL
BH CV STRESS COMMENTS STAGE 2: NORMAL
BH CV STRESS DOSE REGADENOSON STAGE 1: 0.4
BH CV STRESS DURATION MIN STAGE 1: 0
BH CV STRESS DURATION MIN STAGE 2: 4
BH CV STRESS DURATION SEC STAGE 1: 10
BH CV STRESS DURATION SEC STAGE 2: 0
BH CV STRESS HR STAGE 1: 74
BH CV STRESS HR STAGE 2: 78
BH CV STRESS PROTOCOL 1: NORMAL
BH CV STRESS RECOVERY BP: NORMAL MMHG
BH CV STRESS RECOVERY HR: 75 BPM
BH CV STRESS STAGE 1: 1
BH CV STRESS STAGE 2: 2
BILIRUB SERPL-MCNC: 0.2 MG/DL (ref 0.2–1.2)
BUN BLD-MCNC: 33 MG/DL (ref 8–23)
BUN/CREAT SERPL: 20.5 (ref 7–25)
CA-I SERPL ISE-MCNC: 1.16 MMOL/L (ref 1.2–1.3)
CALCIUM SPEC-SCNC: 8.4 MG/DL (ref 8.6–10.5)
CANCER AG19-9 SERPL-ACNC: 13.2 U/ML
CHLORIDE SERPL-SCNC: 109 MMOL/L (ref 98–107)
CO2 SERPL-SCNC: 20 MMOL/L (ref 22–29)
CREAT BLD-MCNC: 1.61 MG/DL (ref 0.76–1.27)
DEPRECATED RDW RBC AUTO: 56 FL (ref 37–54)
EOSINOPHIL # BLD AUTO: 0.2 10*3/MM3 (ref 0–0.4)
EOSINOPHIL NFR BLD AUTO: 3.6 % (ref 0.3–6.2)
ERYTHROCYTE [DISTWIDTH] IN BLOOD BY AUTOMATED COUNT: 16.2 % (ref 12.3–15.4)
GFR SERPL CREATININE-BSD FRML MDRD: 42 ML/MIN/1.73
GLOBULIN UR ELPH-MCNC: 4.3 GM/DL
GLUCOSE BLD-MCNC: 170 MG/DL (ref 65–99)
GLUCOSE BLDC GLUCOMTR-MCNC: 137 MG/DL (ref 70–105)
GLUCOSE BLDC GLUCOMTR-MCNC: 145 MG/DL (ref 70–105)
GLUCOSE BLDC GLUCOMTR-MCNC: 156 MG/DL (ref 70–105)
GLUCOSE BLDC GLUCOMTR-MCNC: 160 MG/DL (ref 70–105)
HCT VFR BLD AUTO: 24.5 % (ref 37.5–51)
HGB BLD-MCNC: 7.9 G/DL (ref 13–17.7)
LV EF NUC BP: 56 %
LYMPHOCYTES # BLD AUTO: 0.8 10*3/MM3 (ref 0.7–3.1)
LYMPHOCYTES NFR BLD AUTO: 11.5 % (ref 19.6–45.3)
MAGNESIUM SERPL-MCNC: 2.1 MG/DL (ref 1.6–2.4)
MAXIMAL PREDICTED HEART RATE: 147 BPM
MCH RBC QN AUTO: 31.5 PG (ref 26.6–33)
MCHC RBC AUTO-ENTMCNC: 32.3 G/DL (ref 31.5–35.7)
MCV RBC AUTO: 97.5 FL (ref 79–97)
MONOCYTES # BLD AUTO: 0.6 10*3/MM3 (ref 0.1–0.9)
MONOCYTES NFR BLD AUTO: 9.1 % (ref 5–12)
NEUTROPHILS # BLD AUTO: 5.2 10*3/MM3 (ref 1.7–7)
NEUTROPHILS NFR BLD AUTO: 75 % (ref 42.7–76)
NRBC BLD AUTO-RTO: 0 /100 WBC (ref 0–0.2)
PERCENT MAX PREDICTED HR: 53.06 %
PHOSPHATE SERPL-MCNC: 2.7 MG/DL (ref 2.5–4.5)
PLATELET # BLD AUTO: 221 10*3/MM3 (ref 140–450)
PMV BLD AUTO: 8.3 FL (ref 6–12)
POTASSIUM BLD-SCNC: 3.7 MMOL/L (ref 3.5–5.2)
PROT SERPL-MCNC: 7 G/DL (ref 6–8.5)
RBC # BLD AUTO: 2.51 10*6/MM3 (ref 4.14–5.8)
SODIUM BLD-SCNC: 142 MMOL/L (ref 136–145)
STRESS BASELINE BP: NORMAL MMHG
STRESS BASELINE HR: 74 BPM
STRESS PERCENT HR: 62 %
STRESS POST PEAK BP: NORMAL MMHG
STRESS POST PEAK HR: 78 BPM
STRESS TARGET HR: 125 BPM
WBC NRBC COR # BLD: 6.9 10*3/MM3 (ref 3.4–10.8)

## 2020-03-06 PROCEDURE — 63710000001 INSULIN GLARGINE PER 5 UNITS: Performed by: INTERNAL MEDICINE

## 2020-03-06 PROCEDURE — 85025 COMPLETE CBC W/AUTO DIFF WBC: CPT | Performed by: INTERNAL MEDICINE

## 2020-03-06 PROCEDURE — P9047 ALBUMIN (HUMAN), 25%, 50ML: HCPCS | Performed by: INTERNAL MEDICINE

## 2020-03-06 PROCEDURE — 63710000001 INSULIN LISPRO (HUMAN) PER 5 UNITS: Performed by: INTERNAL MEDICINE

## 2020-03-06 PROCEDURE — 0DJ08ZZ INSPECTION OF UPPER INTESTINAL TRACT, VIA NATURAL OR ARTIFICIAL OPENING ENDOSCOPIC: ICD-10-PCS | Performed by: INTERNAL MEDICINE

## 2020-03-06 PROCEDURE — 99222 1ST HOSP IP/OBS MODERATE 55: CPT | Mod: 25 | Performed by: NURSE PRACTITIONER

## 2020-03-06 PROCEDURE — 25010000002 ALBUMIN HUMAN 25% PER 50 ML: Performed by: INTERNAL MEDICINE

## 2020-03-06 PROCEDURE — 82140 ASSAY OF AMMONIA: CPT | Performed by: INTERNAL MEDICINE

## 2020-03-06 PROCEDURE — 99231 SBSQ HOSP IP/OBS SF/LOW 25: CPT | Performed by: INTERNAL MEDICINE

## 2020-03-06 PROCEDURE — 82330 ASSAY OF CALCIUM: CPT | Performed by: INTERNAL MEDICINE

## 2020-03-06 PROCEDURE — 83735 ASSAY OF MAGNESIUM: CPT | Performed by: INTERNAL MEDICINE

## 2020-03-06 PROCEDURE — 43235 EGD DIAGNOSTIC BRUSH WASH: CPT | Performed by: INTERNAL MEDICINE

## 2020-03-06 PROCEDURE — 80053 COMPREHEN METABOLIC PANEL: CPT | Performed by: INTERNAL MEDICINE

## 2020-03-06 PROCEDURE — 87147 CULTURE TYPE IMMUNOLOGIC: CPT | Performed by: INTERNAL MEDICINE

## 2020-03-06 PROCEDURE — 25010000002 ENOXAPARIN PER 10 MG: Performed by: INTERNAL MEDICINE

## 2020-03-06 PROCEDURE — 25010000002 PROPOFOL 10 MG/ML EMULSION: Performed by: ANESTHESIOLOGY

## 2020-03-06 PROCEDURE — 82105 ALPHA-FETOPROTEIN SERUM: CPT | Performed by: NURSE PRACTITIONER

## 2020-03-06 PROCEDURE — 25010000002 REGADENOSON 0.4 MG/5ML SOLUTION: Performed by: INTERNAL MEDICINE

## 2020-03-06 PROCEDURE — 93017 CV STRESS TEST TRACING ONLY: CPT

## 2020-03-06 PROCEDURE — 84100 ASSAY OF PHOSPHORUS: CPT | Performed by: INTERNAL MEDICINE

## 2020-03-06 PROCEDURE — A9500 TC99M SESTAMIBI: HCPCS | Performed by: INTERNAL MEDICINE

## 2020-03-06 PROCEDURE — 93016 CV STRESS TEST SUPVJ ONLY: CPT | Performed by: NURSE PRACTITIONER

## 2020-03-06 PROCEDURE — 87040 BLOOD CULTURE FOR BACTERIA: CPT | Performed by: INTERNAL MEDICINE

## 2020-03-06 PROCEDURE — 82962 GLUCOSE BLOOD TEST: CPT

## 2020-03-06 PROCEDURE — 25010000002 FUROSEMIDE PER 20 MG: Performed by: INTERNAL MEDICINE

## 2020-03-06 PROCEDURE — 93018 CV STRESS TEST I&R ONLY: CPT | Performed by: INTERNAL MEDICINE

## 2020-03-06 PROCEDURE — 0 TECHNETIUM SESTAMIBI: Performed by: INTERNAL MEDICINE

## 2020-03-06 PROCEDURE — 86301 IMMUNOASSAY TUMOR CA 19-9: CPT | Performed by: INTERNAL MEDICINE

## 2020-03-06 PROCEDURE — 78452 HT MUSCLE IMAGE SPECT MULT: CPT

## 2020-03-06 PROCEDURE — 78452 HT MUSCLE IMAGE SPECT MULT: CPT | Performed by: INTERNAL MEDICINE

## 2020-03-06 PROCEDURE — 99232 SBSQ HOSP IP/OBS MODERATE 35: CPT | Performed by: INTERNAL MEDICINE

## 2020-03-06 RX ORDER — LACTULOSE 10 G/15ML
30 SOLUTION ORAL DAILY
Status: DISCONTINUED | OUTPATIENT
Start: 2020-03-07 | End: 2020-03-07 | Stop reason: HOSPADM

## 2020-03-06 RX ORDER — ONDANSETRON 4 MG/1
4 TABLET, FILM COATED ORAL EVERY 6 HOURS PRN
Status: DISCONTINUED | OUTPATIENT
Start: 2020-03-06 | End: 2020-03-07 | Stop reason: HOSPADM

## 2020-03-06 RX ORDER — ALBUMIN (HUMAN) 12.5 G/50ML
25 SOLUTION INTRAVENOUS EVERY 8 HOURS
Status: COMPLETED | OUTPATIENT
Start: 2020-03-06 | End: 2020-03-06

## 2020-03-06 RX ORDER — VANCOMYCIN HYDROCHLORIDE
15 ONCE
Status: DISCONTINUED | OUTPATIENT
Start: 2020-03-06 | End: 2020-03-06

## 2020-03-06 RX ORDER — LACTULOSE 10 G/15ML
40 SOLUTION ORAL DAILY
Status: DISCONTINUED | OUTPATIENT
Start: 2020-03-06 | End: 2020-03-06

## 2020-03-06 RX ORDER — PROPOFOL 10 MG/ML
VIAL (ML) INTRAVENOUS AS NEEDED
Status: DISCONTINUED | OUTPATIENT
Start: 2020-03-06 | End: 2020-03-06 | Stop reason: SURG

## 2020-03-06 RX ORDER — SODIUM CHLORIDE 0.9 % (FLUSH) 0.9 %
10 SYRINGE (ML) INJECTION AS NEEDED
Status: DISCONTINUED | OUTPATIENT
Start: 2020-03-06 | End: 2020-03-06 | Stop reason: SDUPTHER

## 2020-03-06 RX ORDER — ONDANSETRON 2 MG/ML
4 INJECTION INTRAMUSCULAR; INTRAVENOUS EVERY 6 HOURS PRN
Status: DISCONTINUED | OUTPATIENT
Start: 2020-03-06 | End: 2020-03-07 | Stop reason: HOSPADM

## 2020-03-06 RX ORDER — SODIUM CHLORIDE 0.9 % (FLUSH) 0.9 %
10 SYRINGE (ML) INJECTION AS NEEDED
Status: DISCONTINUED | OUTPATIENT
Start: 2020-03-06 | End: 2020-03-06 | Stop reason: HOSPADM

## 2020-03-06 RX ORDER — SODIUM CHLORIDE 0.9 % (FLUSH) 0.9 %
3 SYRINGE (ML) INJECTION EVERY 12 HOURS SCHEDULED
Status: DISCONTINUED | OUTPATIENT
Start: 2020-03-06 | End: 2020-03-06 | Stop reason: SDUPTHER

## 2020-03-06 RX ORDER — LIDOCAINE HYDROCHLORIDE 10 MG/ML
INJECTION, SOLUTION EPIDURAL; INFILTRATION; INTRACAUDAL; PERINEURAL AS NEEDED
Status: DISCONTINUED | OUTPATIENT
Start: 2020-03-06 | End: 2020-03-06 | Stop reason: SURG

## 2020-03-06 RX ORDER — ALCOHOL 0.98 ML/ML
INJECTION INTRASPINAL
Status: DISCONTINUED
Start: 2020-03-06 | End: 2020-03-06 | Stop reason: WASHOUT

## 2020-03-06 RX ORDER — SODIUM CHLORIDE 0.9 % (FLUSH) 0.9 %
10 SYRINGE (ML) INJECTION EVERY 12 HOURS SCHEDULED
Status: DISCONTINUED | OUTPATIENT
Start: 2020-03-06 | End: 2020-03-06 | Stop reason: HOSPADM

## 2020-03-06 RX ORDER — SODIUM CHLORIDE 9 MG/ML
9 INJECTION, SOLUTION INTRAVENOUS CONTINUOUS PRN
Status: DISCONTINUED | OUTPATIENT
Start: 2020-03-06 | End: 2020-03-07 | Stop reason: HOSPADM

## 2020-03-06 RX ORDER — FUROSEMIDE 10 MG/ML
20 INJECTION INTRAMUSCULAR; INTRAVENOUS EVERY 8 HOURS
Status: COMPLETED | OUTPATIENT
Start: 2020-03-06 | End: 2020-03-06

## 2020-03-06 RX ADMIN — ATORVASTATIN CALCIUM 20 MG: 20 TABLET, FILM COATED ORAL at 14:56

## 2020-03-06 RX ADMIN — RIFAXIMIN 550 MG: 550 TABLET ORAL at 14:55

## 2020-03-06 RX ADMIN — ENOXAPARIN SODIUM 40 MG: 40 INJECTION SUBCUTANEOUS at 15:13

## 2020-03-06 RX ADMIN — DULOXETINE HYDROCHLORIDE 60 MG: 30 CAPSULE, DELAYED RELEASE ORAL at 14:55

## 2020-03-06 RX ADMIN — ALBUMIN HUMAN 25 G: 0.25 SOLUTION INTRAVENOUS at 08:17

## 2020-03-06 RX ADMIN — FUROSEMIDE 20 MG: 10 INJECTION, SOLUTION INTRAMUSCULAR; INTRAVENOUS at 15:13

## 2020-03-06 RX ADMIN — DOCUSATE SODIUM 100 MG: 100 CAPSULE, LIQUID FILLED ORAL at 14:55

## 2020-03-06 RX ADMIN — POTASSIUM & SODIUM PHOSPHATES POWDER PACK 280-160-250 MG 1 PACKET: 280-160-250 PACK at 14:55

## 2020-03-06 RX ADMIN — THERA TABS 1 TABLET: TAB at 14:56

## 2020-03-06 RX ADMIN — METOCLOPRAMIDE 5 MG: 5 TABLET ORAL at 16:58

## 2020-03-06 RX ADMIN — Medication 1 PACKET: at 16:59

## 2020-03-06 RX ADMIN — ALLOPURINOL 100 MG: 100 TABLET ORAL at 14:56

## 2020-03-06 RX ADMIN — PANTOPRAZOLE SODIUM 40 MG: 40 TABLET, DELAYED RELEASE ORAL at 08:18

## 2020-03-06 RX ADMIN — TECHNETIUM TC 99M SESTAMIBI 1 DOSE: 1 INJECTION INTRAVENOUS at 10:45

## 2020-03-06 RX ADMIN — INSULIN LISPRO 2 UNITS: 100 INJECTION, SOLUTION INTRAVENOUS; SUBCUTANEOUS at 20:28

## 2020-03-06 RX ADMIN — OXYCODONE HYDROCHLORIDE 5 MG: 5 TABLET ORAL at 08:18

## 2020-03-06 RX ADMIN — FOLIC ACID 1 MG: 1 TABLET ORAL at 14:56

## 2020-03-06 RX ADMIN — ZINC SULFATE 220 MG (50 MG) CAPSULE 220 MG: CAPSULE at 14:56

## 2020-03-06 RX ADMIN — REGADENOSON 0.4 MG: 0.08 INJECTION, SOLUTION INTRAVENOUS at 10:45

## 2020-03-06 RX ADMIN — METOCLOPRAMIDE 5 MG: 5 TABLET ORAL at 08:18

## 2020-03-06 RX ADMIN — RIFAXIMIN 550 MG: 550 TABLET ORAL at 20:28

## 2020-03-06 RX ADMIN — SUCRALFATE 1 G: 1 TABLET ORAL at 08:17

## 2020-03-06 RX ADMIN — SODIUM CHLORIDE 9 ML/HR: 900 INJECTION, SOLUTION INTRAVENOUS at 12:32

## 2020-03-06 RX ADMIN — MAGNESIUM OXIDE TAB 400 MG (241.3 MG ELEMENTAL MG) 400 MG: 400 (241.3 MG) TAB at 14:56

## 2020-03-06 RX ADMIN — ASPIRIN 81 MG: 81 TABLET, COATED ORAL at 14:56

## 2020-03-06 RX ADMIN — FUROSEMIDE 20 MG: 10 INJECTION, SOLUTION INTRAMUSCULAR; INTRAVENOUS at 23:35

## 2020-03-06 RX ADMIN — ALBUMIN HUMAN 25 G: 0.25 SOLUTION INTRAVENOUS at 15:13

## 2020-03-06 RX ADMIN — INSULIN LISPRO 18 UNITS: 100 INJECTION, SOLUTION INTRAVENOUS; SUBCUTANEOUS at 16:58

## 2020-03-06 RX ADMIN — SUCRALFATE 1 G: 1 TABLET ORAL at 16:58

## 2020-03-06 RX ADMIN — ALLOPURINOL 100 MG: 100 TABLET ORAL at 20:28

## 2020-03-06 RX ADMIN — SODIUM BICARBONATE 650 MG TABLET 650 MG: at 16:58

## 2020-03-06 RX ADMIN — TECHNETIUM TC 99M SESTAMIBI 1 DOSE: 1 INJECTION INTRAVENOUS at 09:05

## 2020-03-06 RX ADMIN — OXYBUTYNIN 10 MG: 10 TABLET, FILM COATED, EXTENDED RELEASE ORAL at 14:55

## 2020-03-06 RX ADMIN — LIDOCAINE HYDROCHLORIDE 50 MG: 10 INJECTION, SOLUTION EPIDURAL; INFILTRATION; INTRACAUDAL; PERINEURAL at 12:53

## 2020-03-06 RX ADMIN — Medication 10 ML: at 14:57

## 2020-03-06 RX ADMIN — INSULIN GLARGINE 50 UNITS: 100 INJECTION, SOLUTION SUBCUTANEOUS at 20:26

## 2020-03-06 RX ADMIN — PANTOPRAZOLE SODIUM 40 MG: 40 TABLET, DELAYED RELEASE ORAL at 16:58

## 2020-03-06 RX ADMIN — Medication 3 ML: at 09:46

## 2020-03-06 RX ADMIN — SODIUM BICARBONATE 650 MG TABLET 650 MG: at 20:29

## 2020-03-06 RX ADMIN — ALBUMIN HUMAN 25 G: 0.25 SOLUTION INTRAVENOUS at 23:35

## 2020-03-06 RX ADMIN — PROPOFOL 300 MG: 10 INJECTION, EMULSION INTRAVENOUS at 12:52

## 2020-03-06 RX ADMIN — SUCRALFATE 1 G: 1 TABLET ORAL at 20:29

## 2020-03-06 RX ADMIN — DOCUSATE SODIUM 100 MG: 100 CAPSULE, LIQUID FILLED ORAL at 20:28

## 2020-03-06 RX ADMIN — FUROSEMIDE 20 MG: 10 INJECTION, SOLUTION INTRAMUSCULAR; INTRAVENOUS at 08:18

## 2020-03-06 RX ADMIN — SODIUM BICARBONATE 650 MG TABLET 650 MG: at 14:54

## 2020-03-06 RX ADMIN — LEVOTHYROXINE SODIUM 75 MCG: 75 TABLET ORAL at 17:00

## 2020-03-06 NOTE — ANESTHESIA PREPROCEDURE EVALUATION
Anesthesia Evaluation     Patient summary reviewed and Nursing notes reviewed   NPO Solid Status: > 8 hours  NPO Liquid Status: > 8 hours           Airway   Mallampati: I  TM distance: >3 FB  Neck ROM: full  No difficulty expected  Dental - normal exam   (+) upper dentures, lower dentures and partials    Pulmonary - normal exam   (+) pulmonary embolism, sleep apnea on CPAP,   Cardiovascular - normal exam    (+) hypertension, CAD, DVT resolved, hyperlipidemia,     ROS comment: Hx Protein S and C def. AT III def. Hx PE    Neuro/Psych  (+) psychiatric history Anxiety and Depression,     GI/Hepatic/Renal/Endo    (+) morbid obesity, GERD well controlled,  liver disease, renal disease CRI, diabetes mellitus using insulin,     ROS Comment: THOMAS    Musculoskeletal     Abdominal  - normal exam    Bowel sounds: normal.   Substance History      OB/GYN          Other   arthritis,                    Anesthesia Plan    ASA 3     MAC     intravenous induction     Anesthetic plan, all risks, benefits, and alternatives have been provided, discussed and informed consent has been obtained with: patient.

## 2020-03-06 NOTE — PROGRESS NOTES
Reason for follow-up: Shortness of breath  3 of CAD status post PCI  History of DVT  Severe anemia and renal failure  Patient Care Team:  Paulette Harris MD as PCP - General  Josefina Placsencia MD as Consulting Physician (Nephrology)    Subjective .   Feels slightly better     Review of Systems   Constitution: Positive for malaise/fatigue. Negative for fever.   HENT: Negative for congestion and hearing loss.    Eyes: Negative for double vision and visual disturbance.   Cardiovascular: Positive for dyspnea on exertion. Negative for chest pain, claudication, leg swelling and syncope.   Respiratory: Positive for cough and shortness of breath.    Endocrine: Negative for cold intolerance.   Skin: Negative for color change and rash.   Musculoskeletal: Negative for arthritis and joint pain.   Gastrointestinal: Negative for abdominal pain and heartburn.   Genitourinary: Negative for hematuria.   Neurological: Negative for excessive daytime sleepiness and dizziness.   Psychiatric/Behavioral: Negative for depression. The patient is not nervous/anxious.    All other systems reviewed and are negative.      Patient has no known allergies.    Scheduled Meds:    acetaminophen 650 mg Oral Once   albumin human 25 g Intravenous Q8H   allopurinol 100 mg Oral BID   aspirin 81 mg Oral Daily   atorvastatin 20 mg Oral Daily   cyanocobalamin 1,000 mcg Subcutaneous Q30 Days   diphenhydrAMINE 12.5 mg Intravenous Once   docusate sodium 100 mg Oral BID   DULoxetine 60 mg Oral Daily   enoxaparin 40 mg Subcutaneous Daily   folic acid 1 mg Oral Daily   furosemide 20 mg Intravenous Q8H   insulin glargine 50 Units Subcutaneous Nightly   insulin lispro 0-9 Units Subcutaneous 4x Daily With Meals & Nightly   insulin lispro 18 Units Subcutaneous TID AC   ADRIANA 1 packet Oral BID   [START ON 3/7/2020] lactulose 30 g Oral Daily   levothyroxine 75 mcg Oral Q AM   magnesium oxide 400 mg Oral Daily   metoclopramide 5 mg Oral BID AC   nicotine  "1 patch Transdermal Nightly   oxybutynin XL 10 mg Oral Daily   pantoprazole 40 mg Oral BID AC   potassium & sodium phosphates 1 packet Oral Daily   riFAXIMin 550 mg Oral Q12H   sodium bicarbonate 650 mg Oral TID   sucralfate 1 g Oral 4x Daily AC & at Bedtime   THERA 1 tablet Oral Daily   zinc sulfate 220 mg Oral Daily     Continuous Infusions:    sodium chloride 9 mL/hr Last Rate: 9 mL/hr (03/06/20 1232)     PRN Meds:.•  acetaminophen **OR** acetaminophen **OR** acetaminophen  •  Calcium Gluconate-NaCl **AND** calcium gluconate **AND** Calcium, Ionized  •  dextrose  •  dextrose  •  docusate sodium  •  glucagon (human recombinant)  •  hydrOXYzine  •  insulin lispro **AND** insulin lispro  •  ketamine (KETALAR) infusion **AND** Ketamine Vital Signs & Assessment  •  magnesium sulfate **OR** magnesium sulfate **OR** magnesium sulfate  •  melatonin  •  melatonin  •  nitroglycerin  •  ondansetron **OR** ondansetron  •  oxyCODONE  •  potassium & sodium phosphates **OR** potassium & sodium phosphates  •  potassium chloride  •  potassium chloride  •  sodium chloride    Objective   Looks comfortable lying in the bed    VITAL SIGNS  Vitals:    03/06/20 1217 03/06/20 1300 03/06/20 1310 03/06/20 1320   BP: 158/57 101/42 104/55 111/45   BP Location:       Patient Position: Lying      Pulse: 73 64 62 60   Resp: 18 22 20 18   Temp: 98.2 °F (36.8 °C)      TempSrc: Oral      SpO2: 100% 100% 100% 100%   Weight:       Height:           Flowsheet Rows      First Filed Value   Admission Height  188 cm (74\") Documented at 03/03/2020 1133   Admission Weight  134 kg (295 lb) Documented at 03/03/2020 1133           TELEMETRY: Sinus rhythm    Physical Exam:  Physical Exam   Constitutional: He is oriented to person, place, and time. He appears well-developed and well-nourished. He is cooperative.   Obese   HENT:   Head: Normocephalic and atraumatic.   Mouth/Throat: Uvula is midline and oropharynx is clear and moist. No oral lesions.   Eyes: " Conjunctivae are normal. No scleral icterus.   Neck: Trachea normal. Neck supple. No JVD present. Carotid bruit is not present. No thyromegaly present.   Cardiovascular: Normal rate, regular rhythm, S1 normal, S2 normal, normal heart sounds, intact distal pulses and normal pulses. PMI is not displaced. Exam reveals no gallop and no friction rub.   No murmur heard.  Pulmonary/Chest: Effort normal and breath sounds normal.   Abdominal: Soft. Bowel sounds are normal.   Genitourinary:   Genitourinary Comments: No Maurice catheter   Musculoskeletal: Normal range of motion. He exhibits edema.   Neurological: He is alert and oriented to person, place, and time. He has normal strength.   No focal deficits   Skin: Skin is warm. No cyanosis.   Decub ulcers noted on the sacrum   Psychiatric: He has a normal mood and affect.                LAB RESULTS (LAST 7 DAYS)    CBC  Results from last 7 days   Lab Units 03/06/20  0252 03/05/20  0456 03/05/20  0316 03/04/20  1144 03/03/20  1202   WBC 10*3/mm3 6.90  --  6.40 8.30 11.00*   RBC 10*6/mm3 2.51*  --  2.33* 2.50* 2.52*   HEMOGLOBIN g/dL 7.9* 8.3* 7.7* 8.4* 8.4*   HEMATOCRIT % 24.5* 24.5* 22.8* 24.6* 24.9*   MCV fL 97.5*  --  98.2* 98.5* 98.7*   PLATELETS 10*3/mm3 221  --  178 189 181       BMP  Results from last 7 days   Lab Units 03/06/20  0252 03/05/20  0316 03/04/20  0944 03/03/20  1202   SODIUM mmol/L 142 138 137 134*   POTASSIUM mmol/L 3.7 3.6 3.3* 3.9  3.7   CHLORIDE mmol/L 109* 104 103 100   CO2 mmol/L 20.0* 21.0* 22.0 19.0*   BUN mg/dL 33* 34* 35* 35*   CREATININE mg/dL 1.61* 1.77* 1.79* 1.84*   GLUCOSE mg/dL 170* 196* 159* 245*   MAGNESIUM mg/dL 2.1 2.1 1.9  --    PHOSPHORUS mg/dL 2.7 1.8* 2.8  --        CMP   Results from last 7 days   Lab Units 03/06/20  0252 03/05/20  0316 03/04/20  0944 03/03/20  1202   SODIUM mmol/L 142 138 137 134*   POTASSIUM mmol/L 3.7 3.6 3.3* 3.9  3.7   CHLORIDE mmol/L 109* 104 103 100   CO2 mmol/L 20.0* 21.0* 22.0 19.0*   BUN mg/dL 33* 34* 35*  35*   CREATININE mg/dL 1.61* 1.77* 1.79* 1.84*   GLUCOSE mg/dL 170* 196* 159* 245*   ALBUMIN g/dL 2.70* 2.60* 2.70* 3.00*   BILIRUBIN mg/dL 0.2 0.2 0.4 0.5   ALK PHOS U/L 129* 112 119* 128*   AST (SGOT) U/L 37 39 29 34   ALT (SGPT) U/L 23 21 18 22   AMMONIA umol/L 78* 191* 65*  --          BNP        TROPONIN  Results from last 7 days   Lab Units 03/04/20  0944   CK TOTAL U/L 37   TROPONIN T ng/mL 0.019       CoAg  Results from last 7 days   Lab Units 03/03/20  1202   INR  1.09   APTT seconds 31.2*       Creatinine Clearance  Estimated Creatinine Clearance: 59.5 mL/min (A) (by C-G formula based on SCr of 1.61 mg/dL (H)).    ABG        Radiology  Xr Knee 4+ View Bilateral    Result Date: 3/4/2020  Right knee: 1. Moderate to advanced tricompartmental joint space narrowing with marginal osteophyte formation, likely representing osteoarthritis or CPPD arthropathy given chondrocalcinosis. 2. Trace suprapatellar joint effusion.  Left knee: 1. Mild medial and patellofemoral compartment joint space narrowing which may relate to osteoarthritis or CPPD arthropathy given chondrocalcinosis.  Electronically Signed By-Shady Fischer On:3/4/2020 11:26 PM This report was finalized on [id number] by  Shady Fischer, .            EKG        I personally viewed and interpreted the patient's EKG/Telemetry data:    ECHOCARDIOGRAM:    Results for orders placed during the hospital encounter of 03/03/20   Adult Transthoracic Echo Complete W/ Cont if Necessary Per Protocol    Narrative · The left ventricular cavity is mildly dilated.  · Estimated EF = 50%.  · Left ventricular systolic function is low normal.  · Right ventricular cavity is mildly dilated.  · Mild mitral valve regurgitation is present  · Mild tricuspid valve regurgitation is present.  · Mild dilation of the aortic root is present.        STRESS MYOVIEW:    CARDIAC CATHETERIZATION:    OTHER:         Assessment/Plan       General weakness    Cirrhosis (CMS/HCC)    Diabetic  neuropathy (CMS/HCC)    Dyslipidemia    History of DVT of right lower extremity    Essential hypertension    Acquired hypothyroidism    Morbid obesity (CMS/HCC)    Obstructive sleep apnea    Type 2 diabetes mellitus with kidney complication, with long-term current use of insulin (CMS/HCC)    Spinal stenosis of lumbar region    Pulmonary hypertension (CMS/HCC)    Deficiency of other specified B group vitamins    Chronic coronary artery disease    JOSE (iron deficiency anemia) with poor po absorption    IgG kappa MGUS    THOMAS (nonalcoholic steatohepatitis)    Splenomegaly    Antiphospholipid antibody positive    Elevated factor VIII level    Anemia in stage 3 chronic kidney disease (CMS/HCC)    History of Vitamin B12 deficiency    History of bilateral pulmonary embolus (PE)    Antithrombin III deficiency (CMS/HCC)    Protein C deficiency (CMS/HCC)    Protein S deficiency (CMS/HCC)    GAVE (gastric antral vascular ectasia)    CKD (chronic kidney disease) stage 3, GFR 30-59 ml/min (CMS/HCC)    Iron deficiency anemia    Decubitus ulcer of sacral region, stage 3 (CMS/HCC)      Multiple medical problems now admitted with worsening shortness of breath borderline elevated troponin probably demand ischemia  History of CAD status post PCI almost 10 years ago  Patient have severe anemia renal failure  Primary team following  Pharmacological stress Myoview done today no ischemia  Echocardiogram was reviewed  Continue aggressive medical treatment cardiac risk factor modification okay for discharge from the cardiology standpoint      Alvaro Pandey MD  03/06/20  5:48 PM

## 2020-03-06 NOTE — PROGRESS NOTES
"Pharmacy Antimicrobial Dosing Service    Subjective:  Bry Ratliff is a 74 y.o.male admitted with general weakness. Pharmacy has been consulted to dose Vancomycin for possible bacteremia. Patient currently has multiple blood cultures growing gram positive cocci in clusters.     PMH: Cirrhosis, Diabetic Neuropathy, Morbid Obesity, HTN, Pulmonary HTN, CKD Stage 3      Assessment/Plan    1. Day #1 Vancomycin: Goal -600 mcg*h/mL. Per nephrology note, medications to be dosed based upon CrCl <10 ml/min. Will initiate pulse dosing regimen. Patient given Vancomycin 1,500mg (~15 mg/kg IBW) IV x 1 today, and random level ordered for tomorrow morning with AM labs. Pharmacy will continue to monitor and dose based upon levels.       Will continue to monitor drug levels, renal function, culture and sensitivities, and patient clinical status.       Objective:  Relevant clinical data and objective history reviewed:  188 cm (74\")   134 kg (295 lb)   Ideal body weight: 82.2 kg (181 lb 3.5 oz)  Adjusted ideal body weight: 103 kg (226 lb 11.7 oz)  Body mass index is 37.88 kg/m².        Results from last 7 days   Lab Units 03/06/20  0252 03/05/20  0316 03/04/20  0944   CREATININE mg/dL 1.61* 1.77* 1.79*     Estimated Creatinine Clearance: 59.5 mL/min (A) (by C-G formula based on SCr of 1.61 mg/dL (H)).  I/O last 3 completed shifts:  In: 1080 [P.O.:1080]  Out: 250 [Urine:250]    Results from last 7 days   Lab Units 03/06/20  0252 03/05/20  0316 03/04/20  1144   WBC 10*3/mm3 6.90 6.40 8.30     Temperature    03/05/20 1309 03/05/20 1930 03/06/20 0401   Temp: 98 °F (36.7 °C) 97.7 °F (36.5 °C) 98.3 °F (36.8 °C)     Baseline culture/source/susceptibility:  Microbiology Results (last 10 days)       Procedure Component Value - Date/Time    Wound Culture - Wound, Buttock [[id number]] Collected:  03/04/20 1131    Lab Status:  Final result Specimen:  Wound from Buttock Updated:  03/05/20 0825     Wound Culture Heavy growth (4+) Mixed Becky " Isolated     Comment: This specimen is not acceptable for generating clinically relevant information due to predominating colonic and rectal sona. No further workup.          Gram Stain Moderate (3+) WBCs per low power field      Many (4+) Mixed bacterial morphotypes seen on Gram Stain    Blood Culture - Blood, Wrist, Left [[id number]]  (Abnormal) Collected:  03/04/20 0944    Lab Status:  Final result Specimen:  Blood from Wrist, Left Updated:  03/06/20 0629     Blood Culture Staphylococcus, coagulase negative     Comment: Probable contaminant requires clinical correlation, susceptibility not performed unless requested by physician.          Isolated from Aerobic Bottle     Gram Stain Aerobic Bottle Gram positive cocci in clusters    Blood Culture - Blood, Arm, Right [[id number]]  (Abnormal) Collected:  03/04/20 0944    Lab Status:  Preliminary result Specimen:  Blood from Arm, Right Updated:  03/06/20 0308     Blood Culture No growth at 24 hours     Gram Stain Aerobic Bottle Gram positive cocci in clusters    Blood Culture ID, PCR - Blood, Wrist, Left [[id number]]  (Abnormal) Collected:  03/04/20 0944    Lab Status:  Final result Specimen:  Blood from Wrist, Left Updated:  03/05/20 1958     BCID, PCR Staphylococcus spp, not aureus. Identification by BCID PCR.    Influenza Antigen, Rapid - Swab, Nasopharynx [[id number]]  (Normal) Collected:  03/03/20 1202    Lab Status:  Final result Specimen:  Swab from Nasopharynx Updated:  03/03/20 1222     Influenza A PCR Not Detected     Influenza B PCR Not Detected             Anti-Infectives (From admission, onward)      Ordered     Dose/Rate Route Frequency Start Stop    03/06/20 0637  !Vancomycin Level Draw Needed     Ordering Provider:  Josefina Plascencia MD     Does not apply Once 03/07/20 0600      03/06/20 0637  vancomycin IVPB 1500 mg in 0.9% NaCl (Premix) 250 mL     Ordering Provider:  Josefina Plascencia MD    15 mg/kg × 103 kg (Adjusted) Intravenous  Once 03/06/20 0730      03/06/20 0637  vancomycin 1250 mg/250 mL 0.9% NS IVPB ([facility])     Ordering Provider:  Josefina Plascencia MD    1,250 mg  over 90 Minutes Intravenous As Needed 03/06/20 0631 03/13/20 0730    03/06/20 0623  Pharmacy to dose vancomycin     Ordering Provider:  Josefina Plascencia MD     Does not apply Continuous PRN 03/06/20 0621 03/16/20 0720    03/03/20 2114  riFAXIMin (XIFAXAN) tablet 550 mg  Review   Ordering Provider:  Santosh Amaro MD    550 mg Oral Every 12 Hours Scheduled 03/03/20 2200              Meenakshi MalloyD, BCPS  03/06/20 6:41 AM

## 2020-03-06 NOTE — PROGRESS NOTES
"NEPHROLOGY PROGRESS NOTE------[facility]    [facility]  [phone]  Paula Plascencia MD      Patient Care Team:  Paulette Harris MD as PCP - General  Josefina Plascencia MD as Consulting Physician (Nephrology)      Provider:  Paula Plascencia MD  Patient Name: Bry Ratliff  :  1945    SUBJECTIVE:    Feeling okay. No SOB, CP, palpitations.    Medication:    acetaminophen 650 mg Oral Once   allopurinol 100 mg Oral BID   aspirin 81 mg Oral Daily   atorvastatin 20 mg Oral Daily   cyanocobalamin 1,000 mcg Subcutaneous Q30 Days   diphenhydrAMINE 12.5 mg Intravenous Once   docusate sodium 100 mg Oral BID   DULoxetine 60 mg Oral Daily   enoxaparin 40 mg Subcutaneous Daily   folic acid 1 mg Oral Daily   insulin glargine 50 Units Subcutaneous Nightly   insulin lispro 0-9 Units Subcutaneous 4x Daily With Meals & Nightly   insulin lispro 18 Units Subcutaneous TID AC   ADRIANA 1 packet Oral BID   levothyroxine 75 mcg Oral Q AM   magnesium oxide 400 mg Oral Daily   metoclopramide 5 mg Oral BID AC   nicotine 1 patch Transdermal Nightly   oxybutynin XL 10 mg Oral Daily   pantoprazole 40 mg Oral BID AC   potassium & sodium phosphates 1 packet Oral Daily   riFAXIMin 550 mg Oral Q12H   sodium bicarbonate 650 mg Oral TID   sodium chloride 10 mL Intravenous Q12H   sucralfate 1 g Oral 4x Daily AC & at Bedtime   THERA 1 tablet Oral Daily   zinc sulfate 220 mg Oral Daily          OBJECTIVE    Vital Sign Min/Max for last 24 hours  Temp  Min: 97.7 °F (36.5 °C)  Max: 98.3 °F (36.8 °C)   BP  Min: 115/65  Max: 146/67   Pulse  Min: 71  Max: 82   Resp  Min: 14  Max: 16   SpO2  Min: 99 %  Max: 99 %   No data recorded   No data recorded     Flowsheet Rows      First Filed Value   Admission Height  188 cm (74\") Documented at 2020 1133   Admission Weight  134 kg (295 lb) Documented at 2020 1133          No intake/output data recorded.  I/O last 3 completed shifts:  In: " 1080 [P.O.:1080]  Out: 250 [Urine:250]    Physical Exam:  General Appearance: alert, appears stated age and cooperative  Head: normocephalic, without obvious abnormality and atraumatic  Eyes: conjunctivae and sclerae normal and no icterus  Neck: supple and no JVD  Lungs: clear to auscultation and respirations regular  Heart: regular rhythm & normal rate and normal S1, S2 +REBECCA  Chest: Wall no abnormalities observed  Abdomen: normal bowel sounds and soft non-tender +OBESITY  Extremities: moves extremities well, +TRACE PRETIBIAL EDEMA BILAT, no cyanosis and no redness  Skin: +DECUBITUS ULCER  Neurologic: Alert, and oriented. No focal deficits    Labs:    WBC WBC   Date Value Ref Range Status   03/06/2020 6.90 3.40 - 10.80 10*3/mm3 Final   03/05/2020 6.40 3.40 - 10.80 10*3/mm3 Final   03/04/2020 8.30 3.40 - 10.80 10*3/mm3 Final   03/03/2020 11.00 (H) 3.40 - 10.80 10*3/mm3 Final      HGB Hemoglobin   Date Value Ref Range Status   03/06/2020 7.9 (L) 13.0 - 17.7 g/dL Final   03/05/2020 8.3 (L) 13.0 - 17.7 g/dL Final   03/05/2020 7.7 (L) 13.0 - 17.7 g/dL Final   03/04/2020 8.4 (L) 13.0 - 17.7 g/dL Final   03/03/2020 8.4 (L) 13.0 - 17.7 g/dL Final      HCT Hematocrit   Date Value Ref Range Status   03/06/2020 24.5 (L) 37.5 - 51.0 % Final   03/05/2020 24.5 (L) 37.5 - 51.0 % Final   03/05/2020 22.8 (L) 37.5 - 51.0 % Final   03/04/2020 24.6 (L) 37.5 - 51.0 % Final   03/03/2020 24.9 (L) 37.5 - 51.0 % Final      Platlets No results found for: LABPLAT   MCV MCV   Date Value Ref Range Status   03/06/2020 97.5 (H) 79.0 - 97.0 fL Final   03/05/2020 98.2 (H) 79.0 - 97.0 fL Final   03/04/2020 98.5 (H) 79.0 - 97.0 fL Final   03/03/2020 98.7 (H) 79.0 - 97.0 fL Final          Sodium Sodium   Date Value Ref Range Status   03/06/2020 142 136 - 145 mmol/L Final   03/05/2020 138 136 - 145 mmol/L Final   03/04/2020 137 136 - 145 mmol/L Final   03/03/2020 134 (L) 136 - 145 mmol/L Final      Potassium Potassium   Date Value Ref Range Status    03/06/2020 3.7 3.5 - 5.2 mmol/L Final   03/05/2020 3.6 3.5 - 5.2 mmol/L Final   03/04/2020 3.3 (L) 3.5 - 5.2 mmol/L Final   03/03/2020 3.7 3.5 - 5.2 mmol/L Final   03/03/2020 3.9 3.5 - 5.2 mmol/L Final      Chloride Chloride   Date Value Ref Range Status   03/06/2020 109 (H) 98 - 107 mmol/L Final   03/05/2020 104 98 - 107 mmol/L Final   03/04/2020 103 98 - 107 mmol/L Final   03/03/2020 100 98 - 107 mmol/L Final      CO2 CO2   Date Value Ref Range Status   03/06/2020 20.0 (L) 22.0 - 29.0 mmol/L Final   03/05/2020 21.0 (L) 22.0 - 29.0 mmol/L Final   03/04/2020 22.0 22.0 - 29.0 mmol/L Final   03/03/2020 19.0 (L) 22.0 - 29.0 mmol/L Final      BUN BUN   Date Value Ref Range Status   03/06/2020 33 (H) 8 - 23 mg/dL Final   03/05/2020 34 (H) 8 - 23 mg/dL Final   03/04/2020 35 (H) 8 - 23 mg/dL Final   03/03/2020 35 (H) 8 - 23 mg/dL Final      Creatinine Creatinine   Date Value Ref Range Status   03/06/2020 1.61 (H) 0.76 - 1.27 mg/dL Final   03/05/2020 1.77 (H) 0.76 - 1.27 mg/dL Final   03/04/2020 1.79 (H) 0.76 - 1.27 mg/dL Final   03/03/2020 1.84 (H) 0.76 - 1.27 mg/dL Final      Calcium Calcium   Date Value Ref Range Status   03/06/2020 8.4 (L) 8.6 - 10.5 mg/dL Final   03/05/2020 8.5 (L) 8.6 - 10.5 mg/dL Final   03/04/2020 8.5 (L) 8.6 - 10.5 mg/dL Final   03/03/2020 8.2 (L) 8.6 - 10.5 mg/dL Final      PO4 No components found for: PO4   Albumin Albumin   Date Value Ref Range Status   03/06/2020 2.70 (L) 3.50 - 5.20 g/dL Final   03/05/2020 2.60 (L) 3.50 - 5.20 g/dL Final   03/04/2020 2.70 (L) 3.50 - 5.20 g/dL Final   03/03/2020 3.00 (L) 3.50 - 5.20 g/dL Final      Magnesium Magnesium   Date Value Ref Range Status   03/06/2020 2.1 1.6 - 2.4 mg/dL Final   03/05/2020 2.1 1.6 - 2.4 mg/dL Final   03/04/2020 1.9 1.6 - 2.4 mg/dL Final      Uric Acid No components found for: URIC ACID     Imaging Results (Last 72 Hours)     Procedure Component Value Units Date/Time    XR Knee 4+ View Bilateral [[id number]] Collected:  03/04/20 9171      Updated:  03/04/20 2328    Narrative:       DATE OF EXAM:  3/4/2020 9:43 PM     PROCEDURE:  XR KNEE 4+ VW BILATERAL-     INDICATIONS:  Pain in both knees and difficulty walking.; R53.1-Weakness;  D64.9-Anemia, unspecified; N18.9-Chronic kidney disease, unspecified;  R79.89-Other specified abnormal findings of blood chemistry     COMPARISON:  No Comparisons Available     TECHNIQUE:   Four radiographic views of bilateral knees were obtained.     FINDINGS:  Right knee: There is no acute fracture or dislocation. There is moderate  to advanced tricompartmental joint space narrowing with marginal  osteophyte formation, worst within the lateral compartment. There is  subchondral sclerosis of the lateral tibial plateau. There is  chondrocalcinosis within the medial and lateral compartments. There is a  trace suprapatellar joint effusion. There is no significant soft tissue  swelling. Bone mineralization is normal. There is no evidence of osseous  erosion.      Left knee: There is mild medial compartment joint space narrowing  without significant osteophyte formation. There is patellofemoral  compartment joint space narrowing with mild osteophyte formation. There  is chondrocalcinosis which can be seen with CPPD arthropathy. There is  no left knee joint effusion. There is peripheral vascular  atherosclerotic calcification. There is no osseous erosion.       Impression:       Right knee:  1. Moderate to advanced tricompartmental joint space narrowing with  marginal osteophyte formation, likely representing osteoarthritis or  CPPD arthropathy given chondrocalcinosis.  2. Trace suprapatellar joint effusion.     Left knee:  1. Mild medial and patellofemoral compartment joint space narrowing  which may relate to osteoarthritis or CPPD arthropathy given  chondrocalcinosis.     Electronically Signed By-Shady Fischer On:3/4/2020 11:26 PM  This report was finalized on [id number] by  Shady Fischer, .    NM Lung Ventilation  Perfusion Aerosol [[id number]] Collected:  03/04/20 0911     Updated:  03/04/20 0926    Narrative:       agfaDATE OF EXAM:  3/4/2020 8:24 AM     PROCEDURE:  NM LUNG VENTILATION PERFUSION AEROSOL-     INDICATIONS:  Dyspnea, cardiac origin suspected; R53.1-Weakness; D64.9-Anemia,  unspecified; N18.9-Chronic kidney disease, unspecified; R79.89-Other  specified abnormal findings of blood chemistry     COMPARISON:  Chest radiograph 03/03/2020     TECHNIQUE:   The patient was ventilated with 5.5 mCi of technetium 99m pertechnetate  aerosol and ventilation images were acquired in multiple obliquities.  The patient then received 45 mCi of technetium 99m MAA intravenously and  perfusion images were acquired in multiple obliquities.     FINDINGS:  Ventilation and perfusion scan appears normal. No evidence of  ventilation or perfusion mismatch. No chest radiographic abnormalities.       Impression:       Normal ventilation/perfusion lung scan. This excludes the diagnosis of  pulmonary embolism with a very high degree of clinical certainty     Electronically Signed By-Naif Donahue On:3/4/2020 9:13 AM  This report was finalized on [id number] by  Naif Donahue, .    XR Abdomen KUB [[id number]] Collected:  03/03/20 2318     Updated:  03/03/20 2322    Narrative:       DATE OF EXAM:  3/3/2020 7:24 PM     PROCEDURE:  XR ABDOMEN KUB-     INDICATIONS:  ABDOMINAL PAIN; R53.1-Weakness; D64.9-Anemia, unspecified; N18.9-Chronic  kidney disease, unspecified; R79.89-Other specified abnormal findings of  blood chemistry  74-year-old male who has abdominal pain off and on, pain mostly to the  right in the middle of his abdomen. Patient gives history of diabetes     COMPARISON:  No Comparisons Available     TECHNIQUE:   Single radiographic view of the abdomen was obtained.     FINDINGS:  Today's KUB demonstrates a common duct stent in place along with change  of cholecystectomy and placement of inferior vena cava filter the bowel  pattern is  nonspecific the bony structures demonstrate degenerative  change no renal or ureteral or bladder calculus is seen. The bony  structures are intact. The are degenerative changes to the lower  thoracic and lumbar spine. The lung bases are free of disease..       Impression:          1. Postoperative changes of biliary stent placement and cholecystectomy  and inferior vena cava filter  2. Nonspecific bowel pattern, lung bases are free of disease  3. No evidence of renal or ureteral calculus     Electronically Signed By-Bennett Bobby Jr. On:3/3/2020 11:20 PM  This report was finalized on [id number] by  Bennett Bobby Jr., .    US Renal Bilateral [[id number]] Collected:  03/03/20 1701     Updated:  03/03/20 1706    Narrative:       DATE OF EXAM:  3/3/2020 4:38 PM     PROCEDURE:  US RENAL BILATERAL-     INDICATIONS:  ARF/CKD; R53.1-Weakness; D64.9-Anemia, unspecified; N18.9-Chronic kidney  disease, unspecified; R79.89-Other specified abnormal findings of blood  chemistry     COMPARISON:  No Comparisons Available     TECHNIQUE:   Grayscale and color Doppler ultrasound evaluation of the kidneys and  urinary bladder was performed.        FINDINGS:  Study is technically limited secondary to obscuration by body habitus.  Right kidney measures 9.5 x 4.9 x 5.0 cm the left kidney measures 8.6 x  4.7 x 4.9 cm. Both kidneys maintain normal cortical thickness and  echotexture. No cystic or solid renal mass or shadowing stone or  hydronephrosis is seen. There is mild to moderate urinary bladder  distention with fluid, and the urinary bladder appears normal.        Impression:       Normal renal ultrasound.     Electronically Signed By-Dr. Kaitlin Wong MD On:3/3/2020 5:04 PM  This report was finalized on [id number] by Dr. Kaitlin Wong MD.    XR Chest 1 View [[id number]] Collected:  03/03/20 1245     Updated:  03/03/20 1248    Narrative:       DATE OF EXAM:  3/3/2020 12:12 PM     PROCEDURE:  XR CHEST 1 VW-      INDICATIONS:  Shortness breath, cough and fever for 2 to 3 days.       COMPARISON:  PA and lateral chest 07/11/2019.     TECHNIQUE:   Single radiographic view of the chest was obtained.     FINDINGS:  No acute airspace disease. Heart size is upper limits normal. Mild  degenerative endplate spurring in the thoracic spine. No acute osseous  abnormality.       Impression:       No acute chest findings.     Electronically Signed By-Dr. Kaitlin Wong MD On:3/3/2020 12:46 PM  This report was finalized on [id number] by Dr. Kaitlin Wong MD.          Results for orders placed during the hospital encounter of 03/03/20   XR Knee 4+ View Bilateral    Narrative DATE OF EXAM:  3/4/2020 9:43 PM     PROCEDURE:  XR KNEE 4+ VW BILATERAL-     INDICATIONS:  Pain in both knees and difficulty walking.; R53.1-Weakness;  D64.9-Anemia, unspecified; N18.9-Chronic kidney disease, unspecified;  R79.89-Other specified abnormal findings of blood chemistry     COMPARISON:  No Comparisons Available     TECHNIQUE:   Four radiographic views of bilateral knees were obtained.     FINDINGS:  Right knee: There is no acute fracture or dislocation. There is moderate  to advanced tricompartmental joint space narrowing with marginal  osteophyte formation, worst within the lateral compartment. There is  subchondral sclerosis of the lateral tibial plateau. There is  chondrocalcinosis within the medial and lateral compartments. There is a  trace suprapatellar joint effusion. There is no significant soft tissue  swelling. Bone mineralization is normal. There is no evidence of osseous  erosion.      Left knee: There is mild medial compartment joint space narrowing  without significant osteophyte formation. There is patellofemoral  compartment joint space narrowing with mild osteophyte formation. There  is chondrocalcinosis which can be seen with CPPD arthropathy. There is  no left knee joint effusion. There is peripheral vascular  atherosclerotic  calcification. There is no osseous erosion.       Impression Right knee:  1. Moderate to advanced tricompartmental joint space narrowing with  marginal osteophyte formation, likely representing osteoarthritis or  CPPD arthropathy given chondrocalcinosis.  2. Trace suprapatellar joint effusion.     Left knee:  1. Mild medial and patellofemoral compartment joint space narrowing  which may relate to osteoarthritis or CPPD arthropathy given  chondrocalcinosis.     Electronically Signed By-Shady Fischer On:3/4/2020 11:26 PM  This report was finalized on [id number] by  Shady Fischer, .   XR Abdomen KUB    Narrative DATE OF EXAM:  3/3/2020 7:24 PM     PROCEDURE:  XR ABDOMEN KUB-     INDICATIONS:  ABDOMINAL PAIN; R53.1-Weakness; D64.9-Anemia, unspecified; N18.9-Chronic  kidney disease, unspecified; R79.89-Other specified abnormal findings of  blood chemistry  74-year-old male who has abdominal pain off and on, pain mostly to the  right in the middle of his abdomen. Patient gives history of diabetes     COMPARISON:  No Comparisons Available     TECHNIQUE:   Single radiographic view of the abdomen was obtained.     FINDINGS:  Today's KUB demonstrates a common duct stent in place along with change  of cholecystectomy and placement of inferior vena cava filter the bowel  pattern is nonspecific the bony structures demonstrate degenerative  change no renal or ureteral or bladder calculus is seen. The bony  structures are intact. The are degenerative changes to the lower  thoracic and lumbar spine. The lung bases are free of disease..       Impression    1. Postoperative changes of biliary stent placement and cholecystectomy  and inferior vena cava filter  2. Nonspecific bowel pattern, lung bases are free of disease  3. No evidence of renal or ureteral calculus     Electronically Signed By-Bennett Bobby Jr. On:3/3/2020 11:20 PM  This report was finalized on [id number] by  Bennett Bobby Jr., .   XR Chest 1 View    Narrative  DATE OF EXAM:  3/3/2020 12:12 PM     PROCEDURE:  XR CHEST 1 VW-     INDICATIONS:  Shortness breath, cough and fever for 2 to 3 days.       COMPARISON:  PA and lateral chest 07/11/2019.     TECHNIQUE:   Single radiographic view of the chest was obtained.     FINDINGS:  No acute airspace disease. Heart size is upper limits normal. Mild  degenerative endplate spurring in the thoracic spine. No acute osseous  abnormality.       Impression No acute chest findings.     Electronically Signed By-Dr. Kaitlin Wong MD On:3/3/2020 12:46 PM  This report was finalized on [id number] by Dr. Kaitlin Wong MD.       Results for orders placed during the hospital encounter of 03/03/20   Duplex Venous Lower Extremity - Bilateral CAR    Narrative · Normal bilateral lower extremity venous duplex scan.            ASSESSMENT / PLAN      General weakness    Cirrhosis (CMS/HCC)    Diabetic neuropathy (CMS/HCC)    Dyslipidemia    History of DVT of right lower extremity    Essential hypertension    Acquired hypothyroidism    Morbid obesity (CMS/HCC)    Obstructive sleep apnea    Type 2 diabetes mellitus with kidney complication, with long-term current use of insulin (CMS/HCC)    Spinal stenosis of lumbar region    Pulmonary hypertension (CMS/HCC)    Deficiency of other specified B group vitamins    Chronic coronary artery disease    JOSE (iron deficiency anemia) with poor po absorption    IgG kappa MGUS    THOMAS (nonalcoholic steatohepatitis)    Splenomegaly    Antiphospholipid antibody positive    Elevated factor VIII level    Anemia in stage 3 chronic kidney disease (CMS/HCC)    History of Vitamin B12 deficiency    History of bilateral pulmonary embolus (PE)    Antithrombin III deficiency (CMS/HCC)    Protein C deficiency (CMS/HCC)    Protein S deficiency (CMS/HCC)    GAVE (gastric antral vascular ectasia)    CKD (chronic kidney disease) stage 3, GFR 30-59 ml/min (CMS/HCC)    Decubitus ulcer of sacral region, stage 3 (CMS/HCC)      1.  CRF/CKD STG 3-------Nonoliguric. BUN/Cr down a little. +Mild ARF/SUSANA on top of known CRF/CKD STG 3 with a baseline serum creatinine of about 1.5. CRF/CKD STG 3 secondary to DGS/HTN NS. +ARF/SUSANA appears to be secondary to slight prerenal state.  Holding Bumex.  No NSAIDs or IV dye. Dose meds for CrCl less than 10 cc/min.     2. ANEMIA WITH H/O GAVE AND MGUS AND JOSE-----H/H not really improved after PRBCs. Get GI eval     3. HTN WITH CKD STG 3-------BP okay. Avoid hypotension. No ACE/ARB/DRI/diuretic     4. DMII WITH RENAL MANIFESTATIONS------BS okay. On Lantus     5. HYPOTHYROIDISM-------On Synthroid. TSH pending     6. THOMAS/CIRRHOSIS/HEPATIC ENCEPHALOPATHY------Increase Lactulose. Follow ammonia     7. OBESITY/KATHIA------ CPAP     8. EDEMA/CHRONIC VENOUS INSUFFICIENCY     9. ACIDOSIS------Metabolic. No elevation in AGAP. Secondary to Type 4 RTA and stool losses from diarrhea related to Lactulose use. Bicarb down. Increased po NaHCO3     10. GERD-------On PPI/Carafate     11. HYPERCOAGUABLE STATE WITH ANTI THROMBI III/PROTEIN C & S DEFICIENCY AND HISTORY OF PE-----Dopplers negative. On Lovenox    12. HYPOCALCEMIA-----Replace IV. Follow ionized levels    13. HYPOPHOSPHATEMIA------Replaced    14. 1/2 CBld + for G+ cocci in clusters------Repeat and get ID eval. +Decubitus getting wound care    Paula Plascencia MD  [facility]  [phone]  03/06/20  6:17 AM

## 2020-03-06 NOTE — PROGRESS NOTES
"     LOS: 3 days   Patient Care Team:  Paulette Harris MD as PCP - General  Josefina Plascencia MD as Consulting Physician (Nephrology)    Chief Complaint: Right knee follow-up    Subjective     Interval History:     History taken from: patient  Says his right knee feels much better since the injection yesterday.    Objective     Vital Signs  Visit Vitals  /67 (BP Location: Right arm, Patient Position: Lying)   Pulse 71   Temp 98.3 °F (36.8 °C) (Oral)   Resp 16   Ht 188 cm (74\")   Wt 134 kg (295 lb)   SpO2 99%   BMI 37.88 kg/m²       Physical Exam:        General Appearance:   By himself oriented to time place and person.  Sitting up in bed.   Extremities:  Legs are well aligned.  There is no effusion in his right knee.  His right knee is nontender.   Pulses:  Deferred   Skin:  Intact   Neurologic:  Deferred         Results Review:    CBC    Results from last 7 days   Lab Units 03/06/20  0252 03/05/20  0456 03/05/20  0316 03/04/20  1144 03/03/20  1202   WBC 10*3/mm3 6.90  --  6.40 8.30 11.00*   HEMOGLOBIN g/dL 7.9* 8.3* 7.7* 8.4* 8.4*   PLATELETS 10*3/mm3 221  --  178 189 181     BMP Results from last 7 days   Lab Units 03/06/20  0252 03/05/20  0316 03/04/20  0944 03/03/20  1202   SODIUM mmol/L 142 138 137 134*   POTASSIUM mmol/L 3.7 3.6 3.3* 3.9  3.7   CHLORIDE mmol/L 109* 104 103 100   CO2 mmol/L 20.0* 21.0* 22.0 19.0*   BUN mg/dL 33* 34* 35* 35*   CREATININE mg/dL 1.61* 1.77* 1.79* 1.84*   GLUCOSE mg/dL 170* 196* 159* 245*   MAGNESIUM mg/dL 2.1 2.1 1.9  --    PHOSPHORUS mg/dL 2.7 1.8* 2.8  --      Infection Results from last 7 days   Lab Units 03/04/20  1131 03/04/20  0944   BLOODCX   --  Staphylococcus, coagulase negative*  No growth at 24 hours   WOUNDCX  Heavy growth (4+) Mixed Becky Isolated  --    BCIDPCR   --  Staphylococcus spp, not aureus. Identification by BCID PCR.*   PROCALCITONIN ng/mL  --  0.40*     Radiology(recent) Nm Lung Ventilation Perfusion Aerosol    Result Date: " 3/4/2020  Normal ventilation/perfusion lung scan. This excludes the diagnosis of pulmonary embolism with a very high degree of clinical certainty  Electronically Signed By-Naif Donahue On:3/4/2020 9:13 AM This report was finalized on [id number] by  Naif Donahue, .    Xr Knee 4+ View Bilateral    Result Date: 3/4/2020  Right knee: 1. Moderate to advanced tricompartmental joint space narrowing with marginal osteophyte formation, likely representing osteoarthritis or CPPD arthropathy given chondrocalcinosis. 2. Trace suprapatellar joint effusion.  Left knee: 1. Mild medial and patellofemoral compartment joint space narrowing which may relate to osteoarthritis or CPPD arthropathy given chondrocalcinosis.  Electronically Signed By-Shady Fischer On:3/4/2020 11:26 PM This report was finalized on [id number] by  Shady Fischer, .      Imaging reviewed: No new images taken since x-rays of his knees done 2 days ago    Medication Review:   Scheduled Meds:  [START ON 3/7/2020] !Vancomycin Level Draw Needed  Does not apply Once   acetaminophen 650 mg Oral Once   albumin human 25 g Intravenous Q8H   allopurinol 100 mg Oral BID   aspirin 81 mg Oral Daily   atorvastatin 20 mg Oral Daily   cyanocobalamin 1,000 mcg Subcutaneous Q30 Days   diphenhydrAMINE 12.5 mg Intravenous Once   docusate sodium 100 mg Oral BID   DULoxetine 60 mg Oral Daily   enoxaparin 40 mg Subcutaneous Daily   folic acid 1 mg Oral Daily   furosemide 20 mg Intravenous Q8H   insulin glargine 50 Units Subcutaneous Nightly   insulin lispro 0-9 Units Subcutaneous 4x Daily With Meals & Nightly   insulin lispro 18 Units Subcutaneous TID AC   ADRIANA 1 packet Oral BID   levothyroxine 75 mcg Oral Q AM   magnesium oxide 400 mg Oral Daily   metoclopramide 5 mg Oral BID AC   nicotine 1 patch Transdermal Nightly   oxybutynin XL 10 mg Oral Daily   pantoprazole 40 mg Oral BID AC   potassium & sodium phosphates 1 packet Oral Daily   riFAXIMin 550 mg Oral Q12H   sodium  bicarbonate 650 mg Oral TID   sodium chloride 10 mL Intravenous Q12H   sucralfate 1 g Oral 4x Daily AC & at Bedtime   THERA 1 tablet Oral Daily   vancomycin 15 mg/kg (Adjusted) Intravenous Once   zinc sulfate 220 mg Oral Daily     Continuous Infusions:  Pharmacy to dose vancomycin      PRN Meds:.acetaminophen **OR** acetaminophen **OR** acetaminophen  •  Calcium Gluconate-NaCl **AND** calcium gluconate **AND** Calcium, Ionized  •  dextrose  •  dextrose  •  docusate sodium  •  glucagon (human recombinant)  •  hydrOXYzine  •  insulin lispro **AND** insulin lispro  •  ketamine (KETALAR) infusion **AND** Ketamine Vital Signs & Assessment  •  magnesium sulfate **OR** magnesium sulfate **OR** magnesium sulfate  •  melatonin  •  melatonin  •  nitroglycerin  •  ondansetron  •  oxyCODONE  •  Pharmacy to dose vancomycin  •  potassium & sodium phosphates **OR** potassium & sodium phosphates  •  potassium chloride  •  potassium chloride  •  [COMPLETED] Insert peripheral IV **AND** sodium chloride  •  sodium chloride  •  vancomycin    Assessment/Plan       General weakness    Cirrhosis (CMS/HCC)    Diabetic neuropathy (CMS/HCC)    Dyslipidemia    History of DVT of right lower extremity    Essential hypertension    Acquired hypothyroidism    Morbid obesity (CMS/HCC)    Obstructive sleep apnea    Type 2 diabetes mellitus with kidney complication, with long-term current use of insulin (CMS/HCC)    Spinal stenosis of lumbar region    Pulmonary hypertension (CMS/HCC)    Deficiency of other specified B group vitamins    Chronic coronary artery disease    JOSE (iron deficiency anemia) with poor po absorption    IgG kappa MGUS    THOMAS (nonalcoholic steatohepatitis)    Splenomegaly    Antiphospholipid antibody positive    Elevated factor VIII level    Anemia in stage 3 chronic kidney disease (CMS/HCC)    History of Vitamin B12 deficiency    History of bilateral pulmonary embolus (PE)    Antithrombin III deficiency (CMS/HCC)    Protein C  deficiency (CMS/HCC)    Protein S deficiency (CMS/HCC)    GAVE (gastric antral vascular ectasia)    CKD (chronic kidney disease) stage 3, GFR 30-59 ml/min (CMS/HCC)    Decubitus ulcer of sacral region, stage 3 (CMS/HCC)    Impression #1 arthritis of right knee temporarily resolved with steroid injection #2 type 2 diabetes #3 cirrhosis #4 diabetic neuropathy    Recommendations #1 physical therapy #2 follow-up in office in 3 months.          Carlo Staples MD  03/06/20  6:41 AM

## 2020-03-06 NOTE — OP NOTE
ESOPHAGOGASTRODUODENOSCOPY Procedure Report    Patient Name:  Bry Ratliff  YOB: 1945    Date of Surgery:  3/6/2020     Pre-Op Diagnosis:  Iron deficiency anemia, unspecified iron deficiency anemia type [D50.9]  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]       Post-Op Diagnosis Codes:     * Iron deficiency anemia, unspecified iron deficiency anemia type [D50.9]     * 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]      Procedure/CPT® Codes:      Procedure(s):  ESOPHAGOGASTRODUODENOSCOPY    Staff:  Surgeon(s):  Zbigniew Silva MD         Anesthesia: Monitored Anesthesia Care    Implants:    Nothing was implanted during the procedure    Specimen:        See Below    Complications:  None     Description of Procedure:  Informed consent was obtained for the procedure, including sedation.  Risks of perforation, hemorrhage, adverse drug reaction and aspiration were discussed.  The patient was brought into the endoscopy suite. Continuous cardiopulmonary monitoring was performed. The patient was placed in the left lateral decubitus position.  The bite block was inserted into the patient's mouth. After adequate sedation was attained, the Olympus gastroscope was inserted into the patient's mouth and advanced to the second portion of the duodenum without difficulty.  Circumferential examination was performed. A retroflex exam was performed in the patient's stomach.  On completion of the exam, the bowel was decompressed, the scope was removed from the patient, the patient tolerated the procedure well, there were no immediate post-operative complications.     Examination of the esophagus showed normal mucosa  Examination of the stomach showed post-cautery ulcers in gastric antrum. All were clean based, none bleeding.  Mild trace GAVE persistent, not cauterized  Retroflex examination of the stomach was normal   Examination of the duodenum showed 2 biliary stents at major papilla with free flow of  bile, no blood, normal mucosa      Impression:  Post-cautery ulcers  Biliary stents in good position.     Recommendations:  Ok for diet  PPI BID x 2 weeks for post-cautery ulcers  CA 19-9 sent  F/u with Dr. Gomez in office, will see prn.       Zbigniew Silva MD     Date: 3/6/2020  Time: 1:09 PM

## 2020-03-06 NOTE — ANESTHESIA POSTPROCEDURE EVALUATION
Patient: Bry Ratliff    Procedure Summary     Date:  03/06/20 Room / Location:  [facility] ENDOSCOPY 1 / [facility] ENDOSCOPY    Anesthesia Start:  1252 Anesthesia Stop:  1308    Procedure:  ESOPHAGOGASTRODUODENOSCOPY (N/A ) Diagnosis:       Iron deficiency anemia, unspecified iron deficiency anemia type      GAVE (gastric antral vascular ectasia)      (Iron deficiency anemia, unspecified iron deficiency anemia type [D50.9])      (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])    Surgeon:  Zbigniew Silva MD Provider:  Ruben Ratliff MD    Anesthesia Type:  MAC ASA Status:  3          Anesthesia Type: MAC    Vitals  No vitals data found for the desired time range.          Post Anesthesia Care and Evaluation    Patient location during evaluation: bedside  Patient participation: complete - patient cannot participate  Level of consciousness: sleepy but conscious  Pain score: 0  Pain management: adequate  Airway patency: patent  Anesthetic complications: No anesthetic complications  PONV Status: none  Cardiovascular status: acceptable  Respiratory status: acceptable  Hydration status: acceptable

## 2020-03-06 NOTE — CONSULTS
Infectious Diseases Consult Note    Referring Provider: Santosh Amaro MD    Reason for Consultation: Positive blood culture    Patient Care Team:  Paulette Harris MD as PCP - General  Josefina Plascencia MD as Consulting Physician (Nephrology)    Chief complaint general weakness, right knee pain    Subjective     The patient has been afebrile for the last 24 hours.  The patient is on 4 liters of oxygen by nasal cannula, hemodynamically stable, and is tolerating antimicrobial therapy.     History of present illness:     This is a 74-year-old male who presents the hospital on 3/3/2020 for acute weakness and right knee pain.  The patient's wife states the patient did have some fever and chills several days before admission with a fever at home as high as 101.7 degrees but it was easily treated with Tylenol.  Patient did have a right knee injection done on 3/5/2020 and states that his knee pain is better today.  Patient also had an EGD today that showed a gastric antral vascular ectasia, and the 2 biliary stents are working properly.  Patient currently denies fever, chills, diaphoresis, shortness of breath, productive cough, GI symptoms, or any urinary symptoms.  Patient main issue is just general weakness but it is improved since admission.    Patient is currently on 4 L of oxygen nasal cannula and does not appear to be in any acute distress.  Patient has been afebrile since admission.  Patient has a creatinine 1.61, ammonia level of 68, white blood cell count of 6.9, hemoglobin 7.9, platelets of 221.  Flu screen was negative and 2 out of 2 cultures are growing coagulase-negative Staphylococcus.  Patient has a bilateral buttocks wound that does not appear to be acutely infected and cultures grew mixed sona.  Repeat cultures are in process.  Patient had a chest x-ray which negative for acute findings, had a normal renal ultrasound, had a bilateral lower leg Doppler that was negative for DVT, KUB does not  show any acute findings, and a VQ scan was unlikely for PE.  Patient also had a bilateral knee x-ray that shows likely osteoarthritis.  Patient is not appear to be on any antimicrobial therapy-it appears that IV vancomycin was ordered but it was discontinued before it was given.  He has no known allergies.    The patient denies any port, PICC line before admission, major hardware in the back or joints, or heart valve repair or replacement.  Does have a history of cardiac catheterization with stents, 2 biliary stents, and IVC filter placement.  He also has a history of anemia, anxiety, B12 deficiency, Syed's esophagus, CAD, chronic constipation, degenerative disc disease, depression, type 2 diabetes, hyperthyroidism, diverticular disease, DVT, gastroparesis, GERD, gout, hepatic encephalopathy, stomach ulcers, hyperlipidemia, Gonzalez, neuropathy, overactive bladder, obstructive sleep apnea with BiPAP use, PE,Multiple back surgeries, chronic kidney disease stage III, cataract extraction, cholecystectomy, lithotripsy, ERCP, and left renal artery stent in the remote past.  Patient denies tobacco, alcohol, illicit drug abuse.    Review of Systems   Review of Systems   Constitutional: Negative.    HENT: Negative.    Respiratory: Negative.    Cardiovascular: Negative.    Gastrointestinal: Negative.    Genitourinary: Negative.    Musculoskeletal:        Right knee pain   Skin: Positive for wound.   Neurological: Negative.    Psychiatric/Behavioral: Negative.    All other systems reviewed and are negative.      Medications  Medications Prior to Admission   Medication Sig Dispense Refill Last Dose   • allopurinol (ZYLOPRIM) 100 MG tablet Take 100 mg by mouth 2 (Two) Times a Day. Do not preop   3/3/2020 at 0800   • aspirin 81 MG tablet Take 1 tablet by mouth Daily. 30 tablet 3 3/3/2020 at 0800   • atorvastatin (LIPITOR) 20 MG tablet Take 1 tablet by mouth Daily. (Patient taking differently: Take 20 mg by mouth Every Night.) 90  tablet 3 3/2/2020 at Unknown time   • bumetanide (BUMEX) 2 MG tablet Take 1 tablet by mouth Daily. (Patient taking differently: Take 2 mg by mouth Daily. Do not take preop)   3/3/2020 at 0800   • Cyanocobalamin 1000 MCG/ML kit Inject 1,000 mcg as directed Every 30 (Thirty) Days.   1/31/2020   • docusate sodium (COLACE) 100 MG capsule Take 100 mg by mouth 2 (Two) Times a Day.   3/3/2020 at 0800   • DULoxetine (CYMBALTA) 60 MG capsule Take 60 mg by mouth Daily.   3/3/2020 at 0800   • folic acid (FOLVITE) 1 MG tablet Take 1 mg by mouth Daily.   3/3/2020 at 0800   • hydrOXYzine pamoate (VISTARIL) 25 MG capsule Take 25 mg by mouth 3 (Three) Times a Day As Needed.  0 Taking   • insulin glargine (LANTUS) 100 UNIT/ML injection Inject 50 units at bedtime (Patient taking differently: Inject 50 Units under the skin into the appropriate area as directed Every Night. Inject 50 units at bedtime) 45 mL 3 3/2/2020 at Unknown time   • insulin lispro (HUMALOG) 100 UNIT/ML injection 18 units subcu before each meal plus sliding scale MDD 60 (Patient taking differently: Inject 18 Units under the skin into the appropriate area as directed 3 (Three) Times a Day Before Meals. 18 units subcu before each meal plus sliding scale MDD 60) 60 mL 4 3/2/2020 at Unknown time   • lactulose (CHRONULAC) 10 GM/15ML solution Take 60 mL by mouth Every 4 (Four) Hours.   3/3/2020 at 0700   • levothyroxine (SYNTHROID, LEVOTHROID) 75 MCG tablet Take 1 tablet by mouth Daily. (Patient taking differently: Take 75 mcg by mouth Daily. Take preop) 90 tablet 4 3/3/2020 at 0700   • magnesium oxide (MAG-OX) 400 MG tablet Take 400 mg by mouth Daily. Take post op   3/3/2020 at 0800   • melatonin 5 MG tablet tablet Take 10 mg by mouth At Night As Needed.   Taking   • metoclopramide (REGLAN) 5 MG tablet Take 5 mg by mouth 2 (Two) Times a Day. Ok to take preop  0 3/3/2020 at 0800   • Mirabegron ER (MYRBETRIQ) 50 MG tablet sustained-release 24 hour 24 hr tablet Take 50 mg  by mouth Daily.   3/3/2020 at 0800   • Multiple Vitamins-Minerals (MULTIVITAMIN WITH MINERALS) tablet tablet Take 1 tablet by mouth Daily.   3/3/2020 at 0800   • oxyCODONE (ROXICODONE) 5 MG immediate release tablet Take 5 mg by mouth Every 8 (Eight) Hours As Needed.   2/27/2020 at Unknown time   • pantoprazole (PROTONIX) 40 MG EC tablet Take 1 tablet by mouth 2 (Two) Times a Day. 30 tablet 3 3/3/2020 at 0800   • phosphorus (K PHOS NEUTRAL) 155-852-130 MG tablet Take 1 tablet by mouth Daily. afternoon   3/2/2020 at Unknown time   • rifaximin (XIFAXAN) 550 MG tablet Take 550 mg by mouth Every 12 (Twelve) Hours.   3/3/2020 at 0800   • sodium bicarbonate 650 MG tablet Take 650 mg by mouth 3 (Three) Times a Day.   3/3/2020 at 0800   • sucralfate (CARAFATE) 1 g tablet Take 1 g by mouth 4 (Four) Times a Day.   3/3/2020 at 0800   • zinc sulfate (ZINCATE) 50 MG capsule Take 50 mg by mouth Daily.  0 3/3/2020 at 0800       History  Past Medical History:   Diagnosis Date   • Anemia    • Anxiety    • B12 deficiency 2009   • Balance disorder    • Syed's esophagus    • CAD (coronary artery disease)     cardiac stent   • Constipation    • DDD (degenerative disc disease), lumbar    • Decubitus ulcer     buttocks   • Depression    • Diabetes mellitus (CMS/HCC)    • Disease of thyroid gland     hypothyroid   • Diverticular disease    • Dvt femoral (deep venous thrombosis) (CMS/HCC) 2004    rt let   • Gastroparesis    • GERD (gastroesophageal reflux disease)    • Gout    • Hepatic encephalopathy (CMS/HCC)     if doesnt take meds   • History of echocardiogram     03/03/2017 - 12/03/2014 - 04/2012   • History of stomach ulcers    • Hyperlipidemia    • Iron deficiency    • Morbid obesity (CMS/HCC)    • THOMAS (nonalcoholic steatohepatitis)    • Neuropathy    • OAB (overactive bladder)    • KATHIA treated with BiPAP     bring dos   • Peripheral edema    • Pulmonary embolus (CMS/HCC) 2004   • Renal insufficiency    • S/P lumbar laminectomy       • Skin irritation     skin fold   • Stage 3 chronic kidney disease (CMS/HCC)      Past Surgical History:   Procedure Laterality Date   • BACK SURGERY  1971   • BILE DUCT STENT PLACEMENT     • CATARACT EXTRACTION  2014   • CHOLECYSTECTOMY  02/24/2019   • COLONOSCOPY  03/2018   • CORONARY ANGIOPLASTY  08/24/2009    PCI stent - succesful placement of 3.5/23 promus stent in proximal right coronary artery   • CORONARY ANGIOPLASTY WITH STENT PLACEMENT  08/27/2009    patient had stent placed to proximal right coronary artery   • CYSTOSCOPY BLADDER STONE LITHOTRIPSY  1997   • ENDOSCOPY N/A 10/18/2019    Procedure: ESOPHAGOGASTRODUODENOSCOPY with argon plasma coagulation of gastric antral vascular ectasia;  Surgeon: Marisel Gomez MD;  Location: [facility] ENDOSCOPY;  Service: Gastroenterology   • ENDOSCOPY N/A 1/9/2020    Procedure: ESOPHAGOGASTRODUODENOSCOPY WITH BIOPSY, ARGON PLASMA COAGULATION OF GASTRIC ANTREL VASCULAR ECTASIA,;  Surgeon: Marisel Gomez MD;  Location: [facility] ENDOSCOPY;  Service: Gastroenterology   • ERCP N/A 11/20/2019    Procedure: ERCP, clearance of bile duct with balloon, placement of biliary stent, EGD with argon plasma coagulation of gastric antral vascular ectasia;  Surgeon: Marisel Gomez MD;  Location: [facility] ENDOSCOPY;  Service: Gastroenterology   • ERCP N/A 2/27/2020    Procedure: ENDOSCOPIC RETROGRADE CHOLANGIOPANCREATOGRAPHY with removal of biliary stent, brushing, dilation, and placement of biliary stent x 2 and Esophagogastroduodenoscopy with argon plasma coagulation;  Surgeon: Marisel Gomez MD;  Location: [facility] ENDOSCOPY;  Service: Gastroenterology;  Laterality: N/A;  Gastric antral vascular ectasia, common bile duct stricture   • OTHER SURGICAL HISTORY  11/2018    IVC filter implant   • RENAL ARTERY STENT Left     does not recall this       Family History  Family History   Problem Relation Age of Onset   • Hyperlipidemia Other    • Hypertension Other        Social History   reports that  he has never smoked. He has never used smokeless tobacco. He reports that he does not drink alcohol or use drugs.    Allergies  Patient has no known allergies.    Objective     Vital Signs   Vital Signs (last 24 hours)       03/05 0700  -  03/06 0659 03/06 0700  -  03/06 1623   Most Recent    Temp (°F) 97.7 -  98.3    97.9 -  98.2     98.2 (36.8)    Heart Rate 71 -  82    60 -  74     60    Resp 14 -  16    18 -  22     18    /65 -  146/67    101/42 -  158/57     111/45    SpO2 (%)   99      100     100          Physical Exam:  Physical Exam   Constitutional: He is oriented to person, place, and time. He appears well-developed and well-nourished.   HENT:   Head: Normocephalic and atraumatic.   Eyes: Pupils are equal, round, and reactive to light. Conjunctivae and EOM are normal.   Neck: Neck supple.   Cardiovascular: Normal rate, regular rhythm and normal heart sounds.   Pulmonary/Chest: Effort normal and breath sounds normal.   Diminished throughout   Abdominal: Soft. Bowel sounds are normal. He exhibits distension. There is no tenderness.   Musculoskeletal: Normal range of motion.   Degenerative changes   Neurological: He is alert and oriented to person, place, and time.   Skin: Skin is warm and dry.   Bilateral buttocks wounds without overt signs of infection   Psychiatric: He has a normal mood and affect.   Vitals reviewed.      Microbiology  Microbiology Results (last 10 days)     Procedure Component Value - Date/Time    Wound Culture - Wound, Buttock [[id number]] Collected:  03/04/20 1131    Lab Status:  Final result Specimen:  Wound from Buttock Updated:  03/05/20 0825     Wound Culture Heavy growth (4+) Mixed Sona Isolated     Comment: This specimen is not acceptable for generating clinically relevant information due to predominating colonic and rectal sona. No further workup.          Gram Stain Moderate (3+) WBCs per low power field      Many (4+) Mixed bacterial morphotypes seen on Gram Stain     Blood Culture - Blood, Wrist, Left [[id number]]  (Abnormal) Collected:  03/04/20 0944    Lab Status:  Final result Specimen:  Blood from Wrist, Left Updated:  03/06/20 0629     Blood Culture Staphylococcus, coagulase negative     Comment: Probable contaminant requires clinical correlation, susceptibility not performed unless requested by physician.          Isolated from Aerobic Bottle     Gram Stain Aerobic Bottle Gram positive cocci in clusters    Blood Culture - Blood, Arm, Right [[id number]]  (Abnormal) Collected:  03/04/20 0944    Lab Status:  Preliminary result Specimen:  Blood from Arm, Right Updated:  03/06/20 0308     Blood Culture No growth at 24 hours     Gram Stain Aerobic Bottle Gram positive cocci in clusters    Blood Culture ID, PCR - Blood, Wrist, Left [[id number]]  (Abnormal) Collected:  03/04/20 0944    Lab Status:  Final result Specimen:  Blood from Wrist, Left Updated:  03/05/20 1958     BCID, PCR Staphylococcus spp, not aureus. Identification by BCID PCR.    Influenza Antigen, Rapid - Swab, Nasopharynx [[id number]]  (Normal) Collected:  03/03/20 1202    Lab Status:  Final result Specimen:  Swab from Nasopharynx Updated:  03/03/20 1222     Influenza A PCR Not Detected     Influenza B PCR Not Detected          Laboratory  Results from last 7 days   Lab Units 03/06/20  0252   WBC 10*3/mm3 6.90   HEMOGLOBIN g/dL 7.9*   HEMATOCRIT % 24.5*   PLATELETS 10*3/mm3 221     Results from last 7 days   Lab Units 03/06/20  0252   SODIUM mmol/L 142   POTASSIUM mmol/L 3.7   CHLORIDE mmol/L 109*   CO2 mmol/L 20.0*   BUN mg/dL 33*   CREATININE mg/dL 1.61*   GLUCOSE mg/dL 170*   CALCIUM mg/dL 8.4*     Results from last 7 days   Lab Units 03/06/20  0252   SODIUM mmol/L 142   POTASSIUM mmol/L 3.7   CHLORIDE mmol/L 109*   CO2 mmol/L 20.0*   BUN mg/dL 33*   CREATININE mg/dL 1.61*   GLUCOSE mg/dL 170*   CALCIUM mg/dL 8.4*     Results from last 7 days   Lab Units 03/04/20  0944   CK TOTAL U/L 37     Results from last 7 days    Lab Units 03/04/20  1144   SED RATE mm/hr >120*           Radiology  Imaging Results (Last 72 Hours)     Procedure Component Value Units Date/Time    XR Knee 4+ View Bilateral [[id number]] Collected:  03/04/20 2319     Updated:  03/04/20 2328    Narrative:       DATE OF EXAM:  3/4/2020 9:43 PM     PROCEDURE:  XR KNEE 4+ VW BILATERAL-     INDICATIONS:  Pain in both knees and difficulty walking.; R53.1-Weakness;  D64.9-Anemia, unspecified; N18.9-Chronic kidney disease, unspecified;  R79.89-Other specified abnormal findings of blood chemistry     COMPARISON:  No Comparisons Available     TECHNIQUE:   Four radiographic views of bilateral knees were obtained.     FINDINGS:  Right knee: There is no acute fracture or dislocation. There is moderate  to advanced tricompartmental joint space narrowing with marginal  osteophyte formation, worst within the lateral compartment. There is  subchondral sclerosis of the lateral tibial plateau. There is  chondrocalcinosis within the medial and lateral compartments. There is a  trace suprapatellar joint effusion. There is no significant soft tissue  swelling. Bone mineralization is normal. There is no evidence of osseous  erosion.      Left knee: There is mild medial compartment joint space narrowing  without significant osteophyte formation. There is patellofemoral  compartment joint space narrowing with mild osteophyte formation. There  is chondrocalcinosis which can be seen with CPPD arthropathy. There is  no left knee joint effusion. There is peripheral vascular  atherosclerotic calcification. There is no osseous erosion.       Impression:       Right knee:  1. Moderate to advanced tricompartmental joint space narrowing with  marginal osteophyte formation, likely representing osteoarthritis or  CPPD arthropathy given chondrocalcinosis.  2. Trace suprapatellar joint effusion.     Left knee:  1. Mild medial and patellofemoral compartment joint space narrowing  which may relate to  osteoarthritis or CPPD arthropathy given  chondrocalcinosis.     Electronically Signed By-Shady Fischer On:3/4/2020 11:26 PM  This report was finalized on [id number] by  Shady Fischer, .    NM Lung Ventilation Perfusion Aerosol [[id number]] Collected:  03/04/20 0911     Updated:  03/04/20 0926    Narrative:       agfaDATE OF EXAM:  3/4/2020 8:24 AM     PROCEDURE:  NM LUNG VENTILATION PERFUSION AEROSOL-     INDICATIONS:  Dyspnea, cardiac origin suspected; R53.1-Weakness; D64.9-Anemia,  unspecified; N18.9-Chronic kidney disease, unspecified; R79.89-Other  specified abnormal findings of blood chemistry     COMPARISON:  Chest radiograph 03/03/2020     TECHNIQUE:   The patient was ventilated with 5.5 mCi of technetium 99m pertechnetate  aerosol and ventilation images were acquired in multiple obliquities.  The patient then received 45 mCi of technetium 99m MAA intravenously and  perfusion images were acquired in multiple obliquities.     FINDINGS:  Ventilation and perfusion scan appears normal. No evidence of  ventilation or perfusion mismatch. No chest radiographic abnormalities.       Impression:       Normal ventilation/perfusion lung scan. This excludes the diagnosis of  pulmonary embolism with a very high degree of clinical certainty     Electronically Signed By-Naif Donahue On:3/4/2020 9:13 AM  This report was finalized on [id number] by  Naif Donahue, .    XR Abdomen KUB [[id number]] Collected:  03/03/20 2318     Updated:  03/03/20 2322    Narrative:       DATE OF EXAM:  3/3/2020 7:24 PM     PROCEDURE:  XR ABDOMEN KUB-     INDICATIONS:  ABDOMINAL PAIN; R53.1-Weakness; D64.9-Anemia, unspecified; N18.9-Chronic  kidney disease, unspecified; R79.89-Other specified abnormal findings of  blood chemistry  74-year-old male who has abdominal pain off and on, pain mostly to the  right in the middle of his abdomen. Patient gives history of diabetes     COMPARISON:  No Comparisons Available     TECHNIQUE:   Single  radiographic view of the abdomen was obtained.     FINDINGS:  Today's KUB demonstrates a common duct stent in place along with change  of cholecystectomy and placement of inferior vena cava filter the bowel  pattern is nonspecific the bony structures demonstrate degenerative  change no renal or ureteral or bladder calculus is seen. The bony  structures are intact. The are degenerative changes to the lower  thoracic and lumbar spine. The lung bases are free of disease..       Impression:          1. Postoperative changes of biliary stent placement and cholecystectomy  and inferior vena cava filter  2. Nonspecific bowel pattern, lung bases are free of disease  3. No evidence of renal or ureteral calculus     Electronically Signed By-Bennett Bobby Jr. On:3/3/2020 11:20 PM  This report was finalized on [id number] by  Bennett Bobby Jr., .    US Renal Bilateral [[id number]] Collected:  03/03/20 1701     Updated:  03/03/20 1706    Narrative:       DATE OF EXAM:  3/3/2020 4:38 PM     PROCEDURE:  US RENAL BILATERAL-     INDICATIONS:  ARF/CKD; R53.1-Weakness; D64.9-Anemia, unspecified; N18.9-Chronic kidney  disease, unspecified; R79.89-Other specified abnormal findings of blood  chemistry     COMPARISON:  No Comparisons Available     TECHNIQUE:   Grayscale and color Doppler ultrasound evaluation of the kidneys and  urinary bladder was performed.        FINDINGS:  Study is technically limited secondary to obscuration by body habitus.  Right kidney measures 9.5 x 4.9 x 5.0 cm the left kidney measures 8.6 x  4.7 x 4.9 cm. Both kidneys maintain normal cortical thickness and  echotexture. No cystic or solid renal mass or shadowing stone or  hydronephrosis is seen. There is mild to moderate urinary bladder  distention with fluid, and the urinary bladder appears normal.        Impression:       Normal renal ultrasound.     Electronically Signed By-Dr. Kaitlin Wong MD On:3/3/2020 5:04 PM  This report was finalized on [id number]  by Dr. Kaitlin Wong MD.          Cardiology      Results Review:  I have reviewed all clinical data, test, lab, and imaging results.       Schedule Meds    acetaminophen 650 mg Oral Once   albumin human 25 g Intravenous Q8H   allopurinol 100 mg Oral BID   aspirin 81 mg Oral Daily   atorvastatin 20 mg Oral Daily   cyanocobalamin 1,000 mcg Subcutaneous Q30 Days   diphenhydrAMINE 12.5 mg Intravenous Once   docusate sodium 100 mg Oral BID   DULoxetine 60 mg Oral Daily   enoxaparin 40 mg Subcutaneous Daily   folic acid 1 mg Oral Daily   furosemide 20 mg Intravenous Q8H   insulin glargine 50 Units Subcutaneous Nightly   insulin lispro 0-9 Units Subcutaneous 4x Daily With Meals & Nightly   insulin lispro 18 Units Subcutaneous TID AC   ADRIANA 1 packet Oral BID   [START ON 3/7/2020] lactulose 30 g Oral Daily   levothyroxine 75 mcg Oral Q AM   magnesium oxide 400 mg Oral Daily   metoclopramide 5 mg Oral BID AC   nicotine 1 patch Transdermal Nightly   oxybutynin XL 10 mg Oral Daily   pantoprazole 40 mg Oral BID AC   potassium & sodium phosphates 1 packet Oral Daily   riFAXIMin 550 mg Oral Q12H   sodium bicarbonate 650 mg Oral TID   sucralfate 1 g Oral 4x Daily AC & at Bedtime   THERA 1 tablet Oral Daily   zinc sulfate 220 mg Oral Daily       Infusion Meds    Pharmacy to dose vancomycin     sodium chloride 9 mL/hr Last Rate: 9 mL/hr (03/06/20 1232)       PRN Meds  •  acetaminophen **OR** acetaminophen **OR** acetaminophen  •  Calcium Gluconate-NaCl **AND** calcium gluconate **AND** Calcium, Ionized  •  dextrose  •  dextrose  •  docusate sodium  •  glucagon (human recombinant)  •  hydrOXYzine  •  insulin lispro **AND** insulin lispro  •  ketamine (KETALAR) infusion **AND** Ketamine Vital Signs & Assessment  •  magnesium sulfate **OR** magnesium sulfate **OR** magnesium sulfate  •  melatonin  •  melatonin  •  nitroglycerin  •  ondansetron **OR** ondansetron  •  oxyCODONE  •  Pharmacy to dose vancomycin  •  potassium & sodium  phosphates **OR** potassium & sodium phosphates  •  potassium chloride  •  potassium chloride  •  sodium chloride      Assessment/Plan       Assessment    Positive blood culture-2 out of 2 blood cultures grew coagulase-negative Staphylococcus-likely contamination  -Patient denies any PICC, port, hardware in his back or joints, or any pacemaker or heart valve surgery  -Patient has bilateral buttocks wounds but neither appear to be of overtly infected and wound cultures just grew skin sona    Patient presents with weakness-likely related to his Thomas cirrhosis and anemia  -EGD showed GAVE    THOMAS cirrhosis with recent biliary stent placement    IVC filter placement with history of DVT and PE    Chronic kidney disease    Plan    Continue to monitor off antimicrobial therapy  Waiting on repeat blood cultures  Continue supportive care  Seun Verde, APRN  03/06/20  4:23 PM

## 2020-03-06 NOTE — PROGRESS NOTES
Reason for follow-up: Shortness of breath  3 of CAD status post PCI  History of DVT  Severe anemia and renal failure  Patient Care Team:  Paulette Harris MD as PCP - General  Josefina Plascencia MD as Consulting Physician (Nephrology)    Subjective .   Feels slightly better     Review of Systems   Constitution: Positive for malaise/fatigue. Negative for fever.   HENT: Negative for congestion and hearing loss.    Eyes: Negative for double vision and visual disturbance.   Cardiovascular: Positive for dyspnea on exertion. Negative for chest pain, claudication, leg swelling and syncope.   Respiratory: Positive for cough and shortness of breath.    Endocrine: Negative for cold intolerance.   Skin: Negative for color change and rash.   Musculoskeletal: Negative for arthritis and joint pain.   Gastrointestinal: Negative for abdominal pain and heartburn.   Genitourinary: Negative for hematuria.   Neurological: Negative for excessive daytime sleepiness and dizziness.   Psychiatric/Behavioral: Negative for depression. The patient is not nervous/anxious.    All other systems reviewed and are negative.      Patient has no known allergies.    Scheduled Meds:    acetaminophen 650 mg Oral Once   allopurinol 100 mg Oral BID   aspirin 81 mg Oral Daily   atorvastatin 20 mg Oral Daily   calcium gluconate 1 g Intravenous Q12H   cyanocobalamin 1,000 mcg Subcutaneous Q30 Days   diphenhydrAMINE 12.5 mg Intravenous Once   docusate sodium 100 mg Oral BID   DULoxetine 60 mg Oral Daily   enoxaparin 40 mg Subcutaneous Daily   folic acid 1 mg Oral Daily   insulin glargine 50 Units Subcutaneous Nightly   insulin lispro 0-9 Units Subcutaneous 4x Daily With Meals & Nightly   insulin lispro 18 Units Subcutaneous TID AC   ADRIANA 1 packet Oral BID   levothyroxine 75 mcg Oral Q AM   magnesium oxide 400 mg Oral Daily   metoclopramide 5 mg Oral BID AC   nicotine 1 patch Transdermal Nightly   oxybutynin XL 10 mg Oral Daily   pantoprazole 40  "mg Oral BID AC   potassium & sodium phosphates 1 packet Oral Daily   riFAXIMin 550 mg Oral Q12H   sodium bicarbonate 650 mg Oral TID   sodium chloride 10 mL Intravenous Q12H   sucralfate 1 g Oral 4x Daily AC & at Bedtime   THERA 1 tablet Oral Daily   zinc sulfate 220 mg Oral Daily     Continuous Infusions:   PRN Meds:.•  acetaminophen **OR** acetaminophen **OR** acetaminophen  •  Calcium Gluconate-NaCl **AND** calcium gluconate **AND** Calcium, Ionized  •  dextrose  •  dextrose  •  docusate sodium  •  glucagon (human recombinant)  •  hydrOXYzine  •  insulin lispro **AND** insulin lispro  •  ketamine (KETALAR) infusion **AND** Ketamine Vital Signs & Assessment  •  magnesium sulfate **OR** magnesium sulfate **OR** magnesium sulfate  •  melatonin  •  melatonin  •  nitroglycerin  •  ondansetron  •  oxyCODONE  •  potassium & sodium phosphates **OR** potassium & sodium phosphates  •  potassium chloride  •  potassium chloride  •  [COMPLETED] Insert peripheral IV **AND** sodium chloride  •  sodium chloride    Objective   Looks comfortable lying in the bed    VITAL SIGNS  Vitals:    03/04/20 1208 03/04/20 2032 03/05/20 0405 03/05/20 1309   BP: 112/63 117/64 133/72 123/65   BP Location: Left arm Right arm Right arm    Patient Position: Sitting Lying Lying    Pulse: 78 72 68 82   Resp: 17 18 14 16   Temp: 99.1 °F (37.3 °C) 98.3 °F (36.8 °C) 98.9 °F (37.2 °C) 98 °F (36.7 °C)   TempSrc: Oral Oral Oral Oral   SpO2: 98% 99% 99% 99%   Weight:       Height:           Flowsheet Rows      First Filed Value   Admission Height  188 cm (74\") Documented at 03/03/2020 1133   Admission Weight  134 kg (295 lb) Documented at 03/03/2020 1133           TELEMETRY: Sinus rhythm    Physical Exam:  Physical Exam   Constitutional: He is oriented to person, place, and time. He appears well-developed and well-nourished. He is cooperative.   Obese   HENT:   Head: Normocephalic and atraumatic.   Mouth/Throat: Uvula is midline and oropharynx is clear and " moist. No oral lesions.   Eyes: Conjunctivae are normal. No scleral icterus.   Neck: Trachea normal. Neck supple. No JVD present. Carotid bruit is not present. No thyromegaly present.   Cardiovascular: Normal rate, regular rhythm, S1 normal, S2 normal, normal heart sounds, intact distal pulses and normal pulses. PMI is not displaced. Exam reveals no gallop and no friction rub.   No murmur heard.  Pulmonary/Chest: Effort normal and breath sounds normal.   Abdominal: Soft. Bowel sounds are normal.   Genitourinary:   Genitourinary Comments: No Maurice catheter   Musculoskeletal: Normal range of motion. He exhibits edema.   Neurological: He is alert and oriented to person, place, and time. He has normal strength.   No focal deficits   Skin: Skin is warm. No cyanosis.   Decub ulcers noted on the sacrum   Psychiatric: He has a normal mood and affect.                LAB RESULTS (LAST 7 DAYS)    CBC  Results from last 7 days   Lab Units 03/05/20  0456 03/05/20  0316 03/04/20  1144 03/03/20  1202   WBC 10*3/mm3  --  6.40 8.30 11.00*   RBC 10*6/mm3  --  2.33* 2.50* 2.52*   HEMOGLOBIN g/dL 8.3* 7.7* 8.4* 8.4*   HEMATOCRIT % 24.5* 22.8* 24.6* 24.9*   MCV fL  --  98.2* 98.5* 98.7*   PLATELETS 10*3/mm3  --  178 189 181       BMP  Results from last 7 days   Lab Units 03/05/20  0316 03/04/20  0944 03/03/20  1202   SODIUM mmol/L 138 137 134*   POTASSIUM mmol/L 3.6 3.3* 3.9  3.7   CHLORIDE mmol/L 104 103 100   CO2 mmol/L 21.0* 22.0 19.0*   BUN mg/dL 34* 35* 35*   CREATININE mg/dL 1.77* 1.79* 1.84*   GLUCOSE mg/dL 196* 159* 245*   MAGNESIUM mg/dL 2.1 1.9  --    PHOSPHORUS mg/dL 1.8* 2.8  --        CMP   Results from last 7 days   Lab Units 03/05/20  0316 03/04/20  0944 03/03/20  1202   SODIUM mmol/L 138 137 134*   POTASSIUM mmol/L 3.6 3.3* 3.9  3.7   CHLORIDE mmol/L 104 103 100   CO2 mmol/L 21.0* 22.0 19.0*   BUN mg/dL 34* 35* 35*   CREATININE mg/dL 1.77* 1.79* 1.84*   GLUCOSE mg/dL 196* 159* 245*   ALBUMIN g/dL 2.60* 2.70* 3.00*      BILIRUBIN mg/dL 0.2 0.4 0.5   ALK PHOS U/L 112 119* 128*   AST (SGOT) U/L 39 29 34   ALT (SGPT) U/L 21 18 22   AMMONIA umol/L 191* 65*  --          BNP        TROPONIN  Results from last 7 days   Lab Units 03/04/20  0944   CK TOTAL U/L 37   TROPONIN T ng/mL 0.019       CoAg  Results from last 7 days   Lab Units 03/03/20  1202   INR  1.09   APTT seconds 31.2*       Creatinine Clearance  Estimated Creatinine Clearance: 54.2 mL/min (A) (by C-G formula based on SCr of 1.77 mg/dL (H)).    ABG        Radiology  Nm Lung Ventilation Perfusion Aerosol    Result Date: 3/4/2020  Normal ventilation/perfusion lung scan. This excludes the diagnosis of pulmonary embolism with a very high degree of clinical certainty  Electronically Signed By-Naif Donahue On:3/4/2020 9:13 AM This report was finalized on [id number] by  Naif Donahue, .    Xr Knee 4+ View Bilateral    Result Date: 3/4/2020  Right knee: 1. Moderate to advanced tricompartmental joint space narrowing with marginal osteophyte formation, likely representing osteoarthritis or CPPD arthropathy given chondrocalcinosis. 2. Trace suprapatellar joint effusion.  Left knee: 1. Mild medial and patellofemoral compartment joint space narrowing which may relate to osteoarthritis or CPPD arthropathy given chondrocalcinosis.  Electronically Signed By-Shady Fischer On:3/4/2020 11:26 PM This report was finalized on [id number] by  Shady Fischer, .    Xr Abdomen Kub    Result Date: 3/3/2020   1. Postoperative changes of biliary stent placement and cholecystectomy and inferior vena cava filter 2. Nonspecific bowel pattern, lung bases are free of disease 3. No evidence of renal or ureteral calculus  Electronically Signed By-Bennett Bobby Jr. On:3/3/2020 11:20 PM This report was finalized on [id number] by  Bennett Bobby Jr., .            EKG        I personally viewed and interpreted the patient's EKG/Telemetry data:    ECHOCARDIOGRAM:    Results for orders placed during the hospital  encounter of 03/03/20   Adult Transthoracic Echo Complete W/ Cont if Necessary Per Protocol    Narrative · The left ventricular cavity is mildly dilated.  · Estimated EF = 50%.  · Left ventricular systolic function is low normal.  · Right ventricular cavity is mildly dilated.  · Mild mitral valve regurgitation is present  · Mild tricuspid valve regurgitation is present.  · Mild dilation of the aortic root is present.        STRESS MYOVIEW:    CARDIAC CATHETERIZATION:    OTHER:         Assessment/Plan       General weakness    Cirrhosis (CMS/HCC)    Diabetic neuropathy (CMS/HCC)    Dyslipidemia    History of DVT of right lower extremity    Essential hypertension    Acquired hypothyroidism    Morbid obesity (CMS/HCC)    Obstructive sleep apnea    Type 2 diabetes mellitus with kidney complication, with long-term current use of insulin (CMS/HCC)    Spinal stenosis of lumbar region    Pulmonary hypertension (CMS/HCC)    Deficiency of other specified B group vitamins    Chronic coronary artery disease    JOSE (iron deficiency anemia) with poor po absorption    IgG kappa MGUS    THOMAS (nonalcoholic steatohepatitis)    Splenomegaly    Antiphospholipid antibody positive    Elevated factor VIII level    Anemia in stage 3 chronic kidney disease (CMS/HCC)    History of Vitamin B12 deficiency    History of bilateral pulmonary embolus (PE)    Antithrombin III deficiency (CMS/HCC)    Protein C deficiency (CMS/HCC)    Protein S deficiency (CMS/HCC)    GAVE (gastric antral vascular ectasia)    CKD (chronic kidney disease) stage 3, GFR 30-59 ml/min (CMS/HCC)    Decubitus ulcer of sacral region, stage 3 (CMS/HCC)      Multiple medical problems now admitted with worsening shortness of breath borderline elevated troponin probably demand ischemia  History of CAD status post PCI almost 10 years ago  Patient have severe anemia renal failure  Primary team following  Pharmacological stress Myoview rescheduled for tomorrow since patient had VQ  scan yesterday  Echocardiogram was reviewed    I discussed the patients findings and my recommendations with patient and family at bedside    Alvaro Pandey MD  03/05/20  7:12 PM

## 2020-03-06 NOTE — PROGRESS NOTES
Continued Stay Note  ERICA Jerry     Patient Name: Bry Ratliff  MRN: [id number]  Today's Date: 3/6/2020    Admit Date: 3/3/2020    Discharge Plan     Row Name 03/06/20 1144       Plan    Plan Comments  Barrier to D/C: stress test pending, EGD scheduled.         Expected Discharge Date and Time     Expected Discharge Date Expected Discharge Time    Mar 8, 2020             Simran Funes

## 2020-03-06 NOTE — PLAN OF CARE
Pt was very pleasant throughout the shift. Pt had EGD & Stress test today. Wound dressing changed. Pt anticipated to go to [place] upon D/C for rehab. Awaiting blood culture results. Will continue to monitor.

## 2020-03-06 NOTE — PROGRESS NOTES
[facility] Medicine Services Daily Progress Note      Hospitalist Team  LOS 3 days      Patient Care Team:  Paulette Harris MD as PCP - General  Josefina Plascencia MD as Consulting Physician (Nephrology)    Patient Location: 304/1      Subjective   Subjective     Chief Complaint / Subjective  Chief Complaint   Patient presents with   • Weakness - Generalized         Brief Synopsis of Hospital Course/HPI  Mr. Ratliff is a 74 y.o.  presents to [facility] complaining of generalized weakness.  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  Patient also has anemia for which patient is supposed to receive iron infusions.  Patient condition has been declining for the last several days and it got to a point where he was unable to move around and safely ambulate.  5 she is also elderly frail person and she is unable to take care of him as he is very unstable on his gait and he is tend to fall and she is afraid that he will have a major injury from a fall.  Patient also complains of right knee pain for which she had injection in the past and that knee is also getting worse in terms of his ambulation.      Date:: 3/4/2020: Patient seen and examined and patient little better but still continues to be weak and complaining of problem ambulation secondary to right knee pain.  I consulted Dr. Staples and also spoke to him.  I also spoke to  as well as spoke to his wife at the bedside.  Patient had echocardiogram done which came out to be normal.  Most of his  "work-up was negative.    Date 3/5/2020 : Patient had jump in ammonia level from 63 to 191 and patient had no BM since yesterday. Lactulose dose increased.     Date 3/6/2020: Patient seen and examined patient moving his bowels and seemed to be doing much better.    Review of Systems   All other systems reviewed and are negative.        Objective   Objective      Vital Signs  Temp:  [97.7 °F (36.5 °C)-98.3 °F (36.8 °C)] 98.2 °F (36.8 °C)  Heart Rate:  [60-76] 60  Resp:  [14-22] 18  BP: (101-158)/(42-67) 111/45  Oxygen Therapy  SpO2: 100 %  Pulse Oximetry Type: Continuous  Device (Oxygen Therapy): room air  Device (Oxygen Therapy): simple face mask  Flow (L/min): 4  ETCO2 (mmHg): 37 mmHg  Flowsheet Rows      First Filed Value   Admission Height  188 cm (74\") Documented at 03/03/2020 1133   Admission Weight  134 kg (295 lb) Documented at 03/03/2020 1133        Intake & Output (last 3 days)       03/03 0701 - 03/04 0700 03/04 0701 - 03/05 0700 03/05 0701 - 03/06 0700 03/06 0701 - 03/07 0700    P.O. 480 360 720 0    I.V. (mL/kg) 600 (4.5)       Total Intake(mL/kg) 1080 (8.1) 360 (2.7) 720 (5.4) 0 (0)    Urine (mL/kg/hr)  250 (0.1)      Stool  0      Total Output  250      Net +1080 +110 +720 0            Urine Unmeasured Occurrence 1 x 1 x 3 x 1 x    Stool Unmeasured Occurrence 1 x 2 x 8 x 1 x        Lines, Drains & Airways    Active LDAs     Name:   Placement date:   Placement time:   Site:   Days:    Peripheral IV 03/03/20 1153 Right Forearm   03/03/20    1153    Forearm   1                  Physical Exam:    Physical Exam   Nursing note and vitals reviewed.    Patient is an awake and morbidly obese male does not seem to in distress.  Skin exam shows no new acute rash or ulcers.  Lungs are clear to auscultation bilaterally with prolonged expiration.  Cardiovascular S1-S2 no murmurs no gallops.  Abdomen is soft nontender nondistended no organomegaly bowel sounds positive.  Musculoskeletal he has painful range of motion " on the right knee otherwise normal range of motion.  Extremities mild edema.  At this normocephalic.  HEENT exam pupils are equal reactive throat is clear.  Neuro is grossly intact.       Wounds (last 24 hours)      LDA Wound     Row Name 03/06/20 0701 03/05/20 1902          Wound 10/17/19 1830 lower sacral spine Pressure Injury    Wound - Properties Group Date first assessed: 10/17/19  -JR Time first assessed: 1830  -JR Orientation: lower  -JR Location: sacral spine  -JR Primary Wound Type: Pressure inj  -JR Stage, Pressure Injury: Stage 3  -JR    Dressing Appearance  moist drainage;intact  -AS  moist drainage;intact  -MW     Closure  Other (Comment) Mepilex  -AS  Adhesive bandage  -MW     Base  moist;red;bleeding  -AS  moist;red;non-blanchable;granulating;bleeding  -MW     Periwound  intact;moist;pink;redness  -AS  intact;moist;pink;redness  -MW     Periwound Temperature  warm  -AS  warm  -MW     Periwound Skin Turgor  soft  -AS  --     Edges  irregular  -AS  open;irregular  -MW     Drainage Characteristics/Odor  yellow;serosanguineous  -AS  bleeding controlled;serosanguineous;yellow  -MW     Drainage Amount  small  -AS  scant  -MW     Care, Wound  cleansed with;sterile half normal saline  -AS  --     Dressing Care, Wound  dressing applied;silicone  -AS  --       User Key  (r) = Recorded By, (t) = Taken By, (c) = Cosigned By    Initials Name Provider Type    JR Gautam Torres, RN Registered Nurse    AS Gorge Lugo RN Registered Nurse    Theresa Chadwick, RN Registered Nurse          Procedures:              Results Review:     I reviewed the patient's new clinical results.      Lab Results (last 24 hours)     Procedure Component Value Units Date/Time    Cancer Antigen 19-9 [[id number]] Collected:  03/06/20 0252    Specimen:  Blood Updated:  03/06/20 1444    POC Glucose Once [[id number]]  (Abnormal) Collected:  03/06/20 1148    Specimen:  Blood Updated:  03/06/20 1205     Glucose 156 mg/dL      Comment: Serial  Number: [id number]:  [id number]       AFP Tumor Marker [[id number]] Collected:  03/06/20 0252    Specimen:  Blood Updated:  03/06/20 1113    Blood Culture - Blood, Hand, Left [[id number]] Collected:  03/06/20 0741    Specimen:  Blood from Hand, Left Updated:  03/06/20 0932    Blood Culture - Blood, Hand, Right [[id number]] Collected:  03/06/20 0740    Specimen:  Blood from Hand, Right Updated:  03/06/20 0932    POC Glucose Once [[id number]]  (Abnormal) Collected:  03/06/20 0700    Specimen:  Blood Updated:  03/06/20 0701     Glucose 145 mg/dL      Comment: Serial Number: [id number]:  [id number]       Blood Culture - Blood, Wrist, Left [[id number]]  (Abnormal) Collected:  03/04/20 0944    Specimen:  Blood from Wrist, Left Updated:  03/06/20 0629     Blood Culture Staphylococcus, coagulase negative     Comment: Probable contaminant requires clinical correlation, susceptibility not performed unless requested by physician.          Isolated from Aerobic Bottle     Gram Stain Aerobic Bottle Gram positive cocci in clusters    Phosphorus [[id number]]  (Normal) Collected:  03/06/20 0252    Specimen:  Blood Updated:  03/06/20 0343     Phosphorus 2.7 mg/dL      Comment: Result checked        Comprehensive Metabolic Panel [[id number]]  (Abnormal) Collected:  03/06/20 0252    Specimen:  Blood Updated:  03/06/20 0338     Glucose 170 mg/dL      BUN 33 mg/dL      Creatinine 1.61 mg/dL      Sodium 142 mmol/L      Potassium 3.7 mmol/L      Chloride 109 mmol/L      CO2 20.0 mmol/L      Calcium 8.4 mg/dL      Total Protein 7.0 g/dL      Albumin 2.70 g/dL      ALT (SGPT) 23 U/L      AST (SGOT) 37 U/L      Alkaline Phosphatase 129 U/L      Total Bilirubin 0.2 mg/dL      eGFR Non African Amer 42 mL/min/1.73      Globulin 4.3 gm/dL      A/G Ratio 0.6 g/dL      BUN/Creatinine Ratio 20.5     Anion Gap 13.0 mmol/L     Narrative:       GFR Normal >60  Chronic Kidney Disease <60  Kidney Failure <15      Magnesium [[id number]]  (Normal)  Collected:  03/06/20 0252    Specimen:  Blood Updated:  03/06/20 0338     Magnesium 2.1 mg/dL     Ammonia [[id number]]  (Abnormal) Collected:  03/06/20 0252    Specimen:  Blood Updated:  03/06/20 0336     Ammonia 78 umol/L      Comment: Result checked        Calcium, Ionized [[id number]]  (Abnormal) Collected:  03/06/20 0252    Specimen:  Blood Updated:  03/06/20 0329     Ionized Calcium 1.16 mmol/L     Blood Culture - Blood, Arm, Right [[id number]]  (Abnormal) Collected:  03/04/20 0944    Specimen:  Blood from Arm, Right Updated:  03/06/20 0308     Blood Culture No growth at 24 hours     Gram Stain Aerobic Bottle Gram positive cocci in clusters    CBC & Differential [[id number]] Collected:  03/06/20 0252    Specimen:  Blood Updated:  03/06/20 0305    Narrative:       The following orders were created for panel order CBC & Differential.  Procedure                               Abnormality         Status                     ---------                               -----------         ------                     CBC Auto Differential[[id number]]        Abnormal            Final result                 Please view results for these tests on the individual orders.    CBC Auto Differential [[id number]]  (Abnormal) Collected:  03/06/20 0252    Specimen:  Blood Updated:  03/06/20 0305     WBC 6.90 10*3/mm3      RBC 2.51 10*6/mm3      Hemoglobin 7.9 g/dL      Hematocrit 24.5 %      MCV 97.5 fL      MCH 31.5 pg      MCHC 32.3 g/dL      RDW 16.2 %      RDW-SD 56.0 fl      MPV 8.3 fL      Platelets 221 10*3/mm3      Neutrophil % 75.0 %      Lymphocyte % 11.5 %      Monocyte % 9.1 %      Eosinophil % 3.6 %      Basophil % 0.8 %      Neutrophils, Absolute 5.20 10*3/mm3      Lymphocytes, Absolute 0.80 10*3/mm3      Monocytes, Absolute 0.60 10*3/mm3      Eosinophils, Absolute 0.20 10*3/mm3      Basophils, Absolute 0.10 10*3/mm3      nRBC 0.0 /100 WBC     Blood Culture ID, PCR - Blood, Wrist, Left [[id number]]  (Abnormal) Collected:   03/04/20 0944    Specimen:  Blood from Wrist, Left Updated:  03/05/20 1958     BCID, PCR Staphylococcus spp, not aureus. Identification by BCID PCR.    POC Glucose Once [[id number]]  (Abnormal) Collected:  03/05/20 1954    Specimen:  Blood Updated:  03/05/20 1956     Glucose 188 mg/dL      Comment: Serial Number: [id number]:  [id number]       POC Glucose Once [[id number]]  (Abnormal) Collected:  03/05/20 1701    Specimen:  Blood Updated:  03/05/20 1706     Glucose 145 mg/dL      Comment: Serial Number: [id number]:  [id number]           Hemoglobin A1C   Date Value Ref Range Status   03/04/2020 7.4 (H) 3.5 - 5.6 % Final     Results from last 7 days   Lab Units 03/03/20  1202   INR  1.09           Lab Results   Component Value Date    LIPASE 26 10/16/2019     Lab Results   Component Value Date    CHOL 99 03/04/2020    TRIG 127 03/04/2020    HDL 30 (L) 03/04/2020    LDL 44 03/04/2020       Lab Results   Lab Value Date/Time    FINALDX  02/27/2020 1128     Common bile duct brushing, smear preparation only:    Atypical ductal cells, see comment    JPR/cec      FINALDX  01/09/2020 1148     Esophagus, biopsy:    Squamoglandular mucosa with intestinal metaplasia consistent with Syed's esophagus    Negative for dysplasia    JPR/sms       COMDX  02/27/2020 1128     The sample shows multiple groups of atypical appearing ductal cells. The atypia is characterized by nuclear overlap and mild nuclear enlargement. Marked pleomorphism and mitotic figures are not identified. The patient's clinical history of a stent is noted and the current findings are favored to represent reactive ductal epithelium with stent atypia, however a well differentiated malignancy cannot be entirely excluded. Continued close clinical follow up along with correlation with serum tumor markers and imaging studies is recommended.      JPR/cec      COMDX  11/20/2019 1307     The cellularity is scant and features few groups of glandular cells with  mild atypia encompassing nuclear enlargement and loss of polarity. These features may be reactive, however if a mass lesion is present or clinical suspicion for malignancy is high, additional studies are recommended as clinically indicated.      WALESKA/cec         Microbiology Results (last 10 days)     Procedure Component Value - Date/Time    Wound Culture - Wound, Buttock [[id number]] Collected:  03/04/20 1131    Lab Status:  Final result Specimen:  Wound from Buttock Updated:  03/05/20 0825     Wound Culture Heavy growth (4+) Mixed Sona Isolated     Comment: This specimen is not acceptable for generating clinically relevant information due to predominating colonic and rectal sona. No further workup.          Gram Stain Moderate (3+) WBCs per low power field      Many (4+) Mixed bacterial morphotypes seen on Gram Stain    Blood Culture - Blood, Wrist, Left [[id number]]  (Abnormal) Collected:  03/04/20 0944    Lab Status:  Final result Specimen:  Blood from Wrist, Left Updated:  03/06/20 0629     Blood Culture Staphylococcus, coagulase negative     Comment: Probable contaminant requires clinical correlation, susceptibility not performed unless requested by physician.          Isolated from Aerobic Bottle     Gram Stain Aerobic Bottle Gram positive cocci in clusters    Blood Culture - Blood, Arm, Right [[id number]]  (Abnormal) Collected:  03/04/20 0944    Lab Status:  Preliminary result Specimen:  Blood from Arm, Right Updated:  03/06/20 0308     Blood Culture No growth at 24 hours     Gram Stain Aerobic Bottle Gram positive cocci in clusters    Blood Culture ID, PCR - Blood, Wrist, Left [[id number]]  (Abnormal) Collected:  03/04/20 0944    Lab Status:  Final result Specimen:  Blood from Wrist, Left Updated:  03/05/20 1958     BCID, PCR Staphylococcus spp, not aureus. Identification by BCID PCR.    Influenza Antigen, Rapid - Swab, Nasopharynx [[id number]]  (Normal) Collected:  03/03/20 1202    Lab Status:  Final result  Specimen:  Swab from Nasopharynx Updated:  03/03/20 1222     Influenza A PCR Not Detected     Influenza B PCR Not Detected          ECG/EMG Results (most recent)     Procedure Component Value Units Date/Time    ECG 12 Lead [[id number]] Collected:  03/03/20 1307     Updated:  03/04/20 0824    Narrative:       HEART RATE= 77  bpm  RR Interval= 776  ms  ME Interval= 88  ms  P Horizontal Axis=   deg  P Front Axis= 0  deg  QRSD Interval= 90  ms  QT Interval= 459  ms  QRS Axis= -23  deg  T Wave Axis= 23  deg  - ABNORMAL ECG -  Sinus rhythm  Low voltage, precordial leads  Prolonged QT interval  When compared with ECG of 18-Feb-2020 10:54:41,  Electronically Signed By: Erasto Weems (Blaze) 04-Mar-2020 08:24:19  Date and Time of Study: 2020-03-03 13:07:48    Adult Transthoracic Echo Complete W/ Cont if Necessary Per Protocol [[id number]] Collected:  03/04/20 1002     Updated:  03/04/20 1224     BSA 2.6 m^2      RVIDd 3.5 cm      IVSd 0.96 cm      LVIDd 5.9 cm      LVIDs 3.8 cm      LVPWd 1.2 cm      IVS/LVPW 0.81     FS 35.9 %      EDV(Teich) 170.5 ml      ESV(Teich) 60.2 ml      EF(Teich) 64.7 %      EDV(cubed) 201.1 ml      ESV(cubed) 53.0 ml      EF(cubed) 73.7 %      LV mass(C)d 259.1 grams      LV mass(C)dI 101.1 grams/m^2      SV(Teich) 110.2 ml      SI(Teich) 43.0 ml/m^2      SV(cubed) 148.2 ml      SI(cubed) 57.8 ml/m^2      Ao root diam 4.0 cm      Ao root area 12.4 cm^2      ACS 1.9 cm      asc Aorta Diam 3.5 cm      LVOT diam 2.6 cm      LVOT area 5.2 cm^2      RVOT diam 2.7 cm      RVOT area 5.5 cm^2      EDV(MOD-sp4) 176.8 ml      ESV(MOD-sp4) 68.5 ml      EF(MOD-sp4) 61.2 %      SV(MOD-sp4) 108.3 ml      SI(MOD-sp4) 42.2 ml/m^2      Ao root area (BSA corrected) 1.5     LV Tam Vol (BSA corrected) 69.0 ml/m^2      LV Sys Vol (BSA corrected) 26.7 ml/m^2      Aortic R-R 0.78 sec      Aortic HR 76.5 BPM      MV E max frankie 101.9 cm/sec      MV A max frankie 109.4 cm/sec      MV E/A 0.93     MV V2 max 120.6 cm/sec      MV max  PG 5.8 mmHg      MV V2 mean 87.4 cm/sec      MV mean PG 3.3 mmHg      MV V2 VTI 33.3 cm      MVA(VTI) 4.4 cm^2      MV dec slope 649.1 cm/sec^2      MV dec time 0.16 sec      Ao pk frankie 138.1 cm/sec      Ao max PG 7.6 mmHg      Ao max PG (full) 2.1 mmHg      Ao V2 mean 99.8 cm/sec      Ao mean PG 4.5 mmHg      Ao mean PG (full) 1.0 mmHg      Ao V2 VTI 28.9 cm      ARMAND(I,A) 5.1 cm^2      ARMAND(I,D) 5.1 cm^2      ARMAND(V,A) 4.4 cm^2      ARMAND(V,D) 4.4 cm^2      LV V1 max PG 5.5 mmHg      LV V1 mean PG 3.5 mmHg      LV V1 max 117.2 cm/sec      LV V1 mean 90.2 cm/sec      LV V1 VTI 28.2 cm      CO(Ao) 27.3 l/min      CI(Ao) 10.7 l/min/m^2      SV(Ao) 357.2 ml      SI(Ao) 139.4 ml/m^2      CO(LVOT) 11.2 l/min      CI(LVOT) 4.4 l/min/m^2      SV(LVOT) 146.6 ml      SV(RVOT) 99.3 ml      SI(LVOT) 57.2 ml/m^2      PA V2 max 139.7 cm/sec      PA max PG 7.8 mmHg      PA max PG (full) 5.0 mmHg      PA V2 mean 96.4 cm/sec      PA mean PG 4.3 mmHg      PA mean PG (full) 2.7 mmHg      PA V2 VTI 27.2 cm      PVA(I,A) 3.7 cm^2      BH CV ECHO RODNEY - PVA(I,D) 3.7 cm^2      BH CV ECHO RODNEY - PVA(V,A) 3.3 cm^2      BH CV ECHO RODNEY - PVA(V,D) 3.3 cm^2      PA acc time 0.08 sec      RV V1 max PG 2.8 mmHg      RV V1 mean PG 1.6 mmHg      RV V1 max 84.4 cm/sec      RV V1 mean 59.6 cm/sec      RV V1 VTI 17.9 cm      TR max frankie 291.7 cm/sec      RVSP(TR) 49.0 mmHg      RAP systole 15.0 mmHg      PA pr(Accel) 41.3 mmHg      Qp/Qs 0.68     BH CV ECHO RODNEY - BZI_BMI 37.9 kilograms/m^2      BH CV ECHO RODNEY - BSA(HAYCOCK) 2.7 m^2       CV ECHO RODNEY - BZI_METRIC_WEIGHT 133.8 kg       CV ECHO RODNEY - BZI_METRIC_HEIGHT 188.0 cm      EF(MOD-bp) 61.0 %      LA dimension(2D) 3.4 cm      Echo EF Estimated 50 %     Narrative:       · The left ventricular cavity is mildly dilated.  · Estimated EF = 50%.  · Left ventricular systolic function is low normal.  · Right ventricular cavity is mildly dilated.  · Mild mitral valve regurgitation is present  · Mild  tricuspid valve regurgitation is present.  · Mild dilation of the aortic root is present.             Results for orders placed during the hospital encounter of 03/03/20   Duplex Venous Lower Extremity - Bilateral CAR    Narrative · Normal bilateral lower extremity venous duplex scan.          Results for orders placed during the hospital encounter of 03/03/20   Adult Transthoracic Echo Complete W/ Cont if Necessary Per Protocol    Narrative · The left ventricular cavity is mildly dilated.  · Estimated EF = 50%.  · Left ventricular systolic function is low normal.  · Right ventricular cavity is mildly dilated.  · Mild mitral valve regurgitation is present  · Mild tricuspid valve regurgitation is present.  · Mild dilation of the aortic root is present.          Nm Lung Ventilation Perfusion Aerosol    Result Date: 3/4/2020  Normal ventilation/perfusion lung scan. This excludes the diagnosis of pulmonary embolism with a very high degree of clinical certainty  Electronically Signed By-Naif Donahue On:3/4/2020 9:13 AM This report was finalized on [id number] by  Naif Donahue, .    Xr Knee 4+ View Bilateral    Result Date: 3/4/2020  Right knee: 1. Moderate to advanced tricompartmental joint space narrowing with marginal osteophyte formation, likely representing osteoarthritis or CPPD arthropathy given chondrocalcinosis. 2. Trace suprapatellar joint effusion.  Left knee: 1. Mild medial and patellofemoral compartment joint space narrowing which may relate to osteoarthritis or CPPD arthropathy given chondrocalcinosis.  Electronically Signed By-Shady Fischer On:3/4/2020 11:26 PM This report was finalized on [id number] by  Shady Fischer, .    Xr Chest 1 View    Result Date: 3/3/2020  No acute chest findings.  Electronically Signed By-Dr. Kaitlin Wong MD On:3/3/2020 12:46 PM This report was finalized on [id number] by Dr. Kaitlin Wong MD.    Us Renal Bilateral    Result Date: 3/3/2020  Normal renal ultrasound.   Electronically Signed By-Dr. Kaitlin Wong MD On:3/3/2020 5:04 PM This report was finalized on [id number] by Dr. Kaitlin Wong MD.    Xr Abdomen Kub    Result Date: 3/3/2020   1. Postoperative changes of biliary stent placement and cholecystectomy and inferior vena cava filter 2. Nonspecific bowel pattern, lung bases are free of disease 3. No evidence of renal or ureteral calculus  Electronically Signed By-Bennett Bobby Jr. On:3/3/2020 11:20 PM This report was finalized on [id number] by  Bennett Bobby Jr., .          Xrays, labs reviewed personally by physician.    Medication Review:   I have reviewed the patient's current medication list      Scheduled Meds    acetaminophen 650 mg Oral Once   albumin human 25 g Intravenous Q8H   allopurinol 100 mg Oral BID   aspirin 81 mg Oral Daily   atorvastatin 20 mg Oral Daily   cyanocobalamin 1,000 mcg Subcutaneous Q30 Days   diphenhydrAMINE 12.5 mg Intravenous Once   docusate sodium 100 mg Oral BID   DULoxetine 60 mg Oral Daily   enoxaparin 40 mg Subcutaneous Daily   folic acid 1 mg Oral Daily   furosemide 20 mg Intravenous Q8H   insulin glargine 50 Units Subcutaneous Nightly   insulin lispro 0-9 Units Subcutaneous 4x Daily With Meals & Nightly   insulin lispro 18 Units Subcutaneous TID AC   ADRIANA 1 packet Oral BID   [START ON 3/7/2020] lactulose 30 g Oral Daily   levothyroxine 75 mcg Oral Q AM   magnesium oxide 400 mg Oral Daily   metoclopramide 5 mg Oral BID AC   nicotine 1 patch Transdermal Nightly   oxybutynin XL 10 mg Oral Daily   pantoprazole 40 mg Oral BID AC   potassium & sodium phosphates 1 packet Oral Daily   riFAXIMin 550 mg Oral Q12H   sodium bicarbonate 650 mg Oral TID   sucralfate 1 g Oral 4x Daily AC & at Bedtime   THERA 1 tablet Oral Daily   zinc sulfate 220 mg Oral Daily       Meds Infusions    Pharmacy to dose vancomycin     sodium chloride 9 mL/hr Last Rate: 9 mL/hr (03/06/20 1232)       Meds PRN  •  acetaminophen **OR** acetaminophen **OR**  acetaminophen  •  Calcium Gluconate-NaCl **AND** calcium gluconate **AND** Calcium, Ionized  •  dextrose  •  dextrose  •  docusate sodium  •  glucagon (human recombinant)  •  hydrOXYzine  •  insulin lispro **AND** insulin lispro  •  ketamine (KETALAR) infusion **AND** Ketamine Vital Signs & Assessment  •  magnesium sulfate **OR** magnesium sulfate **OR** magnesium sulfate  •  melatonin  •  melatonin  •  nitroglycerin  •  ondansetron **OR** ondansetron  •  oxyCODONE  •  Pharmacy to dose vancomycin  •  potassium & sodium phosphates **OR** potassium & sodium phosphates  •  potassium chloride  •  potassium chloride  •  sodium chloride      Assessment/Plan   Assessment/Plan     Active Hospital Problems    Diagnosis  POA   • **General weakness [R53.1]  Yes     Priority: High   • CKD (chronic kidney disease) stage 3, GFR 30-59 ml/min (CMS/[facility]) [N18.3]  Yes     Priority: High   • Spinal stenosis of lumbar region [M48.061]  Yes     Priority: High   • Anemia in stage 3 chronic kidney disease (CMS/HCC) [N18.3, D63.1]  Yes     Priority: Medium   • Type 2 diabetes mellitus with kidney complication, with long-term current use of insulin (CMS/[facility]) [E11.29, Z79.4]  Not Applicable     Priority: Medium   • Chronic coronary artery disease [I25.10]  Yes     Priority: Medium   • Cirrhosis (CMS/[facility]) [K74.60]  Yes     Priority: Medium   • Diabetic neuropathy (CMS/[facility]) [E11.40]  Yes     Priority: Medium   • Morbid obesity (CMS/[facility]) [E66.01]  Yes     Priority: Medium   • Obstructive sleep apnea [G47.33]  Yes     Priority: Medium   • 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]  Yes     Priority: Low   • 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]  Yes     Priority: Low   • Protein S deficiency (CMS/HCC) [D68.59]  Yes     Priority: Low   • Protein C deficiency (CMS/[facility]) [D68.59]  Yes     Priority: Low   • History of Vitamin B12 deficiency [Z86.39]  Not Applicable     Priority: Low   • History of bilateral pulmonary embolus (PE)  [Z86.711]  Yes     Priority: Low   • JOSE (iron deficiency anemia) with poor po absorption [D50.9]  Yes     Priority: Low   • IgG kappa MGUS [D47.2]  Yes     Priority: Low   • THOMAS (nonalcoholic steatohepatitis) [K75.81]  Yes     Priority: Low   • Antiphospholipid antibody positive [R76.0]  Yes     Priority: Low   • 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]  Yes     Priority: Low   • Splenomegaly [R16.1]  Yes     Priority: Low   • Essential hypertension [I10]  Yes     Priority: Low   • Acquired hypothyroidism [E03.9]  Yes     Priority: Low   • Pulmonary hypertension (CMS/HCC) [I27.20]  Yes     Priority: Low   • Dyslipidemia [E78.5]  Yes     Priority: Low   • History of DVT of right lower extremity [Z86.718]  Not Applicable     Priority: Low   • Deficiency of other specified B group vitamins [E53.8]  Yes     Priority: Low   • Decubitus ulcer of sacral region, stage 3 (CMS/HCC) [L89.153]  Yes   • Iron deficiency anemia [D50.9]  Unknown      Resolved Hospital Problems   No resolved problems to display.       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  -Patient also has right knee pain I am getting orthopedic consult and he will get right knee injection.  -PT OT is working with him and patient will also need rehab as patient is very unsafe for going home and ambulating by himself.    #2 chronic kidney disease stage III  -We will avoid nephrotoxic medications as well as patient will see nephrology     #3 anemia of chronic kidney disease as well as chronic blood loss anemia from GI origin and he has recent endoscopy done as well as ERCP done and patient has seen GI.  -Patient may need Venofer infusion as well as patient is on vitamin B12 injections.  His hemoglobin need to be monitored.   -Hemoglobin stable      #4 obstructive sleep sleep apnea and patient need to use his CPAP  machine     #5 hyperlipidemia stable on statins and will check lipid profile     #6 history of DVT/PE and patient is at high risk for PE and DVT while he is in the hospital and he will be on anticoagulation enoxaparin and I do understand patient has history of chronic blood loss anemia but we do need to monitor that but I am also concerned about his DVT PE and further complication.     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   -Patient and family explained    #13   + blood culture and patient will be seen by infectious disease      #14 patient is getting upper endoscopy by GI as well as patient is getting stress test today and once those are all taken care of patient hopefully will be discharged home in next 24 hours.      VTE Prophylaxis -   Mechanical Order History:     None      Pharmalogical Order History:     Ordered     Dose Route Frequency Stop    03/03/20 2114  enoxaparin (LOVENOX) syringe 40 mg      40 mg SC Daily --            Code Status -   Code Status and Medical Interventions:   Ordered at: 03/03/20 1551     Level Of Support Discussed With:    Patient     Code Status:    CPR     Medical Interventions (Level of Support Prior to Arrest):    Full       Discharge Planning          Destination      Service Provider Request Status Selected Services Address Phone Number Fax Number    [facility] Accepted N/A [address] [facility] [place] [address] [phone]      Durable Medical Equipment      Coordination has not been started for this encounter.      Dialysis/Infusion      Coordination has not been started for this encounter.         Home Medical Care      Service Provider Request Status Selected Services Address Phone Number Fax Number    [facility] Pending - Request Sent N/A [address] [facility] [place] [address] [phone]      Therapy      Coordination has not been started for this encounter.      Community Resources      Coordination has not been started for this encounter.            Electronically signed by Santosh Amaro MD, 03/06/20, 3:05 PM.  [facility] Hospitalist Team

## 2020-03-06 NOTE — PLAN OF CARE
Problem: Patient Care Overview  Goal: Plan of Care Review  Outcome: Ongoing (interventions implemented as appropriate)  Flowsheets  Taken 3/5/2020 0334 by Danita Malone, RN  Progress: no change  Taken 3/6/2020 0500 by Theresa Garcia, RN  Plan of Care Reviewed With: patient  Outcome Summary: pt rested through most of the night. had several bowel movements and is having a strerss test this am. will continue to monitor.

## 2020-03-06 NOTE — CONSULTS
GI CONSULT  NOTE:    Referring Provider:  Dr. Plascencia    Chief complaint: Anemia     Subjective .     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  We have been asked to consult for anemia.  The patient does have a history of GAVE which has required APC twice this year.  He currently denies any bright red blood per rectum or melena.  Does report loose stools 4-5 times daily on lactulose.  Denies any abdominal pain.  No nausea/vomiting, heartburn, or dysphagia.  No unintentional weight loss.  Of note, the patient did have an MRI in 12/2019 which did show some suspicious lesions in the liver.  Patient has since had ERCP with CBD stent placement due to distal CBD stricture.  Brushings was obtained at that time with cytology showing atypical cells.  He is scheduled for outpatient EUS in 4/2020.      Endo History:  2/27/2020 ERCP with stent (Dr. Gomez) -distal CBD stricture status post dilation and brushing (cytology shows atypical cells) with CBD stent placement, GAVE status post APC, hiatal hernia, Syed's  1/2020 EGD (Dr. Gomez) -moderately severe gave status post APC, short segment Syed's, hiatal hernia  2/2019 ERCP (Dr. Hampton) - evidence of prior sphicterotomy, sludge and small stones removed via balloon extraction  10/2018 ERCP (Dr. Russo) - multiple filling defects on cholangiogram indicating multipele large cbd stones, sphinteroplasty using 8mm hurricane balloon held x 3 minutes with some bleeding after sphincteroplasty that slowed down by endo of procedure, successful extraction of multiple large CBD stones larges 1cm, widely patent sphincterotomy  side for any stone remnants to come out on their own   9/2018 EGD (Dr. Galindo) - nl egd with removal of biliary stent  8/2018 EUS (Dr. Gomez) - benign biliary structure suspected to be d/t BD stone, gravel and sludge in CBD, fatty infiltration of pancreas, FNA of CBD negative for malignancy  7/2018 ERCP (Dr. Leiva) - biliary ductal dilation with abrupt distal CBD cutoff, choledocholithiaisis s/p sphincterotomy and balloon sweep, biliary stent placed, biliary brushings negative for malignancy  3/2018 EGD/colon (Dr. Gomez) - Syed's with bx neg for dysphasia, hiatal hernia, poor colon prep  4/2017 EGD (Dr. Gomez) - Syed's esophagus with bx negative for dysphasia, hiatal hernia, polyp in stomach body hyperplastic, food in fundus  6/2014 EGD/colonoscopy (Dr. Gomez) - Syed's esophagus with bx negative for dysphasia, antral polyp, TA colon polyp, div, g2 internal/external hemorrhoids       Past Medical History:  Past Medical History:   Diagnosis Date   • Anemia    • Anxiety    • B12 deficiency 2009   • Balance disorder    • Syed's esophagus    • CAD (coronary artery disease)     cardiac stent   • Constipation    • DDD (degenerative disc disease), lumbar    • Decubitus ulcer     buttocks   • Depression    • Diabetes mellitus (CMS/HCC)    • Disease of thyroid gland     hypothyroid   • Diverticular disease    • Dvt femoral (deep venous thrombosis) (CMS/HCC) 2004    rt let   • Gastroparesis    • GERD (gastroesophageal reflux disease)    • Gout    • Hepatic encephalopathy (CMS/HCC)     if doesnt take meds   • History of echocardiogram     03/03/2017 - 12/03/2014 - 04/2012   • History of stomach ulcers    • Hyperlipidemia    • Iron deficiency    • Morbid obesity (CMS/HCC)    • THOMAS (nonalcoholic steatohepatitis)    • Neuropathy    • OAB (overactive bladder)    • KATHIA treated with BiPAP     bring dos   • Peripheral edema    • Pulmonary embolus (CMS/HCC) 2004   • Renal insufficiency    • S/P lumbar laminectomy       • Skin irritation     skin fold   • Stage 3 chronic kidney disease (CMS/HCC)        Past Surgical History:  Past Surgical History:   Procedure Laterality Date   • BACK SURGERY  1971   • BILE DUCT STENT PLACEMENT     • CATARACT EXTRACTION  2014   • CHOLECYSTECTOMY  02/24/2019   • COLONOSCOPY  03/2018   • CORONARY ANGIOPLASTY  08/24/2009    PCI stent - succesful placement of 3.5/23 promus stent in proximal right coronary artery   • CORONARY ANGIOPLASTY WITH STENT PLACEMENT  08/27/2009    patient had stent placed to proximal right coronary artery   • CYSTOSCOPY BLADDER STONE LITHOTRIPSY  1997   • ENDOSCOPY N/A 10/18/2019    Procedure: ESOPHAGOGASTRODUODENOSCOPY with argon plasma coagulation of gastric antral vascular ectasia;  Surgeon: Marisel Gomez MD;  Location: [facility] ENDOSCOPY;  Service: Gastroenterology   • ENDOSCOPY N/A 1/9/2020    Procedure: ESOPHAGOGASTRODUODENOSCOPY WITH BIOPSY, ARGON PLASMA COAGULATION OF GASTRIC ANTREL VASCULAR ECTASIA,;  Surgeon: Marisel Gomez MD;  Location: [facility] ENDOSCOPY;  Service: Gastroenterology   • ERCP N/A 11/20/2019    Procedure: ERCP, clearance of bile duct with balloon, placement of biliary stent, EGD with argon plasma coagulation of gastric antral vascular ectasia;  Surgeon: Marisel Gomez MD;  Location: [facility] ENDOSCOPY;  Service: Gastroenterology   • ERCP N/A 2/27/2020    Procedure: ENDOSCOPIC RETROGRADE CHOLANGIOPANCREATOGRAPHY with removal of biliary stent, brushing, dilation, and placement of biliary stent x 2 and Esophagogastroduodenoscopy with argon plasma coagulation;  Surgeon: Marisle Gomez MD;  Location: [facility] ENDOSCOPY;  Service: Gastroenterology;  Laterality: N/A;  Gastric antral vascular ectasia, common bile duct stricture   • OTHER SURGICAL HISTORY  11/2018    IVC filter implant   • RENAL ARTERY STENT Left     does not recall this       Social History:  Social History     Tobacco Use   • Smoking status: Never Smoker   • Smokeless tobacco: Never Used    Substance Use Topics   • Alcohol use: No     Frequency: Never   • Drug use: No       Family History:  Family History   Problem Relation Age of Onset   • Hyperlipidemia Other    • Hypertension Other        Medications:  Medications Prior to Admission   Medication Sig Dispense Refill Last Dose   • allopurinol (ZYLOPRIM) 100 MG tablet Take 100 mg by mouth 2 (Two) Times a Day. Do not preop   3/3/2020 at 0800   • aspirin 81 MG tablet Take 1 tablet by mouth Daily. 30 tablet 3 3/3/2020 at 0800   • atorvastatin (LIPITOR) 20 MG tablet Take 1 tablet by mouth Daily. (Patient taking differently: Take 20 mg by mouth Every Night.) 90 tablet 3 3/2/2020 at Unknown time   • bumetanide (BUMEX) 2 MG tablet Take 1 tablet by mouth Daily. (Patient taking differently: Take 2 mg by mouth Daily. Do not take preop)   3/3/2020 at 0800   • Cyanocobalamin 1000 MCG/ML kit Inject 1,000 mcg as directed Every 30 (Thirty) Days.   1/31/2020   • docusate sodium (COLACE) 100 MG capsule Take 100 mg by mouth 2 (Two) Times a Day.   3/3/2020 at 0800   • DULoxetine (CYMBALTA) 60 MG capsule Take 60 mg by mouth Daily.   3/3/2020 at 0800   • folic acid (FOLVITE) 1 MG tablet Take 1 mg by mouth Daily.   3/3/2020 at 0800   • hydrOXYzine pamoate (VISTARIL) 25 MG capsule Take 25 mg by mouth 3 (Three) Times a Day As Needed.  0 Taking   • insulin glargine (LANTUS) 100 UNIT/ML injection Inject 50 units at bedtime (Patient taking differently: Inject 50 Units under the skin into the appropriate area as directed Every Night. Inject 50 units at bedtime) 45 mL 3 3/2/2020 at Unknown time   • insulin lispro (HUMALOG) 100 UNIT/ML injection 18 units subcu before each meal plus sliding scale MDD 60 (Patient taking differently: Inject 18 Units under the skin into the appropriate area as directed 3 (Three) Times a Day Before Meals. 18 units subcu before each meal plus sliding scale MDD 60) 60 mL 4 3/2/2020 at Unknown time   • lactulose (CHRONULAC) 10 GM/15ML solution Take 60 mL  by mouth Every 4 (Four) Hours.   3/3/2020 at 0700   • levothyroxine (SYNTHROID, LEVOTHROID) 75 MCG tablet Take 1 tablet by mouth Daily. (Patient taking differently: Take 75 mcg by mouth Daily. Take preop) 90 tablet 4 3/3/2020 at 0700   • magnesium oxide (MAG-OX) 400 MG tablet Take 400 mg by mouth Daily. Take post op   3/3/2020 at 0800   • melatonin 5 MG tablet tablet Take 10 mg by mouth At Night As Needed.   Taking   • metoclopramide (REGLAN) 5 MG tablet Take 5 mg by mouth 2 (Two) Times a Day. Ok to take preop  0 3/3/2020 at 0800   • Mirabegron ER (MYRBETRIQ) 50 MG tablet sustained-release 24 hour 24 hr tablet Take 50 mg by mouth Daily.   3/3/2020 at 0800   • Multiple Vitamins-Minerals (MULTIVITAMIN WITH MINERALS) tablet tablet Take 1 tablet by mouth Daily.   3/3/2020 at 0800   • oxyCODONE (ROXICODONE) 5 MG immediate release tablet Take 5 mg by mouth Every 8 (Eight) Hours As Needed.   2/27/2020 at Unknown time   • pantoprazole (PROTONIX) 40 MG EC tablet Take 1 tablet by mouth 2 (Two) Times a Day. 30 tablet 3 3/3/2020 at 0800   • phosphorus (K PHOS NEUTRAL) 155-852-130 MG tablet Take 1 tablet by mouth Daily. afternoon   3/2/2020 at Unknown time   • rifaximin (XIFAXAN) 550 MG tablet Take 550 mg by mouth Every 12 (Twelve) Hours.   3/3/2020 at 0800   • sodium bicarbonate 650 MG tablet Take 650 mg by mouth 3 (Three) Times a Day.   3/3/2020 at 0800   • sucralfate (CARAFATE) 1 g tablet Take 1 g by mouth 4 (Four) Times a Day.   3/3/2020 at 0800   • zinc sulfate (ZINCATE) 50 MG capsule Take 50 mg by mouth Daily.  0 3/3/2020 at 0800       Scheduled Meds:  [START ON 3/7/2020] !Vancomycin Level Draw Needed  Does not apply Once   acetaminophen 650 mg Oral Once   albumin human 25 g Intravenous Q8H   allopurinol 100 mg Oral BID   aspirin 81 mg Oral Daily   atorvastatin 20 mg Oral Daily   cyanocobalamin 1,000 mcg Subcutaneous Q30 Days   diphenhydrAMINE 12.5 mg Intravenous Once   docusate sodium 100 mg Oral BID   DULoxetine 60 mg  Oral Daily   enoxaparin 40 mg Subcutaneous Daily   folic acid 1 mg Oral Daily   furosemide 20 mg Intravenous Q8H   insulin glargine 50 Units Subcutaneous Nightly   insulin lispro 0-9 Units Subcutaneous 4x Daily With Meals & Nightly   insulin lispro 18 Units Subcutaneous TID AC   ADRIANA 1 packet Oral BID   lactulose 40 g Oral Daily   levothyroxine 75 mcg Oral Q AM   magnesium oxide 400 mg Oral Daily   metoclopramide 5 mg Oral BID AC   nicotine 1 patch Transdermal Nightly   oxybutynin XL 10 mg Oral Daily   pantoprazole 40 mg Oral BID AC   potassium & sodium phosphates 1 packet Oral Daily   riFAXIMin 550 mg Oral Q12H   sodium bicarbonate 650 mg Oral TID   sodium chloride 10 mL Intravenous Q12H   sodium chloride 3 mL Intravenous Q12H   sucralfate 1 g Oral 4x Daily AC & at Bedtime   THERA 1 tablet Oral Daily   vancomycin 15 mg/kg (Adjusted) Intravenous Once   zinc sulfate 220 mg Oral Daily     Continuous Infusions:  Pharmacy to dose vancomycin      PRN Meds:.acetaminophen **OR** acetaminophen **OR** acetaminophen  •  Calcium Gluconate-NaCl **AND** calcium gluconate **AND** Calcium, Ionized  •  dextrose  •  dextrose  •  docusate sodium  •  glucagon (human recombinant)  •  hydrOXYzine  •  insulin lispro **AND** insulin lispro  •  ketamine (KETALAR) infusion **AND** Ketamine Vital Signs & Assessment  •  magnesium sulfate **OR** magnesium sulfate **OR** magnesium sulfate  •  melatonin  •  melatonin  •  nitroglycerin  •  ondansetron  •  oxyCODONE  •  Pharmacy to dose vancomycin  •  potassium & sodium phosphates **OR** potassium & sodium phosphates  •  potassium chloride  •  potassium chloride  •  [COMPLETED] Insert peripheral IV **AND** sodium chloride  •  sodium chloride  •  sodium chloride  •  vancomycin    ALLERGIES:  Patient has no known allergies.    ROS:  Review of Systems   Constitutional: Negative for chills and fever.   HENT: Negative for sore throat and trouble swallowing.    Respiratory: Negative for cough and  shortness of breath.    Cardiovascular: Positive for leg swelling. Negative for chest pain.   Gastrointestinal: Positive for diarrhea. Negative for abdominal distention, abdominal pain, anal bleeding, blood in stool, constipation, nausea, rectal pain and vomiting.   Genitourinary: Negative for dysuria and hematuria.   Musculoskeletal: Negative for back pain and joint swelling.   Skin: Negative for color change and rash.   Neurological: Positive for weakness. Negative for dizziness and light-headedness.   Psychiatric/Behavioral: Negative for confusion. The patient is not nervous/anxious.        Objective     Vital Signs:   Temp:  [97.7 °F (36.5 °C)-98.3 °F (36.8 °C)] 98.3 °F (36.8 °C)  Heart Rate:  [71-82] 71  Resp:  [14-16] 16  BP: (115-146)/(65-67) 146/67    Physical Exam:      General Appearance:    Awake and alert, in no acute distress, morbidly obese habitus   Head:    Normocephalic, without obvious abnormality, atraumatic   Eyes:            Conjunctivae normal, anicteric sclera   Ears:    Ears appear intact with no abnormalities noted   Throat:   No oral lesions, no thrush, oral mucosa moist   Neck:   No adenopathy, supple, no thyromegaly, no JVD   Lungs:     Clear to auscultation bilaterally, respirations regular, even and unlabored    Heart:    Regular rhythm and normal rate, normal S1 and S2   Chest Wall:    No abnormalities observed   Abdomen:     Normal bowel sounds, soft, non-tender, no rebound or guarding, non-distended   Rectal:     Deferred   Extremities:   Moves all extremities well, no cyanosis, no redness, edema to the bilateral lower extremities   Pulses:   Pulses palpable and equal bilaterally   Skin:   No bleeding, bruising or rash, no jaundice       Neurologic:   Cranial nerves 2 - 12 grossly intact, no asterixis, sensation intact       Results Review:   I reviewed the patient's labs and imaging.  Lab Results (last 24 hours)     Procedure Component Value Units Date/Time    Blood Culture - Blood,  Hand, Left [[id number]] Collected:  03/06/20 0741    Specimen:  Blood from Hand, Left Updated:  03/06/20 0932    Blood Culture - Blood, Hand, Right [[id number]] Collected:  03/06/20 0740    Specimen:  Blood from Hand, Right Updated:  03/06/20 0932    POC Glucose Once [[id number]]  (Abnormal) Collected:  03/06/20 0700    Specimen:  Blood Updated:  03/06/20 0701     Glucose 145 mg/dL      Comment: Serial Number: [id number]:  [id number]       Blood Culture - Blood, Wrist, Left [[id number]]  (Abnormal) Collected:  03/04/20 0944    Specimen:  Blood from Wrist, Left Updated:  03/06/20 0629     Blood Culture Staphylococcus, coagulase negative     Comment: Probable contaminant requires clinical correlation, susceptibility not performed unless requested by physician.          Isolated from Aerobic Bottle     Gram Stain Aerobic Bottle Gram positive cocci in clusters    Phosphorus [[id number]]  (Normal) Collected:  03/06/20 0252    Specimen:  Blood Updated:  03/06/20 0343     Phosphorus 2.7 mg/dL      Comment: Result checked        Comprehensive Metabolic Panel [[id number]]  (Abnormal) Collected:  03/06/20 0252    Specimen:  Blood Updated:  03/06/20 0338     Glucose 170 mg/dL      BUN 33 mg/dL      Creatinine 1.61 mg/dL      Sodium 142 mmol/L      Potassium 3.7 mmol/L      Chloride 109 mmol/L      CO2 20.0 mmol/L      Calcium 8.4 mg/dL      Total Protein 7.0 g/dL      Albumin 2.70 g/dL      ALT (SGPT) 23 U/L      AST (SGOT) 37 U/L      Alkaline Phosphatase 129 U/L      Total Bilirubin 0.2 mg/dL      eGFR Non African Amer 42 mL/min/1.73      Globulin 4.3 gm/dL      A/G Ratio 0.6 g/dL      BUN/Creatinine Ratio 20.5     Anion Gap 13.0 mmol/L     Narrative:       GFR Normal >60  Chronic Kidney Disease <60  Kidney Failure <15      Magnesium [[id number]]  (Normal) Collected:  03/06/20 0252    Specimen:  Blood Updated:  03/06/20 0338     Magnesium 2.1 mg/dL     Ammonia [[id number]]  (Abnormal) Collected:  03/06/20 0252    Specimen:   Blood Updated:  03/06/20 0336     Ammonia 78 umol/L      Comment: Result checked        Calcium, Ionized [[id number]]  (Abnormal) Collected:  03/06/20 0252    Specimen:  Blood Updated:  03/06/20 0329     Ionized Calcium 1.16 mmol/L     Blood Culture - Blood, Arm, Right [[id number]]  (Abnormal) Collected:  03/04/20 0944    Specimen:  Blood from Arm, Right Updated:  03/06/20 0308     Blood Culture No growth at 24 hours     Gram Stain Aerobic Bottle Gram positive cocci in clusters    CBC & Differential [[id number]] Collected:  03/06/20 0252    Specimen:  Blood Updated:  03/06/20 0305    Narrative:       The following orders were created for panel order CBC & Differential.  Procedure                               Abnormality         Status                     ---------                               -----------         ------                     CBC Auto Differential[[id number]]        Abnormal            Final result                 Please view results for these tests on the individual orders.    CBC Auto Differential [[id number]]  (Abnormal) Collected:  03/06/20 0252    Specimen:  Blood Updated:  03/06/20 0305     WBC 6.90 10*3/mm3      RBC 2.51 10*6/mm3      Hemoglobin 7.9 g/dL      Hematocrit 24.5 %      MCV 97.5 fL      MCH 31.5 pg      MCHC 32.3 g/dL      RDW 16.2 %      RDW-SD 56.0 fl      MPV 8.3 fL      Platelets 221 10*3/mm3      Neutrophil % 75.0 %      Lymphocyte % 11.5 %      Monocyte % 9.1 %      Eosinophil % 3.6 %      Basophil % 0.8 %      Neutrophils, Absolute 5.20 10*3/mm3      Lymphocytes, Absolute 0.80 10*3/mm3      Monocytes, Absolute 0.60 10*3/mm3      Eosinophils, Absolute 0.20 10*3/mm3      Basophils, Absolute 0.10 10*3/mm3      nRBC 0.0 /100 WBC     Blood Culture ID, PCR - Blood, Wrist, Left [[id number]]  (Abnormal) Collected:  03/04/20 0944    Specimen:  Blood from Wrist, Left Updated:  03/05/20 1958     BCID, PCR Staphylococcus spp, not aureus. Identification by BCID PCR.    POC Glucose Once  [[id number]]  (Abnormal) Collected:  03/05/20 1954    Specimen:  Blood Updated:  03/05/20 1956     Glucose 188 mg/dL      Comment: Serial Number: [id number]:  [id number]       POC Glucose Once [[id number]]  (Abnormal) Collected:  03/05/20 1701    Specimen:  Blood Updated:  03/05/20 1706     Glucose 145 mg/dL      Comment: Serial Number: [id number]:  [id number]       Occult Blood, Fecal By Immunoassay - Stool, Per Rectum [[id number]]  (Abnormal) Collected:  03/03/20 1417    Specimen:  Stool from Per Rectum Updated:  03/05/20 1417     Occult Blood, Fecal by Immunoassay Positive    POC Glucose Once [[id number]]  (Abnormal) Collected:  03/05/20 1137    Specimen:  Blood Updated:  03/05/20 1154     Glucose 178 mg/dL      Comment: Serial Number: [id number]:  [id number]             Imaging Results (Last 24 Hours)     ** No results found for the last 24 hours. **             ASSESSMENT:  -Macrocytic anemia -consider GI loss versus anemia of chronic disease  -Weakness  -THOMAS cirrhosis complicated by hepatic encephalopathy  -GAVE status post APC -performed twice this year  -Common bile duct stricture status post ERCP with stent placement in 1/2020  -Abnormal MRI showing suspicious liver lesions in 12/2019  -Diabetes mellitus type 2  -CKD stage III  -KATHIA  -CAD status post stent placement/hypertension  -Hyperlipidemia  -History of DVT/PE status post IVC filter placement  -Hypothyroidism  -History of cholecystectomy  -Morbid obesity     PLAN:  Patient presented with complaints of weakness.  We have been asked to consult for anemia.  The patient does have a history of GAVE which has required APC twice this year already.  We will plan repeat EGD today to further evaluate.  Hemoglobin is currently stable at 7.9.  Continue to monitor H/H and transfuse as needed.  Continue PPI twice daily.  Patient does report 4-5 loose bowel movements daily on lactulose.  We will decrease dose.  Continue Xifaxan.  Goal should be 2-3  bowel movements daily.  MELD 6.  MRI in 12/2019 showed some suspicious lesions in the liver.  Will check AFP.  Patient did recently have ERCP in 2/2020 with CBD stricture noted.  Brushings were taken and stent was placed at that time.  Cytology from brushings were positive for atypical cells.  Patient is scheduled for EUS in 4/2020.  Supportive care.    I discussed the patients findings and my recommendations with the patient.  I will discuss the case with Dr. Silva and change the plan accordingly.  SHAJI Sosa  03/06/20  9:35 AM

## 2020-03-07 VITALS
HEART RATE: 73 BPM | SYSTOLIC BLOOD PRESSURE: 131 MMHG | RESPIRATION RATE: 18 BRPM | DIASTOLIC BLOOD PRESSURE: 63 MMHG | OXYGEN SATURATION: 100 % | HEIGHT: 74 IN | TEMPERATURE: 98.7 F | BODY MASS INDEX: 37.29 KG/M2 | WEIGHT: 290.57 LBS

## 2020-03-07 LAB
ALBUMIN SERPL-MCNC: 3.5 G/DL (ref 3.5–5.2)
ALBUMIN/GLOB SERPL: 0.9 G/DL
ALP SERPL-CCNC: 105 U/L (ref 39–117)
ALT SERPL W P-5'-P-CCNC: 17 U/L (ref 1–41)
AMMONIA BLD-SCNC: 21 UMOL/L (ref 16–60)
ANION GAP SERPL CALCULATED.3IONS-SCNC: 12 MMOL/L (ref 5–15)
AST SERPL-CCNC: 25 U/L (ref 1–40)
BASOPHILS # BLD AUTO: 0.1 10*3/MM3 (ref 0–0.2)
BASOPHILS NFR BLD AUTO: 0.8 % (ref 0–1.5)
BILIRUB SERPL-MCNC: 0.3 MG/DL (ref 0.2–1.2)
BUN BLD-MCNC: 33 MG/DL (ref 8–23)
BUN/CREAT SERPL: 21.3 (ref 7–25)
CA-I SERPL ISE-MCNC: 1.15 MMOL/L (ref 1.2–1.3)
CALCIUM SPEC-SCNC: 8.5 MG/DL (ref 8.6–10.5)
CHLORIDE SERPL-SCNC: 106 MMOL/L (ref 98–107)
CO2 SERPL-SCNC: 25 MMOL/L (ref 22–29)
CREAT BLD-MCNC: 1.55 MG/DL (ref 0.76–1.27)
DEPRECATED RDW RBC AUTO: 57.3 FL (ref 37–54)
EOSINOPHIL # BLD AUTO: 0.3 10*3/MM3 (ref 0–0.4)
EOSINOPHIL NFR BLD AUTO: 3.9 % (ref 0.3–6.2)
ERYTHROCYTE [DISTWIDTH] IN BLOOD BY AUTOMATED COUNT: 16.6 % (ref 12.3–15.4)
GFR SERPL CREATININE-BSD FRML MDRD: 44 ML/MIN/1.73
GLOBULIN UR ELPH-MCNC: 3.8 GM/DL
GLUCOSE BLD-MCNC: 144 MG/DL (ref 65–99)
GLUCOSE BLDC GLUCOMTR-MCNC: 138 MG/DL (ref 70–105)
GLUCOSE BLDC GLUCOMTR-MCNC: 151 MG/DL (ref 70–105)
HCT VFR BLD AUTO: 22.9 % (ref 37.5–51)
HGB BLD-MCNC: 7.9 G/DL (ref 13–17.7)
INR PPP: 1.12 (ref 0.9–1.1)
LYMPHOCYTES # BLD AUTO: 0.9 10*3/MM3 (ref 0.7–3.1)
LYMPHOCYTES NFR BLD AUTO: 14.5 % (ref 19.6–45.3)
MAGNESIUM SERPL-MCNC: 1.9 MG/DL (ref 1.6–2.4)
MCH RBC QN AUTO: 33.6 PG (ref 26.6–33)
MCHC RBC AUTO-ENTMCNC: 34.6 G/DL (ref 31.5–35.7)
MCV RBC AUTO: 97.2 FL (ref 79–97)
MONOCYTES # BLD AUTO: 0.5 10*3/MM3 (ref 0.1–0.9)
MONOCYTES NFR BLD AUTO: 8.4 % (ref 5–12)
NEUTROPHILS # BLD AUTO: 4.7 10*3/MM3 (ref 1.7–7)
NEUTROPHILS NFR BLD AUTO: 72.4 % (ref 42.7–76)
NRBC BLD AUTO-RTO: 0.1 /100 WBC (ref 0–0.2)
PHOSPHATE SERPL-MCNC: 2.5 MG/DL (ref 2.5–4.5)
PLATELET # BLD AUTO: 212 10*3/MM3 (ref 140–450)
PMV BLD AUTO: 8.8 FL (ref 6–12)
POTASSIUM BLD-SCNC: 3.5 MMOL/L (ref 3.5–5.2)
PROT SERPL-MCNC: 7.3 G/DL (ref 6–8.5)
PROTHROMBIN TIME: 11.5 SECONDS (ref 9.6–11.7)
RBC # BLD AUTO: 2.35 10*6/MM3 (ref 4.14–5.8)
SODIUM BLD-SCNC: 143 MMOL/L (ref 136–145)
WBC NRBC COR # BLD: 6.5 10*3/MM3 (ref 3.4–10.8)

## 2020-03-07 PROCEDURE — 25010000002 CALCIUM GLUCONATE-NACL 1-0.675 GM/50ML-% SOLUTION: Performed by: INTERNAL MEDICINE

## 2020-03-07 PROCEDURE — 82140 ASSAY OF AMMONIA: CPT | Performed by: INTERNAL MEDICINE

## 2020-03-07 PROCEDURE — 85025 COMPLETE CBC W/AUTO DIFF WBC: CPT | Performed by: INTERNAL MEDICINE

## 2020-03-07 PROCEDURE — 84100 ASSAY OF PHOSPHORUS: CPT | Performed by: INTERNAL MEDICINE

## 2020-03-07 PROCEDURE — 80053 COMPREHEN METABOLIC PANEL: CPT | Performed by: INTERNAL MEDICINE

## 2020-03-07 PROCEDURE — 99232 SBSQ HOSP IP/OBS MODERATE 35: CPT | Performed by: INTERNAL MEDICINE

## 2020-03-07 PROCEDURE — 83735 ASSAY OF MAGNESIUM: CPT | Performed by: INTERNAL MEDICINE

## 2020-03-07 PROCEDURE — 85610 PROTHROMBIN TIME: CPT | Performed by: INTERNAL MEDICINE

## 2020-03-07 PROCEDURE — 82962 GLUCOSE BLOOD TEST: CPT

## 2020-03-07 PROCEDURE — 63710000001 INSULIN LISPRO (HUMAN) PER 5 UNITS: Performed by: INTERNAL MEDICINE

## 2020-03-07 PROCEDURE — 82330 ASSAY OF CALCIUM: CPT | Performed by: INTERNAL MEDICINE

## 2020-03-07 PROCEDURE — 99239 HOSP IP/OBS DSCHRG MGMT >30: CPT | Performed by: INTERNAL MEDICINE

## 2020-03-07 RX ORDER — BUMETANIDE 1 MG/1
1 TABLET ORAL 2 TIMES DAILY
Qty: 60 TABLET | Refills: 0
Start: 2020-03-07 | End: 2020-06-15 | Stop reason: HOSPADM

## 2020-03-07 RX ORDER — BUMETANIDE 1 MG/1
1 TABLET ORAL
Status: DISCONTINUED | OUTPATIENT
Start: 2020-03-07 | End: 2020-03-07 | Stop reason: HOSPADM

## 2020-03-07 RX ORDER — CALCIUM GLUCONATE 20 MG/ML
1 INJECTION, SOLUTION INTRAVENOUS EVERY 12 HOURS
Status: DISCONTINUED | OUTPATIENT
Start: 2020-03-07 | End: 2020-03-07 | Stop reason: HOSPADM

## 2020-03-07 RX ORDER — NITROGLYCERIN 0.4 MG/1
0.4 TABLET SUBLINGUAL
Qty: 30 TABLET | Refills: 12
Start: 2020-03-07

## 2020-03-07 RX ORDER — OXYCODONE HYDROCHLORIDE 5 MG/1
5 TABLET ORAL EVERY 8 HOURS PRN
Qty: 6 TABLET | Refills: 0 | Status: SHIPPED | OUTPATIENT
Start: 2020-03-07 | End: 2020-05-11 | Stop reason: DRUGHIGH

## 2020-03-07 RX ADMIN — PANTOPRAZOLE SODIUM 40 MG: 40 TABLET, DELAYED RELEASE ORAL at 08:37

## 2020-03-07 RX ADMIN — ZINC SULFATE 220 MG (50 MG) CAPSULE 220 MG: CAPSULE at 08:37

## 2020-03-07 RX ADMIN — SUCRALFATE 1 G: 1 TABLET ORAL at 12:09

## 2020-03-07 RX ADMIN — CALCIUM GLUCONATE 1 G: 20 INJECTION, SOLUTION INTRAVENOUS at 08:35

## 2020-03-07 RX ADMIN — BUMETANIDE 1 MG: 1 TABLET ORAL at 08:37

## 2020-03-07 RX ADMIN — DULOXETINE HYDROCHLORIDE 60 MG: 30 CAPSULE, DELAYED RELEASE ORAL at 08:37

## 2020-03-07 RX ADMIN — FOLIC ACID 1 MG: 1 TABLET ORAL at 08:37

## 2020-03-07 RX ADMIN — INSULIN LISPRO 18 UNITS: 100 INJECTION, SOLUTION INTRAVENOUS; SUBCUTANEOUS at 08:36

## 2020-03-07 RX ADMIN — INSULIN LISPRO 18 UNITS: 100 INJECTION, SOLUTION INTRAVENOUS; SUBCUTANEOUS at 12:10

## 2020-03-07 RX ADMIN — SODIUM BICARBONATE 650 MG TABLET 650 MG: at 08:37

## 2020-03-07 RX ADMIN — POTASSIUM & SODIUM PHOSPHATES POWDER PACK 280-160-250 MG 1 PACKET: 280-160-250 PACK at 08:36

## 2020-03-07 RX ADMIN — ASPIRIN 81 MG: 81 TABLET, COATED ORAL at 08:37

## 2020-03-07 RX ADMIN — ATORVASTATIN CALCIUM 20 MG: 20 TABLET, FILM COATED ORAL at 08:38

## 2020-03-07 RX ADMIN — SUCRALFATE 1 G: 1 TABLET ORAL at 08:37

## 2020-03-07 RX ADMIN — METOCLOPRAMIDE 5 MG: 5 TABLET ORAL at 08:37

## 2020-03-07 RX ADMIN — SODIUM BICARBONATE 650 MG TABLET 650 MG: at 16:01

## 2020-03-07 RX ADMIN — MAGNESIUM OXIDE TAB 400 MG (241.3 MG ELEMENTAL MG) 400 MG: 400 (241.3 MG) TAB at 08:36

## 2020-03-07 RX ADMIN — DOCUSATE SODIUM 100 MG: 100 CAPSULE, LIQUID FILLED ORAL at 08:37

## 2020-03-07 RX ADMIN — INSULIN LISPRO 2 UNITS: 100 INJECTION, SOLUTION INTRAVENOUS; SUBCUTANEOUS at 12:09

## 2020-03-07 RX ADMIN — THERA TABS 1 TABLET: TAB at 08:37

## 2020-03-07 RX ADMIN — ALLOPURINOL 100 MG: 100 TABLET ORAL at 08:37

## 2020-03-07 RX ADMIN — LACTULOSE 30 G: 10 SOLUTION ORAL at 08:36

## 2020-03-07 RX ADMIN — OXYBUTYNIN 10 MG: 10 TABLET, FILM COATED, EXTENDED RELEASE ORAL at 08:37

## 2020-03-07 RX ADMIN — Medication 1 PACKET: at 08:35

## 2020-03-07 RX ADMIN — RIFAXIMIN 550 MG: 550 TABLET ORAL at 08:37

## 2020-03-07 RX ADMIN — LEVOTHYROXINE SODIUM 75 MCG: 75 TABLET ORAL at 06:15

## 2020-03-07 NOTE — PROGRESS NOTES
"NEPHROLOGY PROGRESS NOTE------[facility]    [facility]  [phone]  Paula Plascencia MD      Patient Care Team:  Paulette Harris MD as PCP - General  Josefina Plascencia MD as Consulting Physician (Nephrology)      Provider:  Paula Plascencia MD  Patient Name: Bry Ratliff  :  1945    SUBJECTIVE:     Feeling much better. Negative for CP/SOA    Medication:    acetaminophen 650 mg Oral Once   allopurinol 100 mg Oral BID   aspirin 81 mg Oral Daily   atorvastatin 20 mg Oral Daily   cyanocobalamin 1,000 mcg Subcutaneous Q30 Days   diphenhydrAMINE 12.5 mg Intravenous Once   docusate sodium 100 mg Oral BID   DULoxetine 60 mg Oral Daily   enoxaparin 40 mg Subcutaneous Daily   folic acid 1 mg Oral Daily   insulin glargine 50 Units Subcutaneous Nightly   insulin lispro 0-9 Units Subcutaneous 4x Daily With Meals & Nightly   insulin lispro 18 Units Subcutaneous TID AC   ADRIANA 1 packet Oral BID   lactulose 30 g Oral Daily   levothyroxine 75 mcg Oral Q AM   magnesium oxide 400 mg Oral Daily   metoclopramide 5 mg Oral BID AC   nicotine 1 patch Transdermal Nightly   oxybutynin XL 10 mg Oral Daily   pantoprazole 40 mg Oral BID AC   potassium & sodium phosphates 1 packet Oral Daily   riFAXIMin 550 mg Oral Q12H   sodium bicarbonate 650 mg Oral TID   sucralfate 1 g Oral 4x Daily AC & at Bedtime   THERA 1 tablet Oral Daily   zinc sulfate 220 mg Oral Daily       sodium chloride 9 mL/hr Last Rate: 9 mL/hr (20 1232)       OBJECTIVE    Vital Sign Min/Max for last 24 hours  Temp  Min: 97.9 °F (36.6 °C)  Max: 98.4 °F (36.9 °C)   BP  Min: 101/42  Max: 158/57   Pulse  Min: 60  Max: 74   Resp  Min: 18  Max: 22   SpO2  Min: 100 %  Max: 100 %   No data recorded   Weight  Min: 132 kg (290 lb 9.1 oz)  Max: 132 kg (290 lb 9.1 oz)     Flowsheet Rows      First Filed Value   Admission Height  188 cm (74\") Documented at 2020 1133   Admission Weight  134 kg (295 lb) " Documented at 03/03/2020 1133          No intake/output data recorded.  I/O last 3 completed shifts:  In: 260 [P.O.:260]  Out: 500 [Urine:500]    Physical Exam:  General Appearance: alert, appears stated age and cooperative  Head: normocephalic, without obvious abnormality and atraumatic  Eyes: conjunctivae and sclerae normal and no icterus  Neck: supple and no JVD  Lungs: clear to auscultation and respirations regular  Heart: regular rhythm & normal rate and normal S1, S2 +REBECCA  Chest: Wall no abnormalities observed  Abdomen: normal bowel sounds and soft non-tender +OBESITY  Extremities: moves extremities well, +TRACE PRETIBIAL EDEMA BILAT, no cyanosis and no redness  Skin: +DECUBITUS ULCER  Neurologic: Alert, and oriented. No focal deficits    Labs:    WBC WBC   Date Value Ref Range Status   03/07/2020 6.50 3.40 - 10.80 10*3/mm3 Final   03/06/2020 6.90 3.40 - 10.80 10*3/mm3 Final   03/05/2020 6.40 3.40 - 10.80 10*3/mm3 Final   03/04/2020 8.30 3.40 - 10.80 10*3/mm3 Final      HGB Hemoglobin   Date Value Ref Range Status   03/07/2020 7.9 (L) 13.0 - 17.7 g/dL Final   03/06/2020 7.9 (L) 13.0 - 17.7 g/dL Final   03/05/2020 8.3 (L) 13.0 - 17.7 g/dL Final   03/05/2020 7.7 (L) 13.0 - 17.7 g/dL Final   03/04/2020 8.4 (L) 13.0 - 17.7 g/dL Final      HCT Hematocrit   Date Value Ref Range Status   03/07/2020 22.9 (L) 37.5 - 51.0 % Final   03/06/2020 24.5 (L) 37.5 - 51.0 % Final   03/05/2020 24.5 (L) 37.5 - 51.0 % Final   03/05/2020 22.8 (L) 37.5 - 51.0 % Final   03/04/2020 24.6 (L) 37.5 - 51.0 % Final      Platlets No results found for: LABPLAT   MCV MCV   Date Value Ref Range Status   03/07/2020 97.2 (H) 79.0 - 97.0 fL Final   03/06/2020 97.5 (H) 79.0 - 97.0 fL Final   03/05/2020 98.2 (H) 79.0 - 97.0 fL Final   03/04/2020 98.5 (H) 79.0 - 97.0 fL Final          Sodium Sodium   Date Value Ref Range Status   03/07/2020 143 136 - 145 mmol/L Final   03/06/2020 142 136 - 145 mmol/L Final   03/05/2020 138 136 - 145 mmol/L Final    03/04/2020 137 136 - 145 mmol/L Final      Potassium Potassium   Date Value Ref Range Status   03/07/2020 3.5 3.5 - 5.2 mmol/L Final   03/06/2020 3.7 3.5 - 5.2 mmol/L Final   03/05/2020 3.6 3.5 - 5.2 mmol/L Final   03/04/2020 3.3 (L) 3.5 - 5.2 mmol/L Final      Chloride Chloride   Date Value Ref Range Status   03/07/2020 106 98 - 107 mmol/L Final   03/06/2020 109 (H) 98 - 107 mmol/L Final   03/05/2020 104 98 - 107 mmol/L Final   03/04/2020 103 98 - 107 mmol/L Final      CO2 CO2   Date Value Ref Range Status   03/07/2020 25.0 22.0 - 29.0 mmol/L Final   03/06/2020 20.0 (L) 22.0 - 29.0 mmol/L Final   03/05/2020 21.0 (L) 22.0 - 29.0 mmol/L Final   03/04/2020 22.0 22.0 - 29.0 mmol/L Final      BUN BUN   Date Value Ref Range Status   03/07/2020 33 (H) 8 - 23 mg/dL Final   03/06/2020 33 (H) 8 - 23 mg/dL Final   03/05/2020 34 (H) 8 - 23 mg/dL Final   03/04/2020 35 (H) 8 - 23 mg/dL Final      Creatinine Creatinine   Date Value Ref Range Status   03/07/2020 1.55 (H) 0.76 - 1.27 mg/dL Final   03/06/2020 1.61 (H) 0.76 - 1.27 mg/dL Final   03/05/2020 1.77 (H) 0.76 - 1.27 mg/dL Final   03/04/2020 1.79 (H) 0.76 - 1.27 mg/dL Final      Calcium Calcium   Date Value Ref Range Status   03/07/2020 8.5 (L) 8.6 - 10.5 mg/dL Final   03/06/2020 8.4 (L) 8.6 - 10.5 mg/dL Final   03/05/2020 8.5 (L) 8.6 - 10.5 mg/dL Final   03/04/2020 8.5 (L) 8.6 - 10.5 mg/dL Final      PO4 No components found for: PO4   Albumin Albumin   Date Value Ref Range Status   03/07/2020 3.50 3.50 - 5.20 g/dL Final   03/06/2020 2.70 (L) 3.50 - 5.20 g/dL Final   03/05/2020 2.60 (L) 3.50 - 5.20 g/dL Final   03/04/2020 2.70 (L) 3.50 - 5.20 g/dL Final      Magnesium Magnesium   Date Value Ref Range Status   03/07/2020 1.9 1.6 - 2.4 mg/dL Final   03/06/2020 2.1 1.6 - 2.4 mg/dL Final   03/05/2020 2.1 1.6 - 2.4 mg/dL Final   03/04/2020 1.9 1.6 - 2.4 mg/dL Final      Uric Acid No components found for: URIC ACID     Imaging Results (Last 72 Hours)     Procedure Component  Value Units Date/Time    XR Knee 4+ View Bilateral [[id number]] Collected:  03/04/20 2319     Updated:  03/04/20 2328    Narrative:       DATE OF EXAM:  3/4/2020 9:43 PM     PROCEDURE:  XR KNEE 4+ VW BILATERAL-     INDICATIONS:  Pain in both knees and difficulty walking.; R53.1-Weakness;  D64.9-Anemia, unspecified; N18.9-Chronic kidney disease, unspecified;  R79.89-Other specified abnormal findings of blood chemistry     COMPARISON:  No Comparisons Available     TECHNIQUE:   Four radiographic views of bilateral knees were obtained.     FINDINGS:  Right knee: There is no acute fracture or dislocation. There is moderate  to advanced tricompartmental joint space narrowing with marginal  osteophyte formation, worst within the lateral compartment. There is  subchondral sclerosis of the lateral tibial plateau. There is  chondrocalcinosis within the medial and lateral compartments. There is a  trace suprapatellar joint effusion. There is no significant soft tissue  swelling. Bone mineralization is normal. There is no evidence of osseous  erosion.      Left knee: There is mild medial compartment joint space narrowing  without significant osteophyte formation. There is patellofemoral  compartment joint space narrowing with mild osteophyte formation. There  is chondrocalcinosis which can be seen with CPPD arthropathy. There is  no left knee joint effusion. There is peripheral vascular  atherosclerotic calcification. There is no osseous erosion.       Impression:       Right knee:  1. Moderate to advanced tricompartmental joint space narrowing with  marginal osteophyte formation, likely representing osteoarthritis or  CPPD arthropathy given chondrocalcinosis.  2. Trace suprapatellar joint effusion.     Left knee:  1. Mild medial and patellofemoral compartment joint space narrowing  which may relate to osteoarthritis or CPPD arthropathy given  chondrocalcinosis.     Electronically Signed ByKeely Amado On:3/4/2020 11:26  PM  This report was finalized on [id number] by  Shady Fischer, .    NM Lung Ventilation Perfusion Aerosol [[id number]] Collected:  03/04/20 0911     Updated:  03/04/20 0926    Narrative:       agfaDATE OF EXAM:  3/4/2020 8:24 AM     PROCEDURE:  NM LUNG VENTILATION PERFUSION AEROSOL-     INDICATIONS:  Dyspnea, cardiac origin suspected; R53.1-Weakness; D64.9-Anemia,  unspecified; N18.9-Chronic kidney disease, unspecified; R79.89-Other  specified abnormal findings of blood chemistry     COMPARISON:  Chest radiograph 03/03/2020     TECHNIQUE:   The patient was ventilated with 5.5 mCi of technetium 99m pertechnetate  aerosol and ventilation images were acquired in multiple obliquities.  The patient then received 45 mCi of technetium 99m MAA intravenously and  perfusion images were acquired in multiple obliquities.     FINDINGS:  Ventilation and perfusion scan appears normal. No evidence of  ventilation or perfusion mismatch. No chest radiographic abnormalities.       Impression:       Normal ventilation/perfusion lung scan. This excludes the diagnosis of  pulmonary embolism with a very high degree of clinical certainty     Electronically Signed By-Naif Donahue On:3/4/2020 9:13 AM  This report was finalized on [id number] by  Naif Donahue, .          Results for orders placed during the hospital encounter of 03/03/20   XR Knee 4+ View Bilateral    Narrative DATE OF EXAM:  3/4/2020 9:43 PM     PROCEDURE:  XR KNEE 4+ VW BILATERAL-     INDICATIONS:  Pain in both knees and difficulty walking.; R53.1-Weakness;  D64.9-Anemia, unspecified; N18.9-Chronic kidney disease, unspecified;  R79.89-Other specified abnormal findings of blood chemistry     COMPARISON:  No Comparisons Available     TECHNIQUE:   Four radiographic views of bilateral knees were obtained.     FINDINGS:  Right knee: There is no acute fracture or dislocation. There is moderate  to advanced tricompartmental joint space narrowing with marginal  osteophyte  formation, worst within the lateral compartment. There is  subchondral sclerosis of the lateral tibial plateau. There is  chondrocalcinosis within the medial and lateral compartments. There is a  trace suprapatellar joint effusion. There is no significant soft tissue  swelling. Bone mineralization is normal. There is no evidence of osseous  erosion.      Left knee: There is mild medial compartment joint space narrowing  without significant osteophyte formation. There is patellofemoral  compartment joint space narrowing with mild osteophyte formation. There  is chondrocalcinosis which can be seen with CPPD arthropathy. There is  no left knee joint effusion. There is peripheral vascular  atherosclerotic calcification. There is no osseous erosion.       Impression Right knee:  1. Moderate to advanced tricompartmental joint space narrowing with  marginal osteophyte formation, likely representing osteoarthritis or  CPPD arthropathy given chondrocalcinosis.  2. Trace suprapatellar joint effusion.     Left knee:  1. Mild medial and patellofemoral compartment joint space narrowing  which may relate to osteoarthritis or CPPD arthropathy given  chondrocalcinosis.     Electronically Signed By-Shady Fischer On:3/4/2020 11:26 PM  This report was finalized on [id number] by  Shady Fischer, .   XR Abdomen KUB    Narrative DATE OF EXAM:  3/3/2020 7:24 PM     PROCEDURE:  XR ABDOMEN KUB-     INDICATIONS:  ABDOMINAL PAIN; R53.1-Weakness; D64.9-Anemia, unspecified; N18.9-Chronic  kidney disease, unspecified; R79.89-Other specified abnormal findings of  blood chemistry  74-year-old male who has abdominal pain off and on, pain mostly to the  right in the middle of his abdomen. Patient gives history of diabetes     COMPARISON:  No Comparisons Available     TECHNIQUE:   Single radiographic view of the abdomen was obtained.     FINDINGS:  Today's KUB demonstrates a common duct stent in place along with change  of cholecystectomy and  placement of inferior vena cava filter the bowel  pattern is nonspecific the bony structures demonstrate degenerative  change no renal or ureteral or bladder calculus is seen. The bony  structures are intact. The are degenerative changes to the lower  thoracic and lumbar spine. The lung bases are free of disease..       Impression    1. Postoperative changes of biliary stent placement and cholecystectomy  and inferior vena cava filter  2. Nonspecific bowel pattern, lung bases are free of disease  3. No evidence of renal or ureteral calculus     Electronically Signed By-Bennett Bobby Jr. On:3/3/2020 11:20 PM  This report was finalized on [id number] by  Bennett Bobby Jr., .   XR Chest 1 View    Narrative DATE OF EXAM:  3/3/2020 12:12 PM     PROCEDURE:  XR CHEST 1 VW-     INDICATIONS:  Shortness breath, cough and fever for 2 to 3 days.       COMPARISON:  PA and lateral chest 07/11/2019.     TECHNIQUE:   Single radiographic view of the chest was obtained.     FINDINGS:  No acute airspace disease. Heart size is upper limits normal. Mild  degenerative endplate spurring in the thoracic spine. No acute osseous  abnormality.       Impression No acute chest findings.     Electronically Signed By-Dr. Kaitlin Wong MD On:3/3/2020 12:46 PM  This report was finalized on [id number] by Dr. Kaitlin Wong MD.       Results for orders placed during the hospital encounter of 03/03/20   Duplex Venous Lower Extremity - Bilateral CAR    Narrative · Normal bilateral lower extremity venous duplex scan.            ASSESSMENT / PLAN      General weakness    Cirrhosis (CMS/HCC)    Diabetic neuropathy (CMS/HCC)    Dyslipidemia    History of DVT of right lower extremity    Essential hypertension    Acquired hypothyroidism    Morbid obesity (CMS/HCC)    Obstructive sleep apnea    Type 2 diabetes mellitus with kidney complication, with long-term current use of insulin (CMS/HCC)    Spinal stenosis of lumbar region    Pulmonary hypertension  (CMS/HCC)    Deficiency of other specified B group vitamins    Chronic coronary artery disease    JOSE (iron deficiency anemia) with poor po absorption    IgG kappa MGUS    THOMAS (nonalcoholic steatohepatitis)    Splenomegaly    Antiphospholipid antibody positive    Elevated factor VIII level    Anemia in stage 3 chronic kidney disease (CMS/HCC)    History of Vitamin B12 deficiency    History of bilateral pulmonary embolus (PE)    Antithrombin III deficiency (CMS/HCC)    Protein C deficiency (CMS/HCC)    Protein S deficiency (CMS/HCC)    GAVE (gastric antral vascular ectasia)    CKD (chronic kidney disease) stage 3, GFR 30-59 ml/min (CMS/HCC)    Iron deficiency anemia    Decubitus ulcer of sacral region, stage 3 (CMS/HCC)      1. CRF/CKD STG 3-------Nonoliguric. BUN/Cr stable and near baseline. +Mild ARF/SUSANA on top of known CRF/CKD STG 3 with a baseline serum creatinine of about 1.5. CRF/CKD STG 3 secondary to DGS/HTN NS. +ARF/SUSANA appears to be secondary to slight prerenal state.  Holding Bumex.  No NSAIDs or IV dye. Dose meds for CrCl less than 10 cc/min.     2. ANEMIA WITH H/O GAVE AND MGUS AND JOSE-----Follow for further PRBC need. Appreciate GI eval. +GAVE     3. HTN WITH CKD STG 3-------BP okay. Avoid hypotension. No ACE/ARB/DRI/diuretic     4. DMII WITH RENAL MANIFESTATIONS------BS okay. On Lantus     5. HYPOTHYROIDISM-------On Synthroid.       6. THOMAS/CIRRHOSIS/HEPATIC ENCEPHALOPATHY------Increased Lactulose. Follow ammonia     7. OBESITY/KATHIA------ CPAP     8. EDEMA/CHRONIC VENOUS INSUFFICIENCY-----Restart po Bumex     9. ACIDOSIS------Metabolic. No elevation in AGAP. Secondary to Type 4 RTA and stool losses from diarrhea related to Lactulose use. Bicarb down. Increased po NaHCO3     10. GERD-------On PPI/Carafate     11. HYPERCOAGUABLE STATE WITH ANTI THROMBI III/PROTEIN C & S DEFICIENCY AND HISTORY OF PE-----Dopplers negative.      12. HYPOCALCEMIA-----Replace IV. Follow ionized levels    13.  HYPOPHOSPHATEMIA------Replaced    14. 1/2 CBld + for G+ cocci in clusters------Repeat Cx pending and ID following. +Decubitus getting wound care    15. GAVE--- Per Dr. Silva. No cauterization at this time as he is not actively bleeding.     Paula Plascencia MD  [facility]  [phone]  03/07/20  7:04 AM

## 2020-03-07 NOTE — PLAN OF CARE
Problem: Patient Care Overview  Goal: Plan of Care Review  Outcome: Ongoing (interventions implemented as appropriate)  Flowsheets (Taken 3/7/2020 0919)  Progress: no change  Plan of Care Reviewed With: patient  Outcome Summary: Patient rested on and offf with no c/o pain or discomfort. Continue diuresis. Will continue to monitor.

## 2020-03-07 NOTE — PROGRESS NOTES
Reason for follow-up: Shortness of breath  3 of CAD status post PCI  History of DVT  Severe anemia and renal failure  Patient Care Team:  Paulette Harris MD as PCP - General  Josefina Plascencia MD as Consulting Physician (Nephrology)        Cardiology assessment and plan      Multiple medical problems now admitted with worsening shortness of breath borderline elevated troponin probably demand ischemia  History of CAD status post PCI almost 10 years ago  Patient have severe anemia renal failure  Primary team following  Pharmacological stress Myoview rescheduled for tomorrow since patient had VQ scan yesterday  Echocardiogram was reviewed      Interpretation Summary        · Findings consistent with a normal ECG stress test.  · Left ventricular ejection fraction is normal (Calculated EF = 56%).  · Impressions are consistent with a low risk study.  · Medium to large distal inferolateral reverse redistribution without any ischemia EF 56%.       Interpretation Summary     · The left ventricular cavity is mildly dilated.  · Estimated EF = 50%.  · Left ventricular systolic function is low normal.  · Right ventricular cavity is mildly dilated.  · Mild mitral valve regurgitation is present  · Mild tricuspid valve regurgitation is present.  · Mild dilation of the aortic root is present.       Results of the stress test and also echocardiogram discussed with the patient and family  Plan to manage patient conservatively from cardiac standpoint                  Subjective .   Feels slightly better     Review of Systems   Constitution: Positive for malaise/fatigue. Negative for fever.   HENT: Negative for congestion and hearing loss.    Eyes: Negative for double vision and visual disturbance.   Cardiovascular: Positive for dyspnea on exertion. Negative for chest pain, claudication, leg swelling and syncope.   Respiratory: Positive for cough and shortness of breath.    Endocrine: Negative for cold intolerance.      Skin: Negative for color change and rash.   Musculoskeletal: Negative for arthritis and joint pain.   Gastrointestinal: Negative for abdominal pain and heartburn.   Genitourinary: Negative for hematuria.   Neurological: Negative for excessive daytime sleepiness and dizziness.   Psychiatric/Behavioral: Negative for depression. The patient is not nervous/anxious.    All other systems reviewed and are negative.      Patient has no known allergies.    Scheduled Meds:    acetaminophen 650 mg Oral Once   allopurinol 100 mg Oral BID   aspirin 81 mg Oral Daily   atorvastatin 20 mg Oral Daily   bumetanide 1 mg Oral BID   calcium gluconate 1 g Intravenous Q12H   cyanocobalamin 1,000 mcg Subcutaneous Q30 Days   diphenhydrAMINE 12.5 mg Intravenous Once   docusate sodium 100 mg Oral BID   DULoxetine 60 mg Oral Daily   enoxaparin 40 mg Subcutaneous Daily   folic acid 1 mg Oral Daily   insulin glargine 50 Units Subcutaneous Nightly   insulin lispro 0-9 Units Subcutaneous 4x Daily With Meals & Nightly   insulin lispro 18 Units Subcutaneous TID AC   ADRIANA 1 packet Oral BID   lactulose 30 g Oral Daily   levothyroxine 75 mcg Oral Q AM   magnesium oxide 400 mg Oral Daily   metoclopramide 5 mg Oral BID AC   nicotine 1 patch Transdermal Nightly   oxybutynin XL 10 mg Oral Daily   pantoprazole 40 mg Oral BID AC   potassium & sodium phosphates 1 packet Oral Daily   riFAXIMin 550 mg Oral Q12H   sodium bicarbonate 650 mg Oral TID   sucralfate 1 g Oral 4x Daily AC & at Bedtime   THERA 1 tablet Oral Daily   zinc sulfate 220 mg Oral Daily     Continuous Infusions:    sodium chloride 9 mL/hr Last Rate: 9 mL/hr (03/06/20 1232)     PRN Meds:.•  acetaminophen **OR** acetaminophen **OR** acetaminophen  •  dextrose  •  dextrose  •  docusate sodium  •  glucagon (human recombinant)  •  hydrOXYzine  •  insulin lispro **AND** insulin lispro  •  ketamine (KETALAR) infusion **AND** Ketamine Vital Signs & Assessment  •  magnesium sulfate **OR** magnesium  "sulfate **OR** magnesium sulfate  •  melatonin  •  melatonin  •  nitroglycerin  •  ondansetron **OR** ondansetron  •  oxyCODONE  •  potassium & sodium phosphates **OR** potassium & sodium phosphates  •  potassium chloride  •  potassium chloride  •  sodium chloride    Objective   Looks comfortable lying in the bed    VITAL SIGNS  Vitals:    03/06/20 1927 03/07/20 0310 03/07/20 0834 03/07/20 1305   BP: 109/52 123/65 117/66 131/63   BP Location: Right leg Right arm Right arm Right arm   Patient Position:  Lying Lying Lying   Pulse: 70 66 74 73   Resp: 20 20  18   Temp: 98.4 °F (36.9 °C) 97.9 °F (36.6 °C)  98.7 °F (37.1 °C)   TempSrc: Oral Axillary  Oral   SpO2: 100% 100%  100%   Weight:  132 kg (290 lb 9.1 oz)     Height:           Flowsheet Rows      First Filed Value   Admission Height  188 cm (74\") Documented at 03/03/2020 1133   Admission Weight  134 kg (295 lb) Documented at 03/03/2020 1133           TELEMETRY: Sinus rhythm    Physical Exam:  Physical Exam   Constitutional: He is oriented to person, place, and time. He appears well-developed and well-nourished. He is cooperative.   Obese   HENT:   Head: Normocephalic and atraumatic.   Mouth/Throat: Uvula is midline and oropharynx is clear and moist. No oral lesions.   Eyes: Conjunctivae are normal. No scleral icterus.   Neck: Trachea normal. Neck supple. No JVD present. Carotid bruit is not present. No thyromegaly present.   Cardiovascular: Normal rate, regular rhythm, S1 normal, S2 normal, normal heart sounds, intact distal pulses and normal pulses. PMI is not displaced. Exam reveals no gallop and no friction rub.   No murmur heard.  Pulmonary/Chest: Effort normal and breath sounds normal.   Abdominal: Soft. Bowel sounds are normal.   Genitourinary:   Genitourinary Comments: No Maurice catheter   Musculoskeletal: Normal range of motion. He exhibits edema.   Neurological: He is alert and oriented to person, place, and time. He has normal strength.   No focal " deficits   Skin: Skin is warm. No cyanosis.   Decub ulcers noted on the sacrum   Psychiatric: He has a normal mood and affect.                LAB RESULTS (LAST 7 DAYS)    CBC  Results from last 7 days   Lab Units 03/07/20  0411 03/06/20  0252 03/05/20  0456 03/05/20  0316 03/04/20  1144 03/03/20  1202   WBC 10*3/mm3 6.50 6.90  --  6.40 8.30 11.00*   RBC 10*6/mm3 2.35* 2.51*  --  2.33* 2.50* 2.52*   HEMOGLOBIN g/dL 7.9* 7.9* 8.3* 7.7* 8.4* 8.4*   HEMATOCRIT % 22.9* 24.5* 24.5* 22.8* 24.6* 24.9*   MCV fL 97.2* 97.5*  --  98.2* 98.5* 98.7*   PLATELETS 10*3/mm3 212 221  --  178 189 181       BMP  Results from last 7 days   Lab Units 03/07/20  0411 03/06/20  0252 03/05/20  0316 03/04/20  0944 03/03/20  1202   SODIUM mmol/L 143 142 138 137 134*   POTASSIUM mmol/L 3.5 3.7 3.6 3.3* 3.9  3.7   CHLORIDE mmol/L 106 109* 104 103 100   CO2 mmol/L 25.0 20.0* 21.0* 22.0 19.0*   BUN mg/dL 33* 33* 34* 35* 35*   CREATININE mg/dL 1.55* 1.61* 1.77* 1.79* 1.84*   GLUCOSE mg/dL 144* 170* 196* 159* 245*   MAGNESIUM mg/dL 1.9 2.1 2.1 1.9  --    PHOSPHORUS mg/dL 2.5 2.7 1.8* 2.8  --        CMP   Results from last 7 days   Lab Units 03/07/20  0412 03/07/20  0411 03/06/20  0252 03/05/20  0316 03/04/20  0944 03/03/20  1202   SODIUM mmol/L  --  143 142 138 137 134*   POTASSIUM mmol/L  --  3.5 3.7 3.6 3.3* 3.9  3.7   CHLORIDE mmol/L  --  106 109* 104 103 100   CO2 mmol/L  --  25.0 20.0* 21.0* 22.0 19.0*   BUN mg/dL  --  33* 33* 34* 35* 35*   CREATININE mg/dL  --  1.55* 1.61* 1.77* 1.79* 1.84*   GLUCOSE mg/dL  --  144* 170* 196* 159* 245*   ALBUMIN g/dL  --  3.50 2.70* 2.60* 2.70* 3.00*   BILIRUBIN mg/dL  --  0.3 0.2 0.2 0.4 0.5   ALK PHOS U/L  --  105 129* 112 119* 128*   AST (SGOT) U/L  --  25 37 39 29 34   ALT (SGPT) U/L  --  17 23 21 18 22   AMMONIA umol/L 21  --  78* 191* 65*  --          BNP        TROPONIN  Results from last 7 days   Lab Units 03/04/20  0944   CK TOTAL U/L 37   TROPONIN T ng/mL 0.019       CoAg  Results from last 7 days     Lab Units 03/07/20  0411 03/03/20  1202   INR  1.12* 1.09   APTT seconds  --  31.2*       Creatinine Clearance  Estimated Creatinine Clearance: 61.2 mL/min (A) (by C-G formula based on SCr of 1.55 mg/dL (H)).    ABG        Radiology  No radiology results for the last day          EKG        I personally viewed and interpreted the patient's EKG/Telemetry data:    ECHOCARDIOGRAM:    Results for orders placed during the hospital encounter of 03/03/20   Adult Transthoracic Echo Complete W/ Cont if Necessary Per Protocol    Narrative · The left ventricular cavity is mildly dilated.  · Estimated EF = 50%.  · Left ventricular systolic function is low normal.  · Right ventricular cavity is mildly dilated.  · Mild mitral valve regurgitation is present  · Mild tricuspid valve regurgitation is present.  · Mild dilation of the aortic root is present.        STRESS MYOVIEW:    CARDIAC CATHETERIZATION:    OTHER:         Assessment/Plan       General weakness    Cirrhosis (CMS/HCC)    Diabetic neuropathy (CMS/HCC)    Dyslipidemia    History of DVT of right lower extremity    Essential hypertension    Acquired hypothyroidism    Morbid obesity (CMS/HCC)    Obstructive sleep apnea    Type 2 diabetes mellitus with kidney complication, with long-term current use of insulin (CMS/HCC)    Spinal stenosis of lumbar region    Pulmonary hypertension (CMS/HCC)    Deficiency of other specified B group vitamins    Chronic coronary artery disease    JOSE (iron deficiency anemia) with poor po absorption    IgG kappa MGUS    THOMAS (nonalcoholic steatohepatitis)    Splenomegaly    Antiphospholipid antibody positive    Elevated factor VIII level    Anemia in stage 3 chronic kidney disease (CMS/HCC)    History of Vitamin B12 deficiency    History of bilateral pulmonary embolus (PE)    Antithrombin III deficiency (CMS/HCC)    Protein C deficiency (CMS/HCC)    Protein S deficiency (CMS/HCC)    GAVE (gastric antral vascular ectasia)    CKD (chronic  kidney disease) stage 3, GFR 30-59 ml/min (CMS/HCC)    Iron deficiency anemia    Decubitus ulcer of sacral region, stage 3 (CMS/HCC)          Elis Easton MD  03/07/20  1:53 PM

## 2020-03-07 NOTE — PROGRESS NOTES
[facility] Daily Progress Note      Hospitalist Team  LOS 4 days      Patient Care Team:  Paulette Harris MD as PCP - General  Josefian Plascencia MD as Consulting Physician (Nephrology)    Patient Location: 304/1      Subjective   Subjective     Chief Complaint / Subjective  Chief Complaint   Patient presents with   • Weakness - Generalized         Brief Synopsis of Hospital Course/HPI  Mr. Ratliff is a 74 y.o.  presents to [facility] complaining of generalized weakness.  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  Patient also has anemia for which patient is supposed to receive iron infusions.  Patient condition has been declining for the last several days and it got to a point where he was unable to move around and safely ambulate.  5 she is also elderly frail person and she is unable to take care of him as he is very unstable on his gait and he is tend to fall and she is afraid that he will have a major injury from a fall.  Patient also complains of right knee pain for which she had injection in the past and that knee is also getting worse in terms of his ambulation.      Date:: 3/4/2020: Patient seen and examined and patient little better but still continues to be weak and complaining of problem ambulation secondary to right knee pain.  I consulted Dr. Staples and also spoke to him.  I also spoke to  as well as spoke to his wife at the bedside.  Patient had echocardiogram done which came out to be normal.  Most of his  "work-up was negative.    Date 3/5/2020 : Patient had jump in ammonia level from 63 to 191 and patient had no BM since yesterday. Lactulose dose increased.     Date 3/6/2020: Patient seen and examined patient moving his bowels and seemed to be doing much better.    Review of Systems   All other systems reviewed and are negative.        Objective   Objective      Vital Signs  Temp:  [97.9 °F (36.6 °C)-98.4 °F (36.9 °C)] 97.9 °F (36.6 °C)  Heart Rate:  [60-74] 74  Resp:  [18-22] 20  BP: (101-123)/(42-66) 117/66  Oxygen Therapy  SpO2: 100 %  Pulse Oximetry Type: Continuous  Device (Oxygen Therapy): room air  Device (Oxygen Therapy): room air  Flow (L/min): 4  ETCO2 (mmHg): 37 mmHg  Flowsheet Rows      First Filed Value   Admission Height  188 cm (74\") Documented at 03/03/2020 1133   Admission Weight  134 kg (295 lb) Documented at 03/03/2020 1133        Intake & Output (last 3 days)       03/04 0701 - 03/05 0700 03/05 0701 - 03/06 0700 03/06 0701 - 03/07 0700 03/07 0701 - 03/08 0700    P.O. 360 720 260 240    I.V. (mL/kg)        Total Intake(mL/kg) 360 (2.7) 720 (5.4) 260 (2) 240 (1.8)    Urine (mL/kg/hr) 250 (0.1)  500 (0.2) 200 (0.3)    Stool 0  0     Total Output 250  500 200    Net +110 +720 -240 +40            Urine Unmeasured Occurrence 1 x 3 x 3 x 1 x    Stool Unmeasured Occurrence 2 x 8 x 2 x         Lines, Drains & Airways    Active LDAs     Name:   Placement date:   Placement time:   Site:   Days:    Peripheral IV 03/03/20 1153 Right Forearm   03/03/20    1153    Forearm   1                Physical Exam   Nursing note and vitals reviewed.    Patient is an awake and morbidly obese male does not seem to in distress.  Skin exam shows no new acute rash or ulcers.  Lungs are clear to auscultation bilaterally with prolonged expiration.  Cardiovascular S1-S2 no murmurs no gallops.  Abdomen is soft nontender nondistended no organomegaly bowel sounds positive.  Musculoskeletal he has painful range of motion on the right " knee otherwise normal range of motion.  Extremities mild edema.  At this normocephalic.  HEENT exam pupils are equal reactive throat is clear.  Neuro is grossly intact.       Wounds (last 24 hours)      LDA Wound     Row Name 03/07/20 0743 03/06/20 1901          Wound 10/17/19 1830 lower sacral spine Pressure Injury    Wound - Properties Group Date first assessed: 10/17/19  -JR Time first assessed: 1830  -JR Orientation: lower  -JR Location: sacral spine  -JR Primary Wound Type: Pressure inj  -JR Stage, Pressure Injury: Stage 3  -JR    Dressing Appearance  moist drainage;intact  -AS  moist drainage;intact  -SP     Closure  Adhesive bandage  -AS  Adhesive bandage mepilex  -SP     Base  moist;red;bleeding  -AS  --     Periwound  intact;moist;pink;redness  -AS  --     Periwound Temperature  warm  -AS  --     Periwound Skin Turgor  soft  -AS  --     Edges  irregular  -AS  --     Drainage Characteristics/Odor  yellow;serosanguineous  -AS  yellow;serosanguineous  -SP     Drainage Amount  scant  -AS  scant  -SP       User Key  (r) = Recorded By, (t) = Taken By, (c) = Cosigned By    Initials Name Provider Type    JR Gautam Torres, RN Registered Nurse    SP Horn, Dean J, RN Registered Nurse    AS Marques Barba LPN Licensed Nurse          Procedures:              Results Review:     I reviewed the patient's new clinical results.      Lab Results (last 24 hours)     Procedure Component Value Units Date/Time    POC Glucose Once [[id number]]  (Abnormal) Collected:  03/07/20 1126    Specimen:  Blood Updated:  03/07/20 1129     Glucose 151 mg/dL      Comment: Serial Number: [id number]:  [id number]       Blood Culture - Blood, Hand, Left [[id number]] Collected:  03/06/20 0741    Specimen:  Blood from Hand, Left Updated:  03/07/20 0945     Blood Culture No growth at 24 hours    Blood Culture - Blood, Hand, Right [[id number]] Collected:  03/06/20 0740    Specimen:  Blood from Hand, Right Updated:  03/07/20 0945     Blood  Culture No growth at 24 hours    POC Glucose Once [[id number]]  (Abnormal) Collected:  03/07/20 0729    Specimen:  Blood Updated:  03/07/20 0732     Glucose 138 mg/dL      Comment: Serial Number: [id number]:  [id number]       Blood Culture - Blood, Wrist, Left [[id number]]  (Abnormal) Collected:  03/04/20 0944    Specimen:  Blood from Wrist, Left Updated:  03/07/20 0642     Blood Culture Staphylococcus, coagulase negative     Comment: Probable contaminant requires clinical correlation, susceptibility not performed unless requested by physician.          Isolated from Aerobic Bottle     Gram Stain Aerobic Bottle Gram positive cocci in clusters    Blood Culture - Blood, Arm, Right [[id number]]  (Abnormal) Collected:  03/04/20 0944    Specimen:  Blood from Arm, Right Updated:  03/07/20 0641     Blood Culture Gram Positive Cocci     Gram Stain Aerobic Bottle Gram positive cocci in clusters    Comprehensive Metabolic Panel [[id number]]  (Abnormal) Collected:  03/07/20 0411    Specimen:  Blood Updated:  03/07/20 0503     Glucose 144 mg/dL      BUN 33 mg/dL      Creatinine 1.55 mg/dL      Sodium 143 mmol/L      Potassium 3.5 mmol/L      Chloride 106 mmol/L      CO2 25.0 mmol/L      Calcium 8.5 mg/dL      Total Protein 7.3 g/dL      Albumin 3.50 g/dL      ALT (SGPT) 17 U/L      AST (SGOT) 25 U/L      Alkaline Phosphatase 105 U/L      Total Bilirubin 0.3 mg/dL      eGFR Non African Amer 44 mL/min/1.73      Globulin 3.8 gm/dL      A/G Ratio 0.9 g/dL      BUN/Creatinine Ratio 21.3     Anion Gap 12.0 mmol/L     Narrative:       GFR Normal >60  Chronic Kidney Disease <60  Kidney Failure <15      Magnesium [[id number]]  (Normal) Collected:  03/07/20 0411    Specimen:  Blood Updated:  03/07/20 0458     Magnesium 1.9 mg/dL     Phosphorus [[id number]]  (Normal) Collected:  03/07/20 0411    Specimen:  Blood Updated:  03/07/20 0458     Phosphorus 2.5 mg/dL     Calcium, Ionized [[id number]]  (Abnormal) Collected:  03/07/20 0411     Specimen:  Blood Updated:  03/07/20 0458     Ionized Calcium 1.15 mmol/L     Ammonia [[id number]]  (Normal) Collected:  03/07/20 0412    Specimen:  Blood Updated:  03/07/20 0452     Ammonia 21 umol/L     CBC & Differential [[id number]] Collected:  03/07/20 0411    Specimen:  Blood Updated:  03/07/20 0439    Narrative:       The following orders were created for panel order CBC & Differential.  Procedure                               Abnormality         Status                     ---------                               -----------         ------                     CBC Auto Differential[[id number]]        Abnormal            Final result                 Please view results for these tests on the individual orders.    CBC Auto Differential [[id number]]  (Abnormal) Collected:  03/07/20 0411    Specimen:  Blood Updated:  03/07/20 0439     WBC 6.50 10*3/mm3      RBC 2.35 10*6/mm3      Hemoglobin 7.9 g/dL      Hematocrit 22.9 %      MCV 97.2 fL      MCH 33.6 pg      Comment: Result checked         MCHC 34.6 g/dL      Comment: Result checked         RDW 16.6 %      RDW-SD 57.3 fl      MPV 8.8 fL      Platelets 212 10*3/mm3      Neutrophil % 72.4 %      Lymphocyte % 14.5 %      Monocyte % 8.4 %      Eosinophil % 3.9 %      Basophil % 0.8 %      Neutrophils, Absolute 4.70 10*3/mm3      Lymphocytes, Absolute 0.90 10*3/mm3      Monocytes, Absolute 0.50 10*3/mm3      Eosinophils, Absolute 0.30 10*3/mm3      Basophils, Absolute 0.10 10*3/mm3      nRBC 0.1 /100 WBC     Protime-INR [[id number]]  (Abnormal) Collected:  03/07/20 0411    Specimen:  Blood Updated:  03/07/20 0437     Protime 11.5 Seconds      INR 1.12    Cancer Antigen 19-9 [[id number]]  (Normal) Collected:  03/06/20 0252    Specimen:  Blood Updated:  03/1946     CA 19-9 13.2 U/mL     Narrative:       Results may be falsely decreased if patient taking Biotin.     POC Glucose Once [[id number]]  (Abnormal) Collected:  03/06/20 1926    Specimen:  Blood Updated:  03/06/20  1927     Glucose 160 mg/dL      Comment: Serial Number: [id number]:  [id number]       AFP Tumor Marker [[id number]]  (Normal) Collected:  03/06/20 0252    Specimen:  Blood Updated:  03/06/20 1639     ALPHA-FETOPROTEIN 1.03 ng/mL     Narrative:       Alpha Fetoprotein Tumor Marker Reference Range:    0.0-8.3 ng/mL    Note: Normal values apply only to males and nonpregnant females. These results are not interpretable for pregnant females.    Results may be falsely decreased if patient taking Biotin.      POC Glucose Once [[id number]]  (Abnormal) Collected:  03/06/20 1602    Specimen:  Blood Updated:  03/06/20 1603     Glucose 137 mg/dL      Comment: Serial Number: [id number]:  [id number]           No results found for: HGBA1C  Results from last 7 days   Lab Units 03/07/20  0411 03/03/20  1202   INR  1.12* 1.09           Lab Results   Component Value Date    LIPASE 26 10/16/2019     Lab Results   Component Value Date    CHOL 99 03/04/2020    TRIG 127 03/04/2020    HDL 30 (L) 03/04/2020    LDL 44 03/04/2020       Lab Results   Lab Value Date/Time    FINALDX  02/27/2020 1128     Common bile duct brushing, smear preparation only:    Atypical ductal cells, see comment    JPR/cec      FINALDX  01/09/2020 1148     Esophagus, biopsy:    Squamoglandular mucosa with intestinal metaplasia consistent with Syed's esophagus    Negative for dysplasia    JPR/sms       COMDX  02/27/2020 1128     The sample shows multiple groups of atypical appearing ductal cells. The atypia is characterized by nuclear overlap and mild nuclear enlargement. Marked pleomorphism and mitotic figures are not identified. The patient's clinical history of a stent is noted and the current findings are favored to represent reactive ductal epithelium with stent atypia, however a well differentiated malignancy cannot be entirely excluded. Continued close clinical follow up along with correlation with serum tumor markers and imaging studies is  recommended.      JPR/cec      COMDX  11/20/2019 1307     The cellularity is scant and features few groups of glandular cells with mild atypia encompassing nuclear enlargement and loss of polarity. These features may be reactive, however if a mass lesion is present or clinical suspicion for malignancy is high, additional studies are recommended as clinically indicated.      WALESKA/cec         Microbiology Results (last 10 days)     Procedure Component Value - Date/Time    Blood Culture - Blood, Hand, Left [[id number]] Collected:  03/06/20 0741    Lab Status:  Preliminary result Specimen:  Blood from Hand, Left Updated:  03/07/20 0945     Blood Culture No growth at 24 hours    Blood Culture - Blood, Hand, Right [[id number]] Collected:  03/06/20 0740    Lab Status:  Preliminary result Specimen:  Blood from Hand, Right Updated:  03/07/20 0945     Blood Culture No growth at 24 hours    Wound Culture - Wound, Buttock [[id number]] Collected:  03/04/20 1131    Lab Status:  Final result Specimen:  Wound from Buttock Updated:  03/05/20 0825     Wound Culture Heavy growth (4+) Mixed Sona Isolated     Comment: This specimen is not acceptable for generating clinically relevant information due to predominating colonic and rectal sona. No further workup.          Gram Stain Moderate (3+) WBCs per low power field      Many (4+) Mixed bacterial morphotypes seen on Gram Stain    Blood Culture - Blood, Wrist, Left [[id number]]  (Abnormal) Collected:  03/04/20 0944    Lab Status:  Preliminary result Specimen:  Blood from Wrist, Left Updated:  03/07/20 0642     Blood Culture Staphylococcus, coagulase negative     Comment: Probable contaminant requires clinical correlation, susceptibility not performed unless requested by physician.          Isolated from Aerobic Bottle     Gram Stain Aerobic Bottle Gram positive cocci in clusters    Blood Culture - Blood, Arm, Right [[id number]]  (Abnormal) Collected:  03/04/20 0944    Lab Status:   Preliminary result Specimen:  Blood from Arm, Right Updated:  03/07/20 0641     Blood Culture Gram Positive Cocci     Gram Stain Aerobic Bottle Gram positive cocci in clusters    Blood Culture ID, PCR - Blood, Wrist, Left [[id number]]  (Abnormal) Collected:  03/04/20 0944    Lab Status:  Final result Specimen:  Blood from Wrist, Left Updated:  03/05/20 1958     BCID, PCR Staphylococcus spp, not aureus. Identification by BCID PCR.    Influenza Antigen, Rapid - Swab, Nasopharynx [[id number]]  (Normal) Collected:  03/03/20 1202    Lab Status:  Final result Specimen:  Swab from Nasopharynx Updated:  03/03/20 1222     Influenza A PCR Not Detected     Influenza B PCR Not Detected          ECG/EMG Results (most recent)     Procedure Component Value Units Date/Time    ECG 12 Lead [[id number]] Collected:  03/03/20 1307     Updated:  03/04/20 0824    Narrative:       HEART RATE= 77  bpm  RR Interval= 776  ms  ID Interval= 88  ms  P Horizontal Axis=   deg  P Front Axis= 0  deg  QRSD Interval= 90  ms  QT Interval= 459  ms  QRS Axis= -23  deg  T Wave Axis= 23  deg  - ABNORMAL ECG -  Sinus rhythm  Low voltage, precordial leads  Prolonged QT interval  When compared with ECG of 18-Feb-2020 10:54:41,  Electronically Signed By: Erasto Weems ([facility]) 04-Mar-2020 08:24:19  Date and Time of Study: 2020-03-03 13:07:48    Adult Transthoracic Echo Complete W/ Cont if Necessary Per Protocol [[id number]] Collected:  03/04/20 1002     Updated:  03/04/20 1224     BSA 2.6 m^2      RVIDd 3.5 cm      IVSd 0.96 cm      LVIDd 5.9 cm      LVIDs 3.8 cm      LVPWd 1.2 cm      IVS/LVPW 0.81     FS 35.9 %      EDV(Teich) 170.5 ml      ESV(Teich) 60.2 ml      EF(Teich) 64.7 %      EDV(cubed) 201.1 ml      ESV(cubed) 53.0 ml      EF(cubed) 73.7 %      LV mass(C)d 259.1 grams      LV mass(C)dI 101.1 grams/m^2      SV(Teich) 110.2 ml      SI(Teich) 43.0 ml/m^2      SV(cubed) 148.2 ml      SI(cubed) 57.8 ml/m^2      Ao root diam 4.0 cm      Ao root area 12.4 cm^2       ACS 1.9 cm      asc Aorta Diam 3.5 cm      LVOT diam 2.6 cm      LVOT area 5.2 cm^2      RVOT diam 2.7 cm      RVOT area 5.5 cm^2      EDV(MOD-sp4) 176.8 ml      ESV(MOD-sp4) 68.5 ml      EF(MOD-sp4) 61.2 %      SV(MOD-sp4) 108.3 ml      SI(MOD-sp4) 42.2 ml/m^2      Ao root area (BSA corrected) 1.5     LV Tam Vol (BSA corrected) 69.0 ml/m^2      LV Sys Vol (BSA corrected) 26.7 ml/m^2      Aortic R-R 0.78 sec      Aortic HR 76.5 BPM      MV E max frankie 101.9 cm/sec      MV A max frankie 109.4 cm/sec      MV E/A 0.93     MV V2 max 120.6 cm/sec      MV max PG 5.8 mmHg      MV V2 mean 87.4 cm/sec      MV mean PG 3.3 mmHg      MV V2 VTI 33.3 cm      MVA(VTI) 4.4 cm^2      MV dec slope 649.1 cm/sec^2      MV dec time 0.16 sec      Ao pk frankie 138.1 cm/sec      Ao max PG 7.6 mmHg      Ao max PG (full) 2.1 mmHg      Ao V2 mean 99.8 cm/sec      Ao mean PG 4.5 mmHg      Ao mean PG (full) 1.0 mmHg      Ao V2 VTI 28.9 cm      ARMAND(I,A) 5.1 cm^2      ARMAND(I,D) 5.1 cm^2      ARMAND(V,A) 4.4 cm^2      ARMAND(V,D) 4.4 cm^2      LV V1 max PG 5.5 mmHg      LV V1 mean PG 3.5 mmHg      LV V1 max 117.2 cm/sec      LV V1 mean 90.2 cm/sec      LV V1 VTI 28.2 cm      CO(Ao) 27.3 l/min      CI(Ao) 10.7 l/min/m^2      SV(Ao) 357.2 ml      SI(Ao) 139.4 ml/m^2      CO(LVOT) 11.2 l/min      CI(LVOT) 4.4 l/min/m^2      SV(LVOT) 146.6 ml      SV(RVOT) 99.3 ml      SI(LVOT) 57.2 ml/m^2      PA V2 max 139.7 cm/sec      PA max PG 7.8 mmHg      PA max PG (full) 5.0 mmHg      PA V2 mean 96.4 cm/sec      PA mean PG 4.3 mmHg      PA mean PG (full) 2.7 mmHg      PA V2 VTI 27.2 cm      PVA(I,A) 3.7 cm^2      BH CV ECHO RODNEY - PVA(I,D) 3.7 cm^2      BH CV ECHO RODNEY - PVA(V,A) 3.3 cm^2      BH CV ECHO RODNEY - PVA(V,D) 3.3 cm^2      PA acc time 0.08 sec      RV V1 max PG 2.8 mmHg      RV V1 mean PG 1.6 mmHg      RV V1 max 84.4 cm/sec      RV V1 mean 59.6 cm/sec      RV V1 VTI 17.9 cm      TR max frankie 291.7 cm/sec      RVSP(TR) 49.0 mmHg      RAP systole 15.0 mmHg      PA  pr(Accel) 41.3 mmHg      Qp/Qs 0.68      CV ECHO RODNEY - BZI_BMI 37.9 kilograms/m^2       CV ECHO RODNEY - BSA(HAYCOCK) 2.7 m^2       CV ECHO RODNEY - BZI_METRIC_WEIGHT 133.8 kg       CV ECHO RODNEY - BZI_METRIC_HEIGHT 188.0 cm      EF(MOD-bp) 61.0 %      LA dimension(2D) 3.4 cm      Echo EF Estimated 50 %     Narrative:       · The left ventricular cavity is mildly dilated.  · Estimated EF = 50%.  · Left ventricular systolic function is low normal.  · Right ventricular cavity is mildly dilated.  · Mild mitral valve regurgitation is present  · Mild tricuspid valve regurgitation is present.  · Mild dilation of the aortic root is present.             Results for orders placed during the hospital encounter of 03/03/20   Duplex Venous Lower Extremity - Bilateral CAR    Narrative · Normal bilateral lower extremity venous duplex scan.          Results for orders placed during the hospital encounter of 03/03/20   Adult Transthoracic Echo Complete W/ Cont if Necessary Per Protocol    Narrative · The left ventricular cavity is mildly dilated.  · Estimated EF = 50%.  · Left ventricular systolic function is low normal.  · Right ventricular cavity is mildly dilated.  · Mild mitral valve regurgitation is present  · Mild tricuspid valve regurgitation is present.  · Mild dilation of the aortic root is present.          Nm Lung Ventilation Perfusion Aerosol    Result Date: 3/4/2020  Normal ventilation/perfusion lung scan. This excludes the diagnosis of pulmonary embolism with a very high degree of clinical certainty  Electronically Signed By-Naif Donahue On:3/4/2020 9:13 AM This report was finalized on [id number] by  Naif Donahue, .    Xr Knee 4+ View Bilateral    Result Date: 3/4/2020  Right knee: 1. Moderate to advanced tricompartmental joint space narrowing with marginal osteophyte formation, likely representing osteoarthritis or CPPD arthropathy given chondrocalcinosis. 2. Trace suprapatellar joint effusion.  Left knee: 1.  Mild medial and patellofemoral compartment joint space narrowing which may relate to osteoarthritis or CPPD arthropathy given chondrocalcinosis.  Electronically Signed By-Shady Fischer On:3/4/2020 11:26 PM This report was finalized on [id number] by  Shady Fischer, .    Xr Chest 1 View    Result Date: 3/3/2020  No acute chest findings.  Electronically Signed By-Dr. Kaitlin Wong MD On:3/3/2020 12:46 PM This report was finalized on [id number] by Dr. Kaitlin Wong MD.    Us Renal Bilateral    Result Date: 3/3/2020  Normal renal ultrasound.  Electronically Signed By-Dr. Kaitlin Wong MD On:3/3/2020 5:04 PM This report was finalized on [id number] by Dr. Kaitlin Wong MD.    Xr Abdomen Kub    Result Date: 3/3/2020   1. Postoperative changes of biliary stent placement and cholecystectomy and inferior vena cava filter 2. Nonspecific bowel pattern, lung bases are free of disease 3. No evidence of renal or ureteral calculus  Electronically Signed By-Bennett Bobby Jr. On:3/3/2020 11:20 PM This report was finalized on [id number] by  Bennett Bobby Jr., .          Xrays, labs reviewed personally by physician.    Medication Review:   I have reviewed the patient's current medication list      Scheduled Meds    acetaminophen 650 mg Oral Once   allopurinol 100 mg Oral BID   aspirin 81 mg Oral Daily   atorvastatin 20 mg Oral Daily   bumetanide 1 mg Oral BID   calcium gluconate 1 g Intravenous Q12H   cyanocobalamin 1,000 mcg Subcutaneous Q30 Days   diphenhydrAMINE 12.5 mg Intravenous Once   docusate sodium 100 mg Oral BID   DULoxetine 60 mg Oral Daily   enoxaparin 40 mg Subcutaneous Daily   folic acid 1 mg Oral Daily   insulin glargine 50 Units Subcutaneous Nightly   insulin lispro 0-9 Units Subcutaneous 4x Daily With Meals & Nightly   insulin lispro 18 Units Subcutaneous TID AC   ADRIANA 1 packet Oral BID   lactulose 30 g Oral Daily   levothyroxine 75 mcg Oral Q AM   magnesium oxide 400 mg Oral Daily   metoclopramide  5 mg Oral BID AC   nicotine 1 patch Transdermal Nightly   oxybutynin XL 10 mg Oral Daily   pantoprazole 40 mg Oral BID AC   potassium & sodium phosphates 1 packet Oral Daily   riFAXIMin 550 mg Oral Q12H   sodium bicarbonate 650 mg Oral TID   sucralfate 1 g Oral 4x Daily AC & at Bedtime   THERA 1 tablet Oral Daily   zinc sulfate 220 mg Oral Daily       Meds Infusions    sodium chloride 9 mL/hr Last Rate: 9 mL/hr (03/06/20 1232)       Meds PRN  •  acetaminophen **OR** acetaminophen **OR** acetaminophen  •  dextrose  •  dextrose  •  docusate sodium  •  glucagon (human recombinant)  •  hydrOXYzine  •  insulin lispro **AND** insulin lispro  •  ketamine (KETALAR) infusion **AND** Ketamine Vital Signs & Assessment  •  magnesium sulfate **OR** magnesium sulfate **OR** magnesium sulfate  •  melatonin  •  melatonin  •  nitroglycerin  •  ondansetron **OR** ondansetron  •  oxyCODONE  •  potassium & sodium phosphates **OR** potassium & sodium phosphates  •  potassium chloride  •  potassium chloride  •  sodium chloride      Assessment/Plan   Assessment/Plan     Active Hospital Problems    Diagnosis  POA   • **General weakness [R53.1]  Yes   • Decubitus ulcer of sacral region, stage 3 (CMS/HCC) [L89.153]  Yes   • Iron deficiency anemia [D50.9]  Unknown   • CKD (chronic kidney disease) stage 3, GFR 30-59 ml/min (CMS/HCC) [N18.3]  Yes   • 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]  Yes   • 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]  Yes   • Protein S deficiency (CMS/HCC) [D68.59]  Yes   • Protein C deficiency (CMS/HCC) [D68.59]  Yes   • Anemia in stage 3 chronic kidney disease (CMS/HCC) [N18.3, D63.1]  Yes   • History of Vitamin B12 deficiency [Z86.39]  Not Applicable   • History of bilateral pulmonary embolus (PE) [Z86.711]  Yes   • JOSE (iron deficiency anemia) with poor po absorption [D50.9]  Yes   • IgG kappa MGUS [D47.2]  Yes   • THOMAS (nonalcoholic steatohepatitis) [K75.81]  Yes   • Antiphospholipid antibody positive  [R76.0]  Yes   • 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]  Yes   • Splenomegaly [R16.1]  Yes   • Essential hypertension [I10]  Yes   • Acquired hypothyroidism [E03.9]  Yes   • Type 2 diabetes mellitus with kidney complication, with long-term current use of insulin (CMS/[facility]) [E11.29, Z79.4]  Not Applicable   • Pulmonary hypertension (CMS/[facility]) [I27.20]  Yes   • Spinal stenosis of lumbar region [M48.061]  Yes   • Chronic coronary artery disease [I25.10]  Yes   • Cirrhosis (CMS/[facility] [place]) [K74.60]  Yes   • Diabetic neuropathy (CMS/[facility]) [E11.40]  Yes   • Dyslipidemia [E78.5]  Yes   • History of DVT of right lower extremity [Z86.718]  Not Applicable   • Morbid obesity (CMS/[facility]) [E66.01]  Yes   • Obstructive sleep apnea [G47.33]  Yes   • Deficiency of other specified B group vitamins [E53.8]  Yes      Resolved Hospital Problems   No resolved problems to display.       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  -Patient also has right knee pain I am getting orthopedic consult and he will get right knee injection.  -PT OT is working with him and patient will also need rehab as patient is very unsafe for going home and ambulating by himself.    #2 chronic kidney disease stage III  -We will avoid nephrotoxic medications as well as patient will see nephrology     #3 anemia of chronic kidney disease as well as chronic blood loss anemia from GI origin and he has recent endoscopy done as well as ERCP done and patient has seen GI.  -Patient may need Venofer infusion as well as patient is on vitamin B12 injections.  His hemoglobin need to be monitored.   -Hemoglobin stable      #4 obstructive sleep sleep apnea and patient need to use his CPAP machine     #5 hyperlipidemia stable on statins and will check lipid profile     #6 history of DVT/PE and patient is at high risk for PE and DVT while  he is in the hospital and he will be on anticoagulation enoxaparin and I do understand patient has history of chronic blood loss anemia but we do need to monitor that but I am also concerned about his DVT PE and further complication.     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   -Patient and family explained    #13 + blood culture and patient will be seen by infectious disease    #14 s/p upper endoscopy:Post-cautery ulcers, Biliary stents in good position.   -stress Myoview done today no ischemia and once those are all taken care of patient hopefully will be discharged home in next 24 hours.      VTE Prophylaxis -   Mechanical Order History:     None      Pharmalogical Order History:     Ordered     Dose Route Frequency Stop    03/03/20 2114  enoxaparin (LOVENOX) syringe 40 mg      40 mg SC Daily --            Code Status -   Code Status and Medical Interventions:   Ordered at: 03/03/20 1551     Level Of Support Discussed With:    Patient     Code Status:    CPR     Medical Interventions (Level of Support Prior to Arrest):    Full       Discharge Planning          Destination      Service Provider Request Status Selected Services Address Phone Number Fax Number    [facility] Accepted N/A [address] [place] [address] [phone]      Durable Medical Equipment      Coordination has not been started for this encounter.      Dialysis/Infusion      Coordination has not been started for this encounter.      Home Medical Care      Service Provider Request Status Selected Services Address Phone Number Fax Number    [facility]  [facility] Pending - Request Sent N/A [address] [place] [address] [phone]      Therapy      Coordination has not been started for this encounter.      Community Resources      Coordination has not been started for this encounter.            Electronically signed by Hawk Garner MD, 03/07/20, 12:39 PM.  [facility] Hospitalist Team

## 2020-03-07 NOTE — DISCHARGE SUMMARY
[facility] Medicine Services  DISCHARGE SUMMARY        Prepared For PCP:  Paulette Harris MD    Patient Name: Bry Ratliff  : 1945  MRN: [id number]      Date of Admission:   3/3/2020    Date of Discharge:  3/7/2020    Length of stay:  LOS: 4 days     Hospital Course     Presenting Problem:   General weakness [R53.1]  Elevated troponin [R79.89]  Anemia, unspecified type [D64.9]  Chronic kidney disease, unspecified CKD stage [N18.9]      Active Hospital Problems    Diagnosis  POA   • **General weakness [R53.1]  Yes     Priority: High   • Decubitus ulcer of sacral region, stage 3 (CMS/HCC) [L89.153]  Yes   • Iron deficiency anemia [D50.9]  Unknown   • CKD (chronic kidney disease) stage 3, GFR 30-59 ml/min (CMS/HCC) [N18.3]  Yes   • 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]  Yes   • 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]  Yes   • Protein S deficiency (CMS/HCC) [D68.59]  Yes   • Protein C deficiency (CMS/HCC) [D68.59]  Yes   • Anemia in stage 3 chronic kidney disease (CMS/HCC) [N18.3, D63.1]  Yes   • History of Vitamin B12 deficiency [Z86.39]  Not Applicable   • History of bilateral pulmonary embolus (PE) [Z86.711]  Yes   • JOSE (iron deficiency anemia) with poor po absorption [D50.9]  Yes   • IgG kappa MGUS [D47.2]  Yes   • THOMAS (nonalcoholic steatohepatitis) [K75.81]  Yes   • Antiphospholipid antibody positive [R76.0]  Yes   • 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]  Yes   • Splenomegaly [R16.1]  Yes   • Essential hypertension [I10]  Yes   • Acquired hypothyroidism [E03.9]  Yes   • Type 2 diabetes mellitus with kidney complication, with long-term current use of insulin (CMS/HCC) [E11.29, Z79.4]  Not Applicable   • Pulmonary hypertension (CMS/HCC) [I27.20]  Yes   • Spinal stenosis of lumbar region [M48.061]  Yes   • Chronic coronary artery disease [I25.10]  Yes   • Cirrhosis (CMS/HCC) [K74.60]  Yes   • Diabetic neuropathy (CMS/HCC) [E11.40]  Yes   • Dyslipidemia [E78.5]   Yes   • History of DVT of right lower extremity [Z86.718]  Not Applicable   • Morbid obesity (CMS/HCC) [E66.01]  Yes   • Obstructive sleep apnea [G47.33]  Yes   • Deficiency of other specified B group vitamins [E53.8]  Yes      Resolved Hospital Problems   No resolved problems to display.       #1 generalized weakness with difficult ambulation  -Possibly myopathy with peripheral neuropathy as well as patient has history of spinal stenosis.  -Patient also has right knee pain I am getting orthopedic consult and he will get right knee injection.  -PT OT is working with him and patient will also need rehab as patient is very unsafe for going home and ambulating by himself.     #2 chronic kidney disease stage III  -We will avoid nephrotoxic medications as well as patient will see nephrology     #3 anemia of chronic kidney disease as well as chronic blood loss anemia from GI origin and he has recent endoscopy done as well as ERCP done and patient has seen GI.  -Patient may need Venofer infusion as well as patient is on vitamin B12 injections.  His hemoglobin need to be monitored.   -Hemoglobin stable      #4 obstructive sleep sleep apnea and patient need to use his CPAP machine     #5 hyperlipidemia stable on statins and will check lipid profile     #6 history of DVT/PE and patient is at high risk for PE and DVT while he is in the hospital and he will be on anticoagulation enoxaparin and I do understand patient has history of chronic blood loss anemia but we do need to monitor that but I am also concerned about his DVT PE and further complication.     #7 essential hypertension well-controlled on medications     #8 acquired hypothyroidism patient is on levothyroxine     #9 diabetes mellitus type 2 with neuropathy/chronic kidney disease  -Well-controlled for this patient     #10 hyponatremia: monitor sodium     #11  Positive d-dimer: VQ scan is negative     #12 liver cirrhosis and patient is on lactulose  -Dose increased.      -Patient and family explained     #13 + blood culture and patient will be seen by infectious disease     #14 s/p upper endoscopy:Post-cautery ulcers, Biliary stents in good position.   -stress Myoview done no ischemia         Hospital Course:  Bry Ratliff is a 74 y.o. male presentd to [facility] complaining of generalized weakness.  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  Patient also has anemia for which patient is supposed to receive iron infusions.  Patient condition has been declining for the last several days and it got to a point where he was unable to move around and safely ambulate.  5 she is also elderly frail person and she is unable to take care of him as he is very unstable on his gait and he is tend to fall and she is afraid that he will have a major injury from a fall.  Patient also complains of right knee pain for which she had injection in the past and that knee is also getting worse in terms of his ambulation.       Date:: 3/4/2020: Patient seen and examined and patient little better but still continues to be weak and complaining of problem ambulation secondary to right knee pain.  I consulted Dr. Staples and also spoke to him.  I also spoke to  as well as spoke to his wife at the bedside.  Patient had echocardiogram done which came out to be normal.  Most of his work-up was negative.     Date 3/5/2020 : Patient had jump in ammonia level from 63 to 191 and patient had no BM since yesterday. Lactulose dose increased.      Date 3/6/2020: Patient seen  and examined patient moving his bowels and seemed to be doing much better.    3/7/20 doing better today, will dc to NH      Reasons For Change In Medications and Indications for New Medications:        Day of Discharge     HPI:       Vital Signs:   Temp:  [97.9 °F (36.6 °C)-98.7 °F (37.1 °C)] 98.7 °F (37.1 °C)  Heart Rate:  [66-74] 73  Resp:  [18-20] 18  BP: (109-131)/(52-66) 131/63     Physical Exam   Constitutional: He is oriented to person, place, and time. He appears well-developed. No distress.   Obese white male in NAD   HENT:   Head: Normocephalic and atraumatic.   Mouth/Throat: No oropharyngeal exudate.   Eyes: Pupils are equal, round, and reactive to light. Conjunctivae and EOM are normal. Right eye exhibits no discharge. Left eye exhibits no discharge. No scleral icterus.   Neck: Normal range of motion. No JVD present. No tracheal deviation present. No thyromegaly present.   Cardiovascular: Normal rate, regular rhythm and normal heart sounds. Exam reveals no gallop and no friction rub.   No murmur heard.  Pulmonary/Chest: Effort normal and breath sounds normal. No stridor. No respiratory distress. He has no wheezes. He has no rales. He exhibits no tenderness.   Abdominal: Soft. Bowel sounds are normal. He exhibits no distension and no mass. There is no tenderness. There is no rebound and no guarding. No hernia.   Musculoskeletal: Normal range of motion. He exhibits no edema, tenderness or deformity.   Lymphadenopathy:     He has no cervical adenopathy.   Neurological: He is alert and oriented to person, place, and time. No cranial nerve deficit or sensory deficit. He exhibits normal muscle tone. Coordination normal.   Skin: Skin is warm and dry. No rash noted. He is not diaphoretic. No erythema.   Psychiatric: He has a normal mood and affect. His behavior is normal.   Nursing note and vitals reviewed.      Pertinent  and/or Most Recent Results     Results from last 7 days   Lab Units 03/07/20  1791  03/06/20  0252 03/05/20  0456 03/05/20  0316 03/04/20  1144 03/04/20  0944 03/03/20  1202   WBC 10*3/mm3 6.50 6.90  --  6.40 8.30  --  11.00*   HEMOGLOBIN g/dL 7.9* 7.9* 8.3* 7.7* 8.4*  --  8.4*   HEMATOCRIT % 22.9* 24.5* 24.5* 22.8* 24.6*  --  24.9*   PLATELETS 10*3/mm3 212 221  --  178 189  --  181   SODIUM mmol/L 143 142  --  138  --  137 134*   POTASSIUM mmol/L 3.5 3.7  --  3.6  --  3.3* 3.9  3.7   CHLORIDE mmol/L 106 109*  --  104  --  103 100   CO2 mmol/L 25.0 20.0*  --  21.0*  --  22.0 19.0*   BUN mg/dL 33* 33*  --  34*  --  35* 35*   CREATININE mg/dL 1.55* 1.61*  --  1.77*  --  1.79* 1.84*   GLUCOSE mg/dL 144* 170*  --  196*  --  159* 245*   CALCIUM mg/dL 8.5* 8.4*  --  8.5*  --  8.5* 8.2*     Results from last 7 days   Lab Units 03/07/20  0411 03/06/20  0252 03/05/20  0316 03/04/20  0944 03/03/20  1202   BILIRUBIN mg/dL 0.3 0.2 0.2 0.4 0.5   ALK PHOS U/L 105 129* 112 119* 128*   ALT (SGPT) U/L 17 23 21 18 22   AST (SGOT) U/L 25 37 39 29 34   PROTIME Seconds 11.5  --   --   --  11.3   INR  1.12*  --   --   --  1.09   APTT seconds  --   --   --   --  31.2*     Results from last 7 days   Lab Units 03/04/20  0944   CHOLESTEROL mg/dL 99   TRIGLYCERIDES mg/dL 127   HDL CHOL mg/dL 30*     Results from last 7 days   Lab Units 03/04/20  0944 03/03/20  1202   TSH uIU/mL 2.730  --    HEMOGLOBIN A1C % 7.4*  --    PROBNP pg/mL 1,083.0*  --    TROPONIN T ng/mL 0.019 0.031*   PROCALCITONIN ng/mL 0.40*  --    LACTATE mmol/L 2.0  --        Brief Urine Lab Results  (Last result in the past 365 days)      Color   Clarity   Blood   Leuk Est   Nitrite   Protein   CREAT   Urine HCG        03/03/20 1238 Yellow Clear Small (1+) Negative Negative Negative               Microbiology Results Abnormal     Procedure Component Value - Date/Time    Blood Culture - Blood, Hand, Left [[id number]] Collected:  03/06/20 0741    Lab Status:  Preliminary result Specimen:  Blood from Hand, Left Updated:  03/07/20 0945     Blood Culture No growth  at 24 hours    Blood Culture - Blood, Hand, Right [[id number]] Collected:  03/06/20 0740    Lab Status:  Preliminary result Specimen:  Blood from Hand, Right Updated:  03/07/20 0945     Blood Culture No growth at 24 hours    Blood Culture - Blood, Wrist, Left [[id number]]  (Abnormal) Collected:  03/04/20 0944    Lab Status:  Preliminary result Specimen:  Blood from Wrist, Left Updated:  03/07/20 0642     Blood Culture Staphylococcus, coagulase negative     Comment: Probable contaminant requires clinical correlation, susceptibility not performed unless requested by physician.          Isolated from Aerobic Bottle     Gram Stain Aerobic Bottle Gram positive cocci in clusters    Blood Culture - Blood, Arm, Right [[id number]]  (Abnormal) Collected:  03/04/20 0944    Lab Status:  Preliminary result Specimen:  Blood from Arm, Right Updated:  03/07/20 0641     Blood Culture Gram Positive Cocci     Gram Stain Aerobic Bottle Gram positive cocci in clusters    Blood Culture ID, PCR - Blood, Wrist, Left [[id number]]  (Abnormal) Collected:  03/04/20 0944    Lab Status:  Final result Specimen:  Blood from Wrist, Left Updated:  03/05/20 1958     BCID, PCR Staphylococcus spp, not aureus. Identification by BCID PCR.    Wound Culture - Wound, Buttock [[id number]] Collected:  03/04/20 1131    Lab Status:  Final result Specimen:  Wound from Buttock Updated:  03/05/20 0825     Wound Culture Heavy growth (4+) Mixed Sona Isolated     Comment: This specimen is not acceptable for generating clinically relevant information due to predominating colonic and rectal sona. No further workup.          Gram Stain Moderate (3+) WBCs per low power field      Many (4+) Mixed bacterial morphotypes seen on Gram Stain    Influenza Antigen, Rapid - Swab, Nasopharynx [[id number]]  (Normal) Collected:  03/03/20 1202    Lab Status:  Final result Specimen:  Swab from Nasopharynx Updated:  03/03/20 1222     Influenza A PCR Not Detected     Influenza B PCR Not  Detected          Nm Lung Ventilation Perfusion Aerosol    Result Date: 3/4/2020  Impression: Normal ventilation/perfusion lung scan. This excludes the diagnosis of pulmonary embolism with a very high degree of clinical certainty  Electronically Signed By-Naif Donahue On:3/4/2020 9:13 AM This report was finalized on [id number] by  Naif Donahue, .    Xr Knee 4+ View Bilateral    Result Date: 3/4/2020  Impression: Right knee: 1. Moderate to advanced tricompartmental joint space narrowing with marginal osteophyte formation, likely representing osteoarthritis or CPPD arthropathy given chondrocalcinosis. 2. Trace suprapatellar joint effusion.  Left knee: 1. Mild medial and patellofemoral compartment joint space narrowing which may relate to osteoarthritis or CPPD arthropathy given chondrocalcinosis.  Electronically Signed By-hSady Fischer On:3/4/2020 11:26 PM This report was finalized on [id number] by  Shady Fischer, .    Xr Chest 1 View    Result Date: 3/3/2020  Impression: No acute chest findings.  Electronically Signed By-Dr. Kaitlin Wong MD On:3/3/2020 12:46 PM This report was finalized on [id number] by Dr. Kaitlin Wong MD.    Us Renal Bilateral    Result Date: 3/3/2020  Impression: Normal renal ultrasound.  Electronically Signed By-Dr. Kaitlin Wong MD On:3/3/2020 5:04 PM This report was finalized on [id number] by Dr. Kaitlin Wong MD.    Xr Abdomen Kub    Result Date: 3/3/2020  Impression:  1. Postoperative changes of biliary stent placement and cholecystectomy and inferior vena cava filter 2. Nonspecific bowel pattern, lung bases are free of disease 3. No evidence of renal or ureteral calculus  Electronically Signed By-Bennett Bobby Jr. On:3/3/2020 11:20 PM This report was finalized on [id number] by  Bennett Bobby Jr., .    Fl Ercp Biliary Duct Only    Result Date: 2/27/2020  Impression: Fluoroscopy performed during ERCP, as described above.  Electronically Signed By-Rush Soni On:2/27/2020  12:02 PM This report was finalized on [id number] by  Rush Soni, .      Results for orders placed during the hospital encounter of 03/03/20   Duplex Venous Lower Extremity - Bilateral CAR    Narrative · Normal bilateral lower extremity venous duplex scan.          Results for orders placed during the hospital encounter of 03/03/20   Duplex Venous Lower Extremity - Bilateral CAR    Narrative · Normal bilateral lower extremity venous duplex scan.          Results for orders placed during the hospital encounter of 03/03/20   Adult Transthoracic Echo Complete W/ Cont if Necessary Per Protocol    Narrative · The left ventricular cavity is mildly dilated.  · Estimated EF = 50%.  · Left ventricular systolic function is low normal.  · Right ventricular cavity is mildly dilated.  · Mild mitral valve regurgitation is present  · Mild tricuspid valve regurgitation is present.  · Mild dilation of the aortic root is present.                  Test Results Pending at Discharge   Order Current Status    Blood Culture - Blood, Arm, Right Preliminary result    Blood Culture - Blood, Hand, Left Preliminary result    Blood Culture - Blood, Hand, Right Preliminary result    Blood Culture - Blood, Wrist, Left Preliminary result            Procedures Performed  Procedure(s):  ESOPHAGOGASTRODUODENOSCOPY         Consults:     ASSESSMENT / PLAN       General weakness    Cirrhosis (CMS/HCC)    Diabetic neuropathy (CMS/HCC)    Dyslipidemia    History of DVT of right lower extremity    Essential hypertension    Acquired hypothyroidism    Morbid obesity (CMS/HCC)    Obstructive sleep apnea    Type 2 diabetes mellitus with kidney complication, with long-term current use of insulin (CMS/HCC)    Spinal stenosis of lumbar region    Pulmonary hypertension (CMS/HCC)    Deficiency of other specified B group vitamins    Chronic coronary artery disease    JOSE (iron deficiency anemia) with poor po absorption    IgG kappa MGUS    THOMAS (nonalcoholic  steatohepatitis)    Splenomegaly    Antiphospholipid antibody positive    Elevated factor VIII level    Anemia in stage 3 chronic kidney disease (CMS/HCC)    History of Vitamin B12 deficiency    History of bilateral pulmonary embolus (PE)    Antithrombin III deficiency (CMS/HCC)    Protein C deficiency (CMS/HCC)    Protein S deficiency (CMS/HCC)    GAVE (gastric antral vascular ectasia)    CKD (chronic kidney disease) stage 3, GFR 30-59 ml/min (CMS/HCC)    Iron deficiency anemia    Decubitus ulcer of sacral region, stage 3 (CMS/HCC)        1. CRF/CKD STG 3-------Nonoliguric. BUN/Cr stable and near baseline. +Mild ARF/SUSANA on top of known CRF/CKD STG 3 with a baseline serum creatinine of about 1.5. CRF/CKD STG 3 secondary to DGS/HTN NS. +ARF/SUSANA appears to be secondary to slight prerenal state.  Holding Bumex.  No NSAIDs or IV dye. Dose meds for CrCl less than 10 cc/min.     2. ANEMIA WITH H/O GAVE AND MGUS AND JOSE-----Follow for further PRBC need. Appreciate GI eval. +GAVE     3. HTN WITH CKD STG 3-------BP okay. Avoid hypotension. No ACE/ARB/DRI/diuretic     4. DMII WITH RENAL MANIFESTATIONS------BS okay. On Lantus     5. HYPOTHYROIDISM-------On Synthroid.       6. THOMAS/CIRRHOSIS/HEPATIC ENCEPHALOPATHY------Increased Lactulose. Follow ammonia     7. OBESITY/KATHIA------ CPAP     8. EDEMA/CHRONIC VENOUS INSUFFICIENCY-----Restart po Bumex     9. ACIDOSIS------Metabolic. No elevation in AGAP. Secondary to Type 4 RTA and stool losses from diarrhea related to Lactulose use. Bicarb down. Increased po NaHCO3     10. GERD-------On PPI/Carafate     11. HYPERCOAGUABLE STATE WITH ANTI THROMBI III/PROTEIN C & S DEFICIENCY AND HISTORY OF PE-----Dopplers negative.       12. HYPOCALCEMIA-----Replace IV. Follow ionized levels     13. HYPOPHOSPHATEMIA------Replaced     14. 1/2 CBld + for G+ cocci in clusters------Repeat Cx pending and ID following. +Decubitus getting wound care     15. GAVE--- Per Dr. Silva. No cauterization at this  time as he is not actively bleeding.      Paula Plascencia MD  [facility]  [phone]  03/07/20  ================================================    cardiology  Interpretation Summary      · The left ventricular cavity is mildly dilated.  · Estimated EF = 50%.  · Left ventricular systolic function is low normal.  · Right ventricular cavity is mildly dilated.  · Mild mitral valve regurgitation is present  · Mild tricuspid valve regurgitation is present.  · Mild dilation of the aortic root is present.         Results of the stress test and also echocardiogram discussed with the patient and family  Plan to manage patient conservatively from cardiac standpoint    cardiology  Assessment/Plan           General weakness    Cirrhosis (CMS/HCC)    Diabetic neuropathy (CMS/HCC)    Dyslipidemia    History of DVT of right lower extremity    Essential hypertension    Acquired hypothyroidism    Morbid obesity (CMS/HCC)    Obstructive sleep apnea    Type 2 diabetes mellitus with kidney complication, with long-term current use of insulin (CMS/HCC)    Spinal stenosis of lumbar region    Pulmonary hypertension (CMS/HCC)    Deficiency of other specified B group vitamins    Chronic coronary artery disease    JOSE (iron deficiency anemia) with poor po absorption    IgG kappa MGUS    THOMAS (nonalcoholic steatohepatitis)    Splenomegaly    Antiphospholipid antibody positive    Elevated factor VIII level    Anemia in stage 3 chronic kidney disease (CMS/HCC)    History of Vitamin B12 deficiency    History of bilateral pulmonary embolus (PE)    Antithrombin III deficiency (CMS/HCC)    Protein C deficiency (CMS/HCC)    Protein S deficiency (CMS/HCC)    GAVE (gastric antral vascular ectasia)    CKD (chronic kidney disease) stage 3, GFR 30-59 ml/min (CMS/HCC)    Iron deficiency anemia    Decubitus ulcer of sacral region, stage 3 (CMS/HCC)              Elis Easton MD  03/07/20    Consults     Date and Time Order  Name Status Description    3/6/2020 0623 Inpatient Gastroenterology Consult Completed     3/6/2020 0623 Inpatient Infectious Diseases Consult Completed     3/4/2020 1030 Inpatient Orthopedic Surgery Consult Completed     3/3/2020 2114 Inpatient Nephrology Consult      3/3/2020 1608 Inpatient Cardiology Consult Completed     3/3/2020 1346 Hospitalist (on-call MD unless specified) Completed             Discharge Details        Discharge Medications      New Medications      Instructions Start Date   nitroglycerin 0.4 MG SL tablet  Commonly known as:  NITROSTAT   0.4 mg, Sublingual, Every 5 Minutes PRN, Take no more than 3 doses in 15 minutes.         Changes to Medications      Instructions Start Date   atorvastatin 20 MG tablet  Commonly known as:  LIPITOR  What changed:  when to take this   20 mg, Oral, Daily      bumetanide 1 MG tablet  Commonly known as:  BUMEX  What changed:    · medication strength  · how much to take  · when to take this   1 mg, Oral, 2 Times Daily      insulin glargine 100 UNIT/ML injection  Commonly known as:  LANTUS  What changed:    · how much to take  · how to take this  · when to take this   Inject 50 units at bedtime      insulin lispro 100 UNIT/ML injection  Commonly known as:  HUMALOG  What changed:    · how much to take  · how to take this  · when to take this   18 units subcu before each meal plus sliding scale MDD 60      levothyroxine 75 MCG tablet  Commonly known as:  SYNTHROID, LEVOTHROID  What changed:  additional instructions   75 mcg, Oral, Daily      oxyCODONE 5 MG immediate release tablet  Commonly known as:  ROXICODONE  What changed:  reasons to take this   5 mg, Oral, Every 8 Hours PRN         Continue These Medications      Instructions Start Date   allopurinol 100 MG tablet  Commonly known as:  ZYLOPRIM   100 mg, Oral, 2 Times Daily, Do not preop      aspirin 81 MG tablet   81 mg, Oral, Daily      COLACE 100 MG capsule  Generic drug:  docusate sodium   100 mg, Oral, 2  Times Daily      Cyanocobalamin 1000 MCG/ML kit   1,000 mcg, Injection, Every 30 Days      DULoxetine 60 MG capsule  Commonly known as:  CYMBALTA   60 mg, Oral, Daily      folic acid 1 MG tablet  Commonly known as:  FOLVITE   1 mg, Oral, Daily      hydrOXYzine pamoate 25 MG capsule  Commonly known as:  VISTARIL   25 mg, Oral, 3 Times Daily PRN      lactulose 10 GM/15ML solution  Commonly known as:  CHRONULAC   60 mL, Oral, Every 4 Hours      magnesium oxide 400 MG tablet  Commonly known as:  MAG-OX   400 mg, Oral, Daily, Take post op      melatonin 5 MG tablet tablet   10 mg, Oral, Nightly PRN      metoclopramide 5 MG tablet  Commonly known as:  REGLAN   5 mg, Oral, 2 Times Daily, Ok to take preop      multivitamin with minerals tablet tablet   1 tablet, Oral, Daily      MYRBETRIQ 50 MG tablet sustained-release 24 hour 24 hr tablet  Generic drug:  Mirabegron ER   50 mg, Oral, Daily      pantoprazole 40 MG EC tablet  Commonly known as:  PROTONIX   40 mg, Oral, 2 Times Daily      phosphorus 155-852-130 MG tablet  Commonly known as:  K PHOS NEUTRAL   1 tablet, Oral, Daily, afternoon      sodium bicarbonate 650 MG tablet   650 mg, Oral, 3 Times Daily      sucralfate 1 g tablet  Commonly known as:  CARAFATE   1 g, Oral, 4 Times Daily      XIFAXAN 550 MG tablet  Generic drug:  riFAXIMin   550 mg, Oral, Every 12 Hours Scheduled      zinc sulfate 50 MG capsule  Commonly known as:  ZINCATE   50 mg, Oral, Daily             No Known Allergies      Discharge Disposition:  Skilled Nursing Facility (DC - External)    Diet:  Hospital:  Diet Order   Procedures   • Diet Cardiac, Diabetic/Consistent Carbs, Renal; Healthy Heart; Diabetic - Consistent Carb; 2gm Na+         Discharge Activity:   Activity Instructions     Gradually Increase Activity Until at Pre-Hospitalization Level              CODE STATUS:    Code Status and Medical Interventions:   Ordered at: 03/03/20 0941     Level Of Support Discussed With:    Patient     Code  Status:    CPR     Medical Interventions (Level of Support Prior to Arrest):    Full         Follow-up Appointments  Future Appointments   Date Time Provider Department Center   3/16/2020  1:30 PM LAB [facility] ONC LAB [facility]   3/16/2020  1:45 PM RN REVIEW ROOM  [facility]   3/23/2020  1:35 PM LAB [facility] LAB [facility]   3/23/2020  1:45 PM RN REVIEW ROOM  [facility]   3/30/2020  1:30 PM LAB [facility] LAB [facility]   3/30/2020  1:45 PM RN REVIEW ROOM  [facility]   4/6/2020  1:30 PM Joceline Pandey MD [facility] ONC NA [facility]   4/6/2020  1:30 PM LAB BH [facility] ONC LAB NA BH LAG ONAL [facility]   4/6/2020  1:30 PM LAB MD BH LAG ONC LAB NA BH LAG ONAL [facility]   4/6/2020  1:45 PM RN REVIEW ROOM  [facility] CC NA LAG   5/5/2020  2:30 PM LAB BH [facility] IZABELLA LAB DS BH [facility] JLDS None   5/12/2020  2:10 PM Alejandra Amaro MD [facility] END NA None   9/4/2020 11:10 AM Alvaro Pandey MD [facility] NA       Additional Instructions for the Follow-ups that You Need to Schedule     Ambulatory Referral to Home Health   As directed      Face to Face Visit Date:  3/4/2020    Follow-up provider for Plan of Care?:  I treated the patient in an acute care facility and will not continue treatment after discharge.    Follow-up provider:  MELODY CHIANG [[id number]]    Reason/Clinical Findings:  general weakness    Describe mobility limitations that make leaving home difficult:  general weakness    Nursing/Therapeutic Services Requested:  Other (eval and treat)    Frequency:  1 Week 1         Basic Metabolic Panel    Mar 12, 2020 (Approximate)              Condition on Discharge:      Stable          Electronically signed by Hawk Garner MD, 03/07/20, 3:10 PM.    Time: I spent 35  minutes on this discharge activity which included face-to-face encounter with the patient/reviewing the data in the system/coordination of the care with the nursing staff as well as  consultants/documentation/entering orders.

## 2020-03-07 NOTE — PROGRESS NOTES
Discharge Planning Assessment   [facility]     Patient Name: Bry Ratliff  MRN: [id number]  Today's Date: 3/7/2020    Admit Date: 3/3/2020      Discharge Plan     Row Name 03/07/20 1524       Plan    Final Note  Pt can transfer to [facility] today. No precert required. PASRR approved.        Destination      Service Provider Request Status Selected Services Address Phone Number Fax Number    [facility] Accepted N/A [address] [facility] [place] [address] [phone]       Sharita Ahn  [facility] [place], LSW  Weekend   Care Management Dept  Cell [phone]  Weekday Department [phone]

## 2020-03-08 LAB
BACTERIA SPEC AEROBE CULT: ABNORMAL
BACTERIA SPEC AEROBE CULT: ABNORMAL
GRAM STN SPEC: ABNORMAL
GRAM STN SPEC: ABNORMAL
ISOLATED FROM: ABNORMAL

## 2020-03-09 LAB
BACTERIA SPEC AEROBE CULT: ABNORMAL
GRAM STN SPEC: ABNORMAL
ISOLATED FROM: ABNORMAL

## 2020-03-09 NOTE — PROGRESS NOTES
Case Management Discharge Note      Final Note: [facility] for inpt rehab.                            Final Discharge Disposition Code: 03 - skilled nursing facility (SNF)

## 2020-03-10 ENCOUNTER — APPOINTMENT (OUTPATIENT)
Dept: ONCOLOGY | Facility: HOSPITAL | Age: 75
End: 2020-03-10

## 2020-03-11 LAB — BACTERIA SPEC AEROBE CULT: NORMAL

## 2020-03-13 DIAGNOSIS — K90.9 MALABSORPTION OF IRON: ICD-10-CM

## 2020-03-13 DIAGNOSIS — D50.9 IRON DEFICIENCY ANEMIA, UNSPECIFIED IRON DEFICIENCY ANEMIA TYPE: Primary | ICD-10-CM

## 2020-03-13 NOTE — PROGRESS NOTES
Orders entered for Ferritin and Iron profile at facility pt is currently housed in.   [place] given the orders to fax to facility.

## 2020-03-20 ENCOUNTER — TELEPHONE (OUTPATIENT)
Dept: ONCOLOGY | Facility: CLINIC | Age: 75
End: 2020-03-20

## 2020-03-20 DIAGNOSIS — D63.1 ANEMIA IN STAGE 3 CHRONIC KIDNEY DISEASE (HCC): ICD-10-CM

## 2020-03-20 DIAGNOSIS — D50.9 IRON DEFICIENCY ANEMIA, UNSPECIFIED IRON DEFICIENCY ANEMIA TYPE: Primary | ICD-10-CM

## 2020-03-20 DIAGNOSIS — N18.30 ANEMIA IN STAGE 3 CHRONIC KIDNEY DISEASE (HCC): ICD-10-CM

## 2020-03-20 NOTE — TELEPHONE ENCOUNTER
Faxed lab order (ferritin, iron, cbc) to [facility]. Fax# [phone].    Called and spoke to Amanda (switchboard) she transferred me to Rodney Sampson and I spoke to Alfredo and she stated that they did receive the orders and will have them drawn. msd

## 2020-03-23 ENCOUNTER — APPOINTMENT (OUTPATIENT)
Dept: ONCOLOGY | Facility: HOSPITAL | Age: 75
End: 2020-03-23

## 2020-03-23 ENCOUNTER — APPOINTMENT (OUTPATIENT)
Dept: LAB | Facility: HOSPITAL | Age: 75
End: 2020-03-23

## 2020-03-26 ENCOUNTER — TELEPHONE (OUTPATIENT)
Dept: ONCOLOGY | Facility: CLINIC | Age: 75
End: 2020-03-26

## 2020-03-30 ENCOUNTER — HOSPITAL ENCOUNTER (OUTPATIENT)
Dept: ONCOLOGY | Facility: HOSPITAL | Age: 75
Discharge: HOME | End: 2020-03-30
Admitting: NURSE PRACTITIONER

## 2020-03-30 ENCOUNTER — LAB (OUTPATIENT)
Dept: LAB | Facility: HOSPITAL | Age: 75
End: 2020-03-30

## 2020-03-30 DIAGNOSIS — K31.819 GAVE (GASTRIC ANTRAL VASCULAR ECTASIA): ICD-10-CM

## 2020-03-30 DIAGNOSIS — D63.1 ANEMIA IN STAGE 3 CHRONIC KIDNEY DISEASE (HCC): ICD-10-CM

## 2020-03-30 DIAGNOSIS — D63.1 ANEMIA IN STAGE 3 CHRONIC KIDNEY DISEASE (HCC): Primary | ICD-10-CM

## 2020-03-30 DIAGNOSIS — N18.30 ANEMIA IN STAGE 3 CHRONIC KIDNEY DISEASE (HCC): Primary | ICD-10-CM

## 2020-03-30 DIAGNOSIS — K90.9 MALABSORPTION OF IRON: ICD-10-CM

## 2020-03-30 DIAGNOSIS — D50.0 IRON DEFICIENCY ANEMIA DUE TO CHRONIC BLOOD LOSS: ICD-10-CM

## 2020-03-30 DIAGNOSIS — D47.2 MGUS (MONOCLONAL GAMMOPATHY OF UNKNOWN SIGNIFICANCE): ICD-10-CM

## 2020-03-30 DIAGNOSIS — N18.30 ANEMIA IN STAGE 3 CHRONIC KIDNEY DISEASE (HCC): ICD-10-CM

## 2020-03-30 DIAGNOSIS — D50.9 IRON DEFICIENCY ANEMIA, UNSPECIFIED IRON DEFICIENCY ANEMIA TYPE: ICD-10-CM

## 2020-03-30 LAB
BASOPHILS # BLD AUTO: 0.1 10*3/MM3 (ref 0–0.2)
BASOPHILS NFR BLD AUTO: 0.9 % (ref 0–1.5)
DEPRECATED RDW RBC AUTO: 68.3 FL (ref 37–54)
EOSINOPHIL # BLD AUTO: 0.5 10*3/MM3 (ref 0–0.4)
EOSINOPHIL NFR BLD AUTO: 5.5 % (ref 0.3–6.2)
ERYTHROCYTE [DISTWIDTH] IN BLOOD BY AUTOMATED COUNT: 19.2 % (ref 12.3–15.4)
FERRITIN SERPL-MCNC: 229.6 NG/ML (ref 30–400)
HCT VFR BLD AUTO: 29.5 % (ref 37.5–51)
HGB BLD-MCNC: 9.8 G/DL (ref 13–17.7)
IRON 24H UR-MRATE: 53 MCG/DL (ref 59–158)
IRON SATN MFR SERPL: 22 % (ref 20–50)
LYMPHOCYTES # BLD AUTO: 1.3 10*3/MM3 (ref 0.7–3.1)
LYMPHOCYTES NFR BLD AUTO: 14.7 % (ref 19.6–45.3)
MCH RBC QN AUTO: 33.5 PG (ref 26.6–33)
MCHC RBC AUTO-ENTMCNC: 33.1 G/DL (ref 31.5–35.7)
MCV RBC AUTO: 101.1 FL (ref 79–97)
MONOCYTES # BLD AUTO: 0.7 10*3/MM3 (ref 0.1–0.9)
MONOCYTES NFR BLD AUTO: 8.2 % (ref 5–12)
NEUTROPHILS # BLD AUTO: 6.2 10*3/MM3 (ref 1.7–7)
NEUTROPHILS NFR BLD AUTO: 70.7 % (ref 42.7–76)
NRBC BLD AUTO-RTO: 0.1 /100 WBC (ref 0–0.2)
PLATELET # BLD AUTO: 210 10*3/MM3 (ref 140–450)
PMV BLD AUTO: 9.9 FL (ref 6–12)
RBC # BLD AUTO: 2.92 10*6/MM3 (ref 4.14–5.8)
TIBC SERPL-MCNC: 241 MCG/DL (ref 298–536)
TRANSFERRIN SERPL-MCNC: 162 MG/DL (ref 200–360)
WBC NRBC COR # BLD: 8.7 10*3/MM3 (ref 3.4–10.8)

## 2020-03-30 PROCEDURE — 36415 COLL VENOUS BLD VENIPUNCTURE: CPT

## 2020-03-30 PROCEDURE — 84466 ASSAY OF TRANSFERRIN: CPT | Performed by: NURSE PRACTITIONER

## 2020-03-30 PROCEDURE — 83540 ASSAY OF IRON: CPT | Performed by: NURSE PRACTITIONER

## 2020-03-30 PROCEDURE — 82728 ASSAY OF FERRITIN: CPT | Performed by: NURSE PRACTITIONER

## 2020-03-30 PROCEDURE — 85025 COMPLETE CBC W/AUTO DIFF WBC: CPT | Performed by: NURSE PRACTITIONER

## 2020-03-31 DIAGNOSIS — N18.30 CKD (CHRONIC KIDNEY DISEASE) STAGE 3, GFR 30-59 ML/MIN (HCC): ICD-10-CM

## 2020-04-06 ENCOUNTER — OFFICE VISIT (OUTPATIENT)
Dept: ONCOLOGY | Facility: CLINIC | Age: 75
End: 2020-04-06

## 2020-04-06 ENCOUNTER — LAB (OUTPATIENT)
Dept: LAB | Facility: HOSPITAL | Age: 75
End: 2020-04-06

## 2020-04-06 ENCOUNTER — HOSPITAL ENCOUNTER (OUTPATIENT)
Dept: ONCOLOGY | Facility: HOSPITAL | Age: 75
Discharge: HOME | End: 2020-04-06
Admitting: NURSE PRACTITIONER

## 2020-04-06 ENCOUNTER — APPOINTMENT (OUTPATIENT)
Dept: LAB | Facility: HOSPITAL | Age: 75
End: 2020-04-06

## 2020-04-06 VITALS
HEART RATE: 81 BPM | DIASTOLIC BLOOD PRESSURE: 73 MMHG | TEMPERATURE: 98.7 F | HEIGHT: 74 IN | RESPIRATION RATE: 18 BRPM | SYSTOLIC BLOOD PRESSURE: 130 MMHG | WEIGHT: 295.2 LBS | BODY MASS INDEX: 37.88 KG/M2

## 2020-04-06 DIAGNOSIS — D63.1 ANEMIA DUE TO CHRONIC RENAL FAILURE TREATED WITH ERYTHROPOIETIN, STAGE 3 (MODERATE) (HCC): Primary | ICD-10-CM

## 2020-04-06 DIAGNOSIS — N18.30 CKD (CHRONIC KIDNEY DISEASE) STAGE 3, GFR 30-59 ML/MIN (HCC): Primary | ICD-10-CM

## 2020-04-06 DIAGNOSIS — D50.0 IRON DEFICIENCY ANEMIA DUE TO CHRONIC BLOOD LOSS: ICD-10-CM

## 2020-04-06 DIAGNOSIS — N18.30 ANEMIA DUE TO CHRONIC RENAL FAILURE TREATED WITH ERYTHROPOIETIN, STAGE 3 (MODERATE) (HCC): Primary | ICD-10-CM

## 2020-04-06 DIAGNOSIS — D47.2 MGUS (MONOCLONAL GAMMOPATHY OF UNKNOWN SIGNIFICANCE): ICD-10-CM

## 2020-04-06 LAB
BASOPHILS # BLD AUTO: 0.09 10*3/MM3 (ref 0–0.2)
BASOPHILS NFR BLD AUTO: 0.8 % (ref 0–1.5)
DEPRECATED RDW RBC AUTO: 63 FL (ref 37–54)
EOSINOPHIL # BLD AUTO: 0.43 10*3/MM3 (ref 0–0.4)
EOSINOPHIL NFR BLD AUTO: 4 % (ref 0.3–6.2)
ERYTHROCYTE [DISTWIDTH] IN BLOOD BY AUTOMATED COUNT: 16.8 % (ref 12.3–15.4)
HCT VFR BLD AUTO: 28.7 % (ref 37.5–51)
HGB BLD-MCNC: 9 G/DL (ref 13–17.7)
LYMPHOCYTES # BLD AUTO: 1.56 10*3/MM3 (ref 0.7–3.1)
LYMPHOCYTES NFR BLD AUTO: 14.4 % (ref 19.6–45.3)
MCH RBC QN AUTO: 33.1 PG (ref 26.6–33)
MCHC RBC AUTO-ENTMCNC: 31.4 G/DL (ref 31.5–35.7)
MCV RBC AUTO: 105.5 FL (ref 79–97)
MONOCYTES # BLD AUTO: 0.8 10*3/MM3 (ref 0.1–0.9)
MONOCYTES NFR BLD AUTO: 7.4 % (ref 5–12)
NEUTROPHILS # BLD AUTO: 7.93 10*3/MM3 (ref 1.7–7)
NEUTROPHILS NFR BLD AUTO: 73.4 % (ref 42.7–76)
PLATELET # BLD AUTO: 225 10*3/MM3 (ref 140–450)
PMV BLD AUTO: 10.1 FL (ref 6–12)
RBC # BLD AUTO: 2.72 10*6/MM3 (ref 4.14–5.8)
WBC NRBC COR # BLD: 10.81 10*3/MM3 (ref 3.4–10.8)

## 2020-04-06 PROCEDURE — 36415 COLL VENOUS BLD VENIPUNCTURE: CPT

## 2020-04-06 PROCEDURE — 99214 OFFICE O/P EST MOD 30 MIN: CPT | Performed by: INTERNAL MEDICINE

## 2020-04-06 PROCEDURE — 25010000002 EPOETIN ALFA-EPBX 40000 UNIT/ML SOLUTION: Performed by: NURSE PRACTITIONER

## 2020-04-06 PROCEDURE — 96372 THER/PROPH/DIAG INJ SC/IM: CPT

## 2020-04-06 PROCEDURE — 85025 COMPLETE CBC W/AUTO DIFF WBC: CPT

## 2020-04-06 RX ORDER — POTASSIUM CHLORIDE 20 MEQ/1
20 TABLET, EXTENDED RELEASE ORAL DAILY
COMMUNITY
End: 2020-06-15 | Stop reason: HOSPADM

## 2020-04-06 RX ADMIN — EPOETIN ALFA-EPBX 40000 UNITS: 40000 INJECTION, SOLUTION INTRAVENOUS; SUBCUTANEOUS at 14:12

## 2020-04-06 NOTE — PROGRESS NOTES
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 Anemia diagnosed January 2018 and IgG kappa monoclonal gammopathy diagnosed May 2018.  Anemia related to chronic renal failure 3  · This patient is an obese  male who  developed kidney problems in January 2018 for which he was referred to Devi Plascencia M.D. He was found to have a hemoglobin of 7.4 at that time and was given 1 unit of packed red blood cells. He was then referred to GI where he had been followed for Syed’s esophagus for a number of years. An EGD and colonoscopy was performed on 3/5/18 by Marisel Gomez M.D. revealing mucosa suggestive of Syed’s esophagus and hiatal hernia in the cardia. Patient had a poor prep compromising the colonoscopy exam though the scope did reach the cecum. Esophageal biopsy revealed squamocolumnar mucosa with extensive intestinal metaplasia consistent with Syed’s esophagus, negative for dysplasia. Colonoscopy was recommended to be repeated in two years and EGD in three years. The patient saw his primary care physician, Paulette Harris M.D., in followup and blood testing was done on 5/17/18 revealing WBC 6.3, hemoglobin 8.4, MCV 84.5, platelet count 182,000. Vitamin B12 level was 416 (180-914) and patient was referred here for further evaluation. The patient claims to have been diagnosed with Vitamin B12 deficiency a number of years ago for which he has been on Vitamin B12 injections up until six months ago when he just stopped taking those. He has been  recently started on oral iron supplementation. He claims not to see any blood in his urine but does have microscopic hematuria for which he sees a urologist. He denies nosebleeds, gum bleeds or blood in the stool. He has not had any significant changes in his weight. He feels weak and dizzy for a number of years which has recently gotten worse. He does not ambulate much because of back surgery causing him weakness. He did have a right lower extremity DVT with bilateral pulmonary emboli in 2004 since which time he has been on Coumadin, thought secondary to a sedentary lifestyle. He has no family history of anemias.   · 5/24/18 - Patient seen initial consultation for anemia. Hemoglobin 7.9, MCV 89.9. Ferritin 17 (), iron 183 (), TIBC 379 (228-428), iron saturation 48% (20-50), retic 2.16% (0.5-1.5), haptoglobin 138 (), folate 9.1 (5.9-24.8). SPEP showed monoclonal gammopathy. SIFE showed IgG kappa monoclonal gammopathy. M-spike in the gamma region 0.7 g/dL. Erythropoietin 53.5 (2.59-18.5). Antiparietal cell antibody and intrinsic factor antibodies negative.   Globulin 4.2 (2.5-3.8). Creatinine 1.4 (0.7-1.2).   · 6/19/18 - Discussed results of anemia workup. Hemoglobin improving. Ferrous Sulfate decreased to 325 mg twice a day. Will perform gammopathy workup for IgG kappa monoclonal gammopathy. IgA 783 (), IgG 1480 (600-1500), IgM 93 (). Kappa lambda FLC ratio 0.81 (0.26-1.65). Ferritin 36 ().        · 6/25/18 - Bone survey negative for acute disease. No evidence of lytic or blastic metastatic bone disease was present. Chest x-ray showed cardiomegaly, mild right basilar atelectasis and findings suggestive of ankylosing spondylitis.   · 6/29/18 - Patient underwent sternal bone marrow revealing monoclonal gammopathy of undetermined significant (MGUS). Extent of bone marrow involvement 5%. Phenotype kappa positive, IgG positive, CD38 positive, CD20 negative, CD19 negative, CD45  negative, CD56 negative and  negative. Dyspoiesis was not seen. There was absence of iron stores. Normal male karyotype. Normal FISH study. Flow cytometry revealed IgG kappa restrictive plasma cells in a polytypic background. There was a mixed population of maturing myeloid cells, B-cells and T-cells. No abnormal myeloid maturation was seen. A monoclonal IgG kappa plasma cell population was present. 4% plasma cells present.   · 8/23/18 - Labs by Dr. Plascencia revealed B12 of 857 (180-914), folate 12.7 (5.9-24.8), iron 127 ().      · 9/19/18 - Iron 94 (), TIBC 297 (228-428), iron saturation 32 (20-50), ferritin 29 (). Erythropoietin 30.04 (2.59-18.5).        · 10/16/18 - Iron 177 (), TIBC 320 (228-428), iron saturation 55 (20-50), ferritin 34 ().       · 11/16/18 - Hemoglobin 7.1. Patient given 1 unit of packed red blood cells.   · 11/21/18 - Ferritin 27 (). Hemoglobin 7.9. Patient given 1 unit of packed red blood cells.    · 11/26/18 - EGD by Dave Russo M.D. revealed erosive gastritis and bleeding ampulla. There was oozing upon entering the stomach in many different areas. Duodenal mucosa and the esophagus were normal.   · 11/27/18 - Hemoglobin 8.2. Order written for Procrit 30,000 units subq weekly, not approved by insurance for low ferritin. Urine JERRY with no definite monoclonal gammopathy identified with questionable faint IgG kappa.   · 12/18/18 - Hemoglobin 9.9. Injectafer 750 mg IV given.   · 1/2/19 - Injectafer 750 mg IV given.  Hemoglobin 11, .1. Patient claims that he is getting Vitamin B12 injections monthly at home through Dr. Harris. Currently taking Ferrous Sulfate 325 mg p.o. b.i.d. and asked to continue that. Asked to continue Pantoprazole 40 mg daily that he is taking. IgA 651 (), IgG 1470 (600-1500), IgM 94 ().      · 2/12/19 - Iron 88 ().    · 3/6/19 - WBC 19.8 with 83% neutrophils, 5% lymphocytes, 11% monocytes, hemoglobin  8.7, , platelet count 182,000. Patient status post laparoscopic cholecystectomy on 2/24/19 with large ecchymoses apparent on abdomen. Infectious workup ordered and Injectafer 750 mg IV days 1 and 8 ordered.  Iron 23 (), TIBC 153 (228-428), iron saturation 15% (20-50), ferritin 400 (224-336).       · 3/12/19 - WBC 15.02, hemoglobin 8.1 and platelets 227,000. Patient reported hematuria followed by Dr. Starkey. Orders written to start Injectafer ASAP. Abdominal ecchymosis improving.   · 3/14/19 - Injectafer 750 mg IV given.   · 4/22/19 - Hemoglobin 9.5, . Patient missed day 8 Injectafer in March and it was rescheduled. SIFE showed IgG kappa monoclonal gammopathy.  · 5/8/19 - Injectafer 750 mg IV given.   · 7/8/19 - Iron 56 (). Iron Saturation 36 (20-50%). Transferrin 110 (180-330). TIBC 154 (228-428). Ferritin 1,199 ().    · 8/7/2019-hemoglobin 10.0.  Patient taking multivitamin with iron only.  Restarted ferrous sulfate 325mg by mouth twice a day. UIFE showed free kappa light chain identified.   10/16/2019 patient hospitalized at [facility] for 3 days and found to have a hemoglobin of 4.8 at the cancer center visit.  Stool Hemoccult was positive.  He ended up receiving 5 units of packed red blood cells.  EGD by Marisel Gomez MD revealed severe gastric antral vascular ectasia with oozing, Syed's esophagus and hiatal hernia.  The patient was treated with PPI and Carafate.  Plan was to repeat EGD with cauterization in 6 weeks.  B12 and reticulocyte count were high.  Haptoglobin and folic acid were normal.  Creatinine 2.2 (0.76-1.27), iron 47 (), TIBC 264 (298-5 0.36), iron saturation 18 (20-50), ferritin 128.4 ().  Aspirin was held temporarily and patient given IV iron.  IgA 720 (), IgM 72 (), IgG 1489 (700-1600).  · EGD done secondary to GI bleed on 1/9/2020 1.  Moderately severe gastric antral vascular ectasia APC applied for cauterization 2.  Short segment of Syed's  biopsy taken. 3.  Small hiatal hernia.  · 1/17/20 Iron 42, Iron saturation 16, TIBC 264.  · 1/3/2020 patient was initiated on weekly Procrit  · 3/31/2020 iron profile iron 53 iron saturation 22 iron binding capacity 241 ferritin 229.     2.   Elevated LFT’s diagnosis established March 2018.  Biliary duct dilation diagnosis established June 2018.   · 11/30/17 - Maia phos 93 (32-91), total bili 0.7 (0.3-1.2), AST 37 (15-41), ALT 25 (15-41).   · 3/1/18 - T-bili 0.5 (0.3-1.2), maia phos 106 (32-91), AST 54 (15-41), ALT 27 (17-63).   · 5/24/18 - AST 46 (15-41), maia phos 131 (32-91).   · 6/8/18 - CMP ordered by Dr. Alejandra Amaro showed maia phos 209 (32-91),  (15-41), ALT 84 (17-63), total bili 1.1 (0.3-1.2).             · 6/19/18 - CT abdomen and pelvis as well as serological workup for elevated LFT’s ordered. Alpha-1 antitrypsin 144 (). Ceruloplasmin 38 (22-58). AFP 3 (0-9).  Hepatitis panel non-reactive.   · 6/22/18 - CT abdomen and pelvis showed abnormal intra and extrahepatic biliary dilation. There was mild distention of the gallbladder and evidence of several gallstones. Bilateral non-obstructing kidney stone was present. Incidental sigmoid diverticulosis.   · 7/2/18 - Patient underwent MRCP at [facility] showing markedly dilated intrahepatic and extrahepatic bile duct with multiple small filling defects within the distal common bile duct compatible with choledocholithiasis. There was a focal 6 mm segmental narrowing at the distal CBD with recommended GI consult for ERCP and brushings.   · 7/5/18 to 7/10/18 - On 7/5/18 labs revealed normal total bilirubin (0.8), AST 69 (15-41), maia phos 152 (32-91), ALT was normal at 37 (17-63), ammonia was elevated at 86 (9-35), lipase was normal (22). Patient hospitalized at [facility] due to weakness. Workup revealed cholelithiasis. On 7/9/18 patient underwent ERCP with bilateral sphincterotomy, common duct stone extraction, biliary brushing and stent placement  by Dr. Leiva. Path on brushings was negative for malignancy. There was intra and extrahepatic biliary ductal dilation with relatively abrupt distal common bile duct cutoff and non-visualization of the gallbladder. He was started on Lovenox and sequential compression devices due to risk of pulmonary embolism.  of 33 (0-35).  On 7/10/18 LFT’s revealed total bili 0.9 (0.3-1.2). Maia phos 129 (32-91). AST 50 (15-41), ALT 41 (17-63).     · 8/31/18 - EUS by Dr. Gomez at [facility] revealing suspect benign biliary stricture due to CBD stone with FNA pathology revealing benign and atypical ductal cells, bile and proteinaceous material including scattered bacteria, neutrophils and degenerating cells. The atypia was felt mild and favored to be reactive in nature. Plan was to repeat ERCP with removal of the stent and the stone with consideration of common bile duct evaluation with cholangioscopy at that time.   · 10/12/18 - ERCP with sphincteroplasty and stone extraction done by  JUAN PABLO Russo for choledocholithiasis.   · 2/23/19 - Patient underwent ERCP with balloon clearance of bile duct by Memo  · 3/6/19 - LFT’s not elevated with bilirubin 1.1, AST 31, ALT 14, maia phos was mildly elevated at 101 (32-91).   · 4/25/19 - 4/26/19 - Admitted to [facility] for hepatic encephalopathy and hypokalemia.   · 4/27/2019 - CT abdomen pelvis showed a 4.8 cm air-fluid collection adjacent to the right posterior hepatic lobe significant improved.  Right-sided chest tube small right pleural effusion.  Hepatic cirrhosis.  Bilateral nonobstructing renal stones.  Sigmoid diverticulosis.   · 5/26/19 - 5/29/2019 - Admitted to [facility] for hepatic and cephalopathy.  Ammonia level 213 (H).  · 7/11/19 - AFP 2.76 (0-9).   · 9/26/19 - CT scan abdomen showed fluid collection posterior to the right hepatic lobe has significantly diminished in size with only trace fluid remaining. Small locules of air are seen within this collection which  could be related to recent shunt mentation or could be related to tiny fistula from the splenic flexure of the colon. Trace right pleural fluid, diminished since 4/27/19. Thickened, likely reactive, pleural rind remains. Mild right basilar atelectasis. Abnormal intrahepatic and extra hepatic biliary ductal dilation. Intrahepatic biliary dilation is new since 4/27/19. The CBD appears attenuated in its mid segment, raising the possibility of stricture. ERCP recommended. Uncomplicated colonic diverticulosis. Non-obstructing bilateral renal stones and probable small right renal cyst. Cirrhotic liver morphology. No focal liver lesion is seen.  · 10/28/19 - CT scan abdomen and pelvis showed fluid collection posterior to the right hepatic lobe appears slightly increased in size now measuring 24 mm in diameter, again a small droplet of gas adjacent to this fluid collection is seen which may represent fistula. Right basilar small pleural effusion and atelectasis remain. Continued dilatation of the intra and extrahepatic biliary ductal system which appears stable from previous exam. Non-obstructing bilateral renal calculi. Changes of cirrhosis and splenomegaly. Diverticulosis. Mild interval change from prior study with increasing fluid collection posterior to the right hepatic lobe.      3.   Right lower extremity DVT and bilateral pulmonary emboli diagnosed 2004.   · 2004 - Patient claims to have developed right lower extremity DVT with bilateral pulmonary emboli in 2004 and was coumadinized. The blood clots were thought secondary to his sedentary lifestyle. He stayed on the Coumadin up until October 2017 when, due to difficulty maintaining his INR’s, he was switched to Xarelto 20 mg daily by Paulette Harris M.D. which was later dropped to 10 mg daily.    · 11/26/18 - EGD by Dave Russo M.D. revealed erosive gastritis and bleeding ampulla. Ampullary biopsy revealed reactive/reparative small intestinal mucosa with  mild chronic inflammation. Gastric antrum biopsy was suggestive of focal mild reactive gastropathy. Due to erosive gastritis, patient asked to hold Xarelto and Aspirin by Dave Russo M.D.   · 11/27/18 - Patient asked to discontinue Xarelto and hold Aspirin while obtaining IVC filter. Risks discussed. Factor V Leiden gene mutation and prothrombin gene mutations not detected. Anticardiolipin IgM 11 (<11). Anti beta-2 glyco, IgA 55 (<20). Factor VIII activity 186 (). Antithrombin III activity 63 (). Protein C activity 69 (), protein S activity 69 ().       · 12/5/18 - Patient underwent transjugular IVC filter placement in IR by  Jonn Cuenca M.D.   · 1/2/19 - Told patient that his low protein CNS and antithrombin III likely due to liver disease. He would be at a high risk of going back on Xarelto and hence continued on Aspirin 81 mg p.o. daily.   · 1/3/19 - D-dimer 1.25 (<0.45).    · 3/8/19 - Chest x-ray showed chronic changes in the chest with no obvious pneumonia or congestive changes.  · 4/3/19 - Chest x-ray with right pleural effusion and basilar lung density with slight increase from prior. Cardiomegaly.   · 4/22/19 - Factor VIII 334 (H), Anti-phosphatidylserine antibody IgM> 80 (H), Anti-phosphatidylserine antibody IgG 12 (N), Cardiolipin IgG 21 (N), Cardiolipin IgM 55 (H), Cardiolipin IgA 63 (H), Beta-2 glycoprotein IgG 4 (N), IgA 91 (H), and IgM 21 (H).   · 7/11/19 - Chest x-ray showed stable small right pleural effusion since 04/25/2019. Minimal right costophrenic angle atelectasis.  · 4.  Recurrent episodes of hepatic encephalopathy on treatment with lactulose 30 cc 4 times a day.  · Recurrent GI blood loss on anticoagulation.    Reviewed  Past Medical History:   Diagnosis Date   • Anemia    • Anxiety    • B12 deficiency 2009   • Balance disorder    • Syed's esophagus    • CAD (coronary artery disease)     cardiac stent   • Constipation    • DDD (degenerative disc  disease), lumbar    • Decubitus ulcer     buttocks   • Depression    • Diabetes mellitus (CMS/HCC)    • Disease of thyroid gland     hypothyroid   • Diverticular disease    • Dvt femoral (deep venous thrombosis) (CMS/HCC) 2004    rt let   • Gastroparesis    • GERD (gastroesophageal reflux disease)    • Gout    • Hepatic encephalopathy (CMS/HCC)     if doesnt take meds   • History of echocardiogram     03/03/2017 - 12/03/2014 - 04/2012   • History of stomach ulcers    • Hyperlipidemia    • Iron deficiency    • Morbid obesity (CMS/HCC)    • THOMAS (nonalcoholic steatohepatitis)    • Neuropathy    • OAB (overactive bladder)    • KATHIA treated with BiPAP     bring dos   • Peripheral edema    • Pulmonary embolus (CMS/HCC) 2004   • Renal insufficiency    • S/P lumbar laminectomy    • Skin irritation     skin fold   • Stage 3 chronic kidney disease (CMS/HCC)        Past Surgical History:   Procedure Laterality Date   • BACK SURGERY  1971   • BILE DUCT STENT PLACEMENT     • CATARACT EXTRACTION  2014   • CHOLECYSTECTOMY  02/24/2019   • COLONOSCOPY  03/2018   • CORONARY ANGIOPLASTY  08/24/2009    PCI stent - succesful placement of 3.5/23 promus stent in proximal right coronary artery   • CORONARY ANGIOPLASTY WITH STENT PLACEMENT  08/27/2009    patient had stent placed to proximal right coronary artery   • CYSTOSCOPY BLADDER STONE LITHOTRIPSY  1997   • ENDOSCOPY N/A 10/18/2019    Procedure: ESOPHAGOGASTRODUODENOSCOPY with argon plasma coagulation of gastric antral vascular ectasia;  Surgeon: Marisel Gomez MD;  [facility];  Service: Gastroenterology   • ENDOSCOPY N/A 1/9/2020    Procedure: ESOPHAGOGASTRODUODENOSCOPY WITH BIOPSY, ARGON PLASMA COAGULATION OF GASTRIC ANTREL VASCULAR ECTASIA,;  Surgeon: Marisel Gomez MD;  [facility];  Service: Gastroenterology   • ENDOSCOPY N/A 3/6/2020    Procedure: ESOPHAGOGASTRODUODENOSCOPY;  Surgeon: Zbigniew Silva MD;  [facility];  Service:  Gastroenterology;  Laterality: N/A;  mild gave, post cautery ulcer     • ERCP N/A 11/20/2019    Procedure: ERCP, clearance of bile duct with balloon, placement of biliary stent, EGD with argon plasma coagulation of gastric antral vascular ectasia;  Surgeon: Marisel Gomez MD;  Location: [facility] ENDOSCOPY;  Service: Gastroenterology   • ERCP N/A 2/27/2020    Procedure: ENDOSCOPIC RETROGRADE CHOLANGIOPANCREATOGRAPHY with removal of biliary stent, brushing, dilation, and placement of biliary stent x 2 and Esophagogastroduodenoscopy with argon plasma coagulation;  Surgeon: Marisel Gomez MD;  Location: [facility] ENDOSCOPY;  Service: Gastroenterology;  Laterality: N/A;  Gastric antral vascular ectasia, common bile duct stricture   • OTHER SURGICAL HISTORY  11/2018    IVC filter implant   • RENAL ARTERY STENT Left     does not recall this         Current Outpatient Medications:   •  allopurinol (ZYLOPRIM) 100 MG tablet, Take 100 mg by mouth 2 (Two) Times a Day. Do not preop, Disp: , Rfl:   •  aspirin 81 MG tablet, Take 1 tablet by mouth Daily., Disp: 30 tablet, Rfl: 3  •  atorvastatin (LIPITOR) 20 MG tablet, Take 1 tablet by mouth Daily. (Patient taking differently: Take 20 mg by mouth Every Night.), Disp: 90 tablet, Rfl: 3  •  bumetanide (BUMEX) 1 MG tablet, Take 1 tablet by mouth 2 (Two) Times a Day., Disp: 60 tablet, Rfl: 0  •  Cyanocobalamin 1000 MCG/ML kit, Inject 1,000 mcg as directed Every 30 (Thirty) Days., Disp: , Rfl:   •  docusate sodium (COLACE) 100 MG capsule, Take 100 mg by mouth 2 (Two) Times a Day., Disp: , Rfl:   •  DULoxetine (CYMBALTA) 60 MG capsule, Take 60 mg by mouth Daily., Disp: , Rfl:   •  folic acid (FOLVITE) 1 MG tablet, Take 1 mg by mouth Daily., Disp: , Rfl:   •  hydrOXYzine pamoate (VISTARIL) 25 MG capsule, Take 25 mg by mouth 3 (Three) Times a Day As Needed., Disp: , Rfl: 0  •  insulin glargine (LANTUS) 100 UNIT/ML injection, Inject 50 units at bedtime (Patient taking differently: Inject 50  Units under the skin into the appropriate area as directed Every Night. Inject 50 units at bedtime), Disp: 45 mL, Rfl: 3  •  insulin lispro (HUMALOG) 100 UNIT/ML injection, 18 units subcu before each meal plus sliding scale MDD 60 (Patient taking differently: Inject 18 Units under the skin into the appropriate area as directed 3 (Three) Times a Day Before Meals. 18 units subcu before each meal plus sliding scale MDD 60), Disp: 60 mL, Rfl: 4  •  lactulose (CHRONULAC) 10 GM/15ML solution, Take 60 mL by mouth Every 4 (Four) Hours., Disp: , Rfl:   •  levothyroxine (SYNTHROID, LEVOTHROID) 75 MCG tablet, Take 1 tablet by mouth Daily. (Patient taking differently: Take 75 mcg by mouth Daily. Take preop), Disp: 90 tablet, Rfl: 4  •  magnesium oxide (MAG-OX) 400 MG tablet, Take 400 mg by mouth Daily. Take post op, Disp: , Rfl:   •  melatonin 5 MG tablet tablet, Take 10 mg by mouth At Night As Needed., Disp: , Rfl:   •  metoclopramide (REGLAN) 5 MG tablet, Take 5 mg by mouth 2 (Two) Times a Day. Ok to take preop, Disp: , Rfl: 0  •  Mirabegron ER (MYRBETRIQ) 50 MG tablet sustained-release 24 hour 24 hr tablet, Take 50 mg by mouth Daily., Disp: , Rfl:   •  Multiple Vitamins-Minerals (MULTIVITAMIN WITH MINERALS) tablet tablet, Take 1 tablet by mouth Daily., Disp: , Rfl:   •  nitroglycerin (NITROSTAT) 0.4 MG SL tablet, Place 1 tablet under the tongue Every 5 (Five) Minutes As Needed for Chest Pain (Only if SBP Greater Than 100). Take no more than 3 doses in 15 minutes., Disp: 30 tablet, Rfl: 12  •  oxyCODONE (ROXICODONE) 5 MG immediate release tablet, Take 1 tablet by mouth Every 8 (Eight) Hours As Needed for Moderate Pain ., Disp: 6 tablet, Rfl: 0  •  pantoprazole (PROTONIX) 40 MG EC tablet, Take 1 tablet by mouth 2 (Two) Times a Day., Disp: 30 tablet, Rfl: 3  •  potassium chloride (K-DUR,KLOR-CON) 20 MEQ CR tablet, Take 20 mEq by mouth Daily., Disp: , Rfl:   •  rifaximin (XIFAXAN) 550 MG tablet, Take 550 mg by mouth Every 12  (Twelve) Hours., Disp: , Rfl:   •  sodium bicarbonate 650 MG tablet, Take 650 mg by mouth 3 (Three) Times a Day., Disp: , Rfl:   •  zinc sulfate (ZINCATE) 50 MG capsule, Take 50 mg by mouth Daily., Disp: , Rfl: 0  •  phosphorus (K PHOS NEUTRAL) 155-852-130 MG tablet, Take 1 tablet by mouth Daily. afternoon, Disp: , Rfl:   •  sucralfate (CARAFATE) 1 g tablet, Take 1 g by mouth 4 (Four) Times a Day., Disp: , Rfl:   No current facility-administered medications for this visit.     No Known Allergies    Family History   Problem Relation Age of Onset   • Hyperlipidemia Other    • Hypertension Other        Cancer-related family history is not on file.    Social History     Tobacco Use   • Smoking status: Never Smoker   • Smokeless tobacco: Never Used   Substance Use Topics   • Alcohol use: No     Frequency: Never   • Drug use: No       I have reviewed the history of present illness, past medical history, family history, social history, lab results, all notes and other records since the patient was last seen at the [facility]    SUBJECTIVE:      Patient is my office for follow-up accompanied by his wife.  He received iron infusions tolerated it well.  He had a procedure done for his bile leakage done by Dr. Begum.had ERCP done compliant with lactulose not too sleepy anymore.  Not trying to lose weight.  Weight fluctuates depending on the fluid on him    ROS:      Review of Systems   Constitutional: Negative for fever.   HENT: Negative for nosebleeds and trouble swallowing.    Eyes: Negative for visual disturbance.   Respiratory: Negative for cough, shortness of breath and wheezing.    Cardiovascular: Negative for chest pain.   Gastrointestinal: Negative for abdominal pain and blood in stool.   Endocrine: Negative for cold intolerance.   Genitourinary: Negative for dysuria and hematuria.   Musculoskeletal: Negative for joint swelling.   Skin: Negative for rash.   Allergic/Immunologic: Negative for environmental  "allergies.   Neurological: Negative for seizures.   Hematological: Does not bruise/bleed easily.   Psychiatric/Behavioral: The patient is not nervous/anxious.    Reviewed      Objective:       Vitals:    04/06/20 1334   BP: 130/73   Pulse: 81   Resp: 18   Temp: 98.7 °F (37.1 °C)   Weight: 134 kg (295 lb 3.2 oz)   Height: 188 cm (74\")   PainSc: 0-No pain         PHYSICAL EXAM:      Physical Exam   Constitutional: He is oriented to person, place, and time. No distress.   Morbidly obese   HENT:   Head: Normocephalic and atraumatic.   Eyes: Conjunctivae and EOM are normal. Right eye exhibits no discharge. Left eye exhibits no discharge. No scleral icterus.   Neck: Normal range of motion. Neck supple. No thyromegaly present.   Cardiovascular: Normal rate, regular rhythm and normal heart sounds. Exam reveals no gallop and no friction rub.   Pulmonary/Chest: Effort normal. No stridor. No respiratory distress. He has no wheezes.   Abdominal: Soft. Bowel sounds are normal. He exhibits no mass. There is no tenderness. There is no rebound and no guarding.   Musculoskeletal: Normal range of motion. He exhibits no tenderness.   1+ bilateral lower extremity edema   Lymphadenopathy:     He has no cervical adenopathy.   Neurological: He is alert and oriented to person, place, and time. He exhibits normal muscle tone.   Skin: Skin is warm. No rash noted. He is not diaphoretic. No erythema.   Psychiatric: He has a normal mood and affect. His behavior is normal.   Nursing note and vitals reviewed.       RECENT LABS:     WBC   Date Value Ref Range Status   04/06/2020 10.81 (H) 3.40 - 10.80 10*3/mm3 Final     RBC   Date Value Ref Range Status   04/06/2020 2.72 (L) 4.14 - 5.80 10*6/mm3 Final     Hemoglobin   Date Value Ref Range Status   04/06/2020 9.0 (L) 13.0 - 17.7 g/dL Final     Hematocrit   Date Value Ref Range Status   04/06/2020 28.7 (L) 37.5 - 51.0 % Final     MCV   Date Value Ref Range Status   04/06/2020 105.5 (H) 79.0 - 97.0 " fL Final     MCH   Date Value Ref Range Status   04/06/2020 33.1 (H) 26.6 - 33.0 pg Final     MCHC   Date Value Ref Range Status   04/06/2020 31.4 (L) 31.5 - 35.7 g/dL Final     RDW   Date Value Ref Range Status   04/06/2020 16.8 (H) 12.3 - 15.4 % Final     RDW-SD   Date Value Ref Range Status   04/06/2020 63.0 (H) 37.0 - 54.0 fl Final     MPV   Date Value Ref Range Status   04/06/2020 10.1 6.0 - 12.0 fL Final     Platelets   Date Value Ref Range Status   04/06/2020 225 140 - 450 10*3/mm3 Final     Neutrophil %   Date Value Ref Range Status   04/06/2020 73.4 42.7 - 76.0 % Final     Lymphocyte %   Date Value Ref Range Status   04/06/2020 14.4 (L) 19.6 - 45.3 % Final     Monocyte %   Date Value Ref Range Status   04/06/2020 7.4 5.0 - 12.0 % Final     Eosinophil %   Date Value Ref Range Status   04/06/2020 4.0 0.3 - 6.2 % Final     Basophil %   Date Value Ref Range Status   04/06/2020 0.8 0.0 - 1.5 % Final     Immature Grans %   Date Value Ref Range Status   01/06/2020 0.5 0.0 - 0.5 % Final     Neutrophils, Absolute   Date Value Ref Range Status   04/06/2020 7.93 (H) 1.70 - 7.00 10*3/mm3 Final     Lymphocytes, Absolute   Date Value Ref Range Status   04/06/2020 1.56 0.70 - 3.10 10*3/mm3 Final     Monocytes, Absolute   Date Value Ref Range Status   04/06/2020 0.80 0.10 - 0.90 10*3/mm3 Final     Eosinophils, Absolute   Date Value Ref Range Status   04/06/2020 0.43 (H) 0.00 - 0.40 10*3/mm3 Final     Basophils, Absolute   Date Value Ref Range Status   04/06/2020 0.09 0.00 - 0.20 10*3/mm3 Final     Immature Grans, Absolute   Date Value Ref Range Status   01/06/2020 0.04 0.00 - 0.05 10*3/mm3 Final     nRBC   Date Value Ref Range Status   03/30/2020 0.1 0.0 - 0.2 /100 WBC Final       Lab Results   Component Value Date    GLUCOSE 144 (H) 03/07/2020    BUN 33 (H) 03/07/2020    CREATININE 1.55 (H) 03/07/2020    EGFRIFNONA 44 (L) 03/07/2020    BCR 21.3 03/07/2020    K 3.5 03/07/2020    CO2 25.0 03/07/2020    CALCIUM 8.5 (L)  03/07/2020    ALBUMIN 3.50 03/07/2020    LABIL2 0.4 (L) 05/29/2019    AST 25 03/07/2020    ALT 17 03/07/2020         Assessment/Plan      ASSESSMENT:     GAVE (gastric antral vascular ectasia)     Anemia in stage 3 chronic kidney disease (CMS/HCC)     History of Vitamin B12 deficiency     History of DVT of right lower extremity     History of bilateral pulmonary embolus (PE)     Antithrombin III deficiency (CMS/HCC)     Protein C deficiency (CMS/HCC)     Protein S deficiency (CMS/HCC)     Antiphospholipid antibody positive     Elevated factor VIII level     THOMAS (nonalcoholic steatohepatitis)     Splenomegaly     IgG kappa MGUS    PLAN:      1. Hemoglobin is low iron deficiency has resolved I will restart Procrit injections.  2. Check CBC q. other week and administer Procrit 30,000 units if the hemoglobin is less than 10  3. Patient is off anticoagulation given his GI bleed.  Keep him off anticoagulation  4. Patient to stay off oral iron tablets given that he received Injectafer infusions.  5. Patient follows with Dr. Gomez regarding his Thomas and portal hypertension.  Continue to use lactulose for his high ammonia.  He tolerated the ERCP and stent removal.  6. Patient has hyper coag work-up but off anticoagulation secondary to GI bleeds.    7. I will see him back in my office in 6 weeks recheck CBC at the time     I have reviewed labs results, imaging, vitals, and medications with the patient today. Patient verbalized understanding and is in agreement of the above plan.  Electronically signed by Joceline Pandey MD, 04/06/20, 5:29 PM.          This report was compiled using Dragon voice recognition software. I have made every effort to proof read this document; however, typographical errors may persist.

## 2020-04-17 ENCOUNTER — TELEPHONE (OUTPATIENT)
Dept: ONCOLOGY | Facility: HOSPITAL | Age: 75
End: 2020-04-17

## 2020-04-17 NOTE — TELEPHONE ENCOUNTER
Case Management/ Note    Patient Name: Bry Ratliff  YOB: 1945  MRN #: [id number]    OSW called patient for prescreening. While on the phone his wife Lisa said he fell and is unable to transfer unassisted. She said she has called Dr. Harris's office and was told not to go to the ED due to COVID-19. In addition, she was told by Dr. Harris's office that she could call for an ambulance to take him to the [facility] for his lab appointment on Monday. She was advised that while she can do this, they would likely have a large bill as there would be no medical necessity for an ambulance transport to do lab work. She gave v/u. She said she would call friends / family to help get him into the car for his appointment on Monday. Further, she asked if we had a CT scan as she said Dr. Harris said for us to do a CT scan. Explained to her that if Dr. Harris wants a CT scan at our facility that she would need to write an order and send it to us, also, insurance would need to approve this. She gave v/u. OSW told her that JASON Garcia and NAKITA Mejia would be contacted regarding his appointment on Monday to determine if this could be rescheduled since patient is not transferring well after a fall. She will await a call back regarding Monday's appointment.     Electronically signed by:   Kristine Ellis LCSW, OSW-C  04/17/20, 11:34 AM

## 2020-04-20 ENCOUNTER — APPOINTMENT (OUTPATIENT)
Dept: ONCOLOGY | Facility: HOSPITAL | Age: 75
End: 2020-04-20

## 2020-04-20 ENCOUNTER — ANESTHESIA (OUTPATIENT)
Dept: GASTROENTEROLOGY | Facility: HOSPITAL | Age: 75
End: 2020-04-20

## 2020-04-20 ENCOUNTER — HOSPITAL ENCOUNTER (OUTPATIENT)
Facility: HOSPITAL | Age: 75
Setting detail: HOSPITAL OUTPATIENT SURGERY
Discharge: HOME | End: 2020-04-20
Attending: INTERNAL MEDICINE | Admitting: INTERNAL MEDICINE

## 2020-04-20 ENCOUNTER — APPOINTMENT (OUTPATIENT)
Dept: GENERAL RADIOLOGY | Facility: HOSPITAL | Age: 75
End: 2020-04-20

## 2020-04-20 ENCOUNTER — APPOINTMENT (OUTPATIENT)
Dept: LAB | Facility: HOSPITAL | Age: 75
End: 2020-04-20

## 2020-04-20 ENCOUNTER — ANESTHESIA EVENT (OUTPATIENT)
Dept: GASTROENTEROLOGY | Facility: HOSPITAL | Age: 75
End: 2020-04-20

## 2020-04-20 ENCOUNTER — ON CAMPUS - OUTPATIENT (AMBULATORY)
Dept: URBAN - METROPOLITAN AREA HOSPITAL 85 | Facility: HOSPITAL | Age: 75
End: 2020-04-20
Payer: COMMERCIAL

## 2020-04-20 VITALS
TEMPERATURE: 98.7 F | HEIGHT: 74 IN | WEIGHT: 295 LBS | HEART RATE: 79 BPM | SYSTOLIC BLOOD PRESSURE: 144 MMHG | DIASTOLIC BLOOD PRESSURE: 70 MMHG | RESPIRATION RATE: 13 BRPM | BODY MASS INDEX: 37.86 KG/M2 | OXYGEN SATURATION: 97 %

## 2020-04-20 DIAGNOSIS — K44.9 DIAPHRAGMATIC HERNIA WITHOUT OBSTRUCTION OR GANGRENE: ICD-10-CM

## 2020-04-20 DIAGNOSIS — K31.819 ANGIODYSPLASIA OF STOMACH AND DUODENUM WITHOUT BLEEDING: ICD-10-CM

## 2020-04-20 DIAGNOSIS — K83.1 OBSTRUCTION OF BILE DUCT: ICD-10-CM

## 2020-04-20 DIAGNOSIS — K83.8 OTHER SPECIFIED DISEASES OF BILIARY TRACT: ICD-10-CM

## 2020-04-20 LAB — GLUCOSE BLDC GLUCOMTR-MCNC: 183 MG/DL (ref 70–105)

## 2020-04-20 PROCEDURE — C1876 STENT, NON-COA/NON-COV W/DEL: HCPCS | Performed by: INTERNAL MEDICINE

## 2020-04-20 PROCEDURE — C1769 GUIDE WIRE: HCPCS | Performed by: INTERNAL MEDICINE

## 2020-04-20 PROCEDURE — 43276 ERCP STENT EXCHANGE W/DILATE: CPT | Performed by: INTERNAL MEDICINE

## 2020-04-20 PROCEDURE — 88177 CYTP FNA EVAL EA ADDL: CPT | Performed by: INTERNAL MEDICINE

## 2020-04-20 PROCEDURE — 25010000002 IOPAMIDOL 61 % SOLUTION: Performed by: INTERNAL MEDICINE

## 2020-04-20 PROCEDURE — 43238 EGD US FINE NEEDLE BX/ASPIR: CPT | Performed by: INTERNAL MEDICINE

## 2020-04-20 PROCEDURE — 25010000002 PROPOFOL 10 MG/ML EMULSION: Performed by: ANESTHESIOLOGIST ASSISTANT

## 2020-04-20 PROCEDURE — 88172 CYTP DX EVAL FNA 1ST EA SITE: CPT | Performed by: INTERNAL MEDICINE

## 2020-04-20 PROCEDURE — 25010000002 DEXAMETHASONE PER 1 MG: Performed by: ANESTHESIOLOGIST ASSISTANT

## 2020-04-20 PROCEDURE — 88173 CYTOPATH EVAL FNA REPORT: CPT | Performed by: INTERNAL MEDICINE

## 2020-04-20 PROCEDURE — 25010000002 ONDANSETRON PER 1 MG: Performed by: ANESTHESIOLOGIST ASSISTANT

## 2020-04-20 PROCEDURE — 43264 ERCP REMOVE DUCT CALCULI: CPT | Performed by: INTERNAL MEDICINE

## 2020-04-20 PROCEDURE — 25010000002 LEVOFLOXACIN PER 250 MG: Performed by: ANESTHESIOLOGIST ASSISTANT

## 2020-04-20 PROCEDURE — C1726 CATH, BAL DIL, NON-VASCULAR: HCPCS | Performed by: INTERNAL MEDICINE

## 2020-04-20 PROCEDURE — 25010000002 FENTANYL CITRATE (PF) 100 MCG/2ML SOLUTION: Performed by: ANESTHESIOLOGIST ASSISTANT

## 2020-04-20 PROCEDURE — 82962 GLUCOSE BLOOD TEST: CPT

## 2020-04-20 PROCEDURE — 74328 X-RAY BILE DUCT ENDOSCOPY: CPT

## 2020-04-20 PROCEDURE — 88305 TISSUE EXAM BY PATHOLOGIST: CPT | Performed by: INTERNAL MEDICINE

## 2020-04-20 PROCEDURE — 88104 CYTOPATH FL NONGYN SMEARS: CPT | Performed by: INTERNAL MEDICINE

## 2020-04-20 DEVICE — STNT OASIS 11.5F 9CM: Type: IMPLANTABLE DEVICE | Site: BILE DUCT | Status: FUNCTIONAL

## 2020-04-20 RX ORDER — PHENYLEPHRINE HCL IN 0.9% NACL 0.5 MG/5ML
SYRINGE (ML) INTRAVENOUS AS NEEDED
Status: DISCONTINUED | OUTPATIENT
Start: 2020-04-20 | End: 2020-04-20 | Stop reason: SURG

## 2020-04-20 RX ORDER — IPRATROPIUM BROMIDE AND ALBUTEROL SULFATE 2.5; .5 MG/3ML; MG/3ML
3 SOLUTION RESPIRATORY (INHALATION) ONCE AS NEEDED
Status: DISCONTINUED | OUTPATIENT
Start: 2020-04-20 | End: 2020-04-20 | Stop reason: HOSPADM

## 2020-04-20 RX ORDER — HYDROCODONE BITARTRATE AND ACETAMINOPHEN 10; 325 MG/1; MG/1
1 TABLET ORAL ONCE AS NEEDED
Status: DISCONTINUED | OUTPATIENT
Start: 2020-04-20 | End: 2020-04-20 | Stop reason: HOSPADM

## 2020-04-20 RX ORDER — DEXAMETHASONE SODIUM PHOSPHATE 4 MG/ML
INJECTION, SOLUTION INTRA-ARTICULAR; INTRALESIONAL; INTRAMUSCULAR; INTRAVENOUS; SOFT TISSUE AS NEEDED
Status: DISCONTINUED | OUTPATIENT
Start: 2020-04-20 | End: 2020-04-20 | Stop reason: SURG

## 2020-04-20 RX ORDER — LEVOFLOXACIN 5 MG/ML
INJECTION, SOLUTION INTRAVENOUS AS NEEDED
Status: DISCONTINUED | OUTPATIENT
Start: 2020-04-20 | End: 2020-04-20 | Stop reason: SURG

## 2020-04-20 RX ORDER — SODIUM CHLORIDE 9 MG/ML
20 INJECTION, SOLUTION INTRAVENOUS ONCE
Status: COMPLETED | OUTPATIENT
Start: 2020-04-20 | End: 2020-04-20

## 2020-04-20 RX ORDER — LIDOCAINE HYDROCHLORIDE 10 MG/ML
INJECTION, SOLUTION EPIDURAL; INFILTRATION; INTRACAUDAL; PERINEURAL AS NEEDED
Status: DISCONTINUED | OUTPATIENT
Start: 2020-04-20 | End: 2020-04-20 | Stop reason: SURG

## 2020-04-20 RX ORDER — PROMETHAZINE HYDROCHLORIDE 25 MG/ML
12.5 INJECTION, SOLUTION INTRAMUSCULAR; INTRAVENOUS ONCE AS NEEDED
Status: DISCONTINUED | OUTPATIENT
Start: 2020-04-20 | End: 2020-04-20 | Stop reason: HOSPADM

## 2020-04-20 RX ORDER — WATER 1000 ML/1000ML
INJECTION, SOLUTION INTRAVENOUS
Status: DISCONTINUED
Start: 2020-04-20 | End: 2020-04-20 | Stop reason: WASHOUT

## 2020-04-20 RX ORDER — MEPERIDINE HYDROCHLORIDE 25 MG/ML
12.5 INJECTION INTRAMUSCULAR; INTRAVENOUS; SUBCUTANEOUS
Status: DISCONTINUED | OUTPATIENT
Start: 2020-04-20 | End: 2020-04-20 | Stop reason: HOSPADM

## 2020-04-20 RX ORDER — SODIUM CHLORIDE 9 MG/ML
INJECTION, SOLUTION INTRAVENOUS CONTINUOUS PRN
Status: DISCONTINUED | OUTPATIENT
Start: 2020-04-20 | End: 2020-04-20 | Stop reason: SURG

## 2020-04-20 RX ORDER — PROMETHAZINE HYDROCHLORIDE 25 MG/1
25 SUPPOSITORY RECTAL ONCE AS NEEDED
Status: DISCONTINUED | OUTPATIENT
Start: 2020-04-20 | End: 2020-04-20 | Stop reason: HOSPADM

## 2020-04-20 RX ORDER — FENTANYL CITRATE 50 UG/ML
INJECTION, SOLUTION INTRAMUSCULAR; INTRAVENOUS AS NEEDED
Status: DISCONTINUED | OUTPATIENT
Start: 2020-04-20 | End: 2020-04-20 | Stop reason: SURG

## 2020-04-20 RX ORDER — HYDROMORPHONE HCL 110MG/55ML
0.5 PATIENT CONTROLLED ANALGESIA SYRINGE INTRAVENOUS
Status: DISCONTINUED | OUTPATIENT
Start: 2020-04-20 | End: 2020-04-20 | Stop reason: HOSPADM

## 2020-04-20 RX ORDER — LEVOFLOXACIN 5 MG/ML
INJECTION, SOLUTION INTRAVENOUS
Status: COMPLETED
Start: 2020-04-20 | End: 2020-04-20

## 2020-04-20 RX ORDER — SODIUM CHLORIDE 9 MG/ML
INJECTION INTRAVENOUS
Status: DISCONTINUED
Start: 2020-04-20 | End: 2020-04-20 | Stop reason: HOSPADM

## 2020-04-20 RX ORDER — ROCURONIUM BROMIDE 10 MG/ML
INJECTION, SOLUTION INTRAVENOUS AS NEEDED
Status: DISCONTINUED | OUTPATIENT
Start: 2020-04-20 | End: 2020-04-20 | Stop reason: SURG

## 2020-04-20 RX ORDER — EPHEDRINE SULFATE 50 MG/ML
5 INJECTION, SOLUTION INTRAVENOUS ONCE AS NEEDED
Status: DISCONTINUED | OUTPATIENT
Start: 2020-04-20 | End: 2020-04-20 | Stop reason: HOSPADM

## 2020-04-20 RX ORDER — HYDRALAZINE HYDROCHLORIDE 20 MG/ML
5 INJECTION INTRAMUSCULAR; INTRAVENOUS
Status: DISCONTINUED | OUTPATIENT
Start: 2020-04-20 | End: 2020-04-20 | Stop reason: HOSPADM

## 2020-04-20 RX ORDER — LABETALOL HYDROCHLORIDE 5 MG/ML
5 INJECTION, SOLUTION INTRAVENOUS
Status: DISCONTINUED | OUTPATIENT
Start: 2020-04-20 | End: 2020-04-20 | Stop reason: HOSPADM

## 2020-04-20 RX ORDER — PROMETHAZINE HYDROCHLORIDE 25 MG/1
25 TABLET ORAL ONCE AS NEEDED
Status: DISCONTINUED | OUTPATIENT
Start: 2020-04-20 | End: 2020-04-20 | Stop reason: HOSPADM

## 2020-04-20 RX ORDER — PROPOFOL 10 MG/ML
VIAL (ML) INTRAVENOUS AS NEEDED
Status: DISCONTINUED | OUTPATIENT
Start: 2020-04-20 | End: 2020-04-20 | Stop reason: SURG

## 2020-04-20 RX ORDER — DIPHENHYDRAMINE HYDROCHLORIDE 50 MG/ML
12.5 INJECTION INTRAMUSCULAR; INTRAVENOUS
Status: DISCONTINUED | OUTPATIENT
Start: 2020-04-20 | End: 2020-04-20 | Stop reason: HOSPADM

## 2020-04-20 RX ORDER — ONDANSETRON 2 MG/ML
4 INJECTION INTRAMUSCULAR; INTRAVENOUS ONCE AS NEEDED
Status: COMPLETED | OUTPATIENT
Start: 2020-04-20 | End: 2020-04-20

## 2020-04-20 RX ADMIN — LIDOCAINE HYDROCHLORIDE 50 MG: 10 INJECTION, SOLUTION EPIDURAL; INFILTRATION; INTRACAUDAL; PERINEURAL at 12:09

## 2020-04-20 RX ADMIN — METRONIDAZOLE 500 MG: 500 INJECTION, SOLUTION INTRAVENOUS at 12:39

## 2020-04-20 RX ADMIN — SODIUM CHLORIDE 20 ML/HR: 0.9 INJECTION, SOLUTION INTRAVENOUS at 11:45

## 2020-04-20 RX ADMIN — SODIUM CHLORIDE: 0.9 INJECTION, SOLUTION INTRAVENOUS at 11:54

## 2020-04-20 RX ADMIN — FENTANYL CITRATE 100 MCG: 50 INJECTION, SOLUTION INTRAMUSCULAR; INTRAVENOUS at 12:09

## 2020-04-20 RX ADMIN — ONDANSETRON 4 MG: 2 INJECTION INTRAMUSCULAR; INTRAVENOUS at 12:09

## 2020-04-20 RX ADMIN — LEVOFLOXACIN 500 MG: 5 INJECTION, SOLUTION INTRAVENOUS at 12:50

## 2020-04-20 RX ADMIN — ROCURONIUM BROMIDE 80 MG: 10 INJECTION, SOLUTION INTRAVENOUS at 12:10

## 2020-04-20 RX ADMIN — PHENYLEPHRINE HYDROCHLORIDE 100 MCG: 10 INJECTION INTRAVENOUS at 12:09

## 2020-04-20 RX ADMIN — PROPOFOL 150 MG: 10 INJECTION, EMULSION INTRAVENOUS at 12:09

## 2020-04-20 RX ADMIN — DEXAMETHASONE SODIUM PHOSPHATE 4 MG: 4 INJECTION, SOLUTION INTRAMUSCULAR; INTRAVENOUS at 12:09

## 2020-04-20 RX ADMIN — SUGAMMADEX 200 MG: 100 INJECTION, SOLUTION INTRAVENOUS at 13:22

## 2020-04-20 NOTE — ANESTHESIA PREPROCEDURE EVALUATION
Anesthesia Evaluation     Patient summary reviewed and Nursing notes reviewed   no history of anesthetic complications:  NPO Solid Status: > 8 hours  NPO Liquid Status: > 8 hours           Airway   Mallampati: II  TM distance: >3 FB  Neck ROM: full  No difficulty expected  Dental      Pulmonary     breath sounds clear to auscultation  (+) pulmonary embolism, sleep apnea on CPAP,   Cardiovascular     ECG reviewed  Rhythm: regular  Rate: normal    (+) hypertension, CAD, DVT (filter) resolved, hyperlipidemia,       Neuro/Psych  (+) psychiatric history Anxiety and Depression,     GI/Hepatic/Renal/Endo    (+) obesity,  GERD, PUD,  liver disease fatty liver disease, renal disease CRI and stones, diabetes mellitus type 2 using insulin,     Musculoskeletal     Abdominal     Abdomen: soft.   Substance History - negative use     OB/GYN negative ob/gyn ROS         Other   arthritis,                      Anesthesia Plan    ASA 3     general     intravenous induction     Anesthetic plan, all risks, benefits, and alternatives have been provided, discussed and informed consent has been obtained with: patient.    Plan discussed with CAA.

## 2020-04-20 NOTE — ANESTHESIA POSTPROCEDURE EVALUATION
Patient: Bry Ratliff    Procedure Summary     Date:  04/20/20 Room / Location:  [facility] ENDOSCOPY 2 / [facility] ENDOSCOPY    Anesthesia Start:  1204 Anesthesia Stop:  1331    Procedures:       Esophagogastroduodenoscopy with Endoscopic ultrasound and fine needle aspiration (N/A )      ENDOSCOPIC RETROGRADE CHOLANGIOPANCREATOGRAPHY WITH REMOVAL OF BILIARY STENT, AMPULA DILATION TO 8MM, BRUSHING OF COMMON BILE DUT, CLEARANCE OF BILE DUCT WITH BALLOON, BIOPSY OF AMPULA, PLACEMENT OF BILIARY STENT (N/A ) Diagnosis:       Obstruction of bile duct      (OBSTRUCTION OF BILE DUCT)    Surgeon:  Marisel Gomez MD Provider:  Anna Etienne MD    Anesthesia Type:  general ASA Status:  3          Anesthesia Type: general    Vitals  Vitals Value Taken Time   /70 4/20/2020  2:13 PM   Temp 98.7 °F (37.1 °C) 4/20/2020  1:28 PM   Pulse 79 4/20/2020  2:13 PM   Resp 13 4/20/2020  2:13 PM   SpO2 97 % 4/20/2020  2:13 PM           Post Anesthesia Care and Evaluation    Patient location during evaluation: PACU  Patient participation: complete - patient participated  Level of consciousness: awake  Pain scale: See nurse's notes for pain score.  Pain management: adequate  Airway patency: patent  Anesthetic complications: No anesthetic complications  PONV Status: none  Cardiovascular status: acceptable  Respiratory status: acceptable  Hydration status: acceptable    Comments: Patient seen and examined postoperatively; vital signs stable; SpO2 greater than or equal to 90%; cardiopulmonary status stable; nausea/vomiting adequately controlled; pain adequately controlled; no apparent anesthesia complications; patient discharged from anesthesia care when discharge criteria were met

## 2020-04-20 NOTE — ANESTHESIA PROCEDURE NOTES
Airway  Urgency: elective    Date/Time: 4/20/2020 12:04 PM  Airway not difficult    General Information and Staff    Patient location during procedure: OR  Anesthesiologist: Caio Ward MD    Indications and Patient Condition  Indications for airway management: airway protection    Preoxygenated: yes  MILS not maintained throughout  Mask difficulty assessment: 0 - not attempted    Final Airway Details  Final airway type: endotracheal airway      Successful airway: ETT  Cuffed: yes   Successful intubation technique: direct laryngoscopy and RSI  Facilitating devices/methods: intubating stylet  Endotracheal tube insertion site: oral  Blade: Vikram  Blade size: 3  ETT size (mm): 7.0  Cormack-Lehane Classification: grade I - full view of glottis  Placement verified by: capnometry   Measured from: lips  Number of attempts at approach: 1  Assessment: lips, teeth, and gum same as pre-op and atraumatic intubation    Additional Comments  Covid precautions

## 2020-04-21 LAB
LAB AP CASE REPORT: NORMAL
Lab: NORMAL
PATH REPORT.FINAL DX SPEC: NORMAL
PATH REPORT.GROSS SPEC: NORMAL

## 2020-04-22 ENCOUNTER — HOSPITAL ENCOUNTER (OUTPATIENT)
Dept: ONCOLOGY | Facility: HOSPITAL | Age: 75
Discharge: HOME | End: 2020-04-22

## 2020-04-22 ENCOUNTER — LAB (OUTPATIENT)
Dept: LAB | Facility: HOSPITAL | Age: 75
End: 2020-04-22

## 2020-04-22 DIAGNOSIS — N18.30 CKD (CHRONIC KIDNEY DISEASE) STAGE 3, GFR 30-59 ML/MIN (HCC): Primary | ICD-10-CM

## 2020-04-22 LAB
BASOPHILS # BLD AUTO: 0.05 10*3/MM3 (ref 0–0.2)
BASOPHILS NFR BLD AUTO: 0.5 % (ref 0–1.5)
DEPRECATED RDW RBC AUTO: 58.8 FL (ref 37–54)
EOSINOPHIL # BLD AUTO: 0.29 10*3/MM3 (ref 0–0.4)
EOSINOPHIL NFR BLD AUTO: 2.6 % (ref 0.3–6.2)
ERYTHROCYTE [DISTWIDTH] IN BLOOD BY AUTOMATED COUNT: 16.3 % (ref 12.3–15.4)
HCT VFR BLD AUTO: 33 % (ref 37.5–51)
HGB BLD-MCNC: 10.6 G/DL (ref 13–17.7)
LAB AP CASE REPORT: NORMAL
LAB AP CASE REPORT: NORMAL
LAB AP DIAGNOSIS COMMENT: NORMAL
LAB AP DIAGNOSIS COMMENT: NORMAL
LYMPHOCYTES # BLD AUTO: 1.76 10*3/MM3 (ref 0.7–3.1)
LYMPHOCYTES NFR BLD AUTO: 16 % (ref 19.6–45.3)
MCH RBC QN AUTO: 32.5 PG (ref 26.6–33)
MCHC RBC AUTO-ENTMCNC: 32.1 G/DL (ref 31.5–35.7)
MCV RBC AUTO: 101.2 FL (ref 79–97)
MONOCYTES # BLD AUTO: 1.03 10*3/MM3 (ref 0.1–0.9)
MONOCYTES NFR BLD AUTO: 9.4 % (ref 5–12)
NEUTROPHILS # BLD AUTO: 7.86 10*3/MM3 (ref 1.7–7)
NEUTROPHILS NFR BLD AUTO: 71.5 % (ref 42.7–76)
PATH REPORT.FINAL DX SPEC: NORMAL
PATH REPORT.FINAL DX SPEC: NORMAL
PATH REPORT.GROSS SPEC: NORMAL
PATH REPORT.GROSS SPEC: NORMAL
PLATELET # BLD AUTO: 143 10*3/MM3 (ref 140–450)
PMV BLD AUTO: 11.9 FL (ref 6–12)
RBC # BLD AUTO: 3.26 10*6/MM3 (ref 4.14–5.8)
WBC NRBC COR # BLD: 10.99 10*3/MM3 (ref 3.4–10.8)

## 2020-04-22 PROCEDURE — 36415 COLL VENOUS BLD VENIPUNCTURE: CPT

## 2020-04-22 PROCEDURE — 85025 COMPLETE CBC W/AUTO DIFF WBC: CPT

## 2020-05-04 ENCOUNTER — LAB (OUTPATIENT)
Dept: LAB | Facility: HOSPITAL | Age: 75
End: 2020-05-04

## 2020-05-04 ENCOUNTER — HOSPITAL ENCOUNTER (OUTPATIENT)
Dept: ONCOLOGY | Facility: HOSPITAL | Age: 75
Discharge: HOME | End: 2020-05-04
Admitting: NURSE PRACTITIONER

## 2020-05-04 VITALS
RESPIRATION RATE: 18 BRPM | TEMPERATURE: 98.7 F | HEART RATE: 99 BPM | SYSTOLIC BLOOD PRESSURE: 128 MMHG | DIASTOLIC BLOOD PRESSURE: 66 MMHG | HEIGHT: 74 IN | BODY MASS INDEX: 36.6 KG/M2 | WEIGHT: 285.2 LBS

## 2020-05-04 DIAGNOSIS — D47.2 MGUS (MONOCLONAL GAMMOPATHY OF UNKNOWN SIGNIFICANCE): ICD-10-CM

## 2020-05-04 DIAGNOSIS — D50.0 IRON DEFICIENCY ANEMIA DUE TO CHRONIC BLOOD LOSS: ICD-10-CM

## 2020-05-04 DIAGNOSIS — N18.30 CKD (CHRONIC KIDNEY DISEASE) STAGE 3, GFR 30-59 ML/MIN (HCC): Primary | ICD-10-CM

## 2020-05-04 LAB
BASOPHILS # BLD AUTO: 0.08 10*3/MM3 (ref 0–0.2)
BASOPHILS NFR BLD AUTO: 0.6 % (ref 0–1.5)
DEPRECATED RDW RBC AUTO: 58.1 FL (ref 37–54)
EOSINOPHIL # BLD AUTO: 0.52 10*3/MM3 (ref 0–0.4)
EOSINOPHIL NFR BLD AUTO: 4.1 % (ref 0.3–6.2)
ERYTHROCYTE [DISTWIDTH] IN BLOOD BY AUTOMATED COUNT: 15.9 % (ref 12.3–15.4)
HCT VFR BLD AUTO: 29.6 % (ref 37.5–51)
HGB BLD-MCNC: 9.5 G/DL (ref 13–17.7)
LYMPHOCYTES # BLD AUTO: 2.19 10*3/MM3 (ref 0.7–3.1)
LYMPHOCYTES NFR BLD AUTO: 17.3 % (ref 19.6–45.3)
MCH RBC QN AUTO: 32.9 PG (ref 26.6–33)
MCHC RBC AUTO-ENTMCNC: 32.1 G/DL (ref 31.5–35.7)
MCV RBC AUTO: 102.4 FL (ref 79–97)
MONOCYTES # BLD AUTO: 1.05 10*3/MM3 (ref 0.1–0.9)
MONOCYTES NFR BLD AUTO: 8.3 % (ref 5–12)
NEUTROPHILS # BLD AUTO: 8.79 10*3/MM3 (ref 1.7–7)
NEUTROPHILS NFR BLD AUTO: 69.7 % (ref 42.7–76)
PLATELET # BLD AUTO: 242 10*3/MM3 (ref 140–450)
PMV BLD AUTO: 11 FL (ref 6–12)
RBC # BLD AUTO: 2.89 10*6/MM3 (ref 4.14–5.8)
WBC NRBC COR # BLD: 12.63 10*3/MM3 (ref 3.4–10.8)

## 2020-05-04 PROCEDURE — 25010000002 EPOETIN ALFA-EPBX 40000 UNIT/ML SOLUTION: Performed by: NURSE PRACTITIONER

## 2020-05-04 PROCEDURE — 96372 THER/PROPH/DIAG INJ SC/IM: CPT

## 2020-05-04 PROCEDURE — 36415 COLL VENOUS BLD VENIPUNCTURE: CPT

## 2020-05-04 PROCEDURE — 85025 COMPLETE CBC W/AUTO DIFF WBC: CPT

## 2020-05-04 RX ADMIN — EPOETIN ALFA-EPBX 40000 UNITS: 40000 INJECTION, SOLUTION INTRAVENOUS; SUBCUTANEOUS at 11:02

## 2020-05-05 ENCOUNTER — LAB (OUTPATIENT)
Dept: LAB | Facility: HOSPITAL | Age: 75
End: 2020-05-05

## 2020-05-05 DIAGNOSIS — E11.22 TYPE 2 DIABETES MELLITUS WITH STAGE 3 CHRONIC KIDNEY DISEASE, WITH LONG-TERM CURRENT USE OF INSULIN (HCC): ICD-10-CM

## 2020-05-05 DIAGNOSIS — E78.2 MIXED HYPERLIPIDEMIA: ICD-10-CM

## 2020-05-05 DIAGNOSIS — E03.9 ACQUIRED HYPOTHYROIDISM: ICD-10-CM

## 2020-05-05 DIAGNOSIS — K74.60 HEPATIC CIRRHOSIS, UNSPECIFIED HEPATIC CIRRHOSIS TYPE, UNSPECIFIED WHETHER ASCITES PRESENT (HCC): ICD-10-CM

## 2020-05-05 DIAGNOSIS — I10 ESSENTIAL HYPERTENSION: ICD-10-CM

## 2020-05-05 DIAGNOSIS — Z79.4 TYPE 2 DIABETES MELLITUS WITH STAGE 3 CHRONIC KIDNEY DISEASE, WITH LONG-TERM CURRENT USE OF INSULIN (HCC): ICD-10-CM

## 2020-05-05 DIAGNOSIS — N18.30 TYPE 2 DIABETES MELLITUS WITH STAGE 3 CHRONIC KIDNEY DISEASE, WITH LONG-TERM CURRENT USE OF INSULIN (HCC): ICD-10-CM

## 2020-05-05 LAB
ALBUMIN SERPL-MCNC: 3 G/DL (ref 3.5–5.2)
ALBUMIN UR-MCNC: <1.2 MG/DL
ALBUMIN/GLOB SERPL: 0.7 G/DL
ALP SERPL-CCNC: 243 U/L (ref 39–117)
ALT SERPL W P-5'-P-CCNC: 71 U/L (ref 1–41)
ANION GAP SERPL CALCULATED.3IONS-SCNC: 14.6 MMOL/L (ref 5–15)
AST SERPL-CCNC: 146 U/L (ref 1–40)
BILIRUB SERPL-MCNC: 0.4 MG/DL (ref 0.2–1.2)
BUN BLD-MCNC: 34 MG/DL (ref 8–23)
BUN/CREAT SERPL: 17.5 (ref 7–25)
CALCIUM SPEC-SCNC: 8.4 MG/DL (ref 8.6–10.5)
CHLORIDE SERPL-SCNC: 98 MMOL/L (ref 98–107)
CO2 SERPL-SCNC: 25.4 MMOL/L (ref 22–29)
CREAT BLD-MCNC: 1.94 MG/DL (ref 0.76–1.27)
CREAT UR-MCNC: 69 MG/DL
GFR SERPL CREATININE-BSD FRML MDRD: 34 ML/MIN/1.73
GLOBULIN UR ELPH-MCNC: 4.3 GM/DL
GLUCOSE BLD-MCNC: 142 MG/DL (ref 65–99)
MICROALBUMIN/CREAT UR: NORMAL MG/G{CREAT}
POTASSIUM BLD-SCNC: 4.1 MMOL/L (ref 3.5–5.2)
PROT SERPL-MCNC: 7.3 G/DL (ref 6–8.5)
SODIUM BLD-SCNC: 138 MMOL/L (ref 136–145)

## 2020-05-05 PROCEDURE — 82570 ASSAY OF URINE CREATININE: CPT

## 2020-05-05 PROCEDURE — 36415 COLL VENOUS BLD VENIPUNCTURE: CPT

## 2020-05-05 PROCEDURE — 83036 HEMOGLOBIN GLYCOSYLATED A1C: CPT

## 2020-05-05 PROCEDURE — 82043 UR ALBUMIN QUANTITATIVE: CPT

## 2020-05-05 PROCEDURE — 80053 COMPREHEN METABOLIC PANEL: CPT

## 2020-05-06 LAB — HBA1C MFR BLD: 6.2 % (ref 3.5–5.6)

## 2020-05-07 ENCOUNTER — OFFICE (AMBULATORY)
Dept: URBAN - METROPOLITAN AREA CLINIC 64 | Facility: CLINIC | Age: 75
End: 2020-05-07
Payer: COMMERCIAL

## 2020-05-07 VITALS — HEIGHT: 74 IN

## 2020-05-07 DIAGNOSIS — K22.70 BARRETT'S ESOPHAGUS WITHOUT DYSPLASIA: ICD-10-CM

## 2020-05-07 DIAGNOSIS — K74.69 OTHER CIRRHOSIS OF LIVER: ICD-10-CM

## 2020-05-07 DIAGNOSIS — K80.50 CALCULUS OF BILE DUCT WITHOUT CHOLANGITIS OR CHOLECYSTITIS W: ICD-10-CM

## 2020-05-07 DIAGNOSIS — K31.819 ANGIODYSPLASIA OF STOMACH AND DUODENUM WITHOUT BLEEDING: ICD-10-CM

## 2020-05-07 PROCEDURE — 99213 OFFICE O/P EST LOW 20 MIN: CPT | Mod: 95 | Performed by: INTERNAL MEDICINE

## 2020-05-11 RX ORDER — CYANOCOBALAMIN 1000 UG/ML
INJECTION, SOLUTION INTRAMUSCULAR; SUBCUTANEOUS
COMMUNITY
Start: 2020-04-07 | End: 2020-05-11 | Stop reason: SDUPTHER

## 2020-05-11 RX ORDER — BLOOD SUGAR DIAGNOSTIC
STRIP MISCELLANEOUS
COMMUNITY
Start: 2020-03-20 | End: 2021-01-01 | Stop reason: SDUPTHER

## 2020-05-11 RX ORDER — ZINC SULFATE 50(220)MG
220 CAPSULE ORAL DAILY
COMMUNITY
Start: 2020-03-16 | End: 2021-01-01 | Stop reason: HOSPADM

## 2020-05-11 RX ORDER — SYRINGE WITH NEEDLE, 1 ML 25GX5/8"
SYRINGE, EMPTY DISPOSABLE MISCELLANEOUS
COMMUNITY
Start: 2020-04-07 | End: 2021-01-01

## 2020-05-12 ENCOUNTER — HOSPITAL ENCOUNTER (OUTPATIENT)
Dept: GENERAL RADIOLOGY | Facility: HOSPITAL | Age: 75
Discharge: HOME | End: 2020-05-12

## 2020-05-12 ENCOUNTER — HOSPITAL ENCOUNTER (OUTPATIENT)
Dept: CT IMAGING | Facility: HOSPITAL | Age: 75
Discharge: HOME | End: 2020-05-12
Admitting: INTERNAL MEDICINE

## 2020-05-12 ENCOUNTER — HOSPITAL ENCOUNTER (OUTPATIENT)
Dept: INTERVENTIONAL RADIOLOGY/VASCULAR | Facility: HOSPITAL | Age: 75
Discharge: HOME | End: 2020-05-12

## 2020-05-12 VITALS
HEIGHT: 74 IN | DIASTOLIC BLOOD PRESSURE: 46 MMHG | WEIGHT: 288 LBS | RESPIRATION RATE: 12 BRPM | OXYGEN SATURATION: 97 % | HEART RATE: 74 BPM | SYSTOLIC BLOOD PRESSURE: 124 MMHG | BODY MASS INDEX: 36.96 KG/M2

## 2020-05-12 DIAGNOSIS — K80.50 COMMON BILE DUCT STONE: ICD-10-CM

## 2020-05-12 DIAGNOSIS — M54.9 BACK PAIN: ICD-10-CM

## 2020-05-12 DIAGNOSIS — K74.60 CIRRHOSIS OF LIVER (HCC): ICD-10-CM

## 2020-05-12 DIAGNOSIS — K22.70 BARRETT'S ESOPHAGUS DETERMINED BY ENDOSCOPY: ICD-10-CM

## 2020-05-12 DIAGNOSIS — K31.819 GASTRIC ANTRAL VASCULAR ECTASIA: ICD-10-CM

## 2020-05-12 DIAGNOSIS — K63.2 ENTEROCUTANEOUS FISTULA: ICD-10-CM

## 2020-05-12 PROCEDURE — C1769 GUIDE WIRE: HCPCS

## 2020-05-12 PROCEDURE — 72110 X-RAY EXAM L-2 SPINE 4/>VWS: CPT

## 2020-05-12 PROCEDURE — 99152 MOD SED SAME PHYS/QHP 5/>YRS: CPT

## 2020-05-12 PROCEDURE — 0 IOPAMIDOL PER 1 ML: Performed by: INTERNAL MEDICINE

## 2020-05-12 PROCEDURE — C1729 CATH, DRAINAGE: HCPCS

## 2020-05-12 PROCEDURE — 25010000002 MIDAZOLAM PER 1 MG: Performed by: RADIOLOGY

## 2020-05-12 PROCEDURE — 74176 CT ABD & PELVIS W/O CONTRAST: CPT

## 2020-05-12 PROCEDURE — 76080 X-RAY EXAM OF FISTULA: CPT

## 2020-05-12 PROCEDURE — 25010000002 FENTANYL CITRATE (PF) 100 MCG/2ML SOLUTION: Performed by: RADIOLOGY

## 2020-05-12 PROCEDURE — 75984 XRAY CONTROL CATHETER CHANGE: CPT

## 2020-05-12 RX ORDER — FENTANYL CITRATE 50 UG/ML
INJECTION, SOLUTION INTRAMUSCULAR; INTRAVENOUS
Status: COMPLETED | OUTPATIENT
Start: 2020-05-12 | End: 2020-05-12

## 2020-05-12 RX ORDER — OXYCODONE HYDROCHLORIDE 10 MG/1
10 TABLET ORAL EVERY 8 HOURS PRN
COMMUNITY
Start: 2020-05-08 | End: 2021-01-01 | Stop reason: HOSPADM

## 2020-05-12 RX ORDER — SODIUM CHLORIDE 9 MG/ML
30 INJECTION, SOLUTION INTRAVENOUS CONTINUOUS
Status: DISCONTINUED | OUTPATIENT
Start: 2020-05-12 | End: 2020-05-13 | Stop reason: HOSPADM

## 2020-05-12 RX ORDER — ZINC OXIDE 40 %
OINTMENT (GRAM) TOPICAL
COMMUNITY
Start: 2020-05-07 | End: 2020-01-01

## 2020-05-12 RX ORDER — MIDAZOLAM HYDROCHLORIDE 1 MG/ML
INJECTION INTRAMUSCULAR; INTRAVENOUS
Status: COMPLETED | OUTPATIENT
Start: 2020-05-12 | End: 2020-05-12

## 2020-05-12 RX ORDER — SODIUM CHLORIDE 0.9 % (FLUSH) 0.9 %
10 SYRINGE (ML) INJECTION AS NEEDED
Status: DISCONTINUED | OUTPATIENT
Start: 2020-05-12 | End: 2020-05-13 | Stop reason: HOSPADM

## 2020-05-12 RX ADMIN — IOPAMIDOL 14 ML: 755 INJECTION, SOLUTION INTRAVENOUS at 08:55

## 2020-05-12 RX ADMIN — FENTANYL CITRATE 50 MCG: 50 INJECTION, SOLUTION INTRAMUSCULAR; INTRAVENOUS at 09:58

## 2020-05-12 RX ADMIN — Medication 10 ML: at 09:35

## 2020-05-12 RX ADMIN — MIDAZOLAM 0.5 MG: 1 INJECTION INTRAMUSCULAR; INTRAVENOUS at 09:58

## 2020-05-12 RX ADMIN — CEFAZOLIN SODIUM 2 G: 1 INJECTION, POWDER, FOR SOLUTION INTRAMUSCULAR; INTRAVENOUS at 09:41

## 2020-05-12 RX ADMIN — SODIUM CHLORIDE 30 ML/HR: 0.9 INJECTION, SOLUTION INTRAVENOUS at 09:36

## 2020-05-12 NOTE — NURSING NOTE
Current dressing removed from right lateral abdomen/flank region and prepped in sterile fashion using chlorhexidine scrub.

## 2020-05-12 NOTE — NURSING NOTE
Pt assisted from wheelchair onto stretcher and was then taken into IR procedure room. Pt was assisted from stretcher onto IR exam table into prone position. Pt declined warm blanket.

## 2020-05-12 NOTE — NURSING NOTE
Dr. Donahue placed a 10F abscession drain into pt's right lateral abdomen, under flouro guidance.

## 2020-05-12 NOTE — DISCHARGE INSTRUCTIONS
Enterocutaneous Fistula  An intestinal fistula is an abnormal connection (fistula) that can develop between the intestines and another part of your body. The most common type of intestinal fistula is called an enterocutaneous fistula (ECF). It can cause fluid from the intestines to leak out through the skin. Most ECFs start in the small or large intestine then drain through the skin of the abdomen. They most often develop after abdomen (abdominal) surgery.  What are the causes?  An ECF is usually caused by surgery that involves the intestines. A leak may occur where an opening was made into the intestines and then closed, or it may occur if the intestines were punctured accidentally during surgery. Other causes of ECF include:  · An injury (trauma) to the abdomen.  · Radiation treatments on the abdomen.  · A perforated ulcer.  · An inflammatory bowel disease (IBD), such as Crohn disease.  · Infection from another condition that affects the intestines, such as appendicitis or diverticulitis.  · An incision into a tumor.  · Poor nutrition or not getting enough nutrients from food (malnutrition).  What are the signs or symptoms?  Common symptoms of this condition include:  · Drainage from an abdominal incision area or from an opening of the abdominal skin.  · Abdominal pain.  · Severe pain when pressing on the abdomen.  · Abdominal swelling.  · The abdomen feeling hard and looking bigger than normal (distended).  · Diarrhea.  · Fever.  · Rapid heartbeat.  How is this diagnosed?  This condition may be diagnosed based on:  · Your symptoms and your medical history. Your health care provider may suspect this condition if you have symptoms after abdominal surgery or if you have another risk factor for an ECF.  · A physical exam.  · Tests, such as:  ? Blood tests to check for infection and loss of blood minerals (electrolytes).  ? X-rays that are done after a type of dye (contrast) is put into the fistula or  intestines.  ? Imaging studies such as a CT scan, ultrasound, or MRI.  How is this treated?  The usual goal of treatment is to help the fistula heal over time. You may start this medical treatment in the hospital. It may take several weeks of close and careful monitoring. Treatment may include:  · IV fluids to keep you hydrated and to replace the fluids and certain minerals (electrolytes)that your body needs.  · IV nutrition.  · Tube feeding.  · Antibiotic medicines given through an IV.  · A procedure to drain a sac of pus (abscess) that may have collected in the abdomen.  · Medicines to reduce stomach acid and secretions.  · Medicines that are placed inside the fistula to seal it.  · A bandage (dressing) or another form of skin protection.  · A pouch to collect the fluid may be put in place.  If the fistula does not close after a couple months, surgery may be done to close the fistula. You may be more likely to need surgery if you developed an ECF after having radiation, infection, or a tumor.  Follow these instructions at home:  Medicines  · Take your antibiotic medicine as told by your health care provider. Do not stop taking the antibiotic even if you start to feel better.  · Take over-the-counter and prescription medicines only as told by your health care provider.  Fistula care  · Follow instructions from your health care provider about how to take care of your fistula. Make sure you:  ? Wash your hands with soap and water before you change your dressings. If soap and water are not available, use hand .  ? Change your dressings as told by your health care provider.  · Check your fistula site every day for signs of infection. Check for:  ? Redness, swelling, or pain.  ? More fluid or blood.  ? Warmth.  ? Pus or a bad smell.  General instructions    · Do not take baths, swim, or use a hot tub until your health care provider approves. Ask your health care provider if you may take showers. You may only be  allowed to take sponge baths.  · Follow instructions from your health care provider about eating or drinking.  · Drink enough fluid to keep your urine pale yellow.  · If you are prescribed a skin protection or antibiotic ointment, apply it as told by your health care provider. Do not stop using the ointment even if your condition improves.  · Return to your normal activities as told by your health care provider. Ask your health care provider what activities are safe for you.  · Do not use any products that contain nicotine or tobacco, such as cigarettes and e-cigarettes. These can delay healing. If you need help quitting, ask your health care provider.  · Keep all follow-up visits as told by your health care provider. This is important.  Contact a health care provider if:  · You have chills or a fever.  · You have redness, swelling, or pain around your fistula.  · Your fistula feels warm to the touch.  · You have pus or a bad smell coming from your fistula.  · You have more fluid or blood coming from your fistula area.  · Your fistula starts to drain more often or more heavily.  · You have persistent diarrhea.  · You are losing weight.  · Your pain medicine is not helping.  Get help right away if:  · You have severe abdominal pain.  · You have persistent bleeding from your fistula.  · You cannot eat or drink without vomiting.  Summary  · An intestinal fistula known as an ECF is an abnormal connection that develops between an area of your intestines and your skin.  · If you have an ECF, the contents of your intestines can leak out through your skin.  · An ECF is usually caused by surgery that involves the abdomen or intestines.  · Treatment for this condition usually starts with medical treatment to help the fistula heal over time.  · You may need surgery to close the fistula if it has not closed after a couple months.  This information is not intended to replace advice given to you by your health care provider. Make  sure you discuss any questions you have with your health care provider.  Document Released: 08/08/2018 Document Revised: 08/08/2018 Document Reviewed: 08/08/2018  [employer] Interactive Patient Education © 2020 [employer]    Percutaneous Abscess Drain, Care After  This sheet gives you information about how to care for yourself after your procedure. Your health care provider may also give you more specific instructions. If you have problems or questions, contact your health care provider.  What can I expect after the procedure?  After your procedure, it is common to have:  · A small amount of bruising and discomfort in the area where the drainage tube (catheter) was placed.  · Sleepiness and fatigue. This should go away after the medicines you were given have worn off.  Follow these instructions at home:  Incision care  · Follow instructions from your health care provider about how to take care of your incision. Make sure you:  ? Wash your hands with soap and water before you change your bandage (dressing). If soap and water are not available, use hand .  ? Change your dressing as told by your health care provider.  ? Leave stitches (sutures), skin glue, or adhesive strips in place. These skin closures may need to stay in place for 2 weeks or longer. If adhesive strip edges start to loosen and curl up, you may trim the loose edges. Do not remove adhesive strips completely unless your health care provider tells you to do that.  · Check your incision area every day for signs of infection. Check for:  ? More redness, swelling, or pain.  ? More fluid or blood.  ? Warmth.  ? Pus or a bad smell.  ? Fluid leaking from around your catheter (instead of fluid draining through your catheter).  Catheter care    · Follow instructions from your health care provider about emptying and cleaning your catheter and collection bag. You may need to clean the catheter every day so it does not clog.  · If directed, write down the  following information every time you empty your bag:  ? The date and time.  ? The amount of drainage.  General instructions  · Rest at home for 1-2 days after your procedure. Return to your normal activities as told by your health care provider.  · Do not take baths, swim, or use a hot tub for 24 hours after your procedure, or until your health care provider says that this is okay.  · Take over-the-counter and prescription medicines only as told by your health care provider.  · Keep all follow-up visits as told by your health care provider. This is important.  Contact a health care provider if:  · You have less than 10 mL of drainage a day for 2-3 days in a row, or as directed by your health care provider.  · You have more redness, swelling, or pain around your incision area.  · You have more fluid or blood coming from your incision area.  · Your incision area feels warm to the touch.  · You have pus or a bad smell coming from your incision area.  · You have fluid leaking from around your catheter (instead of through your catheter).  · You have a fever or chills.  · You have pain that does not get better with medicine.  Get help right away if:  · Your catheter comes out.  · You suddenly stop having drainage from your catheter.  · You suddenly have blood in the fluid that is draining from your catheter.  · You become dizzy or you faint.  · You develop a rash.  · You have nausea or vomiting.  · You have difficulty breathing or you feel short of breath.  · You develop chest pain.  · You have problems with your speech or vision.  · You have trouble balancing or moving your arms or legs.  Summary  · It is common to have a small amount of bruising and discomfort in the area where the drainage tube (catheter) was placed.  · You may be directed to record the amount of drainage from the bag every time you empty it.  · Follow instructions from your health care provider about emptying and cleaning your catheter and collection  bag.  This information is not intended to replace advice given to you by your health care provider. Make sure you discuss any questions you have with your health care provider.  Document Released: 05/04/2015 Document Revised: 11/09/2017 Document Reviewed: 11/09/2017  [employer] Interactive Patient Education © 2020 [employer]                                                                                         Make follow up appointment with Dr. Gomez and Dr. Etienne.  Keep drain bag below level of fistula. Change dressing when soiled with provided dressing supplies. No alcohol, driving, or legal decisions for the next 24 hours.

## 2020-05-12 NOTE — NURSING NOTE
Local dressing of maxorb and covaderm applied to fistula area and pt is being taken over to CT for scan, per Dr. Donahue, with no oral contrast.

## 2020-05-12 NOTE — NURSING NOTE
Pt was educated on discharge instructions and provided paper copy to take home. Pt also provided additional dressing change materials. Pt was informed of follow up appts that need to be made with Dr. Etienne and Dr. Gomez. Pt voiced understanding. Pt was taken to xray dept for outpt back xray and will then be discharged home in stable condition. Wife of patient is providing his transportation home. Pt left via wheelchair.

## 2020-05-12 NOTE — NURSING NOTE
Dr. Donahue explained to patient that if he is unable to get an appt with Dr. Gomez next week, then he needs to make an appt with Dr. Etienne.

## 2020-05-12 NOTE — NURSING NOTE
Pt is alert and oriented x3. Pt can be discharged home when awake and v/s/s per Dr. Donahue's verbal order.

## 2020-05-12 NOTE — NURSING NOTE
Local dressing of biodisk and sorbaview applied to drain in right lateral abdomen. Dressing is dry and intact.

## 2020-05-12 NOTE — NURSING NOTE
Dr. Donahue numbed area on right lateral abdomen fistula region and procedure begins. See MD dictation for procedural specific information.

## 2020-05-12 NOTE — NURSING NOTE
Pt was brought into IR procedure room via stretcher and assisted by IR staff to move from stretcher onto IR exam table into prone position. Pt is being placed on heart, blood pressure, and oxygen sat monitoring. Pt also placed on 2L oxygen via N/C for procedure.

## 2020-05-12 NOTE — NURSING NOTE
Pt assisted with changing back into his clothes and jacket. Pt remains alert and oriented x3. Pt was able to move self from stretcher back into wheelchair independently. Drain remains intact to gravity drainage bag and dressing is dry and intact.

## 2020-05-12 NOTE — NURSING NOTE
Pt moved back onto the stretcher with assistance from staff, laying in supine position with HOB approx. 10 degrees.

## 2020-05-14 ENCOUNTER — ON CAMPUS - OUTPATIENT (AMBULATORY)
Dept: URBAN - METROPOLITAN AREA HOSPITAL 77 | Facility: HOSPITAL | Age: 75
End: 2020-05-14
Payer: COMMERCIAL

## 2020-05-14 DIAGNOSIS — K83.1 OBSTRUCTION OF BILE DUCT: ICD-10-CM

## 2020-05-14 DIAGNOSIS — R94.5 ABNORMAL RESULTS OF LIVER FUNCTION STUDIES: ICD-10-CM

## 2020-05-14 DIAGNOSIS — K80.50 CALCULUS OF BILE DUCT WITHOUT CHOLANGITIS OR CHOLECYSTITIS W: ICD-10-CM

## 2020-05-14 DIAGNOSIS — Z46.59 ENCOUNTER FOR FITTING AND ADJUSTMENT OF OTHER GASTROINTESTIN: ICD-10-CM

## 2020-05-14 DIAGNOSIS — K31.819 ANGIODYSPLASIA OF STOMACH AND DUODENUM WITHOUT BLEEDING: ICD-10-CM

## 2020-05-14 PROCEDURE — 43276 ERCP STENT EXCHANGE W/DILATE: CPT | Performed by: INTERNAL MEDICINE

## 2020-05-18 ENCOUNTER — HOSPITAL ENCOUNTER (OUTPATIENT)
Dept: ONCOLOGY | Facility: HOSPITAL | Age: 75
Discharge: HOME | End: 2020-05-18
Admitting: NURSE PRACTITIONER

## 2020-05-18 ENCOUNTER — LAB (OUTPATIENT)
Dept: LAB | Facility: HOSPITAL | Age: 75
End: 2020-05-18

## 2020-05-18 ENCOUNTER — TELEPHONE (OUTPATIENT)
Dept: ENDOCRINOLOGY | Facility: CLINIC | Age: 75
End: 2020-05-18

## 2020-05-18 ENCOUNTER — OFFICE VISIT (OUTPATIENT)
Dept: ONCOLOGY | Facility: CLINIC | Age: 75
End: 2020-05-18

## 2020-05-18 VITALS
RESPIRATION RATE: 18 BRPM | DIASTOLIC BLOOD PRESSURE: 76 MMHG | HEART RATE: 82 BPM | TEMPERATURE: 97.1 F | HEIGHT: 74 IN | BODY MASS INDEX: 37.58 KG/M2 | WEIGHT: 292.8 LBS | SYSTOLIC BLOOD PRESSURE: 147 MMHG

## 2020-05-18 DIAGNOSIS — S22.000A COMPRESSION FRACTURE OF BODY OF THORACIC VERTEBRA (HCC): Primary | ICD-10-CM

## 2020-05-18 DIAGNOSIS — N18.30 CKD (CHRONIC KIDNEY DISEASE) STAGE 3, GFR 30-59 ML/MIN (HCC): Primary | ICD-10-CM

## 2020-05-18 DIAGNOSIS — D50.0 IRON DEFICIENCY ANEMIA DUE TO CHRONIC BLOOD LOSS: ICD-10-CM

## 2020-05-18 DIAGNOSIS — D47.2 MGUS (MONOCLONAL GAMMOPATHY OF UNKNOWN SIGNIFICANCE): ICD-10-CM

## 2020-05-18 LAB
BASOPHILS # BLD AUTO: 0.09 10*3/MM3 (ref 0–0.2)
BASOPHILS NFR BLD AUTO: 0.8 % (ref 0–1.5)
DEPRECATED RDW RBC AUTO: 58.5 FL (ref 37–54)
EOSINOPHIL # BLD AUTO: 0.52 10*3/MM3 (ref 0–0.4)
EOSINOPHIL NFR BLD AUTO: 4.9 % (ref 0.3–6.2)
ERYTHROCYTE [DISTWIDTH] IN BLOOD BY AUTOMATED COUNT: 15.6 % (ref 12.3–15.4)
HCT VFR BLD AUTO: 29.1 % (ref 37.5–51)
HGB BLD-MCNC: 9 G/DL (ref 13–17.7)
LYMPHOCYTES # BLD AUTO: 1.6 10*3/MM3 (ref 0.7–3.1)
LYMPHOCYTES NFR BLD AUTO: 15.1 % (ref 19.6–45.3)
MCH RBC QN AUTO: 33 PG (ref 26.6–33)
MCHC RBC AUTO-ENTMCNC: 30.9 G/DL (ref 31.5–35.7)
MCV RBC AUTO: 106.6 FL (ref 79–97)
MONOCYTES # BLD AUTO: 0.91 10*3/MM3 (ref 0.1–0.9)
MONOCYTES NFR BLD AUTO: 8.6 % (ref 5–12)
NEUTROPHILS # BLD AUTO: 7.49 10*3/MM3 (ref 1.7–7)
NEUTROPHILS NFR BLD AUTO: 70.6 % (ref 42.7–76)
PLATELET # BLD AUTO: 287 10*3/MM3 (ref 140–450)
PMV BLD AUTO: 10.6 FL (ref 6–12)
RBC # BLD AUTO: 2.73 10*6/MM3 (ref 4.14–5.8)
WBC NRBC COR # BLD: 10.61 10*3/MM3 (ref 3.4–10.8)

## 2020-05-18 PROCEDURE — 85025 COMPLETE CBC W/AUTO DIFF WBC: CPT

## 2020-05-18 PROCEDURE — 96372 THER/PROPH/DIAG INJ SC/IM: CPT

## 2020-05-18 PROCEDURE — 99214 OFFICE O/P EST MOD 30 MIN: CPT | Performed by: INTERNAL MEDICINE

## 2020-05-18 PROCEDURE — 36415 COLL VENOUS BLD VENIPUNCTURE: CPT

## 2020-05-18 PROCEDURE — 25010000002 EPOETIN ALFA-EPBX 40000 UNIT/ML SOLUTION: Performed by: NURSE PRACTITIONER

## 2020-05-18 RX ADMIN — EPOETIN ALFA-EPBX 40000 UNITS: 40000 INJECTION, SOLUTION INTRAVENOUS; SUBCUTANEOUS at 12:24

## 2020-05-18 NOTE — PROGRESS NOTES
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 Anemia diagnosed January 2018 and IgG kappa monoclonal gammopathy diagnosed May 2018.  Anemia related to chronic renal failure 3  · This patient is an obese  male who  developed kidney problems in January 2018 for which he was referred to Devi Plascencia M.D. He was found to have a hemoglobin of 7.4 at that time and was given 1 unit of packed red blood cells. He was then referred to GI where he had been followed for Syed’s esophagus for a number of years. An EGD and colonoscopy was performed on 3/5/18 by Marisel Gomez M.D. revealing mucosa suggestive of Syed’s esophagus and hiatal hernia in the cardia. Patient had a poor prep compromising the colonoscopy exam though the scope did reach the cecum. Esophageal biopsy revealed squamocolumnar mucosa with extensive intestinal metaplasia consistent with Syed’s esophagus, negative for dysplasia. Colonoscopy was recommended to be repeated in two years and EGD in three years. The patient saw his primary care physician, Paulette Harris M.D., in followup and blood testing was done on 5/17/18 revealing WBC 6.3, hemoglobin 8.4, MCV 84.5, platelet count 182,000. Vitamin B12 level was 416 (180-914) and patient was referred here for further evaluation. The patient claims to have been diagnosed with Vitamin B12 deficiency a number of years ago for which he has been on Vitamin B12 injections up until six months ago when he just stopped taking those. He has been  recently started on oral iron supplementation. He claims not to see any blood in his urine but does have microscopic hematuria for which he sees a urologist. He denies nosebleeds, gum bleeds or blood in the stool. He has not had any significant changes in his weight. He feels weak and dizzy for a number of years which has recently gotten worse. He does not ambulate much because of back surgery causing him weakness. He did have a right lower extremity DVT with bilateral pulmonary emboli in 2004 since which time he has been on Coumadin, thought secondary to a sedentary lifestyle. He has no family history of anemias.   · 5/24/18 - Patient seen initial consultation for anemia. Hemoglobin 7.9, MCV 89.9. Ferritin 17 (), iron 183 (), TIBC 379 (228-428), iron saturation 48% (20-50), retic 2.16% (0.5-1.5), haptoglobin 138 (), folate 9.1 (5.9-24.8). SPEP showed monoclonal gammopathy. SIFE showed IgG kappa monoclonal gammopathy. M-spike in the gamma region 0.7 g/dL. Erythropoietin 53.5 (2.59-18.5). Antiparietal cell antibody and intrinsic factor antibodies negative.   Globulin 4.2 (2.5-3.8). Creatinine 1.4 (0.7-1.2).   · 6/19/18 - Discussed results of anemia workup. Hemoglobin improving. Ferrous Sulfate decreased to 325 mg twice a day. Will perform gammopathy workup for IgG kappa monoclonal gammopathy. IgA 783 (), IgG 1480 (600-1500), IgM 93 (). Kappa lambda FLC ratio 0.81 (0.26-1.65). Ferritin 36 ().        · 6/25/18 - Bone survey negative for acute disease. No evidence of lytic or blastic metastatic bone disease was present. Chest x-ray showed cardiomegaly, mild right basilar atelectasis and findings suggestive of ankylosing spondylitis.   · 6/29/18 - Patient underwent sternal bone marrow revealing monoclonal gammopathy of undetermined significant (MGUS). Extent of bone marrow involvement 5%. Phenotype kappa positive, IgG positive, CD38 positive, CD20 negative, CD19 negative, CD45  negative, CD56 negative and  negative. Dyspoiesis was not seen. There was absence of iron stores. Normal male karyotype. Normal FISH study. Flow cytometry revealed IgG kappa restrictive plasma cells in a polytypic background. There was a mixed population of maturing myeloid cells, B-cells and T-cells. No abnormal myeloid maturation was seen. A monoclonal IgG kappa plasma cell population was present. 4% plasma cells present.   · 8/23/18 - Labs by Dr. Plascencia revealed B12 of 857 (180-914), folate 12.7 (5.9-24.8), iron 127 ().      · 9/19/18 - Iron 94 (), TIBC 297 (228-428), iron saturation 32 (20-50), ferritin 29 (). Erythropoietin 30.04 (2.59-18.5).        · 10/16/18 - Iron 177 (), TIBC 320 (228-428), iron saturation 55 (20-50), ferritin 34 ().       · 11/16/18 - Hemoglobin 7.1. Patient given 1 unit of packed red blood cells.   · 11/21/18 - Ferritin 27 (). Hemoglobin 7.9. Patient given 1 unit of packed red blood cells.    · 11/26/18 - EGD by Dave Russo M.D. revealed erosive gastritis and bleeding ampulla. There was oozing upon entering the stomach in many different areas. Duodenal mucosa and the esophagus were normal.   · 11/27/18 - Hemoglobin 8.2. Order written for Procrit 30,000 units subq weekly, not approved by insurance for low ferritin. Urine JERRY with no definite monoclonal gammopathy identified with questionable faint IgG kappa.   · 12/18/18 - Hemoglobin 9.9. Injectafer 750 mg IV given.   · 1/2/19 - Injectafer 750 mg IV given.  Hemoglobin 11, .1. Patient claims that he is getting Vitamin B12 injections monthly at home through Dr. Harris. Currently taking Ferrous Sulfate 325 mg p.o. b.i.d. and asked to continue that. Asked to continue Pantoprazole 40 mg daily that he is taking. IgA 651 (), IgG 1470 (600-1500), IgM 94 ().      · 2/12/19 - Iron 88 ().    · 3/6/19 - WBC 19.8 with 83% neutrophils, 5% lymphocytes, 11% monocytes, hemoglobin  8.7, , platelet count 182,000. Patient status post laparoscopic cholecystectomy on 2/24/19 with large ecchymoses apparent on abdomen. Infectious workup ordered and Injectafer 750 mg IV days 1 and 8 ordered.  Iron 23 (), TIBC 153 (228-428), iron saturation 15% (20-50), ferritin 400 (224-336).       · 3/12/19 - WBC 15.02, hemoglobin 8.1 and platelets 227,000. Patient reported hematuria followed by Dr. Starkey. Orders written to start Injectafer ASAP. Abdominal ecchymosis improving.   · 3/14/19 - Injectafer 750 mg IV given.   · 4/22/19 - Hemoglobin 9.5, . Patient missed day 8 Injectafer in March and it was rescheduled. SIFE showed IgG kappa monoclonal gammopathy.  · 5/8/19 - Injectafer 750 mg IV given.   · 7/8/19 - Iron 56 (). Iron Saturation 36 (20-50%). Transferrin 110 (180-330). TIBC 154 (228-428). Ferritin 1,199 ().    · 8/7/2019-hemoglobin 10.0.  Patient taking multivitamin with iron only.  Restarted ferrous sulfate 325mg by mouth twice a day. UIFE showed free kappa light chain identified.   10/16/2019 patient hospitalized at [facility] for 3 days and found to have a hemoglobin of 4.8 at the cancer center visit.  Stool Hemoccult was positive.  He ended up receiving 5 units of packed red blood cells.  EGD by Marisel Gomez MD revealed severe gastric antral vascular ectasia with oozing, Syed's esophagus and hiatal hernia.  The patient was treated with PPI and Carafate.  Plan was to repeat EGD with cauterization in 6 weeks.  B12 and reticulocyte count were high.  Haptoglobin and folic acid were normal.  Creatinine 2.2 (0.76-1.27), iron 47 (), TIBC 264 (298-5 0.36), iron saturation 18 (20-50), ferritin 128.4 ().  Aspirin was held temporarily and patient given IV iron.  IgA 720 (), IgM 72 (), IgG 1489 (700-1600).  · EGD done secondary to GI bleed on 1/9/2020 1.  Moderately severe gastric antral vascular ectasia APC applied for cauterization 2.  Short segment of Syed's  biopsy taken. 3.  Small hiatal hernia.  · 1/17/20 Iron 42, Iron saturation 16, TIBC 264.  · 1/3/2020 patient was initiated on weekly Procrit  · 3/31/2020 iron profile iron 53 iron saturation 22 iron binding capacity 241 ferritin 229.     2.   Elevated LFT’s diagnosis established March 2018.  Biliary duct dilation diagnosis established June 2018.   · 11/30/17 - Maia phos 93 (32-91), total bili 0.7 (0.3-1.2), AST 37 (15-41), ALT 25 (15-41).   · 3/1/18 - T-bili 0.5 (0.3-1.2), maia phos 106 (32-91), AST 54 (15-41), ALT 27 (17-63).   · 5/24/18 - AST 46 (15-41), maia phos 131 (32-91).   · 6/8/18 - CMP ordered by Dr. Alejandra Amaro showed maia phos 209 (32-91),  (15-41), ALT 84 (17-63), total bili 1.1 (0.3-1.2).             · 6/19/18 - CT abdomen and pelvis as well as serological workup for elevated LFT’s ordered. Alpha-1 antitrypsin 144 (). Ceruloplasmin 38 (22-58). AFP 3 (0-9).  Hepatitis panel non-reactive.   · 6/22/18 - CT abdomen and pelvis showed abnormal intra and extrahepatic biliary dilation. There was mild distention of the gallbladder and evidence of several gallstones. Bilateral non-obstructing kidney stone was present. Incidental sigmoid diverticulosis.   · 7/2/18 - Patient underwent MRCP at [facility] showing markedly dilated intrahepatic and extrahepatic bile duct with multiple small filling defects within the distal common bile duct compatible with choledocholithiasis. There was a focal 6 mm segmental narrowing at the distal CBD with recommended GI consult for ERCP and brushings.   · 7/5/18 to 7/10/18 - On 7/5/18 labs revealed normal total bilirubin (0.8), AST 69 (15-41), maia phos 152 (32-91), ALT was normal at 37 (17-63), ammonia was elevated at 86 (9-35), lipase was normal (22). Patient hospitalized at [facility] due to weakness. Workup revealed cholelithiasis. On 7/9/18 patient underwent ERCP with bilateral sphincterotomy, common duct stone extraction, biliary brushing and stent placement  by Dr. Leiva. Path on brushings was negative for malignancy. There was intra and extrahepatic biliary ductal dilation with relatively abrupt distal common bile duct cutoff and non-visualization of the gallbladder. He was started on Lovenox and sequential compression devices due to risk of pulmonary embolism.  of 33 (0-35).  On 7/10/18 LFT’s revealed total bili 0.9 (0.3-1.2). Maia phos 129 (32-91). AST 50 (15-41), ALT 41 (17-63).     · 8/31/18 - EUS by Dr. Gomez at [facility] revealing suspect benign biliary stricture due to CBD stone with FNA pathology revealing benign and atypical ductal cells, bile and proteinaceous material including scattered bacteria, neutrophils and degenerating cells. The atypia was felt mild and favored to be reactive in nature. Plan was to repeat ERCP with removal of the stent and the stone with consideration of common bile duct evaluation with cholangioscopy at that time.   · 10/12/18 - ERCP with sphincteroplasty and stone extraction done by  JUAN PABLO Russo for choledocholithiasis.   · 2/23/19 - Patient underwent ERCP with balloon clearance of bile duct by Memo  · 3/6/19 - LFT’s not elevated with bilirubin 1.1, AST 31, ALT 14, maia phos was mildly elevated at 101 (32-91).   · 4/25/19 - 4/26/19 - Admitted to [facility] for hepatic encephalopathy and hypokalemia.   · 4/27/2019 - CT abdomen pelvis showed a 4.8 cm air-fluid collection adjacent to the right posterior hepatic lobe significant improved.  Right-sided chest tube small right pleural effusion.  Hepatic cirrhosis.  Bilateral nonobstructing renal stones.  Sigmoid diverticulosis.   · 5/26/19 - 5/29/2019 - Admitted to [facility] for hepatic and cephalopathy.  Ammonia level 213 (H).  · 7/11/19 - AFP 2.76 (0-9).   · 9/26/19 - CT scan abdomen showed fluid collection posterior to the right hepatic lobe has significantly diminished in size with only trace fluid remaining. Small locules of air are seen within this collection which  could be related to recent shunt mentation or could be related to tiny fistula from the splenic flexure of the colon. Trace right pleural fluid, diminished since 4/27/19. Thickened, likely reactive, pleural rind remains. Mild right basilar atelectasis. Abnormal intrahepatic and extra hepatic biliary ductal dilation. Intrahepatic biliary dilation is new since 4/27/19. The CBD appears attenuated in its mid segment, raising the possibility of stricture. ERCP recommended. Uncomplicated colonic diverticulosis. Non-obstructing bilateral renal stones and probable small right renal cyst. Cirrhotic liver morphology. No focal liver lesion is seen.  · 10/28/19 - CT scan abdomen and pelvis showed fluid collection posterior to the right hepatic lobe appears slightly increased in size now measuring 24 mm in diameter, again a small droplet of gas adjacent to this fluid collection is seen which may represent fistula. Right basilar small pleural effusion and atelectasis remain. Continued dilatation of the intra and extrahepatic biliary ductal system which appears stable from previous exam. Non-obstructing bilateral renal calculi. Changes of cirrhosis and splenomegaly. Diverticulosis. Mild interval change from prior study with increasing fluid collection posterior to the right hepatic lobe.      3.   Right lower extremity DVT and bilateral pulmonary emboli diagnosed 2004.   · 2004 - Patient claims to have developed right lower extremity DVT with bilateral pulmonary emboli in 2004 and was coumadinized. The blood clots were thought secondary to his sedentary lifestyle. He stayed on the Coumadin up until October 2017 when, due to difficulty maintaining his INR’s, he was switched to Xarelto 20 mg daily by Paulette Harris M.D. which was later dropped to 10 mg daily.    · 11/26/18 - EGD by Dave Russo M.D. revealed erosive gastritis and bleeding ampulla. Ampullary biopsy revealed reactive/reparative small intestinal mucosa with  mild chronic inflammation. Gastric antrum biopsy was suggestive of focal mild reactive gastropathy. Due to erosive gastritis, patient asked to hold Xarelto and Aspirin by Dave Russo M.D.   · 11/27/18 - Patient asked to discontinue Xarelto and hold Aspirin while obtaining IVC filter. Risks discussed. Factor V Leiden gene mutation and prothrombin gene mutations not detected. Anticardiolipin IgM 11 (<11). Anti beta-2 glyco, IgA 55 (<20). Factor VIII activity 186 (). Antithrombin III activity 63 (). Protein C activity 69 (), protein S activity 69 ().       · 12/5/18 - Patient underwent transjugular IVC filter placement in IR by  Jonn Cuenca M.D.   · 1/2/19 - Told patient that his low protein CNS and antithrombin III likely due to liver disease. He would be at a high risk of going back on Xarelto and hence continued on Aspirin 81 mg p.o. daily.   · 1/3/19 - D-dimer 1.25 (<0.45).    · 3/8/19 - Chest x-ray showed chronic changes in the chest with no obvious pneumonia or congestive changes.  · 4/3/19 - Chest x-ray with right pleural effusion and basilar lung density with slight increase from prior. Cardiomegaly.   · 4/22/19 - Factor VIII 334 (H), Anti-phosphatidylserine antibody IgM> 80 (H), Anti-phosphatidylserine antibody IgG 12 (N), Cardiolipin IgG 21 (N), Cardiolipin IgM 55 (H), Cardiolipin IgA 63 (H), Beta-2 glycoprotein IgG 4 (N), IgA 91 (H), and IgM 21 (H).   · 7/11/19 - Chest x-ray showed stable small right pleural effusion since 04/25/2019. Minimal right costophrenic angle atelectasis.  · 4.  Recurrent episodes of hepatic encephalopathy on treatment with lactulose 30 cc 4 times a day.  · Recurrent GI blood loss on anticoagulation.    Reviewed  Past Medical History:   Diagnosis Date   • Anemia     iron deficiency   • Anxiety    • B12 deficiency 2009   • Balance disorder    • Syed's esophagus    • Bile duct leak    • CAD (coronary artery disease)     cardiac stent   •  Constipation    • DDD (degenerative disc disease), lumbar    • Decubitus ulcer     buttocks   • Depression    • Diabetes mellitus (CMS/HCC)    • Disease of thyroid gland     hypothyroid   • Diverticular disease    • Dvt femoral (deep venous thrombosis) (CMS/HCC) 2004    rt let   • Gastroparesis    • GERD (gastroesophageal reflux disease)    • Gout    • Hepatic encephalopathy (CMS/HCC)     if doesnt take meds   • History of echocardiogram     03/03/2017 - 12/03/2014 - 04/2012   • History of stomach ulcers    • Hyperlipidemia    • Incontinence    • Iron deficiency    • Morbid obesity (CMS/HCC)    • THOMAS (nonalcoholic steatohepatitis)    • Neuropathy    • OAB (overactive bladder)    • KATHIA treated with BiPAP     bring dos   • Peripheral edema    • Pulmonary embolus (CMS/HCC) 2004   • Renal insufficiency    • S/P lumbar laminectomy    • Skin irritation     skin fold   • Stage 3 chronic kidney disease (CMS/HCC)        Past Surgical History:   Procedure Laterality Date   • BACK SURGERY  1971   • BILE DUCT STENT PLACEMENT     • CATARACT EXTRACTION  2014   • CHOLECYSTECTOMY  02/24/2019   • COLONOSCOPY  03/2018   • CORONARY ANGIOPLASTY  08/24/2009    PCI stent - succesful placement of 3.5/23 promus stent in proximal right coronary artery   • CORONARY ANGIOPLASTY WITH STENT PLACEMENT  08/27/2009    patient had stent placed to proximal right coronary artery   • CYSTOSCOPY BLADDER STONE LITHOTRIPSY  1997   • ENDOSCOPY N/A 10/18/2019    Procedure: ESOPHAGOGASTRODUODENOSCOPY with argon plasma coagulation of gastric antral vascular ectasia;  Surgeon: Marisel Gomez MD;  Location: [facility] ENDOSCOPY;  Service: Gastroenterology   • ENDOSCOPY N/A 1/9/2020    Procedure: ESOPHAGOGASTRODUODENOSCOPY WITH BIOPSY, ARGON PLASMA COAGULATION OF GASTRIC ANTREL VASCULAR ECTASIA,;  Surgeon: Marisel Gomez MD;  Location: [facility] ENDOSCOPY;  Service: Gastroenterology   • ENDOSCOPY N/A 3/6/2020    Procedure: ESOPHAGOGASTRODUODENOSCOPY;  Surgeon:  Zbigniew Silva MD;  Location: [facility] ENDOSCOPY;  Service: Gastroenterology;  Laterality: N/A;  mild gave, post cautery ulcer     • ERCP N/A 11/20/2019    Procedure: ERCP, clearance of bile duct with balloon, placement of biliary stent, EGD with argon plasma coagulation of gastric antral vascular ectasia;  Surgeon: Marisel Gomez MD;  Location: [facility] ENDOSCOPY;  Service: Gastroenterology   • ERCP N/A 2/27/2020    Procedure: ENDOSCOPIC RETROGRADE CHOLANGIOPANCREATOGRAPHY with removal of biliary stent, brushing, dilation, and placement of biliary stent x 2 and Esophagogastroduodenoscopy with argon plasma coagulation;  Surgeon: Marisel Gomez MD;  Location: [facility] ENDOSCOPY;  Service: Gastroenterology;  Laterality: N/A;  Gastric antral vascular ectasia, common bile duct stricture   • ERCP N/A 4/20/2020    Procedure: ENDOSCOPIC RETROGRADE CHOLANGIOPANCREATOGRAPHY WITH REMOVAL OF BILIARY STENT, AMPULA DILATION TO 8MM, BRUSHING OF COMMON BILE DUT, CLEARANCE OF BILE DUCT WITH BALLOON, BIOPSY OF AMPULA, PLACEMENT OF BILIARY STENT;  Surgeon: Marisel Gomez MD;  Location: [facility] ENDOSCOPY;  Service: Gastroenterology;  Laterality: N/A;  POST:CBD STRICTURE   • OTHER SURGICAL HISTORY  11/2018    IVC filter implant   • RENAL ARTERY STENT Left     does not recall this   • UPPER ENDOSCOPIC ULTRASOUND W/ FNA N/A 4/20/2020    Procedure: Esophagogastroduodenoscopy with Endoscopic ultrasound and fine needle aspiration;  Surgeon: Marisel Gomez MD;  Location: [facility] ENDOSCOPY;  Service: Gastroenterology;  Laterality: N/A;  Post: CBD STRICTURE         Current Outpatient Medications:   •  allopurinol (ZYLOPRIM) 100 MG tablet, Take 100 mg by mouth 2 (Two) Times a Day. Do not preop, Disp: , Rfl:   •  aspirin 81 MG tablet, Take 1 tablet by mouth Daily. (Patient taking differently: Take 81 mg by mouth Daily. dont take dos), Disp: 30 tablet, Rfl: 3  •  atorvastatin (LIPITOR) 20 MG tablet, Take 1 tablet by mouth Daily. (Patient taking  "differently: Take 20 mg by mouth Every Night.), Disp: 90 tablet, Rfl: 3  •  B-D 3CC LUER-YASMEEN SYR 25GX1\" 25G X 1\" 3 ML misc, USE FOR THE B12 INJECTION INTRAMUSCULAR ONCE MONTHLY, Disp: , Rfl:   •  BOUDREAUXS BUTT PASTE 40 % ointment, APPLY TO WOUNDS ONCE DAILY, Disp: , Rfl:   •  bumetanide (BUMEX) 1 MG tablet, Take 1 tablet by mouth 2 (Two) Times a Day. (Patient taking differently: Take 1 mg by mouth 2 (Two) Times a Day. Do not take preop), Disp: 60 tablet, Rfl: 0  •  Cyanocobalamin 1000 MCG/ML kit, Inject 1,000 mcg as directed Every 30 (Thirty) Days., Disp: , Rfl:   •  docusate sodium (COLACE) 100 MG capsule, Take 100 mg by mouth 2 (Two) Times a Day. dont take preop, Disp: , Rfl:   •  DULoxetine (CYMBALTA) 60 MG capsule, Take 60 mg by mouth Daily. Take preop, Disp: , Rfl:   •  folic acid (FOLVITE) 1 MG tablet, Take 1 mg by mouth Daily. Take  Post op, Disp: , Rfl:   •  hydrOXYzine pamoate (VISTARIL) 25 MG capsule, Take 25 mg by mouth 3 (Three) Times a Day As Needed. Take preop, Disp: , Rfl: 0  •  insulin glargine (LANTUS) 100 UNIT/ML injection, Inject 50 units at bedtime (Patient taking differently: Inject 50 Units under the skin into the appropriate area as directed Every Night. Inject 50 units at bedtime), Disp: 45 mL, Rfl: 3  •  insulin lispro (HUMALOG) 100 UNIT/ML injection, 18 units subcu before each meal plus sliding scale MDD 60 (Patient taking differently: Inject 18 Units under the skin into the appropriate area as directed 3 (Three) Times a Day Before Meals. 18 units subcu before each meal plus sliding scale MDD 60 dont take preop), Disp: 60 mL, Rfl: 4  •  lactulose (CHRONULAC) 10 GM/15ML solution, Take 60 mL by mouth Every 4 (Four) Hours. Last dose 0600 4/20, Disp: , Rfl:   •  levothyroxine (SYNTHROID, LEVOTHROID) 75 MCG tablet, Take 1 tablet by mouth Daily. (Patient taking differently: Take 75 mcg by mouth Daily. Take preop), Disp: 90 tablet, Rfl: 4  •  magnesium oxide (MAG-OX) 400 MG tablet, Take 400 mg " by mouth Daily. Take post op, Disp: , Rfl:   •  melatonin 5 MG tablet tablet, Take 10 mg by mouth At Night As Needed., Disp: , Rfl:   •  metoclopramide (REGLAN) 5 MG tablet, Take 5 mg by mouth 2 (Two) Times a Day. Take post op, Disp: , Rfl: 0  •  Mirabegron ER (Myrbetriq) 50 MG tablet sustained-release 24 hour 24 hr tablet, Take 50 mg by mouth Daily., Disp: , Rfl:   •  Multiple Vitamins-Minerals (MULTIVITAMIN WITH MINERALS) tablet tablet, Take 1 tablet by mouth Daily. Do not take preop, Disp: , Rfl:   •  nitroglycerin (NITROSTAT) 0.4 MG SL tablet, Place 1 tablet under the tongue Every 5 (Five) Minutes As Needed for Chest Pain (Only if SBP Greater Than 100). Take no more than 3 doses in 15 minutes., Disp: 30 tablet, Rfl: 12  •  ONETOUCH VERIO test strip, USE TO CHECK BLOOD SUGAR QID, Disp: , Rfl:   •  oxyCODONE (ROXICODONE) 10 MG tablet, TK 1 T PO Q 8 H, Disp: , Rfl:   •  pantoprazole (PROTONIX) 40 MG EC tablet, Take 1 tablet by mouth 2 (Two) Times a Day. (Patient taking differently: Take 40 mg by mouth 2 (Two) Times a Day. Take preop), Disp: 30 tablet, Rfl: 3  •  potassium chloride (K-DUR,KLOR-CON) 20 MEQ CR tablet, Take 20 mEq by mouth Daily. Take preop, Disp: , Rfl:   •  rifaximin (XIFAXAN) 550 MG tablet, Take 550 mg by mouth Every 12 (Twelve) Hours. Take preop, Disp: , Rfl:   •  sodium bicarbonate 650 MG tablet, Take 650 mg by mouth 3 (Three) Times a Day. Take post op, Disp: , Rfl:   •  sucralfate (CARAFATE) 1 g tablet, Take 1 g by mouth 4 (Four) Times a Day., Disp: , Rfl:   •  zinc sulfate (ZINCATE) 220 (50 Zn) MG capsule, TAKE 1 CAPSULE BY MOUTH ONCE DAILY AS DIRECTED, Disp: , Rfl:   •  phosphorus (K PHOS NEUTRAL) 155-852-130 MG tablet, Take 1 tablet by mouth Daily. afternoon, Disp: , Rfl:     No Known Allergies    Family History   Problem Relation Age of Onset   • Hyperlipidemia Other    • Hypertension Other        Cancer-related family history is not on file.    Social History     Tobacco Use   • Smoking  "status: Never Smoker   • Smokeless tobacco: Never Used   Substance Use Topics   • Alcohol use: No     Frequency: Never   • Drug use: No       I have reviewed the history of present illness, past medical history, family history, social history, lab results, all notes and other records since the patient was last seen at the cancer care center    SUBJECTIVE:      Patient is my office for follow-up accompanied by his wife.  Had metal stent placed for bile leakage.  Had episode of fall and he is twisted his back and was diagnosed with compression fracture.  Hydrocodone 10 mg is not helping with the pain.  No excessive sleepiness    ROS:      Review of Systems   Constitutional: Negative for fever.   HENT: Negative for nosebleeds and trouble swallowing.    Eyes: Negative for visual disturbance.   Respiratory: Negative for cough, shortness of breath and wheezing.    Cardiovascular: Negative for chest pain.   Gastrointestinal: Negative for abdominal pain and blood in stool.   Endocrine: Negative for cold intolerance.   Genitourinary: Negative for dysuria and hematuria.   Musculoskeletal: Negative for joint swelling.   Skin: Negative for rash.   Allergic/Immunologic: Negative for environmental allergies.   Neurological: Negative for seizures.   Hematological: Does not bruise/bleed easily.   Psychiatric/Behavioral: The patient is not nervous/anxious.    MD performed ROS and are negative except as mentioned in Subjective.        Objective:       Vitals:    05/18/20 1129   BP: 147/76   Pulse: 82   Resp: 18   Temp: 97.1 °F (36.2 °C)   Weight: 133 kg (292 lb 12.8 oz)   Height: 188 cm (74\")   PainSc:   9   PainLoc: Back     /76   Pulse 82   Temp 97.1 °F (36.2 °C)   Resp 18   Ht 188 cm (74\")   Wt 133 kg (292 lb 12.8 oz)   BMI 37.59 kg/m²       PHYSICAL EXAM:      Physical Exam   Constitutional: He is oriented to person, place, and time. No distress.   Morbidly obese   HENT:   Head: Normocephalic and atraumatic.   Eyes: " Conjunctivae and EOM are normal. Right eye exhibits no discharge. Left eye exhibits no discharge. No scleral icterus.   Neck: Normal range of motion. Neck supple. No thyromegaly present.   Cardiovascular: Normal rate, regular rhythm and normal heart sounds. Exam reveals no gallop and no friction rub.   Pulmonary/Chest: Effort normal. No stridor. No respiratory distress. He has no wheezes.   Abdominal: Soft. Bowel sounds are normal. He exhibits no mass. There is no tenderness. There is no rebound and no guarding.   Musculoskeletal: Normal range of motion. He exhibits no tenderness.   1+ bilateral lower extremity edema   Lymphadenopathy:     He has no cervical adenopathy.   Neurological: He is alert and oriented to person, place, and time. He exhibits normal muscle tone.   Skin: Skin is warm. No rash noted. He is not diaphoretic. No erythema.   Psychiatric: He has a normal mood and affect. His behavior is normal.   Nursing note and vitals reviewed.       RECENT LABS:     WBC   Date Value Ref Range Status   05/18/2020 10.61 3.40 - 10.80 10*3/mm3 Final     RBC   Date Value Ref Range Status   05/18/2020 2.73 (L) 4.14 - 5.80 10*6/mm3 Final     Hemoglobin   Date Value Ref Range Status   05/18/2020 9.0 (L) 13.0 - 17.7 g/dL Final     Hematocrit   Date Value Ref Range Status   05/18/2020 29.1 (L) 37.5 - 51.0 % Final     MCV   Date Value Ref Range Status   05/18/2020 106.6 (H) 79.0 - 97.0 fL Final     MCH   Date Value Ref Range Status   05/18/2020 33.0 26.6 - 33.0 pg Final     MCHC   Date Value Ref Range Status   05/18/2020 30.9 (L) 31.5 - 35.7 g/dL Final     RDW   Date Value Ref Range Status   05/18/2020 15.6 (H) 12.3 - 15.4 % Final     RDW-SD   Date Value Ref Range Status   05/18/2020 58.5 (H) 37.0 - 54.0 fl Final     MPV   Date Value Ref Range Status   05/18/2020 10.6 6.0 - 12.0 fL Final     Platelets   Date Value Ref Range Status   05/18/2020 287 140 - 450 10*3/mm3 Final     Neutrophil %   Date Value Ref Range Status    05/18/2020 70.6 42.7 - 76.0 % Final     Lymphocyte %   Date Value Ref Range Status   05/18/2020 15.1 (L) 19.6 - 45.3 % Final     Monocyte %   Date Value Ref Range Status   05/18/2020 8.6 5.0 - 12.0 % Final     Eosinophil %   Date Value Ref Range Status   05/18/2020 4.9 0.3 - 6.2 % Final     Basophil %   Date Value Ref Range Status   05/18/2020 0.8 0.0 - 1.5 % Final     Immature Grans %   Date Value Ref Range Status   01/06/2020 0.5 0.0 - 0.5 % Final     Neutrophils, Absolute   Date Value Ref Range Status   05/18/2020 7.49 (H) 1.70 - 7.00 10*3/mm3 Final     Lymphocytes, Absolute   Date Value Ref Range Status   05/18/2020 1.60 0.70 - 3.10 10*3/mm3 Final     Monocytes, Absolute   Date Value Ref Range Status   05/18/2020 0.91 (H) 0.10 - 0.90 10*3/mm3 Final     Eosinophils, Absolute   Date Value Ref Range Status   05/18/2020 0.52 (H) 0.00 - 0.40 10*3/mm3 Final     Basophils, Absolute   Date Value Ref Range Status   05/18/2020 0.09 0.00 - 0.20 10*3/mm3 Final     Immature Grans, Absolute   Date Value Ref Range Status   01/06/2020 0.04 0.00 - 0.05 10*3/mm3 Final     nRBC   Date Value Ref Range Status   03/30/2020 0.1 0.0 - 0.2 /100 WBC Final       Lab Results   Component Value Date    GLUCOSE 142 (H) 05/05/2020    BUN 34 (H) 05/05/2020    CREATININE 1.94 (H) 05/05/2020    EGFRIFNONA 34 (L) 05/05/2020    BCR 17.5 05/05/2020    K 4.1 05/05/2020    CO2 25.4 05/05/2020    CALCIUM 8.4 (L) 05/05/2020    ALBUMIN 3.00 (L) 05/05/2020    LABIL2 0.4 (L) 05/29/2019     (H) 05/05/2020    ALT 71 (H) 05/05/2020         Assessment/Plan      ASSESSMENT:     GAVE (gastric antral vascular ectasia)     Anemia in stage 3 chronic kidney disease (CMS/HCC)     History of Vitamin B12 deficiency     History of DVT of right lower extremity     History of bilateral pulmonary embolus (PE)     Antithrombin III deficiency (CMS/HCC)     Protein C deficiency (CMS/HCC)     Protein S deficiency (CMS/HCC)     Antiphospholipid antibody  positive     Elevated factor VIII level     GONZALEZ (nonalcoholic steatohepatitis)     Splenomegaly     IgG kappa MGUS    PLAN:      1. Hemoglobin is low iron deficiency has resolved, CBC every 2 weeks and Procrit 40,000 units of the hemoglobin is less than 10.   2. I made appointment for him to see Dr. Ontiveros for possible kyphoplasty  3. Patient is off anticoagulation given his GI bleed.  Keep him off anticoagulation  4. Patient to stay off oral iron tablets given that he received Injectafer infusions.  5. Patient follows with Dr. Gomez regarding his Gonzalez and portal hypertension.  Continue to use lactulose for his high ammonia.  He tolerated the ERCP and stent removal.  6. Patient has hyper coag work-up but off anticoagulation secondary to GI bleeds.    7. I will see him back in my office in 6 weeks recheck CBC at the time     I have reviewed labs results, imaging, vitals, and medications with the patient today. Patient verbalized understanding and is in agreement of the above plan.  Electronically signed by Joceline Pandey MD, 05/18/20, 5:28 PM.            This report was compiled using Dragon voice recognition software. I have made every effort to proof read this document; however, typographical errors may persist.

## 2020-05-19 ENCOUNTER — TELEMEDICINE (OUTPATIENT)
Dept: ENDOCRINOLOGY | Facility: CLINIC | Age: 75
End: 2020-05-19

## 2020-05-19 VITALS
HEIGHT: 74 IN | BODY MASS INDEX: 37.47 KG/M2 | SYSTOLIC BLOOD PRESSURE: 147 MMHG | WEIGHT: 292 LBS | HEART RATE: 82 BPM | DIASTOLIC BLOOD PRESSURE: 76 MMHG

## 2020-05-19 DIAGNOSIS — I10 ESSENTIAL HYPERTENSION: ICD-10-CM

## 2020-05-19 DIAGNOSIS — E78.2 MIXED HYPERLIPIDEMIA: ICD-10-CM

## 2020-05-19 DIAGNOSIS — Z79.4 TYPE 2 DIABETES MELLITUS WITH HYPERGLYCEMIA, WITH LONG-TERM CURRENT USE OF INSULIN (HCC): Primary | ICD-10-CM

## 2020-05-19 DIAGNOSIS — E11.65 TYPE 2 DIABETES MELLITUS WITH HYPERGLYCEMIA, WITH LONG-TERM CURRENT USE OF INSULIN (HCC): Primary | ICD-10-CM

## 2020-05-19 DIAGNOSIS — E03.9 ACQUIRED HYPOTHYROIDISM: ICD-10-CM

## 2020-05-19 PROCEDURE — 99214 OFFICE O/P EST MOD 30 MIN: CPT | Performed by: INTERNAL MEDICINE

## 2020-05-19 RX ORDER — LEVOTHYROXINE SODIUM 0.07 MG/1
75 TABLET ORAL DAILY
Qty: 90 TABLET | Refills: 4 | Status: SHIPPED | OUTPATIENT
Start: 2020-05-19 | End: 2021-01-01 | Stop reason: HOSPADM

## 2020-05-19 NOTE — PROGRESS NOTES
Endocrine Progress Note Outpatient     Patient Care Team:  Paulette Harris MD as PCP - General  Josefina Plascencia MD as Consulting Physician (Nephrology)  You have chosen to receive care through a telehealth visit.  Do you consent to use a video/audio connection for your medical care today? Yes.     Chief Complaint: Follow-up type 2 diabetes: Visit conducted through telehealth due to coronavirus pandemic.    HPI: 74-year-old male with history of type 2 diabetes, hypertension, hyperlipidemia, hypothyroidism, CKD stage III disease and obesity is followed through telehealth.  For type 2 diabetes he is currently on Lantus 46 units at bedtime, Humalog 15 units before each meal plus sliding scale of 1 unit for each 30 mg blood sugar over 140.  Blood sugars usually running less than 200 but no low blood sugar of less than 70.    Hypertension: Well-controlled  Hyperlipidemia: Atorvastatin 20 mg p.o. daily  Hypothyroidism: On levothyroxine supplementation  Liver cirrhosis: He is on lactulose and follows with GI on regular basis.    Past Medical History:   Diagnosis Date   • Anemia     iron deficiency   • Anxiety    • B12 deficiency 2009   • Balance disorder    • Syed's esophagus    • Bile duct leak    • CAD (coronary artery disease)     cardiac stent   • Constipation    • DDD (degenerative disc disease), lumbar    • Decubitus ulcer     buttocks   • Depression    • Diabetes mellitus (CMS/HCC)    • Disease of thyroid gland     hypothyroid   • Diverticular disease    • Dvt femoral (deep venous thrombosis) (CMS/HCC) 2004    rt let   • Gastroparesis    • GERD (gastroesophageal reflux disease)    • Gout    • Hepatic encephalopathy (CMS/HCC)     if doesnt take meds   • History of echocardiogram     03/03/2017 - 12/03/2014 - 04/2012   • History of stomach ulcers    • Hyperlipidemia    • Incontinence    • Iron deficiency    • Morbid obesity (CMS/HCC)    • THOMAS (nonalcoholic steatohepatitis)    • Neuropathy    • OAB  (overactive bladder)    • KATHIA treated with BiPAP     bring dos   • Peripheral edema    • Pulmonary embolus (CMS/[facility]) 2004   • Renal insufficiency    • S/P lumbar laminectomy    • Skin irritation     skin fold   • Stage 3 chronic kidney disease (CMS/[facility])        Social History     Socioeconomic History   • Marital status:      Spouse name: Not on file   • Number of children: Not on file   • Years of education: Not on file   • Highest education level: Not on file   Tobacco Use   • Smoking status: Never Smoker   • Smokeless tobacco: Never Used   Substance and Sexual Activity   • Alcohol use: No     Frequency: Never   • Drug use: No   • Sexual activity: Defer       Family History   Problem Relation Age of Onset   • Hyperlipidemia Other    • Hypertension Other        No Known Allergies    ROS:   Constitutional:  Admit fatigue, tiredness.    Eyes:  Denies change in visual acuity   HENT:  Denies nasal congestion or sore throat   Respiratory: denies cough, admit shortness of breath.   Cardiovascular:  denies chest pain, edema   GI:  Denies abdominal pain, nausea, vomiting.   Musculoskeletal:  Denies back pain or joint pain   Integument:  Denies dry skin and rash   Neurologic:  Denies headache, focal weakness or sensory changes   Endocrine:  Denies polyuria or polydipsia   Psychiatric:  Admit depression and anxiety      Vitals:    05/19/20 0944   BP: 147/76   Pulse: 82       Physical Exam:  GEN: NAD, patient alert and oriented and able to carry on conversation.  Mood and affect was normal.      Results Review:     I reviewed the patient's new clinical results.    Lab Results   Component Value Date    HGBA1C 6.2 (H) 05/05/2020    HGBA1C 7.4 (H) 03/04/2020    HGBA1C 6.0 (H) 01/16/2020      Lab Results   Component Value Date    GLUCOSE 142 (H) 05/05/2020    BUN 34 (H) 05/05/2020    CREATININE 1.94 (H) 05/05/2020    EGFRIFNONA 34 (L) 05/05/2020    BCR 17.5 05/05/2020    K 4.1 05/05/2020    CO2 25.4 05/05/2020    CALCIUM  "8.4 (L) 05/05/2020    ALBUMIN 3.00 (L) 05/05/2020    LABIL2 0.4 (L) 05/29/2019     (H) 05/05/2020    ALT 71 (H) 05/05/2020    CHOL 99 03/04/2020    TRIG 127 03/04/2020    LDL 44 03/04/2020    HDL 30 (L) 03/04/2020     Lab Results   Component Value Date    TSH 2.730 03/04/2020    FREET4 0.87 (L) 01/16/2020         Medication Review: Reviewed.       Current Outpatient Medications:   •  allopurinol (ZYLOPRIM) 100 MG tablet, Take 100 mg by mouth 2 (Two) Times a Day. Do not preop, Disp: , Rfl:   •  aspirin 81 MG tablet, Take 1 tablet by mouth Daily. (Patient taking differently: Take 81 mg by mouth Daily. dont take dos), Disp: 30 tablet, Rfl: 3  •  B-D 3CC LUER-YASMEEN SYR 25GX1\" 25G X 1\" 3 ML misc, USE FOR THE B12 INJECTION INTRAMUSCULAR ONCE MONTHLY, Disp: , Rfl:   •  BOUDREAUXS BUTT PASTE 40 % ointment, APPLY TO WOUNDS ONCE DAILY, Disp: , Rfl:   •  bumetanide (BUMEX) 1 MG tablet, Take 1 tablet by mouth 2 (Two) Times a Day. (Patient taking differently: Take 1 mg by mouth 2 (Two) Times a Day. Do not take preop), Disp: 60 tablet, Rfl: 0  •  Cyanocobalamin 1000 MCG/ML kit, Inject 1,000 mcg as directed Every 30 (Thirty) Days., Disp: , Rfl:   •  docusate sodium (COLACE) 100 MG capsule, Take 100 mg by mouth 2 (Two) Times a Day. dont take preop, Disp: , Rfl:   •  DULoxetine (CYMBALTA) 60 MG capsule, Take 60 mg by mouth Daily. Take preop, Disp: , Rfl:   •  folic acid (FOLVITE) 1 MG tablet, Take 1 mg by mouth Daily. Take  Post op, Disp: , Rfl:   •  hydrOXYzine pamoate (VISTARIL) 25 MG capsule, Take 25 mg by mouth 3 (Three) Times a Day As Needed. Take preop, Disp: , Rfl: 0  •  insulin glargine (LANTUS) 100 UNIT/ML injection, Inject 50 units at bedtime (Patient taking differently: Inject 50 Units under the skin into the appropriate area as directed Every Night. Inject 50 units at bedtime), Disp: 45 mL, Rfl: 3  •  insulin lispro (HUMALOG) 100 UNIT/ML injection, 18 units subcu before each meal plus sliding scale MDD 60 (Patient " taking differently: Inject 18 Units under the skin into the appropriate area as directed 3 (Three) Times a Day Before Meals. 18 units subcu before each meal plus sliding scale MDD 60 dont take preop), Disp: 60 mL, Rfl: 4  •  lactulose (CHRONULAC) 10 GM/15ML solution, Take 60 mL by mouth Every 4 (Four) Hours. Last dose 0600 4/20, Disp: , Rfl:   •  levothyroxine (SYNTHROID, LEVOTHROID) 75 MCG tablet, Take 1 tablet by mouth Daily., Disp: 90 tablet, Rfl: 4  •  magnesium oxide (MAG-OX) 400 MG tablet, Take 400 mg by mouth Daily. Take post op, Disp: , Rfl:   •  melatonin 5 MG tablet tablet, Take 10 mg by mouth At Night As Needed., Disp: , Rfl:   •  metoclopramide (REGLAN) 5 MG tablet, Take 5 mg by mouth 2 (Two) Times a Day. Take post op, Disp: , Rfl: 0  •  Mirabegron ER (Myrbetriq) 50 MG tablet sustained-release 24 hour 24 hr tablet, Take 50 mg by mouth Daily., Disp: , Rfl:   •  Multiple Vitamins-Minerals (MULTIVITAMIN WITH MINERALS) tablet tablet, Take 1 tablet by mouth Daily. Do not take preop, Disp: , Rfl:   •  nitroglycerin (NITROSTAT) 0.4 MG SL tablet, Place 1 tablet under the tongue Every 5 (Five) Minutes As Needed for Chest Pain (Only if SBP Greater Than 100). Take no more than 3 doses in 15 minutes., Disp: 30 tablet, Rfl: 12  •  ONETOUCH VERIO test strip, USE TO CHECK BLOOD SUGAR QID, Disp: , Rfl:   •  oxyCODONE (ROXICODONE) 10 MG tablet, TK 1 T PO Q 8 H, Disp: , Rfl:   •  pantoprazole (PROTONIX) 40 MG EC tablet, Take 1 tablet by mouth 2 (Two) Times a Day. (Patient taking differently: Take 40 mg by mouth 2 (Two) Times a Day. Take preop), Disp: 30 tablet, Rfl: 3  •  potassium chloride (K-DUR,KLOR-CON) 20 MEQ CR tablet, Take 20 mEq by mouth Daily. Take preop, Disp: , Rfl:   •  rifaximin (XIFAXAN) 550 MG tablet, Take 550 mg by mouth Every 12 (Twelve) Hours. Take preop, Disp: , Rfl:   •  sodium bicarbonate 650 MG tablet, Take 650 mg by mouth 3 (Three) Times a Day. Take post op, Disp: , Rfl:   •  zinc sulfate (ZINCATE)  220 (50 Zn) MG capsule, TAKE 1 CAPSULE BY MOUTH ONCE DAILY AS DIRECTED, Disp: , Rfl:   No current facility-administered medications for this visit.       Assessment/Plan   1.  Diabetes mellitus type II: Well-controlled, continue current medications.  We will continue to follow blood sugars and A1c.  Advised always to keep glucose source with him in case of low blood sugar. Advised to stop eating candies.   2.  Hypertension: Well-controlled, continue current medication  3.  Hyperlipidemia: Well-controlled with LDL 64 and triglycerides 141.  Has elevated liver enzymes, advised to stop atorvastatin and check CMP in 4 weeks.    4.  Hypothyroidism: Well-controlled with TSH of 1.8 and free T4 0.87.  Continue current medication  5.  Liver cirrhosis: He follows with GI, currently on lactulose.  He has elevated liver enzymes, advised him to stop atorvastatin and check CMP in 4 weeks.    Unable to complete visit using a video connection to the patient. A phone visit was used to complete this visits. Total time of discussion was 25 minutes.          Alejandra Amaro MD FACE.    Much of the above report is an electronic transcription/translation of the spoken language to printed text using Dragon Software. As such, the subtleties and finesse of the spoken language may permit erroneous, or at times, nonsensical words or phrases to be inadvertently transcribed; thus changes may be made at a later date to rectify these errors.

## 2020-05-19 NOTE — PATIENT INSTRUCTIONS
Stop atorvastatin due to elevated liver enzymes  Check CMP in 4 weeks  Follow-up in 3 months with labs.  Get regular eye exam  Always keep glucose source in case of low blood sugars.

## 2020-05-20 ENCOUNTER — OFFICE VISIT (OUTPATIENT)
Dept: SURGERY | Facility: CLINIC | Age: 75
End: 2020-05-20

## 2020-05-20 ENCOUNTER — OFFICE VISIT (OUTPATIENT)
Dept: NEUROSURGERY | Facility: CLINIC | Age: 75
End: 2020-05-20

## 2020-05-20 VITALS
HEART RATE: 94 BPM | OXYGEN SATURATION: 96 % | BODY MASS INDEX: 37.22 KG/M2 | HEIGHT: 74 IN | SYSTOLIC BLOOD PRESSURE: 123 MMHG | WEIGHT: 290 LBS | DIASTOLIC BLOOD PRESSURE: 68 MMHG

## 2020-05-20 VITALS
DIASTOLIC BLOOD PRESSURE: 68 MMHG | WEIGHT: 290 LBS | HEIGHT: 74 IN | HEART RATE: 88 BPM | BODY MASS INDEX: 37.22 KG/M2 | SYSTOLIC BLOOD PRESSURE: 156 MMHG

## 2020-05-20 DIAGNOSIS — K63.2 ENTEROCUTANEOUS FISTULA: Primary | ICD-10-CM

## 2020-05-20 DIAGNOSIS — S32.030A COMPRESSION FRACTURE OF L3 VERTEBRA, INITIAL ENCOUNTER (HCC): Primary | ICD-10-CM

## 2020-05-20 PROCEDURE — 99213 OFFICE O/P EST LOW 20 MIN: CPT | Performed by: SURGERY

## 2020-05-20 PROCEDURE — 99213 OFFICE O/P EST LOW 20 MIN: CPT | Performed by: NURSE PRACTITIONER

## 2020-05-20 NOTE — PROGRESS NOTES
"Subjective   History of Present Illness: Bry Ratliff is a 74 y.o. male is being seen for consultation today at the request of Dr Pandey    History of Present Illness  Mr Ratliff is a 74-year-old male seen in consultation for L3 vertebral fracture of the superior endplate. He says he has had back problems most of his life but he fell 4/12/20 and it has been much more severe. He says the fall occurred when he was getting out of bed and he placed his hand on his walker and he tilted over. He says he felt a pop in his low back when he fell. He says he has had intermittent right leg pain since he fell. He has been basically bed bound since he fell except when he has had to get out of bed for doctor's appointments. He has a history of low back surgery in 1971. He sees Dr Thorne for injections and has had ESIs and ablations. His last injection was June 2017.    On 5/12/20 he was scheduled with interventional radiology to have a drain put into an intraabdominal abscess that he has had intermittently since cholecystectomy a year ago. He also had a metal stent place last week by Dr Gomez during an ERCP for a common bile duct stenosis. He has a history of THOMAS, hepatic encephalopathy and stage III kidney disease. He also has a history of anemia. He also has a vena cava filter placed in 12/2018.   The following portions of the patient's history were reviewed and updated as appropriate: allergies, current medications, past family history, past medical history, past social history, past surgical history and problem list.    Review of Systems   Constitutional: Positive for activity change.   Genitourinary: Negative for difficulty urinating.   Musculoskeletal: Positive for back pain, joint swelling and myalgias.   Neurological: Positive for numbness. Negative for weakness.       Objective     ./68 (BP Location: Left arm, Patient Position: Sitting, Cuff Size: Large Adult)   Pulse 88   Ht 188 cm (74\")   Wt 132 kg (290 " lb)   BMI 37.23 kg/m²    Body mass index is 37.23 kg/m².      Physical Exam   Constitutional: He is oriented to person, place, and time. He appears well-developed and well-nourished.   HENT:   Head: Normocephalic.   Eyes: Pupils are equal, round, and reactive to light. EOM are normal.   Neck: Normal range of motion.   Cardiovascular: Normal rate.   Pulmonary/Chest: Effort normal.   Abdominal: Soft.   Musculoskeletal: Normal range of motion. He exhibits edema and tenderness.   Neurological: He is oriented to person, place, and time. He has normal strength.   Reflex Scores:       Patellar reflexes are 0 on the right side and 0 on the left side.       Achilles reflexes are 0 on the right side and 0 on the left side.  Skin: Skin is warm and dry.   Psychiatric: He has a normal mood and affect. His speech is normal.   Vitals reviewed.    Neurologic Exam     Mental Status   Oriented to person, place, and time.   Speech: speech is normal   Level of consciousness: alert    Cranial Nerves     CN III, IV, VI   Pupils are equal, round, and reactive to light.  Extraocular motions are normal.     Motor Exam   Muscle bulk: normal  Overall muscle tone: normal    Strength   Strength 5/5 throughout.     Sensory Exam   Light touch normal.     Gait, Coordination, and Reflexes     Gait  Gait: shuffling (Uses rolling walker)    Reflexes   Right patellar: 0  Left patellar: 0  Right achilles: 0  Left achilles: 0  Right Prasad: absent  Left Prasad: absent  Right ankle clonus: absent  Left ankle clonus: absent    Moderate tenderness on palpation of the mid to low lumbar spine and paraspinal muscles  SLRT Left negative, Right negative   Fran's Test negative bilaterally       Assessment/Plan   Independent Review of Radiographic Studies:      I personally reviewed the images from the following studies.    Lumbar x-ray 5/12/2020 was reviewed and reveals mild superior endplate fracture of L3    Lumbar standing film 5/20/2020 in the office  revealed healing compression deformity L3 which appears stable when compared to 5/12/2020, no obvious retropulsion    Medical Decision Making:      Dr. Ontiveros was able to review the lumbar films as well.  He is 6 weeks out from the injury and since most bony healing takes place within 12 weeks, he is prison through the healing process.  Had he presented in the initial phase we may have offered kyphoplasty if he was acutely symptomatic, but given his comorbidities and the fact that he can still get around we will try him in a lumbar brace to help support the spine and let this heal on its own.  We will see him back in the office in 2 months with another set of standing lumbar films to evaluate the healing.  They understand and will call the office sooner if there are any questions or concerns or new back or leg symptoms but for now we will assume this will heal on its own as most vertebral fractures will.    While in the room and during my examination of the patient I wore a mask and eye protection.  I washed my hands before and after this patient encounter.  The patient was also wearing a mask.  Bry was seen today for back pain.    Diagnoses and all orders for this visit:    Compression fracture of L3 vertebra, initial encounter (CMS/[facility])  -     XR Spine Lumbar Lateral; Future  -     Cancel: XR Spine Lumbar AP & Lateral      Return in about 2 months (around 7/20/2020).

## 2020-05-24 PROBLEM — K63.2 ENTEROCUTANEOUS FISTULA: Status: ACTIVE | Noted: 2020-05-24

## 2020-05-24 NOTE — PROGRESS NOTES
Subjective   Bry Ratliff is a 74 y.o. male.     History of present illness  Bry is seen in the office today at the request of Dr. Gomez and Dr. 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  Historically a year and a half ago the patient underwent a laparoscopic cholecystectomy and developed a postop abscess which was drained percutaneously.  He has had a number of percutaneous drains placed and continues to have problems with drainage from the area.  His last radiologic procedure confirmed a entero-colic fistula.  We discussed with Dr. Donahue the interventional radiologist and Dr. Gomez his gastroenterologist that he likely would need to undergo a segmental resection to correct this.  Patient is being seen in the office today to discuss that further.  Dr. Gomez who recently did an ERCP with some biopsies and brushings   is waiting for the path to come back from that.  If those come back showing no malignancy then we would consider a segmental right colon resection to get rid of this enterocutaneous fistula try to get him to heal.  He is at increased risk for morbidity due to his morbid obesity, his Gonzalez, his insulin-dependent diabetes, his coronary artery disease and history of DVT in the past.    Past Medical History:   Diagnosis Date   • Anemia     iron deficiency   • Anxiety    • B12 deficiency 2009   • Balance disorder    • Syed's esophagus    • Bile duct leak    • CAD (coronary artery disease)     cardiac stent   • Constipation    • DDD (degenerative disc disease), lumbar    • Decubitus ulcer     buttocks   • Depression    • Diabetes mellitus (CMS/HCC)    • Disease of thyroid gland     hypothyroid   • Diverticular disease    • Dvt femoral (deep venous thrombosis) (CMS/HCC) 2004    rt let   • Gastroparesis    • GERD (gastroesophageal reflux disease)    • Gout    • Hepatic encephalopathy (CMS/HCC)     if doesnt take meds   • History of  echocardiogram     03/03/2017 - 12/03/2014 - 04/2012   • History of stomach ulcers    • Hyperlipidemia    • Incontinence    • Iron deficiency    • Morbid obesity (CMS/HCC)    • THOMAS (nonalcoholic steatohepatitis)    • Neuropathy    • OAB (overactive bladder)    • KATHIA treated with BiPAP     bring dos   • Peripheral edema    • Pulmonary embolus (CMS/HCC) 2004   • Renal insufficiency    • S/P lumbar laminectomy    • Skin irritation     skin fold   • Stage 3 chronic kidney disease (CMS/HCC)        Past Surgical History:   Procedure Laterality Date   • BACK SURGERY  1971   • BILE DUCT STENT PLACEMENT     • BILE DUCT STENT PLACEMENT     • CATARACT EXTRACTION  2014   • CHOLECYSTECTOMY  02/24/2019   • COLONOSCOPY  03/2018   • CORONARY ANGIOPLASTY  08/24/2009    PCI stent - succesful placement of 3.5/23 promus stent in proximal right coronary artery   • CORONARY ANGIOPLASTY WITH STENT PLACEMENT  08/27/2009    patient had stent placed to proximal right coronary artery   • CYSTOSCOPY BLADDER STONE LITHOTRIPSY  1997   • ENDOSCOPY N/A 10/18/2019    Procedure: ESOPHAGOGASTRODUODENOSCOPY with argon plasma coagulation of gastric antral vascular ectasia;  Surgeon: Marisel Gomez MD;  Location: [facility] ENDOSCOPY;  Service: Gastroenterology   • ENDOSCOPY N/A 1/9/2020    Procedure: ESOPHAGOGASTRODUODENOSCOPY WITH BIOPSY, ARGON PLASMA COAGULATION OF GASTRIC ANTREL VASCULAR ECTASIA,;  Surgeon: Marisel Gomez MD;  Location: [facility] ENDOSCOPY;  Service: Gastroenterology   • ENDOSCOPY N/A 3/6/2020    Procedure: ESOPHAGOGASTRODUODENOSCOPY;  Surgeon: Zbigniew Silva MD;  Location: [facility] ENDOSCOPY;  Service: Gastroenterology;  Laterality: N/A;  mild gave, post cautery ulcer     • ERCP N/A 11/20/2019    Procedure: ERCP, clearance of bile duct with balloon, placement of biliary stent, EGD with argon plasma coagulation of gastric antral vascular ectasia;  Surgeon: Marisel Gomez MD;  Location: [facility] ENDOSCOPY;  Service: Gastroenterology   •  "ERCP N/A 2/27/2020    Procedure: ENDOSCOPIC RETROGRADE CHOLANGIOPANCREATOGRAPHY with removal of biliary stent, brushing, dilation, and placement of biliary stent x 2 and Esophagogastroduodenoscopy with argon plasma coagulation;  Surgeon: Marisel Gomez MD;  Location: [facility] ENDOSCOPY;  Service: Gastroenterology;  Laterality: N/A;  Gastric antral vascular ectasia, common bile duct stricture   • ERCP N/A 4/20/2020    Procedure: ENDOSCOPIC RETROGRADE CHOLANGIOPANCREATOGRAPHY WITH REMOVAL OF BILIARY STENT, AMPULA DILATION TO 8MM, BRUSHING OF COMMON BILE DUT, CLEARANCE OF BILE DUCT WITH BALLOON, BIOPSY OF AMPULA, PLACEMENT OF BILIARY STENT;  Surgeon: Marisel Gomez MD;  Location: [facility] ENDOSCOPY;  Service: Gastroenterology;  Laterality: N/A;  POST:CBD STRICTURE   • OTHER SURGICAL HISTORY  11/2018    IVC filter implant   • RENAL ARTERY STENT Left     does not recall this   • UPPER ENDOSCOPIC ULTRASOUND W/ FNA N/A 4/20/2020    Procedure: Esophagogastroduodenoscopy with Endoscopic ultrasound and fine needle aspiration;  Surgeon: Marisel Gomez MD;  Location: [facility] ENDOSCOPY;  Service: Gastroenterology;  Laterality: N/A;  Post: CBD STRICTURE       Outpatient Encounter Medications as of 5/20/2020   Medication Sig Dispense Refill   • allopurinol (ZYLOPRIM) 100 MG tablet Take 100 mg by mouth 2 (Two) Times a Day. Do not preop     • aspirin 81 MG tablet Take 1 tablet by mouth Daily. (Patient taking differently: Take 81 mg by mouth Daily. dont take dos) 30 tablet 3   • B-D 3CC LUER-YASMEEN SYR 25GX1\" 25G X 1\" 3 ML misc USE FOR THE B12 INJECTION INTRAMUSCULAR ONCE MONTHLY     • BOUDREAUXS BUTT PASTE 40 % ointment APPLY TO WOUNDS ONCE DAILY     • bumetanide (BUMEX) 1 MG tablet Take 1 tablet by mouth 2 (Two) Times a Day. (Patient taking differently: Take 1 mg by mouth 2 (Two) Times a Day. Do not take preop) 60 tablet 0   • Cyanocobalamin 1000 MCG/ML kit Inject 1,000 mcg as directed Every 30 (Thirty) Days.     • docusate sodium (COLACE) " 100 MG capsule Take 100 mg by mouth 2 (Two) Times a Day. dont take preop     • DULoxetine (CYMBALTA) 60 MG capsule Take 60 mg by mouth Daily. Take preop     • folic acid (FOLVITE) 1 MG tablet Take 1 mg by mouth Daily. Take  Post op     • hydrOXYzine pamoate (VISTARIL) 25 MG capsule Take 25 mg by mouth 3 (Three) Times a Day As Needed. Take preop  0   • insulin glargine (LANTUS) 100 UNIT/ML injection Inject 50 units at bedtime (Patient taking differently: Inject 50 Units under the skin into the appropriate area as directed Every Night. Inject 50 units at bedtime) 45 mL 3   • insulin lispro (HUMALOG) 100 UNIT/ML injection 18 units subcu before each meal plus sliding scale MDD 60 (Patient taking differently: Inject 18 Units under the skin into the appropriate area as directed 3 (Three) Times a Day Before Meals. 18 units subcu before each meal plus sliding scale MDD 60 dont take preop) 60 mL 4   • lactulose (CHRONULAC) 10 GM/15ML solution Take 60 mL by mouth Every 4 (Four) Hours. Last dose 0600 4/20     • levothyroxine (SYNTHROID, LEVOTHROID) 75 MCG tablet Take 1 tablet by mouth Daily. 90 tablet 4   • magnesium oxide (MAG-OX) 400 MG tablet Take 400 mg by mouth Daily. Take post op     • melatonin 5 MG tablet tablet Take 10 mg by mouth At Night As Needed.     • metoclopramide (REGLAN) 5 MG tablet Take 5 mg by mouth 2 (Two) Times a Day. Take post op  0   • Mirabegron ER (Myrbetriq) 50 MG tablet sustained-release 24 hour 24 hr tablet Take 50 mg by mouth Daily.     • Multiple Vitamins-Minerals (MULTIVITAMIN WITH MINERALS) tablet tablet Take 1 tablet by mouth Daily. Do not take preop     • nitroglycerin (NITROSTAT) 0.4 MG SL tablet Place 1 tablet under the tongue Every 5 (Five) Minutes As Needed for Chest Pain (Only if SBP Greater Than 100). Take no more than 3 doses in 15 minutes. 30 tablet 12   • ONETOUCH VERIO test strip USE TO CHECK BLOOD SUGAR QID     • oxyCODONE (ROXICODONE) 10 MG tablet TK 1 T PO Q 8 H     •  pantoprazole (PROTONIX) 40 MG EC tablet Take 1 tablet by mouth 2 (Two) Times a Day. (Patient taking differently: Take 40 mg by mouth 2 (Two) Times a Day. Take preop) 30 tablet 3   • potassium chloride (K-DUR,KLOR-CON) 20 MEQ CR tablet Take 20 mEq by mouth Daily. Take preop     • rifaximin (XIFAXAN) 550 MG tablet Take 550 mg by mouth Every 12 (Twelve) Hours. Take preop     • sodium bicarbonate 650 MG tablet Take 650 mg by mouth 3 (Three) Times a Day. Take post op     • zinc sulfate (ZINCATE) 220 (50 Zn) MG capsule TAKE 1 CAPSULE BY MOUTH ONCE DAILY AS DIRECTED       No facility-administered encounter medications on file as of 5/20/2020.        No Known Allergies    Family History   Problem Relation Age of Onset   • Hyperlipidemia Other    • Hypertension Other        Social History     Socioeconomic History   • Marital status:      Spouse name: Not on file   • Number of children: Not on file   • Years of education: Not on file   • Highest education level: Not on file   Tobacco Use   • Smoking status: Never Smoker   • Smokeless tobacco: Never Used   Substance and Sexual Activity   • Alcohol use: No     Frequency: Never   • Drug use: No   • Sexual activity: Defer       The following portions of the patient's history were reviewed and updated as appropriate: allergies, current medications, past family history, past medical history, past social history, past surgical history and problem list.    Objective       Assessment/Plan   There are no diagnoses linked to this encounter.    Complete review of systems is done and unremarkable with exception of the chief complaint and the above-noted exceptions    Physical exam shows a morbidly obese 74-year-old male.  HEENT is negative.  Heart regular.  Lungs clear.  Abdomen obese.  Soft.  Nontender.  He has a percutaneous drain in the right upper flank/abdomen draining what appears to be liquid stool and bile.  Extremities show equal range of motion in the upper and lower  extremities.  He has symmetrical strength and usage.  Neuro shows no obvious focal deficit.    Impression: Morbidly obese 74-year-old with multiple comorbid medical issues with a persistent nonhealing enterocolic fistula    Plan: We will wait for the path report from the recent ERCP studies then consider a segmental right colon resection down the road           Naif Etienne DO  5/24/2020  11:48

## 2020-06-01 ENCOUNTER — LAB (OUTPATIENT)
Dept: LAB | Facility: HOSPITAL | Age: 75
End: 2020-06-01

## 2020-06-01 ENCOUNTER — HOSPITAL ENCOUNTER (OUTPATIENT)
Dept: ONCOLOGY | Facility: HOSPITAL | Age: 75
Discharge: HOME | End: 2020-06-01
Admitting: INTERNAL MEDICINE

## 2020-06-01 VITALS
DIASTOLIC BLOOD PRESSURE: 70 MMHG | SYSTOLIC BLOOD PRESSURE: 120 MMHG | RESPIRATION RATE: 18 BRPM | BODY MASS INDEX: 37.47 KG/M2 | WEIGHT: 292 LBS | HEIGHT: 74 IN | HEART RATE: 70 BPM | TEMPERATURE: 97.1 F

## 2020-06-01 DIAGNOSIS — D50.0 IRON DEFICIENCY ANEMIA DUE TO CHRONIC BLOOD LOSS: ICD-10-CM

## 2020-06-01 DIAGNOSIS — N18.30 CKD (CHRONIC KIDNEY DISEASE) STAGE 3, GFR 30-59 ML/MIN (HCC): Primary | ICD-10-CM

## 2020-06-01 DIAGNOSIS — D47.2 MGUS (MONOCLONAL GAMMOPATHY OF UNKNOWN SIGNIFICANCE): ICD-10-CM

## 2020-06-01 LAB
BASOPHILS # BLD AUTO: 0.12 10*3/MM3 (ref 0–0.2)
BASOPHILS NFR BLD AUTO: 0.9 % (ref 0–1.5)
DEPRECATED RDW RBC AUTO: 54.5 FL (ref 37–54)
EOSINOPHIL # BLD AUTO: 0.54 10*3/MM3 (ref 0–0.4)
EOSINOPHIL NFR BLD AUTO: 4.1 % (ref 0.3–6.2)
ERYTHROCYTE [DISTWIDTH] IN BLOOD BY AUTOMATED COUNT: 14.8 % (ref 12.3–15.4)
HCT VFR BLD AUTO: 28.1 % (ref 37.5–51)
HGB BLD-MCNC: 8.9 G/DL (ref 13–17.7)
LYMPHOCYTES # BLD AUTO: 2.38 10*3/MM3 (ref 0.7–3.1)
LYMPHOCYTES NFR BLD AUTO: 17.9 % (ref 19.6–45.3)
MCH RBC QN AUTO: 32.7 PG (ref 26.6–33)
MCHC RBC AUTO-ENTMCNC: 31.7 G/DL (ref 31.5–35.7)
MCV RBC AUTO: 103.3 FL (ref 79–97)
MONOCYTES # BLD AUTO: 1.13 10*3/MM3 (ref 0.1–0.9)
MONOCYTES NFR BLD AUTO: 8.5 % (ref 5–12)
NEUTROPHILS # BLD AUTO: 9.09 10*3/MM3 (ref 1.7–7)
NEUTROPHILS NFR BLD AUTO: 68.6 % (ref 42.7–76)
PLATELET # BLD AUTO: 270 10*3/MM3 (ref 140–450)
PMV BLD AUTO: 11.1 FL (ref 6–12)
RBC # BLD AUTO: 2.72 10*6/MM3 (ref 4.14–5.8)
WBC NRBC COR # BLD: 13.26 10*3/MM3 (ref 3.4–10.8)

## 2020-06-01 PROCEDURE — 85025 COMPLETE CBC W/AUTO DIFF WBC: CPT

## 2020-06-01 PROCEDURE — 25010000002 EPOETIN ALFA-EPBX 40000 UNIT/ML SOLUTION: Performed by: INTERNAL MEDICINE

## 2020-06-01 PROCEDURE — 96372 THER/PROPH/DIAG INJ SC/IM: CPT

## 2020-06-01 PROCEDURE — 36415 COLL VENOUS BLD VENIPUNCTURE: CPT

## 2020-06-01 RX ADMIN — EPOETIN ALFA-EPBX 40000 UNITS: 40000 INJECTION, SOLUTION INTRAVENOUS; SUBCUTANEOUS at 11:23

## 2020-06-03 ENCOUNTER — TELEHEALTH PROVIDED OTHER THAN IN PATIENT'S HOME (AMBULATORY)
Dept: URBAN - METROPOLITAN AREA TELEHEALTH 4 | Facility: TELEHEALTH | Age: 75
End: 2020-06-03
Payer: COMMERCIAL

## 2020-06-03 ENCOUNTER — PREP FOR SURGERY (OUTPATIENT)
Dept: OTHER | Facility: HOSPITAL | Age: 75
End: 2020-06-03

## 2020-06-03 VITALS — HEIGHT: 74 IN

## 2020-06-03 DIAGNOSIS — K72.90 HEPATIC FAILURE, UNSPECIFIED WITHOUT COMA: ICD-10-CM

## 2020-06-03 DIAGNOSIS — K63.2 COLOCUTANEOUS FISTULA: Primary | ICD-10-CM

## 2020-06-03 DIAGNOSIS — K75.81 NONALCOHOLIC STEATOHEPATITIS (NASH): ICD-10-CM

## 2020-06-03 DIAGNOSIS — K74.69 OTHER CIRRHOSIS OF LIVER: ICD-10-CM

## 2020-06-03 DIAGNOSIS — K80.50 CALCULUS OF BILE DUCT WITHOUT CHOLANGITIS OR CHOLECYSTITIS W: ICD-10-CM

## 2020-06-03 DIAGNOSIS — K63.2 FISTULA OF INTESTINE: ICD-10-CM

## 2020-06-03 DIAGNOSIS — K31.819 ANGIODYSPLASIA OF STOMACH AND DUODENUM WITHOUT BLEEDING: ICD-10-CM

## 2020-06-03 PROCEDURE — 99213 OFFICE O/P EST LOW 20 MIN: CPT | Mod: 95 | Performed by: INTERNAL MEDICINE

## 2020-06-03 RX ORDER — SODIUM CHLORIDE 9 MG/ML
100 INJECTION, SOLUTION INTRAVENOUS CONTINUOUS
Status: CANCELLED | OUTPATIENT
Start: 2020-06-03

## 2020-06-03 RX ORDER — CIPROFLOXACIN 500 MG/1
1000 TABLET, FILM COATED ORAL
Qty: 20 | Refills: 0 | Status: COMPLETED
Start: 2020-06-03 | End: 2020-01-01

## 2020-06-03 NOTE — H&P
Subjective   Bry Ratliff is a 74 y.o. male.     History of present illness  Bry is seen in the office today at the request of Dr. Gomez and Dr. 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  Historically a year and a half ago the patient underwent a laparoscopic cholecystectomy and developed a postop abscess which was drained percutaneously.  He has had a number of percutaneous drains placed and continues to have problems with drainage from the area.  His last radiologic procedure confirmed a entero-colic fistula.  We discussed with Dr. Donahue the interventional radiologist and Dr. Gomez his gastroenterologist that he likely would need to undergo a segmental resection to correct this.  Patient is being seen in the office today to discuss that further.  Dr. Gomez who recently did an ERCP with some biopsies and brushings   is waiting for the path to come back from that.  If those come back showing no malignancy then we would consider a segmental right colon resection to get rid of this enterocutaneous fistula try to get him to heal.  He is at increased risk for morbidity due to his morbid obesity, his Gonzalez, his insulin-dependent diabetes, his coronary artery disease and history of DVT in the past.    Past Medical History:   Diagnosis Date   • Anemia     iron deficiency   • Anxiety    • B12 deficiency 2009   • Balance disorder    • Syed's esophagus    • Bile duct leak    • CAD (coronary artery disease)     cardiac stent   • Constipation    • DDD (degenerative disc disease), lumbar    • Decubitus ulcer     buttocks   • Depression    • Diabetes mellitus (CMS/HCC)    • Disease of thyroid gland     hypothyroid   • Diverticular disease    • Dvt femoral (deep venous thrombosis) (CMS/HCC) 2004    rt let   • Gastroparesis    • GERD (gastroesophageal reflux disease)    • Gout    • Hepatic encephalopathy (CMS/HCC)     if doesnt take meds   • History of  echocardiogram     03/03/2017 - 12/03/2014 - 04/2012   • History of stomach ulcers    • Hyperlipidemia    • Incontinence    • Iron deficiency    • Morbid obesity (CMS/HCC)    • THOMAS (nonalcoholic steatohepatitis)    • Neuropathy    • OAB (overactive bladder)    • KATHIA treated with BiPAP     bring dos   • Peripheral edema    • Pulmonary embolus (CMS/HCC) 2004   • Renal insufficiency    • S/P lumbar laminectomy    • Skin irritation     skin fold   • Stage 3 chronic kidney disease (CMS/HCC)        Past Surgical History:   Procedure Laterality Date   • BACK SURGERY  1971   • BILE DUCT STENT PLACEMENT     • BILE DUCT STENT PLACEMENT     • CATARACT EXTRACTION  2014   • CHOLECYSTECTOMY  02/24/2019   • COLONOSCOPY  03/2018   • CORONARY ANGIOPLASTY  08/24/2009    PCI stent - succesful placement of 3.5/23 promus stent in proximal right coronary artery   • CORONARY ANGIOPLASTY WITH STENT PLACEMENT  08/27/2009    patient had stent placed to proximal right coronary artery   • CYSTOSCOPY BLADDER STONE LITHOTRIPSY  1997   • ENDOSCOPY N/A 10/18/2019    Procedure: ESOPHAGOGASTRODUODENOSCOPY with argon plasma coagulation of gastric antral vascular ectasia;  Surgeon: Marisel Gomez MD;  Location: [facility] ENDOSCOPY;  Service: Gastroenterology   • ENDOSCOPY N/A 1/9/2020    Procedure: ESOPHAGOGASTRODUODENOSCOPY WITH BIOPSY, ARGON PLASMA COAGULATION OF GASTRIC ANTREL VASCULAR ECTASIA,;  Surgeon: Marisel Gomez MD;  Location: [facility] ENDOSCOPY;  Service: Gastroenterology   • ENDOSCOPY N/A 3/6/2020    Procedure: ESOPHAGOGASTRODUODENOSCOPY;  Surgeon: Zbigniew Silva MD;  Location: [facility] ENDOSCOPY;  Service: Gastroenterology;  Laterality: N/A;  mild gave, post cautery ulcer     • ERCP N/A 11/20/2019    Procedure: ERCP, clearance of bile duct with balloon, placement of biliary stent, EGD with argon plasma coagulation of gastric antral vascular ectasia;  Surgeon: Marisel Gomez MD;  Location: [facility] ENDOSCOPY;  Service: Gastroenterology   •  "ERCP N/A 2/27/2020    Procedure: ENDOSCOPIC RETROGRADE CHOLANGIOPANCREATOGRAPHY with removal of biliary stent, brushing, dilation, and placement of biliary stent x 2 and Esophagogastroduodenoscopy with argon plasma coagulation;  Surgeon: Marisel Gomez MD;  Location: [facility] ENDOSCOPY;  Service: Gastroenterology;  Laterality: N/A;  Gastric antral vascular ectasia, common bile duct stricture   • ERCP N/A 4/20/2020    Procedure: ENDOSCOPIC RETROGRADE CHOLANGIOPANCREATOGRAPHY WITH REMOVAL OF BILIARY STENT, AMPULA DILATION TO 8MM, BRUSHING OF COMMON BILE DUT, CLEARANCE OF BILE DUCT WITH BALLOON, BIOPSY OF AMPULA, PLACEMENT OF BILIARY STENT;  Surgeon: Marisel Gomez MD;  Location: [facility] ENDOSCOPY;  Service: Gastroenterology;  Laterality: N/A;  POST:CBD STRICTURE   • OTHER SURGICAL HISTORY  11/2018    IVC filter implant   • RENAL ARTERY STENT Left     does not recall this   • UPPER ENDOSCOPIC ULTRASOUND W/ FNA N/A 4/20/2020    Procedure: Esophagogastroduodenoscopy with Endoscopic ultrasound and fine needle aspiration;  Surgeon: Marisel Gomez MD;  Location: [facility] ENDOSCOPY;  Service: Gastroenterology;  Laterality: N/A;  Post: CBD STRICTURE       Outpatient Encounter Medications as of 6/3/2020   Medication Sig Dispense Refill   • allopurinol (ZYLOPRIM) 100 MG tablet Take 100 mg by mouth 2 (Two) Times a Day. Do not preop     • aspirin 81 MG tablet Take 1 tablet by mouth Daily. (Patient taking differently: Take 81 mg by mouth Daily. dont take dos) 30 tablet 3   • B-D 3CC LUER-YASMEEN SYR 25GX1\" 25G X 1\" 3 ML misc USE FOR THE B12 INJECTION INTRAMUSCULAR ONCE MONTHLY     • BOUDREAUXS BUTT PASTE 40 % ointment APPLY TO WOUNDS ONCE DAILY     • bumetanide (BUMEX) 1 MG tablet Take 1 tablet by mouth 2 (Two) Times a Day. (Patient taking differently: Take 1 mg by mouth 2 (Two) Times a Day. Do not take preop) 60 tablet 0   • Cyanocobalamin 1000 MCG/ML kit Inject 1,000 mcg as directed Every 30 (Thirty) Days.     • docusate sodium (COLACE) " 100 MG capsule Take 100 mg by mouth 2 (Two) Times a Day. dont take preop     • DULoxetine (CYMBALTA) 60 MG capsule Take 60 mg by mouth Daily. Take preop     • folic acid (FOLVITE) 1 MG tablet Take 1 mg by mouth Daily. Take  Post op     • hydrOXYzine pamoate (VISTARIL) 25 MG capsule Take 25 mg by mouth 3 (Three) Times a Day As Needed. Take preop  0   • insulin glargine (LANTUS) 100 UNIT/ML injection Inject 50 units at bedtime (Patient taking differently: Inject 50 Units under the skin into the appropriate area as directed Every Night. Inject 50 units at bedtime) 45 mL 3   • insulin lispro (HUMALOG) 100 UNIT/ML injection 18 units subcu before each meal plus sliding scale MDD 60 (Patient taking differently: Inject 18 Units under the skin into the appropriate area as directed 3 (Three) Times a Day Before Meals. 18 units subcu before each meal plus sliding scale MDD 60 dont take preop) 60 mL 4   • lactulose (CHRONULAC) 10 GM/15ML solution Take 60 mL by mouth Every 4 (Four) Hours. Last dose 0600 4/20     • levothyroxine (SYNTHROID, LEVOTHROID) 75 MCG tablet Take 1 tablet by mouth Daily. 90 tablet 4   • magnesium oxide (MAG-OX) 400 MG tablet Take 400 mg by mouth Daily. Take post op     • melatonin 5 MG tablet tablet Take 10 mg by mouth At Night As Needed.     • metoclopramide (REGLAN) 5 MG tablet Take 5 mg by mouth 2 (Two) Times a Day. Take post op  0   • Mirabegron ER (Myrbetriq) 50 MG tablet sustained-release 24 hour 24 hr tablet Take 50 mg by mouth Daily.     • Multiple Vitamins-Minerals (MULTIVITAMIN WITH MINERALS) tablet tablet Take 1 tablet by mouth Daily. Do not take preop     • nitroglycerin (NITROSTAT) 0.4 MG SL tablet Place 1 tablet under the tongue Every 5 (Five) Minutes As Needed for Chest Pain (Only if SBP Greater Than 100). Take no more than 3 doses in 15 minutes. 30 tablet 12   • ONETOUCH VERIO test strip USE TO CHECK BLOOD SUGAR QID     • oxyCODONE (ROXICODONE) 10 MG tablet TK 1 T PO Q 8 H     •  pantoprazole (PROTONIX) 40 MG EC tablet Take 1 tablet by mouth 2 (Two) Times a Day. (Patient taking differently: Take 40 mg by mouth 2 (Two) Times a Day. Take preop) 30 tablet 3   • potassium chloride (K-DUR,KLOR-CON) 20 MEQ CR tablet Take 20 mEq by mouth Daily. Take preop     • rifaximin (XIFAXAN) 550 MG tablet Take 550 mg by mouth Every 12 (Twelve) Hours. Take preop     • sodium bicarbonate 650 MG tablet Take 650 mg by mouth 3 (Three) Times a Day. Take post op     • zinc sulfate (ZINCATE) 220 (50 Zn) MG capsule TAKE 1 CAPSULE BY MOUTH ONCE DAILY AS DIRECTED       No facility-administered encounter medications on file as of 6/3/2020.        No Known Allergies    Family History   Problem Relation Age of Onset   • Hyperlipidemia Other    • Hypertension Other        Social History     Socioeconomic History   • Marital status:      Spouse name: Not on file   • Number of children: Not on file   • Years of education: Not on file   • Highest education level: Not on file   Tobacco Use   • Smoking status: Never Smoker   • Smokeless tobacco: Never Used   Substance and Sexual Activity   • Alcohol use: No     Frequency: Never   • Drug use: No   • Sexual activity: Defer       The following portions of the patient's history were reviewed and updated as appropriate: allergies, current medications, past family history, past medical history, past social history, past surgical history and problem list.    Objective       Assessment/Plan   There are no diagnoses linked to this encounter.    Complete review of systems is done and unremarkable with exception of the chief complaint and the above-noted exceptions    Physical exam shows a morbidly obese 74-year-old male.  HEENT is negative.  Heart regular.  Lungs clear.  Abdomen obese.  Soft.  Nontender.  He has a percutaneous drain in the right upper flank/abdomen draining what appears to be liquid stool and bile.  Extremities show equal range of motion in the upper and lower  extremities.  He has symmetrical strength and usage.  Neuro shows no obvious focal deficit.    Impression: Morbidly obese 74-year-old with multiple comorbid medical issues with a persistent nonhealing enterocolic fistula    Plan: We will wait for the path report from the recent ERCP studies then consider a segmental right colon resection down the road           Naif Etienne DO  6/3/2020  18:21

## 2020-06-04 PROBLEM — K63.2 COLOCUTANEOUS FISTULA: Status: ACTIVE | Noted: 2020-06-04

## 2020-06-04 RX ORDER — NEOMYCIN SULFATE 500 MG/1
TABLET ORAL
Qty: 6 TABLET | Refills: 0 | Status: SHIPPED | OUTPATIENT
Start: 2020-06-04 | End: 2020-06-15 | Stop reason: HOSPADM

## 2020-06-04 RX ORDER — ERYTHROMYCIN 500 MG/1
TABLET, COATED ORAL
Qty: 40 TABLET | Refills: 0 | Status: SHIPPED | OUTPATIENT
Start: 2020-06-04 | End: 2020-06-15 | Stop reason: HOSPADM

## 2020-06-04 RX ORDER — CIPROFLOXACIN 500 MG/1
500 TABLET, FILM COATED ORAL 2 TIMES DAILY
COMMUNITY
End: 2020-08-25

## 2020-06-05 ENCOUNTER — TELEPHONE (OUTPATIENT)
Dept: ONCOLOGY | Facility: CLINIC | Age: 75
End: 2020-06-05

## 2020-06-05 ENCOUNTER — TELEPHONE (OUTPATIENT)
Dept: ENDOCRINOLOGY | Facility: CLINIC | Age: 75
End: 2020-06-05

## 2020-06-05 NOTE — TELEPHONE ENCOUNTER
I called and spoke with Lisa and she said patient is having surgery on 6/11/20. She was asking about having home health to give his procrit. I told her I didn't think they did that.     I told her to ask the surgeon about him resuming procrit and we could schedule him for the week of 6/22 instead of waiting until 6/29. She said she would give us a call back.

## 2020-06-05 NOTE — TELEPHONE ENCOUNTER
Cancel appointments on 6/15.  He has surgery on 6/11.    Needs to speak with someone about how he will be getting his labwork in the future.    [phone] Lisa  [phone] Lisa rios

## 2020-06-09 ENCOUNTER — TRANSCRIBE ORDERS (OUTPATIENT)
Dept: ADMINISTRATIVE | Facility: HOSPITAL | Age: 75
End: 2020-06-09

## 2020-06-09 ENCOUNTER — LAB (OUTPATIENT)
Dept: LAB | Facility: HOSPITAL | Age: 75
End: 2020-06-09

## 2020-06-09 ENCOUNTER — HOSPITAL ENCOUNTER (OUTPATIENT)
Dept: GENERAL RADIOLOGY | Facility: HOSPITAL | Age: 75
Discharge: HOME | End: 2020-06-09
Admitting: SURGERY

## 2020-06-09 ENCOUNTER — HOSPITAL ENCOUNTER (OUTPATIENT)
Dept: CARDIOLOGY | Facility: HOSPITAL | Age: 75
Discharge: HOME | End: 2020-06-09

## 2020-06-09 ENCOUNTER — APPOINTMENT (OUTPATIENT)
Dept: LAB | Facility: HOSPITAL | Age: 75
End: 2020-06-09

## 2020-06-09 DIAGNOSIS — Z79.4 TYPE 2 DIABETES MELLITUS WITH HYPERGLYCEMIA, WITH LONG-TERM CURRENT USE OF INSULIN (HCC): ICD-10-CM

## 2020-06-09 DIAGNOSIS — K75.81 NONALCOHOLIC STEATOHEPATITIS: ICD-10-CM

## 2020-06-09 DIAGNOSIS — K63.2 ENTEROCUTANEOUS FISTULA: ICD-10-CM

## 2020-06-09 DIAGNOSIS — E03.9 ACQUIRED HYPOTHYROIDISM: ICD-10-CM

## 2020-06-09 DIAGNOSIS — K74.69 FLORID CIRRHOSIS (HCC): ICD-10-CM

## 2020-06-09 DIAGNOSIS — K31.819: ICD-10-CM

## 2020-06-09 DIAGNOSIS — K76.82 HEPATIC ENCEPHALOPATHY (HCC): ICD-10-CM

## 2020-06-09 DIAGNOSIS — I10 ESSENTIAL HYPERTENSION: ICD-10-CM

## 2020-06-09 DIAGNOSIS — K76.82 HEPATIC ENCEPHALOPATHY (HCC): Primary | ICD-10-CM

## 2020-06-09 DIAGNOSIS — E78.2 MIXED HYPERLIPIDEMIA: ICD-10-CM

## 2020-06-09 DIAGNOSIS — K63.2 COLOCUTANEOUS FISTULA: ICD-10-CM

## 2020-06-09 DIAGNOSIS — K80.50 COMMON BILE DUCT CALCULUS: ICD-10-CM

## 2020-06-09 DIAGNOSIS — E11.65 TYPE 2 DIABETES MELLITUS WITH HYPERGLYCEMIA, WITH LONG-TERM CURRENT USE OF INSULIN (HCC): ICD-10-CM

## 2020-06-09 LAB
ABO GROUP BLD: NORMAL
ALBUMIN SERPL-MCNC: 3.1 G/DL (ref 3.5–5.2)
ALBUMIN UR-MCNC: <1.2 MG/DL
ALBUMIN/GLOB SERPL: 0.8 G/DL
ALP SERPL-CCNC: 148 U/L (ref 39–117)
ALT SERPL W P-5'-P-CCNC: 16 U/L (ref 1–41)
AMMONIA BLD-SCNC: 34 UMOL/L (ref 16–60)
ANION GAP SERPL CALCULATED.3IONS-SCNC: 14 MMOL/L (ref 5–15)
APTT PPP: 22.8 SECONDS (ref 24–31)
AST SERPL-CCNC: 34 U/L (ref 1–40)
BASOPHILS # BLD AUTO: 0.1 10*3/MM3 (ref 0–0.2)
BASOPHILS NFR BLD AUTO: 0.7 % (ref 0–1.5)
BILIRUB SERPL-MCNC: 0.5 MG/DL (ref 0.2–1.2)
BLD GP AB SCN SERPL QL: NEGATIVE
BUN BLD-MCNC: 27 MG/DL (ref 8–23)
BUN/CREAT SERPL: 14.4 (ref 7–25)
CALCIUM SPEC-SCNC: 8.6 MG/DL (ref 8.6–10.5)
CHLORIDE SERPL-SCNC: 99 MMOL/L (ref 98–107)
CHOLEST SERPL-MCNC: 149 MG/DL (ref 0–200)
CO2 SERPL-SCNC: 24 MMOL/L (ref 22–29)
CREAT BLD-MCNC: 1.87 MG/DL (ref 0.76–1.27)
CREAT UR-MCNC: 104.1 MG/DL
DEPRECATED RDW RBC AUTO: 59.5 FL (ref 37–54)
EOSINOPHIL # BLD AUTO: 0.4 10*3/MM3 (ref 0–0.4)
EOSINOPHIL NFR BLD AUTO: 4.6 % (ref 0.3–6.2)
ERYTHROCYTE [DISTWIDTH] IN BLOOD BY AUTOMATED COUNT: 16.7 % (ref 12.3–15.4)
GFR SERPL CREATININE-BSD FRML MDRD: 36 ML/MIN/1.73
GLOBULIN UR ELPH-MCNC: 4 GM/DL
GLUCOSE BLD-MCNC: 161 MG/DL (ref 65–99)
HBA1C MFR BLD: 5.6 % (ref 3.5–5.6)
HCT VFR BLD AUTO: 24.9 % (ref 37.5–51)
HDLC SERPL-MCNC: 43 MG/DL (ref 40–60)
HGB BLD-MCNC: 8.2 G/DL (ref 13–17.7)
INR PPP: 1.04 (ref 0.9–1.1)
LDLC SERPL CALC-MCNC: 82 MG/DL (ref 0–100)
LDLC/HDLC SERPL: 1.9 {RATIO}
LYMPHOCYTES # BLD AUTO: 1 10*3/MM3 (ref 0.7–3.1)
LYMPHOCYTES NFR BLD AUTO: 11.7 % (ref 19.6–45.3)
MCH RBC QN AUTO: 33 PG (ref 26.6–33)
MCHC RBC AUTO-ENTMCNC: 32.9 G/DL (ref 31.5–35.7)
MCV RBC AUTO: 100.4 FL (ref 79–97)
MICROALBUMIN/CREAT UR: NORMAL MG/G{CREAT}
MONOCYTES # BLD AUTO: 0.6 10*3/MM3 (ref 0.1–0.9)
MONOCYTES NFR BLD AUTO: 7.3 % (ref 5–12)
NEUTROPHILS # BLD AUTO: 6.4 10*3/MM3 (ref 1.7–7)
NEUTROPHILS NFR BLD AUTO: 75.7 % (ref 42.7–76)
NRBC BLD AUTO-RTO: 0 /100 WBC (ref 0–0.2)
PLATELET # BLD AUTO: 212 10*3/MM3 (ref 140–450)
PMV BLD AUTO: 8.2 FL (ref 6–12)
POTASSIUM BLD-SCNC: 4.3 MMOL/L (ref 3.5–5.2)
PROT SERPL-MCNC: 7.1 G/DL (ref 6–8.5)
PROTHROMBIN TIME: 10.8 SECONDS (ref 9.6–11.7)
RBC # BLD AUTO: 2.48 10*6/MM3 (ref 4.14–5.8)
RH BLD: POSITIVE
SODIUM BLD-SCNC: 137 MMOL/L (ref 136–145)
T&S EXPIRATION DATE: NORMAL
T4 FREE SERPL-MCNC: 1.19 NG/DL (ref 0.93–1.7)
TRIGL SERPL-MCNC: 121 MG/DL (ref 0–150)
TSH SERPL DL<=0.05 MIU/L-ACNC: 2.5 UIU/ML (ref 0.27–4.2)
VLDLC SERPL-MCNC: 24.2 MG/DL (ref 5–40)
WBC NRBC COR # BLD: 8.5 10*3/MM3 (ref 3.4–10.8)

## 2020-06-09 PROCEDURE — 82140 ASSAY OF AMMONIA: CPT

## 2020-06-09 PROCEDURE — 71046 X-RAY EXAM CHEST 2 VIEWS: CPT

## 2020-06-09 PROCEDURE — 86850 RBC ANTIBODY SCREEN: CPT | Performed by: SURGERY

## 2020-06-09 PROCEDURE — U0004 COV-19 TEST NON-CDC HGH THRU: HCPCS

## 2020-06-09 PROCEDURE — 84443 ASSAY THYROID STIM HORMONE: CPT

## 2020-06-09 PROCEDURE — 82570 ASSAY OF URINE CREATININE: CPT

## 2020-06-09 PROCEDURE — 83036 HEMOGLOBIN GLYCOSYLATED A1C: CPT

## 2020-06-09 PROCEDURE — 82043 UR ALBUMIN QUANTITATIVE: CPT

## 2020-06-09 PROCEDURE — 86901 BLOOD TYPING SEROLOGIC RH(D): CPT | Performed by: SURGERY

## 2020-06-09 PROCEDURE — 86923 COMPATIBILITY TEST ELECTRIC: CPT

## 2020-06-09 PROCEDURE — 84439 ASSAY OF FREE THYROXINE: CPT

## 2020-06-09 PROCEDURE — 85730 THROMBOPLASTIN TIME PARTIAL: CPT

## 2020-06-09 PROCEDURE — 80061 LIPID PANEL: CPT

## 2020-06-09 PROCEDURE — 93005 ELECTROCARDIOGRAM TRACING: CPT | Performed by: SURGERY

## 2020-06-09 PROCEDURE — 80053 COMPREHEN METABOLIC PANEL: CPT

## 2020-06-09 PROCEDURE — 85610 PROTHROMBIN TIME: CPT

## 2020-06-09 PROCEDURE — 86900 BLOOD TYPING SEROLOGIC ABO: CPT | Performed by: SURGERY

## 2020-06-09 PROCEDURE — 36415 COLL VENOUS BLD VENIPUNCTURE: CPT

## 2020-06-09 PROCEDURE — 85025 COMPLETE CBC W/AUTO DIFF WBC: CPT

## 2020-06-10 ENCOUNTER — ANESTHESIA EVENT (OUTPATIENT)
Dept: PERIOP | Facility: HOSPITAL | Age: 75
End: 2020-06-10

## 2020-06-10 LAB
REF LAB TEST METHOD: NORMAL
SARS-COV-2 RNA RESP QL NAA+PROBE: NOT DETECTED

## 2020-06-11 ENCOUNTER — HOSPITAL ENCOUNTER (INPATIENT)
Facility: HOSPITAL | Age: 75
LOS: 4 days | Discharge: HOME HEALTH | End: 2020-06-15
Attending: SURGERY | Admitting: INTERNAL MEDICINE

## 2020-06-11 ENCOUNTER — APPOINTMENT (OUTPATIENT)
Dept: GENERAL RADIOLOGY | Facility: HOSPITAL | Age: 75
End: 2020-06-11

## 2020-06-11 ENCOUNTER — ANESTHESIA (OUTPATIENT)
Dept: PERIOP | Facility: HOSPITAL | Age: 75
End: 2020-06-11

## 2020-06-11 DIAGNOSIS — I25.10 CHRONIC CORONARY ARTERY DISEASE: Primary | ICD-10-CM

## 2020-06-11 DIAGNOSIS — L89.153 DECUBITUS ULCER OF SACRAL REGION, STAGE 3 (HCC): ICD-10-CM

## 2020-06-11 DIAGNOSIS — E11.65 TYPE 2 DIABETES MELLITUS WITH HYPERGLYCEMIA, WITH LONG-TERM CURRENT USE OF INSULIN (HCC): ICD-10-CM

## 2020-06-11 DIAGNOSIS — R53.1 GENERAL WEAKNESS: ICD-10-CM

## 2020-06-11 DIAGNOSIS — K63.2 ENTEROCUTANEOUS FISTULA: ICD-10-CM

## 2020-06-11 DIAGNOSIS — Z79.4 TYPE 2 DIABETES MELLITUS WITH HYPERGLYCEMIA, WITH LONG-TERM CURRENT USE OF INSULIN (HCC): ICD-10-CM

## 2020-06-11 DIAGNOSIS — K63.2 COLOCUTANEOUS FISTULA: ICD-10-CM

## 2020-06-11 DIAGNOSIS — I27.20 PULMONARY HYPERTENSION (HCC): ICD-10-CM

## 2020-06-11 LAB
ANION GAP SERPL CALCULATED.3IONS-SCNC: 11 MMOL/L (ref 5–15)
BASOPHILS # BLD AUTO: 0 10*3/MM3 (ref 0–0.2)
BASOPHILS NFR BLD AUTO: 0.1 % (ref 0–1.5)
BUN BLD-MCNC: ABNORMAL MG/DL
BUN/CREAT SERPL: ABNORMAL
CALCIUM SPEC-SCNC: 8.2 MG/DL (ref 8.6–10.5)
CHLORIDE SERPL-SCNC: 106 MMOL/L (ref 98–107)
CO2 SERPL-SCNC: 19 MMOL/L (ref 22–29)
CREAT BLD-MCNC: 1.47 MG/DL (ref 0.76–1.27)
DEPRECATED RDW RBC AUTO: 60.4 FL (ref 37–54)
EOSINOPHIL # BLD AUTO: 0 10*3/MM3 (ref 0–0.4)
EOSINOPHIL NFR BLD AUTO: 0 % (ref 0.3–6.2)
ERYTHROCYTE [DISTWIDTH] IN BLOOD BY AUTOMATED COUNT: 18.4 % (ref 12.3–15.4)
GFR SERPL CREATININE-BSD FRML MDRD: 47 ML/MIN/1.73
GLUCOSE BLD-MCNC: 292 MG/DL (ref 65–99)
GLUCOSE BLDC GLUCOMTR-MCNC: 149 MG/DL (ref 70–105)
GLUCOSE BLDC GLUCOMTR-MCNC: 208 MG/DL (ref 70–105)
GLUCOSE BLDC GLUCOMTR-MCNC: 264 MG/DL (ref 70–105)
HCT VFR BLD AUTO: 28.8 % (ref 37.5–51)
HCT VFR BLD AUTO: 30.4 % (ref 37.5–51)
HGB BLD-MCNC: 10.2 G/DL (ref 13–17.7)
HGB BLD-MCNC: 9.9 G/DL (ref 13–17.7)
LYMPHOCYTES # BLD AUTO: 0.2 10*3/MM3 (ref 0.7–3.1)
LYMPHOCYTES NFR BLD AUTO: 1.3 % (ref 19.6–45.3)
MCH RBC QN AUTO: 31.5 PG (ref 26.6–33)
MCHC RBC AUTO-ENTMCNC: 33.5 G/DL (ref 31.5–35.7)
MCV RBC AUTO: 94 FL (ref 79–97)
MONOCYTES # BLD AUTO: 0.7 10*3/MM3 (ref 0.1–0.9)
MONOCYTES NFR BLD AUTO: 5.4 % (ref 5–12)
NEUTROPHILS # BLD AUTO: 11.9 10*3/MM3 (ref 1.7–7)
NEUTROPHILS NFR BLD AUTO: 93.2 % (ref 42.7–76)
NRBC BLD AUTO-RTO: 0.1 /100 WBC (ref 0–0.2)
PLATELET # BLD AUTO: 198 10*3/MM3 (ref 140–450)
PMV BLD AUTO: 8.1 FL (ref 6–12)
POTASSIUM BLD-SCNC: 4.3 MMOL/L (ref 3.5–5.2)
RBC # BLD AUTO: 3.24 10*6/MM3 (ref 4.14–5.8)
SODIUM BLD-SCNC: 136 MMOL/L (ref 136–145)
WBC NRBC COR # BLD: 12.7 10*3/MM3 (ref 3.4–10.8)

## 2020-06-11 PROCEDURE — 82803 BLOOD GASES ANY COMBINATION: CPT

## 2020-06-11 PROCEDURE — 88307 TISSUE EXAM BY PATHOLOGIST: CPT | Performed by: SURGERY

## 2020-06-11 PROCEDURE — 44160 REMOVAL OF COLON: CPT | Performed by: SURGERY

## 2020-06-11 PROCEDURE — 25010000002 CEFOXITIN PER 1 G: Performed by: SURGERY

## 2020-06-11 PROCEDURE — 44160 REMOVAL OF COLON: CPT | Performed by: NURSE PRACTITIONER

## 2020-06-11 PROCEDURE — 82962 GLUCOSE BLOOD TEST: CPT

## 2020-06-11 PROCEDURE — 84295 ASSAY OF SERUM SODIUM: CPT

## 2020-06-11 PROCEDURE — 25010000002 PROPOFOL 10 MG/ML EMULSION: Performed by: ANESTHESIOLOGIST ASSISTANT

## 2020-06-11 PROCEDURE — 25010000002 DEXAMETHASONE PER 1 MG: Performed by: ANESTHESIOLOGIST ASSISTANT

## 2020-06-11 PROCEDURE — 82947 ASSAY GLUCOSE BLOOD QUANT: CPT

## 2020-06-11 PROCEDURE — 80048 BASIC METABOLIC PNL TOTAL CA: CPT | Performed by: PHYSICIAN ASSISTANT

## 2020-06-11 PROCEDURE — 85014 HEMATOCRIT: CPT

## 2020-06-11 PROCEDURE — 99231 SBSQ HOSP IP/OBS SF/LOW 25: CPT | Performed by: PHYSICIAN ASSISTANT

## 2020-06-11 PROCEDURE — 71045 X-RAY EXAM CHEST 1 VIEW: CPT

## 2020-06-11 PROCEDURE — 0DTF0ZZ RESECTION OF RIGHT LARGE INTESTINE, OPEN APPROACH: ICD-10-PCS | Performed by: SURGERY

## 2020-06-11 PROCEDURE — 85025 COMPLETE CBC W/AUTO DIFF WBC: CPT | Performed by: PHYSICIAN ASSISTANT

## 2020-06-11 PROCEDURE — 85014 HEMATOCRIT: CPT | Performed by: ANESTHESIOLOGIST ASSISTANT

## 2020-06-11 PROCEDURE — 82330 ASSAY OF CALCIUM: CPT

## 2020-06-11 PROCEDURE — C1751 CATH, INF, PER/CENT/MIDLINE: HCPCS | Performed by: ANESTHESIOLOGY

## 2020-06-11 PROCEDURE — P9016 RBC LEUKOCYTES REDUCED: HCPCS

## 2020-06-11 PROCEDURE — 36430 TRANSFUSION BLD/BLD COMPNT: CPT

## 2020-06-11 PROCEDURE — 85018 HEMOGLOBIN: CPT | Performed by: ANESTHESIOLOGIST ASSISTANT

## 2020-06-11 PROCEDURE — 86900 BLOOD TYPING SEROLOGIC ABO: CPT

## 2020-06-11 PROCEDURE — 25010000002 PROPOFOL 200 MG/20ML EMULSION: Performed by: ANESTHESIOLOGIST ASSISTANT

## 2020-06-11 PROCEDURE — 25010000002 MORPHINE PER 10 MG: Performed by: ANESTHESIOLOGIST ASSISTANT

## 2020-06-11 PROCEDURE — 25010000002 ONDANSETRON PER 1 MG: Performed by: ANESTHESIOLOGIST ASSISTANT

## 2020-06-11 PROCEDURE — C9290 INJ, BUPIVACAINE LIPOSOME: HCPCS | Performed by: ANESTHESIOLOGY

## 2020-06-11 PROCEDURE — 84132 ASSAY OF SERUM POTASSIUM: CPT

## 2020-06-11 PROCEDURE — 63710000001 INSULIN GLARGINE PER 5 UNITS: Performed by: PHYSICIAN ASSISTANT

## 2020-06-11 PROCEDURE — 25010000003 BUPIVACAINE LIPOSOME 1.3 % SUSPENSION: Performed by: ANESTHESIOLOGY

## 2020-06-11 PROCEDURE — 25010000002 FENTANYL CITRATE (PF) 100 MCG/2ML SOLUTION: Performed by: ANESTHESIOLOGIST ASSISTANT

## 2020-06-11 DEVICE — PROXIMATE LINEAR CUTTER RELOAD, BLUE, 75MM
Type: IMPLANTABLE DEVICE | Site: ASCENDING COLON | Status: FUNCTIONAL
Brand: PROXIMATE

## 2020-06-11 DEVICE — PROXIMATE RELOADABLE LINEAR STAPLER
Type: IMPLANTABLE DEVICE | Site: ASCENDING COLON | Status: FUNCTIONAL
Brand: PROXIMATE

## 2020-06-11 DEVICE — PROXIMATE RELOADABLE LINEAR CUTTER WITH SAFETY LOCK-OUT, 75MM
Type: IMPLANTABLE DEVICE | Site: ASCENDING COLON | Status: FUNCTIONAL
Brand: PROXIMATE

## 2020-06-11 RX ORDER — IPRATROPIUM BROMIDE AND ALBUTEROL SULFATE 2.5; .5 MG/3ML; MG/3ML
3 SOLUTION RESPIRATORY (INHALATION) ONCE AS NEEDED
Status: DISCONTINUED | OUTPATIENT
Start: 2020-06-11 | End: 2020-06-11 | Stop reason: HOSPADM

## 2020-06-11 RX ORDER — HYDROMORPHONE HCL 110MG/55ML
0.5 PATIENT CONTROLLED ANALGESIA SYRINGE INTRAVENOUS
Status: DISCONTINUED | OUTPATIENT
Start: 2020-06-11 | End: 2020-06-15 | Stop reason: HOSPADM

## 2020-06-11 RX ORDER — NALOXONE HCL 0.4 MG/ML
0.4 VIAL (ML) INJECTION AS NEEDED
Status: DISCONTINUED | OUTPATIENT
Start: 2020-06-11 | End: 2020-06-11 | Stop reason: HOSPADM

## 2020-06-11 RX ORDER — HYDROXYZINE HYDROCHLORIDE 25 MG/1
25 TABLET, FILM COATED ORAL 3 TIMES DAILY PRN
Status: DISCONTINUED | OUTPATIENT
Start: 2020-06-11 | End: 2020-06-15 | Stop reason: HOSPADM

## 2020-06-11 RX ORDER — HYDRALAZINE HYDROCHLORIDE 20 MG/ML
5 INJECTION INTRAMUSCULAR; INTRAVENOUS
Status: DISCONTINUED | OUTPATIENT
Start: 2020-06-11 | End: 2020-06-11 | Stop reason: HOSPADM

## 2020-06-11 RX ORDER — ONDANSETRON 2 MG/ML
4 INJECTION INTRAMUSCULAR; INTRAVENOUS EVERY 6 HOURS PRN
Status: DISCONTINUED | OUTPATIENT
Start: 2020-06-11 | End: 2020-06-15 | Stop reason: HOSPADM

## 2020-06-11 RX ORDER — PROMETHAZINE HYDROCHLORIDE 25 MG/ML
6.25 INJECTION, SOLUTION INTRAMUSCULAR; INTRAVENOUS ONCE AS NEEDED
Status: DISCONTINUED | OUTPATIENT
Start: 2020-06-11 | End: 2020-06-11 | Stop reason: HOSPADM

## 2020-06-11 RX ORDER — MORPHINE SULFATE 4 MG/ML
1 INJECTION, SOLUTION INTRAMUSCULAR; INTRAVENOUS
Status: DISCONTINUED | OUTPATIENT
Start: 2020-06-11 | End: 2020-06-11 | Stop reason: HOSPADM

## 2020-06-11 RX ORDER — DOCUSATE SODIUM 100 MG/1
100 CAPSULE, LIQUID FILLED ORAL 2 TIMES DAILY PRN
Status: DISCONTINUED | OUTPATIENT
Start: 2020-06-11 | End: 2020-06-15 | Stop reason: HOSPADM

## 2020-06-11 RX ORDER — SODIUM CHLORIDE 9 MG/ML
9 INJECTION, SOLUTION INTRAVENOUS CONTINUOUS PRN
Status: DISCONTINUED | OUTPATIENT
Start: 2020-06-11 | End: 2020-06-11

## 2020-06-11 RX ORDER — ZINC SULFATE 50(220)MG
220 CAPSULE ORAL DAILY
Status: DISCONTINUED | OUTPATIENT
Start: 2020-06-12 | End: 2020-06-15 | Stop reason: HOSPADM

## 2020-06-11 RX ORDER — HYDROCODONE BITARTRATE AND ACETAMINOPHEN 5; 325 MG/1; MG/1
1 TABLET ORAL EVERY 4 HOURS PRN
Status: DISCONTINUED | OUTPATIENT
Start: 2020-06-11 | End: 2020-06-15 | Stop reason: HOSPADM

## 2020-06-11 RX ORDER — MORPHINE SULFATE 4 MG/ML
INJECTION, SOLUTION INTRAMUSCULAR; INTRAVENOUS AS NEEDED
Status: DISCONTINUED | OUTPATIENT
Start: 2020-06-11 | End: 2020-06-11 | Stop reason: SURG

## 2020-06-11 RX ORDER — NICOTINE POLACRILEX 4 MG
15 LOZENGE BUCCAL
Status: DISCONTINUED | OUTPATIENT
Start: 2020-06-11 | End: 2020-06-11 | Stop reason: HOSPADM

## 2020-06-11 RX ORDER — BISACODYL 10 MG
10 SUPPOSITORY, RECTAL RECTAL DAILY PRN
Status: DISCONTINUED | OUTPATIENT
Start: 2020-06-11 | End: 2020-06-15 | Stop reason: HOSPADM

## 2020-06-11 RX ORDER — GABAPENTIN 300 MG/1
CAPSULE ORAL AS NEEDED
Status: DISCONTINUED | OUTPATIENT
Start: 2020-06-11 | End: 2020-06-11 | Stop reason: SURG

## 2020-06-11 RX ORDER — OXYCODONE HYDROCHLORIDE 5 MG/1
10 TABLET ORAL EVERY 4 HOURS PRN
Status: DISCONTINUED | OUTPATIENT
Start: 2020-06-11 | End: 2020-06-15 | Stop reason: HOSPADM

## 2020-06-11 RX ORDER — SODIUM CHLORIDE 0.9 % (FLUSH) 0.9 %
10 SYRINGE (ML) INJECTION EVERY 12 HOURS SCHEDULED
Status: DISCONTINUED | OUTPATIENT
Start: 2020-06-11 | End: 2020-06-11 | Stop reason: HOSPADM

## 2020-06-11 RX ORDER — SODIUM CHLORIDE, SODIUM LACTATE, POTASSIUM CHLORIDE, AND CALCIUM CHLORIDE .6; .31; .03; .02 G/100ML; G/100ML; G/100ML; G/100ML
INJECTION, SOLUTION INTRAVENOUS AS NEEDED
Status: DISCONTINUED | OUTPATIENT
Start: 2020-06-11 | End: 2020-06-11 | Stop reason: HOSPADM

## 2020-06-11 RX ORDER — LIDOCAINE HYDROCHLORIDE 10 MG/ML
INJECTION, SOLUTION EPIDURAL; INFILTRATION; INTRACAUDAL; PERINEURAL AS NEEDED
Status: DISCONTINUED | OUTPATIENT
Start: 2020-06-11 | End: 2020-06-11 | Stop reason: SURG

## 2020-06-11 RX ORDER — MORPHINE SULFATE 4 MG/ML
2 INJECTION, SOLUTION INTRAMUSCULAR; INTRAVENOUS
Status: DISCONTINUED | OUTPATIENT
Start: 2020-06-11 | End: 2020-06-11 | Stop reason: HOSPADM

## 2020-06-11 RX ORDER — INSULIN GLARGINE 100 [IU]/ML
50 INJECTION, SOLUTION SUBCUTANEOUS NIGHTLY
Status: DISCONTINUED | OUTPATIENT
Start: 2020-06-11 | End: 2020-06-15 | Stop reason: HOSPADM

## 2020-06-11 RX ORDER — FOLIC ACID 1 MG/1
1 TABLET ORAL DAILY
Status: DISCONTINUED | OUTPATIENT
Start: 2020-06-12 | End: 2020-06-15 | Stop reason: HOSPADM

## 2020-06-11 RX ORDER — ONDANSETRON 2 MG/ML
INJECTION INTRAMUSCULAR; INTRAVENOUS AS NEEDED
Status: DISCONTINUED | OUTPATIENT
Start: 2020-06-11 | End: 2020-06-11 | Stop reason: SURG

## 2020-06-11 RX ORDER — DEXTROSE MONOHYDRATE 25 G/50ML
25 INJECTION, SOLUTION INTRAVENOUS
Status: DISCONTINUED | OUTPATIENT
Start: 2020-06-11 | End: 2020-06-15 | Stop reason: HOSPADM

## 2020-06-11 RX ORDER — CHOLECALCIFEROL (VITAMIN D3) 125 MCG
10 CAPSULE ORAL NIGHTLY PRN
Status: DISCONTINUED | OUTPATIENT
Start: 2020-06-11 | End: 2020-06-15 | Stop reason: HOSPADM

## 2020-06-11 RX ORDER — PHENYLEPHRINE HYDROCHLORIDE 10 MG/ML
INJECTION INTRAVENOUS AS NEEDED
Status: DISCONTINUED | OUTPATIENT
Start: 2020-06-11 | End: 2020-06-11 | Stop reason: SURG

## 2020-06-11 RX ORDER — OXYCODONE HYDROCHLORIDE 5 MG/1
7.5 TABLET ORAL ONCE AS NEEDED
Status: DISCONTINUED | OUTPATIENT
Start: 2020-06-11 | End: 2020-06-11 | Stop reason: HOSPADM

## 2020-06-11 RX ORDER — SODIUM CHLORIDE 0.9 % (FLUSH) 0.9 %
10 SYRINGE (ML) INJECTION EVERY 12 HOURS SCHEDULED
Status: DISCONTINUED | OUTPATIENT
Start: 2020-06-11 | End: 2020-06-15 | Stop reason: HOSPADM

## 2020-06-11 RX ORDER — ROCURONIUM BROMIDE 10 MG/ML
INJECTION, SOLUTION INTRAVENOUS AS NEEDED
Status: DISCONTINUED | OUTPATIENT
Start: 2020-06-11 | End: 2020-06-11 | Stop reason: SURG

## 2020-06-11 RX ORDER — CALCIUM CARBONATE 200(500)MG
2 TABLET,CHEWABLE ORAL 2 TIMES DAILY PRN
Status: DISCONTINUED | OUTPATIENT
Start: 2020-06-11 | End: 2020-06-15 | Stop reason: HOSPADM

## 2020-06-11 RX ORDER — ASPIRIN 81 MG/1
81 TABLET ORAL DAILY
Status: DISCONTINUED | OUTPATIENT
Start: 2020-06-12 | End: 2020-06-15 | Stop reason: HOSPADM

## 2020-06-11 RX ORDER — ACETAMINOPHEN 650 MG/1
650 SUPPOSITORY RECTAL EVERY 4 HOURS PRN
Status: DISCONTINUED | OUTPATIENT
Start: 2020-06-11 | End: 2020-06-15 | Stop reason: HOSPADM

## 2020-06-11 RX ORDER — LEVOTHYROXINE SODIUM 0.07 MG/1
75 TABLET ORAL
Status: DISCONTINUED | OUTPATIENT
Start: 2020-06-12 | End: 2020-06-15 | Stop reason: HOSPADM

## 2020-06-11 RX ORDER — ACETAMINOPHEN 325 MG/1
650 TABLET ORAL EVERY 4 HOURS PRN
Status: DISCONTINUED | OUTPATIENT
Start: 2020-06-11 | End: 2020-06-15 | Stop reason: HOSPADM

## 2020-06-11 RX ORDER — PROMETHAZINE HYDROCHLORIDE 25 MG/1
25 SUPPOSITORY RECTAL ONCE AS NEEDED
Status: DISCONTINUED | OUTPATIENT
Start: 2020-06-11 | End: 2020-06-11 | Stop reason: HOSPADM

## 2020-06-11 RX ORDER — FENTANYL CITRATE 50 UG/ML
INJECTION, SOLUTION INTRAMUSCULAR; INTRAVENOUS AS NEEDED
Status: DISCONTINUED | OUTPATIENT
Start: 2020-06-11 | End: 2020-06-11 | Stop reason: SURG

## 2020-06-11 RX ORDER — SODIUM CHLORIDE 9 MG/ML
100 INJECTION, SOLUTION INTRAVENOUS CONTINUOUS
Status: DISCONTINUED | OUTPATIENT
Start: 2020-06-11 | End: 2020-06-15 | Stop reason: HOSPADM

## 2020-06-11 RX ORDER — ALUMINA, MAGNESIA, AND SIMETHICONE 2400; 2400; 240 MG/30ML; MG/30ML; MG/30ML
15 SUSPENSION ORAL EVERY 6 HOURS PRN
Status: DISCONTINUED | OUTPATIENT
Start: 2020-06-11 | End: 2020-06-15 | Stop reason: HOSPADM

## 2020-06-11 RX ORDER — NITROGLYCERIN 0.4 MG/1
0.4 TABLET SUBLINGUAL
Status: DISCONTINUED | OUTPATIENT
Start: 2020-06-11 | End: 2020-06-15 | Stop reason: HOSPADM

## 2020-06-11 RX ORDER — MEPERIDINE HYDROCHLORIDE 25 MG/ML
12.5 INJECTION INTRAMUSCULAR; INTRAVENOUS; SUBCUTANEOUS
Status: DISCONTINUED | OUTPATIENT
Start: 2020-06-11 | End: 2020-06-11 | Stop reason: HOSPADM

## 2020-06-11 RX ORDER — PANTOPRAZOLE SODIUM 40 MG/1
40 TABLET, DELAYED RELEASE ORAL
Status: DISCONTINUED | OUTPATIENT
Start: 2020-06-12 | End: 2020-06-15 | Stop reason: HOSPADM

## 2020-06-11 RX ORDER — NALOXONE HCL 0.4 MG/ML
0.1 VIAL (ML) INJECTION
Status: DISCONTINUED | OUTPATIENT
Start: 2020-06-11 | End: 2020-06-15 | Stop reason: HOSPADM

## 2020-06-11 RX ORDER — ONDANSETRON 4 MG/1
4 TABLET, FILM COATED ORAL EVERY 6 HOURS PRN
Status: DISCONTINUED | OUTPATIENT
Start: 2020-06-11 | End: 2020-06-11

## 2020-06-11 RX ORDER — MORPHINE SULFATE 4 MG/ML
4 INJECTION, SOLUTION INTRAMUSCULAR; INTRAVENOUS
Status: DISCONTINUED | OUTPATIENT
Start: 2020-06-11 | End: 2020-06-15 | Stop reason: HOSPADM

## 2020-06-11 RX ORDER — OXYCODONE HCL 10 MG/1
TABLET, FILM COATED, EXTENDED RELEASE ORAL AS NEEDED
Status: DISCONTINUED | OUTPATIENT
Start: 2020-06-11 | End: 2020-06-11 | Stop reason: SURG

## 2020-06-11 RX ORDER — LABETALOL HYDROCHLORIDE 5 MG/ML
5 INJECTION, SOLUTION INTRAVENOUS
Status: DISCONTINUED | OUTPATIENT
Start: 2020-06-11 | End: 2020-06-11 | Stop reason: HOSPADM

## 2020-06-11 RX ORDER — ONDANSETRON 2 MG/ML
4 INJECTION INTRAMUSCULAR; INTRAVENOUS ONCE AS NEEDED
Status: DISCONTINUED | OUTPATIENT
Start: 2020-06-11 | End: 2020-06-11 | Stop reason: HOSPADM

## 2020-06-11 RX ORDER — SODIUM BICARBONATE 650 MG/1
650 TABLET ORAL 2 TIMES DAILY
Status: DISCONTINUED | OUTPATIENT
Start: 2020-06-11 | End: 2020-06-15 | Stop reason: HOSPADM

## 2020-06-11 RX ORDER — SODIUM CHLORIDE 0.9 % (FLUSH) 0.9 %
10 SYRINGE (ML) INJECTION AS NEEDED
Status: DISCONTINUED | OUTPATIENT
Start: 2020-06-11 | End: 2020-06-11 | Stop reason: HOSPADM

## 2020-06-11 RX ORDER — EPHEDRINE SULFATE/0.9% NACL/PF 25 MG/5 ML
SYRINGE (ML) INTRAVENOUS AS NEEDED
Status: DISCONTINUED | OUTPATIENT
Start: 2020-06-11 | End: 2020-06-11 | Stop reason: SURG

## 2020-06-11 RX ORDER — SODIUM CHLORIDE 0.9 % (FLUSH) 0.9 %
10 SYRINGE (ML) INJECTION AS NEEDED
Status: DISCONTINUED | OUTPATIENT
Start: 2020-06-11 | End: 2020-06-15 | Stop reason: HOSPADM

## 2020-06-11 RX ORDER — HYDROMORPHONE HCL 110MG/55ML
0.25 PATIENT CONTROLLED ANALGESIA SYRINGE INTRAVENOUS
Status: DISCONTINUED | OUTPATIENT
Start: 2020-06-11 | End: 2020-06-11 | Stop reason: HOSPADM

## 2020-06-11 RX ORDER — BUPIVACAINE HYDROCHLORIDE 5 MG/ML
INJECTION, SOLUTION EPIDURAL; INTRACAUDAL
Status: DISCONTINUED | OUTPATIENT
Start: 2020-06-11 | End: 2020-06-11 | Stop reason: SURG

## 2020-06-11 RX ORDER — NALOXONE HCL 0.4 MG/ML
0.4 VIAL (ML) INJECTION
Status: DISCONTINUED | OUTPATIENT
Start: 2020-06-11 | End: 2020-06-15 | Stop reason: HOSPADM

## 2020-06-11 RX ORDER — DULOXETIN HYDROCHLORIDE 30 MG/1
60 CAPSULE, DELAYED RELEASE ORAL DAILY
Status: DISCONTINUED | OUTPATIENT
Start: 2020-06-12 | End: 2020-06-15 | Stop reason: HOSPADM

## 2020-06-11 RX ORDER — ONDANSETRON 2 MG/ML
4 INJECTION INTRAMUSCULAR; INTRAVENOUS EVERY 6 HOURS PRN
Status: DISCONTINUED | OUTPATIENT
Start: 2020-06-11 | End: 2020-06-11

## 2020-06-11 RX ORDER — PROMETHAZINE HYDROCHLORIDE 25 MG/ML
12.5 INJECTION, SOLUTION INTRAMUSCULAR; INTRAVENOUS EVERY 6 HOURS PRN
Status: DISCONTINUED | OUTPATIENT
Start: 2020-06-11 | End: 2020-06-15 | Stop reason: HOSPADM

## 2020-06-11 RX ORDER — DEXAMETHASONE SODIUM PHOSPHATE 4 MG/ML
INJECTION, SOLUTION INTRA-ARTICULAR; INTRALESIONAL; INTRAMUSCULAR; INTRAVENOUS; SOFT TISSUE AS NEEDED
Status: DISCONTINUED | OUTPATIENT
Start: 2020-06-11 | End: 2020-06-11 | Stop reason: SURG

## 2020-06-11 RX ORDER — PROPOFOL 10 MG/ML
INJECTION, EMULSION INTRAVENOUS AS NEEDED
Status: DISCONTINUED | OUTPATIENT
Start: 2020-06-11 | End: 2020-06-11 | Stop reason: SURG

## 2020-06-11 RX ORDER — ACETAMINOPHEN 650 MG
TABLET, EXTENDED RELEASE ORAL AS NEEDED
Status: DISCONTINUED | OUTPATIENT
Start: 2020-06-11 | End: 2020-06-11 | Stop reason: HOSPADM

## 2020-06-11 RX ORDER — FLUMAZENIL 0.1 MG/ML
0.2 INJECTION INTRAVENOUS AS NEEDED
Status: DISCONTINUED | OUTPATIENT
Start: 2020-06-11 | End: 2020-06-11 | Stop reason: HOSPADM

## 2020-06-11 RX ORDER — DEXTROSE MONOHYDRATE 25 G/50ML
25 INJECTION, SOLUTION INTRAVENOUS
Status: DISCONTINUED | OUTPATIENT
Start: 2020-06-11 | End: 2020-06-11 | Stop reason: HOSPADM

## 2020-06-11 RX ORDER — ALLOPURINOL 100 MG/1
100 TABLET ORAL 2 TIMES DAILY
Status: DISCONTINUED | OUTPATIENT
Start: 2020-06-11 | End: 2020-06-15 | Stop reason: HOSPADM

## 2020-06-11 RX ORDER — NICOTINE POLACRILEX 4 MG
15 LOZENGE BUCCAL
Status: DISCONTINUED | OUTPATIENT
Start: 2020-06-11 | End: 2020-06-15 | Stop reason: HOSPADM

## 2020-06-11 RX ORDER — SODIUM CHLORIDE 9 MG/ML
100 INJECTION, SOLUTION INTRAVENOUS CONTINUOUS
Status: DISCONTINUED | OUTPATIENT
Start: 2020-06-11 | End: 2020-06-11

## 2020-06-11 RX ORDER — ONDANSETRON 4 MG/1
4 TABLET, FILM COATED ORAL EVERY 6 HOURS PRN
Status: DISCONTINUED | OUTPATIENT
Start: 2020-06-11 | End: 2020-06-15 | Stop reason: HOSPADM

## 2020-06-11 RX ORDER — PROMETHAZINE HYDROCHLORIDE 25 MG/1
25 TABLET ORAL ONCE AS NEEDED
Status: DISCONTINUED | OUTPATIENT
Start: 2020-06-11 | End: 2020-06-11 | Stop reason: HOSPADM

## 2020-06-11 RX ORDER — LACTULOSE 10 G/15ML
60 SOLUTION ORAL EVERY 4 HOURS
Status: DISCONTINUED | OUTPATIENT
Start: 2020-06-11 | End: 2020-06-15 | Stop reason: HOSPADM

## 2020-06-11 RX ORDER — LIDOCAINE HYDROCHLORIDE 10 MG/ML
0.5 INJECTION, SOLUTION EPIDURAL; INFILTRATION; INTRACAUDAL; PERINEURAL ONCE AS NEEDED
Status: DISCONTINUED | OUTPATIENT
Start: 2020-06-11 | End: 2020-06-11 | Stop reason: HOSPADM

## 2020-06-11 RX ORDER — VASOPRESSIN 20 U/ML
INJECTION PARENTERAL AS NEEDED
Status: DISCONTINUED | OUTPATIENT
Start: 2020-06-11 | End: 2020-06-11 | Stop reason: SURG

## 2020-06-11 RX ADMIN — VASOPRESSIN 2 UNITS: 20 INJECTION INTRAVENOUS at 10:42

## 2020-06-11 RX ADMIN — ROCURONIUM BROMIDE 50 MG: 10 INJECTION, SOLUTION INTRAVENOUS at 09:01

## 2020-06-11 RX ADMIN — LIDOCAINE HYDROCHLORIDE 50 MG: 10 INJECTION, SOLUTION EPIDURAL; INFILTRATION; INTRACAUDAL; PERINEURAL at 09:01

## 2020-06-11 RX ADMIN — PROPOFOL 150 MG: 10 INJECTION, EMULSION INTRAVENOUS at 09:01

## 2020-06-11 RX ADMIN — SUGAMMADEX 200 MG: 100 INJECTION, SOLUTION INTRAVENOUS at 11:12

## 2020-06-11 RX ADMIN — PHENYLEPHRINE HYDROCHLORIDE 400 MG: 10 INJECTION INTRAVENOUS at 09:44

## 2020-06-11 RX ADMIN — ROCURONIUM BROMIDE 20 MG: 10 INJECTION, SOLUTION INTRAVENOUS at 10:26

## 2020-06-11 RX ADMIN — VASOPRESSIN 2 UNITS: 20 INJECTION INTRAVENOUS at 09:58

## 2020-06-11 RX ADMIN — VASOPRESSIN 2 UNITS: 20 INJECTION INTRAVENOUS at 10:21

## 2020-06-11 RX ADMIN — MORPHINE SULFATE 2 MG: 4 INJECTION INTRAVENOUS at 11:29

## 2020-06-11 RX ADMIN — PROPOFOL 125 MCG/KG/MIN: 10 INJECTION, EMULSION INTRAVENOUS at 09:23

## 2020-06-11 RX ADMIN — ALLOPURINOL 100 MG: 100 TABLET ORAL at 20:01

## 2020-06-11 RX ADMIN — BUPIVACAINE 20 ML: 13.3 INJECTION, SUSPENSION, LIPOSOMAL INFILTRATION at 11:30

## 2020-06-11 RX ADMIN — CEFOXITIN 2 G: 2 INJECTION, POWDER, FOR SOLUTION INTRAVENOUS at 09:08

## 2020-06-11 RX ADMIN — PHENYLEPHRINE HYDROCHLORIDE 200 MG: 10 INJECTION INTRAVENOUS at 09:41

## 2020-06-11 RX ADMIN — VASOPRESSIN 1.2 UNITS: 20 INJECTION INTRAVENOUS at 09:47

## 2020-06-11 RX ADMIN — BUPIVACAINE HYDROCHLORIDE 20 ML: 5 INJECTION, SOLUTION EPIDURAL; INTRACAUDAL; PERINEURAL at 11:30

## 2020-06-11 RX ADMIN — CEFOXITIN 1 G: 1 INJECTION, POWDER, FOR SOLUTION INTRAVENOUS at 17:30

## 2020-06-11 RX ADMIN — PHENYLEPHRINE HYDROCHLORIDE 200 MG: 10 INJECTION INTRAVENOUS at 09:04

## 2020-06-11 RX ADMIN — SODIUM BICARBONATE 650 MG TABLET 650 MG: at 19:55

## 2020-06-11 RX ADMIN — Medication 10 ML: at 20:02

## 2020-06-11 RX ADMIN — PHENYLEPHRINE HYDROCHLORIDE 200 MG: 10 INJECTION INTRAVENOUS at 09:38

## 2020-06-11 RX ADMIN — HYDROCODONE BITARTRATE AND ACETAMINOPHEN 1 TABLET: 5; 325 TABLET ORAL at 19:56

## 2020-06-11 RX ADMIN — LACTULOSE 60 ML: 10 SOLUTION ORAL at 19:55

## 2020-06-11 RX ADMIN — ONDANSETRON 4 MG: 2 INJECTION INTRAMUSCULAR; INTRAVENOUS at 10:47

## 2020-06-11 RX ADMIN — PROPOFOL: 10 INJECTION, EMULSION INTRAVENOUS at 10:34

## 2020-06-11 RX ADMIN — VASOPRESSIN 2 UNITS: 20 INJECTION INTRAVENOUS at 09:52

## 2020-06-11 RX ADMIN — SODIUM CHLORIDE 9 ML/HR: 900 INJECTION, SOLUTION INTRAVENOUS at 08:07

## 2020-06-11 RX ADMIN — FENTANYL CITRATE 25 MCG: 50 INJECTION, SOLUTION INTRAMUSCULAR; INTRAVENOUS at 09:28

## 2020-06-11 RX ADMIN — MORPHINE SULFATE 2 MG: 4 INJECTION INTRAVENOUS at 10:52

## 2020-06-11 RX ADMIN — GABAPENTIN 300 MG: 300 CAPSULE ORAL at 08:37

## 2020-06-11 RX ADMIN — DEXAMETHASONE SODIUM PHOSPHATE 4 MG: 4 INJECTION, SOLUTION INTRAMUSCULAR; INTRAVENOUS at 09:52

## 2020-06-11 RX ADMIN — OXYCODONE HYDROCHLORIDE 10 MG: 10 TABLET, FILM COATED, EXTENDED RELEASE ORAL at 08:37

## 2020-06-11 RX ADMIN — Medication 10 MG: at 09:41

## 2020-06-11 RX ADMIN — INSULIN GLARGINE 50 UNITS: 100 INJECTION, SOLUTION SUBCUTANEOUS at 19:56

## 2020-06-11 RX ADMIN — FENTANYL CITRATE 50 MCG: 50 INJECTION, SOLUTION INTRAMUSCULAR; INTRAVENOUS at 08:58

## 2020-06-11 RX ADMIN — FENTANYL CITRATE 25 MCG: 50 INJECTION, SOLUTION INTRAMUSCULAR; INTRAVENOUS at 09:35

## 2020-06-11 RX ADMIN — Medication 15 MG: at 09:40

## 2020-06-11 RX ADMIN — SODIUM CHLORIDE: 0.9 INJECTION, SOLUTION INTRAVENOUS at 09:14

## 2020-06-11 RX ADMIN — RIFAXIMIN 550 MG: 550 TABLET ORAL at 19:56

## 2020-06-11 NOTE — ANESTHESIA PROCEDURE NOTES
Central Line      Patient reassessed immediately prior to procedure    Patient location during procedure: OR  Indications: vascular access  Staff  Anesthesiologist: Bi Oviedo MD  Preanesthetic Checklist  Completed: patient identified, site marked, surgical consent, pre-op evaluation, timeout performed, IV checked, risks and benefits discussed and monitors and equipment checked  Central Line Prep  Sterile Tech:cap, gloves, gown, mask and sterile barriers  Prep: chloraprep  Patient monitoring: blood pressure monitoring, continuous pulse oximetry and EKG  Central Line Procedure  Laterality:right  Location:internal jugular  Catheter Type:Cordis and MAC  Catheter Size:9 Fr  Guidance:ultrasound guided and landmark technique  PROCEDURE NOTE/ULTRASOUND INTERPRETATION.  Using ultrasound guidance the potential vascular sites for insertion of the catheter were visualized to determine the patency of the vessel to be used for vascular access.  After selecting the appropriate site for insertion, the needle was visualized under ultrasound being inserted into the internal jugular vein, followed by ultrasound confirmation of wire and catheter placement. There were no abnormalities seen on ultrasound; an image was taken; and the patient tolerated the procedure with no complications. Images: still images obtained, printed/placed on chart  Assessment  Post procedure:biopatch applied, line sutured and occlusive dressing applied  Assessement:Vahid Test, chest x-ray ordered, free fluid flow and blood return through all ports  Complications:no  Patient Tolerance:patient tolerated the procedure well with no apparent complications  Additional Notes  Pre-procedure:  Patient identified; pre-procedure vital signs, all relevant labs/studies, complete medical/surgical/anesthetic history, full medication list, full allergy list, and NPO status obtained/reviewed; physical assessment performed; anesthetic options, side effects,  potential complications, risks, and benefits discussed; questions answered; written anesthesia procedure consent obtained; patient cleared for procedure; IV access in situ    Procedure:  Central venous line placed after induction of general anesthesia; ASA monitor placed; patient positioned; hand hygiene performed; patient supine with Trendelenberg; sterile technique maintained throughout the procedure; sterile prep; large sterile drape applied; sterile ultrasound probe cover placed; insertion site determined by anatomical landmarks, palpation, and ultrasound imaging;  live ultrasound guidance throughout the procedure; target vein identified on live ultrasound; target vein is patent with flow; introducer needle with access catheter placed into the skin and advanced into the target vein with correct placement confirmed by aspiration of venous blood; realtime needle entry into the target artery witnessed on live ultrasound; access catheter threaded over the needle and into the target vein; needle removed; central venous pressure transduced with tubing to confirm correct venous placement of the access catheter; wire placed into the target vein via the access catheter; access catheter removed; wire visualized in the target vein on ultrasound; skin nick was made with a scalpel at the insertion site; dilator and flushed central venous catheter inserted together into the target vein over the wire; dilator/wire removed together through the catheter; correct catheter placement confirmed by aspiration of venous blood and free-flowing IV fluids; ports flushed; antimicrobial protective disk placed; catheter secured with sutures and occlusive dressing; ultrasound image printed and placed in the patient's permanent medical record    Post-procedure:  Central venous catheter placed successfully; no arterial puncture; no apparent complications; minimal estimated blood loss; vital signs stable throughout; chest x-ray  pending

## 2020-06-11 NOTE — ANESTHESIA PROCEDURE NOTES
Arterial Line      Patient reassessed immediately prior to procedure    Patient location during procedure: OR  Start time: 6/11/2020 9:07 AM   Line placed for hemodynamic monitoring and ABGs/Labs/ISTAT.  Performed By   Anesthesiologist: Bi Oviedo MD  CRNA: Tahmina Echols AA  Preanesthetic Checklist  Completed: patient identified, site marked, surgical consent, pre-op evaluation, timeout performed, IV checked, risks and benefits discussed and monitors and equipment checked  Arterial Line Prep   Sterile Tech: mask and gloves  Prep: ChloraPrep  Patient monitoring: blood pressure monitoring, continuous pulse oximetry and EKG  Arterial Line Procedure   Laterality:left  Location:  radial artery  Catheter size: 20 G   Guidance: ultrasound guided  PROCEDURE NOTE/ULTRASOUND INTERPRETATION.  Using ultrasound guidance the potential vascular sites for insertion of the catheter were visualized to determine the patency of the vessel to be used for vascular access.  After selecting the appropriate site for insertion, the needle was visualized under ultrasound being inserted into the radial artery, followed by ultrasound confirmation of wire and catheter placement. There were no abnormalities seen on ultrasound; an image was taken; and the patient tolerated the procedure with no complications.   Number of attempts: 1  Successful placement: yes  Post Assessment   Dressing Type: secured with tape (tegaderm and tape used to secure).   Complications no  Circ/Move/Sens Assessment: unchanged.   Patient Tolerance: patient tolerated the procedure well with no apparent complications

## 2020-06-11 NOTE — ANESTHESIA POSTPROCEDURE EVALUATION
Patient: Bry Ratliff    Procedure Summary     Date:  06/11/20 Room / Location:  [facility] OR 08 / [facility] MAIN OR    Anesthesia Start:  0854 Anesthesia Stop:  1143    Procedure:  COLON RESECTION RIGHT (Right Abdomen) Diagnosis:       Colocutaneous fistula      (Colocutaneous fistula [K63.2])    Surgeon:  Naif Etienne DO Provider:  Bi Oviedo MD    Anesthesia Type:  general, epidural ASA Status:  4          Anesthesia Type: general, epidural    Vitals  Vitals Value Taken Time   /61 6/11/2020  3:11 PM   Temp 95.9 °F (35.5 °C) 6/11/2020 10:00 AM   Pulse 76 6/11/2020  3:16 PM   Resp 14 6/11/2020  2:56 PM   SpO2 96 % 6/11/2020  3:16 PM   Vitals shown include unvalidated device data.        Post Anesthesia Care and Evaluation    Patient location during evaluation: PACU  Patient participation: complete - patient participated  Level of consciousness: awake  Pain scale: See nurse's notes for pain score.  Pain management: adequate  Airway patency: patent  Anesthetic complications: No anesthetic complications  PONV Status: none  Cardiovascular status: acceptable  Respiratory status: acceptable  Hydration status: acceptable    Comments: Patient seen and examined postoperatively; vital signs stable; SpO2 greater than or equal to 90%; cardiopulmonary status stable; nausea/vomiting adequately controlled; pain adequately controlled; no apparent anesthesia complications; patient discharged from anesthesia care when discharge criteria were met

## 2020-06-11 NOTE — ANESTHESIA PROCEDURE NOTES
Peripheral Block      Patient reassessed immediately prior to procedure    Patient location during procedure: OR  Reason for block: procedure for pain, at surgeon's request and post-op pain management  Performed by  Anesthesiologist: Bi Oviedo MD  Preanesthetic Checklist  Completed: patient identified, site marked, surgical consent, pre-op evaluation, timeout performed, IV checked, risks and benefits discussed and monitors and equipment checked  Prep:  Pt Position: supine  Sterile barriers:cap, gloves and mask  Prep: ChloraPrep  Patient monitoring: blood pressure monitoring, continuous pulse oximetry and EKG  Procedure  Performed under: general  Guidance:ultrasound guided and landmark technique  ULTRASOUND INTERPRETATION. Using ultrasound guidance a 21 G gauge needle was placed in close proximity to the nerve, at which point, under ultrasound guidance anesthetic was injected in the area of the nerve and spread of the anesthesia was seen on ultrasound in close proximity thereto.  There were no abnormalities seen on ultrasound; a digital image was taken; and the patient tolerated the procedure with no complications. Images:still images obtained, printed/placed on chart    Laterality:Bilateral  Block Type:TAP  Injection Technique:single-shot  Needle Type:echogenic  Needle Gauge:21 G  Resistance on Injection: less than 15 psi    Medications Used: bupivacaine liposome (EXPAREL) injection 1.3%, 20 mL  bupivacaine PF (MARCAINE) injection 0.5%, 20 mL  Med admintered at 6/11/2020 11:30 AM      Medications  Preservative Free Saline:20ml  Comment:30 mL each side    Post Assessment  Injection Assessment: negative aspiration for heme, no paresthesia on injection and incremental injection  Patient Tolerance:comfortable throughout block  Complications:no  Additional Notes  Pre-procedure:  Bilateral TAP block performed during anesthesia time postoperatively in the OR prior to emergence from anesthesia at the request of  the patient and the surgeon for the management of postoperative acute surgical pain (general anesthesia is the primary intraoperative anesthetic); patient identified; pre-procedure vital signs, all relevant labs/studies, complete medical/surgical/anesthetic history, full medication list, full allergy list, and NPO status obtained/reviewed; physical assessment performed; anesthetic options, side effects, potential complications, risks, and benefits discussed; questions answered; patient wishes to proceed with the procedure; written anesthesia procedure consent obtained; patient cleared for procedure; time out performed; IV access in situ    Procedure:  ASA monitor placed; supplemental oxygen provided; patient under general anesthesia; patient positioned; hand hygiene performed; sterile technique maintained throughout the procedure; sterile prep applied; insertion site determined by anatomical landmarks, palpation, and ultrasound imaging for both sides; live ultrasound guidance throughout the procedure for both sides; target nerves/landmarks identified on live ultrasound for both sides; 110 mm 21G Sono TAP Cannula Needle used for both sides; realtime needle advancement and placement in the target anatomic plane witnessed on live ultrasound for both sides; negative aspiration prior to injection for both sides; correct needle placement confirmed on live ultrasound by local anesthetic spread in the correct anatomic plane for both sides; local anesthetic mixture injected with negative aspiration prior to each injection and after each 1-5 mL injected for both sides; needle withdrawn for both sides; dressing applied for both sides; ultrasound image printed and placed in the patient's permanent medical record for both sides    Post-procedure:  Bilateral TAP block placed successfully; good block; no apparent complications; minimal estimated blood loss; vital signs stable throughout; emergence from general anesthesia and  patient transported to the PACU

## 2020-06-11 NOTE — ANESTHESIA PREPROCEDURE EVALUATION
Anesthesia Evaluation     Patient summary reviewed and Nursing notes reviewed   no history of anesthetic complications:  NPO Solid Status: > 8 hours  NPO Liquid Status: > 8 hours           Airway   Mallampati: I  TM distance: >3 FB  Neck ROM: full  No difficulty expected  Dental    (+) poor dentition    Pulmonary    (+) pulmonary embolism, sleep apnea,   Cardiovascular     ECG reviewed  PT is on anticoagulation therapy    (+) hypertension, valvular problems/murmurs MR and TI, CAD, cardiac stents DVT, hyperlipidemia,       Neuro/Psych  (+) weakness, numbness, psychiatric history Anxiety,     GI/Hepatic/Renal/Endo    (+) obesity,  GERD, PUD,  hepatitis, liver disease, renal disease stones and CRI, diabetes mellitus, thyroid problem hypothyroidism    Musculoskeletal     Abdominal    Substance History      OB/GYN          Other   arthritis, blood dyscrasia anemia,     ROS/Med Hx Other: Colocutaneous fistula    Bile duct obstruction, cirrhosis, THOMAS, splenomegaly, spinal stenosis, radiculopathy, neuropathy, erosive gastritis, vit B12 deficiency, pulm HTN, Antithrombin III deficiency, Protein S and C deficiencies, elevated Factor VIII, GAVE, iron malabsorption, IgG Kappa MGUS, skin abscess, Syed's esophagus, OAB, hepatic encephalopathy, KATHIA on BiPAP, decubitus ulcer, gout, DDD, gastroparesis, diverticulosis, balance disorder, edema, decubitus ulcer    S/P renal artery stent    S/P coronary angioplasty    Echo  Echocardiogram Findings     Left Ventricle Left ventricular systolic function is low normal. Calculated EF = 61.0%. Estimated EF = 50%. The left ventricular cavity is mildly dilated.  Right Ventricle Right ventricular cavity is mildly dilated.  Left Atrium Normal left atrial size noted.  Right Atrium Normal right atrial size noted.  Aortic Valve The aortic valve is abnormal in structure. The valve exhibits sclerosis. No aortic valve stenosis is present.  Mitral Valve Moderate MAC is present. Mild mitral valve  regurgitation is present.  Tricuspid Valve The tricuspid valve is grossly normal. Mild tricuspid valve regurgitation is present. Estimated right ventricular systolic pressure from tricuspid regurgitation is moderately elevated (45-55 mmHg).  Pulmonic Valve The pulmonic valve is not well visualized.  Greater Vessels Mild dilation of the aortic root is present.  Pericardium There is no evidence of pericardial effusion.    Stress  Stress Procedure     Rest ECG Baseline ECG of normal sinus rhythm noted. Normal baseline ECG noted at rest.   There was no ST segment deviation noted.  Stress Description A pharmacological stress test was performed using regadenoson without low-level exercise.   The patient reached the end of the protocol.   The patient reported no symptoms during the stress test. Symptoms were not clinically significant.   Blood pressure demonstrated a hypotensive response to stress. Heart rate demonstrated a normal response to stress.  Stress ECG Stress ECG of normal sinus rhythm noted. Normal ECG with no significant stress induced changes noted.   There was no ST segment deviation noted during stress.   There were no arrhythmias during stress.   There were no significant arrhythmias noted during stress.     Stress ECG was interpretable and is consistent with a normal stress ECG.  Recovery ECG During recovery, the patient complained of no significant symptoms following stress.  Sinus rhythm was noted during recovery. Normal ECG with no significant recovery phase changes noted. There was no ST segment deviation noted during recovery.   There were no arrhythmias during recovery.  Stress Findings No ECG evidence of myocardial ischemia.Negative clinical evidence of myocardial ischemia. Findings consistent with a normal ECG stress test.  Nuclear Perfusion Findings     Study Impression Impressions are consistent with a low risk study. Medium to large distal inferolateral reverse redistribution without any  ischemia EF 56%.  Rest Perfusion Defect 1 There is a moderate to large sized defect with moderate reduction in uptake present in the mid inferolateral, apical lateral and apical wall.  Stress Perfusion Defect 1 There is a moderately sized defect of moderate severity present in the mid inferolateral wall.  Ventricle Size / Description Left ventricular ejection fraction is normal (Calculated EF = 56%). Normal LV wall motion noted.        PSH  CYSTOSCOPY BLADDER STONE LITHOTRIPSY RENAL ARTERY STENT  CORONARY ANGIOPLASTY WITH STENT PLACEMENT COLONOSCOPY  OTHER SURGICAL HISTORY CHOLECYSTECTOMY  ENDOSCOPY ERCP  ENDOSCOPY BILE DUCT STENT PLACEMENT  ERCP ENDOSCOPY  CATARACT EXTRACTION UPPER ENDOSCOPIC ULTRASOUND W/ FNA  ERCP BILE DUCT STENT PLACEMENT  CATARACT EXTRACTION, BILATERAL BACK SURGERY  CORONARY ANGIOPLASTY CARDIAC CATHETERIZATION          Phys Exam Other: Chipped teeth              Anesthesia Plan    ASA 4     general and regional   (Patient identified; pre-operative vital signs, all relevant labs/studies, complete medical/surgical/anesthetic history, full medication list, full allergy list, and NPO status obtained/reviewed; physical assessment performed; anesthetic options, side effects, potential complications, risks, and benefits discussed; questions answered; written anesthesia consent obtained; patient cleared for procedure; anesthesia machine and equipment checked and functioning    ERAS  TAP blocks)  intravenous induction     Anesthetic plan, all risks, benefits, and alternatives have been provided, discussed and informed consent has been obtained with: patient.    Plan discussed with CAA.

## 2020-06-11 NOTE — ANESTHESIA PROCEDURE NOTES
Airway  Urgency: elective    Date/Time: 6/11/2020 9:03 AM  Airway not difficult    General Information and Staff    Patient location during procedure: OR  Anesthesiologist: Bi Oviedo MD  CRNA: Tahmina Echols AA    Indications and Patient Condition  Indications for airway management: airway protection    Preoxygenated: yes  Mask difficulty assessment: 1 - vent by mask    Final Airway Details  Final airway type: endotracheal airway      Successful airway: ETT  Cuffed: yes   Successful intubation technique: direct laryngoscopy  Endotracheal tube insertion site: oral  Blade: Vikram  Blade size: 4  ETT size (mm): 7.5  Cormack-Lehane Classification: grade I - full view of glottis  Placement verified by: chest auscultation and capnometry   Measured from: teeth  ETT/EBT  to teeth (cm): 23  Number of attempts at approach: 1  Assessment: lips, teeth, and gum same as pre-op and atraumatic intubation

## 2020-06-12 LAB
ANION GAP SERPL CALCULATED.3IONS-SCNC: 8 MMOL/L (ref 5–15)
BASE DEFICIT: ABNORMAL
BASE EXCESS BLDA CALC-SCNC: <0 MMOL/L (ref 0–3)
BASOPHILS # BLD AUTO: 0 10*3/MM3 (ref 0–0.2)
BASOPHILS NFR BLD AUTO: 0.1 % (ref 0–1.5)
BH BB BLOOD EXPIRATION DATE: NORMAL
BH BB BLOOD TYPE BARCODE: 6200
BH BB DISPENSE STATUS: NORMAL
BH BB PRODUCT CODE: NORMAL
BH BB UNIT NUMBER: NORMAL
BUN BLD-MCNC: 24 MG/DL (ref 8–23)
BUN BLD-MCNC: 25 MG/DL (ref 8–23)
BUN BLD-MCNC: ABNORMAL MG/DL
BUN/CREAT SERPL: ABNORMAL
CA-I BLDA-SCNC: 1.2 MMOL/L (ref 1.12–1.32)
CALCIUM SPEC-SCNC: 8.2 MG/DL (ref 8.6–10.5)
CHLORIDE SERPL-SCNC: 108 MMOL/L (ref 98–107)
CO2 BLDA-SCNC: 25 MMOL/L (ref 23–27)
CO2 SERPL-SCNC: 21 MMOL/L (ref 22–29)
CREAT BLD-MCNC: 1.38 MG/DL (ref 0.76–1.27)
CROSSMATCH INTERPRETATION: NORMAL
DEPRECATED RDW RBC AUTO: 59.5 FL (ref 37–54)
EOSINOPHIL # BLD AUTO: 0 10*3/MM3 (ref 0–0.4)
EOSINOPHIL NFR BLD AUTO: 0 % (ref 0.3–6.2)
ERYTHROCYTE [DISTWIDTH] IN BLOOD BY AUTOMATED COUNT: 18.4 % (ref 12.3–15.4)
GFR SERPL CREATININE-BSD FRML MDRD: 51 ML/MIN/1.73
GLUCOSE BLD-MCNC: 215 MG/DL (ref 65–99)
GLUCOSE BLDC GLUCOMTR-MCNC: 113 MG/DL (ref 70–105)
GLUCOSE BLDC GLUCOMTR-MCNC: 153 MG/DL (ref 70–105)
GLUCOSE BLDC GLUCOMTR-MCNC: 163 MG/DL (ref 70–105)
GLUCOSE BLDC GLUCOMTR-MCNC: 190 MG/DL (ref 70–105)
GLUCOSE BLDC GLUCOMTR-MCNC: 218 MG/DL (ref 70–105)
HCO3 BLDA-SCNC: 23.6 MMOL/L (ref 22–26)
HCT VFR BLD AUTO: 29.4 % (ref 37.5–51)
HCT VFR BLDA CALC: 22 % (ref 38–51)
HGB BLD-MCNC: 9.9 G/DL (ref 13–17.7)
HGB BLDA-MCNC: 7.5 G/DL (ref 12–17)
LAB AP CASE REPORT: NORMAL
LYMPHOCYTES # BLD AUTO: 0.5 10*3/MM3 (ref 0.7–3.1)
LYMPHOCYTES NFR BLD AUTO: 3.2 % (ref 19.6–45.3)
MCH RBC QN AUTO: 31.8 PG (ref 26.6–33)
MCHC RBC AUTO-ENTMCNC: 33.8 G/DL (ref 31.5–35.7)
MCV RBC AUTO: 94.1 FL (ref 79–97)
MONOCYTES # BLD AUTO: 1.1 10*3/MM3 (ref 0.1–0.9)
MONOCYTES NFR BLD AUTO: 7.8 % (ref 5–12)
NEUTROPHILS # BLD AUTO: 12.7 10*3/MM3 (ref 1.7–7)
NEUTROPHILS NFR BLD AUTO: 88.9 % (ref 42.7–76)
NRBC BLD AUTO-RTO: 0 /100 WBC (ref 0–0.2)
PATH REPORT.FINAL DX SPEC: NORMAL
PATH REPORT.GROSS SPEC: NORMAL
PCO2 BLDA: 41 MM HG (ref 35–45)
PH BLDA: 7.37 PH UNITS (ref 7.35–7.45)
PLATELET # BLD AUTO: 220 10*3/MM3 (ref 140–450)
PMV BLD AUTO: 8.1 FL (ref 6–12)
PO2 BLDA: 171 MM HG (ref 80–105)
POTASSIUM BLD-SCNC: 4.3 MMOL/L (ref 3.5–5.2)
POTASSIUM BLDA-SCNC: 3.7 MMOL/L (ref 3.5–4.9)
RBC # BLD AUTO: 3.12 10*6/MM3 (ref 4.14–5.8)
SAO2 % BLDCOA: 99 % (ref 95–98)
SODIUM BLD-SCNC: 137 MMOL/L (ref 136–145)
SODIUM BLDA-SCNC: 142 MMOL/L (ref 138–146)
UNIT  ABO: NORMAL
UNIT  RH: NORMAL
WBC NRBC COR # BLD: 14.3 10*3/MM3 (ref 3.4–10.8)

## 2020-06-12 PROCEDURE — 85025 COMPLETE CBC W/AUTO DIFF WBC: CPT | Performed by: PHYSICIAN ASSISTANT

## 2020-06-12 PROCEDURE — 25010000002 CEFOXITIN PER 1 G: Performed by: SURGERY

## 2020-06-12 PROCEDURE — 99232 SBSQ HOSP IP/OBS MODERATE 35: CPT | Performed by: HOSPITALIST

## 2020-06-12 PROCEDURE — 97530 THERAPEUTIC ACTIVITIES: CPT

## 2020-06-12 PROCEDURE — 82962 GLUCOSE BLOOD TEST: CPT

## 2020-06-12 PROCEDURE — 97162 PT EVAL MOD COMPLEX 30 MIN: CPT

## 2020-06-12 PROCEDURE — 25010000002 HYDRALAZINE PER 20 MG: Performed by: NURSE PRACTITIONER

## 2020-06-12 PROCEDURE — 97165 OT EVAL LOW COMPLEX 30 MIN: CPT

## 2020-06-12 PROCEDURE — 80048 BASIC METABOLIC PNL TOTAL CA: CPT | Performed by: PHYSICIAN ASSISTANT

## 2020-06-12 PROCEDURE — 63710000001 INSULIN LISPRO (HUMAN) PER 5 UNITS: Performed by: PHYSICIAN ASSISTANT

## 2020-06-12 PROCEDURE — 63710000001 INSULIN GLARGINE PER 5 UNITS: Performed by: PHYSICIAN ASSISTANT

## 2020-06-12 RX ORDER — HYDRALAZINE HYDROCHLORIDE 20 MG/ML
10 INJECTION INTRAMUSCULAR; INTRAVENOUS EVERY 6 HOURS PRN
Status: DISCONTINUED | OUTPATIENT
Start: 2020-06-12 | End: 2020-06-15 | Stop reason: HOSPADM

## 2020-06-12 RX ADMIN — MAGNESIUM OXIDE TAB 400 MG (241.3 MG ELEMENTAL MG) 400 MG: 400 (241.3 MG) TAB at 09:52

## 2020-06-12 RX ADMIN — Medication 10 ML: at 21:33

## 2020-06-12 RX ADMIN — SODIUM CHLORIDE 100 ML/HR: 900 INJECTION, SOLUTION INTRAVENOUS at 21:41

## 2020-06-12 RX ADMIN — LACTULOSE 60 ML: 10 SOLUTION ORAL at 09:52

## 2020-06-12 RX ADMIN — DULOXETINE HYDROCHLORIDE 60 MG: 30 CAPSULE, DELAYED RELEASE ORAL at 09:53

## 2020-06-12 RX ADMIN — FOLIC ACID 1 MG: 1 TABLET ORAL at 09:53

## 2020-06-12 RX ADMIN — CEFOXITIN 1 G: 1 INJECTION, POWDER, FOR SOLUTION INTRAVENOUS at 16:02

## 2020-06-12 RX ADMIN — LACTULOSE 60 ML: 10 SOLUTION ORAL at 16:02

## 2020-06-12 RX ADMIN — CEFOXITIN 1 G: 1 INJECTION, POWDER, FOR SOLUTION INTRAVENOUS at 10:26

## 2020-06-12 RX ADMIN — LACTULOSE 60 ML: 10 SOLUTION ORAL at 11:36

## 2020-06-12 RX ADMIN — ALLOPURINOL 100 MG: 100 TABLET ORAL at 09:53

## 2020-06-12 RX ADMIN — RIFAXIMIN 550 MG: 550 TABLET ORAL at 09:53

## 2020-06-12 RX ADMIN — LACTULOSE 60 ML: 10 SOLUTION ORAL at 00:50

## 2020-06-12 RX ADMIN — SODIUM BICARBONATE 650 MG TABLET 650 MG: at 21:33

## 2020-06-12 RX ADMIN — INSULIN LISPRO 18 UNITS: 100 INJECTION, SOLUTION INTRAVENOUS; SUBCUTANEOUS at 09:53

## 2020-06-12 RX ADMIN — RIFAXIMIN 550 MG: 550 TABLET ORAL at 21:33

## 2020-06-12 RX ADMIN — ASPIRIN 81 MG: 81 TABLET, COATED ORAL at 09:53

## 2020-06-12 RX ADMIN — SODIUM BICARBONATE 650 MG TABLET 650 MG: at 09:53

## 2020-06-12 RX ADMIN — HYDRALAZINE HYDROCHLORIDE 10 MG: 20 INJECTION INTRAMUSCULAR; INTRAVENOUS at 05:33

## 2020-06-12 RX ADMIN — LEVOTHYROXINE SODIUM 75 MCG: 75 TABLET ORAL at 05:32

## 2020-06-12 RX ADMIN — INSULIN GLARGINE 50 UNITS: 100 INJECTION, SOLUTION SUBCUTANEOUS at 21:33

## 2020-06-12 RX ADMIN — INSULIN LISPRO 18 UNITS: 100 INJECTION, SOLUTION INTRAVENOUS; SUBCUTANEOUS at 11:37

## 2020-06-12 RX ADMIN — PANTOPRAZOLE SODIUM 40 MG: 40 TABLET, DELAYED RELEASE ORAL at 05:32

## 2020-06-12 RX ADMIN — INSULIN LISPRO 9 UNITS: 100 INJECTION, SOLUTION INTRAVENOUS; SUBCUTANEOUS at 17:26

## 2020-06-12 RX ADMIN — Medication 10 ML: at 09:57

## 2020-06-12 RX ADMIN — CEFOXITIN 1 G: 1 INJECTION, POWDER, FOR SOLUTION INTRAVENOUS at 00:50

## 2020-06-12 RX ADMIN — INSULIN LISPRO 2 UNITS: 100 INJECTION, SOLUTION INTRAVENOUS; SUBCUTANEOUS at 09:53

## 2020-06-12 RX ADMIN — LACTULOSE 60 ML: 10 SOLUTION ORAL at 21:34

## 2020-06-12 RX ADMIN — ALLOPURINOL 100 MG: 100 TABLET ORAL at 21:33

## 2020-06-12 RX ADMIN — OXYCODONE HYDROCHLORIDE 10 MG: 5 TABLET ORAL at 05:33

## 2020-06-12 RX ADMIN — INSULIN LISPRO 3 UNITS: 100 INJECTION, SOLUTION INTRAVENOUS; SUBCUTANEOUS at 11:36

## 2020-06-12 RX ADMIN — LACTULOSE 60 ML: 10 SOLUTION ORAL at 05:32

## 2020-06-13 LAB
ANION GAP SERPL CALCULATED.3IONS-SCNC: 9 MMOL/L (ref 5–15)
ANISOCYTOSIS BLD QL: NORMAL
BASOPHILS # BLD AUTO: 0 10*3/MM3 (ref 0–0.2)
BASOPHILS NFR BLD AUTO: 0.3 % (ref 0–1.5)
BUN BLD-MCNC: 17 MG/DL (ref 8–23)
BUN BLD-MCNC: ABNORMAL MG/DL
BUN/CREAT SERPL: ABNORMAL
CALCIUM SPEC-SCNC: 8.4 MG/DL (ref 8.6–10.5)
CHLORIDE SERPL-SCNC: 113 MMOL/L (ref 98–107)
CO2 SERPL-SCNC: 19 MMOL/L (ref 22–29)
CREAT BLD-MCNC: 1.45 MG/DL (ref 0.76–1.27)
DEPRECATED RDW RBC AUTO: 61.7 FL (ref 37–54)
EOSINOPHIL # BLD AUTO: 0.1 10*3/MM3 (ref 0–0.4)
EOSINOPHIL NFR BLD AUTO: 0.4 % (ref 0.3–6.2)
ERYTHROCYTE [DISTWIDTH] IN BLOOD BY AUTOMATED COUNT: 18.7 % (ref 12.3–15.4)
GFR SERPL CREATININE-BSD FRML MDRD: 48 ML/MIN/1.73
GIANT PLATELETS: NORMAL
GLUCOSE BLD-MCNC: 201 MG/DL (ref 65–99)
GLUCOSE BLDC GLUCOMTR-MCNC: 130 MG/DL (ref 70–105)
GLUCOSE BLDC GLUCOMTR-MCNC: 175 MG/DL (ref 70–105)
GLUCOSE BLDC GLUCOMTR-MCNC: 72 MG/DL (ref 70–105)
GLUCOSE BLDC GLUCOMTR-MCNC: 85 MG/DL (ref 70–105)
HCT VFR BLD AUTO: 30.1 % (ref 37.5–51)
HGB BLD-MCNC: 10 G/DL (ref 13–17.7)
LYMPHOCYTES # BLD AUTO: 0.5 10*3/MM3 (ref 0.7–3.1)
LYMPHOCYTES NFR BLD AUTO: 3.6 % (ref 19.6–45.3)
MCH RBC QN AUTO: 31.8 PG (ref 26.6–33)
MCHC RBC AUTO-ENTMCNC: 33.2 G/DL (ref 31.5–35.7)
MCV RBC AUTO: 95.9 FL (ref 79–97)
MONOCYTES # BLD AUTO: 1.3 10*3/MM3 (ref 0.1–0.9)
MONOCYTES NFR BLD AUTO: 8.7 % (ref 5–12)
NEUTROPHILS # BLD AUTO: 13 10*3/MM3 (ref 1.7–7)
NEUTROPHILS NFR BLD AUTO: 87 % (ref 42.7–76)
NRBC BLD AUTO-RTO: 0 /100 WBC (ref 0–0.2)
PLATELET # BLD AUTO: 232 10*3/MM3 (ref 140–450)
PMV BLD AUTO: 8.6 FL (ref 6–12)
POTASSIUM BLD-SCNC: 3.7 MMOL/L (ref 3.5–5.2)
RBC # BLD AUTO: 3.14 10*6/MM3 (ref 4.14–5.8)
SODIUM BLD-SCNC: 141 MMOL/L (ref 136–145)
WBC MORPH BLD: NORMAL
WBC NRBC COR # BLD: 14.9 10*3/MM3 (ref 3.4–10.8)

## 2020-06-13 PROCEDURE — 63710000001 INSULIN GLARGINE PER 5 UNITS: Performed by: PHYSICIAN ASSISTANT

## 2020-06-13 PROCEDURE — 63710000001 INSULIN LISPRO (HUMAN) PER 5 UNITS: Performed by: PHYSICIAN ASSISTANT

## 2020-06-13 PROCEDURE — 85025 COMPLETE CBC W/AUTO DIFF WBC: CPT | Performed by: PHYSICIAN ASSISTANT

## 2020-06-13 PROCEDURE — 25010000002 CEFOXITIN PER 1 G: Performed by: SURGERY

## 2020-06-13 PROCEDURE — 85007 BL SMEAR W/DIFF WBC COUNT: CPT | Performed by: PHYSICIAN ASSISTANT

## 2020-06-13 PROCEDURE — 80048 BASIC METABOLIC PNL TOTAL CA: CPT | Performed by: PHYSICIAN ASSISTANT

## 2020-06-13 PROCEDURE — 97116 GAIT TRAINING THERAPY: CPT

## 2020-06-13 PROCEDURE — 97530 THERAPEUTIC ACTIVITIES: CPT

## 2020-06-13 PROCEDURE — 82962 GLUCOSE BLOOD TEST: CPT

## 2020-06-13 PROCEDURE — 97110 THERAPEUTIC EXERCISES: CPT

## 2020-06-13 PROCEDURE — 99232 SBSQ HOSP IP/OBS MODERATE 35: CPT | Performed by: HOSPITALIST

## 2020-06-13 PROCEDURE — 99024 POSTOP FOLLOW-UP VISIT: CPT | Performed by: SURGERY

## 2020-06-13 RX ADMIN — FOLIC ACID 1 MG: 1 TABLET ORAL at 08:14

## 2020-06-13 RX ADMIN — RIFAXIMIN 550 MG: 550 TABLET ORAL at 21:19

## 2020-06-13 RX ADMIN — LACTULOSE 60 ML: 10 SOLUTION ORAL at 08:13

## 2020-06-13 RX ADMIN — INSULIN LISPRO 2 UNITS: 100 INJECTION, SOLUTION INTRAVENOUS; SUBCUTANEOUS at 08:13

## 2020-06-13 RX ADMIN — CEFOXITIN 1 G: 1 INJECTION, POWDER, FOR SOLUTION INTRAVENOUS at 08:14

## 2020-06-13 RX ADMIN — LACTULOSE 60 ML: 10 SOLUTION ORAL at 04:47

## 2020-06-13 RX ADMIN — LACTULOSE 60 ML: 10 SOLUTION ORAL at 00:28

## 2020-06-13 RX ADMIN — INSULIN GLARGINE 50 UNITS: 100 INJECTION, SOLUTION SUBCUTANEOUS at 21:19

## 2020-06-13 RX ADMIN — SODIUM BICARBONATE 650 MG TABLET 650 MG: at 08:13

## 2020-06-13 RX ADMIN — LACTULOSE 60 ML: 10 SOLUTION ORAL at 21:19

## 2020-06-13 RX ADMIN — INSULIN LISPRO 18 UNITS: 100 INJECTION, SOLUTION INTRAVENOUS; SUBCUTANEOUS at 08:12

## 2020-06-13 RX ADMIN — SODIUM CHLORIDE 100 ML/HR: 900 INJECTION, SOLUTION INTRAVENOUS at 17:48

## 2020-06-13 RX ADMIN — LACTULOSE 60 ML: 10 SOLUTION ORAL at 12:01

## 2020-06-13 RX ADMIN — ASPIRIN 81 MG: 81 TABLET, COATED ORAL at 08:14

## 2020-06-13 RX ADMIN — HYDROCODONE BITARTRATE AND ACETAMINOPHEN 1 TABLET: 5; 325 TABLET ORAL at 00:28

## 2020-06-13 RX ADMIN — ALLOPURINOL 100 MG: 100 TABLET ORAL at 08:14

## 2020-06-13 RX ADMIN — ALLOPURINOL 100 MG: 100 TABLET ORAL at 21:19

## 2020-06-13 RX ADMIN — PANTOPRAZOLE SODIUM 40 MG: 40 TABLET, DELAYED RELEASE ORAL at 05:39

## 2020-06-13 RX ADMIN — ZINC SULFATE 220 MG (50 MG) CAPSULE 220 MG: CAPSULE at 08:58

## 2020-06-13 RX ADMIN — SODIUM BICARBONATE 650 MG TABLET 650 MG: at 21:19

## 2020-06-13 RX ADMIN — CEFOXITIN 1 G: 1 INJECTION, POWDER, FOR SOLUTION INTRAVENOUS at 00:28

## 2020-06-13 RX ADMIN — INSULIN LISPRO 18 UNITS: 100 INJECTION, SOLUTION INTRAVENOUS; SUBCUTANEOUS at 11:58

## 2020-06-13 RX ADMIN — LACTULOSE 60 ML: 10 SOLUTION ORAL at 16:00

## 2020-06-13 RX ADMIN — Medication 10 ML: at 08:14

## 2020-06-13 RX ADMIN — INSULIN LISPRO 18 UNITS: 100 INJECTION, SOLUTION INTRAVENOUS; SUBCUTANEOUS at 17:45

## 2020-06-13 RX ADMIN — Medication 10 ML: at 21:19

## 2020-06-13 RX ADMIN — LEVOTHYROXINE SODIUM 75 MCG: 75 TABLET ORAL at 05:39

## 2020-06-13 RX ADMIN — RIFAXIMIN 550 MG: 550 TABLET ORAL at 08:14

## 2020-06-13 RX ADMIN — DULOXETINE HYDROCHLORIDE 60 MG: 30 CAPSULE, DELAYED RELEASE ORAL at 08:14

## 2020-06-13 RX ADMIN — CEFOXITIN 1 G: 1 INJECTION, POWDER, FOR SOLUTION INTRAVENOUS at 16:00

## 2020-06-13 RX ADMIN — MAGNESIUM OXIDE TAB 400 MG (241.3 MG ELEMENTAL MG) 400 MG: 400 (241.3 MG) TAB at 08:14

## 2020-06-13 RX ADMIN — HYDROCODONE BITARTRATE AND ACETAMINOPHEN 1 TABLET: 5; 325 TABLET ORAL at 18:44

## 2020-06-14 LAB
ANION GAP SERPL CALCULATED.3IONS-SCNC: 9 MMOL/L (ref 5–15)
BASOPHILS # BLD AUTO: 0.1 10*3/MM3 (ref 0–0.2)
BASOPHILS NFR BLD AUTO: 0.6 % (ref 0–1.5)
BUN BLD-MCNC: 15 MG/DL (ref 8–23)
BUN BLD-MCNC: ABNORMAL MG/DL
BUN/CREAT SERPL: ABNORMAL
CALCIUM SPEC-SCNC: 8.1 MG/DL (ref 8.6–10.5)
CHLORIDE SERPL-SCNC: 117 MMOL/L (ref 98–107)
CO2 SERPL-SCNC: 20 MMOL/L (ref 22–29)
CREAT BLD-MCNC: 1.34 MG/DL (ref 0.76–1.27)
DEPRECATED RDW RBC AUTO: 61.7 FL (ref 37–54)
EOSINOPHIL # BLD AUTO: 0.2 10*3/MM3 (ref 0–0.4)
EOSINOPHIL NFR BLD AUTO: 2.4 % (ref 0.3–6.2)
ERYTHROCYTE [DISTWIDTH] IN BLOOD BY AUTOMATED COUNT: 18.3 % (ref 12.3–15.4)
GFR SERPL CREATININE-BSD FRML MDRD: 52 ML/MIN/1.73
GLUCOSE BLD-MCNC: 113 MG/DL (ref 65–99)
GLUCOSE BLDC GLUCOMTR-MCNC: 101 MG/DL (ref 70–105)
GLUCOSE BLDC GLUCOMTR-MCNC: 124 MG/DL (ref 70–105)
GLUCOSE BLDC GLUCOMTR-MCNC: 131 MG/DL (ref 70–105)
GLUCOSE BLDC GLUCOMTR-MCNC: 96 MG/DL (ref 70–105)
HCT VFR BLD AUTO: 27.4 % (ref 37.5–51)
HGB BLD-MCNC: 9.1 G/DL (ref 13–17.7)
LYMPHOCYTES # BLD AUTO: 0.8 10*3/MM3 (ref 0.7–3.1)
LYMPHOCYTES NFR BLD AUTO: 8.2 % (ref 19.6–45.3)
MCH RBC QN AUTO: 31.8 PG (ref 26.6–33)
MCHC RBC AUTO-ENTMCNC: 33.4 G/DL (ref 31.5–35.7)
MCV RBC AUTO: 95.1 FL (ref 79–97)
MONOCYTES # BLD AUTO: 0.8 10*3/MM3 (ref 0.1–0.9)
MONOCYTES NFR BLD AUTO: 8.4 % (ref 5–12)
NEUTROPHILS # BLD AUTO: 8 10*3/MM3 (ref 1.7–7)
NEUTROPHILS NFR BLD AUTO: 80.4 % (ref 42.7–76)
NRBC BLD AUTO-RTO: 0 /100 WBC (ref 0–0.2)
PLATELET # BLD AUTO: 171 10*3/MM3 (ref 140–450)
PMV BLD AUTO: 8.1 FL (ref 6–12)
POTASSIUM BLD-SCNC: 3.4 MMOL/L (ref 3.5–5.2)
RBC # BLD AUTO: 2.88 10*6/MM3 (ref 4.14–5.8)
SODIUM BLD-SCNC: 146 MMOL/L (ref 136–145)
WBC NRBC COR # BLD: 10 10*3/MM3 (ref 3.4–10.8)

## 2020-06-14 PROCEDURE — 82962 GLUCOSE BLOOD TEST: CPT

## 2020-06-14 PROCEDURE — 80048 BASIC METABOLIC PNL TOTAL CA: CPT | Performed by: PHYSICIAN ASSISTANT

## 2020-06-14 PROCEDURE — 63710000001 INSULIN GLARGINE PER 5 UNITS: Performed by: PHYSICIAN ASSISTANT

## 2020-06-14 PROCEDURE — 85025 COMPLETE CBC W/AUTO DIFF WBC: CPT | Performed by: PHYSICIAN ASSISTANT

## 2020-06-14 PROCEDURE — 99232 SBSQ HOSP IP/OBS MODERATE 35: CPT | Performed by: HOSPITALIST

## 2020-06-14 PROCEDURE — 63710000001 INSULIN LISPRO (HUMAN) PER 5 UNITS: Performed by: PHYSICIAN ASSISTANT

## 2020-06-14 PROCEDURE — 25010000002 CEFOXITIN PER 1 G: Performed by: SURGERY

## 2020-06-14 RX ADMIN — RIFAXIMIN 550 MG: 550 TABLET ORAL at 08:57

## 2020-06-14 RX ADMIN — CEFOXITIN 1 G: 1 INJECTION, POWDER, FOR SOLUTION INTRAVENOUS at 16:30

## 2020-06-14 RX ADMIN — CEFOXITIN 1 G: 1 INJECTION, POWDER, FOR SOLUTION INTRAVENOUS at 00:19

## 2020-06-14 RX ADMIN — INSULIN LISPRO 18 UNITS: 100 INJECTION, SOLUTION INTRAVENOUS; SUBCUTANEOUS at 08:56

## 2020-06-14 RX ADMIN — LEVOTHYROXINE SODIUM 75 MCG: 75 TABLET ORAL at 04:31

## 2020-06-14 RX ADMIN — HYDROCODONE BITARTRATE AND ACETAMINOPHEN 1 TABLET: 5; 325 TABLET ORAL at 11:10

## 2020-06-14 RX ADMIN — ASPIRIN 81 MG: 81 TABLET, COATED ORAL at 08:57

## 2020-06-14 RX ADMIN — RIFAXIMIN 550 MG: 550 TABLET ORAL at 21:02

## 2020-06-14 RX ADMIN — Medication 10 ML: at 09:21

## 2020-06-14 RX ADMIN — ALLOPURINOL 100 MG: 100 TABLET ORAL at 08:57

## 2020-06-14 RX ADMIN — LACTULOSE 60 ML: 10 SOLUTION ORAL at 21:02

## 2020-06-14 RX ADMIN — MAGNESIUM OXIDE TAB 400 MG (241.3 MG ELEMENTAL MG) 400 MG: 400 (241.3 MG) TAB at 08:57

## 2020-06-14 RX ADMIN — SODIUM CHLORIDE 100 ML/HR: 900 INJECTION, SOLUTION INTRAVENOUS at 13:52

## 2020-06-14 RX ADMIN — INSULIN GLARGINE 50 UNITS: 100 INJECTION, SOLUTION SUBCUTANEOUS at 21:04

## 2020-06-14 RX ADMIN — SODIUM BICARBONATE 650 MG TABLET 650 MG: at 08:57

## 2020-06-14 RX ADMIN — INSULIN LISPRO 18 UNITS: 100 INJECTION, SOLUTION INTRAVENOUS; SUBCUTANEOUS at 16:31

## 2020-06-14 RX ADMIN — CEFOXITIN 1 G: 1 INJECTION, POWDER, FOR SOLUTION INTRAVENOUS at 09:19

## 2020-06-14 RX ADMIN — ZINC SULFATE 220 MG (50 MG) CAPSULE 220 MG: CAPSULE at 08:57

## 2020-06-14 RX ADMIN — LACTULOSE 60 ML: 10 SOLUTION ORAL at 11:28

## 2020-06-14 RX ADMIN — ALLOPURINOL 100 MG: 100 TABLET ORAL at 21:01

## 2020-06-14 RX ADMIN — LACTULOSE 60 ML: 10 SOLUTION ORAL at 08:57

## 2020-06-14 RX ADMIN — FOLIC ACID 1 MG: 1 TABLET ORAL at 08:57

## 2020-06-14 RX ADMIN — DULOXETINE HYDROCHLORIDE 60 MG: 30 CAPSULE, DELAYED RELEASE ORAL at 08:57

## 2020-06-14 RX ADMIN — SODIUM BICARBONATE 650 MG TABLET 650 MG: at 21:02

## 2020-06-14 RX ADMIN — Medication 10 ML: at 21:03

## 2020-06-14 RX ADMIN — LACTULOSE 60 ML: 10 SOLUTION ORAL at 00:19

## 2020-06-14 RX ADMIN — INSULIN LISPRO 18 UNITS: 100 INJECTION, SOLUTION INTRAVENOUS; SUBCUTANEOUS at 11:28

## 2020-06-14 RX ADMIN — LACTULOSE 60 ML: 10 SOLUTION ORAL at 16:31

## 2020-06-14 RX ADMIN — PANTOPRAZOLE SODIUM 40 MG: 40 TABLET, DELAYED RELEASE ORAL at 04:31

## 2020-06-14 RX ADMIN — LACTULOSE 60 ML: 10 SOLUTION ORAL at 04:30

## 2020-06-15 VITALS
BODY MASS INDEX: 36.19 KG/M2 | WEIGHT: 281.97 LBS | RESPIRATION RATE: 20 BRPM | SYSTOLIC BLOOD PRESSURE: 146 MMHG | DIASTOLIC BLOOD PRESSURE: 69 MMHG | HEIGHT: 74 IN | OXYGEN SATURATION: 100 % | TEMPERATURE: 98.3 F | HEART RATE: 76 BPM

## 2020-06-15 LAB
AMMONIA BLD-SCNC: 21 UMOL/L (ref 16–60)
ANION GAP SERPL CALCULATED.3IONS-SCNC: 9 MMOL/L (ref 5–15)
BASOPHILS # BLD AUTO: 0.1 10*3/MM3 (ref 0–0.2)
BASOPHILS NFR BLD AUTO: 0.6 % (ref 0–1.5)
BUN BLD-MCNC: 11 MG/DL (ref 8–23)
BUN BLD-MCNC: ABNORMAL MG/DL
BUN/CREAT SERPL: ABNORMAL
CALCIUM SPEC-SCNC: 8.1 MG/DL (ref 8.6–10.5)
CHLORIDE SERPL-SCNC: 119 MMOL/L (ref 98–107)
CO2 SERPL-SCNC: 15 MMOL/L (ref 22–29)
CREAT BLD-MCNC: 1.35 MG/DL (ref 0.76–1.27)
DEPRECATED RDW RBC AUTO: 61.7 FL (ref 37–54)
EOSINOPHIL # BLD AUTO: 0.4 10*3/MM3 (ref 0–0.4)
EOSINOPHIL NFR BLD AUTO: 4.2 % (ref 0.3–6.2)
ERYTHROCYTE [DISTWIDTH] IN BLOOD BY AUTOMATED COUNT: 18.2 % (ref 12.3–15.4)
GFR SERPL CREATININE-BSD FRML MDRD: 52 ML/MIN/1.73
GLUCOSE BLD-MCNC: 151 MG/DL (ref 65–99)
GLUCOSE BLDC GLUCOMTR-MCNC: 125 MG/DL (ref 70–105)
GLUCOSE BLDC GLUCOMTR-MCNC: 128 MG/DL (ref 70–105)
GLUCOSE BLDC GLUCOMTR-MCNC: 57 MG/DL (ref 70–105)
GLUCOSE BLDC GLUCOMTR-MCNC: 71 MG/DL (ref 70–105)
HCT VFR BLD AUTO: 27.5 % (ref 37.5–51)
HGB BLD-MCNC: 9.2 G/DL (ref 13–17.7)
LYMPHOCYTES # BLD AUTO: 0.9 10*3/MM3 (ref 0.7–3.1)
LYMPHOCYTES NFR BLD AUTO: 9.7 % (ref 19.6–45.3)
MCH RBC QN AUTO: 31.9 PG (ref 26.6–33)
MCHC RBC AUTO-ENTMCNC: 33.3 G/DL (ref 31.5–35.7)
MCV RBC AUTO: 95.7 FL (ref 79–97)
MONOCYTES # BLD AUTO: 0.6 10*3/MM3 (ref 0.1–0.9)
MONOCYTES NFR BLD AUTO: 7.2 % (ref 5–12)
NEUTROPHILS # BLD AUTO: 6.9 10*3/MM3 (ref 1.7–7)
NEUTROPHILS NFR BLD AUTO: 78.3 % (ref 42.7–76)
NRBC BLD AUTO-RTO: 0.1 /100 WBC (ref 0–0.2)
PLATELET # BLD AUTO: 176 10*3/MM3 (ref 140–450)
PMV BLD AUTO: 8.1 FL (ref 6–12)
POTASSIUM BLD-SCNC: 3.4 MMOL/L (ref 3.5–5.2)
RBC # BLD AUTO: 2.88 10*6/MM3 (ref 4.14–5.8)
SODIUM BLD-SCNC: 143 MMOL/L (ref 136–145)
WBC NRBC COR # BLD: 8.8 10*3/MM3 (ref 3.4–10.8)

## 2020-06-15 PROCEDURE — 82140 ASSAY OF AMMONIA: CPT | Performed by: HOSPITALIST

## 2020-06-15 PROCEDURE — 82962 GLUCOSE BLOOD TEST: CPT

## 2020-06-15 PROCEDURE — 97530 THERAPEUTIC ACTIVITIES: CPT

## 2020-06-15 PROCEDURE — 63710000001 INSULIN LISPRO (HUMAN) PER 5 UNITS: Performed by: PHYSICIAN ASSISTANT

## 2020-06-15 PROCEDURE — 99239 HOSP IP/OBS DSCHRG MGMT >30: CPT | Performed by: INTERNAL MEDICINE

## 2020-06-15 PROCEDURE — 25010000002 CEFOXITIN PER 1 G: Performed by: SURGERY

## 2020-06-15 PROCEDURE — 97535 SELF CARE MNGMENT TRAINING: CPT

## 2020-06-15 PROCEDURE — 85025 COMPLETE CBC W/AUTO DIFF WBC: CPT | Performed by: PHYSICIAN ASSISTANT

## 2020-06-15 PROCEDURE — 80048 BASIC METABOLIC PNL TOTAL CA: CPT | Performed by: PHYSICIAN ASSISTANT

## 2020-06-15 RX ORDER — POTASSIUM CHLORIDE 20 MEQ/1
40 TABLET, EXTENDED RELEASE ORAL AS NEEDED
Status: DISCONTINUED | OUTPATIENT
Start: 2020-06-15 | End: 2020-06-15 | Stop reason: HOSPADM

## 2020-06-15 RX ORDER — ONDANSETRON 4 MG/1
4 TABLET, FILM COATED ORAL EVERY 6 HOURS PRN
Qty: 30 TABLET | Refills: 0 | Status: SHIPPED | OUTPATIENT
Start: 2020-06-15 | End: 2020-08-25

## 2020-06-15 RX ORDER — HYDROCODONE BITARTRATE AND ACETAMINOPHEN 7.5; 325 MG/1; MG/1
1 TABLET ORAL EVERY 6 HOURS PRN
Qty: 30 TABLET | Refills: 0 | Status: SHIPPED | OUTPATIENT
Start: 2020-06-15 | End: 2020-08-25

## 2020-06-15 RX ORDER — POTASSIUM CHLORIDE 1.5 G/1.77G
40 POWDER, FOR SOLUTION ORAL AS NEEDED
Status: DISCONTINUED | OUTPATIENT
Start: 2020-06-15 | End: 2020-06-15 | Stop reason: HOSPADM

## 2020-06-15 RX ORDER — SODIUM BICARBONATE 650 MG/1
650 TABLET ORAL 2 TIMES DAILY
Qty: 60 TABLET | Refills: 0 | Status: SHIPPED | OUTPATIENT
Start: 2020-06-15 | End: 2020-01-01

## 2020-06-15 RX ADMIN — INSULIN LISPRO 18 UNITS: 100 INJECTION, SOLUTION INTRAVENOUS; SUBCUTANEOUS at 07:59

## 2020-06-15 RX ADMIN — PANTOPRAZOLE SODIUM 40 MG: 40 TABLET, DELAYED RELEASE ORAL at 05:22

## 2020-06-15 RX ADMIN — FOLIC ACID 1 MG: 1 TABLET ORAL at 08:01

## 2020-06-15 RX ADMIN — CEFOXITIN 1 G: 1 INJECTION, POWDER, FOR SOLUTION INTRAVENOUS at 08:01

## 2020-06-15 RX ADMIN — Medication 10 ML: at 08:01

## 2020-06-15 RX ADMIN — CEFOXITIN 1 G: 1 INJECTION, POWDER, FOR SOLUTION INTRAVENOUS at 18:05

## 2020-06-15 RX ADMIN — LACTULOSE 60 ML: 10 SOLUTION ORAL at 03:47

## 2020-06-15 RX ADMIN — RIFAXIMIN 550 MG: 550 TABLET ORAL at 08:01

## 2020-06-15 RX ADMIN — ASPIRIN 81 MG: 81 TABLET, COATED ORAL at 08:01

## 2020-06-15 RX ADMIN — LACTULOSE 60 ML: 10 SOLUTION ORAL at 00:20

## 2020-06-15 RX ADMIN — DULOXETINE HYDROCHLORIDE 60 MG: 30 CAPSULE, DELAYED RELEASE ORAL at 08:01

## 2020-06-15 RX ADMIN — LEVOTHYROXINE SODIUM 75 MCG: 75 TABLET ORAL at 05:22

## 2020-06-15 RX ADMIN — MAGNESIUM OXIDE TAB 400 MG (241.3 MG ELEMENTAL MG) 400 MG: 400 (241.3 MG) TAB at 08:01

## 2020-06-15 RX ADMIN — ALLOPURINOL 100 MG: 100 TABLET ORAL at 08:01

## 2020-06-15 RX ADMIN — SODIUM BICARBONATE 650 MG TABLET 650 MG: at 08:01

## 2020-06-15 RX ADMIN — CEFOXITIN 1 G: 1 INJECTION, POWDER, FOR SOLUTION INTRAVENOUS at 00:20

## 2020-06-16 ENCOUNTER — READMISSION MANAGEMENT (OUTPATIENT)
Dept: CALL CENTER | Facility: HOSPITAL | Age: 75
End: 2020-06-16

## 2020-06-16 NOTE — OUTREACH NOTE
Prep Survey      Responses   [facility] facility patient discharged from?  [facility]   Is LACE score < 7 ?  No   Eligibility  Readm Mgmt   Discharge diagnosis  Colocutaneous fistula, s/p right colon resection, CKDIII, THOMAS, cirrhosis, anemia of CKD, acquired hypothyroidism,  Diabetic neuropathy   COVID-19 Test Status  Negative   Does the patient have one of the following disease processes/diagnoses(primary or secondary)?  General Surgery   Does the patient have Home health ordered?  Yes   What is the Home health agency?   [facility]   Is there a DME ordered?  No   Comments regarding appointments  Pt to schedule F/U with PCP   Medication alerts for this patient  see AVS   Prep survey completed?  Yes          Nany Dejesus RN

## 2020-06-18 ENCOUNTER — READMISSION MANAGEMENT (OUTPATIENT)
Dept: CALL CENTER | Facility: HOSPITAL | Age: 75
End: 2020-06-18

## 2020-06-18 NOTE — OUTREACH NOTE
General Surgery Week 1 Survey      Responses   [facility] patient discharged from?  [facility]   Does the patient have one of the following disease processes/diagnoses(primary or secondary)?  General Surgery   Is there a successful TCM telephone encounter documented?  No   Week 1 attempt successful?  Yes   Call start time  0904   Call end time  0909   Discharge diagnosis  Colocutaneous fistula, s/p right colon resection, CKDIII, THOMAS, cirrhosis, anemia of CKD, acquired hypothyroidism,  Diabetic neuropathy   Meds reviewed with patient/caregiver?  Yes   Is the patient having any side effects they believe may be caused by any medication additions or changes?  No   Does the patient have all medications related to this admission filled (includes all antibiotics, pain medications, etc.)  Yes   Is the patient taking all medications as directed (includes completed medication regime)?  Yes   Does the patient have a follow up appointment scheduled with their surgeon?  Yes [6/25/20]   Has the patient kept scheduled appointments due by today?  N/A   What is the Home health agency?   [facility]   Has home health visited the patient within 72 hours of discharge?  Yes   Psychosocial issues?  No   Did the patient receive a copy of their discharge instructions?  Yes   Nursing interventions  Reviewed instructions with patient   What is the patient's perception of their health status since discharge?  Improving   Nursing interventions  Nurse provided patient education   Is the patient /caregiver able to teach back basic post-op care?  Take showers only when approved by MD-sponge bathe until then, No tub bath, swimming, or hot tub until instructed by MD, Keep incision areas clean,dry and protected, Drive as instructed by MD in discharge instructions, Lifting as instructed by MD in discharge instructions   Is the patient/caregiver able to teach back signs and symptoms of incisional infection?  Increased drainage or bleeding, Increased  redness, swelling or pain at the incisonal site, Fever   Is the patient/caregiver able to teach back steps to recovery at home?  Rest and rebuild strength, gradually increase activity, Set small, achievable goals for return to baseline health, Eat a well-balance diet   If the patient is a current smoker, are they able to teach back resources for cessation?  -- [Nonsmoker]   Is the patient/caregiver able to teach back the hierarchy of who to call/visit for symptoms/problems? PCP, Specialist, Home health nurse, Urgent Care, ED, 911  Yes   Week 1 call completed?  Yes          Julianna Mora RN

## 2020-06-24 ENCOUNTER — LAB REQUISITION (OUTPATIENT)
Dept: LAB | Facility: HOSPITAL | Age: 75
End: 2020-06-24

## 2020-06-24 ENCOUNTER — OFFICE VISIT (OUTPATIENT)
Dept: SURGERY | Facility: CLINIC | Age: 75
End: 2020-06-24

## 2020-06-24 VITALS
HEIGHT: 74 IN | DIASTOLIC BLOOD PRESSURE: 75 MMHG | WEIGHT: 281 LBS | TEMPERATURE: 98.2 F | OXYGEN SATURATION: 96 % | HEART RATE: 92 BPM | BODY MASS INDEX: 36.06 KG/M2 | SYSTOLIC BLOOD PRESSURE: 116 MMHG

## 2020-06-24 DIAGNOSIS — N18.30 CHRONIC KIDNEY DISEASE, STAGE 3 (MODERATE): ICD-10-CM

## 2020-06-24 DIAGNOSIS — E11.40 TYPE 2 DIABETES MELLITUS WITH DIABETIC NEUROPATHY, UNSPECIFIED (HCC): ICD-10-CM

## 2020-06-24 DIAGNOSIS — D63.1 ANEMIA IN CHRONIC KIDNEY DISEASE (CODE): ICD-10-CM

## 2020-06-24 DIAGNOSIS — K63.2 COLOCUTANEOUS FISTULA: Primary | ICD-10-CM

## 2020-06-24 LAB
ANION GAP SERPL CALCULATED.3IONS-SCNC: 9 MMOL/L (ref 5–15)
BUN BLD-MCNC: 18 MG/DL (ref 8–23)
BUN BLD-MCNC: ABNORMAL MG/DL
BUN/CREAT SERPL: ABNORMAL
CALCIUM SPEC-SCNC: 8.3 MG/DL (ref 8.6–10.5)
CHLORIDE SERPL-SCNC: 111 MMOL/L (ref 98–107)
CO2 SERPL-SCNC: 19 MMOL/L (ref 22–29)
CREAT BLD-MCNC: 1.31 MG/DL (ref 0.76–1.27)
GFR SERPL CREATININE-BSD FRML MDRD: 54 ML/MIN/1.73
GLUCOSE BLD-MCNC: 161 MG/DL (ref 65–99)
POTASSIUM BLD-SCNC: 3.8 MMOL/L (ref 3.5–5.2)
SODIUM BLD-SCNC: 139 MMOL/L (ref 136–145)

## 2020-06-24 PROCEDURE — 99024 POSTOP FOLLOW-UP VISIT: CPT | Performed by: SURGERY

## 2020-06-24 PROCEDURE — 80048 BASIC METABOLIC PNL TOTAL CA: CPT | Performed by: SURGERY

## 2020-06-24 RX ORDER — CYANOCOBALAMIN 1000 UG/ML
1000 INJECTION, SOLUTION INTRAMUSCULAR; SUBCUTANEOUS
COMMUNITY
Start: 2020-06-07

## 2020-06-24 NOTE — PROGRESS NOTES
SUBJECTIVE:    Mr Ratilff is seen in the office today follow-up from his open right hemicolectomy 2 weeks ago.  He is doing well.  No fevers or chills.  No nausea or vomiting.    OBJECTIVE:    Vision is healing appropriately without infection.  Drain tube is serous.  It is removed in the office today.  We have not removed the staples today.    ASSESSMENT:    Satisfactory postop progress    PLAN:    Recheck in the office in 3 to 4 weeks.  I want him to see Og next week to get his staples removed and Mastisol and Steri-Strips placed.

## 2020-06-25 ENCOUNTER — READMISSION MANAGEMENT (OUTPATIENT)
Dept: CALL CENTER | Facility: HOSPITAL | Age: 75
End: 2020-06-25

## 2020-06-25 NOTE — OUTREACH NOTE
General Surgery Week 2 Survey      Responses   [facility] patient discharged from?  [facility]   Does the patient have one of the following disease processes/diagnoses(primary or secondary)?  General Surgery   Week 2 attempt successful?  Yes   Call start time  0829   Call end time  0834   Discharge diagnosis  Colocutaneous fistula, s/p right colon resection, CKDIII, THOMAS, cirrhosis, anemia of CKD, acquired hypothyroidism,  Diabetic neuropathy   Person spoke with today (if not patient) and relationship  Lisa, wife   Meds reviewed with patient/caregiver?  Yes   Is the patient taking all medications as directed (includes completed medication regime)?  Yes   Has the patient kept scheduled appointments due by today?  Yes   Comments  Saw Surgeon yesterday, drain removed. Staples to be removed next week.   What is the Home health agency?   [facility]   Home health comments  [facility]   Comments  Has back pain from compression fractures. Doing good from surgery.   What is the patient's perception of their health status since discharge?  Improving   Week 2 call completed?  Yes          Mell Bobby RN

## 2020-06-29 ENCOUNTER — HOSPITAL ENCOUNTER (OUTPATIENT)
Dept: ONCOLOGY | Facility: HOSPITAL | Age: 75
Discharge: HOME | End: 2020-06-29
Admitting: INTERNAL MEDICINE

## 2020-06-29 ENCOUNTER — LAB (OUTPATIENT)
Dept: LAB | Facility: HOSPITAL | Age: 75
End: 2020-06-29

## 2020-06-29 VITALS
HEIGHT: 74 IN | TEMPERATURE: 97.1 F | DIASTOLIC BLOOD PRESSURE: 66 MMHG | SYSTOLIC BLOOD PRESSURE: 118 MMHG | BODY MASS INDEX: 27.98 KG/M2 | RESPIRATION RATE: 18 BRPM | WEIGHT: 218 LBS | HEART RATE: 92 BPM

## 2020-06-29 DIAGNOSIS — D47.2 MGUS (MONOCLONAL GAMMOPATHY OF UNKNOWN SIGNIFICANCE): ICD-10-CM

## 2020-06-29 DIAGNOSIS — N18.30 CKD (CHRONIC KIDNEY DISEASE) STAGE 3, GFR 30-59 ML/MIN (HCC): Primary | ICD-10-CM

## 2020-06-29 DIAGNOSIS — D50.0 IRON DEFICIENCY ANEMIA DUE TO CHRONIC BLOOD LOSS: ICD-10-CM

## 2020-06-29 LAB
BASOPHILS # BLD AUTO: 0.08 10*3/MM3 (ref 0–0.2)
BASOPHILS NFR BLD AUTO: 0.7 % (ref 0–1.5)
DEPRECATED RDW RBC AUTO: 57.1 FL (ref 37–54)
EOSINOPHIL # BLD AUTO: 0.56 10*3/MM3 (ref 0–0.4)
EOSINOPHIL NFR BLD AUTO: 5.2 % (ref 0.3–6.2)
ERYTHROCYTE [DISTWIDTH] IN BLOOD BY AUTOMATED COUNT: 16.7 % (ref 12.3–15.4)
HCT VFR BLD AUTO: 28.1 % (ref 37.5–51)
HGB BLD-MCNC: 9.3 G/DL (ref 13–17.7)
LYMPHOCYTES # BLD AUTO: 1.43 10*3/MM3 (ref 0.7–3.1)
LYMPHOCYTES NFR BLD AUTO: 13.3 % (ref 19.6–45.3)
MCH RBC QN AUTO: 32.1 PG (ref 26.6–33)
MCHC RBC AUTO-ENTMCNC: 33.1 G/DL (ref 31.5–35.7)
MCV RBC AUTO: 96.9 FL (ref 79–97)
MONOCYTES # BLD AUTO: 0.87 10*3/MM3 (ref 0.1–0.9)
MONOCYTES NFR BLD AUTO: 8.1 % (ref 5–12)
NEUTROPHILS # BLD AUTO: 7.78 10*3/MM3 (ref 1.7–7)
NEUTROPHILS NFR BLD AUTO: 72.7 % (ref 42.7–76)
PLATELET # BLD AUTO: 226 10*3/MM3 (ref 140–450)
PMV BLD AUTO: 11 FL (ref 6–12)
RBC # BLD AUTO: 2.9 10*6/MM3 (ref 4.14–5.8)
WBC NRBC COR # BLD: 10.72 10*3/MM3 (ref 3.4–10.8)

## 2020-06-29 PROCEDURE — 85025 COMPLETE CBC W/AUTO DIFF WBC: CPT

## 2020-06-29 PROCEDURE — 25010000002 EPOETIN ALFA-EPBX 40000 UNIT/ML SOLUTION: Performed by: INTERNAL MEDICINE

## 2020-06-29 PROCEDURE — 96372 THER/PROPH/DIAG INJ SC/IM: CPT

## 2020-06-29 RX ADMIN — EPOETIN ALFA-EPBX 40000 UNITS: 40000 INJECTION, SOLUTION INTRAVENOUS; SUBCUTANEOUS at 10:55

## 2020-07-10 DIAGNOSIS — N18.30 CKD (CHRONIC KIDNEY DISEASE) STAGE 3, GFR 30-59 ML/MIN (HCC): ICD-10-CM

## 2020-07-13 ENCOUNTER — OFFICE VISIT (OUTPATIENT)
Dept: ONCOLOGY | Facility: CLINIC | Age: 75
End: 2020-07-13

## 2020-07-13 ENCOUNTER — LAB (OUTPATIENT)
Dept: LAB | Facility: HOSPITAL | Age: 75
End: 2020-07-13

## 2020-07-13 ENCOUNTER — HOSPITAL ENCOUNTER (OUTPATIENT)
Dept: ONCOLOGY | Facility: HOSPITAL | Age: 75
Discharge: HOME | End: 2020-07-13
Admitting: NURSE PRACTITIONER

## 2020-07-13 VITALS
WEIGHT: 287.2 LBS | TEMPERATURE: 97.3 F | SYSTOLIC BLOOD PRESSURE: 161 MMHG | RESPIRATION RATE: 20 BRPM | BODY MASS INDEX: 36.86 KG/M2 | DIASTOLIC BLOOD PRESSURE: 76 MMHG | HEIGHT: 74 IN | HEART RATE: 89 BPM

## 2020-07-13 DIAGNOSIS — N18.30 CKD (CHRONIC KIDNEY DISEASE) STAGE 3, GFR 30-59 ML/MIN (HCC): Primary | ICD-10-CM

## 2020-07-13 DIAGNOSIS — D50.9 IRON DEFICIENCY ANEMIA, UNSPECIFIED IRON DEFICIENCY ANEMIA TYPE: Primary | ICD-10-CM

## 2020-07-13 DIAGNOSIS — N18.30 CKD (CHRONIC KIDNEY DISEASE) STAGE 3, GFR 30-59 ML/MIN (HCC): ICD-10-CM

## 2020-07-13 LAB
BASOPHILS # BLD AUTO: 0.05 10*3/MM3 (ref 0–0.2)
BASOPHILS NFR BLD AUTO: 0.5 % (ref 0–1.5)
DEPRECATED RDW RBC AUTO: 61.6 FL (ref 37–54)
EOSINOPHIL # BLD AUTO: 0.42 10*3/MM3 (ref 0–0.4)
EOSINOPHIL NFR BLD AUTO: 4.2 % (ref 0.3–6.2)
ERYTHROCYTE [DISTWIDTH] IN BLOOD BY AUTOMATED COUNT: 17 % (ref 12.3–15.4)
HCT VFR BLD AUTO: 30.8 % (ref 37.5–51)
HGB BLD-MCNC: 9.6 G/DL (ref 13–17.7)
LYMPHOCYTES # BLD AUTO: 1.6 10*3/MM3 (ref 0.7–3.1)
LYMPHOCYTES NFR BLD AUTO: 15.8 % (ref 19.6–45.3)
MCH RBC QN AUTO: 31.7 PG (ref 26.6–33)
MCHC RBC AUTO-ENTMCNC: 31.2 G/DL (ref 31.5–35.7)
MCV RBC AUTO: 101.7 FL (ref 79–97)
MONOCYTES # BLD AUTO: 0.91 10*3/MM3 (ref 0.1–0.9)
MONOCYTES NFR BLD AUTO: 9 % (ref 5–12)
NEUTROPHILS NFR BLD AUTO: 7.12 10*3/MM3 (ref 1.7–7)
NEUTROPHILS NFR BLD AUTO: 70.5 % (ref 42.7–76)
PLATELET # BLD AUTO: 268 10*3/MM3 (ref 140–450)
PMV BLD AUTO: 10.3 FL (ref 6–12)
RBC # BLD AUTO: 3.03 10*6/MM3 (ref 4.14–5.8)
WBC # BLD AUTO: 10.1 10*3/MM3 (ref 3.4–10.8)

## 2020-07-13 PROCEDURE — 99214 OFFICE O/P EST MOD 30 MIN: CPT | Performed by: INTERNAL MEDICINE

## 2020-07-13 PROCEDURE — 96372 THER/PROPH/DIAG INJ SC/IM: CPT

## 2020-07-13 PROCEDURE — 36415 COLL VENOUS BLD VENIPUNCTURE: CPT

## 2020-07-13 PROCEDURE — 25010000002 EPOETIN ALFA-EPBX 40000 UNIT/ML SOLUTION: Performed by: INTERNAL MEDICINE

## 2020-07-13 PROCEDURE — 85025 COMPLETE CBC W/AUTO DIFF WBC: CPT

## 2020-07-13 RX ADMIN — EPOETIN ALFA-EPBX 40000 UNITS: 40000 INJECTION, SOLUTION INTRAVENOUS; SUBCUTANEOUS at 11:30

## 2020-07-13 NOTE — PROGRESS NOTES
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 Anemia diagnosed January 2018 and IgG kappa monoclonal gammopathy diagnosed May 2018.  Anemia related to chronic renal failure 3  · This patient is an obese  male who  developed kidney problems in January 2018 for which he was referred to Devi Plascencia M.D. He was found to have a hemoglobin of 7.4 at that time and was given 1 unit of packed red blood cells. He was then referred to GI where he had been followed for Syed’s esophagus for a number of years. An EGD and colonoscopy was performed on 3/5/18 by Marisel Gomez M.D. revealing mucosa suggestive of Syed’s esophagus and hiatal hernia in the cardia. Patient had a poor prep compromising the colonoscopy exam though the scope did reach the cecum. Esophageal biopsy revealed squamocolumnar mucosa with extensive intestinal metaplasia consistent with Syed’s esophagus, negative for dysplasia. Colonoscopy was recommended to be repeated in two years and EGD in three years. The patient saw his primary care physician, Paulette Harris M.D., in followup and blood testing was done on 5/17/18 revealing WBC 6.3, hemoglobin 8.4, MCV 84.5, platelet count 182,000. Vitamin B12 level was 416 (180-914) and patient was referred here for further evaluation. The patient claims to have been diagnosed with Vitamin B12 deficiency a number of years ago for which he has been on Vitamin B12 injections up until six months ago when he just stopped taking those. He has been  recently started on oral iron supplementation. He claims not to see any blood in his urine but does have microscopic hematuria for which he sees a urologist. He denies nosebleeds, gum bleeds or blood in the stool. He has not had any significant changes in his weight. He feels weak and dizzy for a number of years which has recently gotten worse. He does not ambulate much because of back surgery causing him weakness. He did have a right lower extremity DVT with bilateral pulmonary emboli in 2004 since which time he has been on Coumadin, thought secondary to a sedentary lifestyle. He has no family history of anemias.   · 5/24/18 - Patient seen initial consultation for anemia. Hemoglobin 7.9, MCV 89.9. Ferritin 17 (), iron 183 (), TIBC 379 (228-428), iron saturation 48% (20-50), retic 2.16% (0.5-1.5), haptoglobin 138 (), folate 9.1 (5.9-24.8). SPEP showed monoclonal gammopathy. SIFE showed IgG kappa monoclonal gammopathy. M-spike in the gamma region 0.7 g/dL. Erythropoietin 53.5 (2.59-18.5). Antiparietal cell antibody and intrinsic factor antibodies negative.   Globulin 4.2 (2.5-3.8). Creatinine 1.4 (0.7-1.2).   · 6/19/18 - Discussed results of anemia workup. Hemoglobin improving. Ferrous Sulfate decreased to 325 mg twice a day. Will perform gammopathy workup for IgG kappa monoclonal gammopathy. IgA 783 (), IgG 1480 (600-1500), IgM 93 (). Kappa lambda FLC ratio 0.81 (0.26-1.65). Ferritin 36 ().        · 6/25/18 - Bone survey negative for acute disease. No evidence of lytic or blastic metastatic bone disease was present. Chest x-ray showed cardiomegaly, mild right basilar atelectasis and findings suggestive of ankylosing spondylitis.   · 6/29/18 - Patient underwent sternal bone marrow revealing monoclonal gammopathy of undetermined significant (MGUS). Extent of bone marrow involvement 5%. Phenotype kappa positive, IgG positive, CD38 positive, CD20 negative, CD19 negative, CD45  negative, CD56 negative and  negative. Dyspoiesis was not seen. There was absence of iron stores. Normal male karyotype. Normal FISH study. Flow cytometry revealed IgG kappa restrictive plasma cells in a polytypic background. There was a mixed population of maturing myeloid cells, B-cells and T-cells. No abnormal myeloid maturation was seen. A monoclonal IgG kappa plasma cell population was present. 4% plasma cells present.   · 8/23/18 - Labs by Dr. Plascencia revealed B12 of 857 (180-914), folate 12.7 (5.9-24.8), iron 127 ().      · 9/19/18 - Iron 94 (), TIBC 297 (228-428), iron saturation 32 (20-50), ferritin 29 (). Erythropoietin 30.04 (2.59-18.5).        · 10/16/18 - Iron 177 (), TIBC 320 (228-428), iron saturation 55 (20-50), ferritin 34 ().       · 11/16/18 - Hemoglobin 7.1. Patient given 1 unit of packed red blood cells.   · 11/21/18 - Ferritin 27 (). Hemoglobin 7.9. Patient given 1 unit of packed red blood cells.    · 11/26/18 - EGD by Dave Russo M.D. revealed erosive gastritis and bleeding ampulla. There was oozing upon entering the stomach in many different areas. Duodenal mucosa and the esophagus were normal.   · 11/27/18 - Hemoglobin 8.2. Order written for Procrit 30,000 units subq weekly, not approved by insurance for low ferritin. Urine JERRY with no definite monoclonal gammopathy identified with questionable faint IgG kappa.   · 12/18/18 - Hemoglobin 9.9. Injectafer 750 mg IV given.   · 1/2/19 - Injectafer 750 mg IV given.  Hemoglobin 11, .1. Patient claims that he is getting Vitamin B12 injections monthly at home through Dr. Harris. Currently taking Ferrous Sulfate 325 mg p.o. b.i.d. and asked to continue that. Asked to continue Pantoprazole 40 mg daily that he is taking. IgA 651 (), IgG 1470 (600-1500), IgM 94 ().      · 2/12/19 - Iron 88 ().    · 3/6/19 - WBC 19.8 with 83% neutrophils, 5% lymphocytes, 11% monocytes, hemoglobin  8.7, , platelet count 182,000. Patient status post laparoscopic cholecystectomy on 2/24/19 with large ecchymoses apparent on abdomen. Infectious workup ordered and Injectafer 750 mg IV days 1 and 8 ordered.  Iron 23 (), TIBC 153 (228-428), iron saturation 15% (20-50), ferritin 400 (224-336).       · 3/12/19 - WBC 15.02, hemoglobin 8.1 and platelets 227,000. Patient reported hematuria followed by Dr. Starkey. Orders written to start Injectafer ASAP. Abdominal ecchymosis improving.   · 3/14/19 - Injectafer 750 mg IV given.   · 4/22/19 - Hemoglobin 9.5, . Patient missed day 8 Injectafer in March and it was rescheduled. SIFE showed IgG kappa monoclonal gammopathy.  · 5/8/19 - Injectafer 750 mg IV given.   · 7/8/19 - Iron 56 (). Iron Saturation 36 (20-50%). Transferrin 110 (180-330). TIBC 154 (228-428). Ferritin 1,199 ().    · 8/7/2019-hemoglobin 10.0.  Patient taking multivitamin with iron only.  Restarted ferrous sulfate 325mg by mouth twice a day. UIFE showed free kappa light chain identified.   10/16/2019 patient hospitalized at [facility] for 3 days and found to have a hemoglobin of 4.8 at the cancer center visit.  Stool Hemoccult was positive.  He ended up receiving 5 units of packed red blood cells.  EGD by Marisel Gomez MD revealed severe gastric antral vascular ectasia with oozing, Syed's esophagus and hiatal hernia.  The patient was treated with PPI and Carafate.  Plan was to repeat EGD with cauterization in 6 weeks.  B12 and reticulocyte count were high.  Haptoglobin and folic acid were normal.  Creatinine 2.2 (0.76-1.27), iron 47 (), TIBC 264 (298-5 0.36), iron saturation 18 (20-50), ferritin 128.4 ().  Aspirin was held temporarily and patient given IV iron.  IgA 720 (), IgM 72 (), IgG 1489 (700-1600).  · EGD done secondary to GI bleed on 1/9/2020 1.  Moderately severe gastric antral vascular ectasia APC applied for cauterization 2.  Short segment of Syed's  biopsy taken. 3.  Small hiatal hernia.  · 1/17/20 Iron 42, Iron saturation 16, TIBC 264.  · 1/3/2020 patient was initiated on weekly Procrit  · 3/31/2020 iron profile iron 53 iron saturation 22 iron binding capacity 241 ferritin 229.     2.   Elevated LFT’s diagnosis established March 2018.  Biliary duct dilation diagnosis established June 2018.   · 11/30/17 - Maia phos 93 (32-91), total bili 0.7 (0.3-1.2), AST 37 (15-41), ALT 25 (15-41).   · 3/1/18 - T-bili 0.5 (0.3-1.2), maia phos 106 (32-91), AST 54 (15-41), ALT 27 (17-63).   · 5/24/18 - AST 46 (15-41), maia phos 131 (32-91).   · 6/8/18 - CMP ordered by Dr. Alejandra Amaro showed maia phos 209 (32-91),  (15-41), ALT 84 (17-63), total bili 1.1 (0.3-1.2).             · 6/19/18 - CT abdomen and pelvis as well as serological workup for elevated LFT’s ordered. Alpha-1 antitrypsin 144 (). Ceruloplasmin 38 (22-58). AFP 3 (0-9).  Hepatitis panel non-reactive.   · 6/22/18 - CT abdomen and pelvis showed abnormal intra and extrahepatic biliary dilation. There was mild distention of the gallbladder and evidence of several gallstones. Bilateral non-obstructing kidney stone was present. Incidental sigmoid diverticulosis.   · 7/2/18 - Patient underwent MRCP at [facility] showing markedly dilated intrahepatic and extrahepatic bile duct with multiple small filling defects within the distal common bile duct compatible with choledocholithiasis. There was a focal 6 mm segmental narrowing at the distal CBD with recommended GI consult for ERCP and brushings.   · 7/5/18 to 7/10/18 - On 7/5/18 labs revealed normal total bilirubin (0.8), AST 69 (15-41), maia phos 152 (32-91), ALT was normal at 37 (17-63), ammonia was elevated at 86 (9-35), lipase was normal (22). Patient hospitalized at [facility] due to weakness. Workup revealed cholelithiasis. On 7/9/18 patient underwent ERCP with bilateral sphincterotomy, common duct stone extraction, biliary brushing and stent placement  by Dr. Leiva. Path on brushings was negative for malignancy. There was intra and extrahepatic biliary ductal dilation with relatively abrupt distal common bile duct cutoff and non-visualization of the gallbladder. He was started on Lovenox and sequential compression devices due to risk of pulmonary embolism.  of 33 (0-35).  On 7/10/18 LFT’s revealed total bili 0.9 (0.3-1.2). Maia phos 129 (32-91). AST 50 (15-41), ALT 41 (17-63).     · 8/31/18 - EUS by Dr. Gomez at [facility] [address] revealing suspect benign biliary stricture due to CBD stone with FNA pathology revealing benign and atypical ductal cells, bile and proteinaceous material including scattered bacteria, neutrophils and degenerating cells. The atypia was felt mild and favored to be reactive in nature. Plan was to repeat ERCP with removal of the stent and the stone with consideration of common bile duct evaluation with cholangioscopy at that time.   · 10/12/18 - ERCP with sphincteroplasty and stone extraction done by  JUAN PABLO Russo for choledocholithiasis.   · 2/23/19 - Patient underwent ERCP with balloon clearance of bile duct by Memo  · 3/6/19 - LFT’s not elevated with bilirubin 1.1, AST 31, ALT 14, maia phos was mildly elevated at 101 (32-91).   · 4/25/19 - 4/26/19 - Admitted to [facility] for hepatic encephalopathy and hypokalemia.   · 4/27/2019 - CT abdomen pelvis showed a 4.8 cm air-fluid collection adjacent to the right posterior hepatic lobe significant improved.  Right-sided chest tube small right pleural effusion.  Hepatic cirrhosis.  Bilateral nonobstructing renal stones.  Sigmoid diverticulosis.   · 5/26/19 - 5/29/2019 - Admitted to [facility] for hepatic and cephalopathy.  Ammonia level 213 (H).  · 7/11/19 - AFP 2.76 (0-9).   · 9/26/19 - CT scan abdomen showed fluid collection posterior to the right hepatic lobe has significantly diminished in size with only trace fluid remaining. Small locules of air are seen within this collection which  could be related to recent shunt mentation or could be related to tiny fistula from the splenic flexure of the colon. Trace right pleural fluid, diminished since 4/27/19. Thickened, likely reactive, pleural rind remains. Mild right basilar atelectasis. Abnormal intrahepatic and extra hepatic biliary ductal dilation. Intrahepatic biliary dilation is new since 4/27/19. The CBD appears attenuated in its mid segment, raising the possibility of stricture. ERCP recommended. Uncomplicated colonic diverticulosis. Non-obstructing bilateral renal stones and probable small right renal cyst. Cirrhotic liver morphology. No focal liver lesion is seen.  · 10/28/19 - CT scan abdomen and pelvis showed fluid collection posterior to the right hepatic lobe appears slightly increased in size now measuring 24 mm in diameter, again a small droplet of gas adjacent to this fluid collection is seen which may represent fistula. Right basilar small pleural effusion and atelectasis remain. Continued dilatation of the intra and extrahepatic biliary ductal system which appears stable from previous exam. Non-obstructing bilateral renal calculi. Changes of cirrhosis and splenomegaly. Diverticulosis. Mild interval change from prior study with increasing fluid collection posterior to the right hepatic lobe.      3.   Right lower extremity DVT and bilateral pulmonary emboli diagnosed 2004.   · 2004 - Patient claims to have developed right lower extremity DVT with bilateral pulmonary emboli in 2004 and was coumadinized. The blood clots were thought secondary to his sedentary lifestyle. He stayed on the Coumadin up until October 2017 when, due to difficulty maintaining his INR’s, he was switched to Xarelto 20 mg daily by Paulette Harris M.D. which was later dropped to 10 mg daily.    · 11/26/18 - EGD by Dave Russo M.D. revealed erosive gastritis and bleeding ampulla. Ampullary biopsy revealed reactive/reparative small intestinal mucosa with  mild chronic inflammation. Gastric antrum biopsy was suggestive of focal mild reactive gastropathy. Due to erosive gastritis, patient asked to hold Xarelto and Aspirin by Dave Russo M.D.   · 11/27/18 - Patient asked to discontinue Xarelto and hold Aspirin while obtaining IVC filter. Risks discussed. Factor V Leiden gene mutation and prothrombin gene mutations not detected. Anticardiolipin IgM 11 (<11). Anti beta-2 glyco, IgA 55 (<20). Factor VIII activity 186 (). Antithrombin III activity 63 (). Protein C activity 69 (), protein S activity 69 ().       · 12/5/18 - Patient underwent transjugular IVC filter placement in IR by  Jonn Cuenca M.D.   · 1/2/19 - Told patient that his low protein CNS and antithrombin III likely due to liver disease. He would be at a high risk of going back on Xarelto and hence continued on Aspirin 81 mg p.o. daily.   · 1/3/19 - D-dimer 1.25 (<0.45).    · 3/8/19 - Chest x-ray showed chronic changes in the chest with no obvious pneumonia or congestive changes.  · 4/3/19 - Chest x-ray with right pleural effusion and basilar lung density with slight increase from prior. Cardiomegaly.   · 4/22/19 - Factor VIII 334 (H), Anti-phosphatidylserine antibody IgM> 80 (H), Anti-phosphatidylserine antibody IgG 12 (N), Cardiolipin IgG 21 (N), Cardiolipin IgM 55 (H), Cardiolipin IgA 63 (H), Beta-2 glycoprotein IgG 4 (N), IgA 91 (H), and IgM 21 (H).   · 7/11/19 - Chest x-ray showed stable small right pleural effusion since 04/25/2019. Minimal right costophrenic angle atelectasis.  · 4.  Recurrent episodes of hepatic encephalopathy on treatment with lactulose 30 cc 4 times a day.  · Recurrent GI blood loss on anticoagulation.    Reviewed  Past Medical History:   Diagnosis Date   • Anemia     iron deficiency   • Anxiety    • B12 deficiency 2009   • Balance disorder    • Syed's esophagus    • Bile duct leak    • CAD (coronary artery disease)     cardiac stent   •  Constipation    • DDD (degenerative disc disease), lumbar    • Decubitus ulcer     buttocks   • Depression    • Diabetes mellitus (CMS/HCC)    • Disease of thyroid gland     hypothyroid   • Diverticular disease    • Dvt femoral (deep venous thrombosis) (CMS/HCC) 2004    rt let   • Elevated cholesterol    • Fistula of intestine    • Gastroparesis    • GERD (gastroesophageal reflux disease)    • Gout    • Hepatic encephalopathy (CMS/HCC)     if doesnt take meds   • History of echocardiogram     03/03/2017 - 12/03/2014 - 04/2012   • History of stomach ulcers    • History of transfusion    • Hyperlipidemia    • Incontinence    • Iron deficiency    • Kidney stones    • Morbid obesity (CMS/HCC)    • THOMAS (nonalcoholic steatohepatitis)    • Neuropathy    • OAB (overactive bladder)    • Open wound     rt knee   • KATHIA treated with BiPAP     bring dos   • Peripheral edema    • Pulmonary embolus (CMS/HCC) 2004   • Renal insufficiency     stage 3   • S/P lumbar laminectomy    • Skin irritation     skin fold   • Stage 3 chronic kidney disease (CMS/HCC)        Past Surgical History:   Procedure Laterality Date   • ABDOMINAL SURGERY     • BACK SURGERY  1971   • BILE DUCT STENT PLACEMENT     • BILE DUCT STENT PLACEMENT     • CARDIAC CATHETERIZATION     • CATARACT EXTRACTION  2014   • CATARACT EXTRACTION, BILATERAL  2014   • CHOLECYSTECTOMY  02/24/2019   • COLON RESECTION Right 6/11/2020    Procedure: COLON RESECTION RIGHT;  Surgeon: Naif Etienne DO;  Location: [facility];  Service: General;  Laterality: Right;   • COLONOSCOPY  03/2018   • CORONARY ANGIOPLASTY  08/24/2009    PCI stent - succesful placement of 3.5/23 promus stent in proximal right coronary artery   • CORONARY ANGIOPLASTY WITH STENT PLACEMENT  08/27/2009    patient had stent placed to proximal right coronary artery   • CYSTOSCOPY BLADDER STONE LITHOTRIPSY  1997   • ENDOSCOPY N/A 10/18/2019    Procedure: ESOPHAGOGASTRODUODENOSCOPY with argon plasma coagulation  of gastric antral vascular ectasia;  Surgeon: Marisel Gomez MD;  Location: [facility] ENDOSCOPY;  Service: Gastroenterology   • ENDOSCOPY N/A 1/9/2020    Procedure: ESOPHAGOGASTRODUODENOSCOPY WITH BIOPSY, ARGON PLASMA COAGULATION OF GASTRIC ANTREL VASCULAR ECTASIA,;  Surgeon: Marisel Gomez MD;  Location: [facility] ENDOSCOPY;  Service: Gastroenterology   • ENDOSCOPY N/A 3/6/2020    Procedure: ESOPHAGOGASTRODUODENOSCOPY;  Surgeon: Zbigniew Silva MD;  Location: [facility] ENDOSCOPY;  Service: Gastroenterology;  Laterality: N/A;  mild gave, post cautery ulcer     • ERCP N/A 11/20/2019    Procedure: ERCP, clearance of bile duct with balloon, placement of biliary stent, EGD with argon plasma coagulation of gastric antral vascular ectasia;  Surgeon: Marisel Gomez MD;  Location: [facility] ENDOSCOPY;  Service: Gastroenterology   • ERCP N/A 2/27/2020    Procedure: ENDOSCOPIC RETROGRADE CHOLANGIOPANCREATOGRAPHY with removal of biliary stent, brushing, dilation, and placement of biliary stent x 2 and Esophagogastroduodenoscopy with argon plasma coagulation;  Surgeon: Marisel Gomez MD;  Location: [facility] ENDOSCOPY;  Service: Gastroenterology;  Laterality: N/A;  Gastric antral vascular ectasia, common bile duct stricture   • ERCP N/A 4/20/2020    Procedure: ENDOSCOPIC RETROGRADE CHOLANGIOPANCREATOGRAPHY WITH REMOVAL OF BILIARY STENT, AMPULA DILATION TO 8MM, BRUSHING OF COMMON BILE DUT, CLEARANCE OF BILE DUCT WITH BALLOON, BIOPSY OF AMPULA, PLACEMENT OF BILIARY STENT;  Surgeon: Marisel Gomez MD;  Location: [facility] ENDOSCOPY;  Service: Gastroenterology;  Laterality: N/A;  POST:CBD STRICTURE   • OTHER SURGICAL HISTORY  11/2018    IVC filter implant   • RENAL ARTERY STENT Left     does not recall this   • UPPER ENDOSCOPIC ULTRASOUND W/ FNA N/A 4/20/2020    Procedure: Esophagogastroduodenoscopy with Endoscopic ultrasound and fine needle aspiration;  Surgeon: Marisel Gomez MD;  Location: [facility] ENDOSCOPY;  Service: Gastroenterology;   "Laterality: N/A;  Post: CBD STRICTURE   • VENA CAVA FILTER INSERTION  12/05/2018         Current Outpatient Medications:   •  allopurinol (ZYLOPRIM) 100 MG tablet, Take 100 mg by mouth 2 (Two) Times a Day. Do not preop, Disp: , Rfl:   •  aspirin 81 MG tablet, Take 1 tablet by mouth Daily., Disp: 30 tablet, Rfl: 3  •  B-D 3CC LUER-YASMEEN SYR 25GX1\" 25G X 1\" 3 ML misc, USE FOR THE B12 INJECTION INTRAMUSCULAR ONCE MONTHLY, Disp: , Rfl:   •  BOUDREAUXS BUTT PASTE 40 % ointment, APPLY TO WOUNDS ONCE DAILY, Disp: , Rfl:   •  cyanocobalamin 1000 MCG/ML injection, ADM 1 ML IM Q MONTH, Disp: , Rfl:   •  docusate sodium (COLACE) 100 MG capsule, Take 100 mg by mouth 2 (Two) Times a Day. dont take preop, Disp: , Rfl:   •  DULoxetine (CYMBALTA) 60 MG capsule, Take 60 mg by mouth Daily. Take preop, Disp: , Rfl:   •  folic acid (FOLVITE) 1 MG tablet, Take 1 mg by mouth Daily. Last dose 6/6, Disp: , Rfl:   •  HYDROcodone-acetaminophen (Norco) 7.5-325 MG per tablet, Take 1 tablet by mouth Every 6 (Six) Hours As Needed for Moderate Pain ., Disp: 30 tablet, Rfl: 0  •  hydrOXYzine pamoate (VISTARIL) 25 MG capsule, Take 25 mg by mouth 3 (Three) Times a Day As Needed. Take preop if needed, Disp: , Rfl: 0  •  insulin glargine (LANTUS) 100 UNIT/ML injection, Inject 50 units at bedtime (Patient taking differently: Inject 50 Units under the skin into the appropriate area as directed Every Night. Inject 50 units at bedtime), Disp: 45 mL, Rfl: 3  •  insulin lispro (HUMALOG) 100 UNIT/ML injection, 18 units subcu before each meal plus sliding scale MDD 60 (Patient taking differently: Inject 18 Units under the skin into the appropriate area as directed 3 (Three) Times a Day Before Meals. 18 units subcu before each meal plus sliding scale MDD 60    dont take preop), Disp: 60 mL, Rfl: 4  •  lactulose (CHRONULAC) 10 GM/15ML solution, Take 60 mL by mouth Every 4 (Four) Hours. Will not take dos am, Disp: , Rfl:   •  levothyroxine (SYNTHROID, LEVOTHROID) " 75 MCG tablet, Take 1 tablet by mouth Daily. (Patient taking differently: Take 75 mcg by mouth Daily. Take preop), Disp: 90 tablet, Rfl: 4  •  magnesium oxide (MAG-OX) 400 MG tablet, Take 400 mg by mouth Daily. dont take preop, Disp: , Rfl:   •  melatonin 5 MG tablet tablet, Take 10 mg by mouth At Night As Needed., Disp: , Rfl:   •  metoclopramide (REGLAN) 5 MG tablet, Take 5 mg by mouth 2 (Two) Times a Day. Do not take preop, Disp: , Rfl: 0  •  Multiple Vitamins-Minerals (MULTIVITAMIN WITH MINERALS) tablet tablet, Take 1 tablet by mouth Daily., Disp: , Rfl:   •  nitroglycerin (NITROSTAT) 0.4 MG SL tablet, Place 1 tablet under the tongue Every 5 (Five) Minutes As Needed for Chest Pain (Only if SBP Greater Than 100). Take no more than 3 doses in 15 minutes. (Patient taking differently: Place 0.4 mg under the tongue Every 5 (Five) Minutes As Needed for Chest Pain (Only if SBP Greater Than 100). Take no more than 3 doses in 15 minutes. hasnt needed), Disp: 30 tablet, Rfl: 12  •  ONETOUCH VERIO test strip, USE TO CHECK BLOOD SUGAR QID, Disp: , Rfl:   •  oxyCODONE (ROXICODONE) 10 MG tablet, Take 10 mg by mouth Every 8 (Eight) Hours As Needed. Take preop if needed, Disp: , Rfl:   •  pantoprazole (PROTONIX) 40 MG EC tablet, Take 1 tablet by mouth 2 (Two) Times a Day. (Patient taking differently: Take 40 mg by mouth 2 (Two) Times a Day. Take preop), Disp: 30 tablet, Rfl: 3  •  rifaximin (XIFAXAN) 550 MG tablet, Take 550 mg by mouth Every 12 (Twelve) Hours. Take preop, Disp: , Rfl:   •  sodium bicarbonate 650 MG tablet, Take 1 tablet by mouth 2 (Two) Times a Day. (Patient taking differently: Take 650 mg by mouth 2 (Two) Times a Day. Patient unclear whether 650 mg twice a day or 1300 mg twice a day.), Disp: 60 tablet, Rfl: 0  •  zinc sulfate (ZINCATE) 220 (50 Zn) MG capsule, Take 220 mg by mouth Daily. dont take dos, Disp: , Rfl:   •  ciprofloxacin (CIPRO) 500 MG tablet, Take 500 mg by mouth 2 (Two) Times a Day. Take preop,  Disp: , Rfl:   •  Cyanocobalamin 1000 MCG/ML kit, Inject 1,000 mcg as directed Every 30 (Thirty) Days., Disp: , Rfl:   •  Mirabegron ER (Myrbetriq) 50 MG tablet sustained-release 24 hour 24 hr tablet, Take 50 mg by mouth Daily. Has been out but may take preop, Disp: , Rfl:   •  ondansetron (ZOFRAN) 4 MG tablet, Take 1 tablet by mouth Every 6 (Six) Hours As Needed for Nausea or Vomiting., Disp: 30 tablet, Rfl: 0  •  sodium bicarbonate 650 MG tablet, Take 650 mg by mouth 2 (Two) Times a Day. Take preop, Disp: , Rfl:   No current facility-administered medications for this visit.     Facility-Administered Medications Ordered in Other Visits:   •  epoetin mathieu-epbx (RETACRIT) injection 40,000 Units, 40,000 Units, Subcutaneous, Once, Joceline Echevarria MD    No Known Allergies    Family History   Problem Relation Age of Onset   • Hyperlipidemia Other    • Hypertension Other        Cancer-related family history is not on file.    Social History     Tobacco Use   • Smoking status: Never Smoker   • Smokeless tobacco: Never Used   Substance Use Topics   • Alcohol use: No     Frequency: Never   • Drug use: No       I have reviewed the history of present illness, past medical history, family history, social history, lab results, all notes and other records since the patient was last seen at the cancer care center    SUBJECTIVE:      Patient is my office for follow-up accompanied by his wife.  He reports feeling more awake alert.  He is compliant with his lactulose.  Being a little bit more active than before.  His sleeping hours are reversed though he sleeps all day.  He is not surgical candidate, continue hydrocodone 10 mg for his back pain.  ROS:      Review of Systems   Constitutional: Negative for fever.   HENT: Negative for nosebleeds and trouble swallowing.    Eyes: Negative for visual disturbance.   Respiratory: Negative for cough, shortness of breath and wheezing.    Cardiovascular: Negative for chest pain.  "  Gastrointestinal: Negative for abdominal pain and blood in stool.   Endocrine: Negative for cold intolerance.   Genitourinary: Negative for dysuria and hematuria.   Musculoskeletal: Negative for joint swelling.   Skin: Negative for rash.   Allergic/Immunologic: Negative for environmental allergies.   Neurological: Negative for seizures.   Hematological: Does not bruise/bleed easily.   Psychiatric/Behavioral: The patient is not nervous/anxious.    MD performed ROS and are negative except as mentioned in Subjective.        Objective:       Vitals:    07/13/20 1031   BP: 161/76   Pulse: 89   Resp: 20   Temp: 97.3 °F (36.3 °C)   TempSrc: Oral   Weight: 130 kg (287 lb 3.2 oz)   Height: 188 cm (74\")   PainSc:   8   PainLoc: Back     /76   Pulse 89   Temp 97.3 °F (36.3 °C) (Oral)   Resp 20   Ht 188 cm (74\")   Wt 130 kg (287 lb 3.2 oz)   BMI 36.87 kg/m²       PHYSICAL EXAM:      Physical Exam   Constitutional: He is oriented to person, place, and time. No distress.   Well-built morbidly obese   HENT:   Head: Normocephalic and atraumatic.   Eyes: Conjunctivae and EOM are normal. Right eye exhibits no discharge. Left eye exhibits no discharge. No scleral icterus.   Neck: Normal range of motion. Neck supple. No thyromegaly present.   Cardiovascular: Normal rate, regular rhythm and normal heart sounds. Exam reveals no gallop and no friction rub.   Pulmonary/Chest: Effort normal. No stridor. No respiratory distress. He has no wheezes.   Abdominal: Soft. Bowel sounds are normal. He exhibits no mass. There is no tenderness. There is no rebound and no guarding.   Musculoskeletal: Normal range of motion. He exhibits no tenderness.   1+ bilateral lower extremity edema   Lymphadenopathy:     He has no cervical adenopathy.   Neurological: He is alert and oriented to person, place, and time. He exhibits normal muscle tone.   Skin: Skin is warm. No rash noted. He is not diaphoretic. No erythema.   Psychiatric: He has a " normal mood and affect. His behavior is normal.   Nursing note and vitals reviewed.     Physical exam done by MD.    RECENT LABS:     WBC   Date Value Ref Range Status   07/13/2020 10.10 3.40 - 10.80 10*3/mm3 Final     RBC   Date Value Ref Range Status   07/13/2020 3.03 (L) 4.14 - 5.80 10*6/mm3 Final     Hemoglobin   Date Value Ref Range Status   07/13/2020 9.6 (L) 13.0 - 17.7 g/dL Final     Hematocrit   Date Value Ref Range Status   07/13/2020 30.8 (L) 37.5 - 51.0 % Final     MCV   Date Value Ref Range Status   07/13/2020 101.7 (H) 79.0 - 97.0 fL Final     MCH   Date Value Ref Range Status   07/13/2020 31.7 26.6 - 33.0 pg Final     MCHC   Date Value Ref Range Status   07/13/2020 31.2 (L) 31.5 - 35.7 g/dL Final     RDW   Date Value Ref Range Status   07/13/2020 17.0 (H) 12.3 - 15.4 % Final     RDW-SD   Date Value Ref Range Status   07/13/2020 61.6 (H) 37.0 - 54.0 fl Final     MPV   Date Value Ref Range Status   07/13/2020 10.3 6.0 - 12.0 fL Final     Platelets   Date Value Ref Range Status   07/13/2020 268 140 - 450 10*3/mm3 Final     Neutrophil %   Date Value Ref Range Status   07/13/2020 70.5 42.7 - 76.0 % Final     Lymphocyte %   Date Value Ref Range Status   07/13/2020 15.8 (L) 19.6 - 45.3 % Final     Monocyte %   Date Value Ref Range Status   07/13/2020 9.0 5.0 - 12.0 % Final     Eosinophil %   Date Value Ref Range Status   07/13/2020 4.2 0.3 - 6.2 % Final     Basophil %   Date Value Ref Range Status   07/13/2020 0.5 0.0 - 1.5 % Final     Immature Grans %   Date Value Ref Range Status   01/06/2020 0.5 0.0 - 0.5 % Final     Neutrophils, Absolute   Date Value Ref Range Status   07/13/2020 7.12 (H) 1.70 - 7.00 10*3/mm3 Final     Lymphocytes, Absolute   Date Value Ref Range Status   07/13/2020 1.60 0.70 - 3.10 10*3/mm3 Final     Monocytes, Absolute   Date Value Ref Range Status   07/13/2020 0.91 (H) 0.10 - 0.90 10*3/mm3 Final     Eosinophils, Absolute   Date Value Ref Range Status   07/13/2020 0.42 (H) 0.00 - 0.40  10*3/mm3 Final     Basophils, Absolute   Date Value Ref Range Status   07/13/2020 0.05 0.00 - 0.20 10*3/mm3 Final     Immature Grans, Absolute   Date Value Ref Range Status   01/06/2020 0.04 0.00 - 0.05 10*3/mm3 Final     nRBC   Date Value Ref Range Status   06/15/2020 0.1 0.0 - 0.2 /100 WBC Final       Lab Results   Component Value Date    GLUCOSE 161 (H) 06/24/2020    BUN  06/24/2020      Comment:      Testing performed by alternate method    BUN 18 06/24/2020    CREATININE 1.31 (H) 06/24/2020    EGFRIFNONA 54 (L) 06/24/2020    BCR  06/24/2020      Comment:      Testing not performed    K 3.8 06/24/2020    CO2 19.0 (L) 06/24/2020    CALCIUM 8.3 (L) 06/24/2020    ALBUMIN 3.10 (L) 06/09/2020    LABIL2 0.4 (L) 05/29/2019    AST 34 06/09/2020    ALT 16 06/09/2020         Assessment/Plan      ASSESSMENT:     GAVE (gastric antral vascular ectasia)     Anemia in stage 3 chronic kidney disease (CMS/HCC)     History of Vitamin B12 deficiency     History of DVT of right lower extremity     History of bilateral pulmonary embolus (PE)     Antithrombin III deficiency (CMS/HCC)     Protein C deficiency (CMS/HCC)     Protein S deficiency (CMS/HCC)     Antiphospholipid antibody positive     Elevated factor VIII level     THOMAS (nonalcoholic steatohepatitis)     Splenomegaly     IgG kappa MGUS    PLAN:      1. Hemoglobin is low, iron deficiency has resolved, continue CBC every 2 weeks and Procrit 40,000 units of the hemoglobin is less than 10.   2. Continue conservative management for his the vertebral fracture with pain medication.  3. Patient cannot beon anticoagulation given his GI bleed.   4. Patient to stay off oral iron tablets given that he received Injectafer infusions.  5. Patient follows with Dr. Gomez regarding his Thomas and portal hypertension.  Continue to use lactulose for his high ammonia.  6. Patient has hyper coag complications but off anticoagulation secondary to GI bleeds.    7. I will see him back in my office  in 6 weeks recheck CBC at the time     I have reviewed labs results, imaging, vitals, and medications with the patient today. Patient verbalized understanding and is in agreement of the above plan.  Electronically signed by Joceline Pandey MD, 07/13/20, 10:57 AM.              This report was compiled using Dragon voice recognition software. I have made every effort to proof read this document; however, typographical errors may persist.

## 2020-07-16 ENCOUNTER — OFFICE VISIT (OUTPATIENT)
Dept: SURGERY | Facility: CLINIC | Age: 75
End: 2020-07-16

## 2020-07-16 VITALS
DIASTOLIC BLOOD PRESSURE: 74 MMHG | OXYGEN SATURATION: 96 % | WEIGHT: 287 LBS | TEMPERATURE: 97.1 F | HEIGHT: 74 IN | HEART RATE: 100 BPM | SYSTOLIC BLOOD PRESSURE: 150 MMHG | BODY MASS INDEX: 36.83 KG/M2

## 2020-07-16 DIAGNOSIS — K63.2 COLOCUTANEOUS FISTULA: Primary | ICD-10-CM

## 2020-07-16 PROCEDURE — 99024 POSTOP FOLLOW-UP VISIT: CPT | Performed by: SURGERY

## 2020-07-16 NOTE — PROGRESS NOTES
SUBJECTIVE:    Mr. 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  He is doing very well.  No fevers or chills.  No nausea or vomiting.    OBJECTIVE:    Incision is healing appropriately without infection or hernia    ASSESSMENT:    Satisfactory postop progress    PLAN:    Ideally I like to recheck him 1 more time in about a month.  If he is doing well then then we will release him from our care

## 2020-07-20 PROCEDURE — 93010 ELECTROCARDIOGRAM REPORT: CPT | Performed by: INTERNAL MEDICINE

## 2020-07-21 ENCOUNTER — TELEPHONE (OUTPATIENT)
Dept: NEUROSURGERY | Facility: CLINIC | Age: 75
End: 2020-07-21

## 2020-07-21 NOTE — TELEPHONE ENCOUNTER
PATIENT'S WIFE CALLED IN WITH CONCERNS ABOUT HER HUSBANDS APPT THAT CONTINUES TO GET CANCEL.  PATIENT IS IN A LOT OF PAIN AND IS NOT CONFINED TO THE BED.  THE LAST OFFICE  NOTE ALSO SAYS HE NEEDS NEW FILMS.  I DIDN'T SEE THE NEW REFERRAL/ORDER FOR THE XRAYS.  PLEASE REVIEW CHART.  PATIENT WILL BE PUSHED OUT TO SEPT FOR AN OFFICE VISIT WHICH WILL BE 4 MONTHS INSTEAD OF A 2 MONTH FOLLOW UP.  PATIENT HAS A FRACTURE IN L3.  PLEASE CALL PATIENT'S WIFE AND DISCUSS WHAT SHE SHOULD DO.    [phone] MARIO (WIFE) LEAVE MESS.

## 2020-07-23 DIAGNOSIS — S32.030A COMPRESSION FRACTURE OF L3 VERTEBRA, INITIAL ENCOUNTER (HCC): Primary | ICD-10-CM

## 2020-07-27 ENCOUNTER — LAB (OUTPATIENT)
Dept: LAB | Facility: HOSPITAL | Age: 75
End: 2020-07-27

## 2020-07-27 ENCOUNTER — HOSPITAL ENCOUNTER (OUTPATIENT)
Dept: ONCOLOGY | Facility: HOSPITAL | Age: 75
Discharge: HOME | End: 2020-07-27

## 2020-07-27 DIAGNOSIS — D50.0 IRON DEFICIENCY ANEMIA DUE TO CHRONIC BLOOD LOSS: ICD-10-CM

## 2020-07-27 DIAGNOSIS — D63.1 ANEMIA DUE TO CHRONIC RENAL FAILURE TREATED WITH ERYTHROPOIETIN, STAGE 3 (MODERATE) (HCC): Primary | ICD-10-CM

## 2020-07-27 DIAGNOSIS — N18.30 ANEMIA DUE TO CHRONIC RENAL FAILURE TREATED WITH ERYTHROPOIETIN, STAGE 3 (MODERATE) (HCC): Primary | ICD-10-CM

## 2020-07-27 DIAGNOSIS — N18.30 CKD (CHRONIC KIDNEY DISEASE) STAGE 3, GFR 30-59 ML/MIN (HCC): ICD-10-CM

## 2020-07-27 DIAGNOSIS — D47.2 MGUS (MONOCLONAL GAMMOPATHY OF UNKNOWN SIGNIFICANCE): ICD-10-CM

## 2020-07-27 LAB
BASOPHILS # BLD AUTO: 0.08 10*3/MM3 (ref 0–0.2)
BASOPHILS NFR BLD AUTO: 0.7 % (ref 0–1.5)
DEPRECATED RDW RBC AUTO: 59 FL (ref 37–54)
EOSINOPHIL # BLD AUTO: 0.66 10*3/MM3 (ref 0–0.4)
EOSINOPHIL NFR BLD AUTO: 5.9 % (ref 0.3–6.2)
ERYTHROCYTE [DISTWIDTH] IN BLOOD BY AUTOMATED COUNT: 17.2 % (ref 12.3–15.4)
HCT VFR BLD AUTO: 30.8 % (ref 37.5–51)
HGB BLD-MCNC: 10 G/DL (ref 13–17.7)
LYMPHOCYTES # BLD AUTO: 1.71 10*3/MM3 (ref 0.7–3.1)
LYMPHOCYTES NFR BLD AUTO: 15.3 % (ref 19.6–45.3)
MCH RBC QN AUTO: 32.1 PG (ref 26.6–33)
MCHC RBC AUTO-ENTMCNC: 32.5 G/DL (ref 31.5–35.7)
MCV RBC AUTO: 98.7 FL (ref 79–97)
MONOCYTES # BLD AUTO: 0.83 10*3/MM3 (ref 0.1–0.9)
MONOCYTES NFR BLD AUTO: 7.4 % (ref 5–12)
NEUTROPHILS NFR BLD AUTO: 7.91 10*3/MM3 (ref 1.7–7)
NEUTROPHILS NFR BLD AUTO: 70.7 % (ref 42.7–76)
PLATELET # BLD AUTO: 143 10*3/MM3 (ref 140–450)
PMV BLD AUTO: 11.8 FL (ref 6–12)
RBC # BLD AUTO: 3.12 10*6/MM3 (ref 4.14–5.8)
WBC # BLD AUTO: 11.19 10*3/MM3 (ref 3.4–10.8)

## 2020-07-27 PROCEDURE — 85025 COMPLETE CBC W/AUTO DIFF WBC: CPT

## 2020-08-10 ENCOUNTER — LAB (OUTPATIENT)
Dept: LAB | Facility: HOSPITAL | Age: 75
End: 2020-08-10

## 2020-08-10 ENCOUNTER — HOSPITAL ENCOUNTER (OUTPATIENT)
Dept: ONCOLOGY | Facility: HOSPITAL | Age: 75
Discharge: HOME | End: 2020-08-10
Admitting: INTERNAL MEDICINE

## 2020-08-10 VITALS
WEIGHT: 287 LBS | DIASTOLIC BLOOD PRESSURE: 58 MMHG | HEIGHT: 74 IN | BODY MASS INDEX: 36.83 KG/M2 | SYSTOLIC BLOOD PRESSURE: 118 MMHG

## 2020-08-10 DIAGNOSIS — N18.30 CKD (CHRONIC KIDNEY DISEASE) STAGE 3, GFR 30-59 ML/MIN (HCC): Primary | ICD-10-CM

## 2020-08-10 DIAGNOSIS — N18.30 CKD (CHRONIC KIDNEY DISEASE) STAGE 3, GFR 30-59 ML/MIN (HCC): ICD-10-CM

## 2020-08-10 LAB
BASOPHILS # BLD AUTO: 0.1 10*3/MM3 (ref 0–0.2)
BASOPHILS NFR BLD AUTO: 1 % (ref 0–1.5)
DEPRECATED RDW RBC AUTO: 59.5 FL (ref 37–54)
EOSINOPHIL # BLD AUTO: 0.66 10*3/MM3 (ref 0–0.4)
EOSINOPHIL NFR BLD AUTO: 6.4 % (ref 0.3–6.2)
ERYTHROCYTE [DISTWIDTH] IN BLOOD BY AUTOMATED COUNT: 16.9 % (ref 12.3–15.4)
HCT VFR BLD AUTO: 29.9 % (ref 37.5–51)
HGB BLD-MCNC: 9.5 G/DL (ref 13–17.7)
LYMPHOCYTES # BLD AUTO: 1.63 10*3/MM3 (ref 0.7–3.1)
LYMPHOCYTES NFR BLD AUTO: 15.9 % (ref 19.6–45.3)
MCH RBC QN AUTO: 31.5 PG (ref 26.6–33)
MCHC RBC AUTO-ENTMCNC: 31.8 G/DL (ref 31.5–35.7)
MCV RBC AUTO: 99 FL (ref 79–97)
MONOCYTES # BLD AUTO: 0.83 10*3/MM3 (ref 0.1–0.9)
MONOCYTES NFR BLD AUTO: 8.1 % (ref 5–12)
NEUTROPHILS NFR BLD AUTO: 68.6 % (ref 42.7–76)
NEUTROPHILS NFR BLD AUTO: 7.06 10*3/MM3 (ref 1.7–7)
PLATELET # BLD AUTO: 118 10*3/MM3 (ref 140–450)
PMV BLD AUTO: 12.8 FL (ref 6–12)
RBC # BLD AUTO: 3.02 10*6/MM3 (ref 4.14–5.8)
WBC # BLD AUTO: 10.28 10*3/MM3 (ref 3.4–10.8)

## 2020-08-10 PROCEDURE — 85025 COMPLETE CBC W/AUTO DIFF WBC: CPT

## 2020-08-10 PROCEDURE — 96372 THER/PROPH/DIAG INJ SC/IM: CPT

## 2020-08-10 PROCEDURE — 25010000002 EPOETIN ALFA-EPBX 40000 UNIT/ML SOLUTION: Performed by: INTERNAL MEDICINE

## 2020-08-10 RX ADMIN — EPOETIN ALFA-EPBX 40000 UNITS: 40000 INJECTION, SOLUTION INTRAVENOUS; SUBCUTANEOUS at 10:22

## 2020-08-17 ENCOUNTER — OFFICE VISIT (OUTPATIENT)
Dept: SURGERY | Facility: CLINIC | Age: 75
End: 2020-08-17

## 2020-08-17 VITALS
WEIGHT: 286 LBS | TEMPERATURE: 97.3 F | OXYGEN SATURATION: 96 % | BODY MASS INDEX: 36.7 KG/M2 | HEART RATE: 82 BPM | HEIGHT: 74 IN | SYSTOLIC BLOOD PRESSURE: 146 MMHG | DIASTOLIC BLOOD PRESSURE: 72 MMHG

## 2020-08-17 DIAGNOSIS — K63.2 COLOCUTANEOUS FISTULA: Primary | ICD-10-CM

## 2020-08-17 PROCEDURE — 99024 POSTOP FOLLOW-UP VISIT: CPT | Performed by: SURGERY

## 2020-08-17 NOTE — PROGRESS NOTES
SUBJECTIVE:    Mr. 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  He is doing very well.  He is about 9 weeks postop now.  Incision has healed  OBJECTIVE:    Incision is good and strong there is no sign of infection or hernia.    ASSESSMENT:    Satisfactory postop progress    PLAN:    Recheck in the office as needed

## 2020-08-21 DIAGNOSIS — I10 ESSENTIAL HYPERTENSION: Primary | ICD-10-CM

## 2020-08-21 DIAGNOSIS — E03.9 ACQUIRED HYPOTHYROIDISM: ICD-10-CM

## 2020-08-21 DIAGNOSIS — D63.1 ANEMIA DUE TO CHRONIC RENAL FAILURE TREATED WITH ERYTHROPOIETIN, STAGE 3 (MODERATE) (HCC): ICD-10-CM

## 2020-08-21 DIAGNOSIS — N18.30 CKD (CHRONIC KIDNEY DISEASE) STAGE 3, GFR 30-59 ML/MIN (HCC): Primary | ICD-10-CM

## 2020-08-21 DIAGNOSIS — N18.30 ANEMIA DUE TO CHRONIC RENAL FAILURE TREATED WITH ERYTHROPOIETIN, STAGE 3 (MODERATE) (HCC): ICD-10-CM

## 2020-08-21 DIAGNOSIS — E78.2 MIXED HYPERLIPIDEMIA: ICD-10-CM

## 2020-08-21 DIAGNOSIS — Z79.4 TYPE 2 DIABETES MELLITUS WITH HYPERGLYCEMIA, WITH LONG-TERM CURRENT USE OF INSULIN (HCC): ICD-10-CM

## 2020-08-21 DIAGNOSIS — E11.65 TYPE 2 DIABETES MELLITUS WITH HYPERGLYCEMIA, WITH LONG-TERM CURRENT USE OF INSULIN (HCC): ICD-10-CM

## 2020-08-24 ENCOUNTER — LAB (OUTPATIENT)
Dept: LAB | Facility: HOSPITAL | Age: 75
End: 2020-08-24

## 2020-08-24 ENCOUNTER — TELEPHONE (OUTPATIENT)
Dept: ENDOCRINOLOGY | Facility: CLINIC | Age: 75
End: 2020-08-24

## 2020-08-24 DIAGNOSIS — E03.9 ACQUIRED HYPOTHYROIDISM: ICD-10-CM

## 2020-08-24 DIAGNOSIS — I10 ESSENTIAL HYPERTENSION: ICD-10-CM

## 2020-08-24 DIAGNOSIS — E11.65 TYPE 2 DIABETES MELLITUS WITH HYPERGLYCEMIA, WITH LONG-TERM CURRENT USE OF INSULIN (HCC): ICD-10-CM

## 2020-08-24 DIAGNOSIS — E78.2 MIXED HYPERLIPIDEMIA: ICD-10-CM

## 2020-08-24 DIAGNOSIS — Z79.4 TYPE 2 DIABETES MELLITUS WITH HYPERGLYCEMIA, WITH LONG-TERM CURRENT USE OF INSULIN (HCC): ICD-10-CM

## 2020-08-24 LAB
ALBUMIN SERPL-MCNC: 3.1 G/DL (ref 3.5–5.2)
ALBUMIN UR-MCNC: <1.2 MG/DL
ALBUMIN/GLOB SERPL: 0.8 G/DL
ALP SERPL-CCNC: 111 U/L (ref 39–117)
ALT SERPL W P-5'-P-CCNC: 18 U/L (ref 1–41)
ANION GAP SERPL CALCULATED.3IONS-SCNC: 8.9 MMOL/L (ref 5–15)
AST SERPL-CCNC: 26 U/L (ref 1–40)
BILIRUB SERPL-MCNC: 0.4 MG/DL (ref 0–1.2)
BUN SERPL-MCNC: 25 MG/DL (ref 8–23)
BUN/CREAT SERPL: 17.1 (ref 7–25)
CALCIUM SPEC-SCNC: 8.9 MG/DL (ref 8.6–10.5)
CHLORIDE SERPL-SCNC: 105 MMOL/L (ref 98–107)
CHOLEST SERPL-MCNC: 190 MG/DL (ref 0–200)
CO2 SERPL-SCNC: 24.1 MMOL/L (ref 22–29)
CREAT SERPL-MCNC: 1.46 MG/DL (ref 0.76–1.27)
CREAT UR-MCNC: 52.6 MG/DL
GFR SERPL CREATININE-BSD FRML MDRD: 47 ML/MIN/1.73
GLOBULIN UR ELPH-MCNC: 3.8 GM/DL
GLUCOSE SERPL-MCNC: 129 MG/DL (ref 65–99)
HBA1C MFR BLD: 5.9 % (ref 3.5–5.6)
HDLC SERPL-MCNC: 54 MG/DL (ref 40–60)
LDLC SERPL CALC-MCNC: 114 MG/DL (ref 0–100)
LDLC/HDLC SERPL: 2.11 {RATIO}
MICROALBUMIN/CREAT UR: NORMAL MG/G{CREAT}
POTASSIUM SERPL-SCNC: 4.9 MMOL/L (ref 3.5–5.2)
PROT SERPL-MCNC: 6.9 G/DL (ref 6–8.5)
SODIUM SERPL-SCNC: 138 MMOL/L (ref 136–145)
T4 FREE SERPL-MCNC: 0.89 NG/DL (ref 0.93–1.7)
TRIGL SERPL-MCNC: 109 MG/DL (ref 0–150)
TSH SERPL DL<=0.05 MIU/L-ACNC: 1.34 UIU/ML (ref 0.27–4.2)
VLDLC SERPL-MCNC: 21.8 MG/DL (ref 5–40)

## 2020-08-24 PROCEDURE — 84439 ASSAY OF FREE THYROXINE: CPT

## 2020-08-24 PROCEDURE — 80061 LIPID PANEL: CPT

## 2020-08-24 PROCEDURE — 36415 COLL VENOUS BLD VENIPUNCTURE: CPT

## 2020-08-24 PROCEDURE — 82043 UR ALBUMIN QUANTITATIVE: CPT

## 2020-08-24 PROCEDURE — 80053 COMPREHEN METABOLIC PANEL: CPT

## 2020-08-24 PROCEDURE — 84443 ASSAY THYROID STIM HORMONE: CPT

## 2020-08-24 PROCEDURE — 83036 HEMOGLOBIN GLYCOSYLATED A1C: CPT

## 2020-08-24 PROCEDURE — 82570 ASSAY OF URINE CREATININE: CPT

## 2020-08-25 ENCOUNTER — OFFICE VISIT (OUTPATIENT)
Dept: ONCOLOGY | Facility: CLINIC | Age: 75
End: 2020-08-25

## 2020-08-25 ENCOUNTER — LAB (OUTPATIENT)
Dept: LAB | Facility: HOSPITAL | Age: 75
End: 2020-08-25

## 2020-08-25 ENCOUNTER — HOSPITAL ENCOUNTER (OUTPATIENT)
Dept: ONCOLOGY | Facility: HOSPITAL | Age: 75
Discharge: HOME | End: 2020-08-25
Admitting: INTERNAL MEDICINE

## 2020-08-25 VITALS
TEMPERATURE: 97.7 F | HEIGHT: 74 IN | SYSTOLIC BLOOD PRESSURE: 124 MMHG | BODY MASS INDEX: 38.65 KG/M2 | RESPIRATION RATE: 18 BRPM | HEART RATE: 90 BPM | WEIGHT: 301.2 LBS | DIASTOLIC BLOOD PRESSURE: 68 MMHG

## 2020-08-25 DIAGNOSIS — D63.1 ANEMIA DUE TO CHRONIC RENAL FAILURE TREATED WITH ERYTHROPOIETIN, STAGE 3 (MODERATE) (HCC): ICD-10-CM

## 2020-08-25 DIAGNOSIS — N18.30 ANEMIA DUE TO CHRONIC RENAL FAILURE TREATED WITH ERYTHROPOIETIN, STAGE 3 (MODERATE) (HCC): Primary | ICD-10-CM

## 2020-08-25 DIAGNOSIS — N18.30 ANEMIA DUE TO CHRONIC RENAL FAILURE TREATED WITH ERYTHROPOIETIN, STAGE 3 (MODERATE) (HCC): ICD-10-CM

## 2020-08-25 DIAGNOSIS — N18.30 CKD (CHRONIC KIDNEY DISEASE) STAGE 3, GFR 30-59 ML/MIN (HCC): ICD-10-CM

## 2020-08-25 DIAGNOSIS — N18.30 CKD (CHRONIC KIDNEY DISEASE) STAGE 3, GFR 30-59 ML/MIN (HCC): Primary | ICD-10-CM

## 2020-08-25 DIAGNOSIS — D63.1 ANEMIA DUE TO CHRONIC RENAL FAILURE TREATED WITH ERYTHROPOIETIN, STAGE 3 (MODERATE) (HCC): Primary | ICD-10-CM

## 2020-08-25 LAB
BASOPHILS # BLD AUTO: 0.09 10*3/MM3 (ref 0–0.2)
BASOPHILS NFR BLD AUTO: 1.1 % (ref 0–1.5)
DEPRECATED RDW RBC AUTO: 62.9 FL (ref 37–54)
EOSINOPHIL # BLD AUTO: 0.62 10*3/MM3 (ref 0–0.4)
EOSINOPHIL NFR BLD AUTO: 7.5 % (ref 0.3–6.2)
ERYTHROCYTE [DISTWIDTH] IN BLOOD BY AUTOMATED COUNT: 17.2 % (ref 12.3–15.4)
HCT VFR BLD AUTO: 30.4 % (ref 37.5–51)
HGB BLD-MCNC: 9.2 G/DL (ref 13–17.7)
LYMPHOCYTES # BLD AUTO: 1.07 10*3/MM3 (ref 0.7–3.1)
LYMPHOCYTES NFR BLD AUTO: 12.9 % (ref 19.6–45.3)
MCH RBC QN AUTO: 31.9 PG (ref 26.6–33)
MCHC RBC AUTO-ENTMCNC: 30.3 G/DL (ref 31.5–35.7)
MCV RBC AUTO: 105.6 FL (ref 79–97)
MONOCYTES # BLD AUTO: 0.8 10*3/MM3 (ref 0.1–0.9)
MONOCYTES NFR BLD AUTO: 9.7 % (ref 5–12)
NEUTROPHILS NFR BLD AUTO: 5.71 10*3/MM3 (ref 1.7–7)
NEUTROPHILS NFR BLD AUTO: 68.8 % (ref 42.7–76)
PLATELET # BLD AUTO: 62 10*3/MM3 (ref 140–450)
RBC # BLD AUTO: 2.88 10*6/MM3 (ref 4.14–5.8)
WBC # BLD AUTO: 8.29 10*3/MM3 (ref 3.4–10.8)

## 2020-08-25 PROCEDURE — 36415 COLL VENOUS BLD VENIPUNCTURE: CPT

## 2020-08-25 PROCEDURE — 99214 OFFICE O/P EST MOD 30 MIN: CPT | Performed by: INTERNAL MEDICINE

## 2020-08-25 PROCEDURE — 25010000002 EPOETIN ALFA-EPBX 40000 UNIT/ML SOLUTION: Performed by: INTERNAL MEDICINE

## 2020-08-25 PROCEDURE — 96372 THER/PROPH/DIAG INJ SC/IM: CPT

## 2020-08-25 PROCEDURE — 85025 COMPLETE CBC W/AUTO DIFF WBC: CPT

## 2020-08-25 RX ADMIN — EPOETIN ALFA-EPBX 40000 UNITS: 40000 INJECTION, SOLUTION INTRAVENOUS; SUBCUTANEOUS at 12:30

## 2020-08-25 NOTE — PROGRESS NOTES
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 Anemia diagnosed January 2018 and IgG kappa monoclonal gammopathy diagnosed May 2018.  Anemia related to chronic renal failure 3  · This patient is an obese  male who  developed kidney problems in January 2018 for which he was referred to Devi Plascencia M.D. He was found to have a hemoglobin of 7.4 at that time and was given 1 unit of packed red blood cells. He was then referred to GI where he had been followed for Syed’s esophagus for a number of years. An EGD and colonoscopy was performed on 3/5/18 by Marisel Gomez M.D. revealing mucosa suggestive of Syed’s esophagus and hiatal hernia in the cardia. Patient had a poor prep compromising the colonoscopy exam though the scope did reach the cecum. Esophageal biopsy revealed squamocolumnar mucosa with extensive intestinal metaplasia consistent with Syed’s esophagus, negative for dysplasia. Colonoscopy was recommended to be repeated in two years and EGD in three years. The patient saw his primary care physician, Paulette Harris M.D., in followup and blood testing was done on 5/17/18 revealing WBC 6.3, hemoglobin 8.4, MCV 84.5, platelet count 182,000. Vitamin B12 level was 416 (180-914) and patient was referred here for further evaluation. The patient claims to have been diagnosed with Vitamin B12 deficiency a number of years ago for which he has been on Vitamin B12 injections up until six months ago when he just stopped taking those. He has been  recently started on oral iron supplementation. He claims not to see any blood in his urine but does have microscopic hematuria for which he sees a urologist. He denies nosebleeds, gum bleeds or blood in the stool. He has not had any significant changes in his weight. He feels weak and dizzy for a number of years which has recently gotten worse. He does not ambulate much because of back surgery causing him weakness. He did have a right lower extremity DVT with bilateral pulmonary emboli in 2004 since which time he has been on Coumadin, thought secondary to a sedentary lifestyle. He has no family history of anemias.   · 5/24/18 - Patient seen initial consultation for anemia. Hemoglobin 7.9, MCV 89.9. Ferritin 17 (), iron 183 (), TIBC 379 (228-428), iron saturation 48% (20-50), retic 2.16% (0.5-1.5), haptoglobin 138 (), folate 9.1 (5.9-24.8). SPEP showed monoclonal gammopathy. SIFE showed IgG kappa monoclonal gammopathy. M-spike in the gamma region 0.7 g/dL. Erythropoietin 53.5 (2.59-18.5). Antiparietal cell antibody and intrinsic factor antibodies negative.   Globulin 4.2 (2.5-3.8). Creatinine 1.4 (0.7-1.2).   · 6/19/18 - Discussed results of anemia workup. Hemoglobin improving. Ferrous Sulfate decreased to 325 mg twice a day. Will perform gammopathy workup for IgG kappa monoclonal gammopathy. IgA 783 (), IgG 1480 (600-1500), IgM 93 (). Kappa lambda FLC ratio 0.81 (0.26-1.65). Ferritin 36 ().        · 6/25/18 - Bone survey negative for acute disease. No evidence of lytic or blastic metastatic bone disease was present. Chest x-ray showed cardiomegaly, mild right basilar atelectasis and findings suggestive of ankylosing spondylitis.   · 6/29/18 - Patient underwent sternal bone marrow revealing monoclonal gammopathy of undetermined significant (MGUS). Extent of bone marrow involvement 5%. Phenotype kappa positive, IgG positive, CD38 positive, CD20 negative, CD19 negative, CD45  negative, CD56 negative and  negative. Dyspoiesis was not seen. There was absence of iron stores. Normal male karyotype. Normal FISH study. Flow cytometry revealed IgG kappa restrictive plasma cells in a polytypic background. There was a mixed population of maturing myeloid cells, B-cells and T-cells. No abnormal myeloid maturation was seen. A monoclonal IgG kappa plasma cell population was present. 4% plasma cells present.   · 8/23/18 - Labs by Dr. Plascencia revealed B12 of 857 (180-914), folate 12.7 (5.9-24.8), iron 127 ().      · 9/19/18 - Iron 94 (), TIBC 297 (228-428), iron saturation 32 (20-50), ferritin 29 (). Erythropoietin 30.04 (2.59-18.5).        · 10/16/18 - Iron 177 (), TIBC 320 (228-428), iron saturation 55 (20-50), ferritin 34 ().       · 11/16/18 - Hemoglobin 7.1. Patient given 1 unit of packed red blood cells.   · 11/21/18 - Ferritin 27 (). Hemoglobin 7.9. Patient given 1 unit of packed red blood cells.    · 11/26/18 - EGD by Dave Russo M.D. revealed erosive gastritis and bleeding ampulla. There was oozing upon entering the stomach in many different areas. Duodenal mucosa and the esophagus were normal.   · 11/27/18 - Hemoglobin 8.2. Order written for Procrit 30,000 units subq weekly, not approved by insurance for low ferritin. Urine JERRY with no definite monoclonal gammopathy identified with questionable faint IgG kappa.   · 12/18/18 - Hemoglobin 9.9. Injectafer 750 mg IV given.   · 1/2/19 - Injectafer 750 mg IV given.  Hemoglobin 11, .1. Patient claims that he is getting Vitamin B12 injections monthly at home through Dr. Harris. Currently taking Ferrous Sulfate 325 mg p.o. b.i.d. and asked to continue that. Asked to continue Pantoprazole 40 mg daily that he is taking. IgA 651 (), IgG 1470 (600-1500), IgM 94 ().      · 2/12/19 - Iron 88 ().    · 3/6/19 - WBC 19.8 with 83% neutrophils, 5% lymphocytes, 11% monocytes, hemoglobin  8.7, , platelet count 182,000. Patient status post laparoscopic cholecystectomy on 2/24/19 with large ecchymoses apparent on abdomen. Infectious workup ordered and Injectafer 750 mg IV days 1 and 8 ordered.  Iron 23 (), TIBC 153 (228-428), iron saturation 15% (20-50), ferritin 400 (224-336).       · 3/12/19 - WBC 15.02, hemoglobin 8.1 and platelets 227,000. Patient reported hematuria followed by Dr. Starkey. Orders written to start Injectafer ASAP. Abdominal ecchymosis improving.   · 3/14/19 - Injectafer 750 mg IV given.   · 4/22/19 - Hemoglobin 9.5, . Patient missed day 8 Injectafer in March and it was rescheduled. SIFE showed IgG kappa monoclonal gammopathy.  · 5/8/19 - Injectafer 750 mg IV given.   · 7/8/19 - Iron 56 (). Iron Saturation 36 (20-50%). Transferrin 110 (180-330). TIBC 154 (228-428). Ferritin 1,199 ().    · 8/7/2019-hemoglobin 10.0.  Patient taking multivitamin with iron only.  Restarted ferrous sulfate 325mg by mouth twice a day. UIFE showed free kappa light chain identified.   10/16/2019 patient hospitalized at [facility] for 3 days and found to have a hemoglobin of 4.8 at the cancer center visit.  Stool Hemoccult was positive.  He ended up receiving 5 units of packed red blood cells.  EGD by Marisel Gomez MD revealed severe gastric antral vascular ectasia with oozing, Syed's esophagus and hiatal hernia.  The patient was treated with PPI and Carafate.  Plan was to repeat EGD with cauterization in 6 weeks.  B12 and reticulocyte count were high.  Haptoglobin and folic acid were normal.  Creatinine 2.2 (0.76-1.27), iron 47 (), TIBC 264 (298-5 0.36), iron saturation 18 (20-50), ferritin 128.4 ().  Aspirin was held temporarily and patient given IV iron.  IgA 720 (), IgM 72 (), IgG 1489 (700-1600).  · EGD done secondary to GI bleed on 1/9/2020 1.  Moderately severe gastric antral vascular ectasia APC applied for cauterization 2.  Short segment of Syed's  biopsy taken. 3.  Small hiatal hernia.  · 1/17/20 Iron 42, Iron saturation 16, TIBC 264.  · 1/3/2020 patient was initiated on weekly Procrit  · 3/31/2020 iron profile iron 53 iron saturation 22 iron binding capacity 241 ferritin 229.     2.   Elevated LFT’s diagnosis established March 2018.  Biliary duct dilation diagnosis established June 2018.   · 11/30/17 - Maia phos 93 (32-91), total bili 0.7 (0.3-1.2), AST 37 (15-41), ALT 25 (15-41).   · 3/1/18 - T-bili 0.5 (0.3-1.2), maia phos 106 (32-91), AST 54 (15-41), ALT 27 (17-63).   · 5/24/18 - AST 46 (15-41), maia phos 131 (32-91).   · 6/8/18 - CMP ordered by Dr. Alejandra Amaro showed maia phos 209 (32-91),  (15-41), ALT 84 (17-63), total bili 1.1 (0.3-1.2).             · 6/19/18 - CT abdomen and pelvis as well as serological workup for elevated LFT’s ordered. Alpha-1 antitrypsin 144 (). Ceruloplasmin 38 (22-58). AFP 3 (0-9).  Hepatitis panel non-reactive.   · 6/22/18 - CT abdomen and pelvis showed abnormal intra and extrahepatic biliary dilation. There was mild distention of the gallbladder and evidence of several gallstones. Bilateral non-obstructing kidney stone was present. Incidental sigmoid diverticulosis.   · 7/2/18 - Patient underwent MRCP at [facility] showing markedly dilated intrahepatic and extrahepatic bile duct with multiple small filling defects within the distal common bile duct compatible with choledocholithiasis. There was a focal 6 mm segmental narrowing at the distal CBD with recommended GI consult for ERCP and brushings.   · 7/5/18 to 7/10/18 - On 7/5/18 labs revealed normal total bilirubin (0.8), AST 69 (15-41), maia phos 152 (32-91), ALT was normal at 37 (17-63), ammonia was elevated at 86 (9-35), lipase was normal (22). Patient hospitalized at [facility] due to weakness. Workup revealed cholelithiasis. On 7/9/18 patient underwent ERCP with bilateral sphincterotomy, common duct stone extraction, biliary brushing and stent placement  by Dr. Leiva. Path on brushings was negative for malignancy. There was intra and extrahepatic biliary ductal dilation with relatively abrupt distal common bile duct cutoff and non-visualization of the gallbladder. He was started on Lovenox and sequential compression devices due to risk of pulmonary embolism.  of 33 (0-35).  On 7/10/18 LFT’s revealed total bili 0.9 (0.3-1.2). Maia phos 129 (32-91). AST 50 (15-41), ALT 41 (17-63).     · 8/31/18 - EUS by Dr. Gomez at [facility] revealing suspect benign biliary stricture due to CBD stone with FNA pathology revealing benign and atypical ductal cells, bile and proteinaceous material including scattered bacteria, neutrophils and degenerating cells. The atypia was felt mild and favored to be reactive in nature. Plan was to repeat ERCP with removal of the stent and the stone with consideration of common bile duct evaluation with cholangioscopy at that time.   · 10/12/18 - ERCP with sphincteroplasty and stone extraction done by  JUAN PABLO Russo for choledocholithiasis.   · 2/23/19 - Patient underwent ERCP with balloon clearance of bile duct by Memo  · 3/6/19 - LFT’s not elevated with bilirubin 1.1, AST 31, ALT 14, maia phos was mildly elevated at 101 (32-91).   · 4/25/19 - 4/26/19 - Admitted to [facility] for hepatic encephalopathy and hypokalemia.   · 4/27/2019 - CT abdomen pelvis showed a 4.8 cm air-fluid collection adjacent to the right posterior hepatic lobe significant improved.  Right-sided chest tube small right pleural effusion.  Hepatic cirrhosis.  Bilateral nonobstructing renal stones.  Sigmoid diverticulosis.   · 5/26/19 - 5/29/2019 - Admitted to [facility] for hepatic and cephalopathy.  Ammonia level 213 (H).  · 7/11/19 - AFP 2.76 (0-9).   · 9/26/19 - CT scan abdomen showed fluid collection posterior to the right hepatic lobe has significantly diminished in size with only trace fluid remaining. Small locules of air are seen within this collection which  could be related to recent shunt mentation or could be related to tiny fistula from the splenic flexure of the colon. Trace right pleural fluid, diminished since 4/27/19. Thickened, likely reactive, pleural rind remains. Mild right basilar atelectasis. Abnormal intrahepatic and extra hepatic biliary ductal dilation. Intrahepatic biliary dilation is new since 4/27/19. The CBD appears attenuated in its mid segment, raising the possibility of stricture. ERCP recommended. Uncomplicated colonic diverticulosis. Non-obstructing bilateral renal stones and probable small right renal cyst. Cirrhotic liver morphology. No focal liver lesion is seen.  · 10/28/19 - CT scan abdomen and pelvis showed fluid collection posterior to the right hepatic lobe appears slightly increased in size now measuring 24 mm in diameter, again a small droplet of gas adjacent to this fluid collection is seen which may represent fistula. Right basilar small pleural effusion and atelectasis remain. Continued dilatation of the intra and extrahepatic biliary ductal system which appears stable from previous exam. Non-obstructing bilateral renal calculi. Changes of cirrhosis and splenomegaly. Diverticulosis. Mild interval change from prior study with increasing fluid collection posterior to the right hepatic lobe.      3.   Right lower extremity DVT and bilateral pulmonary emboli diagnosed 2004.   · 2004 - Patient claims to have developed right lower extremity DVT with bilateral pulmonary emboli in 2004 and was coumadinized. The blood clots were thought secondary to his sedentary lifestyle. He stayed on the Coumadin up until October 2017 when, due to difficulty maintaining his INR’s, he was switched to Xarelto 20 mg daily by Paulette Harris M.D. which was later dropped to 10 mg daily.    · 11/26/18 - EGD by Dave Russo M.D. revealed erosive gastritis and bleeding ampulla. Ampullary biopsy revealed reactive/reparative small intestinal mucosa with  mild chronic inflammation. Gastric antrum biopsy was suggestive of focal mild reactive gastropathy. Due to erosive gastritis, patient asked to hold Xarelto and Aspirin by Dave Russo M.D.   · 11/27/18 - Patient asked to discontinue Xarelto and hold Aspirin while obtaining IVC filter. Risks discussed. Factor V Leiden gene mutation and prothrombin gene mutations not detected. Anticardiolipin IgM 11 (<11). Anti beta-2 glyco, IgA 55 (<20). Factor VIII activity 186 (). Antithrombin III activity 63 (). Protein C activity 69 (), protein S activity 69 ().       · 12/5/18 - Patient underwent transjugular IVC filter placement in IR by  Jonn Cuenca M.D.   · 1/2/19 - Told patient that his low protein CNS and antithrombin III likely due to liver disease. He would be at a high risk of going back on Xarelto and hence continued on Aspirin 81 mg p.o. daily.   · 1/3/19 - D-dimer 1.25 (<0.45).    · 3/8/19 - Chest x-ray showed chronic changes in the chest with no obvious pneumonia or congestive changes.  · 4/3/19 - Chest x-ray with right pleural effusion and basilar lung density with slight increase from prior. Cardiomegaly.   · 4/22/19 - Factor VIII 334 (H), Anti-phosphatidylserine antibody IgM> 80 (H), Anti-phosphatidylserine antibody IgG 12 (N), Cardiolipin IgG 21 (N), Cardiolipin IgM 55 (H), Cardiolipin IgA 63 (H), Beta-2 glycoprotein IgG 4 (N), IgA 91 (H), and IgM 21 (H).   · 7/11/19 - Chest x-ray showed stable small right pleural effusion since 04/25/2019. Minimal right costophrenic angle atelectasis.  · 4.  Recurrent episodes of hepatic encephalopathy on treatment with lactulose 30 cc 4 times a day.  · Recurrent GI blood loss on anticoagulation.    Reviewed  Past Medical History:   Diagnosis Date   • Anemia     iron deficiency   • Anxiety    • B12 deficiency 2009   • Balance disorder    • Syed's esophagus    • Bile duct leak    • CAD (coronary artery disease)     cardiac stent   •  Constipation    • DDD (degenerative disc disease), lumbar    • Decubitus ulcer     buttocks   • Depression    • Diabetes mellitus (CMS/HCC)    • Disease of thyroid gland     hypothyroid   • Diverticular disease    • Dvt femoral (deep venous thrombosis) (CMS/HCC) 2004    rt let   • Elevated cholesterol    • Fistula of intestine    • Gastroparesis    • GERD (gastroesophageal reflux disease)    • Gout    • Hepatic encephalopathy (CMS/HCC)     if doesnt take meds   • History of echocardiogram     03/03/2017 - 12/03/2014 - 04/2012   • History of stomach ulcers    • History of transfusion    • Hyperlipidemia    • Incontinence    • Iron deficiency    • Kidney stones    • Morbid obesity (CMS/HCC)    • THOMAS (nonalcoholic steatohepatitis)    • Neuropathy    • OAB (overactive bladder)    • Open wound     rt knee   • KATHIA treated with BiPAP     bring dos   • Peripheral edema    • Pulmonary embolus (CMS/HCC) 2004   • Renal insufficiency     stage 3   • S/P lumbar laminectomy    • Skin irritation     skin fold   • Stage 3 chronic kidney disease (CMS/HCC)        Past Surgical History:   Procedure Laterality Date   • ABDOMINAL SURGERY     • BACK SURGERY  1971   • BILE DUCT STENT PLACEMENT     • BILE DUCT STENT PLACEMENT     • CARDIAC CATHETERIZATION     • CATARACT EXTRACTION  2014   • CATARACT EXTRACTION, BILATERAL  2014   • CHOLECYSTECTOMY  02/24/2019   • COLON RESECTION Right 6/11/2020    Procedure: COLON RESECTION RIGHT;  Surgeon: Naif Etienne DO;  Location: [facility];  Service: General;  Laterality: Right;   • COLONOSCOPY  03/2018   • CORONARY ANGIOPLASTY  08/24/2009    PCI stent - succesful placement of 3.5/23 promus stent in proximal right coronary artery   • CORONARY ANGIOPLASTY WITH STENT PLACEMENT  08/27/2009    patient had stent placed to proximal right coronary artery   • CYSTOSCOPY BLADDER STONE LITHOTRIPSY  1997   • ENDOSCOPY N/A 10/18/2019    Procedure: ESOPHAGOGASTRODUODENOSCOPY with argon plasma coagulation  of gastric antral vascular ectasia;  Surgeon: Marisel Gomez MD;  Location: [facility] ENDOSCOPY;  Service: Gastroenterology   • ENDOSCOPY N/A 1/9/2020    Procedure: ESOPHAGOGASTRODUODENOSCOPY WITH BIOPSY, ARGON PLASMA COAGULATION OF GASTRIC ANTREL VASCULAR ECTASIA,;  Surgeon: Marisel Gomez MD;  Location: [facility] ENDOSCOPY;  Service: Gastroenterology   • ENDOSCOPY N/A 3/6/2020    Procedure: ESOPHAGOGASTRODUODENOSCOPY;  Surgeon: Zbigniew Silva MD;  Location: [facility] ENDOSCOPY;  Service: Gastroenterology;  Laterality: N/A;  mild gave, post cautery ulcer     • ERCP N/A 11/20/2019    Procedure: ERCP, clearance of bile duct with balloon, placement of biliary stent, EGD with argon plasma coagulation of gastric antral vascular ectasia;  Surgeon: Marisel Gomez MD;  Location: [facility] ENDOSCOPY;  Service: Gastroenterology   • ERCP N/A 2/27/2020    Procedure: ENDOSCOPIC RETROGRADE CHOLANGIOPANCREATOGRAPHY with removal of biliary stent, brushing, dilation, and placement of biliary stent x 2 and Esophagogastroduodenoscopy with argon plasma coagulation;  Surgeon: Marisel Gomez MD;  Location: [facility] ENDOSCOPY;  Service: Gastroenterology;  Laterality: N/A;  Gastric antral vascular ectasia, common bile duct stricture   • ERCP N/A 4/20/2020    Procedure: ENDOSCOPIC RETROGRADE CHOLANGIOPANCREATOGRAPHY WITH REMOVAL OF BILIARY STENT, AMPULA DILATION TO 8MM, BRUSHING OF COMMON BILE DUT, CLEARANCE OF BILE DUCT WITH BALLOON, BIOPSY OF AMPULA, PLACEMENT OF BILIARY STENT;  Surgeon: Marisel Gomez MD;  Location: [facility] ENDOSCOPY;  Service: Gastroenterology;  Laterality: N/A;  POST:CBD STRICTURE   • OTHER SURGICAL HISTORY  11/2018    IVC filter implant   • RENAL ARTERY STENT Left     does not recall this   • UPPER ENDOSCOPIC ULTRASOUND W/ FNA N/A 4/20/2020    Procedure: Esophagogastroduodenoscopy with Endoscopic ultrasound and fine needle aspiration;  Surgeon: Marisel Gomez MD;  Location: [facility] ENDOSCOPY;  Service: Gastroenterology;   "Laterality: N/A;  Post: CBD STRICTURE   • VENA CAVA FILTER INSERTION  12/05/2018         Current Outpatient Medications:   •  allopurinol (ZYLOPRIM) 100 MG tablet, Take 100 mg by mouth 2 (Two) Times a Day. Do not preop, Disp: , Rfl:   •  aspirin 81 MG tablet, Take 1 tablet by mouth Daily., Disp: 30 tablet, Rfl: 3  •  B-D 3CC LUER-YASMEEN SYR 25GX1\" 25G X 1\" 3 ML misc, USE FOR THE B12 INJECTION INTRAMUSCULAR ONCE MONTHLY, Disp: , Rfl:   •  cyanocobalamin 1000 MCG/ML injection, ADM 1 ML IM Q MONTH, Disp: , Rfl:   •  Cyanocobalamin 1000 MCG/ML kit, Inject 1,000 mcg as directed Every 30 (Thirty) Days., Disp: , Rfl:   •  docusate sodium (COLACE) 100 MG capsule, Take 100 mg by mouth 2 (Two) Times a Day. dont take preop, Disp: , Rfl:   •  DULoxetine (CYMBALTA) 60 MG capsule, Take 60 mg by mouth Daily. Take preop, Disp: , Rfl:   •  folic acid (FOLVITE) 1 MG tablet, Take 1 mg by mouth Daily. Last dose 6/6, Disp: , Rfl:   •  hydrOXYzine pamoate (VISTARIL) 25 MG capsule, Take 25 mg by mouth 3 (Three) Times a Day As Needed. Take preop if needed, Disp: , Rfl: 0  •  insulin glargine (LANTUS) 100 UNIT/ML injection, Inject 50 units at bedtime (Patient taking differently: Inject 50 Units under the skin into the appropriate area as directed Every Night. Inject 50 units at bedtime), Disp: 45 mL, Rfl: 3  •  insulin lispro (HUMALOG) 100 UNIT/ML injection, 18 units subcu before each meal plus sliding scale MDD 60 (Patient taking differently: Inject 18 Units under the skin into the appropriate area as directed 3 (Three) Times a Day Before Meals. 18 units subcu before each meal plus sliding scale MDD 60    dont take preop), Disp: 60 mL, Rfl: 4  •  lactulose (CHRONULAC) 10 GM/15ML solution, Take 60 mL by mouth Every 4 (Four) Hours. Will not take dos am, Disp: , Rfl:   •  levothyroxine (SYNTHROID, LEVOTHROID) 75 MCG tablet, Take 1 tablet by mouth Daily. (Patient taking differently: Take 75 mcg by mouth Daily. Take preop), Disp: 90 tablet, Rfl: " 4  •  magnesium oxide (MAG-OX) 400 MG tablet, Take 400 mg by mouth Daily. dont take preop, Disp: , Rfl:   •  melatonin 5 MG tablet tablet, Take 10 mg by mouth At Night As Needed., Disp: , Rfl:   •  metoclopramide (REGLAN) 5 MG tablet, Take 5 mg by mouth 2 (Two) Times a Day. Do not take preop, Disp: , Rfl: 0  •  Mirabegron ER (Myrbetriq) 50 MG tablet sustained-release 24 hour 24 hr tablet, Take 50 mg by mouth Daily. Has been out but may take preop, Disp: , Rfl:   •  Multiple Vitamins-Minerals (MULTIVITAMIN WITH MINERALS) tablet tablet, Take 1 tablet by mouth Daily., Disp: , Rfl:   •  nitroglycerin (NITROSTAT) 0.4 MG SL tablet, Place 1 tablet under the tongue Every 5 (Five) Minutes As Needed for Chest Pain (Only if SBP Greater Than 100). Take no more than 3 doses in 15 minutes. (Patient taking differently: Place 0.4 mg under the tongue Every 5 (Five) Minutes As Needed for Chest Pain (Only if SBP Greater Than 100). Take no more than 3 doses in 15 minutes. hasnt needed), Disp: 30 tablet, Rfl: 12  •  ONETOUCH VERIO test strip, USE TO CHECK BLOOD SUGAR QID, Disp: , Rfl:   •  oxyCODONE (ROXICODONE) 10 MG tablet, Take 10 mg by mouth Every 8 (Eight) Hours As Needed. Take preop if needed, Disp: , Rfl:   •  pantoprazole (PROTONIX) 40 MG EC tablet, Take 1 tablet by mouth 2 (Two) Times a Day. (Patient taking differently: Take 40 mg by mouth 2 (Two) Times a Day. Take preop), Disp: 30 tablet, Rfl: 3  •  rifaximin (XIFAXAN) 550 MG tablet, Take 550 mg by mouth Every 12 (Twelve) Hours. Take preop, Disp: , Rfl:   •  sodium bicarbonate 650 MG tablet, Take 650 mg by mouth 3 (Three) Times a Day. Take preop, Disp: , Rfl:   •  zinc sulfate (ZINCATE) 220 (50 Zn) MG capsule, Take 220 mg by mouth Daily. dont take dos, Disp: , Rfl:   •  BOUDREAUXS BUTT PASTE 40 % ointment, APPLY TO WOUNDS ONCE DAILY, Disp: , Rfl:   •  sodium bicarbonate 650 MG tablet, Take 1 tablet by mouth 2 (Two) Times a Day. (Patient taking differently: Take 650 mg by mouth  2 (Two) Times a Day. Patient unclear whether 650 mg twice a day or 1300 mg twice a day.), Disp: 60 tablet, Rfl: 0  No current facility-administered medications for this visit.     Facility-Administered Medications Ordered in Other Visits:   •  epoetin mathieu-epbx (RETACRIT) injection 40,000 Units, 40,000 Units, Subcutaneous, Once, Joceline Echevarria MD    No Known Allergies    Family History   Problem Relation Age of Onset   • Hyperlipidemia Other    • Hypertension Other        Cancer-related family history is not on file.    Social History     Tobacco Use   • Smoking status: Never Smoker   • Smokeless tobacco: Never Used   Substance Use Topics   • Alcohol use: No     Frequency: Never   • Drug use: No       I have reviewed the history of present illness, past medical history, family history, social history, lab results, all notes and other records since the patient was last seen at the cancer care center    SUBJECTIVE:      Patient is my office for follow-up of his anemia and history of DVT.  Accompanied by his wife.  He reports feeling more awake alert.  He is compliant with his lactulose.  Being a little bit more active than before.  He is more awake during the daytime.  Has back pain but not surgical candidate.    ROS:      Review of Systems   Constitutional: Negative for fever.   HENT: Negative for nosebleeds and trouble swallowing.    Eyes: Negative for visual disturbance.   Respiratory: Negative for cough, shortness of breath and wheezing.    Cardiovascular: Negative for chest pain.   Gastrointestinal: Negative for abdominal pain and blood in stool.   Endocrine: Negative for cold intolerance.   Genitourinary: Negative for dysuria and hematuria.   Musculoskeletal: Negative for joint swelling.   Skin: Negative for rash.   Allergic/Immunologic: Negative for environmental allergies.   Neurological: Negative for seizures.   Hematological: Does not bruise/bleed easily.   Psychiatric/Behavioral: The patient is not  "nervous/anxious.    MD performed ROS and are negative except as mentioned in Subjective.        Objective:       Vitals:    08/25/20 1155   BP: 124/68   Pulse: 90   Resp: 18   Temp: 97.7 °F (36.5 °C)   Weight: (!) 137 kg (301 lb 3.2 oz)   Height: 188 cm (74\")   PainSc:   7   PainLoc: Back  Comment: lower     /68   Pulse 90   Temp 97.7 °F (36.5 °C)   Resp 18   Ht 188 cm (74\")   Wt (!) 137 kg (301 lb 3.2 oz)   BMI 38.67 kg/m²       PHYSICAL EXAM:      Physical Exam   Constitutional: He is oriented to person, place, and time. No distress.   Well-built morbidly obese   HENT:   Head: Normocephalic and atraumatic.   Eyes: Conjunctivae and EOM are normal. Right eye exhibits no discharge. Left eye exhibits no discharge. No scleral icterus.   Neck: Normal range of motion. Neck supple. No thyromegaly present.   Cardiovascular: Normal rate, regular rhythm and normal heart sounds. Exam reveals no gallop and no friction rub.   Pulmonary/Chest: Effort normal. No stridor. No respiratory distress. He has no wheezes.   Abdominal: Soft. Bowel sounds are normal. He exhibits no mass. There is no tenderness. There is no rebound and no guarding.   Musculoskeletal: Normal range of motion. He exhibits no tenderness.   1+ bilateral lower extremity edema   Lymphadenopathy:     He has no cervical adenopathy.   Neurological: He is alert and oriented to person, place, and time. He exhibits normal muscle tone.   Skin: Skin is warm. No rash noted. He is not diaphoretic. No erythema.   Psychiatric: He has a normal mood and affect. His behavior is normal.   Nursing note and vitals reviewed.     Physical exam done by MD.    RECENT LABS:     WBC   Date Value Ref Range Status   08/25/2020 8.29 3.40 - 10.80 10*3/mm3 Final     RBC   Date Value Ref Range Status   08/25/2020 2.88 (L) 4.14 - 5.80 10*6/mm3 Final     Hemoglobin   Date Value Ref Range Status   08/25/2020 9.2 (L) 13.0 - 17.7 g/dL Final     Hematocrit   Date Value Ref Range Status "   08/25/2020 30.4 (L) 37.5 - 51.0 % Final     MCV   Date Value Ref Range Status   08/25/2020 105.6 (H) 79.0 - 97.0 fL Final     MCH   Date Value Ref Range Status   08/25/2020 31.9 26.6 - 33.0 pg Final     MCHC   Date Value Ref Range Status   08/25/2020 30.3 (L) 31.5 - 35.7 g/dL Final     RDW   Date Value Ref Range Status   08/25/2020 17.2 (H) 12.3 - 15.4 % Final     RDW-SD   Date Value Ref Range Status   08/25/2020 62.9 (H) 37.0 - 54.0 fl Final     MPV   Date Value Ref Range Status   08/10/2020 12.8 (H) 6.0 - 12.0 fL Final     Platelets   Date Value Ref Range Status   08/25/2020 62 (L) 140 - 450 10*3/mm3 Final     Neutrophil %   Date Value Ref Range Status   08/25/2020 68.8 42.7 - 76.0 % Final     Lymphocyte %   Date Value Ref Range Status   08/25/2020 12.9 (L) 19.6 - 45.3 % Final     Monocyte %   Date Value Ref Range Status   08/25/2020 9.7 5.0 - 12.0 % Final     Eosinophil %   Date Value Ref Range Status   08/25/2020 7.5 (H) 0.3 - 6.2 % Final     Basophil %   Date Value Ref Range Status   08/25/2020 1.1 0.0 - 1.5 % Final     Immature Grans %   Date Value Ref Range Status   01/06/2020 0.5 0.0 - 0.5 % Final     Neutrophils, Absolute   Date Value Ref Range Status   08/25/2020 5.71 1.70 - 7.00 10*3/mm3 Final     Lymphocytes, Absolute   Date Value Ref Range Status   08/25/2020 1.07 0.70 - 3.10 10*3/mm3 Final     Monocytes, Absolute   Date Value Ref Range Status   08/25/2020 0.80 0.10 - 0.90 10*3/mm3 Final     Eosinophils, Absolute   Date Value Ref Range Status   08/25/2020 0.62 (H) 0.00 - 0.40 10*3/mm3 Final     Basophils, Absolute   Date Value Ref Range Status   08/25/2020 0.09 0.00 - 0.20 10*3/mm3 Final     Immature Grans, Absolute   Date Value Ref Range Status   01/06/2020 0.04 0.00 - 0.05 10*3/mm3 Final     nRBC   Date Value Ref Range Status   06/15/2020 0.1 0.0 - 0.2 /100 WBC Final       Lab Results   Component Value Date    GLUCOSE 129 (H) 08/24/2020    BUN 25 (H) 08/24/2020    CREATININE 1.46 (H) 08/24/2020     EGFRIFNONA 47 (L) 08/24/2020    BCR 17.1 08/24/2020    K 4.9 08/24/2020    CO2 24.1 08/24/2020    CALCIUM 8.9 08/24/2020    ALBUMIN 3.10 (L) 08/24/2020    LABIL2 0.4 (L) 05/29/2019    AST 26 08/24/2020    ALT 18 08/24/2020         Assessment/Plan      ASSESSMENT:     GAVE (gastric antral vascular ectasia)     Anemia in stage 3 chronic kidney disease (CMS/HCC)     History of Vitamin B12 deficiency     History of DVT of right lower extremity     History of bilateral pulmonary embolus (PE)     Antithrombin III deficiency (CMS/HCC)     Protein C deficiency (CMS/HCC)     Protein S deficiency (CMS/HCC)     Antiphospholipid antibody positive     Elevated factor VIII level     THOMAS (nonalcoholic steatohepatitis)     Splenomegaly     IgG kappa MGUS    PLAN:      1. Hemoglobin is low, iron deficiency has resolved, continue CBC every 2 weeks and Procrit 40,000 units of the hemoglobin is less than 10.   2. Pain medication for his DJD spine.  Patient is not surgical candidate.  3. 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  4. Patient cannot be on anticoagulation given his GI bleed.   5. Patient to stay off oral iron tablets given that he received Injectafer infusions.  6. Patient follows with Dr. Gomez regarding his Thomas and portal hypertension.  Continue to use lactulose for his high ammonia.  7. Patient has hyper coag complications but off anticoagulation secondary to GI bleeds.  Hyper coag events related to deficient multiple clotting factors related to cirrhosis.  8. I will see him back in my office in 6 weeks recheck CBC at the time     I have reviewed labs results, imaging, vitals, and medications with the patient today. Patient verbalized understanding and is in agreement of the above plan.  Electronically signed by Joceline Pandey MD, 08/25/20, 12:09 PM.                This report was compiled using Dragon voice recognition software. I have made every effort to proof read this document;  however, typographical errors may persist.

## 2020-08-31 NOTE — PROGRESS NOTES
Endocrine Progress Note Outpatient     Patient Care Team:  Paulette Harris MD as PCP - General  Joceline Echevarria MD as PCP - Josefina Slaughter MD as Consulting Physician (Nephrology)  You have chosen to receive care through a telehealth visit.  Do you consent to use a video/audio connection for your medical care today? Yes.     Chief Complaint: Follow-up type 2 diabetes: Visit conducted through telehealth due to coronavirus pandemic.    HPI: 74-year-old male with history of type 2 diabetes, hypertension, hyperlipidemia, hypothyroidism, CKD stage III disease and obesity is followed through telehealth.    For type 2 diabetes he is currently on Lantus 50 units at bedtime, Humalog 18 units before each meal plus sliding scale of 1 unit for each 30 mg blood sugar over 140.  Blood sugars usually running less than 200 but no low blood sugar of less than 70.      Hypertension: Well-controlled.    Hyperlipidemia: He is off atorvastatin due to elevated liver enzymes.    Hypothyroidism: On levothyroxine supplementation.    Liver cirrhosis: He is on lactulose and follows with GI on regular basis.    Past Medical History:   Diagnosis Date   • Anemia     iron deficiency   • Anxiety    • B12 deficiency 2009   • Balance disorder    • Syed's esophagus    • Bile duct leak    • CAD (coronary artery disease)     cardiac stent   • Constipation    • DDD (degenerative disc disease), lumbar    • Decubitus ulcer     buttocks   • Depression    • Diabetes mellitus (CMS/HCC)    • Disease of thyroid gland     hypothyroid   • Diverticular disease    • Dvt femoral (deep venous thrombosis) (CMS/HCC) 2004    rt let   • Elevated cholesterol    • Fistula of intestine    • Gastroparesis    • GERD (gastroesophageal reflux disease)    • Gout    • Hepatic encephalopathy (CMS/HCC)     if doesnt take meds   • History of echocardiogram     03/03/2017 - 12/03/2014 - 04/2012   • History of stomach ulcers    • History of  transfusion    • Hyperlipidemia    • Incontinence    • Iron deficiency    • Kidney stones    • Morbid obesity (CMS/HCC)    • THOMAS (nonalcoholic steatohepatitis)    • Neuropathy    • OAB (overactive bladder)    • Open wound     rt knee   • KATHIA treated with BiPAP     bring dos   • Peripheral edema    • Pulmonary embolus (CMS/HCC) 2004   • Renal insufficiency     stage 3   • S/P lumbar laminectomy    • Skin irritation     skin fold   • Stage 3 chronic kidney disease (CMS/HCC)        Social History     Socioeconomic History   • Marital status:      Spouse name: Not on file   • Number of children: Not on file   • Years of education: Not on file   • Highest education level: Not on file   Tobacco Use   • Smoking status: Never Smoker   • Smokeless tobacco: Never Used   Substance and Sexual Activity   • Alcohol use: No     Frequency: Never   • Drug use: No   • Sexual activity: Defer       Family History   Problem Relation Age of Onset   • Hyperlipidemia Other    • Hypertension Other        No Known Allergies    ROS:   Constitutional:  Denies fatigue, tiredness.    Eyes:  Denies change in visual acuity   HENT:  Denies nasal congestion or sore throat   Respiratory: denies cough, admit shortness of breath.   Cardiovascular:  denies chest pain, edema   GI:  Denies abdominal pain, nausea, vomiting.   Musculoskeletal:  Denies back pain or joint pain   Integument:  Denies dry skin and rash   Neurologic:  Denies headache, focal weakness or sensory changes   Endocrine:  Denies polyuria or polydipsia   Psychiatric:   Denies depression and anxiety      Vitals:    08/31/20 1412   BP: 124/68       Physical Exam:  GEN: NAD, patient alert and oriented and able to carry on conversation.  Mood and affect was normal.        Results Review:     I reviewed the patient's new clinical results.    Lab Results   Component Value Date    HGBA1C 5.9 (H) 08/24/2020    HGBA1C 5.6 06/09/2020    HGBA1C 6.2 (H) 05/05/2020      Lab Results  "  Component Value Date    GLUCOSE 129 (H) 08/24/2020    BUN 25 (H) 08/24/2020    CREATININE 1.46 (H) 08/24/2020    EGFRIFNONA 47 (L) 08/24/2020    BCR 17.1 08/24/2020    K 4.9 08/24/2020    CO2 24.1 08/24/2020    CALCIUM 8.9 08/24/2020    ALBUMIN 3.10 (L) 08/24/2020    LABIL2 0.4 (L) 05/29/2019    AST 26 08/24/2020    ALT 18 08/24/2020    CHOL 190 08/24/2020    TRIG 109 08/24/2020     (H) 08/24/2020    HDL 54 08/24/2020     Lab Results   Component Value Date    TSH 1.340 08/24/2020    FREET4 0.89 (L) 08/24/2020         Medication Review: Reviewed.       Current Outpatient Medications:   •  allopurinol (ZYLOPRIM) 100 MG tablet, Take 100 mg by mouth 2 (Two) Times a Day. Do not preop, Disp: , Rfl:   •  aspirin 81 MG tablet, Take 1 tablet by mouth Daily., Disp: 30 tablet, Rfl: 3  •  B-D 3CC LUER-YASMEEN SYR 25GX1\" 25G X 1\" 3 ML misc, USE FOR THE B12 INJECTION INTRAMUSCULAR ONCE MONTHLY, Disp: , Rfl:   •  BOUDREAUXS BUTT PASTE 40 % ointment, APPLY TO WOUNDS ONCE DAILY, Disp: , Rfl:   •  cyanocobalamin 1000 MCG/ML injection, ADM 1 ML IM Q MONTH, Disp: , Rfl:   •  docusate sodium (COLACE) 100 MG capsule, Take 100 mg by mouth 2 (Two) Times a Day. dont take preop, Disp: , Rfl:   •  DULoxetine (CYMBALTA) 60 MG capsule, Take 60 mg by mouth Daily. Take preop, Disp: , Rfl:   •  folic acid (FOLVITE) 1 MG tablet, Take 1 mg by mouth Daily. Last dose 6/6, Disp: , Rfl:   •  hydrOXYzine pamoate (VISTARIL) 25 MG capsule, Take 25 mg by mouth 3 (Three) Times a Day As Needed. Take preop if needed, Disp: , Rfl: 0  •  insulin glargine (LANTUS) 100 UNIT/ML injection, Inject 50 units at bedtime (Patient taking differently: Inject 50 Units under the skin into the appropriate area as directed Every Night. Inject 50 units at bedtime), Disp: 45 mL, Rfl: 3  •  insulin lispro (HUMALOG) 100 UNIT/ML injection, 18 units subcu before each meal plus sliding scale MDD 60 (Patient taking differently: Inject 18 Units under the skin into the appropriate " area as directed 3 (Three) Times a Day Before Meals. 18 units subcu before each meal plus sliding scale MDD 60    dont take preop), Disp: 60 mL, Rfl: 4  •  lactulose (CHRONULAC) 10 GM/15ML solution, Take 60 mL by mouth Every 4 (Four) Hours. Will not take dos am, Disp: , Rfl:   •  levothyroxine (SYNTHROID, LEVOTHROID) 75 MCG tablet, Take 1 tablet by mouth Daily. (Patient taking differently: Take 75 mcg by mouth Daily. Take preop), Disp: 90 tablet, Rfl: 4  •  magnesium oxide (MAG-OX) 400 MG tablet, Take 400 mg by mouth Daily. dont take preop, Disp: , Rfl:   •  melatonin 5 MG tablet tablet, Take 10 mg by mouth At Night As Needed., Disp: , Rfl:   •  metoclopramide (REGLAN) 5 MG tablet, Take 5 mg by mouth 2 (Two) Times a Day. Do not take preop, Disp: , Rfl: 0  •  Mirabegron ER (Myrbetriq) 50 MG tablet sustained-release 24 hour 24 hr tablet, Take 50 mg by mouth Daily. Has been out but may take preop, Disp: , Rfl:   •  Multiple Vitamins-Minerals (MULTIVITAMIN WITH MINERALS) tablet tablet, Take 1 tablet by mouth Daily., Disp: , Rfl:   •  nitroglycerin (NITROSTAT) 0.4 MG SL tablet, Place 1 tablet under the tongue Every 5 (Five) Minutes As Needed for Chest Pain (Only if SBP Greater Than 100). Take no more than 3 doses in 15 minutes. (Patient taking differently: Place 0.4 mg under the tongue Every 5 (Five) Minutes As Needed for Chest Pain (Only if SBP Greater Than 100). Take no more than 3 doses in 15 minutes. hasnt needed), Disp: 30 tablet, Rfl: 12  •  ONETOUCH VERIO test strip, USE TO CHECK BLOOD SUGAR QID, Disp: , Rfl:   •  oxyCODONE (ROXICODONE) 10 MG tablet, Take 10 mg by mouth Every 8 (Eight) Hours As Needed. Take preop if needed, Disp: , Rfl:   •  pantoprazole (PROTONIX) 40 MG EC tablet, Take 1 tablet by mouth 2 (Two) Times a Day. (Patient taking differently: Take 40 mg by mouth 2 (Two) Times a Day. Take preop), Disp: 30 tablet, Rfl: 3  •  rifaximin (XIFAXAN) 550 MG tablet, Take 550 mg by mouth Every 12 (Twelve) Hours.  Take preop, Disp: , Rfl:   •  sodium bicarbonate 650 MG tablet, Take 650 mg by mouth 3 (Three) Times a Day. Take preop, Disp: , Rfl:   •  zinc sulfate (ZINCATE) 220 (50 Zn) MG capsule, Take 220 mg by mouth Daily. dont take dos, Disp: , Rfl:   •  Cyanocobalamin 1000 MCG/ML kit, Inject 1,000 mcg as directed Every 30 (Thirty) Days., Disp: , Rfl:   •  sodium bicarbonate 650 MG tablet, Take 1 tablet by mouth 2 (Two) Times a Day. (Patient taking differently: Take 650 mg by mouth 2 (Two) Times a Day. Patient unclear whether 650 mg twice a day or 1300 mg twice a day.), Disp: 60 tablet, Rfl: 0      Assessment/Plan   1.  Diabetes mellitus type II: A1c 5.9%, has high blood sugars, will increase Lantus to 52 units subcu daily.  We will continue to follow blood sugars and A1c.  Advised always to keep glucose source with him in case of low blood sugar. Advised to stop eating candies.   2.  Hypertension: Well-controlled, continue current medication  3.  Hyperlipidemia: LDL high at 114, off atorvastatin due to elevated liver enzymes.  Will add Zetia 10 mg p.o. daily.  Follow lipid panel.  4.  Hypothyroidism: Well-controlled with TSH of 1.34 and free T4 0.89.  Continue current medications.    5.  Liver cirrhosis: He follows with GI, currently on lactulose.     Unable to complete visit using a video connection to the patient. A phone visit was used to complete this visits. Total time of discussion was 15 minutes.          Alejandra Amaro MD FACE.    Much of the above report is an electronic transcription/translation of the spoken language to printed text using Dragon Software. As such, the subtleties and finesse of the spoken language may permit erroneous, or at times, nonsensical words or phrases to be inadvertently transcribed; thus changes may be made at a later date to rectify these errors.

## 2020-08-31 NOTE — PATIENT INSTRUCTIONS
Start Zetia 10 mg p.o. daily  Continue rest of the medication  Increase Lantus to 52 units subcu daily  Always keep glucose source in case of low blood sugar  Continue to follow diet and activity  Annual eye exam  Follow-up in 6 months with labs.

## 2020-09-04 NOTE — PROGRESS NOTES
Subjective:     Encounter Date:09/04/2020      Patient ID: Bry Ratliff is a 74 y.o. male.    Chief Complaint:Follow-up CAD  History of Present Illness    74-year-old white male patient with multiple medical problems including diabetes mellitus hypertension dyslipidemia renal insufficiency hypercoagulable state history of DVT status post IVC filter history of CAD status post PCI 2009To the [facility] to see me to establish his cardiac care  Patient also have chronic anemia followed at [facility]  Cardiac wise he is doing reasonably well denies any active symptoms  He denies of any chest pain  I reviewed all the old records    The following portions of the patient's history were reviewed and updated as appropriate: Allergies current medications past family history past medical history past social history past surgical history problem list and review of systems  Past Medical History:   Diagnosis Date   • Anemia     iron deficiency   • Anxiety    • B12 deficiency 2009   • Balance disorder    • Syed's esophagus    • Bile duct leak    • CAD (coronary artery disease)     cardiac stent   • Constipation    • DDD (degenerative disc disease), lumbar    • Decubitus ulcer     buttocks   • Depression    • Diabetes mellitus (CMS/HCC)    • Disease of thyroid gland     hypothyroid   • Diverticular disease    • Dvt femoral (deep venous thrombosis) (CMS/HCC) 2004    rt let   • Elevated cholesterol    • Fistula of intestine    • Gastroparesis    • GERD (gastroesophageal reflux disease)    • Gout    • Hepatic encephalopathy (CMS/HCC)     if doesnt take meds   • History of echocardiogram     03/03/2017 - 12/03/2014 - 04/2012   • History of stomach ulcers    • History of transfusion    • Hyperlipidemia    • Incontinence    • Iron deficiency    • Kidney stones    • Morbid obesity (CMS/HCC)    • THOMAS (nonalcoholic steatohepatitis)    • Neuropathy    • OAB (overactive bladder)    • Open wound     rt knee   • KATHIA treated with  BiPAP     bring dos   • Peripheral edema    • Pulmonary embolus (CMS/HCC) 2004   • Renal insufficiency     stage 3   • S/P lumbar laminectomy    • Skin irritation     skin fold   • Stage 3 chronic kidney disease (CMS/HCC)      Past Surgical History:   Procedure Laterality Date   • ABDOMINAL SURGERY     • BACK SURGERY  1971   • BILE DUCT STENT PLACEMENT     • BILE DUCT STENT PLACEMENT     • CARDIAC CATHETERIZATION     • CATARACT EXTRACTION  2014   • CATARACT EXTRACTION, BILATERAL  2014   • CHOLECYSTECTOMY  02/24/2019   • COLON RESECTION Right 6/11/2020    Procedure: COLON RESECTION RIGHT;  Surgeon: Naif Etienne DO;  Location: [facility] MAIN OR;  Service: General;  Laterality: Right;   • COLONOSCOPY  03/2018   • CORONARY ANGIOPLASTY  08/24/2009    PCI stent - succesful placement of 3.5/23 promus stent in proximal right coronary artery   • CORONARY ANGIOPLASTY WITH STENT PLACEMENT  08/27/2009    patient had stent placed to proximal right coronary artery   • CYSTOSCOPY BLADDER STONE LITHOTRIPSY  1997   • ENDOSCOPY N/A 10/18/2019    Procedure: ESOPHAGOGASTRODUODENOSCOPY with argon plasma coagulation of gastric antral vascular ectasia;  Surgeon: Marisel Gomez MD;  Location: [facility] ENDOSCOPY;  Service: Gastroenterology   • ENDOSCOPY N/A 1/9/2020    Procedure: ESOPHAGOGASTRODUODENOSCOPY WITH BIOPSY, ARGON PLASMA COAGULATION OF GASTRIC ANTREL VASCULAR ECTASIA,;  Surgeon: Marisel Gomez MD;  Location: [facility] ENDOSCOPY;  Service: Gastroenterology   • ENDOSCOPY N/A 3/6/2020    Procedure: ESOPHAGOGASTRODUODENOSCOPY;  Surgeon: Zbigniew Silva MD;  Location: [facility] ENDOSCOPY;  Service: Gastroenterology;  Laterality: N/A;  mild gave, post cautery ulcer     • ERCP N/A 11/20/2019    Procedure: ERCP, clearance of bile duct with balloon, placement of biliary stent, EGD with argon plasma coagulation of gastric antral vascular ectasia;  Surgeon: Marisel Gomez MD;  Location: [facility] ENDOSCOPY;  Service: Gastroenterology   • ERCP  "N/A 2/27/2020    Procedure: ENDOSCOPIC RETROGRADE CHOLANGIOPANCREATOGRAPHY with removal of biliary stent, brushing, dilation, and placement of biliary stent x 2 and Esophagogastroduodenoscopy with argon plasma coagulation;  Surgeon: Marisel Gomez MD;  Location: [facility] ENDOSCOPY;  Service: Gastroenterology;  Laterality: N/A;  Gastric antral vascular ectasia, common bile duct stricture   • ERCP N/A 4/20/2020    Procedure: ENDOSCOPIC RETROGRADE CHOLANGIOPANCREATOGRAPHY WITH REMOVAL OF BILIARY STENT, AMPULA DILATION TO 8MM, BRUSHING OF COMMON BILE DUT, CLEARANCE OF BILE DUCT WITH BALLOON, BIOPSY OF AMPULA, PLACEMENT OF BILIARY STENT;  Surgeon: Marisel Gomez MD;  Location: [facility] ENDOSCOPY;  Service: Gastroenterology;  Laterality: N/A;  POST:CBD STRICTURE   • OTHER SURGICAL HISTORY  11/2018    IVC filter implant   • RENAL ARTERY STENT Left     does not recall this   • UPPER ENDOSCOPIC ULTRASOUND W/ FNA N/A 4/20/2020    Procedure: Esophagogastroduodenoscopy with Endoscopic ultrasound and fine needle aspiration;  Surgeon: Marisel Gomez MD;  Location: [facility] ENDOSCOPY;  Service: Gastroenterology;  Laterality: N/A;  Post: CBD STRICTURE   • VENA CAVA FILTER INSERTION  12/05/2018     /67   Pulse 77   Ht 188 cm (74\")   Wt (!) 138 kg (304 lb 12 oz)   SpO2 100%   BMI 39.13 kg/m²   Family History   Problem Relation Age of Onset   • Hyperlipidemia Other    • Hypertension Other        Current Outpatient Medications:   •  allopurinol (ZYLOPRIM) 100 MG tablet, Take 100 mg by mouth 2 (Two) Times a Day. Do not preop, Disp: , Rfl:   •  aspirin 81 MG tablet, Take 1 tablet by mouth Daily., Disp: 30 tablet, Rfl: 3  •  B-D 3CC LUER-YASMEEN SYR 25GX1\" 25G X 1\" 3 ML misc, USE FOR THE B12 INJECTION INTRAMUSCULAR ONCE MONTHLY, Disp: , Rfl:   •  cyanocobalamin 1000 MCG/ML injection, ADM 1 ML IM Q MONTH, Disp: , Rfl:   •  docusate sodium (COLACE) 100 MG capsule, Take 100 mg by mouth 2 (Two) Times a Day. dont take preop, Disp: , Rfl:   •  " DULoxetine (CYMBALTA) 60 MG capsule, Take 60 mg by mouth Daily. Take preop, Disp: , Rfl:   •  ezetimibe (Zetia) 10 MG tablet, Take 1 tablet by mouth Daily., Disp: 30 tablet, Rfl: 11  •  folic acid (FOLVITE) 1 MG tablet, Take 1 mg by mouth Daily. Last dose 6/6, Disp: , Rfl:   •  hydrOXYzine pamoate (VISTARIL) 25 MG capsule, Take 25 mg by mouth 3 (Three) Times a Day As Needed. Take preop if needed, Disp: , Rfl: 0  •  insulin glargine (LANTUS) 100 UNIT/ML injection, Inject 50 units at bedtime (Patient taking differently: Inject 52 Units under the skin into the appropriate area as directed Every Night. Inject 50 units at bedtime), Disp: 45 mL, Rfl: 3  •  insulin lispro (HUMALOG) 100 UNIT/ML injection, 18 units subcu before each meal plus sliding scale MDD 60 (Patient taking differently: Inject 18 Units under the skin into the appropriate area as directed 3 (Three) Times a Day Before Meals. 18 units subcu before each meal plus sliding scale MDD 60    dont take preop), Disp: 60 mL, Rfl: 4  •  lactulose (CHRONULAC) 10 GM/15ML solution, Take 60 mL by mouth Every 4 (Four) Hours. Will not take dos am, Disp: , Rfl:   •  levothyroxine (SYNTHROID, LEVOTHROID) 75 MCG tablet, Take 1 tablet by mouth Daily. (Patient taking differently: Take 75 mcg by mouth Daily. Take preop), Disp: 90 tablet, Rfl: 4  •  magnesium oxide (MAG-OX) 400 MG tablet, Take 400 mg by mouth Daily. dont take preop, Disp: , Rfl:   •  melatonin 5 MG tablet tablet, Take 10 mg by mouth At Night As Needed., Disp: , Rfl:   •  metoclopramide (REGLAN) 5 MG tablet, Take 5 mg by mouth 2 (Two) Times a Day. Do not take preop, Disp: , Rfl: 0  •  Mirabegron ER (Myrbetriq) 50 MG tablet sustained-release 24 hour 24 hr tablet, Take 50 mg by mouth Daily. Has been out but may take preop, Disp: , Rfl:   •  Multiple Vitamins-Minerals (MULTIVITAMIN WITH MINERALS) tablet tablet, Take 1 tablet by mouth Daily., Disp: , Rfl:   •  nitroglycerin (NITROSTAT) 0.4 MG SL tablet, Place 1  tablet under the tongue Every 5 (Five) Minutes As Needed for Chest Pain (Only if SBP Greater Than 100). Take no more than 3 doses in 15 minutes. (Patient taking differently: Place 0.4 mg under the tongue Every 5 (Five) Minutes As Needed for Chest Pain (Only if SBP Greater Than 100). Take no more than 3 doses in 15 minutes. hasnt needed), Disp: 30 tablet, Rfl: 12  •  ONETOUCH VERIO test strip, USE TO CHECK BLOOD SUGAR QID, Disp: , Rfl:   •  oxyCODONE (ROXICODONE) 10 MG tablet, Take 10 mg by mouth Every 8 (Eight) Hours As Needed. Take preop if needed, Disp: , Rfl:   •  pantoprazole (PROTONIX) 40 MG EC tablet, Take 1 tablet by mouth 2 (Two) Times a Day. (Patient taking differently: Take 40 mg by mouth 2 (Two) Times a Day. Take preop), Disp: 30 tablet, Rfl: 3  •  rifaximin (XIFAXAN) 550 MG tablet, Take 550 mg by mouth Every 12 (Twelve) Hours. Take preop, Disp: , Rfl:   •  sodium bicarbonate 650 MG tablet, Take 650 mg by mouth 3 (Three) Times a Day. Take preop, Disp: , Rfl:   •  zinc sulfate (ZINCATE) 220 (50 Zn) MG capsule, Take 220 mg by mouth Daily. dont take js, Disp: , Rfl:   Social History     Socioeconomic History   • Marital status:      Spouse name: Not on file   • Number of children: Not on file   • Years of education: Not on file   • Highest education level: Not on file   Tobacco Use   • Smoking status: Never Smoker   • Smokeless tobacco: Never Used   Substance and Sexual Activity   • Alcohol use: No     Frequency: Never   • Drug use: No   • Sexual activity: Defer     No Known Allergies  Review of Systems   Constitution: Positive for malaise/fatigue. Negative for fever.   HENT: Negative for congestion and hearing loss.    Eyes: Negative for double vision and visual disturbance.   Cardiovascular: Negative for chest pain, claudication, dyspnea on exertion, leg swelling and syncope.   Respiratory: Negative for cough and shortness of breath.    Endocrine: Negative for cold intolerance.   Skin: Negative for  color change and rash.   Musculoskeletal: Positive for joint pain. Negative for arthritis.   Gastrointestinal: Negative for abdominal pain and heartburn.   Genitourinary: Negative for hematuria.   Neurological: Negative for excessive daytime sleepiness and dizziness.   Psychiatric/Behavioral: Negative for depression. The patient is not nervous/anxious.    All other systems reviewed and are negative.             Objective:     Physical Exam   Constitutional: He is oriented to person, place, and time. He appears well-developed and well-nourished. He is cooperative.   Sitting in a wheelchair   HENT:   Head: Normocephalic and atraumatic.   Mouth/Throat: Uvula is midline and oropharynx is clear and moist. No oral lesions.   Eyes: Conjunctivae are normal. No scleral icterus.   Neck: Trachea normal. Neck supple. No JVD present. Carotid bruit is not present. No thyromegaly present.   Cardiovascular: Normal rate, regular rhythm, S1 normal, S2 normal, normal heart sounds, intact distal pulses and normal pulses. PMI is not displaced. Exam reveals no gallop and no friction rub.   No murmur heard.  Pulmonary/Chest: Effort normal and breath sounds normal.   Abdominal: Soft. Bowel sounds are normal.   Musculoskeletal: Normal range of motion.   Neurological: He is alert and oriented to person, place, and time. He has normal strength.   No focal deficits   Skin: Skin is warm. No cyanosis.   Psychiatric: He has a normal mood and affect.       Procedures    Lab Review:       Assessment:          Diagnosis Plan   1. Essential hypertension     2. Pulmonary hypertension (CMS/HCC)     3. Chronic coronary artery disease     4. Mixed hyperlipidemia     5. Obstructive sleep apnea     6. Hepatic cirrhosis, unspecified hepatic cirrhosis type, unspecified whether ascites present (CMS/HCC)     7. THOMAS (nonalcoholic steatohepatitis)     8. Type 2 diabetes mellitus with hyperglycemia, with long-term current use of insulin (CMS/HCC)     9. CKD  (chronic kidney disease) stage 3, GFR 30-59 ml/min (CMS/[facility])     10. History of DVT of right lower extremity            Plan:       Multiple medical problems including CAD status post PCI currently stable  Continue baby aspirin  She is followed by endocrinology regarding diabetes and dyslipidemia control  Chronic renal failure followed by nephrology  Anemia followed by hematology  History of hypercoagulable Stated status post IVC filter placement

## 2020-09-04 NOTE — PROGRESS NOTES
"Encounter Date:09/04/2020      Patient ID: Byr Ratliff is a 74 y.o. male.    Chief Complaint:      History of Present Illness      Assessment and Plan               No diagnosis found.LAB RESULTS (LAST 7 DAYS)    CBC        BMP        CMP         BNP        TROPONIN        CoAg        Creatinine Clearance  Estimated Creatinine Clearance: 66.9 mL/min (A) (by C-G formula based on SCr of 1.46 mg/dL (H)).    ABG        Radiology  No radiology results for the last day                The following portions of the patient's history were reviewed and updated as appropriate: {history reviewed:20406::\"allergies\",\"current medications\",\"past family history\",\"past medical history\",\"past social history\",\"past surgical history\",\"problem list\"}.    Review of Systems   Constitution: Negative for malaise/fatigue.   Cardiovascular: Negative for chest pain, leg swelling, palpitations and syncope.   Respiratory: Positive for shortness of breath.    Skin: Negative for rash.   Gastrointestinal: Negative for nausea and vomiting.   Neurological: Negative for dizziness, light-headedness and numbness.         Current Outpatient Medications:   •  allopurinol (ZYLOPRIM) 100 MG tablet, Take 100 mg by mouth 2 (Two) Times a Day. Do not preop, Disp: , Rfl:   •  aspirin 81 MG tablet, Take 1 tablet by mouth Daily., Disp: 30 tablet, Rfl: 3  •  B-D 3CC LUER-YASMEEN SYR 25GX1\" 25G X 1\" 3 ML misc, USE FOR THE B12 INJECTION INTRAMUSCULAR ONCE MONTHLY, Disp: , Rfl:   •  cyanocobalamin 1000 MCG/ML injection, ADM 1 ML IM Q MONTH, Disp: , Rfl:   •  docusate sodium (COLACE) 100 MG capsule, Take 100 mg by mouth 2 (Two) Times a Day. dont take preop, Disp: , Rfl:   •  DULoxetine (CYMBALTA) 60 MG capsule, Take 60 mg by mouth Daily. Take preop, Disp: , Rfl:   •  ezetimibe (Zetia) 10 MG tablet, Take 1 tablet by mouth Daily., Disp: 30 tablet, Rfl: 11  •  folic acid (FOLVITE) 1 MG tablet, Take 1 mg by mouth Daily. Last dose 6/6, Disp: , Rfl:   •  hydrOXYzine pamoate " (VISTARIL) 25 MG capsule, Take 25 mg by mouth 3 (Three) Times a Day As Needed. Take preop if needed, Disp: , Rfl: 0  •  insulin glargine (LANTUS) 100 UNIT/ML injection, Inject 50 units at bedtime (Patient taking differently: Inject 52 Units under the skin into the appropriate area as directed Every Night. Inject 50 units at bedtime), Disp: 45 mL, Rfl: 3  •  insulin lispro (HUMALOG) 100 UNIT/ML injection, 18 units subcu before each meal plus sliding scale MDD 60 (Patient taking differently: Inject 18 Units under the skin into the appropriate area as directed 3 (Three) Times a Day Before Meals. 18 units subcu before each meal plus sliding scale MDD 60    dont take preop), Disp: 60 mL, Rfl: 4  •  lactulose (CHRONULAC) 10 GM/15ML solution, Take 60 mL by mouth Every 4 (Four) Hours. Will not take dos am, Disp: , Rfl:   •  levothyroxine (SYNTHROID, LEVOTHROID) 75 MCG tablet, Take 1 tablet by mouth Daily. (Patient taking differently: Take 75 mcg by mouth Daily. Take preop), Disp: 90 tablet, Rfl: 4  •  magnesium oxide (MAG-OX) 400 MG tablet, Take 400 mg by mouth Daily. dont take preop, Disp: , Rfl:   •  melatonin 5 MG tablet tablet, Take 10 mg by mouth At Night As Needed., Disp: , Rfl:   •  metoclopramide (REGLAN) 5 MG tablet, Take 5 mg by mouth 2 (Two) Times a Day. Do not take preop, Disp: , Rfl: 0  •  Mirabegron ER (Myrbetriq) 50 MG tablet sustained-release 24 hour 24 hr tablet, Take 50 mg by mouth Daily. Has been out but may take preop, Disp: , Rfl:   •  Multiple Vitamins-Minerals (MULTIVITAMIN WITH MINERALS) tablet tablet, Take 1 tablet by mouth Daily., Disp: , Rfl:   •  nitroglycerin (NITROSTAT) 0.4 MG SL tablet, Place 1 tablet under the tongue Every 5 (Five) Minutes As Needed for Chest Pain (Only if SBP Greater Than 100). Take no more than 3 doses in 15 minutes. (Patient taking differently: Place 0.4 mg under the tongue Every 5 (Five) Minutes As Needed for Chest Pain (Only if SBP Greater Than 100). Take no more than 3  doses in 15 minutes. hasnt needed), Disp: 30 tablet, Rfl: 12  •  ONETOUCH VERIO test strip, USE TO CHECK BLOOD SUGAR QID, Disp: , Rfl:   •  oxyCODONE (ROXICODONE) 10 MG tablet, Take 10 mg by mouth Every 8 (Eight) Hours As Needed. Take preop if needed, Disp: , Rfl:   •  pantoprazole (PROTONIX) 40 MG EC tablet, Take 1 tablet by mouth 2 (Two) Times a Day. (Patient taking differently: Take 40 mg by mouth 2 (Two) Times a Day. Take preop), Disp: 30 tablet, Rfl: 3  •  rifaximin (XIFAXAN) 550 MG tablet, Take 550 mg by mouth Every 12 (Twelve) Hours. Take preop, Disp: , Rfl:   •  sodium bicarbonate 650 MG tablet, Take 650 mg by mouth 3 (Three) Times a Day. Take preop, Disp: , Rfl:   •  zinc sulfate (ZINCATE) 220 (50 Zn) MG capsule, Take 220 mg by mouth Daily. dont take dos, Disp: , Rfl:     No Known Allergies    Family History   Problem Relation Age of Onset   • Hyperlipidemia Other    • Hypertension Other        Past Surgical History:   Procedure Laterality Date   • ABDOMINAL SURGERY     • BACK SURGERY  1971   • BILE DUCT STENT PLACEMENT     • BILE DUCT STENT PLACEMENT     • CARDIAC CATHETERIZATION     • CATARACT EXTRACTION  2014   • CATARACT EXTRACTION, BILATERAL  2014   • CHOLECYSTECTOMY  02/24/2019   • COLON RESECTION Right 6/11/2020    Procedure: COLON RESECTION RIGHT;  Surgeon: Naif Etienne DO;  Location: [facility] MAIN OR;  Service: General;  Laterality: Right;   • COLONOSCOPY  03/2018   • CORONARY ANGIOPLASTY  08/24/2009    PCI stent - succesful placement of 3.5/23 promus stent in proximal right coronary artery   • CORONARY ANGIOPLASTY WITH STENT PLACEMENT  08/27/2009    patient had stent placed to proximal right coronary artery   • CYSTOSCOPY BLADDER STONE LITHOTRIPSY  1997   • ENDOSCOPY N/A 10/18/2019    Procedure: ESOPHAGOGASTRODUODENOSCOPY with argon plasma coagulation of gastric antral vascular ectasia;  Surgeon: Marisel Gomez MD;  Location: [facility] ENDOSCOPY;  Service: Gastroenterology   • ENDOSCOPY N/A  1/9/2020    Procedure: ESOPHAGOGASTRODUODENOSCOPY WITH BIOPSY, ARGON PLASMA COAGULATION OF GASTRIC ANTREL VASCULAR ECTASIA,;  Surgeon: Marisel Gomez MD;  Location: [facility] ENDOSCOPY;  Service: Gastroenterology   • ENDOSCOPY N/A 3/6/2020    Procedure: ESOPHAGOGASTRODUODENOSCOPY;  Surgeon: Zbigniew Silva MD;  Location: [facility] ENDOSCOPY;  Service: Gastroenterology;  Laterality: N/A;  mild gave, post cautery ulcer     • ERCP N/A 11/20/2019    Procedure: ERCP, clearance of bile duct with balloon, placement of biliary stent, EGD with argon plasma coagulation of gastric antral vascular ectasia;  Surgeon: Marisel Gomez MD;  Location: [facility] ENDOSCOPY;  Service: Gastroenterology   • ERCP N/A 2/27/2020    Procedure: ENDOSCOPIC RETROGRADE CHOLANGIOPANCREATOGRAPHY with removal of biliary stent, brushing, dilation, and placement of biliary stent x 2 and Esophagogastroduodenoscopy with argon plasma coagulation;  Surgeon: Marisel Gomez MD;  Location: [facility] ENDOSCOPY;  Service: Gastroenterology;  Laterality: N/A;  Gastric antral vascular ectasia, common bile duct stricture   • ERCP N/A 4/20/2020    Procedure: ENDOSCOPIC RETROGRADE CHOLANGIOPANCREATOGRAPHY WITH REMOVAL OF BILIARY STENT, AMPULA DILATION TO 8MM, BRUSHING OF COMMON BILE DUT, CLEARANCE OF BILE DUCT WITH BALLOON, BIOPSY OF AMPULA, PLACEMENT OF BILIARY STENT;  Surgeon: Marisel Gomez MD;  Location: [facility] ENDOSCOPY;  Service: Gastroenterology;  Laterality: N/A;  POST:CBD STRICTURE   • OTHER SURGICAL HISTORY  11/2018    IVC filter implant   • RENAL ARTERY STENT Left     does not recall this   • UPPER ENDOSCOPIC ULTRASOUND W/ FNA N/A 4/20/2020    Procedure: Esophagogastroduodenoscopy with Endoscopic ultrasound and fine needle aspiration;  Surgeon: Marisel Gomez MD;  Location: [facility] ENDOSCOPY;  Service: Gastroenterology;  Laterality: N/A;  Post: CBD STRICTURE   • VENA CAVA FILTER INSERTION  12/05/2018       Past Medical History:   Diagnosis Date   • Anemia      "iron deficiency   • Anxiety    • B12 deficiency 2009   • Balance disorder    • Syed's esophagus    • Bile duct leak    • CAD (coronary artery disease)     cardiac stent   • Constipation    • DDD (degenerative disc disease), lumbar    • Decubitus ulcer     buttocks   • Depression    • Diabetes mellitus (CMS/HCC)    • Disease of thyroid gland     hypothyroid   • Diverticular disease    • Dvt femoral (deep venous thrombosis) (CMS/HCC) 2004    rt let   • Elevated cholesterol    • Fistula of intestine    • Gastroparesis    • GERD (gastroesophageal reflux disease)    • Gout    • Hepatic encephalopathy (CMS/HCC)     if doesnt take meds   • History of echocardiogram     03/03/2017 - 12/03/2014 - 04/2012   • History of stomach ulcers    • History of transfusion    • Hyperlipidemia    • Incontinence    • Iron deficiency    • Kidney stones    • Morbid obesity (CMS/HCC)    • THOMAS (nonalcoholic steatohepatitis)    • Neuropathy    • OAB (overactive bladder)    • Open wound     rt knee   • KATHIA treated with BiPAP     bring dos   • Peripheral edema    • Pulmonary embolus (CMS/HCC) 2004   • Renal insufficiency     stage 3   • S/P lumbar laminectomy    • Skin irritation     skin fold   • Stage 3 chronic kidney disease (CMS/HCC)        Family History   Problem Relation Age of Onset   • Hyperlipidemia Other    • Hypertension Other        Social History     Socioeconomic History   • Marital status:      Spouse name: Not on file   • Number of children: Not on file   • Years of education: Not on file   • Highest education level: Not on file   Tobacco Use   • Smoking status: Never Smoker   • Smokeless tobacco: Never Used   Substance and Sexual Activity   • Alcohol use: No     Frequency: Never   • Drug use: No   • Sexual activity: Defer         Procedures      Objective:       Physical Exam    /67   Pulse 77   Ht 188 cm (74\")   Wt (!) 138 kg (304 lb 12 oz)   SpO2 100%   BMI 39.13 kg/m²   The patient is alert, oriented " and in no distress.    Vital signs as noted above.    Head and neck revealed no carotid bruits or jugular venous distension.  No thyromegaly or lymphadenopathy is present.    Lungs clear.  No wheezing.  Breath sounds are normal bilaterally.    Heart normal first and second heart sounds.  No murmur..  No pericardial rub is present.  No gallop is present.    Abdomen soft and nontender.  No organomegaly is present.    Extremities revealed good peripheral pulses without any pedal edema.    Skin warm and dry.    Musculoskeletal system is grossly normal.    CNS grossly normal.

## 2020-09-08 NOTE — PROGRESS NOTES
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 Anemia diagnosed January 2018 and IgG kappa monoclonal gammopathy diagnosed May 2018.  Anemia related to chronic renal failure 3  · This patient is an obese  male who  developed kidney problems in January 2018 for which he was referred to Devi Plascencia M.D. He was found to have a hemoglobin of 7.4 at that time and was given 1 unit of packed red blood cells. He was then referred to GI where he had been followed for Syed’s esophagus for a number of years. An EGD and colonoscopy was performed on 3/5/18 by Marisel Gomez M.D. revealing mucosa suggestive of Syed’s esophagus and hiatal hernia in the cardia. Patient had a poor prep compromising the colonoscopy exam though the scope did reach the cecum. Esophageal biopsy revealed squamocolumnar mucosa with extensive intestinal metaplasia consistent with Syed’s esophagus, negative for dysplasia. Colonoscopy was recommended to be repeated in two years and EGD in three years. The patient saw his primary care physician, Paulette Harris M.D., in followup and blood testing was done on 5/17/18 revealing WBC 6.3, hemoglobin 8.4, MCV 84.5, platelet count 182,000. Vitamin B12 level was 416 (180-914) and patient was referred here for further evaluation. The patient claims to have been diagnosed with Vitamin B12 deficiency a number of years ago for which he has been on Vitamin B12 injections up until six months ago when he just stopped taking those. He has been  recently started on oral iron supplementation. He claims not to see any blood in his urine but does have microscopic hematuria for which he sees a urologist. He denies nosebleeds, gum bleeds or blood in the stool. He has not had any significant changes in his weight. He feels weak and dizzy for a number of years which has recently gotten worse. He does not ambulate much because of back surgery causing him weakness. He did have a right lower extremity DVT with bilateral pulmonary emboli in 2004 since which time he has been on Coumadin, thought secondary to a sedentary lifestyle. He has no family history of anemias.   · 5/24/18 - Patient seen initial consultation for anemia. Hemoglobin 7.9, MCV 89.9. Ferritin 17 (), iron 183 (), TIBC 379 (228-428), iron saturation 48% (20-50), retic 2.16% (0.5-1.5), haptoglobin 138 (), folate 9.1 (5.9-24.8). SPEP showed monoclonal gammopathy. SIFE showed IgG kappa monoclonal gammopathy. M-spike in the gamma region 0.7 g/dL. Erythropoietin 53.5 (2.59-18.5). Antiparietal cell antibody and intrinsic factor antibodies negative.   Globulin 4.2 (2.5-3.8). Creatinine 1.4 (0.7-1.2).   · 6/19/18 - Discussed results of anemia workup. Hemoglobin improving. Ferrous Sulfate decreased to 325 mg twice a day. Will perform gammopathy workup for IgG kappa monoclonal gammopathy. IgA 783 (), IgG 1480 (600-1500), IgM 93 (). Kappa lambda FLC ratio 0.81 (0.26-1.65). Ferritin 36 ().        · 6/25/18 - Bone survey negative for acute disease. No evidence of lytic or blastic metastatic bone disease was present. Chest x-ray showed cardiomegaly, mild right basilar atelectasis and findings suggestive of ankylosing spondylitis.   · 6/29/18 - Patient underwent sternal bone marrow revealing monoclonal gammopathy of undetermined significant (MGUS). Extent of bone marrow involvement 5%. Phenotype kappa positive, IgG positive, CD38 positive, CD20 negative, CD19 negative, CD45  negative, CD56 negative and  negative. Dyspoiesis was not seen. There was absence of iron stores. Normal male karyotype. Normal FISH study. Flow cytometry revealed IgG kappa restrictive plasma cells in a polytypic background. There was a mixed population of maturing myeloid cells, B-cells and T-cells. No abnormal myeloid maturation was seen. A monoclonal IgG kappa plasma cell population was present. 4% plasma cells present.   · 8/23/18 - Labs by Dr. Plascencia revealed B12 of 857 (180-914), folate 12.7 (5.9-24.8), iron 127 ().      · 9/19/18 - Iron 94 (), TIBC 297 (228-428), iron saturation 32 (20-50), ferritin 29 (). Erythropoietin 30.04 (2.59-18.5).        · 10/16/18 - Iron 177 (), TIBC 320 (228-428), iron saturation 55 (20-50), ferritin 34 ().       · 11/16/18 - Hemoglobin 7.1. Patient given 1 unit of packed red blood cells.   · 11/21/18 - Ferritin 27 (). Hemoglobin 7.9. Patient given 1 unit of packed red blood cells.    · 11/26/18 - EGD by Dave Russo M.D. revealed erosive gastritis and bleeding ampulla. There was oozing upon entering the stomach in many different areas. Duodenal mucosa and the esophagus were normal.   · 11/27/18 - Hemoglobin 8.2. Order written for Procrit 30,000 units subq weekly, not approved by insurance for low ferritin. Urine JERRY with no definite monoclonal gammopathy identified with questionable faint IgG kappa.   · 12/18/18 - Hemoglobin 9.9. Injectafer 750 mg IV given.   · 1/2/19 - Injectafer 750 mg IV given.  Hemoglobin 11, .1. Patient claims that he is getting Vitamin B12 injections monthly at home through Dr. Harris. Currently taking Ferrous Sulfate 325 mg p.o. b.i.d. and asked to continue that. Asked to continue Pantoprazole 40 mg daily that he is taking. IgA 651 (), IgG 1470 (600-1500), IgM 94 ().      · 2/12/19 - Iron 88 ().    · 3/6/19 - WBC 19.8 with 83% neutrophils, 5% lymphocytes, 11% monocytes, hemoglobin  8.7, , platelet count 182,000. Patient status post laparoscopic cholecystectomy on 2/24/19 with large ecchymoses apparent on abdomen. Infectious workup ordered and Injectafer 750 mg IV days 1 and 8 ordered.  Iron 23 (), TIBC 153 (228-428), iron saturation 15% (20-50), ferritin 400 (224-336).       · 3/12/19 - WBC 15.02, hemoglobin 8.1 and platelets 227,000. Patient reported hematuria followed by Dr. Starkey. Orders written to start Injectafer ASAP. Abdominal ecchymosis improving.   · 3/14/19 - Injectafer 750 mg IV given.   · 4/22/19 - Hemoglobin 9.5, . Patient missed day 8 Injectafer in March and it was rescheduled. SIFE showed IgG kappa monoclonal gammopathy.  · 5/8/19 - Injectafer 750 mg IV given.   · 7/8/19 - Iron 56 (). Iron Saturation 36 (20-50%). Transferrin 110 (180-330). TIBC 154 (228-428). Ferritin 1,199 ().    · 8/7/2019-hemoglobin 10.0.  Patient taking multivitamin with iron only.  Restarted ferrous sulfate 325mg by mouth twice a day. UIFE showed free kappa light chain identified.   10/16/2019 patient hospitalized at [facility] for 3 days and found to have a hemoglobin of 4.8 at the cancer center visit.  Stool Hemoccult was positive.  He ended up receiving 5 units of packed red blood cells.  EGD by Marisel Gomez MD revealed severe gastric antral vascular ectasia with oozing, Syed's esophagus and hiatal hernia.  The patient was treated with PPI and Carafate.  Plan was to repeat EGD with cauterization in 6 weeks.  B12 and reticulocyte count were high.  Haptoglobin and folic acid were normal.  Creatinine 2.2 (0.76-1.27), iron 47 (), TIBC 264 (298-5 0.36), iron saturation 18 (20-50), ferritin 128.4 ().  Aspirin was held temporarily and patient given IV iron.  IgA 720 (), IgM 72 (), IgG 1489 (700-1600).  · EGD done secondary to GI bleed on 1/9/2020 1.  Moderately severe gastric antral vascular ectasia APC applied for cauterization 2.  Short segment of Syed's  biopsy taken. 3.  Small hiatal hernia.  · 1/17/20 Iron 42, Iron saturation 16, TIBC 264.  · 1/3/2020 patient was initiated on weekly Procrit  · 3/31/2020 iron profile iron 53 iron saturation 22 iron binding capacity 241 ferritin 229.     2.   Elevated LFT’s diagnosis established March 2018.  Biliary duct dilation diagnosis established June 2018.   · 11/30/17 - Maia phos 93 (32-91), total bili 0.7 (0.3-1.2), AST 37 (15-41), ALT 25 (15-41).   · 3/1/18 - T-bili 0.5 (0.3-1.2), maia phos 106 (32-91), AST 54 (15-41), ALT 27 (17-63).   · 5/24/18 - AST 46 (15-41), maia phos 131 (32-91).   · 6/8/18 - CMP ordered by Dr. Alejandra Amaro showed maia phos 209 (32-91),  (15-41), ALT 84 (17-63), total bili 1.1 (0.3-1.2).             · 6/19/18 - CT abdomen and pelvis as well as serological workup for elevated LFT’s ordered. Alpha-1 antitrypsin 144 (). Ceruloplasmin 38 (22-58). AFP 3 (0-9).  Hepatitis panel non-reactive.   · 6/22/18 - CT abdomen and pelvis showed abnormal intra and extrahepatic biliary dilation. There was mild distention of the gallbladder and evidence of several gallstones. Bilateral non-obstructing kidney stone was present. Incidental sigmoid diverticulosis.   · 7/2/18 - Patient underwent MRCP at [facility] showing markedly dilated intrahepatic and extrahepatic bile duct with multiple small filling defects within the distal common bile duct compatible with choledocholithiasis. There was a focal 6 mm segmental narrowing at the distal CBD with recommended GI consult for ERCP and brushings.   · 7/5/18 to 7/10/18 - On 7/5/18 labs revealed normal total bilirubin (0.8), AST 69 (15-41), maia phos 152 (32-91), ALT was normal at 37 (17-63), ammonia was elevated at 86 (9-35), lipase was normal (22). Patient hospitalized at [facility] due to weakness. Workup revealed cholelithiasis. On 7/9/18 patient underwent ERCP with bilateral sphincterotomy, common duct stone extraction, biliary brushing and stent placement  by Dr. Leiva. Path on brushings was negative for malignancy. There was intra and extrahepatic biliary ductal dilation with relatively abrupt distal common bile duct cutoff and non-visualization of the gallbladder. He was started on Lovenox and sequential compression devices due to risk of pulmonary embolism.  of 33 (0-35).  On 7/10/18 LFT’s revealed total bili 0.9 (0.3-1.2). Maia phos 129 (32-91). AST 50 (15-41), ALT 41 (17-63).     · 8/31/18 - EUS by Dr. Gomez at [facility] revealing suspect benign biliary stricture due to CBD stone with FNA pathology revealing benign and atypical ductal cells, bile and proteinaceous material including scattered bacteria, neutrophils and degenerating cells. The atypia was felt mild and favored to be reactive in nature. Plan was to repeat ERCP with removal of the stent and the stone with consideration of common bile duct evaluation with cholangioscopy at that time.   · 10/12/18 - ERCP with sphincteroplasty and stone extraction done by  JUAN PABLO Russo for choledocholithiasis.   · 2/23/19 - Patient underwent ERCP with balloon clearance of bile duct by Memo  · 3/6/19 - LFT’s not elevated with bilirubin 1.1, AST 31, ALT 14, maia phos was mildly elevated at 101 (32-91).   · 4/25/19 - 4/26/19 - Admitted to [facility] for hepatic encephalopathy and hypokalemia.   · 4/27/2019 - CT abdomen pelvis showed a 4.8 cm air-fluid collection adjacent to the right posterior hepatic lobe significant improved.  Right-sided chest tube small right pleural effusion.  Hepatic cirrhosis.  Bilateral nonobstructing renal stones.  Sigmoid diverticulosis.   · 5/26/19 - 5/29/2019 - Admitted to [facility] for hepatic and cephalopathy.  Ammonia level 213 (H).  · 7/11/19 - AFP 2.76 (0-9).   · 9/26/19 - CT scan abdomen showed fluid collection posterior to the right hepatic lobe has significantly diminished in size with only trace fluid remaining. Small locules of air are seen within this collection which  could be related to recent shunt mentation or could be related to tiny fistula from the splenic flexure of the colon. Trace right pleural fluid, diminished since 4/27/19. Thickened, likely reactive, pleural rind remains. Mild right basilar atelectasis. Abnormal intrahepatic and extra hepatic biliary ductal dilation. Intrahepatic biliary dilation is new since 4/27/19. The CBD appears attenuated in its mid segment, raising the possibility of stricture. ERCP recommended. Uncomplicated colonic diverticulosis. Non-obstructing bilateral renal stones and probable small right renal cyst. Cirrhotic liver morphology. No focal liver lesion is seen.  · 10/28/19 - CT scan abdomen and pelvis showed fluid collection posterior to the right hepatic lobe appears slightly increased in size now measuring 24 mm in diameter, again a small droplet of gas adjacent to this fluid collection is seen which may represent fistula. Right basilar small pleural effusion and atelectasis remain. Continued dilatation of the intra and extrahepatic biliary ductal system which appears stable from previous exam. Non-obstructing bilateral renal calculi. Changes of cirrhosis and splenomegaly. Diverticulosis. Mild interval change from prior study with increasing fluid collection posterior to the right hepatic lobe.      3.   Right lower extremity DVT and bilateral pulmonary emboli diagnosed 2004.   · 2004 - Patient claims to have developed right lower extremity DVT with bilateral pulmonary emboli in 2004 and was coumadinized. The blood clots were thought secondary to his sedentary lifestyle. He stayed on the Coumadin up until October 2017 when, due to difficulty maintaining his INR’s, he was switched to Xarelto 20 mg daily by Paulette Harris M.D. which was later dropped to 10 mg daily.    · 11/26/18 - EGD by Dave Russo M.D. revealed erosive gastritis and bleeding ampulla. Ampullary biopsy revealed reactive/reparative small intestinal mucosa with  mild chronic inflammation. Gastric antrum biopsy was suggestive of focal mild reactive gastropathy. Due to erosive gastritis, patient asked to hold Xarelto and Aspirin by Dave Russo M.D.   · 11/27/18 - Patient asked to discontinue Xarelto and hold Aspirin while obtaining IVC filter. Risks discussed. Factor V Leiden gene mutation and prothrombin gene mutations not detected. Anticardiolipin IgM 11 (<11). Anti beta-2 glyco, IgA 55 (<20). Factor VIII activity 186 (). Antithrombin III activity 63 (). Protein C activity 69 (), protein S activity 69 ().       · 12/5/18 - Patient underwent transjugular IVC filter placement in IR by  Jonn Cuenca M.D.   · 1/2/19 - Told patient that his low protein CNS and antithrombin III likely due to liver disease. He would be at a high risk of going back on Xarelto and hence continued on Aspirin 81 mg p.o. daily.   · 1/3/19 - D-dimer 1.25 (<0.45).    · 3/8/19 - Chest x-ray showed chronic changes in the chest with no obvious pneumonia or congestive changes.  · 4/3/19 - Chest x-ray with right pleural effusion and basilar lung density with slight increase from prior. Cardiomegaly.   · 4/22/19 - Factor VIII 334 (H), Anti-phosphatidylserine antibody IgM> 80 (H), Anti-phosphatidylserine antibody IgG 12 (N), Cardiolipin IgG 21 (N), Cardiolipin IgM 55 (H), Cardiolipin IgA 63 (H), Beta-2 glycoprotein IgG 4 (N), IgA 91 (H), and IgM 21 (H).   · 7/11/19 - Chest x-ray showed stable small right pleural effusion since 04/25/2019. Minimal right costophrenic angle atelectasis.  · 4.  Recurrent episodes of hepatic encephalopathy on treatment with lactulose 30 cc 4 times a day.  · Recurrent GI blood loss on anticoagulation.    Reviewed  Past Medical History:   Diagnosis Date   • Anemia     iron deficiency   • Anxiety    • B12 deficiency 2009   • Balance disorder    • Syed's esophagus    • Bile duct leak    • CAD (coronary artery disease)     cardiac stent   •  Constipation    • DDD (degenerative disc disease), lumbar    • Decubitus ulcer     buttocks   • Depression    • Diabetes mellitus (CMS/HCC)    • Disease of thyroid gland     hypothyroid   • Diverticular disease    • Dvt femoral (deep venous thrombosis) (CMS/HCC) 2004    rt let   • Elevated cholesterol    • Fistula of intestine    • Gastroparesis    • GERD (gastroesophageal reflux disease)    • Gout    • Hepatic encephalopathy (CMS/HCC)     if doesnt take meds   • History of echocardiogram     03/03/2017 - 12/03/2014 - 04/2012   • History of stomach ulcers    • History of transfusion    • Hyperlipidemia    • Incontinence    • Iron deficiency    • Kidney stones    • Morbid obesity (CMS/HCC)    • THOMAS (nonalcoholic steatohepatitis)    • Neuropathy    • OAB (overactive bladder)    • Open wound     rt knee   • KATHIA treated with BiPAP     bring dos   • Peripheral edema    • Pulmonary embolus (CMS/HCC) 2004   • Renal insufficiency     stage 3   • S/P lumbar laminectomy    • Skin irritation     skin fold   • Stage 3 chronic kidney disease (CMS/HCC)        Past Surgical History:   Procedure Laterality Date   • ABDOMINAL SURGERY     • BACK SURGERY  1971   • BILE DUCT STENT PLACEMENT     • BILE DUCT STENT PLACEMENT     • CARDIAC CATHETERIZATION     • CATARACT EXTRACTION  2014   • CATARACT EXTRACTION, BILATERAL  2014   • CHOLECYSTECTOMY  02/24/2019   • COLON RESECTION Right 6/11/2020    Procedure: COLON RESECTION RIGHT;  Surgeon: Naif Etienne DO;  Location: [facility];  Service: General;  Laterality: Right;   • COLONOSCOPY  03/2018   • CORONARY ANGIOPLASTY  08/24/2009    PCI stent - succesful placement of 3.5/23 promus stent in proximal right coronary artery   • CORONARY ANGIOPLASTY WITH STENT PLACEMENT  08/27/2009    patient had stent placed to proximal right coronary artery   • CYSTOSCOPY BLADDER STONE LITHOTRIPSY  1997   • ENDOSCOPY N/A 10/18/2019    Procedure: ESOPHAGOGASTRODUODENOSCOPY with argon plasma coagulation  of gastric antral vascular ectasia;  Surgeon: Marisel Gomez MD;  Location: [facility] ENDOSCOPY;  Service: Gastroenterology   • ENDOSCOPY N/A 1/9/2020    Procedure: ESOPHAGOGASTRODUODENOSCOPY WITH BIOPSY, ARGON PLASMA COAGULATION OF GASTRIC ANTREL VASCULAR ECTASIA,;  Surgeon: Marisel Gomez MD;  Location: [facility] ENDOSCOPY;  Service: Gastroenterology   • ENDOSCOPY N/A 3/6/2020    Procedure: ESOPHAGOGASTRODUODENOSCOPY;  Surgeon: Zbigniew Silva MD;  Location: [facility] ENDOSCOPY;  Service: Gastroenterology;  Laterality: N/A;  mild gave, post cautery ulcer     • ERCP N/A 11/20/2019    Procedure: ERCP, clearance of bile duct with balloon, placement of biliary stent, EGD with argon plasma coagulation of gastric antral vascular ectasia;  Surgeon: Marisel Gomez MD;  Location: [facility] ENDOSCOPY;  Service: Gastroenterology   • ERCP N/A 2/27/2020    Procedure: ENDOSCOPIC RETROGRADE CHOLANGIOPANCREATOGRAPHY with removal of biliary stent, brushing, dilation, and placement of biliary stent x 2 and Esophagogastroduodenoscopy with argon plasma coagulation;  Surgeon: Marisel Gomez MD;  Location: [facility] ENDOSCOPY;  Service: Gastroenterology;  Laterality: N/A;  Gastric antral vascular ectasia, common bile duct stricture   • ERCP N/A 4/20/2020    Procedure: ENDOSCOPIC RETROGRADE CHOLANGIOPANCREATOGRAPHY WITH REMOVAL OF BILIARY STENT, AMPULA DILATION TO 8MM, BRUSHING OF COMMON BILE DUT, CLEARANCE OF BILE DUCT WITH BALLOON, BIOPSY OF AMPULA, PLACEMENT OF BILIARY STENT;  Surgeon: Marisel Gomez MD;  Location: [facility] ENDOSCOPY;  Service: Gastroenterology;  Laterality: N/A;  POST:CBD STRICTURE   • OTHER SURGICAL HISTORY  11/2018    IVC filter implant   • RENAL ARTERY STENT Left     does not recall this   • UPPER ENDOSCOPIC ULTRASOUND W/ FNA N/A 4/20/2020    Procedure: Esophagogastroduodenoscopy with Endoscopic ultrasound and fine needle aspiration;  Surgeon: Marisel Gomez MD;  Location: [facility] ENDOSCOPY;  Service: Gastroenterology;   "Laterality: N/A;  Post: CBD STRICTURE   • VENA CAVA FILTER INSERTION  12/05/2018         Current Outpatient Medications:   •  allopurinol (ZYLOPRIM) 100 MG tablet, Take 100 mg by mouth 2 (Two) Times a Day. Do not preop, Disp: , Rfl:   •  aspirin 81 MG tablet, Take 1 tablet by mouth Daily., Disp: 30 tablet, Rfl: 3  •  B-D 3CC LUER-YASMEEN SYR 25GX1\" 25G X 1\" 3 ML misc, USE FOR THE B12 INJECTION INTRAMUSCULAR ONCE MONTHLY, Disp: , Rfl:   •  cyanocobalamin 1000 MCG/ML injection, ADM 1 ML IM Q MONTH, Disp: , Rfl:   •  docusate sodium (COLACE) 100 MG capsule, Take 100 mg by mouth 2 (Two) Times a Day. dont take preop, Disp: , Rfl:   •  DULoxetine (CYMBALTA) 60 MG capsule, Take 60 mg by mouth Daily. Take preop, Disp: , Rfl:   •  ezetimibe (Zetia) 10 MG tablet, Take 1 tablet by mouth Daily., Disp: 30 tablet, Rfl: 11  •  folic acid (FOLVITE) 1 MG tablet, Take 1 mg by mouth Daily. Last dose 6/6, Disp: , Rfl:   •  hydrOXYzine pamoate (VISTARIL) 25 MG capsule, Take 25 mg by mouth 3 (Three) Times a Day As Needed. Take preop if needed, Disp: , Rfl: 0  •  insulin glargine (LANTUS) 100 UNIT/ML injection, Inject 50 units at bedtime (Patient taking differently: Inject 52 Units under the skin into the appropriate area as directed Every Night. Inject 50 units at bedtime), Disp: 45 mL, Rfl: 3  •  insulin lispro (HUMALOG) 100 UNIT/ML injection, 18 units subcu before each meal plus sliding scale MDD 60 (Patient taking differently: Inject 18 Units under the skin into the appropriate area as directed 3 (Three) Times a Day Before Meals. 18 units subcu before each meal plus sliding scale MDD 60    dont take preop), Disp: 60 mL, Rfl: 4  •  lactulose (CHRONULAC) 10 GM/15ML solution, Take 60 mL by mouth Every 4 (Four) Hours. Will not take dos am, Disp: , Rfl:   •  levothyroxine (SYNTHROID, LEVOTHROID) 75 MCG tablet, Take 1 tablet by mouth Daily. (Patient taking differently: Take 75 mcg by mouth Daily. Take preop), Disp: 90 tablet, Rfl: 4  •  " magnesium oxide (MAG-OX) 400 MG tablet, Take 400 mg by mouth Daily. dont take preop, Disp: , Rfl:   •  melatonin 5 MG tablet tablet, Take 10 mg by mouth At Night As Needed., Disp: , Rfl:   •  metoclopramide (REGLAN) 5 MG tablet, Take 5 mg by mouth 2 (Two) Times a Day. Do not take preop, Disp: , Rfl: 0  •  Mirabegron ER (Myrbetriq) 50 MG tablet sustained-release 24 hour 24 hr tablet, Take 50 mg by mouth Daily. Has been out but may take preop, Disp: , Rfl:   •  Multiple Vitamins-Minerals (MULTIVITAMIN WITH MINERALS) tablet tablet, Take 1 tablet by mouth Daily., Disp: , Rfl:   •  nitroglycerin (NITROSTAT) 0.4 MG SL tablet, Place 1 tablet under the tongue Every 5 (Five) Minutes As Needed for Chest Pain (Only if SBP Greater Than 100). Take no more than 3 doses in 15 minutes. (Patient taking differently: Place 0.4 mg under the tongue Every 5 (Five) Minutes As Needed for Chest Pain (Only if SBP Greater Than 100). Take no more than 3 doses in 15 minutes. hasnt needed), Disp: 30 tablet, Rfl: 12  •  ONETOUCH VERIO test strip, USE TO CHECK BLOOD SUGAR QID, Disp: , Rfl:   •  oxyCODONE (ROXICODONE) 10 MG tablet, Take 10 mg by mouth Every 8 (Eight) Hours As Needed. Take preop if needed, Disp: , Rfl:   •  pantoprazole (PROTONIX) 40 MG EC tablet, Take 1 tablet by mouth 2 (Two) Times a Day. (Patient taking differently: Take 40 mg by mouth 2 (Two) Times a Day. Take preop), Disp: 30 tablet, Rfl: 3  •  rifaximin (XIFAXAN) 550 MG tablet, Take 550 mg by mouth Every 12 (Twelve) Hours. Take preop, Disp: , Rfl:   •  sodium bicarbonate 650 MG tablet, Take 650 mg by mouth 3 (Three) Times a Day. Take preop, Disp: , Rfl:   •  zinc sulfate (ZINCATE) 220 (50 Zn) MG capsule, Take 220 mg by mouth Daily. dont take dos, Disp: , Rfl:     No Known Allergies    Family History   Problem Relation Age of Onset   • Hyperlipidemia Other    • Hypertension Other        Cancer-related family history is not on file.    Social History     Tobacco Use   • Smoking  "status: Never Smoker   • Smokeless tobacco: Never Used   Substance Use Topics   • Alcohol use: No     Frequency: Never   • Drug use: No       I have reviewed the history of present illness, past medical history, family history, social history, lab results, all notes and other records since the patient was last seen at the cancer care center    SUBJECTIVE:      Patient is my office for follow-up of his anemia and history of DVT accompanied by his wife.  He is compliant with his lactulose.  Being a little bit more active than before.  He is more awake during the daytime.  Has back pain but not surgical candidate.  No shortness of air.  Has fatigue.  ROS:      Review of Systems   Constitutional: Negative for fever.   HENT: Negative for nosebleeds and trouble swallowing.    Eyes: Negative for visual disturbance.   Respiratory: Negative for cough, shortness of breath and wheezing.    Cardiovascular: Negative for chest pain.   Gastrointestinal: Negative for abdominal pain and blood in stool.   Endocrine: Negative for cold intolerance.   Genitourinary: Negative for dysuria and hematuria.   Musculoskeletal: Negative for joint swelling.   Skin: Negative for rash.   Allergic/Immunologic: Negative for environmental allergies.   Neurological: Negative for seizures.   Hematological: Does not bruise/bleed easily.   Psychiatric/Behavioral: The patient is not nervous/anxious.    MD performed ROS and are negative except as mentioned in Subjective.        Objective:       Vitals:    09/08/20 1415   BP: 148/71   Pulse: 94   Resp: 18   Temp: 96.8 °F (36 °C)   Weight: (!) 137 kg (303 lb)   Height: 188 cm (74\")   PainSc:   2   PainLoc: Back  Comment: lower back and right leg     /71   Pulse 94   Temp 96.8 °F (36 °C)   Resp 18   Ht 188 cm (74\")   Wt (!) 137 kg (303 lb)   BMI 38.90 kg/m²       PHYSICAL EXAM:      Physical Exam   Constitutional: He is oriented to person, place, and time. No distress.   Well-built morbidly obese "   HENT:   Head: Normocephalic and atraumatic.   Eyes: Conjunctivae and EOM are normal. Right eye exhibits no discharge. Left eye exhibits no discharge. No scleral icterus.   Neck: Normal range of motion. Neck supple. No thyromegaly present.   Cardiovascular: Normal rate, regular rhythm and normal heart sounds. Exam reveals no gallop and no friction rub.   Pulmonary/Chest: Effort normal. No stridor. No respiratory distress. He has no wheezes.   Abdominal: Soft. Bowel sounds are normal. He exhibits no mass. There is no tenderness. There is no rebound and no guarding.   Musculoskeletal: Normal range of motion. He exhibits no tenderness.   1+ bilateral lower extremity edema   Lymphadenopathy:     He has no cervical adenopathy.   Neurological: He is alert and oriented to person, place, and time. He exhibits normal muscle tone.   Skin: Skin is warm. No rash noted. He is not diaphoretic. No erythema.   Psychiatric: He has a normal mood and affect. His behavior is normal.   Nursing note and vitals reviewed.     Physical exam done by MD.    RECENT LABS:     WBC   Date Value Ref Range Status   09/08/2020 9.36 3.40 - 10.80 10*3/mm3 Final     RBC   Date Value Ref Range Status   09/08/2020 2.98 (L) 4.14 - 5.80 10*6/mm3 Final     Hemoglobin   Date Value Ref Range Status   09/08/2020 9.1 (L) 13.0 - 17.7 g/dL Final     Hematocrit   Date Value Ref Range Status   09/08/2020 30.1 (L) 37.5 - 51.0 % Final     MCV   Date Value Ref Range Status   09/08/2020 101.0 (H) 79.0 - 97.0 fL Final     MCH   Date Value Ref Range Status   09/08/2020 30.5 26.6 - 33.0 pg Final     MCHC   Date Value Ref Range Status   09/08/2020 30.2 (L) 31.5 - 35.7 g/dL Final     RDW   Date Value Ref Range Status   09/08/2020 15.3 12.3 - 15.4 % Final     RDW-SD   Date Value Ref Range Status   09/08/2020 54.5 (H) 37.0 - 54.0 fl Final     MPV   Date Value Ref Range Status   09/08/2020 10.6 6.0 - 12.0 fL Final     Platelets   Date Value Ref Range Status   09/08/2020 242  140 - 450 10*3/mm3 Final     Neutrophil %   Date Value Ref Range Status   09/08/2020 72.3 42.7 - 76.0 % Final     Lymphocyte %   Date Value Ref Range Status   09/08/2020 13.0 (L) 19.6 - 45.3 % Final     Monocyte %   Date Value Ref Range Status   09/08/2020 9.5 5.0 - 12.0 % Final     Eosinophil %   Date Value Ref Range Status   09/08/2020 4.2 0.3 - 6.2 % Final     Basophil %   Date Value Ref Range Status   09/08/2020 1.0 0.0 - 1.5 % Final     Immature Grans %   Date Value Ref Range Status   01/06/2020 0.5 0.0 - 0.5 % Final     Neutrophils, Absolute   Date Value Ref Range Status   09/08/2020 6.77 1.70 - 7.00 10*3/mm3 Final     Lymphocytes, Absolute   Date Value Ref Range Status   09/08/2020 1.22 0.70 - 3.10 10*3/mm3 Final     Monocytes, Absolute   Date Value Ref Range Status   09/08/2020 0.89 0.10 - 0.90 10*3/mm3 Final     Eosinophils, Absolute   Date Value Ref Range Status   09/08/2020 0.39 0.00 - 0.40 10*3/mm3 Final     Basophils, Absolute   Date Value Ref Range Status   09/08/2020 0.09 0.00 - 0.20 10*3/mm3 Final     Immature Grans, Absolute   Date Value Ref Range Status   01/06/2020 0.04 0.00 - 0.05 10*3/mm3 Final     nRBC   Date Value Ref Range Status   06/15/2020 0.1 0.0 - 0.2 /100 WBC Final       Lab Results   Component Value Date    GLUCOSE 129 (H) 08/24/2020    BUN 25 (H) 08/24/2020    CREATININE 1.46 (H) 08/24/2020    EGFRIFNONA 47 (L) 08/24/2020    BCR 17.1 08/24/2020    K 4.9 08/24/2020    CO2 24.1 08/24/2020    CALCIUM 8.9 08/24/2020    ALBUMIN 3.10 (L) 08/24/2020    LABIL2 0.4 (L) 05/29/2019    AST 26 08/24/2020    ALT 18 08/24/2020         Assessment/Plan      ASSESSMENT:     GAVE (gastric antral vascular ectasia)     Anemia in stage 3 chronic kidney disease (CMS/HCC)     History of Vitamin B12 deficiency     History of DVT of right lower extremity     History of bilateral pulmonary embolus (PE)     Antithrombin III deficiency (CMS/HCC)     Protein C deficiency (CMS/HCC)     Protein S deficiency  (CMS/HCC)     Antiphospholipid antibody positive     Elevated factor VIII level     THOMAS (nonalcoholic steatohepatitis)     Splenomegaly     IgG kappa MGUS    PLAN:      1. Hemoglobin is low, iron deficiency has resolved, continue CBC every 2 weeks and Procrit 40,000 units of the hemoglobin is less than 10.   2. Continue pain medication for his DJD spine.  Patient is not surgical candidate.  3. 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 But patient cannot be on anticoagulation given his GI bleed.   5. Patient to stay off oral iron tablets given that he received Injectafer infusions.  6. Patient follows with Dr. Gomez regarding his Thomas and portal hypertension.  Continue to use lactulose for his high ammonia.  7. Patient has hyper coag complications but off anticoagulation secondary to GI bleeds.  Hyper coag events related to deficient multiple clotting factors related to cirrhosis.  8. I will see him back in my office in 6 weeks recheck CBC at the time     I have reviewed labs results, imaging, vitals, and medications with the patient today. Patient verbalized understanding and is in agreement of the above plan.  Electronically signed by Joceline Pandey MD, 09/08/20, 2:24 PM.          This report was compiled using Dragon voice recognition software. I have made every effort to proof read this document; however, typographical errors may persist.

## 2020-11-03 NOTE — PROGRESS NOTES
Marisa/Radha -     Please advise pt he has elevated alk phos.  Referring him to Dr. Gomez per Dr. Guajardo's direction. Referral ordered.     Electronically signed by SHAJI Crooks, 11/03/20, 9:26 AM EST.

## 2020-11-05 NOTE — TELEPHONE ENCOUNTER
I called and spoke to the patients wife Lisa and instructed her to have Bry hold rifampin per Dr Guajardo. Lisa verbalized understanding and is aware of appt with GI 11/9/2020 and lab work in our office 11/16/2020.

## 2020-11-05 NOTE — TELEPHONE ENCOUNTER
I returned Lisa Ratliff's call regarding holding rifampin per Dr Guajardo. Lisa asked why we are holding the patients rifampin and I explained that Dr Guajardo instructed to hold it due to increased liver function tests. Lisa verbalized understanding.

## 2020-11-05 NOTE — TELEPHONE ENCOUNTER
MARIO PATEL CALLED TO INQUIRE ABOUT STOPPING XIFAXAN RI . SHE STATED THIS HELPS KEEP TOXINS FROM PT'S BRAIN AND WANTS TO KNOW WHY THIS IS BEING STOPPED FOR HIS 11-9-2020 APPT.    MARIO MAY BE REACHED AT:  [phone] - CELL PHONE  [phone] - FYJJ

## 2020-11-05 NOTE — PROGRESS NOTES
Marisa/Radha,     Please call pt and have him hold Rifampin per Dr. Guajardo.     Electronically signed by SHAJI Crooks, 11/05/20, 8:18 AM EST.

## 2020-11-05 NOTE — TELEPHONE ENCOUNTER
----- Message from SHAJI Crooks sent at 11/5/2020  8:18 AM EST -----  Marisa/Radha,     Please call pt and have him hold Rifampin per Dr. Guajardo.     Electronically signed by SHAJI Crooks, 11/05/20, 8:18 AM EST.

## 2020-11-11 PROBLEM — K22.70 BARRETT'S ESOPHAGUS: Status: ACTIVE | Noted: 2017-04-19

## 2020-11-12 NOTE — OP NOTE
ENDOSCOPIC RETROGRADE CHOLANGIOPANCREATOGRAPHY Procedure Report    Patient Name:  Bry Ratliff  YOB: 1945    Date of Surgery:  11/12/2020     Pre-Op Diagnosis:  Abnormal tumor markers [R97.8]  Elevated liver enzymes [R74.8]  Cirrhosis of liver (CMS/HCC) [K74.60]  Syed's esophagus [K22.70]        Procedure/CPT® Codes:      Procedure(s):  ENDOSCOPIC RETROGRADE CHOLANGIOPANCREATOGRAPHY WITH BALLOON CLEARANCE OF COMMON BILE DUCT, BRUSHING OF COMMON BILE DUCT STRICTURE, BIOPSY X 1 AREA, PLACEMENT OF BILIARY STENT    Staff:  Surgeon(s):  Marisel Gomez MD      Anesthesia: General    Description of Procedure:  A description of the procedure as well as risks, benefits and alternative methods were explained to the patient who voiced understanding and signed the corresponding consent form.Specifically risks of post-ERCP pancreatitis, bleeding, perforation, failure to canulate and adverse reaction to sedation were discussed. A physical exam was performed and vital signs were monitored throughout the procedure.    A  film was performed which was normal. With the patient in the semi-prone position, an Olympus side viewing endoscope was placed into the mouth and proceeded through the esophagus, stomach and second portion of the duodenum without difficulty. Limited views of the esophagus and stomach were normal. The ampulla was visualized and appeared normal. A [employer] hydrotome was used to cannulate the ampulla using wire guided technique. Bile was aspirated to ensure this was the duct of interest. Contrast was injected into the bile duct.      The scope was then retroflexed and the fundus was visualized. The procedure was not difficult and there were no immediate complications.    Findings:   Papula was prominent, biopsy was performed to rule out any possibility of adenomatous changes.  Cannulation of the common bile duct was obtained using a dream tome.  Distal CBD stricture was noticed with  a markedly dilated intrahepatic and common bile common hepatic duct.  Stricture was almost close to 2 to 3 cm.  At this stage, multiple sweeps with a 9 to 12 mm balloon was performed with the removal of sludge and gravel.  Brushings cytology of the stricture was performed using cytology brushing.  Papula major papula biopsy was also performed separately.    As patient has significantly elevated alkaline phosphatase with a lot of gravel and previous excellent response to fully covered metal stent with a benign looking stricture, we opted to place 6 cm long and 10 Uzbek fully covered metal stent.  After placement of the metal stent, significant moderate debris and sludge was drained.  Patient tolerated procedure very well no immediate complication was noticed.  Gastric antral vascular ectasia was noticed.    Impression:  1.  Gastric antral vascular ectasia.  2.  Distal CBD stricture status post of dilation, brushing cytology.  Biopsy was also performed for major papula which looks slightly enlarged.  It may be related to previous stenting versus early adenomatous changes.  3.  Removal of sludge and gravel and placement of fully covered 10 Uzbek and 6 cm metal stent.    Recommendations:  Repeat liver function tests next week.  The liver function test continues to be normal, we may hold off on the MRI.  May ERCP with removal of metal metal stent in 4 to 6 months.  Follow-up with the GI clinic in 2-month as scheduled.      Marisel Gomez MD     Date: 11/12/2020    Time: 13:05 EST

## 2020-11-12 NOTE — NURSING NOTE
On arrival to post op room 2 pt incontinent large amount of urine. During process of cleaning patient, pt respirations labored, BP elevated pt not following commands. Code Stroke called . Replaced face mask with nonrebreather at 15L. Dr. Lemus and code stroke team at bedside.

## 2020-11-12 NOTE — NURSING NOTE
Pt C/O chronic back pain, denies any new pain. Pt able to speak responds slow but wife states that's normal. Son is [employer] officer, here at bedside, sts will assist with getting patient home and settled. Jason blanchardaygiovani patient to be released around 4p.

## 2020-11-12 NOTE — NURSING NOTE
Spoke with Dr. Gomez at this time. He reviewed all of the labs at this time. Order received to discharge patient home. Son and wife at bedside and state they are comfortable with patient going home. Per Dr. Gomez, patient needs to take extra dose of Lactulose today and tomorrow.

## 2020-11-12 NOTE — ANESTHESIA PREPROCEDURE EVALUATION
Anesthesia Evaluation     Patient summary reviewed and Nursing notes reviewed   NPO Solid Status: > 6 hours  NPO Liquid Status: > 2 hours           Airway   Mallampati: II  TM distance: >3 FB  Neck ROM: full  No difficulty expected  Dental    (+) edentulous    Pulmonary    (+) pulmonary embolism, sleep apnea, decreased breath sounds,   Cardiovascular - normal exam    (+) hypertension, CAD, DVT, hyperlipidemia,     ROS comment: ·3/4/20 The left ventricular cavity is mildly dilated.  · Estimated EF = 50%.  · Left ventricular systolic function is low normal.  · Right ventricular cavity is mildly dilated.  · Mild mitral valve regurgitation is present  · Mild tricuspid valve regurgitation is present.  · Mild dilation of the aortic root is present.    Neuro/Psych  (+) psychiatric history,     GI/Hepatic/Renal/Endo    (+) morbid obesity, GERD, PUD,  hepatitis, liver disease history of elevated LFT, renal disease CRI, diabetes mellitus,     Musculoskeletal     Abdominal   (+) obese,    Substance History      OB/GYN          Other   arthritis, autoimmune disease , blood dyscrasia anemia,       Other Comment: IgG Gamma MGUS                Anesthesia Plan    ASA 4     MAC     intravenous induction     Anesthetic plan, all risks, benefits, and alternatives have been provided, discussed and informed consent has been obtained with: patient.

## 2020-11-12 NOTE — H&P
Patient Care Team:  Paulette Harris MD as PCP - General  Josefina Plascencia MD as Consulting Physician (Nephrology)  Alvaro Pandey MD as Consulting Physician (Cardiology)      Subjective .     History of present illness:    Bry Ratliff is a 75 y.o. male who presents today for Procedure(s):  ENDOSCOPIC RETROGRADE CHOLANGIOPANCREATOGRAPHY for the indications listed below.     The updated Patient Profile was reviewed prior to the procedure, in conjunction with the Physical Exam, including medical conditions, surgical procedures, medications, allergies, family history and social history.     Pre-operatively, I reviewed the indication(s) for the procedure, the risks of the procedure [including but not limited to: unexpected bleeding possibly requiring hospitalization and/or unplanned repeat procedures, perforation possibly requiring surgical treatment, missed lesions and complications of sedation/MAC (also explained by anesthesia staff)].     I have evaluated the patient for risks associated with the planned anesthesia and the procedure to be performed and find the patient an acceptable candidate for IV sedation.    Multiple opportunities were provided for any questions or concerns, and all questions were answered satisfactorily before any anesthesia was administered. We will proceed with the planned procedure.    ROS, positive for SOA, no abdominal pain, some nausea, no vomiting    ASSESSMENT/PLAN:  Abnormal LFTs and ERCP      Past Medical History:  Past Medical History:   Diagnosis Date   • Anemia     iron deficiency   • Anxiety    • B12 deficiency 2009   • Balance disorder    • Syed's esophagus    • Bile duct leak    • CAD (coronary artery disease)     cardiac stent   • Constipation    • DDD (degenerative disc disease), lumbar    • Decubitus ulcer     buttocks   • Depression    • Diabetes mellitus (CMS/HCC)    • Disease of thyroid gland     hypothyroid   • Diverticular disease    • Dvt  femoral (deep venous thrombosis) (CMS/HCC) 2004    rt let   • Elevated cholesterol    • Fistula of intestine    • Gastroparesis    • GERD (gastroesophageal reflux disease)    • Gout    • Hepatic encephalopathy (CMS/HCC)     if doesnt take meds   • History of echocardiogram     03/03/2017 - 12/03/2014 - 04/2012   • History of stomach ulcers    • History of transfusion    • Hyperlipidemia    • Incontinence    • Iron deficiency    • Kidney stones    • Morbid obesity (CMS/HCC)    • THOMAS (nonalcoholic steatohepatitis)    • Neuropathy    • OAB (overactive bladder)    • Open wound     rt knee   • KATHIA treated with BiPAP     bring dos   • Peripheral edema    • Pulmonary embolus (CMS/HCC) 2004   • Renal insufficiency     stage 3   • S/P lumbar laminectomy    • Skin irritation     skin fold   • Stage 3 chronic kidney disease        Past Surgical History:  Past Surgical History:   Procedure Laterality Date   • ABDOMINAL SURGERY     • BACK SURGERY  1971   • BILE DUCT STENT PLACEMENT     • BILE DUCT STENT PLACEMENT     • CARDIAC CATHETERIZATION     • CATARACT EXTRACTION  2014   • CATARACT EXTRACTION, BILATERAL  2014   • CHOLECYSTECTOMY  02/24/2019   • COLON RESECTION Right 6/11/2020    Procedure: COLON RESECTION RIGHT;  Surgeon: Naif Etienne DO;  Location: [facility] MAIN OR;  Service: General;  Laterality: Right;   • COLONOSCOPY  03/2018   • CORONARY ANGIOPLASTY  08/24/2009    PCI stent - succesful placement of 3.5/23 promus stent in proximal right coronary artery   • CORONARY ANGIOPLASTY WITH STENT PLACEMENT  08/27/2009    patient had stent placed to proximal right coronary artery   • CYSTOSCOPY BLADDER STONE LITHOTRIPSY  1997   • ENDOSCOPY N/A 10/18/2019    Procedure: ESOPHAGOGASTRODUODENOSCOPY with argon plasma coagulation of gastric antral vascular ectasia;  Surgeon: Marisel Gomez MD;  Location: [facility] ENDOSCOPY;  Service: Gastroenterology   • ENDOSCOPY N/A 1/9/2020    Procedure: ESOPHAGOGASTRODUODENOSCOPY WITH BIOPSY,  ARGON PLASMA COAGULATION OF GASTRIC ANTREL VASCULAR ECTASIA,;  Surgeon: Marisel Gomez MD;  Location: [facility] ENDOSCOPY;  Service: Gastroenterology   • ENDOSCOPY N/A 3/6/2020    Procedure: ESOPHAGOGASTRODUODENOSCOPY;  Surgeon: Zbigniew Silva MD;  Location: [facility] ENDOSCOPY;  Service: Gastroenterology;  Laterality: N/A;  mild gave, post cautery ulcer     • ERCP N/A 11/20/2019    Procedure: ERCP, clearance of bile duct with balloon, placement of biliary stent, EGD with argon plasma coagulation of gastric antral vascular ectasia;  Surgeon: Marisel Gomez MD;  Location: [facility] ENDOSCOPY;  Service: Gastroenterology   • ERCP N/A 2/27/2020    Procedure: ENDOSCOPIC RETROGRADE CHOLANGIOPANCREATOGRAPHY with removal of biliary stent, brushing, dilation, and placement of biliary stent x 2 and Esophagogastroduodenoscopy with argon plasma coagulation;  Surgeon: Marisel Gomez MD;  Location: [facility] ENDOSCOPY;  Service: Gastroenterology;  Laterality: N/A;  Gastric antral vascular ectasia, common bile duct stricture   • ERCP N/A 4/20/2020    Procedure: ENDOSCOPIC RETROGRADE CHOLANGIOPANCREATOGRAPHY WITH REMOVAL OF BILIARY STENT, AMPULA DILATION TO 8MM, BRUSHING OF COMMON BILE DUT, CLEARANCE OF BILE DUCT WITH BALLOON, BIOPSY OF AMPULA, PLACEMENT OF BILIARY STENT;  Surgeon: Marisel Gomez MD;  Location: [facility] ENDOSCOPY;  Service: Gastroenterology;  Laterality: N/A;  POST:CBD STRICTURE   • OTHER SURGICAL HISTORY  11/2018    IVC filter implant   • RENAL ARTERY STENT Left     does not recall this   • UPPER ENDOSCOPIC ULTRASOUND W/ FNA N/A 4/20/2020    Procedure: Esophagogastroduodenoscopy with Endoscopic ultrasound and fine needle aspiration;  Surgeon: Marisel Gomez MD;  Location: [facility] ENDOSCOPY;  Service: Gastroenterology;  Laterality: N/A;  Post: CBD STRICTURE   • VENA CAVA FILTER INSERTION  12/05/2018       Social History:  Social History     Tobacco Use   • Smoking status: Never Smoker   • Smokeless tobacco: Never Used  "  Substance Use Topics   • Alcohol use: No     Frequency: Never   • Drug use: No       Family History:  Family History   Problem Relation Age of Onset   • Hyperlipidemia Other    • Hypertension Other        Medications:  Medications Prior to Admission   Medication Sig Dispense Refill Last Dose   • allopurinol (ZYLOPRIM) 100 MG tablet Take 100 mg by mouth 2 (Two) Times a Day.   11/11/2020 at Unknown time   • aspirin 81 MG tablet Take 1 tablet by mouth Daily. (Patient taking differently: Take 81 mg by mouth Daily. Hold DOS) 30 tablet 3 11/11/2020 at Unknown time   • B-D 3CC LUER-YASMEEN SYR 25GX1\" 25G X 1\" 3 ML misc USE FOR THE B12 INJECTION INTRAMUSCULAR ONCE MONTHLY   11/12/2020 at Unknown time   • cyanocobalamin 1000 MCG/ML injection Inject 1,000 mcg into the appropriate muscle as directed by prescriber Every 28 (Twenty-Eight) Days.   Past Month at Unknown time   • docusate sodium (COLACE) 100 MG capsule Take 100 mg by mouth 2 (Two) Times a Day.   Past Week at Unknown time   • DULoxetine (CYMBALTA) 60 MG capsule Take 60 mg by mouth Daily. Take preop   11/11/2020 at Unknown time   • ezetimibe (Zetia) 10 MG tablet Take 1 tablet by mouth Daily. 30 tablet 11 11/11/2020 at Unknown time   • ferrous gluconate (FERGON) 324 MG tablet Take  by mouth Daily.   11/11/2020 at Unknown time   • folic acid (FOLVITE) 1 MG tablet Take 1 mg by mouth Daily. Last dose 6/6   11/11/2020 at Unknown time   • hydrOXYzine pamoate (VISTARIL) 25 MG capsule Take 25 mg by mouth 3 (Three) Times a Day As Needed. Take preop if needed  0 11/11/2020 at Unknown time   • insulin glargine (LANTUS) 100 UNIT/ML injection Inject 50 units at bedtime (Patient taking differently: Inject 52 Units under the skin into the appropriate area as directed Every Night. Inject 52 units at bedtime) 45 mL 3 11/11/2020 at Unknown time   • insulin lispro (HUMALOG) 100 UNIT/ML injection 18 units subcu before each meal plus sliding scale MDD 60 (Patient taking differently: Inject " 18 Units under the skin into the appropriate area as directed 3 (Three) Times a Day Before Meals. 18 units subcu before each meal plus sliding scale MDD 60    dont take preop) 60 mL 4 11/11/2020 at Unknown time   • lactulose (CHRONULAC) 10 GM/15ML solution Take 60 mL by mouth Every 4 (Four) Hours. Will not take dos am   11/11/2020 at Unknown time   • levothyroxine (SYNTHROID, LEVOTHROID) 75 MCG tablet Take 1 tablet by mouth Daily. (Patient taking differently: Take 75 mcg by mouth Daily. Take preop) 90 tablet 4 11/11/2020 at Unknown time   • magnesium oxide (MAG-OX) 400 MG tablet Take 400 mg by mouth Daily. dont take preop   11/11/2020 at Unknown time   • Multiple Vitamins-Minerals (MULTIVITAMIN WITH MINERALS) tablet tablet Take 1 tablet by mouth Daily.   11/11/2020 at Unknown time   • nitroglycerin (NITROSTAT) 0.4 MG SL tablet Place 1 tablet under the tongue Every 5 (Five) Minutes As Needed for Chest Pain (Only if SBP Greater Than 100). Take no more than 3 doses in 15 minutes. (Patient taking differently: Place 0.4 mg under the tongue Every 5 (Five) Minutes As Needed for Chest Pain (Only if SBP Greater Than 100). Take no more than 3 doses in 15 minutes. hasnt needed) 30 tablet 12    • ONETOUCH VERIO test strip USE TO CHECK BLOOD SUGAR QID   11/12/2020 at Unknown time   • oxyCODONE (ROXICODONE) 10 MG tablet Take 10 mg by mouth Every 8 (Eight) Hours As Needed. Take preop if needed   11/11/2020 at Unknown time   • pantoprazole (PROTONIX) 40 MG EC tablet Take 1 tablet by mouth 2 (Two) Times a Day. (Patient taking differently: Take 40 mg by mouth Daily.) 30 tablet 3 11/11/2020 at Unknown time   • rifaximin (XIFAXAN) 550 MG tablet Take 550 mg by mouth Every 12 (Twelve) Hours. Take preop   11/11/2020 at Unknown time   • sodium bicarbonate 650 MG tablet Take 650 mg by mouth 3 (Three) Times a Day. Take preop   11/11/2020 at Unknown time   • ursodiol (ACTIGALL) 300 MG capsule Take 500 mg by mouth 2 (Two) Times a Day.    11/11/2020 at Unknown time   • zinc sulfate (ZINCATE) 220 (50 Zn) MG capsule Take 220 mg by mouth Daily. dont take dos   11/11/2020 at Unknown time   • melatonin 5 MG tablet tablet Take 10 mg by mouth At Night As Needed.      • metoclopramide (REGLAN) 5 MG tablet Take 5 mg by mouth 2 (Two) Times a Day. Do not take preop  0    • Mirabegron ER (Myrbetriq) 50 MG tablet sustained-release 24 hour 24 hr tablet Take 50 mg by mouth Daily. Has been out but may take preop          Scheduled Meds:indomethacin, , ,   iopamidol, , ,       Continuous Infusions:sodium chloride, 1,000 mL, Last Rate: 1,000 mL (11/12/20 1204)      PRN Meds:.    ALLERGIES:  Patient has no known allergies.        Objective     Vital Signs:   Temp:  [99.1 °F (37.3 °C)] 99.1 °F (37.3 °C)  Heart Rate:  [87] 87  Resp:  [18] 18  BP: (144)/(55) 144/55    Physical Exam:      General Appearance:    Awake and alert, in no acute distress   Lungs:     Clear to auscultation bilaterally, respirations regular, even and unlabored    Heart:    Regular rhythm and normal rate, normal S1 and S2, no            murmur, no gallop, no rub   Abdomen:     Normal bowel sounds, soft, non-tender, no rebound or guarding, non-distended, no hepatosplenomegaly        Results Review:   I reviewed the patient's labs and imaging.  Lab Results (last 24 hours)     Procedure Component Value Units Date/Time    POC Glucose Once [[id number]]  (Abnormal) Collected: 11/12/20 1050    Specimen: Blood Updated: 11/12/20 1054     Glucose 145 mg/dL      Comment: Serial Number: [id number]:  [id number]             Imaging Results (Last 24 Hours)     ** No results found for the last 24 hours. **             I discussed the patients findings and my recommendations with the patient.  Marisel Gomez MD  11/12/20  12:12 EST

## 2020-11-12 NOTE — NURSING NOTE
Dr. Gomez called unit to check on patient. Pt AxOx3 wife at bedside. Pt following commands. Orders to monitor patient another 30 minutes, and if labs normal patient can be discharged to home.

## 2020-11-12 NOTE — DISCHARGE INSTRUCTIONS
A responsible adult should stay with you and you should rest quietly for the rest of the day.    Do not drink alcohol, drive, operate any heavy machinery or power tools or make any legal/important decisions for the next 24 hours.     Progress your diet as tolerated.  If you begin to experience severe pain, increased shortness of breath, racing heartbeat or a fever above 101 F, seek immediate medical attention.     Follow up with MD as instructed.      Recommendations:  Repeat liver function tests next week.  The liver function test continues to be normal, we may hold off on the MRI.  May ERCP with removal of metal metal stent in 4 to 6 months.  Follow-up with the GI clinic in 2-month as scheduled.  Take extra dose of lactulose today and tomorrow.

## 2020-11-12 NOTE — ANESTHESIA PROCEDURE NOTES
Airway  Urgency: elective    Date/Time: 11/12/2020 12:30 PM  Airway not difficult    General Information and Staff    Patient location during procedure: OR  Anesthesiologist: Anna Etienne MD    Indications and Patient Condition  Indications for airway management: airway protection    Preoxygenated: yes  MILS maintained throughout  Mask difficulty assessment: 1 - vent by mask    Final Airway Details  Final airway type: endotracheal airway      Successful airway: ETT  Cuffed: yes   Successful intubation technique: direct laryngoscopy  Endotracheal tube insertion site: oral  Blade: Vikram  Blade size: 4  ETT size (mm): 7.5  Cormack-Lehane Classification: grade I - full view of glottis  Placement verified by: chest auscultation and capnometry   Measured from: lips  ETT/EBT  to lips (cm): 22  Number of attempts at approach: 1  Assessment: lips, teeth, and gum same as pre-op and atraumatic intubation

## 2020-11-13 NOTE — ANESTHESIA POSTPROCEDURE EVALUATION
Patient: Bry Ratliff    Procedure Summary     Date: 11/12/20 Room / Location: [facility] ENDOSCOPY 2 / [facility] ENDOSCOPY    Anesthesia Start: 1222 Anesthesia Stop: 1310    Procedure: ENDOSCOPIC RETROGRADE CHOLANGIOPANCREATOGRAPHY WITH BALLOON CLEARANCE OF COMMON BILE DUCT, BRUSHING OF COMMON BILE DUCT STRICTURE, BIOPSY X 1 AREA, PLACEMENT OF BILIARY STENT (N/A ) Diagnosis:       Abnormal tumor markers      Elevated liver enzymes      Cirrhosis of liver (CMS/HCC)      Syed's esophagus      (Abnormal tumor markers [R97.8])      (Elevated liver enzymes [R74.8])      (Cirrhosis of liver (CMS/HCC) [K74.60])      (Syed's esophagus [K22.70])    Surgeon: Marisel Gomez MD Provider: Anna Etienne MD    Anesthesia Type: MAC ASA Status: 4          Anesthesia Type: MAC    Vitals  Vitals Value Taken Time   /55 11/12/20 1533   Temp     Pulse 81 11/12/20 1534   Resp 25 11/12/20 1400   SpO2 97 % 11/12/20 1534   Vitals shown include unvalidated device data.        Post Anesthesia Care and Evaluation    Patient location during evaluation: PACU  Patient participation: complete - patient participated  Level of consciousness: awake  Pain scale: See nurse's notes for pain score.  Pain management: adequate  Airway patency: patent  Anesthetic complications: No anesthetic complications  PONV Status: none  Cardiovascular status: acceptable  Respiratory status: acceptable  Hydration status: acceptable    Comments: Patient seen and examined postoperatively; vital signs stable; SpO2 greater than or equal to 90%; cardiopulmonary status stable; nausea/vomiting adequately controlled; pain adequately controlled; no apparent anesthesia complications; patient discharged from anesthesia care when discharge criteria were met. Pt mild respiratory distress in PACU. Resolved with single dose of sugammadex.

## 2020-11-23 NOTE — PROGRESS NOTES
Subjective   Bry Ratliff is a 75 y.o. male.     History of present illness  Mr. Ratliff is seen in the office today for drainage from his old fistula site in the right flank/upper quadrant.  His wife states that over the weekend the area became quite swollen and red and open spontaneously drained and is been draining since.  On 11/12 he had an ERCP with placement of a metal stent by Dr. Gomez.  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  I think it is quite unlikely that he would have any connection to the inside again.  But I  need to do a CT scan to prove that.    Past Medical History:   Diagnosis Date   • Anemia     iron deficiency   • Anxiety    • B12 deficiency 2009   • Balance disorder    • Syed's esophagus    • Bile duct leak    • CAD (coronary artery disease)     cardiac stent   • Constipation    • DDD (degenerative disc disease), lumbar    • Decubitus ulcer     buttocks   • Depression    • Diabetes mellitus (CMS/HCC)    • Disease of thyroid gland     hypothyroid   • Diverticular disease    • Dvt femoral (deep venous thrombosis) (CMS/HCC) 2004    rt let   • Elevated cholesterol    • Fistula of intestine    • Gastroparesis    • GERD (gastroesophageal reflux disease)    • Gout    • Hepatic encephalopathy (CMS/HCC)     if doesnt take meds   • History of echocardiogram     03/03/2017 - 12/03/2014 - 04/2012   • History of stomach ulcers    • History of transfusion    • Hyperlipidemia    • Incontinence    • Iron deficiency    • Kidney stones    • Morbid obesity (CMS/HCC)    • THOMAS (nonalcoholic steatohepatitis)    • Neuropathy    • OAB (overactive bladder)    • Open wound     rt knee   • KATHIA treated with BiPAP     bring dos   • Peripheral edema    • Pulmonary embolus (CMS/HCC) 2004   • Renal insufficiency     stage 3   • S/P lumbar laminectomy    • Skin irritation     skin fold   • Stage 3 chronic kidney disease        Past Surgical History:   Procedure Laterality Date   •  ABDOMINAL SURGERY     • BACK SURGERY  1971   • BILE DUCT STENT PLACEMENT     • BILE DUCT STENT PLACEMENT     • CARDIAC CATHETERIZATION     • CATARACT EXTRACTION  2014   • CATARACT EXTRACTION, BILATERAL  2014   • CHOLECYSTECTOMY  02/24/2019   • COLON RESECTION Right 6/11/2020    Procedure: COLON RESECTION RIGHT;  Surgeon: Naif Etienne DO;  Location: [facility] MAIN OR;  Service: General;  Laterality: Right;   • COLONOSCOPY  03/2018   • CORONARY ANGIOPLASTY  08/24/2009    PCI stent - succesful placement of 3.5/23 promus stent in proximal right coronary artery   • CORONARY ANGIOPLASTY WITH STENT PLACEMENT  08/27/2009    patient had stent placed to proximal right coronary artery   • CYSTOSCOPY BLADDER STONE LITHOTRIPSY  1997   • ENDOSCOPY N/A 10/18/2019    Procedure: ESOPHAGOGASTRODUODENOSCOPY with argon plasma coagulation of gastric antral vascular ectasia;  Surgeon: Marisel Gomez MD;  Location: [facility] ENDOSCOPY;  Service: Gastroenterology   • ENDOSCOPY N/A 1/9/2020    Procedure: ESOPHAGOGASTRODUODENOSCOPY WITH BIOPSY, ARGON PLASMA COAGULATION OF GASTRIC ANTREL VASCULAR ECTASIA,;  Surgeon: Marisel Gomez MD;  Location: [facility] ENDOSCOPY;  Service: Gastroenterology   • ENDOSCOPY N/A 3/6/2020    Procedure: ESOPHAGOGASTRODUODENOSCOPY;  Surgeon: Zbigniew Silva MD;  Location: [facility] ENDOSCOPY;  Service: Gastroenterology;  Laterality: N/A;  mild gave, post cautery ulcer     • ERCP N/A 11/20/2019    Procedure: ERCP, clearance of bile duct with balloon, placement of biliary stent, EGD with argon plasma coagulation of gastric antral vascular ectasia;  Surgeon: Marisel Gomez MD;  Location: [facility] ENDOSCOPY;  Service: Gastroenterology   • ERCP N/A 2/27/2020    Procedure: ENDOSCOPIC RETROGRADE CHOLANGIOPANCREATOGRAPHY with removal of biliary stent, brushing, dilation, and placement of biliary stent x 2 and Esophagogastroduodenoscopy with argon plasma coagulation;  Surgeon: Marisel Gomez MD;  Location: [facility] ENDOSCOPY;   "Service: Gastroenterology;  Laterality: N/A;  Gastric antral vascular ectasia, common bile duct stricture   • ERCP N/A 4/20/2020    Procedure: ENDOSCOPIC RETROGRADE CHOLANGIOPANCREATOGRAPHY WITH REMOVAL OF BILIARY STENT, AMPULA DILATION TO 8MM, BRUSHING OF COMMON BILE DUT, CLEARANCE OF BILE DUCT WITH BALLOON, BIOPSY OF AMPULA, PLACEMENT OF BILIARY STENT;  Surgeon: Marisel Gomez MD;  Location: [facility] ENDOSCOPY;  Service: Gastroenterology;  Laterality: N/A;  POST:CBD STRICTURE   • ERCP N/A 11/12/2020    Procedure: ENDOSCOPIC RETROGRADE CHOLANGIOPANCREATOGRAPHY WITH BALLOON CLEARANCE OF COMMON BILE DUCT, BRUSHING OF COMMON BILE DUCT STRICTURE, BIOPSY X 1 AREA, PLACEMENT OF BILIARY STENT;  Surgeon: Marisel Gomez MD;  Location: [facility] ENDOSCOPY;  Service: Gastroenterology;  Laterality: N/A;  Post op:REMOVAL OF SLUDGE, SUCCESSFUL STENT PLACEMENT   • OTHER SURGICAL HISTORY  11/2018    IVC filter implant   • RENAL ARTERY STENT Left     does not recall this   • UPPER ENDOSCOPIC ULTRASOUND W/ FNA N/A 4/20/2020    Procedure: Esophagogastroduodenoscopy with Endoscopic ultrasound and fine needle aspiration;  Surgeon: Marisel Gomez MD;  Location: [facility] ENDOSCOPY;  Service: Gastroenterology;  Laterality: N/A;  Post: CBD STRICTURE   • VENA CAVA FILTER INSERTION  12/05/2018       Outpatient Encounter Medications as of 11/23/2020   Medication Sig Dispense Refill   • allopurinol (ZYLOPRIM) 100 MG tablet Take 100 mg by mouth 2 (Two) Times a Day.     • aspirin 81 MG tablet Take 1 tablet by mouth Daily. (Patient taking differently: Take 81 mg by mouth Daily. Hold DOS) 30 tablet 3   • B-D 3CC LUER-YASMEEN SYR 25GX1\" 25G X 1\" 3 ML misc USE FOR THE B12 INJECTION INTRAMUSCULAR ONCE MONTHLY     • ciprofloxacin (CIPRO) 500 MG tablet TK 1 T PO BID FOR 10 DAYS     • cyanocobalamin 1000 MCG/ML injection Inject 1,000 mcg into the appropriate muscle as directed by prescriber Every 28 (Twenty-Eight) Days.     • docusate sodium (COLACE) 100 MG " capsule Take 100 mg by mouth 2 (Two) Times a Day.     • DULoxetine (CYMBALTA) 60 MG capsule Take 60 mg by mouth Daily. Take preop     • ezetimibe (Zetia) 10 MG tablet Take 1 tablet by mouth Daily. 30 tablet 11   • ferrous gluconate (FERGON) 324 MG tablet Take  by mouth Daily.     • folic acid (FOLVITE) 1 MG tablet Take 1 mg by mouth Daily. Last dose 6/6     • hydrOXYzine pamoate (VISTARIL) 25 MG capsule Take 25 mg by mouth 3 (Three) Times a Day As Needed. Take preop if needed  0   • insulin glargine (LANTUS) 100 UNIT/ML injection Inject 50 units at bedtime (Patient taking differently: Inject 52 Units under the skin into the appropriate area as directed Every Night. Inject 52 units at bedtime) 45 mL 3   • insulin lispro (HUMALOG) 100 UNIT/ML injection 18 units subcu before each meal plus sliding scale MDD 60 (Patient taking differently: Inject 18 Units under the skin into the appropriate area as directed 3 (Three) Times a Day Before Meals. 18 units subcu before each meal plus sliding scale MDD 60    dont take preop) 60 mL 4   • lactulose (CHRONULAC) 10 GM/15ML solution Take 60 mL by mouth Every 4 (Four) Hours. Will not take dos am     • levothyroxine (SYNTHROID, LEVOTHROID) 75 MCG tablet Take 1 tablet by mouth Daily. (Patient taking differently: Take 75 mcg by mouth Daily. Take preop) 90 tablet 4   • magnesium oxide (MAG-OX) 400 MG tablet Take 400 mg by mouth Daily. dont take preop     • melatonin 5 MG tablet tablet Take 10 mg by mouth At Night As Needed.     • Multiple Vitamins-Minerals (MULTIVITAMIN WITH MINERALS) tablet tablet Take 1 tablet by mouth Daily.     • nitroglycerin (NITROSTAT) 0.4 MG SL tablet Place 1 tablet under the tongue Every 5 (Five) Minutes As Needed for Chest Pain (Only if SBP Greater Than 100). Take no more than 3 doses in 15 minutes. (Patient taking differently: Place 0.4 mg under the tongue Every 5 (Five) Minutes As Needed for Chest Pain (Only if SBP Greater Than 100). Take no more than 3  doses in 15 minutes. hasnt needed) 30 tablet 12   • ONETOUCH VERIO test strip USE TO CHECK BLOOD SUGAR QID     • oxyCODONE (ROXICODONE) 10 MG tablet Take 10 mg by mouth Every 8 (Eight) Hours As Needed. Take preop if needed     • pantoprazole (PROTONIX) 40 MG EC tablet Take 1 tablet by mouth 2 (Two) Times a Day. (Patient taking differently: Take 40 mg by mouth Daily.) 30 tablet 3   • rifaximin (XIFAXAN) 550 MG tablet Take 550 mg by mouth Every 12 (Twelve) Hours. Take preop     • sodium bicarbonate 650 MG tablet Take 650 mg by mouth 3 (Three) Times a Day. Take preop     • ursodiol (ACTIGALL) 500 MG tablet Take 500 mg by mouth 2 (Two) Times a Day.     • zinc sulfate (ZINCATE) 220 (50 Zn) MG capsule Take 220 mg by mouth Daily. dont take dos     • metoclopramide (REGLAN) 5 MG tablet Take 5 mg by mouth 2 (Two) Times a Day. Do not take preop  0   • [DISCONTINUED] Mirabegron ER (Myrbetriq) 50 MG tablet sustained-release 24 hour 24 hr tablet Take 50 mg by mouth Daily. Has been out but may take preop     • [DISCONTINUED] ursodiol (ACTIGALL) 300 MG capsule Take 500 mg by mouth 2 (Two) Times a Day.       No facility-administered encounter medications on file as of 11/23/2020.        No Known Allergies    Family History   Problem Relation Age of Onset   • Hyperlipidemia Other    • Hypertension Other        Social History     Socioeconomic History   • Marital status:      Spouse name: Not on file   • Number of children: Not on file   • Years of education: Not on file   • Highest education level: Not on file   Tobacco Use   • Smoking status: Never Smoker   • Smokeless tobacco: Never Used   Substance and Sexual Activity   • Alcohol use: No     Frequency: Never   • Drug use: No   • Sexual activity: Defer       The following portions of the patient's history were reviewed and updated as appropriate: allergies, current medications, past family history, past medical history, past social history, past surgical history and problem  list.    Objective       Assessment/Plan   There are no diagnoses linked to this encounter.    Complete review of systems is done and unremarkable exception the chief complaint    Physical exam shows pleasant obese 75-year-old male.  HEENT is negative.  Heart regular.  Lungs clear.  Abdomen is soft.  Nontender.  Extremities show equal range of motion in the upper and lower extremities with symmetrical strength and usage.  Neuro shows no obvious focal deficit.  He does have some purulence from the old fistula site where a percutaneous drain tube had been placed by Dr. Donahue.    Impression: Drainage from old fistula site    Plan: Do a CT scan to make sure that there is no intra-abdominal component to this           Naif Etienne DO  11/23/2020  13:36 EST

## 2020-12-02 NOTE — PROGRESS NOTES
Subjective   Bry Ratliff is a 75 y.o. male.     History of present illness  Bry is seen in the office today to discuss the CT scan done for drainage from the right lateral chest wall.  CT shows a reaccumulation of fluid near the lateral lobe of the liver and possible pleural effusion also that may be connected.  I think these are all probably a recurrence of an abscess from his original cholecystectomy.  I have put a call out to Dr. Donahue in interventional radiology to review his CT scan and.  I think he will need a percutaneous drain.    Past Medical History:   Diagnosis Date   • Anemia     iron deficiency   • Anxiety    • B12 deficiency 2009   • Balance disorder    • Syed's esophagus    • Bile duct leak    • CAD (coronary artery disease)     cardiac stent   • Constipation    • DDD (degenerative disc disease), lumbar    • Decubitus ulcer     buttocks   • Depression    • Diabetes mellitus (CMS/HCC)    • Disease of thyroid gland     hypothyroid   • Diverticular disease    • Dvt femoral (deep venous thrombosis) (CMS/HCC) 2004    rt let   • Elevated cholesterol    • Fistula of intestine    • Gastroparesis    • GERD (gastroesophageal reflux disease)    • Gout    • Hepatic encephalopathy (CMS/HCC)     if doesnt take meds   • History of echocardiogram     03/03/2017 - 12/03/2014 - 04/2012   • History of stomach ulcers    • History of transfusion    • Hyperlipidemia    • Incontinence    • Iron deficiency    • Kidney stones    • Morbid obesity (CMS/HCC)    • THOMAS (nonalcoholic steatohepatitis)    • Neuropathy    • OAB (overactive bladder)    • Open wound     rt knee   • KATHIA treated with BiPAP     bring dos   • Peripheral edema    • Pulmonary embolus (CMS/HCC) 2004   • Renal insufficiency     stage 3   • S/P lumbar laminectomy    • Skin irritation     skin fold   • Stage 3 chronic kidney disease        Past Surgical History:   Procedure Laterality Date   • ABDOMINAL SURGERY     • BACK SURGERY  1971   • BILE DUCT  STENT PLACEMENT     • BILE DUCT STENT PLACEMENT     • CARDIAC CATHETERIZATION     • CATARACT EXTRACTION  2014   • CATARACT EXTRACTION, BILATERAL  2014   • CHOLECYSTECTOMY  02/24/2019   • COLON RESECTION Right 6/11/2020    Procedure: COLON RESECTION RIGHT;  Surgeon: Naif Etienne DO;  Location: [facility] MAIN OR;  Service: General;  Laterality: Right;   • COLONOSCOPY  03/2018   • CORONARY ANGIOPLASTY  08/24/2009    PCI stent - succesful placement of 3.5/23 promus stent in proximal right coronary artery   • CORONARY ANGIOPLASTY WITH STENT PLACEMENT  08/27/2009    patient had stent placed to proximal right coronary artery   • CYSTOSCOPY BLADDER STONE LITHOTRIPSY  1997   • ENDOSCOPY N/A 10/18/2019    Procedure: ESOPHAGOGASTRODUODENOSCOPY with argon plasma coagulation of gastric antral vascular ectasia;  Surgeon: Marisel Gomez MD;  Location: [facility] ENDOSCOPY;  Service: Gastroenterology   • ENDOSCOPY N/A 1/9/2020    Procedure: ESOPHAGOGASTRODUODENOSCOPY WITH BIOPSY, ARGON PLASMA COAGULATION OF GASTRIC ANTREL VASCULAR ECTASIA,;  Surgeon: Marisel Gomez MD;  Location: [facility] ENDOSCOPY;  Service: Gastroenterology   • ENDOSCOPY N/A 3/6/2020    Procedure: ESOPHAGOGASTRODUODENOSCOPY;  Surgeon: Zbigniew Silva MD;  Location: [facility] ENDOSCOPY;  Service: Gastroenterology;  Laterality: N/A;  mild gave, post cautery ulcer     • ERCP N/A 11/20/2019    Procedure: ERCP, clearance of bile duct with balloon, placement of biliary stent, EGD with argon plasma coagulation of gastric antral vascular ectasia;  Surgeon: Marisel Gomez MD;  Location: [facility] ENDOSCOPY;  Service: Gastroenterology   • ERCP N/A 2/27/2020    Procedure: ENDOSCOPIC RETROGRADE CHOLANGIOPANCREATOGRAPHY with removal of biliary stent, brushing, dilation, and placement of biliary stent x 2 and Esophagogastroduodenoscopy with argon plasma coagulation;  Surgeon: Marisel Gomez MD;  Location: [facility] ENDOSCOPY;  Service: Gastroenterology;  Laterality: N/A;  Gastric  "antral vascular ectasia, common bile duct stricture   • ERCP N/A 4/20/2020    Procedure: ENDOSCOPIC RETROGRADE CHOLANGIOPANCREATOGRAPHY WITH REMOVAL OF BILIARY STENT, AMPULA DILATION TO 8MM, BRUSHING OF COMMON BILE DUT, CLEARANCE OF BILE DUCT WITH BALLOON, BIOPSY OF AMPULA, PLACEMENT OF BILIARY STENT;  Surgeon: Marisel Gomez MD;  Location: [facility] ENDOSCOPY;  Service: Gastroenterology;  Laterality: N/A;  POST:CBD STRICTURE   • ERCP N/A 11/12/2020    Procedure: ENDOSCOPIC RETROGRADE CHOLANGIOPANCREATOGRAPHY WITH BALLOON CLEARANCE OF COMMON BILE DUCT, BRUSHING OF COMMON BILE DUCT STRICTURE, BIOPSY X 1 AREA, PLACEMENT OF BILIARY STENT;  Surgeon: Marisel Gomez MD;  Location: [facility] ENDOSCOPY;  Service: Gastroenterology;  Laterality: N/A;  Post op:REMOVAL OF SLUDGE, SUCCESSFUL STENT PLACEMENT   • OTHER SURGICAL HISTORY  11/2018    IVC filter implant   • RENAL ARTERY STENT Left     does not recall this   • UPPER ENDOSCOPIC ULTRASOUND W/ FNA N/A 4/20/2020    Procedure: Esophagogastroduodenoscopy with Endoscopic ultrasound and fine needle aspiration;  Surgeon: Marisel Gomez MD;  Location: [facility] ENDOSCOPY;  Service: Gastroenterology;  Laterality: N/A;  Post: CBD STRICTURE   • VENA CAVA FILTER INSERTION  12/05/2018       Outpatient Encounter Medications as of 12/2/2020   Medication Sig Dispense Refill   • allopurinol (ZYLOPRIM) 100 MG tablet Take 100 mg by mouth 2 (Two) Times a Day.     • aspirin 81 MG tablet Take 1 tablet by mouth Daily. (Patient taking differently: Take 81 mg by mouth Daily. Hold DOS) 30 tablet 3   • B-D 3CC LUER-YASMEEN SYR 25GX1\" 25G X 1\" 3 ML misc USE FOR THE B12 INJECTION INTRAMUSCULAR ONCE MONTHLY     • cyanocobalamin 1000 MCG/ML injection Inject 1,000 mcg into the appropriate muscle as directed by prescriber Every 28 (Twenty-Eight) Days.     • docusate sodium (COLACE) 100 MG capsule Take 100 mg by mouth 2 (Two) Times a Day.     • DULoxetine (CYMBALTA) 60 MG capsule Take 60 mg by mouth Daily. Take preop "     • ezetimibe (Zetia) 10 MG tablet Take 1 tablet by mouth Daily. 30 tablet 11   • ferrous gluconate (FERGON) 324 MG tablet Take  by mouth Daily.     • folic acid (FOLVITE) 1 MG tablet Take 1 mg by mouth Daily. Last dose 6/6     • hydrOXYzine pamoate (VISTARIL) 25 MG capsule Take 25 mg by mouth 3 (Three) Times a Day As Needed. Take preop if needed  0   • insulin glargine (LANTUS) 100 UNIT/ML injection Inject 50 units at bedtime (Patient taking differently: Inject 52 Units under the skin into the appropriate area as directed Every Night. Inject 52 units at bedtime) 45 mL 3   • insulin lispro (HUMALOG) 100 UNIT/ML injection 18 units subcu before each meal plus sliding scale MDD 60 (Patient taking differently: Inject 18 Units under the skin into the appropriate area as directed 3 (Three) Times a Day Before Meals. 18 units subcu before each meal plus sliding scale MDD 60    dont take preop) 60 mL 4   • lactulose (CHRONULAC) 10 GM/15ML solution Take 60 mL by mouth Every 4 (Four) Hours. Will not take dos am     • levothyroxine (SYNTHROID, LEVOTHROID) 75 MCG tablet Take 1 tablet by mouth Daily. (Patient taking differently: Take 75 mcg by mouth Daily. Take preop) 90 tablet 4   • magnesium oxide (MAG-OX) 400 MG tablet Take 400 mg by mouth Daily. dont take preop     • melatonin 5 MG tablet tablet Take 10 mg by mouth At Night As Needed.     • metoclopramide (REGLAN) 5 MG tablet Take 5 mg by mouth 2 (Two) Times a Day. Do not take preop  0   • Multiple Vitamins-Minerals (MULTIVITAMIN WITH MINERALS) tablet tablet Take 1 tablet by mouth Daily.     • nitroglycerin (NITROSTAT) 0.4 MG SL tablet Place 1 tablet under the tongue Every 5 (Five) Minutes As Needed for Chest Pain (Only if SBP Greater Than 100). Take no more than 3 doses in 15 minutes. (Patient taking differently: Place 0.4 mg under the tongue Every 5 (Five) Minutes As Needed for Chest Pain (Only if SBP Greater Than 100). Take no more than 3 doses in 15 minutes. hasnt  needed) 30 tablet 12   • ONETOUCH VERIO test strip USE TO CHECK BLOOD SUGAR QID     • oxyCODONE (ROXICODONE) 10 MG tablet Take 10 mg by mouth Every 8 (Eight) Hours As Needed. Take preop if needed     • pantoprazole (PROTONIX) 40 MG EC tablet Take 1 tablet by mouth 2 (Two) Times a Day. (Patient taking differently: Take 40 mg by mouth Daily.) 30 tablet 3   • rifaximin (XIFAXAN) 550 MG tablet Take 550 mg by mouth Every 12 (Twelve) Hours. Take preop     • sodium bicarbonate 650 MG tablet Take 650 mg by mouth 3 (Three) Times a Day. Take preop     • ursodiol (ACTIGALL) 500 MG tablet Take 500 mg by mouth 2 (Two) Times a Day.     • zinc sulfate (ZINCATE) 220 (50 Zn) MG capsule Take 220 mg by mouth Daily. dont take dos     • ciprofloxacin (CIPRO) 500 MG tablet TK 1 T PO BID FOR 10 DAYS       No facility-administered encounter medications on file as of 12/2/2020.        No Known Allergies    Family History   Problem Relation Age of Onset   • Hyperlipidemia Other    • Hypertension Other        Social History     Socioeconomic History   • Marital status:      Spouse name: Not on file   • Number of children: Not on file   • Years of education: Not on file   • Highest education level: Not on file   Tobacco Use   • Smoking status: Never Smoker   • Smokeless tobacco: Never Used   Substance and Sexual Activity   • Alcohol use: No     Frequency: Never   • Drug use: No   • Sexual activity: Defer       The following portions of the patient's history were reviewed and updated as appropriate: allergies, current medications, past family history, past medical history, past social history, past surgical history and problem list.    Objective       Assessment/Plan   There are no diagnoses linked to this encounter.  Impression: Loculated fluid collection likely abscess.    Plan: After I discussed him with Dr. Donahue and will get back in touch with  with further recommendations.             Naif Etienne DO  12/2/2020  13:29  EST

## 2020-12-02 NOTE — TELEPHONE ENCOUNTER
Spoke with patient's wife regarding recent CT report. After Dr. Etienne spoke with Dr. Donahue, it is decided that fluid collection is likely bile and this should resolve with stent in place after ERCP. Advised to continue dressing changes and follow up as needed.

## 2020-12-11 NOTE — PROGRESS NOTES
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 Anemia diagnosed 2018 and IgG kappa monoclonal gammopathy diagnosed May 2018.  Anemia related to chronic renal failure 3  · This patient is an obese  male who  developed kidney problems in 2018 for which he was referred to Devi Plascencia M.D. He was found to have a hemoglobin of 7.4 at that time and was given 1 unit of packed red blood cells. He was then referred to GI where he had been followed for Syed’s esophagus for a number of years. An EGD and colonoscopy was performed on 3/5/18 by Marisel Gomez M.D. revealing mucosa suggestive of Syed’s esophagus and hiatal hernia in the cardia. Patient had a poor prep compromising the colonoscopy exam though the scope did reach the cecum. Esophageal biopsy revealed squamocolumnar mucosa with extensive intestinal metaplasia consistent with Syed’s esophagus, negative for dysplasia. Colonoscopy was recommended to be repeated in two years and EGD in three years. The patient saw his primary care physician, Paulette Harris M.D., in followup and blood testing was done on 18 revealing WBC 6.3, hemoglobin 8.4, MCV 84.5, platelet count 182,000. Vitamin B12 level was 416 (180-914) and patient was referred here for further evaluation. The patient claims to have been diagnosed with Vitamin B12 deficiency a number of years ago for  which he has been on Vitamin B12 injections up until six months ago when he just stopped taking those. He has been recently started on oral iron supplementation. He claims not to see any blood in his urine but does have microscopic hematuria for which he sees a urologist. He denies nosebleeds, gum bleeds or blood in the stool. He has not had any significant changes in his weight. He feels weak and dizzy for a number of years which has recently gotten worse. He does not ambulate much because of back surgery causing him weakness. He did have a right lower extremity DVT with bilateral pulmonary emboli in 2004 since which time he has been on Coumadin, thought secondary to a sedentary lifestyle. He has no family history of anemias.   · 5/24/18 - Patient seen initial consultation for anemia. Hemoglobin 7.9, MCV 89.9. Ferritin 17 (), iron 183 (), TIBC 379 (228-428), iron saturation 48% (20-50), retic 2.16% (0.5-1.5), haptoglobin 138 (), folate 9.1 (5.9-24.8). SPEP showed monoclonal gammopathy. SIFE showed IgG kappa monoclonal gammopathy. M-spike in the gamma region 0.7 g/dL. Erythropoietin 53.5 (2.59-18.5). Antiparietal cell antibody and intrinsic factor antibodies negative.   Globulin 4.2 (2.5-3.8). Creatinine 1.4 (0.7-1.2).   · 6/19/18 - Discussed results of anemia workup. Hemoglobin improving. Ferrous Sulfate decreased to 325 mg twice a day. Will perform gammopathy workup for IgG kappa monoclonal gammopathy. IgA 783 (), IgG 1480 (600-1500), IgM 93 (). Kappa lambda FLC ratio 0.81 (0.26-1.65). Ferritin 36 ().        · 6/25/18 - Bone survey negative for acute disease. No evidence of lytic or blastic metastatic bone disease was present. Chest x-ray showed cardiomegaly, mild right basilar atelectasis and findings suggestive of ankylosing spondylitis.   · 6/29/18 - Patient underwent sternal bone marrow revealing monoclonal gammopathy of undetermined significant (MGUS). Extent of bone marrow  involvement 5%. Phenotype kappa positive, IgG positive, CD38 positive, CD20 negative, CD19 negative, CD45 negative, CD56 negative and  negative. Dyspoiesis was not seen. There was absence of iron stores. Normal male karyotype. Normal FISH study. Flow cytometry revealed IgG kappa restrictive plasma cells in a polytypic background. There was a mixed population of maturing myeloid cells, B-cells and T-cells. No abnormal myeloid maturation was seen. A monoclonal IgG kappa plasma cell population was present. 4% plasma cells present.   · 8/23/18 - Labs by Dr. Plascencia revealed B12 of 857 (180-914), folate 12.7 (5.9-24.8), iron 127 ().      · 9/19/18 - Iron 94 (), TIBC 297 (228-428), iron saturation 32 (20-50), ferritin 29 (). Erythropoietin 30.04 (2.59-18.5).        · 10/16/18 - Iron 177 (), TIBC 320 (228-428), iron saturation 55 (20-50), ferritin 34 ().       · 11/16/18 - Hemoglobin 7.1. Patient given 1 unit of packed red blood cells.   · 11/21/18 - Ferritin 27 (). Hemoglobin 7.9. Patient given 1 unit of packed red blood cells.    · 11/26/18 - EGD by Dave Russo M.D. revealed erosive gastritis and bleeding ampulla. There was oozing upon entering the stomach in many different areas. Duodenal mucosa and the esophagus were normal.   · 11/27/18 - Hemoglobin 8.2. Order written for Procrit 30,000 units subq weekly, not approved by insurance for low ferritin. Urine JERRY with no definite monoclonal gammopathy identified with questionable faint IgG kappa.   · 12/18/18 - Hemoglobin 9.9. Injectafer 750 mg IV given.   · 1/2/19 - Injectafer 750 mg IV given.  Hemoglobin 11, .1. Patient claims that he is getting Vitamin B12 injections monthly at home through Dr. Harris. Currently taking Ferrous Sulfate 325 mg p.o. b.i.d. and asked to continue that. Asked to continue Pantoprazole 40 mg daily that he is taking. IgA 651 (), IgG 1470 (600-1500), IgM 94 ().      · 2/12/19 -  Iron 88 ().    · 3/6/19 - WBC 19.8 with 83% neutrophils, 5% lymphocytes, 11% monocytes, hemoglobin 8.7, , platelet count 182,000. Patient status post laparoscopic cholecystectomy on 2/24/19 with large ecchymoses apparent on abdomen. Infectious workup ordered and Injectafer 750 mg IV days 1 and 8 ordered.  Iron 23 (), TIBC 153 (228-428), iron saturation 15% (20-50), ferritin 400 (224-336).       · 3/12/19 - WBC 15.02, hemoglobin 8.1 and platelets 227,000. Patient reported hematuria followed by Dr. Starkey. Orders written to start Injectafer ASAP. Abdominal ecchymosis improving.   · 3/14/19 - Injectafer 750 mg IV given.   · 4/22/19 - Hemoglobin 9.5, . Patient missed day 8 Injectafer in March and it was rescheduled. SIFE showed IgG kappa monoclonal gammopathy.  · 5/8/19 - Injectafer 750 mg IV given.   · 7/8/19 - Iron 56 (). Iron Saturation 36 (20-50%). Transferrin 110 (180-330). TIBC 154 (228-428). Ferritin 1,199 ().    · 8/7/2019-hemoglobin 10.0.  Patient taking multivitamin with iron only.  Restarted ferrous sulfate 325mg by mouth twice a day. UIFE showed free kappa light chain identified.   · 10/16/2019 patient hospitalized at [facility] for 3 days and found to have a hemoglobin of 4.8 at the cancer center visit.  Stool Hemoccult was positive.  He ended up receiving 5 units of packed red blood cells.  EGD by Marisel Gomez MD revealed severe gastric antral vascular ectasia with oozing, Syed's esophagus and hiatal hernia.  The patient was treated with PPI and Carafate.  Plan was to repeat EGD with cauterization in 6 weeks.  B12 and reticulocyte count were high.  Haptoglobin and folic acid were normal.  Creatinine 2.2 (0.76-1.27), iron 47 (), TIBC 264 (298-5 0.36), iron saturation 18 (20-50), ferritin 128.4 ().  Aspirin was held temporarily and patient given IV iron.  IgA 720 (), IgM 72 (), IgG 1489 (700-1600).  · EGD done secondary to GI bleed on 1/9/2020 1.   Moderately severe gastric antral vascular ectasia APC applied for cauterization 2.  Short segment of Syed's biopsy taken. 3.  Small hiatal hernia.  · 1/17/20 Iron 42, Iron saturation 16, TIBC 264.  · 1/3/2020 patient was initiated on weekly Procrit  · 3/31/2020 iron profile iron 53 iron saturation 22 iron binding capacity 241 ferritin 229.  · 10/19/2020 WBC 8.18, hemoglobin 9.2, hematocrit 29.8, MCV 97.1  · 11/2/2020 WBC 7.7, hemoglobin 8.4, hematocrit 27.1, platelets 285,000  · 11/12/2020: ERCP: Impression:   1.  Gastric antral vascular ectasia.  2.  Distal CBD stricture status post of dilation, brushing cytology.  Biopsy was also performed for major papula which looks slightly enlarged.  It may be related to previous stenting versus early adenomatous changes. 3.  Removal of sludge and gravel and placement of fully covered 10 Slov[place] and 6 cm metal stent.  · Surgical pathology: Mucosa, common bile duct, biopsy:    Acute and chronically inflamed small bowel type mucosa with glandular architectural distortion    No dysplasia or malignancy identified     · 11/27/2020: CT abdomen pelvis with contrast:Cirrhotic liver morphology with evidence of portal venous hypertension including venous varices and splenomegaly.    · 11/2/2020 patient received Procrit 40,000 units  · 11/2/2020: Ferritin 67.5, iron 89, iron saturation 29%, TIBC 302, B12 greater than 2000, folate greater than 20, SPEP shows monoclonal protein measuring 0.7 g/dL, IgA 936 high, IgG IgM normal, free kappa lambda ratio normal, creatinine 1.43, alk phos 499 high,  high, ALT 95,  · 11/16/2020 patient received Procrit 40,000 units  · 11/30/2020: Procrit 40,000 units  · 12/14/2020 WBC 8.8, hemoglobin 10.1, platelets 194     2.   Elevated LFTs diagnosis established March 2018.  Biliary duct dilation diagnosis established June 2018.   · 11/30/17 - Maia phos 93 (32-91), total bili 0.7 (0.3-1.2), AST 37 (15-41), ALT 25 (15-41).   · 3/1/18 - T-bili 0.5  (0.3-1.2), maia phos 106 (32-91), AST 54 (15-41), ALT 27 (17-63).   · 5/24/18 - AST 46 (15-41), maia phos 131 (32-91).   · 6/8/18 - CMP ordered by Dr. Alejandra Amaro showed maia phos 209 (32-91),  (15-41), ALT 84 (17-63), total bili 1.1 (0.3-1.2).             · 6/19/18 - CT abdomen and pelvis as well as serological workup for elevated LFT’s ordered. Alpha-1 antitrypsin 144 (). Ceruloplasmin 38 (22-58). AFP 3 (0-9).  Hepatitis panel non-reactive.   · 6/22/18 - CT abdomen and pelvis showed abnormal intra and extrahepatic biliary dilation. There was mild distention of the gallbladder and evidence of several gallstones. Bilateral non-obstructing kidney stone was present. Incidental sigmoid diverticulosis.   · 7/2/18 - Patient underwent MRCP at [facility] showing markedly dilated intrahepatic and extrahepatic bile duct with multiple small filling defects within the distal common bile duct compatible with choledocholithiasis. There was a focal 6 mm segmental narrowing at the distal CBD with recommended GI consult for ERCP and brushings.   · 7/5/18 to 7/10/18 - On 7/5/18 labs revealed normal total bilirubin (0.8), AST 69 (15-41), maia phos 152 (32-91), ALT was normal at 37 (17-63), ammonia was elevated at 86 (9-35), lipase was normal (22). Patient hospitalized at [facility] due to weakness. Workup revealed cholelithiasis. On 7/9/18 patient underwent ERCP with bilateral sphincterotomy, common duct stone extraction, biliary brushing and stent placement by Dr. Leiva. Path on brushings was negative for malignancy. There was intra and extrahepatic biliary ductal dilation with relatively abrupt distal common bile duct cutoff and non-visualization of the gallbladder. He was started on Lovenox and sequential compression devices due to risk of pulmonary embolism.  of 33 (0-35).  On 7/10/18 LFT’s revealed total bili 0.9 (0.3-1.2). Maia phos 129 (32-91). AST 50 (15-41), ALT 41 (17-63).     · 8/31/18 - EUS by  Dr. Gomez at [facility] revealing suspect benign biliary stricture due to CBD stone with FNA pathology revealing benign and atypical ductal cells, bile and proteinaceous material including scattered bacteria, neutrophils and degenerating cells. The atypia was felt mild and favored to be reactive in nature. Plan was to repeat ERCP with removal of the stent and the stone with consideration of common bile duct evaluation with cholangioscopy at that time.   · 10/12/18 - ERCP with sphincteroplasty and stone extraction done by  JUAN PABLO Russo for choledocholithiasis.   · 2/23/19 - Patient underwent ERCP with balloon clearance of bile duct by Memo  · 3/6/19 - LFT’s not elevated with bilirubin 1.1, AST 31, ALT 14, osbaldo phos was mildly elevated at 101 (32-91).   · 4/25/19 - 4/26/19 - Admitted to [facility] for hepatic encephalopathy and hypokalemia.   · 4/27/2019 - CT abdomen pelvis showed a 4.8 cm air-fluid collection adjacent to the right posterior hepatic lobe significant improved.  Right-sided chest tube small right pleural effusion.  Hepatic cirrhosis.  Bilateral nonobstructing renal stones.  Sigmoid diverticulosis.   · 5/26/19 - 5/29/2019 - Admitted to [facility] for hepatic and cephalopathy.  Ammonia level 213 (H).  · 7/11/19 - AFP 2.76 (0-9).   · 9/26/19 - CT scan abdomen showed fluid collection posterior to the right hepatic lobe has significantly diminished in size with only trace fluid remaining. Small locules of air are seen within this collection which could be related to recent shunt mentation or could be related to tiny fistula from the splenic flexure of the colon. Trace right pleural fluid, diminished since 4/27/19. Thickened, likely reactive, pleural rind remains. Mild right basilar atelectasis. Abnormal intrahepatic and extra hepatic biliary ductal dilation. Intrahepatic biliary dilation is new since 4/27/19. The CBD appears attenuated in its mid segment, raising the possibility of stricture. ERCP  recommended. Uncomplicated colonic diverticulosis. Non-obstructing bilateral renal stones and probable small right renal cyst. Cirrhotic liver morphology. No focal liver lesion is seen.  · 10/28/19 - CT scan abdomen and pelvis showed fluid collection posterior to the right hepatic lobe appears slightly increased in size now measuring 24 mm in diameter, again a small droplet of gas adjacent to this fluid collection is seen which may represent fistula. Right basilar small pleural effusion and atelectasis remain. Continued dilatation of the intra and extrahepatic biliary ductal system which appears stable from previous exam. Non-obstructing bilateral renal calculi. Changes of cirrhosis and splenomegaly. Diverticulosis. Mild interval change from prior study with increasing fluid collection posterior to the right hepatic lobe.      3.   Right lower extremity DVT and bilateral pulmonary emboli diagnosed 2004.   · 2004 - Patient claims to have developed right lower extremity DVT with bilateral pulmonary emboli in 2004 and was coumadinized. The blood clots were thought secondary to his sedentary lifestyle. He stayed on the Coumadin up until October 2017 when, due to difficulty maintaining his INR’s, he was switched to Xarelto 20 mg daily by Paulette Harris M.D. which was later dropped to 10 mg daily.    · 11/26/18 - EGD by Dave Russo M.D. revealed erosive gastritis and bleeding ampulla. Ampullary biopsy revealed reactive/reparative small intestinal mucosa with mild chronic inflammation. Gastric antrum biopsy was suggestive of focal mild reactive gastropathy. Due to erosive gastritis, patient asked to hold Xarelto and Aspirin by Dave Russo M.D.   · 11/27/18 - Patient asked to discontinue Xarelto and hold Aspirin while obtaining IVC filter. Risks discussed. Factor V Leiden gene mutation and prothrombin gene mutations not detected. Anticardiolipin IgM 11 (<11). Anti beta-2 glyco, IgA 55 (<20). Factor VIII  activity 186 (). Antithrombin III activity 63 (). Protein C activity 69 (), protein S activity 69 ().       · 12/5/18 - Patient underwent transjugular IVC filter placement in IR by  Jonn Cuenca M.D.   · 1/2/19 - Told patient that his low protein CNS and antithrombin III likely due to liver disease. He would be at a high risk of going back on Xarelto and hence continued on Aspirin 81 mg p.o. daily.   · 1/3/19 - D-dimer 1.25 (<0.45).    · 3/8/19 - Chest x-ray showed chronic changes in the chest with no obvious pneumonia or congestive changes.  · 4/3/19 - Chest x-ray with right pleural effusion and basilar lung density with slight increase from prior. Cardiomegaly.   · 4/22/19 - Factor VIII 334 (H), Anti-phosphatidylserine antibody IgM> 80 (H), Anti-phosphatidylserine antibody IgG 12 (N), Cardiolipin IgG 21 (N), Cardiolipin IgM 55 (H), Cardiolipin IgA 63 (H), Beta-2 glycoprotein IgG 4 (N), IgA 91 (H), and IgM 21 (H).   · 7/11/19 - Chest x-ray showed stable small right pleural effusion since 04/25/2019. Minimal right costophrenic angle atelectasis.    4.  Recurrent episodes of hepatic encephalopathy on treatment with lactulose 30 cc 4 times a day.  · Recurrent GI blood loss on anticoagulation.    Past Medical History:   Diagnosis Date   • Anemia     iron deficiency   • Anxiety    • B12 deficiency 2009   • Balance disorder    • Syed's esophagus    • Bile duct leak    • CAD (coronary artery disease)     cardiac stent   • Constipation    • DDD (degenerative disc disease), lumbar    • Decubitus ulcer     buttocks   • Depression    • Diabetes mellitus (CMS/HCC)    • Disease of thyroid gland     hypothyroid   • Diverticular disease    • Dvt femoral (deep venous thrombosis) (CMS/HCC) 2004    rt let   • Elevated cholesterol    • Fistula of intestine    • Gastroparesis    • GERD (gastroesophageal reflux disease)    • Gout    • Hepatic encephalopathy (CMS/HCC)     if doesnt take meds   • History  of echocardiogram     03/03/2017 - 12/03/2014 - 04/2012   • History of stomach ulcers    • History of transfusion    • Hyperlipidemia    • Incontinence    • Iron deficiency    • Kidney stones    • Morbid obesity (CMS/HCC)    • THOMAS (nonalcoholic steatohepatitis)    • Neuropathy    • OAB (overactive bladder)    • Open wound     rt knee   • KATHIA treated with BiPAP     bring dos   • Peripheral edema    • Pulmonary embolus (CMS/HCC) 2004   • Renal insufficiency     stage 3   • S/P lumbar laminectomy    • Skin irritation     skin fold   • Stage 3 chronic kidney disease        Past Surgical History:   Procedure Laterality Date   • ABDOMINAL SURGERY     • BACK SURGERY  1971   • BILE DUCT STENT PLACEMENT     • BILE DUCT STENT PLACEMENT     • CARDIAC CATHETERIZATION     • CATARACT EXTRACTION  2014   • CATARACT EXTRACTION, BILATERAL  2014   • CHOLECYSTECTOMY  02/24/2019   • COLON RESECTION Right 6/11/2020    Procedure: COLON RESECTION RIGHT;  Surgeon: Naif Etienne DO;  Location: [facility] MAIN OR;  Service: General;  Laterality: Right;   • COLONOSCOPY  03/2018   • CORONARY ANGIOPLASTY  08/24/2009    PCI stent - succesful placement of 3.5/23 promus stent in proximal right coronary artery   • CORONARY ANGIOPLASTY WITH STENT PLACEMENT  08/27/2009    patient had stent placed to proximal right coronary artery   • CYSTOSCOPY BLADDER STONE LITHOTRIPSY  1997   • ENDOSCOPY N/A 10/18/2019    Procedure: ESOPHAGOGASTRODUODENOSCOPY with argon plasma coagulation of gastric antral vascular ectasia;  Surgeon: Marisel Gomez MD;  Location: [facility] ENDOSCOPY;  Service: Gastroenterology   • ENDOSCOPY N/A 1/9/2020    Procedure: ESOPHAGOGASTRODUODENOSCOPY WITH BIOPSY, ARGON PLASMA COAGULATION OF GASTRIC ANTREL VASCULAR ECTASIA,;  Surgeon: Marisel Gomez MD;  Location: [facility] ENDOSCOPY;  Service: Gastroenterology   • ENDOSCOPY N/A 3/6/2020    Procedure: ESOPHAGOGASTRODUODENOSCOPY;  Surgeon: Zbigniew Silva MD;  Location: [facility] ENDOSCOPY;   Service: Gastroenterology;  Laterality: N/A;  mild gave, post cautery ulcer     • ERCP N/A 11/20/2019    Procedure: ERCP, clearance of bile duct with balloon, placement of biliary stent, EGD with argon plasma coagulation of gastric antral vascular ectasia;  Surgeon: Marisel Gomez MD;  Location: [facility] ENDOSCOPY;  Service: Gastroenterology   • ERCP N/A 2/27/2020    Procedure: ENDOSCOPIC RETROGRADE CHOLANGIOPANCREATOGRAPHY with removal of biliary stent, brushing, dilation, and placement of biliary stent x 2 and Esophagogastroduodenoscopy with argon plasma coagulation;  Surgeon: Marisel Gomez MD;  Location: [facility] ENDOSCOPY;  Service: Gastroenterology;  Laterality: N/A;  Gastric antral vascular ectasia, common bile duct stricture   • ERCP N/A 4/20/2020    Procedure: ENDOSCOPIC RETROGRADE CHOLANGIOPANCREATOGRAPHY WITH REMOVAL OF BILIARY STENT, AMPULA DILATION TO 8MM, BRUSHING OF COMMON BILE DUT, CLEARANCE OF BILE DUCT WITH BALLOON, BIOPSY OF AMPULA, PLACEMENT OF BILIARY STENT;  Surgeon: Marisel Gomez MD;  Location: [facility] ENDOSCOPY;  Service: Gastroenterology;  Laterality: N/A;  POST:CBD STRICTURE   • ERCP N/A 11/12/2020    Procedure: ENDOSCOPIC RETROGRADE CHOLANGIOPANCREATOGRAPHY WITH BALLOON CLEARANCE OF COMMON BILE DUCT, BRUSHING OF COMMON BILE DUCT STRICTURE, BIOPSY X 1 AREA, PLACEMENT OF BILIARY STENT;  Surgeon: Marisel Gomez MD;  Location: [facility] ENDOSCOPY;  Service: Gastroenterology;  Laterality: N/A;  Post op:REMOVAL OF SLUDGE, SUCCESSFUL STENT PLACEMENT   • OTHER SURGICAL HISTORY  11/2018    IVC filter implant   • RENAL ARTERY STENT Left     does not recall this   • UPPER ENDOSCOPIC ULTRASOUND W/ FNA N/A 4/20/2020    Procedure: Esophagogastroduodenoscopy with Endoscopic ultrasound and fine needle aspiration;  Surgeon: Marisel Gomez MD;  Location: [facility] ENDOSCOPY;  Service: Gastroenterology;  Laterality: N/A;  Post: CBD STRICTURE   • VENA CAVA FILTER INSERTION  12/05/2018         Current Outpatient  "Medications:   •  allopurinol (ZYLOPRIM) 100 MG tablet, Take 100 mg by mouth 2 (Two) Times a Day., Disp: , Rfl:   •  aspirin 81 MG tablet, Take 1 tablet by mouth Daily. (Patient taking differently: Take 81 mg by mouth Daily. Hold DOS), Disp: 30 tablet, Rfl: 3  •  B-D 3CC LUER-YASMEEN SYR 25GX1\" 25G X 1\" 3 ML misc, USE FOR THE B12 INJECTION INTRAMUSCULAR ONCE MONTHLY, Disp: , Rfl:   •  cyanocobalamin 1000 MCG/ML injection, Inject 1,000 mcg into the appropriate muscle as directed by prescriber Every 28 (Twenty-Eight) Days., Disp: , Rfl:   •  docusate sodium (COLACE) 100 MG capsule, Take 100 mg by mouth 2 (Two) Times a Day., Disp: , Rfl:   •  DULoxetine (CYMBALTA) 60 MG capsule, Take 60 mg by mouth Daily. Take preop, Disp: , Rfl:   •  ezetimibe (Zetia) 10 MG tablet, Take 1 tablet by mouth Daily., Disp: 30 tablet, Rfl: 11  •  ferrous gluconate (FERGON) 324 MG tablet, Take  by mouth Daily., Disp: , Rfl:   •  folic acid (FOLVITE) 1 MG tablet, Take 1 mg by mouth Daily. Last dose 6/6, Disp: , Rfl:   •  hydrOXYzine pamoate (VISTARIL) 25 MG capsule, Take 25 mg by mouth 3 (Three) Times a Day As Needed. Take preop if needed, Disp: , Rfl: 0  •  insulin glargine (LANTUS) 100 UNIT/ML injection, Inject 50 units at bedtime (Patient taking differently: Inject 52 Units under the skin into the appropriate area as directed Every Night. Inject 52 units at bedtime), Disp: 45 mL, Rfl: 3  •  insulin lispro (HUMALOG) 100 UNIT/ML injection, 18 units subcu before each meal plus sliding scale MDD 60 (Patient taking differently: Inject 18 Units under the skin into the appropriate area as directed 3 (Three) Times a Day Before Meals. 18 units subcu before each meal plus sliding scale MDD 60    dont take preop), Disp: 60 mL, Rfl: 4  •  lactulose (CHRONULAC) 10 GM/15ML solution, Take 60 mL by mouth Every 4 (Four) Hours. Will not take dos am, Disp: , Rfl:   •  levothyroxine (SYNTHROID, LEVOTHROID) 75 MCG tablet, Take 1 tablet by mouth Daily. (Patient " taking differently: Take 75 mcg by mouth Daily. Take preop), Disp: 90 tablet, Rfl: 4  •  magnesium oxide (MAG-OX) 400 MG tablet, Take 400 mg by mouth Daily. dont take preop, Disp: , Rfl:   •  melatonin 5 MG tablet tablet, Take 10 mg by mouth At Night As Needed., Disp: , Rfl:   •  metoclopramide (REGLAN) 5 MG tablet, Take 5 mg by mouth 2 (Two) Times a Day. Do not take preop, Disp: , Rfl: 0  •  Multiple Vitamins-Minerals (MULTIVITAMIN WITH MINERALS) tablet tablet, Take 1 tablet by mouth Daily., Disp: , Rfl:   •  nitroglycerin (NITROSTAT) 0.4 MG SL tablet, Place 1 tablet under the tongue Every 5 (Five) Minutes As Needed for Chest Pain (Only if SBP Greater Than 100). Take no more than 3 doses in 15 minutes. (Patient taking differently: Place 0.4 mg under the tongue Every 5 (Five) Minutes As Needed for Chest Pain (Only if SBP Greater Than 100). Take no more than 3 doses in 15 minutes. hasnt needed), Disp: 30 tablet, Rfl: 12  •  ONETOUCH VERIO test strip, USE TO CHECK BLOOD SUGAR QID, Disp: , Rfl:   •  oxyCODONE (ROXICODONE) 10 MG tablet, Take 10 mg by mouth Every 8 (Eight) Hours As Needed. Take preop if needed, Disp: , Rfl:   •  pantoprazole (PROTONIX) 40 MG EC tablet, Take 1 tablet by mouth 2 (Two) Times a Day. (Patient taking differently: Take 40 mg by mouth Daily.), Disp: 30 tablet, Rfl: 3  •  rifaximin (XIFAXAN) 550 MG tablet, Take 550 mg by mouth Every 12 (Twelve) Hours. Take preop, Disp: , Rfl:   •  sodium bicarbonate 650 MG tablet, Take 650 mg by mouth 3 (Three) Times a Day. Take preop, Disp: , Rfl:   •  ursodiol (ACTIGALL) 500 MG tablet, Take 500 mg by mouth 2 (Two) Times a Day., Disp: , Rfl:   •  zinc sulfate (ZINCATE) 220 (50 Zn) MG capsule, Take 220 mg by mouth Daily. dont take dos, Disp: , Rfl:     No Known Allergies    Family History   Problem Relation Age of Onset   • Hyperlipidemia Other    • Hypertension Other        Cancer-related family history is not on file.    Social History     Tobacco Use   •  "Smoking status: Never Smoker   • Smokeless tobacco: Never Used   Substance Use Topics   • Alcohol use: No     Frequency: Never   • Drug use: No       HPI, ROS and PFSH have been reviewed and confirmed on 12/14/2020.     SUBJECTIVE:  The patient presented for a scheduled follow up appointment accompanied by his wife.  In the interim patient had ERCP.  He also continues to follow with GI.  Also following up with the general surgeon and interventional radiology.  Discussed the recent CT scan as well as ERCP results.  He is taking oral iron 1 tablet daily.  Denies any blood in the stool.   His back pain causes him significant issues with walking and he does use a rolling walker for ambulation.      REVIEW OF SYSTEMS:  Review of Systems   Constitutional: Positive for fatigue. Negative for activity change, chills and fever.   HENT: Negative for drooling, ear discharge, mouth sores, nosebleeds and sore throat.    Eyes: Negative for photophobia, pain, redness and visual disturbance.   Respiratory: Negative for cough, choking, shortness of breath and wheezing.    Cardiovascular: Negative for chest pain and palpitations.   Gastrointestinal: Positive for abdominal distention. Negative for abdominal pain, diarrhea, nausea and vomiting.   Genitourinary: Negative for dysuria.   Musculoskeletal: Positive for arthralgias. Negative for neck stiffness.   Skin: Negative for rash.   Neurological: Negative for seizures, syncope and speech difficulty.   Psychiatric/Behavioral: Negative for agitation, confusion and hallucinations.     OBJECTIVE:  Vitals:    12/14/20 1110   BP: 172/80   Pulse: 80   Resp: 18   Temp: 96.9 °F (36.1 °C)   TempSrc: Temporal   Weight: (!) 138 kg (305 lb)   Height: 188 cm (74\")   PainSc: 10-Worst pain ever   PainLoc: Back  Comment: LOWER     Body mass index is 39.16 kg/m².    ECOG  Eastern Cooperative Oncology Group (ECOG, [place], World Health Organization) performance scale  0 Fully active; no performance " restrictions.  1 Strenuous physical activity restricted; fully ambulatory and able to carry out light work.  2 Capable of all self-care but unable to carry out any work activities. Up and about >50% of waking hours.  3 Capable of only limited self-care; confined to bed or chair >50% of waking hours.  4 Completely disabled; cannot carry out any self-care; totally confined to bed or chair.    Physical Exam  Vitals signs reviewed.   Constitutional:       General: He is not in acute distress.     Appearance: Normal appearance. He is well-developed. He is obese. He is not ill-appearing or diaphoretic.      Comments: Morbidly obese   HENT:      Head: Normocephalic and atraumatic.      Nose: Nose normal.      Mouth/Throat:      Mouth: Mucous membranes are dry.      Pharynx: No oropharyngeal exudate.   Eyes:      Conjunctiva/sclera: Conjunctivae normal.   Neck:      Musculoskeletal: Normal range of motion and neck supple.      Thyroid: No thyromegaly.      Vascular: No JVD.   Cardiovascular:      Rate and Rhythm: Normal rate and regular rhythm.      Pulses: Normal pulses.      Heart sounds: Normal heart sounds. No murmur.   Pulmonary:      Effort: Pulmonary effort is normal. No respiratory distress.      Breath sounds: Normal breath sounds.   Abdominal:      General: Bowel sounds are normal.      Palpations: Abdomen is soft.      Tenderness: There is no abdominal tenderness. There is no guarding.      Comments: Upright examination, patient seated on walker   Musculoskeletal: Normal range of motion.   Skin:     General: Skin is warm and dry.      Findings: No erythema or rash.   Neurological:      General: No focal deficit present.      Mental Status: He is alert and oriented to person, place, and time.      Sensory: No sensory deficit.      Gait: Gait abnormal ( uses rolling walker ).   Psychiatric:         Mood and Affect: Mood normal.         Behavior: Behavior normal.         Thought Content: Thought content normal.          Judgment: Judgment normal.       RECENT LABS    Lab Results - Last 18 Months   Lab Units 12/14/20  1059 11/30/20  1042 11/16/20  1053   WBC 10*3/mm3 8.80 9.37 12.46*   HEMOGLOBIN g/dL 10.1* 9.4* 9.0*   HEMATOCRIT % 32.8* 29.7* 29.1*   PLATELETS 10*3/mm3 194 218 281   MCV fL 95.9 95.5 99.0*     Lab Results - Last 18 Months   Lab Units 11/16/20  1053 11/12/20  1341 11/11/20  1512 11/02/20  1105  08/24/20  0955   SODIUM mmol/L 135* 134* 135* 141   < > 138   POTASSIUM mmol/L 5.0 4.9 5.2 5.0   < > 4.9   CHLORIDE mmol/L 102 104 105 106   < > 105   CO2 mmol/L 22.0 19.0* 20.7* 25.0   < > 24.1   BUN mg/dL 37* 28* 27* 35*   < > 25*   CREATININE mg/dL 1.58* 1.41* 1.45* 1.43*   < > 1.46*   CALCIUM mg/dL 8.6 8.5* 8.3* 8.6   < > 8.9   BILIRUBIN mg/dL 0.6  --   --  0.8  --  0.4   ALK PHOS U/L 328*  --   --  499*  --  111   ALT (SGPT) U/L 22  --   --  90*  --  18   AST (SGOT) U/L 21  --   --  179*  --  26   GLUCOSE mg/dL 125* 153* 136* 131*   < > 129*    < > = values in this interval not displayed.       Lab Results   Component Value Date    GLUCOSE 125 (H) 11/16/2020    BUN 37 (H) 11/16/2020    CREATININE 1.58 (H) 11/16/2020    EGFRIFNONA 43 (L) 11/16/2020    BCR 23.4 11/16/2020    K 5.0 11/16/2020    CO2 22.0 11/16/2020    CALCIUM 8.6 11/16/2020    PROTENTOTREF 6.8 11/02/2020    ALBUMIN 2.80 (L) 11/16/2020    LABIL2 0.6 (L) 11/02/2020    AST 21 11/16/2020    ALT 22 11/16/2020     Lab Results   Component Value Date    IRON 89 11/02/2020    TIBC 302 11/02/2020    FERRITIN 67.59 11/02/2020     Lab Results   Component Value Date    FOLATE >20.00 11/02/2020     No results found for: OCCULTBLD  Lab Results   Component Value Date    RETICCTPCT 7.63 (H) 10/16/2019     Lab Results   Component Value Date    THFFAKKX82 >2,000 (H) 11/02/2020    TSH 1.340 08/24/2020     No results found for: SPEP, UPEP  LDH   Date Value Ref Range Status   10/17/2019   Final     Comment:     Specimen hemolyzed.  Results may be affected.     Uric Acid    Date Value Ref Range Status   03/04/2020 8.0 (H) 3.4 - 7.0 mg/dL Final     Lab Results   Component Value Date    CAROLINA  07/06/2018     NEGATIVE This result is designed to aid in the diagnosis of many of the    CAROLINA  07/06/2018     systemic autoimmune disorders and is not diagnostic by itself.  Test results    CAROLINA  07/06/2018     should be interpreted in conjunction with the clinical evaluation of the    CAROLINA  07/06/2018     patient.  SLE patients undergoing steroid therapy may have negative results.    SEDRATE >120 (H) 03/04/2020     Lab Results   Component Value Date    FIBRINOGEN 624 (H) 11/12/2020    HAPTOGLOBIN 105 10/16/2019     No results found for: DDIMER  Lab Results   Component Value Date    PTT 30.6 11/12/2020    INR 1.06 11/12/2020     No results found for:   No results found for: CEA  Lab Results   Component Value Date     13.2 03/06/2020     Lab Results   Component Value Date    PSA 0.041 01/30/2020     No results found for: [id number]      Assessment/Plan     Stage 3 chronic kidney disease, unspecified whether stage 3a or 3b CKD  - CBC & Differential    ASSESSMENT:   1. IgG kappa MGUS: Most recent SPEP on 11/2/2020 showed a M protein of 0.7 g/dL..  No hypercalcemia.  Skeletal survey -was negative June 2018  2. Anemia in stage 3 chronic kidney disease: On Retacrit.  3. History of Vitamin B12 deficiency  4. History of DVT of right lower extremity  5. History of bilateral pulmonary embolus (PE): Status post IVC filter..  Patient had difficulty with anticoagulation.  6. Antithrombin III deficiency  7. Protein C deficiency   8. Protein S deficiency   9. Antiphospholipid antibody positive: cannot be on anticoagulation given his GI bleed history  10. Elevated factor VIII level  11. GAVE (gastric antral vascular ectasia)  12. THOMAS (nonalcoholic steatohepatitis): Followed by Dr. Gomez at [facility]  13. Splenomegaly  14. Chronically elevated alkaline phosphatase: Status post ERCP.  No malignancy..  Reviewed the  findings.     PLAN:   1. Patient seen by me for the first time.  Entire chart reviewed.  All diagnosis including MGUS, anemia, elevated alkaline phosphatase all reviewed.  2. Repeat SPEP in 4 months  3. We will arrange for a dose of Injectafer, since ferritin is not high enough for insurance authorization for Procrit.  4. Continue Procrit 40,000 units every 2 weeks if hemoglobin is less than 10.  5. Continue B12 injections monthly at home   6. Continue pain medication for DJD of spine -patient is not a surgical candidate.  7. Continue to follow-up with [facility] for THOMAS and portal hypertension  8. Follow-up me in 3 months with repeat SPEP, CBC, iron levels,       Patient verbalized understanding and is in agreement of the above plan.    I have reviewed and confirmed the accuracy of the patient's history: Chief complaint, HPI, ROS and Subjective as entered by the MA/LPN/RN. Deon Guajardo MD 12/14/20     I have spent greater than 40 minutes in chart review and more than 50% of of the time was spent in counseling and coordination of care, including review of imaging, pathology, indications for treatment, goals of therapy, alternatives and risks, as well as surveillance and potential outcomes.    Electronically signed by Deon Guajardo MD, 12/14/20, 11:42 AM EST.

## 2021-01-01 ENCOUNTER — HOSPITAL ENCOUNTER (OUTPATIENT)
Dept: ONCOLOGY | Facility: HOSPITAL | Age: 76
Discharge: HOME | End: 2021-01-11

## 2021-01-01 ENCOUNTER — TELEPHONE (OUTPATIENT)
Dept: ONCOLOGY | Facility: CLINIC | Age: 76
End: 2021-01-01

## 2021-01-01 ENCOUNTER — APPOINTMENT (OUTPATIENT)
Dept: CT IMAGING | Facility: HOSPITAL | Age: 76
End: 2021-01-01

## 2021-01-01 ENCOUNTER — APPOINTMENT (OUTPATIENT)
Dept: GENERAL RADIOLOGY | Facility: HOSPITAL | Age: 76
End: 2021-01-01

## 2021-01-01 ENCOUNTER — HOSPITAL ENCOUNTER (OUTPATIENT)
Dept: ONCOLOGY | Facility: HOSPITAL | Age: 76
Discharge: HOME | End: 2021-08-12

## 2021-01-01 ENCOUNTER — APPOINTMENT (OUTPATIENT)
Dept: INTERVENTIONAL RADIOLOGY/VASCULAR | Facility: HOSPITAL | Age: 76
End: 2021-01-01

## 2021-01-01 ENCOUNTER — READMISSION MANAGEMENT (OUTPATIENT)
Dept: CALL CENTER | Facility: HOSPITAL | Age: 76
End: 2021-01-01

## 2021-01-01 ENCOUNTER — APPOINTMENT (OUTPATIENT)
Dept: LAB | Facility: HOSPITAL | Age: 76
End: 2021-01-01

## 2021-01-01 ENCOUNTER — TELEPHONE (OUTPATIENT)
Dept: CARDIOLOGY | Facility: CLINIC | Age: 76
End: 2021-01-01

## 2021-01-01 ENCOUNTER — HOSPITAL ENCOUNTER (OUTPATIENT)
Dept: INFUSION THERAPY | Facility: HOSPITAL | Age: 76
Setting detail: INFUSION SERIES
Discharge: HOME | End: 2021-07-13

## 2021-01-01 ENCOUNTER — HOSPITAL ENCOUNTER (OUTPATIENT)
Facility: HOSPITAL | Age: 76
Setting detail: OBSERVATION
Discharge: HOME HEALTH | End: 2021-05-21
Attending: EMERGENCY MEDICINE | Admitting: EMERGENCY MEDICINE

## 2021-01-01 ENCOUNTER — HOSPITAL ENCOUNTER (OUTPATIENT)
Facility: HOSPITAL | Age: 76
Setting detail: HOSPITAL OUTPATIENT SURGERY
Discharge: HOME | End: 2021-05-10
Attending: INTERNAL MEDICINE | Admitting: INTERNAL MEDICINE

## 2021-01-01 ENCOUNTER — HOSPITAL ENCOUNTER (OUTPATIENT)
Dept: ONCOLOGY | Facility: HOSPITAL | Age: 76
Discharge: HOME | End: 2021-02-22

## 2021-01-01 ENCOUNTER — OFFICE VISIT (OUTPATIENT)
Dept: ONCOLOGY | Facility: CLINIC | Age: 76
End: 2021-01-01

## 2021-01-01 ENCOUNTER — APPOINTMENT (OUTPATIENT)
Dept: NUCLEAR MEDICINE | Facility: HOSPITAL | Age: 76
End: 2021-01-01

## 2021-01-01 ENCOUNTER — HOSPITAL ENCOUNTER (OUTPATIENT)
Dept: ONCOLOGY | Facility: HOSPITAL | Age: 76
Discharge: HOME | End: 2021-07-20

## 2021-01-01 ENCOUNTER — LAB (OUTPATIENT)
Dept: LAB | Facility: HOSPITAL | Age: 76
End: 2021-01-01

## 2021-01-01 ENCOUNTER — ANESTHESIA EVENT (OUTPATIENT)
Dept: GASTROENTEROLOGY | Facility: HOSPITAL | Age: 76
End: 2021-01-01

## 2021-01-01 ENCOUNTER — INPATIENT HOSPITAL (AMBULATORY)
Dept: URBAN - METROPOLITAN AREA HOSPITAL 84 | Facility: HOSPITAL | Age: 76
End: 2021-01-01

## 2021-01-01 ENCOUNTER — TRANSCRIBE ORDERS (OUTPATIENT)
Dept: ADMINISTRATIVE | Facility: HOSPITAL | Age: 76
End: 2021-01-01

## 2021-01-01 ENCOUNTER — OFFICE VISIT (OUTPATIENT)
Dept: SURGERY | Facility: CLINIC | Age: 76
End: 2021-01-01

## 2021-01-01 ENCOUNTER — TELEPHONE (OUTPATIENT)
Dept: ONCOLOGY | Facility: HOSPITAL | Age: 76
End: 2021-01-01

## 2021-01-01 ENCOUNTER — OFFICE (AMBULATORY)
Dept: URBAN - METROPOLITAN AREA CLINIC 64 | Facility: CLINIC | Age: 76
End: 2021-01-01

## 2021-01-01 ENCOUNTER — ANESTHESIA (OUTPATIENT)
Dept: GASTROENTEROLOGY | Facility: HOSPITAL | Age: 76
End: 2021-01-01

## 2021-01-01 ENCOUNTER — HOSPITAL ENCOUNTER (INPATIENT)
Facility: HOSPITAL | Age: 76
LOS: 4 days | Discharge: NURSING, REHAB OR LONG-TERM CARE | End: 2021-07-31
Attending: INTERNAL MEDICINE | Admitting: INTERNAL MEDICINE

## 2021-01-01 ENCOUNTER — HOSPITAL ENCOUNTER (OUTPATIENT)
Dept: ONCOLOGY | Facility: HOSPITAL | Age: 76
Discharge: HOME | End: 2021-05-04

## 2021-01-01 ENCOUNTER — APPOINTMENT (OUTPATIENT)
Dept: ONCOLOGY | Facility: HOSPITAL | Age: 76
End: 2021-01-01

## 2021-01-01 ENCOUNTER — HOSPITAL ENCOUNTER (INPATIENT)
Facility: HOSPITAL | Age: 76
LOS: 3 days | Discharge: NURSING, REHAB OR LONG-TERM CARE | End: 2021-03-05
Attending: INTERNAL MEDICINE | Admitting: INTERNAL MEDICINE

## 2021-01-01 ENCOUNTER — HOSPITAL ENCOUNTER (OUTPATIENT)
Dept: ONCOLOGY | Facility: HOSPITAL | Age: 76
Discharge: HOME | End: 2021-01-25

## 2021-01-01 ENCOUNTER — OFFICE VISIT (OUTPATIENT)
Dept: CARDIOLOGY | Facility: CLINIC | Age: 76
End: 2021-01-01

## 2021-01-01 ENCOUNTER — HOSPITAL ENCOUNTER (OUTPATIENT)
Dept: ONCOLOGY | Facility: HOSPITAL | Age: 76
Setting detail: INFUSION SERIES
Discharge: HOME | End: 2021-04-16

## 2021-01-01 ENCOUNTER — TELEPHONE (OUTPATIENT)
Dept: ENDOCRINOLOGY | Facility: CLINIC | Age: 76
End: 2021-01-01

## 2021-01-01 ENCOUNTER — HOSPITAL ENCOUNTER (OUTPATIENT)
Dept: INFUSION THERAPY | Facility: HOSPITAL | Age: 76
Setting detail: INFUSION SERIES
Discharge: HOME | End: 2021-07-06

## 2021-01-01 ENCOUNTER — HOSPITAL ENCOUNTER (OUTPATIENT)
Dept: INFUSION THERAPY | Facility: HOSPITAL | Age: 76
Setting detail: INFUSION SERIES
Discharge: HOME | End: 2021-06-16

## 2021-01-01 ENCOUNTER — HOSPITAL ENCOUNTER (OUTPATIENT)
Dept: ONCOLOGY | Facility: HOSPITAL | Age: 76
Setting detail: INFUSION SERIES
Discharge: HOME | End: 2021-08-05

## 2021-01-01 ENCOUNTER — HOSPITAL ENCOUNTER (OUTPATIENT)
Dept: ONCOLOGY | Facility: HOSPITAL | Age: 76
Discharge: HOME | End: 2021-03-22

## 2021-01-01 ENCOUNTER — HOME HEALTH ADMISSION (OUTPATIENT)
Dept: HOME HEALTH SERVICES | Facility: HOME HEALTHCARE | Age: 76
End: 2021-01-01

## 2021-01-01 ENCOUNTER — HOSPITAL ENCOUNTER (OUTPATIENT)
Dept: ONCOLOGY | Facility: HOSPITAL | Age: 76
Discharge: HOME | End: 2021-06-15

## 2021-01-01 ENCOUNTER — APPOINTMENT (OUTPATIENT)
Dept: ULTRASOUND IMAGING | Facility: HOSPITAL | Age: 76
End: 2021-01-01

## 2021-01-01 ENCOUNTER — OFFICE VISIT (OUTPATIENT)
Dept: ENDOCRINOLOGY | Facility: CLINIC | Age: 76
End: 2021-01-01

## 2021-01-01 ENCOUNTER — ON CAMPUS - OUTPATIENT (AMBULATORY)
Dept: URBAN - METROPOLITAN AREA HOSPITAL 85 | Facility: HOSPITAL | Age: 76
End: 2021-01-01

## 2021-01-01 ENCOUNTER — APPOINTMENT (OUTPATIENT)
Dept: CARDIOLOGY | Facility: HOSPITAL | Age: 76
End: 2021-01-01

## 2021-01-01 ENCOUNTER — HOSPITAL ENCOUNTER (OUTPATIENT)
Dept: ONCOLOGY | Facility: HOSPITAL | Age: 76
Discharge: HOME | End: 2021-02-08

## 2021-01-01 ENCOUNTER — HOSPITAL ENCOUNTER (OUTPATIENT)
Dept: ONCOLOGY | Facility: HOSPITAL | Age: 76
Discharge: HOME | End: 2021-04-05

## 2021-01-01 ENCOUNTER — HOSPITAL ENCOUNTER (OUTPATIENT)
Dept: ONCOLOGY | Facility: HOSPITAL | Age: 76
Discharge: HOME | End: 2021-07-06

## 2021-01-01 ENCOUNTER — HOSPITAL ENCOUNTER (INPATIENT)
Facility: HOSPITAL | Age: 76
LOS: 5 days | Discharge: HOME HEALTH | End: 2021-05-28
Attending: EMERGENCY MEDICINE | Admitting: FAMILY MEDICINE

## 2021-01-01 ENCOUNTER — TELEPHONE (OUTPATIENT)
Dept: SURGERY | Facility: CLINIC | Age: 76
End: 2021-01-01

## 2021-01-01 ENCOUNTER — HOSPITAL ENCOUNTER (OUTPATIENT)
Dept: CT IMAGING | Facility: HOSPITAL | Age: 76
Discharge: HOME | End: 2021-04-20
Admitting: INTERNAL MEDICINE

## 2021-01-01 ENCOUNTER — HOSPITAL ENCOUNTER (OUTPATIENT)
Dept: ONCOLOGY | Facility: HOSPITAL | Age: 76
Setting detail: INFUSION SERIES
Discharge: HOME | End: 2021-04-26

## 2021-01-01 VITALS
HEIGHT: 74 IN | BODY MASS INDEX: 40.43 KG/M2 | SYSTOLIC BLOOD PRESSURE: 128 MMHG | DIASTOLIC BLOOD PRESSURE: 62 MMHG | WEIGHT: 315 LBS

## 2021-01-01 VITALS
DIASTOLIC BLOOD PRESSURE: 61 MMHG | OXYGEN SATURATION: 93 % | RESPIRATION RATE: 20 BRPM | HEART RATE: 75 BPM | TEMPERATURE: 98.8 F | WEIGHT: 315 LBS | HEIGHT: 74 IN | SYSTOLIC BLOOD PRESSURE: 110 MMHG | BODY MASS INDEX: 40.43 KG/M2

## 2021-01-01 VITALS
HEIGHT: 74 IN | OXYGEN SATURATION: 100 % | DIASTOLIC BLOOD PRESSURE: 80 MMHG | SYSTOLIC BLOOD PRESSURE: 132 MMHG | TEMPERATURE: 97.3 F | HEART RATE: 122 BPM | BODY MASS INDEX: 41.16 KG/M2

## 2021-01-01 VITALS
TEMPERATURE: 97.7 F | HEART RATE: 92 BPM | DIASTOLIC BLOOD PRESSURE: 84 MMHG | SYSTOLIC BLOOD PRESSURE: 120 MMHG | OXYGEN SATURATION: 99 % | RESPIRATION RATE: 15 BRPM

## 2021-01-01 VITALS
WEIGHT: 315 LBS | RESPIRATION RATE: 18 BRPM | OXYGEN SATURATION: 100 % | SYSTOLIC BLOOD PRESSURE: 116 MMHG | HEIGHT: 74 IN | BODY MASS INDEX: 40.43 KG/M2 | HEART RATE: 79 BPM | DIASTOLIC BLOOD PRESSURE: 58 MMHG | TEMPERATURE: 98.3 F

## 2021-01-01 VITALS
RESPIRATION RATE: 24 BRPM | HEIGHT: 74 IN | BODY MASS INDEX: 40.17 KG/M2 | WEIGHT: 313 LBS | SYSTOLIC BLOOD PRESSURE: 128 MMHG | OXYGEN SATURATION: 98 % | HEART RATE: 92 BPM | DIASTOLIC BLOOD PRESSURE: 62 MMHG

## 2021-01-01 VITALS
SYSTOLIC BLOOD PRESSURE: 67 MMHG | OXYGEN SATURATION: 100 % | WEIGHT: 315 LBS | SYSTOLIC BLOOD PRESSURE: 138 MMHG | HEART RATE: 93 BPM | HEIGHT: 74 IN | DIASTOLIC BLOOD PRESSURE: 19 MMHG | BODY MASS INDEX: 40.43 KG/M2 | DIASTOLIC BLOOD PRESSURE: 64 MMHG

## 2021-01-01 VITALS
OXYGEN SATURATION: 98 % | SYSTOLIC BLOOD PRESSURE: 136 MMHG | TEMPERATURE: 98.3 F | HEART RATE: 93 BPM | WEIGHT: 315 LBS | HEIGHT: 74 IN | DIASTOLIC BLOOD PRESSURE: 59 MMHG | RESPIRATION RATE: 18 BRPM | BODY MASS INDEX: 40.43 KG/M2

## 2021-01-01 VITALS
SYSTOLIC BLOOD PRESSURE: 163 MMHG | OXYGEN SATURATION: 100 % | WEIGHT: 315 LBS | HEIGHT: 74 IN | BODY MASS INDEX: 40.43 KG/M2 | DIASTOLIC BLOOD PRESSURE: 83 MMHG | HEART RATE: 82 BPM

## 2021-01-01 VITALS
SYSTOLIC BLOOD PRESSURE: 113 MMHG | HEART RATE: 68 BPM | TEMPERATURE: 98.1 F | DIASTOLIC BLOOD PRESSURE: 58 MMHG | OXYGEN SATURATION: 96 % | RESPIRATION RATE: 14 BRPM

## 2021-01-01 VITALS
RESPIRATION RATE: 18 BRPM | SYSTOLIC BLOOD PRESSURE: 136 MMHG | WEIGHT: 315 LBS | TEMPERATURE: 97.3 F | DIASTOLIC BLOOD PRESSURE: 94 MMHG | HEIGHT: 74 IN | BODY MASS INDEX: 40.43 KG/M2 | HEART RATE: 84 BPM

## 2021-01-01 VITALS
DIASTOLIC BLOOD PRESSURE: 75 MMHG | RESPIRATION RATE: 18 BRPM | WEIGHT: 315 LBS | HEART RATE: 105 BPM | HEIGHT: 74 IN | TEMPERATURE: 97.3 F | BODY MASS INDEX: 40.43 KG/M2 | SYSTOLIC BLOOD PRESSURE: 150 MMHG

## 2021-01-01 VITALS
DIASTOLIC BLOOD PRESSURE: 77 MMHG | SYSTOLIC BLOOD PRESSURE: 148 MMHG | WEIGHT: 315 LBS | OXYGEN SATURATION: 99 % | TEMPERATURE: 98.4 F | BODY MASS INDEX: 41.75 KG/M2 | RESPIRATION RATE: 20 BRPM | HEIGHT: 73 IN | HEART RATE: 94 BPM

## 2021-01-01 VITALS
SYSTOLIC BLOOD PRESSURE: 138 MMHG | BODY MASS INDEX: 40.43 KG/M2 | HEIGHT: 74 IN | WEIGHT: 315 LBS | DIASTOLIC BLOOD PRESSURE: 60 MMHG

## 2021-01-01 VITALS
BODY MASS INDEX: 40.17 KG/M2 | RESPIRATION RATE: 27 BRPM | OXYGEN SATURATION: 97 % | WEIGHT: 313 LBS | SYSTOLIC BLOOD PRESSURE: 111 MMHG | DIASTOLIC BLOOD PRESSURE: 68 MMHG | HEIGHT: 74 IN | HEART RATE: 88 BPM | TEMPERATURE: 97.3 F

## 2021-01-01 VITALS
DIASTOLIC BLOOD PRESSURE: 67 MMHG | HEART RATE: 91 BPM | WEIGHT: 306 LBS | HEIGHT: 74 IN | SYSTOLIC BLOOD PRESSURE: 158 MMHG

## 2021-01-01 VITALS
BODY MASS INDEX: 45.96 KG/M2 | HEIGHT: 74 IN | SYSTOLIC BLOOD PRESSURE: 125 MMHG | HEART RATE: 97 BPM | RESPIRATION RATE: 14 BRPM | DIASTOLIC BLOOD PRESSURE: 68 MMHG | TEMPERATURE: 99 F

## 2021-01-01 VITALS
HEIGHT: 74 IN | DIASTOLIC BLOOD PRESSURE: 62 MMHG | BODY MASS INDEX: 40.43 KG/M2 | SYSTOLIC BLOOD PRESSURE: 116 MMHG | WEIGHT: 315 LBS

## 2021-01-01 VITALS
WEIGHT: 315 LBS | SYSTOLIC BLOOD PRESSURE: 138 MMHG | DIASTOLIC BLOOD PRESSURE: 54 MMHG | BODY MASS INDEX: 40.43 KG/M2 | HEIGHT: 74 IN

## 2021-01-01 VITALS
WEIGHT: 313 LBS | SYSTOLIC BLOOD PRESSURE: 187 MMHG | HEIGHT: 74 IN | HEART RATE: 117 BPM | DIASTOLIC BLOOD PRESSURE: 89 MMHG

## 2021-01-01 VITALS — TEMPERATURE: 97.7 F

## 2021-01-01 VITALS
OXYGEN SATURATION: 99 % | HEART RATE: 53 BPM | RESPIRATION RATE: 16 BRPM | SYSTOLIC BLOOD PRESSURE: 116 MMHG | DIASTOLIC BLOOD PRESSURE: 59 MMHG | TEMPERATURE: 98.3 F

## 2021-01-01 VITALS
BODY MASS INDEX: 40.43 KG/M2 | SYSTOLIC BLOOD PRESSURE: 170 MMHG | OXYGEN SATURATION: 98 % | HEIGHT: 74 IN | DIASTOLIC BLOOD PRESSURE: 80 MMHG | TEMPERATURE: 97.5 F | WEIGHT: 315 LBS | HEART RATE: 87 BPM

## 2021-01-01 DIAGNOSIS — N18.30 TYPE 2 DIABETES MELLITUS WITH STAGE 3 CHRONIC KIDNEY DISEASE, WITH LONG-TERM CURRENT USE OF INSULIN (HCC): ICD-10-CM

## 2021-01-01 DIAGNOSIS — R74.8 ABNORMAL LEVELS OF OTHER SERUM ENZYMES: ICD-10-CM

## 2021-01-01 DIAGNOSIS — E86.0 DEHYDRATION, MODERATE: ICD-10-CM

## 2021-01-01 DIAGNOSIS — D68.59 ANTITHROMBIN III DEFICIENCY (HCC): ICD-10-CM

## 2021-01-01 DIAGNOSIS — E03.9 ACQUIRED HYPOTHYROIDISM: ICD-10-CM

## 2021-01-01 DIAGNOSIS — N18.30 STAGE 3 CHRONIC KIDNEY DISEASE, UNSPECIFIED WHETHER STAGE 3A OR 3B CKD (HCC): ICD-10-CM

## 2021-01-01 DIAGNOSIS — N18.30 STAGE 3 CHRONIC KIDNEY DISEASE, UNSPECIFIED WHETHER STAGE 3A OR 3B CKD (HCC): Primary | ICD-10-CM

## 2021-01-01 DIAGNOSIS — Z79.4 TYPE 2 DIABETES MELLITUS WITH HYPERGLYCEMIA, WITH LONG-TERM CURRENT USE OF INSULIN (HCC): ICD-10-CM

## 2021-01-01 DIAGNOSIS — K22.70 BARRETT'S ESOPHAGUS WITHOUT DYSPLASIA: ICD-10-CM

## 2021-01-01 DIAGNOSIS — D64.9 ANEMIA, UNSPECIFIED TYPE: Primary | ICD-10-CM

## 2021-01-01 DIAGNOSIS — D64.9 ANEMIA, UNSPECIFIED TYPE: ICD-10-CM

## 2021-01-01 DIAGNOSIS — K64.4 RESIDUAL HEMORRHOIDAL SKIN TAGS: ICD-10-CM

## 2021-01-01 DIAGNOSIS — K75.81 NONALCOHOLIC STEATOHEPATITIS (NASH): ICD-10-CM

## 2021-01-01 DIAGNOSIS — R50.9 FEVER, UNSPECIFIED: ICD-10-CM

## 2021-01-01 DIAGNOSIS — D64.9 ANEMIA, UNSPECIFIED: ICD-10-CM

## 2021-01-01 DIAGNOSIS — K74.69 FLORID CIRRHOSIS (HCC): ICD-10-CM

## 2021-01-01 DIAGNOSIS — I10 ESSENTIAL HYPERTENSION: ICD-10-CM

## 2021-01-01 DIAGNOSIS — E78.2 MIXED HYPERLIPIDEMIA: ICD-10-CM

## 2021-01-01 DIAGNOSIS — E11.22 TYPE 2 DIABETES MELLITUS WITH STAGE 3 CHRONIC KIDNEY DISEASE, WITH LONG-TERM CURRENT USE OF INSULIN (HCC): ICD-10-CM

## 2021-01-01 DIAGNOSIS — Z79.4 TYPE 2 DIABETES MELLITUS WITH STAGE 3 CHRONIC KIDNEY DISEASE, WITH LONG-TERM CURRENT USE OF INSULIN (HCC): ICD-10-CM

## 2021-01-01 DIAGNOSIS — G47.33 OBSTRUCTIVE SLEEP APNEA: Primary | ICD-10-CM

## 2021-01-01 DIAGNOSIS — K31.89 OTHER DISEASES OF STOMACH AND DUODENUM: ICD-10-CM

## 2021-01-01 DIAGNOSIS — D50.0 IRON DEFICIENCY ANEMIA SECONDARY TO BLOOD LOSS (CHRONIC): ICD-10-CM

## 2021-01-01 DIAGNOSIS — N18.30 ANEMIA DUE TO CHRONIC RENAL FAILURE TREATED WITH ERYTHROPOIETIN, STAGE 3 (MODERATE) (HCC): ICD-10-CM

## 2021-01-01 DIAGNOSIS — K76.82 HEPATIC ENCEPHALOPATHY (HCC): ICD-10-CM

## 2021-01-01 DIAGNOSIS — D50.9 IRON DEFICIENCY ANEMIA, UNSPECIFIED IRON DEFICIENCY ANEMIA TYPE: ICD-10-CM

## 2021-01-01 DIAGNOSIS — Z90.49 ACQUIRED ABSENCE OF OTHER SPECIFIED PARTS OF DIGESTIVE TRACT: ICD-10-CM

## 2021-01-01 DIAGNOSIS — K63.2 ENTEROENTERIC FISTULA: ICD-10-CM

## 2021-01-01 DIAGNOSIS — K44.9 DIAPHRAGMATIC HERNIA WITHOUT OBSTRUCTION OR GANGRENE: ICD-10-CM

## 2021-01-01 DIAGNOSIS — K63.2 FISTULA OF INTESTINE, EXCLUDING RECTUM AND ANUS: ICD-10-CM

## 2021-01-01 DIAGNOSIS — D47.2 MGUS (MONOCLONAL GAMMOPATHY OF UNKNOWN SIGNIFICANCE): ICD-10-CM

## 2021-01-01 DIAGNOSIS — R77.8 ELEVATED TROPONIN: ICD-10-CM

## 2021-01-01 DIAGNOSIS — Z79.4 TYPE 2 DIABETES MELLITUS WITH HYPERGLYCEMIA, WITH LONG-TERM CURRENT USE OF INSULIN (HCC): Primary | ICD-10-CM

## 2021-01-01 DIAGNOSIS — D12.3 BENIGN NEOPLASM OF TRANSVERSE COLON: ICD-10-CM

## 2021-01-01 DIAGNOSIS — N18.9 CHRONIC RENAL IMPAIRMENT, UNSPECIFIED CKD STAGE: ICD-10-CM

## 2021-01-01 DIAGNOSIS — J98.11 ATELECTASIS: Primary | ICD-10-CM

## 2021-01-01 DIAGNOSIS — D64.9 LOW HEMOGLOBIN: ICD-10-CM

## 2021-01-01 DIAGNOSIS — D63.1 ANEMIA DUE TO CHRONIC RENAL FAILURE TREATED WITH ERYTHROPOIETIN, STAGE 3 (MODERATE) (HCC): ICD-10-CM

## 2021-01-01 DIAGNOSIS — D50.8 OTHER IRON DEFICIENCY ANEMIA: ICD-10-CM

## 2021-01-01 DIAGNOSIS — K57.30 DIVERTICULOSIS OF LARGE INTESTINE WITHOUT PERFORATION OR ABS: ICD-10-CM

## 2021-01-01 DIAGNOSIS — K72.90 HEPATIC FAILURE, UNSPECIFIED WITHOUT COMA: ICD-10-CM

## 2021-01-01 DIAGNOSIS — R10.9 ABDOMINAL PAIN, UNSPECIFIED ABDOMINAL LOCATION: Primary | ICD-10-CM

## 2021-01-01 DIAGNOSIS — K31.819 ANGIODYSPLASIA OF STOMACH AND DUODENUM WITHOUT BLEEDING: ICD-10-CM

## 2021-01-01 DIAGNOSIS — R53.1 WEAKNESS: ICD-10-CM

## 2021-01-01 DIAGNOSIS — K63.2 FISTULA OF INTESTINE: ICD-10-CM

## 2021-01-01 DIAGNOSIS — R50.9 FEVER, UNSPECIFIED FEVER CAUSE: ICD-10-CM

## 2021-01-01 DIAGNOSIS — Z86.718 HISTORY OF DVT OF LOWER EXTREMITY: ICD-10-CM

## 2021-01-01 DIAGNOSIS — D51.9 VITAMIN B12 DEFICIENCY ANEMIA, UNSPECIFIED: ICD-10-CM

## 2021-01-01 DIAGNOSIS — I27.20 PULMONARY HYPERTENSION (HCC): ICD-10-CM

## 2021-01-01 DIAGNOSIS — D64.9 LOW HEMOGLOBIN: Primary | ICD-10-CM

## 2021-01-01 DIAGNOSIS — D50.9 IRON DEFICIENCY ANEMIA, UNSPECIFIED IRON DEFICIENCY ANEMIA TYPE: Primary | ICD-10-CM

## 2021-01-01 DIAGNOSIS — K74.60 HEPATIC CIRRHOSIS, UNSPECIFIED HEPATIC CIRRHOSIS TYPE, UNSPECIFIED WHETHER ASCITES PRESENT (HCC): ICD-10-CM

## 2021-01-01 DIAGNOSIS — Z86.010 PERSONAL HISTORY OF COLONIC POLYPS: ICD-10-CM

## 2021-01-01 DIAGNOSIS — D50.0 IRON DEFICIENCY ANEMIA DUE TO CHRONIC BLOOD LOSS: ICD-10-CM

## 2021-01-01 DIAGNOSIS — I25.10 CHRONIC CORONARY ARTERY DISEASE: ICD-10-CM

## 2021-01-01 DIAGNOSIS — N18.9 CHRONIC KIDNEY DISEASE, UNSPECIFIED: ICD-10-CM

## 2021-01-01 DIAGNOSIS — K76.6 PORTAL HYPERTENSION: ICD-10-CM

## 2021-01-01 DIAGNOSIS — G47.33 OBSTRUCTIVE SLEEP APNEA: ICD-10-CM

## 2021-01-01 DIAGNOSIS — E66.01 MORBID OBESITY (HCC): ICD-10-CM

## 2021-01-01 DIAGNOSIS — R41.0 ACUTE CONFUSION: ICD-10-CM

## 2021-01-01 DIAGNOSIS — E11.65 TYPE 2 DIABETES MELLITUS WITH HYPERGLYCEMIA, WITH LONG-TERM CURRENT USE OF INSULIN (HCC): Primary | ICD-10-CM

## 2021-01-01 DIAGNOSIS — K75.81 NASH (NONALCOHOLIC STEATOHEPATITIS): ICD-10-CM

## 2021-01-01 DIAGNOSIS — L02.211 CUTANEOUS ABSCESS OF ABDOMINAL WALL: Primary | ICD-10-CM

## 2021-01-01 DIAGNOSIS — D50.0 IRON DEFICIENCY ANEMIA DUE TO CHRONIC BLOOD LOSS: Primary | ICD-10-CM

## 2021-01-01 DIAGNOSIS — E61.1 IRON DEFICIENCY: ICD-10-CM

## 2021-01-01 DIAGNOSIS — K90.9 MALABSORPTION OF IRON: ICD-10-CM

## 2021-01-01 DIAGNOSIS — E08.42 DIABETIC POLYNEUROPATHY ASSOCIATED WITH DIABETES MELLITUS DUE TO UNDERLYING CONDITION (HCC): Primary | ICD-10-CM

## 2021-01-01 DIAGNOSIS — K74.69 OTHER CIRRHOSIS OF LIVER: ICD-10-CM

## 2021-01-01 DIAGNOSIS — D64.9 SYMPTOMATIC ANEMIA: Primary | ICD-10-CM

## 2021-01-01 DIAGNOSIS — I49.8 WANDERING ATRIAL PACEMAKER: Chronic | ICD-10-CM

## 2021-01-01 DIAGNOSIS — K64.8 OTHER HEMORRHOIDS: ICD-10-CM

## 2021-01-01 DIAGNOSIS — E78.5 DYSLIPIDEMIA: ICD-10-CM

## 2021-01-01 DIAGNOSIS — D50.8 OTHER IRON DEFICIENCY ANEMIA: Primary | ICD-10-CM

## 2021-01-01 DIAGNOSIS — I50.9 ACUTE ON CHRONIC CONGESTIVE HEART FAILURE, UNSPECIFIED HEART FAILURE TYPE (HCC): ICD-10-CM

## 2021-01-01 DIAGNOSIS — R41.0 CONFUSION: ICD-10-CM

## 2021-01-01 DIAGNOSIS — K74.60 UNSPECIFIED CIRRHOSIS OF LIVER: ICD-10-CM

## 2021-01-01 DIAGNOSIS — K74.60 CIRRHOSIS OF LIVER (HCC): ICD-10-CM

## 2021-01-01 DIAGNOSIS — E11.65 TYPE 2 DIABETES MELLITUS WITH HYPERGLYCEMIA, WITH LONG-TERM CURRENT USE OF INSULIN (HCC): ICD-10-CM

## 2021-01-01 LAB
ABO GROUP BLD: NORMAL
ALBUMIN SERPL ELPH-MCNC: 2.9 G/DL (ref 2.9–4.4)
ALBUMIN SERPL ELPH-MCNC: 3.2 G/DL (ref 2.9–4.4)
ALBUMIN SERPL-MCNC: 2.5 G/DL (ref 3.5–5.2)
ALBUMIN SERPL-MCNC: 2.5 G/DL (ref 3.5–5.2)
ALBUMIN SERPL-MCNC: 2.6 G/DL (ref 3.5–5.2)
ALBUMIN SERPL-MCNC: 2.8 G/DL (ref 3.5–5.2)
ALBUMIN SERPL-MCNC: 3 G/DL (ref 3.5–5.2)
ALBUMIN SERPL-MCNC: 3 G/DL (ref 3.5–5.2)
ALBUMIN SERPL-MCNC: 3.1 G/DL (ref 3.5–5.2)
ALBUMIN SERPL-MCNC: 3.3 G/DL (ref 3.5–5.2)
ALBUMIN SERPL-MCNC: 3.4 G/DL (ref 3.5–5.2)
ALBUMIN UR-MCNC: <1.2 MG/DL
ALBUMIN/GLOB SERPL: 0.6 G/DL
ALBUMIN/GLOB SERPL: 0.7 G/DL
ALBUMIN/GLOB SERPL: 0.7 {RATIO} (ref 0.7–1.7)
ALBUMIN/GLOB SERPL: 0.8 G/DL
ALBUMIN/GLOB SERPL: 0.8 {RATIO} (ref 0.7–1.7)
ALBUMIN/GLOB SERPL: 0.9 G/DL
ALP SERPL-CCNC: 100 U/L (ref 39–117)
ALP SERPL-CCNC: 100 U/L (ref 39–117)
ALP SERPL-CCNC: 105 U/L (ref 39–117)
ALP SERPL-CCNC: 112 U/L (ref 39–117)
ALP SERPL-CCNC: 117 U/L (ref 39–117)
ALP SERPL-CCNC: 122 U/L (ref 39–117)
ALP SERPL-CCNC: 125 U/L (ref 39–117)
ALP SERPL-CCNC: 137 U/L (ref 39–117)
ALP SERPL-CCNC: 141 U/L (ref 39–117)
ALP SERPL-CCNC: 151 U/L (ref 39–117)
ALP SERPL-CCNC: 82 U/L (ref 39–117)
ALP SERPL-CCNC: 85 U/L (ref 39–117)
ALP SERPL-CCNC: 90 U/L (ref 39–117)
ALP SERPL-CCNC: 92 U/L (ref 39–117)
ALPHA-FETOPROTEIN: 1.86 NG/ML (ref 0–8.3)
ALPHA-FETOPROTEIN: 2.08 NG/ML (ref 0–8.3)
ALPHA1 GLOB SERPL ELPH-MCNC: 0.2 G/DL (ref 0–0.4)
ALPHA1 GLOB SERPL ELPH-MCNC: 0.2 G/DL (ref 0–0.4)
ALPHA2 GLOB SERPL ELPH-MCNC: 0.6 G/DL (ref 0.4–1)
ALPHA2 GLOB SERPL ELPH-MCNC: 0.6 G/DL (ref 0.4–1)
ALT SERPL W P-5'-P-CCNC: 11 U/L (ref 1–41)
ALT SERPL W P-5'-P-CCNC: 12 U/L (ref 1–41)
ALT SERPL W P-5'-P-CCNC: 13 U/L (ref 1–41)
ALT SERPL W P-5'-P-CCNC: 13 U/L (ref 1–41)
ALT SERPL W P-5'-P-CCNC: 14 U/L (ref 1–41)
ALT SERPL W P-5'-P-CCNC: 15 U/L (ref 1–41)
ALT SERPL W P-5'-P-CCNC: 8 U/L (ref 1–41)
AMMONIA BLD-SCNC: 106 UMOL/L (ref 16–60)
AMMONIA BLD-SCNC: 19 UMOL/L (ref 16–60)
AMMONIA BLD-SCNC: 19 UMOL/L (ref 16–60)
AMMONIA BLD-SCNC: 22 UMOL/L (ref 16–60)
AMMONIA BLD-SCNC: 23 UMOL/L (ref 16–60)
AMMONIA BLD-SCNC: 32 UMOL/L (ref 16–60)
AMMONIA BLD-SCNC: 67 UMOL/L (ref 16–60)
AMMONIA BLD-SCNC: 73 UMOL/L (ref 16–60)
AMORPH URATE CRY URNS QL MICRO: ABNORMAL /HPF
ANION GAP SERPL CALCULATED.3IONS-SCNC: 10 MMOL/L (ref 5–15)
ANION GAP SERPL CALCULATED.3IONS-SCNC: 10 MMOL/L (ref 5–15)
ANION GAP SERPL CALCULATED.3IONS-SCNC: 11 MMOL/L (ref 5–15)
ANION GAP SERPL CALCULATED.3IONS-SCNC: 12 MMOL/L (ref 5–15)
ANION GAP SERPL CALCULATED.3IONS-SCNC: 12 MMOL/L (ref 5–15)
ANION GAP SERPL CALCULATED.3IONS-SCNC: 13 MMOL/L (ref 5–15)
ANION GAP SERPL CALCULATED.3IONS-SCNC: 6.9 MMOL/L (ref 5–15)
ANION GAP SERPL CALCULATED.3IONS-SCNC: 8 MMOL/L (ref 5–15)
ANION GAP SERPL CALCULATED.3IONS-SCNC: 9 MMOL/L (ref 5–15)
ANISOCYTOSIS BLD QL: ABNORMAL
APTT PPP: 23.5 SECONDS (ref 24–31)
APTT PPP: 27.9 SECONDS (ref 24–31)
ARTERIAL PATENCY WRIST A: POSITIVE
ARTERIAL PATENCY WRIST A: POSITIVE
AST SERPL-CCNC: 13 U/L (ref 1–40)
AST SERPL-CCNC: 13 U/L (ref 1–40)
AST SERPL-CCNC: 15 U/L (ref 1–40)
AST SERPL-CCNC: 20 U/L (ref 1–40)
AST SERPL-CCNC: 21 U/L (ref 1–40)
AST SERPL-CCNC: 22 U/L (ref 1–40)
AST SERPL-CCNC: 23 U/L (ref 1–40)
AST SERPL-CCNC: 25 U/L (ref 1–40)
AST SERPL-CCNC: 28 U/L (ref 1–40)
AT III PPP CHRO-ACNC: 55 % (ref 75–120)
ATMOSPHERIC PRESS: ABNORMAL MM[HG]
ATMOSPHERIC PRESS: ABNORMAL MM[HG]
B PARAPERT DNA SPEC QL NAA+PROBE: NOT DETECTED
B PARAPERT DNA SPEC QL NAA+PROBE: NOT DETECTED
B PERT DNA SPEC QL NAA+PROBE: NOT DETECTED
B PERT DNA SPEC QL NAA+PROBE: NOT DETECTED
B-GLOBULIN SERPL ELPH-MCNC: 1.1 G/DL (ref 0.7–1.3)
B-GLOBULIN SERPL ELPH-MCNC: 1.4 G/DL (ref 0.7–1.3)
BACTERIA SPEC AEROBE CULT: NO GROWTH
BACTERIA SPEC AEROBE CULT: NORMAL
BACTERIA UR QL AUTO: ABNORMAL /HPF
BASE EXCESS BLDA CALC-SCNC: -4.7 MMOL/L (ref 0–3)
BASE EXCESS BLDA CALC-SCNC: -5.2 MMOL/L (ref 0–3)
BASOPHILS # BLD AUTO: 0 10*3/MM3 (ref 0–0.2)
BASOPHILS # BLD AUTO: 0 10*3/MM3 (ref 0–0.2)
BASOPHILS # BLD AUTO: 0.05 10*3/MM3 (ref 0–0.2)
BASOPHILS # BLD AUTO: 0.07 10*3/MM3 (ref 0–0.2)
BASOPHILS # BLD AUTO: 0.09 10*3/MM3 (ref 0–0.2)
BASOPHILS # BLD AUTO: 0.1 10*3/MM3 (ref 0–0.2)
BASOPHILS # BLD AUTO: 0.11 10*3/MM3 (ref 0–0.2)
BASOPHILS # BLD AUTO: 0.13 10*3/MM3 (ref 0–0.2)
BASOPHILS # BLD AUTO: 0.14 10*3/MM3 (ref 0–0.2)
BASOPHILS # BLD AUTO: 0.15 10*3/MM3 (ref 0–0.2)
BASOPHILS # BLD AUTO: 0.15 10*3/MM3 (ref 0–0.2)
BASOPHILS # BLD AUTO: 0.17 10*3/MM3 (ref 0–0.2)
BASOPHILS # BLD AUTO: 0.18 10*3/MM3 (ref 0–0.2)
BASOPHILS # BLD AUTO: 0.23 10*3/MM3 (ref 0–0.2)
BASOPHILS # BLD AUTO: 0.24 10*3/MM3 (ref 0–0.2)
BASOPHILS # BLD AUTO: 0.28 10*3/MM3 (ref 0–0.2)
BASOPHILS # BLD MANUAL: 0.05 10*3/MM3 (ref 0–0.2)
BASOPHILS # BLD MANUAL: 0.05 10*3/MM3 (ref 0–0.2)
BASOPHILS # BLD MANUAL: 0.07 10*3/MM3 (ref 0–0.2)
BASOPHILS # BLD MANUAL: 0.09 10*3/MM3 (ref 0–0.2)
BASOPHILS # BLD MANUAL: 0.09 10*3/MM3 (ref 0–0.2)
BASOPHILS # BLD MANUAL: 0.13 10*3/MM3 (ref 0–0.2)
BASOPHILS # BLD MANUAL: 0.13 10*3/MM3 (ref 0–0.2)
BASOPHILS # BLD MANUAL: 0.33 10*3/MM3 (ref 0–0.2)
BASOPHILS NFR BLD AUTO: 0.4 % (ref 0–1.5)
BASOPHILS NFR BLD AUTO: 0.4 % (ref 0–1.5)
BASOPHILS NFR BLD AUTO: 0.6 % (ref 0–1.5)
BASOPHILS NFR BLD AUTO: 1 % (ref 0–1.5)
BASOPHILS NFR BLD AUTO: 1.3 % (ref 0–1.5)
BASOPHILS NFR BLD AUTO: 1.3 % (ref 0–1.5)
BASOPHILS NFR BLD AUTO: 1.4 % (ref 0–1.5)
BASOPHILS NFR BLD AUTO: 1.5 % (ref 0–1.5)
BASOPHILS NFR BLD AUTO: 1.9 % (ref 0–1.5)
BASOPHILS NFR BLD AUTO: 2 % (ref 0–1.5)
BASOPHILS NFR BLD AUTO: 2.1 % (ref 0–1.5)
BASOPHILS NFR BLD AUTO: 2.4 % (ref 0–1.5)
BASOPHILS NFR BLD AUTO: 2.6 % (ref 0–1.5)
BASOPHILS NFR BLD AUTO: 2.6 % (ref 0–1.5)
BASOPHILS NFR BLD AUTO: 2.8 % (ref 0–1.5)
BASOPHILS NFR BLD AUTO: 3 % (ref 0–1.5)
BASOPHILS NFR BLD AUTO: 3.4 % (ref 0–1.5)
BASOPHILS NFR BLD AUTO: 3.6 % (ref 0–1.5)
BASOPHILS NFR BLD AUTO: 3.8 % (ref 0–1.5)
BASOPHILS NFR BLD AUTO: 4 % (ref 0–1.5)
BASOPHILS NFR BLD AUTO: 5.3 % (ref 0–1.5)
BASOPHILS NFR BLD AUTO: 9 % (ref 0–1.5)
BB HOLD TUBE: NORMAL
BB HOLD TUBE: NORMAL
BDY SITE: ABNORMAL
BDY SITE: ABNORMAL
BH BB BLOOD EXPIRATION DATE: NORMAL
BH BB BLOOD TYPE BARCODE: 6200
BH BB DISPENSE STATUS: NORMAL
BH BB PRODUCT CODE: NORMAL
BH BB UNIT NUMBER: NORMAL
BH CV ECHO MEAS - ACS: 1.7 CM
BH CV ECHO MEAS - AO MAX PG (FULL): 7.6 MMHG
BH CV ECHO MEAS - AO MAX PG: 13.7 MMHG
BH CV ECHO MEAS - AO MEAN PG (FULL): 3.6 MMHG
BH CV ECHO MEAS - AO MEAN PG: 6.7 MMHG
BH CV ECHO MEAS - AO ROOT AREA (BSA CORRECTED): 1.5
BH CV ECHO MEAS - AO ROOT AREA: 11.9 CM^2
BH CV ECHO MEAS - AO ROOT DIAM: 3.9 CM
BH CV ECHO MEAS - AO V2 MAX: 185.1 CM/SEC
BH CV ECHO MEAS - AO V2 MEAN: 118.1 CM/SEC
BH CV ECHO MEAS - AO V2 VTI: 35.1 CM
BH CV ECHO MEAS - AORTIC HR: 88.1 BPM
BH CV ECHO MEAS - AORTIC R-R: 0.68 SEC
BH CV ECHO MEAS - ASC AORTA: 3.6 CM
BH CV ECHO MEAS - AVA(I,A): 3.5 CM^2
BH CV ECHO MEAS - AVA(I,D): 3.5 CM^2
BH CV ECHO MEAS - AVA(V,A): 3.5 CM^2
BH CV ECHO MEAS - AVA(V,D): 3.5 CM^2
BH CV ECHO MEAS - BSA(HAYCOCK): 2.8 M^2
BH CV ECHO MEAS - BSA: 2.7 M^2
BH CV ECHO MEAS - BZI_BMI: 41.2 KILOGRAMS/M^2
BH CV ECHO MEAS - BZI_METRIC_HEIGHT: 188 CM
BH CV ECHO MEAS - BZI_METRIC_WEIGHT: 145.6 KG
BH CV ECHO MEAS - CI(AO): 13.9 L/MIN/M^2
BH CV ECHO MEAS - CI(LVOT): 4.1 L/MIN/M^2
BH CV ECHO MEAS - CO(AO): 36.8 L/MIN
BH CV ECHO MEAS - CO(LVOT): 10.9 L/MIN
BH CV ECHO MEAS - EDV(CUBED): 221.2 ML
BH CV ECHO MEAS - EDV(MOD-SP4): 177.2 ML
BH CV ECHO MEAS - EDV(TEICH): 183.3 ML
BH CV ECHO MEAS - EF(CUBED): 56.9 %
BH CV ECHO MEAS - EF(MOD-BP): 58 %
BH CV ECHO MEAS - EF(MOD-SP4): 58.1 %
BH CV ECHO MEAS - EF(TEICH): 47.8 %
BH CV ECHO MEAS - ESV(CUBED): 95.3 ML
BH CV ECHO MEAS - ESV(MOD-SP4): 74.2 ML
BH CV ECHO MEAS - ESV(TEICH): 95.7 ML
BH CV ECHO MEAS - FS: 24.5 %
BH CV ECHO MEAS - IVS/LVPW: 1.1
BH CV ECHO MEAS - IVSD: 1.2 CM
BH CV ECHO MEAS - LA DIMENSION(2D): 4.9 CM
BH CV ECHO MEAS - LV DIASTOLIC VOL/BSA (35-75): 66.7 ML/M^2
BH CV ECHO MEAS - LV MASS(C)D: 299.9 GRAMS
BH CV ECHO MEAS - LV MASS(C)DI: 112.9 GRAMS/M^2
BH CV ECHO MEAS - LV MAX PG: 6.1 MMHG
BH CV ECHO MEAS - LV MEAN PG: 3.1 MMHG
BH CV ECHO MEAS - LV SYSTOLIC VOL/BSA (12-30): 27.9 ML/M^2
BH CV ECHO MEAS - LV V1 MAX: 123.6 CM/SEC
BH CV ECHO MEAS - LV V1 MEAN: 82.1 CM/SEC
BH CV ECHO MEAS - LV V1 VTI: 23.4 CM
BH CV ECHO MEAS - LVIDD: 6 CM
BH CV ECHO MEAS - LVIDS: 4.6 CM
BH CV ECHO MEAS - LVOT AREA: 5.3 CM^2
BH CV ECHO MEAS - LVOT DIAM: 2.6 CM
BH CV ECHO MEAS - LVPWD: 1.1 CM
BH CV ECHO MEAS - MV MAX PG: 9 MMHG
BH CV ECHO MEAS - MV MEAN PG: 3 MMHG
BH CV ECHO MEAS - MV V2 MAX: 150.3 CM/SEC
BH CV ECHO MEAS - MV V2 MEAN: 76.8 CM/SEC
BH CV ECHO MEAS - MV V2 VTI: 37.7 CM
BH CV ECHO MEAS - MVA(VTI): 3.3 CM^2
BH CV ECHO MEAS - PA ACC TIME: 0.15 SEC
BH CV ECHO MEAS - PA MAX PG (FULL): 7.4 MMHG
BH CV ECHO MEAS - PA MAX PG: 11.3 MMHG
BH CV ECHO MEAS - PA MEAN PG (FULL): 5.9 MMHG
BH CV ECHO MEAS - PA MEAN PG: 7.5 MMHG
BH CV ECHO MEAS - PA PR(ACCEL): 12.2 MMHG
BH CV ECHO MEAS - PA V2 MAX: 167.9 CM/SEC
BH CV ECHO MEAS - PA V2 MEAN: 133.5 CM/SEC
BH CV ECHO MEAS - PA V2 VTI: 34 CM
BH CV ECHO MEAS - PVA(I,A): 2.2 CM^2
BH CV ECHO MEAS - PVA(I,D): 2.2 CM^2
BH CV ECHO MEAS - PVA(V,A): 2.8 CM^2
BH CV ECHO MEAS - PVA(V,D): 2.8 CM^2
BH CV ECHO MEAS - QP/QS: 0.61
BH CV ECHO MEAS - RAP SYSTOLE: 3 MMHG
BH CV ECHO MEAS - RV MAX PG: 3.9 MMHG
BH CV ECHO MEAS - RV MEAN PG: 1.7 MMHG
BH CV ECHO MEAS - RV V1 MAX: 98.7 CM/SEC
BH CV ECHO MEAS - RV V1 MEAN: 57.7 CM/SEC
BH CV ECHO MEAS - RV V1 VTI: 16 CM
BH CV ECHO MEAS - RVDD: 3.5 CM
BH CV ECHO MEAS - RVOT AREA: 4.7 CM^2
BH CV ECHO MEAS - RVOT DIAM: 2.4 CM
BH CV ECHO MEAS - RVSP: 40.1 MMHG
BH CV ECHO MEAS - SI(AO): 157.4 ML/M^2
BH CV ECHO MEAS - SI(CUBED): 47.4 ML/M^2
BH CV ECHO MEAS - SI(LVOT): 46.6 ML/M^2
BH CV ECHO MEAS - SI(MOD-SP4): 38.8 ML/M^2
BH CV ECHO MEAS - SI(TEICH): 32.9 ML/M^2
BH CV ECHO MEAS - SV(AO): 418.1 ML
BH CV ECHO MEAS - SV(CUBED): 125.9 ML
BH CV ECHO MEAS - SV(LVOT): 123.9 ML
BH CV ECHO MEAS - SV(MOD-SP4): 103.1 ML
BH CV ECHO MEAS - SV(RVOT): 75.1 ML
BH CV ECHO MEAS - SV(TEICH): 87.5 ML
BH CV ECHO MEAS - TR MAX VEL: 304.5 CM/SEC
BH CV REST NUCLEAR ISOTOPE DOSE: 7 MCI
BH CV STRESS BP STAGE 1: NORMAL
BH CV STRESS BP STAGE 2: NORMAL
BH CV STRESS COMMENTS STAGE 1: NORMAL
BH CV STRESS COMMENTS STAGE 2: NORMAL
BH CV STRESS DOSE REGADENOSON STAGE 1: 0.4
BH CV STRESS DURATION MIN STAGE 1: 0
BH CV STRESS DURATION MIN STAGE 2: 4
BH CV STRESS DURATION SEC STAGE 1: 10
BH CV STRESS DURATION SEC STAGE 2: 0
BH CV STRESS HR STAGE 1: 91
BH CV STRESS HR STAGE 2: 78
BH CV STRESS NUCLEAR ISOTOPE DOSE: 21.9 MCI
BH CV STRESS PROTOCOL 1: NORMAL
BH CV STRESS RECOVERY BP: NORMAL MMHG
BH CV STRESS RECOVERY HR: 78 BPM
BH CV STRESS STAGE 1: 1
BH CV STRESS STAGE 2: 2
BILIRUB CONJ SERPL-MCNC: 0.3 MG/DL (ref 0–0.3)
BILIRUB INDIRECT SERPL-MCNC: 0.3 MG/DL
BILIRUB SERPL-MCNC: 0.4 MG/DL (ref 0–1.2)
BILIRUB SERPL-MCNC: 0.5 MG/DL (ref 0–1.2)
BILIRUB SERPL-MCNC: 0.6 MG/DL (ref 0–1.2)
BILIRUB SERPL-MCNC: 0.6 MG/DL (ref 0–1.2)
BILIRUB SERPL-MCNC: 0.7 MG/DL (ref 0–1.2)
BILIRUB SERPL-MCNC: 0.8 MG/DL (ref 0–1.2)
BILIRUB SERPL-MCNC: 1 MG/DL (ref 0–1.2)
BILIRUB UR QL STRIP: NEGATIVE
BLASTS NFR BLD MANUAL: 1 % (ref 0–0)
BLD GP AB SCN SERPL QL: NEGATIVE
BUN SERPL-MCNC: 22 MG/DL (ref 8–23)
BUN SERPL-MCNC: 24 MG/DL (ref 8–23)
BUN SERPL-MCNC: 25 MG/DL (ref 8–23)
BUN SERPL-MCNC: 25 MG/DL (ref 8–23)
BUN SERPL-MCNC: 26 MG/DL (ref 8–23)
BUN SERPL-MCNC: 29 MG/DL (ref 8–23)
BUN SERPL-MCNC: 30 MG/DL (ref 8–23)
BUN SERPL-MCNC: 31 MG/DL (ref 8–23)
BUN SERPL-MCNC: 35 MG/DL (ref 8–23)
BUN SERPL-MCNC: 38 MG/DL (ref 8–23)
BUN SERPL-MCNC: 39 MG/DL (ref 8–23)
BUN SERPL-MCNC: 40 MG/DL (ref 8–23)
BUN SERPL-MCNC: 42 MG/DL (ref 8–23)
BUN/CREAT SERPL: 12 (ref 7–25)
BUN/CREAT SERPL: 15.4 (ref 7–25)
BUN/CREAT SERPL: 15.8 (ref 7–25)
BUN/CREAT SERPL: 15.8 (ref 7–25)
BUN/CREAT SERPL: 16.2 (ref 7–25)
BUN/CREAT SERPL: 16.5 (ref 7–25)
BUN/CREAT SERPL: 16.6 (ref 7–25)
BUN/CREAT SERPL: 16.7 (ref 7–25)
BUN/CREAT SERPL: 17.2 (ref 7–25)
BUN/CREAT SERPL: 17.3 (ref 7–25)
BUN/CREAT SERPL: 17.3 (ref 7–25)
BUN/CREAT SERPL: 17.6 (ref 7–25)
BUN/CREAT SERPL: 18.6 (ref 7–25)
BUN/CREAT SERPL: 18.6 (ref 7–25)
BUN/CREAT SERPL: 19.3 (ref 7–25)
BUN/CREAT SERPL: 19.4 (ref 7–25)
C PNEUM DNA NPH QL NAA+NON-PROBE: NOT DETECTED
C PNEUM DNA NPH QL NAA+NON-PROBE: NOT DETECTED
C3 FRG RBC-MCNC: ABNORMAL
CA-I SERPL ISE-MCNC: 1.16 MMOL/L (ref 1.2–1.3)
CA-I SERPL ISE-MCNC: 1.18 MMOL/L (ref 1.2–1.3)
CA-I SERPL ISE-MCNC: 1.2 MMOL/L (ref 1.2–1.3)
CALCIUM SPEC-SCNC: 7.8 MG/DL (ref 8.6–10.5)
CALCIUM SPEC-SCNC: 7.8 MG/DL (ref 8.6–10.5)
CALCIUM SPEC-SCNC: 7.9 MG/DL (ref 8.6–10.5)
CALCIUM SPEC-SCNC: 7.9 MG/DL (ref 8.6–10.5)
CALCIUM SPEC-SCNC: 8 MG/DL (ref 8.6–10.5)
CALCIUM SPEC-SCNC: 8 MG/DL (ref 8.6–10.5)
CALCIUM SPEC-SCNC: 8.1 MG/DL (ref 8.6–10.5)
CALCIUM SPEC-SCNC: 8.2 MG/DL (ref 8.6–10.5)
CALCIUM SPEC-SCNC: 8.3 MG/DL (ref 8.6–10.5)
CALCIUM SPEC-SCNC: 8.4 MG/DL (ref 8.6–10.5)
CALCIUM SPEC-SCNC: 8.6 MG/DL (ref 8.6–10.5)
CALCIUM SPEC-SCNC: 8.7 MG/DL (ref 8.6–10.5)
CALCIUM SPEC-SCNC: 9 MG/DL (ref 8.6–10.5)
CALCIUM SPEC-SCNC: 9 MG/DL (ref 8.6–10.5)
CHLORIDE SERPL-SCNC: 103 MMOL/L (ref 98–107)
CHLORIDE SERPL-SCNC: 104 MMOL/L (ref 98–107)
CHLORIDE SERPL-SCNC: 105 MMOL/L (ref 98–107)
CHLORIDE SERPL-SCNC: 105 MMOL/L (ref 98–107)
CHLORIDE SERPL-SCNC: 106 MMOL/L (ref 98–107)
CHLORIDE SERPL-SCNC: 107 MMOL/L (ref 98–107)
CHLORIDE SERPL-SCNC: 109 MMOL/L (ref 98–107)
CHLORIDE SERPL-SCNC: 109 MMOL/L (ref 98–107)
CHLORIDE SERPL-SCNC: 110 MMOL/L (ref 98–107)
CHLORIDE SERPL-SCNC: 111 MMOL/L (ref 98–107)
CHLORIDE SERPL-SCNC: 113 MMOL/L (ref 98–107)
CHLORIDE SERPL-SCNC: 114 MMOL/L (ref 98–107)
CHLORIDE SERPL-SCNC: 114 MMOL/L (ref 98–107)
CHOLEST SERPL-MCNC: 127 MG/DL (ref 0–200)
CHOLEST SERPL-MCNC: 154 MG/DL (ref 0–200)
CK SERPL-CCNC: 101 U/L (ref 20–200)
CK SERPL-CCNC: 45 U/L (ref 20–200)
CLARITY UR: ABNORMAL
CLARITY UR: CLEAR
CLUMPED PLATELETS: PRESENT
CO2 BLDA-SCNC: 19.5 MMOL/L (ref 22–29)
CO2 BLDA-SCNC: 21.5 MMOL/L (ref 22–29)
CO2 SERPL-SCNC: 18 MMOL/L (ref 22–29)
CO2 SERPL-SCNC: 19 MMOL/L (ref 22–29)
CO2 SERPL-SCNC: 20 MMOL/L (ref 22–29)
CO2 SERPL-SCNC: 21 MMOL/L (ref 22–29)
CO2 SERPL-SCNC: 22 MMOL/L (ref 22–29)
CO2 SERPL-SCNC: 23 MMOL/L (ref 22–29)
CO2 SERPL-SCNC: 24 MMOL/L (ref 22–29)
CO2 SERPL-SCNC: 25.1 MMOL/L (ref 22–29)
COLOR UR: YELLOW
CREAT SERPL-MCNC: 1.51 MG/DL (ref 0.76–1.27)
CREAT SERPL-MCNC: 1.51 MG/DL (ref 0.76–1.27)
CREAT SERPL-MCNC: 1.52 MG/DL (ref 0.76–1.27)
CREAT SERPL-MCNC: 1.55 MG/DL (ref 0.76–1.27)
CREAT SERPL-MCNC: 1.61 MG/DL (ref 0.76–1.27)
CREAT SERPL-MCNC: 1.62 MG/DL (ref 0.76–1.27)
CREAT SERPL-MCNC: 1.79 MG/DL (ref 0.76–1.27)
CREAT SERPL-MCNC: 1.83 MG/DL (ref 0.76–1.27)
CREAT SERPL-MCNC: 1.84 MG/DL (ref 0.76–1.27)
CREAT SERPL-MCNC: 1.86 MG/DL (ref 0.76–1.27)
CREAT SERPL-MCNC: 1.88 MG/DL (ref 0.76–1.27)
CREAT SERPL-MCNC: 2.02 MG/DL (ref 0.76–1.27)
CREAT SERPL-MCNC: 2.15 MG/DL (ref 0.76–1.27)
CREAT SERPL-MCNC: 2.22 MG/DL (ref 0.76–1.27)
CREAT SERPL-MCNC: 2.26 MG/DL (ref 0.76–1.27)
CREAT SERPL-MCNC: 2.34 MG/DL (ref 0.76–1.27)
CREAT UR-MCNC: 69.7 MG/DL
CROSSMATCH INTERPRETATION: NORMAL
D DIMER PPP FEU-MCNC: 3.41 MG/L (FEU) (ref 0–0.59)
D-LACTATE SERPL-SCNC: 1.3 MMOL/L (ref 0.5–2)
D-LACTATE SERPL-SCNC: 1.5 MMOL/L (ref 0.5–2)
D-LACTATE SERPL-SCNC: 1.6 MMOL/L (ref 0.5–2)
D-LACTATE SERPL-SCNC: 2 MMOL/L (ref 0.5–2)
D-LACTATE SERPL-SCNC: 2 MMOL/L (ref 0.5–2)
D-LACTATE SERPL-SCNC: 2.2 MMOL/L (ref 0.5–2)
DACRYOCYTES BLD QL SMEAR: ABNORMAL
DAT POLY-SP REAG RBC QL: NEGATIVE
DEPRECATED RDW RBC AUTO: 58.2 FL (ref 37–54)
DEPRECATED RDW RBC AUTO: 59.3 FL (ref 37–54)
DEPRECATED RDW RBC AUTO: 59.7 FL (ref 37–54)
DEPRECATED RDW RBC AUTO: 60.8 FL (ref 37–54)
DEPRECATED RDW RBC AUTO: 60.8 FL (ref 37–54)
DEPRECATED RDW RBC AUTO: 61.2 FL (ref 37–54)
DEPRECATED RDW RBC AUTO: 63.8 FL (ref 37–54)
DEPRECATED RDW RBC AUTO: 66.9 FL (ref 37–54)
DEPRECATED RDW RBC AUTO: 67.4 FL (ref 37–54)
DEPRECATED RDW RBC AUTO: 67.8 FL (ref 37–54)
DEPRECATED RDW RBC AUTO: 68.7 FL (ref 37–54)
DEPRECATED RDW RBC AUTO: 69.6 FL (ref 37–54)
DEPRECATED RDW RBC AUTO: 69.7 FL (ref 37–54)
DEPRECATED RDW RBC AUTO: 70 FL (ref 37–54)
DEPRECATED RDW RBC AUTO: 70.4 FL (ref 37–54)
DEPRECATED RDW RBC AUTO: 71.8 FL (ref 37–54)
DEPRECATED RDW RBC AUTO: 73.5 FL (ref 37–54)
DEPRECATED RDW RBC AUTO: 76.1 FL (ref 37–54)
DEPRECATED RDW RBC AUTO: 76.7 FL (ref 37–54)
DEPRECATED RDW RBC AUTO: 77.4 FL (ref 37–54)
DEPRECATED RDW RBC AUTO: 78 FL (ref 37–54)
DEPRECATED RDW RBC AUTO: 79.2 FL (ref 37–54)
DEPRECATED RDW RBC AUTO: 80.6 FL (ref 37–54)
DEPRECATED RDW RBC AUTO: 81.4 FL (ref 37–54)
DEPRECATED RDW RBC AUTO: 81.8 FL (ref 37–54)
DEPRECATED RDW RBC AUTO: 84.7 FL (ref 37–54)
DEPRECATED RDW RBC AUTO: 85.3 FL (ref 37–54)
DEPRECATED RDW RBC AUTO: 87.1 FL (ref 37–54)
DEPRECATED RDW RBC AUTO: 88.8 FL (ref 37–54)
DEPRECATED RDW RBC AUTO: 89.3 FL (ref 37–54)
ELLIPTOCYTES BLD QL SMEAR: ABNORMAL
ELLIPTOCYTES BLD QL SMEAR: ABNORMAL
EOSINOPHIL # BLD AUTO: 0.1 10*3/MM3 (ref 0–0.4)
EOSINOPHIL # BLD AUTO: 0.1 10*3/MM3 (ref 0–0.4)
EOSINOPHIL # BLD AUTO: 0.14 10*3/MM3 (ref 0–0.4)
EOSINOPHIL # BLD AUTO: 0.17 10*3/MM3 (ref 0–0.4)
EOSINOPHIL # BLD AUTO: 0.18 10*3/MM3 (ref 0–0.4)
EOSINOPHIL # BLD AUTO: 0.2 10*3/MM3 (ref 0–0.4)
EOSINOPHIL # BLD AUTO: 0.21 10*3/MM3 (ref 0–0.4)
EOSINOPHIL # BLD AUTO: 0.22 10*3/MM3 (ref 0–0.4)
EOSINOPHIL # BLD AUTO: 0.23 10*3/MM3 (ref 0–0.4)
EOSINOPHIL # BLD AUTO: 0.25 10*3/MM3 (ref 0–0.4)
EOSINOPHIL # BLD AUTO: 0.26 10*3/MM3 (ref 0–0.4)
EOSINOPHIL # BLD AUTO: 0.35 10*3/MM3 (ref 0–0.4)
EOSINOPHIL # BLD AUTO: 0.36 10*3/MM3 (ref 0–0.4)
EOSINOPHIL # BLD AUTO: 0.38 10*3/MM3 (ref 0–0.4)
EOSINOPHIL # BLD AUTO: 0.4 10*3/MM3 (ref 0–0.4)
EOSINOPHIL # BLD AUTO: 0.42 10*3/MM3 (ref 0–0.4)
EOSINOPHIL # BLD AUTO: 0.44 10*3/MM3 (ref 0–0.4)
EOSINOPHIL # BLD AUTO: 0.53 10*3/MM3 (ref 0–0.4)
EOSINOPHIL # BLD MANUAL: 0.07 10*3/MM3 (ref 0–0.4)
EOSINOPHIL # BLD MANUAL: 0.09 10*3/MM3 (ref 0–0.4)
EOSINOPHIL # BLD MANUAL: 0.1 10*3/MM3 (ref 0–0.4)
EOSINOPHIL # BLD MANUAL: 0.11 10*3/MM3 (ref 0–0.4)
EOSINOPHIL # BLD MANUAL: 0.13 10*3/MM3 (ref 0–0.4)
EOSINOPHIL # BLD MANUAL: 0.13 10*3/MM3 (ref 0–0.4)
EOSINOPHIL # BLD MANUAL: 0.14 10*3/MM3 (ref 0–0.4)
EOSINOPHIL # BLD MANUAL: 0.14 10*3/MM3 (ref 0–0.4)
EOSINOPHIL # BLD MANUAL: 0.41 10*3/MM3 (ref 0–0.4)
EOSINOPHIL # BLD MANUAL: 0.58 10*3/MM3 (ref 0–0.4)
EOSINOPHIL NFR BLD AUTO: 1 % (ref 0.3–6.2)
EOSINOPHIL NFR BLD AUTO: 1.5 % (ref 0.3–6.2)
EOSINOPHIL NFR BLD AUTO: 1.8 % (ref 0.3–6.2)
EOSINOPHIL NFR BLD AUTO: 2.1 % (ref 0.3–6.2)
EOSINOPHIL NFR BLD AUTO: 3.4 % (ref 0.3–6.2)
EOSINOPHIL NFR BLD AUTO: 3.8 % (ref 0.3–6.2)
EOSINOPHIL NFR BLD AUTO: 4.3 % (ref 0.3–6.2)
EOSINOPHIL NFR BLD AUTO: 4.4 % (ref 0.3–6.2)
EOSINOPHIL NFR BLD AUTO: 4.7 % (ref 0.3–6.2)
EOSINOPHIL NFR BLD AUTO: 4.8 % (ref 0.3–6.2)
EOSINOPHIL NFR BLD AUTO: 5.2 % (ref 0.3–6.2)
EOSINOPHIL NFR BLD AUTO: 5.3 % (ref 0.3–6.2)
EOSINOPHIL NFR BLD AUTO: 5.5 % (ref 0.3–6.2)
EOSINOPHIL NFR BLD AUTO: 5.6 % (ref 0.3–6.2)
EOSINOPHIL NFR BLD AUTO: 6.1 % (ref 0.3–6.2)
EOSINOPHIL NFR BLD AUTO: 6.3 % (ref 0.3–6.2)
EOSINOPHIL NFR BLD AUTO: 6.6 % (ref 0.3–6.2)
EOSINOPHIL NFR BLD AUTO: 7.4 % (ref 0.3–6.2)
EOSINOPHIL NFR BLD MANUAL: 1 % (ref 0.3–6.2)
EOSINOPHIL NFR BLD MANUAL: 11 % (ref 0.3–6.2)
EOSINOPHIL NFR BLD MANUAL: 2 % (ref 0.3–6.2)
EOSINOPHIL NFR BLD MANUAL: 2 % (ref 0.3–6.2)
EOSINOPHIL NFR BLD MANUAL: 3 % (ref 0.3–6.2)
EOSINOPHIL NFR BLD MANUAL: 4 % (ref 0.3–6.2)
EOSINOPHIL NFR BLD MANUAL: 4 % (ref 0.3–6.2)
EOSINOPHIL NFR BLD MANUAL: 8 % (ref 0.3–6.2)
EOSINOPHIL SPEC QL MICRO: 0 % EOS/100 CELLS (ref 0–0)
ERYTHROCYTE [DISTWIDTH] IN BLOOD BY AUTOMATED COUNT: 15.9 % (ref 12.3–15.4)
ERYTHROCYTE [DISTWIDTH] IN BLOOD BY AUTOMATED COUNT: 16.2 % (ref 12.3–15.4)
ERYTHROCYTE [DISTWIDTH] IN BLOOD BY AUTOMATED COUNT: 16.4 % (ref 12.3–15.4)
ERYTHROCYTE [DISTWIDTH] IN BLOOD BY AUTOMATED COUNT: 17 % (ref 12.3–15.4)
ERYTHROCYTE [DISTWIDTH] IN BLOOD BY AUTOMATED COUNT: 17.7 % (ref 12.3–15.4)
ERYTHROCYTE [DISTWIDTH] IN BLOOD BY AUTOMATED COUNT: 17.7 % (ref 12.3–15.4)
ERYTHROCYTE [DISTWIDTH] IN BLOOD BY AUTOMATED COUNT: 18.5 % (ref 12.3–15.4)
ERYTHROCYTE [DISTWIDTH] IN BLOOD BY AUTOMATED COUNT: 18.9 % (ref 12.3–15.4)
ERYTHROCYTE [DISTWIDTH] IN BLOOD BY AUTOMATED COUNT: 19.1 % (ref 12.3–15.4)
ERYTHROCYTE [DISTWIDTH] IN BLOOD BY AUTOMATED COUNT: 19.1 % (ref 12.3–15.4)
ERYTHROCYTE [DISTWIDTH] IN BLOOD BY AUTOMATED COUNT: 19.3 % (ref 12.3–15.4)
ERYTHROCYTE [DISTWIDTH] IN BLOOD BY AUTOMATED COUNT: 19.5 % (ref 12.3–15.4)
ERYTHROCYTE [DISTWIDTH] IN BLOOD BY AUTOMATED COUNT: 19.5 % (ref 12.3–15.4)
ERYTHROCYTE [DISTWIDTH] IN BLOOD BY AUTOMATED COUNT: 19.8 % (ref 12.3–15.4)
ERYTHROCYTE [DISTWIDTH] IN BLOOD BY AUTOMATED COUNT: 19.9 % (ref 12.3–15.4)
ERYTHROCYTE [DISTWIDTH] IN BLOOD BY AUTOMATED COUNT: 20.6 % (ref 12.3–15.4)
ERYTHROCYTE [DISTWIDTH] IN BLOOD BY AUTOMATED COUNT: 21.1 % (ref 12.3–15.4)
ERYTHROCYTE [DISTWIDTH] IN BLOOD BY AUTOMATED COUNT: 21.2 % (ref 12.3–15.4)
ERYTHROCYTE [DISTWIDTH] IN BLOOD BY AUTOMATED COUNT: 21.5 % (ref 12.3–15.4)
ERYTHROCYTE [DISTWIDTH] IN BLOOD BY AUTOMATED COUNT: 22.2 % (ref 12.3–15.4)
ERYTHROCYTE [DISTWIDTH] IN BLOOD BY AUTOMATED COUNT: 22.3 % (ref 12.3–15.4)
ERYTHROCYTE [DISTWIDTH] IN BLOOD BY AUTOMATED COUNT: 23.1 % (ref 12.3–15.4)
ERYTHROCYTE [DISTWIDTH] IN BLOOD BY AUTOMATED COUNT: 23.2 % (ref 12.3–15.4)
ERYTHROCYTE [DISTWIDTH] IN BLOOD BY AUTOMATED COUNT: 24.2 % (ref 12.3–15.4)
ERYTHROCYTE [DISTWIDTH] IN BLOOD BY AUTOMATED COUNT: 24.3 % (ref 12.3–15.4)
ERYTHROCYTE [DISTWIDTH] IN BLOOD BY AUTOMATED COUNT: 25 % (ref 12.3–15.4)
ERYTHROCYTE [DISTWIDTH] IN BLOOD BY AUTOMATED COUNT: 25.1 % (ref 12.3–15.4)
ERYTHROCYTE [DISTWIDTH] IN BLOOD BY AUTOMATED COUNT: 25.2 % (ref 12.3–15.4)
ERYTHROCYTE [DISTWIDTH] IN BLOOD BY AUTOMATED COUNT: 25.3 % (ref 12.3–15.4)
FERRITIN SERPL-MCNC: 103 NG/ML (ref 30–400)
FERRITIN SERPL-MCNC: 1093 NG/ML (ref 30–400)
FERRITIN SERPL-MCNC: 348.4 NG/ML (ref 30–400)
FERRITIN SERPL-MCNC: 410.3 NG/ML (ref 30–400)
FERRITIN SERPL-MCNC: 504 NG/ML (ref 30–400)
FERRITIN SERPL-MCNC: 509.5 NG/ML (ref 30–400)
FLUAV SUBTYP SPEC NAA+PROBE: NOT DETECTED
FLUAV SUBTYP SPEC NAA+PROBE: NOT DETECTED
FLUBV RNA ISLT QL NAA+PROBE: NOT DETECTED
FLUBV RNA ISLT QL NAA+PROBE: NOT DETECTED
FOLATE SERPL-MCNC: >20 NG/ML (ref 4.78–24.2)
FOLATE SERPL-MCNC: >20 NG/ML (ref 4.78–24.2)
GAMMA GLOB SERPL ELPH-MCNC: 2.1 G/DL (ref 0.4–1.8)
GAMMA GLOB SERPL ELPH-MCNC: 2.1 G/DL (ref 0.4–1.8)
GFR SERPL CREATININE-BSD FRML MDRD: 27 ML/MIN/1.73
GFR SERPL CREATININE-BSD FRML MDRD: 29 ML/MIN/1.73
GFR SERPL CREATININE-BSD FRML MDRD: 29 ML/MIN/1.73
GFR SERPL CREATININE-BSD FRML MDRD: 30 ML/MIN/1.73
GFR SERPL CREATININE-BSD FRML MDRD: 32 ML/MIN/1.73
GFR SERPL CREATININE-BSD FRML MDRD: 35 ML/MIN/1.73
GFR SERPL CREATININE-BSD FRML MDRD: 36 ML/MIN/1.73
GFR SERPL CREATININE-BSD FRML MDRD: 37 ML/MIN/1.73
GFR SERPL CREATININE-BSD FRML MDRD: 42 ML/MIN/1.73
GFR SERPL CREATININE-BSD FRML MDRD: 42 ML/MIN/1.73
GFR SERPL CREATININE-BSD FRML MDRD: 44 ML/MIN/1.73
GFR SERPL CREATININE-BSD FRML MDRD: 45 ML/MIN/1.73
GLOBULIN SER CALC-MCNC: 4 G/DL (ref 2.2–3.9)
GLOBULIN SER CALC-MCNC: 4.3 G/DL (ref 2.2–3.9)
GLOBULIN UR ELPH-MCNC: 3.3 GM/DL
GLOBULIN UR ELPH-MCNC: 3.4 GM/DL
GLOBULIN UR ELPH-MCNC: 3.4 GM/DL
GLOBULIN UR ELPH-MCNC: 3.6 GM/DL
GLOBULIN UR ELPH-MCNC: 3.6 GM/DL
GLOBULIN UR ELPH-MCNC: 3.7 GM/DL
GLOBULIN UR ELPH-MCNC: 3.8 GM/DL
GLOBULIN UR ELPH-MCNC: 3.9 GM/DL
GLOBULIN UR ELPH-MCNC: 4.2 GM/DL
GLOBULIN UR ELPH-MCNC: 4.4 GM/DL
GLOBULIN UR ELPH-MCNC: 4.7 GM/DL
GLUCOSE BLDC GLUCOMTR-MCNC: 102 MG/DL (ref 70–105)
GLUCOSE BLDC GLUCOMTR-MCNC: 106 MG/DL (ref 70–105)
GLUCOSE BLDC GLUCOMTR-MCNC: 108 MG/DL (ref 70–105)
GLUCOSE BLDC GLUCOMTR-MCNC: 116 MG/DL (ref 70–105)
GLUCOSE BLDC GLUCOMTR-MCNC: 119 MG/DL (ref 70–105)
GLUCOSE BLDC GLUCOMTR-MCNC: 122 MG/DL (ref 70–105)
GLUCOSE BLDC GLUCOMTR-MCNC: 126 MG/DL (ref 70–105)
GLUCOSE BLDC GLUCOMTR-MCNC: 135 MG/DL (ref 70–105)
GLUCOSE BLDC GLUCOMTR-MCNC: 138 MG/DL (ref 70–105)
GLUCOSE BLDC GLUCOMTR-MCNC: 140 MG/DL (ref 70–105)
GLUCOSE BLDC GLUCOMTR-MCNC: 142 MG/DL (ref 70–105)
GLUCOSE BLDC GLUCOMTR-MCNC: 147 MG/DL (ref 70–105)
GLUCOSE BLDC GLUCOMTR-MCNC: 148 MG/DL (ref 70–105)
GLUCOSE BLDC GLUCOMTR-MCNC: 148 MG/DL (ref 70–105)
GLUCOSE BLDC GLUCOMTR-MCNC: 149 MG/DL (ref 70–105)
GLUCOSE BLDC GLUCOMTR-MCNC: 150 MG/DL (ref 70–105)
GLUCOSE BLDC GLUCOMTR-MCNC: 155 MG/DL (ref 70–105)
GLUCOSE BLDC GLUCOMTR-MCNC: 156 MG/DL (ref 70–105)
GLUCOSE BLDC GLUCOMTR-MCNC: 158 MG/DL (ref 70–105)
GLUCOSE BLDC GLUCOMTR-MCNC: 159 MG/DL (ref 70–105)
GLUCOSE BLDC GLUCOMTR-MCNC: 163 MG/DL (ref 70–105)
GLUCOSE BLDC GLUCOMTR-MCNC: 168 MG/DL (ref 70–105)
GLUCOSE BLDC GLUCOMTR-MCNC: 169 MG/DL (ref 70–105)
GLUCOSE BLDC GLUCOMTR-MCNC: 172 MG/DL (ref 70–105)
GLUCOSE BLDC GLUCOMTR-MCNC: 177 MG/DL (ref 70–105)
GLUCOSE BLDC GLUCOMTR-MCNC: 178 MG/DL (ref 70–105)
GLUCOSE BLDC GLUCOMTR-MCNC: 181 MG/DL (ref 70–105)
GLUCOSE BLDC GLUCOMTR-MCNC: 184 MG/DL (ref 70–105)
GLUCOSE BLDC GLUCOMTR-MCNC: 192 MG/DL (ref 70–105)
GLUCOSE BLDC GLUCOMTR-MCNC: 196 MG/DL (ref 70–105)
GLUCOSE BLDC GLUCOMTR-MCNC: 201 MG/DL (ref 70–105)
GLUCOSE BLDC GLUCOMTR-MCNC: 202 MG/DL (ref 70–105)
GLUCOSE BLDC GLUCOMTR-MCNC: 204 MG/DL (ref 70–105)
GLUCOSE BLDC GLUCOMTR-MCNC: 205 MG/DL (ref 70–105)
GLUCOSE BLDC GLUCOMTR-MCNC: 206 MG/DL (ref 70–105)
GLUCOSE BLDC GLUCOMTR-MCNC: 211 MG/DL (ref 70–105)
GLUCOSE BLDC GLUCOMTR-MCNC: 224 MG/DL (ref 70–105)
GLUCOSE BLDC GLUCOMTR-MCNC: 227 MG/DL (ref 70–105)
GLUCOSE BLDC GLUCOMTR-MCNC: 227 MG/DL (ref 70–105)
GLUCOSE BLDC GLUCOMTR-MCNC: 232 MG/DL (ref 70–105)
GLUCOSE BLDC GLUCOMTR-MCNC: 233 MG/DL (ref 70–105)
GLUCOSE BLDC GLUCOMTR-MCNC: 242 MG/DL (ref 70–105)
GLUCOSE BLDC GLUCOMTR-MCNC: 247 MG/DL (ref 70–105)
GLUCOSE BLDC GLUCOMTR-MCNC: 250 MG/DL (ref 70–105)
GLUCOSE BLDC GLUCOMTR-MCNC: 260 MG/DL (ref 70–105)
GLUCOSE BLDC GLUCOMTR-MCNC: 292 MG/DL (ref 70–105)
GLUCOSE BLDC GLUCOMTR-MCNC: 324 MG/DL (ref 70–105)
GLUCOSE BLDC GLUCOMTR-MCNC: 57 MG/DL (ref 70–105)
GLUCOSE BLDC GLUCOMTR-MCNC: 59 MG/DL (ref 70–105)
GLUCOSE BLDC GLUCOMTR-MCNC: 64 MG/DL (ref 70–105)
GLUCOSE BLDC GLUCOMTR-MCNC: 88 MG/DL (ref 70–105)
GLUCOSE BLDC GLUCOMTR-MCNC: 96 MG/DL (ref 70–105)
GLUCOSE SERPL-MCNC: 105 MG/DL (ref 65–99)
GLUCOSE SERPL-MCNC: 111 MG/DL (ref 65–99)
GLUCOSE SERPL-MCNC: 122 MG/DL (ref 65–99)
GLUCOSE SERPL-MCNC: 135 MG/DL (ref 65–99)
GLUCOSE SERPL-MCNC: 136 MG/DL (ref 65–99)
GLUCOSE SERPL-MCNC: 143 MG/DL (ref 65–99)
GLUCOSE SERPL-MCNC: 151 MG/DL (ref 65–99)
GLUCOSE SERPL-MCNC: 155 MG/DL (ref 65–99)
GLUCOSE SERPL-MCNC: 155 MG/DL (ref 65–99)
GLUCOSE SERPL-MCNC: 158 MG/DL (ref 65–99)
GLUCOSE SERPL-MCNC: 165 MG/DL (ref 65–99)
GLUCOSE SERPL-MCNC: 172 MG/DL (ref 65–99)
GLUCOSE SERPL-MCNC: 178 MG/DL (ref 65–99)
GLUCOSE SERPL-MCNC: 199 MG/DL (ref 65–99)
GLUCOSE SERPL-MCNC: 241 MG/DL (ref 65–99)
GLUCOSE SERPL-MCNC: 253 MG/DL (ref 65–99)
GLUCOSE UR STRIP-MCNC: NEGATIVE MG/DL
HADV DNA SPEC NAA+PROBE: NOT DETECTED
HADV DNA SPEC NAA+PROBE: NOT DETECTED
HAPTOGLOB SERPL-MCNC: 59 MG/DL (ref 30–200)
HAPTOGLOB SERPL-MCNC: 88 MG/DL (ref 30–200)
HBA1C MFR BLD: 6.5 % (ref 3.5–5.6)
HBA1C MFR BLD: 6.6 % (ref 3.5–5.6)
HBA1C MFR BLD: 7.1 % (ref 3.5–5.6)
HBA1C MFR BLD: 7.5 % (ref 3.5–5.6)
HCO3 BLDA-SCNC: 18.6 MMOL/L (ref 21–28)
HCO3 BLDA-SCNC: 20.4 MMOL/L (ref 21–28)
HCOV 229E RNA SPEC QL NAA+PROBE: NOT DETECTED
HCOV 229E RNA SPEC QL NAA+PROBE: NOT DETECTED
HCOV HKU1 RNA SPEC QL NAA+PROBE: NOT DETECTED
HCOV HKU1 RNA SPEC QL NAA+PROBE: NOT DETECTED
HCOV NL63 RNA SPEC QL NAA+PROBE: NOT DETECTED
HCOV NL63 RNA SPEC QL NAA+PROBE: NOT DETECTED
HCOV OC43 RNA SPEC QL NAA+PROBE: NOT DETECTED
HCOV OC43 RNA SPEC QL NAA+PROBE: NOT DETECTED
HCT VFR BLD AUTO: 18.7 % (ref 37.5–51)
HCT VFR BLD AUTO: 21 % (ref 37.5–51)
HCT VFR BLD AUTO: 21.5 % (ref 37.5–51)
HCT VFR BLD AUTO: 22 % (ref 37.5–51)
HCT VFR BLD AUTO: 22 % (ref 37.5–51)
HCT VFR BLD AUTO: 22.3 % (ref 37.5–51)
HCT VFR BLD AUTO: 22.5 % (ref 37.5–51)
HCT VFR BLD AUTO: 22.5 % (ref 37.5–51)
HCT VFR BLD AUTO: 22.6 % (ref 37.5–51)
HCT VFR BLD AUTO: 22.9 % (ref 37.5–51)
HCT VFR BLD AUTO: 23.2 % (ref 37.5–51)
HCT VFR BLD AUTO: 23.5 % (ref 37.5–51)
HCT VFR BLD AUTO: 24.4 % (ref 37.5–51)
HCT VFR BLD AUTO: 24.5 % (ref 37.5–51)
HCT VFR BLD AUTO: 24.7 % (ref 37.5–51)
HCT VFR BLD AUTO: 24.8 % (ref 37.5–51)
HCT VFR BLD AUTO: 24.9 % (ref 37.5–51)
HCT VFR BLD AUTO: 25.1 % (ref 37.5–51)
HCT VFR BLD AUTO: 25.2 % (ref 37.5–51)
HCT VFR BLD AUTO: 25.3 % (ref 37.5–51)
HCT VFR BLD AUTO: 25.3 % (ref 37.5–51)
HCT VFR BLD AUTO: 25.6 % (ref 37.5–51)
HCT VFR BLD AUTO: 25.6 % (ref 37.5–51)
HCT VFR BLD AUTO: 26.2 % (ref 37.5–51)
HCT VFR BLD AUTO: 26.8 % (ref 37.5–51)
HCT VFR BLD AUTO: 27.5 % (ref 37.5–51)
HCT VFR BLD AUTO: 28.1 % (ref 37.5–51)
HCT VFR BLD AUTO: 28.4 % (ref 37.5–51)
HCT VFR BLD AUTO: 29.4 % (ref 37.5–51)
HCT VFR BLD AUTO: 30.7 % (ref 37.5–51)
HCT VFR BLD AUTO: 31 % (ref 37.5–51)
HCT VFR BLD AUTO: 32.6 % (ref 37.5–51)
HCT VFR BLD AUTO: 32.6 % (ref 37.5–51)
HCT VFR BLD AUTO: 34.9 % (ref 37.5–51)
HDLC SERPL-MCNC: 62 MG/DL (ref 40–60)
HDLC SERPL-MCNC: 62 MG/DL (ref 40–60)
HEMOCCULT STL QL IA: NEGATIVE
HEMODILUTION: NO
HEMODILUTION: NO
HGB BLD-MCNC: 10.3 G/DL (ref 13–17.7)
HGB BLD-MCNC: 10.7 G/DL (ref 13–17.7)
HGB BLD-MCNC: 10.7 G/DL (ref 13–17.7)
HGB BLD-MCNC: 11.3 G/DL (ref 13–17.7)
HGB BLD-MCNC: 6.3 G/DL (ref 13–17.7)
HGB BLD-MCNC: 6.9 G/DL (ref 13–17.7)
HGB BLD-MCNC: 7 G/DL (ref 13–17.7)
HGB BLD-MCNC: 7.1 G/DL (ref 13–17.7)
HGB BLD-MCNC: 7.2 G/DL (ref 13–17.7)
HGB BLD-MCNC: 7.2 G/DL (ref 13–17.7)
HGB BLD-MCNC: 7.3 G/DL (ref 13–17.7)
HGB BLD-MCNC: 7.3 G/DL (ref 13–17.7)
HGB BLD-MCNC: 7.4 G/DL (ref 13–17.7)
HGB BLD-MCNC: 7.5 G/DL (ref 13–17.7)
HGB BLD-MCNC: 7.5 G/DL (ref 13–17.7)
HGB BLD-MCNC: 7.6 G/DL (ref 13–17.7)
HGB BLD-MCNC: 7.8 G/DL (ref 13–17.7)
HGB BLD-MCNC: 7.9 G/DL (ref 13–17.7)
HGB BLD-MCNC: 8.2 G/DL (ref 13–17.7)
HGB BLD-MCNC: 8.3 G/DL (ref 13–17.7)
HGB BLD-MCNC: 8.4 G/DL (ref 13–17.7)
HGB BLD-MCNC: 8.6 G/DL (ref 13–17.7)
HGB BLD-MCNC: 8.7 G/DL (ref 13–17.7)
HGB BLD-MCNC: 8.8 G/DL (ref 13–17.7)
HGB BLD-MCNC: 8.8 G/DL (ref 13–17.7)
HGB BLD-MCNC: 8.9 G/DL (ref 13–17.7)
HGB BLD-MCNC: 9.1 G/DL (ref 13–17.7)
HGB BLD-MCNC: 9.2 G/DL (ref 13–17.7)
HGB BLD-MCNC: 9.4 G/DL (ref 13–17.7)
HGB BLD-MCNC: 9.6 G/DL (ref 13–17.7)
HGB UR QL STRIP.AUTO: ABNORMAL
HMPV RNA NPH QL NAA+NON-PROBE: NOT DETECTED
HMPV RNA NPH QL NAA+NON-PROBE: NOT DETECTED
HOLD SPECIMEN: NORMAL
HPIV1 RNA SPEC QL NAA+PROBE: NOT DETECTED
HPIV1 RNA SPEC QL NAA+PROBE: NOT DETECTED
HPIV2 RNA SPEC QL NAA+PROBE: NOT DETECTED
HPIV2 RNA SPEC QL NAA+PROBE: NOT DETECTED
HPIV3 RNA NPH QL NAA+PROBE: NOT DETECTED
HPIV3 RNA NPH QL NAA+PROBE: NOT DETECTED
HPIV4 P GENE NPH QL NAA+PROBE: NOT DETECTED
HPIV4 P GENE NPH QL NAA+PROBE: NOT DETECTED
HYALINE CASTS UR QL AUTO: ABNORMAL /LPF
INHALED O2 CONCENTRATION: 21 %
INHALED O2 CONCENTRATION: <21 %
INR PPP: 0.98 (ref 0.93–1.1)
INR PPP: 0.99 (ref 0.93–1.1)
INR PPP: 1.08 (ref 0.93–1.1)
INR PPP: 1.09 (ref 0.93–1.1)
IRON 24H UR-MRATE: 25 MCG/DL (ref 59–158)
IRON 24H UR-MRATE: 52 MCG/DL (ref 59–158)
IRON 24H UR-MRATE: 76 MCG/DL (ref 59–158)
IRON 24H UR-MRATE: 84 MCG/DL (ref 59–158)
IRON 24H UR-MRATE: 85 MCG/DL (ref 59–158)
IRON 24H UR-MRATE: 87 MCG/DL (ref 59–158)
IRON SATN MFR SERPL: 14 % (ref 20–50)
IRON SATN MFR SERPL: 18 % (ref 20–50)
IRON SATN MFR SERPL: 28 % (ref 20–50)
IRON SATN MFR SERPL: 37 % (ref 20–50)
IRON SATN MFR SERPL: 38 % (ref 20–50)
IRON SATN MFR SERPL: 39 % (ref 20–50)
KAPPA LC FREE SER-MCNC: 135.6 MG/L (ref 3.3–19.4)
KAPPA LC FREE/LAMBDA FREE SER: 0.91 {RATIO} (ref 0.26–1.65)
KARYOTYP MAR: NORMAL
KETONES UR QL STRIP: NEGATIVE
LAB AP CASE REPORT: NORMAL
LAB AP DIAGNOSIS COMMENT: NORMAL
LAB AP FLOW CYTOMETRY SUMMARY: NORMAL
LABORATORY COMMENT REPORT: ABNORMAL
LABORATORY COMMENT REPORT: ABNORMAL
LACTATE HOLD SPECIMEN: NORMAL
LAMBDA LC FREE SERPL-MCNC: 148.8 MG/L (ref 5.7–26.3)
LARGE PLATELETS: ABNORMAL
LARGE PLATELETS: ABNORMAL
LDH SERPL-CCNC: 176 U/L (ref 135–225)
LDLC SERPL CALC-MCNC: 51 MG/DL (ref 0–100)
LDLC SERPL CALC-MCNC: 77 MG/DL (ref 0–100)
LDLC/HDLC SERPL: 0.84 {RATIO}
LDLC/HDLC SERPL: 1.24 {RATIO}
LEUKOCYTE ESTERASE UR QL STRIP.AUTO: NEGATIVE
LIPASE SERPL-CCNC: 14 U/L (ref 13–60)
LIPASE SERPL-CCNC: 22 U/L (ref 13–60)
LV EF 2D ECHO EST: 60 %
LV EF NUC BP: 55 %
LYMPHOCYTES # BLD AUTO: 0.4 10*3/MM3 (ref 0.7–3.1)
LYMPHOCYTES # BLD AUTO: 0.6 10*3/MM3 (ref 0.7–3.1)
LYMPHOCYTES # BLD AUTO: 0.62 10*3/MM3 (ref 0.7–3.1)
LYMPHOCYTES # BLD AUTO: 0.7 10*3/MM3 (ref 0.7–3.1)
LYMPHOCYTES # BLD AUTO: 0.75 10*3/MM3 (ref 0.7–3.1)
LYMPHOCYTES # BLD AUTO: 0.87 10*3/MM3 (ref 0.7–3.1)
LYMPHOCYTES # BLD AUTO: 1.06 10*3/MM3 (ref 0.7–3.1)
LYMPHOCYTES # BLD AUTO: 1.2 10*3/MM3 (ref 0.7–3.1)
LYMPHOCYTES # BLD AUTO: 1.32 10*3/MM3 (ref 0.7–3.1)
LYMPHOCYTES # BLD AUTO: 1.33 10*3/MM3 (ref 0.7–3.1)
LYMPHOCYTES # BLD AUTO: 1.41 10*3/MM3 (ref 0.7–3.1)
LYMPHOCYTES # BLD AUTO: 1.41 10*3/MM3 (ref 0.7–3.1)
LYMPHOCYTES # BLD AUTO: 1.48 10*3/MM3 (ref 0.7–3.1)
LYMPHOCYTES # BLD AUTO: 1.57 10*3/MM3 (ref 0.7–3.1)
LYMPHOCYTES # BLD AUTO: 1.66 10*3/MM3 (ref 0.7–3.1)
LYMPHOCYTES # BLD AUTO: 1.69 10*3/MM3 (ref 0.7–3.1)
LYMPHOCYTES # BLD AUTO: 1.9 10*3/MM3 (ref 0.7–3.1)
LYMPHOCYTES # BLD AUTO: 2.1 10*3/MM3 (ref 0.7–3.1)
LYMPHOCYTES # BLD MANUAL: 0.07 10*3/MM3 (ref 0.7–3.1)
LYMPHOCYTES # BLD MANUAL: 0.73 10*3/MM3 (ref 0.7–3.1)
LYMPHOCYTES # BLD MANUAL: 0.76 10*3/MM3 (ref 0.7–3.1)
LYMPHOCYTES # BLD MANUAL: 0.78 10*3/MM3 (ref 0.7–3.1)
LYMPHOCYTES # BLD MANUAL: 0.79 10*3/MM3 (ref 0.7–3.1)
LYMPHOCYTES # BLD MANUAL: 0.89 10*3/MM3 (ref 0.7–3.1)
LYMPHOCYTES # BLD MANUAL: 0.99 10*3/MM3 (ref 0.7–3.1)
LYMPHOCYTES # BLD MANUAL: 0.99 10*3/MM3 (ref 0.7–3.1)
LYMPHOCYTES # BLD MANUAL: 1.02 10*3/MM3 (ref 0.7–3.1)
LYMPHOCYTES # BLD MANUAL: 1.32 10*3/MM3 (ref 0.7–3.1)
LYMPHOCYTES # BLD MANUAL: 1.86 10*3/MM3 (ref 0.7–3.1)
LYMPHOCYTES NFR BLD AUTO: 10 % (ref 19.6–45.3)
LYMPHOCYTES NFR BLD AUTO: 16.6 % (ref 19.6–45.3)
LYMPHOCYTES NFR BLD AUTO: 17.3 % (ref 19.6–45.3)
LYMPHOCYTES NFR BLD AUTO: 18.6 % (ref 19.6–45.3)
LYMPHOCYTES NFR BLD AUTO: 19.7 % (ref 19.6–45.3)
LYMPHOCYTES NFR BLD AUTO: 20.4 % (ref 19.6–45.3)
LYMPHOCYTES NFR BLD AUTO: 21.5 % (ref 19.6–45.3)
LYMPHOCYTES NFR BLD AUTO: 21.7 % (ref 19.6–45.3)
LYMPHOCYTES NFR BLD AUTO: 22.5 % (ref 19.6–45.3)
LYMPHOCYTES NFR BLD AUTO: 24.7 % (ref 19.6–45.3)
LYMPHOCYTES NFR BLD AUTO: 25.8 % (ref 19.6–45.3)
LYMPHOCYTES NFR BLD AUTO: 27.8 % (ref 19.6–45.3)
LYMPHOCYTES NFR BLD AUTO: 31.7 % (ref 19.6–45.3)
LYMPHOCYTES NFR BLD AUTO: 34.2 % (ref 19.6–45.3)
LYMPHOCYTES NFR BLD AUTO: 34.4 % (ref 19.6–45.3)
LYMPHOCYTES NFR BLD AUTO: 4 % (ref 19.6–45.3)
LYMPHOCYTES NFR BLD AUTO: 5.9 % (ref 19.6–45.3)
LYMPHOCYTES NFR BLD AUTO: 9.4 % (ref 19.6–45.3)
LYMPHOCYTES NFR BLD MANUAL: 1 % (ref 19.6–45.3)
LYMPHOCYTES NFR BLD MANUAL: 10 % (ref 19.6–45.3)
LYMPHOCYTES NFR BLD MANUAL: 12 % (ref 5–12)
LYMPHOCYTES NFR BLD MANUAL: 13 % (ref 5–12)
LYMPHOCYTES NFR BLD MANUAL: 17 % (ref 19.6–45.3)
LYMPHOCYTES NFR BLD MANUAL: 17 % (ref 5–12)
LYMPHOCYTES NFR BLD MANUAL: 18 % (ref 5–12)
LYMPHOCYTES NFR BLD MANUAL: 19 % (ref 5–12)
LYMPHOCYTES NFR BLD MANUAL: 22 % (ref 19.6–45.3)
LYMPHOCYTES NFR BLD MANUAL: 24 % (ref 19.6–45.3)
LYMPHOCYTES NFR BLD MANUAL: 24 % (ref 19.6–45.3)
LYMPHOCYTES NFR BLD MANUAL: 24 % (ref 5–12)
LYMPHOCYTES NFR BLD MANUAL: 25 % (ref 5–12)
LYMPHOCYTES NFR BLD MANUAL: 28 % (ref 19.6–45.3)
LYMPHOCYTES NFR BLD MANUAL: 3 % (ref 5–12)
LYMPHOCYTES NFR BLD MANUAL: 30 % (ref 19.6–45.3)
LYMPHOCYTES NFR BLD MANUAL: 31 % (ref 19.6–45.3)
LYMPHOCYTES NFR BLD MANUAL: 34 % (ref 19.6–45.3)
LYMPHOCYTES NFR BLD MANUAL: 7 % (ref 19.6–45.3)
LYMPHOCYTES NFR BLD MANUAL: 7 % (ref 5–12)
LYMPHOCYTES NFR BLD MANUAL: 9 % (ref 5–12)
LYMPHOCYTES NFR BLD MANUAL: 9 % (ref 5–12)
Lab: NORMAL
M PNEUMO IGG SER IA-ACNC: NOT DETECTED
M PNEUMO IGG SER IA-ACNC: NOT DETECTED
M PROTEIN SERPL ELPH-MCNC: 0.6 G/DL
M PROTEIN SERPL ELPH-MCNC: 0.8 G/DL
MACROCYTES BLD QL SMEAR: ABNORMAL
MAGNESIUM SERPL-MCNC: 1.9 MG/DL (ref 1.6–2.4)
MAGNESIUM SERPL-MCNC: 1.9 MG/DL (ref 1.6–2.4)
MAGNESIUM SERPL-MCNC: 2 MG/DL (ref 1.6–2.4)
MAGNESIUM SERPL-MCNC: 2.1 MG/DL (ref 1.6–2.4)
MAXIMAL PREDICTED HEART RATE: 146 BPM
MCH RBC QN AUTO: 31.7 PG (ref 26.6–33)
MCH RBC QN AUTO: 32 PG (ref 26.6–33)
MCH RBC QN AUTO: 32.4 PG (ref 26.6–33)
MCH RBC QN AUTO: 32.6 PG (ref 26.6–33)
MCH RBC QN AUTO: 32.9 PG (ref 26.6–33)
MCH RBC QN AUTO: 33 PG (ref 26.6–33)
MCH RBC QN AUTO: 33.2 PG (ref 26.6–33)
MCH RBC QN AUTO: 33.3 PG (ref 26.6–33)
MCH RBC QN AUTO: 33.5 PG (ref 26.6–33)
MCH RBC QN AUTO: 33.6 PG (ref 26.6–33)
MCH RBC QN AUTO: 33.7 PG (ref 26.6–33)
MCH RBC QN AUTO: 33.8 PG (ref 26.6–33)
MCH RBC QN AUTO: 34 PG (ref 26.6–33)
MCH RBC QN AUTO: 34.1 PG (ref 26.6–33)
MCH RBC QN AUTO: 34.2 PG (ref 26.6–33)
MCH RBC QN AUTO: 34.3 PG (ref 26.6–33)
MCH RBC QN AUTO: 34.6 PG (ref 26.6–33)
MCH RBC QN AUTO: 34.8 PG (ref 26.6–33)
MCH RBC QN AUTO: 35.1 PG (ref 26.6–33)
MCHC RBC AUTO-ENTMCNC: 30 G/DL (ref 31.5–35.7)
MCHC RBC AUTO-ENTMCNC: 31.3 G/DL (ref 31.5–35.7)
MCHC RBC AUTO-ENTMCNC: 31.4 G/DL (ref 31.5–35.7)
MCHC RBC AUTO-ENTMCNC: 31.4 G/DL (ref 31.5–35.7)
MCHC RBC AUTO-ENTMCNC: 31.5 G/DL (ref 31.5–35.7)
MCHC RBC AUTO-ENTMCNC: 31.9 G/DL (ref 31.5–35.7)
MCHC RBC AUTO-ENTMCNC: 32 G/DL (ref 31.5–35.7)
MCHC RBC AUTO-ENTMCNC: 32 G/DL (ref 31.5–35.7)
MCHC RBC AUTO-ENTMCNC: 32.4 G/DL (ref 31.5–35.7)
MCHC RBC AUTO-ENTMCNC: 32.6 G/DL (ref 31.5–35.7)
MCHC RBC AUTO-ENTMCNC: 32.8 G/DL (ref 31.5–35.7)
MCHC RBC AUTO-ENTMCNC: 32.9 G/DL (ref 31.5–35.7)
MCHC RBC AUTO-ENTMCNC: 33 G/DL (ref 31.5–35.7)
MCHC RBC AUTO-ENTMCNC: 33.1 G/DL (ref 31.5–35.7)
MCHC RBC AUTO-ENTMCNC: 33.2 G/DL (ref 31.5–35.7)
MCHC RBC AUTO-ENTMCNC: 33.2 G/DL (ref 31.5–35.7)
MCHC RBC AUTO-ENTMCNC: 33.5 G/DL (ref 31.5–35.7)
MCHC RBC AUTO-ENTMCNC: 33.5 G/DL (ref 31.5–35.7)
MCHC RBC AUTO-ENTMCNC: 33.6 G/DL (ref 31.5–35.7)
MCHC RBC AUTO-ENTMCNC: 33.7 G/DL (ref 31.5–35.7)
MCHC RBC AUTO-ENTMCNC: 33.7 G/DL (ref 31.5–35.7)
MCHC RBC AUTO-ENTMCNC: 33.8 G/DL (ref 31.5–35.7)
MCHC RBC AUTO-ENTMCNC: 33.9 G/DL (ref 31.5–35.7)
MCHC RBC AUTO-ENTMCNC: 33.9 G/DL (ref 31.5–35.7)
MCHC RBC AUTO-ENTMCNC: 34.1 G/DL (ref 31.5–35.7)
MCHC RBC AUTO-ENTMCNC: 34.3 G/DL (ref 31.5–35.7)
MCV RBC AUTO: 100 FL (ref 79–97)
MCV RBC AUTO: 100 FL (ref 79–97)
MCV RBC AUTO: 100.3 FL (ref 79–97)
MCV RBC AUTO: 100.6 FL (ref 79–97)
MCV RBC AUTO: 100.8 FL (ref 79–97)
MCV RBC AUTO: 100.8 FL (ref 79–97)
MCV RBC AUTO: 101.4 FL (ref 79–97)
MCV RBC AUTO: 101.5 FL (ref 79–97)
MCV RBC AUTO: 101.6 FL (ref 79–97)
MCV RBC AUTO: 101.9 FL (ref 79–97)
MCV RBC AUTO: 102.1 FL (ref 79–97)
MCV RBC AUTO: 102.4 FL (ref 79–97)
MCV RBC AUTO: 103 FL (ref 79–97)
MCV RBC AUTO: 103.5 FL (ref 79–97)
MCV RBC AUTO: 103.8 FL (ref 79–97)
MCV RBC AUTO: 104 FL (ref 79–97)
MCV RBC AUTO: 104.6 FL (ref 79–97)
MCV RBC AUTO: 105.4 FL (ref 79–97)
MCV RBC AUTO: 105.6 FL (ref 79–97)
MCV RBC AUTO: 106 FL (ref 79–97)
MCV RBC AUTO: 106 FL (ref 79–97)
MCV RBC AUTO: 106.4 FL (ref 79–97)
MCV RBC AUTO: 106.5 FL (ref 79–97)
MCV RBC AUTO: 106.8 FL (ref 79–97)
MCV RBC AUTO: 106.8 FL (ref 79–97)
MCV RBC AUTO: 106.9 FL (ref 79–97)
MCV RBC AUTO: 107.8 FL (ref 79–97)
MCV RBC AUTO: 112.9 FL (ref 79–97)
MCV RBC AUTO: 97.6 FL (ref 79–97)
MCV RBC AUTO: 97.8 FL (ref 79–97)
MCV RBC AUTO: 98.8 FL (ref 79–97)
MCV RBC AUTO: 99 FL (ref 79–97)
METAMYELOCYTES NFR BLD MANUAL: 1 % (ref 0–0)
METAMYELOCYTES NFR BLD MANUAL: 2 % (ref 0–0)
METHYLMALONATE SERPL-SCNC: 363 NMOL/L (ref 0–378)
MICROALBUMIN/CREAT UR: NORMAL MG/G{CREAT}
MODALITY: ABNORMAL
MODALITY: ABNORMAL
MONOCYTES # BLD AUTO: 0.14 10*3/MM3 (ref 0.1–0.9)
MONOCYTES # BLD AUTO: 0.23 10*3/MM3 (ref 0.1–0.9)
MONOCYTES # BLD AUTO: 0.3 10*3/MM3 (ref 0.1–0.9)
MONOCYTES # BLD AUTO: 0.4 10*3/MM3 (ref 0.1–0.9)
MONOCYTES # BLD AUTO: 0.42 10*3/MM3 (ref 0.1–0.9)
MONOCYTES # BLD AUTO: 0.79 10*3/MM3 (ref 0.1–0.9)
MONOCYTES # BLD AUTO: 0.84 10*3/MM3 (ref 0.1–0.9)
MONOCYTES # BLD AUTO: 0.86 10*3/MM3 (ref 0.1–0.9)
MONOCYTES # BLD AUTO: 0.9 10*3/MM3 (ref 0.1–0.9)
MONOCYTES # BLD AUTO: 0.93 10*3/MM3 (ref 0.1–0.9)
MONOCYTES # BLD AUTO: 0.97 10*3/MM3 (ref 0.1–0.9)
MONOCYTES # BLD AUTO: 0.99 10*3/MM3 (ref 0.1–0.9)
MONOCYTES # BLD AUTO: 1.01 10*3/MM3 (ref 0.1–0.9)
MONOCYTES # BLD AUTO: 1.02 10*3/MM3 (ref 0.1–0.9)
MONOCYTES # BLD AUTO: 1.05 10*3/MM3 (ref 0.1–0.9)
MONOCYTES # BLD AUTO: 1.09 10*3/MM3 (ref 0.1–0.9)
MONOCYTES # BLD AUTO: 1.09 10*3/MM3 (ref 0.1–0.9)
MONOCYTES # BLD AUTO: 1.1 10*3/MM3 (ref 0.1–0.9)
MONOCYTES # BLD AUTO: 1.1 10*3/MM3 (ref 0.1–0.9)
MONOCYTES # BLD AUTO: 1.14 10*3/MM3 (ref 0.1–0.9)
MONOCYTES # BLD AUTO: 1.18 10*3/MM3 (ref 0.1–0.9)
MONOCYTES # BLD AUTO: 1.24 10*3/MM3 (ref 0.1–0.9)
MONOCYTES # BLD AUTO: 1.35 10*3/MM3 (ref 0.1–0.9)
MONOCYTES # BLD AUTO: 1.38 10*3/MM3 (ref 0.1–0.9)
MONOCYTES # BLD AUTO: 1.68 10*3/MM3 (ref 0.1–0.9)
MONOCYTES # BLD AUTO: 1.7 10*3/MM3 (ref 0.1–0.9)
MONOCYTES # BLD AUTO: 1.8 10*3/MM3 (ref 0.1–0.9)
MONOCYTES # BLD AUTO: 1.96 10*3/MM3 (ref 0.1–0.9)
MONOCYTES # BLD AUTO: 2.2 10*3/MM3 (ref 0.1–0.9)
MONOCYTES NFR BLD AUTO: 11.6 % (ref 5–12)
MONOCYTES NFR BLD AUTO: 11.7 % (ref 5–12)
MONOCYTES NFR BLD AUTO: 13.7 % (ref 5–12)
MONOCYTES NFR BLD AUTO: 13.9 % (ref 5–12)
MONOCYTES NFR BLD AUTO: 16.4 % (ref 5–12)
MONOCYTES NFR BLD AUTO: 17 % (ref 5–12)
MONOCYTES NFR BLD AUTO: 17 % (ref 5–12)
MONOCYTES NFR BLD AUTO: 17.3 % (ref 5–12)
MONOCYTES NFR BLD AUTO: 19.2 % (ref 5–12)
MONOCYTES NFR BLD AUTO: 20.5 % (ref 5–12)
MONOCYTES NFR BLD AUTO: 22.6 % (ref 5–12)
MONOCYTES NFR BLD AUTO: 22.9 % (ref 5–12)
MONOCYTES NFR BLD AUTO: 26.1 % (ref 5–12)
MONOCYTES NFR BLD AUTO: 27.7 % (ref 5–12)
MONOCYTES NFR BLD AUTO: 28 % (ref 5–12)
MONOCYTES NFR BLD AUTO: 30.1 % (ref 5–12)
MONOCYTES NFR BLD AUTO: 37.6 % (ref 5–12)
MONOCYTES NFR BLD AUTO: 9.6 % (ref 5–12)
MRSA DNA SPEC QL NAA+PROBE: NORMAL
MYELOCYTES NFR BLD MANUAL: 1 % (ref 0–0)
MYELOCYTES NFR BLD MANUAL: 1 % (ref 0–0)
NEUTROPHILS # BLD AUTO: 1.15 10*3/MM3 (ref 1.7–7)
NEUTROPHILS # BLD AUTO: 1.68 10*3/MM3 (ref 1.7–7)
NEUTROPHILS # BLD AUTO: 1.72 10*3/MM3 (ref 1.7–7)
NEUTROPHILS # BLD AUTO: 2.08 10*3/MM3 (ref 1.7–7)
NEUTROPHILS # BLD AUTO: 2.28 10*3/MM3 (ref 1.7–7)
NEUTROPHILS # BLD AUTO: 2.39 10*3/MM3 (ref 1.7–7)
NEUTROPHILS # BLD AUTO: 2.63 10*3/MM3 (ref 1.7–7)
NEUTROPHILS # BLD AUTO: 3.2 10*3/MM3 (ref 1.7–7)
NEUTROPHILS # BLD AUTO: 4.67 10*3/MM3 (ref 1.7–7)
NEUTROPHILS # BLD AUTO: 5.35 10*3/MM3 (ref 1.7–7)
NEUTROPHILS # BLD AUTO: 8.07 10*3/MM3 (ref 1.7–7)
NEUTROPHILS NFR BLD AUTO: 1.38 10*3/MM3 (ref 1.7–7)
NEUTROPHILS NFR BLD AUTO: 1.39 10*3/MM3 (ref 1.7–7)
NEUTROPHILS NFR BLD AUTO: 1.44 10*3/MM3 (ref 1.7–7)
NEUTROPHILS NFR BLD AUTO: 1.62 10*3/MM3 (ref 1.7–7)
NEUTROPHILS NFR BLD AUTO: 1.67 10*3/MM3 (ref 1.7–7)
NEUTROPHILS NFR BLD AUTO: 2.13 10*3/MM3 (ref 1.7–7)
NEUTROPHILS NFR BLD AUTO: 2.13 10*3/MM3 (ref 1.7–7)
NEUTROPHILS NFR BLD AUTO: 2.94 10*3/MM3 (ref 1.7–7)
NEUTROPHILS NFR BLD AUTO: 3.29 10*3/MM3 (ref 1.7–7)
NEUTROPHILS NFR BLD AUTO: 3.84 10*3/MM3 (ref 1.7–7)
NEUTROPHILS NFR BLD AUTO: 33.3 % (ref 42.7–76)
NEUTROPHILS NFR BLD AUTO: 34.9 % (ref 42.7–76)
NEUTROPHILS NFR BLD AUTO: 35.3 % (ref 42.7–76)
NEUTROPHILS NFR BLD AUTO: 38.5 % (ref 42.7–76)
NEUTROPHILS NFR BLD AUTO: 39.8 % (ref 42.7–76)
NEUTROPHILS NFR BLD AUTO: 4.1 10*3/MM3 (ref 1.7–7)
NEUTROPHILS NFR BLD AUTO: 4.24 10*3/MM3 (ref 1.7–7)
NEUTROPHILS NFR BLD AUTO: 4.64 10*3/MM3 (ref 1.7–7)
NEUTROPHILS NFR BLD AUTO: 40.7 % (ref 42.7–76)
NEUTROPHILS NFR BLD AUTO: 41.6 % (ref 42.7–76)
NEUTROPHILS NFR BLD AUTO: 49 % (ref 42.7–76)
NEUTROPHILS NFR BLD AUTO: 49.8 % (ref 42.7–76)
NEUTROPHILS NFR BLD AUTO: 5.32 10*3/MM3 (ref 1.7–7)
NEUTROPHILS NFR BLD AUTO: 5.96 10*3/MM3 (ref 1.7–7)
NEUTROPHILS NFR BLD AUTO: 57.7 % (ref 42.7–76)
NEUTROPHILS NFR BLD AUTO: 58.4 % (ref 42.7–76)
NEUTROPHILS NFR BLD AUTO: 59.4 % (ref 42.7–76)
NEUTROPHILS NFR BLD AUTO: 6.45 10*3/MM3 (ref 1.7–7)
NEUTROPHILS NFR BLD AUTO: 63.1 % (ref 42.7–76)
NEUTROPHILS NFR BLD AUTO: 64.3 % (ref 42.7–76)
NEUTROPHILS NFR BLD AUTO: 64.3 % (ref 42.7–76)
NEUTROPHILS NFR BLD AUTO: 68.1 % (ref 42.7–76)
NEUTROPHILS NFR BLD AUTO: 7.4 10*3/MM3 (ref 1.7–7)
NEUTROPHILS NFR BLD AUTO: 74.9 % (ref 42.7–76)
NEUTROPHILS NFR BLD AUTO: 77.6 % (ref 42.7–76)
NEUTROPHILS NFR BLD AUTO: 8 10*3/MM3 (ref 1.7–7)
NEUTROPHILS NFR BLD MANUAL: 31 % (ref 42.7–76)
NEUTROPHILS NFR BLD MANUAL: 39 % (ref 42.7–76)
NEUTROPHILS NFR BLD MANUAL: 43 % (ref 42.7–76)
NEUTROPHILS NFR BLD MANUAL: 48 % (ref 42.7–76)
NEUTROPHILS NFR BLD MANUAL: 51 % (ref 42.7–76)
NEUTROPHILS NFR BLD MANUAL: 54 % (ref 42.7–76)
NEUTROPHILS NFR BLD MANUAL: 55 % (ref 42.7–76)
NEUTROPHILS NFR BLD MANUAL: 58 % (ref 42.7–76)
NEUTROPHILS NFR BLD MANUAL: 62 % (ref 42.7–76)
NEUTROPHILS NFR BLD MANUAL: 62 % (ref 42.7–76)
NEUTROPHILS NFR BLD MANUAL: 72 % (ref 42.7–76)
NEUTS BAND NFR BLD MANUAL: 1 % (ref 0–5)
NEUTS BAND NFR BLD MANUAL: 1 % (ref 0–5)
NEUTS BAND NFR BLD MANUAL: 19 % (ref 0–5)
NEUTS BAND NFR BLD MANUAL: 2 % (ref 0–5)
NEUTS BAND NFR BLD MANUAL: 3 % (ref 0–5)
NEUTS BAND NFR BLD MANUAL: 4 % (ref 0–5)
NEUTS BAND NFR BLD MANUAL: 6 % (ref 0–5)
NEUTS BAND NFR BLD MANUAL: 9 % (ref 0–5)
NITRITE UR QL STRIP: NEGATIVE
NRBC BLD AUTO-RTO: 0 /100 WBC (ref 0–0.2)
NRBC BLD AUTO-RTO: 0.1 /100 WBC (ref 0–0.2)
NRBC BLD AUTO-RTO: 0.1 /100 WBC (ref 0–0.2)
NRBC SPEC MANUAL: 1 /100 WBC (ref 0–0.2)
NT-PROBNP SERPL-MCNC: 3667 PG/ML (ref 0–900)
NT-PROBNP SERPL-MCNC: 7697 PG/ML (ref 0–900)
PATH REPORT.ADDENDUM SPEC: NORMAL
PATH REPORT.FINAL DX SPEC: NORMAL
PATH REPORT.GROSS SPEC: NORMAL
PATH REPORT.GROSS SPEC: NORMAL
PATHOLOGY REVIEW: YES
PATHOLOGY REVIEW: YES
PCO2 BLDA: 29.2 MM HG (ref 35–48)
PCO2 BLDA: 36.1 MM HG (ref 35–48)
PERCENT MAX PREDICTED HR: 62.33 %
PH BLDA: 7.36 PH UNITS (ref 7.35–7.45)
PH BLDA: 7.41 PH UNITS (ref 7.35–7.45)
PH UR STRIP.AUTO: <=5 [PH] (ref 5–8)
PHOSPHATE SERPL-MCNC: 2.1 MG/DL (ref 2.5–4.5)
PHOSPHATE SERPL-MCNC: 3.2 MG/DL (ref 2.5–4.5)
PHOSPHATE SERPL-MCNC: 3.3 MG/DL (ref 2.5–4.5)
PLAT MORPH BLD: NORMAL
PLATELET # BLD AUTO: 100 10*3/MM3 (ref 140–450)
PLATELET # BLD AUTO: 109 10*3/MM3 (ref 140–450)
PLATELET # BLD AUTO: 126 10*3/MM3 (ref 140–450)
PLATELET # BLD AUTO: 132 10*3/MM3 (ref 140–450)
PLATELET # BLD AUTO: 135 10*3/MM3 (ref 140–450)
PLATELET # BLD AUTO: 139 10*3/MM3 (ref 140–450)
PLATELET # BLD AUTO: 141 10*3/MM3 (ref 140–450)
PLATELET # BLD AUTO: 158 10*3/MM3 (ref 140–450)
PLATELET # BLD AUTO: 44 10*3/MM3 (ref 140–450)
PLATELET # BLD AUTO: 52 10*3/MM3 (ref 140–450)
PLATELET # BLD AUTO: 52 10*3/MM3 (ref 140–450)
PLATELET # BLD AUTO: 55 10*3/MM3 (ref 140–450)
PLATELET # BLD AUTO: 55 10*3/MM3 (ref 140–450)
PLATELET # BLD AUTO: 56 10*3/MM3 (ref 140–450)
PLATELET # BLD AUTO: 57 10*3/MM3 (ref 140–450)
PLATELET # BLD AUTO: 60 10*3/MM3 (ref 140–450)
PLATELET # BLD AUTO: 64 10*3/MM3 (ref 140–450)
PLATELET # BLD AUTO: 67 10*3/MM3 (ref 140–450)
PLATELET # BLD AUTO: 72 10*3/MM3 (ref 140–450)
PLATELET # BLD AUTO: 73 10*3/MM3 (ref 140–450)
PLATELET # BLD AUTO: 74 10*3/MM3 (ref 140–450)
PLATELET # BLD AUTO: 75 10*3/MM3 (ref 140–450)
PLATELET # BLD AUTO: 75 10*3/MM3 (ref 140–450)
PLATELET # BLD AUTO: 77 10*3/MM3 (ref 140–450)
PLATELET # BLD AUTO: 77 10*3/MM3 (ref 140–450)
PLATELET # BLD AUTO: 81 10*3/MM3 (ref 140–450)
PLATELET # BLD AUTO: 86 10*3/MM3 (ref 140–450)
PLATELET # BLD AUTO: 89 10*3/MM3 (ref 140–450)
PLATELET # BLD AUTO: 91 10*3/MM3 (ref 140–450)
PLATELET # BLD AUTO: 93 10*3/MM3 (ref 140–450)
PLATELET # BLD AUTO: 95 10*3/MM3 (ref 140–450)
PLATELET # BLD AUTO: 99 10*3/MM3 (ref 140–450)
PMV BLD AUTO: 10.1 FL (ref 6–12)
PMV BLD AUTO: 10.2 FL (ref 6–12)
PMV BLD AUTO: 10.3 FL (ref 6–12)
PMV BLD AUTO: 10.9 FL (ref 6–12)
PMV BLD AUTO: 10.9 FL (ref 6–12)
PMV BLD AUTO: 11.1 FL (ref 6–12)
PMV BLD AUTO: 11.1 FL (ref 6–12)
PMV BLD AUTO: 11.2 FL (ref 6–12)
PMV BLD AUTO: 11.3 FL (ref 6–12)
PMV BLD AUTO: 11.4 FL (ref 6–12)
PMV BLD AUTO: 11.4 FL (ref 6–12)
PMV BLD AUTO: 11.8 FL (ref 6–12)
PMV BLD AUTO: 11.9 FL (ref 6–12)
PMV BLD AUTO: 12.3 FL (ref 6–12)
PMV BLD AUTO: 12.6 FL (ref 6–12)
PMV BLD AUTO: 13.7 FL (ref 6–12)
PMV BLD AUTO: 8.4 FL (ref 6–12)
PMV BLD AUTO: 8.6 FL (ref 6–12)
PMV BLD AUTO: 8.7 FL (ref 6–12)
PMV BLD AUTO: 8.8 FL (ref 6–12)
PMV BLD AUTO: 9.1 FL (ref 6–12)
PMV BLD AUTO: 9.2 FL (ref 6–12)
PMV BLD AUTO: 9.3 FL (ref 6–12)
PMV BLD AUTO: 9.7 FL (ref 6–12)
PMV BLD AUTO: 9.7 FL (ref 6–12)
PO2 BLDA: 76 MM HG (ref 83–108)
PO2 BLDA: 81.5 MM HG (ref 83–108)
POIKILOCYTOSIS BLD QL SMEAR: ABNORMAL
POTASSIUM SERPL-SCNC: 3.2 MMOL/L (ref 3.5–5.2)
POTASSIUM SERPL-SCNC: 3.6 MMOL/L (ref 3.5–5.2)
POTASSIUM SERPL-SCNC: 3.7 MMOL/L (ref 3.5–5.2)
POTASSIUM SERPL-SCNC: 3.8 MMOL/L (ref 3.5–5.2)
POTASSIUM SERPL-SCNC: 4 MMOL/L (ref 3.5–5.2)
POTASSIUM SERPL-SCNC: 4 MMOL/L (ref 3.5–5.2)
POTASSIUM SERPL-SCNC: 4.2 MMOL/L (ref 3.5–5.2)
POTASSIUM SERPL-SCNC: 4.3 MMOL/L (ref 3.5–5.2)
POTASSIUM SERPL-SCNC: 4.4 MMOL/L (ref 3.5–5.2)
POTASSIUM SERPL-SCNC: 4.6 MMOL/L (ref 3.5–5.2)
POTASSIUM SERPL-SCNC: 4.8 MMOL/L (ref 3.5–5.2)
POTASSIUM SERPL-SCNC: 4.9 MMOL/L (ref 3.5–5.2)
POTASSIUM SERPL-SCNC: 4.9 MMOL/L (ref 3.5–5.2)
POTASSIUM SERPL-SCNC: 5.1 MMOL/L (ref 3.5–5.2)
PROCALCITONIN SERPL-MCNC: 0.12 NG/ML (ref 0–0.25)
PROCALCITONIN SERPL-MCNC: 0.76 NG/ML (ref 0–0.25)
PROT C ACT/NOR PPP: 53 % (ref 70–130)
PROT PATTERN SERPL ELPH-IMP: ABNORMAL
PROT PATTERN SERPL ELPH-IMP: ABNORMAL
PROT S ACT/NOR PPP: 38 % (ref 63–140)
PROT SERPL-MCNC: 5.9 G/DL (ref 6–8.5)
PROT SERPL-MCNC: 6.1 G/DL (ref 6–8.5)
PROT SERPL-MCNC: 6.2 G/DL (ref 6–8.5)
PROT SERPL-MCNC: 6.3 G/DL (ref 6–8.5)
PROT SERPL-MCNC: 6.3 G/DL (ref 6–8.5)
PROT SERPL-MCNC: 6.6 G/DL (ref 6–8.5)
PROT SERPL-MCNC: 6.6 G/DL (ref 6–8.5)
PROT SERPL-MCNC: 6.9 G/DL (ref 6–8.5)
PROT SERPL-MCNC: 7 G/DL (ref 6–8.5)
PROT SERPL-MCNC: 7.2 G/DL (ref 6–8.5)
PROT SERPL-MCNC: 7.6 G/DL (ref 6–8.5)
PROT SERPL-MCNC: 7.7 G/DL (ref 6–8.5)
PROT SERPL-MCNC: 7.8 G/DL (ref 6–8.5)
PROT UR QL STRIP: ABNORMAL
PROT UR QL STRIP: ABNORMAL
PROT UR QL STRIP: NEGATIVE
PROTHROMBIN TIME: 10.8 SECONDS (ref 9.6–11.7)
PROTHROMBIN TIME: 10.9 SECONDS (ref 9.6–11.7)
PROTHROMBIN TIME: 11.9 SECONDS (ref 9.6–11.7)
PROTHROMBIN TIME: 11.9 SECONDS (ref 9.6–11.7)
QT INTERVAL: 337 MS
QT INTERVAL: 341 MS
QT INTERVAL: 388 MS
QT INTERVAL: 399 MS
RBC # BLD AUTO: 1.8 10*6/MM3 (ref 4.14–5.8)
RBC # BLD AUTO: 2.04 10*6/MM3 (ref 4.14–5.8)
RBC # BLD AUTO: 2.04 10*6/MM3 (ref 4.14–5.8)
RBC # BLD AUTO: 2.07 10*6/MM3 (ref 4.14–5.8)
RBC # BLD AUTO: 2.13 10*6/MM3 (ref 4.14–5.8)
RBC # BLD AUTO: 2.14 10*6/MM3 (ref 4.14–5.8)
RBC # BLD AUTO: 2.15 10*6/MM3 (ref 4.14–5.8)
RBC # BLD AUTO: 2.18 10*6/MM3 (ref 4.14–5.8)
RBC # BLD AUTO: 2.18 10*6/MM3 (ref 4.14–5.8)
RBC # BLD AUTO: 2.21 10*6/MM3 (ref 4.14–5.8)
RBC # BLD AUTO: 2.24 10*6/MM3 (ref 4.14–5.8)
RBC # BLD AUTO: 2.3 10*6/MM3 (ref 4.14–5.8)
RBC # BLD AUTO: 2.34 10*6/MM3 (ref 4.14–5.8)
RBC # BLD AUTO: 2.35 10*6/MM3 (ref 4.14–5.8)
RBC # BLD AUTO: 2.36 10*6/MM3 (ref 4.14–5.8)
RBC # BLD AUTO: 2.41 10*6/MM3 (ref 4.14–5.8)
RBC # BLD AUTO: 2.45 10*6/MM3 (ref 4.14–5.8)
RBC # BLD AUTO: 2.47 10*6/MM3 (ref 4.14–5.8)
RBC # BLD AUTO: 2.49 10*6/MM3 (ref 4.14–5.8)
RBC # BLD AUTO: 2.51 10*6/MM3 (ref 4.14–5.8)
RBC # BLD AUTO: 2.55 10*6/MM3 (ref 4.14–5.8)
RBC # BLD AUTO: 2.58 10*6/MM3 (ref 4.14–5.8)
RBC # BLD AUTO: 2.6 10*6/MM3 (ref 4.14–5.8)
RBC # BLD AUTO: 2.63 10*6/MM3 (ref 4.14–5.8)
RBC # BLD AUTO: 2.65 10*6/MM3 (ref 4.14–5.8)
RBC # BLD AUTO: 2.72 10*6/MM3 (ref 4.14–5.8)
RBC # BLD AUTO: 2.75 10*6/MM3 (ref 4.14–5.8)
RBC # BLD AUTO: 2.79 10*6/MM3 (ref 4.14–5.8)
RBC # BLD AUTO: 3.13 10*6/MM3 (ref 4.14–5.8)
RBC # BLD AUTO: 3.3 10*6/MM3 (ref 4.14–5.8)
RBC # BLD AUTO: 3.34 10*6/MM3 (ref 4.14–5.8)
RBC # BLD AUTO: 3.57 10*6/MM3 (ref 4.14–5.8)
RBC # UR: ABNORMAL /HPF
REF LAB TEST METHOD: ABNORMAL
REF LAB TEST METHOD: NORMAL
RH BLD: POSITIVE
RHINOVIRUS RNA SPEC NAA+PROBE: NOT DETECTED
RHINOVIRUS RNA SPEC NAA+PROBE: NOT DETECTED
RSV RNA NPH QL NAA+NON-PROBE: NOT DETECTED
RSV RNA NPH QL NAA+NON-PROBE: NOT DETECTED
SAO2 % BLDCOA: 95.5 % (ref 94–98)
SAO2 % BLDCOA: 95.6 % (ref 94–98)
SARS-COV-2 ORF1AB RESP QL NAA+PROBE: NOT DETECTED
SARS-COV-2 ORF1AB RESP QL NAA+PROBE: NOT DETECTED
SARS-COV-2 RNA NPH QL NAA+NON-PROBE: NOT DETECTED
SARS-COV-2 RNA PNL SPEC NAA+PROBE: NOT DETECTED
SARS-COV-2 RNA PNL SPEC NAA+PROBE: NOT DETECTED
SCAN SLIDE: NORMAL
SMALL PLATELETS BLD QL SMEAR: ABNORMAL
SODIUM SERPL-SCNC: 134 MMOL/L (ref 136–145)
SODIUM SERPL-SCNC: 135 MMOL/L (ref 136–145)
SODIUM SERPL-SCNC: 135 MMOL/L (ref 136–145)
SODIUM SERPL-SCNC: 136 MMOL/L (ref 136–145)
SODIUM SERPL-SCNC: 137 MMOL/L (ref 136–145)
SODIUM SERPL-SCNC: 137 MMOL/L (ref 136–145)
SODIUM SERPL-SCNC: 138 MMOL/L (ref 136–145)
SODIUM SERPL-SCNC: 139 MMOL/L (ref 136–145)
SODIUM SERPL-SCNC: 140 MMOL/L (ref 136–145)
SODIUM SERPL-SCNC: 141 MMOL/L (ref 136–145)
SODIUM SERPL-SCNC: 142 MMOL/L (ref 136–145)
SODIUM SERPL-SCNC: 143 MMOL/L (ref 136–145)
SODIUM SERPL-SCNC: 144 MMOL/L (ref 136–145)
SODIUM SERPL-SCNC: 144 MMOL/L (ref 136–145)
SODIUM UR-SCNC: 76 MMOL/L
SP GR UR STRIP: 1.02 (ref 1–1.03)
SQUAMOUS #/AREA URNS HPF: ABNORMAL /HPF
STRESS BASELINE BP: NORMAL MMHG
STRESS BASELINE HR: 79 BPM
STRESS PERCENT HR: 73 %
STRESS POST PEAK BP: NORMAL MMHG
STRESS POST PEAK HR: 91 BPM
STRESS TARGET HR: 124 BPM
T&S EXPIRATION DATE: NORMAL
T4 FREE SERPL-MCNC: 0.88 NG/DL (ref 0.93–1.7)
TIBC SERPL-MCNC: 179 MCG/DL (ref 298–536)
TIBC SERPL-MCNC: 221 MCG/DL (ref 298–536)
TIBC SERPL-MCNC: 225 MCG/DL (ref 298–536)
TIBC SERPL-MCNC: 231 MCG/DL (ref 298–536)
TIBC SERPL-MCNC: 268 MCG/DL (ref 298–536)
TIBC SERPL-MCNC: 289 MCG/DL (ref 298–536)
TRANSFERRIN SERPL-MCNC: 120 MG/DL (ref 200–360)
TRANSFERRIN SERPL-MCNC: 148 MG/DL (ref 200–360)
TRANSFERRIN SERPL-MCNC: 151 MG/DL (ref 200–360)
TRANSFERRIN SERPL-MCNC: 155 MG/DL (ref 200–360)
TRANSFERRIN SERPL-MCNC: 180 MG/DL (ref 200–360)
TRANSFERRIN SERPL-MCNC: 194 MG/DL (ref 200–360)
TRIGL SERPL-MCNC: 66 MG/DL (ref 0–150)
TRIGL SERPL-MCNC: 77 MG/DL (ref 0–150)
TROPONIN T SERPL-MCNC: 0.01 NG/ML (ref 0–0.03)
TROPONIN T SERPL-MCNC: 0.05 NG/ML (ref 0–0.03)
TROPONIN T SERPL-MCNC: 0.07 NG/ML (ref 0–0.03)
TSH SERPL DL<=0.05 MIU/L-ACNC: 1.1 UIU/ML (ref 0.27–4.2)
TSH SERPL DL<=0.05 MIU/L-ACNC: 1.11 UIU/ML (ref 0.27–4.2)
TSH SERPL DL<=0.05 MIU/L-ACNC: 2.48 UIU/ML (ref 0.27–4.2)
TSH SERPL DL<=0.05 MIU/L-ACNC: 3.34 UIU/ML (ref 0.27–4.2)
UNIT  ABO: NORMAL
UNIT  RH: NORMAL
URATE SERPL-MCNC: 5.4 MG/DL (ref 3.4–7)
UROBILINOGEN UR QL STRIP: ABNORMAL
VARIANT LYMPHS NFR BLD MANUAL: 1 % (ref 0–5)
VIT B12 BLD-MCNC: 1223 PG/ML (ref 211–946)
VIT B12 BLD-MCNC: 1558 PG/ML (ref 211–946)
VLDLC SERPL-MCNC: 14 MG/DL (ref 5–40)
VLDLC SERPL-MCNC: 15 MG/DL (ref 5–40)
WBC # BLD AUTO: 10.21 10*3/MM3 (ref 3.4–10.8)
WBC # BLD AUTO: 10.4 10*3/MM3 (ref 3.4–10.8)
WBC # BLD AUTO: 10.9 10*3/MM3 (ref 3.4–10.8)
WBC # BLD AUTO: 2.7 10*3/MM3 (ref 3.4–10.8)
WBC # BLD AUTO: 3 10*3/MM3 (ref 3.4–10.8)
WBC # BLD AUTO: 3.2 10*3/MM3 (ref 3.4–10.8)
WBC # BLD AUTO: 3.3 10*3/MM3 (ref 3.4–10.8)
WBC # BLD AUTO: 3.46 10*3/MM3 (ref 3.4–10.8)
WBC # BLD AUTO: 3.59 10*3/MM3 (ref 3.4–10.8)
WBC # BLD AUTO: 3.7 10*3/MM3 (ref 3.4–10.8)
WBC # BLD AUTO: 4.11 10*3/MM3 (ref 3.4–10.8)
WBC # BLD AUTO: 4.17 10*3/MM3 (ref 3.4–10.8)
WBC # BLD AUTO: 4.5 10*3/MM3 (ref 3.4–10.8)
WBC # BLD AUTO: 4.59 10*3/MM3 (ref 3.4–10.8)
WBC # BLD AUTO: 4.6 10*3/MM3 (ref 3.4–10.8)
WBC # BLD AUTO: 4.7 10*3/MM3 (ref 3.4–10.8)
WBC # BLD AUTO: 5.3 10*3/MM3 (ref 3.4–10.8)
WBC # BLD AUTO: 5.33 10*3/MM3 (ref 3.4–10.8)
WBC # BLD AUTO: 5.91 10*3/MM3 (ref 3.4–10.8)
WBC # BLD AUTO: 6.1 10*3/MM3 (ref 3.4–10.8)
WBC # BLD AUTO: 6.57 10*3/MM3 (ref 3.4–10.8)
WBC # BLD AUTO: 6.6 10*3/MM3 (ref 3.4–10.8)
WBC # BLD AUTO: 6.71 10*3/MM3 (ref 3.4–10.8)
WBC # BLD AUTO: 7.14 10*3/MM3 (ref 3.4–10.8)
WBC # BLD AUTO: 7.2 10*3/MM3 (ref 3.4–10.8)
WBC # BLD AUTO: 7.21 10*3/MM3 (ref 3.4–10.8)
WBC # BLD AUTO: 7.3 10*3/MM3 (ref 3.4–10.8)
WBC # BLD AUTO: 8.27 10*3/MM3 (ref 3.4–10.8)
WBC # BLD AUTO: 8.74 10*3/MM3 (ref 3.4–10.8)
WBC # BLD AUTO: 9.8 10*3/MM3 (ref 3.4–10.8)
WBC MORPH BLD: NORMAL
WBC UR QL AUTO: ABNORMAL /HPF
WHOLE BLOOD HOLD SPECIMEN: NORMAL

## 2021-01-01 PROCEDURE — 93005 ELECTROCARDIOGRAM TRACING: CPT | Performed by: FAMILY MEDICINE

## 2021-01-01 PROCEDURE — 88313 SPECIAL STAINS GROUP 2: CPT | Performed by: INTERNAL MEDICINE

## 2021-01-01 PROCEDURE — 99214 OFFICE O/P EST MOD 30 MIN: CPT | Performed by: INTERNAL MEDICINE

## 2021-01-01 PROCEDURE — 25010000002 EPOETIN ALFA-EPBX 40000 UNIT/ML SOLUTION: Performed by: INTERNAL MEDICINE

## 2021-01-01 PROCEDURE — 99222 1ST HOSP IP/OBS MODERATE 55: CPT | Performed by: NURSE PRACTITIONER

## 2021-01-01 PROCEDURE — 86923 COMPATIBILITY TEST ELECTRIC: CPT

## 2021-01-01 PROCEDURE — P9016 RBC LEUKOCYTES REDUCED: HCPCS

## 2021-01-01 PROCEDURE — 85025 COMPLETE CBC W/AUTO DIFF WBC: CPT

## 2021-01-01 PROCEDURE — 94640 AIRWAY INHALATION TREATMENT: CPT

## 2021-01-01 PROCEDURE — 85025 COMPLETE CBC W/AUTO DIFF WBC: CPT | Performed by: EMERGENCY MEDICINE

## 2021-01-01 PROCEDURE — 86901 BLOOD TYPING SEROLOGIC RH(D): CPT | Performed by: NURSE PRACTITIONER

## 2021-01-01 PROCEDURE — 83036 HEMOGLOBIN GLYCOSYLATED A1C: CPT | Performed by: FAMILY MEDICINE

## 2021-01-01 PROCEDURE — 84100 ASSAY OF PHOSPHORUS: CPT | Performed by: INTERNAL MEDICINE

## 2021-01-01 PROCEDURE — 99232 SBSQ HOSP IP/OBS MODERATE 35: CPT | Performed by: INTERNAL MEDICINE

## 2021-01-01 PROCEDURE — 36600 WITHDRAWAL OF ARTERIAL BLOOD: CPT

## 2021-01-01 PROCEDURE — 82746 ASSAY OF FOLIC ACID SERUM: CPT | Performed by: INTERNAL MEDICINE

## 2021-01-01 PROCEDURE — 63710000001 INSULIN GLARGINE PER 5 UNITS: Performed by: FAMILY MEDICINE

## 2021-01-01 PROCEDURE — 94799 UNLISTED PULMONARY SVC/PX: CPT

## 2021-01-01 PROCEDURE — 87641 MR-STAPH DNA AMP PROBE: CPT | Performed by: FAMILY MEDICINE

## 2021-01-01 PROCEDURE — 82728 ASSAY OF FERRITIN: CPT | Performed by: INTERNAL MEDICINE

## 2021-01-01 PROCEDURE — A9500 TC99M SESTAMIBI: HCPCS | Performed by: FAMILY MEDICINE

## 2021-01-01 PROCEDURE — 83880 ASSAY OF NATRIURETIC PEPTIDE: CPT | Performed by: EMERGENCY MEDICINE

## 2021-01-01 PROCEDURE — 80061 LIPID PANEL: CPT | Performed by: FAMILY MEDICINE

## 2021-01-01 PROCEDURE — 96361 HYDRATE IV INFUSION ADD-ON: CPT

## 2021-01-01 PROCEDURE — 63710000001 INSULIN LISPRO (HUMAN) PER 5 UNITS: Performed by: INTERNAL MEDICINE

## 2021-01-01 PROCEDURE — 80048 BASIC METABOLIC PNL TOTAL CA: CPT | Performed by: INTERNAL MEDICINE

## 2021-01-01 PROCEDURE — 43270 EGD LESION ABLATION: CPT | Mod: 59 | Performed by: INTERNAL MEDICINE

## 2021-01-01 PROCEDURE — 86900 BLOOD TYPING SEROLOGIC ABO: CPT

## 2021-01-01 PROCEDURE — 86850 RBC ANTIBODY SCREEN: CPT | Performed by: INTERNAL MEDICINE

## 2021-01-01 PROCEDURE — 85014 HEMATOCRIT: CPT | Performed by: INTERNAL MEDICINE

## 2021-01-01 PROCEDURE — 84466 ASSAY OF TRANSFERRIN: CPT | Performed by: INTERNAL MEDICINE

## 2021-01-01 PROCEDURE — 82248 BILIRUBIN DIRECT: CPT | Performed by: FAMILY MEDICINE

## 2021-01-01 PROCEDURE — 80053 COMPREHEN METABOLIC PANEL: CPT | Performed by: INTERNAL MEDICINE

## 2021-01-01 PROCEDURE — 88305 TISSUE EXAM BY PATHOLOGIST: CPT | Performed by: INTERNAL MEDICINE

## 2021-01-01 PROCEDURE — 97530 THERAPEUTIC ACTIVITIES: CPT

## 2021-01-01 PROCEDURE — 71045 X-RAY EXAM CHEST 1 VIEW: CPT

## 2021-01-01 PROCEDURE — 97162 PT EVAL MOD COMPLEX 30 MIN: CPT

## 2021-01-01 PROCEDURE — 96372 THER/PROPH/DIAG INJ SC/IM: CPT

## 2021-01-01 PROCEDURE — 63710000001 INSULIN GLARGINE PER 5 UNITS: Performed by: NURSE PRACTITIONER

## 2021-01-01 PROCEDURE — 87040 BLOOD CULTURE FOR BACTERIA: CPT | Performed by: EMERGENCY MEDICINE

## 2021-01-01 PROCEDURE — 81001 URINALYSIS AUTO W/SCOPE: CPT | Performed by: INTERNAL MEDICINE

## 2021-01-01 PROCEDURE — 86850 RBC ANTIBODY SCREEN: CPT | Performed by: FAMILY MEDICINE

## 2021-01-01 PROCEDURE — 81001 URINALYSIS AUTO W/SCOPE: CPT | Performed by: FAMILY MEDICINE

## 2021-01-01 PROCEDURE — 93010 ELECTROCARDIOGRAM REPORT: CPT | Performed by: INTERNAL MEDICINE

## 2021-01-01 PROCEDURE — 93016 CV STRESS TEST SUPVJ ONLY: CPT | Performed by: NURSE PRACTITIONER

## 2021-01-01 PROCEDURE — 85007 BL SMEAR W/DIFF WBC COUNT: CPT | Performed by: NURSE PRACTITIONER

## 2021-01-01 PROCEDURE — 0 IOPAMIDOL PER 1 ML: Performed by: INTERNAL MEDICINE

## 2021-01-01 PROCEDURE — 99222 1ST HOSP IP/OBS MODERATE 55: CPT | Performed by: INTERNAL MEDICINE

## 2021-01-01 PROCEDURE — 74176 CT ABD & PELVIS W/O CONTRAST: CPT

## 2021-01-01 PROCEDURE — 25010000002 NA FERRIC GLUC CPLX PER 12.5 MG: Performed by: NURSE PRACTITIONER

## 2021-01-01 PROCEDURE — 85730 THROMBOPLASTIN TIME PARTIAL: CPT | Performed by: FAMILY MEDICINE

## 2021-01-01 PROCEDURE — 99231 SBSQ HOSP IP/OBS SF/LOW 25: CPT | Performed by: NURSE PRACTITIONER

## 2021-01-01 PROCEDURE — 07DR3ZX EXTRACTION OF ILIAC BONE MARROW, PERCUTANEOUS APPROACH, DIAGNOSTIC: ICD-10-PCS | Performed by: RADIOLOGY

## 2021-01-01 PROCEDURE — 99215 OFFICE O/P EST HI 40 MIN: CPT | Performed by: INTERNAL MEDICINE

## 2021-01-01 PROCEDURE — 85018 HEMOGLOBIN: CPT | Performed by: INTERNAL MEDICINE

## 2021-01-01 PROCEDURE — 45385 COLONOSCOPY W/LESION REMOVAL: CPT | Mod: PT | Performed by: INTERNAL MEDICINE

## 2021-01-01 PROCEDURE — 83540 ASSAY OF IRON: CPT | Performed by: INTERNAL MEDICINE

## 2021-01-01 PROCEDURE — 82247 BILIRUBIN TOTAL: CPT | Performed by: FAMILY MEDICINE

## 2021-01-01 PROCEDURE — G0378 HOSPITAL OBSERVATION PER HR: HCPCS

## 2021-01-01 PROCEDURE — 82140 ASSAY OF AMMONIA: CPT | Performed by: FAMILY MEDICINE

## 2021-01-01 PROCEDURE — 83690 ASSAY OF LIPASE: CPT | Performed by: EMERGENCY MEDICINE

## 2021-01-01 PROCEDURE — 81001 URINALYSIS AUTO W/SCOPE: CPT | Performed by: EMERGENCY MEDICINE

## 2021-01-01 PROCEDURE — 82140 ASSAY OF AMMONIA: CPT | Performed by: INTERNAL MEDICINE

## 2021-01-01 PROCEDURE — 97116 GAIT TRAINING THERAPY: CPT

## 2021-01-01 PROCEDURE — 84466 ASSAY OF TRANSFERRIN: CPT | Performed by: FAMILY MEDICINE

## 2021-01-01 PROCEDURE — 25010000002 PROPOFOL 10 MG/ML EMULSION: Performed by: ANESTHESIOLOGY

## 2021-01-01 PROCEDURE — 87040 BLOOD CULTURE FOR BACTERIA: CPT | Performed by: NURSE PRACTITIONER

## 2021-01-01 PROCEDURE — 25010000002 FUROSEMIDE PER 20 MG: Performed by: INTERNAL MEDICINE

## 2021-01-01 PROCEDURE — 63710000001 INSULIN LISPRO (HUMAN) PER 5 UNITS: Performed by: NURSE PRACTITIONER

## 2021-01-01 PROCEDURE — 36415 COLL VENOUS BLD VENIPUNCTURE: CPT

## 2021-01-01 PROCEDURE — 87635 SARS-COV-2 COVID-19 AMP PRB: CPT | Performed by: NURSE PRACTITIONER

## 2021-01-01 PROCEDURE — 84443 ASSAY THYROID STIM HORMONE: CPT | Performed by: INTERNAL MEDICINE

## 2021-01-01 PROCEDURE — 25010000002 ENOXAPARIN PER 10 MG: Performed by: NURSE PRACTITIONER

## 2021-01-01 PROCEDURE — 97166 OT EVAL MOD COMPLEX 45 MIN: CPT

## 2021-01-01 PROCEDURE — 63710000001 INSULIN LISPRO (HUMAN) PER 5 UNITS: Performed by: FAMILY MEDICINE

## 2021-01-01 PROCEDURE — 76705 ECHO EXAM OF ABDOMEN: CPT

## 2021-01-01 PROCEDURE — 82962 GLUCOSE BLOOD TEST: CPT

## 2021-01-01 PROCEDURE — C9803 HOPD COVID-19 SPEC COLLECT: HCPCS

## 2021-01-01 PROCEDURE — 85610 PROTHROMBIN TIME: CPT | Performed by: FAMILY MEDICINE

## 2021-01-01 PROCEDURE — 93005 ELECTROCARDIOGRAM TRACING: CPT | Performed by: NURSE PRACTITIONER

## 2021-01-01 PROCEDURE — 99232 SBSQ HOSP IP/OBS MODERATE 35: CPT | Performed by: NURSE PRACTITIONER

## 2021-01-01 PROCEDURE — 83605 ASSAY OF LACTIC ACID: CPT | Performed by: INTERNAL MEDICINE

## 2021-01-01 PROCEDURE — 86900 BLOOD TYPING SEROLOGIC ABO: CPT | Performed by: INTERNAL MEDICINE

## 2021-01-01 PROCEDURE — 97165 OT EVAL LOW COMPLEX 30 MIN: CPT

## 2021-01-01 PROCEDURE — 84300 ASSAY OF URINE SODIUM: CPT | Performed by: INTERNAL MEDICINE

## 2021-01-01 PROCEDURE — 85025 COMPLETE CBC W/AUTO DIFF WBC: CPT | Performed by: NURSE PRACTITIONER

## 2021-01-01 PROCEDURE — 86900 BLOOD TYPING SEROLOGIC ABO: CPT | Performed by: NURSE PRACTITIONER

## 2021-01-01 PROCEDURE — 82274 ASSAY TEST FOR BLOOD FECAL: CPT | Performed by: FAMILY MEDICINE

## 2021-01-01 PROCEDURE — 84443 ASSAY THYROID STIM HORMONE: CPT

## 2021-01-01 PROCEDURE — 84145 PROCALCITONIN (PCT): CPT | Performed by: EMERGENCY MEDICINE

## 2021-01-01 PROCEDURE — 83540 ASSAY OF IRON: CPT | Performed by: FAMILY MEDICINE

## 2021-01-01 PROCEDURE — 99285 EMERGENCY DEPT VISIT HI MDM: CPT

## 2021-01-01 PROCEDURE — 25010000002 PIPERACILLIN SOD-TAZOBACTAM PER 1 G: Performed by: NURSE PRACTITIONER

## 2021-01-01 PROCEDURE — 82330 ASSAY OF CALCIUM: CPT | Performed by: INTERNAL MEDICINE

## 2021-01-01 PROCEDURE — 78452 HT MUSCLE IMAGE SPECT MULT: CPT

## 2021-01-01 PROCEDURE — 25010000002 CALCIUM GLUCONATE-NACL 1-0.675 GM/50ML-% SOLUTION: Performed by: INTERNAL MEDICINE

## 2021-01-01 PROCEDURE — 70450 CT HEAD/BRAIN W/O DYE: CPT

## 2021-01-01 PROCEDURE — 86850 RBC ANTIBODY SCREEN: CPT | Performed by: NURSE PRACTITIONER

## 2021-01-01 PROCEDURE — 87635 SARS-COV-2 COVID-19 AMP PRB: CPT | Performed by: EMERGENCY MEDICINE

## 2021-01-01 PROCEDURE — 85379 FIBRIN DEGRADATION QUANT: CPT | Performed by: INTERNAL MEDICINE

## 2021-01-01 PROCEDURE — 25010000002 EPOETIN ALFA-EPBX 40000 UNIT/ML SOLUTION: Performed by: NURSE PRACTITIONER

## 2021-01-01 PROCEDURE — 96365 THER/PROPH/DIAG IV INF INIT: CPT

## 2021-01-01 PROCEDURE — 83615 LACTATE (LD) (LDH) ENZYME: CPT | Performed by: FAMILY MEDICINE

## 2021-01-01 PROCEDURE — 83605 ASSAY OF LACTIC ACID: CPT | Performed by: FAMILY MEDICINE

## 2021-01-01 PROCEDURE — 86901 BLOOD TYPING SEROLOGIC RH(D): CPT | Performed by: EMERGENCY MEDICINE

## 2021-01-01 PROCEDURE — 85025 COMPLETE CBC W/AUTO DIFF WBC: CPT | Performed by: INTERNAL MEDICINE

## 2021-01-01 PROCEDURE — 83036 HEMOGLOBIN GLYCOSYLATED A1C: CPT

## 2021-01-01 PROCEDURE — 82803 BLOOD GASES ANY COMBINATION: CPT

## 2021-01-01 PROCEDURE — 83605 ASSAY OF LACTIC ACID: CPT | Performed by: NURSE PRACTITIONER

## 2021-01-01 PROCEDURE — 85007 BL SMEAR W/DIFF WBC COUNT: CPT | Performed by: FAMILY MEDICINE

## 2021-01-01 PROCEDURE — 80053 COMPREHEN METABOLIC PANEL: CPT

## 2021-01-01 PROCEDURE — 25010000002 VANCOMYCIN 10 G RECONSTITUTED SOLUTION: Performed by: EMERGENCY MEDICINE

## 2021-01-01 PROCEDURE — 80061 LIPID PANEL: CPT

## 2021-01-01 PROCEDURE — 78452 HT MUSCLE IMAGE SPECT MULT: CPT | Performed by: INTERNAL MEDICINE

## 2021-01-01 PROCEDURE — 88184 FLOWCYTOMETRY/ TC 1 MARKER: CPT

## 2021-01-01 PROCEDURE — 82140 ASSAY OF AMMONIA: CPT | Performed by: NURSE PRACTITIONER

## 2021-01-01 PROCEDURE — 83735 ASSAY OF MAGNESIUM: CPT | Performed by: INTERNAL MEDICINE

## 2021-01-01 PROCEDURE — 84443 ASSAY THYROID STIM HORMONE: CPT | Performed by: EMERGENCY MEDICINE

## 2021-01-01 PROCEDURE — 82140 ASSAY OF AMMONIA: CPT | Performed by: EMERGENCY MEDICINE

## 2021-01-01 PROCEDURE — 86900 BLOOD TYPING SEROLOGIC ABO: CPT | Performed by: EMERGENCY MEDICINE

## 2021-01-01 PROCEDURE — 63710000001 DIPHENHYDRAMINE PER 50 MG: Performed by: INTERNAL MEDICINE

## 2021-01-01 PROCEDURE — 82570 ASSAY OF URINE CREATININE: CPT

## 2021-01-01 PROCEDURE — 71275 CT ANGIOGRAPHY CHEST: CPT

## 2021-01-01 PROCEDURE — U0005 INFEC AGEN DETEC AMPLI PROBE: HCPCS

## 2021-01-01 PROCEDURE — 93005 ELECTROCARDIOGRAM TRACING: CPT

## 2021-01-01 PROCEDURE — 88323 CONSLTJ&REPRT MATRL PREP SLD: CPT

## 2021-01-01 PROCEDURE — 82550 ASSAY OF CK (CPK): CPT | Performed by: INTERNAL MEDICINE

## 2021-01-01 PROCEDURE — 83036 HEMOGLOBIN GLYCOSYLATED A1C: CPT | Performed by: NURSE PRACTITIONER

## 2021-01-01 PROCEDURE — 88342 IMHCHEM/IMCYTCHM 1ST ANTB: CPT

## 2021-01-01 PROCEDURE — 97535 SELF CARE MNGMENT TRAINING: CPT

## 2021-01-01 PROCEDURE — 83036 HEMOGLOBIN GLYCOSYLATED A1C: CPT | Performed by: INTERNAL MEDICINE

## 2021-01-01 PROCEDURE — 84484 ASSAY OF TROPONIN QUANT: CPT | Performed by: NURSE PRACTITIONER

## 2021-01-01 PROCEDURE — U0004 COV-19 TEST NON-CDC HGH THRU: HCPCS

## 2021-01-01 PROCEDURE — 80053 COMPREHEN METABOLIC PANEL: CPT | Performed by: EMERGENCY MEDICINE

## 2021-01-01 PROCEDURE — 83010 ASSAY OF HAPTOGLOBIN QUANT: CPT | Performed by: FAMILY MEDICINE

## 2021-01-01 PROCEDURE — 99239 HOSP IP/OBS DSCHRG MGMT >30: CPT | Performed by: INTERNAL MEDICINE

## 2021-01-01 PROCEDURE — C9803 HOPD COVID-19 SPEC COLLECT: HCPCS | Performed by: EMERGENCY MEDICINE

## 2021-01-01 PROCEDURE — 88237 TISSUE CULTURE BONE MARROW: CPT

## 2021-01-01 PROCEDURE — 36430 TRANSFUSION BLD/BLD COMPNT: CPT

## 2021-01-01 PROCEDURE — 85730 THROMBOPLASTIN TIME PARTIAL: CPT | Performed by: RADIOLOGY

## 2021-01-01 PROCEDURE — 85610 PROTHROMBIN TIME: CPT | Performed by: NURSE PRACTITIONER

## 2021-01-01 PROCEDURE — 25010000002 CEFEPIME PER 500 MG: Performed by: EMERGENCY MEDICINE

## 2021-01-01 PROCEDURE — 82330 ASSAY OF CALCIUM: CPT | Performed by: NURSE PRACTITIONER

## 2021-01-01 PROCEDURE — 88311 DECALCIFY TISSUE: CPT | Performed by: INTERNAL MEDICINE

## 2021-01-01 PROCEDURE — 84145 PROCALCITONIN (PCT): CPT | Performed by: NURSE PRACTITIONER

## 2021-01-01 PROCEDURE — 85027 COMPLETE CBC AUTOMATED: CPT | Performed by: INTERNAL MEDICINE

## 2021-01-01 PROCEDURE — 93306 TTE W/DOPPLER COMPLETE: CPT | Performed by: INTERNAL MEDICINE

## 2021-01-01 PROCEDURE — 85007 BL SMEAR W/DIFF WBC COUNT: CPT | Performed by: INTERNAL MEDICINE

## 2021-01-01 PROCEDURE — 88341 IMHCHEM/IMCYTCHM EA ADD ANTB: CPT

## 2021-01-01 PROCEDURE — 81001 URINALYSIS AUTO W/SCOPE: CPT | Performed by: NURSE PRACTITIONER

## 2021-01-01 PROCEDURE — 93306 TTE W/DOPPLER COMPLETE: CPT

## 2021-01-01 PROCEDURE — 80053 COMPREHEN METABOLIC PANEL: CPT | Performed by: NURSE PRACTITIONER

## 2021-01-01 PROCEDURE — 85007 BL SMEAR W/DIFF WBC COUNT: CPT | Performed by: EMERGENCY MEDICINE

## 2021-01-01 PROCEDURE — 86850 RBC ANTIBODY SCREEN: CPT | Performed by: EMERGENCY MEDICINE

## 2021-01-01 PROCEDURE — 0 TECHNETIUM SESTAMIBI: Performed by: FAMILY MEDICINE

## 2021-01-01 PROCEDURE — 84443 ASSAY THYROID STIM HORMONE: CPT | Performed by: FAMILY MEDICINE

## 2021-01-01 PROCEDURE — 25010000002 CEFEPIME PER 500 MG: Performed by: FAMILY MEDICINE

## 2021-01-01 PROCEDURE — 88264 CHROMOSOME ANALYSIS 20-25: CPT

## 2021-01-01 PROCEDURE — 25010000002 FERRIC CARBOXYMALTOSE 750 MG/15ML SOLUTION 15 ML VIAL: Performed by: INTERNAL MEDICINE

## 2021-01-01 PROCEDURE — 63710000001 INSULIN GLARGINE PER 5 UNITS: Performed by: INTERNAL MEDICINE

## 2021-01-01 PROCEDURE — 82962 GLUCOSE BLOOD TEST: CPT | Performed by: INTERNAL MEDICINE

## 2021-01-01 PROCEDURE — 86880 COOMBS TEST DIRECT: CPT | Performed by: FAMILY MEDICINE

## 2021-01-01 PROCEDURE — 81450 HL NEO GSAP 5-50DNA/DNA&RNA: CPT

## 2021-01-01 PROCEDURE — 82607 VITAMIN B-12: CPT | Performed by: FAMILY MEDICINE

## 2021-01-01 PROCEDURE — 25010000002 NA FERRIC GLUC CPLX PER 12.5 MG: Performed by: INTERNAL MEDICINE

## 2021-01-01 PROCEDURE — 85610 PROTHROMBIN TIME: CPT | Performed by: RADIOLOGY

## 2021-01-01 PROCEDURE — U0004 COV-19 TEST NON-CDC HGH THRU: HCPCS | Performed by: EMERGENCY MEDICINE

## 2021-01-01 PROCEDURE — 88313 SPECIAL STAINS GROUP 2: CPT

## 2021-01-01 PROCEDURE — 85097 BONE MARROW INTERPRETATION: CPT | Performed by: INTERNAL MEDICINE

## 2021-01-01 PROCEDURE — 25010000002 FENTANYL CITRATE (PF) 50 MCG/ML SOLUTION: Performed by: RADIOLOGY

## 2021-01-01 PROCEDURE — 93018 CV STRESS TEST I&R ONLY: CPT | Performed by: INTERNAL MEDICINE

## 2021-01-01 PROCEDURE — 92610 EVALUATE SWALLOWING FUNCTION: CPT

## 2021-01-01 PROCEDURE — 83735 ASSAY OF MAGNESIUM: CPT | Performed by: EMERGENCY MEDICINE

## 2021-01-01 PROCEDURE — 83735 ASSAY OF MAGNESIUM: CPT | Performed by: FAMILY MEDICINE

## 2021-01-01 PROCEDURE — 84100 ASSAY OF PHOSPHORUS: CPT | Performed by: EMERGENCY MEDICINE

## 2021-01-01 PROCEDURE — 85300 ANTITHROMBIN III ACTIVITY: CPT | Performed by: INTERNAL MEDICINE

## 2021-01-01 PROCEDURE — 88185 FLOWCYTOMETRY/TC ADD-ON: CPT

## 2021-01-01 PROCEDURE — P9612 CATHETERIZE FOR URINE SPEC: HCPCS

## 2021-01-01 PROCEDURE — 25010000002 THIAMINE PER 100 MG: Performed by: EMERGENCY MEDICINE

## 2021-01-01 PROCEDURE — 25010000002 REGADENOSON 0.4 MG/5ML SOLUTION: Performed by: FAMILY MEDICINE

## 2021-01-01 PROCEDURE — 86901 BLOOD TYPING SEROLOGIC RH(D): CPT | Performed by: FAMILY MEDICINE

## 2021-01-01 PROCEDURE — 92526 ORAL FUNCTION THERAPY: CPT

## 2021-01-01 PROCEDURE — 82105 ALPHA-FETOPROTEIN SERUM: CPT | Performed by: NURSE PRACTITIONER

## 2021-01-01 PROCEDURE — 84484 ASSAY OF TROPONIN QUANT: CPT | Performed by: EMERGENCY MEDICINE

## 2021-01-01 PROCEDURE — 43239 EGD BIOPSY SINGLE/MULTIPLE: CPT | Performed by: INTERNAL MEDICINE

## 2021-01-01 PROCEDURE — 82728 ASSAY OF FERRITIN: CPT | Performed by: FAMILY MEDICINE

## 2021-01-01 PROCEDURE — 80053 COMPREHEN METABOLIC PANEL: CPT | Performed by: FAMILY MEDICINE

## 2021-01-01 PROCEDURE — 83605 ASSAY OF LACTIC ACID: CPT

## 2021-01-01 PROCEDURE — 0202U NFCT DS 22 TRGT SARS-COV-2: CPT | Performed by: INTERNAL MEDICINE

## 2021-01-01 PROCEDURE — 43235 EGD DIAGNOSTIC BRUSH WASH: CPT | Performed by: INTERNAL MEDICINE

## 2021-01-01 PROCEDURE — 25010000002 IRON SUCROSE PER 1 MG: Performed by: INTERNAL MEDICINE

## 2021-01-01 PROCEDURE — 82607 VITAMIN B-12: CPT | Performed by: INTERNAL MEDICINE

## 2021-01-01 PROCEDURE — 97161 PT EVAL LOW COMPLEX 20 MIN: CPT

## 2021-01-01 PROCEDURE — 94760 N-INVAS EAR/PLS OXIMETRY 1: CPT

## 2021-01-01 PROCEDURE — U0005 INFEC AGEN DETEC AMPLI PROBE: HCPCS | Performed by: EMERGENCY MEDICINE

## 2021-01-01 PROCEDURE — 82728 ASSAY OF FERRITIN: CPT

## 2021-01-01 PROCEDURE — 87205 SMEAR GRAM STAIN: CPT | Performed by: INTERNAL MEDICINE

## 2021-01-01 PROCEDURE — 86901 BLOOD TYPING SEROLOGIC RH(D): CPT | Performed by: INTERNAL MEDICINE

## 2021-01-01 PROCEDURE — 85025 COMPLETE CBC W/AUTO DIFF WBC: CPT | Performed by: FAMILY MEDICINE

## 2021-01-01 PROCEDURE — 71250 CT THORAX DX C-: CPT

## 2021-01-01 PROCEDURE — 93005 ELECTROCARDIOGRAM TRACING: CPT | Performed by: EMERGENCY MEDICINE

## 2021-01-01 PROCEDURE — 83880 ASSAY OF NATRIURETIC PEPTIDE: CPT | Performed by: NURSE PRACTITIONER

## 2021-01-01 PROCEDURE — 82550 ASSAY OF CK (CPK): CPT | Performed by: EMERGENCY MEDICINE

## 2021-01-01 PROCEDURE — 76775 US EXAM ABDO BACK WALL LIM: CPT

## 2021-01-01 PROCEDURE — 83540 ASSAY OF IRON: CPT

## 2021-01-01 PROCEDURE — 97110 THERAPEUTIC EXERCISES: CPT

## 2021-01-01 PROCEDURE — 97112 NEUROMUSCULAR REEDUCATION: CPT

## 2021-01-01 PROCEDURE — 99221 1ST HOSP IP/OBS SF/LOW 40: CPT | Performed by: INTERNAL MEDICINE

## 2021-01-01 PROCEDURE — 0DJ08ZZ INSPECTION OF UPPER INTESTINAL TRACT, VIA NATURAL OR ARTIFICIAL OPENING ENDOSCOPIC: ICD-10-PCS | Performed by: INTERNAL MEDICINE

## 2021-01-01 PROCEDURE — 25010000002 ONDANSETRON PER 1 MG: Performed by: RADIOLOGY

## 2021-01-01 PROCEDURE — 85303 CLOT INHIBIT PROT C ACTIVITY: CPT | Performed by: INTERNAL MEDICINE

## 2021-01-01 PROCEDURE — 87086 URINE CULTURE/COLONY COUNT: CPT | Performed by: FAMILY MEDICINE

## 2021-01-01 PROCEDURE — 83010 ASSAY OF HAPTOGLOBIN QUANT: CPT | Performed by: INTERNAL MEDICINE

## 2021-01-01 PROCEDURE — 80048 BASIC METABOLIC PNL TOTAL CA: CPT | Performed by: NURSE PRACTITIONER

## 2021-01-01 PROCEDURE — 99233 SBSQ HOSP IP/OBS HIGH 50: CPT | Performed by: INTERNAL MEDICINE

## 2021-01-01 PROCEDURE — 82043 UR ALBUMIN QUANTITATIVE: CPT

## 2021-01-01 PROCEDURE — 77012 CT SCAN FOR NEEDLE BIOPSY: CPT

## 2021-01-01 PROCEDURE — 99152 MOD SED SAME PHYS/QHP 5/>YRS: CPT

## 2021-01-01 PROCEDURE — 82746 ASSAY OF FOLIC ACID SERUM: CPT | Performed by: FAMILY MEDICINE

## 2021-01-01 PROCEDURE — 83921 ORGANIC ACID SINGLE QUANT: CPT | Performed by: FAMILY MEDICINE

## 2021-01-01 PROCEDURE — 25010000002 MIDAZOLAM PER 1 MG: Performed by: RADIOLOGY

## 2021-01-01 PROCEDURE — 96374 THER/PROPH/DIAG INJ IV PUSH: CPT

## 2021-01-01 PROCEDURE — 86900 BLOOD TYPING SEROLOGIC ABO: CPT | Performed by: FAMILY MEDICINE

## 2021-01-01 PROCEDURE — 99213 OFFICE O/P EST LOW 20 MIN: CPT | Performed by: SURGERY

## 2021-01-01 PROCEDURE — 87633 RESP VIRUS 12-25 TARGETS: CPT | Performed by: NURSE PRACTITIONER

## 2021-01-01 PROCEDURE — 84439 ASSAY OF FREE THYROXINE: CPT

## 2021-01-01 PROCEDURE — 83735 ASSAY OF MAGNESIUM: CPT | Performed by: NURSE PRACTITIONER

## 2021-01-01 PROCEDURE — 84466 ASSAY OF TRANSFERRIN: CPT

## 2021-01-01 PROCEDURE — 85610 PROTHROMBIN TIME: CPT | Performed by: EMERGENCY MEDICINE

## 2021-01-01 PROCEDURE — 93017 CV STRESS TEST TRACING ONLY: CPT

## 2021-01-01 PROCEDURE — 84550 ASSAY OF BLOOD/URIC ACID: CPT | Performed by: INTERNAL MEDICINE

## 2021-01-01 RX ORDER — ACETAMINOPHEN 325 MG/1
650 TABLET ORAL ONCE
Status: COMPLETED | OUTPATIENT
Start: 2021-01-01 | End: 2021-01-01

## 2021-01-01 RX ORDER — DOXYCYCLINE 100 MG/1
100 TABLET ORAL EVERY 12 HOURS SCHEDULED
Qty: 13 TABLET | Refills: 0 | Status: SHIPPED | OUTPATIENT
Start: 2021-01-01 | End: 2021-01-01

## 2021-01-01 RX ORDER — SYRINGE-NEEDLE,INSULIN,0.5 ML 27GX1/2"
SYRINGE, EMPTY DISPOSABLE MISCELLANEOUS
Qty: 400 EACH | Refills: 2 | Status: SHIPPED | OUTPATIENT
Start: 2021-01-01 | End: 2021-01-01

## 2021-01-01 RX ORDER — SODIUM CHLORIDE 0.9 % (FLUSH) 0.9 %
3 SYRINGE (ML) INJECTION EVERY 12 HOURS SCHEDULED
Status: DISCONTINUED | OUTPATIENT
Start: 2021-01-01 | End: 2021-01-01 | Stop reason: HOSPADM

## 2021-01-01 RX ORDER — SODIUM CHLORIDE 9 MG/ML
30 INJECTION, SOLUTION INTRAVENOUS CONTINUOUS
Status: DISCONTINUED | OUTPATIENT
Start: 2021-01-01 | End: 2021-01-01 | Stop reason: HOSPADM

## 2021-01-01 RX ORDER — ARGININE/GLUTAMINE/CALCIUM BMB 7G-7G-1.5G
1 POWDER IN PACKET (EA) ORAL 2 TIMES DAILY
Status: DISCONTINUED | OUTPATIENT
Start: 2021-01-01 | End: 2021-01-01 | Stop reason: HOSPADM

## 2021-01-01 RX ORDER — IPRATROPIUM BROMIDE AND ALBUTEROL SULFATE 2.5; .5 MG/3ML; MG/3ML
3 SOLUTION RESPIRATORY (INHALATION) EVERY 4 HOURS PRN
Status: DISCONTINUED | OUTPATIENT
Start: 2021-01-01 | End: 2021-01-01 | Stop reason: HOSPADM

## 2021-01-01 RX ORDER — ACETAMINOPHEN 650 MG/1
650 SUPPOSITORY RECTAL ONCE
Status: COMPLETED | OUTPATIENT
Start: 2021-01-01 | End: 2021-01-01

## 2021-01-01 RX ORDER — CALCIUM CARBONATE 200(500)MG
2 TABLET,CHEWABLE ORAL 2 TIMES DAILY PRN
Status: DISCONTINUED | OUTPATIENT
Start: 2021-01-01 | End: 2021-01-01 | Stop reason: HOSPADM

## 2021-01-01 RX ORDER — SODIUM CHLORIDE AND POTASSIUM CHLORIDE 150; 900 MG/100ML; MG/100ML
100 INJECTION, SOLUTION INTRAVENOUS CONTINUOUS
Status: DISCONTINUED | OUTPATIENT
Start: 2021-01-01 | End: 2021-01-01

## 2021-01-01 RX ORDER — NITROGLYCERIN 0.4 MG/1
0.4 TABLET SUBLINGUAL
Status: DISCONTINUED | OUTPATIENT
Start: 2021-01-01 | End: 2021-01-01 | Stop reason: HOSPADM

## 2021-01-01 RX ORDER — DOCUSATE SODIUM 100 MG/1
100 CAPSULE, LIQUID FILLED ORAL 2 TIMES DAILY
Status: DISCONTINUED | OUTPATIENT
Start: 2021-01-01 | End: 2021-01-01 | Stop reason: HOSPADM

## 2021-01-01 RX ORDER — DIPHENHYDRAMINE HYDROCHLORIDE 50 MG/ML
12.5 INJECTION INTRAMUSCULAR; INTRAVENOUS
Status: DISCONTINUED | OUTPATIENT
Start: 2021-01-01 | End: 2021-01-01 | Stop reason: HOSPADM

## 2021-01-01 RX ORDER — METOPROLOL SUCCINATE 25 MG/1
25 TABLET, EXTENDED RELEASE ORAL
Qty: 30 TABLET | Refills: 0 | Status: SHIPPED | OUTPATIENT
Start: 2021-01-01 | End: 2021-01-01 | Stop reason: SDUPTHER

## 2021-01-01 RX ORDER — INSULIN GLARGINE 100 [IU]/ML
INJECTION, SOLUTION SUBCUTANEOUS
Qty: 45 ML | Refills: 3 | Status: SHIPPED | OUTPATIENT
Start: 2021-01-01 | End: 2021-01-01 | Stop reason: HOSPADM

## 2021-01-01 RX ORDER — DIPHENHYDRAMINE HCL 25 MG
25 CAPSULE ORAL ONCE
Status: COMPLETED | OUTPATIENT
Start: 2021-01-01 | End: 2021-01-01

## 2021-01-01 RX ORDER — BLOOD SUGAR DIAGNOSTIC
STRIP MISCELLANEOUS
Qty: 120 EACH | Refills: 6 | Status: SHIPPED | OUTPATIENT
Start: 2021-01-01 | End: 2021-01-01 | Stop reason: SDUPTHER

## 2021-01-01 RX ORDER — FERROUS SULFATE TAB EC 324 MG (65 MG FE EQUIVALENT) 324 (65 FE) MG
324 TABLET DELAYED RESPONSE ORAL
Status: DISCONTINUED | OUTPATIENT
Start: 2021-01-01 | End: 2021-01-01 | Stop reason: HOSPADM

## 2021-01-01 RX ORDER — LEVOTHYROXINE SODIUM 0.07 MG/1
75 TABLET ORAL
Start: 2021-01-01 | End: 2021-01-01

## 2021-01-01 RX ORDER — LACTULOSE 10 G/15ML
50 SOLUTION ORAL 4 TIMES DAILY
Status: DISCONTINUED | OUTPATIENT
Start: 2021-01-01 | End: 2021-01-01 | Stop reason: HOSPADM

## 2021-01-01 RX ORDER — ONDANSETRON 2 MG/ML
INJECTION INTRAMUSCULAR; INTRAVENOUS
Status: COMPLETED | OUTPATIENT
Start: 2021-01-01 | End: 2021-01-01

## 2021-01-01 RX ORDER — BUDESONIDE 0.5 MG/2ML
0.5 INHALANT ORAL
Status: DISCONTINUED | OUTPATIENT
Start: 2021-01-01 | End: 2021-01-01 | Stop reason: HOSPADM

## 2021-01-01 RX ORDER — FLUMAZENIL 0.1 MG/ML
0.2 INJECTION INTRAVENOUS AS NEEDED
Status: DISCONTINUED | OUTPATIENT
Start: 2021-01-01 | End: 2021-01-01 | Stop reason: HOSPADM

## 2021-01-01 RX ORDER — SODIUM CHLORIDE 0.9 % (FLUSH) 0.9 %
3-10 SYRINGE (ML) INJECTION AS NEEDED
Status: DISCONTINUED | OUTPATIENT
Start: 2021-01-01 | End: 2021-01-01 | Stop reason: HOSPADM

## 2021-01-01 RX ORDER — BUDESONIDE 0.5 MG/2ML
0.5 INHALANT ORAL
Qty: 30 EACH | Refills: 0 | Status: SHIPPED | OUTPATIENT
Start: 2021-01-01

## 2021-01-01 RX ORDER — PANTOPRAZOLE SODIUM 40 MG/1
40 TABLET, DELAYED RELEASE ORAL 2 TIMES DAILY
Status: DISCONTINUED | OUTPATIENT
Start: 2021-01-01 | End: 2021-01-01 | Stop reason: HOSPADM

## 2021-01-01 RX ORDER — ACETAMINOPHEN 325 MG/1
650 TABLET ORAL EVERY 4 HOURS PRN
Status: DISCONTINUED | OUTPATIENT
Start: 2021-01-01 | End: 2021-01-01 | Stop reason: HOSPADM

## 2021-01-01 RX ORDER — SODIUM CHLORIDE 0.9 % (FLUSH) 0.9 %
10 SYRINGE (ML) INJECTION EVERY 12 HOURS SCHEDULED
Status: DISCONTINUED | OUTPATIENT
Start: 2021-01-01 | End: 2021-01-01 | Stop reason: HOSPADM

## 2021-01-01 RX ORDER — SODIUM CHLORIDE 9 MG/ML
250 INJECTION, SOLUTION INTRAVENOUS AS NEEDED
Status: DISCONTINUED | OUTPATIENT
Start: 2021-01-01 | End: 2021-01-01 | Stop reason: HOSPADM

## 2021-01-01 RX ORDER — CALCIUM CARBONATE 200(500)MG
2 TABLET,CHEWABLE ORAL 3 TIMES DAILY PRN
Status: DISCONTINUED | OUTPATIENT
Start: 2021-01-01 | End: 2021-01-01 | Stop reason: HOSPADM

## 2021-01-01 RX ORDER — ALLOPURINOL 100 MG/1
100 TABLET ORAL 2 TIMES DAILY
Status: DISCONTINUED | OUTPATIENT
Start: 2021-01-01 | End: 2021-01-01 | Stop reason: HOSPADM

## 2021-01-01 RX ORDER — NICOTINE 21 MG/24HR
1 PATCH, TRANSDERMAL 24 HOURS TRANSDERMAL EVERY 24 HOURS
Status: DISCONTINUED | OUTPATIENT
Start: 2021-01-01 | End: 2021-01-01 | Stop reason: SDUPTHER

## 2021-01-01 RX ORDER — DEXTROSE MONOHYDRATE 25 G/50ML
25 INJECTION, SOLUTION INTRAVENOUS
Status: DISCONTINUED | OUTPATIENT
Start: 2021-01-01 | End: 2021-01-01 | Stop reason: HOSPADM

## 2021-01-01 RX ORDER — LEVOCARNITINE 1 G/10ML
1000 SOLUTION ORAL DAILY
Status: DISCONTINUED | OUTPATIENT
Start: 2021-01-01 | End: 2021-01-01

## 2021-01-01 RX ORDER — ACETAMINOPHEN 650 MG/1
650 SUPPOSITORY RECTAL EVERY 4 HOURS PRN
Status: DISCONTINUED | OUTPATIENT
Start: 2021-01-01 | End: 2021-01-01 | Stop reason: HOSPADM

## 2021-01-01 RX ORDER — DOCUSATE SODIUM 100 MG/1
100 CAPSULE, LIQUID FILLED ORAL 2 TIMES DAILY PRN
Status: DISCONTINUED | OUTPATIENT
Start: 2021-01-01 | End: 2021-01-01 | Stop reason: HOSPADM

## 2021-01-01 RX ORDER — IPRATROPIUM BROMIDE AND ALBUTEROL SULFATE 2.5; .5 MG/3ML; MG/3ML
3 SOLUTION RESPIRATORY (INHALATION) ONCE
Status: COMPLETED | OUTPATIENT
Start: 2021-01-01 | End: 2021-01-01

## 2021-01-01 RX ORDER — INSULIN LISPRO 100 [IU]/ML
20 INJECTION, SOLUTION INTRAVENOUS; SUBCUTANEOUS
Status: DISCONTINUED | OUTPATIENT
Start: 2021-01-01 | End: 2021-01-01 | Stop reason: HOSPADM

## 2021-01-01 RX ORDER — LACTULOSE 10 G/15ML
30 SOLUTION ORAL 3 TIMES DAILY
Status: DISCONTINUED | OUTPATIENT
Start: 2021-01-01 | End: 2021-01-01 | Stop reason: HOSPADM

## 2021-01-01 RX ORDER — INSULIN GLARGINE 100 [IU]/ML
30 INJECTION, SOLUTION SUBCUTANEOUS NIGHTLY
Status: DISCONTINUED | OUTPATIENT
Start: 2021-01-01 | End: 2021-01-01 | Stop reason: HOSPADM

## 2021-01-01 RX ORDER — LEVOTHYROXINE SODIUM 0.07 MG/1
75 TABLET ORAL
Status: DISCONTINUED | OUTPATIENT
Start: 2021-01-01 | End: 2021-01-01 | Stop reason: HOSPADM

## 2021-01-01 RX ORDER — PSEUDOEPHEDRINE HCL 30 MG
100 TABLET ORAL 2 TIMES DAILY PRN
Start: 2021-01-01

## 2021-01-01 RX ORDER — DOXYCYCLINE 100 MG/1
100 TABLET ORAL EVERY 12 HOURS SCHEDULED
Status: DISCONTINUED | OUTPATIENT
Start: 2021-01-01 | End: 2021-01-01 | Stop reason: HOSPADM

## 2021-01-01 RX ORDER — DOCUSATE SODIUM 100 MG/1
100 CAPSULE, LIQUID FILLED ORAL 2 TIMES DAILY
Status: DISCONTINUED | OUTPATIENT
Start: 2021-01-01 | End: 2021-01-01

## 2021-01-01 RX ORDER — FUROSEMIDE 10 MG/ML
20 INJECTION INTRAMUSCULAR; INTRAVENOUS ONCE
Status: COMPLETED | OUTPATIENT
Start: 2021-01-01 | End: 2021-01-01

## 2021-01-01 RX ORDER — ONDANSETRON 2 MG/ML
4 INJECTION INTRAMUSCULAR; INTRAVENOUS EVERY 6 HOURS PRN
Status: DISCONTINUED | OUTPATIENT
Start: 2021-01-01 | End: 2021-01-01 | Stop reason: HOSPADM

## 2021-01-01 RX ORDER — DIPHENHYDRAMINE HYDROCHLORIDE 50 MG/ML
25 INJECTION INTRAMUSCULAR; INTRAVENOUS ONCE
Status: COMPLETED | OUTPATIENT
Start: 2021-01-01 | End: 2021-01-01

## 2021-01-01 RX ORDER — BLOOD SUGAR DIAGNOSTIC
STRIP MISCELLANEOUS
Qty: 400 EACH | Refills: 2 | Status: SHIPPED | OUTPATIENT
Start: 2021-01-01 | End: 2021-01-01

## 2021-01-01 RX ORDER — PANTOPRAZOLE SODIUM 40 MG/1
40 TABLET, DELAYED RELEASE ORAL DAILY
Status: DISCONTINUED | OUTPATIENT
Start: 2021-01-01 | End: 2021-01-01 | Stop reason: HOSPADM

## 2021-01-01 RX ORDER — FENTANYL CITRATE 50 UG/ML
INJECTION, SOLUTION INTRAMUSCULAR; INTRAVENOUS
Status: COMPLETED | OUTPATIENT
Start: 2021-01-01 | End: 2021-01-01

## 2021-01-01 RX ORDER — HYDRALAZINE HYDROCHLORIDE 20 MG/ML
20 INJECTION INTRAMUSCULAR; INTRAVENOUS EVERY 6 HOURS PRN
Status: DISCONTINUED | OUTPATIENT
Start: 2021-01-01 | End: 2021-01-01 | Stop reason: HOSPADM

## 2021-01-01 RX ORDER — LORAZEPAM 2 MG/ML
1 INJECTION INTRAMUSCULAR
Status: DISCONTINUED | OUTPATIENT
Start: 2021-01-01 | End: 2021-01-01 | Stop reason: HOSPADM

## 2021-01-01 RX ORDER — SODIUM CHLORIDE 0.9 % (FLUSH) 0.9 %
10 SYRINGE (ML) INJECTION AS NEEDED
Status: DISCONTINUED | OUTPATIENT
Start: 2021-01-01 | End: 2021-01-01 | Stop reason: HOSPADM

## 2021-01-01 RX ORDER — ACETAMINOPHEN 160 MG/5ML
650 SOLUTION ORAL EVERY 4 HOURS PRN
Status: DISCONTINUED | OUTPATIENT
Start: 2021-01-01 | End: 2021-01-01 | Stop reason: HOSPADM

## 2021-01-01 RX ORDER — SODIUM CHLORIDE 9 MG/ML
INJECTION, SOLUTION INTRAVENOUS CONTINUOUS PRN
Status: DISCONTINUED | OUTPATIENT
Start: 2021-01-01 | End: 2021-01-01 | Stop reason: SURG

## 2021-01-01 RX ORDER — INSULIN LISPRO 100 [IU]/ML
INJECTION, SOLUTION INTRAVENOUS; SUBCUTANEOUS
Refills: 0 | Status: CANCELLED | OUTPATIENT
Start: 2021-01-01

## 2021-01-01 RX ORDER — ASPIRIN 81 MG/1
81 TABLET ORAL DAILY
Status: DISCONTINUED | OUTPATIENT
Start: 2021-01-01 | End: 2021-01-01 | Stop reason: HOSPADM

## 2021-01-01 RX ORDER — INSULIN GLARGINE 100 [IU]/ML
45 INJECTION, SOLUTION SUBCUTANEOUS NIGHTLY
Status: DISCONTINUED | OUTPATIENT
Start: 2021-01-01 | End: 2021-01-01

## 2021-01-01 RX ORDER — HYDROXYZINE HYDROCHLORIDE 25 MG/1
25 TABLET, FILM COATED ORAL 3 TIMES DAILY PRN
Status: DISCONTINUED | OUTPATIENT
Start: 2021-01-01 | End: 2021-01-01 | Stop reason: HOSPADM

## 2021-01-01 RX ORDER — ZINC SULFATE 50(220)MG
220 CAPSULE ORAL DAILY
Status: DISCONTINUED | OUTPATIENT
Start: 2021-01-01 | End: 2021-01-01 | Stop reason: HOSPADM

## 2021-01-01 RX ORDER — POTASSIUM CHLORIDE 20 MEQ/1
40 TABLET, EXTENDED RELEASE ORAL AS NEEDED
Status: DISCONTINUED | OUTPATIENT
Start: 2021-01-01 | End: 2021-01-01 | Stop reason: HOSPADM

## 2021-01-01 RX ORDER — OXYCODONE HYDROCHLORIDE 5 MG/1
10 TABLET ORAL 3 TIMES DAILY PRN
Status: DISCONTINUED | OUTPATIENT
Start: 2021-01-01 | End: 2021-01-01 | Stop reason: HOSPADM

## 2021-01-01 RX ORDER — INSULIN LISPRO 100 [IU]/ML
15 INJECTION, SOLUTION INTRAVENOUS; SUBCUTANEOUS
Status: DISCONTINUED | OUTPATIENT
Start: 2021-01-01 | End: 2021-01-01

## 2021-01-01 RX ORDER — PROPOFOL 10 MG/ML
VIAL (ML) INTRAVENOUS AS NEEDED
Status: DISCONTINUED | OUTPATIENT
Start: 2021-01-01 | End: 2021-01-01 | Stop reason: SURG

## 2021-01-01 RX ORDER — DIPHENOXYLATE HYDROCHLORIDE AND ATROPINE SULFATE 2.5; .025 MG/1; MG/1
1 TABLET ORAL DAILY
Status: DISCONTINUED | OUTPATIENT
Start: 2021-01-01 | End: 2021-01-01 | Stop reason: HOSPADM

## 2021-01-01 RX ORDER — INSULIN GLARGINE 100 [IU]/ML
25 INJECTION, SOLUTION SUBCUTANEOUS NIGHTLY
Status: DISCONTINUED | OUTPATIENT
Start: 2021-01-01 | End: 2021-01-01 | Stop reason: HOSPADM

## 2021-01-01 RX ORDER — INSULIN LISPRO 100 [IU]/ML
0-7 INJECTION, SOLUTION INTRAVENOUS; SUBCUTANEOUS
Status: DISCONTINUED | OUTPATIENT
Start: 2021-01-01 | End: 2021-01-01 | Stop reason: HOSPADM

## 2021-01-01 RX ORDER — NALBUPHINE HCL 10 MG/ML
2 AMPUL (ML) INJECTION EVERY 4 HOURS PRN
Status: DISCONTINUED | OUTPATIENT
Start: 2021-01-01 | End: 2021-01-01 | Stop reason: HOSPADM

## 2021-01-01 RX ORDER — LIDOCAINE HYDROCHLORIDE 10 MG/ML
INJECTION, SOLUTION EPIDURAL; INFILTRATION; INTRACAUDAL; PERINEURAL AS NEEDED
Status: DISCONTINUED | OUTPATIENT
Start: 2021-01-01 | End: 2021-01-01 | Stop reason: SURG

## 2021-01-01 RX ORDER — CIPROFLOXACIN 750 MG/1
TABLET, FILM COATED ORAL
COMMUNITY
Start: 2020-01-01 | End: 2021-01-01

## 2021-01-01 RX ORDER — INSULIN LISPRO 100 [IU]/ML
0-14 INJECTION, SOLUTION INTRAVENOUS; SUBCUTANEOUS AS NEEDED
Status: DISCONTINUED | OUTPATIENT
Start: 2021-01-01 | End: 2021-01-01 | Stop reason: HOSPADM

## 2021-01-01 RX ORDER — DIPHENHYDRAMINE HYDROCHLORIDE 50 MG/ML
12.5 INJECTION INTRAMUSCULAR; INTRAVENOUS ONCE
Status: COMPLETED | OUTPATIENT
Start: 2021-01-01 | End: 2021-01-01

## 2021-01-01 RX ORDER — MULTIPLE VITAMINS W/ MINERALS TAB 9MG-400MCG
1 TAB ORAL DAILY
Status: DISCONTINUED | OUTPATIENT
Start: 2021-01-01 | End: 2021-01-01 | Stop reason: HOSPADM

## 2021-01-01 RX ORDER — METOPROLOL SUCCINATE 25 MG/1
25 TABLET, EXTENDED RELEASE ORAL
Qty: 90 TABLET | Refills: 1 | Status: SHIPPED | OUTPATIENT
Start: 2021-01-01 | End: 2021-01-01 | Stop reason: HOSPADM

## 2021-01-01 RX ORDER — NADOLOL 20 MG/1
20 TABLET ORAL
Status: DISCONTINUED | OUTPATIENT
Start: 2021-01-01 | End: 2021-01-01 | Stop reason: HOSPADM

## 2021-01-01 RX ORDER — IPRATROPIUM BROMIDE AND ALBUTEROL SULFATE 2.5; .5 MG/3ML; MG/3ML
3 SOLUTION RESPIRATORY (INHALATION) EVERY 4 HOURS PRN
Start: 2021-01-01

## 2021-01-01 RX ORDER — SYRINGE-NEEDLE,INSULIN,0.5 ML 27GX1/2"
SYRINGE, EMPTY DISPOSABLE MISCELLANEOUS
Qty: 100 EACH | Refills: 6 | Status: SHIPPED | OUTPATIENT
Start: 2021-01-01 | End: 2021-01-01 | Stop reason: SDUPTHER

## 2021-01-01 RX ORDER — ISOPROPYL ALCOHOL 700 MG/ML
1 CLOTH TOPICAL
Qty: 200 EACH | Refills: 0 | Status: CANCELLED | OUTPATIENT
Start: 2021-01-01

## 2021-01-01 RX ORDER — SODIUM CHLORIDE 9 MG/ML
250 INJECTION, SOLUTION INTRAVENOUS AS NEEDED
Status: CANCELLED | OUTPATIENT
Start: 2021-01-01

## 2021-01-01 RX ORDER — SODIUM CHLORIDE 9 MG/ML
100 INJECTION, SOLUTION INTRAVENOUS CONTINUOUS
Status: DISCONTINUED | OUTPATIENT
Start: 2021-01-01 | End: 2021-01-01 | Stop reason: HOSPADM

## 2021-01-01 RX ORDER — LEVOTHYROXINE SODIUM 0.07 MG/1
75 TABLET ORAL DAILY
Status: DISCONTINUED | OUTPATIENT
Start: 2021-01-01 | End: 2021-01-01 | Stop reason: HOSPADM

## 2021-01-01 RX ORDER — LEVOCARNITINE 1 G/10ML
500 SOLUTION ORAL EVERY 12 HOURS
Start: 2021-01-01

## 2021-01-01 RX ORDER — INSULIN LISPRO 100 [IU]/ML
10 INJECTION, SOLUTION INTRAVENOUS; SUBCUTANEOUS
Status: DISCONTINUED | OUTPATIENT
Start: 2021-01-01 | End: 2021-01-01 | Stop reason: HOSPADM

## 2021-01-01 RX ORDER — ACETAMINOPHEN 160 MG/5ML
650 SOLUTION ORAL ONCE
Status: COMPLETED | OUTPATIENT
Start: 2021-01-01 | End: 2021-01-01

## 2021-01-01 RX ORDER — MIDAZOLAM HYDROCHLORIDE 1 MG/ML
INJECTION INTRAMUSCULAR; INTRAVENOUS
Status: COMPLETED | OUTPATIENT
Start: 2021-01-01 | End: 2021-01-01

## 2021-01-01 RX ORDER — POTASSIUM CHLORIDE 1.5 G/1.77G
40 POWDER, FOR SOLUTION ORAL AS NEEDED
Status: DISCONTINUED | OUTPATIENT
Start: 2021-01-01 | End: 2021-01-01 | Stop reason: HOSPADM

## 2021-01-01 RX ORDER — INSULIN LISPRO 100 [IU]/ML
0-14 INJECTION, SOLUTION INTRAVENOUS; SUBCUTANEOUS
Status: DISCONTINUED | OUTPATIENT
Start: 2021-01-01 | End: 2021-01-01 | Stop reason: HOSPADM

## 2021-01-01 RX ORDER — LIDOCAINE HYDROCHLORIDE 10 MG/ML
INJECTION, SOLUTION EPIDURAL; INFILTRATION; INTRACAUDAL; PERINEURAL
Status: COMPLETED | OUTPATIENT
Start: 2021-01-01 | End: 2021-01-01

## 2021-01-01 RX ORDER — LEVOTHYROXINE SODIUM 0.07 MG/1
TABLET ORAL
Qty: 90 TABLET | Refills: 1 | Status: SHIPPED | OUTPATIENT
Start: 2021-01-01

## 2021-01-01 RX ORDER — ONDANSETRON 4 MG/1
4 TABLET, FILM COATED ORAL EVERY 6 HOURS PRN
Status: DISCONTINUED | OUTPATIENT
Start: 2021-01-01 | End: 2021-01-01 | Stop reason: HOSPADM

## 2021-01-01 RX ORDER — ONDANSETRON 2 MG/ML
4 INJECTION INTRAMUSCULAR; INTRAVENOUS ONCE AS NEEDED
Status: DISCONTINUED | OUTPATIENT
Start: 2021-01-01 | End: 2021-01-01 | Stop reason: HOSPADM

## 2021-01-01 RX ORDER — CYANOCOBALAMIN 1000 UG/ML
1000 INJECTION, SOLUTION INTRAMUSCULAR; SUBCUTANEOUS
Status: DISCONTINUED | OUTPATIENT
Start: 2021-01-01 | End: 2021-01-01 | Stop reason: HOSPADM

## 2021-01-01 RX ORDER — EZETIMIBE 10 MG/1
10 TABLET ORAL DAILY
Qty: 30 TABLET | Refills: 11 | Status: SHIPPED | OUTPATIENT
Start: 2021-01-01 | End: 2022-08-03

## 2021-01-01 RX ORDER — DULOXETIN HYDROCHLORIDE 60 MG/1
60 CAPSULE, DELAYED RELEASE ORAL DAILY
Start: 2021-01-01

## 2021-01-01 RX ORDER — INSULIN LISPRO 100 [IU]/ML
0-7 INJECTION, SOLUTION INTRAVENOUS; SUBCUTANEOUS AS NEEDED
Status: DISCONTINUED | OUTPATIENT
Start: 2021-01-01 | End: 2021-01-01 | Stop reason: HOSPADM

## 2021-01-01 RX ORDER — OXYCODONE HYDROCHLORIDE 10 MG/1
10 TABLET ORAL 3 TIMES DAILY PRN
COMMUNITY

## 2021-01-01 RX ORDER — HYDROMORPHONE HCL 110MG/55ML
0.5 PATIENT CONTROLLED ANALGESIA SYRINGE INTRAVENOUS
Status: DISCONTINUED | OUTPATIENT
Start: 2021-01-01 | End: 2021-01-01 | Stop reason: HOSPADM

## 2021-01-01 RX ORDER — NICOTINE POLACRILEX 4 MG
15 LOZENGE BUCCAL
Status: DISCONTINUED | OUTPATIENT
Start: 2021-01-01 | End: 2021-01-01 | Stop reason: HOSPADM

## 2021-01-01 RX ORDER — FOLIC ACID 1 MG/1
1 TABLET ORAL DAILY
Status: DISCONTINUED | OUTPATIENT
Start: 2021-01-01 | End: 2021-01-01 | Stop reason: HOSPADM

## 2021-01-01 RX ORDER — METOPROLOL SUCCINATE 25 MG/1
25 TABLET, EXTENDED RELEASE ORAL
Status: DISCONTINUED | OUTPATIENT
Start: 2021-01-01 | End: 2021-01-01 | Stop reason: HOSPADM

## 2021-01-01 RX ORDER — SODIUM BICARBONATE 650 MG/1
1300 TABLET ORAL 3 TIMES DAILY
Status: DISCONTINUED | OUTPATIENT
Start: 2021-01-01 | End: 2021-01-01 | Stop reason: HOSPADM

## 2021-01-01 RX ORDER — LACTULOSE 10 G/15ML
40 SOLUTION ORAL 4 TIMES DAILY
Status: DISCONTINUED | OUTPATIENT
Start: 2021-01-01 | End: 2021-01-01

## 2021-01-01 RX ORDER — ACETAMINOPHEN 325 MG/1
650 TABLET ORAL EVERY 6 HOURS PRN
Status: DISCONTINUED | OUTPATIENT
Start: 2021-01-01 | End: 2021-01-01 | Stop reason: HOSPADM

## 2021-01-01 RX ORDER — EZETIMIBE 10 MG/1
10 TABLET ORAL DAILY
Qty: 30 TABLET | Refills: 5 | Status: SHIPPED | OUTPATIENT
Start: 2021-01-01 | End: 2021-01-01

## 2021-01-01 RX ORDER — HYDROXYZINE HYDROCHLORIDE 25 MG/1
25 TABLET, FILM COATED ORAL EVERY 4 HOURS PRN
Status: DISCONTINUED | OUTPATIENT
Start: 2021-01-01 | End: 2021-01-01 | Stop reason: HOSPADM

## 2021-01-01 RX ORDER — HYDROXYZINE HYDROCHLORIDE 25 MG/1
50 TABLET, FILM COATED ORAL 3 TIMES DAILY PRN
Status: DISCONTINUED | OUTPATIENT
Start: 2021-01-01 | End: 2021-01-01 | Stop reason: HOSPADM

## 2021-01-01 RX ORDER — NALBUPHINE HCL 10 MG/ML
10 AMPUL (ML) INJECTION EVERY 4 HOURS PRN
Status: DISCONTINUED | OUTPATIENT
Start: 2021-01-01 | End: 2021-01-01 | Stop reason: HOSPADM

## 2021-01-01 RX ORDER — ARGININE/GLUTAMINE/CALCIUM BMB 7G-7G-1.5G
1 POWDER IN PACKET (EA) ORAL 2 TIMES DAILY
Qty: 60 EACH | Refills: 0 | Status: SHIPPED | OUTPATIENT
Start: 2021-01-01

## 2021-01-01 RX ORDER — INSULIN LISPRO 100 [IU]/ML
20 INJECTION, SOLUTION INTRAVENOUS; SUBCUTANEOUS
Refills: 12
Start: 2021-01-01

## 2021-01-01 RX ORDER — SODIUM CHLORIDE 9 MG/ML
30 INJECTION, SOLUTION INTRAVENOUS CONTINUOUS PRN
Status: DISCONTINUED | OUTPATIENT
Start: 2021-01-01 | End: 2021-01-01 | Stop reason: HOSPADM

## 2021-01-01 RX ORDER — INSULIN GLARGINE 100 [IU]/ML
20 INJECTION, SOLUTION SUBCUTANEOUS NIGHTLY
Status: DISCONTINUED | OUTPATIENT
Start: 2021-01-01 | End: 2021-01-01 | Stop reason: HOSPADM

## 2021-01-01 RX ORDER — LACTULOSE 10 G/15ML
50 SOLUTION ORAL 4 TIMES DAILY
Start: 2021-01-01

## 2021-01-01 RX ORDER — PEN NEEDLE, DIABETIC 30 GX3/16"
1 NEEDLE, DISPOSABLE MISCELLANEOUS
Qty: 200 EACH | Refills: 0 | Status: CANCELLED | OUTPATIENT
Start: 2021-01-01

## 2021-01-01 RX ORDER — NICOTINE 21 MG/24HR
1 PATCH, TRANSDERMAL 24 HOURS TRANSDERMAL EVERY 24 HOURS
Status: DISCONTINUED | OUTPATIENT
Start: 2021-01-01 | End: 2021-01-01

## 2021-01-01 RX ORDER — CALCIUM GLUCONATE 20 MG/ML
1 INJECTION, SOLUTION INTRAVENOUS ONCE
Status: COMPLETED | OUTPATIENT
Start: 2021-01-01 | End: 2021-01-01

## 2021-01-01 RX ORDER — CHOLECALCIFEROL (VITAMIN D3) 125 MCG
5 CAPSULE ORAL NIGHTLY PRN
Status: DISCONTINUED | OUTPATIENT
Start: 2021-01-01 | End: 2021-01-01 | Stop reason: HOSPADM

## 2021-01-01 RX ORDER — ZINC SULFATE 50(220)MG
220 CAPSULE ORAL DAILY
Start: 2021-01-01

## 2021-01-01 RX ORDER — CIPROFLOXACIN 500 MG/1
500 TABLET, FILM COATED ORAL 2 TIMES DAILY
COMMUNITY
End: 2021-01-01

## 2021-01-01 RX ORDER — DULOXETIN HYDROCHLORIDE 30 MG/1
60 CAPSULE, DELAYED RELEASE ORAL DAILY
Status: DISCONTINUED | OUTPATIENT
Start: 2021-01-01 | End: 2021-01-01 | Stop reason: HOSPADM

## 2021-01-01 RX ORDER — IPRATROPIUM BROMIDE AND ALBUTEROL SULFATE 2.5; .5 MG/3ML; MG/3ML
3 SOLUTION RESPIRATORY (INHALATION)
Status: DISCONTINUED | OUTPATIENT
Start: 2021-01-01 | End: 2021-01-01 | Stop reason: HOSPADM

## 2021-01-01 RX ORDER — ACETAMINOPHEN 500 MG
1000 TABLET ORAL ONCE
Status: COMPLETED | OUTPATIENT
Start: 2021-01-01 | End: 2021-01-01

## 2021-01-01 RX ORDER — SODIUM BICARBONATE 650 MG/1
650 TABLET ORAL 3 TIMES DAILY
Status: DISCONTINUED | OUTPATIENT
Start: 2021-01-01 | End: 2021-01-01 | Stop reason: HOSPADM

## 2021-01-01 RX ORDER — MEPERIDINE HYDROCHLORIDE 25 MG/ML
12.5 INJECTION INTRAMUSCULAR; INTRAVENOUS; SUBCUTANEOUS
Status: DISCONTINUED | OUTPATIENT
Start: 2021-01-01 | End: 2021-01-01 | Stop reason: HOSPADM

## 2021-01-01 RX ORDER — FOLIC ACID 1 MG/1
1 TABLET ORAL DAILY
Start: 2021-01-01

## 2021-01-01 RX ORDER — SODIUM CHLORIDE 9 MG/ML
125 INJECTION, SOLUTION INTRAVENOUS CONTINUOUS
Status: DISCONTINUED | OUTPATIENT
Start: 2021-01-01 | End: 2021-01-01

## 2021-01-01 RX ORDER — ALBUTEROL SULFATE 2.5 MG/3ML
2.5 SOLUTION RESPIRATORY (INHALATION) ONCE AS NEEDED
Status: DISCONTINUED | OUTPATIENT
Start: 2021-01-01 | End: 2021-01-01 | Stop reason: HOSPADM

## 2021-01-01 RX ORDER — MAGNESIUM SULFATE HEPTAHYDRATE 40 MG/ML
2 INJECTION, SOLUTION INTRAVENOUS AS NEEDED
Status: DISCONTINUED | OUTPATIENT
Start: 2021-01-01 | End: 2021-01-01 | Stop reason: HOSPADM

## 2021-01-01 RX ORDER — INSULIN GLARGINE 100 [IU]/ML
26 INJECTION, SOLUTION SUBCUTANEOUS ONCE
Status: COMPLETED | OUTPATIENT
Start: 2021-01-01 | End: 2021-01-01

## 2021-01-01 RX ORDER — SODIUM CHLORIDE 9 MG/ML
250 INJECTION, SOLUTION INTRAVENOUS ONCE
Status: COMPLETED | OUTPATIENT
Start: 2021-01-01 | End: 2021-01-01

## 2021-01-01 RX ORDER — MIDAZOLAM HYDROCHLORIDE 1 MG/ML
1 INJECTION INTRAMUSCULAR; INTRAVENOUS
Status: DISCONTINUED | OUTPATIENT
Start: 2021-01-01 | End: 2021-01-01 | Stop reason: HOSPADM

## 2021-01-01 RX ORDER — SODIUM BICARBONATE 650 MG/1
1300 TABLET ORAL 3 TIMES DAILY
Start: 2021-01-01

## 2021-01-01 RX ORDER — METOPROLOL SUCCINATE 25 MG/1
25 TABLET, EXTENDED RELEASE ORAL
Qty: 90 TABLET | Refills: 1 | Status: SHIPPED | OUTPATIENT
Start: 2021-01-01 | End: 2021-01-01 | Stop reason: SDUPTHER

## 2021-01-01 RX ORDER — NADOLOL 20 MG/1
20 TABLET ORAL
Start: 2021-01-01

## 2021-01-01 RX ORDER — METOPROLOL SUCCINATE 25 MG/1
25 TABLET, EXTENDED RELEASE ORAL
Status: DISCONTINUED | OUTPATIENT
Start: 2021-01-01 | End: 2021-01-01

## 2021-01-01 RX ORDER — ACETAMINOPHEN 325 MG/1
650 TABLET ORAL EVERY 6 HOURS PRN
Start: 2021-01-01

## 2021-01-01 RX ORDER — GEL BASE NO.41
GEL (GRAM) MISCELLANEOUS
COMMUNITY

## 2021-01-01 RX ORDER — AMOXICILLIN AND CLAVULANATE POTASSIUM 875; 125 MG/1; MG/1
1 TABLET, FILM COATED ORAL EVERY 12 HOURS SCHEDULED
Status: DISCONTINUED | OUTPATIENT
Start: 2021-01-01 | End: 2021-01-01 | Stop reason: HOSPADM

## 2021-01-01 RX ORDER — SODIUM BICARBONATE 650 MG/1
650 TABLET ORAL 3 TIMES DAILY
Status: DISCONTINUED | OUTPATIENT
Start: 2021-01-01 | End: 2021-01-01

## 2021-01-01 RX ORDER — LEVOCARNITINE 1 G/10ML
500 SOLUTION ORAL EVERY 12 HOURS
Status: DISCONTINUED | OUTPATIENT
Start: 2021-01-01 | End: 2021-01-01 | Stop reason: HOSPADM

## 2021-01-01 RX ORDER — MAGNESIUM SULFATE 1 G/100ML
1 INJECTION INTRAVENOUS AS NEEDED
Status: DISCONTINUED | OUTPATIENT
Start: 2021-01-01 | End: 2021-01-01 | Stop reason: HOSPADM

## 2021-01-01 RX ORDER — NALOXONE HCL 0.4 MG/ML
0.4 VIAL (ML) INJECTION AS NEEDED
Status: DISCONTINUED | OUTPATIENT
Start: 2021-01-01 | End: 2021-01-01 | Stop reason: HOSPADM

## 2021-01-01 RX ORDER — AMOXICILLIN AND CLAVULANATE POTASSIUM 875; 125 MG/1; MG/1
1 TABLET, FILM COATED ORAL EVERY 12 HOURS SCHEDULED
Qty: 4 TABLET | Refills: 0 | Status: SHIPPED | OUTPATIENT
Start: 2021-01-01 | End: 2021-01-01

## 2021-01-01 RX ORDER — FUROSEMIDE 10 MG/ML
40 INJECTION INTRAMUSCULAR; INTRAVENOUS ONCE
Status: COMPLETED | OUTPATIENT
Start: 2021-01-01 | End: 2021-01-01

## 2021-01-01 RX ORDER — FOLIC ACID 1 MG/1
TABLET ORAL
Qty: 90 | Refills: 3 | Status: ACTIVE

## 2021-01-01 RX ORDER — INSULIN GLARGINE 100 [IU]/ML
30 INJECTION, SOLUTION SUBCUTANEOUS NIGHTLY
Qty: 10 ML | Refills: 0 | Status: SHIPPED | OUTPATIENT
Start: 2021-01-01 | End: 2021-01-01 | Stop reason: HOSPADM

## 2021-01-01 RX ORDER — ASCORBIC ACID 500 MG
500 TABLET ORAL DAILY
COMMUNITY

## 2021-01-01 RX ORDER — POTASSIUM CHLORIDE 7.45 MG/ML
10 INJECTION INTRAVENOUS
Status: DISCONTINUED | OUTPATIENT
Start: 2021-01-01 | End: 2021-01-01 | Stop reason: HOSPADM

## 2021-01-01 RX ORDER — LACTULOSE 10 G/15ML
30 SOLUTION ORAL 3 TIMES DAILY
Start: 2021-01-01 | End: 2021-01-01 | Stop reason: HOSPADM

## 2021-01-01 RX ORDER — BISACODYL 10 MG
10 SUPPOSITORY, RECTAL RECTAL DAILY PRN
Status: DISCONTINUED | OUTPATIENT
Start: 2021-01-01 | End: 2021-01-01 | Stop reason: HOSPADM

## 2021-01-01 RX ADMIN — LEVOTHYROXINE SODIUM 75 MCG: 75 TABLET ORAL at 05:15

## 2021-01-01 RX ADMIN — Medication 1 PACKET: at 21:09

## 2021-01-01 RX ADMIN — REGADENOSON 0.4 MG: 0.08 INJECTION, SOLUTION INTRAVENOUS at 09:00

## 2021-01-01 RX ADMIN — LEVOTHYROXINE SODIUM 75 MCG: 75 TABLET ORAL at 04:55

## 2021-01-01 RX ADMIN — ALLOPURINOL 100 MG: 100 TABLET ORAL at 21:04

## 2021-01-01 RX ADMIN — FERROUS SULFATE TAB EC 324 MG (65 MG FE EQUIVALENT) 324 MG: 324 (65 FE) TABLET DELAYED RESPONSE at 09:03

## 2021-01-01 RX ADMIN — IPRATROPIUM BROMIDE AND ALBUTEROL SULFATE 3 ML: 2.5; .5 SOLUTION RESPIRATORY (INHALATION) at 10:21

## 2021-01-01 RX ADMIN — IPRATROPIUM BROMIDE AND ALBUTEROL SULFATE 3 ML: 2.5; .5 SOLUTION RESPIRATORY (INHALATION) at 06:45

## 2021-01-01 RX ADMIN — INSULIN GLARGINE 30 UNITS: 100 INJECTION, SOLUTION SUBCUTANEOUS at 20:49

## 2021-01-01 RX ADMIN — DULOXETINE HYDROCHLORIDE 60 MG: 30 CAPSULE, DELAYED RELEASE ORAL at 08:23

## 2021-01-01 RX ADMIN — BUDESONIDE 0.5 MG: 0.5 SUSPENSION RESPIRATORY (INHALATION) at 06:45

## 2021-01-01 RX ADMIN — FOLIC ACID 1 MG: 1 TABLET ORAL at 13:10

## 2021-01-01 RX ADMIN — Medication 3 ML: at 13:12

## 2021-01-01 RX ADMIN — SODIUM BICARBONATE 650 MG: 650 TABLET ORAL at 20:30

## 2021-01-01 RX ADMIN — ZINC SULFATE 220 MG (50 MG) CAPSULE 220 MG: CAPSULE at 13:10

## 2021-01-01 RX ADMIN — ZINC SULFATE 220 MG (50 MG) CAPSULE 220 MG: CAPSULE at 08:19

## 2021-01-01 RX ADMIN — ZINC SULFATE 220 MG (50 MG) CAPSULE 220 MG: CAPSULE at 09:10

## 2021-01-01 RX ADMIN — ENOXAPARIN SODIUM 40 MG: 40 INJECTION SUBCUTANEOUS at 09:22

## 2021-01-01 RX ADMIN — SODIUM BICARBONATE 650 MG: 650 TABLET ORAL at 17:20

## 2021-01-01 RX ADMIN — LIDOCAINE HYDROCHLORIDE 8 ML: 10 INJECTION, SOLUTION EPIDURAL; INFILTRATION; INTRACAUDAL; PERINEURAL at 08:36

## 2021-01-01 RX ADMIN — SODIUM BICARBONATE 650 MG: 650 TABLET ORAL at 13:11

## 2021-01-01 RX ADMIN — MIDAZOLAM 0.5 MG: 1 INJECTION INTRAMUSCULAR; INTRAVENOUS at 08:29

## 2021-01-01 RX ADMIN — SODIUM BICARBONATE 650 MG: 650 TABLET ORAL at 09:23

## 2021-01-01 RX ADMIN — PANTOPRAZOLE SODIUM 40 MG: 40 TABLET, DELAYED RELEASE ORAL at 10:04

## 2021-01-01 RX ADMIN — SODIUM BICARBONATE 650 MG TABLET 650 MG: at 20:55

## 2021-01-01 RX ADMIN — THERA TABS 1 TABLET: TAB at 08:36

## 2021-01-01 RX ADMIN — INSULIN LISPRO 20 UNITS: 100 INJECTION, SOLUTION INTRAVENOUS; SUBCUTANEOUS at 13:00

## 2021-01-01 RX ADMIN — METOPROLOL SUCCINATE 25 MG: 25 TABLET, EXTENDED RELEASE ORAL at 09:24

## 2021-01-01 RX ADMIN — IPRATROPIUM BROMIDE AND ALBUTEROL SULFATE 3 ML: 2.5; .5 SOLUTION RESPIRATORY (INHALATION) at 17:28

## 2021-01-01 RX ADMIN — Medication 3 ML: at 20:32

## 2021-01-01 RX ADMIN — INSULIN LISPRO 3 UNITS: 100 INJECTION, SOLUTION INTRAVENOUS; SUBCUTANEOUS at 11:41

## 2021-01-01 RX ADMIN — Medication 3 ML: at 21:47

## 2021-01-01 RX ADMIN — LACTULOSE 30 G: 20 SOLUTION ORAL at 20:31

## 2021-01-01 RX ADMIN — DOCUSATE SODIUM 100 MG: 100 CAPSULE ORAL at 22:48

## 2021-01-01 RX ADMIN — ALLOPURINOL 100 MG: 100 TABLET ORAL at 20:30

## 2021-01-01 RX ADMIN — LEVOCARNITINE ORAL SOLUTION 500 MG: 1 SOLUTION ORAL at 05:15

## 2021-01-01 RX ADMIN — LACTULOSE 30 G: 20 SOLUTION ORAL at 08:56

## 2021-01-01 RX ADMIN — Medication 1 PACKET: at 09:53

## 2021-01-01 RX ADMIN — Medication 1 PACKET: at 10:10

## 2021-01-01 RX ADMIN — LACTULOSE 30 G: 20 SOLUTION ORAL at 09:02

## 2021-01-01 RX ADMIN — PANTOPRAZOLE SODIUM 40 MG: 40 TABLET, DELAYED RELEASE ORAL at 20:45

## 2021-01-01 RX ADMIN — PANTOPRAZOLE SODIUM 40 MG: 40 TABLET, DELAYED RELEASE ORAL at 08:19

## 2021-01-01 RX ADMIN — SODIUM BICARBONATE 1300 MG: 650 TABLET ORAL at 09:48

## 2021-01-01 RX ADMIN — RIFAXIMIN 550 MG: 550 TABLET ORAL at 13:15

## 2021-01-01 RX ADMIN — SODIUM CHLORIDE 250 MG: 9 INJECTION, SOLUTION INTRAVENOUS at 17:00

## 2021-01-01 RX ADMIN — SODIUM BICARBONATE 1300 MG: 650 TABLET ORAL at 22:13

## 2021-01-01 RX ADMIN — Medication 3 ML: at 09:54

## 2021-01-01 RX ADMIN — ACETAMINOPHEN 650 MG: 325 TABLET, FILM COATED ORAL at 10:40

## 2021-01-01 RX ADMIN — LACTULOSE 50 G: 20 SOLUTION ORAL at 10:03

## 2021-01-01 RX ADMIN — MAGNESIUM GLUCONATE 500 MG ORAL TABLET 400 MG: 500 TABLET ORAL at 08:23

## 2021-01-01 RX ADMIN — IPRATROPIUM BROMIDE AND ALBUTEROL SULFATE 3 ML: 2.5; .5 SOLUTION RESPIRATORY (INHALATION) at 19:36

## 2021-01-01 RX ADMIN — ALLOPURINOL 100 MG: 100 TABLET ORAL at 20:25

## 2021-01-01 RX ADMIN — PANTOPRAZOLE SODIUM 40 MG: 40 TABLET, DELAYED RELEASE ORAL at 09:54

## 2021-01-01 RX ADMIN — LACTULOSE 50 G: 20 SOLUTION ORAL at 22:14

## 2021-01-01 RX ADMIN — FERROUS SULFATE TAB EC 324 MG (65 MG FE EQUIVALENT) 324 MG: 324 (65 FE) TABLET DELAYED RESPONSE at 09:22

## 2021-01-01 RX ADMIN — MAGNESIUM OXIDE TAB 400 MG (241.3 MG ELEMENTAL MG) 400 MG: 400 (241.3 MG) TAB at 08:01

## 2021-01-01 RX ADMIN — SODIUM BICARBONATE 650 MG: 650 TABLET ORAL at 08:35

## 2021-01-01 RX ADMIN — INSULIN LISPRO 5 UNITS: 100 INJECTION, SOLUTION INTRAVENOUS; SUBCUTANEOUS at 12:38

## 2021-01-01 RX ADMIN — NADOLOL 20 MG: 20 TABLET ORAL at 10:05

## 2021-01-01 RX ADMIN — INSULIN LISPRO 10 UNITS: 100 INJECTION, SOLUTION INTRAVENOUS; SUBCUTANEOUS at 08:57

## 2021-01-01 RX ADMIN — INSULIN LISPRO 20 UNITS: 100 INJECTION, SOLUTION INTRAVENOUS; SUBCUTANEOUS at 09:21

## 2021-01-01 RX ADMIN — EPOETIN ALFA-EPBX 20000 UNITS: 40000 INJECTION, SOLUTION INTRAVENOUS; SUBCUTANEOUS at 09:57

## 2021-01-01 RX ADMIN — BUDESONIDE 0.5 MG: 0.5 SUSPENSION RESPIRATORY (INHALATION) at 19:09

## 2021-01-01 RX ADMIN — SODIUM BICARBONATE 1300 MG: 650 TABLET ORAL at 10:03

## 2021-01-01 RX ADMIN — INSULIN LISPRO 5 UNITS: 100 INJECTION, SOLUTION INTRAVENOUS; SUBCUTANEOUS at 17:22

## 2021-01-01 RX ADMIN — LIDOCAINE HYDROCHLORIDE 50 MG: 10 INJECTION, SOLUTION EPIDURAL; INFILTRATION; INTRACAUDAL; PERINEURAL at 12:04

## 2021-01-01 RX ADMIN — INSULIN GLARGINE 30 UNITS: 100 INJECTION, SOLUTION SUBCUTANEOUS at 21:07

## 2021-01-01 RX ADMIN — THERA TABS 1 TABLET: TAB at 08:19

## 2021-01-01 RX ADMIN — ALLOPURINOL 100 MG: 100 TABLET ORAL at 09:03

## 2021-01-01 RX ADMIN — CEFEPIME 2 G: 2 INJECTION, POWDER, FOR SOLUTION INTRAVENOUS at 23:19

## 2021-01-01 RX ADMIN — LEVOCARNITINE ORAL SOLUTION 500 MG: 1 SOLUTION ORAL at 05:20

## 2021-01-01 RX ADMIN — LACTULOSE 40 G: 20 SOLUTION ORAL at 22:49

## 2021-01-01 RX ADMIN — INSULIN LISPRO 3 UNITS: 100 INJECTION, SOLUTION INTRAVENOUS; SUBCUTANEOUS at 18:03

## 2021-01-01 RX ADMIN — ALLOPURINOL 100 MG: 100 TABLET ORAL at 21:35

## 2021-01-01 RX ADMIN — INSULIN LISPRO 20 UNITS: 100 INJECTION, SOLUTION INTRAVENOUS; SUBCUTANEOUS at 09:47

## 2021-01-01 RX ADMIN — Medication 1 PACKET: at 21:46

## 2021-01-01 RX ADMIN — THIAMINE HYDROCHLORIDE 1000 ML/HR: 100 INJECTION, SOLUTION INTRAMUSCULAR; INTRAVENOUS at 17:42

## 2021-01-01 RX ADMIN — PANTOPRAZOLE SODIUM 40 MG: 40 TABLET, DELAYED RELEASE ORAL at 20:56

## 2021-01-01 RX ADMIN — MULTIPLE VITAMINS W/ MINERALS TAB 1 TABLET: TAB at 08:24

## 2021-01-01 RX ADMIN — PANTOPRAZOLE SODIUM 40 MG: 40 TABLET, DELAYED RELEASE ORAL at 08:23

## 2021-01-01 RX ADMIN — IPRATROPIUM BROMIDE AND ALBUTEROL SULFATE 3 ML: 2.5; .5 SOLUTION RESPIRATORY (INHALATION) at 19:19

## 2021-01-01 RX ADMIN — SODIUM CHLORIDE 250 ML: 9 INJECTION, SOLUTION INTRAVENOUS at 13:09

## 2021-01-01 RX ADMIN — INSULIN LISPRO 10 UNITS: 100 INJECTION, SOLUTION INTRAVENOUS; SUBCUTANEOUS at 08:23

## 2021-01-01 RX ADMIN — ASPIRIN 81 MG: 81 TABLET, COATED ORAL at 09:03

## 2021-01-01 RX ADMIN — DOCUSATE SODIUM 100 MG: 100 CAPSULE, LIQUID FILLED ORAL at 21:06

## 2021-01-01 RX ADMIN — PIPERACILLIN SODIUM AND TAZOBACTAM SODIUM 4.5 G: 4; .5 INJECTION, POWDER, LYOPHILIZED, FOR SOLUTION INTRAVENOUS at 04:37

## 2021-01-01 RX ADMIN — DOCUSATE SODIUM 100 MG: 100 CAPSULE, LIQUID FILLED ORAL at 09:10

## 2021-01-01 RX ADMIN — INSULIN LISPRO 3 UNITS: 100 INJECTION, SOLUTION INTRAVENOUS; SUBCUTANEOUS at 16:31

## 2021-01-01 RX ADMIN — FOLIC ACID 1 MG: 1 TABLET ORAL at 09:24

## 2021-01-01 RX ADMIN — PIPERACILLIN AND TAZOBACTAM 3.38 G: 3; .375 INJECTION, POWDER, LYOPHILIZED, FOR SOLUTION INTRAVENOUS at 04:40

## 2021-01-01 RX ADMIN — RIFAXIMIN 550 MG: 550 TABLET ORAL at 22:18

## 2021-01-01 RX ADMIN — RIFAXIMIN 550 MG: 550 TABLET ORAL at 22:48

## 2021-01-01 RX ADMIN — RIFAXIMIN 550 MG: 550 TABLET ORAL at 08:19

## 2021-01-01 RX ADMIN — ONDANSETRON 4 MG: 2 INJECTION INTRAMUSCULAR; INTRAVENOUS at 08:27

## 2021-01-01 RX ADMIN — LACTULOSE 30 G: 20 SOLUTION ORAL at 20:44

## 2021-01-01 RX ADMIN — LACTULOSE 30 G: 20 SOLUTION ORAL at 20:24

## 2021-01-01 RX ADMIN — SODIUM BICARBONATE 650 MG TABLET 650 MG: at 20:31

## 2021-01-01 RX ADMIN — SODIUM CHLORIDE 100 ML/HR: 9 INJECTION, SOLUTION INTRAVENOUS at 21:26

## 2021-01-01 RX ADMIN — MAGNESIUM GLUCONATE 500 MG ORAL TABLET 400 MG: 500 TABLET ORAL at 09:06

## 2021-01-01 RX ADMIN — Medication 1 PACKET: at 09:34

## 2021-01-01 RX ADMIN — BUDESONIDE 0.5 MG: 0.5 SUSPENSION RESPIRATORY (INHALATION) at 07:32

## 2021-01-01 RX ADMIN — DIPHENHYDRAMINE HYDROCHLORIDE 25 MG: 25 CAPSULE ORAL at 22:48

## 2021-01-01 RX ADMIN — DOCUSATE SODIUM 100 MG: 100 CAPSULE, LIQUID FILLED ORAL at 08:24

## 2021-01-01 RX ADMIN — PANTOPRAZOLE SODIUM 40 MG: 40 TABLET, DELAYED RELEASE ORAL at 21:45

## 2021-01-01 RX ADMIN — ZINC SULFATE 220 MG (50 MG) CAPSULE 220 MG: CAPSULE at 09:48

## 2021-01-01 RX ADMIN — ENOXAPARIN SODIUM 40 MG: 40 INJECTION SUBCUTANEOUS at 21:26

## 2021-01-01 RX ADMIN — LACTULOSE 30 G: 20 SOLUTION ORAL at 09:54

## 2021-01-01 RX ADMIN — LEVOTHYROXINE SODIUM 75 MCG: 0.07 TABLET ORAL at 06:06

## 2021-01-01 RX ADMIN — IOPAMIDOL 100 ML: 755 INJECTION, SOLUTION INTRAVENOUS at 08:30

## 2021-01-01 RX ADMIN — AMOXICILLIN AND CLAVULANATE POTASSIUM 1 TABLET: 875; 125 TABLET, FILM COATED ORAL at 08:35

## 2021-01-01 RX ADMIN — IPRATROPIUM BROMIDE AND ALBUTEROL SULFATE 3 ML: 2.5; .5 SOLUTION RESPIRATORY (INHALATION) at 10:54

## 2021-01-01 RX ADMIN — BUDESONIDE 0.5 MG: 0.5 SUSPENSION RESPIRATORY (INHALATION) at 06:34

## 2021-01-01 RX ADMIN — INSULIN LISPRO 20 UNITS: 100 INJECTION, SOLUTION INTRAVENOUS; SUBCUTANEOUS at 17:06

## 2021-01-01 RX ADMIN — PANTOPRAZOLE SODIUM 40 MG: 40 TABLET, DELAYED RELEASE ORAL at 21:06

## 2021-01-01 RX ADMIN — METOPROLOL SUCCINATE 25 MG: 25 TABLET, EXTENDED RELEASE ORAL at 09:10

## 2021-01-01 RX ADMIN — INSULIN LISPRO 20 UNITS: 100 INJECTION, SOLUTION INTRAVENOUS; SUBCUTANEOUS at 12:29

## 2021-01-01 RX ADMIN — FUROSEMIDE 40 MG: 10 INJECTION, SOLUTION INTRAMUSCULAR; INTRAVENOUS at 12:28

## 2021-01-01 RX ADMIN — PANTOPRAZOLE SODIUM 40 MG: 40 TABLET, DELAYED RELEASE ORAL at 08:36

## 2021-01-01 RX ADMIN — PANTOPRAZOLE SODIUM 40 MG: 40 TABLET, DELAYED RELEASE ORAL at 20:25

## 2021-01-01 RX ADMIN — DOCUSATE SODIUM 100 MG: 100 CAPSULE, LIQUID FILLED ORAL at 08:56

## 2021-01-01 RX ADMIN — LACTULOSE 50 G: 20 SOLUTION ORAL at 09:05

## 2021-01-01 RX ADMIN — AMOXICILLIN AND CLAVULANATE POTASSIUM 1 TABLET: 875; 125 TABLET, FILM COATED ORAL at 09:10

## 2021-01-01 RX ADMIN — ALLOPURINOL 100 MG: 100 TABLET ORAL at 09:10

## 2021-01-01 RX ADMIN — NADOLOL 20 MG: 20 TABLET ORAL at 18:18

## 2021-01-01 RX ADMIN — ZINC SULFATE 220 MG (50 MG) CAPSULE 220 MG: CAPSULE at 09:06

## 2021-01-01 RX ADMIN — MAGNESIUM GLUCONATE 500 MG ORAL TABLET 400 MG: 500 TABLET ORAL at 09:24

## 2021-01-01 RX ADMIN — EPOETIN ALFA-EPBX 40000 UNITS: 40000 INJECTION, SOLUTION INTRAVENOUS; SUBCUTANEOUS at 11:26

## 2021-01-01 RX ADMIN — IPRATROPIUM BROMIDE AND ALBUTEROL SULFATE 3 ML: 2.5; .5 SOLUTION RESPIRATORY (INHALATION) at 18:13

## 2021-01-01 RX ADMIN — INSULIN LISPRO 20 UNITS: 100 INJECTION, SOLUTION INTRAVENOUS; SUBCUTANEOUS at 19:33

## 2021-01-01 RX ADMIN — FERROUS SULFATE TAB EC 324 MG (65 MG FE EQUIVALENT) 324 MG: 324 (65 FE) TABLET DELAYED RESPONSE at 08:56

## 2021-01-01 RX ADMIN — AMOXICILLIN AND CLAVULANATE POTASSIUM 1 TABLET: 875; 125 TABLET, FILM COATED ORAL at 20:30

## 2021-01-01 RX ADMIN — SODIUM BICARBONATE 650 MG: 650 TABLET ORAL at 09:10

## 2021-01-01 RX ADMIN — RIFAXIMIN 550 MG: 550 TABLET ORAL at 20:25

## 2021-01-01 RX ADMIN — BUDESONIDE 0.5 MG: 0.5 SUSPENSION RESPIRATORY (INHALATION) at 19:23

## 2021-01-01 RX ADMIN — MULTIPLE VITAMINS W/ MINERALS TAB 1 TABLET: TAB at 08:56

## 2021-01-01 RX ADMIN — Medication 3 ML: at 09:35

## 2021-01-01 RX ADMIN — LACTULOSE 30 G: 20 SOLUTION ORAL at 08:02

## 2021-01-01 RX ADMIN — DOCUSATE SODIUM 100 MG: 100 CAPSULE, LIQUID FILLED ORAL at 21:34

## 2021-01-01 RX ADMIN — RIFAXIMIN 550 MG: 550 TABLET ORAL at 10:05

## 2021-01-01 RX ADMIN — ASPIRIN 81 MG: 81 TABLET, COATED ORAL at 08:19

## 2021-01-01 RX ADMIN — INSULIN LISPRO 8 UNITS: 100 INJECTION, SOLUTION INTRAVENOUS; SUBCUTANEOUS at 17:08

## 2021-01-01 RX ADMIN — ALLOPURINOL 100 MG: 100 TABLET ORAL at 22:13

## 2021-01-01 RX ADMIN — Medication 3 ML: at 20:30

## 2021-01-01 RX ADMIN — ACETAMINOPHEN 650 MG: 325 TABLET, FILM COATED ORAL at 22:48

## 2021-01-01 RX ADMIN — ALLOPURINOL 100 MG: 100 TABLET ORAL at 21:09

## 2021-01-01 RX ADMIN — INSULIN LISPRO 10 UNITS: 100 INJECTION, SOLUTION INTRAVENOUS; SUBCUTANEOUS at 12:38

## 2021-01-01 RX ADMIN — Medication 10 ML: at 08:49

## 2021-01-01 RX ADMIN — INSULIN LISPRO 20 UNITS: 100 INJECTION, SOLUTION INTRAVENOUS; SUBCUTANEOUS at 12:30

## 2021-01-01 RX ADMIN — RIFAXIMIN 550 MG: 550 TABLET ORAL at 09:10

## 2021-01-01 RX ADMIN — ZINC SULFATE 220 MG (50 MG) CAPSULE 220 MG: CAPSULE at 08:01

## 2021-01-01 RX ADMIN — SODIUM BICARBONATE 650 MG: 650 TABLET ORAL at 08:01

## 2021-01-01 RX ADMIN — IRON SUCROSE 400 MG: 20 INJECTION, SOLUTION INTRAVENOUS at 08:36

## 2021-01-01 RX ADMIN — PIPERACILLIN AND TAZOBACTAM 3.38 G: 3; .375 INJECTION, POWDER, LYOPHILIZED, FOR SOLUTION INTRAVENOUS at 13:26

## 2021-01-01 RX ADMIN — INSULIN LISPRO 20 UNITS: 100 INJECTION, SOLUTION INTRAVENOUS; SUBCUTANEOUS at 13:11

## 2021-01-01 RX ADMIN — THERA TABS 1 TABLET: TAB at 09:54

## 2021-01-01 RX ADMIN — LEVOTHYROXINE SODIUM 75 MCG: 75 TABLET ORAL at 05:05

## 2021-01-01 RX ADMIN — INSULIN LISPRO 5 UNITS: 100 INJECTION, SOLUTION INTRAVENOUS; SUBCUTANEOUS at 11:51

## 2021-01-01 RX ADMIN — Medication 3 ML: at 09:05

## 2021-01-01 RX ADMIN — IPRATROPIUM BROMIDE AND ALBUTEROL SULFATE 3 ML: 2.5; .5 SOLUTION RESPIRATORY (INHALATION) at 15:46

## 2021-01-01 RX ADMIN — RIFAXIMIN 550 MG: 550 TABLET ORAL at 08:36

## 2021-01-01 RX ADMIN — RIFAXIMIN 550 MG: 550 TABLET ORAL at 09:03

## 2021-01-01 RX ADMIN — MAGNESIUM GLUCONATE 500 MG ORAL TABLET 400 MG: 500 TABLET ORAL at 08:56

## 2021-01-01 RX ADMIN — EPOETIN ALFA-EPBX 40000 UNITS: 40000 INJECTION, SOLUTION INTRAVENOUS; SUBCUTANEOUS at 16:56

## 2021-01-01 RX ADMIN — LACTULOSE 30 G: 20 SOLUTION ORAL at 08:19

## 2021-01-01 RX ADMIN — BUDESONIDE 0.5 MG: 0.5 SUSPENSION RESPIRATORY (INHALATION) at 19:35

## 2021-01-01 RX ADMIN — LACTULOSE 50 G: 20 SOLUTION ORAL at 21:09

## 2021-01-01 RX ADMIN — EPOETIN ALFA-EPBX 40000 UNITS: 40000 INJECTION, SOLUTION INTRAVENOUS; SUBCUTANEOUS at 11:39

## 2021-01-01 RX ADMIN — PANTOPRAZOLE SODIUM 40 MG: 40 TABLET, DELAYED RELEASE ORAL at 09:03

## 2021-01-01 RX ADMIN — Medication 3 ML: at 20:25

## 2021-01-01 RX ADMIN — LEVOTHYROXINE SODIUM 75 MCG: 75 TABLET ORAL at 08:19

## 2021-01-01 RX ADMIN — DOCUSATE SODIUM 100 MG: 100 CAPSULE, LIQUID FILLED ORAL at 20:56

## 2021-01-01 RX ADMIN — INSULIN LISPRO 5 UNITS: 100 INJECTION, SOLUTION INTRAVENOUS; SUBCUTANEOUS at 17:07

## 2021-01-01 RX ADMIN — INSULIN LISPRO 3 UNITS: 100 INJECTION, SOLUTION INTRAVENOUS; SUBCUTANEOUS at 08:19

## 2021-01-01 RX ADMIN — SODIUM CHLORIDE: 0.9 INJECTION, SOLUTION INTRAVENOUS at 12:01

## 2021-01-01 RX ADMIN — PANTOPRAZOLE SODIUM 40 MG: 40 TABLET, DELAYED RELEASE ORAL at 20:30

## 2021-01-01 RX ADMIN — Medication 3 ML: at 21:46

## 2021-01-01 RX ADMIN — SODIUM BICARBONATE 650 MG: 650 TABLET ORAL at 20:45

## 2021-01-01 RX ADMIN — IPRATROPIUM BROMIDE AND ALBUTEROL SULFATE 3 ML: 2.5; .5 SOLUTION RESPIRATORY (INHALATION) at 23:04

## 2021-01-01 RX ADMIN — INSULIN LISPRO 5 UNITS: 100 INJECTION, SOLUTION INTRAVENOUS; SUBCUTANEOUS at 11:36

## 2021-01-01 RX ADMIN — LACTULOSE 30 G: 20 SOLUTION ORAL at 15:22

## 2021-01-01 RX ADMIN — LACTULOSE 30 G: 20 SOLUTION ORAL at 09:22

## 2021-01-01 RX ADMIN — DULOXETINE 60 MG: 30 CAPSULE, DELAYED RELEASE ORAL at 09:02

## 2021-01-01 RX ADMIN — IPRATROPIUM BROMIDE AND ALBUTEROL SULFATE 3 ML: .5; 3 SOLUTION RESPIRATORY (INHALATION) at 09:15

## 2021-01-01 RX ADMIN — SODIUM BICARBONATE 1300 MG: 650 TABLET ORAL at 17:00

## 2021-01-01 RX ADMIN — EPOETIN ALFA-EPBX 40000 UNITS: 40000 INJECTION, SOLUTION INTRAVENOUS; SUBCUTANEOUS at 15:50

## 2021-01-01 RX ADMIN — Medication 3 ML: at 10:09

## 2021-01-01 RX ADMIN — LACTULOSE 30 G: 20 SOLUTION ORAL at 20:56

## 2021-01-01 RX ADMIN — FOLIC ACID 1 MG: 1 TABLET ORAL at 08:24

## 2021-01-01 RX ADMIN — AMOXICILLIN AND CLAVULANATE POTASSIUM 1 TABLET: 875; 125 TABLET, FILM COATED ORAL at 21:34

## 2021-01-01 RX ADMIN — LACTULOSE 50 G: 20 SOLUTION ORAL at 12:30

## 2021-01-01 RX ADMIN — LACTULOSE 30 G: 20 SOLUTION ORAL at 21:06

## 2021-01-01 RX ADMIN — FERROUS SULFATE TAB EC 324 MG (65 MG FE EQUIVALENT) 324 MG: 324 (65 FE) TABLET DELAYED RESPONSE at 08:24

## 2021-01-01 RX ADMIN — INSULIN LISPRO 3 UNITS: 100 INJECTION, SOLUTION INTRAVENOUS; SUBCUTANEOUS at 10:14

## 2021-01-01 RX ADMIN — DEXTROSE 15 G: 15 GEL ORAL at 17:14

## 2021-01-01 RX ADMIN — INSULIN LISPRO 20 UNITS: 100 INJECTION, SOLUTION INTRAVENOUS; SUBCUTANEOUS at 10:03

## 2021-01-01 RX ADMIN — DULOXETINE 60 MG: 30 CAPSULE, DELAYED RELEASE ORAL at 08:01

## 2021-01-01 RX ADMIN — TECHNETIUM TC 99M SESTAMIBI 1 DOSE: 1 INJECTION INTRAVENOUS at 09:00

## 2021-01-01 RX ADMIN — PANTOPRAZOLE SODIUM 40 MG: 40 TABLET, DELAYED RELEASE ORAL at 08:56

## 2021-01-01 RX ADMIN — Medication 10 ML: at 09:23

## 2021-01-01 RX ADMIN — ALLOPURINOL 100 MG: 100 TABLET ORAL at 09:24

## 2021-01-01 RX ADMIN — IPRATROPIUM BROMIDE AND ALBUTEROL SULFATE 3 ML: 2.5; .5 SOLUTION RESPIRATORY (INHALATION) at 14:50

## 2021-01-01 RX ADMIN — INSULIN LISPRO 5 UNITS: 100 INJECTION, SOLUTION INTRAVENOUS; SUBCUTANEOUS at 11:54

## 2021-01-01 RX ADMIN — ASPIRIN 81 MG: 81 TABLET, COATED ORAL at 09:54

## 2021-01-01 RX ADMIN — IPRATROPIUM BROMIDE AND ALBUTEROL SULFATE 3 ML: 2.5; .5 SOLUTION RESPIRATORY (INHALATION) at 19:10

## 2021-01-01 RX ADMIN — IPRATROPIUM BROMIDE AND ALBUTEROL SULFATE 3 ML: 2.5; .5 SOLUTION RESPIRATORY (INHALATION) at 07:32

## 2021-01-01 RX ADMIN — MAGNESIUM OXIDE TAB 400 MG (241.3 MG ELEMENTAL MG) 400 MG: 400 (241.3 MG) TAB at 08:36

## 2021-01-01 RX ADMIN — LACTULOSE 30 G: 20 SOLUTION ORAL at 21:34

## 2021-01-01 RX ADMIN — RIFAXIMIN 550 MG: 550 TABLET ORAL at 08:23

## 2021-01-01 RX ADMIN — EPOETIN ALFA-EPBX 40000 UNITS: 40000 INJECTION, SOLUTION INTRAVENOUS; SUBCUTANEOUS at 11:13

## 2021-01-01 RX ADMIN — ZINC SULFATE 220 MG (50 MG) CAPSULE 220 MG: CAPSULE at 08:35

## 2021-01-01 RX ADMIN — LEVOTHYROXINE SODIUM 75 MCG: 75 TABLET ORAL at 06:06

## 2021-01-01 RX ADMIN — DOCUSATE SODIUM 100 MG: 100 CAPSULE ORAL at 13:11

## 2021-01-01 RX ADMIN — FERROUS SULFATE TAB EC 324 MG (65 MG FE EQUIVALENT) 324 MG: 324 (65 FE) TABLET DELAYED RESPONSE at 08:01

## 2021-01-01 RX ADMIN — SODIUM CHLORIDE 125 ML/HR: 9 INJECTION, SOLUTION INTRAVENOUS at 00:48

## 2021-01-01 RX ADMIN — METOPROLOL SUCCINATE 25 MG: 25 TABLET, EXTENDED RELEASE ORAL at 17:24

## 2021-01-01 RX ADMIN — DULOXETINE HYDROCHLORIDE 60 MG: 30 CAPSULE, DELAYED RELEASE ORAL at 09:48

## 2021-01-01 RX ADMIN — ALLOPURINOL 100 MG: 100 TABLET ORAL at 20:45

## 2021-01-01 RX ADMIN — TECHNETIUM TC 99M SESTAMIBI 1 DOSE: 1 INJECTION INTRAVENOUS at 13:25

## 2021-01-01 RX ADMIN — LEVOCARNITINE ORAL SOLUTION 500 MG: 1 SOLUTION ORAL at 23:46

## 2021-01-01 RX ADMIN — SODIUM BICARBONATE 650 MG: 650 TABLET ORAL at 21:34

## 2021-01-01 RX ADMIN — AMOXICILLIN AND CLAVULANATE POTASSIUM 1 TABLET: 875; 125 TABLET, FILM COATED ORAL at 08:19

## 2021-01-01 RX ADMIN — Medication 3 ML: at 09:06

## 2021-01-01 RX ADMIN — SODIUM BICARBONATE 650 MG: 650 TABLET ORAL at 16:14

## 2021-01-01 RX ADMIN — ACETAMINOPHEN 1000 MG: 500 TABLET, FILM COATED ORAL at 23:28

## 2021-01-01 RX ADMIN — PROPOFOL 370 MG: 10 INJECTION, EMULSION INTRAVENOUS at 12:04

## 2021-01-01 RX ADMIN — FENTANYL CITRATE 50 MCG: 50 INJECTION, SOLUTION INTRAMUSCULAR; INTRAVENOUS at 08:29

## 2021-01-01 RX ADMIN — MAGNESIUM GLUCONATE 500 MG ORAL TABLET 400 MG: 500 TABLET ORAL at 09:22

## 2021-01-01 RX ADMIN — LACTULOSE 40 G: 20 SOLUTION ORAL at 09:22

## 2021-01-01 RX ADMIN — RIFAXIMIN 550 MG: 550 TABLET ORAL at 09:54

## 2021-01-01 RX ADMIN — RIFAXIMIN 550 MG: 550 TABLET ORAL at 21:34

## 2021-01-01 RX ADMIN — MAGNESIUM GLUCONATE 500 MG ORAL TABLET 400 MG: 500 TABLET ORAL at 09:48

## 2021-01-01 RX ADMIN — NADOLOL 20 MG: 20 TABLET ORAL at 09:47

## 2021-01-01 RX ADMIN — THERA TABS 1 TABLET: TAB at 08:02

## 2021-01-01 RX ADMIN — DOCUSATE SODIUM 100 MG: 100 CAPSULE ORAL at 09:23

## 2021-01-01 RX ADMIN — LACTULOSE 40 G: 20 SOLUTION ORAL at 13:09

## 2021-01-01 RX ADMIN — LACTULOSE 50 G: 20 SOLUTION ORAL at 12:28

## 2021-01-01 RX ADMIN — FOLIC ACID 1 MG: 1 TABLET ORAL at 09:54

## 2021-01-01 RX ADMIN — INSULIN LISPRO 5 UNITS: 100 INJECTION, SOLUTION INTRAVENOUS; SUBCUTANEOUS at 08:25

## 2021-01-01 RX ADMIN — SODIUM BICARBONATE 1300 MG: 650 TABLET ORAL at 21:45

## 2021-01-01 RX ADMIN — ZINC SULFATE 220 MG (50 MG) CAPSULE 220 MG: CAPSULE at 08:56

## 2021-01-01 RX ADMIN — ALLOPURINOL 100 MG: 100 TABLET ORAL at 22:48

## 2021-01-01 RX ADMIN — PIPERACILLIN AND TAZOBACTAM 3.38 G: 3; .375 INJECTION, POWDER, LYOPHILIZED, FOR SOLUTION INTRAVENOUS at 18:31

## 2021-01-01 RX ADMIN — LACTULOSE 50 G: 20 SOLUTION ORAL at 09:46

## 2021-01-01 RX ADMIN — DULOXETINE 60 MG: 30 CAPSULE, DELAYED RELEASE ORAL at 09:54

## 2021-01-01 RX ADMIN — PANTOPRAZOLE SODIUM 40 MG: 40 TABLET, DELAYED RELEASE ORAL at 21:09

## 2021-01-01 RX ADMIN — Medication 3 ML: at 21:34

## 2021-01-01 RX ADMIN — MAGNESIUM GLUCONATE 500 MG ORAL TABLET 400 MG: 500 TABLET ORAL at 10:05

## 2021-01-01 RX ADMIN — PIPERACILLIN SODIUM AND TAZOBACTAM SODIUM 4.5 G: 4; .5 INJECTION, POWDER, LYOPHILIZED, FOR SOLUTION INTRAVENOUS at 10:20

## 2021-01-01 RX ADMIN — LACTULOSE 30 G: 20 SOLUTION ORAL at 17:20

## 2021-01-01 RX ADMIN — Medication 3 ML: at 21:05

## 2021-01-01 RX ADMIN — PANTOPRAZOLE SODIUM 40 MG: 40 TABLET, DELAYED RELEASE ORAL at 22:48

## 2021-01-01 RX ADMIN — PANTOPRAZOLE SODIUM 40 MG: 40 TABLET, DELAYED RELEASE ORAL at 22:13

## 2021-01-01 RX ADMIN — IPRATROPIUM BROMIDE AND ALBUTEROL SULFATE 3 ML: 2.5; .5 SOLUTION RESPIRATORY (INHALATION) at 19:04

## 2021-01-01 RX ADMIN — PIPERACILLIN SODIUM AND TAZOBACTAM SODIUM 4.5 G: 4; .5 INJECTION, POWDER, LYOPHILIZED, FOR SOLUTION INTRAVENOUS at 16:41

## 2021-01-01 RX ADMIN — PIPERACILLIN AND TAZOBACTAM 3.38 G: 3; .375 INJECTION, POWDER, LYOPHILIZED, FOR SOLUTION INTRAVENOUS at 20:24

## 2021-01-01 RX ADMIN — METOPROLOL SUCCINATE 25 MG: 25 TABLET, EXTENDED RELEASE ORAL at 13:10

## 2021-01-01 RX ADMIN — FENTANYL CITRATE 50 MCG: 50 INJECTION, SOLUTION INTRAMUSCULAR; INTRAVENOUS at 08:33

## 2021-01-01 RX ADMIN — Medication 3 ML: at 08:10

## 2021-01-01 RX ADMIN — LACTULOSE 50 G: 20 SOLUTION ORAL at 13:00

## 2021-01-01 RX ADMIN — MAGNESIUM OXIDE TAB 400 MG (241.3 MG ELEMENTAL MG) 400 MG: 400 (241.3 MG) TAB at 09:10

## 2021-01-01 RX ADMIN — DOCUSATE SODIUM 100 MG: 100 CAPSULE, LIQUID FILLED ORAL at 20:25

## 2021-01-01 RX ADMIN — IPRATROPIUM BROMIDE AND ALBUTEROL SULFATE 3 ML: 2.5; .5 SOLUTION RESPIRATORY (INHALATION) at 06:34

## 2021-01-01 RX ADMIN — PIPERACILLIN SODIUM AND TAZOBACTAM SODIUM 4.5 G: 4; .5 INJECTION, POWDER, LYOPHILIZED, FOR SOLUTION INTRAVENOUS at 03:23

## 2021-01-01 RX ADMIN — ZINC SULFATE 220 MG (50 MG) CAPSULE 220 MG: CAPSULE at 09:54

## 2021-01-01 RX ADMIN — RIFAXIMIN 550 MG: 550 TABLET ORAL at 09:48

## 2021-01-01 RX ADMIN — LACTULOSE 30 G: 20 SOLUTION ORAL at 17:04

## 2021-01-01 RX ADMIN — ALLOPURINOL 100 MG: 100 TABLET ORAL at 09:22

## 2021-01-01 RX ADMIN — SODIUM BICARBONATE 1300 MG: 650 TABLET ORAL at 16:57

## 2021-01-01 RX ADMIN — INSULIN GLARGINE 30 UNITS: 100 INJECTION, SOLUTION SUBCUTANEOUS at 20:31

## 2021-01-01 RX ADMIN — SODIUM BICARBONATE 650 MG TABLET 650 MG: at 08:23

## 2021-01-01 RX ADMIN — LIDOCAINE HYDROCHLORIDE 100 MG: 10 INJECTION, SOLUTION EPIDURAL; INFILTRATION; INTRACAUDAL; PERINEURAL at 11:14

## 2021-01-01 RX ADMIN — INSULIN LISPRO 10 UNITS: 100 INJECTION, SOLUTION INTRAVENOUS; SUBCUTANEOUS at 11:54

## 2021-01-01 RX ADMIN — PIPERACILLIN AND TAZOBACTAM 3.38 G: 3; .375 INJECTION, POWDER, LYOPHILIZED, FOR SOLUTION INTRAVENOUS at 06:05

## 2021-01-01 RX ADMIN — INSULIN GLARGINE 30 UNITS: 100 INJECTION, SOLUTION SUBCUTANEOUS at 21:00

## 2021-01-01 RX ADMIN — LACTULOSE 30 G: 20 SOLUTION ORAL at 16:14

## 2021-01-01 RX ADMIN — Medication 3 ML: at 08:19

## 2021-01-01 RX ADMIN — SODIUM CHLORIDE 250 MG: 9 INJECTION, SOLUTION INTRAVENOUS at 23:06

## 2021-01-01 RX ADMIN — IPRATROPIUM BROMIDE AND ALBUTEROL SULFATE 3 ML: 2.5; .5 SOLUTION RESPIRATORY (INHALATION) at 15:32

## 2021-01-01 RX ADMIN — ASPIRIN 81 MG: 81 TABLET, COATED ORAL at 08:23

## 2021-01-01 RX ADMIN — CEFEPIME 2 G: 2 INJECTION, POWDER, FOR SOLUTION INTRAVENOUS at 08:57

## 2021-01-01 RX ADMIN — SODIUM BICARBONATE 1300 MG: 650 TABLET ORAL at 21:09

## 2021-01-01 RX ADMIN — ALLOPURINOL 100 MG: 100 TABLET ORAL at 08:01

## 2021-01-01 RX ADMIN — LEVOTHYROXINE SODIUM 75 MCG: 75 TABLET ORAL at 05:34

## 2021-01-01 RX ADMIN — DOCUSATE SODIUM 100 MG: 100 CAPSULE, LIQUID FILLED ORAL at 20:30

## 2021-01-01 RX ADMIN — ALLOPURINOL 100 MG: 100 TABLET ORAL at 08:36

## 2021-01-01 RX ADMIN — LACTULOSE 30 G: 20 SOLUTION ORAL at 09:11

## 2021-01-01 RX ADMIN — LEVOTHYROXINE SODIUM 75 MCG: 0.07 TABLET ORAL at 09:24

## 2021-01-01 RX ADMIN — ALLOPURINOL 100 MG: 100 TABLET ORAL at 13:10

## 2021-01-01 RX ADMIN — DOCUSATE SODIUM 100 MG: 100 CAPSULE, LIQUID FILLED ORAL at 08:01

## 2021-01-01 RX ADMIN — ZINC SULFATE 220 MG (50 MG) CAPSULE 220 MG: CAPSULE at 08:23

## 2021-01-01 RX ADMIN — INSULIN GLARGINE 20 UNITS: 100 INJECTION, SOLUTION SUBCUTANEOUS at 20:32

## 2021-01-01 RX ADMIN — LEVOTHYROXINE SODIUM 75 MCG: 75 TABLET ORAL at 06:12

## 2021-01-01 RX ADMIN — SODIUM BICARBONATE 650 MG: 650 TABLET ORAL at 17:04

## 2021-01-01 RX ADMIN — ALLOPURINOL 100 MG: 100 TABLET ORAL at 21:06

## 2021-01-01 RX ADMIN — MAGNESIUM OXIDE TAB 400 MG (241.3 MG ELEMENTAL MG) 400 MG: 400 (241.3 MG) TAB at 09:54

## 2021-01-01 RX ADMIN — Medication 3 ML: at 20:46

## 2021-01-01 RX ADMIN — RIFAXIMIN 550 MG: 550 TABLET ORAL at 20:30

## 2021-01-01 RX ADMIN — DULOXETINE HYDROCHLORIDE 60 MG: 30 CAPSULE, DELAYED RELEASE ORAL at 10:04

## 2021-01-01 RX ADMIN — INSULIN GLARGINE 26 UNITS: 100 INJECTION, SOLUTION SUBCUTANEOUS at 21:26

## 2021-01-01 RX ADMIN — EPOETIN ALFA-EPBX 40000 UNITS: 40000 INJECTION, SOLUTION INTRAVENOUS; SUBCUTANEOUS at 13:48

## 2021-01-01 RX ADMIN — RIFAXIMIN 550 MG: 550 TABLET ORAL at 09:24

## 2021-01-01 RX ADMIN — Medication 3 ML: at 09:22

## 2021-01-01 RX ADMIN — LACTULOSE 30 G: 20 SOLUTION ORAL at 17:07

## 2021-01-01 RX ADMIN — Medication 1 PACKET: at 09:05

## 2021-01-01 RX ADMIN — NADOLOL 20 MG: 20 TABLET ORAL at 09:06

## 2021-01-01 RX ADMIN — Medication 3 ML: at 13:11

## 2021-01-01 RX ADMIN — SODIUM BICARBONATE 650 MG: 650 TABLET ORAL at 08:19

## 2021-01-01 RX ADMIN — DULOXETINE 60 MG: 30 CAPSULE, DELAYED RELEASE ORAL at 08:35

## 2021-01-01 RX ADMIN — ASPIRIN 81 MG: 81 TABLET, COATED ORAL at 08:56

## 2021-01-01 RX ADMIN — DULOXETINE HYDROCHLORIDE 60 MG: 30 CAPSULE, DELAYED RELEASE ORAL at 09:06

## 2021-01-01 RX ADMIN — IPRATROPIUM BROMIDE AND ALBUTEROL SULFATE 3 ML: 2.5; .5 SOLUTION RESPIRATORY (INHALATION) at 11:32

## 2021-01-01 RX ADMIN — ALLOPURINOL 100 MG: 100 TABLET ORAL at 09:06

## 2021-01-01 RX ADMIN — FOLIC ACID 1 MG: 1 TABLET ORAL at 09:03

## 2021-01-01 RX ADMIN — DULOXETINE HYDROCHLORIDE 60 MG: 30 CAPSULE, DELAYED RELEASE ORAL at 08:56

## 2021-01-01 RX ADMIN — ASPIRIN 81 MG: 81 TABLET, COATED ORAL at 20:55

## 2021-01-01 RX ADMIN — RIFAXIMIN 550 MG: 550 TABLET ORAL at 21:45

## 2021-01-01 RX ADMIN — FOLIC ACID 1 MG: 1 TABLET ORAL at 08:19

## 2021-01-01 RX ADMIN — DOXYCYCLINE 100 MG: 100 TABLET, FILM COATED ORAL at 11:54

## 2021-01-01 RX ADMIN — SODIUM BICARBONATE 650 MG: 650 TABLET ORAL at 16:22

## 2021-01-01 RX ADMIN — DULOXETINE 60 MG: 30 CAPSULE, DELAYED RELEASE ORAL at 08:18

## 2021-01-01 RX ADMIN — FERROUS SULFATE TAB EC 324 MG (65 MG FE EQUIVALENT) 324 MG: 324 (65 FE) TABLET DELAYED RESPONSE at 08:35

## 2021-01-01 RX ADMIN — LEVOTHYROXINE SODIUM 75 MCG: 75 TABLET ORAL at 09:03

## 2021-01-01 RX ADMIN — DOCUSATE SODIUM 100 MG: 100 CAPSULE, LIQUID FILLED ORAL at 09:04

## 2021-01-01 RX ADMIN — FERROUS SULFATE TAB EC 324 MG (65 MG FE EQUIVALENT) 324 MG: 324 (65 FE) TABLET DELAYED RESPONSE at 09:10

## 2021-01-01 RX ADMIN — LACTULOSE 30 G: 20 SOLUTION ORAL at 20:30

## 2021-01-01 RX ADMIN — FOLIC ACID 1 MG: 1 TABLET ORAL at 10:05

## 2021-01-01 RX ADMIN — INSULIN LISPRO 10 UNITS: 100 INJECTION, SOLUTION INTRAVENOUS; SUBCUTANEOUS at 17:07

## 2021-01-01 RX ADMIN — ASPIRIN 81 MG: 81 TABLET, COATED ORAL at 09:10

## 2021-01-01 RX ADMIN — LACTULOSE 50 G: 20 SOLUTION ORAL at 17:02

## 2021-01-01 RX ADMIN — RIFAXIMIN 550 MG: 550 TABLET ORAL at 20:45

## 2021-01-01 RX ADMIN — RIFAXIMIN 550 MG: 550 TABLET ORAL at 20:56

## 2021-01-01 RX ADMIN — DOCUSATE SODIUM 100 MG: 100 CAPSULE ORAL at 21:09

## 2021-01-01 RX ADMIN — INSULIN LISPRO 10 UNITS: 100 INJECTION, SOLUTION INTRAVENOUS; SUBCUTANEOUS at 17:26

## 2021-01-01 RX ADMIN — SODIUM BICARBONATE 650 MG TABLET 650 MG: at 08:56

## 2021-01-01 RX ADMIN — Medication 1 PACKET: at 13:09

## 2021-01-01 RX ADMIN — ALLOPURINOL 100 MG: 100 TABLET ORAL at 21:45

## 2021-01-01 RX ADMIN — EPOETIN ALFA-EPBX 40000 UNITS: 40000 INJECTION, SOLUTION INTRAVENOUS; SUBCUTANEOUS at 12:50

## 2021-01-01 RX ADMIN — SODIUM BICARBONATE 650 MG: 650 TABLET ORAL at 22:48

## 2021-01-01 RX ADMIN — SODIUM BICARBONATE 650 MG: 650 TABLET ORAL at 09:22

## 2021-01-01 RX ADMIN — LEVOTHYROXINE SODIUM 75 MCG: 75 TABLET ORAL at 07:14

## 2021-01-01 RX ADMIN — INSULIN LISPRO 2 UNITS: 100 INJECTION, SOLUTION INTRAVENOUS; SUBCUTANEOUS at 13:00

## 2021-01-01 RX ADMIN — PANTOPRAZOLE SODIUM 40 MG: 40 TABLET, DELAYED RELEASE ORAL at 09:22

## 2021-01-01 RX ADMIN — Medication 3 ML: at 08:36

## 2021-01-01 RX ADMIN — ALLOPURINOL 100 MG: 100 TABLET ORAL at 08:19

## 2021-01-01 RX ADMIN — FOLIC ACID 1 MG: 1 TABLET ORAL at 08:36

## 2021-01-01 RX ADMIN — ASPIRIN 81 MG: 81 TABLET, COATED ORAL at 08:01

## 2021-01-01 RX ADMIN — FOLIC ACID 1 MG: 1 TABLET ORAL at 08:56

## 2021-01-01 RX ADMIN — LEVOCARNITINE ORAL SOLUTION 500 MG: 1 SOLUTION ORAL at 16:57

## 2021-01-01 RX ADMIN — FOLIC ACID 1 MG: 1 TABLET ORAL at 08:01

## 2021-01-01 RX ADMIN — ALLOPURINOL 100 MG: 100 TABLET ORAL at 09:48

## 2021-01-01 RX ADMIN — INSULIN LISPRO 20 UNITS: 100 INJECTION, SOLUTION INTRAVENOUS; SUBCUTANEOUS at 17:02

## 2021-01-01 RX ADMIN — INSULIN GLARGINE 30 UNITS: 100 INJECTION, SOLUTION SUBCUTANEOUS at 20:26

## 2021-01-01 RX ADMIN — RIFAXIMIN 550 MG: 550 TABLET ORAL at 08:56

## 2021-01-01 RX ADMIN — INSULIN LISPRO 8 UNITS: 100 INJECTION, SOLUTION INTRAVENOUS; SUBCUTANEOUS at 08:57

## 2021-01-01 RX ADMIN — INSULIN LISPRO 3 UNITS: 100 INJECTION, SOLUTION INTRAVENOUS; SUBCUTANEOUS at 09:02

## 2021-01-01 RX ADMIN — SODIUM BICARBONATE 650 MG: 650 TABLET ORAL at 09:03

## 2021-01-01 RX ADMIN — INSULIN LISPRO 5 UNITS: 100 INJECTION, SOLUTION INTRAVENOUS; SUBCUTANEOUS at 12:58

## 2021-01-01 RX ADMIN — ALLOPURINOL 100 MG: 100 TABLET ORAL at 20:31

## 2021-01-01 RX ADMIN — ALLOPURINOL 100 MG: 100 TABLET ORAL at 08:56

## 2021-01-01 RX ADMIN — SODIUM BICARBONATE 650 MG: 650 TABLET ORAL at 17:22

## 2021-01-01 RX ADMIN — ASPIRIN 81 MG: 81 TABLET, COATED ORAL at 09:24

## 2021-01-01 RX ADMIN — ZINC SULFATE 220 MG (50 MG) CAPSULE 220 MG: CAPSULE at 09:03

## 2021-01-01 RX ADMIN — DULOXETINE HYDROCHLORIDE 60 MG: 30 CAPSULE, DELAYED RELEASE ORAL at 09:23

## 2021-01-01 RX ADMIN — PANTOPRAZOLE SODIUM 40 MG: 40 TABLET, DELAYED RELEASE ORAL at 09:48

## 2021-01-01 RX ADMIN — ASPIRIN 81 MG: 81 TABLET, COATED ORAL at 08:36

## 2021-01-01 RX ADMIN — PANTOPRAZOLE SODIUM 40 MG: 40 TABLET, DELAYED RELEASE ORAL at 13:10

## 2021-01-01 RX ADMIN — THERA TABS 1 TABLET: TAB at 09:10

## 2021-01-01 RX ADMIN — FUROSEMIDE 20 MG: 10 INJECTION, SOLUTION INTRAMUSCULAR; INTRAVENOUS at 09:47

## 2021-01-01 RX ADMIN — FOLIC ACID 1 MG: 1 TABLET ORAL at 09:06

## 2021-01-01 RX ADMIN — ACETAMINOPHEN 650 MG: 325 TABLET ORAL at 23:10

## 2021-01-01 RX ADMIN — DULOXETINE HYDROCHLORIDE 60 MG: 30 CAPSULE, DELAYED RELEASE ORAL at 13:10

## 2021-01-01 RX ADMIN — DIPHENHYDRAMINE HYDROCHLORIDE 25 MG: 25 CAPSULE ORAL at 10:40

## 2021-01-01 RX ADMIN — MAGNESIUM OXIDE TAB 400 MG (241.3 MG ELEMENTAL MG) 400 MG: 400 (241.3 MG) TAB at 08:19

## 2021-01-01 RX ADMIN — ZINC SULFATE 220 MG (50 MG) CAPSULE 220 MG: CAPSULE at 10:05

## 2021-01-01 RX ADMIN — SODIUM CHLORIDE 125 ML/HR: 9 INJECTION, SOLUTION INTRAVENOUS at 10:19

## 2021-01-01 RX ADMIN — RIFAXIMIN 550 MG: 550 TABLET ORAL at 08:02

## 2021-01-01 RX ADMIN — SODIUM BICARBONATE 1300 MG: 650 TABLET ORAL at 17:40

## 2021-01-01 RX ADMIN — ZINC SULFATE 220 MG (50 MG) CAPSULE 220 MG: CAPSULE at 09:24

## 2021-01-01 RX ADMIN — INSULIN LISPRO 5 UNITS: 100 INJECTION, SOLUTION INTRAVENOUS; SUBCUTANEOUS at 12:50

## 2021-01-01 RX ADMIN — SODIUM BICARBONATE 650 MG: 650 TABLET ORAL at 21:07

## 2021-01-01 RX ADMIN — FERROUS SULFATE TAB EC 324 MG (65 MG FE EQUIVALENT) 324 MG: 324 (65 FE) TABLET DELAYED RESPONSE at 08:19

## 2021-01-01 RX ADMIN — LACTULOSE 30 G: 20 SOLUTION ORAL at 08:36

## 2021-01-01 RX ADMIN — SODIUM BICARBONATE 650 MG: 650 TABLET ORAL at 21:09

## 2021-01-01 RX ADMIN — MIDAZOLAM 0.5 MG: 1 INJECTION INTRAMUSCULAR; INTRAVENOUS at 08:33

## 2021-01-01 RX ADMIN — INSULIN LISPRO 3 UNITS: 100 INJECTION, SOLUTION INTRAVENOUS; SUBCUTANEOUS at 17:24

## 2021-01-01 RX ADMIN — SODIUM CHLORIDE 100 ML/HR: 9 INJECTION, SOLUTION INTRAVENOUS at 08:47

## 2021-01-01 RX ADMIN — PANTOPRAZOLE SODIUM 40 MG: 40 TABLET, DELAYED RELEASE ORAL at 20:31

## 2021-01-01 RX ADMIN — RIFAXIMIN 550 MG: 550 TABLET ORAL at 20:31

## 2021-01-01 RX ADMIN — DOCUSATE SODIUM 100 MG: 100 CAPSULE, LIQUID FILLED ORAL at 09:55

## 2021-01-01 RX ADMIN — ALLOPURINOL 100 MG: 100 TABLET ORAL at 10:05

## 2021-01-01 RX ADMIN — SODIUM BICARBONATE 650 MG: 650 TABLET ORAL at 09:54

## 2021-01-01 RX ADMIN — EPOETIN ALFA-EPBX 40000 UNITS: 40000 INJECTION, SOLUTION INTRAVENOUS; SUBCUTANEOUS at 11:53

## 2021-01-01 RX ADMIN — FERROUS SULFATE TAB EC 324 MG (65 MG FE EQUIVALENT) 324 MG: 324 (65 FE) TABLET DELAYED RESPONSE at 09:54

## 2021-01-01 RX ADMIN — THERA TABS 1 TABLET: TAB at 09:03

## 2021-01-01 RX ADMIN — RIFAXIMIN 550 MG: 550 TABLET ORAL at 21:09

## 2021-01-01 RX ADMIN — LEVOTHYROXINE SODIUM 75 MCG: 75 TABLET ORAL at 05:20

## 2021-01-01 RX ADMIN — PROPOFOL 350 MG: 10 INJECTION, EMULSION INTRAVENOUS at 11:14

## 2021-01-01 RX ADMIN — FOLIC ACID 1 MG: 1 TABLET ORAL at 09:48

## 2021-01-01 RX ADMIN — VANCOMYCIN HYDROCHLORIDE 2000 MG: 10 INJECTION, POWDER, LYOPHILIZED, FOR SOLUTION INTRAVENOUS at 23:31

## 2021-01-01 RX ADMIN — LEVOCARNITINE ORAL SOLUTION 500 MG: 1 SOLUTION ORAL at 16:22

## 2021-01-01 RX ADMIN — LACTULOSE 50 G: 20 SOLUTION ORAL at 21:45

## 2021-01-01 RX ADMIN — DULOXETINE 60 MG: 30 CAPSULE, DELAYED RELEASE ORAL at 09:22

## 2021-01-01 RX ADMIN — INSULIN LISPRO 3 UNITS: 100 INJECTION, SOLUTION INTRAVENOUS; SUBCUTANEOUS at 07:58

## 2021-01-01 RX ADMIN — PANTOPRAZOLE SODIUM 40 MG: 40 TABLET, DELAYED RELEASE ORAL at 09:06

## 2021-01-01 RX ADMIN — PIPERACILLIN AND TAZOBACTAM 3.38 G: 3; .375 INJECTION, POWDER, LYOPHILIZED, FOR SOLUTION INTRAVENOUS at 11:36

## 2021-01-01 RX ADMIN — FOLIC ACID 1 MG: 1 TABLET ORAL at 09:11

## 2021-01-01 RX ADMIN — DOCUSATE SODIUM 100 MG: 100 CAPSULE, LIQUID FILLED ORAL at 08:19

## 2021-01-01 RX ADMIN — INSULIN LISPRO 20 UNITS: 100 INJECTION, SOLUTION INTRAVENOUS; SUBCUTANEOUS at 17:32

## 2021-01-01 RX ADMIN — INSULIN GLARGINE 20 UNITS: 100 INJECTION, SOLUTION SUBCUTANEOUS at 21:04

## 2021-01-01 RX ADMIN — SODIUM BICARBONATE 650 MG: 650 TABLET ORAL at 15:22

## 2021-01-01 RX ADMIN — INSULIN LISPRO 5 UNITS: 100 INJECTION, SOLUTION INTRAVENOUS; SUBCUTANEOUS at 12:21

## 2021-01-01 RX ADMIN — PANTOPRAZOLE SODIUM 40 MG: 40 TABLET, DELAYED RELEASE ORAL at 21:34

## 2021-01-01 RX ADMIN — INSULIN LISPRO 10 UNITS: 100 INJECTION, SOLUTION INTRAVENOUS; SUBCUTANEOUS at 16:40

## 2021-01-01 RX ADMIN — Medication 3 ML: at 09:07

## 2021-01-01 RX ADMIN — PANTOPRAZOLE SODIUM 40 MG: 40 TABLET, DELAYED RELEASE ORAL at 09:10

## 2021-01-01 RX ADMIN — INSULIN LISPRO 20 UNITS: 100 INJECTION, SOLUTION INTRAVENOUS; SUBCUTANEOUS at 09:07

## 2021-01-01 RX ADMIN — EPOETIN ALFA-EPBX 40000 UNITS: 40000 INJECTION, SOLUTION INTRAVENOUS; SUBCUTANEOUS at 11:37

## 2021-01-01 RX ADMIN — LACTULOSE 30 G: 20 SOLUTION ORAL at 17:21

## 2021-01-01 RX ADMIN — LIDOCAINE HYDROCHLORIDE 6 ML: 10 INJECTION, SOLUTION EPIDURAL; INFILTRATION; INTRACAUDAL; PERINEURAL at 08:35

## 2021-01-01 RX ADMIN — Medication 1 PACKET: at 20:44

## 2021-01-01 RX ADMIN — FERRIC CARBOXYMALTOSE INJECTION 750 MG: 50 INJECTION, SOLUTION INTRAVENOUS at 13:10

## 2021-01-01 RX ADMIN — LACTULOSE 50 G: 20 SOLUTION ORAL at 17:07

## 2021-01-01 RX ADMIN — Medication 1 PACKET: at 08:42

## 2021-01-01 RX ADMIN — PIPERACILLIN AND TAZOBACTAM 3.38 G: 3; .375 INJECTION, POWDER, LYOPHILIZED, FOR SOLUTION INTRAVENOUS at 12:50

## 2021-01-01 RX ADMIN — DULOXETINE 60 MG: 30 CAPSULE, DELAYED RELEASE ORAL at 09:10

## 2021-01-01 RX ADMIN — MAGNESIUM OXIDE TAB 400 MG (241.3 MG ELEMENTAL MG) 400 MG: 400 (241.3 MG) TAB at 09:04

## 2021-01-01 RX ADMIN — LEVOCARNITINE ORAL SOLUTION 500 MG: 1 SOLUTION ORAL at 12:29

## 2021-01-01 RX ADMIN — RIFAXIMIN 550 MG: 550 TABLET ORAL at 21:06

## 2021-01-01 RX ADMIN — SODIUM BICARBONATE 650 MG: 650 TABLET ORAL at 15:55

## 2021-01-01 RX ADMIN — METOPROLOL SUCCINATE 25 MG: 25 TABLET, EXTENDED RELEASE ORAL at 08:35

## 2021-01-01 RX ADMIN — SODIUM BICARBONATE 650 MG: 650 TABLET ORAL at 20:25

## 2021-01-01 RX ADMIN — SODIUM BICARBONATE 1300 MG: 650 TABLET ORAL at 09:06

## 2021-01-01 RX ADMIN — Medication 10 ML: at 21:27

## 2021-01-01 RX ADMIN — Medication 3 ML: at 21:09

## 2021-01-01 RX ADMIN — Medication 3 ML: at 09:10

## 2021-01-01 RX ADMIN — IPRATROPIUM BROMIDE AND ALBUTEROL SULFATE 3 ML: 2.5; .5 SOLUTION RESPIRATORY (INHALATION) at 21:28

## 2021-01-01 RX ADMIN — CALCIUM GLUCONATE 1 G: 20 INJECTION, SOLUTION INTRAVENOUS at 15:18

## 2021-01-01 RX ADMIN — LEVOTHYROXINE SODIUM 75 MCG: 0.07 TABLET ORAL at 06:13

## 2021-01-01 RX ADMIN — PANTOPRAZOLE SODIUM 40 MG: 40 TABLET, DELAYED RELEASE ORAL at 08:01

## 2021-01-01 RX ADMIN — PANTOPRAZOLE SODIUM 40 MG: 40 TABLET, DELAYED RELEASE ORAL at 09:23

## 2021-01-01 RX ADMIN — INSULIN LISPRO 3 UNITS: 100 INJECTION, SOLUTION INTRAVENOUS; SUBCUTANEOUS at 13:25

## 2021-01-01 RX ADMIN — MAGNESIUM GLUCONATE 500 MG ORAL TABLET 400 MG: 500 TABLET ORAL at 13:11

## 2021-01-01 RX ADMIN — SODIUM BICARBONATE 650 MG TABLET 650 MG: at 17:07

## 2021-01-01 RX ADMIN — LACTULOSE 40 G: 20 SOLUTION ORAL at 21:09

## 2021-01-01 RX ADMIN — Medication 3 ML: at 22:12

## 2021-01-01 RX ADMIN — DOCUSATE SODIUM 100 MG: 100 CAPSULE, LIQUID FILLED ORAL at 08:35

## 2021-01-01 RX ADMIN — LACTULOSE 30 G: 20 SOLUTION ORAL at 08:23

## 2021-01-01 RX ADMIN — AMOXICILLIN AND CLAVULANATE POTASSIUM 1 TABLET: 875; 125 TABLET, FILM COATED ORAL at 20:45

## 2021-01-11 NOTE — TELEPHONE ENCOUNTER
Caller: osorio    Relationship to patient: wife    Best call back number: [phone]    Wife states pt needs a lab appt asap. Wife states that the lab did not do everything they were supposed to do and lab told pt to set up another lab appt asap to get the rest of the lab work done.

## 2021-01-12 NOTE — TELEPHONE ENCOUNTER
LVM for patient to call to be scheduled for lab work for Iron and Ferritin profile. Please schedule patient

## 2021-02-25 NOTE — PATIENT INSTRUCTIONS
Increase Lantus to 54 units subcu nightly  Increase Humalog to 20 with each meal  Check blood pressure at home at least once a week for the next few weeks and send numbers for review  Always keep glucose source in case of low blood sugar  Annual eye exam and flu vaccine  Labs before follow-up.

## 2021-03-02 PROBLEM — A41.9 SEPSIS (HCC): Status: ACTIVE | Noted: 2021-01-01

## 2021-03-03 NOTE — THERAPY EVALUATION
Patient Name: Bry Ratliff  : 1945    MRN: [id number]                              Today's Date: 3/3/2021       Admit Date: 3/2/2021    Visit Dx: No diagnosis found.  Patient Active Problem List   Diagnosis   • Cirrhosis (CMS/HCC)   • Diabetic neuropathy (CMS/HCC)   • Dyslipidemia   • History of DVT of right lower extremity   • Essential hypertension   • Acquired hypothyroidism   • Morbid obesity (CMS/HCC)   • Obstructive sleep apnea   • Type 2 diabetes mellitus with hyperglycemia, with long-term current use of insulin (CMS/HCC)   • Spinal stenosis of lumbar region   • Pulmonary hypertension (CMS/HCC)   • Kidney stone   • Deficiency of other specified B group vitamins   • Chronic coronary artery disease   • JOSE (iron deficiency anemia) with poor po absorption   • Erosive gastritis   • IgG kappa MGUS   • THOMAS (nonalcoholic steatohepatitis)   • Splenomegaly   • Antiphospholipid antibody positive   • Elevated factor VIII level   • Anemia in stage 3 chronic kidney disease (CMS/HCC)   • History of Vitamin B12 deficiency   • History of bilateral pulmonary embolus (PE)   • Antithrombin III deficiency (CMS/HCC)   • Protein C deficiency (CMS/HCC)   • Protein S deficiency (CMS/HCC)   • Skin abscess   • Mixed hyperlipidemia   • Anemia   • Iron deficiency anemia due to chronic blood loss   • GAVE (gastric antral vascular ectasia)   • Vitamin B12 deficiency   • Malabsorption of iron   • CKD (chronic kidney disease) stage 3, GFR 30-59 ml/min (CMS/HCC)   • Iron deficiency anemia   • General weakness   • Decubitus ulcer of sacral region, stage 3 (CMS/HCC)   • Enterocutaneous fistula   • Colocutaneous fistula   • Sepsis (CMS/HCC)     Past Medical History:   Diagnosis Date   • Anemia     iron deficiency   • Anxiety    • B12 deficiency    • Balance disorder    • Syed's esophagus    • Bile duct leak    • CAD (coronary artery disease)     cardiac stent   • Constipation    • DDD (degenerative disc disease), lumbar    •  Decubitus ulcer     buttocks   • Depression    • Diabetes mellitus (CMS/HCC)    • Disease of thyroid gland     hypothyroid   • Diverticular disease    • Dvt femoral (deep venous thrombosis) (CMS/HCC) 2004    rt let   • Elevated cholesterol    • Fistula of intestine    • Gastroparesis    • GERD (gastroesophageal reflux disease)    • Gout    • Hepatic encephalopathy (CMS/HCC)     if doesnt take meds   • History of echocardiogram     03/03/2017 - 12/03/2014 - 04/2012   • History of stomach ulcers    • History of transfusion    • Hyperlipidemia    • Incontinence    • Iron deficiency    • Kidney stones    • Morbid obesity (CMS/HCC)    • HTOMAS (nonalcoholic steatohepatitis)    • Neuropathy    • OAB (overactive bladder)    • Open wound     rt knee   • KATHIA treated with BiPAP     bring dos   • Peripheral edema    • Pulmonary embolus (CMS/HCC) 2004   • Renal insufficiency     stage 3   • S/P lumbar laminectomy    • Skin irritation     skin fold   • Stage 3 chronic kidney disease (CMS/HCC)      Past Surgical History:   Procedure Laterality Date   • ABDOMINAL SURGERY     • BACK SURGERY  1971   • BILE DUCT STENT PLACEMENT     • BILE DUCT STENT PLACEMENT     • CARDIAC CATHETERIZATION     • CATARACT EXTRACTION  2014   • CATARACT EXTRACTION, BILATERAL  2014   • CHOLECYSTECTOMY  02/24/2019   • COLON RESECTION Right 6/11/2020    Procedure: COLON RESECTION RIGHT;  Surgeon: Naif Etienne DO;  Location: [facility] OR;  Service: General;  Laterality: Right;   • COLONOSCOPY  03/2018   • CORONARY ANGIOPLASTY  08/24/2009    PCI stent - succesful placement of 3.5/23 promus stent in proximal right coronary artery   • CORONARY ANGIOPLASTY WITH STENT PLACEMENT  08/27/2009    patient had stent placed to proximal right coronary artery   • CYSTOSCOPY BLADDER STONE LITHOTRIPSY  1997   • ENDOSCOPY N/A 10/18/2019    Procedure: ESOPHAGOGASTRODUODENOSCOPY with argon plasma coagulation of gastric antral vascular ectasia;  Surgeon: Marisel Gomez MD;   Location: [facility] ENDOSCOPY;  Service: Gastroenterology   • ENDOSCOPY N/A 1/9/2020    Procedure: ESOPHAGOGASTRODUODENOSCOPY WITH BIOPSY, ARGON PLASMA COAGULATION OF GASTRIC ANTREL VASCULAR ECTASIA,;  Surgeon: Marisel Gomez MD;  Location: [facility] ENDOSCOPY;  Service: Gastroenterology   • ENDOSCOPY N/A 3/6/2020    Procedure: ESOPHAGOGASTRODUODENOSCOPY;  Surgeon: Zbigniew Silva MD;  Location: [facility] ENDOSCOPY;  Service: Gastroenterology;  Laterality: N/A;  mild gave, post cautery ulcer     • ERCP N/A 11/20/2019    Procedure: ERCP, clearance of bile duct with balloon, placement of biliary stent, EGD with argon plasma coagulation of gastric antral vascular ectasia;  Surgeon: Marisel Gomez MD;  Location: [facility] ENDOSCOPY;  Service: Gastroenterology   • ERCP N/A 2/27/2020    Procedure: ENDOSCOPIC RETROGRADE CHOLANGIOPANCREATOGRAPHY with removal of biliary stent, brushing, dilation, and placement of biliary stent x 2 and Esophagogastroduodenoscopy with argon plasma coagulation;  Surgeon: Marisel Gomez MD;  Location: [facility] ENDOSCOPY;  Service: Gastroenterology;  Laterality: N/A;  Gastric antral vascular ectasia, common bile duct stricture   • ERCP N/A 4/20/2020    Procedure: ENDOSCOPIC RETROGRADE CHOLANGIOPANCREATOGRAPHY WITH REMOVAL OF BILIARY STENT, AMPULA DILATION TO 8MM, BRUSHING OF COMMON BILE DUT, CLEARANCE OF BILE DUCT WITH BALLOON, BIOPSY OF AMPULA, PLACEMENT OF BILIARY STENT;  Surgeon: Marisel Gomez MD;  Location: [facility] ENDOSCOPY;  Service: Gastroenterology;  Laterality: N/A;  POST:CBD STRICTURE   • ERCP N/A 11/12/2020    Procedure: ENDOSCOPIC RETROGRADE CHOLANGIOPANCREATOGRAPHY WITH BALLOON CLEARANCE OF COMMON BILE DUCT, BRUSHING OF COMMON BILE DUCT STRICTURE, BIOPSY X 1 AREA, PLACEMENT OF BILIARY STENT;  Surgeon: Marisel Gomez MD;  Location: [facility] ENDOSCOPY;  Service: Gastroenterology;  Laterality: N/A;  Post op:REMOVAL OF SLUDGE, SUCCESSFUL STENT PLACEMENT   • OTHER SURGICAL HISTORY  11/2018    IVC  "filter implant   • RENAL ARTERY STENT Left     does not recall this   • UPPER ENDOSCOPIC ULTRASOUND W/ FNA N/A 4/20/2020    Procedure: Esophagogastroduodenoscopy with Endoscopic ultrasound and fine needle aspiration;  Surgeon: Marisel Gomez MD;  Location: [facility] ENDOSCOPY;  Service: Gastroenterology;  Laterality: N/A;  Post: CBD STRICTURE   • VENA CAVA FILTER INSERTION  12/05/2018     General Information     Row Name 03/03/21 1556          Physical Therapy Time and Intention    Document Type  evaluation  -CM     Mode of Treatment  physical therapy;occupational therapy;co-treatment  -CM     Row Name 03/03/21 1556          General Information    Patient Profile Reviewed  yes  -CM     Prior Level of Function  independent:;all household mobility;gait uses rw in home; uses rollator wx out of home; wife present, reports pt does not amb a lot out of home  -CM     Existing Precautions/Restrictions  fall  -CM     Barriers to Rehab  medically complex;cognitive status  -CM     Row Name 03/03/21 1556          Living Environment    Lives With  spouse wife very small, petite woman; pt is 6-2\" and weighs 325 lbs.  -CM     Row Name 03/03/21 1556          Home Main Entrance    Number of Stairs, Main Entrance  none  -CM     Row Name 03/03/21 1556          Stairs Within Home, Primary    Number of Stairs, Within Home, Primary  none  -CM     Row Name 03/03/21 1556          Cognition    Orientation Status (Cognition)  oriented to;person;place;time unfamiliar w/ situation; slow to respond to many orientation questions  -CM     Row Name 03/03/21 1556          Safety Issues, Functional Mobility    Safety Issues Affecting Function (Mobility)  insight into deficits/self-awareness;awareness of need for assistance;judgment;problem-solving;safety precaution awareness;safety precautions follow-through/compliance  -CM     Impairments Affecting Function (Mobility)  balance;endurance/activity tolerance;shortness of breath;strength;sensation/sensory " awareness  -CM       User Key  (r) = Recorded By, (t) = Taken By, (c) = Cosigned By    Initials Name Provider Type    Elda Padron, PT Physical Therapist        Mobility     Row Name 03/03/21 1558          Bed Mobility    Bed Mobility  bed mobility (all) activities  -CM     All Activities, [place] (Bed Mobility)  moderate assist (50% patient effort);maximum assist (25% patient effort);1 person assist  -CM     Assistive Device (Bed Mobility)  bed rails;draw sheet;head of bed elevated  -CM     Row Name 03/03/21 1558          Transfers    Comment (Transfers)  shuffled steps to turn and sit in chair; needs elevated surface to come to stand 2* his height; placed pillow in base of chair to assist in returning to stand from sitting in chair  -CM     Row Name 03/03/21 1558          Bed-Chair Transfer    Bed-Chair [place] (Transfers)  minimum assist (75% patient effort);moderate assist (50% patient effort);2 person assist  -CM     Assistive Device (Bed-Chair Transfers)  walker, front-wheeled  -CM     Row Name 03/03/21 1558          Sit-Stand Transfer    Sit-Stand [place] (Transfers)  minimum assist (75% patient effort);moderate assist (50% patient effort);2 person assist  -CM     Assistive Device (Sit-Stand Transfers)  walker, front-wheeled  -CM     Row Name 03/03/21 1558          Gait/Stairs (Locomotion)    Distance in Feet (Gait)  did not attempt due to LE weakness  -CM       User Key  (r) = Recorded By, (t) = Taken By, (c) = Cosigned By    Initials Name Provider Type    Elda Padron, PT Physical Therapist        Obj/Interventions     Row Name 03/03/21 1601          Range of Motion Comprehensive    General Range of Motion  no range of motion deficits identified  -CM     Row Name 03/03/21 1601          Strength Comprehensive (MMT)    General Manual Muscle Testing (MMT) Assessment  lower extremity strength deficits identified  -CM     Comment, General Manual Muscle Testing (MMT) Assessment   BLEs 3/5  -CM     Row Name 03/03/21 1601          Balance    Balance Assessment  sitting static balance;sitting dynamic balance;standing static balance;standing dynamic balance  -CM     Static Sitting Balance  WFL;unsupported;sitting, edge of bed  -CM     Dynamic Sitting Balance  sitting, edge of bed;unsupported;mild impairment  -CM     Static Standing Balance  mild impairment;supported;standing  -CM     Dynamic Standing Balance  standing;supported;mild impairment;moderate impairment  -CM     Row Name 03/03/21 1601          Sensory Assessment (Somatosensory)    Sensory Assessment (Somatosensory)  other (see comments) hx of PN, but has some sensation in B feet  -CM       User Key  (r) = Recorded By, (t) = Taken By, (c) = Cosigned By    Initials Name Provider Type    Elda Padron, PT Physical Therapist        Goals/Plan     Row Name 03/03/21 1605          Bed Mobility Goal 1 (PT)    Activity/Assistive Device (Bed Mobility Goal 1, PT)  bed mobility activities, all  -CM     [place] Level/Cues Needed (Bed Mobility Goal 1, PT)  minimum assist (75% or more patient effort)  -CM     Time Frame (Bed Mobility Goal 1, PT)  2 weeks  -CM     Row Name 03/03/21 1605          Transfer Goal 1 (PT)    Activity/Assistive Device (Transfer Goal 1, PT)  transfers, all;walker, rolling  -CM     [place] Level/Cues Needed (Transfer Goal 1, PT)  minimum assist (75% or more patient effort);1 person assist  -CM     Time Frame (Transfer Goal 1, PT)  2 weeks  -CM     Row Name 03/03/21 1605          Gait Training Goal 1 (PT)    Activity/Assistive Device (Gait Training Goal 1, PT)  gait (walking locomotion);walker, rolling  -CM     [place] Level (Gait Training Goal 1, PT)  minimum assist (75% or more patient effort);2 person assist  -CM     Distance (Gait Training Goal 1, PT)  80 ft  -CM     Time Frame (Gait Training Goal 1, PT)  2 weeks  -CM       User Key  (r) = Recorded By, (t) = Taken By, (c) = Cosigned By    Initials Name  Provider Type    Elda Padron, PT Physical Therapist        Clinical Impression     Row Name 03/03/21 1602          Pain    Additional Documentation  Pain Scale: Numbers Pre/Post-Treatment (Group)  -CM     Row Name 03/03/21 1602          Pain Scale: Numbers Pre/Post-Treatment    Pretreatment Pain Rating  0/10 - no pain  -CM     Posttreatment Pain Rating  0/10 - no pain  -CM     Pain Intervention(s)  Ambulation/increased activity;Repositioned  -CM     Row Name 03/03/21 1602          Plan of Care Review    Plan of Care Reviewed With  patient;spouse  -CM     Outcome Summary  74 yo male adm for urosepsis. CT (-) for PE. Hx of THOMAS cirrhosis, multiple other comorbidities. Pt confused this date but able to follow directions well. Very pleasant. Comes to sit at eob w/ mod to max assist of 1. Comes to stand w/ min to mod assist of 2, then needs mod assist of 2 to turn w/ rw and sit in chair. Pt has only 3/5 strength in BLEs, and he was not safe to attempt gait this date. Will progress to gait training as able. Pt not safe to return home w/ wife, as she is about 1/2 his size. Will require IP rehab at d/c. Will follow. PPE: gloves, mask, goggles.  -CM     Row Name 03/03/21 1602          Therapy Assessment/Plan (PT)    Patient/Family Therapy Goals Statement (PT)  wife agreeable to IP rehab but hoping for improvement to allow home w/ home health  -CM     Rehab Potential (PT)  good, to achieve stated therapy goals  -CM     Criteria for Skilled Interventions Met (PT)  yes;meets criteria;skilled treatment is necessary  -CM     Predicted Duration of Therapy Intervention (PT)  until d/c  -     Row Name 03/03/21 1602          Positioning and Restraints    Pre-Treatment Position  in bed  -CM     Post Treatment Position  chair  -CM     In Chair  notified nsg;sitting;call light within reach;encouraged to call for assist;exit alarm on;with family/caregiver  -CM       User Key  (r) = Recorded By, (t) = Taken By, (c) = Cosigned  By    Initials Name Provider Type    Elda Padron, PT Physical Therapist        Outcome Measures     Row Name 03/03/21 1606          How much help from another person do you currently need...    Turning from your back to your side while in flat bed without using bedrails?  3  -CM     Moving from lying on back to sitting on the side of a flat bed without bedrails?  2  -CM     Moving to and from a bed to a chair (including a wheelchair)?  2  -CM     Standing up from a chair using your arms (e.g., wheelchair, bedside chair)?  2  -CM     Climbing 3-5 steps with a railing?  1  -CM     To walk in hospital room?  2  -CM     AM-PAC 6 Clicks Score (PT)  12  -CM     Row Name 03/03/21 1606          Modified [place] Scale    Pre-Stroke Modified [place] Scale  3 - Moderate disability.  Requiring some help, but able to walk without assistance.  -CM     Modified [place] Scale  4 - Moderately severe disability.  Unable to walk without assistance, and unable to attend to own bodily needs without assistance.  -CM     Row Name 03/03/21 1606          Functional Assessment    Outcome Measure Options  AM-PAC 6 Clicks Basic Mobility (PT);Modified [place]  -CM       User Key  (r) = Recorded By, (t) = Taken By, (c) = Cosigned By    Initials Name Provider Type    Elda Padron, PT Physical Therapist        Physical Therapy Education                 Title: PT OT SLP Therapies (Done)     Topic: Physical Therapy (Done)     Point: Mobility training (Done)     Learning Progress Summary           Patient Acceptance, E,TB, VU by  at 3/3/2021 1607   Significant Other Acceptance, E,TB, VU by  at 3/3/2021 1607                               User Key     Initials Effective Dates Name Provider Type Discipline     03/01/19 -  Elda Babb, PT Physical Therapist PT              PT Recommendation and Plan  Planned Therapy Interventions (PT): balance training, bed mobility training, gait training, patient/family education,  strengthening, postural re-education, transfer training  Plan of Care Reviewed With: patient, spouse  Outcome Summary: 76 yo male adm for urosepsis. CT (-) for PE. Hx of THOMAS cirrhosis, multiple other comorbidities. Pt confused this date but able to follow directions well. Very pleasant. Comes to sit at eob w/ mod to max assist of 1. Comes to stand w/ min to mod assist of 2, then needs mod assist of 2 to turn w/ rw and sit in chair. Pt has only 3/5 strength in BLEs, and he was not safe to attempt gait this date. Will progress to gait training as able. Pt not safe to return home w/ wife, as she is about 1/2 his size. Will require IP rehab at d/c. Will follow. PPE: gloves, mask, goggles.     Time Calculation:   PT Charges     Row Name 03/03/21 1607             Time Calculation    Start Time  1341  -CM      Stop Time  1418  -CM      Time Calculation (min)  37 min  -CM      PT Received On  03/03/21  -CM      PT - Next Appointment  03/04/21  -CM      PT Goal Re-Cert Due Date  03/17/21  -CM         Time Calculation- PT    Total Timed Code Minutes- PT  8 minute(s)  -CM        User Key  (r) = Recorded By, (t) = Taken By, (c) = Cosigned By    Initials Name Provider Type    Elda Padron, PT Physical Therapist        Therapy Charges for Today     Code Description Service Date Service Provider Modifiers Qty    51059263082 HC PT EVAL MOD COMPLEXITY 4 3/3/2021 Elda Babb, PT GP 1    92032955547 HC GAIT TRAINING EA 15 MIN 3/3/2021 Elda Babb, PT GP 1          PT G-Codes  Outcome Measure Options: AM-PAC 6 Clicks Basic Mobility (PT), Modified [place]esar  AM-PAC 6 Clicks Score (PT): 12  Modified [place] Scale: 4 - Moderately severe disability.  Unable to walk without assistance, and unable to attend to own bodily needs without assistance.    Elda Babb, PT  3/3/2021

## 2021-03-03 NOTE — PLAN OF CARE
Patient was direct admit to [facility] for sepsis.  Patient denies shortness of breath, respirations have been increased.  O2 sats remaining stable, patient using home bipap.  Pt denies pain/discomfort.  Vital signs stable at this time.      Goal Outcome Evaluation:  Plan of Care Reviewed With: patient  Progress: no change       Problem: Adult Inpatient Plan of Care  Goal: Plan of Care Review  Outcome: Ongoing, Not Progressing  Flowsheets (Taken 3/3/2021 0325)  Progress: no change  Plan of Care Reviewed With: patient  Goal: Patient-Specific Goal (Individualized)  Outcome: Ongoing, Not Progressing  Goal: Absence of Hospital-Acquired Illness or Injury  Outcome: Ongoing, Not Progressing  Intervention: Identify and Manage Fall Risk  Recent Flowsheet Documentation  Taken 3/3/2021 0324 by Ruben Nicholas RN  Safety Promotion/Fall Prevention:   safety round/check completed   room organization consistent   nonskid shoes/slippers when out of bed   lighting adjusted   fall prevention program maintained   clutter free environment maintained   assistive device/personal items within reach   activity supervised  Taken 3/3/2021 0200 by Ruben Nicholas, RN  Safety Promotion/Fall Prevention:   safety round/check completed   room organization consistent   nonskid shoes/slippers when out of bed   lighting adjusted   fall prevention program maintained   clutter free environment maintained   assistive device/personal items within reach   activity supervised  Taken 3/2/2021 2314 by Ruben Nicholas, RN  Safety Promotion/Fall Prevention:   safety round/check completed   room organization consistent   nonskid shoes/slippers when out of bed   lighting adjusted   fall prevention program maintained   clutter free environment maintained   assistive device/personal items within reach   activity supervised  Intervention: Prevent Skin Injury  Recent Flowsheet Documentation  Taken 3/3/2021 0324 by Ruben Nicholas, RN  Body Position: position  changed independently  Intervention: Prevent Infection  Recent Flowsheet Documentation  Taken 3/3/2021 0324 by Ruben Nicholas RN  Infection Prevention:   personal protective equipment utilized   visitors restricted/screened  Taken 3/3/2021 0200 by Ruben Nicholas RN  Infection Prevention:   personal protective equipment utilized   visitors restricted/screened  Taken 3/2/2021 2314 by Ruben Nicholas RN  Infection Prevention:   personal protective equipment utilized   visitors restricted/screened  Goal: Optimal Comfort and Wellbeing  Outcome: Ongoing, Not Progressing  Intervention: Provide Person-Centered Care  Recent Flowsheet Documentation  Taken 3/3/2021 0324 by Ruben Nicholas RN  Trust Relationship/Rapport: care explained  Taken 3/2/2021 2314 by Ruben Nicholas RN  Trust Relationship/Rapport:   care explained   choices provided   thoughts/feelings acknowledged  Goal: Readiness for Transition of Care  Outcome: Ongoing, Not Progressing  Intervention: Mutually Develop Transition Plan  Recent Flowsheet Documentation  Taken 3/2/2021 2257 by Ruben Nicholas RN  Transportation Anticipated: family or friend will provide  Patient/Family Anticipated Services at Transition: home health care  Patient/Family Anticipates Transition to: home with family  Taken 3/2/2021 2255 by Ruben Nicholas RN  Equipment Currently Used at Home:   cpap   walker, standard     Problem: Fall Injury Risk  Goal: Absence of Fall and Fall-Related Injury  Outcome: Ongoing, Not Progressing  Intervention: Identify and Manage Contributors to Fall Injury Risk  Recent Flowsheet Documentation  Taken 3/3/2021 0324 by Ruben Nicholas RN  Medication Review/Management: medications reviewed  Taken 3/3/2021 0200 by Ruben Nicholas RN  Medication Review/Management: medications reviewed  Taken 3/2/2021 2314 by Ruben Nicholas RN  Medication Review/Management: medications reviewed  Intervention: Promote Injury-Free Environment  Recent  Flowsheet Documentation  Taken 3/3/2021 0324 by Ruben Nicholas RN  Safety Promotion/Fall Prevention:   safety round/check completed   room organization consistent   nonskid shoes/slippers when out of bed   lighting adjusted   fall prevention program maintained   clutter free environment maintained   assistive device/personal items within reach   activity supervised  Taken 3/3/2021 0200 by Ruben Nicholas RN  Safety Promotion/Fall Prevention:   safety round/check completed   room organization consistent   nonskid shoes/slippers when out of bed   lighting adjusted   fall prevention program maintained   clutter free environment maintained   assistive device/personal items within reach   activity supervised  Taken 3/2/2021 2314 by Ruben Nicholas RN  Safety Promotion/Fall Prevention:   safety round/check completed   room organization consistent   nonskid shoes/slippers when out of bed   lighting adjusted   fall prevention program maintained   clutter free environment maintained   assistive device/personal items within reach   activity supervised     Problem: Skin Injury Risk Increased  Goal: Skin Health and Integrity  Outcome: Ongoing, Not Progressing  Intervention: Optimize Skin Protection  Recent Flowsheet Documentation  Taken 3/3/2021 0324 by Ruben Nicholas RN  Pressure Reduction Techniques: frequent weight shift encouraged  Pressure Reduction Devices: pressure-redistributing mattress utilized  Skin Protection:   incontinence pads utilized   adhesive use limited   tubing/devices free from skin contact  Taken 3/2/2021 2314 by Ruben Nicholas RN  Pressure Reduction Techniques: frequent weight shift encouraged  Pressure Reduction Devices: pressure-redistributing mattress utilized  Skin Protection:   adhesive use limited   incontinence pads utilized   tubing/devices free from skin contact

## 2021-03-03 NOTE — PROGRESS NOTES
Discharge Planning Assessment   [facility]     Patient Name: Bry Ratliff  MRN: [id number]  Today's Date: 3/3/2021    Admit Date: 3/2/2021    Discharge Needs Assessment     Row Name 03/03/21 1608       Living Environment    Lives With  spouse    Current Living Arrangements  home/apartment/condo    Primary Care Provided by  spouse/significant other;self    Able to Return to Prior Arrangements  -- Pending PT and OT Evals       Resource/Environmental Concerns    Resource/Environmental Concerns  none    Transportation Concerns  car, none       Transition Planning    Transportation Anticipated  family or friend will provide Spouse will transport to home at [place]       Discharge Needs Assessment    Equipment Currently Used at Home  commode;rollator;walker, rolling;cpap    Concerns to be Addressed  discharge planning        Discharge Plan     Row Name 03/03/21 1612       Plan    Plan  Pending PT and OT Evals      Plan Comments  Spoke to patient and spouse in  room with mask and goggles maintianing 6 ft for less than 15 min.  Patient needs spouse to help with ADL's and does not drive. PCP is Kimberly and pharmacy is [facility] in [place]. DC barriers - Elevated Lactic Acid at OLF, Pending Blood and Urine Cultures, Elevated D- dimer, IV ABX        Continued Care and Services - Admitted Since 3/2/2021          Demographic Summary     Row Name 03/03/21 1607       General Information    Admission Type  inpatient    Required Notices Provided  Important Message from Medicare    Referral Source  admission list    Reason for Consult  discharge planning    Preferred Language  English        Functional Status     Row Name 03/03/21 1607       Functional Status, IADL    Medications  assistive person    Meal Preparation  assistive person    Housekeeping  assistive person    Laundry  assistive person    Shopping  assistive person       Mental Status    General Appearance WDL  WDL       Mental Status Summary    Recent Changes in Mental  Status/Cognitive Functioning  no changes             Patient Forms     Row Name 03/03/21 2293       Patient Forms    Important Message from Medicare ([facility])  Delivered IM 3/3/21    Delivered to  Support person Spouse    Method of delivery  In person          Venice Serrato RN, CM  Office Phone [phone]  Cell [phone]

## 2021-03-03 NOTE — PROGRESS NOTES
[facility] Medicine Services Daily Progress Note      Hospitalist Team  LOS 1 days      Patient Care Team:  Paulette Harris MD as PCP - Josefina Rucker MD as Consulting Physician (Nephrology)  Alvaro Pandey MD as Consulting Physician (Cardiology)    Patient Location: 270/1      Subjective   Subjective     Chief Complaint / Subjective  No chief complaint on file.        Brief Synopsis of Hospital Course/HPI  75-year-old man with multiple morbidities including Gonzalez cirrhosis, CKD stage III, CAD status post previous stenting and hypothyroidism.  Also has iron deficiency anemia and coagulation factor deficiency for which he follows up with the hematologist.  Patient had presented to an outlying ED with complaints of progressively worsening shortness of breath, generalized body weakness and subjective fever for the last 3 weeks.  In the ED patient was stated to be hypotensive but responded to IV fluid resuscitation.  He was given empirical antibiotics after blood culture was obtained and transferred to [facility] with diagnosis of possible urosepsis.    Date::    3/3/2021  Patient seen and examined  Continues with generalized body weakness  He is saturating 97% on room air  Blood pressure stable      Review of Systems   Constitution: Negative for chills and fever.   HENT: Negative for congestion.    Eyes: Negative for blurred vision.   Cardiovascular: Negative for chest pain.   Endocrine: Negative for cold intolerance and heat intolerance.   Gastrointestinal: Negative for abdominal pain, nausea and vomiting.   Genitourinary: Negative for flank pain.   Neurological: Positive for weakness.   Psychiatric/Behavioral: Negative for altered mental status.         Objective   Objective      Vital Signs  Temp:  [97.8 °F (36.6 °C)-100.3 °F (37.9 °C)] 100.3 °F (37.9 °C)  Heart Rate:  [96-99] 96  Resp:  [23-25] 23  BP: (124-139)/(56-65) 124/60  Oxygen  "Therapy  SpO2: 95 %  Pulse Oximetry Type: Continuous  Device (Oxygen Therapy): CPAP  Flowsheet Rows      First Filed Value   Admission Height  188 cm (74\") Documented at 03/02/2021 2250   Admission Weight  (!) 148 kg (326 lb 11.6 oz) Documented at 03/02/2021 2250        Intake & Output (last 3 days)       02/28 0701 - 03/01 0700 03/01 0701 - 03/02 0700 03/02 0701 - 03/03 0700 03/03 0701 - 03/04 0700    Urine (mL/kg/hr)   580     Total Output   580     Net   -580                 Lines, Drains & Airways    Active LDAs     Name:   Placement date:   Placement time:   Site:   Days:    Peripheral IV 03/02/21 2324 Left;Posterior Hand   03/02/21 2324    Hand   less than 1    Peripheral IV 03/03/21 0516 Left;Posterior Forearm   03/03/21 0516    Forearm   less than 1                  Physical Exam:    Physical Exam  Vitals signs reviewed.   Constitutional:       General: He is not in acute distress.     Appearance: He is obese.   HENT:      Head: Normocephalic and atraumatic.      Nose: Nose normal.      Mouth/Throat:      Mouth: Mucous membranes are moist.   Eyes:      Extraocular Movements: Extraocular movements intact.      Conjunctiva/sclera: Conjunctivae normal.      Pupils: Pupils are equal, round, and reactive to light.   Neck:      Musculoskeletal: Neck supple.   Cardiovascular:      Rate and Rhythm: Normal rate and regular rhythm.   Pulmonary:      Effort: No respiratory distress.      Breath sounds: Normal breath sounds. No wheezing or rales.   Abdominal:      General: Bowel sounds are normal.      Palpations: Abdomen is soft.      Tenderness: There is no abdominal tenderness.   Musculoskeletal:      Comments: Degenerative changes   Skin:     General: Skin is warm and dry.   Neurological:      Mental Status: He is alert and oriented to person, place, and time.   Psychiatric:         Mood and Affect: Mood normal.              Wounds (last 24 hours)      LDA Wound     Row Name 03/03/21 0324 03/03/21 0058 " 03/02/21 2314       Wound 03/02/21 2312 Left anterior hip    Wound - Properties Group Placement Date: 03/02/21  -AA Placement Time: 2312 -AA Side: Left  -AA Orientation: anterior  -AA Location: hip  -AA Stage, Pressure Injury : other (see comments)  -AA, Moisture associated     Wound Image  --  Images linked: 1  -AA  --    Dressing Appearance  --  --  open to air  -AA    Closure  Open to air  -AA  --  Open to air  -AA    Base  red  -AA  --  red  -AA    Retired Wound - Properties Group Date first assessed: 03/02/21 -AA Time first assessed: 2312 -AA Side: Left  -AA Location: hip  -AA      User Key  (r) = Recorded By, (t) = Taken By, (c) = Cosigned By    Initials Name Provider Type    Ruben Victor RN Registered Nurse          Procedures:              Results Review:     I reviewed the patient's new clinical results.      Lab Results (last 24 hours)     Procedure Component Value Units Date/Time    POC Glucose Once [[id number]]  (Abnormal) Collected: 03/03/21 0829    Specimen: Blood Updated: 03/03/21 0831     Glucose 168 mg/dL      Comment: Serial Number: [id number]:  [id number]       Calcium, Ionized [[id number]]  (Abnormal) Collected: 03/03/21 0613    Specimen: Blood Updated: 03/03/21 0651     Ionized Calcium 1.18 mmol/L     Ammonia [[id number]]  (Normal) Collected: 03/03/21 0613    Specimen: Blood Updated: 03/03/21 0641     Ammonia 23 umol/L     Lactic Acid, Plasma [[id number]]  (Normal) Collected: 03/03/21 0613    Specimen: Blood Updated: 03/03/21 0641     Lactate 1.5 mmol/L     Urinalysis, Microscopic Only - Urine, Clean Catch [[id number]]  (Abnormal) Collected: 03/03/21 0518    Specimen: Urine, Clean Catch Updated: 03/03/21 0630     RBC, UA 6-12 /HPF      WBC, UA 3-5 /HPF      Bacteria, UA Trace /HPF      Squamous Epithelial Cells, UA 0-2 /HPF      Hyaline Casts, UA None Seen /LPF      Amorphous Crystals, UA Small/1+ /HPF      Methodology Manual Light Microscopy    Urinalysis With Culture If Indicated  "- Urine, Clean Catch [[id number]]  (Abnormal) Collected: 03/03/21 0518    Specimen: Urine, Clean Catch Updated: 03/03/21 0550     Color, UA Yellow     Appearance, UA Hazy     Comment: Result checked visual confirmation        pH, UA <=5.0     Specific Gravity, UA 1.020     Glucose, UA Negative     Ketones, UA Negative     Bilirubin, UA Negative     Blood, UA Large (3+)     Protein, UA 30 mg/dL (1+)     Leuk Esterase, UA Negative     Nitrite, UA Negative     Urobilinogen, UA 0.2 E.U./dL    Procalcitonin [[id number]]  (Abnormal) Collected: 03/03/21 0214    Specimen: Blood Updated: 03/03/21 0252     Procalcitonin 0.76 ng/mL     Narrative:      As a Marker for Sepsis (Non-Neonates):   1. <0.5 ng/mL represents a low risk of severe sepsis and/or septic shock.  1. >2 ng/mL represents a high risk of severe sepsis and/or septic shock.    As a Marker for Lower Respiratory Tract Infections that require antibiotic therapy:  PCT on Admission     Antibiotic Therapy             6-12 Hrs later  > 0.5                Strongly Recommended            >0.25 - <0.5         Recommended  0.1 - 0.25           Discouraged                   Remeasure/reassess PCT  <0.1                 Strongly Discouraged          Remeasure/reassess PCT      As 28 day mortality risk marker: \"Change in Procalcitonin Result\" (> 80 % or <=80 %) if Day 0 (or Day 1) and Day 4 values are available. Refer to http://www.[email or web address]-pct-calculator.com/   Change in PCT <=80 %   A decrease of PCT levels below or equal to 80 % defines a positive change in PCT test result representing a higher risk for 28-day all-cause mortality of patients diagnosed with severe sepsis or septic shock.  Change in PCT > 80 %   A decrease of PCT levels of more than 80 % defines a negative change in PCT result representing a lower risk for 28-day all-cause mortality of patients diagnosed with severe sepsis or septic shock.                Results may be falsely decreased if patient taking Biotin.  "    CBC & Differential [[id number]]  (Abnormal) Collected: 03/03/21 0214    Specimen: Blood Updated: 03/03/21 0250    Narrative:      The following orders were created for panel order CBC & Differential.  Procedure                               Abnormality         Status                     ---------                               -----------         ------                     Scan Slide[[id number]]                                       Final result               CBC Auto Differential[[id number]]        Abnormal            Final result                 Please view results for these tests on the individual orders.    CBC Auto Differential [[id number]]  (Abnormal) Collected: 03/03/21 0214    Specimen: Blood Updated: 03/03/21 0250     WBC 10.90 10*3/mm3      RBC 2.45 10*6/mm3      Hemoglobin 8.4 g/dL      Hematocrit 24.4 %      .0 fL      MCH 34.2 pg      MCHC 34.3 g/dL      RDW 19.3 %      RDW-SD 67.8 fl      MPV 8.8 fL      Platelets 89 10*3/mm3     Scan Slide [[id number]] Collected: 03/03/21 0214    Specimen: Blood Updated: 03/03/21 0250     Scan Slide --     Comment: See Manual Differential Results       Manual Differential [[id number]]  (Abnormal) Collected: 03/03/21 0214    Specimen: Blood Updated: 03/03/21 0250     Neutrophil % 55.0 %      Lymphocyte % 7.0 %      Monocyte % 18.0 %      Bands %  19.0 %      Metamyelocyte % 1.0 %      Neutrophils Absolute 8.07 10*3/mm3      Lymphocytes Absolute 0.76 10*3/mm3      Monocytes Absolute 1.96 10*3/mm3      Dacrocytes Slight/1+     Poikilocytes Slight/1+     RBC Fragments Slight/1+     WBC Morphology Normal     Platelet Estimate Decreased    Lactic Acid, Plasma [[id number]]  (Normal) Collected: 03/03/21 0214    Specimen: Blood Updated: 03/03/21 0247     Lactate 1.3 mmol/L     Comprehensive Metabolic Panel [[id number]]  (Abnormal) Collected: 03/03/21 0214    Specimen: Blood Updated: 03/03/21 0245     Glucose 199 mg/dL      BUN 24 mg/dL      Creatinine 1.52 mg/dL      Sodium  135 mmol/L      Potassium 3.7 mmol/L      Chloride 106 mmol/L      CO2 21.0 mmol/L      Calcium 7.8 mg/dL      Total Protein 6.3 g/dL      Albumin 2.60 g/dL      ALT (SGPT) 14 U/L      AST (SGOT) 13 U/L      Alkaline Phosphatase 82 U/L      Total Bilirubin 0.7 mg/dL      eGFR Non African Amer 45 mL/min/1.73      Globulin 3.7 gm/dL      A/G Ratio 0.7 g/dL      BUN/Creatinine Ratio 15.8     Anion Gap 8.0 mmol/L     Narrative:      GFR Normal >60  Chronic Kidney Disease <60  Kidney Failure <15      Hemoglobin A1c [[id number]] Collected: 03/03/21 0214    Specimen: Blood Updated: 03/03/21 0222    Blood Culture - Blood, Arm, Right [[id number]] Collected: 03/03/21 0214    Specimen: Blood from Arm, Right Updated: 03/03/21 0222    Blood Culture - Blood, Arm, Left [[id number]] Collected: 03/02/21 2350    Specimen: Blood from Arm, Left Updated: 03/03/21 0038    POC Glucose Once [[id number]]  (Abnormal) Collected: 03/02/21 2314    Specimen: Blood Updated: 03/02/21 2316     Glucose 206 mg/dL      Comment: Serial Number: [id number]:  [id number]           No results found for: HGBA1C            Lab Results   Component Value Date    LIPASE 26 10/16/2019     Lab Results   Component Value Date    CHOL 154 02/23/2021    TRIG 77 02/23/2021    HDL 62 (H) 02/23/2021    LDL 77 02/23/2021       Lab Results   Lab Value Date/Time    INTRAOP  04/20/2020 1239     FNA of common bile duct stricture, immediate evaluation    Pass 1: Blood only.  Pass 2: Blood and scant benign duct epithelium.  Pass 3: Blood only.  Pass 4: Blood, stroma and scant benign duct epithelium.    Initial cytology interpretation performed by Bry Harrell MD.        FINALDX  11/12/2020 1257     Mucosa, common bile duct, biopsy:    Acute and chronically inflamed small bowel type mucosa with glandular architectural distortion    No dysplasia or malignancy identified    See comment    WALESKA/sms       FINALDX  11/12/2020 1253     Common bile duct stricture with brushings:     Scattered atypical glandular cells in a background of benign duct epithelium and inflammation    See comment    WALESKA/sms       COMDX  11/12/2020 1257     Deeper levels were examined through the block but were not further contributory. Clinical correlation is recommended.     WALESKA/sms       COMDX  11/12/2020 1253     Routine sections show smears with predominantly benign sheets of duct epithelial cells. There are a few groups with enlarged and somewhat disorganized nuclei which are favored to be reactive, however, clinical correlation and followup are recommended.     WALESKA/sms          Microbiology Results (last 10 days)     ** No results found for the last 240 hours. **          ECG/EMG Results (most recent)     Procedure Component Value Units Date/Time    ECG 12 Lead [[id number]] Collected: 03/02/21 2332     Updated: 03/02/21 2334     QT Interval 388 ms     Narrative:      HEART RATE= 94  bpm  RR Interval= 640  ms  MT Interval=   ms  P Horizontal Axis=   deg  P Front Axis=   deg  QRSD Interval= 92  ms  QT Interval= 388  ms  QRS Axis= -20  deg  T Wave Axis= 58  deg  - ABNORMAL ECG -  Atrial fibrillation  Electronically Signed By:   Date and Time of Study: 2021-03-02 23:32:13          Results for orders placed during the hospital encounter of 03/03/20   Duplex Venous Lower Extremity - Bilateral CAR    Narrative · Normal bilateral lower extremity venous duplex scan.          Results for orders placed during the hospital encounter of 03/03/20   Adult Transthoracic Echo Complete W/ Cont if Necessary Per Protocol    Narrative · The left ventricular cavity is mildly dilated.  · Estimated EF = 50%.  · Left ventricular systolic function is low normal.  · Right ventricular cavity is mildly dilated.  · Mild mitral valve regurgitation is present  · Mild tricuspid valve regurgitation is present.  · Mild dilation of the aortic root is present.          Xr Chest 1 View    Result Date: 3/3/2021  No acute cardiopulmonary abnormality.   Electronically Signed By-Shady Fischer MD On:3/3/2021 8:22 AM This report was finalized on [id number] by  Shady Fischer MD.    Ct Chest Pulmonary Embolism    Result Date: 3/3/2021  1. No evidence pulmonary embolism. 2. Small right pleural effusion with mild right basilar airspace disease likely representing atelectasis and or superimposed infection. 3. Cardiomegaly with mild interlobular septal thickening which can be seen with early interstitial edema changes.    Electronically Signed By-Shady Fischer MD On:3/3/2021 7:45 AM This report was finalized on [id number] by  Shady Fischer MD.          Xrays, labs reviewed personally by physician.    Medication Review:   I have reviewed the patient's current medication list      Scheduled Meds  insulin lispro, 0-14 Units, Subcutaneous, TID AC  piperacillin-tazobactam, 4.5 g, Intravenous, Q8H        Meds Infusions  Pharmacy to Dose Zosyn,   sodium chloride, 125 mL/hr, Last Rate: 125 mL/hr (03/03/21 0048)        Meds PRN  •  dextrose  •  dextrose  •  glucagon (human recombinant)  •  insulin lispro **AND** insulin lispro  •  Pharmacy to Dose Zosyn        Assessment/Plan   Assessment/Plan     Active Hospital Problems    Diagnosis  POA   • Sepsis (CMS/[facility]) [A41.9]  Yes      Resolved Hospital Problems   No resolved problems to display.       MEDICAL DECISION MAKING COMPLEXITY BY PROBLEM:     Generalized body weakness with subjective fever and shortness of breath  Chest x-ray from outlying ED showed no cardiopulmonary abnormality  Will obtain CT chest with no contrast  Blood culture pending  Respiratory viral panel with COVID-19-insignificant  Procalcitonin 0.70  Continue empirical antibiotics with Zosyn for now  Respiratory care with bronchodilators    Sepsis ruled out    Diabetes mellitus type 2  On Lantus, Premeal insulin and sliding scale insulin  Monitor blood sugar and adjust insulin as needed    CKD stage IIIa  Serum creatinine around baseline  On sodium  bicarb  Monitor      Anemia of chronic disease  Has a history of JOSE with malabsorption and IgG kappa MGUS  Patient on supplementary iron  Hemoglobin stable  Monitor    Hypothyroidism-on Synthroid    Anxiety/depression disorder  On  Atarax  and Cymbalta     History of Gonzalez cirrhosis with complication-hepatic encephalopathy  Had no history of previous esophageal varices  Continue on home medication  On Xifaxan and lactulose     History of PE/DVT-status post IVC filter    Chronic thrombocytopenia  Most likely due to liver disease  Monitor        Morbid obesity BMI 41.9 lifestyle management education if receptive       Deconditioning  PT to evaluate     VTE Prophylaxis -SCD                                         Gout on allopurinol      Mechanical Order History:      Ordered        03/02/21 2317  Place Sequential Compression Device  Once         03/02/21 2317  Maintain Sequential Compression Device  Continuous                 Pharmalogical Order History:     None            Code Status -   Code Status and Medical Interventions:   Ordered at: 03/02/21 2318     Code Status:    CPR     Medical Interventions (Level of Support Prior to Arrest):    Full       This patient has been examined with appropriate PPE . 03/03/21        Discharge Planning  Pending clinical progress        Electronically signed by Celestino Menchaca MD, 03/03/21, 09:07 EST.  [facility] Hospitalist Team

## 2021-03-03 NOTE — H&P
"      [facility] Medicine Services      Patient Name: Bry Ratliff  : 1945  MRN: [id number]  Primary Care Physician: Paulette Harris MD  Date of admission: 3/2/2021    Patient Care Team:  Paulette Harris MD as PCP - General  Josefina Plascencia MD as Consulting Physician (Nephrology)  Alvaro Pandey MD as Consulting Physician (Cardiology)          Subjective   History Present Illness     Chief Complaint: fever           75-year-old male transferred from [facility] emergency department for further evaluation and treatment of reported urosepsis.  He has multiple medical problems.  He is awake and alert but poor historian for details.  He is oriented x2 but reports he has no complaints.  Per review of [facility] records, presented to [facility] emergency department with complaints of fever and dyspnea for the past 3 weeks and getting worse.  He was reported as negative for Covid and has had the first Covid vaccine.  Hest x-ray Per [facility] report:    \"Borderline heart size mild aortic tortuosity.  No gross infiltrate or edema no pleural findings.  Limited bone visualization due to body habitus.\"    BMI is 41.95, lactic acid per review of records was initially 3.4 he received 2 L normal saline IV fluid bolus and was 1.6 upon discharge from [facility].  Ammonia was less than 9 serum glucose was 277 creatinine 1.54 BUN 26 white blood cell count was 12.8 hemoglobin 9.4 UA Per [facility] showed trace bacteria and elevated WBCs.  He was started on IV Zosyn at [facility].  Troponin  was 0.03 D-dimer was 1.89.  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 Has reported IVC filter.  It does not appear a CT chest was ordered at outlying facility for elevated D-dimer.  CT chest ordered and pending.  Repeat CBC, CMP, urinalysis, lactic acid ordered and pending.  Will be admitted for further evaluation and treatment.      Review of Systems   Constitution: Negative.   HENT: Negative.    Eyes: Negative.    Cardiovascular: Negative.    Respiratory: Positive for shortness of breath.    Endocrine: Negative.    Hematologic/Lymphatic: Negative.    Skin: Positive for poor wound healing.   Musculoskeletal: Positive for arthritis, back pain and joint pain.   Gastrointestinal: Negative.    Genitourinary: Negative.    Neurological: Negative.    Psychiatric/Behavioral: Negative.    Allergic/Immunologic: Negative.    ,         Personal History     Past Medical History:   Past Medical History:   Diagnosis Date   • Anemia     iron deficiency   • Anxiety    • B12 deficiency 2009   • Balance disorder    • Syed's esophagus    • Bile duct leak    • CAD (coronary artery disease)     cardiac stent   • Constipation    • DDD (degenerative disc disease), lumbar    • Decubitus ulcer     buttocks   • Depression    • Diabetes mellitus (CMS/HCC)    • Disease of thyroid gland     hypothyroid   • Diverticular disease    • Dvt femoral (deep venous thrombosis) (CMS/HCC) 2004    rt let   • Elevated cholesterol    • Fistula of intestine    • Gastroparesis    • GERD (gastroesophageal reflux disease)    • Gout    • Hepatic encephalopathy (CMS/HCC)     if doesnt take meds   • History of echocardiogram     03/03/2017 - 12/03/2014 - 04/2012   • History of stomach ulcers    • History of transfusion    • Hyperlipidemia    • Incontinence    • Iron deficiency    • Kidney stones    • Morbid obesity (CMS/HCC)    • THOMAS (nonalcoholic steatohepatitis)    • Neuropathy    • OAB (overactive bladder)    • Open wound     rt  knee   • KATHAI treated with BiPAP     bring dos   • Peripheral edema    • Pulmonary embolus (CMS/HCC) 2004   • Renal insufficiency     stage 3   • S/P lumbar laminectomy    • Skin irritation     skin fold   • Stage 3 chronic kidney disease (CMS/HCC)        Surgical History:      Past Surgical History:   Procedure Laterality Date   • ABDOMINAL SURGERY     • BACK SURGERY  1971   • BILE DUCT STENT PLACEMENT     • BILE DUCT STENT PLACEMENT     • CARDIAC CATHETERIZATION     • CATARACT EXTRACTION  2014   • CATARACT EXTRACTION, BILATERAL  2014   • CHOLECYSTECTOMY  02/24/2019   • COLON RESECTION Right 6/11/2020    Procedure: COLON RESECTION RIGHT;  Surgeon: Naif Etienne DO;  Location: [facility] MAIN OR;  Service: General;  Laterality: Right;   • COLONOSCOPY  03/2018   • CORONARY ANGIOPLASTY  08/24/2009    PCI stent - succesful placement of 3.5/23 promus stent in proximal right coronary artery   • CORONARY ANGIOPLASTY WITH STENT PLACEMENT  08/27/2009    patient had stent placed to proximal right coronary artery   • CYSTOSCOPY BLADDER STONE LITHOTRIPSY  1997   • ENDOSCOPY N/A 10/18/2019    Procedure: ESOPHAGOGASTRODUODENOSCOPY with argon plasma coagulation of gastric antral vascular ectasia;  Surgeon: Marisel Gomez MD;  Location: [facility] ENDOSCOPY;  Service: Gastroenterology   • ENDOSCOPY N/A 1/9/2020    Procedure: ESOPHAGOGASTRODUODENOSCOPY WITH BIOPSY, ARGON PLASMA COAGULATION OF GASTRIC ANTREL VASCULAR ECTASIA,;  Surgeon: Marisel Gomez MD;  Location: [facility] ENDOSCOPY;  Service: Gastroenterology   • ENDOSCOPY N/A 3/6/2020    Procedure: ESOPHAGOGASTRODUODENOSCOPY;  Surgeon: Zbigniew Silva MD;  Location: [facility] ENDOSCOPY;  Service: Gastroenterology;  Laterality: N/A;  mild gave, post cautery ulcer     • ERCP N/A 11/20/2019    Procedure: ERCP, clearance of bile duct with balloon, placement of biliary stent, EGD with argon plasma coagulation of gastric antral vascular ectasia;  Surgeon: Marisel Gomez MD;  Location:   [facility] ENDOSCOPY;  Service: Gastroenterology   • ERCP N/A 2/27/2020    Procedure: ENDOSCOPIC RETROGRADE CHOLANGIOPANCREATOGRAPHY with removal of biliary stent, brushing, dilation, and placement of biliary stent x 2 and Esophagogastroduodenoscopy with argon plasma coagulation;  Surgeon: Marisel Gomez MD;  Location: [facility] ENDOSCOPY;  Service: Gastroenterology;  Laterality: N/A;  Gastric antral vascular ectasia, common bile duct stricture   • ERCP N/A 4/20/2020    Procedure: ENDOSCOPIC RETROGRADE CHOLANGIOPANCREATOGRAPHY WITH REMOVAL OF BILIARY STENT, AMPULA DILATION TO 8MM, BRUSHING OF COMMON BILE DUT, CLEARANCE OF BILE DUCT WITH BALLOON, BIOPSY OF AMPULA, PLACEMENT OF BILIARY STENT;  Surgeon: Marisel Gomez MD;  Location: [facility] ENDOSCOPY;  Service: Gastroenterology;  Laterality: N/A;  POST:CBD STRICTURE   • ERCP N/A 11/12/2020    Procedure: ENDOSCOPIC RETROGRADE CHOLANGIOPANCREATOGRAPHY WITH BALLOON CLEARANCE OF COMMON BILE DUCT, BRUSHING OF COMMON BILE DUCT STRICTURE, BIOPSY X 1 AREA, PLACEMENT OF BILIARY STENT;  Surgeon: Marisel Gomez MD;  Location: [facility] ENDOSCOPY;  Service: Gastroenterology;  Laterality: N/A;  Post op:REMOVAL OF SLUDGE, SUCCESSFUL STENT PLACEMENT   • OTHER SURGICAL HISTORY  11/2018    IVC filter implant   • RENAL ARTERY STENT Left     does not recall this   • UPPER ENDOSCOPIC ULTRASOUND W/ FNA N/A 4/20/2020    Procedure: Esophagogastroduodenoscopy with Endoscopic ultrasound and fine needle aspiration;  Surgeon: Marisel Gomez MD;  Location: [facility] ENDOSCOPY;  Service: Gastroenterology;  Laterality: N/A;  Post: CBD STRICTURE   • VENA CAVA FILTER INSERTION  12/05/2018           Family History: family history includes Hyperlipidemia in an other family member; Hypertension in an other family member. Otherwise pertinent FHx was reviewed and unremarkable.     Social History:  reports that he has never smoked. He has never used smokeless tobacco. He reports that he does not drink alcohol or use  "drugs.      Medications:  Prior to Admission medications    Medication Sig Start Date End Date Taking? Authorizing Provider   allopurinol (ZYLOPRIM) 100 MG tablet Take 100 mg by mouth 2 (Two) Times a Day.    Mary Young MD   aspirin 81 MG tablet Take 1 tablet by mouth Daily.  Patient taking differently: Take 81 mg by mouth Daily. Hold DOS 10/26/19   Celestino Menchaca MD B-D 3CC LUER-YASMEEN SYR 25GX1\" 25G X 1\" 3 ML misc USE FOR THE B12 INJECTION INTRAMUSCULAR ONCE MONTHLY 4/7/20   Mary Young MD   cyanocobalamin 1000 MCG/ML injection Inject 1,000 mcg into the appropriate muscle as directed by prescriber Every 28 (Twenty-Eight) Days. 6/7/20   Mary Young MD   docusate sodium (COLACE) 100 MG capsule Take 100 mg by mouth 2 (Two) Times a Day.    Mary Young MD   DULoxetine (CYMBALTA) 60 MG capsule Take 60 mg by mouth Daily. Take preop    Mary Young MD   ezetimibe (Zetia) 10 MG tablet Take 1 tablet by mouth Daily. 8/31/20 8/31/21  Alejandra Amaro MD   ferrous gluconate (FERGON) 324 MG tablet Take  by mouth Daily. 10/10/20   Mary Young MD   folic acid (FOLVITE) 1 MG tablet Take 1 mg by mouth Daily. Last dose 6/6 12/11/18   Mary Young MD   hydrOXYzine pamoate (VISTARIL) 25 MG capsule Take 25 mg by mouth 3 (Three) Times a Day As Needed. Take preop if needed 11/26/19   Mary Young MD   insulin glargine (Lantus) 100 UNIT/ML injection Inject 54 units at bedtime 2/25/21   Alejandra Amaro MD   insulin lispro (HumaLOG) 100 UNIT/ML injection 20 units subcu before each meal plus sliding scale MDD 60 2/25/21   Alejandra Amaro MD   Insulin Syringe-Needle U-100 (BD Insulin Syringe U/F) 31G X 5/16\" 1 ML misc Used to inject insulin twice a day. 3/1/21   Alejandra Amaro MD   lactulose (CHRONULAC) 10 GM/15ML solution Take 60 mL by mouth Every 4 (Four) Hours. Will not take dos am    Provider, MD Mary   levothyroxine (SYNTHROID, LEVOTHROID) 75 MCG tablet " Take 1 tablet by mouth Daily.  Patient taking differently: Take 75 mcg by mouth Daily. Take preop 5/19/20   Alejandra Amaro MD   magnesium oxide (MAG-OX) 400 MG tablet Take 400 mg by mouth Daily. dont take preop 9/11/18   Mary Young MD   melatonin 5 MG tablet tablet Take 10 mg by mouth At Night As Needed.    Mary Young MD   Multiple Vitamins-Minerals (MULTIVITAMIN WITH MINERALS) tablet tablet Take 1 tablet by mouth Daily.    Mary Young MD   nitroglycerin (NITROSTAT) 0.4 MG SL tablet Place 1 tablet under the tongue Every 5 (Five) Minutes As Needed for Chest Pain (Only if SBP Greater Than 100). Take no more than 3 doses in 15 minutes.  Patient taking differently: Place 0.4 mg under the tongue Every 5 (Five) Minutes As Needed for Chest Pain (Only if SBP Greater Than 100). Take no more than 3 doses in 15 minutes. hasnt needed 3/7/20   Hawk Garner MD   OneTouch Verio test strip Check BS 4 times a day 3/1/21   Alejandra Amaro MD   oxyCODONE (ROXICODONE) 10 MG tablet Take 10 mg by mouth Every 8 (Eight) Hours As Needed. Take preop if needed 5/8/20   Mary Young MD   pantoprazole (PROTONIX) 40 MG EC tablet Take 1 tablet by mouth 2 (Two) Times a Day.  Patient taking differently: Take 40 mg by mouth Daily. 10/19/19   Celestino Menchaca MD   rifaximin (XIFAXAN) 550 MG tablet Take 550 mg by mouth Every 12 (Twelve) Hours. Take preop 12/11/18   Mary Young MD   sodium bicarbonate 650 MG tablet Take 650 mg by mouth 3 (Three) Times a Day. Take preop    Mary Young MD   zinc sulfate (ZINCATE) 220 (50 Zn) MG capsule Take 220 mg by mouth Daily. dont take dos 3/16/20   Mary Young MD       Allergies:  No Known Allergies    Objective   Objective     Vital Signs  Temp:  [97.8 °F (36.6 °C)-100.1 °F (37.8 °C)] 100.1 °F (37.8 °C)  Heart Rate:  [99] 99  Resp:  [23-25] 23  BP: (131-139)/(56-65) 139/65  SpO2:  [99 %] 99 %  on   ;   Device (Oxygen Therapy): CPAP  Body  mass index is 41.95 kg/m².    Physical Exam  Vitals signs reviewed.   Constitutional:       Appearance: Normal appearance. He is obese.   HENT:      Head: Normocephalic and atraumatic.      Right Ear: External ear normal.      Left Ear: External ear normal.      Nose: Nose normal.      Mouth/Throat:      Mouth: Mucous membranes are moist.   Eyes:      Extraocular Movements: Extraocular movements intact.   Neck:      Musculoskeletal: Normal range of motion and neck supple.   Cardiovascular:      Rate and Rhythm: Normal rate. Rhythm irregular.      Heart sounds: Normal heart sounds.   Pulmonary:      Breath sounds: Normal breath sounds.   Abdominal:      Palpations: Abdomen is soft.   Genitourinary:     Comments: deferred  Musculoskeletal: Normal range of motion.   Skin:     General: Skin is warm and dry.   Neurological:      General: No focal deficit present.      Mental Status: He is alert.      Comments: Oriented x2 poor historian   Psychiatric:         Mood and Affect: Mood normal.         Behavior: Behavior normal.      Comments: Poor historian but pleasant and cooperative            Results Review:  I have personally reviewed most recent lab results and agree with findings, most notably: all labs and cxr from [facility].    Results from last 7 days   Lab Units 03/03/21  0214   WBC 10*3/mm3 10.90*   HEMOGLOBIN g/dL 8.4*   HEMATOCRIT % 24.4*   PLATELETS 10*3/mm3 89*     Results from last 7 days   Lab Units 03/03/21  0214   SODIUM mmol/L 135*   POTASSIUM mmol/L 3.7   CHLORIDE mmol/L 106   CO2 mmol/L 21.0*   BUN mg/dL 24*   CREATININE mg/dL 1.52*   GLUCOSE mg/dL 199*   CALCIUM mg/dL 7.8*   ALT (SGPT) U/L 14   AST (SGOT) U/L 13   LACTATE mmol/L 1.3   PROCALCITONIN ng/mL 0.76*     Estimated Creatinine Clearance: 65.7 mL/min (A) (by C-G formula based on SCr of 1.52 mg/dL (H)).  Brief Urine Lab Results  (Last result in the past 365 days)      Color   Clarity   Blood   Leuk Est   Nitrite   Protein   CREAT   Urine HCG         02/23/21 1011             69.7             Microbiology Results (last 10 days)     ** No results found for the last 240 hours. **          ECG/EMG Results (most recent)     Procedure Component Value Units Date/Time    ECG 12 Lead [[id number]] Collected: 03/02/21 2332     Updated: 03/02/21 2334     QT Interval 388 ms     Narrative:      HEART RATE= 94  bpm  RR Interval= 640  ms  MD Interval=   ms  P Horizontal Axis=   deg  P Front Axis=   deg  QRSD Interval= 92  ms  QT Interval= 388  ms  QRS Axis= -20  deg  T Wave Axis= 58  deg  - ABNORMAL ECG -  Atrial fibrillation  Electronically Signed By:   Date and Time of Study: 2021-03-02 23:32:13          Results for orders placed during the hospital encounter of 03/03/20   Duplex Venous Lower Extremity - Bilateral CAR    Narrative · Normal bilateral lower extremity venous duplex scan.          Results for orders placed during the hospital encounter of 03/03/20   Adult Transthoracic Echo Complete W/ Cont if Necessary Per Protocol    Narrative · The left ventricular cavity is mildly dilated.  · Estimated EF = 50%.  · Left ventricular systolic function is low normal.  · Right ventricular cavity is mildly dilated.  · Mild mitral valve regurgitation is present  · Mild tricuspid valve regurgitation is present.  · Mild dilation of the aortic root is present.          No radiology results for the last 7 days      Estimated Creatinine Clearance: 65.7 mL/min (A) (by C-G formula based on SCr of 1.52 mg/dL (H)).    Assessment/Plan   Assessment/Plan       Active Hospital Problems    Diagnosis  POA   • Sepsis (CMS/[facility]) [A41.9]  Yes      Resolved Hospital Problems   No resolved problems to display.     Reported Urosepsis, Lactic was 3.4 at [facility] improved after 2L NS bolus, now, 1.3, continue IV Zosyn pharmacy to dose blood culture reordered. Blood and urine cultures done at [facility] and pending,NS IVF, trend lactic procalcitonin elevated ' serum WBC 10.9    Dyspnea, may be multifactorial no  infiltrate [facility], D-dimer was elevated at [facility] facility 1.8 CT chest PE protocol ordered here given past medical history of DVT and PE, meds unverified , has reported IVC filter , troponin not elevated    Diabetes mellitus type 2 with serum glucose 199, SSI as needed and Accu-Cheks AC at bedtime home meds unverified pending review    Thrombocytopenia platelets 89, were 129 2 weeks ago  anemia of chronic kidney disease hemoglobin 8.4, was 10.3 2 weeks ago no signs of bleeding trend CBC    Nonalcoholic cirrhosis, ammonia not elevated at [facility] facility repeat ammonia in a.m. meds unverified was on right fax admission and lactulose in the past pending review    Hypothyroidism Synthroid dose not verified pending review chronic kidney disease stage III creatinine 1.52 home meds unverified was on sodium bicarb reordered pending review void nephrotoxic meds or message to dose Zosyn depression was on Cymbalta meds unverified not ordered pending review GERD was on PPI meds unverified pending review    Chronic lower back pain post laminectomy home meds unverified pending review    Morbid obesity BMI 41.9 lifestyle management education if receptive    Mild hypocalcemia 7.8 ionized calcium pending    VTE Prophylaxis -   Mechanical Order History:      Ordered        03/02/21 2317  Place Sequential Compression Device  Once         03/02/21 2317  Maintain Sequential Compression Device  Continuous                 Pharmalogical Order History:     None          CODE STATUS:    Code Status and Medical Interventions:   Ordered at: 03/02/21 2318     Code Status:    CPR     Medical Interventions (Level of Support Prior to Arrest):    Full       This patient has been examined wearing appropriate Personal Protective Equipment . 03/03/21      I discussed the patient's findings and my recommendations with patient and RN at bedside .      Signature:Electronically signed by SHAJI Alvares,  03/03/21, 4:30 AM EST.    [facility] Hospitalist Team

## 2021-03-03 NOTE — PLAN OF CARE
Goal Outcome Evaluation:  Plan of Care Reviewed With: patient, spouse     Outcome Summary: 74 yo male adm for urosepsis. CT (-) for PE. Hx of THOMAS cirrhosis, multiple other comorbidities. Pt confused this date but able to follow directions well. Very pleasant. Comes to sit at eob w/ mod to max assist of 1. Comes to stand w/ min to mod assist of 2, then needs mod assist of 2 to turn w/ rw and sit in chair. Pt has only 3/5 strength in BLEs, and he was not safe to attempt gait this date. Will progress to gait training as able. Pt not safe to return home w/ wife, as she is about 1/2 his size. Will require IP rehab at d/c. Will follow. PPE: gloves, mask, goggles.

## 2021-03-03 NOTE — THERAPY EVALUATION
Patient Name: Bry Ratliff  : 1945    MRN: [id number]                              Today's Date: 3/3/2021       Admit Date: 3/2/2021    Visit Dx: No diagnosis found.  Patient Active Problem List   Diagnosis   • Cirrhosis (CMS/HCC)   • Diabetic neuropathy (CMS/HCC)   • Dyslipidemia   • History of DVT of right lower extremity   • Essential hypertension   • Acquired hypothyroidism   • Morbid obesity (CMS/HCC)   • Obstructive sleep apnea   • Type 2 diabetes mellitus with hyperglycemia, with long-term current use of insulin (CMS/HCC)   • Spinal stenosis of lumbar region   • Pulmonary hypertension (CMS/HCC)   • Kidney stone   • Deficiency of other specified B group vitamins   • Chronic coronary artery disease   • JOSE (iron deficiency anemia) with poor po absorption   • Erosive gastritis   • IgG kappa MGUS   • THOMAS (nonalcoholic steatohepatitis)   • Splenomegaly   • Antiphospholipid antibody positive   • Elevated factor VIII level   • Anemia in stage 3 chronic kidney disease (CMS/HCC)   • History of Vitamin B12 deficiency   • History of bilateral pulmonary embolus (PE)   • Antithrombin III deficiency (CMS/HCC)   • Protein C deficiency (CMS/HCC)   • Protein S deficiency (CMS/HCC)   • Skin abscess   • Mixed hyperlipidemia   • Anemia   • Iron deficiency anemia due to chronic blood loss   • GAVE (gastric antral vascular ectasia)   • Vitamin B12 deficiency   • Malabsorption of iron   • CKD (chronic kidney disease) stage 3, GFR 30-59 ml/min (CMS/HCC)   • Iron deficiency anemia   • General weakness   • Decubitus ulcer of sacral region, stage 3 (CMS/HCC)   • Enterocutaneous fistula   • Colocutaneous fistula   • Sepsis (CMS/HCC)     Past Medical History:   Diagnosis Date   • Anemia     iron deficiency   • Anxiety    • B12 deficiency    • Balance disorder    • Syed's esophagus    • Bile duct leak    • CAD (coronary artery disease)     cardiac stent   • Constipation    • DDD (degenerative disc disease), lumbar    •  Decubitus ulcer     buttocks   • Depression    • Diabetes mellitus (CMS/HCC)    • Disease of thyroid gland     hypothyroid   • Diverticular disease    • Dvt femoral (deep venous thrombosis) (CMS/HCC) 2004    rt let   • Elevated cholesterol    • Fistula of intestine    • Gastroparesis    • GERD (gastroesophageal reflux disease)    • Gout    • Hepatic encephalopathy (CMS/HCC)     if doesnt take meds   • History of echocardiogram     03/03/2017 - 12/03/2014 - 04/2012   • History of stomach ulcers    • History of transfusion    • Hyperlipidemia    • Incontinence    • Iron deficiency    • Kidney stones    • Morbid obesity (CMS/HCC)    • THOMAS (nonalcoholic steatohepatitis)    • Neuropathy    • OAB (overactive bladder)    • Open wound     rt knee   • KATHIA treated with BiPAP     bring dos   • Peripheral edema    • Pulmonary embolus (CMS/HCC) 2004   • Renal insufficiency     stage 3   • S/P lumbar laminectomy    • Skin irritation     skin fold   • Stage 3 chronic kidney disease (CMS/HCC)      Past Surgical History:   Procedure Laterality Date   • ABDOMINAL SURGERY     • BACK SURGERY  1971   • BILE DUCT STENT PLACEMENT     • BILE DUCT STENT PLACEMENT     • CARDIAC CATHETERIZATION     • CATARACT EXTRACTION  2014   • CATARACT EXTRACTION, BILATERAL  2014   • CHOLECYSTECTOMY  02/24/2019   • COLON RESECTION Right 6/11/2020    Procedure: COLON RESECTION RIGHT;  Surgeon: Naif Etienne DO;  Location: [facility] OR;  Service: General;  Laterality: Right;   • COLONOSCOPY  03/2018   • CORONARY ANGIOPLASTY  08/24/2009    PCI stent - succesful placement of 3.5/23 promus stent in proximal right coronary artery   • CORONARY ANGIOPLASTY WITH STENT PLACEMENT  08/27/2009    patient had stent placed to proximal right coronary artery   • CYSTOSCOPY BLADDER STONE LITHOTRIPSY  1997   • ENDOSCOPY N/A 10/18/2019    Procedure: ESOPHAGOGASTRODUODENOSCOPY with argon plasma coagulation of gastric antral vascular ectasia;  Surgeon: Marisel Gomez MD;   Location: [facility] ENDOSCOPY;  Service: Gastroenterology   • ENDOSCOPY N/A 1/9/2020    Procedure: ESOPHAGOGASTRODUODENOSCOPY WITH BIOPSY, ARGON PLASMA COAGULATION OF GASTRIC ANTREL VASCULAR ECTASIA,;  Surgeon: Marisel Gomez MD;  Location: [facility] ENDOSCOPY;  Service: Gastroenterology   • ENDOSCOPY N/A 3/6/2020    Procedure: ESOPHAGOGASTRODUODENOSCOPY;  Surgeon: Zbigniew Silva MD;  Location: [facility] ENDOSCOPY;  Service: Gastroenterology;  Laterality: N/A;  mild gave, post cautery ulcer     • ERCP N/A 11/20/2019    Procedure: ERCP, clearance of bile duct with balloon, placement of biliary stent, EGD with argon plasma coagulation of gastric antral vascular ectasia;  Surgeon: Marisel Gomez MD;  Location: [facility] ENDOSCOPY;  Service: Gastroenterology   • ERCP N/A 2/27/2020    Procedure: ENDOSCOPIC RETROGRADE CHOLANGIOPANCREATOGRAPHY with removal of biliary stent, brushing, dilation, and placement of biliary stent x 2 and Esophagogastroduodenoscopy with argon plasma coagulation;  Surgeon: Marisel Gomez MD;  Location: [facility] ENDOSCOPY;  Service: Gastroenterology;  Laterality: N/A;  Gastric antral vascular ectasia, common bile duct stricture   • ERCP N/A 4/20/2020    Procedure: ENDOSCOPIC RETROGRADE CHOLANGIOPANCREATOGRAPHY WITH REMOVAL OF BILIARY STENT, AMPULA DILATION TO 8MM, BRUSHING OF COMMON BILE DUT, CLEARANCE OF BILE DUCT WITH BALLOON, BIOPSY OF AMPULA, PLACEMENT OF BILIARY STENT;  Surgeon: Marisel Gomez MD;  Location: [facility] ENDOSCOPY;  Service: Gastroenterology;  Laterality: N/A;  POST:CBD STRICTURE   • ERCP N/A 11/12/2020    Procedure: ENDOSCOPIC RETROGRADE CHOLANGIOPANCREATOGRAPHY WITH BALLOON CLEARANCE OF COMMON BILE DUCT, BRUSHING OF COMMON BILE DUCT STRICTURE, BIOPSY X 1 AREA, PLACEMENT OF BILIARY STENT;  Surgeon: Marisel Gomez MD;  Location: [facility] ENDOSCOPY;  Service: Gastroenterology;  Laterality: N/A;  Post op:REMOVAL OF SLUDGE, SUCCESSFUL STENT PLACEMENT   • OTHER SURGICAL HISTORY  11/2018    IVC  "filter implant   • RENAL ARTERY STENT Left     does not recall this   • UPPER ENDOSCOPIC ULTRASOUND W/ FNA N/A 4/20/2020    Procedure: Esophagogastroduodenoscopy with Endoscopic ultrasound and fine needle aspiration;  Surgeon: Marisel Gomez MD;  Location: [facility] ENDOSCOPY;  Service: Gastroenterology;  Laterality: N/A;  Post: CBD STRICTURE   • VENA CAVA FILTER INSERTION  12/05/2018     General Information     Row Name 03/03/21 1556          Physical Therapy Time and Intention    Document Type  evaluation  -CM     Mode of Treatment  physical therapy;occupational therapy;co-treatment  -CM     Row Name 03/03/21 1556          General Information    Patient Profile Reviewed  yes  -CM     Prior Level of Function  independent:;all household mobility;gait uses rw in home; uses rollator wx out of home; wife present, reports pt does not amb a lot out of home  -CM     Existing Precautions/Restrictions  fall  -CM     Barriers to Rehab  medically complex;cognitive status  -CM     Row Name 03/03/21 1556          Living Environment    Lives With  spouse wife very small, petite woman; pt is 6-2\" and weighs 325 lbs.  -CM     Row Name 03/03/21 1556          Home Main Entrance    Number of Stairs, Main Entrance  none  -CM     Row Name 03/03/21 1556          Stairs Within Home, Primary    Number of Stairs, Within Home, Primary  none  -CM     Row Name 03/03/21 1556          Cognition    Orientation Status (Cognition)  oriented to;person;place;time unfamiliar w/ situation; slow to respond to many orientation questions  -CM     Row Name 03/03/21 1556          Safety Issues, Functional Mobility    Safety Issues Affecting Function (Mobility)  insight into deficits/self-awareness;awareness of need for assistance;judgment;problem-solving;safety precaution awareness;safety precautions follow-through/compliance  -CM     Impairments Affecting Function (Mobility)  balance;endurance/activity tolerance;shortness of breath;strength;sensation/sensory " awareness  -CM       User Key  (r) = Recorded By, (t) = Taken By, (c) = Cosigned By    Initials Name Provider Type    Elda Padron, PT Physical Therapist        Mobility     Row Name 03/03/21 1558          Bed Mobility    Bed Mobility  bed mobility (all) activities  -CM     All Activities, [place] (Bed Mobility)  moderate assist (50% patient effort);maximum assist (25% patient effort);1 person assist  -CM     Assistive Device (Bed Mobility)  bed rails;draw sheet;head of bed elevated  -CM     Row Name 03/03/21 1558          Transfers    Comment (Transfers)  shuffled steps to turn and sit in chair; needs elevated surface to come to stand 2* his height; placed pillow in base of chair to assist in returning to stand from sitting in chair  -CM     Row Name 03/03/21 1558          Bed-Chair Transfer    Bed-Chair [place] (Transfers)  minimum assist (75% patient effort);moderate assist (50% patient effort);2 person assist  -CM     Assistive Device (Bed-Chair Transfers)  walker, front-wheeled  -CM     Row Name 03/03/21 1558          Sit-Stand Transfer    Sit-Stand [place] (Transfers)  minimum assist (75% patient effort);moderate assist (50% patient effort);2 person assist  -CM     Assistive Device (Sit-Stand Transfers)  walker, front-wheeled  -CM     Row Name 03/03/21 1558          Gait/Stairs (Locomotion)    Distance in Feet (Gait)  did not attempt due to LE weakness  -CM       User Key  (r) = Recorded By, (t) = Taken By, (c) = Cosigned By    Initials Name Provider Type    Elda Padron, PT Physical Therapist        Obj/Interventions     Row Name 03/03/21 1601          Range of Motion Comprehensive    General Range of Motion  no range of motion deficits identified  -CM     Row Name 03/03/21 1601          Strength Comprehensive (MMT)    General Manual Muscle Testing (MMT) Assessment  lower extremity strength deficits identified  -CM     Comment, General Manual Muscle Testing (MMT) Assessment   BLEs 3/5  -CM     Row Name 03/03/21 1601          Balance    Balance Assessment  sitting static balance;sitting dynamic balance;standing static balance;standing dynamic balance  -CM     Static Sitting Balance  WFL;unsupported;sitting, edge of bed  -CM     Dynamic Sitting Balance  sitting, edge of bed;unsupported;mild impairment  -CM     Static Standing Balance  mild impairment;supported;standing  -CM     Dynamic Standing Balance  standing;supported;mild impairment;moderate impairment  -CM     Row Name 03/03/21 1601          Sensory Assessment (Somatosensory)    Sensory Assessment (Somatosensory)  other (see comments) hx of PN, but has some sensation in B feet  -CM       User Key  (r) = Recorded By, (t) = Taken By, (c) = Cosigned By    Initials Name Provider Type    Elda Padron, PT Physical Therapist        Goals/Plan     Row Name 03/03/21 1605          Bed Mobility Goal 1 (PT)    Activity/Assistive Device (Bed Mobility Goal 1, PT)  bed mobility activities, all  -CM     [place] Level/Cues Needed (Bed Mobility Goal 1, PT)  minimum assist (75% or more patient effort)  -CM     Time Frame (Bed Mobility Goal 1, PT)  2 weeks  -CM     Row Name 03/03/21 1605          Transfer Goal 1 (PT)    Activity/Assistive Device (Transfer Goal 1, PT)  transfers, all;walker, rolling  -CM     [place] Level/Cues Needed (Transfer Goal 1, PT)  minimum assist (75% or more patient effort);1 person assist  -CM     Time Frame (Transfer Goal 1, PT)  2 weeks  -CM     Row Name 03/03/21 1605          Gait Training Goal 1 (PT)    Activity/Assistive Device (Gait Training Goal 1, PT)  gait (walking locomotion);walker, rolling  -CM     [place] Level (Gait Training Goal 1, PT)  minimum assist (75% or more patient effort);2 person assist  -CM     Distance (Gait Training Goal 1, PT)  80 ft  -CM     Time Frame (Gait Training Goal 1, PT)  2 weeks  -CM       User Key  (r) = Recorded By, (t) = Taken By, (c) = Cosigned By    Initials Name  Provider Type    Elda Padron, PT Physical Therapist        Clinical Impression     Row Name 03/03/21 1602          Pain    Additional Documentation  Pain Scale: Numbers Pre/Post-Treatment (Group)  -CM     Row Name 03/03/21 1602          Pain Scale: Numbers Pre/Post-Treatment    Pretreatment Pain Rating  0/10 - no pain  -CM     Posttreatment Pain Rating  0/10 - no pain  -CM     Pain Intervention(s)  Ambulation/increased activity;Repositioned  -CM     Row Name 03/03/21 1602          Plan of Care Review    Plan of Care Reviewed With  patient;spouse  -CM     Outcome Summary  74 yo male adm for urosepsis. CT (-) for PE. Hx of THOMAS cirrhosis, multiple other comorbidities. Pt confused this date but able to follow directions well. Very pleasant. Comes to sit at eob w/ mod to max assist of 1. Comes to stand w/ min to mod assist of 2, then needs mod assist of 2 to turn w/ rw and sit in chair. Pt has only 3/5 strength in BLEs, and he was not safe to attempt gait this date. Will progress to gait training as able. Pt not safe to return home w/ wife, as she is about 1/2 his size. Will require IP rehab at d/c. Will follow. PPE: gloves, mask, goggles.  -CM     Row Name 03/03/21 1602          Therapy Assessment/Plan (PT)    Patient/Family Therapy Goals Statement (PT)  wife agreeable to IP rehab but hoping for improvement to allow home w/ home health  -CM     Rehab Potential (PT)  good, to achieve stated therapy goals  -CM     Criteria for Skilled Interventions Met (PT)  yes;meets criteria;skilled treatment is necessary  -CM     Predicted Duration of Therapy Intervention (PT)  until d/c  -     Row Name 03/03/21 1602          Positioning and Restraints    Pre-Treatment Position  in bed  -CM     Post Treatment Position  chair  -CM     In Chair  notified nsg;sitting;call light within reach;encouraged to call for assist;exit alarm on;with family/caregiver  -CM       User Key  (r) = Recorded By, (t) = Taken By, (c) = Cosigned  By    Initials Name Provider Type    Elda Padron, PT Physical Therapist        Outcome Measures     Row Name 03/03/21 1606          How much help from another person do you currently need...    Turning from your back to your side while in flat bed without using bedrails?  3  -CM     Moving from lying on back to sitting on the side of a flat bed without bedrails?  2  -CM     Moving to and from a bed to a chair (including a wheelchair)?  2  -CM     Standing up from a chair using your arms (e.g., wheelchair, bedside chair)?  2  -CM     Climbing 3-5 steps with a railing?  1  -CM     To walk in hospital room?  2  -CM     AM-PAC 6 Clicks Score (PT)  12  -CM     Row Name 03/03/21 1606          Modified [place] Scale    Pre-Stroke Modified [place] Scale  3 - Moderate disability.  Requiring some help, but able to walk without assistance.  -CM     Modified [place] Scale  4 - Moderately severe disability.  Unable to walk without assistance, and unable to attend to own bodily needs without assistance.  -CM     Row Name 03/03/21 1606          Functional Assessment    Outcome Measure Options  AM-PAC 6 Clicks Basic Mobility (PT);Modified [place]  -CM       User Key  (r) = Recorded By, (t) = Taken By, (c) = Cosigned By    Initials Name Provider Type    Elda Padron, PT Physical Therapist        Physical Therapy Education                 Title: PT OT SLP Therapies (Done)     Topic: Physical Therapy (Done)     Point: Mobility training (Done)     Learning Progress Summary           Patient Acceptance, E,TB, VU by  at 3/3/2021 1607   Significant Other Acceptance, E,TB, VU by  at 3/3/2021 1607                               User Key     Initials Effective Dates Name Provider Type Discipline     03/01/19 -  Elda Babb, PT Physical Therapist PT              PT Recommendation and Plan  Planned Therapy Interventions (PT): balance training, bed mobility training, gait training, patient/family education,  strengthening, postural re-education, transfer training  Plan of Care Reviewed With: patient, spouse  Outcome Summary: 74 yo male adm for urosepsis. CT (-) for PE. Hx of THOMAS cirrhosis, multiple other comorbidities. Pt confused this date but able to follow directions well. Very pleasant. Comes to sit at eob w/ mod to max assist of 1. Comes to stand w/ min to mod assist of 2, then needs mod assist of 2 to turn w/ rw and sit in chair. Pt has only 3/5 strength in BLEs, and he was not safe to attempt gait this date. Will progress to gait training as able. Pt not safe to return home w/ wife, as she is about 1/2 his size. Will require IP rehab at d/c. Will follow. PPE: gloves, mask, goggles.     Time Calculation:   PT Charges     Row Name 03/03/21 1607             Time Calculation    Start Time  1341  -CM      Stop Time  1418  -CM      Time Calculation (min)  37 min  -CM      PT Received On  03/03/21  -CM      PT - Next Appointment  03/04/21  -CM      PT Goal Re-Cert Due Date  03/17/21  -CM         Time Calculation- PT    Total Timed Code Minutes- PT  8 minute(s)  -CM        User Key  (r) = Recorded By, (t) = Taken By, (c) = Cosigned By    Initials Name Provider Type    Elda Padron, PT Physical Therapist        Therapy Charges for Today     Code Description Service Date Service Provider Modifiers Qty    [id number]  PT EVAL MOD COMPLEXITY 4 3/3/2021 Elda Babb, PT GP 1    [id number]  PT THERAPEUTIC ACT EA 15 MIN 3/3/2021 Elda Babb, PT GP 1          PT G-Codes  Outcome Measure Options: AM-PAC 6 Clicks Basic Mobility (PT), Modified [place]  AM-PAC 6 Clicks Score (PT): 12  Modified [place] Scale: 4 - Moderately severe disability.  Unable to walk without assistance, and unable to attend to own bodily needs without assistance.    Elda Babb, PT  3/3/2021

## 2021-03-03 NOTE — PLAN OF CARE
Goal Outcome Evaluation:  Plan of Care Reviewed With: patient, spouse     Outcome Summary: Pt 74 yo male adm for urosepsis. CT (-) for PE. Hx of THOMAS cirrhosis, multiple other comorbidities. Pt resides with spouse in a one-level home, utilizing cane and RW. Pt reports LB ADL limitations secondary to lumbar spine surgeries. Spouse assists with ADLs at baseline. Pt requiring signficantly more assist this date - Max A for LB ADLs and Min-Mod Ax2 for transfers.  Pt demos increased confusion, scoring 9/28 on short Blessed Test.  Pt spouse somewhat anasognostic regarding pt's condition, and reluctant to agree to IP. OT recommends IP rehab at this time, and will continue to follow 5x/week. PPE: Gloves, mask, eyewear

## 2021-03-03 NOTE — THERAPY EVALUATION
Patient Name: Bry Ratliff  : 1945    MRN: [id number]                              Today's Date: 3/3/2021       Admit Date: 3/2/2021    Visit Dx: No diagnosis found.  Patient Active Problem List   Diagnosis   • Cirrhosis (CMS/HCC)   • Diabetic neuropathy (CMS/HCC)   • Dyslipidemia   • History of DVT of right lower extremity   • Essential hypertension   • Acquired hypothyroidism   • Morbid obesity (CMS/HCC)   • Obstructive sleep apnea   • Type 2 diabetes mellitus with hyperglycemia, with long-term current use of insulin (CMS/HCC)   • Spinal stenosis of lumbar region   • Pulmonary hypertension (CMS/HCC)   • Kidney stone   • Deficiency of other specified B group vitamins   • Chronic coronary artery disease   • JOSE (iron deficiency anemia) with poor po absorption   • Erosive gastritis   • IgG kappa MGUS   • THOMAS (nonalcoholic steatohepatitis)   • Splenomegaly   • Antiphospholipid antibody positive   • Elevated factor VIII level   • Anemia in stage 3 chronic kidney disease (CMS/HCC)   • History of Vitamin B12 deficiency   • History of bilateral pulmonary embolus (PE)   • Antithrombin III deficiency (CMS/HCC)   • Protein C deficiency (CMS/HCC)   • Protein S deficiency (CMS/HCC)   • Skin abscess   • Mixed hyperlipidemia   • Anemia   • Iron deficiency anemia due to chronic blood loss   • GAVE (gastric antral vascular ectasia)   • Vitamin B12 deficiency   • Malabsorption of iron   • CKD (chronic kidney disease) stage 3, GFR 30-59 ml/min (CMS/HCC)   • Iron deficiency anemia   • General weakness   • Decubitus ulcer of sacral region, stage 3 (CMS/HCC)   • Enterocutaneous fistula   • Colocutaneous fistula   • Sepsis (CMS/HCC)     Past Medical History:   Diagnosis Date   • Anemia     iron deficiency   • Anxiety    • B12 deficiency    • Balance disorder    • Syed's esophagus    • Bile duct leak    • CAD (coronary artery disease)     cardiac stent   • Constipation    • DDD (degenerative disc disease), lumbar    •  Decubitus ulcer     buttocks   • Depression    • Diabetes mellitus (CMS/HCC)    • Disease of thyroid gland     hypothyroid   • Diverticular disease    • Dvt femoral (deep venous thrombosis) (CMS/HCC) 2004    rt let   • Elevated cholesterol    • Fistula of intestine    • Gastroparesis    • GERD (gastroesophageal reflux disease)    • Gout    • Hepatic encephalopathy (CMS/HCC)     if doesnt take meds   • History of echocardiogram     03/03/2017 - 12/03/2014 - 04/2012   • History of stomach ulcers    • History of transfusion    • Hyperlipidemia    • Incontinence    • Iron deficiency    • Kidney stones    • Morbid obesity (CMS/HCC)    • THOMAS (nonalcoholic steatohepatitis)    • Neuropathy    • OAB (overactive bladder)    • Open wound     rt knee   • KATHIA treated with BiPAP     bring dos   • Peripheral edema    • Pulmonary embolus (CMS/HCC) 2004   • Renal insufficiency     stage 3   • S/P lumbar laminectomy    • Skin irritation     skin fold   • Stage 3 chronic kidney disease (CMS/HCC)      Past Surgical History:   Procedure Laterality Date   • ABDOMINAL SURGERY     • BACK SURGERY  1971   • BILE DUCT STENT PLACEMENT     • BILE DUCT STENT PLACEMENT     • CARDIAC CATHETERIZATION     • CATARACT EXTRACTION  2014   • CATARACT EXTRACTION, BILATERAL  2014   • CHOLECYSTECTOMY  02/24/2019   • COLON RESECTION Right 6/11/2020    Procedure: COLON RESECTION RIGHT;  Surgeon: Naif Etienne DO;  Location: [facility] OR;  Service: General;  Laterality: Right;   • COLONOSCOPY  03/2018   • CORONARY ANGIOPLASTY  08/24/2009    PCI stent - succesful placement of 3.5/23 promus stent in proximal right coronary artery   • CORONARY ANGIOPLASTY WITH STENT PLACEMENT  08/27/2009    patient had stent placed to proximal right coronary artery   • CYSTOSCOPY BLADDER STONE LITHOTRIPSY  1997   • ENDOSCOPY N/A 10/18/2019    Procedure: ESOPHAGOGASTRODUODENOSCOPY with argon plasma coagulation of gastric antral vascular ectasia;  Surgeon: Marisel Gomez MD;   Location: [facility] ENDOSCOPY;  Service: Gastroenterology   • ENDOSCOPY N/A 1/9/2020    Procedure: ESOPHAGOGASTRODUODENOSCOPY WITH BIOPSY, ARGON PLASMA COAGULATION OF GASTRIC ANTREL VASCULAR ECTASIA,;  Surgeon: Marisel Gomez MD;  Location: [facility] ENDOSCOPY;  Service: Gastroenterology   • ENDOSCOPY N/A 3/6/2020    Procedure: ESOPHAGOGASTRODUODENOSCOPY;  Surgeon: Zbigniew Silva MD;  Location: [facility] ENDOSCOPY;  Service: Gastroenterology;  Laterality: N/A;  mild gave, post cautery ulcer     • ERCP N/A 11/20/2019    Procedure: ERCP, clearance of bile duct with balloon, placement of biliary stent, EGD with argon plasma coagulation of gastric antral vascular ectasia;  Surgeon: Marisel Gomez MD;  Location: [facility] ENDOSCOPY;  Service: Gastroenterology   • ERCP N/A 2/27/2020    Procedure: ENDOSCOPIC RETROGRADE CHOLANGIOPANCREATOGRAPHY with removal of biliary stent, brushing, dilation, and placement of biliary stent x 2 and Esophagogastroduodenoscopy with argon plasma coagulation;  Surgeon: Marisel Gomez MD;  Location: [facility] ENDOSCOPY;  Service: Gastroenterology;  Laterality: N/A;  Gastric antral vascular ectasia, common bile duct stricture   • ERCP N/A 4/20/2020    Procedure: ENDOSCOPIC RETROGRADE CHOLANGIOPANCREATOGRAPHY WITH REMOVAL OF BILIARY STENT, AMPULA DILATION TO 8MM, BRUSHING OF COMMON BILE DUT, CLEARANCE OF BILE DUCT WITH BALLOON, BIOPSY OF AMPULA, PLACEMENT OF BILIARY STENT;  Surgeon: Marisel Gomez MD;  Location: [facility] ENDOSCOPY;  Service: Gastroenterology;  Laterality: N/A;  POST:CBD STRICTURE   • ERCP N/A 11/12/2020    Procedure: ENDOSCOPIC RETROGRADE CHOLANGIOPANCREATOGRAPHY WITH BALLOON CLEARANCE OF COMMON BILE DUCT, BRUSHING OF COMMON BILE DUCT STRICTURE, BIOPSY X 1 AREA, PLACEMENT OF BILIARY STENT;  Surgeon: Marisel Gomez MD;  Location: [facility] ENDOSCOPY;  Service: Gastroenterology;  Laterality: N/A;  Post op:REMOVAL OF SLUDGE, SUCCESSFUL STENT PLACEMENT   • OTHER SURGICAL HISTORY  11/2018    IVC  filter implant   • RENAL ARTERY STENT Left     does not recall this   • UPPER ENDOSCOPIC ULTRASOUND W/ FNA N/A 4/20/2020    Procedure: Esophagogastroduodenoscopy with Endoscopic ultrasound and fine needle aspiration;  Surgeon: Marisel Gomez MD;  Location: [facility] ENDOSCOPY;  Service: Gastroenterology;  Laterality: N/A;  Post: CBD STRICTURE   • VENA CAVA FILTER INSERTION  12/05/2018     General Information     Row Name 03/03/21 1632          OT Time and Intention    Document Type  evaluation  -ES     Mode of Treatment  occupational therapy;co-treatment;physical therapy  -ES     Row Name 03/03/21 1632          General Information    Patient Profile Reviewed  yes  -ES     Prior Level of Function  ADL's;min assist: A for bathing (sponge bathing) and LB dressing secondary to lower back impairment  -ES     Existing Precautions/Restrictions  fall  -ES     Barriers to Rehab  medically complex;cognitive status  -ES     Row Name 03/03/21 1632          Occupational Profile    Reason for Services/Referral (Occupational Profile)  Pt 74 yo male adm for urosepsis. CT (-) for PE. Hx of THOMAS cirrhosis, multiple other comorbidities. Pt resides with spouse in a one-level home, utilizing cane and RW. Pt reports LB ADL limitations secondary to lumbar spine surgeries. Spouse assists with ADLs.  -ES     Successful Occupations (Occupational Profile)    -ES     Row Name 03/03/21 1632          Living Environment    Lives With  spouse  -ES     Row Name 03/03/21 1632          Home Main Entrance    Number of Stairs, Main Entrance  none  -ES     Row Name 03/03/21 1632          Stairs Within Home, Primary    Number of Stairs, Within Home, Primary  none  -ES     Row Name 03/03/21 1632          Cognition    Orientation Status (Cognition)  oriented x 3 Short Blessed Test: 9/28, indicating cognitive impairment  -ES     Row Name 03/03/21 1632          Safety Issues, Functional Mobility    Safety Issues Affecting Function (Mobility)  ability to  follow commands;awareness of need for assistance;insight into deficits/self-awareness;positioning of assistive device;safety precaution awareness;safety precautions follow-through/compliance;problem-solving  -ES     Impairments Affecting Function (Mobility)  balance;endurance/activity tolerance;shortness of breath;strength;sensation/sensory awareness;cognition;pain  -ES       User Key  (r) = Recorded By, (t) = Taken By, (c) = Cosigned By    Initials Name Provider Type    ES Tahmina Parks OT Occupational Therapist          Mobility/ADL's     Row Name 03/03/21 1636          Bed Mobility    Bed Mobility  bed mobility (all) activities  -ES     All Activities, [place] (Bed Mobility)  moderate assist (50% patient effort);maximum assist (25% patient effort);1 person assist  -ES     Assistive Device (Bed Mobility)  bed rails;draw sheet;head of bed elevated  -ES     Row Name 03/03/21 1636          Transfers    Transfers  bed-chair transfer;sit-stand transfer  -ES     Comment (Transfers)  difficulty with motor planning  -ES     Bed-Chair [place] (Transfers)  minimum assist (75% patient effort);moderate assist (50% patient effort);2 person assist  -ES     Assistive Device (Bed-Chair Transfers)  walker, front-wheeled  -ES     Sit-Stand [place] (Transfers)  minimum assist (75% patient effort);moderate assist (50% patient effort);2 person assist  -ES     Row Name 03/03/21 1636          Sit-Stand Transfer    Assistive Device (Sit-Stand Transfers)  walker, front-wheeled  -ES     Row Name 03/03/21 1636          Activities of Daily Living    BADL Assessment/Intervention  upper body dressing;lower body dressing  -ES     Row Name 03/03/21 1636          Upper Body Dressing Assessment/Training    [place] Level (Upper Body Dressing)  minimum assist (75% patient effort)  -ES     Row Name 03/03/21 1636          Lower Body Dressing Assessment/Training    [place] Level (Lower Body Dressing)  maximum assist (25%  patient effort)  -ES       User Key  (r) = Recorded By, (t) = Taken By, (c) = Cosigned By    Initials Name Provider Type    ES Tahmina Parks OT Occupational Therapist        Obj/Interventions     Row Name 03/03/21 1637          Vision Assessment/Intervention    Visual Impairment/Limitations  corrective lenses for reading  -ES     Row Name 03/03/21 1637          Range of Motion Comprehensive    General Range of Motion  no range of motion deficits identified  -ES     Row Name 03/03/21 1637          Strength Comprehensive (MMT)    General Manual Muscle Testing (MMT) Assessment  upper extremity strength deficits identified  -ES     Comment, General Manual Muscle Testing (MMT) Assessment  BUE 3+/5  -ES     Row Name 03/03/21 1637          Balance    Balance Assessment  sitting static balance;sitting dynamic balance;sit to stand dynamic balance;standing dynamic balance;standing static balance  -ES     Static Sitting Balance  WNL  -ES     Dynamic Sitting Balance  mild impairment  -ES     Static Standing Balance  mild impairment  -ES     Dynamic Standing Balance  mild impairment;moderate impairment  -ES     Balance Interventions  standing;sitting;sit to stand;static;dynamic;supported  -ES       User Key  (r) = Recorded By, (t) = Taken By, (c) = Cosigned By    Initials Name Provider Type    ES Tahmina Parks OT Occupational Therapist        Goals/Plan     Row Name 03/03/21 164          Bathing Goal 1 (OT)    Activity/Device (Bathing Goal 1, OT)  bathing skills, all  -ES     [place] Level/Cues Needed (Bathing Goal 1, OT)  moderate assist (50-74% patient effort)  -ES     Time Frame (Bathing Goal 1, OT)  2 weeks  -ES     Row Name 03/03/21 1645          Dressing Goal 1 (OT)    Activity/Device (Dressing Goal 1, OT)  dressing skills, all  -ES     [place]/Cues Needed (Dressing Goal 1, OT)  moderate assist (50-74% patient effort)  -ES     Time Frame (Dressing Goal 1, OT)  2 weeks  -ES     Row Name 03/03/21 2309           Toileting Goal 1 (OT)    Activity/Device (Toileting Goal 1, OT)  toileting skills, all  -ES     [place] Level/Cues Needed (Toileting Goal 1, OT)  minimum assist (75% or more patient effort)  -ES     Time Frame (Toileting Goal 1, OT)  2 weeks  -ES     Row Name 03/03/21 4469          Therapy Assessment/Plan (OT)    Planned Therapy Interventions (OT)  activity tolerance training;adaptive equipment training;BADL retraining;cognitive/visual perception retraining;functional balance retraining;neuromuscular control/coordination retraining;occupation/activity based interventions;orthotic fabrication/fitting/training;passive ROM/stretching;patient/caregiver education/training;ROM/therapeutic exercise;strengthening exercise;transfer/mobility retraining  -ES       User Key  (r) = Recorded By, (t) = Taken By, (c) = Cosigned By    Initials Name Provider Type    Tahmina Crowley OT Occupational Therapist        Clinical Impression     Row Name 03/03/21 6930          Pain Scale: Numbers Pre/Post-Treatment    Pretreatment Pain Rating  0/10 - no pain  -ES     Posttreatment Pain Rating  0/10 - no pain  -ES     Row Name 03/03/21 2037          Plan of Care Review    Plan of Care Reviewed With  patient;spouse  -ES     Outcome Summary  Pt 74 yo male adm for urosepsis. CT (-) for PE. Hx of THOMAS cirrhosis, multiple other comorbidities. Pt resides with spouse in a one-level home, utilizing cane and RW. Pt reports LB ADL limitations secondary to lumbar spine surgeries. Spouse assists with ADLs at baseline. Pt requiring signficantly more assist this date - Max A for LB ADLs and Min-Mod Ax2 for transfers.  Pt demos increased confusion, scoring 9/28 on short Blessed Test.  Pt spouse somewhat anasognostic regarding pt's condition, and reluctant to agree to IP. OT recommends IP rehab at this time, and will continue to follow 5x/week. PPE: Gloves, mask, eyewear  -ES     Row Name 03/03/21 8877          Therapy Assessment/Plan  (OT)    Rehab Potential (OT)  good, to achieve stated therapy goals  -ES     Criteria for Skilled Therapeutic Interventions Met (OT)  yes  -ES     Therapy Frequency (OT)  5 times/wk  -ES     Row Name 03/03/21 1637          Vital Signs    Pre Systolic BP Rehab  124  -ES     Pre Treatment Diastolic BP  57  -ES     Intra Systolic BP Rehab  151  -ES     Intra Treatment Diastolic BP  63  -ES     Pretreatment Heart Rate (beats/min)  116  -ES     Intratreatment Heart Rate (beats/min)  123  -ES     Posttreatment Heart Rate (beats/min)  107  -ES     Pre SpO2 (%)  97  -ES     O2 Delivery Pre Treatment  room air  -ES     Intra SpO2 (%)  90  -ES     O2 Delivery Intra Treatment  room air  -ES     Post SpO2 (%)  93  -ES     O2 Delivery Post Treatment  room air  -ES     Pre Patient Position  Supine  -ES     Intra Patient Position  Standing  -ES     Post Patient Position  Sitting  -ES     Row Name 03/03/21 1637          Positioning and Restraints    Pre-Treatment Position  in bed  -ES     Post Treatment Position  chair  -ES     In Chair  notified nsg;call light within reach;encouraged to call for assist;with family/caregiver;exit alarm on  -ES       User Key  (r) = Recorded By, (t) = Taken By, (c) = Cosigned By    Initials Name Provider Type    Tahmina Crowley OT Occupational Therapist        Outcome Measures     Row Name 03/03/21 1652          How much help from another is currently needed...    Putting on and taking off regular lower body clothing?  2  -ES     Bathing (including washing, rinsing, and drying)  2  -ES     Toileting (which includes using toilet bed pan or urinal)  2  -ES     Putting on and taking off regular upper body clothing  2  -ES     Taking care of personal grooming (such as brushing teeth)  3  -ES     Eating meals  3  -ES     AM-PAC 6 Clicks Score (OT)  14  -ES     Row Name 03/03/21 1652          Functional Assessment    Outcome Measure Options  AM-PAC 6 Clicks Daily Activity (OT)  -ES       User Key   (r) = Recorded By, (t) = Taken By, (c) = Cosigned By    Initials Name Provider Type     Tahmina Parks OT Occupational Therapist        Occupational Therapy Education                 Title: PT OT SLP Therapies (In Progress)     Topic: Occupational Therapy (In Progress)     Point: ADL training (In Progress)     Description:   Instruct learner(s) on proper safety adaptation and remediation techniques during self care or transfers.   Instruct in proper use of assistive devices.              Learning Progress Summary           Patient Acceptance, E,TB, NR by ES at 3/3/2021 1654   Family Acceptance, E,TB, NR by ES at 3/3/2021 1654                   Point: Home exercise program (Not Started)     Description:   Instruct learner(s) on appropriate technique for monitoring, assisting and/or progressing therapeutic exercises/activities.              Learner Progress:  Not documented in this visit.          Point: Precautions (In Progress)     Description:   Instruct learner(s) on prescribed precautions during self-care and functional transfers.              Learning Progress Summary           Patient Acceptance, E,TB, NR by ES at 3/3/2021 1654   Family Acceptance, E,TB, NR by ES at 3/3/2021 1654                   Point: Body mechanics (In Progress)     Description:   Instruct learner(s) on proper positioning and spine alignment during self-care, functional mobility activities and/or exercises.              Learning Progress Summary           Patient Acceptance, E,TB, NR by ES at 3/3/2021 1654   Family Acceptance, E,TB, NR by ES at 3/3/2021 1654                               User Key     Initials Effective Dates Name Provider Type Discipline     03/01/19 -  Tahmina Parks OT Occupational Therapist OT              OT Recommendation and Plan  Planned Therapy Interventions (OT): activity tolerance training, adaptive equipment training, BADL retraining, cognitive/visual perception retraining, functional balance  retraining, neuromuscular control/coordination retraining, occupation/activity based interventions, orthotic fabrication/fitting/training, passive ROM/stretching, patient/caregiver education/training, ROM/therapeutic exercise, strengthening exercise, transfer/mobility retraining  Therapy Frequency (OT): 5 times/wk  Plan of Care Review  Plan of Care Reviewed With: patient, spouse  Outcome Summary: Pt 76 yo male adm for urosepsis. CT (-) for PE. Hx of THOMAS cirrhosis, multiple other comorbidities. Pt resides with spouse in a one-level home, utilizing cane and RW. Pt reports LB ADL limitations secondary to lumbar spine surgeries. Spouse assists with ADLs at baseline. Pt requiring signficantly more assist this date - Max A for LB ADLs and Min-Mod Ax2 for transfers.  Pt demos increased confusion, scoring 9/28 on short Blessed Test.  Pt spouse somewhat anasognostic regarding pt's condition, and reluctant to agree to IP. OT recommends IP rehab at this time, and will continue to follow 5x/week. PPE: Gloves, mask, eyewear     Time Calculation:   Time Calculation- OT     Row Name 03/03/21 1652             Time Calculation- OT    OT Start Time  1350  -ES      OT Stop Time  1418  -ES      OT Time Calculation (min)  28 min  -ES      Total Timed Code Minutes- OT  10 minute(s)  -ES      OT Non-Billable Time (min)  18 min  -ES      OT Received On  03/03/21  -ES      OT - Next Appointment  03/04/21  -ES      OT Goal Re-Cert Due Date  03/17/21  -ES        User Key  (r) = Recorded By, (t) = Taken By, (c) = Cosigned By    Initials Name Provider Type    ES Tahmina Parks OT Occupational Therapist        Therapy Charges for Today     Code Description Service Date Service Provider Modifiers Qty    88548255396 HC OT SELF CARE/MGMT/TRAIN EA 15 MIN 3/3/2021 Tahmina Parks OT GO 1    49216327881 HC OT EVAL MOD COMPLEXITY 3 3/3/2021 Tahmina Parks OT GO 1               Tahmina Parks OT  3/3/2021

## 2021-03-04 NOTE — PLAN OF CARE
Problem: Adult Inpatient Plan of Care  Goal: Plan of Care Review  Outcome: Ongoing, Progressing  Flowsheets (Taken 3/4/2021 1550)  Progress: declining  Plan of Care Reviewed With: patient  Outcome Summary: Pt demonstrated decline in mobility this session and demonstrated SOA/tachypenia throughout with RR 30-35 bpm (Sp02 >90% on 2L) and required frequent rest breaks 2*/2 fatigue/SOA. Pt required MAX A x 2 for rolling, supine to/from sit transfer, and required MOD A x to for sit to/from stand transfer from EOB, tolerating only brief 20-25 second standing bout with RW. Pt is far from baseline and will required IP rehab at d/c, as pt is high risk for falls and not safe for home. Continue seeing 5x/week at [facility] for progression of mobility. PPE: gloves, mask, safety glasses

## 2021-03-04 NOTE — DISCHARGE PLACEMENT REQUEST
"Jose Patel (75 y.o. Male)     Date of Birth Social Security Number Address Home Phone MRN    1945  [address] [place] [address] [phone] [id number]    Jewish Marital Status          None        Admission Date Admission Type Admitting Provider Attending Provider Department, Room/Bed    3/2/21 Urgent Celestino Menchaca MD Ozor, Uchenna, MD [facility] NEURO HEART, 270/1    Discharge Date Discharge Disposition Discharge Destination                       Attending Provider: Celestino Menchaca MD    Allergies: No Known Allergies    Isolation: None   Infection: None   Code Status: CPR    Ht: 188 cm (74\")   Wt: 151 kg (331 lb 12.7 oz)    Admission Cmt: None   Principal Problem: None                Active Insurance as of 3/2/2021     Primary Coverage     Payor Plan Insurance Group Employer/Plan Group    MEDICARE MEDICARE A & B      Payor Plan Address Payor Plan Phone Number Payor Plan Fax Number Effective Dates    [address] [phone]  6/1/2005 - None Entered    [facility] [id number]       Subscriber Name Subscriber Birth Date Member ID       JOSE PATEL 1945 [id number]           Secondary Coverage     Payor Plan Insurance Group Employer/Plan Group    [facility] [employer]      Payor Plan Address Payor Plan Phone Number Payor Plan Fax Number Effective Dates    [facility] [phone]  1/1/2019 - None Entered    [address]       [facility] [id number]       Subscriber Name Subscriber Birth Date Member ID       JOSE PATEL 1945 [id number]                 Emergency Contacts      (Rel.) Home Phone Work Phone Mobile Phone    osorio patel (Spouse) [phone] -- [phone]    RENAN BAE (Daughter) -- -- [phone]               History & Physical      Essing-Noy Proctor APRN at 03/02/21 2318     Attestation signed by Celestino Menchaca MD at 03/03/21 0906    Reviewed NP's documentation below                        [facility] " "[facility] Medicine Services      Patient Name: Bry Ratliff  : 1945  MRN: [id number]  Primary Care Physician: Paulette Harris MD  Date of admission: 3/2/2021    Patient Care Team:  Paulette Harris MD as PCP - General  Josefina Plascencia MD as Consulting Physician (Nephrology)  Alvaro Pandey MD as Consulting Physician (Cardiology)          Subjective   History Present Illness     Chief Complaint: fever           75-year-old male transferred from [facility] emergency department for further evaluation and treatment of reported urosepsis.  He has multiple medical problems.  He is awake and alert but poor historian for details.  He is oriented x2 but reports he has no complaints.  Per review of [facility] records, presented to [facility] emergency department with complaints of fever and dyspnea for the past 3 weeks and getting worse.  He was reported as negative for Covid and has had the first Covid vaccine.  Hest x-ray Per [facility] report:    \"Borderline heart size mild aortic tortuosity.  No gross infiltrate or edema no pleural findings.  Limited bone visualization due to body habitus.\"    BMI is 41.95, lactic acid per review of records was initially 3.4 he received 2 L normal saline IV fluid bolus and was 1.6 upon discharge from [facility].  Ammonia was less than 9 serum glucose was 277 creatinine 1.54 BUN 26 white blood cell count was 12.8 hemoglobin 9.4 UA Per [facility] showed trace bacteria and elevated WBCs.  He was started on IV Zosyn at [facility].  Troponin  was 0.03 D-dimer was 1.89.  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 Has reported IVC filter.  It does not appear a CT chest was ordered at outlying facility for elevated D-dimer.  CT chest ordered and pending.  Repeat CBC, CMP, urinalysis, lactic acid ordered and pending.  Will be admitted for further evaluation and treatment.      Review of Systems   Constitution: Negative.   HENT: Negative.    Eyes: Negative.    Cardiovascular: Negative.    Respiratory: Positive for shortness of breath.    Endocrine: Negative.    Hematologic/Lymphatic: Negative.    Skin: Positive for poor wound healing.   Musculoskeletal: Positive for arthritis, back pain and joint pain.   Gastrointestinal: Negative.    Genitourinary: Negative.    Neurological: Negative.    Psychiatric/Behavioral: Negative.    Allergic/Immunologic: Negative.    ,         Personal History     Past Medical History:   Past Medical History:   Diagnosis Date   • Anemia     iron deficiency   • Anxiety    • B12 deficiency 2009   • Balance disorder    • Syed's esophagus    • Bile duct leak    • CAD (coronary artery disease)     cardiac stent   • Constipation    • DDD (degenerative disc disease), lumbar    • Decubitus ulcer     buttocks   • Depression    • Diabetes mellitus (CMS/HCC)    • Disease of thyroid gland     hypothyroid   • Diverticular disease    • Dvt femoral (deep venous thrombosis) (CMS/HCC) 2004    rt let   • Elevated cholesterol    • Fistula of intestine    • Gastroparesis    • GERD (gastroesophageal reflux disease)    • Gout    • Hepatic encephalopathy (CMS/HCC)     if doesnt take meds   • History of echocardiogram     03/03/2017 - 12/03/2014 - 04/2012   • History of stomach ulcers    • History of transfusion    • Hyperlipidemia    • Incontinence    • Iron deficiency    • Kidney stones    • Morbid obesity (CMS/HCC)    • THOMAS (nonalcoholic steatohepatitis)    • Neuropathy    • OAB (overactive bladder)    • Open wound     rt knee   • KATHIA  treated with BiPAP     bring dos   • Peripheral edema    • Pulmonary embolus (CMS/HCC) 2004   • Renal insufficiency     stage 3   • S/P lumbar laminectomy    • Skin irritation     skin fold   • Stage 3 chronic kidney disease (CMS/HCC)        Surgical History:      Past Surgical History:   Procedure Laterality Date   • ABDOMINAL SURGERY     • BACK SURGERY  1971   • BILE DUCT STENT PLACEMENT     • BILE DUCT STENT PLACEMENT     • CARDIAC CATHETERIZATION     • CATARACT EXTRACTION  2014   • CATARACT EXTRACTION, BILATERAL  2014   • CHOLECYSTECTOMY  02/24/2019   • COLON RESECTION Right 6/11/2020    Procedure: COLON RESECTION RIGHT;  Surgeon: Naif Etienne DO;  Location: [facility] MAIN OR;  Service: General;  Laterality: Right;   • COLONOSCOPY  03/2018   • CORONARY ANGIOPLASTY  08/24/2009    PCI stent - succesful placement of 3.5/23 promus stent in proximal right coronary artery   • CORONARY ANGIOPLASTY WITH STENT PLACEMENT  08/27/2009    patient had stent placed to proximal right coronary artery   • CYSTOSCOPY BLADDER STONE LITHOTRIPSY  1997   • ENDOSCOPY N/A 10/18/2019    Procedure: ESOPHAGOGASTRODUODENOSCOPY with argon plasma coagulation of gastric antral vascular ectasia;  Surgeon: Marisel Gomez MD;  Location: [facility] ENDOSCOPY;  Service: Gastroenterology   • ENDOSCOPY N/A 1/9/2020    Procedure: ESOPHAGOGASTRODUODENOSCOPY WITH BIOPSY, ARGON PLASMA COAGULATION OF GASTRIC ANTREL VASCULAR ECTASIA,;  Surgeon: Marisel Gomez MD;  Location: [facility] ENDOSCOPY;  Service: Gastroenterology   • ENDOSCOPY N/A 3/6/2020    Procedure: ESOPHAGOGASTRODUODENOSCOPY;  Surgeon: Zbigniew Silva MD;  Location: [facility] ENDOSCOPY;  Service: Gastroenterology;  Laterality: N/A;  mild gave, post cautery ulcer     • ERCP N/A 11/20/2019    Procedure: ERCP, clearance of bile duct with balloon, placement of biliary stent, EGD with argon plasma coagulation of gastric antral vascular ectasia;  Surgeon: Marisel Gomez MD;  Location: [facility] ENDOSCOPY;   Service: Gastroenterology   • ERCP N/A 2/27/2020    Procedure: ENDOSCOPIC RETROGRADE CHOLANGIOPANCREATOGRAPHY with removal of biliary stent, brushing, dilation, and placement of biliary stent x 2 and Esophagogastroduodenoscopy with argon plasma coagulation;  Surgeon: Marisel Gomez MD;  Location: [facility] ENDOSCOPY;  Service: Gastroenterology;  Laterality: N/A;  Gastric antral vascular ectasia, common bile duct stricture   • ERCP N/A 4/20/2020    Procedure: ENDOSCOPIC RETROGRADE CHOLANGIOPANCREATOGRAPHY WITH REMOVAL OF BILIARY STENT, AMPULA DILATION TO 8MM, BRUSHING OF COMMON BILE DUT, CLEARANCE OF BILE DUCT WITH BALLOON, BIOPSY OF AMPULA, PLACEMENT OF BILIARY STENT;  Surgeon: Marisel Gomez MD;  Location: [facility] ENDOSCOPY;  Service: Gastroenterology;  Laterality: N/A;  POST:CBD STRICTURE   • ERCP N/A 11/12/2020    Procedure: ENDOSCOPIC RETROGRADE CHOLANGIOPANCREATOGRAPHY WITH BALLOON CLEARANCE OF COMMON BILE DUCT, BRUSHING OF COMMON BILE DUCT STRICTURE, BIOPSY X 1 AREA, PLACEMENT OF BILIARY STENT;  Surgeon: Marisel Gomez MD;  Location: [facility] ENDOSCOPY;  Service: Gastroenterology;  Laterality: N/A;  Post op:REMOVAL OF SLUDGE, SUCCESSFUL STENT PLACEMENT   • OTHER SURGICAL HISTORY  11/2018    IVC filter implant   • RENAL ARTERY STENT Left     does not recall this   • UPPER ENDOSCOPIC ULTRASOUND W/ FNA N/A 4/20/2020    Procedure: Esophagogastroduodenoscopy with Endoscopic ultrasound and fine needle aspiration;  Surgeon: Marisel Gomez MD;  Location: [facility] ENDOSCOPY;  Service: Gastroenterology;  Laterality: N/A;  Post: CBD STRICTURE   • VENA CAVA FILTER INSERTION  12/05/2018           Family History: family history includes Hyperlipidemia in an other family member; Hypertension in an other family member. Otherwise pertinent FHx was reviewed and unremarkable.     Social History:  reports that he has never smoked. He has never used smokeless tobacco. He reports that he does not drink alcohol or use  "drugs.      Medications:  Prior to Admission medications    Medication Sig Start Date End Date Taking? Authorizing Provider   allopurinol (ZYLOPRIM) 100 MG tablet Take 100 mg by mouth 2 (Two) Times a Day.    Mary Young MD   aspirin 81 MG tablet Take 1 tablet by mouth Daily.  Patient taking differently: Take 81 mg by mouth Daily. Hold DOS 10/26/19   Celestino Menchaca MD B-D 3CC LUER-YASMEEN SYR 25GX1\" 25G X 1\" 3 ML misc USE FOR THE B12 INJECTION INTRAMUSCULAR ONCE MONTHLY 4/7/20   Mary Young MD   cyanocobalamin 1000 MCG/ML injection Inject 1,000 mcg into the appropriate muscle as directed by prescriber Every 28 (Twenty-Eight) Days. 6/7/20   Mary Young MD   docusate sodium (COLACE) 100 MG capsule Take 100 mg by mouth 2 (Two) Times a Day.    Mary Young MD   DULoxetine (CYMBALTA) 60 MG capsule Take 60 mg by mouth Daily. Take preop    Mary Young MD   ezetimibe (Zetia) 10 MG tablet Take 1 tablet by mouth Daily. 8/31/20 8/31/21  Alejandra Amaro MD   ferrous gluconate (FERGON) 324 MG tablet Take  by mouth Daily. 10/10/20   Mary Young MD   folic acid (FOLVITE) 1 MG tablet Take 1 mg by mouth Daily. Last dose 6/6 12/11/18   Mary Young MD   hydrOXYzine pamoate (VISTARIL) 25 MG capsule Take 25 mg by mouth 3 (Three) Times a Day As Needed. Take preop if needed 11/26/19   Mary Young MD   insulin glargine (Lantus) 100 UNIT/ML injection Inject 54 units at bedtime 2/25/21   Alejandra Amaro MD   insulin lispro (HumaLOG) 100 UNIT/ML injection 20 units subcu before each meal plus sliding scale MDD 60 2/25/21   Alejandra Amaro MD   Insulin Syringe-Needle U-100 (BD Insulin Syringe U/F) 31G X 5/16\" 1 ML misc Used to inject insulin twice a day. 3/1/21   Alejandra Amaro MD   lactulose (CHRONULAC) 10 GM/15ML solution Take 60 mL by mouth Every 4 (Four) Hours. Will not take dos am    Provider, MD Mary   levothyroxine (SYNTHROID, LEVOTHROID) 75 MCG tablet " Take 1 tablet by mouth Daily.  Patient taking differently: Take 75 mcg by mouth Daily. Take preop 5/19/20   Alejandra Amaro MD   magnesium oxide (MAG-OX) 400 MG tablet Take 400 mg by mouth Daily. dont take preop 9/11/18   Mary Young MD   melatonin 5 MG tablet tablet Take 10 mg by mouth At Night As Needed.    Mary Young MD   Multiple Vitamins-Minerals (MULTIVITAMIN WITH MINERALS) tablet tablet Take 1 tablet by mouth Daily.    Mary Young MD   nitroglycerin (NITROSTAT) 0.4 MG SL tablet Place 1 tablet under the tongue Every 5 (Five) Minutes As Needed for Chest Pain (Only if SBP Greater Than 100). Take no more than 3 doses in 15 minutes.  Patient taking differently: Place 0.4 mg under the tongue Every 5 (Five) Minutes As Needed for Chest Pain (Only if SBP Greater Than 100). Take no more than 3 doses in 15 minutes. hasnt needed 3/7/20   Hawk Garner MD   OneTouch Verio test strip Check BS 4 times a day 3/1/21   Alejandra Amaro MD   oxyCODONE (ROXICODONE) 10 MG tablet Take 10 mg by mouth Every 8 (Eight) Hours As Needed. Take preop if needed 5/8/20   Mary Young MD   pantoprazole (PROTONIX) 40 MG EC tablet Take 1 tablet by mouth 2 (Two) Times a Day.  Patient taking differently: Take 40 mg by mouth Daily. 10/19/19   Celestino Menchaca MD   rifaximin (XIFAXAN) 550 MG tablet Take 550 mg by mouth Every 12 (Twelve) Hours. Take preop 12/11/18   Mary Young MD   sodium bicarbonate 650 MG tablet Take 650 mg by mouth 3 (Three) Times a Day. Take preop    Mary Young MD   zinc sulfate (ZINCATE) 220 (50 Zn) MG capsule Take 220 mg by mouth Daily. dont take dos 3/16/20   Mary Young MD       Allergies:  No Known Allergies    Objective   Objective     Vital Signs  Temp:  [97.8 °F (36.6 °C)-100.1 °F (37.8 °C)] 100.1 °F (37.8 °C)  Heart Rate:  [99] 99  Resp:  [23-25] 23  BP: (131-139)/(56-65) 139/65  SpO2:  [99 %] 99 %  on   ;   Device (Oxygen Therapy): CPAP  Body  mass index is 41.95 kg/m².    Physical Exam  Vitals signs reviewed.   Constitutional:       Appearance: Normal appearance. He is obese.   HENT:      Head: Normocephalic and atraumatic.      Right Ear: External ear normal.      Left Ear: External ear normal.      Nose: Nose normal.      Mouth/Throat:      Mouth: Mucous membranes are moist.   Eyes:      Extraocular Movements: Extraocular movements intact.   Neck:      Musculoskeletal: Normal range of motion and neck supple.   Cardiovascular:      Rate and Rhythm: Normal rate. Rhythm irregular.      Heart sounds: Normal heart sounds.   Pulmonary:      Breath sounds: Normal breath sounds.   Abdominal:      Palpations: Abdomen is soft.   Genitourinary:     Comments: deferred  Musculoskeletal: Normal range of motion.   Skin:     General: Skin is warm and dry.   Neurological:      General: No focal deficit present.      Mental Status: He is alert.      Comments: Oriented x2 poor historian   Psychiatric:         Mood and Affect: Mood normal.         Behavior: Behavior normal.      Comments: Poor historian but pleasant and cooperative            Results Review:  I have personally reviewed most recent lab results and agree with findings, most notably: all labs and cxr from [facility].    Results from last 7 days   Lab Units 03/03/21  0214   WBC 10*3/mm3 10.90*   HEMOGLOBIN g/dL 8.4*   HEMATOCRIT % 24.4*   PLATELETS 10*3/mm3 89*     Results from last 7 days   Lab Units 03/03/21  0214   SODIUM mmol/L 135*   POTASSIUM mmol/L 3.7   CHLORIDE mmol/L 106   CO2 mmol/L 21.0*   BUN mg/dL 24*   CREATININE mg/dL 1.52*   GLUCOSE mg/dL 199*   CALCIUM mg/dL 7.8*   ALT (SGPT) U/L 14   AST (SGOT) U/L 13   LACTATE mmol/L 1.3   PROCALCITONIN ng/mL 0.76*     Estimated Creatinine Clearance: 65.7 mL/min (A) (by C-G formula based on SCr of 1.52 mg/dL (H)).  Brief Urine Lab Results  (Last result in the past 365 days)      Color   Clarity   Blood   Leuk Est   Nitrite   Protein   CREAT   Urine HCG         02/23/21 1011             69.7             Microbiology Results (last 10 days)     ** No results found for the last 240 hours. **          ECG/EMG Results (most recent)     Procedure Component Value Units Date/Time    ECG 12 Lead [[id number]] Collected: 03/02/21 2332     Updated: 03/02/21 2334     QT Interval 388 ms     Narrative:      HEART RATE= 94  bpm  RR Interval= 640  ms  CA Interval=   ms  P Horizontal Axis=   deg  P Front Axis=   deg  QRSD Interval= 92  ms  QT Interval= 388  ms  QRS Axis= -20  deg  T Wave Axis= 58  deg  - ABNORMAL ECG -  Atrial fibrillation  Electronically Signed By:   Date and Time of Study: 2021-03-02 23:32:13          Results for orders placed during the hospital encounter of 03/03/20   Duplex Venous Lower Extremity - Bilateral CAR    Narrative · Normal bilateral lower extremity venous duplex scan.          Results for orders placed during the hospital encounter of 03/03/20   Adult Transthoracic Echo Complete W/ Cont if Necessary Per Protocol    Narrative · The left ventricular cavity is mildly dilated.  · Estimated EF = 50%.  · Left ventricular systolic function is low normal.  · Right ventricular cavity is mildly dilated.  · Mild mitral valve regurgitation is present  · Mild tricuspid valve regurgitation is present.  · Mild dilation of the aortic root is present.          No radiology results for the last 7 days      Estimated Creatinine Clearance: 65.7 mL/min (A) (by C-G formula based on SCr of 1.52 mg/dL (H)).    Assessment/Plan   Assessment/Plan       Active Hospital Problems    Diagnosis  POA   • Sepsis (CMS/[facility]) [A41.9]  Yes      Resolved Hospital Problems   No resolved problems to display.     Reported Urosepsis, Lactic was 3.4 at [facility] improved after 2L NS bolus, now, 1.3, continue IV Zosyn pharmacy to dose blood culture reordered. Blood and urine cultures done at [facility] and pending,NS IVF, trend lactic procalcitonin elevated ' serum WBC 10.9    Dyspnea, may be multifactorial no  [facility]iltr[facility] D-dimer was elevated at [facility] facility 1.8 CT chest PE protocol ordered here given past medical history of DVT and PE, meds unverified , has reported IVC filter , troponin not elevated    Diabetes mellitus type 2 with serum glucose 199, SSI as needed and Accu-Cheks AC at bedtime home meds unverified pending review    Thrombocytopenia platelets 89, were 129 2 weeks ago  anemia of chronic kidney disease hemoglobin 8.4, was 10.3 2 weeks ago no signs of bleeding trend CBC    Nonalcoholic cirrhosis, ammonia not elevated at [facility] facility repeat ammonia in a.m. meds unverified was on right fax admission and lactulose in the past pending review    Hypothyroidism Synthroid dose not verified pending review chronic kidney disease stage III creatinine 1.52 home meds unverified was on sodium bicarb reordered pending review void nephrotoxic meds or message to dose Zosyn depression was on Cymbalta meds unverified not ordered pending review GERD was on PPI meds unverified pending review    Chronic lower back pain post laminectomy home meds unverified pending review    Morbid obesity BMI 41.9 lifestyle management education if receptive    Mild hypocalcemia 7.8 ionized calcium pending    VTE Prophylaxis -   Mechanical Order History:      Ordered        03/02/21 2317  Place Sequential Compression Device  Once         03/02/21 2317  Maintain Sequential Compression Device  Continuous                 Pharmalogical Order History:     None          CODE STATUS:    Code Status and Medical Interventions:   Ordered at: 03/02/21 2318     Code Status:    CPR     Medical Interventions (Level of Support Prior to Arrest):    Full       This patient has been examined wearing appropriate Personal Protective Equipment . 03/03/21      I discussed the patient's findings and my recommendations with patient and RN at bedside .      Signature:Electronically signed by SHAJI Alvares,  03/03/21, 4:30 AM EST.    [facility]ist Team          Electronically signed by Celestino Menchaca MD at 03/03/21 0906       {Outbreak/Travel/Exposure Documentation......;  Question Available Choices Patient Response   Outbreak Screen: Do you currently have a new onset of the following symptoms?        Fever/Chils, Cough, Shortness of air, Loss of taste or smell, No, Unknown  Unknown (03/02/21 1655)   Outbreak Screen: In the last 14 days, have you had contact with anyone who is ill, has show any of the symptoms listed above and/or has been diagnosis with the 2019 Novel Coronavirus? This includes any immediate household members but excludes any patients with whom you have been in contact within your normal work duties wearing proper PPE, if you are a healthcare worker.  Yes, No, Unknown              Unknown (03/02/21 1655)   Outbreak Screen: Who was notified?    Free text  (not recorded)   Travel Screen: Have you traveled in the last month? If so, to what country have you traveled? If [place] what state? Yes, No, Unknown  List of all countries  List of all States No (03/02/21 2344)  (not recorded)  (not recorded)   Infection Risk: Do you currently have the following symptoms?  (If cough is selected, the Tuberculosis Screen is performed.) Cough, Fever, Rash, No No (03/02/21 2344)   Tuberculosis Screen: Do you have any of the following Tuberculosis Risks?  · Have you lived or spent time with anyone who had or may have TB?  · Have you lived in or visited any of the following areas for more than one month: [place], [place], [place], [place] or [place], the [place] or [place]?  · Do you have HIV/AIDS?  · Have you lived in or worked in a nursing home, homeless shelter, correctional facility, or substance abuse treatment facility?   · No    If Yes do you have any of the following symptoms? Yes responses display to the right    If Yes, symptoms listed are:  Cough greater than or equal to 3 weeks, Loss of  appetite, Unexplained weight loss, Night sweats, Bloody sputum or hemoptysis, Hoarseness, Fever, Fatigue, Chest pain, No (not recorded)  (not recorded)   Exposure Screen: Have you been exposed to any of these contagious diseases in the last month? Measles, Chickenpox, Meningitis, Pertussis, Whooping Cough, No No (03/02/21 7394)         Current Facility-Administered Medications   Medication Dose Route Frequency Provider Last Rate Last Admin   • allopurinol (ZYLOPRIM) tablet 100 mg  100 mg Oral BID Celestino Menchaca MD   100 mg at 03/03/21 2104   • aspirin EC tablet 81 mg  81 mg Oral Daily Celestino Menchaca MD   81 mg at 03/04/21 0823   • dextrose (D50W) 25 g/ 50mL Intravenous Solution 25 g  25 g Intravenous Q15 Min PRN Noy Brooks APRN       • dextrose (GLUTOSE) oral gel 15 g  15 g Oral Q15 Min PRN Noy Brooks APRN       • docusate sodium (COLACE) capsule 100 mg  100 mg Oral BID Celestino Menchaca MD   100 mg at 03/04/21 0824   • DULoxetine (CYMBALTA) DR capsule 60 mg  60 mg Oral Daily Celestino Menchaca MD   60 mg at 03/04/21 0823   • ferrous sulfate EC tablet 324 mg  324 mg Oral Daily With Breakfast Celestino Menchaca MD   324 mg at 03/04/21 0824   • folic acid (FOLVITE) tablet 1 mg  1 mg Oral Daily Celestino Menchaca MD   1 mg at 03/04/21 0824   • glucagon (human recombinant) (GLUCAGEN DIAGNOSTIC) injection 1 mg  1 mg Subcutaneous PRN Noy Brooks APRN       • hydrOXYzine (ATARAX) tablet 25 mg  25 mg Oral TID PRN Celestino Menchaca MD       • insulin glargine (LANTUS, SEMGLEE) injection 20 Units  20 Units Subcutaneous Nightly Celestino Menchaca MD   20 Units at 03/03/21 2104   • insulin lispro (ADMELOG) injection 0-14 Units  0-14 Units Subcutaneous TID AC Noy Brooks APRN   5 Units at 03/04/21 1238    And   • insulin lispro (ADMELOG) injection 0-14 Units  0-14 Units Subcutaneous PRN Noy Brooks APRN       • insulin lispro (ADMELOG) injection 10 Units  10 Units Subcutaneous TID With Meals  Celestino Menchaca MD   10 Units at 03/04/21 1238   • ipratropium-albuterol (DUO-NEB) nebulizer solution 3 mL  3 mL Nebulization Q4H PRN Celestino Menchaca MD   3 mL at 03/04/21 0915   • lactulose (CHRONULAC) 10 GM/15ML solution 30 g  30 g Oral TID Celestino Menchaca MD   30 g at 03/04/21 0823   • levothyroxine (SYNTHROID, LEVOTHROID) tablet 75 mcg  75 mcg Oral Daily Celestino Menchaca MD   75 mcg at 03/04/21 0613   • magnesium oxide (MAG-OX) tablet 400 mg  400 mg Oral Daily Celestino Menchaca MD   400 mg at 03/04/21 0823   • multivitamin with minerals 1 tablet  1 tablet Oral Daily Celestion Menchaca MD   1 tablet at 03/04/21 0824   • pantoprazole (PROTONIX) EC tablet 40 mg  40 mg Oral BID Celestino Menchaca MD   40 mg at 03/04/21 0823   • riFAXIMin (XIFAXAN) tablet 550 mg  550 mg Oral Q12H Celestino Menchaca MD   550 mg at 03/04/21 0823   • sodium bicarbonate tablet 650 mg  650 mg Oral TID Celestino Menchaca MD   650 mg at 03/04/21 0823   • zinc sulfate (ZINCATE) capsule 220 mg  220 mg Oral Daily Celestino Menchaca MD   220 mg at 03/04/21 0823        Physician Progress Notes (most recent note)      Celestino Menchaca MD at 03/03/21 0907                [facility] Medicine Services Daily Progress Note      Hospitalist Team  LOS 1 days      Patient Care Team:  Paulette Harris MD as PCP - General  Josefina Plascencia MD as Consulting Physician (Nephrology)  Alvaro Pandey MD as Consulting Physician (Cardiology)    Patient Location: 270/1      Subjective   Subjective     Chief Complaint / Subjective  No chief complaint on file.        Brief Synopsis of Hospital Course/HPI  75-year-old man with multiple morbidities including Gonzalez cirrhosis, CKD stage III, CAD status post previous stenting and hypothyroidism.  Also has iron deficiency anemia and coagulation factor deficiency for which he follows up with the hematologist.  Patient had presented to an [facility] ED with complaints of progressively worsening shortness of  "breath, generalized body weakness and subjective fever for the last 3 weeks.  In the ED patient was stated to be hypotensive but responded to IV fluid resuscitation.  He was given empirical antibiotics after blood culture was obtained and transferred to [facility] with diagnosis of possible urosepsis.    Date::    3/3/2021  Patient seen and examined  Continues with generalized body weakness  He is saturating 97% on room air  Blood pressure stable      Review of Systems   Constitution: Negative for chills and fever.   HENT: Negative for congestion.    Eyes: Negative for blurred vision.   Cardiovascular: Negative for chest pain.   Endocrine: Negative for cold intolerance and heat intolerance.   Gastrointestinal: Negative for abdominal pain, nausea and vomiting.   Genitourinary: Negative for flank pain.   Neurological: Positive for weakness.   Psychiatric/Behavioral: Negative for altered mental status.         Objective   Objective      Vital Signs  Temp:  [97.8 °F (36.6 °C)-100.3 °F (37.9 °C)] 100.3 °F (37.9 °C)  Heart Rate:  [96-99] 96  Resp:  [23-25] 23  BP: (124-139)/(56-65) 124/60  Oxygen Therapy  SpO2: 95 %  Pulse Oximetry Type: Continuous  Device (Oxygen Therapy): CPAP  Flowsheet Rows      First Filed Value   Admission Height  188 cm (74\") Documented at 03/02/2021 2250   Admission Weight  (!) 148 kg (326 lb 11.6 oz) Documented at 03/02/2021 2250        Intake & Output (last 3 days)       02/28 0701 - 03/01 0700 03/01 0701 - 03/02 0700 03/02 0701 - 03/03 0700 03/03 0701 - 03/04 0700    Urine (mL/kg/hr)   580     Total Output   580     Net   -580                 Lines, Drains & Airways    Active LDAs     Name:   Placement date:   Placement time:   Site:   Days:    Peripheral IV 03/02/21 2324 Left;Posterior Hand   03/02/21 2324    Hand   less than 1    Peripheral IV 03/03/21 0516 Left;Posterior Forearm   03/03/21 0516    Forearm   less than 1                  Physical Exam:    Physical Exam  Vitals " signs reviewed.   Constitutional:       General: He is not in acute distress.     Appearance: He is obese.   HENT:      Head: Normocephalic and atraumatic.      Nose: Nose normal.      Mouth/Throat:      Mouth: Mucous membranes are moist.   Eyes:      Extraocular Movements: Extraocular movements intact.      Conjunctiva/sclera: Conjunctivae normal.      Pupils: Pupils are equal, round, and reactive to light.   Neck:      Musculoskeletal: Neck supple.   Cardiovascular:      Rate and Rhythm: Normal rate and regular rhythm.   Pulmonary:      Effort: No respiratory distress.      Breath sounds: Normal breath sounds. No wheezing or rales.   Abdominal:      General: Bowel sounds are normal.      Palpations: Abdomen is soft.      Tenderness: There is no abdominal tenderness.   Musculoskeletal:      Comments: Degenerative changes   Skin:     General: Skin is warm and dry.   Neurological:      Mental Status: He is alert and oriented to person, place, and time.   Psychiatric:         Mood and Affect: Mood normal.              Wounds (last 24 hours)      LDA Wound     Row Name 03/03/21 0324 03/03/21 0058 03/02/21 2314       Wound 03/02/21 2312 Left anterior hip    Wound - Properties Group Placement Date: 03/02/21  -AA Placement Time: 2312 -AA Side: Left  -AA Orientation: anterior  -AA Location: hip  -AA Stage, Pressure Injury : other (see comments)  -AA, Moisture associated     Wound Image  --  Images linked: 1  -AA  --    Dressing Appearance  --  --  open to air  -AA    Closure  Open to air  -AA  --  Open to air  -AA    Base  red  -AA  --  red  -AA    Retired Wound - Properties Group Date first assessed: 03/02/21  -AA Time first assessed: 2312  -AA Side: Left  -AA Location: hip  -AA      User Key  (r) = Recorded By, (t) = Taken By, (c) = Cosigned By    Initials Name Provider Type    Ruben Victor RN Registered Nurse          Procedures:              Results Review:     I reviewed the patient's new clinical  results.      Lab Results (last 24 hours)     Procedure Component Value Units Date/Time    POC Glucose Once [[id number]]  (Abnormal) Collected: 03/03/21 0829    Specimen: Blood Updated: 03/03/21 0831     Glucose 168 mg/dL      Comment: Serial Number: [id number]:  [id number]       Calcium, Ionized [[id number]]  (Abnormal) Collected: 03/03/21 0613    Specimen: Blood Updated: 03/03/21 0651     Ionized Calcium 1.18 mmol/L     Ammonia [[id number]]  (Normal) Collected: 03/03/21 0613    Specimen: Blood Updated: 03/03/21 0641     Ammonia 23 umol/L     Lactic Acid, Plasma [[id number]]  (Normal) Collected: 03/03/21 0613    Specimen: Blood Updated: 03/03/21 0641     Lactate 1.5 mmol/L     Urinalysis, Microscopic Only - Urine, Clean Catch [[id number]]  (Abnormal) Collected: 03/03/21 0518    Specimen: Urine, Clean Catch Updated: 03/03/21 0630     RBC, UA 6-12 /HPF      WBC, UA 3-5 /HPF      Bacteria, UA Trace /HPF      Squamous Epithelial Cells, UA 0-2 /HPF      Hyaline Casts, UA None Seen /LPF      Amorphous Crystals, UA Small/1+ /HPF      Methodology Manual Light Microscopy    Urinalysis With Culture If Indicated - Urine, Clean Catch [[id number]]  (Abnormal) Collected: 03/03/21 0518    Specimen: Urine, Clean Catch Updated: 03/03/21 0550     Color, UA Yellow     Appearance, UA Hazy     Comment: Result checked visual confirmation        pH, UA <=5.0     Specific Gravity, UA 1.020     Glucose, UA Negative     Ketones, UA Negative     Bilirubin, UA Negative     Blood, UA Large (3+)     Protein, UA 30 mg/dL (1+)     Leuk Esterase, UA Negative     Nitrite, UA Negative     Urobilinogen, UA 0.2 E.U./dL    Procalcitonin [[id number]]  (Abnormal) Collected: 03/03/21 0214    Specimen: Blood Updated: 03/03/21 0252     Procalcitonin 0.76 ng/mL     Narrative:      As a Marker for Sepsis (Non-Neonates):   1. <0.5 ng/mL represents a low risk of severe sepsis and/or septic shock.  1. >2 ng/mL represents a high risk of severe sepsis and/or septic  "shock.    As a Marker for Lower Respiratory Tract Infections that require antibiotic therapy:  PCT on Admission     Antibiotic Therapy             6-12 Hrs later  > 0.5                Strongly Recommended            >0.25 - <0.5         Recommended  0.1 - 0.25           Discouraged                   Remeasure/reassess PCT  <0.1                 Strongly Discouraged          Remeasure/reassess PCT      As 28 day mortality risk marker: \"Change in Procalcitonin Result\" (> 80 % or <=80 %) if Day 0 (or Day 1) and Day 4 values are available. Refer to http://[email or web address]/   Change in PCT <=80 %   A decrease of PCT levels below or equal to 80 % defines a positive change in PCT test result representing a higher risk for 28-day all-cause mortality of patients diagnosed with severe sepsis or septic shock.  Change in PCT > 80 %   A decrease of PCT levels of more than 80 % defines a negative change in PCT result representing a lower risk for 28-day all-cause mortality of patients diagnosed with severe sepsis or septic shock.                Results may be falsely decreased if patient taking Biotin.     CBC & Differential [[id number]]  (Abnormal) Collected: 03/03/21 0214    Specimen: Blood Updated: 03/03/21 0250    Narrative:      The following orders were created for panel order CBC & Differential.  Procedure                               Abnormality         Status                     ---------                               -----------         ------                     Scan Slide[[id number]]                                       Final result               CBC Auto Differential[[id number]]        Abnormal            Final result                 Please view results for these tests on the individual orders.    CBC Auto Differential [[id number]]  (Abnormal) Collected: 03/03/21 0214    Specimen: Blood Updated: 03/03/21 0250     WBC 10.90 10*3/mm3      RBC 2.45 10*6/mm3      Hemoglobin 8.4 g/dL      Hematocrit 24.4 %      " .0 fL      MCH 34.2 pg      MCHC 34.3 g/dL      RDW 19.3 %      RDW-SD 67.8 fl      MPV 8.8 fL      Platelets 89 10*3/mm3     Scan Slide [[id number]] Collected: 03/03/21 0214    Specimen: Blood Updated: 03/03/21 0250     Scan Slide --     Comment: See Manual Differential Results       Manual Differential [[id number]]  (Abnormal) Collected: 03/03/21 0214    Specimen: Blood Updated: 03/03/21 0250     Neutrophil % 55.0 %      Lymphocyte % 7.0 %      Monocyte % 18.0 %      Bands %  19.0 %      Metamyelocyte % 1.0 %      Neutrophils Absolute 8.07 10*3/mm3      Lymphocytes Absolute 0.76 10*3/mm3      Monocytes Absolute 1.96 10*3/mm3      Dacrocytes Slight/1+     Poikilocytes Slight/1+     RBC Fragments Slight/1+     WBC Morphology Normal     Platelet Estimate Decreased    Lactic Acid, Plasma [[id number]]  (Normal) Collected: 03/03/21 0214    Specimen: Blood Updated: 03/03/21 0247     Lactate 1.3 mmol/L     Comprehensive Metabolic Panel [[id number]]  (Abnormal) Collected: 03/03/21 0214    Specimen: Blood Updated: 03/03/21 0245     Glucose 199 mg/dL      BUN 24 mg/dL      Creatinine 1.52 mg/dL      Sodium 135 mmol/L      Potassium 3.7 mmol/L      Chloride 106 mmol/L      CO2 21.0 mmol/L      Calcium 7.8 mg/dL      Total Protein 6.3 g/dL      Albumin 2.60 g/dL      ALT (SGPT) 14 U/L      AST (SGOT) 13 U/L      Alkaline Phosphatase 82 U/L      Total Bilirubin 0.7 mg/dL      eGFR Non African Amer 45 mL/min/1.73      Globulin 3.7 gm/dL      A/G Ratio 0.7 g/dL      BUN/Creatinine Ratio 15.8     Anion Gap 8.0 mmol/L     Narrative:      GFR Normal >60  Chronic Kidney Disease <60  Kidney Failure <15      Hemoglobin A1c [[id number]] Collected: 03/03/21 0214    Specimen: Blood Updated: 03/03/21 0222    Blood Culture - Blood, Arm, Right [[id number]] Collected: 03/03/21 0214    Specimen: Blood from Arm, Right Updated: 03/03/21 0222    Blood Culture - Blood, Arm, Left [[id number]] Collected: 03/02/21 2298    Specimen: Blood from Arm,  Left Updated: 03/03/21 0038    POC Glucose Once [[id number]]  (Abnormal) Collected: 03/02/21 2314    Specimen: Blood Updated: 03/02/21 2316     Glucose 206 mg/dL      Comment: Serial Number: [id number]:  [id number]           No results found for: HGBA1C            Lab Results   Component Value Date    LIPASE 26 10/16/2019     Lab Results   Component Value Date    CHOL 154 02/23/2021    TRIG 77 02/23/2021    HDL 62 (H) 02/23/2021    LDL 77 02/23/2021       Lab Results   Lab Value Date/Time    INTRAOP  04/20/2020 1239     FNA of common bile duct stricture, immediate evaluation    Pass 1: Blood only.  Pass 2: Blood and scant benign duct epithelium.  Pass 3: Blood only.  Pass 4: Blood, stroma and scant benign duct epithelium.    Initial cytology interpretation performed by Bry Harrell MD.        FINALDX  11/12/2020 1257     Mucosa, common bile duct, biopsy:    Acute and chronically inflamed small bowel type mucosa with glandular architectural distortion    No dysplasia or malignancy identified    See comment    WALESKA/sms       FINALDX  11/12/2020 1253     Common bile duct stricture with brushings:    Scattered atypical glandular cells in a background of benign duct epithelium and inflammation    See comment    WALESKA/sms       COMDX  11/12/2020 1257     Deeper levels were examined through the block but were not further contributory. Clinical correlation is recommended.     WALESKA/sms       COMDX  11/12/2020 1253     Routine sections show smears with predominantly benign sheets of duct epithelial cells. There are a few groups with enlarged and somewhat disorganized nuclei which are favored to be reactive, however, clinical correlation and followup are recommended.     WALESKA/sms          Microbiology Results (last 10 days)     ** No results found for the last 240 hours. **          ECG/EMG Results (most recent)     Procedure Component Value Units Date/Time    ECG 12 Lead [[id number]] Collected: 03/02/21 2332     Updated:  03/02/21 2334     QT Interval 388 ms     Narrative:      HEART RATE= 94  bpm  RR Interval= 640  ms  DE Interval=   ms  P Horizontal Axis=   deg  P Front Axis=   deg  QRSD Interval= 92  ms  QT Interval= 388  ms  QRS Axis= -20  deg  T Wave Axis= 58  deg  - ABNORMAL ECG -  Atrial fibrillation  Electronically Signed By:   Date and Time of Study: 2021-03-02 23:32:13          Results for orders placed during the hospital encounter of 03/03/20   Duplex Venous Lower Extremity - Bilateral CAR    Narrative · Normal bilateral lower extremity venous duplex scan.          Results for orders placed during the hospital encounter of 03/03/20   Adult Transthoracic Echo Complete W/ Cont if Necessary Per Protocol    Narrative · The left ventricular cavity is mildly dilated.  · Estimated EF = 50%.  · Left ventricular systolic function is low normal.  · Right ventricular cavity is mildly dilated.  · Mild mitral valve regurgitation is present  · Mild tricuspid valve regurgitation is present.  · Mild dilation of the aortic root is present.          Xr Chest 1 View    Result Date: 3/3/2021  No acute cardiopulmonary abnormality.  Electronically Signed By-Shady Fischer MD On:3/3/2021 8:22 AM This report was finalized on [id number] by  Shady Fischer MD.    Ct Chest Pulmonary Embolism    Result Date: 3/3/2021  1. No evidence pulmonary embolism. 2. Small right pleural effusion with mild right basilar airspace disease likely representing atelectasis and or superimposed infection. 3. Cardiomegaly with mild interlobular septal thickening which can be seen with early interstitial edema changes.    Electronically Signed By-Shady Fischer MD On:3/3/2021 7:45 AM This report was finalized on [id number] by  Shady Fischer MD.          Xrays, labs reviewed personally by physician.    Medication Review:   I have reviewed the patient's current medication list      Scheduled Meds  insulin lispro, 0-14 Units, Subcutaneous, TID  AC  piperacillin-tazobactam, 4.5 g, Intravenous, Q8H        Meds Infusions  Pharmacy to Dose Zosyn,   sodium chloride, 125 mL/hr, Last Rate: 125 mL/hr (03/03/21 0048)        Meds PRN  •  dextrose  •  dextrose  •  glucagon (human recombinant)  •  insulin lispro **AND** insulin lispro  •  Pharmacy to Dose Zosyn        Assessment/Plan   Assessment/Plan     Active Hospital Problems    Diagnosis  POA   • Sepsis (CMS/HCC) [A41.9]  Yes      Resolved Hospital Problems   No resolved problems to display.       MEDICAL DECISION MAKING COMPLEXITY BY PROBLEM:     Generalized body weakness with subjective fever and shortness of breath  Chest x-ray from outlying ED showed no cardiopulmonary abnormality  Will obtain CT chest with no contrast  Blood culture pending  Respiratory viral panel with COVID-19-insignificant  Procalcitonin 0.70  Continue empirical antibiotics with Zosyn for now  Respiratory care with bronchodilators    Sepsis ruled out    Diabetes mellitus type 2  On Lantus, Premeal insulin and sliding scale insulin  Monitor blood sugar and adjust insulin as needed    CKD stage IIIa  Serum creatinine around baseline  On sodium bicarb  Monitor      Anemia of chronic disease  Has a history of JOSE with malabsorption and IgG kappa MGUS  Patient on supplementary iron  Hemoglobin stable  Monitor    Hypothyroidism-on Synthroid    Anxiety/depression disorder  On  Atarax  and Cymbalta     History of Gonzalez cirrhosis with complication-hepatic encephalopathy  Had no history of previous esophageal varices  Continue on home medication  On Xifaxan and lactulose     History of PE/DVT-status post IVC filter    Chronic thrombocytopenia  Most likely due to liver disease  Monitor        Morbid obesity BMI 41.9 lifestyle management education if receptive       Deconditioning  PT to evaluate     VTE Prophylaxis -SCD                                         Gout on allopurinol      Mechanical Order History:      Ordered        03/02/21 [facility]7  Place  Sequential Compression Device  Once         217  Maintain Sequential Compression Device  Continuous                 Pharmalogical Order History:     None            Code Status -   Code Status and Medical Interventions:   Ordered at: 21 2318     Code Status:    CPR     Medical Interventions (Level of Support Prior to Arrest):    Full       This patient has been examined with appropriate PPE . 21        Discharge Planning  Pending clinical progress        Electronically signed by Celestino Menchaca MD, 21, 09:07 EST.  [facility], operated by [facility] Hospitalist Team        Electronically signed by Celestino Menchaca MD at 21 1657       Consult Notes (most recent note)    No notes of this type exist for this encounter.            Physical Therapy Notes (most recent note)      Elda Babb PT at 21 1618  Version 1 of 1         Patient Name: Bry Ratliff  : 1945    MRN: [id number]                              Today's Date: 3/3/2021       Admit Date: 3/2/2021    Visit Dx: No diagnosis found.  Patient Active Problem List   Diagnosis   • Cirrhosis (CMS/HCC)   • Diabetic neuropathy (CMS/HCC)   • Dyslipidemia   • History of DVT of right lower extremity   • Essential hypertension   • Acquired hypothyroidism   • Morbid obesity (CMS/HCC)   • Obstructive sleep apnea   • Type 2 diabetes mellitus with hyperglycemia, with long-term current use of insulin (CMS/HCC)   • Spinal stenosis of lumbar region   • Pulmonary hypertension (CMS/HCC)   • Kidney stone   • Deficiency of other specified B group vitamins   • Chronic coronary artery disease   • JOSE (iron deficiency anemia) with poor po absorption   • Erosive gastritis   • IgG kappa MGUS   • THOMAS (nonalcoholic steatohepatitis)   • Splenomegaly   • Antiphospholipid antibody positive   • Elevated factor VIII level   • Anemia in stage 3 chronic kidney disease (CMS/HCC)   • History of Vitamin B12 deficiency   • History of bilateral pulmonary embolus (PE)   •  Antithrombin III deficiency (CMS/HCC)   • Protein C deficiency (CMS/HCC)   • Protein S deficiency (CMS/HCC)   • Skin abscess   • Mixed hyperlipidemia   • Anemia   • Iron deficiency anemia due to chronic blood loss   • GAVE (gastric antral vascular ectasia)   • Vitamin B12 deficiency   • Malabsorption of iron   • CKD (chronic kidney disease) stage 3, GFR 30-59 ml/min (CMS/HCC)   • Iron deficiency anemia   • General weakness   • Decubitus ulcer of sacral region, stage 3 (CMS/HCC)   • Enterocutaneous fistula   • Colocutaneous fistula   • Sepsis (CMS/HCC)     Past Medical History:   Diagnosis Date   • Anemia     iron deficiency   • Anxiety    • B12 deficiency 2009   • Balance disorder    • Syed's esophagus    • Bile duct leak    • CAD (coronary artery disease)     cardiac stent   • Constipation    • DDD (degenerative disc disease), lumbar    • Decubitus ulcer     buttocks   • Depression    • Diabetes mellitus (CMS/HCC)    • Disease of thyroid gland     hypothyroid   • Diverticular disease    • Dvt femoral (deep venous thrombosis) (CMS/HCC) 2004    rt let   • Elevated cholesterol    • Fistula of intestine    • Gastroparesis    • GERD (gastroesophageal reflux disease)    • Gout    • Hepatic encephalopathy (CMS/HCC)     if doesnt take meds   • History of echocardiogram     03/03/2017 - 12/03/2014 - 04/2012   • History of stomach ulcers    • History of transfusion    • Hyperlipidemia    • Incontinence    • Iron deficiency    • Kidney stones    • Morbid obesity (CMS/HCC)    • THOMAS (nonalcoholic steatohepatitis)    • Neuropathy    • OAB (overactive bladder)    • Open wound     rt knee   • KATHIA treated with BiPAP     bring dos   • Peripheral edema    • Pulmonary embolus (CMS/HCC) 2004   • Renal insufficiency     stage 3   • S/P lumbar laminectomy    • Skin irritation     skin fold   • Stage 3 chronic kidney disease (CMS/HCC)      Past Surgical History:   Procedure Laterality Date   • ABDOMINAL SURGERY     • BACK SURGERY   1971   • BILE DUCT STENT PLACEMENT     • BILE DUCT STENT PLACEMENT     • CARDIAC CATHETERIZATION     • CATARACT EXTRACTION  2014   • CATARACT EXTRACTION, BILATERAL  2014   • CHOLECYSTECTOMY  02/24/2019   • COLON RESECTION Right 6/11/2020    Procedure: COLON RESECTION RIGHT;  Surgeon: Naif Etienne DO;  Location: [facility] MAIN OR;  Service: General;  Laterality: Right;   • COLONOSCOPY  03/2018   • CORONARY ANGIOPLASTY  08/24/2009    PCI stent - succesful placement of 3.5/23 promus stent in proximal right coronary artery   • CORONARY ANGIOPLASTY WITH STENT PLACEMENT  08/27/2009    patient had stent placed to proximal right coronary artery   • CYSTOSCOPY BLADDER STONE LITHOTRIPSY  1997   • ENDOSCOPY N/A 10/18/2019    Procedure: ESOPHAGOGASTRODUODENOSCOPY with argon plasma coagulation of gastric antral vascular ectasia;  Surgeon: Marisel Gomez MD;  Location: [facility] ENDOSCOPY;  Service: Gastroenterology   • ENDOSCOPY N/A 1/9/2020    Procedure: ESOPHAGOGASTRODUODENOSCOPY WITH BIOPSY, ARGON PLASMA COAGULATION OF GASTRIC ANTREL VASCULAR ECTASIA,;  Surgeon: Marisel Gomez MD;  Location: [facility] ENDOSCOPY;  Service: Gastroenterology   • ENDOSCOPY N/A 3/6/2020    Procedure: ESOPHAGOGASTRODUODENOSCOPY;  Surgeon: Zbigniew Silva MD;  Location: [facility] ENDOSCOPY;  Service: Gastroenterology;  Laterality: N/A;  mild gave, post cautery ulcer     • ERCP N/A 11/20/2019    Procedure: ERCP, clearance of bile duct with balloon, placement of biliary stent, EGD with argon plasma coagulation of gastric antral vascular ectasia;  Surgeon: Marisel Gomez MD;  Location: [facility] ENDOSCOPY;  Service: Gastroenterology   • ERCP N/A 2/27/2020    Procedure: ENDOSCOPIC RETROGRADE CHOLANGIOPANCREATOGRAPHY with removal of biliary stent, brushing, dilation, and placement of biliary stent x 2 and Esophagogastroduodenoscopy with argon plasma coagulation;  Surgeon: Marisel Gomez MD;  Location: [facility] ENDOSCOPY;  Service: Gastroenterology;  Laterality:  N/A;  Gastric antral vascular ectasia, common bile duct stricture   • ERCP N/A 4/20/2020    Procedure: ENDOSCOPIC RETROGRADE CHOLANGIOPANCREATOGRAPHY WITH REMOVAL OF BILIARY STENT, AMPULA DILATION TO 8MM, BRUSHING OF COMMON BILE DUT, CLEARANCE OF BILE DUCT WITH BALLOON, BIOPSY OF AMPULA, PLACEMENT OF BILIARY STENT;  Surgeon: Marisel Gomez MD;  Location: [facility] ENDOSCOPY;  Service: Gastroenterology;  Laterality: N/A;  POST:CBD STRICTURE   • ERCP N/A 11/12/2020    Procedure: ENDOSCOPIC RETROGRADE CHOLANGIOPANCREATOGRAPHY WITH BALLOON CLEARANCE OF COMMON BILE DUCT, BRUSHING OF COMMON BILE DUCT STRICTURE, BIOPSY X 1 AREA, PLACEMENT OF BILIARY STENT;  Surgeon: Marisel Gomez MD;  Location: [facility] ENDOSCOPY;  Service: Gastroenterology;  Laterality: N/A;  Post op:REMOVAL OF SLUDGE, SUCCESSFUL STENT PLACEMENT   • OTHER SURGICAL HISTORY  11/2018    IVC filter implant   • RENAL ARTERY STENT Left     does not recall this   • UPPER ENDOSCOPIC ULTRASOUND W/ FNA N/A 4/20/2020    Procedure: Esophagogastroduodenoscopy with Endoscopic ultrasound and fine needle aspiration;  Surgeon: Marisel Gomez MD;  Location: [facility] ENDOSCOPY;  Service: Gastroenterology;  Laterality: N/A;  Post: CBD STRICTURE   • VENA CAVA FILTER INSERTION  12/05/2018     General Information     Row Name 03/03/21 155          Physical Therapy Time and Intention    Document Type  evaluation  -CM     Mode of Treatment  physical therapy;occupational therapy;co-treatment  -CM     Row Name 03/03/21 1559          General Information    Patient Profile Reviewed  yes  -CM     Prior Level of Function  independent:;all household mobility;gait uses rw in home; uses rollator wx out of home; wife present, reports pt does not amb a lot out of home  -CM     Existing Precautions/Restrictions  fall  -CM     Barriers to Rehab  medically complex;cognitive status  -CM     Row Name 03/03/21 1633          Living Environment    Lives With  spouse wife very small, petite woman; pt is  "6-2\" and weighs 325 lbs.  -CM     Row Name 03/03/21 1556          Home Main Entrance    Number of Stairs, Main Entrance  none  -CM     Row Name 03/03/21 1556          Stairs Within Home, Primary    Number of Stairs, Within Home, Primary  none  -CM     Row Name 03/03/21 1556          Cognition    Orientation Status (Cognition)  oriented to;person;place;time unfamiliar w/ situation; slow to respond to many orientation questions  -CM     Row Name 03/03/21 1556          Safety Issues, Functional Mobility    Safety Issues Affecting Function (Mobility)  insight into deficits/self-awareness;awareness of need for assistance;judgment;problem-solving;safety precaution awareness;safety precautions follow-through/compliance  -     Impairments Affecting Function (Mobility)  balance;endurance/activity tolerance;shortness of breath;strength;sensation/sensory awareness  -CM       User Key  (r) = Recorded By, (t) = Taken By, (c) = Cosigned By    Initials Name Provider Type    CM Elda Babb, PT Physical Therapist        Mobility     Row Name 03/03/21 1558          Bed Mobility    Bed Mobility  bed mobility (all) activities  -CM     All Activities, [place] (Bed Mobility)  moderate assist (50% patient effort);maximum assist (25% patient effort);1 person assist  -CM     Assistive Device (Bed Mobility)  bed rails;draw sheet;head of bed elevated  -CM     Row Name 03/03/21 1558          Transfers    Comment (Transfers)  shuffled steps to turn and sit in chair; needs elevated surface to come to stand 2* his height; placed pillow in base of chair to assist in returning to stand from sitting in chair  -     Row Name 03/03/21 1558          Bed-Chair Transfer    Bed-Chair [place] (Transfers)  minimum assist (75% patient effort);moderate assist (50% patient effort);2 person assist  -CM     Assistive Device (Bed-Chair Transfers)  walker, front-wheeled  -CM     Row Name 03/03/21 1550          Sit-Stand Transfer    Sit-Stand " [place] (Transfers)  minimum assist (75% patient effort);moderate assist (50% patient effort);2 person assist  -CM     Assistive Device (Sit-Stand Transfers)  walker, front-wheeled  -CM     Row Name 03/03/21 1558          Gait/Stairs (Locomotion)    Distance in Feet (Gait)  did not attempt due to LE weakness  -CM       User Key  (r) = Recorded By, (t) = Taken By, (c) = Cosigned By    Initials Name Provider Type    Elda Padron, PT Physical Therapist        Obj/Interventions     Row Name 03/03/21 1601          Range of Motion Comprehensive    General Range of Motion  no range of motion deficits identified  -CM     Row Name 03/03/21 1601          Strength Comprehensive (MMT)    General Manual Muscle Testing (MMT) Assessment  lower extremity strength deficits identified  -CM     Comment, General Manual Muscle Testing (MMT) Assessment  BLEs 3/5  -CM     Row Name 03/03/21 1601          Balance    Balance Assessment  sitting static balance;sitting dynamic balance;standing static balance;standing dynamic balance  -CM     Static Sitting Balance  WFL;unsupported;sitting, edge of bed  -CM     Dynamic Sitting Balance  sitting, edge of bed;unsupported;mild impairment  -CM     Static Standing Balance  mild impairment;supported;standing  -CM     Dynamic Standing Balance  standing;supported;mild impairment;moderate impairment  -CM     Row Name 03/03/21 1601          Sensory Assessment (Somatosensory)    Sensory Assessment (Somatosensory)  other (see comments) hx of PN, but has some sensation in B feet  -CM       User Key  (r) = Recorded By, (t) = Taken By, (c) = Cosigned By    Initials Name Provider Type    Elda Padron, PT Physical Therapist        Goals/Plan     Row Name 03/03/21 1605          Bed Mobility Goal 1 (PT)    Activity/Assistive Device (Bed Mobility Goal 1, PT)  bed mobility activities, all  -CM     [place] Level/Cues Needed (Bed Mobility Goal 1, PT)  minimum assist (75% or more patient  effort)  -CM     Time Frame (Bed Mobility Goal 1, PT)  2 weeks  -CM     Row Name 03/03/21 1605          Transfer Goal 1 (PT)    Activity/Assistive Device (Transfer Goal 1, PT)  transfers, all;walker, rolling  -CM     [place] Level/Cues Needed (Transfer Goal 1, PT)  minimum assist (75% or more patient effort);1 person assist  -CM     Time Frame (Transfer Goal 1, PT)  2 weeks  -CM     Row Name 03/03/21 1605          Gait Training Goal 1 (PT)    Activity/Assistive Device (Gait Training Goal 1, PT)  gait (walking locomotion);walker, rolling  -CM     [place] Level (Gait Training Goal 1, PT)  minimum assist (75% or more patient effort);2 person assist  -CM     Distance (Gait Training Goal 1, PT)  80 ft  -CM     Time Frame (Gait Training Goal 1, PT)  2 weeks  -CM       User Key  (r) = Recorded By, (t) = Taken By, (c) = Cosigned By    Initials Name Provider Type    CM Elda Babb, PT Physical Therapist        Clinical Impression     Row Name 03/03/21 1602          Pain    Additional Documentation  Pain Scale: Numbers Pre/Post-Treatment (Group)  -CM     Row Name 03/03/21 1602          Pain Scale: Numbers Pre/Post-Treatment    Pretreatment Pain Rating  0/10 - no pain  -CM     Posttreatment Pain Rating  0/10 - no pain  -CM     Pain Intervention(s)  Ambulation/increased activity;Repositioned  -CM     Row Name 03/03/21 1602          Plan of Care Review    Plan of Care Reviewed With  patient;spouse  -CM     Outcome Summary  76 yo male adm for urosepsis. CT (-) for PE. Hx of THOMAS cirrhosis, multiple other comorbidities. Pt confused this date but able to follow directions well. Very pleasant. Comes to sit at eob w/ mod to max assist of 1. Comes to stand w/ min to mod assist of 2, then needs mod assist of 2 to turn w/ rw and sit in chair. Pt has only 3/5 strength in BLEs, and he was not safe to attempt gait this date. Will progress to gait training as able. Pt not safe to return home w/ wife, as she is about 1/2 his  size. Will require IP rehab at d/c. Will follow. PPE: gloves, mask, goggles.  -CM     Row Name 03/03/21 1602          Therapy Assessment/Plan (PT)    Patient/Family Therapy Goals Statement (PT)  wife agreeable to IP rehab but hoping for improvement to allow home w/ home health  -CM     Rehab Potential (PT)  good, to achieve stated therapy goals  -CM     Criteria for Skilled Interventions Met (PT)  yes;meets criteria;skilled treatment is necessary  -CM     Predicted Duration of Therapy Intervention (PT)  until d/c  -CM     Row Name 03/03/21 1602          Positioning and Restraints    Pre-Treatment Position  in bed  -CM     Post Treatment Position  chair  -CM     In Chair  notified nsg;sitting;call light within reach;encouraged to call for assist;exit alarm on;with family/caregiver  -CM       User Key  (r) = Recorded By, (t) = Taken By, (c) = Cosigned By    Initials Name Provider Type    Elda Padron, PT Physical Therapist        Outcome Measures     Row Name 03/03/21 1606          How much help from another person do you currently need...    Turning from your back to your side while in flat bed without using bedrails?  3  -CM     Moving from lying on back to sitting on the side of a flat bed without bedrails?  2  -CM     Moving to and from a bed to a chair (including a wheelchair)?  2  -CM     Standing up from a chair using your arms (e.g., wheelchair, bedside chair)?  2  -CM     Climbing 3-5 steps with a railing?  1  -CM     To walk in hospital room?  2  -CM     AM-PAC 6 Clicks Score (PT)  12  -CM     Row Name 03/03/21 1606          Modified [place] Scale    Pre-Stroke Modified [place] Scale  3 - Moderate disability.  Requiring some help, but able to walk without assistance.  -CM     Modified [place] Scale  4 - Moderately severe disability.  Unable to walk without assistance, and unable to attend to own bodily needs without assistance.  -CM     Row Name 03/03/21 1606          Functional Assessment    Outcome  Measure Options  AM-PAC 6 Clicks Basic Mobility (PT);Modified [place]  -CM       User Key  (r) = Recorded By, (t) = Taken By, (c) = Cosigned By    Initials Name Provider Type    CM Elda Babb, PT Physical Therapist        Physical Therapy Education                 Title: PT OT SLP Therapies (Done)     Topic: Physical Therapy (Done)     Point: Mobility training (Done)     Learning Progress Summary           Patient Acceptance, E,TB, VU by CM at 3/3/2021 1607   Significant Other Acceptance, E,TB, VU by CM at 3/3/2021 1607                               User Key     Initials Effective Dates Name Provider Type Discipline     03/01/19 -  Elda Babb, PT Physical Therapist PT              PT Recommendation and Plan  Planned Therapy Interventions (PT): balance training, bed mobility training, gait training, patient/family education, strengthening, postural re-education, transfer training  Plan of Care Reviewed With: patient, spouse  Outcome Summary: 74 yo male adm for urosepsis. CT (-) for PE. Hx of THOMAS cirrhosis, multiple other comorbidities. Pt confused this date but able to follow directions well. Very pleasant. Comes to sit at eob w/ mod to max assist of 1. Comes to stand w/ min to mod assist of 2, then needs mod assist of 2 to turn w/ rw and sit in chair. Pt has only 3/5 strength in BLEs, and he was not safe to attempt gait this date. Will progress to gait training as able. Pt not safe to return home w/ wife, as she is about 1/2 his size. Will require IP rehab at d/c. Will follow. PPE: gloves, mask, goggles.     Time Calculation:   PT Charges     Row Name 03/03/21 1607             Time Calculation    Start Time  1341  -CM      Stop Time  1418  -CM      Time Calculation (min)  37 min  -CM      PT Received On  03/03/21  -CM      PT - Next Appointment  03/04/21  -CM      PT Goal Re-Cert Due Date  03/17/21  -CM         Time Calculation- PT    Total Timed Code Minutes- PT  8 minute(s)  -CM        User Key   (r) = Recorded By, (t) = Taken By, (c) = Cosigned By    Initials Name Provider Type     Elda Babb, PT Physical Therapist        Therapy Charges for Today     Code Description Service Date Service Provider Modifiers Qty    13732442232  PT EVAL MOD COMPLEXITY 4 3/3/2021 Elda Babb, PT GP 1    [id number]  PT THERAPEUTIC ACT EA 15 MIN 3/3/2021 Elda Babb, PT GP 1          PT G-Codes  Outcome Measure Options: AM-PAC 6 Clicks Basic Mobility (PT), Modified [place]  AM-PAC 6 Clicks Score (PT): 12  Modified [place] Scale: 4 - Moderately severe disability.  Unable to walk without assistance, and unable to attend to own bodily needs without assistance.    Elda Babb, VALENTE  3/3/2021      Electronically signed by Elda Babb, PT at 03/03/21 1074

## 2021-03-04 NOTE — NURSING NOTE
Pt reported SOA with RR 30. O2 sats 93% on RA, NC placed at 2L. MD called and notified her of situation as well as presence of cough, crackles, and pt meeting sepsis criteria due to RR and HR. MD ordered Respiratory tx. RT called for tx.

## 2021-03-04 NOTE — PLAN OF CARE
Goal Outcome Evaluation:   Pt A & O x4. Pt presents with tachypnea but denies SOA. Pt resting comfortably using home BiPaP. No new complaints. Call light in reach. Will continue to monitor.     Problem: Adult Inpatient Plan of Care  Goal: Plan of Care Review  Outcome: Ongoing, Progressing  Goal: Patient-Specific Goal (Individualized)  Outcome: Ongoing, Progressing  Goal: Absence of Hospital-Acquired Illness or Injury  Outcome: Ongoing, Progressing  Intervention: Identify and Manage Fall Risk  Recent Flowsheet Documentation  Taken 3/4/2021 0422 by Hanna Siddiqi RN  Safety Promotion/Fall Prevention:   assistive device/personal items within reach   clutter free environment maintained   fall prevention program maintained   safety round/check completed   room organization consistent   nonskid shoes/slippers when out of bed  Taken 3/4/2021 0215 by Hanna Siddiqi RN  Safety Promotion/Fall Prevention:   assistive device/personal items within reach   clutter free environment maintained   safety round/check completed   room organization consistent   nonskid shoes/slippers when out of bed  Taken 3/4/2021 0012 by Hanna Siddiqi RN  Safety Promotion/Fall Prevention:   assistive device/personal items within reach   clutter free environment maintained   fall prevention program maintained   safety round/check completed   room organization consistent   nonskid shoes/slippers when out of bed  Taken 3/3/2021 2201 by Hanna Siddiqi RN  Safety Promotion/Fall Prevention:   assistive device/personal items within reach   clutter free environment maintained   fall prevention program maintained   room organization consistent   safety round/check completed   mobility aid in reach  Taken 3/3/2021 2025 by Hanna Siddiqi RN  Safety Promotion/Fall Prevention:   assistive device/personal items within reach   clutter free environment maintained   fall prevention program maintained   safety round/check completed   room organization consistent   nonskid  shoes/slippers when out of bed  Intervention: Prevent Skin Injury  Recent Flowsheet Documentation  Taken 3/4/2021 0422 by Hanna Siddiqi RN  Body Position:   turned   tilted, right  Taken 3/4/2021 0012 by Hanna Siddiqi RN  Body Position: neutral body alignment  Taken 3/3/2021 2025 by Hanna Siddiqi RN  Body Position: neutral body alignment  Intervention: Prevent Infection  Recent Flowsheet Documentation  Taken 3/4/2021 0422 by Hanna Siddiqi RN  Infection Prevention:   environmental surveillance performed   hand hygiene promoted   personal protective equipment utilized   visitors restricted/screened  Taken 3/4/2021 0215 by Hanna Siddiqi RN  Infection Prevention:   environmental surveillance performed   personal protective equipment utilized   hand hygiene promoted   visitors restricted/screened  Taken 3/4/2021 0012 by Hanna Siddiqi RN  Infection Prevention:   environmental surveillance performed   hand hygiene promoted   personal protective equipment utilized   visitors restricted/screened  Taken 3/3/2021 2201 by Hanna Siddiqi RN  Infection Prevention:   environmental surveillance performed   hand hygiene promoted   personal protective equipment utilized   visitors restricted/screened  Taken 3/3/2021 2025 by Hanna Siddiqi RN  Infection Prevention:   environmental surveillance performed   hand hygiene promoted   personal protective equipment utilized   visitors restricted/screened  Goal: Optimal Comfort and Wellbeing  Outcome: Ongoing, Progressing  Intervention: Provide Person-Centered Care  Recent Flowsheet Documentation  Taken 3/4/2021 0422 by Hanna Siddiqi RN  Trust Relationship/Rapport: care explained  Taken 3/3/2021 2025 by Hanna Siddiqi RN  Trust Relationship/Rapport: care explained  Goal: Readiness for Transition of Care  Outcome: Ongoing, Progressing     Problem: Fall Injury Risk  Goal: Absence of Fall and Fall-Related Injury  Outcome: Ongoing, Progressing  Intervention: Identify and Manage Contributors to Fall  Injury Risk  Recent Flowsheet Documentation  Taken 3/4/2021 0422 by Hanna Siddiqi RN  Medication Review/Management: medications reviewed  Taken 3/4/2021 0215 by Hanna Siddiqi RN  Medication Review/Management: medications reviewed  Taken 3/4/2021 0012 by Hanna Siddiqi RN  Medication Review/Management: medications reviewed  Taken 3/3/2021 2201 by Hanna Siddiqi RN  Medication Review/Management: medications reviewed  Taken 3/3/2021 2025 by Hanna Siddiqi RN  Medication Review/Management: medications reviewed  Intervention: Promote Injury-Free Environment  Recent Flowsheet Documentation  Taken 3/4/2021 0422 by Hanna Siddiqi RN  Safety Promotion/Fall Prevention:   assistive device/personal items within reach   clutter free environment maintained   fall prevention program maintained   safety round/check completed   room organization consistent   nonskid shoes/slippers when out of bed  Taken 3/4/2021 0215 by Hanna Siddiqi RN  Safety Promotion/Fall Prevention:   assistive device/personal items within reach   clutter free environment maintained   safety round/check completed   room organization consistent   nonskid shoes/slippers when out of bed  Taken 3/4/2021 0012 by Hanna Siddiqi RN  Safety Promotion/Fall Prevention:   assistive device/personal items within reach   clutter free environment maintained   fall prevention program maintained   safety round/check completed   room organization consistent   nonskid shoes/slippers when out of bed  Taken 3/3/2021 2201 by Hanna Siddiqi RN  Safety Promotion/Fall Prevention:   assistive device/personal items within reach   clutter free environment maintained   fall prevention program maintained   room organization consistent   safety round/check completed   mobility aid in reach  Taken 3/3/2021 2025 by Hanna Siddiqi RN  Safety Promotion/Fall Prevention:   assistive device/personal items within reach   clutter free environment maintained   fall prevention program maintained   safety  round/check completed   room organization consistent   nonskid shoes/slippers when out of bed     Problem: Skin Injury Risk Increased  Goal: Skin Health and Integrity  Outcome: Ongoing, Progressing  Intervention: Optimize Skin Protection  Recent Flowsheet Documentation  Taken 3/4/2021 0422 by Hanna Siddiqi RN  Pressure Reduction Techniques: weight shift assistance provided  Head of Bed (hospitals): hospitals elevated  Pressure Reduction Devices: pressure-redistributing mattress utilized  Skin Protection: incontinence pads utilized  Taken 3/4/2021 0012 by Hanna Siddiqi RN  Pressure Reduction Techniques: weight shift assistance provided  Head of Bed (hospitals): hospitals elevated  Pressure Reduction Devices: pressure-redistributing mattress utilized  Skin Protection: incontinence pads utilized  Taken 3/3/2021 2025 by Hanna Siddiqi RN  Head of Bed (hospitals): hospitals elevated

## 2021-03-04 NOTE — THERAPY TREATMENT NOTE
Subjective: Pt agreeable to participate in session. Reports 9/10 LBP, however, does not wish to have pain medication at this time. Pt requests to ofe a brief prior to mobility.    Objective:     Bed mobility - Pt completed 1x5 glute bridges at start of session to assess ability to ofe brief in supine (able to activate glutes but unable to lift glutes/hips from bed surface). Pt then completed rolling R/L requiring MAX A x 2 and assist for donning brief. Pt completed supine to sit transfer requiring MAX A x 2 and completed static/dynamic sitting 10 minutes EOB. After sitting there-ex and standing bout, pt completed sit to supine transfer requiring MAX A x 2 plus assist to reposition to HOB with pt in trendelenburg position.     Transfers - Pt completed sit to/from stand transfer with RW from elevated bed surface requiring MOD A x 2 plus B knee block and increased time to complete. Able to complete ~20-25 second static standing bout prior to requesting to return to sitting EOB. Not safe for bed to chair transfer this date.    Ambulation - DNT secondary to safety concerns    Therapeutic exercise: Pt completed 1x5 B marching in place and LAQ sitting EOB requiring CGA for sitting balance.    Pain: 9/10 LBP at start of session, pt reports improvement in pain s/p mobility to 7/10    Education: Provided education on importance of mobility and skilled verbal / tactile cueing throughout intervention.     Assessment: Pt demonstrated decline in mobility this session and demonstrated SOA/tachypenia throughout with RR 30-35 bpm (Sp02 >90% on 2L) and required frequent rest breaks 2*/2 fatigue/SOA. Pt required MAX A x 2 for rolling, supine to/from sit transfer, and required MOD A x to for sit to/from stand transfer from EOB, tolerating only brief 20-25 second standing bout with RW. Pt is far from baseline and will required IP rehab at d/c, as pt is high risk for falls and not safe for home. Continue [facility] at [facility] for  progression of mobility. PPE: gloves, mask, safety glasses    Plan/Recommendations:   Pt would benefit from Inpatient Rehabilitation placement at discharge from facility and requires no DME at discharge.   Pt desires Inpatient Rehabilitation placement at discharge. Pt cooperative; agreeable to therapeutic recommendations and plan of care.     Basic Mobility 6-click:  Rollin = Total, A lot = 2, A little = 3; 4 = None  Supine>Sit:   1 = Total, A lot = 2, A little = 3; 4 = None   Sit>Stand with arms:  1 = Total, A lot = 2, A little = 3; 4 = None  Bed>Chair:   1 = Total, A lot = 2, A little = 3; 4 = None  Ambulate in room:  1 = Total, A lot = 2, A little = 3; 4 = None  3-5 Steps with railin = Total, A lot = 2, A little = 3; 4 = None  Score: 8    Modified [place]: 5 = Severe disability (Requires constant nursing care and attention, bedridden, incontinent)    Post-Tx Position: Supine with HOB Elevated, Alarms activated and Call light and personal items within reach  PPE: gloves, surgical mask, eyewear protection

## 2021-03-04 NOTE — PROGRESS NOTES
Continued Stay Note   [place]     Patient Name: Bry Ratliff  MRN: [id number]  Today's Date: 3/4/2021    Admit Date: 3/2/2021    Discharge Plan     Row Name 03/04/21 1541       Plan    Plan  Accepted to [facility]. No pre-cert needed. PASRR is approved.    Plan Comments  Per liaison, pt is now accepted to [facility] as a bed has opened. RICHARD informed MD via secure chat. However, pt is requiring additional night of observation d/t numerous complaints throughout the day and no apparent clinical cause. RICHARD updated [facility] liaison.     Phone communication or documentation only - no physical contact with patient or family.    Tamanna Ugarte, [facility], W    Office: [phone]  Cell: [phone]  Fax: [phone]  E-mail: [email or web address]

## 2021-03-04 NOTE — CONSULTS
"Diabetes Education  Assessment/Teaching    Patient Name:  Bry Ratliff  YOB: 1945  MRN: [id number]  Admit Date:  3/2/2021      Assessment Date:  3/4/2021    Most Recent Value   General Information    Referral From:  MD order, Blood glucose [MD consult for A1c >7.0% and admission blood sugar 206.]   Height  188 cm (74\")   Height Method  Stated   Weight  (!) 151 kg (331 lb 12.7 oz)   Weight Method  Bed scale   Pregnancy Assessment   Diabetes History   What type of diabetes do you have?  Type 2   Current DM knowledge  fair   Do you test your blood sugar at home?  yes   Frequency of checks  4X a day   Meter type  unsure of name but < a year old   Who performs the test?  patient or wife   Typical readings  120s-130s, but had an epidural last week and it was in the 300s and 400s   Have you had low blood sugar? (<70mg/dl)  no   Have you had high blood sugar? (>140mg/dl)  yes   How often do you have high blood sugar?  unknown   When was your last high blood sugar?  Admission blood sugar 206   Education Preferences   What areas of diabetes would you like to learn about?  avoiding high blood sugar, diabetes complications, medications for diabetes   Nutrition Information   Assessment Topics   Taking Medication - Assessment  Needs education   Problem Solving - Assessment  Needs education   Reducing Risk - Assessment  Needs education   DM Goals   Taking Medication - Goal  Today   Problem Solving - Goal  Today   Reducing Risk - Goal  Today            Most Recent Value   DM Education Needs   Meter  Has own   Frequency of Testing  4 times a day   Medication  Insulin, Vial [Patient stated that his wife gives him his insulin shots of Lantus 40 units at bedtime and Humalog 18 units before meals at home.]   Problem Solving  Hyperglycemia, Signs, Symptoms, Treatment   Reducing Risks  A1C testing [On 3/3/2021 A1c was 7.5%.  A1c info sheet given with discussion on A1c target and healthy blood sugar range.]   Healthy " Eating  Other (comment) [Patient stated that he drinks a glass of milk 2X a day when he takes his pills, but other than that only drinks unsweetened beverages.]   Discharge Plan  Home   Motivation  Moderate   Teaching Method  Discussion, Handouts   Patient Response  Verbalized understanding            Other Comments:  Discussed with patient about 1 glass of milk being = 1 serving of carbs. Patient stated that he is currently on vials and syringes but has used insulin pens in the past and would like to have pens again. Patient stated that he just changed insurance.  Prescriptions started in discharge orders for Lantus, Humalog, pen needles, and alcohol wipes. Patient has no further questions or concerns related to diabetes at this time.        Electronically signed by:  Nany Valle RN  03/04/21 17:08 EST

## 2021-03-04 NOTE — PLAN OF CARE
Pt placed on 2L NC due to pt reporting SOA. Pt felt better throughout day.   Problem: Adult Inpatient Plan of Care  Goal: Plan of Care Review  Outcome: Ongoing, Progressing  Goal: Patient-Specific Goal (Individualized)  Outcome: Ongoing, Progressing  Goal: Absence of Hospital-Acquired Illness or Injury  Outcome: Ongoing, Progressing  Intervention: Identify and Manage Fall Risk  Recent Flowsheet Documentation  Taken 3/4/2021 1537 by Alma Bates RN  Safety Promotion/Fall Prevention:   safety round/check completed   fall prevention program maintained  Taken 3/4/2021 1200 by Alma Bates RN  Safety Promotion/Fall Prevention:   safety round/check completed   fall prevention program maintained  Taken 3/4/2021 0849 by Alma Bates RN  Safety Promotion/Fall Prevention:   safety round/check completed   fall prevention program maintained  Goal: Optimal Comfort and Wellbeing  Outcome: Ongoing, Progressing  Intervention: Provide Person-Centered Care  Recent Flowsheet Documentation  Taken 3/4/2021 1537 by Alma Bates RN  Trust Relationship/Rapport:   care explained   choices provided   emotional support provided   empathic listening provided  Taken 3/4/2021 0849 by Alma Bates RN  Trust Relationship/Rapport:   care explained   choices provided   emotional support provided   empathic listening provided  Goal: Readiness for Transition of Care  Outcome: Ongoing, Progressing     Problem: Fall Injury Risk  Goal: Absence of Fall and Fall-Related Injury  Outcome: Ongoing, Progressing  Intervention: Identify and Manage Contributors to Fall Injury Risk  Recent Flowsheet Documentation  Taken 3/4/2021 1537 by Alma Bates RN  Medication Review/Management: medications reviewed  Taken 3/4/2021 1200 by Alma Bates RN  Medication Review/Management: medications reviewed  Taken 3/4/2021 0849 by Alma Bates RN  Medication Review/Management: medications  reviewed  Intervention: Promote Injury-Free Environment  Recent Flowsheet Documentation  Taken 3/4/2021 1537 by Alma Bates, RN  Safety Promotion/Fall Prevention:   safety round/check completed   fall prevention program maintained  Taken 3/4/2021 1200 by Alma Bates, RN  Safety Promotion/Fall Prevention:   safety round/check completed   fall prevention program maintained  Taken 3/4/2021 0849 by Alma Bates, RN  Safety Promotion/Fall Prevention:   safety round/check completed   fall prevention program maintained     Problem: Skin Injury Risk Increased  Goal: Skin Health and Integrity  Outcome: Ongoing, Progressing   Goal Outcome Evaluation:

## 2021-03-04 NOTE — PROGRESS NOTES
Continued Stay Note  ERICA Jerry     Patient Name: Bry Ratliff  MRN: [id number]  Today's Date: 3/4/2021    Admit Date: 3/2/2021    Discharge Plan     Row Name 03/04/21 1325       Plan    Plan  PT recommends IP Rehab - Spouse and patient chose [facility] or [facility] - SW informed      Plan Comments  Spoke to patient in room about choice for Rehab as recommended per PT. States he wants to speak to spouse. Spouse returned call and she states they want to go to [facility] or [facility]. SW informed        Venice Serrato RN, CM  Office Phone [phone]  Cell [phone]

## 2021-03-04 NOTE — PROGRESS NOTES
Continued Stay Note   [place]     Patient Name: Bry Ratliff  MRN: [id number]  Today's Date: 3/4/2021    Admit Date: 3/2/2021    Discharge Plan     Row Name 03/04/21 1353       Plan    Plan  Pending agreeableness of admission to [facility] (see SW note). No pre-cert needed. PASRR is approved.      Plan Comments  SW received text from  that pt's rehab choices are [facility] or [facility]. SW made referral via text and [facility] (both [facility]). [facility] liaison responded that neither [facility] nor [facility] will have a bed until next week. SW inquired if another nearby [facility] community might be able to accommodate pt. Per liaison, she will call pt to offer [facility] - awaiting update.     Phone communication or documentation only - no physical contact with patient or family.    TRISH León, LSW    Office: [phone]  Cell: [phone]  Fax: [phone]  E-mail: [email or web address]

## 2021-03-04 NOTE — PROGRESS NOTES
[facility] Medicine Services Daily Progress Note      Hospitalist Team  LOS 2 days      Patient Care Team:  Paulette Harris MD as PCP - Josefina Rucker MD as Consulting Physician (Nephrology)  Alvaro Pandey MD as Consulting Physician (Cardiology)    Patient Location: 270/1      Subjective   Subjective     Chief Complaint / Subjective  No chief complaint on file.        Brief Synopsis of Hospital Course/HPI  75-year-old man with multiple morbidities including Gonzalez cirrhosis, CKD stage III, CAD status post previous stenting and hypothyroidism.  Also has iron deficiency anemia and coagulation factor deficiency for which he follows up with the hematologist.  Patient had presented to an outlying ED with complaints of progressively worsening shortness of breath, generalized body weakness and subjective fever for the last 3 weeks.  In the ED patient was stated to be hypotensive but responded to IV fluid resuscitation.  He was given empirical antibiotics after blood culture was obtained and transferred to [facility] with diagnosis of possible urosepsis.    Date::    3/3/2021  Patient seen and examined  Continues with generalized body weakness  He is saturating 97% on room air  Blood pressure stable    3/4/2021  Patient seen and examined  Had multiple complaints  Initially stated having shortness of breath-of note is that CT chest without contrast done showed findings suggestive of atelectasis.  Incentive spirometry use will be recommended  Complained of abdominal discomfort-on examination abdomen is soft with no appreciable tenderness  We will continue to monitor      Review of Systems   Constitution: Negative for chills and fever.   HENT: Negative for congestion.    Eyes: Negative for blurred vision.   Cardiovascular: Negative for chest pain.   Endocrine: Negative for cold intolerance and heat intolerance.   Gastrointestinal: Negative for abdominal  "pain, nausea and vomiting.   Genitourinary: Negative for flank pain.   Neurological: Positive for weakness.   Psychiatric/Behavioral: Negative for altered mental status.         Objective   Objective      Vital Signs  Temp:  [98.2 °F (36.8 °C)-99.8 °F (37.7 °C)] 99.1 °F (37.3 °C)  Heart Rate:  [] 91  Resp:  [20-25] 25  BP: (124-146)/(44-70) 141/70  Oxygen Therapy  SpO2: 95 %  Pulse Oximetry Type: Continuous  Device (Oxygen Therapy): CPAP  Flowsheet Rows      First Filed Value   Admission Height  188 cm (74\") Documented at 03/02/2021 2250   Admission Weight  (!) 148 kg (326 lb 11.6 oz) Documented at 03/02/2021 2250        Intake & Output (last 3 days)       03/01 0701 - 03/02 0700 03/02 0701 - 03/03 0700 03/03 0701 - 03/04 0700 03/04 0701 - 03/05 0700    P.O.   702     I.V. (mL/kg)   1000 (6.7)     IV Piggyback   200     Total Intake(mL/kg)   1902 (12.7)     Urine (mL/kg/hr)  580      Total Output  580      Net  -580 +1902             Urine Unmeasured Occurrence   2 x     Stool Unmeasured Occurrence   3 x         Lines, Drains & Airways    Active LDAs     Name:   Placement date:   Placement time:   Site:   Days:    Peripheral IV 03/02/21 2324 Left;Posterior Hand   03/02/21    2324    Hand   less than 1    Peripheral IV 03/03/21 0516 Left;Posterior Forearm   03/03/21    0516    Forearm   less than 1                  Physical Exam:    Physical Exam  Vitals signs reviewed.   Constitutional:       General: He is not in acute distress.     Appearance: He is obese.   HENT:      Head: Normocephalic and atraumatic.      Nose: Nose normal.      Mouth/Throat:      Mouth: Mucous membranes are moist.   Eyes:      Extraocular Movements: Extraocular movements intact.      Conjunctiva/sclera: Conjunctivae normal.      Pupils: Pupils are equal, round, and reactive to light.   Neck:      Musculoskeletal: Neck supple.   Cardiovascular:      Rate and Rhythm: Normal rate and regular rhythm.   Pulmonary:      Effort: No respiratory " distress.      Breath sounds: Normal breath sounds. No wheezing or rales.   Abdominal:      General: Bowel sounds are normal.      Palpations: Abdomen is soft.      Tenderness: There is no abdominal tenderness.   Musculoskeletal:      Comments: Degenerative changes   Skin:     General: Skin is warm and dry.   Neurological:      Mental Status: He is alert and oriented to person, place, and time.   Psychiatric:         Mood and Affect: Mood normal.              Wounds (last 24 hours)      LDA Wound     Row Name 03/04/21 0422 03/04/21 0012 03/03/21 2025       Wound 03/02/21 2312 Left anterior hip    Wound - Properties Group Placement Date: 03/02/21 -AA Placement Time: 2312 -AA Side: Left  -AA Orientation: anterior  -AA Location: hip  -AA Stage, Pressure Injury : other (see comments)  -AA, Moisture associated     Dressing Appearance  open to air  -KR  open to air  -KR  open to air  -KR    Closure  Open to air  -KR  Open to air  -KR  Open to air  -KR    Base  red  -KR  red  -KR  red  -KR    Retired Wound - Properties Group Date first assessed: 03/02/21 -AA Time first assessed: 2312 -AA Side: Left  -AA Location: hip  -AA    Row Name 03/03/21 1600 03/03/21 1205          Wound 03/02/21 2312 Left anterior hip    Wound - Properties Group Placement Date: 03/02/21 -AA Placement Time: 2312 -AA Side: Left  -AA Orientation: anterior  -AA Location: hip  -AA Stage, Pressure Injury : other (see comments)  -AA, Moisture associated     Closure  Open to air  -SO  Open to air  -SO     Base  red  -SO  red  -SO     Retired Wound - Properties Group Date first assessed: 03/02/21 -AA Time first assessed: 2312 -AA Side: Left  -AA Location: hip  -AA      User Key  (r) = Recorded By, (t) = Taken By, (c) = Cosigned By    Initials Name Provider Type    Jude De León, RN Registered Nurse    Ruben Victor, RN Registered Nurse    Hanna Dean RN Registered Nurse          Procedures:              Results Review:     I reviewed  the patient's new clinical results.      Lab Results (last 24 hours)     Procedure Component Value Units Date/Time    POC Glucose Once [[id number]]  (Abnormal) Collected: 03/04/21 0731    Specimen: Blood Updated: 03/04/21 0732     Glucose 224 mg/dL      Comment: Serial Number: [id number]:  [id number]       Comprehensive Metabolic Panel [[id number]]  (Abnormal) Collected: 03/04/21 0319    Specimen: Blood Updated: 03/04/21 0352     Glucose 253 mg/dL      BUN 29 mg/dL      Creatinine 1.83 mg/dL      Sodium 134 mmol/L      Potassium 3.6 mmol/L      Chloride 105 mmol/L      CO2 21.0 mmol/L      Calcium 7.9 mg/dL      Total Protein 6.1 g/dL      Albumin 2.50 g/dL      ALT (SGPT) 12 U/L      AST (SGOT) 13 U/L      Alkaline Phosphatase 85 U/L      Total Bilirubin 0.4 mg/dL      eGFR Non African Amer 36 mL/min/1.73      Globulin 3.6 gm/dL      A/G Ratio 0.7 g/dL      BUN/Creatinine Ratio 15.8     Anion Gap 8.0 mmol/L     Narrative:      GFR Normal >60  Chronic Kidney Disease <60  Kidney Failure <15      Lactic Acid, Plasma [[id number]]  (Normal) Collected: 03/04/21 0319    Specimen: Blood Updated: 03/04/21 0343     Lactate 1.6 mmol/L     CBC & Differential [[id number]]  (Abnormal) Collected: 03/04/21 0319    Specimen: Blood Updated: 03/04/21 0332    Narrative:      The following orders were created for panel order CBC & Differential.  Procedure                               Abnormality         Status                     ---------                               -----------         ------                     CBC Auto Differential[[id number]]        Abnormal            Final result                 Please view results for these tests on the individual orders.    CBC Auto Differential [[id number]]  (Abnormal) Collected: 03/04/21 0319    Specimen: Blood Updated: 03/04/21 0332     WBC 10.40 10*3/mm3      RBC 2.60 10*6/mm3      Hemoglobin 8.8 g/dL      Hematocrit 26.2 %      .6 fL      MCH 33.7 pg      MCHC 33.5 g/dL      RDW  19.3 %      RDW-SD 67.4 fl      MPV 8.4 fL      Platelets 91 10*3/mm3      Neutrophil % 77.6 %      Lymphocyte % 4.0 %      Monocyte % 17.0 %      Eosinophil % 1.0 %      Basophil % 0.4 %      Neutrophils, Absolute 8.00 10*3/mm3      Lymphocytes, Absolute 0.40 10*3/mm3      Monocytes, Absolute 1.80 10*3/mm3      Eosinophils, Absolute 0.10 10*3/mm3      Basophils, Absolute 0.00 10*3/mm3      nRBC 0.1 /100 WBC     Blood Culture - Blood, Arm, Right [[id number]] Collected: 03/03/21 0214    Specimen: Blood from Arm, Right Updated: 03/04/21 0230     Blood Culture No growth at 24 hours    Blood Culture - Blood, Arm, Left [[id number]] Collected: 03/02/21 2350    Specimen: Blood from Arm, Left Updated: 03/04/21 0045     Blood Culture No growth at 24 hours    POC Glucose Once [[id number]]  (Abnormal) Collected: 03/03/21 1915    Specimen: Blood Updated: 03/03/21 1925     Glucose 292 mg/dL      Comment: Serial Number: [id number]:  [id number]       Lactic Acid, Reflex [[id number]]  (Normal) Collected: 03/03/21 1832    Specimen: Blood Updated: 03/03/21 1900     Lactate 2.0 mmol/L     Blood Gas, Arterial - [[id number]]  (Abnormal) Collected: 03/03/21 1735    Specimen: Arterial Blood Updated: 03/03/21 1739     Site Right Radial     Luis's Test Positive     pH, Arterial 7.411 pH units      pCO2, Arterial 29.2 mm Hg      pO2, Arterial 76.0 mm Hg      HCO3, Arterial 18.6 mmol/L      Base Excess, Arterial -5.2 mmol/L      Comment: Serial Number: [id number]:  [id number]        O2 Saturation, Arterial 95.5 %      CO2 Content 19.5 mmol/L      Barometric Pressure for Blood Gas --     Comment: N/A        Modality Room Air     FIO2 21 %      Hemodilution No    Lactic Acid, Reflex Timer (This will reflex a repeat order 3-3:15 hours after ordered.) [[id number]] Collected: 03/03/21 1353    Specimen: Blood Updated: 03/03/21 1730     Hold Tube Hold for add-ons.     Comment: Auto resulted.       POC Glucose Once [[id number]]  (Abnormal) Collected:  03/03/21 1602    Specimen: Blood Updated: 03/03/21 1603     Glucose 324 mg/dL      Comment: Serial Number: [id number]:  [id number]       Lactic Acid, Plasma [[id number]]  (Abnormal) Collected: 03/03/21 1353    Specimen: Blood Updated: 03/03/21 1425     Lactate 2.2 mmol/L     Respiratory Panel PCR w/COVID-19(SARS-CoV-2) BARBARA/REGI/ELIZABETH/PAD/COR/MAD/ESHA In-House, NP Swab in UTM/VTM, 3-4 HR TAT - Swab, Nasopharynx [[id number]]  (Normal) Collected: 03/03/21 1014    Specimen: Swab from Nasopharynx Updated: 03/03/21 1125     ADENOVIRUS, PCR Not Detected     Coronavirus 229E Not Detected     Coronavirus HKU1 Not Detected     Coronavirus NL63 Not Detected     Coronavirus OC43 Not Detected     COVID19 Not Detected     Human Metapneumovirus Not Detected     Human Rhinovirus/Enterovirus Not Detected     Influenza A PCR Not Detected     Influenza B PCR Not Detected     Parainfluenza Virus 1 Not Detected     Parainfluenza Virus 2 Not Detected     Parainfluenza Virus 3 Not Detected     Parainfluenza Virus 4 Not Detected     RSV, PCR Not Detected     Bordetella pertussis pcr Not Detected     Bordetella parapertussis PCR Not Detected     Chlamydophila pneumoniae PCR Not Detected     Mycoplasma pneumo by PCR Not Detected    Narrative:      Fact sheet for providers: https://docs.[employer]/wp-content/uploads/YSZ1279-0193-LR6.1-EUA-Provider-Fact-Sheet-3.pdf    Fact sheet for patients: https://docs.[employer]/wp-content/uploads/JKV0134-0225-MQ6.1-EUA-Patient-Fact-Sheet-1.pdf    Test performed by PCR.    POC Glucose Once [[id number]]  (Abnormal) Collected: 03/03/21 1114    Specimen: Blood Updated: 03/03/21 1118     Glucose 227 mg/dL      Comment: Serial Number: [id number]:  [id number]       Hemoglobin A1c [[id number]]  (Abnormal) Collected: 03/03/21 0214    Specimen: Blood Updated: 03/03/21 1022     Hemoglobin A1C 7.5 %     Narrative:      Hemoglobin A1C Reference Range:    <5.7 %        Normal  5.7-6.4 %     Increased risk for  diabetes  > 6.4 %        Diabetes       These guidelines have been recommended by the American Diabetic Association for Hgb A1c.      The following 2010 guidelines have been recommended by the American Diabetes Association for Hemoglobin A1c.    HBA1c 5.7-6.4% Increased risk for future diabetes (pre-diabetes)  HBA1c     >6.4% Diabetes          Hemoglobin A1C   Date Value Ref Range Status   03/03/2021 7.5 (H) 3.5 - 5.6 % Final           Results from last 7 days   Lab Units 03/03/21  1735   PH, ARTERIAL pH units 7.411   PO2 ART mm Hg 76.0*   PCO2, ARTERIAL mm Hg 29.2*   HCO3 ART mmol/L 18.6*     Lab Results   Component Value Date    LIPASE 26 10/16/2019     Lab Results   Component Value Date    CHOL 154 02/23/2021    TRIG 77 02/23/2021    HDL 62 (H) 02/23/2021    LDL 77 02/23/2021       Lab Results   Lab Value Date/Time    INTRAOP  04/20/2020 1239     FNA of common bile duct stricture, immediate evaluation    Pass 1: Blood only.  Pass 2: Blood and scant benign duct epithelium.  Pass 3: Blood only.  Pass 4: Blood, stroma and scant benign duct epithelium.    Initial cytology interpretation performed by Bry Harrell MD.        FINALDX  11/12/2020 1257     Mucosa, common bile duct, biopsy:    Acute and chronically inflamed small bowel type mucosa with glandular architectural distortion    No dysplasia or malignancy identified    See comment    WALESKA/sms       FINALDX  11/12/2020 1253     Common bile duct stricture with brushings:    Scattered atypical glandular cells in a background of benign duct epithelium and inflammation    See comment    WALESKA/sms       COMDX  11/12/2020 1257     Deeper levels were examined through the block but were not further contributory. Clinical correlation is recommended.     WALESKA/sms       COMDX  11/12/2020 1253     Routine sections show smears with predominantly benign sheets of duct epithelial cells. There are a few groups with enlarged and somewhat disorganized nuclei which are favored to be  reactive, however, clinical correlation and followup are recommended.     [facility]          Microbiology Results (last 10 days)     Procedure Component Value - Date/Time    Respiratory Panel PCR w/COVID-19(SARS-CoV-2) BARBARA/REGI/ELIZABETH/PAD/COR/MAD/ESHA In-House, NP Swab in UTM/VTM, 3-4 HR TAT - Swab, Nasopharynx [[id number]]  (Normal) Collected: 03/03/21 1014    Lab Status: Final result Specimen: Swab from Nasopharynx Updated: 03/03/21 1125     ADENOVIRUS, PCR Not Detected     Coronavirus 229E Not Detected     Coronavirus HKU1 Not Detected     Coronavirus NL63 Not Detected     Coronavirus OC43 Not Detected     COVID19 Not Detected     Human Metapneumovirus Not Detected     Human Rhinovirus/Enterovirus Not Detected     Influenza A PCR Not Detected     Influenza B PCR Not Detected     Parainfluenza Virus 1 Not Detected     Parainfluenza Virus 2 Not Detected     Parainfluenza Virus 3 Not Detected     Parainfluenza Virus 4 Not Detected     RSV, PCR Not Detected     Bordetella pertussis pcr Not Detected     Bordetella parapertussis PCR Not Detected     Chlamydophila pneumoniae PCR Not Detected     Mycoplasma pneumo by PCR Not Detected    Narrative:      Fact sheet for providers: https://docs.[employer]/wp-content/uploads/OAN7820-5668-WT6.1-EUA-Provider-Fact-Sheet-3.pdf    Fact sheet for patients: https://docs.[employer]/wp-content/uploads/KKI0440-3463-LT6.1-EUA-Patient-Fact-Sheet-1.pdf    Test performed by PCR.    Blood Culture - Blood, Arm, Right [[id number]] Collected: 03/03/21 0214    Lab Status: Preliminary result Specimen: Blood from Arm, Right Updated: 03/04/21 0230     Blood Culture No growth at 24 hours    Blood Culture - Blood, Arm, Left [[id number]] Collected: 03/02/21 2350    Lab Status: Preliminary result Specimen: Blood from Arm, Left Updated: 03/04/21 0045     Blood Culture No growth at 24 hours          ECG/EMG Results (most recent)     Procedure Component Value Units Date/Time    ECG 12 Lead [[id number]]  Collected: 03/02/21 2332     Updated: 03/02/21 2334     QT Interval 388 ms     Narrative:      HEART RATE= 94  bpm  RR Interval= 640  ms  IN Interval=   ms  P Horizontal Axis=   deg  P Front Axis=   deg  QRSD Interval= 92  ms  QT Interval= 388  ms  QRS Axis= -20  deg  T Wave Axis= 58  deg  - ABNORMAL ECG -  Atrial fibrillation  Electronically Signed By:   Date and Time of Study: 2021-03-02 23:32:13          Results for orders placed during the hospital encounter of 03/03/20   Duplex Venous Lower Extremity - Bilateral CAR    Narrative · Normal bilateral lower extremity venous duplex scan.          Results for orders placed during the hospital encounter of 03/03/20   Adult Transthoracic Echo Complete W/ Cont if Necessary Per Protocol    Narrative · The left ventricular cavity is mildly dilated.  · Estimated EF = 50%.  · Left ventricular systolic function is low normal.  · Right ventricular cavity is mildly dilated.  · Mild mitral valve regurgitation is present  · Mild tricuspid valve regurgitation is present.  · Mild dilation of the aortic root is present.          Ct Chest Without Contrast Diagnostic    Result Date: 3/3/2021  1.  Overall no significant change from the prior exam.  There is a small to moderate persistent area of right lower lung atelectasis with small effusion. 2.  Common bile duct stent in good position with pneumobilia. Electronically signed by:  Cheko Mijares M.D.  3/3/2021 9:40 PM    Xr Chest 1 View    Result Date: 3/3/2021  No acute cardiopulmonary abnormality.  Electronically Signed By-Shady Fischer MD On:3/3/2021 8:22 AM This report was finalized on [id number] by  Shady Fischer MD.    Ct Chest Pulmonary Embolism    Result Date: 3/3/2021  1. No evidence pulmonary embolism. 2. Small right pleural effusion with mild right basilar airspace disease likely representing atelectasis and or superimposed infection. 3. Cardiomegaly with mild interlobular septal thickening which can be seen with  early interstitial edema changes.    Electronically Signed By-Shady Fischer MD On:3/3/2021 7:45 AM This report was finalized on [id number] by  Shady Fischer MD.          Xrays, labs reviewed personally by physician.    Medication Review:   I have reviewed the patient's current medication list      Scheduled Meds  allopurinol, 100 mg, Oral, BID  aspirin, 81 mg, Oral, Daily  docusate sodium, 100 mg, Oral, BID  DULoxetine, 60 mg, Oral, Daily  ferrous sulfate, 324 mg, Oral, Daily With Breakfast  folic acid, 1 mg, Oral, Daily  insulin glargine, 20 Units, Subcutaneous, Nightly  insulin lispro, 0-14 Units, Subcutaneous, TID AC  insulin lispro, 10 Units, Subcutaneous, TID With Meals  lactulose, 30 g, Oral, TID  levothyroxine, 75 mcg, Oral, Daily  magnesium oxide, 400 mg, Oral, Daily  multivitamin with minerals, 1 tablet, Oral, Daily  pantoprazole, 40 mg, Oral, BID  piperacillin-tazobactam, 4.5 g, Intravenous, Q8H  riFAXIMin, 550 mg, Oral, Q12H  sodium bicarbonate, 650 mg, Oral, TID  zinc sulfate, 220 mg, Oral, Daily        Meds Infusions  Pharmacy to Dose Zosyn,         Meds PRN  dextrose  •  dextrose  •  glucagon (human recombinant)  •  hydrOXYzine  •  insulin lispro **AND** insulin lispro  •  Pharmacy to Dose Zosyn        Assessment/Plan   Assessment/Plan     Active Hospital Problems    Diagnosis  POA   • Sepsis (CMS/[facility]) [A41.9]  Yes      Resolved Hospital Problems   No resolved problems to display.       MEDICAL DECISION MAKING COMPLEXITY BY PROBLEM:     Generalized body weakness with subjective fever and shortness of breath  Chest x-ray from outlying ED showed no cardiopulmonary abnormality  CT chest with no contrast showed findings suggestive of atelectasis  Blood culture no growth  Respiratory viral panel with COVID-19-insignificant  Procalcitonin 0.70  Continue Zosyn  Respiratory care with bronchodilators  Oxygen therapy and titration    Sepsis ruled out    Diabetes mellitus type 2  On Lantus, Premeal  insulin and sliding scale insulin  Monitor blood sugar and adjust insulin as needed    CKD stage IIIa  Serum creatinine around baseline  On sodium bicarb  Monitor      Anemia of chronic disease  Has a history of JOSE with malabsorption and IgG kappa MGUS  Patient on supplementary iron  Hemoglobin stable  Monitor    Hypothyroidism-on Synthroid    Anxiety/depression disorder  On  Atarax  and Cymbalta     History of Gonzalez cirrhosis with complication-hepatic encephalopathy  Had no history of previous esophageal varices  Continue on home medication  On Xifaxan and lactulose     History of PE/DVT-status post IVC filter    Chronic thrombocytopenia  Most likely due to liver disease  Monitor        Morbid obesity BMI 41.9 lifestyle management education if receptive       Deconditioning  PT recommending inpatient rehab    Gout on allopurinol     VTE Prophylaxis -SCD                                               Mechanical Order History:      Ordered        03/02/21 2317  Place Sequential Compression Device  Once         03/02/21 2317  Maintain Sequential Compression Device  Continuous                 Pharmalogical Order History:     None            Code Status -   Code Status and Medical Interventions:   Ordered at: 03/02/21 2318     Code Status:    CPR     Medical Interventions (Level of Support Prior to Arrest):    Full       This patient has been examined with appropriate PPE . 03/04/21        Discharge Planning  Pending clinical progress        Electronically signed by Celestino Menchaca MD, 03/04/21, 09:00 EST.  [facility] Hospitalist Team

## 2021-03-05 NOTE — PLAN OF CARE
Goal Outcome Evaluation:  Patient is alert and able to make needs known to staff. Patient has no complaints of pain at this time. Patient discharging to rehab facility. Will continue to monitor until discharge.   Problem: Adult Inpatient Plan of Care  Goal: Plan of Care Review  Outcome: Ongoing, Progressing

## 2021-03-05 NOTE — DISCHARGE SUMMARY
[facility] Hospital Medicine Services  DISCHARGE SUMMARY        Prepared For PCP:  Paulette Harris MD    Patient Name: Bry Ratliff  : 1945  MRN: [id number]      Date of Admission:   3/2/2021    Date of Discharge:  3/5/2021    Length of stay:  LOS: 3 days     Hospital Course     Presenting Problem:   Hypotension [I95.9]  Sepsis (CMS/HCC) [A41.9]      Active Hospital Problems    Diagnosis  POA   • Sepsis (CMS/HCC) [A41.9]  Yes      Resolved Hospital Problems   No resolved problems to display.           Hospital Course:  75-year-old man with multiple morbidities including Gonzalez cirrhosis, CKD stage III, CAD status post previous stenting and hypothyroidism.  Also has iron deficiency anemia and coagulation factor deficiency for which he follows up with the hematologist.  Patient had presented to an [facility] ED with complaints of progressively worsening shortness of breath, generalized body weakness and subjective fever for the last 3 weeks.  In the ED patient was stated to be hypotensive but responded to IV fluid resuscitation.  He was given empirical antibiotics after blood culture was obtained and transferred to [facility] with diagnosis of possible urosepsis.    Conditions addressed while inpatient are as stated below    Generalized body weakness with subjective fever and shortness of breath  Chest x-ray from [facility] ED showed no cardiopulmonary abnormality  CT chest with no contrast showed findings suggestive of atelectasis  Blood culture no growth  Respiratory viral panel with COVID-19-insignificant  Procalcitonin 0.70  D-dimer elevated  CT E protocol showed no evidence of PE however patient was noted to have small right basilar infiltrates-atelectasis/infection  Initially on Zosyn which was deescalated to doxycycline  Respiratory care with bronchodilators  Oxygen therapy and titration     Sepsis ruled out     Diabetes mellitus type 2  Continue on home insulin regimen     CKD  stage IIIa  Serum creatinine around baseline  On sodium bicarb          Anemia of chronic disease  Has a history of JOSE with malabsorption and IgG kappa MGUS  Patient on supplementary iron  Hemoglobin stable    Hypothyroidism-on Synthroid     Anxiety/depression disorder  On  Atarax  and Cymbalta      History of Gonzalez cirrhosis with complication-hepatic encephalopathy  Had no history of previous esophageal varices  Continue on home medication  On Xifaxan and lactulose     History of PE/DVT-status post IVC filter     Chronic thrombocytopenia  Most likely due to liver disease  Monitor        Morbid obesity BMI 41.9 lifestyle management education if receptive        Deconditioning  PT recommending inpatient rehab     Gout on allopurinol        Recommendation for Outpatient Providers:             Reasons For Change In Medications and Indications for New Medications:        Day of Discharge     HPI:       Vital Signs:   Temp:  [98.1 °F (36.7 °C)-100.3 °F (37.9 °C)] 98.3 °F (36.8 °C)  Heart Rate:  [] 93  Resp:  [18-32] 18  BP: (117-152)/(58-76) 136/59     Physical Exam:  Physical Exam  Vitals signs reviewed.   Constitutional:       General: He is not in acute distress.     Appearance: He is obese.   HENT:      Head: Normocephalic and atraumatic.      Nose: Nose normal.      Mouth/Throat:      Mouth: Mucous membranes are moist.   Eyes:      Extraocular Movements: Extraocular movements intact.      Conjunctiva/sclera: Conjunctivae normal.      Pupils: Pupils are equal, round, and reactive to light.   Neck:      Musculoskeletal: Neck supple.   Cardiovascular:      Rate and Rhythm: Normal rate and regular rhythm.   Pulmonary:      Effort: No respiratory distress.      Breath sounds: Normal breath sounds.   Abdominal:      General: Bowel sounds are normal.      Palpations: Abdomen is soft.      Tenderness: There is no abdominal tenderness.   Musculoskeletal:      Comments: Degenerative changes   Skin:     General: Skin is  warm and dry.   Neurological:      Mental Status: He is alert and oriented to person, place, and time.   Psychiatric:         Mood and Affect: Mood normal.         Pertinent  and/or Most Recent Results     Results from last 7 days   Lab Units 03/05/21 0618 03/04/21 0319 03/03/21 0214   WBC 10*3/mm3 9.80 10.40 10.90*   HEMOGLOBIN g/dL 8.7* 8.8* 8.4*   HEMATOCRIT % 25.6* 26.2* 24.4*   PLATELETS 10*3/mm3 93* 91* 89*   SODIUM mmol/L 137 134* 135*   POTASSIUM mmol/L 3.2* 3.6 3.7   CHLORIDE mmol/L 106 105 106   CO2 mmol/L 20.0* 21.0* 21.0*   BUN mg/dL 31* 29* 24*   CREATININE mg/dL 1.79* 1.83* 1.52*   GLUCOSE mg/dL 241* 253* 199*   CALCIUM mg/dL 8.1* 7.9* 7.8*     Results from last 7 days   Lab Units 03/05/21 0618 03/04/21 0319 03/03/21 0214   BILIRUBIN mg/dL 0.4 0.4 0.7   ALK PHOS U/L 92 85 82   ALT (SGPT) U/L 14 12 14   AST (SGOT) U/L 20 13 13           Invalid input(s): TG, LDLCALC, LDLREALC  Results from last 7 days   Lab Units 03/04/21 0319 03/03/21  1832 03/03/21  1353  03/03/21 0214   HEMOGLOBIN A1C %  --   --   --   --  7.5*   PROCALCITONIN ng/mL  --   --   --   --  0.76*   LACTATE mmol/L 1.6 2.0 2.2*   < > 1.3    < > = values in this interval not displayed.       Brief Urine Lab Results  (Last result in the past 365 days)      Color   Clarity   Blood   Leuk Est   Nitrite   Protein   CREAT   Urine HCG        03/03/21 0518 Yellow Hazy  Comment:  Result checked visual confirmation Large (3+) Negative Negative 30 mg/dL (1+)               Microbiology Results Abnormal     Procedure Component Value - Date/Time    Blood Culture - Blood, Arm, Right [[id number]] Collected: 03/03/21 0214    Lab Status: Preliminary result Specimen: Blood from Arm, Right Updated: 03/05/21 0230     Blood Culture No growth at 2 days    Blood Culture - Blood, Arm, Left [[id number]] Collected: 03/02/21 2350    Lab Status: Preliminary result Specimen: Blood from Arm, Left Updated: 03/05/21 0045     Blood Culture No growth at 2 days     Respiratory Panel PCR w/COVID-19(SARS-CoV-2) [facility] In-House, NP Swab in UTM/VTM, 3-4 HR TAT - Swab, Nasopharynx [[id number]]  (Normal) Collected: 03/03/21 1014    Lab Status: Final result Specimen: Swab from Nasopharynx Updated: 03/03/21 1125     ADENOVIRUS, PCR Not Detected     Coronavirus 229E Not Detected     Coronavirus HKU1 Not Detected     Coronavirus NL63 Not Detected     Coronavirus OC43 Not Detected     COVID19 Not Detected     Human Metapneumovirus Not Detected     Human Rhinovirus/Enterovirus Not Detected     Influenza A PCR Not Detected     Influenza B PCR Not Detected     Parainfluenza Virus 1 Not Detected     Parainfluenza Virus 2 Not Detected     Parainfluenza Virus 3 Not Detected     Parainfluenza Virus 4 Not Detected     RSV, PCR Not Detected     Bordetella pertussis pcr Not Detected     Bordetella parapertussis PCR Not Detected     Chlamydophila pneumoniae PCR Not Detected     Mycoplasma pneumo by PCR Not Detected    Narrative:      Fact sheet for providers: https://docs.[employer]/wp-content/uploads/EMO0676-8648-OT3.1-EUA-Provider-Fact-Sheet-3.pdf    Fact sheet for patients: https://docs.[employer]/wp-content/uploads/AGL8992-3649-YS2.1-EUA-Patient-Fact-Sheet-1.pdf    Test performed by PCR.          Ct Chest Without Contrast Diagnostic    Result Date: 3/3/2021  Impression: 1.  Overall no significant change from the prior exam.  There is a small to moderate persistent area of right lower lung atelectasis with small effusion. 2.  Common bile duct stent in good position with pneumobilia. Electronically signed by:  Cheko Mijares M.D.  3/3/2021 9:40 PM    Xr Chest 1 View    Result Date: 3/3/2021  Impression: No acute cardiopulmonary abnormality.  Electronically Signed By-Shady Fischer MD On:3/3/2021 8:22 AM This report was finalized on [id number] by  Shady Fischer MD.    Ct Chest Pulmonary Embolism    Result Date: 3/3/2021  Impression: 1. No evidence pulmonary  embolism. 2. Small right pleural effusion with mild right basilar airspace disease likely representing atelectasis and or superimposed infection. 3. Cardiomegaly with mild interlobular septal thickening which can be seen with early interstitial edema changes.    Electronically Signed By-Shady Fischer MD On:3/3/2021 7:45 AM This report was finalized on [id number] by  Shady Fischer MD.      Results for orders placed during the hospital encounter of 03/03/20   Duplex Venous Lower Extremity - Bilateral CAR    Narrative · Normal bilateral lower extremity venous duplex scan.          Results for orders placed during the hospital encounter of 03/03/20   Duplex Venous Lower Extremity - Bilateral CAR    Narrative · Normal bilateral lower extremity venous duplex scan.          Results for orders placed during the hospital encounter of 03/03/20   Adult Transthoracic Echo Complete W/ Cont if Necessary Per Protocol    Narrative · The left ventricular cavity is mildly dilated.  · Estimated EF = 50%.  · Left ventricular systolic function is low normal.  · Right ventricular cavity is mildly dilated.  · Mild mitral valve regurgitation is present  · Mild tricuspid valve regurgitation is present.  · Mild dilation of the aortic root is present.                  Test Results Pending at Discharge  Pending Labs     Order Current Status    Blood Culture - Blood, Arm, Left Preliminary result    Blood Culture - Blood, Arm, Right Preliminary result            Procedures Performed           Consults:   Consults     No orders found from 2/1/2021 to 3/3/2021.            Discharge Details        Discharge Medications      New Medications      Instructions Start Date   doxycycline 100 MG tablet  Commonly known as: ADOXA   100 mg, Oral, Every 12 Hours Scheduled      ipratropium-albuterol 0.5-2.5 mg/3 ml nebulizer  Commonly known as: DUO-NEB   3 mL, Nebulization, Every 4 Hours PRN         Changes to Medications      Instructions Start Date  "  aspirin 81 MG tablet  What changed: additional instructions   81 mg, Oral, Daily      lactulose 10 GM/15ML solution  Commonly known as: CHRONULAC  What changed:   · how much to take  · when to take this   30 g, Oral, 3 Times Daily, Will not take dos am      levothyroxine 75 MCG tablet  Commonly known as: SYNTHROID, LEVOTHROID  What changed: additional instructions   75 mcg, Oral, Daily      nitroglycerin 0.4 MG SL tablet  Commonly known as: NITROSTAT  What changed: additional instructions   0.4 mg, Sublingual, Every 5 Minutes PRN, Take no more than 3 doses in 15 minutes.      pantoprazole 40 MG EC tablet  Commonly known as: PROTONIX  What changed: when to take this   40 mg, Oral, 2 Times Daily         Continue These Medications      Instructions Start Date   allopurinol 100 MG tablet  Commonly known as: ZYLOPRIM   100 mg, Oral, 2 Times Daily      B-D 3CC LUER-YASMEEN SYR 25GX1\" 25G X 1\" 3 ML misc  Generic drug: Syringe/Needle (Disp)   USE FOR THE B12 INJECTION INTRAMUSCULAR ONCE MONTHLY      Colace 100 MG capsule  Generic drug: docusate sodium   100 mg, Oral, 2 Times Daily      cyanocobalamin 1000 MCG/ML injection   1,000 mcg, Intramuscular, Every 28 Days      DULoxetine 60 MG capsule  Commonly known as: CYMBALTA   60 mg, Oral, Daily, Take preop      ezetimibe 10 MG tablet  Commonly known as: Zetia   10 mg, Oral, Daily      ferrous gluconate 324 MG tablet  Commonly known as: FERGON   Oral, Daily      folic acid 1 MG tablet  Commonly known as: FOLVITE   1 mg, Oral, Daily, Last dose 6/6      hydrOXYzine pamoate 25 MG capsule  Commonly known as: VISTARIL   25 mg, Oral, 3 Times Daily PRN, Take preop if needed      insulin lispro 100 UNIT/ML injection  Commonly known as: HumaLOG   20 units subcu before each meal plus sliding scale MDD 60      Insulin Syringe-Needle U-100 31G X 5/16\" 1 ML misc  Commonly known as: BD Insulin Syringe U/F   Used to inject insulin twice a day.      Lantus 100 UNIT/ML injection  Generic drug: " insulin glargine   Inject 54 units at bedtime      magnesium oxide 400 MG tablet  Commonly known as: MAG-OX   400 mg, Oral, Daily, dont take preop      melatonin 5 MG tablet tablet   10 mg, Oral, Nightly PRN      multivitamin with minerals tablet tablet   1 tablet, Oral, Daily      OneTouch Verio test strip  Generic drug: glucose blood   Check BS 4 times a day      sodium bicarbonate 650 MG tablet   650 mg, Oral, 3 Times Daily, Take preop      Xifaxan 550 MG tablet  Generic drug: riFAXIMin   550 mg, Oral, Every 12 Hours Scheduled, Take preop      zinc sulfate 220 (50 Zn) MG capsule  Commonly known as: ZINCATE   220 mg, Oral, Daily, dont take dos         Stop These Medications    oxyCODONE 10 MG tablet  Commonly known as: ROXICODONE            No Known Allergies      Discharge Disposition:  Rehab Facility or Unit (DC - External)    Diet:  Hospital:  Diet Order   Procedures   • Diet Diabetic/Consistent Carbs; Diabetic - Consistent Carb         Discharge Activity:   Activity Instructions     Gradually Increase Activity Until at Pre-Hospitalization Level              CODE STATUS:    Code Status and Medical Interventions:   Ordered at: 03/02/21 2318     Code Status:    CPR     Medical Interventions (Level of Support Prior to Arrest):    Full         Follow-up Appointments  Future Appointments   Date Time Provider Department Center   3/8/2021  1:00 PM LAB [facility] ONC LAB [facility]   3/8/2021  1:30 PM RN REVIEW ROOM  [facility]   3/22/2021  1:00 PM LAB [facility] ON[facility] LAB NA [facility]   3/22/2021  1:30 PM RN REVIEW ROOM  [facility]   4/5/2021  1:00 PM LAB [facility] ONC LAB NA [facility]   4/5/2021  1:30 PM Deon Guajardo MD [facility] ONC [facility]   4/5/2021  1:45 PM RN REVIEW ROOM  [facility]   8/20/2021  9:50 AM LAB [facility] LAB DS [facility] None   8/27/2021  1:15 PM Alejandra Amaro MD [facility] END NA None   9/3/2021 11:10 AM Alvaro Pandey MD [facility] NA        Additional Instructions for the Follow-ups that You Need to Schedule     Discharge Follow-up with PCP   As directed       Currently Documented PCP:    Paulette Harris MD    PCP Phone Number:    [phone]     Follow Up Details: Follow-up with PCP as needed         Basic Metabolic Panel    Mar 10, 2021 (Approximate)              Condition on Discharge:      Stable      This patient has been examined with appropriate PPE . 03/05/21      Electronically signed by Celestino Menchaca MD, 03/05/21, 12:24 PM EST.      Time: I spent  32  minutes on this discharge activity which included face-to-face encounter with the patient/reviewing the data in the system/coordination of the care with the nursing staff as well as consultants/documentation/entering orders.

## 2021-03-05 NOTE — PLAN OF CARE
Problem: Adult Inpatient Plan of Care  Goal: Absence of Hospital-Acquired Illness or Injury  Intervention: Identify and Manage Fall Risk  Recent Flowsheet Documentation  Taken 3/5/2021 0306 by Liana Avery RN  Safety Promotion/Fall Prevention:   fall prevention program maintained   safety round/check completed  Taken 3/5/2021 0026 by Liana Avery RN  Safety Promotion/Fall Prevention:   fall prevention program maintained   safety round/check completed  Taken 3/4/2021 2038 by Liana Avery RN  Safety Promotion/Fall Prevention:   fall prevention program maintained   safety round/check completed     Problem: Fall Injury Risk  Goal: Absence of Fall and Fall-Related Injury  Intervention: Promote Injury-Free Environment  Recent Flowsheet Documentation  Taken 3/5/2021 0306 by Liana Avery RN  Safety Promotion/Fall Prevention:   fall prevention program maintained   safety round/check completed  Taken 3/5/2021 0026 by Liana Avery RN  Safety Promotion/Fall Prevention:   fall prevention program maintained   safety round/check completed  Taken 3/4/2021 2038 by Liana Avery RN  Safety Promotion/Fall Prevention:   fall prevention program maintained   safety round/check completed     Problem: Skin Injury Risk Increased  Goal: Skin Health and Integrity  Intervention: Optimize Skin Protection  Recent Flowsheet Documentation  Taken 3/4/2021 2038 by Liana Avery RN  Pressure Reduction Techniques: frequent weight shift encouraged  Pressure Reduction Devices: positioning supports utilized  Skin Protection: incontinence pads utilized   Goal Outcome Evaluation:

## 2021-03-05 NOTE — TELEPHONE ENCOUNTER
Bry's wife Lisa called to say pt is in the hospital and will be transferred to rehab today. She's not sure if he'll be able to make it to his 03/08 appt. She says she'll have to speak with the rehab facility first    Ph# [phone]

## 2021-03-05 NOTE — TELEPHONE ENCOUNTER
Spoke w/ Lisa and let her know that we can keep his current appointment for now and when she speaks to the rehab facility she can call us back and let us know if we need to reschedule.  She v/u and states that she will call as soon as she knows something.

## 2021-04-15 PROBLEM — D50.0 IRON DEFICIENCY ANEMIA SECONDARY TO BLOOD LOSS (CHRONIC): Status: ACTIVE | Noted: 2018-03-05

## 2021-04-16 NOTE — PROGRESS NOTES
Pt here today for injectafer infusion. He denies having any complaints and tolerated treatment well. Pt to return to [facility] on 4/26/21 at 1115 for lab collection and possible retacrit injection.

## 2021-04-19 NOTE — TELEPHONE ENCOUNTER
Pt wife called and wanted us to know that the pt fistula has opened up and is draining a very small amount, he has a ct tomorrow 4-20-21, and thought Dr. Etienne may want to call in antibiotics.  Told pt I would speak with Dr. Etienne when he gets in tomorrow and let her know what he says.  kp

## 2021-04-21 NOTE — TELEPHONE ENCOUNTER
Pt wife called wanting to know if Dr. Etienne would call in an antibiotic for pt , his fistula was draining a little. Called in Bactrum DS x 10 days to [facility] in [place]. kp

## 2021-04-26 NOTE — PROGRESS NOTES
Noted Retacrit injection not approved from insurance and notified Wil ADLER and states after labs results someone will contact patient when to return for next possible Retacrit injection. And patient made aware of this and verbalized understanding.

## 2021-04-30 NOTE — TELEPHONE ENCOUNTER
Wife called in wanting to know if we have received approval for Retacrit. Sent message to Kaitlin and Nany and also LVM with [facility] about getting approval and patient needs to come in before his 5/17/21 appt.

## 2021-05-10 NOTE — ANESTHESIA POSTPROCEDURE EVALUATION
Patient: Bry Ratliff    Procedure Summary     Date: 05/10/21 Room / Location: [facility] 4 / [facility]    Anesthesia Start: 1201 Anesthesia Stop: 1250    Procedures:       ESOPHAGOGASTRODUODENOSCOPY WITH BIOPSY X 1, argon plasma coagulation (N/A )      COLONOSCOPY with polypectomy x 1 (N/A Rectum) Diagnosis:       Iron deficiency anemia secondary to blood loss (chronic)      Cirrhosis of liver (CMS/HCC)      Enteroenteric fistula      (Iron deficiency anemia secondary to blood loss (chronic) [D50.0])      (Cirrhosis of liver (CMS/HCC) [K74.60])      (Enteroenteric fistula [K63.2])    Surgeons: Marisel Gomez MD Provider: Reese Morales MD    Anesthesia Type: MAC ASA Status: 4          Anesthesia Type: MAC    Vitals  Vitals Value Taken Time   /62 05/10/21 1301   Temp     Pulse 94 05/10/21 1308   Resp 24 05/10/21 1300   SpO2 100 % 05/10/21 1308   Vitals shown include unvalidated device data.        Post Anesthesia Care and Evaluation    Patient location during evaluation: PACU  Patient participation: complete - patient participated  Level of consciousness: awake  Pain scale: See nurse's notes for pain score.  Pain management: adequate  Airway patency: patent  Anesthetic complications: No anesthetic complications  PONV Status: none  Cardiovascular status: acceptable  Respiratory status: acceptable  Hydration status: acceptable    Comments: Patient seen and examined postoperatively; vital signs stable; SpO2 greater than or equal to 90%; cardiopulmonary status stable; nausea/vomiting adequately controlled; pain adequately controlled; no apparent anesthesia complications; patient discharged from anesthesia care when discharge criteria were met

## 2021-05-10 NOTE — DISCHARGE INSTRUCTIONS
A responsible adult should stay with you and you should rest quietly for the rest of the day.    Do not drink alcohol, drive, operate any heavy machinery or power tools or make any legal/important decisions for the next 24 hours.     Progress your diet as tolerated.  If you begin to experience severe pain, increased shortness of breath, racing heartbeat or a fever above 101 F, seek immediate medical attention.     Follow up with MD as instructed. Call office for results in 3 to 5 days if needed. [phone]    Impression:  1.  Severe portal hypertensive gastropathy.  2.  Short segment of Syed's esophagus biopsy was performed, no esophageal varices.  3.  Significant improvement in gastric antral vascular ectasia, only few lesions were noticed in the antrum and duodenum they were cauterized.  4.  Stent was noticed in the second part of the duodenum there is some inflammatory changes around that area.  5.  Small part removed from the colon.  6.  Extensive diverticulosis and moderate-sized internal/external hemorrhoid  7.  Surgical changes consistent with a right hemicolectomy with ileocolonic anastomosis.     Recommendations:  Follow up with GI clinic as needed  Follow up with PCP as scheduled  Follow up with biopsy report  No recall for colonoscopy  Benefiber 1 scoop 2x/day   No significant gave lesion was noticed except few specks which were cauterized.  Continue with iron supplement and PPI.

## 2021-05-10 NOTE — ANESTHESIA PREPROCEDURE EVALUATION
Anesthesia Evaluation     Patient summary reviewed and Nursing notes reviewed   NPO Solid Status: > 8 hours  NPO Liquid Status: > 8 hours           Airway   Mallampati: II  TM distance: >3 FB  Neck ROM: full  No difficulty expected  Dental - normal exam     Pulmonary    (+) pulmonary embolism, sleep apnea,   Cardiovascular     (+) hypertension, CAD, DVT, hyperlipidemia,       Neuro/Psych  GI/Hepatic/Renal/Endo    (+) morbid obesity, GERD, PUD,  hepatitis, liver disease, renal disease, diabetes mellitus,     Musculoskeletal     Abdominal    Substance History      OB/GYN          Other   arthritis,                      Anesthesia Plan    ASA 4     MAC     intravenous induction

## 2021-05-10 NOTE — OP NOTE
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 A physical exam was performed and vital signs were monitored throughout the procedure.  Initially  scope was advanced regularization from oropharynx and esophagus stomach and the third part of the duodenum.    A rectal exam was performed which was normal. An Olympus colonoscope was placed into the rectum and proceeded under direct visualization through the colon until the cecum and appendiceal orifice were identified. Careful visualization occurred upon slow withdraw of the scope. The scope was then retroflexed and the distal rectum was visualized. The quality of the prep was good. The procedure was not difficult and there were no immediate complications.    Findings:   Upper endoscopy lamination did not show any esophageal varices.  Short segment of Syed's esophagus was noticed around 2 cm biopsy was performed on 40 cm were performed.  Small hiatal hernia was seen.  Gas mucosa did show changes consistent with a severe portal hypertensive gastropathy only a few small specks of ectasia noticed in the antrum area they were cauterized overall the gave has significantly improved after cauterization.  Few area of the injected area was seen in the duodenal bulb were also cauterized.  At the D2 level, again noticed some inflammatory  changes due to previous intervention and duodenitis.  Metal stent was noticed in the second part of coming out of the ampulla.  Colonoscopy lamination did show a right hemicolectomy with ileocolonic anastomosis there was a 1 small polyp removed with a cold snare.  Extensive diverticulosis sigmoid descending colon was noticed with a moderate-sized internal/external hemorrhoid and prominent rectal veins.    Impression:  1.  Severe portal hypertensive gastropathy.  2.  Short segment of Syed's esophagus biopsy was performed, no esophageal varices.  3.  Significant improvement in gastric antral vascular ectasia, only few lesions were noticed in the antrum and duodenum they were cauterized.  4.  Stent was noticed in the second part of the duodenum there is some inflammatory changes around that area.  5.  Small part removed from the colon.  6.  Extensive diverticulosis and moderate-sized internal/external hemorrhoid  7.  Surgical changes consistent with a right hemicolectomy with ileocolonic anastomosis.    Recommendations:  Follow up with GI clinic as needed  Follow up with PCP as scheduled  Follow up with biopsy report  No recall for colonoscopy  Benefiber 1 scoop 2x/day   No significant gave lesion was noticed except few specks which were cauterized.  Continue with iron supplement and PPI.    Marisel Gomez MD     Date: 5/10/2021    Time: 12:43 EDT

## 2021-05-10 NOTE — H&P
Patient Care Team:  Paulette Harris MD as PCP - General  Josefina Plascencia MD as Consulting Physician (Nephrology)  Alvaro Pandey MD as Consulting Physician (Cardiology)      Subjective .     History of present illness:    Bry Ratliff is a 75 y.o. male who presents today for Procedure(s):  ESOPHAGOGASTRODUODENOSCOPY  COLONOSCOPY for the indications listed below.     The updated Patient Profile was reviewed prior to the procedure, in conjunction with the Physical Exam, including medical conditions, surgical procedures, medications, allergies, family history and social history.     Pre-operatively, I reviewed the indication(s) for the procedure, the risks of the procedure [including but not limited to: unexpected bleeding possibly requiring hospitalization and/or unplanned repeat procedures, perforation possibly requiring surgical treatment, missed lesions and complications of sedation/MAC (also explained by anesthesia staff)].     I have evaluated the patient for risks associated with the planned anesthesia and the procedure to be performed and find the patient an acceptable candidate for IV sedation.    Multiple opportunities were provided for any questions or concerns, and all questions were answered satisfactorily before any anesthesia was administered. We will proceed with the planned procedure.  Review of symptoms positive for abdominal pain generalized weakness fatigue and tiredness  Nausea right upper quadrant pain.  No melena    ASSESSMENT/PLAN:  EGD  COLON          Past Medical History:  Past Medical History:   Diagnosis Date   • Anemia     iron deficiency   • Anxiety    • B12 deficiency 2009   • Balance disorder    • Syed's esophagus    • Bile duct leak    • CAD (coronary artery disease)     cardiac stent   • Constipation    • DDD (degenerative disc disease), lumbar    • Decubitus ulcer     buttocks   • Depression    • Diabetes mellitus (CMS/HCC)    • Disease of thyroid  gland     hypothyroid   • Diverticular disease    • Dvt femoral (deep venous thrombosis) (CMS/HCC) 2004    rt let   • Elevated cholesterol    • Fistula of intestine    • Gastroparesis    • GERD (gastroesophageal reflux disease)    • Gout    • Hepatic encephalopathy (CMS/HCC)     if doesnt take meds   • History of echocardiogram     03/03/2017 - 12/03/2014 - 04/2012   • History of stomach ulcers    • History of transfusion    • Hyperlipidemia    • Incontinence    • Iron deficiency    • Kidney stones    • Morbid obesity (CMS/HCC)    • THOMAS (nonalcoholic steatohepatitis)    • Neuropathy    • OAB (overactive bladder)    • Open wound     rt knee   • KATHIA treated with BiPAP     bring dos   • Peripheral edema    • Pulmonary embolus (CMS/HCC) 2004   • Renal insufficiency     stage 3   • S/P lumbar laminectomy    • Skin irritation     skin fold   • Stage 3 chronic kidney disease (CMS/HCC)        Past Surgical History:  Past Surgical History:   Procedure Laterality Date   • ABDOMINAL SURGERY     • BACK SURGERY  1971   • BILE DUCT STENT PLACEMENT     • BILE DUCT STENT PLACEMENT     • CARDIAC CATHETERIZATION     • CATARACT EXTRACTION  2014   • CATARACT EXTRACTION, BILATERAL  2014   • CHOLECYSTECTOMY  02/24/2019   • COLON RESECTION Right 6/11/2020    Procedure: COLON RESECTION RIGHT;  Surgeon: Naif Etienne DO;  Location: [facility] MAIN OR;  Service: General;  Laterality: Right;   • COLONOSCOPY  03/2018   • CORONARY ANGIOPLASTY  08/24/2009    PCI stent - succesful placement of 3.5/23 promus stent in proximal right coronary artery   • CORONARY ANGIOPLASTY WITH STENT PLACEMENT  08/27/2009    patient had stent placed to proximal right coronary artery   • CYSTOSCOPY BLADDER STONE LITHOTRIPSY  1997   • ENDOSCOPY N/A 10/18/2019    Procedure: ESOPHAGOGASTRODUODENOSCOPY with argon plasma coagulation of gastric antral vascular ectasia;  Surgeon: Marisel Gomez MD;  Location: [facility] ENDOSCOPY;  Service: Gastroenterology   • ENDOSCOPY N/A  1/9/2020    Procedure: ESOPHAGOGASTRODUODENOSCOPY WITH BIOPSY, ARGON PLASMA COAGULATION OF GASTRIC ANTREL VASCULAR ECTASIA,;  Surgeon: Marisel Gomez MD;  Location: [facility] ENDOSCOPY;  Service: Gastroenterology   • ENDOSCOPY N/A 3/6/2020    Procedure: ESOPHAGOGASTRODUODENOSCOPY;  Surgeon: Zbigniew Silva MD;  Location: [facility] ENDOSCOPY;  Service: Gastroenterology;  Laterality: N/A;  mild gave, post cautery ulcer     • ERCP N/A 11/20/2019    Procedure: ERCP, clearance of bile duct with balloon, placement of biliary stent, EGD with argon plasma coagulation of gastric antral vascular ectasia;  Surgeon: Marisel Gomez MD;  Location: [facility] ENDOSCOPY;  Service: Gastroenterology   • ERCP N/A 2/27/2020    Procedure: ENDOSCOPIC RETROGRADE CHOLANGIOPANCREATOGRAPHY with removal of biliary stent, brushing, dilation, and placement of biliary stent x 2 and Esophagogastroduodenoscopy with argon plasma coagulation;  Surgeon: Marisel Gomez MD;  Location: [facility] ENDOSCOPY;  Service: Gastroenterology;  Laterality: N/A;  Gastric antral vascular ectasia, common bile duct stricture   • ERCP N/A 4/20/2020    Procedure: ENDOSCOPIC RETROGRADE CHOLANGIOPANCREATOGRAPHY WITH REMOVAL OF BILIARY STENT, AMPULA DILATION TO 8MM, BRUSHING OF COMMON BILE DUT, CLEARANCE OF BILE DUCT WITH BALLOON, BIOPSY OF AMPULA, PLACEMENT OF BILIARY STENT;  Surgeon: Marisel Gomez MD;  Location: [facility] ENDOSCOPY;  Service: Gastroenterology;  Laterality: N/A;  POST:CBD STRICTURE   • ERCP N/A 11/12/2020    Procedure: ENDOSCOPIC RETROGRADE CHOLANGIOPANCREATOGRAPHY WITH BALLOON CLEARANCE OF COMMON BILE DUCT, BRUSHING OF COMMON BILE DUCT STRICTURE, BIOPSY X 1 AREA, PLACEMENT OF BILIARY STENT;  Surgeon: Marisel Gomez MD;  Location: [facility] ENDOSCOPY;  Service: Gastroenterology;  Laterality: N/A;  Post op:REMOVAL OF SLUDGE, SUCCESSFUL STENT PLACEMENT   • OTHER SURGICAL HISTORY  11/2018    IVC filter implant   • RENAL ARTERY STENT Left     does not recall this   •  UPPER ENDOSCOPIC ULTRASOUND W/ FNA N/A 4/20/2020    Procedure: Esophagogastroduodenoscopy with Endoscopic ultrasound and fine needle aspiration;  Surgeon: Marisel Gomez MD;  Location: [facility] ENDOSCOPY;  Service: Gastroenterology;  Laterality: N/A;  Post: CBD STRICTURE   • VENA CAVA FILTER INSERTION  12/05/2018       Social History:  Social History     Tobacco Use   • Smoking status: Never Smoker   • Smokeless tobacco: Never Used   Vaping Use   • Vaping Use: Never used   Substance Use Topics   • Alcohol use: No   • Drug use: No       Family History:  Family History   Problem Relation Age of Onset   • Hyperlipidemia Other    • Hypertension Other        Medications:  No medications prior to admission.       Scheduled Meds:  Continuous Infusions:No current facility-administered medications for this encounter.    PRN Meds:.    ALLERGIES:  Patient has no known allergies.        Objective     Vital Signs:        Physical Exam:      General Appearance:    Awake and alert, in no acute distress   Lungs:     Clear to auscultation bilaterally, respirations regular, even and unlabored    Heart:    Regular rhythm and normal rate, normal S1 and S2, no            murmur, no gallop, no rub   Abdomen:     Normal bowel sounds, soft, non-tender, no rebound or guarding, non-distended, no hepatosplenomegaly        Results Review:   I reviewed the patient's labs and imaging.  Lab Results (last 24 hours)     ** No results found for the last 24 hours. **          Imaging Results (Last 24 Hours)     ** No results found for the last 24 hours. **             I discussed the patients findings and my recommendations with the patient.  Marisel Gomez MD  05/10/21  10:07 EDT

## 2021-05-19 NOTE — PROGRESS NOTES
Subjective   Bry Ratliff is a 75 y.o. male.     History of present illness  Mr. Ratliff is seen in the office today for a sinus tract in the right upper abdomen that opens and drains periodically.  He has not been an issue now for the past month or so.  He just recently underwent upper and lower endoscopy by Dr. Gomez for chronic anemia.  Those turned out pretty good.  It showed the previous right hemicolectomy that we had done for a [place] cutaneous fistula.    Past Medical History:   Diagnosis Date   • Anemia     iron deficiency   • Anxiety    • B12 deficiency 2009   • Balance disorder    • Syed's esophagus    • Bile duct leak    • CAD (coronary artery disease)     cardiac stent   • Constipation    • DDD (degenerative disc disease), lumbar    • Decubitus ulcer     buttocks   • Depression    • Diabetes mellitus (CMS/HCC)    • Disease of thyroid gland     hypothyroid   • Diverticular disease    • Dvt femoral (deep venous thrombosis) (CMS/HCC) 2004    rt let   • Elevated cholesterol    • Fistula of intestine    • Gastroparesis    • GERD (gastroesophageal reflux disease)    • Gout    • Hepatic encephalopathy (CMS/HCC)     if doesnt take meds   • History of echocardiogram     03/03/2017 - 12/03/2014 - 04/2012   • History of stomach ulcers    • History of transfusion    • Hyperlipidemia    • Incontinence    • Iron deficiency    • Kidney stones    • Morbid obesity (CMS/HCC)    • THOMAS (nonalcoholic steatohepatitis)    • Neuropathy    • OAB (overactive bladder)    • Open wound     rt knee   • KATHIA treated with BiPAP     bring dos   • Peripheral edema    • Pulmonary embolus (CMS/HCC) 2004   • Renal insufficiency     stage 3   • S/P lumbar laminectomy    • Skin irritation     skin fold   • Stage 3 chronic kidney disease (CMS/HCC)        Past Surgical History:   Procedure Laterality Date   • ABDOMINAL SURGERY     • BACK SURGERY  1971   • BILE DUCT STENT PLACEMENT     • BILE DUCT STENT PLACEMENT     • CARDIAC  CATHETERIZATION     • CATARACT EXTRACTION  2014   • CATARACT EXTRACTION, BILATERAL  2014   • CHOLECYSTECTOMY  02/24/2019   • COLON RESECTION Right 6/11/2020    Procedure: COLON RESECTION RIGHT;  Surgeon: Naif Etienne DO;  Location: [facility] MAIN OR;  Service: General;  Laterality: Right;   • COLONOSCOPY  03/2018   • COLONOSCOPY N/A 5/10/2021    Procedure: COLONOSCOPY with polypectomy x 1;  Surgeon: Marisel Gomez MD;  Location: [facility] ENDOSCOPY;  Service: Gastroenterology;  Laterality: N/A;  diverticulosis, internal/external hemorrhoid, polyp   • CORONARY ANGIOPLASTY  08/24/2009    PCI stent - succesful placement of 3.5/23 promus stent in proximal right coronary artery   • CORONARY ANGIOPLASTY WITH STENT PLACEMENT  08/27/2009    patient had stent placed to proximal right coronary artery   • CYSTOSCOPY BLADDER STONE LITHOTRIPSY  1997   • ENDOSCOPY N/A 10/18/2019    Procedure: ESOPHAGOGASTRODUODENOSCOPY with argon plasma coagulation of gastric antral vascular ectasia;  Surgeon: Marisel Gomez MD;  Location: [facility] ENDOSCOPY;  Service: Gastroenterology   • ENDOSCOPY N/A 1/9/2020    Procedure: ESOPHAGOGASTRODUODENOSCOPY WITH BIOPSY, ARGON PLASMA COAGULATION OF GASTRIC ANTREL VASCULAR ECTASIA,;  Surgeon: Marisel Gomez MD;  Location: [facility] ENDOSCOPY;  Service: Gastroenterology   • ENDOSCOPY N/A 3/6/2020    Procedure: ESOPHAGOGASTRODUODENOSCOPY;  Surgeon: Zbigniew Silva MD;  Location: [facility] ENDOSCOPY;  Service: Gastroenterology;  Laterality: N/A;  mild gave, post cautery ulcer     • ENDOSCOPY N/A 5/10/2021    Procedure: ESOPHAGOGASTRODUODENOSCOPY WITH BIOPSY X 1, argon plasma coagulation;  Surgeon: Marisel Gomez MD;  Location: [facility] ENDOSCOPY;  Service: Gastroenterology;  Laterality: N/A;  portal hypertensive gastropathy, Syed's esophagus, gastric antral vascular ectasia   • ERCP N/A 11/20/2019    Procedure: ERCP, clearance of bile duct with balloon, placement of biliary stent, EGD with argon plasma  coagulation of gastric antral vascular ectasia;  Surgeon: Marisel Gomez MD;  Location: [facility] ENDOSCOPY;  Service: Gastroenterology   • ERCP N/A 2/27/2020    Procedure: ENDOSCOPIC RETROGRADE CHOLANGIOPANCREATOGRAPHY with removal of biliary stent, brushing, dilation, and placement of biliary stent x 2 and Esophagogastroduodenoscopy with argon plasma coagulation;  Surgeon: Marisel Gomez MD;  Location: [facility] ENDOSCOPY;  Service: Gastroenterology;  Laterality: N/A;  Gastric antral vascular ectasia, common bile duct stricture   • ERCP N/A 4/20/2020    Procedure: ENDOSCOPIC RETROGRADE CHOLANGIOPANCREATOGRAPHY WITH REMOVAL OF BILIARY STENT, AMPULA DILATION TO 8MM, BRUSHING OF COMMON BILE DUT, CLEARANCE OF BILE DUCT WITH BALLOON, BIOPSY OF AMPULA, PLACEMENT OF BILIARY STENT;  Surgeon: Marisel Gomez MD;  Location: [facility] ENDOSCOPY;  Service: Gastroenterology;  Laterality: N/A;  POST:CBD STRICTURE   • ERCP N/A 11/12/2020    Procedure: ENDOSCOPIC RETROGRADE CHOLANGIOPANCREATOGRAPHY WITH BALLOON CLEARANCE OF COMMON BILE DUCT, BRUSHING OF COMMON BILE DUCT STRICTURE, BIOPSY X 1 AREA, PLACEMENT OF BILIARY STENT;  Surgeon: Marisel Gomez MD;  Location: [facility] ENDOSCOPY;  Service: Gastroenterology;  Laterality: N/A;  Post op:REMOVAL OF SLUDGE, SUCCESSFUL STENT PLACEMENT   • OTHER SURGICAL HISTORY  11/2018    IVC filter implant   • RENAL ARTERY STENT Left     does not recall this   • UPPER ENDOSCOPIC ULTRASOUND W/ FNA N/A 4/20/2020    Procedure: Esophagogastroduodenoscopy with Endoscopic ultrasound and fine needle aspiration;  Surgeon: Marisel Gomez MD;  Location: [facility] ENDOSCOPY;  Service: Gastroenterology;  Laterality: N/A;  Post: CBD STRICTURE   • VENA CAVA FILTER INSERTION  12/05/2018       Outpatient Encounter Medications as of 5/19/2021   Medication Sig Dispense Refill   • allopurinol (ZYLOPRIM) 100 MG tablet Take 100 mg by mouth 2 (Two) Times a Day.     • ascorbic acid (VITAMIN C) 500 MG tablet Take 500 mg by mouth  "Daily.     • aspirin 81 MG tablet Take 1 tablet by mouth Daily. (Patient taking differently: Take 81 mg by mouth Daily. Hold DOS) 30 tablet 3   • B-D 3CC LUER-YASMEEN SYR 25GX1\" 25G X 1\" 3 ML misc USE FOR THE B12 INJECTION INTRAMUSCULAR ONCE MONTHLY     • cyanocobalamin 1000 MCG/ML injection Inject 1,000 mcg into the appropriate muscle as directed by prescriber Every 28 (Twenty-Eight) Days.     • docusate sodium (COLACE) 100 MG capsule Take 100 mg by mouth 2 (Two) Times a Day.     • DULoxetine (CYMBALTA) 60 MG capsule Take 60 mg by mouth Daily. Take preop     • ezetimibe (Zetia) 10 MG tablet Take 1 tablet by mouth Daily. 30 tablet 11   • ferrous gluconate (FERGON) 324 MG tablet Take  by mouth Daily.     • folic acid (FOLVITE) 1 MG tablet Take 1 mg by mouth Daily. Last dose 6/6     • hydrOXYzine pamoate (VISTARIL) 25 MG capsule Take 25 mg by mouth 3 (Three) Times a Day As Needed. Take preop if needed  0   • insulin glargine (Lantus) 100 UNIT/ML injection Inject 54 units at bedtime 45 mL 3   • insulin lispro (HumaLOG) 100 UNIT/ML injection 20 units subcu before each meal plus sliding scale MDD 60 60 mL 4   • Insulin Syringe-Needle U-100 (BD Insulin Syringe U/F) 31G X 5/16\" 1 ML misc Used to inject insulin twice a day. 400 each 2   • lactulose (CHRONULAC) 10 GM/15ML solution Take 45 mL by mouth 3 (Three) Times a Day. Will not take dos am     • levothyroxine (SYNTHROID, LEVOTHROID) 75 MCG tablet Take 1 tablet by mouth Daily. (Patient taking differently: Take 75 mcg by mouth Daily. Take preop) 90 tablet 4   • magnesium oxide (MAG-OX) 400 MG tablet Take 400 mg by mouth Daily. dont take preop     • Multiple Vitamins-Minerals (MULTIVITAMIN WITH MINERALS) tablet tablet Take 1 tablet by mouth Daily.     • nitroglycerin (NITROSTAT) 0.4 MG SL tablet Place 1 tablet under the tongue Every 5 (Five) Minutes As Needed for Chest Pain (Only if SBP Greater Than 100). Take no more than 3 doses in 15 minutes. (Patient taking differently: " Place 0.4 mg under the tongue Every 5 (Five) Minutes As Needed for Chest Pain (Only if SBP Greater Than 100). Take no more than 3 doses in 15 minutes. hasnt needed) 30 tablet 12   • OneTouch Verio test strip Check BS 4 times a day 400 each 2   • oxyCODONE (ROXICODONE) 10 MG tablet Take 10 mg by mouth Every 4 (Four) Hours As Needed for Moderate Pain .     • pantoprazole (PROTONIX) 40 MG EC tablet Take 1 tablet by mouth 2 (Two) Times a Day. (Patient taking differently: Take 40 mg by mouth Daily.) 30 tablet 3   • rifaximin (XIFAXAN) 550 MG tablet Take 550 mg by mouth Every 12 (Twelve) Hours. Take preop     • sodium bicarbonate 650 MG tablet Take 650 mg by mouth 3 (Three) Times a Day. Take preop     • zinc sulfate (ZINCATE) 220 (50 Zn) MG capsule Take 220 mg by mouth Daily. dont take dos     • ciprofloxacin (CIPRO) 500 MG tablet Take 500 mg by mouth 2 (Two) Times a Day.     • ipratropium-albuterol (DUO-NEB) 0.5-2.5 mg/3 ml nebulizer Take 3 mL by nebulization Every 4 (Four) Hours As Needed for Shortness of Air.     • melatonin 5 MG tablet tablet Take 10 mg by mouth At Night As Needed.       No facility-administered encounter medications on file as of 5/19/2021.       No Known Allergies    Family History   Problem Relation Age of Onset   • Hyperlipidemia Other    • Hypertension Other        Social History     Socioeconomic History   • Marital status:      Spouse name: Not on file   • Number of children: Not on file   • Years of education: Not on file   • Highest education level: Not on file   Tobacco Use   • Smoking status: Never Smoker   • Smokeless tobacco: Never Used   Vaping Use   • Vaping Use: Never used   Substance and Sexual Activity   • Alcohol use: No   • Drug use: No   • Sexual activity: Defer       The following portions of the patient's history were reviewed and updated as appropriate: allergies, current medications, past family history, past medical history, past social history, past surgical history  and problem list.    Objective   Review of systems is unchanged from previous visit.  He was hospitalized at [facility] in [place] and transferred here for urosepsis but I do not think that had anything to do with this fistulous tract that opens periodically.    I have discussed with the wife that should this open and drain significantly then we would need to do a fistulogram to see is there any connection inside which I doubt.    Plan: Recheck in the office as needed    Assessment/Plan   There are no diagnoses linked to this encounter.               Naif Etienne DO  5/19/2021  14:21 EDT

## 2021-05-20 PROBLEM — E86.0 DEHYDRATION: Status: ACTIVE | Noted: 2021-01-01

## 2021-05-21 PROBLEM — E86.0 DEHYDRATION: Status: RESOLVED | Noted: 2021-01-01 | Resolved: 2021-01-01

## 2021-05-22 PROBLEM — R50.9 FEVER: Status: ACTIVE | Noted: 2021-01-01

## 2021-05-22 NOTE — OUTREACH NOTE
Prep Survey      Responses   [facility] patient discharged from?  [facility]   Is LACE score < 7 ?  No   Emergency Room discharge w/ pulse ox?  No   Eligibility  Readm Mgmt   Discharge diagnosis  dehydration   Does the patient have one of the following disease processes/diagnoses(primary or secondary)?  Other   Does the patient have Home health ordered?  Yes   What is the Home health agency?   [facility]   Is there a DME ordered?  No   Comments regarding appointments  see AVS   Prep survey completed?  Yes          Madeline Starkey RN

## 2021-05-23 PROBLEM — R77.8 ELEVATED TROPONIN LEVEL NOT DUE TO ACUTE CORONARY SYNDROME: Status: ACTIVE | Noted: 2021-01-01

## 2021-05-23 PROBLEM — R79.89 ELEVATED TROPONIN LEVEL NOT DUE TO ACUTE CORONARY SYNDROME: Status: ACTIVE | Noted: 2021-01-01

## 2021-05-23 PROBLEM — R55 SYNCOPAL EPISODES: Status: ACTIVE | Noted: 2021-01-01

## 2021-05-23 PROBLEM — E72.20 HYPERAMMONEMIA (HCC): Chronic | Status: ACTIVE | Noted: 2021-01-01

## 2021-05-23 PROBLEM — R41.0 CONFUSION: Status: ACTIVE | Noted: 2021-01-01

## 2021-05-23 NOTE — OUTREACH NOTE
Medical Week 1 Survey      Responses   [facility] patient discharged from?  [facility]   Does the patient have one of the following disease processes/diagnoses(primary or secondary)?  Other   Week 1 attempt successful?  No   Unsuccessful attempts  Attempt 1   Revoke  Readmitted          Karie Roth RN

## 2021-05-24 NOTE — TELEPHONE ENCOUNTER
Caller: MARIO PATEL    Relationship to patient: WIFE    Best call back number: [phone]    Patient is needing: TO CANCEL 5- LAB AND INJECTION. PT IS ADMITTED TO HOSPITAL. WIFE STATES HE MAY BE GOING TO REHAB AFTER.

## 2021-05-28 PROBLEM — I49.8 WANDERING ATRIAL PACEMAKER: Chronic | Status: ACTIVE | Noted: 2021-01-01

## 2021-05-29 NOTE — OUTREACH NOTE
Prep Survey      Responses   [facility] patient discharged from?  [facility]   Is LACE score < 7 ?  No   Emergency Room discharge w/ pulse ox?  No   Eligibility  Readm Mgmt   Discharge diagnosis  Syncopal episodes   Does the patient have one of the following disease processes/diagnoses(primary or secondary)?  Other   Does the patient have Home health ordered?  Yes   What is the Home health agency?   [facility]   Is there a DME ordered?  No   Prep survey completed?  Yes          Tenisha Auguste RN

## 2021-06-01 NOTE — CASE MANAGEMENT/SOCIAL WORK
Case Management Discharge Note      Final Note: [facility]    Selected Continued Care - Discharged on 5/28/2021 Admission date: 5/22/2021 - Discharge disposition: Home-Health Care Svc        Home Medical Care Coordination complete    Service Provider Selected Services Address Phone Fax Patient Preferred    [facility]  Home Health Services [address] [facility] [place] [address] [phone] --           Final Discharge Disposition Code: 06 - home with home health care

## 2021-06-02 NOTE — OUTREACH NOTE
Medical Week 1 Survey      Responses   [facility] patient discharged from?  [facility]   Does the patient have one of the following disease processes/diagnoses(primary or secondary)?  Other   Week 1 attempt successful?  Yes   Call start time  1630   Call end time  1631   Discharge diagnosis  Syncopal episodes   Is patient permission given to speak with other caregiver?  Yes   List who call center can speak with  MARIO PATEL   Person spoke with today (if not patient) and relationship  MARIO PATEL- WIFE   Meds reviewed with patient/caregiver?  Yes   Is the patient having any side effects they believe may be caused by any medication additions or changes?  No   Does the patient have all medications ordered at discharge?  Yes   Is the patient taking all medications as directed (includes completed medication regime)?  Yes   Does the patient have a primary care provider?   Yes   Does the patient have an appointment with their PCP within 7 days of discharge?  No   What is preventing the patient from scheduling follow up appointments within 7 days of discharge?  Haven't had time   Nursing Interventions  Educated patient on importance of making appointment, Advised patient to make appointment   Has the patient kept scheduled appointments due by today?  N/A   What is the Home health agency?   [facility]   Has home health visited the patient within 72 hours of discharge?  Yes   Psychosocial issues?  No   Did the patient receive a copy of their discharge instructions?  Yes   Nursing interventions  Reviewed instructions with patient   What is the patient's perception of their health status since discharge?  Improving   Is the patient/caregiver able to teach back signs and symptoms related to disease process for when to call PCP?  Yes   Is the patient/caregiver able to teach back signs and symptoms related to disease process for when to call 911?  Yes   Is the patient/caregiver able to teach back the hierarchy of who to call/visit for  symptoms/problems? PCP, Specialist, Home health nurse, Urgent Care, ED, 911  Yes   If the patient is a current smoker, are they able to teach back resources for cessation?  Not a smoker   Week 1 call completed?  Yes          Samia Gonzalez LPN

## 2021-06-02 NOTE — TELEPHONE ENCOUNTER
WILL BE OUT OF METOPROLOL 25 MG 6/26/21. WILL DR. LAND CALL THAT IT TO [facility] IN [place]? WANTS 90 DAY SUPPLY. DR. COHEN GAVE HIM A 30 DAY SUPPLY IN HOSPITAL.  CALL WIFE WITH UPDATE. THANK YOU.

## 2021-06-09 NOTE — OUTREACH NOTE
Medical Week 2 Survey      Responses   [facility] patient discharged from?  [facility]   Does the patient have one of the following disease processes/diagnoses(primary or secondary)?  Other   Week 2 attempt successful?  Yes   Call start time  1236   Call end time  1238   Person spoke with today (if not patient) and relationship  MARIO PATEL- WIFE   Is the patient taking all medications as directed (includes completed medication regime)?  Yes   What is the Home health agency?   [facility]   Has home health visited the patient within 72 hours of discharge?  Yes   What is the patient's perception of their health status since discharge?  New symptoms unrelated to diagnosis   Is the patient/caregiver able to teach back the hierarchy of who to call/visit for symptoms/problems? PCP, Specialist, Home health nurse, Urgent Care, ED, 911  Yes   Additional teach back comments  States he has some draining to fistual and they had Dr. Gomez order a CT scan.  Home health is still coming to visit.   Week 2 Call Completed?  Yes   Wrap up additional comments  No questions or needs at this time          Kesha Mallory LPN

## 2021-06-15 NOTE — PROGRESS NOTES
Notified 's nurse Wil ADLER of patients Hgb today 7.2. and she replied that she would make  aware of Hgb

## 2021-06-18 NOTE — OUTREACH NOTE
Medical Week 3 Survey      Responses   [facility] patient discharged from?  [facility]   Does the patient have one of the following disease processes/diagnoses(primary or secondary)?  Other   Week 3 attempt successful?  No   Unsuccessful attempts  Attempt 1          Blaire Pat RN

## 2021-06-22 NOTE — OUTREACH NOTE
Medical Week 3 Survey      Responses   [facility] patient discharged from?  [facility]   Does the patient have one of the following disease processes/diagnoses(primary or secondary)?  Other   Week 3 attempt successful?  No   Unsuccessful attempts  Attempt 2          Shazia Auguste RN

## 2021-07-06 NOTE — TELEPHONE ENCOUNTER
Received call from [facility] advising that the patients Hbg was 6.3 over there.  Advised NP Swathi and she gave orders for CBC to be done in 1 week and for him to call with any symptoms.  Advised [facility] nurse Nany and she was going to give patient appt time.

## 2021-07-06 NOTE — PROGRESS NOTES
Notified Kamari N.P. of patients Hgb 7.2 and last Hgb 7.5 and today platelets 57. New order per N.P. P.Jazmine patient to receive one unit PRBC and patient and his wife informed that patient to go to [facility] to receive his transfusion and they verbalize understanding.

## 2021-07-06 NOTE — PROGRESS NOTES
Not[facility] that patient on his way there to receive one unit PRBCs.Patients Hgb 7.2 Platelets 57,.and that PRBCs ordered per Kamari DOAN.P.

## 2021-07-20 NOTE — PROGRESS NOTES
Patient came in today for retacrit injection. Hemoglobin 7.8, injection given. Spoke with CHAPINCITO Echevarria. She stated it needs to be under 7.5 for a transfusion, unless the patient is symptomatic. Patient denies issues or SOB. Next appts confirmed. CBC will be rechecked next week.

## 2021-07-23 NOTE — PROGRESS NOTES
Subjective:     Encounter Date:07/23/2021      Patient ID: Bry Ratliff is a 75 y.o. male.    Chief Complaint: 6 wk f/u hosp f/u HTN  History of Present Illness     75-year-old white male patient with multiple medical problems including diabetes mellitus hypertension dyslipidemia renal insufficiency hypercoagulable state history of DVT status post IVC filter history of CAD status post PCI 2009To the [facility] to see me to establish his cardiac care  Patient also have chronic anemia followed at [facility]  Cardiac wise he is doing reasonably well denies any active symptoms  He denies of any chest pain  I reviewed all the old records    Patient was admitted to hospital recently at that time there was some question of atrial fibrillation seen by electrophysiology decided patient has possible atrial tachycardia versus atrial fibrillation so anticoagulation was not started  But patient does have problems with taking anticoagulation underwent filter placement previously for DVT  So again discussed with the family  I would suggest repeating another EKG and follow-up in the near future  Also suggest Holter monitor meanwhile  He was also needs to be considered for watchman device which was also addressed by the EP previously  Blood pressure is uncontrolled today usually runs well      The following portions of the patient's history were reviewed and updated as appropriate: Allergies current medications past family history past medical history past social history past surgical history problem list and review of systems  Past Medical History:   Diagnosis Date   • Anemia     iron deficiency   • Anxiety    • B12 deficiency 2009   • Balance disorder    • Syed's esophagus    • Bile duct leak    • CAD (coronary artery disease)     cardiac stent   • Constipation    • DDD (degenerative disc disease), lumbar    • Decubitus ulcer     buttocks   • Depression    • Diabetes mellitus (CMS/HCC)    • Disease of thyroid gland      hypothyroid   • Diverticular disease    • Dvt femoral (deep venous thrombosis) (CMS/HCC) 2004    rt let   • Elevated cholesterol    • Fistula of intestine    • Gastroparesis    • GERD (gastroesophageal reflux disease)    • Gout    • Hepatic encephalopathy (CMS/HCC)     if doesnt take meds   • History of echocardiogram     03/03/2017 - 12/03/2014 - 04/2012   • History of stomach ulcers    • History of transfusion    • Hyperlipidemia    • Incontinence    • Iron deficiency    • Kidney stones    • Morbid obesity (CMS/HCC)    • THOMAS (nonalcoholic steatohepatitis)    • Neuropathy    • OAB (overactive bladder)    • Open wound     rt knee   • KATHIA treated with BiPAP     bring dos   • Peripheral edema    • Pulmonary embolus (CMS/HCC) 2004   • Renal insufficiency     stage 3   • S/P lumbar laminectomy    • Skin irritation     skin fold   • Stage 3 chronic kidney disease (CMS/HCC)      Past Surgical History:   Procedure Laterality Date   • ABDOMINAL SURGERY     • BACK SURGERY  1971   • BILE DUCT STENT PLACEMENT     • BILE DUCT STENT PLACEMENT     • CARDIAC CATHETERIZATION     • CATARACT EXTRACTION  2014   • CATARACT EXTRACTION, BILATERAL  2014   • CHOLECYSTECTOMY  02/24/2019   • COLON RESECTION Right 6/11/2020    Procedure: COLON RESECTION RIGHT;  Surgeon: Naif Etienne DO;  Location: [facility] MAIN OR;  Service: General;  Laterality: Right;   • COLONOSCOPY  03/2018   • COLONOSCOPY N/A 5/10/2021    Procedure: COLONOSCOPY with polypectomy x 1;  Surgeon: Marisel Gomez MD;  Location: [facility] ENDOSCOPY;  Service: Gastroenterology;  Laterality: N/A;  diverticulosis, internal/external hemorrhoid, polyp   • CORONARY ANGIOPLASTY  08/24/2009    PCI stent - succesful placement of 3.5/23 promus stent in proximal right coronary artery   • CORONARY ANGIOPLASTY WITH STENT PLACEMENT  08/27/2009    patient had stent placed to proximal right coronary artery   • CYSTOSCOPY BLADDER STONE LITHOTRIPSY  1997   • ENDOSCOPY N/A 10/18/2019     Procedure: ESOPHAGOGASTRODUODENOSCOPY with argon plasma coagulation of gastric antral vascular ectasia;  Surgeon: Marisel Gomez MD;  Location: [facility] ENDOSCOPY;  Service: Gastroenterology   • ENDOSCOPY N/A 1/9/2020    Procedure: ESOPHAGOGASTRODUODENOSCOPY WITH BIOPSY, ARGON PLASMA COAGULATION OF GASTRIC ANTREL VASCULAR ECTASIA,;  Surgeon: Marisel Gomez MD;  Location: [facility] ENDOSCOPY;  Service: Gastroenterology   • ENDOSCOPY N/A 3/6/2020    Procedure: ESOPHAGOGASTRODUODENOSCOPY;  Surgeon: Zbigniew Silva MD;  Location: [facility] ENDOSCOPY;  Service: Gastroenterology;  Laterality: N/A;  mild gave, post cautery ulcer     • ENDOSCOPY N/A 5/10/2021    Procedure: ESOPHAGOGASTRODUODENOSCOPY WITH BIOPSY X 1, argon plasma coagulation;  Surgeon: Marisel Gomez MD;  Location: [facility] ENDOSCOPY;  Service: Gastroenterology;  Laterality: N/A;  portal hypertensive gastropathy, Syed's esophagus, gastric antral vascular ectasia   • ERCP N/A 11/20/2019    Procedure: ERCP, clearance of bile duct with balloon, placement of biliary stent, EGD with argon plasma coagulation of gastric antral vascular ectasia;  Surgeon: Marisel Gomez MD;  Location: [facility] ENDOSCOPY;  Service: Gastroenterology   • ERCP N/A 2/27/2020    Procedure: ENDOSCOPIC RETROGRADE CHOLANGIOPANCREATOGRAPHY with removal of biliary stent, brushing, dilation, and placement of biliary stent x 2 and Esophagogastroduodenoscopy with argon plasma coagulation;  Surgeon: Marisel Gomez MD;  Location: [facility] ENDOSCOPY;  Service: Gastroenterology;  Laterality: N/A;  Gastric antral vascular ectasia, common bile duct stricture   • ERCP N/A 4/20/2020    Procedure: ENDOSCOPIC RETROGRADE CHOLANGIOPANCREATOGRAPHY WITH REMOVAL OF BILIARY STENT, AMPULA DILATION TO 8MM, BRUSHING OF COMMON BILE DUT, CLEARANCE OF BILE DUCT WITH BALLOON, BIOPSY OF AMPULA, PLACEMENT OF BILIARY STENT;  Surgeon: Marisel Gomez MD;  Location: [facility] ENDOSCOPY;  Service: Gastroenterology;  Laterality: N/A;   "POST:CBD STRICTURE   • ERCP N/A 11/12/2020    Procedure: ENDOSCOPIC RETROGRADE CHOLANGIOPANCREATOGRAPHY WITH BALLOON CLEARANCE OF COMMON BILE DUCT, BRUSHING OF COMMON BILE DUCT STRICTURE, BIOPSY X 1 AREA, PLACEMENT OF BILIARY STENT;  Surgeon: Marisel Gomez MD;  Location: [facility] ENDOSCOPY;  Service: Gastroenterology;  Laterality: N/A;  Post op:REMOVAL OF SLUDGE, SUCCESSFUL STENT PLACEMENT   • OTHER SURGICAL HISTORY  11/2018    IVC filter implant   • RENAL ARTERY STENT Left     does not recall this   • UPPER ENDOSCOPIC ULTRASOUND W/ FNA N/A 4/20/2020    Procedure: Esophagogastroduodenoscopy with Endoscopic ultrasound and fine needle aspiration;  Surgeon: Marisel Gomez MD;  Location: [facility] ENDOSCOPY;  Service: Gastroenterology;  Laterality: N/A;  Post: CBD STRICTURE   • VENA CAVA FILTER INSERTION  12/05/2018     /83 (BP Location: Left arm, Patient Position: Sitting, Cuff Size: Large Adult)   Pulse 105   Ht 188 cm (74\")   Wt (!) 162 kg (358 lb)   SpO2 100%   BMI 45.96 kg/m²   Family History   Problem Relation Age of Onset   • Hyperlipidemia Other    • Hypertension Other        Current Outpatient Medications:   •  allopurinol (ZYLOPRIM) 100 MG tablet, Take 100 mg by mouth 2 (Two) Times a Day., Disp: , Rfl:   •  ascorbic acid (VITAMIN C) 500 MG tablet, Take 500 mg by mouth Daily., Disp: , Rfl:   •  aspirin 81 MG tablet, Take 1 tablet by mouth Daily., Disp: 30 tablet, Rfl: 3  •  budesonide (PULMICORT) 0.5 MG/2ML nebulizer solution, Take 2 mL by nebulization 2 (Two) Times a Day., Disp: 30 each, Rfl: 0  •  Carbomer Gel Base (Hydrogel) gel, , Disp: , Rfl:   •  cyanocobalamin 1000 MCG/ML injection, Inject 1,000 mcg into the appropriate muscle as directed by prescriber Every 28 (Twenty-Eight) Days., Disp: , Rfl:   •  docusate sodium (COLACE) 100 MG capsule, Take 100 mg by mouth 2 (Two) Times a Day., Disp: , Rfl:   •  DULoxetine (CYMBALTA) 60 MG capsule, Take 60 mg by mouth Daily. Take preop, Disp: , Rfl:   •  " ezetimibe (Zetia) 10 MG tablet, Take 1 tablet by mouth Daily. (Patient taking differently: Take 10 mg by mouth Every Evening.), Disp: 30 tablet, Rfl: 11  •  ferrous gluconate (FERGON) 324 MG tablet, Take  by mouth Daily., Disp: , Rfl:   •  folic acid (FOLVITE) 1 MG tablet, Take 1 mg by mouth Daily. Last dose 6/6, Disp: , Rfl:   •  hydrOXYzine pamoate (VISTARIL) 25 MG capsule, Take 25 mg by mouth 3 (Three) Times a Day As Needed. Take preop if needed, Disp: , Rfl: 0  •  insulin glargine (Lantus) 100 UNIT/ML injection, Inject 30 Units under the skin into the appropriate area as directed Every Night., Disp: 10 mL, Rfl: 0  •  insulin lispro (HumaLOG) 100 UNIT/ML injection, 20 units subcu before each meal plus sliding scale MDD 60, Disp: 60 mL, Rfl: 4  •  ipratropium-albuterol (DUO-NEB) 0.5-2.5 mg/3 ml nebulizer, Take 3 mL by nebulization Every 4 (Four) Hours As Needed for Shortness of Air., Disp: , Rfl:   •  lactulose (CHRONULAC) 10 GM/15ML solution, Take 45 mL by mouth 3 (Three) Times a Day. Will not take dos am (Patient taking differently: Take 30 g by mouth 3 (Three) Times a Day.), Disp: , Rfl:   •  levothyroxine (SYNTHROID, LEVOTHROID) 75 MCG tablet, Take 1 tablet by mouth Daily., Disp: 90 tablet, Rfl: 4  •  magnesium oxide (MAG-OX) 400 MG tablet, Take 400 mg by mouth Daily. dont take preop, Disp: , Rfl:   •  metoprolol succinate XL (TOPROL-XL) 25 MG 24 hr tablet, Take 1 tablet by mouth Daily., Disp: 90 tablet, Rfl: 1  •  Multiple Vitamins-Minerals (MULTIVITAMIN WITH MINERALS) tablet tablet, Take 1 tablet by mouth Daily., Disp: , Rfl:   •  nitroglycerin (NITROSTAT) 0.4 MG SL tablet, Place 1 tablet under the tongue Every 5 (Five) Minutes As Needed for Chest Pain (Only if SBP Greater Than 100). Take no more than 3 doses in 15 minutes. (Patient taking differently: Place 0.4 mg under the tongue Every 5 (Five) Minutes As Needed for Chest Pain (Only if SBP Greater Than 100). Take no more than 3 doses in 15 minutes. hasnt  needed), Disp: 30 tablet, Rfl: 12  •  Nutritional Supplements (Adrian) pack, Take 1 packet by mouth 2 (Two) Times a Day., Disp: 60 each, Rfl: 0  •  oxyCODONE (ROXICODONE) 10 MG tablet, Take 10 mg by mouth 3 (Three) Times a Day As Needed for Moderate Pain ., Disp: , Rfl:   •  pantoprazole (PROTONIX) 40 MG EC tablet, Take 1 tablet by mouth 2 (Two) Times a Day. (Patient taking differently: Take 40 mg by mouth Daily.), Disp: 30 tablet, Rfl: 3  •  rifaximin (XIFAXAN) 550 MG tablet, Take 550 mg by mouth Every 12 (Twelve) Hours. Take preop, Disp: , Rfl:   •  sodium bicarbonate 650 MG tablet, Take 650 mg by mouth 3 (Three) Times a Day. Take preop, Disp: , Rfl:   •  zinc sulfate (ZINCATE) 220 (50 Zn) MG capsule, Take 220 mg by mouth Daily. dont take dos, Disp: , Rfl:   Social History     Socioeconomic History   • Marital status:      Spouse name: Not on file   • Number of children: Not on file   • Years of education: Not on file   • Highest education level: Not on file   Tobacco Use   • Smoking status: Never Smoker   • Smokeless tobacco: Never Used   Vaping Use   • Vaping Use: Never used   Substance and Sexual Activity   • Alcohol use: No   • Drug use: No   • Sexual activity: Defer     No Known Allergies  Review of Systems   Constitutional: Negative for chills, fever and malaise/fatigue.   Cardiovascular: Positive for dyspnea on exertion and leg swelling. Negative for chest pain, palpitations and syncope.   Respiratory: Positive for shortness of breath.    Skin: Negative for rash.   Neurological: Negative for dizziness, light-headedness and numbness.              Objective:     Constitutional:       Appearance: Well-developed.      Comments: Patient is currently sitting in a wheelchair obese   Eyes:      General: No scleral icterus.     Conjunctiva/sclera: Conjunctivae normal.   HENT:      Head: Normocephalic and atraumatic.    Mouth/Throat:      Mouth: No oral lesions.      Pharynx: Uvula midline.   Neck:       Thyroid: No thyromegaly.      Vascular: No carotid bruit or JVD.      Trachea: Trachea normal.   Pulmonary:      Effort: Pulmonary effort is normal.      Breath sounds: Normal breath sounds.   Cardiovascular:      Normal rate. Regular rhythm.      No gallop.   Pulses:     Intact distal pulses.   Edema:     Ankle: bilateral 1+ edema of the ankle.     Feet: bilateral 1+ edema of the feet.  Abdominal:      General: Bowel sounds are normal.      Palpations: Abdomen is soft.   Musculoskeletal: Normal range of motion.      Cervical back: Neck supple. Skin:     General: Skin is warm. There is no cyanosis.   Neurological:      Mental Status: Alert and oriented to person, place, and time.      Comments: No focal deficits   Psychiatric:         Behavior: Behavior is cooperative.         Procedures    Lab Review:       Assessment:          Diagnosis Plan   1. Obstructive sleep apnea     2. History of DVT of right lower extremity     3. Dyslipidemia     4. Hepatic cirrhosis, unspecified hepatic cirrhosis type, unspecified whether ascites present (CMS/HCC)     5. Wandering atrial pacemaker     6. Chronic coronary artery disease     7. THOMAS (nonalcoholic steatohepatitis)     8. Antithrombin III deficiency (CMS/HCC)            Plan:       MDM  Number of Diagnoses or Management Options  Antithrombin III deficiency (CMS/HCC): established, improving  Chronic coronary artery disease: established, improving  Dyslipidemia: established, improving  Hepatic cirrhosis, unspecified hepatic cirrhosis type, unspecified whether ascites present (CMS/HCC): established, worsening  History of DVT of right lower extremity: established, improving  THOMAS (nonalcoholic steatohepatitis): established, improving  Obstructive sleep apnea: established, improving  Wandering atrial pacemaker: established, worsening     Amount and/or Complexity of Data Reviewed  Clinical lab tests: reviewed  Review and summarize past medical records: yes    Risk of  Complications, Morbidity, and/or Mortality  Presenting problems: moderate  Management options: moderate    Patient Progress  Patient progress: other (comment)

## 2021-07-26 PROBLEM — D64.9 SYMPTOMATIC ANEMIA: Status: ACTIVE | Noted: 2021-01-01

## 2021-07-26 NOTE — PROGRESS NOTES
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 Anemia diagnosed 2018 and IgG kappa monoclonal gammopathy diagnosed May 2018.  Anemia related to chronic renal failure 3  · This patient is an obese  male who  developed kidney problems in 2018 for which he was referred to Devi Plascencia M.D. He was found to have a hemoglobin of 7.4 at that time and was given 1 unit of packed red blood cells. He was then referred to GI where he had been followed for Syed’s esophagus for a number of years. An EGD and colonoscopy was performed on 3/5/18 by Marisel Gomez M.D. revealing mucosa suggestive of Syed’s esophagus and hiatal hernia in the cardia. Patient had a poor prep compromising the colonoscopy exam though the scope did reach the cecum. Esophageal biopsy revealed squamocolumnar mucosa with extensive intestinal metaplasia consistent with Syed’s esophagus, negative for dysplasia. Colonoscopy was recommended to be repeated in two years and EGD in three years. The patient saw his primary care physician, Paulette Harris M.D., in followup and blood testing was done on 18 revealing WBC 6.3, hemoglobin 8.4, MCV 84.5, platelet count 182,000. Vitamin B12 level was 416 (180-914) and patient was referred here for further evaluation. The patient claims to have been diagnosed with Vitamin B12 deficiency a number of years ago for  which he has been on Vitamin B12 injections up until six months ago when he just stopped taking those. He has been recently started on oral iron supplementation. He claims not to see any blood in his urine but does have microscopic hematuria for which he sees a urologist. He denies nosebleeds, gum bleeds or blood in the stool. He has not had any significant changes in his weight. He feels weak and dizzy for a number of years which has recently gotten worse. He does not ambulate much because of back surgery causing him weakness. He did have a right lower extremity DVT with bilateral pulmonary emboli in 2004 since which time he has been on Coumadin, thought secondary to a sedentary lifestyle. He has no family history of anemias.   · 5/24/18 - Patient seen initial consultation for anemia. Hemoglobin 7.9, MCV 89.9. Ferritin 17 (), iron 183 (), TIBC 379 (228-428), iron saturation 48% (20-50), retic 2.16% (0.5-1.5), haptoglobin 138 (), folate 9.1 (5.9-24.8). SPEP showed monoclonal gammopathy. SIFE showed IgG kappa monoclonal gammopathy. M-spike in the gamma region 0.7 g/dL. Erythropoietin 53.5 (2.59-18.5). Antiparietal cell antibody and intrinsic factor antibodies negative.   Globulin 4.2 (2.5-3.8). Creatinine 1.4 (0.7-1.2).   · 6/19/18 - Discussed results of anemia workup. Hemoglobin improving. Ferrous Sulfate decreased to 325 mg twice a day. Will perform gammopathy workup for IgG kappa monoclonal gammopathy. IgA 783 (), IgG 1480 (600-1500), IgM 93 (). Kappa lambda FLC ratio 0.81 (0.26-1.65). Ferritin 36 ().        · 6/25/18 - Bone survey negative for acute disease. No evidence of lytic or blastic metastatic bone disease was present. Chest x-ray showed cardiomegaly, mild right basilar atelectasis and findings suggestive of ankylosing spondylitis.   · 6/29/18 - Patient underwent sternal bone marrow revealing monoclonal gammopathy of undetermined significant (MGUS). Extent of bone marrow  involvement 5%. Phenotype kappa positive, IgG positive, CD38 positive, CD20 negative, CD19 negative, CD45 negative, CD56 negative and  negative. Dyspoiesis was not seen. There was absence of iron stores. Normal male karyotype. Normal FISH study. Flow cytometry revealed IgG kappa restrictive plasma cells in a polytypic background. There was a mixed population of maturing myeloid cells, B-cells and T-cells. No abnormal myeloid maturation was seen. A monoclonal IgG kappa plasma cell population was present. 4% plasma cells present.   · 8/23/18 - Labs by Dr. Plascencia revealed B12 of 857 (180-914), folate 12.7 (5.9-24.8), iron 127 ().      · 9/19/18 - Iron 94 (), TIBC 297 (228-428), iron saturation 32 (20-50), ferritin 29 (). Erythropoietin 30.04 (2.59-18.5).        · 10/16/18 - Iron 177 (), TIBC 320 (228-428), iron saturation 55 (20-50), ferritin 34 ().       · 11/16/18 - Hemoglobin 7.1. Patient given 1 unit of packed red blood cells.   · 11/21/18 - Ferritin 27 (). Hemoglobin 7.9. Patient given 1 unit of packed red blood cells.    · 11/26/18 - EGD by Dave Russo M.D. revealed erosive gastritis and bleeding ampulla. There was oozing upon entering the stomach in many different areas. Duodenal mucosa and the esophagus were normal.   · 11/27/18 - Hemoglobin 8.2. Order written for Procrit 30,000 units subq weekly, not approved by insurance for low ferritin. Urine JERRY with no definite monoclonal gammopathy identified with questionable faint IgG kappa.   · 12/18/18 - Hemoglobin 9.9. Injectafer 750 mg IV given.   · 1/2/19 - Injectafer 750 mg IV given.  Hemoglobin 11, .1. Patient claims that he is getting Vitamin B12 injections monthly at home through Dr. Harris. Currently taking Ferrous Sulfate 325 mg p.o. b.i.d. and asked to continue that. Asked to continue Pantoprazole 40 mg daily that he is taking. IgA 651 (), IgG 1470 (600-1500), IgM 94 ().      · 2/12/19 -  Iron 88 ().    · 3/6/19 - WBC 19.8 with 83% neutrophils, 5% lymphocytes, 11% monocytes, hemoglobin 8.7, , platelet count 182,000. Patient status post laparoscopic cholecystectomy on 2/24/19 with large ecchymoses apparent on abdomen. Infectious workup ordered and Injectafer 750 mg IV days 1 and 8 ordered.  Iron 23 (), TIBC 153 (228-428), iron saturation 15% (20-50), ferritin 400 (224-336).       · 3/12/19 - WBC 15.02, hemoglobin 8.1 and platelets 227,000. Patient reported hematuria followed by Dr. Starkey. Orders written to start Injectafer ASAP. Abdominal ecchymosis improving.   · 3/14/19 - Injectafer 750 mg IV given.   · 4/22/19 - Hemoglobin 9.5, . Patient missed day 8 Injectafer in March and it was rescheduled. SIFE showed IgG kappa monoclonal gammopathy.  · 5/8/19 - Injectafer 750 mg IV given.   · 7/8/19 - Iron 56 (). Iron Saturation 36 (20-50%). Transferrin 110 (180-330). TIBC 154 (228-428). Ferritin 1,199 ().    · 8/7/2019-hemoglobin 10.0.  Patient taking multivitamin with iron only.  Restarted ferrous sulfate 325mg by mouth twice a day. UIFE showed free kappa light chain identified.   · 10/16/2019 patient hospitalized at [facility] for 3 days and found to have a hemoglobin of 4.8 at the cancer center visit.  Stool Hemoccult was positive.  He ended up receiving 5 units of packed red blood cells.  EGD by Marisel Gomez MD revealed severe gastric antral vascular ectasia with oozing, Syed's esophagus and hiatal hernia.  The patient was treated with PPI and Carafate.  Plan was to repeat EGD with cauterization in 6 weeks.  B12 and reticulocyte count were high.  Haptoglobin and folic acid were normal.  Creatinine 2.2 (0.76-1.27), iron 47 (), TIBC 264 (298-5 0.36), iron saturation 18 (20-50), ferritin 128.4 ().  Aspirin was held temporarily and patient given IV iron.  IgA 720 (), IgM 72 (), IgG 1489 (700-1600).  · EGD done secondary to GI bleed on 1/9/2020 1.   Moderately severe gastric antral vascular ectasia APC applied for cauterization 2.  Short segment of Syed's biopsy taken. 3.  Small hiatal hernia.  · 1/17/20 Iron 42, Iron saturation 16, TIBC 264.  · 1/3/2020 patient was initiated on weekly Procrit  · 3/31/2020 iron profile iron 53 iron saturation 22 iron binding capacity 241 ferritin 229.  · 10/19/2020 WBC 8.18, hemoglobin 9.2, hematocrit 29.8, MCV 97.1  · 11/2/2020 WBC 7.7, hemoglobin 8.4, hematocrit 27.1, platelets 285,000  · 11/12/2020: ERCP: Impression:   1.  Gastric antral vascular ectasia.  2.  Distal CBD stricture status post of dilation, brushing cytology.  Biopsy was also performed for major papula which looks slightly enlarged.  It may be related to previous stenting versus early adenomatous changes. 3.  Removal of sludge and gravel and placement of fully covered 10 Bulgarian and 6 cm metal stent.  · Surgical pathology: Mucosa, common bile duct, biopsy:    Acute and chronically inflamed small bowel type mucosa with glandular architectural distortion    No dysplasia or malignancy identified     · 11/27/2020: CT abdomen pelvis with contrast:Cirrhotic liver morphology with evidence of portal venous hypertension including venous varices and splenomegaly.    · 11/2/2020 patient received Procrit 40,000 units  · 11/2/2020: Ferritin 67.5, iron 89, iron saturation 29%, TIBC 302, B12 greater than 2000, folate greater than 20, SPEP shows monoclonal protein measuring 0.7 g/dL, IgA 936 high, IgG IgM normal, free kappa lambda ratio normal, creatinine 1.43, alk phos 499 high,  high, ALT 95,  · 11/16/2020 patient received Procrit 40,000 units  · 11/30/2020: Procrit 40,000 units  · 12/14/2020 WBC 8.8, hemoglobin 10.1, platelets 194  · 12/22/2020: Injectafer 750  · 3/22/2021: Retacrit 40,000 units, hemoglobin 9.2, creatinine 1.8, platelets 139, iron saturation 18%, ferritin 103, iron 52, TIBC 289 low, iron saturation 18% low,, kappa lambda ratio 0.9, SPEP M protein  0.6, creatinine 1.84,  · 4/5/2021: WBC 8.7, hemoglobin 9.4, platelets 141  · 5/4/2021: Erythropoietin 40,000 units, hemoglobin 8.9,Platelets 81,  · 5/22/2021: Hemoglobin 8.6, platelets 77  · 5/27/2021: Hemoglobin 7.6, platelets 55, creatinine 1.6  · 5/28/2021: Erythropoietin 20,000 units,  · 6/15/2021: Erythropoietin 40,000 units, hemoglobin 7.2, platelets 52  · 7/6/2021: Erythropoietin 40,000 units, WBC 3.3, hemoglobin 6.3, platelets 56  · 7/20/2021: Erythropoietin 40,000 units, hemoglobin 7.8, platelets 44,000  · 7/26/2021: WBC 4.1, hemoglobin 6.9, platelets 95     2.   Elevated LFTs diagnosis established March 2018.  Biliary duct dilation diagnosis established June 2018.   · 11/30/17 - Maia phos 93 (32-91), total bili 0.7 (0.3-1.2), AST 37 (15-41), ALT 25 (15-41).   · 3/1/18 - T-bili 0.5 (0.3-1.2), maia phos 106 (32-91), AST 54 (15-41), ALT 27 (17-63).   · 5/24/18 - AST 46 (15-41), maia phos 131 (32-91).   · 6/8/18 - CMP ordered by Dr. Alejandra Amaro showed maia phos 209 (32-91),  (15-41), ALT 84 (17-63), total bili 1.1 (0.3-1.2).             · 6/19/18 - CT abdomen and pelvis as well as serological workup for elevated LFT’s ordered. Alpha-1 antitrypsin 144 (). Ceruloplasmin 38 (22-58). AFP 3 (0-9).  Hepatitis panel non-reactive.   · 6/22/18 - CT abdomen and pelvis showed abnormal intra and extrahepatic biliary dilation. There was mild distention of the gallbladder and evidence of several gallstones. Bilateral non-obstructing kidney stone was present. Incidental sigmoid diverticulosis.   · 7/2/18 - Patient underwent MRCP at [facility] showing markedly dilated intrahepatic and extrahepatic bile duct with multiple small filling defects within the distal common bile duct compatible with choledocholithiasis. There was a focal 6 mm segmental narrowing at the distal CBD with recommended GI consult for ERCP and brushings.   · 7/5/18 to 7/10/18 - On 7/5/18 labs revealed normal total bilirubin (0.8),  AST 69 (15-41), maia phos 152 (32-91), ALT was normal at 37 (17-63), ammonia was elevated at 86 (9-35), lipase was normal (22). Patient hospitalized at [facility] due to weakness. Workup revealed cholelithiasis. On 7/9/18 patient underwent ERCP with bilateral sphincterotomy, common duct stone extraction, biliary brushing and stent placement by Dr. Leiva. Path on brushings was negative for malignancy. There was intra and extrahepatic biliary ductal dilation with relatively abrupt distal common bile duct cutoff and non-visualization of the gallbladder. He was started on Lovenox and sequential compression devices due to risk of pulmonary embolism.  of 33 (0-35).  On 7/10/18 LFT’s revealed total bili 0.9 (0.3-1.2). Maia phos 129 (32-91). AST 50 (15-41), ALT 41 (17-63).     · 8/31/18 - EUS by Dr. Gomez at [facility] revealing suspect benign biliary stricture due to CBD stone with FNA pathology revealing benign and atypical ductal cells, bile and proteinaceous material including scattered bacteria, neutrophils and degenerating cells. The atypia was felt mild and favored to be reactive in nature. Plan was to repeat ERCP with removal of the stent and the stone with consideration of common bile duct evaluation with cholangioscopy at that time.   · 10/12/18 - ERCP with sphincteroplasty and stone extraction done by  JUAN PABLO Russo for choledocholithiasis.   · 2/23/19 - Patient underwent ERCP with balloon clearance of bile duct by Memo  · 3/6/19 - LFT’s not elevated with bilirubin 1.1, AST 31, ALT 14, maia phos was mildly elevated at 101 (32-91).   · 4/25/19 - 4/26/19 - Admitted to [facility] for hepatic encephalopathy and hypokalemia.   · 4/27/2019 - CT abdomen pelvis showed a 4.8 cm air-fluid collection adjacent to the right posterior hepatic lobe significant improved.  Right-sided chest tube small right pleural effusion.  Hepatic cirrhosis.  Bilateral nonobstructing renal stones.  Sigmoid diverticulosis.   · 5/26/19 - 5/29/2019 -  Admitted to [facility] for hepatic and cephalopathy.  Ammonia level 213 (H).  · 7/11/19 - AFP 2.76 (0-9).   · 9/26/19 - CT scan abdomen showed fluid collection posterior to the right hepatic lobe has significantly diminished in size with only trace fluid remaining. Small locules of air are seen within this collection which could be related to recent shunt mentation or could be related to tiny fistula from the splenic flexure of the colon. Trace right pleural fluid, diminished since 4/27/19. Thickened, likely reactive, pleural rind remains. Mild right basilar atelectasis. Abnormal intrahepatic and extra hepatic biliary ductal dilation. Intrahepatic biliary dilation is new since 4/27/19. The CBD appears attenuated in its mid segment, raising the possibility of stricture. ERCP recommended. Uncomplicated colonic diverticulosis. Non-obstructing bilateral renal stones and probable small right renal cyst. Cirrhotic liver morphology. No focal liver lesion is seen.  · 10/28/19 - CT scan abdomen and pelvis showed fluid collection posterior to the right hepatic lobe appears slightly increased in size now measuring 24 mm in diameter, again a small droplet of gas adjacent to this fluid collection is seen which may represent fistula. Right basilar small pleural effusion and atelectasis remain. Continued dilatation of the intra and extrahepatic biliary ductal system which appears stable from previous exam. Non-obstructing bilateral renal calculi. Changes of cirrhosis and splenomegaly. Diverticulosis. Mild interval change from prior study with increasing fluid collection posterior to the right hepatic lobe.   · 3/22/2021: LFTs normal,     3.   Right lower extremity DVT and bilateral pulmonary emboli diagnosed 2004.   · 2004 - Patient claims to have developed right lower extremity DVT with bilateral pulmonary emboli in 2004 and was coumadinized. The blood clots were thought secondary to his sedentary lifestyle. He stayed  on the Coumadin up until October 2017 when, due to difficulty maintaining his INR’s, he was switched to Xarelto 20 mg daily by Paulette Harris M.D. which was later dropped to 10 mg daily.    · 11/26/18 - EGD by Dave Russo M.D. revealed erosive gastritis and bleeding ampulla. Ampullary biopsy revealed reactive/reparative small intestinal mucosa with mild chronic inflammation. Gastric antrum biopsy was suggestive of focal mild reactive gastropathy. Due to erosive gastritis, patient asked to hold Xarelto and Aspirin by Dave Russo M.D.   · 11/27/18 - Patient asked to discontinue Xarelto and hold Aspirin while obtaining IVC filter. Risks discussed. Factor V Leiden gene mutation and prothrombin gene mutations not detected. Anticardiolipin IgM 11 (<11). Anti beta-2 glyco, IgA 55 (<20). Factor VIII activity 186 (). Antithrombin III activity 63 (). Protein C activity 69 (), protein S activity 69 ().       · 12/5/18 - Patient underwent transjugular IVC filter placement in IR by  Jonn Cuenca M.D.   · 1/2/19 - Told patient that his low protein CNS and antithrombin III likely due to liver disease. He would be at a high risk of going back on Xarelto and hence continued on Aspirin 81 mg p.o. daily.   · 1/3/19 - D-dimer 1.25 (<0.45).    · 3/8/19 - Chest x-ray showed chronic changes in the chest with no obvious pneumonia or congestive changes.  · 4/3/19 - Chest x-ray with right pleural effusion and basilar lung density with slight increase from prior. Cardiomegaly.   · 4/22/19 - Factor VIII 334 (H), Anti-phosphatidylserine antibody IgM> 80 (H), Anti-phosphatidylserine antibody IgG 12 (N), Cardiolipin IgG 21 (N), Cardiolipin IgM 55 (H), Cardiolipin IgA 63 (H), Beta-2 glycoprotein IgG 4 (N), IgA 91 (H), and IgM 21 (H).   · 7/11/19 - Chest x-ray showed stable small right pleural effusion since 04/25/2019. Minimal right costophrenic angle atelectasis.    4.  Recurrent episodes of hepatic  encephalopathy on treatment with lactulose 30 cc 4 times a day.  · Recurrent GI blood loss on anticoagulation.    Past Medical History:   Diagnosis Date   • Anemia     iron deficiency   • Anxiety    • B12 deficiency 2009   • Balance disorder    • Syed's esophagus    • Bile duct leak    • CAD (coronary artery disease)     cardiac stent   • Constipation    • DDD (degenerative disc disease), lumbar    • Decubitus ulcer     buttocks   • Depression    • Diabetes mellitus (CMS/HCC)    • Disease of thyroid gland     hypothyroid   • Diverticular disease    • Dvt femoral (deep venous thrombosis) (CMS/HCC) 2004    rt let   • Elevated cholesterol    • Fistula of intestine    • Gastroparesis    • GERD (gastroesophageal reflux disease)    • Gout    • Hepatic encephalopathy (CMS/HCC)     if doesnt take meds   • History of echocardiogram     03/03/2017 - 12/03/2014 - 04/2012   • History of stomach ulcers    • History of transfusion    • Hyperlipidemia    • Incontinence    • Iron deficiency    • Kidney stones    • Morbid obesity (CMS/HCC)    • THOMAS (nonalcoholic steatohepatitis)    • Neuropathy    • OAB (overactive bladder)    • Open wound     rt knee   • KATHIA treated with BiPAP     bring dos   • Peripheral edema    • Pulmonary embolus (CMS/HCC) 2004   • Renal insufficiency     stage 3   • S/P lumbar laminectomy    • Skin irritation     skin fold   • Stage 3 chronic kidney disease (CMS/HCC)        Past Surgical History:   Procedure Laterality Date   • ABDOMINAL SURGERY     • BACK SURGERY  1971   • BILE DUCT STENT PLACEMENT     • BILE DUCT STENT PLACEMENT     • CARDIAC CATHETERIZATION     • CATARACT EXTRACTION  2014   • CATARACT EXTRACTION, BILATERAL  2014   • CHOLECYSTECTOMY  02/24/2019   • COLON RESECTION Right 6/11/2020    Procedure: COLON RESECTION RIGHT;  Surgeon: Naif Etienne DO;  Location: [facility] OR;  Service: General;  Laterality: Right;   • COLONOSCOPY  03/2018   • COLONOSCOPY N/A 5/10/2021    Procedure:  COLONOSCOPY with polypectomy x 1;  Surgeon: Marisel Gomez MD;  Location: [facility] ENDOSCOPY;  Service: Gastroenterology;  Laterality: N/A;  diverticulosis, internal/external hemorrhoid, polyp   • CORONARY ANGIOPLASTY  08/24/2009    PCI stent - succesful placement of 3.5/23 promus stent in proximal right coronary artery   • CORONARY ANGIOPLASTY WITH STENT PLACEMENT  08/27/2009    patient had stent placed to proximal right coronary artery   • CYSTOSCOPY BLADDER STONE LITHOTRIPSY  1997   • ENDOSCOPY N/A 10/18/2019    Procedure: ESOPHAGOGASTRODUODENOSCOPY with argon plasma coagulation of gastric antral vascular ectasia;  Surgeon: Marisel Gomez MD;  Location: [facility] ENDOSCOPY;  Service: Gastroenterology   • ENDOSCOPY N/A 1/9/2020    Procedure: ESOPHAGOGASTRODUODENOSCOPY WITH BIOPSY, ARGON PLASMA COAGULATION OF GASTRIC ANTREL VASCULAR ECTASIA,;  Surgeon: Marisel Gomez MD;  Location: [facility] ENDOSCOPY;  Service: Gastroenterology   • ENDOSCOPY N/A 3/6/2020    Procedure: ESOPHAGOGASTRODUODENOSCOPY;  Surgeon: Zbigniew Silva MD;  Location: [facility] ENDOSCOPY;  Service: Gastroenterology;  Laterality: N/A;  mild gave, post cautery ulcer     • ENDOSCOPY N/A 5/10/2021    Procedure: ESOPHAGOGASTRODUODENOSCOPY WITH BIOPSY X 1, argon plasma coagulation;  Surgeon: Marisel Gomez MD;  Location: [facility] ENDOSCOPY;  Service: Gastroenterology;  Laterality: N/A;  portal hypertensive gastropathy, Syed's esophagus, gastric antral vascular ectasia   • ERCP N/A 11/20/2019    Procedure: ERCP, clearance of bile duct with balloon, placement of biliary stent, EGD with argon plasma coagulation of gastric antral vascular ectasia;  Surgeon: Marisel Gomez MD;  Location: [facility] ENDOSCOPY;  Service: Gastroenterology   • ERCP N/A 2/27/2020    Procedure: ENDOSCOPIC RETROGRADE CHOLANGIOPANCREATOGRAPHY with removal of biliary stent, brushing, dilation, and placement of biliary stent x 2 and Esophagogastroduodenoscopy with argon plasma coagulation;   Surgeon: Marisel Gomez MD;  Location: [facility] ENDOSCOPY;  Service: Gastroenterology;  Laterality: N/A;  Gastric antral vascular ectasia, common bile duct stricture   • ERCP N/A 4/20/2020    Procedure: ENDOSCOPIC RETROGRADE CHOLANGIOPANCREATOGRAPHY WITH REMOVAL OF BILIARY STENT, AMPULA DILATION TO 8MM, BRUSHING OF COMMON BILE DUT, CLEARANCE OF BILE DUCT WITH BALLOON, BIOPSY OF AMPULA, PLACEMENT OF BILIARY STENT;  Surgeon: Marisel Gomez MD;  Location: [facility] ENDOSCOPY;  Service: Gastroenterology;  Laterality: N/A;  POST:CBD STRICTURE   • ERCP N/A 11/12/2020    Procedure: ENDOSCOPIC RETROGRADE CHOLANGIOPANCREATOGRAPHY WITH BALLOON CLEARANCE OF COMMON BILE DUCT, BRUSHING OF COMMON BILE DUCT STRICTURE, BIOPSY X 1 AREA, PLACEMENT OF BILIARY STENT;  Surgeon: Marisel Gomez MD;  Location: [facility] ENDOSCOPY;  Service: Gastroenterology;  Laterality: N/A;  Post op:REMOVAL OF SLUDGE, SUCCESSFUL STENT PLACEMENT   • OTHER SURGICAL HISTORY  11/2018    IVC filter implant   • RENAL ARTERY STENT Left     does not recall this   • UPPER ENDOSCOPIC ULTRASOUND W/ FNA N/A 4/20/2020    Procedure: Esophagogastroduodenoscopy with Endoscopic ultrasound and fine needle aspiration;  Surgeon: Marisel Gomez MD;  Location: [facility] ENDOSCOPY;  Service: Gastroenterology;  Laterality: N/A;  Post: CBD STRICTURE   • VENA CAVA FILTER INSERTION  12/05/2018         Current Outpatient Medications:   •  allopurinol (ZYLOPRIM) 100 MG tablet, Take 100 mg by mouth 2 (Two) Times a Day., Disp: , Rfl:   •  ascorbic acid (VITAMIN C) 500 MG tablet, Take 500 mg by mouth Daily., Disp: , Rfl:   •  aspirin 81 MG tablet, Take 1 tablet by mouth Daily., Disp: 30 tablet, Rfl: 3  •  budesonide (PULMICORT) 0.5 MG/2ML nebulizer solution, Take 2 mL by nebulization 2 (Two) Times a Day., Disp: 30 each, Rfl: 0  •  Carbomer Gel Base (Hydrogel) gel, , Disp: , Rfl:   •  cyanocobalamin 1000 MCG/ML injection, Inject 1,000 mcg into the appropriate muscle as directed by prescriber Every  28 (Twenty-Eight) Days., Disp: , Rfl:   •  docusate sodium (COLACE) 100 MG capsule, Take 100 mg by mouth 2 (Two) Times a Day., Disp: , Rfl:   •  DULoxetine (CYMBALTA) 60 MG capsule, Take 60 mg by mouth Daily. Take preop, Disp: , Rfl:   •  ezetimibe (Zetia) 10 MG tablet, Take 1 tablet by mouth Daily. (Patient taking differently: Take 10 mg by mouth Every Evening.), Disp: 30 tablet, Rfl: 11  •  ferrous gluconate (FERGON) 324 MG tablet, Take  by mouth Daily., Disp: , Rfl:   •  folic acid (FOLVITE) 1 MG tablet, Take 1 mg by mouth Daily. Last dose 6/6, Disp: , Rfl:   •  Hydroactive Dressings (AQUAFLO HYDROGEL WOUND DRESS EX), Apply  topically., Disp: , Rfl:   •  hydrOXYzine pamoate (VISTARIL) 25 MG capsule, Take 25 mg by mouth 3 (Three) Times a Day As Needed. Take preop if needed, Disp: , Rfl: 0  •  insulin glargine (Lantus) 100 UNIT/ML injection, Inject 30 Units under the skin into the appropriate area as directed Every Night., Disp: 10 mL, Rfl: 0  •  insulin lispro (HumaLOG) 100 UNIT/ML injection, 20 units subcu before each meal plus sliding scale MDD 60, Disp: 60 mL, Rfl: 4  •  ipratropium-albuterol (DUO-NEB) 0.5-2.5 mg/3 ml nebulizer, Take 3 mL by nebulization Every 4 (Four) Hours As Needed for Shortness of Air., Disp: , Rfl:   •  lactulose (CHRONULAC) 10 GM/15ML solution, Take 45 mL by mouth 3 (Three) Times a Day. Will not take dos am (Patient taking differently: Take 30 g by mouth 3 (Three) Times a Day.), Disp: , Rfl:   •  levothyroxine (SYNTHROID, LEVOTHROID) 75 MCG tablet, Take 1 tablet by mouth Daily., Disp: 90 tablet, Rfl: 4  •  magnesium oxide (MAG-OX) 400 MG tablet, Take 400 mg by mouth Daily. dont take preop, Disp: , Rfl:   •  metoprolol succinate XL (TOPROL-XL) 25 MG 24 hr tablet, Take 1 tablet by mouth Daily., Disp: 90 tablet, Rfl: 1  •  Multiple Vitamins-Minerals (MULTIVITAMIN WITH MINERALS) tablet tablet, Take 1 tablet by mouth Daily., Disp: , Rfl:   •  nitroglycerin (NITROSTAT) 0.4 MG SL tablet, Place 1  "tablet under the tongue Every 5 (Five) Minutes As Needed for Chest Pain (Only if SBP Greater Than 100). Take no more than 3 doses in 15 minutes. (Patient taking differently: Place 0.4 mg under the tongue Every 5 (Five) Minutes As Needed for Chest Pain (Only if SBP Greater Than 100). Take no more than 3 doses in 15 minutes. hasnt needed), Disp: 30 tablet, Rfl: 12  •  Nutritional Supplements (Adrian) pack, Take 1 packet by mouth 2 (Two) Times a Day., Disp: 60 each, Rfl: 0  •  oxyCODONE (ROXICODONE) 10 MG tablet, Take 10 mg by mouth 3 (Three) Times a Day As Needed for Moderate Pain ., Disp: , Rfl:   •  pantoprazole (PROTONIX) 40 MG EC tablet, Take 1 tablet by mouth 2 (Two) Times a Day. (Patient taking differently: Take 40 mg by mouth Daily.), Disp: 30 tablet, Rfl: 3  •  rifaximin (XIFAXAN) 550 MG tablet, Take 550 mg by mouth Every 12 (Twelve) Hours. Take preop, Disp: , Rfl:   •  sodium bicarbonate 650 MG tablet, Take 650 mg by mouth 3 (Three) Times a Day. Take preop, Disp: , Rfl:   •  zinc sulfate (ZINCATE) 220 (50 Zn) MG capsule, Take 220 mg by mouth Daily. dont take dos, Disp: , Rfl:     No Known Allergies    Family History   Problem Relation Age of Onset   • Hyperlipidemia Other    • Hypertension Other        Cancer-related family history is not on file.    Social History     Tobacco Use   • Smoking status: Never Smoker   • Smokeless tobacco: Never Used   Vaping Use   • Vaping Use: Never used   Substance Use Topics   • Alcohol use: No   • Drug use: No       HPI, ROS and PFSH have been reviewed and confirmed on 7/26/2021.     SUBJECTIVE:  Hemoglobin has been trending really low.  He had a transfusion on 7/8/2021 and also on 7/15/2021.      REVIEW OF SYSTEMS:    OBJECTIVE:  Vitals:    07/26/21 1416   BP: 125/68   Pulse: 97   Resp: 14   Temp: 99 °F (37.2 °C)   Weight: Comment: unable to stand   Height: 188 cm (74\")   PainSc:   3   PainLoc: Back     Body mass index is 45.96 kg/m².    ECOG  Eastern Cooperative Oncology " Group (ECOG, [place], World Health Organization) performance scale  0 Fully active; no performance restrictions.  1 Strenuous physical activity restricted; fully ambulatory and able to carry out light work.  2 Capable of all self-care but unable to carry out any work activities. Up and about >50% of waking hours.  3 Capable of only limited self-care; confined to bed or chair >50% of waking hours.  4 Completely disabled; cannot carry out any self-care; totally confined to bed or chair.    Physical Exam  Vitals reviewed.   Constitutional:       General: He is not in acute distress.     Appearance: Normal appearance. He is well-developed. He is obese. He is ill-appearing. He is not diaphoretic.      Comments: Morbidly obese   HENT:      Head: Normocephalic and atraumatic.      Nose: Nose normal.      Mouth/Throat:      Mouth: Mucous membranes are dry.      Pharynx: No oropharyngeal exudate.         Pulmonary:      Effort: Pulmonary effort is normal. No respiratory distress.      Breath sounds: Normal breath sounds.   Abdominal:      General: Bowel sounds are normal.      Palpations: Abdomen is soft.      Tenderness: There is no abdominal tenderness. There is no guarding.      Comments: Lower abdominal surgical scar.   Musculoskeletal:         Using a walker  Neurological:      General: No focal deficit present.      Mental Status: He is alert and oriented to person, place, and time.      Sensory: No sensory deficit.      Gait: Gait abnormal ( uses rolling walker ).   Psychiatric:         Mood and Affect: Mood normal.         Behavior: Behavior normal.         Thought Content: Thought content normal.         Judgment: Judgment normal.     I have reexamined the patient and the results are consistent with the previously documented exam. Deon Guajardo MD     RECENT LABS    Lab Results - Last 18 Months   Lab Units 07/26/21  1351 07/20/21  1057 07/13/21  1216   WBC 10*3/mm3 4.11 4.17 3.30*   HEMOGLOBIN g/dL 6.9* 7.8* 7.0*    HEMATOCRIT % 22.0* 24.8* 21.5*   PLATELETS 10*3/mm3 95* 44* 52*   MCV fL 107.8* 106.0* 105.4*     Lab Results - Last 18 Months   Lab Units 05/27/21  0839 05/25/21  0705 05/24/21  1359 05/24/21  0709 05/24/21  0709   SODIUM mmol/L 138 139  --   --  137   POTASSIUM mmol/L 4.0 4.0  --   --  3.8   CHLORIDE mmol/L 107 106  --   --  105   CO2 mmol/L 20.0* 20.0*  --   --  21.0*   BUN mg/dL 31* 31*  --   --  30*   CREATININE mg/dL 1.61* 1.86*  --   --  1.55*   CALCIUM mg/dL 8.1* 7.8*  --   --  8.0*   BILIRUBIN mg/dL 0.4 0.5 0.6   < > 1.0   ALK PHOS U/L 90 100  --   --  105   ALT (SGPT) U/L 12 15  --   --  14   AST (SGOT) U/L 15 23  --   --  21   GLUCOSE mg/dL 143* 172*  --   --  165*    < > = values in this interval not displayed.       Lab Results   Component Value Date    GLUCOSE 143 (H) 05/27/2021    BUN 31 (H) 05/27/2021    CREATININE 1.61 (H) 05/27/2021    EGFRIFNONA 42 (L) 05/27/2021    BCR 19.3 05/27/2021    K 4.0 05/27/2021    CO2 20.0 (L) 05/27/2021    CALCIUM 8.1 (L) 05/27/2021    PROTENTOTREF 7.2 03/22/2021    ALBUMIN 2.80 (L) 05/27/2021    LABIL2 0.7 03/22/2021    AST 15 05/27/2021    ALT 12 05/27/2021     Lab Results   Component Value Date    IRON 25 (L) 05/24/2021    TIBC 179 (L) 05/24/2021    FERRITIN 348.40 05/24/2021     Lab Results   Component Value Date    FOLATE >20.00 05/24/2021     No results found for: OCCULTBLD  Lab Results   Component Value Date    RETICCTPCT 7.63 (H) 10/16/2019     Lab Results   Component Value Date    RLOATWZH85 1,558 (H) 05/24/2021    TSH 1.110 05/23/2021     Lab Results   Component Value Date    SPEP Comment 03/22/2021     LDH   Date Value Ref Range Status   05/24/2021 176 135 - 225 U/L Final     Uric Acid   Date Value Ref Range Status   03/04/2020 8.0 (H) 3.4 - 7.0 mg/dL Final     Lab Results   Component Value Date    CAROLINA  07/06/2018     NEGATIVE This result is designed to aid in the diagnosis of many of the    CAROLINA  07/06/2018     systemic autoimmune disorders and is not  diagnostic by itself.  Test results    CAROLINA  07/06/2018     should be interpreted in conjunction with the clinical evaluation of the    CAROLINA  07/06/2018     patient.  SLE patients undergoing steroid therapy may have negative results.    SEDRATE >120 (H) 03/04/2020     Lab Results   Component Value Date    FIBRINOGEN 624 (H) 11/12/2020    HAPTOGLOBIN 88 05/24/2021    DDIMER 3.41 (H) 03/05/2021     Lab Results   Component Value Date    DDIMER 3.41 (H) 03/05/2021     Lab Results   Component Value Date    PTT 23.5 (L) 05/27/2021    INR 0.99 05/27/2021     No results found for:   No results found for: CEA  Lab Results   Component Value Date     13.2 03/06/2020     Lab Results   Component Value Date    PSA 0.041 01/30/2020     No results found for: [id number]      Assessment/Plan     There are no diagnoses linked to this encounter.  ASSESSMENT:       1. Multifactorial anemia: Due to GI bleeding, chronic kidney disease.  Patient does have a history of GAVE, hemorrhoids etc..  For his anemia of stage 3 chronic kidney disease: On Retacrit.  Requiring multiple transfusions  2. IgG kappa MGUS:  SPEP on 11/2/2020 showed a M protein of 0.7 g/dL..  No hypercalcemia.  Skeletal survey -was negative June 2018.  More recent SPEP shows monoclonal protein 0.6 g/dL.  3. History of Vitamin B12 deficiency  4. Iron deficiency/chronic disease.  5. History of DVT of right lower extremity  6. Severe portal hypertension gastropathy/G AVE's/stent in the second part of duodenum.  7. History of bilateral pulmonary embolus (PE): Status post IVC filter..  Patient had difficulty with anticoagulation.  8. 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  Some of these may be false positive.  9. Antiphospholipid antibody positive: cannot be on anticoagulation given his GI bleed history  10. Elevated factor VIII level  11. GAVE (gastric antral vascular ectasia) status post EGD recently  12. THOMAS (nonalcoholic  steatohepatitis): Followed by Dr. Gomez at [facility]  13. Splenomegaly  14. Chronically elevated alkaline phosphatase: Status post ERCP.  No malignancy..  Reviewed the findings.     PLAN:     1. Hemoglobin has been running very low.  Platelets recently dropped to 44,000.  Bone marrow biopsy would be helpful to ensure there is no coexisting issue going on.  2. Will need 2 units of PRBC.  3. We will continue Procrit 40,000 units every 3 weeks if hemoglobin is less than 10.  4. Continue B12 injections monthly at home   5. We will need to repeat protein C, protein S and Antithrombin III levels  6. Continue pain medication for DJD of spine -patient is not a surgical candidate.  7. Patient will need urgent EGD, transfusions as well as bone marrow biopsy.  He is not feeling well at all.  Looks very weak.  Risk of falls.  We will therefore admit the patient to the hospital ASAP to get all these things done.  I did discuss with Dr. Archibald regarding directly admitted the patient.    We will order ferritin, iron panel, B12, folate, reticulocyte count, haptoglobin, SPEP, LDH,, today.  We will order GI consult in the hospital.       Patient verbalized understanding and is in agreement of the above plan.    I have reviewed and confirmed the accuracy of the patient's history: Chief complaint, HPI, ROS and Subjective as entered by the MA/LPN/RN. Deon Guajardo MD 07/26/21       Electronically signed by Deon Guajardo MD, 07/26/21, 3:21 PM EDT.

## 2021-07-27 NOTE — ANESTHESIA POSTPROCEDURE EVALUATION
Patient: Bry Ratliff    Procedure Summary     Date: 07/27/21 Room / Location: [facility] / [facility]    Anesthesia Start: 1122 Anesthesia Stop: 1139    Procedure: ESOPHAGOGASTRODUODENOSCOPY (N/A ) Diagnosis:       Symptomatic anemia      (Symptomatic anemia [D64.9])    Surgeons: DEV Joseph MD Provider: Caio Ward MD    Anesthesia Type: MAC ASA Status: 4          Anesthesia Type: MAC    Vitals  Vitals Value Taken Time   /76 07/27/21 1206   Temp     Pulse 91 07/27/21 1209   Resp 25 07/27/21 1156   SpO2 100 % 07/27/21 1209   Vitals shown include unvalidated device data.        Post Anesthesia Care and Evaluation    Patient location during evaluation: PACU  Patient participation: complete - patient participated  Level of consciousness: awake  Pain scale: See nurse's notes for pain score.  Pain management: adequate  Airway patency: patent  Anesthetic complications: No anesthetic complications  PONV Status: none  Cardiovascular status: acceptable  Respiratory status: acceptable  Hydration status: acceptable    Comments: Patient seen and examined postoperatively; vital signs stable; SpO2 greater than or equal to 90%; cardiopulmonary status stable; nausea/vomiting adequately controlled; pain adequately controlled; no apparent anesthesia complications; patient discharged from anesthesia care when discharge criteria were met

## 2021-07-27 NOTE — ANESTHESIA PREPROCEDURE EVALUATION
Anesthesia Evaluation     Patient summary reviewed and Nursing notes reviewed   NPO Solid Status: > 8 hours  NPO Liquid Status: > 8 hours           Airway   Mallampati: II  TM distance: >3 FB  Neck ROM: full  No difficulty expected  Dental - normal exam     Pulmonary - normal exam   (+) pulmonary embolism, sleep apnea,   Cardiovascular - normal exam    ECG reviewed    (+) hypertension, CAD, DVT resolved, hyperlipidemia,     ROS comment: Hx protein S, C, ATIII deficiency. IVC filter.    TTE 05/2021: nl EF.  Stress test 05/2021:  Interpretation Summary  Findings consistent with an equivocal ECG stress test.  ·Left ventricular ejection fraction is normal. (Calculated EF = 55%).  ·Impressions are consistent with a low risk study.  ·Inferolateral and inferoapical mostly fixed defect with more prominent in the stress pictures especially in the inferoapical area Small to medium inferoapical ischemia cannot be excluded EF 55% clinical correlation is recommended.          Neuro/Psych- negative ROS  GI/Hepatic/Renal/Endo    (+) morbid obesity, GERD, PUD,  hepatitis, liver disease fatty liver disease, renal disease CRI, diabetes mellitus poorly controlled,     ROS Comment: Cirrhosis    Musculoskeletal     (+) back pain,   Abdominal  - normal exam    Bowel sounds: normal.   Substance History - negative use     OB/GYN negative ob/gyn ROS         Other   arthritis, blood dyscrasia anemia thrombocytopenia,                   Anesthesia Plan    ASA 4     MAC     intravenous induction     Anesthetic plan, all risks, benefits, and alternatives have been provided, discussed and informed consent has been obtained with: patient.    Plan discussed with CRNA and CAA.

## 2021-07-30 NOTE — TELEPHONE ENCOUNTER
Left v/m asking pt to c/b to change his appt to 8/5 w/ [facility] from 8/9 per NP Swathi. Stated that if pt was able to do this please call so we could change for Hosp f/up

## 2021-08-02 NOTE — CASE MANAGEMENT/SOCIAL WORK
Case Management Discharge Note      Final Note: Neelam         Selected Continued Care - Discharged on 7/31/2021 Admission date: 7/26/2021 - Discharge disposition: Rehab Facility or Unit (DC - External)    Destination Coordination complete.    Service Provider Selected Services Address Phone Fax Patient Preferred    [facility]  Skilled Nursing [address] [place] [address] [place] [address] [phone] --                    Selected Continued Care - Prior Encounters Includes selections from prior encounters from 4/27/2021 to 7/31/2021    Discharged on 5/28/2021 Admission date: 5/22/2021 - Discharge disposition: Home-Health Care Svc    Home Medical Care     Service Provider Selected Services Address Phone Fax Patient Preferred    [facility]  [facility] [address] [facility] [place] [address] [phone] --                         Final Discharge Disposition Code: 03 - skilled nursing facility (SNF)   Private car

## 2021-08-03 NOTE — TELEPHONE ENCOUNTER
Caller: MARIO    Relationship: [employer]OUSE    Best call back number: [phone] OK TO LEAVE VM MSG     What is the best time to reach you: ANYTIME    Who are you requesting to speak with (clinical staff, provider,  specific staff member): SCHEDULING    Do you know the name of the person who called: MADELIN    What was the call regarding: BARRERA IS IN REHAB AND SHE STATES WE CALLED TO SEE IF WE COULD SEE PT ON 8/5 INSTEAD OF 8/9    Do you require a callback: YES         normal

## 2021-08-03 NOTE — TELEPHONE ENCOUNTER
Spoke to the rehab facility where the patient currently resides and they have requested that the holter monitor be taken to the the [place] [facility] and they will get the patient there to have it put on .

## 2021-08-03 NOTE — TELEPHONE ENCOUNTER
Called and spoke with patients wife. Patient is currently in rehab and she is going there today. She states that either she or the facility will call me back today to see when they should come  the holter monitor.

## 2021-08-05 PROBLEM — E53.8 B12 DEFICIENCY: Status: ACTIVE | Noted: 2021-01-01

## 2021-08-05 NOTE — PROGRESS NOTES
Patients wife states that according to  today that he(patient) is to get his shot(Retacrit) today. Contacted Carla Chaidez assistant of patient had this Retacrit injection last week and his Retacrit plan is for every 2 weeks. Carla MACE.  assistant states that patient will start receiving his cbc and Retacrit weekly starting now.

## 2021-08-05 NOTE — PROGRESS NOTES
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 Anemia diagnosed 2018 and IgG kappa monoclonal gammopathy diagnosed May 2018.  Anemia related to chronic renal failure 3  · This patient is an obese  male who  developed kidney problems in 2018 for which he was referred to Devi Plascencia M.D. He was found to have a hemoglobin of 7.4 at that time and was given 1 unit of packed red blood cells. He was then referred to GI where he had been followed for Syed’s esophagus for a number of years. An EGD and colonoscopy was performed on 3/5/18 by Marisel Gomez M.D. revealing mucosa suggestive of Syed’s esophagus and hiatal hernia in the cardia. Patient had a poor prep compromising the colonoscopy exam though the scope did reach the cecum. Esophageal biopsy revealed squamocolumnar mucosa with extensive intestinal metaplasia consistent with Syed’s esophagus, negative for dysplasia. Colonoscopy was recommended to be repeated in two years and EGD in three years. The patient saw his primary care physician, Paulette Harris M.D., in followup and blood testing was done on 18 revealing WBC 6.3, hemoglobin 8.4, MCV 84.5, platelet count 182,000. Vitamin B12 level was 416 (180-914) and patient was referred here for further evaluation. The patient claims to have been diagnosed with Vitamin B12 deficiency a number of years  ago for which he has been on Vitamin B12 injections up until six months ago when he just stopped taking those. He has been recently started on oral iron supplementation. He claims not to see any blood in his urine but does have microscopic hematuria for which he sees a urologist. He denies nosebleeds, gum bleeds or blood in the stool. He has not had any significant changes in his weight. He feels weak and dizzy for a number of years which has recently gotten worse. He does not ambulate much because of back surgery causing him weakness. He did have a right lower extremity DVT with bilateral pulmonary emboli in 2004 since which time he has been on Coumadin, thought secondary to a sedentary lifestyle. He has no family history of anemias.   · 5/24/18 - Patient seen initial consultation for anemia. Hemoglobin 7.9, MCV 89.9. Ferritin 17 (), iron 183 (), TIBC 379 (228-428), iron saturation 48% (20-50), retic 2.16% (0.5-1.5), haptoglobin 138 (), folate 9.1 (5.9-24.8). SPEP showed monoclonal gammopathy. SIFE showed IgG kappa monoclonal gammopathy. M-spike in the gamma region 0.7 g/dL. Erythropoietin 53.5 (2.59-18.5). Antiparietal cell antibody and intrinsic factor antibodies negative.   Globulin 4.2 (2.5-3.8). Creatinine 1.4 (0.7-1.2).   · 6/19/18 - Discussed results of anemia workup. Hemoglobin improving. Ferrous Sulfate decreased to 325 mg twice a day. Will perform gammopathy workup for IgG kappa monoclonal gammopathy. IgA 783 (), IgG 1480 (600-1500), IgM 93 (). Kappa lambda FLC ratio 0.81 (0.26-1.65). Ferritin 36 ().        · 6/25/18 - Bone survey negative for acute disease. No evidence of lytic or blastic metastatic bone disease was present. Chest x-ray showed cardiomegaly, mild right basilar atelectasis and findings suggestive of ankylosing spondylitis.   · 6/29/18 - Patient underwent sternal bone marrow revealing monoclonal gammopathy of undetermined significant (MGUS). Extent of  bone marrow involvement 5%. Phenotype kappa positive, IgG positive, CD38 positive, CD20 negative, CD19 negative, CD45 negative, CD56 negative and  negative. Dyspoiesis was not seen. There was absence of iron stores. Normal male karyotype. Normal FISH study. Flow cytometry revealed IgG kappa restrictive plasma cells in a polytypic background. There was a mixed population of maturing myeloid cells, B-cells and T-cells. No abnormal myeloid maturation was seen. A monoclonal IgG kappa plasma cell population was present. 4% plasma cells present.   · 8/23/18 - Labs by Dr. Plascencia revealed B12 of 857 (180-914), folate 12.7 (5.9-24.8), iron 127 ().      · 9/19/18 - Iron 94 (), TIBC 297 (228-428), iron saturation 32 (20-50), ferritin 29 (). Erythropoietin 30.04 (2.59-18.5).        · 10/16/18 - Iron 177 (), TIBC 320 (228-428), iron saturation 55 (20-50), ferritin 34 ().       · 11/16/18 - Hemoglobin 7.1. Patient given 1 unit of packed red blood cells.   · 11/21/18 - Ferritin 27 (). Hemoglobin 7.9. Patient given 1 unit of packed red blood cells.    · 11/26/18 - EGD by Dave Russo M.D. revealed erosive gastritis and bleeding ampulla. There was oozing upon entering the stomach in many different areas. Duodenal mucosa and the esophagus were normal.   · 11/27/18 - Hemoglobin 8.2. Order written for Procrit 30,000 units subq weekly, not approved by insurance for low ferritin. Urine JERRY with no definite monoclonal gammopathy identified with questionable faint IgG kappa.   · 12/18/18 - Hemoglobin 9.9. Injectafer 750 mg IV given.   · 1/2/19 - Injectafer 750 mg IV given.  Hemoglobin 11, .1. Patient claims that he is getting Vitamin B12 injections monthly at home through Dr. Harris. Currently taking Ferrous Sulfate 325 mg p.o. b.i.d. and asked to continue that. Asked to continue Pantoprazole 40 mg daily that he is taking. IgA 651 (), IgG 1470 (600-1500), IgM 94 ().       · 2/12/19 - Iron 88 ().    · 3/6/19 - WBC 19.8 with 83% neutrophils, 5% lymphocytes, 11% monocytes, hemoglobin 8.7, , platelet count 182,000. Patient status post laparoscopic cholecystectomy on 2/24/19 with large ecchymoses apparent on abdomen. Infectious workup ordered and Injectafer 750 mg IV days 1 and 8 ordered.  Iron 23 (), TIBC 153 (228-428), iron saturation 15% (20-50), ferritin 400 (224-336).       · 3/12/19 - WBC 15.02, hemoglobin 8.1 and platelets 227,000. Patient reported hematuria followed by Dr. Starkey. Orders written to start Injectafer ASAP. Abdominal ecchymosis improving.   · 3/14/19 - Injectafer 750 mg IV given.   · 4/22/19 - Hemoglobin 9.5, . Patient missed day 8 Injectafer in March and it was rescheduled. SIFE showed IgG kappa monoclonal gammopathy.  · 5/8/19 - Injectafer 750 mg IV given.   · 7/8/19 - Iron 56 (). Iron Saturation 36 (20-50%). Transferrin 110 (180-330). TIBC 154 (228-428). Ferritin 1,199 ().    · 8/7/2019-hemoglobin 10.0.  Patient taking multivitamin with iron only.  Restarted ferrous sulfate 325mg by mouth twice a day. UIFE showed free kappa light chain identified.   · 10/16/2019 patient hospitalized at [facility] for 3 days and found to have a hemoglobin of 4.8 at the cancer center visit.  Stool Hemoccult was positive.  He ended up receiving 5 units of packed red blood cells.  EGD by Marisel Gomez MD revealed severe gastric antral vascular ectasia with oozing, Syed's esophagus and hiatal hernia.  The patient was treated with PPI and Carafate.  Plan was to repeat EGD with cauterization in 6 weeks.  B12 and reticulocyte count were high.  Haptoglobin and folic acid were normal.  Creatinine 2.2 (0.76-1.27), iron 47 (), TIBC 264 (298-5 0.36), iron saturation 18 (20-50), ferritin 128.4 ().  Aspirin was held temporarily and patient given IV iron.  IgA 720 (), IgM 72 (), IgG 1489 (700-1600).  · EGD done secondary to GI bleed on  1/9/2020 1.  Moderately severe gastric antral vascular ectasia APC applied for cauterization 2.  Short segment of Syed's biopsy taken. 3.  Small hiatal hernia.  · 1/17/20 Iron 42, Iron saturation 16, TIBC 264.  · 1/3/2020 patient was initiated on weekly Procrit  · 3/31/2020 iron profile iron 53 iron saturation 22 iron binding capacity 241 ferritin 229.  · 10/19/2020 WBC 8.18, hemoglobin 9.2, hematocrit 29.8, MCV 97.1  · 11/2/2020 WBC 7.7, hemoglobin 8.4, hematocrit 27.1, platelets 285,000  · 11/12/2020: ERCP: Impression:   1.  Gastric antral vascular ectasia.  2.  Distal CBD stricture status post of dilation, brushing cytology.  Biopsy was also performed for major papula which looks slightly enlarged.  It may be related to previous stenting versus early adenomatous changes. 3.  Removal of sludge and gravel and placement of fully covered 10 Telugu and 6 cm metal stent.  · Surgical pathology: Mucosa, common bile duct, biopsy:    Acute and chronically inflamed small bowel type mucosa with glandular architectural distortion    No dysplasia or malignancy identified     · 11/27/2020: CT abdomen pelvis with contrast:Cirrhotic liver morphology with evidence of portal venous hypertension including venous varices and splenomegaly.    · 11/2/2020 patient received Procrit 40,000 units  · 11/2/2020: Ferritin 67.5, iron 89, iron saturation 29%, TIBC 302, B12 greater than 2000, folate greater than 20, SPEP shows monoclonal protein measuring 0.7 g/dL, IgA 936 high, IgG IgM normal, free kappa lambda ratio normal, creatinine 1.43, alk phos 499 high,  high, ALT 95,  · 11/16/2020 patient received Procrit 40,000 units  · 11/30/2020: Procrit 40,000 units  · 12/14/2020 WBC 8.8, hemoglobin 10.1, platelets 194  · 12/22/2020: Injectafer 750  · 3/22/2021: Retacrit 40,000 units, hemoglobin 9.2, creatinine 1.8, platelets 139, iron saturation 18%, ferritin 103, iron 52, TIBC 289 low, iron saturation 18% low,, kappa lambda ratio 0.9,  SPEP M protein 0.6, creatinine 1.84,  · 4/5/2021: WBC 8.7, hemoglobin 9.4, platelets 141  · 5/4/2021: Erythropoietin 40,000 units, hemoglobin 8.9,Platelets 81,  · 5/22/2021: Hemoglobin 8.6, platelets 77  · 5/27/2021: Hemoglobin 7.6, platelets 55, creatinine 1.6  · 5/28/2021: Erythropoietin 20,000 units,  · 6/15/2021: Erythropoietin 40,000 units, hemoglobin 7.2, platelets 52  · 7/6/2021: Erythropoietin 40,000 units, WBC 3.3, hemoglobin 6.3, platelets 56  · 7/20/2021: Erythropoietin 40,000 units, hemoglobin 7.8, platelets 44,000  · 7/26/2021: WBC 4.1, hemoglobin 6.9, platelets 95  · 7/29/2021: Bone marrow aspirate and biopsy: Persistent myeloid neoplasm with 4-5% myeloid blasts.  Mildly hypercellular marrow 40 to 50% with multilineage dyspoiesis including proliferation of atypical megakaryocytes.  Diffuse mild to focally moderate increase in reticulin grade 1-2  Storage iron present.     2.   Elevated LFTs diagnosis established March 2018.  Biliary duct dilation diagnosis established June 2018.   · 11/30/17 - Maia phos 93 (32-91), total bili 0.7 (0.3-1.2), AST 37 (15-41), ALT 25 (15-41).   · 3/1/18 - T-bili 0.5 (0.3-1.2), maia phos 106 (32-91), AST 54 (15-41), ALT 27 (17-63).   · 5/24/18 - AST 46 (15-41), maia phos 131 (32-91).   · 6/8/18 - CMP ordered by Dr. Alejandra Amaro showed maia phos 209 (32-91),  (15-41), ALT 84 (17-63), total bili 1.1 (0.3-1.2).             · 6/19/18 - CT abdomen and pelvis as well as serological workup for elevated LFT’s ordered. Alpha-1 antitrypsin 144 (). Ceruloplasmin 38 (22-58). AFP 3 (0-9).  Hepatitis panel non-reactive.   · 6/22/18 - CT abdomen and pelvis showed abnormal intra and extrahepatic biliary dilation. There was mild distention of the gallbladder and evidence of several gallstones. Bilateral non-obstructing kidney stone was present. Incidental sigmoid diverticulosis.   · 7/2/18 - Patient underwent MRCP at [facility] showing markedly dilated intrahepatic  and extrahepatic bile duct with multiple small filling defects within the distal common bile duct compatible with choledocholithiasis. There was a focal 6 mm segmental narrowing at the distal CBD with recommended GI consult for ERCP and brushings.   · 7/5/18 to 7/10/18 - On 7/5/18 labs revealed normal total bilirubin (0.8), AST 69 (15-41), maia phos 152 (32-91), ALT was normal at 37 (17-63), ammonia was elevated at 86 (9-35), lipase was normal (22). Patient hospitalized at [facility] due to weakness. Workup revealed cholelithiasis. On 7/9/18 patient underwent ERCP with bilateral sphincterotomy, common duct stone extraction, biliary brushing and stent placement by Dr. Leiva. Path on brushings was negative for malignancy. There was intra and extrahepatic biliary ductal dilation with relatively abrupt distal common bile duct cutoff and non-visualization of the gallbladder. He was started on Lovenox and sequential compression devices due to risk of pulmonary embolism.  of 33 (0-35).  On 7/10/18 LFT’s revealed total bili 0.9 (0.3-1.2). Maia phos 129 (32-91). AST 50 (15-41), ALT 41 (17-63).     · 8/31/18 - EUS by Dr. Gomez at [facility] revealing suspect benign biliary stricture due to CBD stone with FNA pathology revealing benign and atypical ductal cells, bile and proteinaceous material including scattered bacteria, neutrophils and degenerating cells. The atypia was felt mild and favored to be reactive in nature. Plan was to repeat ERCP with removal of the stent and the stone with consideration of common bile duct evaluation with cholangioscopy at that time.   · 10/12/18 - ERCP with sphincteroplasty and stone extraction done by  JUAN PABLO Russo for choledocholithiasis.   · 2/23/19 - Patient underwent ERCP with balloon clearance of bile duct by Memo  · 3/6/19 - LFT’s not elevated with bilirubin 1.1, AST 31, ALT 14, maia phos was mildly elevated at 101 (32-91).   · 4/25/19 - 4/26/19 - Admitted to [facility] for hepatic  encephalopathy and hypokalemia.   · 4/27/2019 - CT abdomen pelvis showed a 4.8 cm air-fluid collection adjacent to the right posterior hepatic lobe significant improved.  Right-sided chest tube small right pleural effusion.  Hepatic cirrhosis.  Bilateral nonobstructing renal stones.  Sigmoid diverticulosis.   · 5/26/19 - 5/29/2019 - Admitted to [facility] for hepatic and cephalopathy.  Ammonia level 213 (H).  · 7/11/19 - AFP 2.76 (0-9).   · 9/26/19 - CT scan abdomen showed fluid collection posterior to the right hepatic lobe has significantly diminished in size with only trace fluid remaining. Small locules of air are seen within this collection which could be related to recent shunt mentation or could be related to tiny fistula from the splenic flexure of the colon. Trace right pleural fluid, diminished since 4/27/19. Thickened, likely reactive, pleural rind remains. Mild right basilar atelectasis. Abnormal intrahepatic and extra hepatic biliary ductal dilation. Intrahepatic biliary dilation is new since 4/27/19. The CBD appears attenuated in its mid segment, raising the possibility of stricture. ERCP recommended. Uncomplicated colonic diverticulosis. Non-obstructing bilateral renal stones and probable small right renal cyst. Cirrhotic liver morphology. No focal liver lesion is seen.  · 10/28/19 - CT scan abdomen and pelvis showed fluid collection posterior to the right hepatic lobe appears slightly increased in size now measuring 24 mm in diameter, again a small droplet of gas adjacent to this fluid collection is seen which may represent fistula. Right basilar small pleural effusion and atelectasis remain. Continued dilatation of the intra and extrahepatic biliary ductal system which appears stable from previous exam. Non-obstructing bilateral renal calculi. Changes of cirrhosis and splenomegaly. Diverticulosis. Mild interval change from prior study with increasing fluid collection posterior to the right  hepatic lobe.   · 3/22/2021: LFTs normal,     3.   Right lower extremity DVT and bilateral pulmonary emboli diagnosed 2004.   · 2004 - Patient claims to have developed right lower extremity DVT with bilateral pulmonary emboli in 2004 and was coumadinized. The blood clots were thought secondary to his sedentary lifestyle. He stayed on the Coumadin up until October 2017 when, due to difficulty maintaining his INR’s, he was switched to Xarelto 20 mg daily by Paulette Harris M.D. which was later dropped to 10 mg daily.    · 11/26/18 - EGD by Dave Russo M.D. revealed erosive gastritis and bleeding ampulla. Ampullary biopsy revealed reactive/reparative small intestinal mucosa with mild chronic inflammation. Gastric antrum biopsy was suggestive of focal mild reactive gastropathy. Due to erosive gastritis, patient asked to hold Xarelto and Aspirin by Dave Russo M.D.   · 11/27/18 - Patient asked to discontinue Xarelto and hold Aspirin while obtaining IVC filter. Risks discussed. Factor V Leiden gene mutation and prothrombin gene mutations not detected. Anticardiolipin IgM 11 (<11). Anti beta-2 glyco, IgA 55 (<20). Factor VIII activity 186 (). Antithrombin III activity 63 (). Protein C activity 69 (), protein S activity 69 ().       · 12/5/18 - Patient underwent transjugular IVC filter placement in IR by  Jonn Cuenca M.D.   · 1/2/19 - Told patient that his low protein CNS and antithrombin III likely due to liver disease. He would be at a high risk of going back on Xarelto and hence continued on Aspirin 81 mg p.o. daily.   · 1/3/19 - D-dimer 1.25 (<0.45).    · 3/8/19 - Chest x-ray showed chronic changes in the chest with no obvious pneumonia or congestive changes.  · 4/3/19 - Chest x-ray with right pleural effusion and basilar lung density with slight increase from prior. Cardiomegaly.   · 4/22/19 - Factor VIII 334 (H), Anti-phosphatidylserine antibody IgM> 80 (H),  Anti-phosphatidylserine antibody IgG 12 (N), Cardiolipin IgG 21 (N), Cardiolipin IgM 55 (H), Cardiolipin IgA 63 (H), Beta-2 glycoprotein IgG 4 (N), IgA 91 (H), and IgM 21 (H).   · 7/11/19 - Chest x-ray showed stable small right pleural effusion since 04/25/2019. Minimal right costophrenic angle atelectasis.    4.  Recurrent episodes of hepatic encephalopathy on treatment with lactulose 30 cc 4 times a day.  · Recurrent GI blood loss on anticoagulation.    Past Medical History:   Diagnosis Date   • Anemia     iron deficiency   • Anxiety    • B12 deficiency 2009   • Balance disorder    • Syed's esophagus    • Bile duct leak    • CAD (coronary artery disease)     cardiac stent   • Constipation    • DDD (degenerative disc disease), lumbar    • Decubitus ulcer     buttocks   • Depression    • Diabetes mellitus (CMS/HCC)    • Disease of thyroid gland     hypothyroid   • Diverticular disease    • Dvt femoral (deep venous thrombosis) (CMS/HCC) 2004    rt let   • Elevated cholesterol    • Fistula of intestine    • Gastroparesis    • GERD (gastroesophageal reflux disease)    • Gout    • Hepatic encephalopathy (CMS/HCC)     if doesnt take meds   • History of echocardiogram     03/03/2017 - 12/03/2014 - 04/2012   • History of stomach ulcers    • History of transfusion    • Hyperlipidemia    • Incontinence    • Iron deficiency    • Kidney stones    • Morbid obesity (CMS/HCC)    • THOMAS (nonalcoholic steatohepatitis)    • Neuropathy    • OAB (overactive bladder)    • Open wound     rt knee   • KATHIA treated with BiPAP     bring dos   • Peripheral edema    • Pulmonary embolus (CMS/HCC) 2004   • Renal insufficiency     stage 3   • S/P lumbar laminectomy    • Skin irritation     skin fold   • Stage 3 chronic kidney disease (CMS/HCC)        Past Surgical History:   Procedure Laterality Date   • ABDOMINAL SURGERY     • BACK SURGERY  1971   • BILE DUCT STENT PLACEMENT     • BILE DUCT STENT PLACEMENT     • CARDIAC CATHETERIZATION     •  CATARACT EXTRACTION  2014   • CATARACT EXTRACTION, BILATERAL  2014   • CHOLECYSTECTOMY  02/24/2019   • COLON RESECTION Right 6/11/2020    Procedure: COLON RESECTION RIGHT;  Surgeon: Naif Etienne DO;  Location: [facility] MAIN OR;  Service: General;  Laterality: Right;   • COLONOSCOPY  03/2018   • COLONOSCOPY N/A 5/10/2021    Procedure: COLONOSCOPY with polypectomy x 1;  Surgeon: Marisel Gomez MD;  Location: [facility] ENDOSCOPY;  Service: Gastroenterology;  Laterality: N/A;  diverticulosis, internal/external hemorrhoid, polyp   • CORONARY ANGIOPLASTY  08/24/2009    PCI stent - succesful placement of 3.5/23 promus stent in proximal right coronary artery   • CORONARY ANGIOPLASTY WITH STENT PLACEMENT  08/27/2009    patient had stent placed to proximal right coronary artery   • CYSTOSCOPY BLADDER STONE LITHOTRIPSY  1997   • ENDOSCOPY N/A 10/18/2019    Procedure: ESOPHAGOGASTRODUODENOSCOPY with argon plasma coagulation of gastric antral vascular ectasia;  Surgeon: Marisel Gomez MD;  Location: [facility] ENDOSCOPY;  Service: Gastroenterology   • ENDOSCOPY N/A 1/9/2020    Procedure: ESOPHAGOGASTRODUODENOSCOPY WITH BIOPSY, ARGON PLASMA COAGULATION OF GASTRIC ANTREL VASCULAR ECTASIA,;  Surgeon: Marisel Gomez MD;  Location: [facility] ENDOSCOPY;  Service: Gastroenterology   • ENDOSCOPY N/A 3/6/2020    Procedure: ESOPHAGOGASTRODUODENOSCOPY;  Surgeon: Zbigniew Silva MD;  Location: [facility] ENDOSCOPY;  Service: Gastroenterology;  Laterality: N/A;  mild gave, post cautery ulcer     • ENDOSCOPY N/A 5/10/2021    Procedure: ESOPHAGOGASTRODUODENOSCOPY WITH BIOPSY X 1, argon plasma coagulation;  Surgeon: Marisel Gomez MD;  Location: [facility] ENDOSCOPY;  Service: Gastroenterology;  Laterality: N/A;  portal hypertensive gastropathy, Syed's esophagus, gastric antral vascular ectasia   • ENDOSCOPY N/A 7/27/2021    Procedure: ESOPHAGOGASTRODUODENOSCOPY;  Surgeon: DEV Joseph MD;  Location: [facility] ENDOSCOPY;  Service: Gastroenterology;   Laterality: N/A;  post op: portal hypertensive gastropathy, Syed's esophagus   • ERCP N/A 11/20/2019    Procedure: ERCP, clearance of bile duct with balloon, placement of biliary stent, EGD with argon plasma coagulation of gastric antral vascular ectasia;  Surgeon: Marisel Gomez MD;  Location: [facility] ENDOSCOPY;  Service: Gastroenterology   • ERCP N/A 2/27/2020    Procedure: ENDOSCOPIC RETROGRADE CHOLANGIOPANCREATOGRAPHY with removal of biliary stent, brushing, dilation, and placement of biliary stent x 2 and Esophagogastroduodenoscopy with argon plasma coagulation;  Surgeon: Marisel Gomez MD;  Location: [facility] ENDOSCOPY;  Service: Gastroenterology;  Laterality: N/A;  Gastric antral vascular ectasia, common bile duct stricture   • ERCP N/A 4/20/2020    Procedure: ENDOSCOPIC RETROGRADE CHOLANGIOPANCREATOGRAPHY WITH REMOVAL OF BILIARY STENT, AMPULA DILATION TO 8MM, BRUSHING OF COMMON BILE DUT, CLEARANCE OF BILE DUCT WITH BALLOON, BIOPSY OF AMPULA, PLACEMENT OF BILIARY STENT;  Surgeon: Marisel Gomez MD;  Location: [facility] ENDOSCOPY;  Service: Gastroenterology;  Laterality: N/A;  POST:CBD STRICTURE   • ERCP N/A 11/12/2020    Procedure: ENDOSCOPIC RETROGRADE CHOLANGIOPANCREATOGRAPHY WITH BALLOON CLEARANCE OF COMMON BILE DUCT, BRUSHING OF COMMON BILE DUCT STRICTURE, BIOPSY X 1 AREA, PLACEMENT OF BILIARY STENT;  Surgeon: Marisel Gomez MD;  Location: [facility] ENDOSCOPY;  Service: Gastroenterology;  Laterality: N/A;  Post op:REMOVAL OF SLUDGE, SUCCESSFUL STENT PLACEMENT   • OTHER SURGICAL HISTORY  11/2018    IVC filter implant   • RENAL ARTERY STENT Left     does not recall this   • UPPER ENDOSCOPIC ULTRASOUND W/ FNA N/A 4/20/2020    Procedure: Esophagogastroduodenoscopy with Endoscopic ultrasound and fine needle aspiration;  Surgeon: Marisel Gomez MD;  Location: [facility] ENDOSCOPY;  Service: Gastroenterology;  Laterality: N/A;  Post: CBD STRICTURE   • VENA CAVA FILTER INSERTION  12/05/2018         Current Outpatient  Medications:   •  acetaminophen (TYLENOL) 325 MG tablet, Take 2 tablets by mouth Every 6 (Six) Hours As Needed for Mild Pain ., Disp: , Rfl:   •  allopurinol (ZYLOPRIM) 100 MG tablet, Take 100 mg by mouth 2 (Two) Times a Day., Disp: , Rfl:   •  ascorbic acid (VITAMIN C) 500 MG tablet, Take 500 mg by mouth Daily., Disp: , Rfl:   •  budesonide (PULMICORT) 0.5 MG/2ML nebulizer solution, Take 2 mL by nebulization 2 (Two) Times a Day., Disp: 30 each, Rfl: 0  •  Carbomer Gel Base (Hydrogel) gel, , Disp: , Rfl:   •  cyanocobalamin 1000 MCG/ML injection, Inject 1,000 mcg into the appropriate muscle as directed by prescriber Every 28 (Twenty-Eight) Days., Disp: , Rfl:   •  docusate sodium 100 MG capsule, Take 1 capsule by mouth 2 (Two) Times a Day As Needed for Constipation., Disp: , Rfl:   •  DULoxetine (CYMBALTA) 60 MG capsule, Take 1 capsule by mouth Daily., Disp: , Rfl:   •  ezetimibe (ZETIA) 10 MG tablet, TAKE 1 TABLET BY MOUTH DAILY, Disp: 30 tablet, Rfl: 11  •  ferrous gluconate (FERGON) 324 MG tablet, Take  by mouth Daily., Disp: , Rfl:   •  folic acid (FOLVITE) 1 MG tablet, Take 1 tablet by mouth Daily., Disp: , Rfl:   •  Hydroactive Dressings (AQUAFLO HYDROGEL WOUND DRESS EX), Apply  topically., Disp: , Rfl:   •  insulin lispro (ADMELOG) 100 UNIT/ML injection, Inject 20 Units under the skin into the appropriate area as directed 3 (Three) Times a Day With Meals., Disp: , Rfl: 12  •  ipratropium-albuterol (DUO-NEB) 0.5-2.5 mg/3 ml nebulizer, Take 3 mL by nebulization Every 4 (Four) Hours As Needed for Shortness of Air., Disp: , Rfl:   •  lactulose (CHRONULAC) 10 GM/15ML solution, Take 75 mL by mouth 4 (Four) Times a Day., Disp: , Rfl:   •  levOCARNitine (CARNITOR) 1 GM/10ML solution, Take 5 mL by mouth Every 12 (Twelve) Hours., Disp: , Rfl:   •  levothyroxine (SYNTHROID, LEVOTHROID) 75 MCG tablet, Take 1 tablet by mouth Every Morning., Disp: , Rfl:   •  magnesium oxide (MAG-OX) 400 tablet tablet, Take 1 tablet by  mouth Daily., Disp: , Rfl:   •  Multiple Vitamins-Minerals (MULTIVITAMIN WITH MINERALS) tablet tablet, Take 1 tablet by mouth Daily., Disp: , Rfl:   •  nadolol (CORGARD) 20 MG tablet, Take 1 tablet by mouth Daily., Disp: , Rfl:   •  nitroglycerin (NITROSTAT) 0.4 MG SL tablet, Place 1 tablet under the tongue Every 5 (Five) Minutes As Needed for Chest Pain (Only if SBP Greater Than 100). Take no more than 3 doses in 15 minutes. (Patient taking differently: Place 0.4 mg under the tongue Every 5 (Five) Minutes As Needed for Chest Pain (Only if SBP Greater Than 100). Take no more than 3 doses in 15 minutes. hasnt needed), Disp: 30 tablet, Rfl: 12  •  Nutritional Supplements (Adrian) pack, Take 1 packet by mouth 2 (Two) Times a Day., Disp: 60 each, Rfl: 0  •  oxyCODONE (ROXICODONE) 10 MG tablet, Take 10 mg by mouth 3 (Three) Times a Day As Needed for Moderate Pain ., Disp: , Rfl:   •  pantoprazole (PROTONIX) 40 MG EC tablet, Take 1 tablet by mouth 2 (Two) Times a Day. (Patient taking differently: Take 40 mg by mouth Daily.), Disp: 30 tablet, Rfl: 3  •  riFAXIMin (Xifaxan) 550 MG tablet, Take 1 tablet by mouth Every 12 (Twelve) Hours. Indications: Impaired Brain Function due to Liver Disease, Disp: , Rfl:   •  sodium bicarbonate 650 MG tablet, Take 2 tablets by mouth 3 (Three) Times a Day., Disp: , Rfl:   •  zinc sulfate (ZINCATE) 220 (50 Zn) MG capsule, Take 1 capsule by mouth Daily., Disp: , Rfl:     No Known Allergies    Family History   Problem Relation Age of Onset   • Hyperlipidemia Other    • Hypertension Other        Cancer-related family history is not on file.    Social History     Tobacco Use   • Smoking status: Never Smoker   • Smokeless tobacco: Never Used   Vaping Use   • Vaping Use: Never used   Substance Use Topics   • Alcohol use: No   • Drug use: No       HPI, ROS and PFSH have been reviewed and confirmed on 8/5/2021.     SUBJECTIVE:    Patient looks better than last week.  He is sitting on a wheel chair.   "He is currently at a rehab center.  He was recently in the hospital and required transfusions.        REVIEW OF SYSTEMS:    OBJECTIVE:  Vitals:    08/05/21 1046   BP: 136/94   Pulse: 84   Resp: 18   Temp: 97.3 °F (36.3 °C)   Weight: (!) 171 kg (376 lb)   Height: 188 cm (74\")   PainSc:   7   PainLoc: Back     Body mass index is 48.28 kg/m².    ECOG  Eastern Cooperative Oncology Group (ECOG, [place], World Health Organization) performance scale  0 Fully active; no performance restrictions.  1 Strenuous physical activity restricted; fully ambulatory and able to carry out light work.  2 Capable of all self-care but unable to carry out any work activities. Up and about >50% of waking hours.  3 Capable of only limited self-care; confined to bed or chair >50% of waking hours.  4 Completely disabled; cannot carry out any self-care; totally confined to bed or chair.    Physical Exam  Vitals reviewed.   Constitutional:       General: He is not in acute distress.     Appearance: Normal appearance. He is well-developed. He is obese. He is ill-appearing. He is not diaphoretic.      Comments: Morbidly obese better than last week.  Sitting in chair.  HENT:      Head: Normocephalic and atraumatic.      Nose: Nose normal.      Mouth/Throat:      Mouth: Mucous membranes are dry.      Pharynx: No oropharyngeal exudate.       Bruises noted on the skin.  Pulmonary:      Effort: Pulmonary effort is normal. No respiratory distress.      Breath sounds: Normal breath sounds.   Abdominal:      General: Bowel sounds are normal.      Palpations: Abdomen is soft.      Tenderness: There is no abdominal tenderness. There is no guarding.      Comments: Lower abdominal surgical scar.   Musculoskeletal:         Using a walker  Neurological:      General: No focal deficit present.      Mental Status: He is alert and oriented to person, place, and time.      Sensory: No sensory deficit.      Gait: Gait abnormal ( uses rolling walker ).   Psychiatric:   "       Mood and Affect: Mood normal.         Behavior: Behavior normal.         Thought Content: Thought content normal.         Judgment: Judgment normal.     I have reexamined the patient and the results are consistent with the previously documented exam. Deon Guajardo MD     RECENT LABS    Lab Results - Last 18 Months   Lab Units 08/05/21  1039 07/31/21  0444 07/30/21  1617 07/30/21  0327 07/30/21  0327   WBC 10*3/mm3 3.46 3.00*  --   --  3.20*   HEMOGLOBIN g/dL 7.9* 8.2* 8.4*   < > 7.5*   HEMATOCRIT % 25.2* 24.9* 25.3*   < > 23.5*   PLATELETS 10*3/mm3 73* 67*  --   --  64*   MCV fL 106.8* 100.0*  --   --  102.1*    < > = values in this interval not displayed.     Lab Results - Last 18 Months   Lab Units 07/31/21  0444 07/30/21  0327 07/29/21  0404   SODIUM mmol/L 144 144 143   POTASSIUM mmol/L 4.6 4.8 4.9   CHLORIDE mmol/L 113* 114* 114*   CO2 mmol/L 23.0 19.0* 20.0*   BUN mg/dL 39* 42* 40*   CREATININE mg/dL 2.22* 2.26* 2.15*   CALCIUM mg/dL 8.4* 8.1* 8.2*   BILIRUBIN mg/dL 0.7 0.4 0.5   ALK PHOS U/L 141* 125* 137*   ALT (SGPT) U/L 13 11 12   AST (SGOT) U/L 28 21 22   GLUCOSE mg/dL 135* 122* 155*       Lab Results   Component Value Date    GLUCOSE 135 (H) 07/31/2021    BUN 39 (H) 07/31/2021    CREATININE 2.22 (H) 07/31/2021    EGFRIFNONA 29 (L) 07/31/2021    BCR 17.6 07/31/2021    K 4.6 07/31/2021    CO2 23.0 07/31/2021    CALCIUM 8.4 (L) 07/31/2021    PROTENTOTREF 7.2 07/26/2021    ALBUMIN 3.40 (L) 07/31/2021    LABIL2 0.8 07/26/2021    AST 28 07/31/2021    ALT 13 07/31/2021     Lab Results   Component Value Date    IRON 85 07/26/2021    IRON 84 07/26/2021    TIBC 221 (L) 07/26/2021    TIBC 225 (L) 07/26/2021    FERRITIN 504.00 (H) 07/26/2021    FERRITIN 509.50 (H) 07/26/2021     Lab Results   Component Value Date    FOLATE >20.00 07/26/2021     No results found for: OCCULTBLD  Lab Results   Component Value Date    RETICCTPCT 7.63 (H) 10/16/2019     Lab Results   Component Value Date    MHTAPAPX56 1,223 (H)  07/26/2021    TSH 3.340 07/29/2021     Lab Results   Component Value Date    SPEP Comment 07/26/2021     LDH   Date Value Ref Range Status   05/24/2021 176 135 - 225 U/L Final     Uric Acid   Date Value Ref Range Status   07/28/2021 5.4 3.4 - 7.0 mg/dL Final     Lab Results   Component Value Date    CAROLINA  07/06/2018     NEGATIVE This result is designed to aid in the diagnosis of many of the    CAROLINA  07/06/2018     systemic autoimmune disorders and is not diagnostic by itself.  Test results    CAROLINA  07/06/2018     should be interpreted in conjunction with the clinical evaluation of the    CAROLINA  07/06/2018     patient.  SLE patients undergoing steroid therapy may have negative results.    SEDRATE >120 (H) 03/04/2020     Lab Results   Component Value Date    FIBRINOGEN 624 (H) 11/12/2020    HAPTOGLOBIN 59 07/26/2021    DDIMER 3.41 (H) 03/05/2021     Lab Results   Component Value Date    DDIMER 3.41 (H) 03/05/2021     Lab Results   Component Value Date    PTT 27.9 07/28/2021    INR 1.08 07/28/2021     No results found for:   No results found for: CEA  Lab Results   Component Value Date     13.2 03/06/2020     Lab Results   Component Value Date    PSA 0.041 01/30/2020     No results found for: HO3427      Assessment/Plan     Iron deficiency anemia, unspecified iron deficiency anemia type  - CBC & Differential    ASSESSMENT:       1. 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  Flow cytometry showed increased CD34 positive myeloid blast 3.5%.  Aberrant CD56 neutrophilic cells, and increased monocytic cells 13%.  He did not really show significant clonal plasma cells..  There is evidence of atypical myeloid maturation with increased atypical monocytes and mildly increased CD34+  blasts hypercellular marrow 40 to 50%, multilineage dyspoiesis including proliferation of atypical megakaryocytes.  Increase reticulin 1-2.  Blasts  4 to 5%.   2. Multifactorial anemia: Due to GI bleeding, chronic kidney disease.  Patient does have a history of GAVE, hemorrhoids etc..  For his anemia of stage 3 chronic kidney disease: On Retacrit.  Requiring multiple transfusions  3. IgG kappa MGUS:  SPEP on 11/2/2020 showed a M protein of 0.7 g/dL..  No hypercalcemia.  Skeletal survey -was negative June 2018.  More recent SPEP shows monoclonal protein 0.6 g/dL.  4. History of Vitamin B12 deficiency  5. Iron deficiency/chronic disease.  6. Status post upper GI endoscopy 7/27/2021 showing severe portal hypertensive gastropathy  PHT.  Also had Syed's esophagus.  There was evidence of some slow oozing from the portal hypertensive gastropathy  7. History of DVT of right lower extremity  8. Severe portal hypertension gastropathy/G AVE's/stent in the second part of duodenum.  9. History of bilateral pulmonary embolus (PE): Status post IVC filter..  Patient had difficulty with anticoagulation.  10. 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  Some of these may be false positive.  11. Antiphospholipid antibody positive: cannot be on anticoagulation given his GI bleed history  12. Elevated factor VIII level  13. GAVE (gastric antral vascular ectasia) status post EGD recently  14. THOMAS (nonalcoholic steatohepatitis): Followed by Dr. Gomez at [facility]  15. Splenomegaly  16. Chronically elevated alkaline phosphatase: Status post ERCP.  No malignancy..  Reviewed the findings.     PLAN:     1. Hemoglobin continues to remain low.  He did receive IV iron transfusions in the recent hospitalization.  Also had received Procrit.  I suspect he has a bone marrow disorder that is causing his worsening anemia.  2. Will order cytogenetics, myeloid NGS panel on the bone marrow.  Also will request for MDS FISH panel  3. Discussed potential diagnosis of hematological malignancy with the patient and wife.  He may need more transfusions.  4. Recommend Procrit  weekly at this point  5. Reviewed recent hospitalization records.  6. Continue B12 injections monthly   7. We are repeating protein C, protein S and Antithrombin III levels  8. Continue pain medication for DJD of spine -patient is not a surgical candidate.  9. Currently at rehab, continue physical therapy.       Patient verbalized understanding and is in agreement of the above plan.     I have spent greater than 40 minutes on patient chart, and that includes face to face time with patient. More than 50% of that time was spent in data review including review of labs, imaging, pathology ,formulating treatment/management/surveillance plan and documentation of record..      I have reviewed and confirmed the accuracy of the patient's history: Chief complaint, HPI, ROS and Subjective as entered by the MA/LPN/RN. Deon Guajardo MD 08/05/21       Electronically signed by Deon Guajardo MD, 07/26/21, 3:21 PM EDT.

## 2021-08-12 NOTE — PROGRESS NOTES
Notified Marcel N.P. of Hgb 7.9 today and Hgb 7.9 on 08/05/21 and no new orders received. Informed  Patient and his wife of no new orders and they are aware of patients next appointment.

## 2021-08-18 NOTE — TELEPHONE ENCOUNTER
Caller: MARIO    Relationship to patient: WIFE    Best call back number: [phone]    [facility] [phone]    Patient is needing: TO CANCEL 8- F/U, LAB AND INJECTION APPT. PT'S WIFE STATES [facility]. SHE WANTS TO KNOW IF PT CAN GET LABS DONE AT REHAB FACILITY. ALSO, SHE WANTS TO KNOW HIS RESULTS FROM HIS BONE MARROW BIOPSY.

## 2021-08-19 NOTE — TELEPHONE ENCOUNTER
Called and informed the patient's daughter and wife that Dr. Guajardo stated he would call them at the end of the day regarding the patient's current status. The wife wants the results to the patient's bone marrow biopsy; I stated Dr. Guajardo will explain during phone call. They stated that the patient is in a facility under hospice care at this time. They stated they understood and were accepting that  is going to call them. I provided Dr. uGajardo with the phone number and name of the patient and wife.

## 2021-08-19 NOTE — TELEPHONE ENCOUNTER
Patient's wife states patient is not able is at [facility] and is not going to be able to make today's appt.  Wife is requesting bone marrow results.  Call transferred to Flaquito Jack RN.

## 2021-08-20 NOTE — TELEPHONE ENCOUNTER
Caller: RENAN BAE    Relationship to patient: DAUGHTER    Best call back number: [phone]    Patient is needing: TO NOTIFY US TO CANCEL ANY FUTURE APPOINTMENTS. PT IS ON HOSPICE CARE.

## 2021-08-20 NOTE — TELEPHONE ENCOUNTER
Cassy wanted to notify Dr. You that patient is now under hospice care. They have cancelled his apts.

## (undated) DEVICE — PENCL HND ROCKRSWTCH HOLSTR EZ CLEAN TP CRD 10FT

## (undated) DEVICE — STAPLER, SKIN, 35W, A: Brand: MEDLINE INDUSTRIES, INC.

## (undated) DEVICE — PK ENDO GI 50

## (undated) DEVICE — BITEBLOCK ENDO W/STRAP 60F A/ LF DISP

## (undated) DEVICE — SUT SILK 3/0 SH CR8 30IN C017D

## (undated) DEVICE — 3M™ PATIENT PLATE, CORDED, SPLIT, LARGE, 40 PER CASE, 1179: Brand: 3M™

## (undated) DEVICE — APC PROBE 2200 A, SINGLE USE OD 2.3MM/6.9FR; L. 2.2M/7.2FT: Brand: ERBE

## (undated) DEVICE — ENSEAL 20 CM SHAFT, LARGE JAW: Brand: ENSEAL X1

## (undated) DEVICE — PK PROC TURNOVER

## (undated) DEVICE — GLV SURG BIOGEL SENSR LTX PF SZ7.5

## (undated) DEVICE — WIREGUIDED CYTOLOGY BRUSH: Brand: RX CYTOLOGY BRUSH

## (undated) DEVICE — FIAPC® PROBE W/ FILTER 2200 A OD 2.3MM/6.9FR; L 2.2M/7.2FT: Brand: ERBE

## (undated) DEVICE — PAPR PRNT PK SONY W RIBN UPC55

## (undated) DEVICE — DEV POSITION LK AND BIOP CAP SYS

## (undated) DEVICE — RETRIEVAL BALLOON CATHETER: Brand: EXTRACTOR™ PRO RX

## (undated) DEVICE — SPHINCTEROTOME: Brand: DREAMTOME™ RX 44

## (undated) DEVICE — COVER,MAYO STAND,STERILE: Brand: MEDLINE

## (undated) DEVICE — GLV SURG TRIUMPH LT PF LTX 7.5 STRL

## (undated) DEVICE — PK MAJ LAPAROTOMY 50

## (undated) DEVICE — SOL IRRIG H2O 1000ML STRL

## (undated) DEVICE — DEV INFL BALN BIG60 W/GAUGE 60ML

## (undated) DEVICE — CVR HNDL LT SURG ACCSSRY BLU STRL

## (undated) DEVICE — SINGLE-USE BIOPSY FORCEPS: Brand: RADIAL JAW 4

## (undated) DEVICE — THE CARR-LOCKE INJECTION NEEDLE IS A SINGLE USE, DISPOSABLE, FLEXIBLE SHEATH INJECTION NEEDLE USED FOR THE INJECTION OF VARIOUS TYPES OF MEDIA THROUGH FLEXIBLE ENDOSCOPES.

## (undated) DEVICE — DRN WND EVAC BULB 100CC

## (undated) DEVICE — CVR HNDL LT CAM LB54

## (undated) DEVICE — SYS BIOP SHARKCORE FNB W/NDL 25G

## (undated) DEVICE — ABDOMINAL BINDER: Brand: DEROYAL

## (undated) DEVICE — SPONGE,LAP,12"X12",XR,ST,5/PK,40PK/CS: Brand: MEDLINE

## (undated) DEVICE — GLV SURG SIGNATURE ESSENTIAL PF LTX SZ7.5

## (undated) DEVICE — DRSNG WND BORDR/ADHS NONADHR/GZ LF 4X4IN STRL

## (undated) DEVICE — BALLOON DILATATION CATHETER: Brand: HURRICANE™ RX

## (undated) DEVICE — TOWEL,OR,DSP,ST,WHITE,DLX,4/PK,20PK/CS: Brand: MEDLINE

## (undated) DEVICE — SUT ETHIB 1 CT1 30IN  X425H

## (undated) DEVICE — SUT ETHLN 2/0 PS 18IN 585H

## (undated) DEVICE — SPNG LAP PREWSH SFTPK 18X18IN STRL PK/5

## (undated) DEVICE — PROXIMATE RH ROTATING HEAD SKIN STAPLERS (35 WIDE) CONTAINS 35 STAINLESS STEEL STAPLES: Brand: PROXIMATE

## (undated) DEVICE — BLAKE SILICONE DRAIN, 19 FR ROUND, HUBLESS WITH 1/4" BENDABLE TROCAR: Brand: BLAKE

## (undated) DEVICE — SUT VIC 3/0 CTI 36IN J944H

## (undated) DEVICE — TUBING, SUCTION, 1/4" X 12', STRAIGHT: Brand: MEDLINE

## (undated) DEVICE — GOWN,REINFORCE,POLY,SIRUS,BREATH SLV,XLG: Brand: MEDLINE

## (undated) DEVICE — SNAR POLYP CAPTIVATOR HEX 27MM 240CM

## (undated) DEVICE — TP SXN YANKR BULB STRL

## (undated) DEVICE — FRCP GRSPR RESCUE COMBO 8MMX230CM